# Patient Record
Sex: MALE | Race: WHITE | NOT HISPANIC OR LATINO | Employment: FULL TIME | URBAN - METROPOLITAN AREA
[De-identification: names, ages, dates, MRNs, and addresses within clinical notes are randomized per-mention and may not be internally consistent; named-entity substitution may affect disease eponyms.]

---

## 2017-01-12 ENCOUNTER — INITIAL CONSULT (OUTPATIENT)
Dept: PSYCHIATRY | Facility: CLINIC | Age: 62
End: 2017-01-12
Payer: COMMERCIAL

## 2017-01-12 DIAGNOSIS — F33.1 MODERATE EPISODE OF RECURRENT MAJOR DEPRESSIVE DISORDER: ICD-10-CM

## 2017-01-12 PROCEDURE — 90792 PSYCH DIAG EVAL W/MED SRVCS: CPT | Mod: S$GLB,,, | Performed by: PSYCHIATRY & NEUROLOGY

## 2017-01-12 RX ORDER — SERTRALINE HYDROCHLORIDE 100 MG/1
TABLET, FILM COATED ORAL
Qty: 30 TABLET | Refills: 1 | Status: SHIPPED | OUTPATIENT
Start: 2017-01-12 | End: 2017-02-14 | Stop reason: SDUPTHER

## 2017-01-12 NOTE — PROGRESS NOTES
"Outpatient Psychiatry Initial Visit (MD/NP)    2017    Stepan Springer, a 61 y.o. male, presenting for initial evaluation visit. Met with patient.    Reason for Encounter: self-referral. Patient complains of depressison.    History of Present Illness: This 62 y/o with depression recurrence, 6 months of low moods, feelings of guilt/self-reproach, hopelessness & helplessness, intermittent SI which has been mostly passive, occasional fantasies about active suicide attempts, but never seriously considers action. Most recent depression is in context of loss of love relationship to a woman he met on internet, dated for some time then broke up with him & is now in another relationship. Continued to text & attempt to contact her (she generally doesn't respond) until about a week ago, stopped because he realizes it's time to "move on", that it's taking a toll on his mental health, but still feels tempted to do it. Continues to work despite symptoms, reports performance is "ok", though "not my best work".     PsychHx: symptoms began around [employer] . first treated for depression in early '10. Did IOP at [facility] in '10. Suffered a series of losses since including wife (), mother in law (), mother (), father () & loss of job as [employer]  due to his health problems (he's since changed fields, now works in tax preparation for [employer]). Has participated in psychotherapy including since he moved to [place], stopped due to loss of insurance (though reinsured, hasn't returned since most recent depression recurrence). 1 remote episode of suicidality without action.   MedHx: DM, had NSTEMI in , FRAN, CHF, CKD, cataracts   SubstanceHx: rare wine, but mostly avoids alcohol due to his health; no illicits or prescription drug abuse.   Social Hx: normal , birth, & developmental milestones. Grew up in [place], moved to [place] in .  (wife  in '10), & 2 year relationship ended in mid , though " he's continued to contact her until very recently. 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 Works full-time. Has 3 adopted kids (one of which is child of one of other kids).      Review Of Systems:     GENERAL:  No weight gain or loss  SKIN:  No rashes or lacerations  HEAD:  No headaches  EYES:  No exophthalmos, jaundice or blindness  EARS:  No dizziness, tinnitus or hearing loss  NOSE:  No changes in smell  MOUTH & THROAT:  No dyskinetic movements or obvious goiter  CHEST:  No shortness of breath, hyperventilation or cough  CARDIOVASCULAR:  No tachycardia or chest pain  ABDOMEN:  No nausea, vomiting, pain, constipation or diarrhea  URINARY:  No frequency, dysuria or sexual dysfunction  ENDOCRINE:  No polydipsia, polyuria  MUSCULOSKELETAL:  No pain or stiffness of the joints  NEUROLOGIC:  No weakness, sensory changes, seizures, confusion, memory loss, tremor or other abnormal movements    Current Evaluation:     Nutritional Screening: Considering the patient's height and weight, medications, medical history and preferences, should a referral be made to the dietitian? no    Constitutional  Vitals:  Most recent vital signs, dated less than 90 days prior to this appointment, were not reviewed.    There were no vitals filed for this visit.     General:  unremarkable, age appropriate     Musculoskeletal  Muscle Strength/Tone:  no tremor, no tic   Gait & Station:  non-ataxic     Psychiatric  Appearance: casually dressed & groomed;   Behavior: calm,   Cooperation: cooperative with assessment  Speech: normal rate, volume, tone  Thought Process: linear, goal-directed  Thought Content: No suicidal or homicidal ideation; no delusions  Affect: normal range  Mood: euthymic  Perceptions: No auditory or visual hallucinations  Level of Consciousness: alert throughout interview  Insight: fair  Cognition: Oriented to person, place, time, &  situation  Memory: no apparent deficits to general clinical interview; not formally assessed  Attention/Concentration: no apparent deficits to general clinical interview; not formally assessed  Fund of Knowledge: average by vocabulary/education      Relevant Elements of Neurological Exam: normal gait    Functioning in Relationships:  Spouse/partner:   Peers:   Employers:     Laboratory Data  No visits with results within 1 Month(s) from this visit.  Latest known visit with results is:    Lab Visit on 11/21/2016   Component Date Value Ref Range Status    Glucose 11/21/2016 111* 70 - 110 mg/dL Final    Sodium 11/21/2016 143  136 - 145 mmol/L Final    Potassium 11/21/2016 4.0  3.5 - 5.1 mmol/L Final    Chloride 11/21/2016 112* 95 - 110 mmol/L Final    CO2 11/21/2016 18* 23 - 29 mmol/L Final    BUN, Bld 11/21/2016 40* 6 - 20 mg/dL Final    Calcium 11/21/2016 9.4  8.7 - 10.5 mg/dL Final    Creatinine 11/21/2016 2.0* 0.5 - 1.4 mg/dL Final    Albumin 11/21/2016 3.7  3.5 - 5.2 g/dL Final    Phosphorus 11/21/2016 2.9  2.7 - 4.5 mg/dL Final    eGFR if  11/21/2016 41.0* >60 mL/min/1.73 m^2 Final    eGFR if non African American 11/21/2016 35.5* >60 mL/min/1.73 m^2 Final    Anion Gap 11/21/2016 13  8 - 16 mmol/L Final         Medications  Outpatient Encounter Prescriptions as of 1/12/2017   Medication Sig Dispense Refill    aspirin 81 MG chewable tablet Take 81 mg by mouth Daily. 1 Tablet, Chewable Oral Every day      brimonidine 0.1% (ALPHAGAN P) 0.1 % Drop Place 1 drop into both eyes 3 (three) times daily. Order 90 day supply 10 mL 4    brinzolamide (AZOPT) 1 % ophthalmic suspension Place 1 drop into both eyes 3 (three) times daily. ORDER 90 DAY SUPPLY 10 mL 4    carvedilol (COREG) 25 MG tablet Take 1 tablet (25 mg total) by mouth 2 (two) times daily with meals. 80 tablet 3    furosemide (LASIX) 40 MG tablet Take 2 tablets (80 mg total) by mouth 2 (two) times daily. 180 tablet 3    insulin  NPH-insulin regular, 70/30, (NOVOLIN 70/30) 100 unit/mL (70-30) injection Inject 40 Units into the skin 2 (two) times daily. (Patient taking differently: Inject 50 Units into the skin 2 (two) times daily. ) 20 mL 11    lisinopril (PRINIVIL,ZESTRIL) 20 MG tablet Take 1 tablet (20 mg total) by mouth once daily. 90 tablet 3    lovastatin (MEVACOR) 10 MG tablet Take 1 tablet (10 mg total) by mouth every evening. 90 tablet 3    nitroGLYCERIN (NITROSTAT) 0.4 MG SL tablet Place 1 tablet (0.4 mg total) under the tongue every 5 (five) minutes as needed for Chest pain. 20 tablet 0    spironolactone (ALDACTONE) 25 MG tablet Take 1 tablet (25 mg total) by mouth 2 (two) times daily. 180 tablet 3    ticagrelor (BRILINTA) 90 mg tablet Take 1 tablet (90 mg total) by mouth 2 (two) times daily. 180 tablet 3    [DISCONTINUED] sertraline (ZOLOFT) 100 MG tablet Take 1 tablet (100 mg total) by mouth 2 (two) times daily. 60 tablet 11     No facility-administered encounter medications on file as of 1/12/2017.        Assessment - Diagnosis - Goals:     Impression: This 60 y/o WM with MDD, with recurrent major depression. Prior benefit from antidepressants & psychotherapy.     Major depressive disorder, recurrent, moderate    Treatment Goals:  Specify outcomes written in observable, behavioral terms:   Depression: increasing energy, increasing interest in usual activities, increasing motivation, reducing excessive guilt and reducing fatigue    Treatment Plan/Recommendations:   Start sertraline, taper up to 100 mg. Discussed risks, benefits, and alternatives to treatment plan documented above with patient. I answered all patient questions related to this plan and patient expressed understanding and agreement.   Psychotherapy referral    Return to Clinic: 1 month    Counseling time: 10 minutes  Total time: 50 minutes    RAMON Pitt MD

## 2017-01-14 DIAGNOSIS — I10 ESSENTIAL HYPERTENSION: ICD-10-CM

## 2017-01-14 RX ORDER — CARVEDILOL 25 MG/1
TABLET ORAL
Qty: 80 TABLET | Refills: 11 | Status: SHIPPED | OUTPATIENT
Start: 2017-01-14 | End: 2017-07-06 | Stop reason: SDUPTHER

## 2017-01-18 PROBLEM — F33.9 RECURRENT MAJOR DEPRESSIVE DISORDER: Status: ACTIVE | Noted: 2017-01-18

## 2017-01-31 ENCOUNTER — TELEPHONE (OUTPATIENT)
Dept: OPHTHALMOLOGY | Facility: CLINIC | Age: 62
End: 2017-01-31

## 2017-01-31 NOTE — TELEPHONE ENCOUNTER
Left message for patient that Dr. Hale is unable to approve drivers license form, due to his glaucoma cause loss of visual field.    We are mailed the form to him, and if he has any question to please call us @ [phone]

## 2017-02-01 ENCOUNTER — OFFICE VISIT (OUTPATIENT)
Dept: CARDIOLOGY | Facility: CLINIC | Age: 62
End: 2017-02-01
Payer: COMMERCIAL

## 2017-02-01 VITALS
OXYGEN SATURATION: 98 % | HEART RATE: 70 BPM | HEIGHT: 78 IN | WEIGHT: 315 LBS | BODY MASS INDEX: 36.45 KG/M2 | SYSTOLIC BLOOD PRESSURE: 130 MMHG | DIASTOLIC BLOOD PRESSURE: 80 MMHG

## 2017-02-01 DIAGNOSIS — I42.8 CARDIOMYOPATHY, NONISCHEMIC: Chronic | ICD-10-CM

## 2017-02-01 DIAGNOSIS — E66.01 OBESITY, CLASS III, BMI 40-49.9 (MORBID OBESITY): Chronic | ICD-10-CM

## 2017-02-01 DIAGNOSIS — I44.7 LBBB (LEFT BUNDLE BRANCH BLOCK): ICD-10-CM

## 2017-02-01 DIAGNOSIS — E78.2 MIXED HYPERLIPIDEMIA: Chronic | ICD-10-CM

## 2017-02-01 DIAGNOSIS — G47.33 OSA (OBSTRUCTIVE SLEEP APNEA): Primary | ICD-10-CM

## 2017-02-01 DIAGNOSIS — I10 ESSENTIAL HYPERTENSION: Chronic | ICD-10-CM

## 2017-02-01 DIAGNOSIS — I50.20 SYSTOLIC CONGESTIVE HEART FAILURE, UNSPECIFIED CONGESTIVE HEART FAILURE CHRONICITY: ICD-10-CM

## 2017-02-01 PROCEDURE — 99999 PR PBB SHADOW E&M-EST. PATIENT-LVL III: CPT | Mod: PBBFAC,,, | Performed by: INTERNAL MEDICINE

## 2017-02-01 PROCEDURE — 3079F DIAST BP 80-89 MM HG: CPT | Mod: S$GLB,,, | Performed by: INTERNAL MEDICINE

## 2017-02-01 PROCEDURE — 3075F SYST BP GE 130 - 139MM HG: CPT | Mod: S$GLB,,, | Performed by: INTERNAL MEDICINE

## 2017-02-01 PROCEDURE — 99214 OFFICE O/P EST MOD 30 MIN: CPT | Mod: S$GLB,,, | Performed by: INTERNAL MEDICINE

## 2017-02-01 PROCEDURE — 3066F NEPHROPATHY DOC TX: CPT | Mod: S$GLB,,, | Performed by: INTERNAL MEDICINE

## 2017-02-01 PROCEDURE — 3044F HG A1C LEVEL LT 7.0%: CPT | Mod: S$GLB,,, | Performed by: INTERNAL MEDICINE

## 2017-02-01 NOTE — PROGRESS NOTES
Subjective:   Patient ID:  Stepan Springer is a 61 y.o. male who presents for follow-up of PCI of RCA after NSTEMI 9-16.  Patient denies CP, angina or anginal equivalent.LDL 49.Pt increased lasix because of edema from zoloft    Hypertension   This is a chronic problem. The current episode started more than 1 year ago. The problem has been gradually improving since onset. The problem is controlled. Pertinent negatives include no chest pain, palpitations or shortness of breath. Past treatments include ACE inhibitors, beta blockers and diuretics. The current treatment provides moderate improvement. Compliance problems include exercise.    Hyperlipidemia   This is a chronic problem. The current episode started more than 1 year ago. The problem is controlled. Recent lipid tests were reviewed and are variable. Pertinent negatives include no chest pain or shortness of breath. Current antihyperlipidemic treatment includes statins. The current treatment provides moderate improvement of lipids. Compliance problems include adherence to exercise.    Coronary Artery Disease   Presents for follow-up visit. Pertinent negatives include no chest pain, chest pressure, chest tightness, dizziness, leg swelling, muscle weakness, palpitations, shortness of breath or weight gain. Risk factors include hyperlipidemia. The symptoms have been stable. Compliance with diet is variable. Compliance with exercise is variable. Compliance with medications is good.       Review of Systems   Constitution: Negative for weight gain.   Cardiovascular: Negative for chest pain, leg swelling and palpitations.   Respiratory: Negative for chest tightness and shortness of breath.    Musculoskeletal: Negative for muscle weakness.   Neurological: Negative for dizziness.     Family History   Problem Relation Age of Onset    Macular degeneration Father     Heart disease Father      CHF     Heart attack Father     Hypertension Mother     Macular  degeneration Paternal Uncle     Hypertension Maternal Grandmother     Hypertension Maternal Grandfather     Strabismus Neg Hx     Retinal detachment Neg Hx     Glaucoma Neg Hx     Blindness Neg Hx     Amblyopia Neg Hx      Past Medical History   Diagnosis Date    Anxiety     CHF (congestive heart failure)     CKD (chronic kidney disease) stage 3, GFR 30-59 ml/min     Coronary artery disease involving native coronary artery without angina pectoris 10/18/2016    Diabetes mellitus type 2, controlled, with complications 8/21/2013    Glaucoma     Hyperlipidemia     Hypertension     Idiopathic peripheral neuropathy 12/11/2012    Mixed hyperlipidemia 12/11/2012    Morbid obesity     Retinal detachment     Sleep apnea     Vitamin B 12 deficiency 8/23/2012     Current Outpatient Prescriptions on File Prior to Visit   Medication Sig Dispense Refill    aspirin 81 MG chewable tablet Take 81 mg by mouth Daily. 1 Tablet, Chewable Oral Every day      brimonidine 0.1% (ALPHAGAN P) 0.1 % Drop Place 1 drop into both eyes 3 (three) times daily. Order 90 day supply 10 mL 4    brinzolamide (AZOPT) 1 % ophthalmic suspension Place 1 drop into both eyes 3 (three) times daily. ORDER 90 DAY SUPPLY 10 mL 4    carvedilol (COREG) 25 MG tablet TAKE ONE TABLET BY MOUTH TWICE DAILY WITH MEALS 80 tablet 11    furosemide (LASIX) 40 MG tablet Take 2 tablets (80 mg total) by mouth 2 (two) times daily. 180 tablet 3    insulin NPH-insulin regular, 70/30, (NOVOLIN 70/30) 100 unit/mL (70-30) injection Inject 40 Units into the skin 2 (two) times daily. (Patient taking differently: Inject 50 Units into the skin 2 (two) times daily. ) 20 mL 11    lisinopril (PRINIVIL,ZESTRIL) 20 MG tablet Take 1 tablet (20 mg total) by mouth once daily. 90 tablet 3    lovastatin (MEVACOR) 10 MG tablet Take 1 tablet (10 mg total) by mouth every evening. 90 tablet 3    nitroGLYCERIN (NITROSTAT) 0.4 MG SL tablet Place 1 tablet (0.4 mg total) under  the tongue every 5 (five) minutes as needed for Chest pain. 20 tablet 0    sertraline (ZOLOFT) 100 MG tablet Take 1/2 tablet daily for the first seven days then 1 tablet daily thereafter (Patient taking differently: 1 tablet daily) 30 tablet 1    spironolactone (ALDACTONE) 25 MG tablet Take 1 tablet (25 mg total) by mouth 2 (two) times daily. 180 tablet 3    ticagrelor (BRILINTA) 90 mg tablet Take 1 tablet (90 mg total) by mouth 2 (two) times daily. 180 tablet 3     No current facility-administered medications on file prior to visit.      Review of patient's allergies indicates:   Allergen Reactions    Cefazolin      Other reaction(s): Shakes  Other reaction(s): Chills       Objective:     Physical Exam    Assessment:     1. FRAN (obstructive sleep apnea)    2. Systolic congestive heart failure, unspecified congestive heart failure chronicity    3. LBBB (left bundle branch block)    4. Cardiomyopathy, nonischemic    5. Essential hypertension    6. Mixed hyperlipidemia    7. Uncontrolled type 2 diabetes mellitus without complication, without long-term current use of insulin    8. Obesity, Class III, BMI 40-49.9 (morbid obesity)        Plan:     FRAN (obstructive sleep apnea)    Systolic congestive heart failure, unspecified congestive heart failure chronicity    LBBB (left bundle branch block)    Cardiomyopathy, nonischemic    Essential hypertension    Mixed hyperlipidemia    Uncontrolled type 2 diabetes mellitus without complication, without long-term current use of insulin    Obesity, Class III, BMI 40-49.9 (morbid obesity)      Continue asa, brilinta- cad/pci  Continue coreg, lisinopril, lasix-htn  Check K

## 2017-02-01 NOTE — MR AVS SNAPSHOT
[facility]  [address] [facility] [address]  Phone: [phone]  Fax: [phone]                  Stepan Springer   2017 8:00 AM   Office Visit    Description:  Male : 1955   Provider:  Laz Gann MD   Department:  [facility]           Diagnoses this Visit        Comments    FRAN (obstructive sleep apnea)    -  Primary     Systolic congestive heart failure, unspecified congestive heart failure chronicity         LBBB (left bundle branch block)         Cardiomyopathy, nonischemic         Essential hypertension         Mixed hyperlipidemia         Uncontrolled type 2 diabetes mellitus without complication, without long-term current use of insulin         Obesity, Class III, BMI 40-49.9 (morbid obesity)                To Do List           Future Appointments        Provider Department Dept Phone    2017 7:50 AM LABORATORY, [facility] [phone]    2017 8:00 AM SPECIMEN, [facility] [phone]    3/6/2017 2:20 PM Liza Fontenot MD [facility] - Internal Medicine [phone]    3/17/2017 8:15 AM Alvin Hale MD [facility] Ophthalmology [phone]      Goals (5 Years of Data)     None      Follow-Up and Disposition     Return in about 6 months (around 2017).      [facility]     [facility] -  Assistance  Registered nurses in the [facility] provide clinical advisement, health education, appointment booking, and other advisory services.  Call for this free service at [phone].             Medications           Message regarding Medications     Verify the changes and/or additions to your medication regime listed below are the same as discussed with your clinician today.  If any of these changes or additions are incorrect, please notify your healthcare provider.             Verify that the below list of medications is an accurate  "representation of the medications you are currently taking.  If none reported, the list may be blank. If incorrect, please contact your healthcare provider. Carry this list with you in case of emergency.           Current Medications     aspirin 81 MG chewable tablet Take 81 mg by mouth Daily. 1 Tablet, Chewable Oral Every day    brimonidine 0.1% (ALPHAGAN P) 0.1 % Drop Place 1 drop into both eyes 3 (three) times daily. Order 90 day supply    brinzolamide (AZOPT) 1 % ophthalmic suspension Place 1 drop into both eyes 3 (three) times daily. ORDER 90 DAY SUPPLY    carvedilol (COREG) 25 MG tablet TAKE ONE TABLET BY MOUTH TWICE DAILY WITH MEALS    furosemide (LASIX) 40 MG tablet Take 2 tablets (80 mg total) by mouth 2 (two) times daily.    insulin NPH-insulin regular, 70/30, (NOVOLIN 70/30) 100 unit/mL (70-30) injection Inject 40 Units into the skin 2 (two) times daily.    lisinopril (PRINIVIL,ZESTRIL) 20 MG tablet Take 1 tablet (20 mg total) by mouth once daily.    lovastatin (MEVACOR) 10 MG tablet Take 1 tablet (10 mg total) by mouth every evening.    nitroGLYCERIN (NITROSTAT) 0.4 MG SL tablet Place 1 tablet (0.4 mg total) under the tongue every 5 (five) minutes as needed for Chest pain.    sertraline (ZOLOFT) 100 MG tablet Take 1/2 tablet daily for the first seven days then 1 tablet daily thereafter    spironolactone (ALDACTONE) 25 MG tablet Take 1 tablet (25 mg total) by mouth 2 (two) times daily.    ticagrelor (BRILINTA) 90 mg tablet Take 1 tablet (90 mg total) by mouth 2 (two) times daily.           Clinical Reference Information           Vital Signs - Last Recorded  Most recent update: 2/1/2017  8:12 AM by Rachael Adamson LPN    BP Pulse Ht Wt SpO2 BMI    130/80 (BP Location: Right arm, Patient Position: Sitting, BP Method: Manual) 70 6' 7" (2.007 m) (!) 168.2 kg (370 lb 13 oz) 98% 41.77 kg/m2      Blood Pressure          Most Recent Value    Right Arm BP - Sitting  130/80    Left Arm BP - Sitting  130/80    BP  " 130/80      Allergies as of 2/1/2017     Cefazolin      Immunizations Administered on Date of Encounter - 2/1/2017     None

## 2017-02-06 ENCOUNTER — TELEPHONE (OUTPATIENT)
Dept: OPHTHALMOLOGY | Facility: CLINIC | Age: 62
End: 2017-02-06

## 2017-02-06 NOTE — TELEPHONE ENCOUNTER
----- Message from Joan Cho sent at 2/6/2017 12:40 PM CST -----  Contact: pt  [phone]  Would like to speak with staff about forms that was drop off to [place] pt states he need forms filled out and signed fax back today for [facility]mv pls fax to  [phone] today

## 2017-02-06 NOTE — TELEPHONE ENCOUNTER
Left Message for patient that we tried to reach him on 1-31-17 and left message on that date that due to his visual field loss from his glaucoma Dr. Hale could not approve his DMV form and it was mailed out on 1-31-17 back to your home address.

## 2017-02-07 ENCOUNTER — TELEPHONE (OUTPATIENT)
Dept: OPHTHALMOLOGY | Facility: CLINIC | Age: 62
End: 2017-02-07

## 2017-02-07 NOTE — TELEPHONE ENCOUNTER
Patient notified that DMV form was faxed to Dr. Hale and patient will  2-8-17 in the morning at our reception desk

## 2017-02-07 NOTE — TELEPHONE ENCOUNTER
Left message for patient to call with location of drivers license test so that I can call and try to get a new form sent over for Dr. Hale to approve DL renewal.  He reviewed his chart and did do conf. VF and will be able to renew form.  Please call [phone] and ask for Jeni with information

## 2017-02-08 ENCOUNTER — LAB VISIT (OUTPATIENT)
Dept: LAB | Facility: HOSPITAL | Age: 62
End: 2017-02-08
Attending: INTERNAL MEDICINE
Payer: COMMERCIAL

## 2017-02-08 DIAGNOSIS — E78.2 MIXED HYPERLIPIDEMIA: Chronic | ICD-10-CM

## 2017-02-08 DIAGNOSIS — Z29.9 PREVENTIVE MEASURE: ICD-10-CM

## 2017-02-08 LAB
CREAT UR-MCNC: 97 MG/DL
MICROALBUMIN UR DL<=1MG/L-MCNC: 7 UG/ML
MICROALBUMIN/CREATININE RATIO: 7.2 UG/MG

## 2017-02-08 PROCEDURE — 82570 ASSAY OF URINE CREATININE: CPT

## 2017-02-14 ENCOUNTER — OFFICE VISIT (OUTPATIENT)
Dept: PSYCHIATRY | Facility: CLINIC | Age: 62
End: 2017-02-14
Payer: COMMERCIAL

## 2017-02-14 DIAGNOSIS — F33.0 MILD EPISODE OF RECURRENT MAJOR DEPRESSIVE DISORDER: Primary | ICD-10-CM

## 2017-02-14 PROCEDURE — 99213 OFFICE O/P EST LOW 20 MIN: CPT | Mod: S$GLB,,, | Performed by: PSYCHIATRY & NEUROLOGY

## 2017-02-14 RX ORDER — SERTRALINE HYDROCHLORIDE 100 MG/1
TABLET, FILM COATED ORAL
Qty: 30 TABLET | Refills: 2 | Status: SHIPPED | OUTPATIENT
Start: 2017-02-14 | End: 2017-05-02 | Stop reason: ALTCHOICE

## 2017-02-14 NOTE — PROGRESS NOTES
"Outpatient Psychiatry Initial Visit (MD/NP)    2/14/2017    Stepan Springer, a 61 y.o. male, presenting for initial evaluation visit. Met with patient.    Reason for Encounter: self-referral. Patient complains of depressison.    Interval History: Pt. Seen & interviewed for f/u, about 1 month since initial eval. Reports work has been intensely busy amidst high tax season compounded by company being understaffed, that the business leads him to feel anxious, but not overwhelmed, but much less depressed than he had been. Also thinks switch to sertraline may have helped, but knows that distraction is a major factor because he continues to get depressed during times when he's not consumed by work, grieves loss of his relationship. Hasn't been texting ex as he had before. Says "I know it's done, but she's the best woman in the world". Adherent to meds, denies side effects. Is looking forward to restarting therapy.       Background: This 60 y/o with depression recurrence, 6 months of low moods, feelings of guilt/self-reproach, hopelessness & helplessness, intermittent SI which has been mostly passive, occasional fantasies about active suicide attempts, but never seriously considers action. Most recent depression is in context of loss of love relationship to a woman he met on internet, dated for some time then broke up with him & is now in another relationship. Continued to text & attempt to contact her (she generally doesn't respond) until about a week ago, stopped because he realizes it's time to "move on", that it's taking a toll on his mental health, but still feels tempted to do it. Continues to work despite symptoms, reports performance is "ok", though "not my best work". PsychHx: symptoms began around Jose Manuel '09. first treated for depression in early '10. [place] [facility] [place] Suffered a series of losses since including wife ('09), mother in law ('11), mother ('12), father ('13) & loss of job as [employer]  " due to his health problems (he's since changed fields, now works in tax preparation for [employer]). Has participated in psychotherapy including since he moved to [place], stopped due to loss of insurance (though reinsured, hasn't returned since most recent depression recurrence). 1 remote episode of suicidality without action.  MedHx: DM, had NSTEMI in , FRAN, CHF, CKD, cataracts   SubstanceHx: rare wine, but mostly avoids alcohol due to his health; no illicits or prescription drug abuse. Social Hx: normal , birth, & developmental milestones. Grew up in [place], moved to [place] in .  (wife  in '10), & 2 year relationship ended in mid , though he's continued to contact her until very recently. 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 Works full-time. Has 3 adopted kids (one of which is child of one of other kids).      Review Of Systems:     GENERAL:  No weight gain or loss  SKIN:  No rashes or lacerations  HEAD:  No headaches  EYES:  No exophthalmos, jaundice or blindness  EARS:  No dizziness, tinnitus or hearing loss  NOSE:  No changes in smell  MOUTH & THROAT:  No dyskinetic movements or obvious goiter  CHEST:  No shortness of breath, hyperventilation or cough  CARDIOVASCULAR:  No tachycardia or chest pain  ABDOMEN:  No nausea, vomiting, pain, constipation or diarrhea  URINARY:  No frequency, dysuria or sexual dysfunction  ENDOCRINE:  No polydipsia, polyuria  MUSCULOSKELETAL:  No pain or stiffness of the joints  NEUROLOGIC:  No weakness, sensory changes, seizures, confusion, memory loss, tremor or other abnormal movements    Current Evaluation:     Nutritional Screening: Considering the patient's height and weight, medications, medical history and preferences, should a referral be made to the dietitian? no    Constitutional  Vitals:  Most recent vital signs, dated less than 90 days prior to this  appointment, were not reviewed.    There were no vitals filed for this visit.     General:  unremarkable, age appropriate     Musculoskeletal  Muscle Strength/Tone:  no tremor, no tic   Gait & Station:  non-ataxic     Psychiatric  Appearance: casually dressed & groomed;   Behavior: calm,   Cooperation: cooperative with assessment  Speech: normal rate, volume, tone  Thought Process: linear, goal-directed  Thought Content: No suicidal or homicidal ideation; no delusions  Affect: normal range  Mood: euthymic  Perceptions: No auditory or visual hallucinations  Level of Consciousness: alert throughout interview  Insight: fair  Cognition: Oriented to person, place, time, & situation  Memory: no apparent deficits to general clinical interview; not formally assessed  Attention/Concentration: no apparent deficits to general clinical interview; not formally assessed  Fund of Knowledge: average by vocabulary/education      Relevant Elements of Neurological Exam: normal gait    Functioning in Relationships:  Spouse/partner:   Peers:   Employers:     Laboratory Data  Lab Visit on 02/08/2017   Component Date Value Ref Range Status    Sodium 02/08/2017 141  136 - 145 mmol/L Final    Potassium 02/08/2017 4.8  3.5 - 5.1 mmol/L Final    Chloride 02/08/2017 110  95 - 110 mmol/L Final    CO2 02/08/2017 25  23 - 29 mmol/L Final    Glucose 02/08/2017 89  70 - 110 mg/dL Final    BUN, Bld 02/08/2017 31* 6 - 20 mg/dL Final    Creatinine 02/08/2017 1.8* 0.5 - 1.4 mg/dL Final    Calcium 02/08/2017 9.0  8.7 - 10.5 mg/dL Final    Total Protein 02/08/2017 6.9  6.0 - 8.4 g/dL Final    Albumin 02/08/2017 3.6  3.5 - 5.2 g/dL Final    Total Bilirubin 02/08/2017 0.3  0.1 - 1.0 mg/dL Final    Alkaline Phosphatase 02/08/2017 91  55 - 135 U/L Final    AST 02/08/2017 16  10 - 40 U/L Final    ALT 02/08/2017 21  10 - 44 U/L Final    Anion Gap 02/08/2017 6* 8 - 16 mmol/L Final    eGFR if  02/08/2017 46.6* >60 mL/min/1.73 m^2  Final    eGFR if non  02/08/2017 40.3* >60 mL/min/1.73 m^2 Final    PSA, SCREEN 02/08/2017 0.18  0.00 - 4.00 ng/mL Final    WBC 02/08/2017 9.87  3.90 - 12.70 K/uL Final    RBC 02/08/2017 4.09* 4.60 - 6.20 M/uL Final    Hemoglobin 02/08/2017 13.2* 14.0 - 18.0 g/dL Final    Hematocrit 02/08/2017 39.2* 40.0 - 54.0 % Final    MCV 02/08/2017 96  82 - 98 fL Final    MCH 02/08/2017 32.3* 27.0 - 31.0 pg Final    MCHC 02/08/2017 33.7  32.0 - 36.0 % Final    RDW 02/08/2017 12.8  11.5 - 14.5 % Final    Platelets 02/08/2017 216  150 - 350 K/uL Final    MPV 02/08/2017 9.7  9.2 - 12.9 fL Final    Gran # 02/08/2017 7.5  1.8 - 7.7 K/uL Final    Lymph # 02/08/2017 1.1  1.0 - 4.8 K/uL Final    Mono # 02/08/2017 0.8  0.3 - 1.0 K/uL Final    Eos # 02/08/2017 0.5  0.0 - 0.5 K/uL Final    Baso # 02/08/2017 0.01  0.00 - 0.20 K/uL Final    Gran% 02/08/2017 76.1* 38.0 - 73.0 % Final    Lymph% 02/08/2017 11.0* 18.0 - 48.0 % Final    Mono% 02/08/2017 7.9  4.0 - 15.0 % Final    Eosinophil% 02/08/2017 4.7  0.0 - 8.0 % Final    Basophil% 02/08/2017 0.1  0.0 - 1.9 % Final    Differential Method 02/08/2017 Automated   Final    TSH 02/08/2017 1.640  0.400 - 4.000 uIU/mL Final    Vit D, 25-Hydroxy 02/08/2017 28* 30 - 96 ng/mL Final    Hemoglobin A1C 02/08/2017 6.2  4.5 - 6.2 % Final    Estimated Avg Glucose 02/08/2017 131  68 - 131 mg/dL Final   Lab Visit on 02/08/2017   Component Date Value Ref Range Status    Microalbum.,U,Random 02/08/2017 7.0  ug/mL Final    Creatinine, Random Ur 02/08/2017 97.0  23.0 - 375.0 mg/dL Final    Microalb Creat Ratio 02/08/2017 7.2  0.0 - 30.0 ug/mg Final         Medications  Outpatient Encounter Prescriptions as of 2/14/2017   Medication Sig Dispense Refill    aspirin 81 MG chewable tablet Take 81 mg by mouth Daily. 1 Tablet, Chewable Oral Every day      brimonidine 0.1% (ALPHAGAN P) 0.1 % Drop Place 1 drop into both eyes 3 (three) times daily. Order 90 day supply 10 mL  4    brinzolamide (AZOPT) 1 % ophthalmic suspension Place 1 drop into both eyes 3 (three) times daily. ORDER 90 DAY SUPPLY 10 mL 4    carvedilol (COREG) 25 MG tablet TAKE ONE TABLET BY MOUTH TWICE DAILY WITH MEALS 80 tablet 11    furosemide (LASIX) 40 MG tablet Take 2 tablets (80 mg total) by mouth 2 (two) times daily. 180 tablet 3    insulin NPH-insulin regular, 70/30, (NOVOLIN 70/30) 100 unit/mL (70-30) injection Inject 40 Units into the skin 2 (two) times daily. (Patient taking differently: Inject 50 Units into the skin 2 (two) times daily. ) 20 mL 11    lisinopril (PRINIVIL,ZESTRIL) 20 MG tablet Take 1 tablet (20 mg total) by mouth once daily. 90 tablet 3    lovastatin (MEVACOR) 10 MG tablet Take 1 tablet (10 mg total) by mouth every evening. 90 tablet 3    nitroGLYCERIN (NITROSTAT) 0.4 MG SL tablet Place 1 tablet (0.4 mg total) under the tongue every 5 (five) minutes as needed for Chest pain. 20 tablet 0    sertraline (ZOLOFT) 100 MG tablet Take 1/2 tablet daily for the first seven days then 1 tablet daily thereafter (Patient taking differently: 1 tablet daily) 30 tablet 1    spironolactone (ALDACTONE) 25 MG tablet Take 1 tablet (25 mg total) by mouth 2 (two) times daily. 180 tablet 3    ticagrelor (BRILINTA) 90 mg tablet Take 1 tablet (90 mg total) by mouth 2 (two) times daily. 180 tablet 3     No facility-administered encounter medications on file as of 2/14/2017.        Assessment - Diagnosis - Goals:     Impression: This 60 y/o WM with MDD, with recurrent major depression. Prior benefit from antidepressants & psychotherapy. Symptoms improved, partially due to behavioral activation, perhaps some antidepressant benefit.     Major depressive disorder, recurrent, mild    Treatment Goals:  Specify outcomes written in observable, behavioral terms:   Depression: increasing energy, increasing interest in usual activities, increasing motivation, reducing excessive guilt and reducing fatigue    Treatment  Plan/Recommendations:   Continue sertraline 100 mg. Discussed risks, benefits, and alternatives to treatment plan documented above with patient. I answered all patient questions related to this plan and patient expressed understanding and agreement.   Psychotherapy appt. Pending.     Return to Clinic: 2 months    Counseling time: 5 minutes  Total time: 20 minutes    RAMON Pitt MD

## 2017-02-20 ENCOUNTER — INITIAL CONSULT (OUTPATIENT)
Dept: PSYCHIATRY | Facility: CLINIC | Age: 62
End: 2017-02-20
Payer: COMMERCIAL

## 2017-02-20 DIAGNOSIS — F33.1 MAJOR DEPRESSIVE DISORDER, RECURRENT EPISODE, MODERATE: Primary | ICD-10-CM

## 2017-02-20 PROCEDURE — 90791 PSYCH DIAGNOSTIC EVALUATION: CPT | Mod: S$GLB,,, | Performed by: SOCIAL WORKER
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
## 2017-02-20 NOTE — LETTER
February 22, 2017        Wu Campbell MD  [address] [facility] [place] [address]             [facility] - Psychiatry  [address] [facility] [address] [place] [address]  Phone: [phone]  Fax: [phone]   Patient: Stepan Springer   MR Number: [id number]   YOB: 1955   Date of Visit: 2/20/2017       Dear Dr. Campbell:    Thank you for referring Stepan Springer to me for evaluation. Below are the relevant portions of my assessment and plan of care.            If you have questions, please do not hesitate to call me. I look forward to following Stepan along with you.    Sincerely,      Fransisco Lewis, ADRIANA           CC  No Recipients

## 2017-02-21 ENCOUNTER — PATIENT OUTREACH (OUTPATIENT)
Dept: ADMINISTRATIVE | Facility: HOSPITAL | Age: 62
End: 2017-02-21

## 2017-03-07 ENCOUNTER — OFFICE VISIT (OUTPATIENT)
Dept: INTERNAL MEDICINE | Facility: CLINIC | Age: 62
End: 2017-03-07
Payer: COMMERCIAL

## 2017-03-07 VITALS
HEIGHT: 78 IN | TEMPERATURE: 97 F | OXYGEN SATURATION: 97 % | HEART RATE: 84 BPM | SYSTOLIC BLOOD PRESSURE: 144 MMHG | WEIGHT: 315 LBS | BODY MASS INDEX: 36.45 KG/M2 | DIASTOLIC BLOOD PRESSURE: 86 MMHG

## 2017-03-07 DIAGNOSIS — Z00.00 ENCOUNTER FOR PREVENTIVE HEALTH EXAMINATION: ICD-10-CM

## 2017-03-07 DIAGNOSIS — N18.30 CHRONIC KIDNEY DISEASE, STAGE III (MODERATE): Chronic | ICD-10-CM

## 2017-03-07 DIAGNOSIS — F33.9 EPISODE OF RECURRENT MAJOR DEPRESSIVE DISORDER, UNSPECIFIED DEPRESSION EPISODE SEVERITY: ICD-10-CM

## 2017-03-07 DIAGNOSIS — I25.10 CORONARY ARTERY DISEASE INVOLVING NATIVE CORONARY ARTERY WITHOUT ANGINA PECTORIS, UNSPECIFIED WHETHER NATIVE OR TRANSPLANTED HEART: ICD-10-CM

## 2017-03-07 DIAGNOSIS — E78.2 MIXED HYPERLIPIDEMIA: Chronic | ICD-10-CM

## 2017-03-07 DIAGNOSIS — F41.9 ANXIETY: ICD-10-CM

## 2017-03-07 DIAGNOSIS — I10 ESSENTIAL HYPERTENSION: Chronic | ICD-10-CM

## 2017-03-07 PROCEDURE — 99396 PREV VISIT EST AGE 40-64: CPT | Mod: S$GLB,,, | Performed by: INTERNAL MEDICINE

## 2017-03-07 PROCEDURE — 3077F SYST BP >= 140 MM HG: CPT | Mod: S$GLB,,, | Performed by: INTERNAL MEDICINE

## 2017-03-07 PROCEDURE — 99999 PR PBB SHADOW E&M-EST. PATIENT-LVL III: CPT | Mod: PBBFAC,,, | Performed by: INTERNAL MEDICINE

## 2017-03-07 PROCEDURE — 3079F DIAST BP 80-89 MM HG: CPT | Mod: S$GLB,,, | Performed by: INTERNAL MEDICINE

## 2017-03-07 RX ORDER — SODIUM, POTASSIUM,MAG SULFATES 17.5-3.13G
1 SOLUTION, RECONSTITUTED, ORAL ORAL DAILY
Qty: 1 BOTTLE | Refills: 0 | Status: SHIPPED | OUTPATIENT
Start: 2017-03-07 | End: 2018-04-18

## 2017-03-07 NOTE — MR AVS SNAPSHOT
[facility] Medicine  [address] [place] [address]  Phone: [phone]  Fax: [phone]                  Stepan Springer   3/7/2017 4:20 PM   Office Visit    Description:  Male : 1955   Provider:  Liza Fontenot MD   Department:  [facility] - Internal Medicine           Reason for Visit     Annual Exam           Diagnoses this Visit        Comments    Uncontrolled type 2 diabetes mellitus with stage 3 chronic kidney disease, with long-term current use of insulin    -  Primary     Episode of recurrent major depressive disorder, unspecified depression episode severity         Essential hypertension         Mixed hyperlipidemia         Anxiety         Chronic kidney disease, stage III (moderate)         Coronary artery disease involving native coronary artery without angina pectoris, unspecified whether native or transplanted heart                To Do List           Future Appointments        Provider Department Dept Phone    3/17/2017 8:15 AM Alvin Hale MD [facility] Ophthalmology [phone]    2017 10:10 AM LAB[facility]ATORY, [facility] [phone]    2017 7:00 AM Liza Fontenot MD [facility] Internal Medicine [phone]      Goals (5 Years of Data)     None      Follow-Up and Disposition     Return in about 3 months (around 2017).      [facility]     [facility] -  Assistance  Registered nurses in the [facility] provide clinical advisement, health education, appointment booking, and other advisory services.  Call for this free service at [phone].             Medications           Message regarding Medications     Verify the changes and/or additions to your medication regime listed below are the same as discussed with your clinician today.  If any of these changes or additions are incorrect, please notify your healthcare provider.             Verify that the below list of medications is an  "accurate representation of the medications you are currently taking.  If none reported, the list may be blank. If incorrect, please contact your healthcare provider. Carry this list with you in case of emergency.           Current Medications     aspirin 81 MG chewable tablet Take 81 mg by mouth Daily. 1 Tablet, Chewable Oral Every day    brimonidine 0.1% (ALPHAGAN P) 0.1 % Drop Place 1 drop into both eyes 3 (three) times daily. Order 90 day supply    brinzolamide (AZOPT) 1 % ophthalmic suspension Place 1 drop into both eyes 3 (three) times daily. ORDER 90 DAY SUPPLY    carvedilol (COREG) 25 MG tablet TAKE ONE TABLET BY MOUTH TWICE DAILY WITH MEALS    furosemide (LASIX) 40 MG tablet Take 2 tablets (80 mg total) by mouth 2 (two) times daily.    insulin NPH-insulin regular, 70/30, (NOVOLIN 70/30) 100 unit/mL (70-30) injection Inject 40 Units into the skin 2 (two) times daily.    lisinopril (PRINIVIL,ZESTRIL) 20 MG tablet Take 1 tablet (20 mg total) by mouth once daily.    lovastatin (MEVACOR) 10 MG tablet Take 1 tablet (10 mg total) by mouth every evening.    nitroGLYCERIN (NITROSTAT) 0.4 MG SL tablet Place 1 tablet (0.4 mg total) under the tongue every 5 (five) minutes as needed for Chest pain.    sertraline (ZOLOFT) 100 MG tablet 1 tablet daily    spironolactone (ALDACTONE) 25 MG tablet Take 1 tablet (25 mg total) by mouth 2 (two) times daily.    ticagrelor (BRILINTA) 90 mg tablet Take 1 tablet (90 mg total) by mouth 2 (two) times daily.           Clinical Reference Information           Your Vitals Were     BP Pulse Temp Height Weight SpO2    144/86 (BP Location: Right arm) 84 96.6 °F (35.9 °C) (Tympanic) 6' 7" (2.007 m) 173.3 kg (382 lb 0.9 oz) 97%    BMI                43.04 kg/m2          Blood Pressure          Most Recent Value    BP  (!)  144/86      Allergies as of 3/7/2017     Cefazolin      Immunizations Administered on Date of Encounter - 3/7/2017     None      Orders Placed During Today's Visit     " Future Labs/Procedures Expected by Expires    Hemoglobin A1c  3/7/2017 5/6/2018      Language Assistance Services     ATTENTION: Language assistance services are available, free of charge. Please call [phone].      ATENCIÓN: Si guanako barber, tiene a mckeon disposición servicios gratuitos de asistencia lingüística. Llame al [phone].     CHÚ Ý: N?u b?n nói Ti?ng Vi?t, có các d?ch v? h? tr? ngôn ng? mi?n phí dành cho b?n. G?i s? [phone].         [facility] - Internal Medicine complies with applicable Federal civil rights laws and does not discriminate on the basis of race, color, national origin, age, disability, or sex.

## 2017-03-10 ENCOUNTER — OFFICE VISIT (OUTPATIENT)
Dept: PSYCHIATRY | Facility: CLINIC | Age: 62
End: 2017-03-10
Payer: COMMERCIAL

## 2017-03-10 DIAGNOSIS — F33.1 MAJOR DEPRESSIVE DISORDER, RECURRENT EPISODE, MODERATE: Primary | ICD-10-CM

## 2017-03-10 PROCEDURE — 90834 PSYTX W PT 45 MINUTES: CPT | Mod: S$GLB,,, | Performed by: SOCIAL WORKER
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
## 2017-03-20 ENCOUNTER — OFFICE VISIT (OUTPATIENT)
Dept: PSYCHIATRY | Facility: CLINIC | Age: 62
End: 2017-03-20
Payer: COMMERCIAL

## 2017-03-20 DIAGNOSIS — F33.1 MAJOR DEPRESSIVE DISORDER, RECURRENT EPISODE, MODERATE: Primary | ICD-10-CM

## 2017-03-20 PROCEDURE — 90834 PSYTX W PT 45 MINUTES: CPT | Mod: S$GLB,,, | Performed by: SOCIAL WORKER

## 2017-03-20 NOTE — PROGRESS NOTES
Individual Psychotherapy (PhD/LCSW)    3/20/2017    Site:  [facility]         Therapeutic Intervention: Met with patient.  Outpatient - Insight oriented psychotherapy 45 min - CPT code 03209    Chief complaint/reason for encounter: depression     Interval history and content of current session: Patient presents to ongoing individual therapy due to depression.  Patient continues to struggle with depression related to the end of a relationship two years ago with Solange.  He relates how connected they were for two years.  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  As the roses would begin to die, he would send her another batch.  They would talk long lunches together.  He would call her every morning when he was on the road.  He is not sure why their relationship ended.  A manager told them that the relationship was not approved of even though there was no policy preventing the relationship.  They did not work directly together.  His friend has encouraged him to reach out to her.  He does not feel comfortable because she has told him she is currently dating.  He was told by a mutual friend that she has been feeling ill.  He admits that when he is working he does not think about the previous relationship much.  He admits that he felt feelings for Solange that he had not felt before.  He has his last day off for the next month.  He hopes to take his son to dinner.  He details how his son has finished the school year early.  His son is trying to catch up with his peers academically due to being held back in the past.    Treatment plan:  Target symptoms: depression  Why chosen therapy is appropriate versus another modality: relevant to diagnosis  Outcome monitoring methods: self-report, observation  Therapeutic intervention type: insight oriented psychotherapy, supportive psychotherapy, interactive psychotherapy     Risk parameters:  Patient reports no suicidal ideation  Patient reports no homicidal ideation  Patient reports  no self-injurious behavior  Patient reports no violent behavior     Verbal deficits: None     Patient's response to intervention:  The patient's response to intervention is accepting, motivated.     Progress toward goals and other mental status changes:  The patient's progress toward goals is poor.     Diagnosis:   Major depression recurrent moderate     Plan:  individual psychotherapy and medication management by physician, Dr. Campbell     Return to clinic: as scheduled     Length of Service (minutes): 45

## 2017-04-03 ENCOUNTER — OFFICE VISIT (OUTPATIENT)
Dept: PSYCHIATRY | Facility: CLINIC | Age: 62
End: 2017-04-03
Payer: COMMERCIAL

## 2017-04-03 DIAGNOSIS — F33.1 MAJOR DEPRESSIVE DISORDER, RECURRENT EPISODE, MODERATE: Primary | ICD-10-CM

## 2017-04-03 PROCEDURE — 90834 PSYTX W PT 45 MINUTES: CPT | Mod: S$GLB,,, | Performed by: SOCIAL WORKER

## 2017-04-03 NOTE — PROGRESS NOTES
"Individual Psychotherapy (PhD/LCSW)    4/3/2017    Site:  [facility]         Therapeutic Intervention: Met with patient.  Outpatient - Insight oriented psychotherapy 45 min - CPT code 56528    Chief complaint/reason for encounter: depression     Interval history and content of current session: Patient presents to ongoing individual therapy due to anxiety.  He continues to be busy at his job.  He admits he recently "went off" on a co worker.  She works as an  and she was interjecting into his conversation with a manager.  The conversation was none of her business.  He took the day off after to calm himself.  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  The friend was asking him very specific questions about his feelings toward his ex girlfriend, Solange.  The patient wondered the purpose for these questions, but he did not ask.  He says he will take whatever relationship comes to him.  He is excited that his son, who lives in [place], is planning to propose.  His son has asked to use his grandmother's ring.  The patient was planning to use it for his girlfriend, Solange.  So, he feels it would be good for his son to use it since he is no longer in that relationship.  He admits there were some difficult times when he was raising his son, but he is proud of his son's accomplishments over the years.  He was able to enjoy some time with his friends on his day off.    Treatment plan:  Target symptoms: depression  Why chosen therapy is appropriate versus another modality: relevant to diagnosis  Outcome monitoring methods: self-report, observation  Therapeutic intervention type: insight oriented psychotherapy, supportive psychotherapy, interactive psychotherapy      Risk parameters:  Patient reports no suicidal ideation  Patient reports no homicidal ideation  Patient reports no self-injurious behavior  Patient reports no violent behavior      Verbal deficits: None      Patient's response to " intervention:  The patient's response to intervention is accepting, motivated.      Progress toward goals and other mental status changes:  The patient's progress toward goals is poor.      Diagnosis:   Major depression recurrent moderate      Plan:  individual psychotherapy and medication management by physician, Dr. Campbell      Return to clinic: as scheduled      Length of Service (minutes): 45

## 2017-04-03 NOTE — MR AVS SNAPSHOT
[facility] Psychiatry  [address] [address]  Phone: [phone]  Fax: [phone]                  Stepan Springer   4/3/2017 8:00 AM   Office Visit    Description:  Male : 1955   Provider:  Fransisco Lewis LCSW   Department:  [facility] Psychiatry                To Do List           Future Appointments        Provider Department Dept Phone    2017 8:15 AM Alvin Hale MD [facility] Ophthalmology [phone]    2017 8:00 AM Wu Campbell MD [facility] Psychiatry [phone]    5/3/2017 8:00 AM Fransisco Lewis LCSW [facility] [phone]    2017 10:10 AM [facility]AT[facility], [facility] [phone]    2017 7:00 AM Liza Fontenot MD [facility] Internal Medicine [phone]      Your Future Surgeries/Procedures     May 05, 2017   Surgery with Henry Mo III, MD   [facility]    [address] [address] [phone]              Goals (5 Years of Data)     None      [facility]     [facility] -  Assistance  Unless otherwise directed by your provider, please contact [facility], our nurse care line that is available for  assistance.     Registered nurses in the [facility] provide: appointment scheduling, clinical advisement, health education, and other advisory services.  Call: [phone] (toll free)               Medications           Message regarding Medications     Verify the changes and/or additions to your medication regime listed below are the same as discussed with your clinician today.  If any of these changes or additions are incorrect, please notify your healthcare provider.             Verify that the below list of medications is an accurate representation of the medications you are currently taking.  If none reported, the list may be blank. If incorrect, please contact your  healthcare provider. Carry this list with you in case of emergency.           Current Medications     aspirin 81 MG chewable tablet Take 81 mg by mouth Daily. 1 Tablet, Chewable Oral Every day    brimonidine 0.1% (ALPHAGAN P) 0.1 % Drop Place 1 drop into both eyes 3 (three) times daily. Order 90 day supply    brinzolamide (AZOPT) 1 % ophthalmic suspension Place 1 drop into both eyes 3 (three) times daily. ORDER 90 DAY SUPPLY    carvedilol (COREG) 25 MG tablet TAKE ONE TABLET BY MOUTH TWICE DAILY WITH MEALS    furosemide (LASIX) 40 MG tablet Take 2 tablets (80 mg total) by mouth 2 (two) times daily.    insulin NPH-insulin regular, 70/30, (NOVOLIN 70/30) 100 unit/mL (70-30) injection Inject 40 Units into the skin 2 (two) times daily.    lisinopril (PRINIVIL,ZESTRIL) 20 MG tablet Take 1 tablet (20 mg total) by mouth once daily.    lovastatin (MEVACOR) 10 MG tablet Take 1 tablet (10 mg total) by mouth every evening.    nitroGLYCERIN (NITROSTAT) 0.4 MG SL tablet Place 1 tablet (0.4 mg total) under the tongue every 5 (five) minutes as needed for Chest pain.    sertraline (ZOLOFT) 100 MG tablet 1 tablet daily    sodium,potassium,mag sulfates (SUPREP BOWEL PREP KIT) 17.5-3.13-1.6 gram SolR Take 1 kit by mouth once daily.    spironolactone (ALDACTONE) 25 MG tablet Take 1 tablet (25 mg total) by mouth 2 (two) times daily.    ticagrelor (BRILINTA) 90 mg tablet Take 1 tablet (90 mg total) by mouth 2 (two) times daily.           Clinical Reference Information           Allergies as of 4/3/2017     Cefazolin      Immunizations Administered on Date of Encounter - 4/3/2017     None      Language Assistance Services     ATTENTION: Language assistance services are available, free of charge. Please call [phone].      ATENCIÓN: Si guanako barber, tiene a mckeon disposición servicios gratuitos de asistencia lingüística. Llame al [phone].     CHÚ Ý: N?u b?n nói Ti?ng Vi?t, có các d?ch v? h? tr? ngôn ng? mi?n phí dành cho b?n.  G?i s? [phone].         [facility] - Psychiatry complies with applicable Federal civil rights laws and does not discriminate on the basis of race, color, national origin, age, disability, or sex.

## 2017-04-25 ENCOUNTER — OFFICE VISIT (OUTPATIENT)
Dept: OPHTHALMOLOGY | Facility: CLINIC | Age: 62
End: 2017-04-25
Payer: COMMERCIAL

## 2017-04-25 DIAGNOSIS — H40.1133 PRIMARY OPEN ANGLE GLAUCOMA OF BOTH EYES, SEVERE STAGE: Primary | ICD-10-CM

## 2017-04-25 DIAGNOSIS — H35.352 MACULAR EDEMA, CYSTOID, LEFT: ICD-10-CM

## 2017-04-25 DIAGNOSIS — H35.30 AMD (AGE-RELATED MACULAR DEGENERATION), BILATERAL: ICD-10-CM

## 2017-04-25 DIAGNOSIS — H26.492 PCO (POSTERIOR CAPSULAR OPACIFICATION), LEFT: ICD-10-CM

## 2017-04-25 PROCEDURE — 92014 COMPRE OPH EXAM EST PT 1/>: CPT | Mod: S$GLB,,, | Performed by: OPHTHALMOLOGY

## 2017-04-25 PROCEDURE — 99999 PR PBB SHADOW E&M-EST. PATIENT-LVL I: CPT | Mod: PBBFAC,,, | Performed by: OPHTHALMOLOGY

## 2017-04-25 PROCEDURE — 92133 CPTRZD OPH DX IMG PST SGM ON: CPT | Mod: S$GLB,,, | Performed by: OPHTHALMOLOGY

## 2017-04-25 RX ORDER — KETOROLAC TROMETHAMINE 5 MG/ML
1 SOLUTION OPHTHALMIC 4 TIMES DAILY
Qty: 5 ML | Refills: 2 | Status: SHIPPED | OUTPATIENT
Start: 2017-04-25 | End: 2017-09-20

## 2017-04-25 NOTE — PROGRESS NOTES
HPI     Glaucoma    Additional comments: azopt bid ou, alphagan bid ou           Comments   Patient is here for a 4 month goct and iop check, patient works for [employer] and misses his evening dosing of both drops due to lots of overtime   and arriving home late, patient c/o vision os dropping since 01/17 tax   season.      [facility] - Dr Burgess pt  Corneal Opacity  DM  RD Repair with Scleral Buckle right eye  PCIOL OU  CANALOPLASTY OD 8-22-13 Good flow and tension(-900)  CANAL OS 03/20/14/LOT # 1306-01/REF # [id number]  -500 with shutter effect due to much intraop respiratory movement  Moderate flow with good tension  Azopt BID OU, Alphagan BID OU                   Last edited by Alvin Hale MD on 4/25/2017  9:18 AM. (History)            Assessment /Plan     For exam results, see Encounter Report.      ICD-10-CM ICD-9-CM    1. Primary open angle glaucoma of both eyes, severe stage H40.1133 365.11 Posterior Segment OCT Optic Nerve- Both eyes Done today   IOP stable and optic nerve exam - Follow      365.73    2. Uncontrolled type 2 diabetes mellitus with stage 3 chronic kidney disease, with long-term current use of insulin E11.22 250.52 Posterior Segment OCT Retina-Both eyes    No BDR OD, yet new  CME present left eye    E11.65 585.3     N18.3 V58.67     Z79.4     3. AMD (age-related macular degeneration), bilateral H35.30 362.50 Appears stable    4. Macular edema, cystoid, left H35.352 362.53 New finding today.   Posterior Segment OCT Retina-Both eyes      Begin treatment with ketorolac 0.5% (ACULAR) 0.5 % Drop      5. PCO (posterior capsular opacification), left H26.492 366.50 Consider yag in the future if va does not improve after treating ME       Start Ketorolac QID OS  Azopt BID OU, Alphagan BID OU  RETURN TO CLINIC 2 months MOCT, if not better consider cnslt with Dr. Butterfield

## 2017-05-02 ENCOUNTER — OFFICE VISIT (OUTPATIENT)
Dept: PSYCHIATRY | Facility: CLINIC | Age: 62
End: 2017-05-02
Payer: COMMERCIAL

## 2017-05-02 DIAGNOSIS — F41.9 ANXIETY: ICD-10-CM

## 2017-05-02 DIAGNOSIS — F33.2 SEVERE EPISODE OF RECURRENT MAJOR DEPRESSIVE DISORDER, WITHOUT PSYCHOTIC FEATURES: Primary | ICD-10-CM

## 2017-05-02 PROCEDURE — 1160F RVW MEDS BY RX/DR IN RCRD: CPT | Mod: S$GLB,,, | Performed by: PSYCHIATRY & NEUROLOGY

## 2017-05-02 PROCEDURE — 99213 OFFICE O/P EST LOW 20 MIN: CPT | Mod: S$GLB,,, | Performed by: PSYCHIATRY & NEUROLOGY

## 2017-05-02 RX ORDER — BUPROPION HYDROCHLORIDE 150 MG/1
TABLET ORAL
Qty: 60 TABLET | Refills: 1 | Status: SHIPPED | OUTPATIENT
Start: 2017-05-02 | End: 2017-07-06 | Stop reason: DRUGHIGH

## 2017-05-02 NOTE — PROGRESS NOTES
"Outpatient Psychiatry Follow-up Visit (MD/NP)    5/2/2017    Stepan Springer, a 62 y.o. male, presenting for follow-up visit. Met with patient.    Reason for Encounter: self-referral. Patient complains of depressison.    Interval History: Pt. Seen & interviewed for f/u, about 2.5 months since last visit. Reports moods have been considerably worse following end of tax season. Thoughts of loss, loneliness, grief with regard to lost relationship have filled vacuum. Has been having passive suicidal thoughts & suicidal fantasies ("about a hundred ways"), denies any intent, planning or preparation. Sleep, appetite are ok. Low energy, self-reproach, rumination. Has been participating in psychotherapy, good rapport with Mr. Lewis.     Background: This 60 y/o with depression recurrence, 6 months of low moods, feelings of guilt/self-reproach, hopelessness & helplessness, intermittent SI which has been mostly passive, occasional fantasies about active suicide attempts, but never seriously considers action. Most recent depression is in context of loss of love relationship to a woman he met on internet, dated for some time then broke up with him & is now in another relationship. Continued to text & attempt to contact her (she generally doesn't respond) until about a week ago, stopped because he realizes it's time to "move on", that it's taking a toll on his mental health, but still feels tempted to do it. Continues to work despite symptoms, reports performance is "ok", though "not my best work". PsychHx: symptoms began around Christmas '09. first treated for depression in early '10. Did IOP at [facility] in '10. 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 Has participated in psychotherapy including since he moved to [place], stopped due to " loss of insurance (though reinsured, hasn't returned since most recent depression recurrence). 1 remote episode of suicidality without action.  MedHx: DM, had NSTEMI in , FRAN, CHF, CKD, cataracts   SubstanceHx: rare wine, but mostly avoids alcohol due to his health; no illicits or prescription drug abuse. Social Hx: normal , birth, & developmental milestones. Grew up in [place], moved to [place] in .  (wife  in '10), & 2 year relationship ended in mid , though he's continued to contact her until very recently. 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 Works full-time. Has 3 adopted kids (one of which is child of one of other kids).      Review Of Systems:     GENERAL:  No weight gain or loss  SKIN:  No rashes or lacerations  HEAD:  No headaches  EYES:  No exophthalmos, jaundice or blindness  EARS:  No dizziness, tinnitus or hearing loss  NOSE:  No changes in smell  MOUTH & THROAT:  No dyskinetic movements or obvious goiter  CHEST:  No shortness of breath, hyperventilation or cough  CARDIOVASCULAR:  No tachycardia or chest pain  ABDOMEN:  No nausea, vomiting, pain, constipation or diarrhea  URINARY:  No frequency, dysuria or sexual dysfunction  ENDOCRINE:  No polydipsia, polyuria  MUSCULOSKELETAL:  No pain or stiffness of the joints  NEUROLOGIC:  No weakness, sensory changes, seizures, confusion, memory loss, tremor or other abnormal movements    Current Evaluation:     Nutritional Screening: Considering the patient's height and weight, medications, medical history and preferences, should a referral be made to the dietitian? no    Constitutional  Vitals:  Most recent vital signs, dated less than 90 days prior to this appointment, were not reviewed.    There were no vitals filed for this visit.     General:  unremarkable, age appropriate     Musculoskeletal  Muscle Strength/Tone:  no tremor, no tic   Gait &  Station:  non-ataxic     Psychiatric  Appearance: casually dressed & groomed;   Behavior: calm,   Cooperation: cooperative with assessment  Speech: normal rate, volume, tone  Thought Process: linear, goal-directed  Thought Content: No suicidal or homicidal ideation; no delusions  Affect: normal range  Mood: euthymic  Perceptions: No auditory or visual hallucinations  Level of Consciousness: alert throughout interview  Insight: fair  Cognition: Oriented to person, place, time, & situation  Memory: no apparent deficits to general clinical interview; not formally assessed  Attention/Concentration: no apparent deficits to general clinical interview; not formally assessed  Fund of Knowledge: average by vocabulary/education      Relevant Elements of Neurological Exam: normal gait    Functioning in Relationships:  Spouse/partner:   Peers:   Employers:     Laboratory Data  No visits with results within 1 Month(s) from this visit.  Latest known visit with results is:    Lab Visit on 02/08/2017   Component Date Value Ref Range Status    Sodium 02/08/2017 141  136 - 145 mmol/L Final    Potassium 02/08/2017 4.8  3.5 - 5.1 mmol/L Final    Chloride 02/08/2017 110  95 - 110 mmol/L Final    CO2 02/08/2017 25  23 - 29 mmol/L Final    Glucose 02/08/2017 89  70 - 110 mg/dL Final    BUN, Bld 02/08/2017 31* 6 - 20 mg/dL Final    Creatinine 02/08/2017 1.8* 0.5 - 1.4 mg/dL Final    Calcium 02/08/2017 9.0  8.7 - 10.5 mg/dL Final    Total Protein 02/08/2017 6.9  6.0 - 8.4 g/dL Final    Albumin 02/08/2017 3.6  3.5 - 5.2 g/dL Final    Total Bilirubin 02/08/2017 0.3  0.1 - 1.0 mg/dL Final    Alkaline Phosphatase 02/08/2017 91  55 - 135 U/L Final    AST 02/08/2017 16  10 - 40 U/L Final    ALT 02/08/2017 21  10 - 44 U/L Final    Anion Gap 02/08/2017 6* 8 - 16 mmol/L Final    eGFR if  02/08/2017 46.6* >60 mL/min/1.73 m^2 Final    eGFR if non  02/08/2017 40.3* >60 mL/min/1.73 m^2 Final    PSA,  SCREEN 02/08/2017 0.18  0.00 - 4.00 ng/mL Final    WBC 02/08/2017 9.87  3.90 - 12.70 K/uL Final    RBC 02/08/2017 4.09* 4.60 - 6.20 M/uL Final    Hemoglobin 02/08/2017 13.2* 14.0 - 18.0 g/dL Final    Hematocrit 02/08/2017 39.2* 40.0 - 54.0 % Final    MCV 02/08/2017 96  82 - 98 fL Final    MCH 02/08/2017 32.3* 27.0 - 31.0 pg Final    MCHC 02/08/2017 33.7  32.0 - 36.0 % Final    RDW 02/08/2017 12.8  11.5 - 14.5 % Final    Platelets 02/08/2017 216  150 - 350 K/uL Final    MPV 02/08/2017 9.7  9.2 - 12.9 fL Final    Gran # 02/08/2017 7.5  1.8 - 7.7 K/uL Final    Lymph # 02/08/2017 1.1  1.0 - 4.8 K/uL Final    Mono # 02/08/2017 0.8  0.3 - 1.0 K/uL Final    Eos # 02/08/2017 0.5  0.0 - 0.5 K/uL Final    Baso # 02/08/2017 0.01  0.00 - 0.20 K/uL Final    Gran% 02/08/2017 76.1* 38.0 - 73.0 % Final    Lymph% 02/08/2017 11.0* 18.0 - 48.0 % Final    Mono% 02/08/2017 7.9  4.0 - 15.0 % Final    Eosinophil% 02/08/2017 4.7  0.0 - 8.0 % Final    Basophil% 02/08/2017 0.1  0.0 - 1.9 % Final    Differential Method 02/08/2017 Automated   Final    TSH 02/08/2017 1.640  0.400 - 4.000 uIU/mL Final    Vit D, 25-Hydroxy 02/08/2017 28* 30 - 96 ng/mL Final    Hemoglobin A1C 02/08/2017 6.2  4.5 - 6.2 % Final    Estimated Avg Glucose 02/08/2017 131  68 - 131 mg/dL Final         Medications  Outpatient Encounter Prescriptions as of 5/2/2017   Medication Sig Dispense Refill    aspirin 81 MG chewable tablet Take 81 mg by mouth Daily. 1 Tablet, Chewable Oral Every day      brimonidine 0.1% (ALPHAGAN P) 0.1 % Drop Place 1 drop into both eyes 3 (three) times daily. Order 90 day supply 10 mL 4    brinzolamide (AZOPT) 1 % ophthalmic suspension Place 1 drop into both eyes 3 (three) times daily. ORDER 90 DAY SUPPLY 10 mL 4    buPROPion (WELLBUTRIN XL) 150 MG TB24 tablet Take 1 tablet daily for 3 days then 2 tablets daily 60 tablet 1    carvedilol (COREG) 25 MG tablet TAKE ONE TABLET BY MOUTH TWICE DAILY WITH MEALS 80 tablet 11     furosemide (LASIX) 40 MG tablet Take 2 tablets (80 mg total) by mouth 2 (two) times daily. 180 tablet 3    insulin NPH-insulin regular, 70/30, (NOVOLIN 70/30) 100 unit/mL (70-30) injection Inject 40 Units into the skin 2 (two) times daily. (Patient taking differently: Inject 50 Units into the skin 2 (two) times daily. ) 20 mL 11    ketorolac 0.5% (ACULAR) 0.5 % Drop Place 1 drop into the left eye 4 (four) times daily. 5 mL 2    lisinopril (PRINIVIL,ZESTRIL) 20 MG tablet Take 1 tablet (20 mg total) by mouth once daily. 90 tablet 3    lovastatin (MEVACOR) 10 MG tablet Take 1 tablet (10 mg total) by mouth every evening. 90 tablet 3    nitroGLYCERIN (NITROSTAT) 0.4 MG SL tablet Place 1 tablet (0.4 mg total) under the tongue every 5 (five) minutes as needed for Chest pain. 20 tablet 0    sodium,potassium,mag sulfates (SUPREP BOWEL PREP KIT) 17.5-3.13-1.6 gram SolR Take 1 kit by mouth once daily. 1 Bottle 0    spironolactone (ALDACTONE) 25 MG tablet Take 1 tablet (25 mg total) by mouth 2 (two) times daily. 180 tablet 3    ticagrelor (BRILINTA) 90 mg tablet Take 1 tablet (90 mg total) by mouth 2 (two) times daily. 180 tablet 3    [DISCONTINUED] sertraline (ZOLOFT) 100 MG tablet 1 tablet daily 30 tablet 2     No facility-administered encounter medications on file as of 5/2/2017.        Assessment - Diagnosis - Goals:     Impression: This 60 y/o WM with MDD, with recurrent major depression, symptoms were better during tax season, worse since. Prior benefit from antidepressants but no better with current antidepressant. Started psychotherapy.. Passive SI & suicidal fantasies, but no intent or plan.      Major depressive disorder, recurrent, severe without psychosis.     Treatment Goals:  Specify outcomes written in observable, behavioral terms:   Depression: increasing energy, increasing interest in usual activities, increasing motivation, reducing excessive guilt and reducing fatigue    Treatment  Plan/Recommendations:   discontinue sertraline. Start bupropion xl, 150 mg, taper up to 300 mg daily. Discussed risks, benefits, and alternatives to treatment plan documented above with patient. I answered all patient questions related to this plan and patient expressed understanding and agreement.   Continue psychotherapy. Consider IOP. He'll discuss with Mr. Lewis.    Return to Clinic: 6 weeks    Counseling time: 5 minutes  Total time: 20 minutes    RAMON Pitt MD

## 2017-05-03 ENCOUNTER — OFFICE VISIT (OUTPATIENT)
Dept: PSYCHIATRY | Facility: CLINIC | Age: 62
End: 2017-05-03
Payer: COMMERCIAL

## 2017-05-03 DIAGNOSIS — F33.1 MAJOR DEPRESSIVE DISORDER, RECURRENT EPISODE, MODERATE: Primary | ICD-10-CM

## 2017-05-03 PROCEDURE — 90834 PSYTX W PT 45 MINUTES: CPT | Mod: S$GLB,,, | Performed by: SOCIAL WORKER

## 2017-05-03 NOTE — PROGRESS NOTES
"Individual Psychotherapy (PhD/LCSW)    5/3/2017    [facility]:  [facility]         Therapeutic Intervention: Met with patient.  Outpatient - Insight oriented psychotherapy 45 min - CPT code 61382    Chief complaint/reason for encounter: depression     Interval history and content of current session: Patient presents to ongoing individual therapy due to depression.  He has been having increased depression due to decreased work at the [employer] where he works.  He was in [facility] in [place] in 2010.  He is not sure about returning to [facility] due to financial strain.  He notes that he had to pay $5,000 at the previous program.  He admits to working to avoid his feelings.  He went to a [place] boil and he became depressed because there were only couples there.  He has not had any contact with Solange.  He notes that he started a relationship with her five months after his wife's death.  He has been told by people that he "needs" to be in a relationship.  He agrees.  He has been attending Zoroastrianism on a weekly basis.  He admits he feels comfortable in the Zoroastrianism, but he is not sure why.  He is given the number to [facility] crisis line to call if he become suicidal.  He is educated about the purpose and function of IOPs.  He is educated about Codependency.  He hopes his son will set a wedding date in June of next year and not April due to tax season.    Treatment plan:  Target symptoms: depression  Why chosen therapy is appropriate versus another modality: relevant to diagnosis  Outcome monitoring methods: self-report, observation  Therapeutic intervention type: insight oriented psychotherapy, supportive psychotherapy, interactive psychotherapy      Risk parameters:  Patient reports no suicidal ideation  Patient reports no homicidal ideation  Patient reports no self-injurious behavior  Patient reports no violent behavior      Verbal deficits: None      Patient's response to intervention:  The patient's response to intervention is " accepting, motivated.      Progress toward goals and other mental status changes:  The patient's progress toward goals is poor.      Diagnosis:   Major depression recurrent moderate      Plan:  individual psychotherapy and medication management by physician, Dr. Campbell      Return to clinic: as scheduled      Length of Service (minutes): 45

## 2017-05-12 DIAGNOSIS — I50.9 CHF (NYHA CLASS III, ACC/AHA STAGE C): ICD-10-CM

## 2017-05-12 RX ORDER — FUROSEMIDE 40 MG/1
TABLET ORAL
Qty: 180 TABLET | Refills: 3 | Status: SHIPPED | OUTPATIENT
Start: 2017-05-12 | End: 2017-07-06 | Stop reason: DRUGHIGH

## 2017-05-25 ENCOUNTER — OFFICE VISIT (OUTPATIENT)
Dept: PSYCHIATRY | Facility: CLINIC | Age: 62
End: 2017-05-25
Payer: COMMERCIAL

## 2017-05-25 DIAGNOSIS — F33.1 MAJOR DEPRESSIVE DISORDER, RECURRENT EPISODE, MODERATE: Primary | ICD-10-CM

## 2017-05-25 PROCEDURE — 90834 PSYTX W PT 45 MINUTES: CPT | Mod: S$GLB,,, | Performed by: SOCIAL WORKER

## 2017-05-25 NOTE — PROGRESS NOTES
Individual Psychotherapy (PhD/LCSW)    5/25/2017    Site:  [place]         Therapeutic Intervention: Met with patient.  Outpatient - Insight oriented psychotherapy 45 min - CPT code 83100    Chief complaint/reason for encounter: depression     Interval history and content of current session:  Patient presents to ongoing individual therapy due to depression.  He wonders if the Wellbutrin is causing him to have angry outbursts.  He has only had an outburst at work.  He had a co worker return from having a lung transplant.  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  He notes how she has had an attitude in the past.  He began to yell at her and throw things at her.  There are several co workers who do not like her as well.  He was angry with his son who did not ride with him to [place].  He went to [place] to visit his son who was in from [place] to see an ill friend.  The patient details how he has difficulty seeing during the day due to the glare.  His son is rosado and went to [place] with his boyfriend.  The patient has not seen his son for several days.  He left his phone at home and he has no way of contacting him.  His son has been dating for one year.  The patient admits he does not like the boyfriend.  The patient suspects that the boyfriend has been stealing from him.  The patient continues to ruminate about a previous relationship.  He says he is not interested in dating anymore.  He notes that a previous co worker is trying to put in a good word for him.  He admits that her lack of contact with him does tell him something.    Treatment plan:  Target symptoms: depression  Why chosen therapy is appropriate versus another modality: relevant to diagnosis  Outcome monitoring methods: self-report, observation  Therapeutic intervention type: insight oriented psychotherapy, supportive psychotherapy, interactive psychotherapy     Risk parameters:  Patient reports no suicidal  ideation  Patient reports no homicidal ideation  Patient reports no self-injurious behavior  Patient reports no violent behavior     Verbal deficits: None     Patient's response to intervention:  The patient's response to intervention is accepting, motivated.     Progress toward goals and other mental status changes:  The patient's progress toward goals is poor.     Diagnosis:   Major depression recurrent moderate     Plan:  individual psychotherapy and medication management by physician, Dr. Campbell     Return to clinic: as scheduled     Length of Service (minutes): 45

## 2017-06-06 ENCOUNTER — OFFICE VISIT (OUTPATIENT)
Dept: PSYCHIATRY | Facility: CLINIC | Age: 62
End: 2017-06-06
Payer: COMMERCIAL

## 2017-06-06 DIAGNOSIS — F33.1 MAJOR DEPRESSIVE DISORDER, RECURRENT EPISODE, MODERATE: Primary | ICD-10-CM

## 2017-06-06 PROCEDURE — 90834 PSYTX W PT 45 MINUTES: CPT | Mod: S$GLB,,, | Performed by: SOCIAL WORKER

## 2017-06-06 NOTE — PROGRESS NOTES
"Individual Psychotherapy (PhD/LCSW)    6/6/2017    Site:  [place]         Therapeutic Intervention: Met with patient.  Outpatient - Insight oriented psychotherapy 45 min - CPT code 85448    Chief complaint/reason for encounter: depression     Interval history and content of current session: Patient presents to ongoing individual therapy due to depression.  His son is no longer living with him.  His bank showed him pictures of his son stealing money from his [employer] with his son's boyfriend.  He found out that his son had not been attending school and he has flunked out.  He began to clean out his son's room and he found drug paraphernalia.  He believes his son has been smoking marijuana.  He is not sure if his son is using other drugs.  His son called the house wanting "his" computer.  The patient notes that he is the person who brought the computer for him.  His son said his "business" is on the computer.  The patient notes that his son does not have a job.  He admits that his son tried to "hit low" and criticize the patient.  The patient would come back with points about what his son is not doing.  The patient's older son wonders if his brother is dealing drugs.  The patient has contacted the [employer] police department and he has changed the deadlocks on the door.  He was able to enjoy going to a new restaurant with friends.  He feels the conflict at work has settled some.  He admits that despite all the recent activity he is still ruminating about a previous relationship.  He feels that he "needs" to be .    Treatment plan:  Target symptoms: depression  Why chosen therapy is appropriate versus another modality: relevant to diagnosis  Outcome monitoring methods: self-report, observation  Therapeutic intervention type: insight oriented psychotherapy, supportive psychotherapy, interactive psychotherapy     Risk parameters:  Patient reports no suicidal ideation  Patient reports no homicidal ideation  Patient " reports no self-injurious behavior  Patient reports no violent behavior     Verbal deficits: None     Patient's response to intervention:  The patient's response to intervention is accepting, motivated.     Progress toward goals and other mental status changes:  The patient's progress toward goals is poor.     Diagnosis:   Major depression recurrent moderate     Plan:  individual psychotherapy and medication management by physician, Dr. Campbell     Return to clinic: as scheduled     Length of Service (minutes): 45

## 2017-06-14 ENCOUNTER — TELEPHONE (OUTPATIENT)
Dept: INTERNAL MEDICINE | Facility: CLINIC | Age: 62
End: 2017-06-14

## 2017-06-14 NOTE — TELEPHONE ENCOUNTER
----- Message from Sravani Cho sent at 6/14/2017  4:41 PM CDT -----  Contact: pt  The pt states his 6/21 appt was changed without his knowledge, the pt wants to be put back on the schedule for 6/21 at 7:00, pt can be reached at [phone]//[id number]

## 2017-06-15 ENCOUNTER — LAB VISIT (OUTPATIENT)
Dept: LAB | Facility: HOSPITAL | Age: 62
End: 2017-06-15
Attending: INTERNAL MEDICINE
Payer: COMMERCIAL

## 2017-06-15 PROCEDURE — 36415 COLL VENOUS BLD VENIPUNCTURE: CPT | Mod: PO

## 2017-06-15 PROCEDURE — 83036 HEMOGLOBIN GLYCOSYLATED A1C: CPT

## 2017-06-16 LAB
ESTIMATED AVG GLUCOSE: 151 MG/DL
HBA1C MFR BLD HPLC: 6.9 %

## 2017-06-21 ENCOUNTER — OFFICE VISIT (OUTPATIENT)
Dept: PSYCHIATRY | Facility: CLINIC | Age: 62
End: 2017-06-21
Payer: COMMERCIAL

## 2017-06-21 DIAGNOSIS — F33.1 MAJOR DEPRESSIVE DISORDER, RECURRENT EPISODE, MODERATE: Primary | ICD-10-CM

## 2017-06-21 PROCEDURE — 90834 PSYTX W PT 45 MINUTES: CPT | Mod: S$GLB,,, | Performed by: SOCIAL WORKER

## 2017-06-21 NOTE — PROGRESS NOTES
"Individual Psychotherapy (PhD/LCSW)    6/21/2017    [facility]:  [facility]         Therapeutic Intervention: Met with patient.  Outpatient - Insight oriented psychotherapy 45 min - CPT code 78861    Chief complaint/reason for encounter: depression     Interval history and content of current session:  Patient presents to ongoing individual therapy due to depression.  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  He is frustrated that another tax worker did such a poor job on a previous return.  He notes that his office is full of papers.  He does admit that his job helps him to maintain some of his sanity.  His house is quite empty since his son, Murray, is no longer living there.  His son has asked his sister if he can move in with them in [place], but she declined.  As far as the patient knows, his son continues to live with his boyfriend.  He hopes that his son will be able to find a more stable life that is drug free.  The patient continues to ruminate about a previous relationship with a woman named Solange.  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  He sent her a text yesterday that she can evacuate to his house if she needs to.  He plans to send her a text on her birthday.  He plans to ask her out to dinner.  He admits that she has not responded to any of his texts in some time.  He has a friend who works with Solange.  He said she "smiled" when he told her that the patient's son had moved out.  He does not feel that he can move on because "she is the one" for him.  He has continued to socialize with a small group of friends in [place].  He enjoys spending time with them, but he feels like the "fifth wheel."    Treatment plan:  · Target symptoms: depression  · Why chosen therapy is appropriate versus another modality: relevant to diagnosis  · Outcome monitoring methods: self-report, observation  · Therapeutic intervention type: insight oriented psychotherapy, supportive psychotherapy, interactive " psychotherapy    Risk parameters:  Patient reports no suicidal ideation  Patient reports no homicidal ideation  Patient reports no self-injurious behavior  Patient reports no violent behavior    Verbal deficits: None    Patient's response to intervention:  The patient's response to intervention is accepting, motivated.    Progress toward goals and other mental status changes:  The patient's progress toward goals is fair .    Diagnosis:   Major depression recurrent moderate    Plan:  individual psychotherapy and medication management by physician    Return to clinic: as scheduled    Length of Service (minutes): 45

## 2017-06-26 ENCOUNTER — PATIENT MESSAGE (OUTPATIENT)
Dept: RESEARCH | Facility: HOSPITAL | Age: 62
End: 2017-06-26

## 2017-06-30 DIAGNOSIS — E11.29 TYPE 2 DIABETES MELLITUS WITH OTHER DIABETIC KIDNEY COMPLICATION: ICD-10-CM

## 2017-06-30 RX ORDER — HUMAN INSULIN 100 [USP'U]/ML
INJECTION, SUSPENSION SUBCUTANEOUS
Qty: 3 ML | Refills: 6 | Status: SHIPPED | OUTPATIENT
Start: 2017-06-30 | End: 2018-11-06

## 2017-07-06 ENCOUNTER — OFFICE VISIT (OUTPATIENT)
Dept: PSYCHIATRY | Facility: CLINIC | Age: 62
End: 2017-07-06
Payer: COMMERCIAL

## 2017-07-06 ENCOUNTER — OFFICE VISIT (OUTPATIENT)
Dept: INTERNAL MEDICINE | Facility: CLINIC | Age: 62
End: 2017-07-06
Payer: COMMERCIAL

## 2017-07-06 VITALS
SYSTOLIC BLOOD PRESSURE: 130 MMHG | TEMPERATURE: 96 F | HEART RATE: 70 BPM | WEIGHT: 315 LBS | OXYGEN SATURATION: 98 % | DIASTOLIC BLOOD PRESSURE: 88 MMHG | BODY MASS INDEX: 36.45 KG/M2 | HEIGHT: 78 IN

## 2017-07-06 DIAGNOSIS — I10 ESSENTIAL HYPERTENSION: Chronic | ICD-10-CM

## 2017-07-06 DIAGNOSIS — F33.41 MDD (MAJOR DEPRESSIVE DISORDER), RECURRENT, IN PARTIAL REMISSION: Primary | ICD-10-CM

## 2017-07-06 DIAGNOSIS — L03.116 CELLULITIS OF LEFT LEG: ICD-10-CM

## 2017-07-06 DIAGNOSIS — F33.1 MAJOR DEPRESSIVE DISORDER, RECURRENT EPISODE, MODERATE: Primary | ICD-10-CM

## 2017-07-06 DIAGNOSIS — F41.9 ANXIETY: ICD-10-CM

## 2017-07-06 DIAGNOSIS — N52.01 ERECTILE DYSFUNCTION DUE TO ARTERIAL INSUFFICIENCY: ICD-10-CM

## 2017-07-06 PROBLEM — N52.9 VASCULOGENIC ERECTILE DYSFUNCTION: Status: ACTIVE | Noted: 2017-07-06

## 2017-07-06 PROCEDURE — 3066F NEPHROPATHY DOC TX: CPT | Mod: S$GLB,,, | Performed by: INTERNAL MEDICINE

## 2017-07-06 PROCEDURE — 3044F HG A1C LEVEL LT 7.0%: CPT | Mod: S$GLB,,, | Performed by: INTERNAL MEDICINE

## 2017-07-06 PROCEDURE — 99213 OFFICE O/P EST LOW 20 MIN: CPT | Mod: S$GLB,,, | Performed by: PSYCHIATRY & NEUROLOGY

## 2017-07-06 PROCEDURE — 99999 PR PBB SHADOW E&M-EST. PATIENT-LVL III: CPT | Mod: PBBFAC,,, | Performed by: INTERNAL MEDICINE

## 2017-07-06 PROCEDURE — 90834 PSYTX W PT 45 MINUTES: CPT | Mod: S$GLB,,, | Performed by: SOCIAL WORKER

## 2017-07-06 PROCEDURE — 99214 OFFICE O/P EST MOD 30 MIN: CPT | Mod: S$GLB,,, | Performed by: INTERNAL MEDICINE

## 2017-07-06 RX ORDER — CARVEDILOL 25 MG/1
25 TABLET ORAL 2 TIMES DAILY WITH MEALS
Qty: 180 TABLET | Refills: 11 | Status: SHIPPED | OUTPATIENT
Start: 2017-07-06 | End: 2018-08-22 | Stop reason: SDUPTHER

## 2017-07-06 RX ORDER — BUPROPION HYDROCHLORIDE 300 MG/1
300 TABLET ORAL DAILY
Qty: 90 TABLET | Refills: 1 | Status: SHIPPED | OUTPATIENT
Start: 2017-07-06 | End: 2017-11-09 | Stop reason: SDUPTHER

## 2017-07-06 RX ORDER — SILDENAFIL 50 MG/1
50 TABLET, FILM COATED ORAL DAILY PRN
Qty: 5 TABLET | Refills: 3 | Status: SHIPPED | OUTPATIENT
Start: 2017-07-06 | End: 2021-11-02

## 2017-07-06 NOTE — PROGRESS NOTES
"Outpatient Psychiatry Follow-up Visit (MD/NP)    7/6/2017    Stepan Springer, a 62 y.o. male, presenting for follow-up visit. Met with patient.    Reason for Encounter: self-referral. Patient complains of depressison.    Interval History: Pt. Seen & interviewed for f/u, ~2 months since last visit. Reports moods are improved following about 1 week of irritability on starting wellbutrin xl. Has been sick the past 2 weeks, off work, was treated for staph infection on LE, now almost completely healed and went back to work yesterday. Endorses hope for future of relationship with his ex, though they're not in contact. Superficially able to acknowledge reconciliation is unlikely. No further SI. Sleep, appetite are ok. Has been participating in psychotherapy, good rapport with Mr. Lewis.     Background: This 62 y/o with depression recurrence, 6 months of low moods, feelings of guilt/self-reproach, hopelessness & helplessness, intermittent SI which has been mostly passive, occasional fantasies about active suicide attempts, but never seriously considers action. Most recent depression is in context of loss of love relationship to a woman he met on internet, dated for some time then broke up with him & is now in another relationship. Continued to text & attempt to contact her (she generally doesn't respond) until about a week ago, stopped because he realizes it's time to "move on", that it's taking a toll on his mental health, but still feels tempted to do it. Continues to work despite symptoms, reports performance is "ok", though "not my best work". PsychHx: symptoms began around [place] '09. first treated for depression in early '10. Did IOP at [facility] [place] in '10. Suffered a series of losses since including wife ('09), mother in law ('11), mother ('12), father ('13) & loss of job as [employer]  due to his health problems (he's since changed fields, now works in tax preparation for [employer]). Has participated in " psychotherapy including since he moved to [place], stopped due to loss of insurance (though reinsured, hasn't returned since most recent depression recurrence). 1 remote episode of suicidality without action.  MedHx: DM, had NSTEMI in , FRAN, CHF, CKD, cataracts   SubstanceHx: rare wine, but mostly avoids alcohol due to his health; no illicits or prescription drug abuse. Social Hx: normal , birth, & developmental milestones. Grew up in [place], moved to [place] in .  (wife  in '10), & 2 year relationship ended in mid , though he's continued to contact her until very recently. 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 Works full-time. Has 3 adopted kids (one of which is child of one of other kids).      Review Of Systems:     GENERAL:  No weight gain or loss  SKIN:  No rashes or lacerations  HEAD:  No headaches  EYES:  No exophthalmos, jaundice or blindness  EARS:  No dizziness, tinnitus or hearing loss  NOSE:  No changes in smell  MOUTH & THROAT:  No dyskinetic movements or obvious goiter  CHEST:  No shortness of breath, hyperventilation or cough  CARDIOVASCULAR:  No tachycardia or chest pain  ABDOMEN:  No nausea, vomiting, pain, constipation or diarrhea  URINARY:  No frequency, dysuria or sexual dysfunction  ENDOCRINE:  No polydipsia, polyuria  MUSCULOSKELETAL:  No pain or stiffness of the joints  NEUROLOGIC:  No weakness, sensory changes, seizures, confusion, memory loss, tremor or other abnormal movements    Current Evaluation:     Nutritional Screening: Considering the patient's height and weight, medications, medical history and preferences, should a referral be made to the dietitian? no    Constitutional  Vitals:  Most recent vital signs, dated less than 90 days prior to this appointment, were not reviewed.    There were no vitals filed for this visit.     General:  unremarkable, age appropriate      Musculoskeletal  Muscle Strength/Tone:  no tremor, no tic   Gait & Station:  non-ataxic     Psychiatric  Appearance: casually dressed & groomed;   Behavior: calm,   Cooperation: cooperative with assessment  Speech: normal rate, volume, tone  Thought Process: linear, goal-directed  Thought Content: No suicidal or homicidal ideation; no delusions  Affect: normal range  Mood: euthymic  Perceptions: No auditory or visual hallucinations  Level of Consciousness: alert throughout interview  Insight: fair  Cognition: Oriented to person, place, time, & situation  Memory: no apparent deficits to general clinical interview; not formally assessed  Attention/Concentration: no apparent deficits to general clinical interview; not formally assessed  Fund of Knowledge: average by vocabulary/education    Laboratory Data  Lab Visit on 06/15/2017   Component Date Value Ref Range Status    Hemoglobin A1C 06/16/2017 6.9* 4.0 - 5.6 % Final    Estimated Avg Glucose 06/16/2017 151* 68 - 131 mg/dL Final     Medications  Outpatient Encounter Prescriptions as of 7/6/2017   Medication Sig Dispense Refill    aspirin 81 MG chewable tablet Take 81 mg by mouth Daily. 1 Tablet, Chewable Oral Every day      brimonidine 0.1% (ALPHAGAN P) 0.1 % Drop Place 1 drop into both eyes 3 (three) times daily. Order 90 day supply 10 mL 4    brinzolamide (AZOPT) 1 % ophthalmic suspension Place 1 drop into both eyes 3 (three) times daily. ORDER 90 DAY SUPPLY 10 mL 4    buPROPion (WELLBUTRIN XL) 150 MG TB24 tablet Take 1 tablet daily for 3 days then 2 tablets daily 60 tablet 1    carvedilol (COREG) 25 MG tablet TAKE ONE TABLET BY MOUTH TWICE DAILY WITH MEALS 80 tablet 11    furosemide (LASIX) 40 MG tablet Take 2 tablets (80 mg total) by mouth 2 (two) times daily. 180 tablet 3    furosemide (LASIX) 40 MG tablet TAKE ONE TABLET BY MOUTH TWICE DAILY 180 tablet 3    ketorolac 0.5% (ACULAR) 0.5 % Drop Place 1 drop into the left eye 4 (four) times daily. 5  mL 2    lisinopril (PRINIVIL,ZESTRIL) 20 MG tablet Take 1 tablet (20 mg total) by mouth once daily. 90 tablet 3    lovastatin (MEVACOR) 10 MG tablet Take 1 tablet (10 mg total) by mouth every evening. 90 tablet 3    nitroGLYCERIN (NITROSTAT) 0.4 MG SL tablet Place 1 tablet (0.4 mg total) under the tongue every 5 (five) minutes as needed for Chest pain. 20 tablet 0    NOVOLIN 70/30 100 unit/mL (70-30) injection INJECT 40 UNITS SUB-Q TWO TIMES DAILY 3 mL 6    sodium,potassium,mag sulfates (SUPREP BOWEL PREP KIT) 17.5-3.13-1.6 gram SolR Take 1 kit by mouth once daily. 1 Bottle 0    spironolactone (ALDACTONE) 25 MG tablet Take 1 tablet (25 mg total) by mouth 2 (two) times daily. 180 tablet 3    ticagrelor (BRILINTA) 90 mg tablet Take 1 tablet (90 mg total) by mouth 2 (two) times daily. 180 tablet 3     No facility-administered encounter medications on file as of 7/6/2017.        Assessment - Diagnosis - Goals:     Impression: This 60 y/o WM with MDD, with recurrent major depression, symptoms were better during tax season, worse afterwards then improved in past 2 months. Participating well in psychotherapy.  SI is resolved. Fantasies of reconciliation with ex (which don't seem realistic) seem to be fueling euthymia for the moment, putting him at risk for another cycle of disappointment and depression.       Major depressive disorder, recurrent, partial remission;     Treatment Goals:  Specify outcomes written in observable, behavioral terms:   Depression: increasing energy, increasing interest in usual activities, increasing motivation, reducing excessive guilt and reducing fatigue    Treatment Plan/Recommendations:   Continue bupropion xl 300 mg daily. Discussed risks, benefits, and alternatives to treatment plan documented above with patient. I answered all patient questions related to this plan and patient expressed understanding and agreement.   Continue psychotherapy.     Return to Clinic: 4 months, prn  sooner.     Counseling time: 5 minutes  Total time: 20 minutes    RAMON Pitt MD

## 2017-07-06 NOTE — PROGRESS NOTES
"Subjective:       Patient ID: Stepan Springer is a 62 y.o. male.    Chief Complaint: Follow-up    Here for follow up of medical problems.  2 weeks ago given IV abic and then clinda course for cellulitis LLE.  Now resolved.    AC breakfast 120-130.  Didn't get colonoscopy.  May get it done in [place].  Walking more for exercise.  No f/c/sw/cough.  No cp/sob/palp.  BMs normal.  Wants viagra, has worked in past.    Updated/ annual due 2/18:  HM: ref fluvax, 6/16 [id number], 4/14 [id number], 12/13 TDaP, 2/17 PSA, No Cscope, 9/16 Eye Dr. Hale, 9/16 HCV neg.          Review of Systems   Constitutional: Negative for chills, diaphoresis, fatigue and fever.   Respiratory: Negative for cough, chest tightness and shortness of breath.    Cardiovascular: Negative for chest pain, palpitations and leg swelling.   Gastrointestinal: Negative for blood in stool, constipation, diarrhea, nausea and vomiting.   Genitourinary: Negative for difficulty urinating and frequency.   Musculoskeletal: Negative for arthralgias.       Objective:   /88 (BP Location: Right arm)   Pulse 70   Temp 96.1 °F (35.6 °C) (Tympanic)   Ht 6' 7" (2.007 m)   Wt (!) 170.6 kg (376 lb 1.7 oz)   SpO2 98%   BMI 42.37 kg/m²     Physical Exam   Constitutional: He is oriented to person, place, and time. He appears well-developed and well-nourished.   HENT:   Mouth/Throat: Oropharynx is clear and moist.   Neck: Normal range of motion. Neck supple.   Cardiovascular: Normal rate, regular rhythm and intact distal pulses.  Exam reveals no gallop and no friction rub.    No murmur heard.  Pulmonary/Chest: Effort normal and breath sounds normal. He has no wheezes. He has no rales.   Abdominal: Soft. Bowel sounds are normal. He exhibits no mass. There is no tenderness.   Musculoskeletal: He exhibits no edema.   Lymphadenopathy:     He has no cervical adenopathy.   Neurological: He is alert and oriented to person, place, and time.   Psychiatric: He has a normal " mood and affect.     Results for STEPAN FLANAGAN (MRN [id number]) as of 7/6/2017 09:44   Ref. Range 2/8/2017 07:30 6/15/2017 07:55   Hemoglobin A1C Latest Ref Range: 4.0 - 5.6 % 6.2 6.9 (H)   Estimated Avg Glucose Latest Ref Range: 68 - 131 mg/dL 131 151 (H)     Assessment:       1. Uncontrolled type 2 diabetes mellitus with stage 3 chronic kidney disease, with long-term current use of insulin    2. Essential hypertension    3. Anxiety    4. Erectile dysfunction due to arterial insufficiency    5. Cellulitis of left leg        Plan:       Stepan was seen today for follow-up.    Diagnoses and all orders for this visit:    Uncontrolled type 2 diabetes mellitus with stage 3 chronic kidney disease, with long-term current use of insulin- likely incr due to recent infx, recheck 6mo.  -     Hemoglobin A1c; Future    Essential hypertension- cont regimen, doing well.  -     carvedilol (COREG) 25 MG tablet; Take 1 tablet (25 mg total) by mouth 2 (two) times daily with meals.    Anxiety- doing well, cont Psych support.    Erectile dysfunction due to arterial insufficiency  -     sildenafil (VIAGRA) 50 MG tablet; Take 1 tablet (50 mg total) by mouth daily as needed for Erectile Dysfunction.    Cellulitis of left leg, s/p clinda, now resolved.

## 2017-07-06 NOTE — PROGRESS NOTES
"Individual Psychotherapy (PhD/LCSW)    7/6/2017    Site:  [facility]         Therapeutic Intervention: Met with patient.  Outpatient - Insight oriented psychotherapy 45 min - CPT code 75888    Chief complaint/reason for encounter: depression     Interval history and content of current session:  Patient presents to ongoing individual therapy due to depression.  Two weeks ago, he began having chills one day and he was burning up with fever the next.  His friends decided to take him to the doctor.  He was told to go to the hospital for IV antibiotics due to a staff infection.  He is not sure what caused it to flare up.  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  He was told to take two weeks off of work to rest.  He spent the majority of time watching television.  He enjoys watching Star Trek.  He does feel that the time off gave him time to thing about he ex girlfriend, Solange.  He has decided that she is his "forever love."  He plans to ask her out on a date for her birthday at the end of July.  He has planned the activities he would like to treat her to.  He admits that looking forward to the possibility has helped him to be more positive.  He admits that she may not agree to see him.  He does not feel he wants a relationship with any other woman.  He does have several women who he enjoys being friends with.  He was able to spend time with his friends over the fourth of July holiday listening to live music and eating his friend's cooking.    Treatment plan:  Target symptoms: depression  Why chosen therapy is appropriate versus another modality: relevant to diagnosis  Outcome monitoring methods: self-report, observation  Therapeutic intervention type: insight oriented psychotherapy, supportive psychotherapy, interactive psychotherapy     Risk parameters:  Patient reports no suicidal ideation  Patient reports no homicidal ideation  Patient reports no self-injurious behavior  Patient reports no violent " behavior     Verbal deficits: None     Patient's response to intervention:  The patient's response to intervention is accepting, motivated.     Progress toward goals and other mental status changes:  The patient's progress toward goals is fair .     Diagnosis:   Major depression recurrent moderate     Plan:  individual psychotherapy and medication management by physician     Return to clinic: as scheduled     Length of Service (minutes): 45

## 2017-07-18 ENCOUNTER — OFFICE VISIT (OUTPATIENT)
Dept: OPHTHALMOLOGY | Facility: CLINIC | Age: 62
End: 2017-07-18
Payer: COMMERCIAL

## 2017-07-18 DIAGNOSIS — H35.352 MACULAR EDEMA, CYSTOID, LEFT: ICD-10-CM

## 2017-07-18 DIAGNOSIS — H40.1133 PRIMARY OPEN ANGLE GLAUCOMA OF BOTH EYES, SEVERE STAGE: Primary | ICD-10-CM

## 2017-07-18 DIAGNOSIS — H35.30 AMD (AGE-RELATED MACULAR DEGENERATION), BILATERAL: ICD-10-CM

## 2017-07-18 PROCEDURE — 99999 PR PBB SHADOW E&M-EST. PATIENT-LVL II: CPT | Mod: PBBFAC,,, | Performed by: OPHTHALMOLOGY

## 2017-07-18 PROCEDURE — 92134 CPTRZ OPH DX IMG PST SGM RTA: CPT | Mod: S$GLB,,, | Performed by: OPHTHALMOLOGY

## 2017-07-18 PROCEDURE — 92014 COMPRE OPH EXAM EST PT 1/>: CPT | Mod: S$GLB,,, | Performed by: OPHTHALMOLOGY

## 2017-07-18 NOTE — PROGRESS NOTES
HPI     Glaucoma    Additional comments: azopb bid ou, alphagan            Follow-up    Additional comments: CME. MOCT today.            Comments   Pt states no changes since his last visit. Has been compliant with his   drops. Not having any pain or irritation. Vision is still a little blurry   but he sees better on cloudy days.     [facility] - Dr Burgess pt  Corneal Opacity  DM  RD Repair with Scleral Buckle right eye  PCIOL OU  CANALOPLASTY OD 8-22-13 Good flow and tension(-900)  CANAL OS 03/20/14/LOT # 1306-01/REF # [id number]  -500 with shutter effect due to much intraop respiratory movement  Moderate flow with good tension    Ketorolac qid os  Azopt BID OU, Alphagan BID OU       Last edited by Rivas Randle on 7/18/2017  8:54 AM. (History)            Assessment /Plan     For exam results, see Encounter Report.      ICD-10-CM ICD-9-CM    1. Primary open angle glaucoma of both eyes, severe stage H40.1133 365.11 Further progression of glaucoma despite controlled iop ou at 10-11. IVFA performed today to rule out additional causes of loss of vision. No causes identified. Intraretinal edema on mOCT not consistent with level of acuity.  Explained that loss of central acuity is due to progression of glaucoma and discussed the [facility] [employer] [facility] in [place] as an option for optimization of life skills. Patient declined. Also discussed that he no longer meets the criteria for a 's license.     365.73    2. Macular edema, cystoid, left H35.352 362.53 Posterior Segment OCT Retina-Both eyes - not visually significant   3. AMD (age-related macular degeneration), bilateral H35.30 362.50 Posterior Segment OCT Retina-Both eyes, dry   4. Uncontrolled type 2 diabetes mellitus with stage 3 chronic kidney disease, with long-term current use of insulin E11.22 250.52 Diabetes with no diabetic retinopathy on dilated exam.   Reviewed diabetic eye precautions including excellent blood sugar control, and  importance of regular follow up.         E11.65 585.3     N18.3 V58.67     Z79.4         Consult 2nd Opinion with Dr Burgess regarding the utility of further intervention.  DL With pt re: [facility] in [place], [place]       Stop Ketorolac   Continue Azopt BID OU, Alphagan BID OU

## 2017-07-20 ENCOUNTER — OFFICE VISIT (OUTPATIENT)
Dept: PSYCHIATRY | Facility: CLINIC | Age: 62
End: 2017-07-20
Payer: COMMERCIAL

## 2017-07-20 DIAGNOSIS — F33.1 MAJOR DEPRESSIVE DISORDER, RECURRENT EPISODE, MODERATE: Primary | ICD-10-CM

## 2017-07-20 PROCEDURE — 90834 PSYTX W PT 45 MINUTES: CPT | Mod: S$GLB,,, | Performed by: SOCIAL WORKER

## 2017-07-20 NOTE — PROGRESS NOTES
"Individual Psychotherapy (PhD/LCSW)    7/20/2017    Site:  [facility]         Therapeutic Intervention: Met with patient.  Outpatient - Insight oriented psychotherapy 45 min - CPT code 70100    Chief complaint/reason for encounter: depression     Interval history and content of current session:  Patient presents to ongoing individual therapy due to depression.  His doctor has told him that the glaucoma in his left eye continues to get worse.  He has only peripheral vision in his right eye.  The doctor told him that he may not be legal to drive currently.  The patient will be going to [place] to see a specialist.  He may need surgery on his eyes.  He notes that the surgery may cause blindness.  He becomes tearful admitting he has told his friends in [place] that they may have to be driving him around.  He feels that he could claim disability now due to his eye sight and heart condition.  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  He does not see a way that taxes could be done remotely at his home.  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  He heard from his son through his friend.  His son was requesting his Medicaid card.  The patient notes that the card was "under" him and his son will now need to get his card as an individual.  He is still planning on asking his ex girlfriend out on her birthday next week.  The patient admits his friends are preparing to console him.    Treatment plan:  Target symptoms: depression  Why chosen therapy is appropriate versus another modality: relevant to diagnosis  Outcome monitoring methods: self-report, observation  Therapeutic intervention type: insight oriented psychotherapy, supportive psychotherapy, interactive psychotherapy     Risk parameters:  Patient reports no suicidal ideation  Patient reports no homicidal ideation  Patient reports no self-injurious behavior  Patient reports no violent " behavior     Verbal deficits: None     Patient's response to intervention:  The patient's response to intervention is accepting, motivated.     Progress toward goals and other mental status changes:  The patient's progress toward goals is fair .     Diagnosis:   Major depression recurrent moderate     Plan:  individual psychotherapy and medication management by physician     Return to clinic: as scheduled     Length of Service (minutes): 45

## 2017-08-03 ENCOUNTER — OFFICE VISIT (OUTPATIENT)
Dept: PSYCHIATRY | Facility: CLINIC | Age: 62
End: 2017-08-03
Payer: COMMERCIAL

## 2017-08-03 DIAGNOSIS — E78.5 HYPERLIPIDEMIA: ICD-10-CM

## 2017-08-03 DIAGNOSIS — E11.29 TYPE 2 DIABETES MELLITUS WITH OTHER DIABETIC KIDNEY COMPLICATION: ICD-10-CM

## 2017-08-03 DIAGNOSIS — F33.1 MAJOR DEPRESSIVE DISORDER, RECURRENT EPISODE, MODERATE: Primary | ICD-10-CM

## 2017-08-03 DIAGNOSIS — I10 ESSENTIAL HYPERTENSION: ICD-10-CM

## 2017-08-03 PROCEDURE — 90834 PSYTX W PT 45 MINUTES: CPT | Mod: S$GLB,,, | Performed by: SOCIAL WORKER

## 2017-08-03 RX ORDER — LISINOPRIL 20 MG/1
TABLET ORAL
Qty: 90 TABLET | Refills: 3 | Status: SHIPPED | OUTPATIENT
Start: 2017-08-03 | End: 2018-07-27 | Stop reason: SDUPTHER

## 2017-08-03 RX ORDER — HUMAN INSULIN 100 [USP'U]/ML
INJECTION, SUSPENSION SUBCUTANEOUS
Qty: 20 ML | Refills: 6 | Status: SHIPPED | OUTPATIENT
Start: 2017-08-03 | End: 2018-01-11 | Stop reason: SDUPTHER

## 2017-08-03 RX ORDER — LOVASTATIN 10 MG/1
TABLET ORAL
Qty: 90 TABLET | Refills: 3 | Status: SHIPPED | OUTPATIENT
Start: 2017-08-03 | End: 2018-07-27

## 2017-08-03 NOTE — PROGRESS NOTES
"Individual Psychotherapy (PhD/LCSW)    8/3/2017    [facility]:  [facility]         Therapeutic Intervention: Met with patient.  Outpatient - Insight oriented psychotherapy 45 min - CPT code 76996    Chief complaint/reason for encounter: depression     Interval history and content of current session:  Patient presents to ongoing individual therapy due to depression.  His ex girlfriend's birthday has passed.  She did not respond to his advances of wanting to have dinner.  He notes that he even hand wrote a card for her.  He went to work dressed nicely that day in case he heard something from her.  He notes that he thinks she is "perfect."  He plans to continue thinking about her.  He admits if the feelings would go away he might not ruminate as much.  He was interacting with a customer and she asked if she could pray with him.  He agreed.  He had a feeling like a "lightning bolt" go into him.  He has been having slightly better eyesight since then.  He is not sure what happened.  He admits for ten years he did not go to [facility].  He felt that God had "forgotten" him.  He decided to return to [facility] a year ago because he "feels better" when he attends.  He has not heard anything from his son, Murray, since the last session.  He details how his two older children reconnected with him after several years of estrangement.  He becomes tearful stating that they thanked him.  He recalls a [place] when his house was full and all of his children were present.    Treatment plan:  Target symptoms: depression  Why chosen therapy is appropriate versus another modality: relevant to diagnosis  Outcome monitoring methods: self-report, observation  Therapeutic intervention type: insight oriented psychotherapy, supportive psychotherapy, interactive psychotherapy     Risk parameters:  Patient reports no suicidal ideation  Patient reports no homicidal ideation  Patient reports no self-injurious behavior  Patient reports no violent " behavior     Verbal deficits: None     Patient's response to intervention:  The patient's response to intervention is accepting, motivated.     Progress toward goals and other mental status changes:  The patient's progress toward goals is fair .     Diagnosis:   Major depression recurrent moderate     Plan:  individual psychotherapy and medication management by physician     Return to clinic: as scheduled     Length of Service (minutes): 45

## 2017-08-17 ENCOUNTER — OFFICE VISIT (OUTPATIENT)
Dept: PSYCHIATRY | Facility: CLINIC | Age: 62
End: 2017-08-17
Payer: COMMERCIAL

## 2017-08-17 DIAGNOSIS — F33.1 MAJOR DEPRESSIVE DISORDER, RECURRENT EPISODE, MODERATE: Primary | ICD-10-CM

## 2017-08-17 PROCEDURE — 90834 PSYTX W PT 45 MINUTES: CPT | Mod: S$GLB,,, | Performed by: SOCIAL WORKER

## 2017-08-17 NOTE — PROGRESS NOTES
"Individual Psychotherapy (PhD/LCSW)    8/17/2017    [facility]:  [facility]         Therapeutic Intervention: Met with patient.  Outpatient - Insight oriented psychotherapy 45 min - CPT code 65712    Chief complaint/reason for encounter: depression     Interval history and content of current session:  Patient presents to ongoing individual therapy due to depression.  He recently twisted his knee due to slipping on his wet steps at home.  He aggravated the arthritis in his knee.  He will have to go to physical therapy.  He has had some swelling in his foot and ankle.  He admits he has had medical problems since a young age.  He takes a "handful" of medication in the morning and in the evening.  He notes that the medication has decreased because the effectiveness has improved over the years.  He met with his friends from work.  They have been watching his ex girlfriend, Solange.  They told him that he is "breaking her down."  They noted that she had a "big smile" on her face for her birthday which was attributed to the patient's efforts.  He has composed a second letter that he plans to send to her.  He notes that she will be "over the moon" when she reads this letter.  He details in the letter how she is "perfect" for him.  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  He has worked with the  before and feels they had a good working relationship.  He admits the enjoys his job because it feels he is putting together the pieces of a puzzle.    Treatment plan:  Target symptoms: depression  Why chosen therapy is appropriate versus another modality: relevant to diagnosis  Outcome monitoring methods: self-report, observation  Therapeutic intervention type: insight oriented psychotherapy, supportive psychotherapy, interactive psychotherapy     Risk parameters:  Patient reports no suicidal ideation  Patient reports no homicidal ideation  Patient reports no self-injurious behavior  Patient reports no violent " behavior     Verbal deficits: None     Patient's response to intervention:  The patient's response to intervention is accepting, motivated.     Progress toward goals and other mental status changes:  The patient's progress toward goals is fair .     Diagnosis:   Major depression recurrent moderate     Plan:  individual psychotherapy and medication management by physician     Return to clinic: as scheduled     Length of Service (minutes): 45

## 2017-08-30 ENCOUNTER — OFFICE VISIT (OUTPATIENT)
Dept: CARDIOLOGY | Facility: CLINIC | Age: 62
End: 2017-08-30
Payer: COMMERCIAL

## 2017-08-30 VITALS
HEIGHT: 78 IN | BODY MASS INDEX: 36.45 KG/M2 | DIASTOLIC BLOOD PRESSURE: 80 MMHG | WEIGHT: 315 LBS | SYSTOLIC BLOOD PRESSURE: 128 MMHG | HEART RATE: 60 BPM

## 2017-08-30 DIAGNOSIS — N18.30 CHRONIC KIDNEY DISEASE, STAGE III (MODERATE): Chronic | ICD-10-CM

## 2017-08-30 DIAGNOSIS — E78.2 MIXED HYPERLIPIDEMIA: Chronic | ICD-10-CM

## 2017-08-30 DIAGNOSIS — I25.10 CORONARY ARTERY DISEASE INVOLVING NATIVE CORONARY ARTERY OF NATIVE HEART WITHOUT ANGINA PECTORIS: ICD-10-CM

## 2017-08-30 DIAGNOSIS — I10 ESSENTIAL HYPERTENSION: Chronic | ICD-10-CM

## 2017-08-30 DIAGNOSIS — I50.9 CHF (NYHA CLASS III, ACC/AHA STAGE C): Chronic | ICD-10-CM

## 2017-08-30 DIAGNOSIS — I44.7 LBBB (LEFT BUNDLE BRANCH BLOCK): Primary | ICD-10-CM

## 2017-08-30 PROCEDURE — 3008F BODY MASS INDEX DOCD: CPT | Mod: S$GLB,,, | Performed by: INTERNAL MEDICINE

## 2017-08-30 PROCEDURE — 99214 OFFICE O/P EST MOD 30 MIN: CPT | Mod: S$GLB,,, | Performed by: INTERNAL MEDICINE

## 2017-08-30 PROCEDURE — 3079F DIAST BP 80-89 MM HG: CPT | Mod: S$GLB,,, | Performed by: INTERNAL MEDICINE

## 2017-08-30 PROCEDURE — 99999 PR PBB SHADOW E&M-EST. PATIENT-LVL II: CPT | Mod: PBBFAC,,, | Performed by: INTERNAL MEDICINE

## 2017-08-30 PROCEDURE — 3074F SYST BP LT 130 MM HG: CPT | Mod: S$GLB,,, | Performed by: INTERNAL MEDICINE

## 2017-08-30 RX ORDER — HYDROCODONE BITARTRATE AND ACETAMINOPHEN 5; 325 MG/1; MG/1
1 TABLET ORAL EVERY 6 HOURS PRN
COMMUNITY
End: 2018-05-29

## 2017-08-30 NOTE — PROGRESS NOTES
Subjective:   Patient ID:  Stepan Springer is a 62 y.o. male who presents for follow-up of Sleep Apnea and Hypertension  and CAD.Patient denies CP, angina or anginal equivalent. Brilenta is costing pt too much money.    Hypertension   This is a chronic problem. The current episode started more than 1 year ago. The problem has been gradually improving since onset. The problem is controlled. Pertinent negatives include no chest pain, palpitations or shortness of breath. Past treatments include ACE inhibitors and beta blockers. The current treatment provides moderate improvement. Compliance problems include medication side effects.  Hypertensive end-organ damage includes CAD/MI.   Coronary Artery Disease   Presents for follow-up visit. Pertinent negatives include no chest pain, chest pressure, chest tightness, dizziness, leg swelling, muscle weakness, palpitations, shortness of breath or weight gain. Risk factors include hyperlipidemia. The symptoms have been stable. Compliance with diet is variable. Compliance with exercise is variable. Compliance with medications is good.   Hyperlipidemia   This is a chronic problem. The current episode started more than 1 year ago. The problem is controlled. Pertinent negatives include no chest pain or shortness of breath. Current antihyperlipidemic treatment includes statins. The current treatment provides moderate improvement of lipids. Compliance problems include medication side effects and adherence to exercise.        Review of Systems   Constitution: Negative. Negative for weight gain.   HENT: Negative.    Eyes: Negative.    Cardiovascular: Negative.  Negative for chest pain, leg swelling and palpitations.   Respiratory: Negative.  Negative for chest tightness and shortness of breath.    Endocrine: Negative.    Hematologic/Lymphatic: Negative.    Skin: Negative.    Musculoskeletal: Negative for muscle weakness.   Gastrointestinal: Negative.    Genitourinary: Negative.     Neurological: Negative.  Negative for dizziness.   Psychiatric/Behavioral: Negative.    Allergic/Immunologic: Negative.      Family History   Problem Relation Age of Onset    Macular degeneration Father     Heart disease Father      CHF     Heart attack Father     Hypertension Mother     Macular degeneration Paternal Uncle     Hypertension Maternal Grandmother     Hypertension Maternal Grandfather     Strabismus Neg Hx     Retinal detachment Neg Hx     Glaucoma Neg Hx     Blindness Neg Hx     Amblyopia Neg Hx      Past Medical History:   Diagnosis Date    Anxiety     CHF (congestive heart failure)     CKD (chronic kidney disease) stage 3, GFR 30-59 ml/min     Coronary artery disease involving native coronary artery without angina pectoris 10/18/2016    Diabetes mellitus type 2, controlled, with complications 8/21/2013    Glaucoma     Hyperlipidemia     Hypertension     Idiopathic peripheral neuropathy 12/11/2012    Mixed hyperlipidemia 12/11/2012    Morbid obesity     Retinal detachment     Sleep apnea     Vitamin B 12 deficiency 8/23/2012     Current Outpatient Prescriptions on File Prior to Visit   Medication Sig Dispense Refill    aspirin 81 MG chewable tablet Take 81 mg by mouth Daily. 1 Tablet, Chewable Oral Every day      brimonidine 0.1% (ALPHAGAN P) 0.1 % Drop Place 1 drop into both eyes 3 (three) times daily. Order 90 day supply 10 mL 4    brinzolamide (AZOPT) 1 % ophthalmic suspension Place 1 drop into both eyes 3 (three) times daily. ORDER 90 DAY SUPPLY 10 mL 4    buPROPion (WELLBUTRIN XL) 300 MG 24 hr tablet Take 1 tablet (300 mg total) by mouth once daily. 90 tablet 1    carvedilol (COREG) 25 MG tablet Take 1 tablet (25 mg total) by mouth 2 (two) times daily with meals. 180 tablet 11    furosemide (LASIX) 40 MG tablet Take 2 tablets (80 mg total) by mouth 2 (two) times daily. 180 tablet 3    ketorolac 0.5% (ACULAR) 0.5 % Drop Place 1 drop into the left eye 4 (four)  times daily. 5 mL 2    lisinopril (PRINIVIL,ZESTRIL) 20 MG tablet TAKE ONE TABLET BY MOUTH ONCE DAILY 90 tablet 3    lovastatin (MEVACOR) 10 MG tablet TAKE ONE TABLET BY MOUTH IN THE EVENING 90 tablet 3    NOVOLIN 70/30 100 unit/mL (70-30) injection INJECT 40 UNITS SUB-Q TWO TIMES DAILY 3 mL 6    NOVOLIN 70/30 100 unit/mL (70-30) injection INJECT 40 UNITS SUB-Q TWO TIMES DAILY 20 mL 6    sildenafil (VIAGRA) 50 MG tablet Take 1 tablet (50 mg total) by mouth daily as needed for Erectile Dysfunction. 5 tablet 3    sodium,potassium,mag sulfates (SUPREP BOWEL PREP KIT) 17.5-3.13-1.6 gram SolR Take 1 kit by mouth once daily. 1 Bottle 0    spironolactone (ALDACTONE) 25 MG tablet Take 1 tablet (25 mg total) by mouth 2 (two) times daily. 180 tablet 3    nitroGLYCERIN (NITROSTAT) 0.4 MG SL tablet Place 1 tablet (0.4 mg total) under the tongue every 5 (five) minutes as needed for Chest pain. 20 tablet 0    ticagrelor (BRILINTA) 90 mg tablet Take 1 tablet (90 mg total) by mouth 2 (two) times daily. 180 tablet 3     No current facility-administered medications on file prior to visit.      Review of patient's allergies indicates:   Allergen Reactions    Cefazolin      Other reaction(s): Shakes  Other reaction(s): Chills       Objective:     Physical Exam   Constitutional: He is oriented to person, place, and time. He appears well-developed and well-nourished.   HENT:   Head: Normocephalic and atraumatic.   Eyes: Conjunctivae are normal. Pupils are equal, round, and reactive to light.   Neck: Normal range of motion. Neck supple.   Cardiovascular: Normal rate, regular rhythm, normal heart sounds and intact distal pulses.    Pulmonary/Chest: Effort normal and breath sounds normal.   Abdominal: Soft. Bowel sounds are normal.   Neurological: He is alert and oriented to person, place, and time.   Skin: Skin is warm and dry.   Psychiatric: He has a normal mood and affect.   Nursing note and vitals reviewed.      Assessment:      1. LBBB (left bundle branch block)    2. CHF (NYHA class III, ACC/AHA stage C)    3. Coronary artery disease involving native coronary artery of native heart without angina pectoris    4. Mixed hyperlipidemia    5. Essential hypertension    6. Chronic kidney disease, stage III (moderate)        Plan:     LBBB (left bundle branch block)    CHF (NYHA class III, ACC/AHA stage C)    Coronary artery disease involving native coronary artery of native heart without angina pectoris    Mixed hyperlipidemia    Essential hypertension    Chronic kidney disease, stage III (moderate)    continue asa, stop brilenta in 10-17-cad  Continue coreg, lisinopril-htn  Continue statin-hlp  Exercise and weight loss

## 2017-08-31 ENCOUNTER — OFFICE VISIT (OUTPATIENT)
Dept: PSYCHIATRY | Facility: CLINIC | Age: 62
End: 2017-08-31
Payer: COMMERCIAL

## 2017-08-31 DIAGNOSIS — F33.1 MAJOR DEPRESSIVE DISORDER, RECURRENT EPISODE, MODERATE: Primary | ICD-10-CM

## 2017-08-31 PROCEDURE — 90834 PSYTX W PT 45 MINUTES: CPT | Mod: S$GLB,,, | Performed by: SOCIAL WORKER

## 2017-08-31 NOTE — PROGRESS NOTES
Individual Psychotherapy (PhD/LCSW)    8/31/2017    Site:  [place]         Therapeutic Intervention: Met with patient.  Outpatient - Insight oriented psychotherapy 45 min - CPT code 25117    Chief complaint/reason for encounter: depression     Interval history and content of current session:  Patient presents to ongoing individual therapy due to depression.  He continues to rehab his right knee.  He has been participating in physical therapy twice a week.  He is hoping to avoid surgery.  The injury happened from a fall.  He has been frustrated with a co worker who will soon be the .  She displayed poor judgment with a client of his.  He set his limits.  He does not work at a regular [employer] office.  They work more as individuals with wealthier clients.  Though she will be , she will not have any administrative butler over him.  He is hoping to retire in seven years if his eyesight does not worsen.  There are no businesses in [place] which could employ him.  He has not heard anything from his son.  He continues to have dreams of being a relationship with his ex girlfriend, Solange.  He sent her another letter recently with no response.  He notes that she will get her wish and she will now be a .  She will be going to [place] for training.  He details how she was good at her job when they worked together in the past.  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    Treatment plan:  Target symptoms: depression  Why chosen therapy is appropriate versus another modality: relevant to diagnosis  Outcome monitoring methods: self-report, observation  Therapeutic intervention type: insight oriented psychotherapy, supportive psychotherapy, interactive psychotherapy     Risk parameters:  Patient reports no suicidal ideation  Patient reports no homicidal ideation  Patient reports no self-injurious behavior  Patient reports no violent behavior     Verbal  deficits: None     Patient's response to intervention:  The patient's response to intervention is accepting, motivated.     Progress toward goals and other mental status changes:  The patient's progress toward goals is fair .     Diagnosis:   Major depression recurrent moderate     Plan:  individual psychotherapy and medication management by physician     Return to clinic: as scheduled     Length of Service (minutes): 45

## 2017-09-14 ENCOUNTER — OFFICE VISIT (OUTPATIENT)
Dept: PSYCHIATRY | Facility: CLINIC | Age: 62
End: 2017-09-14
Payer: COMMERCIAL

## 2017-09-14 DIAGNOSIS — F33.1 MAJOR DEPRESSIVE DISORDER, RECURRENT EPISODE, MODERATE: Primary | ICD-10-CM

## 2017-09-14 PROCEDURE — 90834 PSYTX W PT 45 MINUTES: CPT | Mod: S$GLB,,, | Performed by: SOCIAL WORKER

## 2017-09-14 NOTE — PROGRESS NOTES
"Individual Psychotherapy (PhD/LCSW)    9/14/2017    Site:  [facility]         Therapeutic Intervention: Met with patient.  Outpatient - Insight oriented psychotherapy 45 min - CPT code 08757    Chief complaint/reason for encounter: depression     Interval history and content of current session:  Patient presents to ongoing individual therapy due to depression.  He had a dip in his mood on Friday.  He became depressed and spent the weekend in bed.  He noticed the episode coming on because he was snapping at his friend in [place].  His mood improved on Monday.  He was unable to identify any trigger of his depressed mood.  His friend found that there were solar flares this weekend which may effect depression.  He was frustrated with his job last week.  He had considered quitting.  He was having to move to different offices.  He is considering running for the [employer] in a year or so.  He believes he could do a good job as the [employer].  He has been having pain in his left knee which he is going to physical therapy for.  He has an upcoming appointment with the eye doctor in [place].  He was able to find yellow glasses which helped with his vision.  He continues to think of his ex girlfriend, Solange.  He has been told by others, "Don't worry you're going to get the girl!"  He wonders if his ex girlfriend has not contacted him due to conflict with the supervisor.  He thinks she may be waiting till she gets a position where the supervisor can no longer effect her.  His son showed up at his door asking for his Medicaid card.  The patient did not let him in and he explained the circumstances of the Medicaid.  He admits that his son did look better.  His son said he is in school, but he did not specify which school.  He asked the patient for his bike, but the patient said no because he had paid for it.    Treatment plan:  Target symptoms: depression  Why chosen therapy is appropriate versus another modality: " relevant to diagnosis  Outcome monitoring methods: self-report, observation  Therapeutic intervention type: insight oriented psychotherapy, supportive psychotherapy, interactive psychotherapy     Risk parameters:  Patient reports no suicidal ideation  Patient reports no homicidal ideation  Patient reports no self-injurious behavior  Patient reports no violent behavior     Verbal deficits: None     Patient's response to intervention:  The patient's response to intervention is accepting, motivated.     Progress toward goals and other mental status changes:  The patient's progress toward goals is fair .     Diagnosis:   Major depression recurrent moderate     Plan:  individual psychotherapy and medication management by physician     Return to clinic: as scheduled     Length of Service (minutes): 45

## 2017-09-15 PROBLEM — H54.10 BLINDNESS AND LOW VISION: Status: ACTIVE | Noted: 2017-09-15

## 2017-09-15 NOTE — PROGRESS NOTES
Assessment /Plan     For exam results, see Encounter Report.    Primary open angle glaucoma of both eyes, severe stage  Comments:  SP Bilateral Canaloplasty WM Hale  Orders:  -     Color Fundus Photography - OU - Both Eyes  -     Posterior Segment OCT Optic Nerve- Both eyes    FRAN (obstructive sleep apnea)    Status post cataract extraction and insertion of intraocular lens, unspecified laterality    Blindness and low vision  -     Color Fundus Photography - OU - Both Eyes  -     Posterior Segment OCT Optic Nerve- Both eyes    Hx of retinal detachment  Comments:  Both eyes - Dr Haider    High myopia, both eyes  -     Color Fundus Photography - OU - Both Eyes  -     Posterior Segment OCT Optic Nerve- Both eyes    Posterior capsular opacification, left    Corneal opacity - Left Eye    Epiretinal membrane, left    Reports decline in vision OS from Feb 2017  Gradual loss  Glc vs ERM vs PCO      Living in [place]  SP MI stents x 2  NIDDM : good control    Depression --> gets help    [employer]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 Visually significant as discussed    RD precautions:  Discussed symptoms of RD with increased flashes, floaters, decreasing vision.  Patient/Family to call and return immediately to clinic should the symptoms of RD occur. Voiced good understanding with Q & A.      Plan  RTC 1 month after October Tax period for Retina exam and possible Yag Cap OS  rtc sooner prn with good understand

## 2017-09-20 ENCOUNTER — OFFICE VISIT (OUTPATIENT)
Dept: OPHTHALMOLOGY | Facility: CLINIC | Age: 62
End: 2017-09-20
Payer: COMMERCIAL

## 2017-09-20 DIAGNOSIS — G47.33 OSA (OBSTRUCTIVE SLEEP APNEA): ICD-10-CM

## 2017-09-20 DIAGNOSIS — H35.372 EPIRETINAL MEMBRANE, LEFT: ICD-10-CM

## 2017-09-20 DIAGNOSIS — H40.1133 PRIMARY OPEN ANGLE GLAUCOMA OF BOTH EYES, SEVERE STAGE: Primary | Chronic | ICD-10-CM

## 2017-09-20 DIAGNOSIS — Z96.1 STATUS POST CATARACT EXTRACTION AND INSERTION OF INTRAOCULAR LENS, UNSPECIFIED LATERALITY: ICD-10-CM

## 2017-09-20 DIAGNOSIS — H17.9 CORNEAL OPACITY: ICD-10-CM

## 2017-09-20 DIAGNOSIS — H26.492 POSTERIOR CAPSULAR OPACIFICATION, LEFT: ICD-10-CM

## 2017-09-20 DIAGNOSIS — Z86.69 HX OF RETINAL DETACHMENT: ICD-10-CM

## 2017-09-20 DIAGNOSIS — Z98.49 STATUS POST CATARACT EXTRACTION AND INSERTION OF INTRAOCULAR LENS, UNSPECIFIED LATERALITY: ICD-10-CM

## 2017-09-20 DIAGNOSIS — H54.10 BLINDNESS AND LOW VISION: ICD-10-CM

## 2017-09-20 DIAGNOSIS — H52.13 HIGH MYOPIA, BOTH EYES: ICD-10-CM

## 2017-09-20 PROCEDURE — 99999 PR PBB SHADOW E&M-EST. PATIENT-LVL II: CPT | Mod: PBBFAC,,, | Performed by: OPHTHALMOLOGY

## 2017-09-20 PROCEDURE — 92133 CPTRZD OPH DX IMG PST SGM ON: CPT | Mod: S$GLB,,, | Performed by: OPHTHALMOLOGY

## 2017-09-20 PROCEDURE — 92014 COMPRE OPH EXAM EST PT 1/>: CPT | Mod: S$GLB,,, | Performed by: OPHTHALMOLOGY

## 2017-09-20 PROCEDURE — 92020 GONIOSCOPY: CPT | Mod: S$GLB,,, | Performed by: OPHTHALMOLOGY

## 2017-09-28 ENCOUNTER — OFFICE VISIT (OUTPATIENT)
Dept: PSYCHIATRY | Facility: CLINIC | Age: 62
End: 2017-09-28
Payer: COMMERCIAL

## 2017-09-28 DIAGNOSIS — F33.1 MAJOR DEPRESSIVE DISORDER, RECURRENT EPISODE, MODERATE: Primary | ICD-10-CM

## 2017-09-28 PROCEDURE — 90834 PSYTX W PT 45 MINUTES: CPT | Mod: S$GLB,,, | Performed by: SOCIAL WORKER
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
## 2017-10-12 ENCOUNTER — OFFICE VISIT (OUTPATIENT)
Dept: PSYCHIATRY | Facility: CLINIC | Age: 62
End: 2017-10-12
Payer: COMMERCIAL

## 2017-10-12 DIAGNOSIS — F33.1 MAJOR DEPRESSIVE DISORDER, RECURRENT EPISODE, MODERATE: Primary | ICD-10-CM

## 2017-10-12 PROCEDURE — 90834 PSYTX W PT 45 MINUTES: CPT | Mod: S$GLB,,, | Performed by: SOCIAL WORKER
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
## 2017-10-20 DIAGNOSIS — E11.9 TYPE 2 DIABETES MELLITUS WITHOUT COMPLICATION: ICD-10-CM

## 2017-10-26 ENCOUNTER — OFFICE VISIT (OUTPATIENT)
Dept: PSYCHIATRY | Facility: CLINIC | Age: 62
End: 2017-10-26
Payer: COMMERCIAL

## 2017-10-26 DIAGNOSIS — F33.1 MAJOR DEPRESSIVE DISORDER, RECURRENT EPISODE, MODERATE: Primary | ICD-10-CM

## 2017-10-26 PROCEDURE — 90834 PSYTX W PT 45 MINUTES: CPT | Mod: S$GLB,,, | Performed by: SOCIAL WORKER

## 2017-10-26 NOTE — PROGRESS NOTES
Individual Psychotherapy (PhD/LCSW)    10/26/2017    Site:  [place]         Therapeutic Intervention: Met with patient.  Outpatient - Insight oriented psychotherapy 45 min - CPT code 78132    Chief complaint/reason for encounter: depression     Interval history and content of current session:  Patient presents to ongoing individual therapy due to depression.  His daughter and her family came from the [facility] for a visit over the weekend.  He enjoyed visiting with his grandchildren.  They are home schooled.  They both have Asperger's disorder.  The disorder is more evident in his older grandchild.  His daughter visited his son, Murray, who he had to kick out of the house several months ago.  His son is going to school for his GED.  His daughter and son in law noticed some traits of Asperger's disorder in his son.  His son will do well in some classes and he will be flunking others.  He continues to think of his ex girlfriend, Solange, who is now in [place] doing some training.  He plans to send her some pictures of roses.  He is hopeful that they can begin to have a relationship.  He is frustrated with training that is required at work.  He has had some difficulty passing the courses.  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  He is looking forward to a Halloween party with friends tomorrow night.  He plans to dress as a Ghostbuster.    Treatment plan:  Target symptoms: depression  Why chosen therapy is appropriate versus another modality: relevant to diagnosis  Outcome monitoring methods: self-report, observation  Therapeutic intervention type: insight oriented psychotherapy, supportive psychotherapy, interactive psychotherapy     Risk parameters:  Patient reports no suicidal ideation  Patient reports no homicidal ideation  Patient reports no self-injurious behavior  Patient reports no violent behavior     Verbal deficits: None     Patient's response to  intervention:  The patient's response to intervention is accepting, motivated.     Progress toward goals and other mental status changes:  The patient's progress toward goals is fair .     Diagnosis:   Major depression recurrent moderate     Plan:  individual psychotherapy and medication management by physician     Return to clinic: as scheduled     Length of Service (minutes): 45

## 2017-11-07 NOTE — PROGRESS NOTES
"Outpatient Psychiatry Follow-up Visit (MD/NP)    11/9/2017    Stepan Springer, a 62 y.o. male, presenting for follow-up visit. Met with patient.    Reason for Encounter: self-referral. Patient complains of depressison.    Interval History: Patient seen & interviewed for follow-up, last seen about 4 months prior to this visit. Endorses moods "up and down", cites "holding out hope" due to an encouraging text from his ex, also experiencing frustration with work, struggling with recovery from knee injury, mixed relationships with his kids (asked his younger son but he's now behaving in some encouraging ways); son may get full custody, daughter & grandkids visiting. upcoming eye surgery. No further SI. Sleep, appetite are ok. Has been participating in psychotherapy, good rapport with Mr. Lewis.     Background: This 60 y/o with depression recurrence, 6 months of low moods, feelings of guilt/self-reproach, hopelessness & helplessness, intermittent SI which has been mostly passive, occasional fantasies about active suicide attempts, but never seriously considers action. Most recent depression is in context of loss of love relationship to a woman he met on internet, dated for some time then broke up with him & is now in another relationship. Continued to text & attempt to contact her (she generally doesn't respond) until about a week ago, stopped because he realizes it's time to "move on", that it's taking a toll on his mental health, but still feels tempted to do it. Continues to work despite symptoms, reports performance is "ok", though "not my best work". PsychHx: symptoms began around [place] '09. first treated for depression in early '10. Did IOP at [facility] in '10. Suffered a series of losses since including wife ('09), mother in law ('11), mother ('12), father ('13) & loss of job as [employer]  due to his health problems (he's since changed fields, now works in tax preparation for [employer]). Has participated in " psychotherapy including since he moved to [place], stopped due to loss of insurance (though reinsured, hasn't returned since most recent depression recurrence). 1 remote episode of suicidality without action.  MedHx: DM, had NSTEMI in , FRAN, CHF, CKD, cataracts   SubstanceHx: rare wine, but mostly avoids alcohol due to his health; no illicits or prescription drug abuse. Social Hx: normal , birth, & developmental milestones. Grew up in [place], moved to [place] in .  (wife  in '10), & 2 year relationship ended in mid , though he's continued to contact her until very recently. 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 Works full-time. Has 3 adopted kids (one of which is child of one of other kids).      Review Of Systems:     GENERAL:  No weight gain or loss  SKIN:  No rashes or lacerations  HEAD:  No headaches  EYES:  No exophthalmos, jaundice or blindness  EARS:  No dizziness, tinnitus or hearing loss  NOSE:  No changes in smell  MOUTH & THROAT:  No dyskinetic movements or obvious goiter  CHEST:  No shortness of breath, hyperventilation or cough  CARDIOVASCULAR:  No tachycardia or chest pain  ABDOMEN:  No nausea, vomiting, pain, constipation or diarrhea  URINARY:  No frequency, dysuria or sexual dysfunction  ENDOCRINE:  No polydipsia, polyuria  MUSCULOSKELETAL:  No pain or stiffness of the joints  NEUROLOGIC:  No weakness, sensory changes, seizures, confusion, memory loss, tremor or other abnormal movements    Current Evaluation:     Nutritional Screening: Considering the patient's height and weight, medications, medical history and preferences, should a referral be made to the dietitian? no    Constitutional  Vitals:  Most recent vital signs, dated less than 90 days prior to this appointment, were not reviewed.    There were no vitals filed for this visit.     General:  unremarkable, age appropriate      Musculoskeletal  Muscle Strength/Tone:  no tremor, no tic   Gait & Station:  non-ataxic     Psychiatric  Appearance: casually dressed & groomed;   Behavior: calm,   Cooperation: cooperative with assessment  Speech: normal rate, volume, tone  Thought Process: linear, goal-directed  Thought Content: No suicidal or homicidal ideation; no delusions  Affect: normal range  Mood: euthymic  Perceptions: No auditory or visual hallucinations  Level of Consciousness: alert throughout interview  Insight: fair  Cognition: Oriented to person, place, time, & situation  Memory: no apparent deficits to general clinical interview; not formally assessed  Attention/Concentration: no apparent deficits to general clinical interview; not formally assessed  Fund of Knowledge: average by vocabulary/education    Laboratory Data  No visits with results within 1 Month(s) from this visit.   Latest known visit with results is:   Lab Visit on 06/15/2017   Component Date Value Ref Range Status    Hemoglobin A1C 06/16/2017 6.9* 4.0 - 5.6 % Final    Estimated Avg Glucose 06/16/2017 151* 68 - 131 mg/dL Final     Medications  Outpatient Encounter Prescriptions as of 11/9/2017   Medication Sig Dispense Refill    aspirin 81 MG chewable tablet Take 81 mg by mouth Daily. 1 Tablet, Chewable Oral Every day      brimonidine 0.1% (ALPHAGAN P) 0.1 % Drop Place 1 drop into both eyes 3 (three) times daily. Order 90 day supply 10 mL 4    brinzolamide (AZOPT) 1 % ophthalmic suspension Place 1 drop into both eyes 3 (three) times daily. ORDER 90 DAY SUPPLY 10 mL 4    buPROPion (WELLBUTRIN XL) 300 MG 24 hr tablet Take 1 tablet (300 mg total) by mouth once daily. 90 tablet 1    carvedilol (COREG) 25 MG tablet Take 1 tablet (25 mg total) by mouth 2 (two) times daily with meals. 180 tablet 11    furosemide (LASIX) 40 MG tablet Take 2 tablets (80 mg total) by mouth 2 (two) times daily. 180 tablet 3    hydrocodone-acetaminophen 5-325mg (NORCO) 5-325 mg per  tablet Take 1 tablet by mouth every 6 (six) hours as needed for Pain (for knee pain).      lisinopril (PRINIVIL,ZESTRIL) 20 MG tablet TAKE ONE TABLET BY MOUTH ONCE DAILY 90 tablet 3    lovastatin (MEVACOR) 10 MG tablet TAKE ONE TABLET BY MOUTH IN THE EVENING 90 tablet 3    nitroGLYCERIN (NITROSTAT) 0.4 MG SL tablet Place 1 tablet (0.4 mg total) under the tongue every 5 (five) minutes as needed for Chest pain. 20 tablet 0    NOVOLIN 70/30 100 unit/mL (70-30) injection INJECT 40 UNITS SUB-Q TWO TIMES DAILY 3 mL 6    NOVOLIN 70/30 100 unit/mL (70-30) injection INJECT 40 UNITS SUB-Q TWO TIMES DAILY 20 mL 6    sildenafil (VIAGRA) 50 MG tablet Take 1 tablet (50 mg total) by mouth daily as needed for Erectile Dysfunction. 5 tablet 3    sodium,potassium,mag sulfates (SUPREP BOWEL PREP KIT) 17.5-3.13-1.6 gram SolR Take 1 kit by mouth once daily. 1 Bottle 0    spironolactone (ALDACTONE) 25 MG tablet Take 1 tablet (25 mg total) by mouth 2 (two) times daily. 180 tablet 3    ticagrelor (BRILINTA) 90 mg tablet Take 1 tablet (90 mg total) by mouth 2 (two) times daily. 180 tablet 3     No facility-administered encounter medications on file as of 11/9/2017.        Assessment - Diagnosis - Goals:     Impression: This 62 y/o WM with MDD, with recurrent major depression, symptoms were better during tax season, worse afterwards then improved in past 2 months. Participating well in psychotherapy.  SI is resolved. Ongoing fantasies of reconciliation with ex.       Major depressive disorder, recurrent, partial remission;     Treatment Goals:  Specify outcomes written in observable, behavioral terms:   Depression: increasing energy, increasing interest in usual activities, increasing motivation, reducing excessive guilt and reducing fatigue    Treatment Plan/Recommendations:   Continue bupropion xl 300 mg daily. Discussed risks, benefits, and alternatives to treatment plan documented above with patient. I answered all patient  questions related to this plan and patient expressed understanding and agreement.   Continue psychotherapy.     Return to Clinic: 6 months, prn sooner.     Counseling time: 5 minutes  Total time: 20 minutes    RAMON Pitt MD

## 2017-11-09 ENCOUNTER — OFFICE VISIT (OUTPATIENT)
Dept: PSYCHIATRY | Facility: CLINIC | Age: 62
End: 2017-11-09
Payer: COMMERCIAL

## 2017-11-09 DIAGNOSIS — F41.9 ANXIETY: ICD-10-CM

## 2017-11-09 DIAGNOSIS — F33.1 MAJOR DEPRESSIVE DISORDER, RECURRENT EPISODE, MODERATE: Primary | ICD-10-CM

## 2017-11-09 DIAGNOSIS — F33.9 RECURRENT MAJOR DEPRESSIVE DISORDER, REMISSION STATUS UNSPECIFIED: Primary | ICD-10-CM

## 2017-11-09 PROCEDURE — 99213 OFFICE O/P EST LOW 20 MIN: CPT | Mod: S$GLB,,, | Performed by: PSYCHIATRY & NEUROLOGY

## 2017-11-09 PROCEDURE — 90834 PSYTX W PT 45 MINUTES: CPT | Mod: S$GLB,,, | Performed by: SOCIAL WORKER

## 2017-11-09 RX ORDER — BUPROPION HYDROCHLORIDE 300 MG/1
300 TABLET ORAL DAILY
Qty: 90 TABLET | Refills: 1 | Status: SHIPPED | OUTPATIENT
Start: 2017-11-09 | End: 2018-06-01 | Stop reason: SDUPTHER
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
## 2017-11-21 ENCOUNTER — OFFICE VISIT (OUTPATIENT)
Dept: PSYCHIATRY | Facility: CLINIC | Age: 62
End: 2017-11-21
Payer: COMMERCIAL

## 2017-11-21 DIAGNOSIS — F33.1 MAJOR DEPRESSIVE DISORDER, RECURRENT EPISODE, MODERATE: Primary | ICD-10-CM

## 2017-11-21 PROCEDURE — 90834 PSYTX W PT 45 MINUTES: CPT | Mod: S$GLB,,, | Performed by: SOCIAL WORKER

## 2017-11-21 NOTE — PROGRESS NOTES
Assessment /Plan     For exam results, see Encounter Report.    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 Visually significant as discussed    RD precautions:  Discussed symptoms of RD with increased flashes, floaters, decreasing vision.  Patient/Family to call and return immediately to clinic should the symptoms of RD occur. Voiced good understanding with Q & A.          Laser Capsulotomy  left eye    Laser Capsulotomy Surgery Consent:  Patient with visually significant capsular opacification negatively impacting visually based ADLs and QOL.  Discussed with patient options, risks and benefits, expectations of laser surgery, utilized an eye model with questions and answers to facilitate discussion @ Glc, ERM and PCO OS.  We specifically covered the risks of IOP spike, bleeding, lens disruption, persistent visual disturbance,macular edema, retinal detachment,  iritis & pain,  and the need for further surgery.  The patient  voiced good understanding and patient wishes to proceed with surgery.  The patient will likely benefit from laser surgery and patient signed consent for Left Eye.      The correct eye was identified by patient prior to start of the procedure.    YAG Capsulotomy:    Total  Energy:  78.2 mJ --> a lot of movement with wasted shots    Total # of Exposures: 61 Burst    Patient tolerated procedure well       Stepan understands the information Dr. Burgess provided at the time of visit.  The patient voices good understanding of these these instructions and agrees with the plan.  Retinal detachment precautions were discussed and patient is to return for increasing flashes, floaters and decreasing vision.  In addition, patient will return to clinic sooner as needed for pain, decreasing vision etc.    PF 1% 4x/day for 4-5 Days in the eye with laser treatment      RD precautions:  Discussed with patient symptoms of RD with increased flashes, floaters, decreasing vision.  Patient/Family to call and return immediately to clinic should the symptoms of RD occur.  patient voice good understanding.        Plan  RTC 1 month  Yag Cap OS --> IOP & DFE  rtc sooner prn with good understand
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
## 2017-11-28 ENCOUNTER — INITIAL CONSULT (OUTPATIENT)
Dept: OPHTHALMOLOGY | Facility: CLINIC | Age: 62
End: 2017-11-28
Payer: COMMERCIAL

## 2017-11-28 ENCOUNTER — OFFICE VISIT (OUTPATIENT)
Dept: OPHTHALMOLOGY | Facility: CLINIC | Age: 62
End: 2017-11-28
Payer: COMMERCIAL

## 2017-11-28 DIAGNOSIS — H33.021 RETINAL DETACHMENT WITH MULTIPLE BREAKS, RIGHT: ICD-10-CM

## 2017-11-28 DIAGNOSIS — H35.372 EPIRETINAL MEMBRANE (ERM) OF LEFT EYE: ICD-10-CM

## 2017-11-28 DIAGNOSIS — H35.372 LEFT EPIRETINAL MEMBRANE: Primary | ICD-10-CM

## 2017-11-28 DIAGNOSIS — Z98.49 STATUS POST CATARACT EXTRACTION AND INSERTION OF INTRAOCULAR LENS, UNSPECIFIED LATERALITY: ICD-10-CM

## 2017-11-28 DIAGNOSIS — H26.492 LEFT POSTERIOR CAPSULAR OPACIFICATION: Primary | ICD-10-CM

## 2017-11-28 DIAGNOSIS — H54.10 BLINDNESS AND LOW VISION: ICD-10-CM

## 2017-11-28 DIAGNOSIS — H35.412 LATTICE DEGENERATION OF PERIPHERAL RETINA, LEFT: ICD-10-CM

## 2017-11-28 DIAGNOSIS — Z96.1 STATUS POST CATARACT EXTRACTION AND INSERTION OF INTRAOCULAR LENS, UNSPECIFIED LATERALITY: ICD-10-CM

## 2017-11-28 DIAGNOSIS — Z86.69 HX OF RETINAL DETACHMENT: ICD-10-CM

## 2017-11-28 DIAGNOSIS — H52.13 HIGH MYOPIA, BOTH EYES: ICD-10-CM

## 2017-11-28 DIAGNOSIS — H40.1133 PRIMARY OPEN ANGLE GLAUCOMA OF BOTH EYES, SEVERE STAGE: ICD-10-CM

## 2017-11-28 PROCEDURE — 92225 PR SPECIAL EYE EXAM, INITIAL: CPT | Mod: 59,RT,S$GLB, | Performed by: OPHTHALMOLOGY

## 2017-11-28 PROCEDURE — 92134 CPTRZ OPH DX IMG PST SGM RTA: CPT | Mod: S$GLB,,, | Performed by: OPHTHALMOLOGY

## 2017-11-28 PROCEDURE — 92012 INTRM OPH EXAM EST PATIENT: CPT | Mod: 25,S$GLB,, | Performed by: OPHTHALMOLOGY

## 2017-11-28 PROCEDURE — 92014 COMPRE OPH EXAM EST PT 1/>: CPT | Mod: S$GLB,,, | Performed by: OPHTHALMOLOGY

## 2017-11-28 PROCEDURE — 99999 PR PBB SHADOW E&M-EST. PATIENT-LVL II: CPT | Mod: PBBFAC,,, | Performed by: OPHTHALMOLOGY

## 2017-11-28 PROCEDURE — 66821 AFTER CATARACT LASER SURGERY: CPT | Mod: LT,S$GLB,, | Performed by: OPHTHALMOLOGY

## 2017-11-28 NOTE — PROGRESS NOTES
HPI     DLS=9/20/2017 - Dr. Burgess Patient states to get retina check for left   eye.    POAG    Last edited by Lety Santos MA on 11/28/2017 10:50 AM. (History)        OCT OD - atrophy  OS - ERM with LMH    A/P    1. RD OD  S/p PPV/SB with LWA '97    2. Lattice OS with breaks  S/p laser/cryo with LWA    3. ERM OS with LMH  Minimal distortion  Given above and advanced Glc, not a candidate for PPV    4. Advanced POAG OU    5. PCIOL OU  Open PC OD  OS with PCO - ok for YAG      Retina with Creed or myself yearly

## 2017-11-28 NOTE — LETTER
November 28, 2017      Kev Burgess MD  [address]  [facility] [id number]           [facility] - Ophthalmology  [address]  [facility] [id number]  Phone: [phone]  Fax: [phone]          Patient: Stepan Springer   MR Number: [id number]   YOB: 1955   Date of Visit: 11/28/2017       Dear Dr. Kev Burgess:    Thank you for referring Stepan Springer to me for evaluation. Attached you will find relevant portions of my assessment and plan of care.    If you have questions, please do not hesitate to call me. I look forward to following Stepan Springer along with you.    Sincerely,    RIVAS Otoole MD    Enclosure  CC:  No Recipients    If you would like to receive this communication electronically, please contact [email or web address] or [phone] to request more information on [facility] access.    For providers and/or their staff who would like to refer a patient to [facility], please contact us through our one-stop-shop provider referral line, [facility], at [phone].    If you feel you have received this communication in error or would no longer like to receive these types of communications, please e-mail [email or web address]

## 2017-12-05 ENCOUNTER — OFFICE VISIT (OUTPATIENT)
Dept: PSYCHIATRY | Facility: CLINIC | Age: 62
End: 2017-12-05
Payer: COMMERCIAL

## 2017-12-05 DIAGNOSIS — F33.1 MAJOR DEPRESSIVE DISORDER, RECURRENT EPISODE, MODERATE: Primary | ICD-10-CM

## 2017-12-05 PROCEDURE — 90834 PSYTX W PT 45 MINUTES: CPT | Mod: S$GLB,,, | Performed by: SOCIAL WORKER

## 2017-12-05 NOTE — PROGRESS NOTES
"Individual Psychotherapy (PhD/LCSW)    12/5/2017    Site:  [facility]         Therapeutic Intervention: Met with patient.  Outpatient - Insight oriented psychotherapy 45 min - CPT code 25625    Chief complaint/reason for encounter: depression     Interval history and content of current session:  Patient presents to ongoing individual therapy due to depression.  He had the eye surgery to correct film that had built up on his lens after cataract surgery.  He was frustrated with his appointment with the retina specialist.  The specialist told him that the surgery would not work.  The patient had confidence in the surgeon due to previous visits.  After the laser surgery, the patient began to notice his vision improving as he rode home with his friend.  The patient is now able to sit several feet away from his computer at work and see the figures on the screen.  He is able to see lights at night more clearly instead of them appearing as a blur.  He is able to see further ahead of his car.  He continues to wait on his insurance to see what will happen with his car repair.  He admits he is depressed about [place] because he is "alone" in his home.  He was hoping to see his son when he came in for a custody hearing in [place].  However, the court case took longer than anticipated and his son had to fly home.  The patient is planning on cooking his "world famous" brownies.  He is hoping to give some to his ex girlfriend, Solange.  However, he still has not had any communication with her.  He has not put up any decorations, but his friend put up two wreaths at his house.    Treatment plan:  Target symptoms: depression  Why chosen therapy is appropriate versus another modality: relevant to diagnosis  Outcome monitoring methods: self-report, observation  Therapeutic intervention type: insight oriented psychotherapy, supportive psychotherapy, interactive psychotherapy     Risk parameters:  Patient reports no suicidal " ideation  Patient reports no homicidal ideation  Patient reports no self-injurious behavior  Patient reports no violent behavior     Verbal deficits: None     Patient's response to intervention:  The patient's response to intervention is accepting, motivated.     Progress toward goals and other mental status changes:  The patient's progress toward goals is fair .     Diagnosis:   Major depression recurrent moderate     Plan:  individual psychotherapy and medication management by physician     Return to clinic: as scheduled     Length of Service (minutes): 45

## 2017-12-15 NOTE — PROGRESS NOTES
Assessment /Plan     For exam results, see Encounter Report.    Primary open angle glaucoma of both eyes, severe stage    Blindness and low vision    High myopia, both eyes    Status post cataract extraction and insertion of intraocular lens, unspecified laterality    Retinal detachment with multiple breaks, right    Lattice degeneration of peripheral retina, left          Reports decline in vision OS from Feb 2017  Gradual loss  Glc vs ERM vs PCO      Living in [place]  SP MI stents x 2  NIDDM : good control    Depression --> gets help    [employer] tax [employer]      Advanced Glaucoma OD & OS  Better Adherence now since 2009  Hx high Myopia  Bilateral RDs --> Dr Haider    SP Donato Canaloplasty with Dr Hale --> nice result    CCT  534 // 544    Both eyes  Alphagan BID  Azopt BID      PC IOL OU  Open OD / OS  OS SP Yag Cap 11/28/2017 --> patient happy with improved Va    + ERM OS with small cyst  ? Visually significant as discussed    RD precautions:  Discussed symptoms of RD with increased flashes, floaters, decreasing vision.  Patient/Family to call and return immediately to clinic should the symptoms of RD occur. Voiced good understanding with Q & A.      Plan  RTC 5 month  With WM Hale / SUSAN  rtc sooner prn with good understand

## 2017-12-21 ENCOUNTER — OFFICE VISIT (OUTPATIENT)
Dept: PSYCHIATRY | Facility: CLINIC | Age: 62
End: 2017-12-21
Payer: COMMERCIAL

## 2017-12-21 DIAGNOSIS — F33.1 MAJOR DEPRESSIVE DISORDER, RECURRENT EPISODE, MODERATE: Primary | ICD-10-CM

## 2017-12-21 PROCEDURE — 90834 PSYTX W PT 45 MINUTES: CPT | Mod: S$GLB,,, | Performed by: SOCIAL WORKER

## 2017-12-21 NOTE — PROGRESS NOTES
Individual Psychotherapy (PhD/LCSW)    12/21/2017    Site:  [facility]         Therapeutic Intervention: Met with patient.  Outpatient - Insight oriented psychotherapy 45 min - CPT code 74171    Chief complaint/reason for encounter: depression     Interval history and content of current session:  Patient presents to ongoing individual therapy due to depression.  He continues to see better after his eye surgery.  He is having some problems with glare.  He has a follow up appointment next week with the eye surgeon.  He is looking forward to having eleven days off from work.  He plans to catch up on his rest.  He has not been sleeping well recently.  He admits he has been waking up tired.  He had a [employer] party at his house about a week ago.  There were nine people in attendance.  They enjoyed food and conversation.  He hopes to visit with his youngest son in the next week.  He notes he is not allowing his son to return home.  He continues to focus on a relationship with Solange though he has not heard anything from her.  He has heard that she is dealing with a lot of stress from her job and work.  He dated her for two years.  At the end of their relationship, she did not give any reason for no longer talking to him.  He acknowledges that thinking of her can be a source of happiness or depression.  He admits that he does not want to date because he feels women are only interested in a free meal.  He has been working on his stamp collection.  He is able to see the stamps better due to the eye surgery.  He also plans to walk more in his time off.  He is hoping to work up to two miles.    Treatment plan:  Target symptoms: depression  Why chosen therapy is appropriate versus another modality: relevant to diagnosis  Outcome monitoring methods: self-report, observation  Therapeutic intervention type: insight oriented psychotherapy, supportive psychotherapy, interactive psychotherapy     Risk parameters:  Patient reports no  suicidal ideation  Patient reports no homicidal ideation  Patient reports no self-injurious behavior  Patient reports no violent behavior     Verbal deficits: None     Patient's response to intervention:  The patient's response to intervention is accepting, motivated.     Progress toward goals and other mental status changes:  The patient's progress toward goals is fair .     Diagnosis:   Major depression recurrent moderate     Plan:  individual psychotherapy and medication management by physician     Return to clinic: as scheduled     Length of Service (minutes): 45

## 2017-12-29 ENCOUNTER — OFFICE VISIT (OUTPATIENT)
Dept: OPHTHALMOLOGY | Facility: CLINIC | Age: 62
End: 2017-12-29
Payer: COMMERCIAL

## 2017-12-29 DIAGNOSIS — H54.10 BLINDNESS AND LOW VISION: ICD-10-CM

## 2017-12-29 DIAGNOSIS — H33.021 RETINAL DETACHMENT WITH MULTIPLE BREAKS, RIGHT: ICD-10-CM

## 2017-12-29 DIAGNOSIS — H52.13 HIGH MYOPIA, BOTH EYES: ICD-10-CM

## 2017-12-29 DIAGNOSIS — H35.412 LATTICE DEGENERATION OF PERIPHERAL RETINA, LEFT: ICD-10-CM

## 2017-12-29 DIAGNOSIS — Z96.1 STATUS POST CATARACT EXTRACTION AND INSERTION OF INTRAOCULAR LENS, UNSPECIFIED LATERALITY: ICD-10-CM

## 2017-12-29 DIAGNOSIS — Z98.49 STATUS POST CATARACT EXTRACTION AND INSERTION OF INTRAOCULAR LENS, UNSPECIFIED LATERALITY: ICD-10-CM

## 2017-12-29 DIAGNOSIS — H40.1133 PRIMARY OPEN ANGLE GLAUCOMA OF BOTH EYES, SEVERE STAGE: Primary | ICD-10-CM

## 2017-12-29 PROCEDURE — 99024 POSTOP FOLLOW-UP VISIT: CPT | Mod: S$GLB,,, | Performed by: OPHTHALMOLOGY

## 2017-12-29 PROCEDURE — 99999 PR PBB SHADOW E&M-EST. PATIENT-LVL II: CPT | Mod: PBBFAC,,, | Performed by: OPHTHALMOLOGY

## 2018-01-04 ENCOUNTER — LAB VISIT (OUTPATIENT)
Dept: LAB | Facility: HOSPITAL | Age: 63
End: 2018-01-04
Attending: INTERNAL MEDICINE
Payer: COMMERCIAL

## 2018-01-04 ENCOUNTER — OFFICE VISIT (OUTPATIENT)
Dept: PSYCHIATRY | Facility: CLINIC | Age: 63
End: 2018-01-04
Payer: COMMERCIAL

## 2018-01-04 DIAGNOSIS — F33.1 MAJOR DEPRESSIVE DISORDER, RECURRENT EPISODE, MODERATE: Primary | ICD-10-CM

## 2018-01-04 LAB
ESTIMATED AVG GLUCOSE: 214 MG/DL
HBA1C MFR BLD HPLC: 9.1 %

## 2018-01-04 PROCEDURE — 36415 COLL VENOUS BLD VENIPUNCTURE: CPT | Mod: PO

## 2018-01-04 PROCEDURE — 90834 PSYTX W PT 45 MINUTES: CPT | Mod: S$GLB,,, | Performed by: SOCIAL WORKER

## 2018-01-04 PROCEDURE — 83036 HEMOGLOBIN GLYCOSYLATED A1C: CPT

## 2018-01-05 ENCOUNTER — DOCUMENTATION ONLY (OUTPATIENT)
Dept: GASTROENTEROLOGY | Facility: CLINIC | Age: 63
End: 2018-01-05
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
## 2018-01-11 ENCOUNTER — OFFICE VISIT (OUTPATIENT)
Dept: INTERNAL MEDICINE | Facility: CLINIC | Age: 63
End: 2018-01-11
Payer: COMMERCIAL

## 2018-01-11 VITALS
HEART RATE: 72 BPM | SYSTOLIC BLOOD PRESSURE: 138 MMHG | DIASTOLIC BLOOD PRESSURE: 94 MMHG | HEIGHT: 78 IN | OXYGEN SATURATION: 97 % | TEMPERATURE: 96 F | WEIGHT: 315 LBS | BODY MASS INDEX: 36.45 KG/M2

## 2018-01-11 DIAGNOSIS — E78.2 MIXED HYPERLIPIDEMIA: Chronic | ICD-10-CM

## 2018-01-11 DIAGNOSIS — G47.33 OSA (OBSTRUCTIVE SLEEP APNEA): ICD-10-CM

## 2018-01-11 DIAGNOSIS — F41.9 ANXIETY: ICD-10-CM

## 2018-01-11 DIAGNOSIS — Z29.9 PREVENTIVE MEASURE: ICD-10-CM

## 2018-01-11 DIAGNOSIS — I10 ESSENTIAL HYPERTENSION: Primary | Chronic | ICD-10-CM

## 2018-01-11 DIAGNOSIS — E66.01 SEVERE OBESITY (BMI >= 40): Chronic | ICD-10-CM

## 2018-01-11 PROCEDURE — 99999 PR PBB SHADOW E&M-EST. PATIENT-LVL III: CPT | Mod: PBBFAC,,, | Performed by: INTERNAL MEDICINE

## 2018-01-11 PROCEDURE — 99214 OFFICE O/P EST MOD 30 MIN: CPT | Mod: S$GLB,,, | Performed by: INTERNAL MEDICINE

## 2018-02-01 ENCOUNTER — OFFICE VISIT (OUTPATIENT)
Dept: PSYCHIATRY | Facility: CLINIC | Age: 63
End: 2018-02-01
Payer: COMMERCIAL

## 2018-02-01 DIAGNOSIS — F33.1 MAJOR DEPRESSIVE DISORDER, RECURRENT EPISODE, MODERATE: Primary | ICD-10-CM

## 2018-02-01 PROCEDURE — 90834 PSYTX W PT 45 MINUTES: CPT | Mod: S$GLB,,, | Performed by: SOCIAL WORKER
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
## 2018-02-12 ENCOUNTER — LAB VISIT (OUTPATIENT)
Dept: LAB | Facility: HOSPITAL | Age: 63
End: 2018-02-12
Attending: INTERNAL MEDICINE
Payer: COMMERCIAL

## 2018-02-12 DIAGNOSIS — Z29.9 PREVENTIVE MEASURE: ICD-10-CM

## 2018-02-12 DIAGNOSIS — I10 HTN (HYPERTENSION), BENIGN: ICD-10-CM

## 2018-02-12 LAB — HEMOCCULT STL QL IA: NEGATIVE

## 2018-02-12 PROCEDURE — 82274 ASSAY TEST FOR BLOOD FECAL: CPT

## 2018-02-12 RX ORDER — SPIRONOLACTONE 25 MG/1
TABLET ORAL
Qty: 180 TABLET | Refills: 3 | Status: SHIPPED | OUTPATIENT
Start: 2018-02-12 | End: 2019-03-14 | Stop reason: ALTCHOICE

## 2018-02-15 ENCOUNTER — OFFICE VISIT (OUTPATIENT)
Dept: PSYCHIATRY | Facility: CLINIC | Age: 63
End: 2018-02-15
Payer: COMMERCIAL

## 2018-02-15 DIAGNOSIS — F33.1 MAJOR DEPRESSIVE DISORDER, RECURRENT EPISODE, MODERATE: Primary | ICD-10-CM

## 2018-02-15 PROCEDURE — 90834 PSYTX W PT 45 MINUTES: CPT | Mod: S$GLB,,, | Performed by: SOCIAL WORKER
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
## 2018-03-01 ENCOUNTER — OFFICE VISIT (OUTPATIENT)
Dept: PSYCHIATRY | Facility: CLINIC | Age: 63
End: 2018-03-01
Payer: COMMERCIAL

## 2018-03-01 DIAGNOSIS — F33.1 MAJOR DEPRESSIVE DISORDER, RECURRENT EPISODE, MODERATE: Primary | ICD-10-CM

## 2018-03-01 PROCEDURE — 90834 PSYTX W PT 45 MINUTES: CPT | Mod: S$GLB,,, | Performed by: SOCIAL WORKER

## 2018-03-01 NOTE — PROGRESS NOTES
"Individual Psychotherapy (PhD/LCSW)    3/1/2018    Site:  [place]         Therapeutic Intervention: Met with patient.  Outpatient - Insight oriented psychotherapy 45 min - CPT code 34949    Chief complaint/reason for encounter: depression     Interval history and content of current session:  Patient presents to ongoing individual therapy due to depression.  He recently had a birthday last Saturday.  He worked eleven hours that day.  His co workers surprised him with a cake.  While they were singing him "Happy Birthday," he had a stoic face because he "felt nothing."  He notes that he is alone.  He thanked his co workers and he did enjoy the cake.  He is sad that his [place] package did not get to his ex girlfriend, Solange.  She still has not contacted him.  He wants to get the package and revamp it.  He plans to bake another heart shaped brownie and decorate it for [place] Day.  He admits that his life has not gone as he thought it would.  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  He does think he could be happy with that lifestyle.  His son is planning to fly him to [place] for a visit.  The patient is happy about the trip.  He has not been to [place] since he was in his early twenties and he worked for [employer].  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  He has started walking with his friend, Geena.  She has been to PT as well.  They are doing the exercises they learned in PT together.  He continues to be busy with audits at his work.     Treatment plan:  Target symptoms: depression  Why chosen therapy is appropriate versus another modality: relevant to diagnosis  Outcome monitoring methods: self-report, observation  Therapeutic intervention type: insight oriented psychotherapy, supportive psychotherapy, interactive psychotherapy     Risk parameters:  Patient reports no suicidal " ideation  Patient reports no homicidal ideation  Patient reports no self-injurious behavior  Patient reports no violent behavior     Verbal deficits: None     Patient's response to intervention:  The patient's response to intervention is accepting, motivated.     Progress toward goals and other mental status changes:  The patient's progress toward goals is fair .     Diagnosis:   Major depression recurrent moderate     Plan:  individual psychotherapy and medication management by physician     Return to clinic: as scheduled     Length of Service (minutes): 45

## 2018-03-07 ENCOUNTER — TELEPHONE (OUTPATIENT)
Dept: CARDIOLOGY | Facility: CLINIC | Age: 63
End: 2018-03-07

## 2018-03-07 NOTE — TELEPHONE ENCOUNTER
Called and left v/m he missed his appt with Dr Gann this morning in [place]. Please rtc to reschedule. cm

## 2018-03-15 ENCOUNTER — OFFICE VISIT (OUTPATIENT)
Dept: PSYCHIATRY | Facility: CLINIC | Age: 63
End: 2018-03-15
Payer: COMMERCIAL

## 2018-03-15 DIAGNOSIS — F33.1 MAJOR DEPRESSIVE DISORDER, RECURRENT EPISODE, MODERATE: Primary | ICD-10-CM

## 2018-03-15 PROCEDURE — 90834 PSYTX W PT 45 MINUTES: CPT | Mod: S$GLB,,, | Performed by: SOCIAL WORKER
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
## 2018-03-16 DIAGNOSIS — E11.29 TYPE 2 DIABETES MELLITUS WITH OTHER DIABETIC KIDNEY COMPLICATION: ICD-10-CM

## 2018-03-16 RX ORDER — HUMAN INSULIN 100 [USP'U]/ML
INJECTION, SUSPENSION SUBCUTANEOUS
Qty: 20 ML | Refills: 6 | Status: SHIPPED | OUTPATIENT
Start: 2018-03-16 | End: 2018-04-18 | Stop reason: SDUPTHER

## 2018-03-21 ENCOUNTER — TELEPHONE (OUTPATIENT)
Dept: CARDIOLOGY | Facility: CLINIC | Age: 63
End: 2018-03-21

## 2018-03-21 NOTE — TELEPHONE ENCOUNTER
I called pt twice to move him to early morning and left v/m x 2. I called and left v/m will reschedule his appt for an early morning appt on a wed. I will call back and leave that message on his answer machine. evelin

## 2018-03-29 ENCOUNTER — OFFICE VISIT (OUTPATIENT)
Dept: PSYCHIATRY | Facility: CLINIC | Age: 63
End: 2018-03-29
Payer: COMMERCIAL

## 2018-03-29 DIAGNOSIS — F33.1 MAJOR DEPRESSIVE DISORDER, RECURRENT EPISODE, MODERATE: Primary | ICD-10-CM

## 2018-03-29 PROCEDURE — 90834 PSYTX W PT 45 MINUTES: CPT | Mod: S$GLB,,, | Performed by: SOCIAL WORKER
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
## 2018-04-12 ENCOUNTER — LAB VISIT (OUTPATIENT)
Dept: LAB | Facility: HOSPITAL | Age: 63
End: 2018-04-12
Attending: INTERNAL MEDICINE
Payer: COMMERCIAL

## 2018-04-12 ENCOUNTER — OFFICE VISIT (OUTPATIENT)
Dept: PSYCHIATRY | Facility: CLINIC | Age: 63
End: 2018-04-12
Payer: COMMERCIAL

## 2018-04-12 DIAGNOSIS — Z29.9 PREVENTIVE MEASURE: ICD-10-CM

## 2018-04-12 DIAGNOSIS — F33.1 MAJOR DEPRESSIVE DISORDER, RECURRENT EPISODE, MODERATE: Primary | ICD-10-CM

## 2018-04-12 DIAGNOSIS — E11.9 TYPE 2 DIABETES MELLITUS WITHOUT COMPLICATION: ICD-10-CM

## 2018-04-12 DIAGNOSIS — E78.2 MIXED HYPERLIPIDEMIA: Chronic | ICD-10-CM

## 2018-04-12 LAB
25(OH)D3+25(OH)D2 SERPL-MCNC: 21 NG/ML
ALBUMIN SERPL BCP-MCNC: 3.7 G/DL
ALP SERPL-CCNC: 111 U/L
ALT SERPL W/O P-5'-P-CCNC: 16 U/L
ANION GAP SERPL CALC-SCNC: 9 MMOL/L
AST SERPL-CCNC: 11 U/L
BASOPHILS # BLD AUTO: 0.02 K/UL
BASOPHILS NFR BLD: 0.4 %
BILIRUB SERPL-MCNC: 0.3 MG/DL
BUN SERPL-MCNC: 24 MG/DL
CALCIUM SERPL-MCNC: 9.6 MG/DL
CHLORIDE SERPL-SCNC: 105 MMOL/L
CHOLEST SERPL-MCNC: 125 MG/DL
CHOLEST SERPL-MCNC: 125 MG/DL
CHOLEST/HDLC SERPL: 3 {RATIO}
CHOLEST/HDLC SERPL: 3 {RATIO}
CO2 SERPL-SCNC: 27 MMOL/L
COMPLEXED PSA SERPL-MCNC: 0.14 NG/ML
CREAT SERPL-MCNC: 2.2 MG/DL
DIFFERENTIAL METHOD: ABNORMAL
EOSINOPHIL # BLD AUTO: 0.3 K/UL
EOSINOPHIL NFR BLD: 4.9 %
ERYTHROCYTE [DISTWIDTH] IN BLOOD BY AUTOMATED COUNT: 12 %
EST. GFR  (AFRICAN AMERICAN): 36 ML/MIN/1.73 M^2
EST. GFR  (NON AFRICAN AMERICAN): 31.2 ML/MIN/1.73 M^2
ESTIMATED AVG GLUCOSE: 220 MG/DL
GLUCOSE SERPL-MCNC: 205 MG/DL
HBA1C MFR BLD HPLC: 9.3 %
HCT VFR BLD AUTO: 41.6 %
HDLC SERPL-MCNC: 41 MG/DL
HDLC SERPL-MCNC: 41 MG/DL
HDLC SERPL: 32.8 %
HDLC SERPL: 32.8 %
HGB BLD-MCNC: 14 G/DL
IMM GRANULOCYTES # BLD AUTO: 0.01 K/UL
IMM GRANULOCYTES NFR BLD AUTO: 0.2 %
LDLC SERPL CALC-MCNC: 48.2 MG/DL
LDLC SERPL CALC-MCNC: 48.2 MG/DL
LYMPHOCYTES # BLD AUTO: 1.3 K/UL
LYMPHOCYTES NFR BLD: 23.3 %
MCH RBC QN AUTO: 31.4 PG
MCHC RBC AUTO-ENTMCNC: 33.7 G/DL
MCV RBC AUTO: 93 FL
MONOCYTES # BLD AUTO: 0.5 K/UL
MONOCYTES NFR BLD: 9.7 %
NEUTROPHILS # BLD AUTO: 3.4 K/UL
NEUTROPHILS NFR BLD: 61.5 %
NONHDLC SERPL-MCNC: 84 MG/DL
NONHDLC SERPL-MCNC: 84 MG/DL
NRBC BLD-RTO: 0 /100 WBC
PLATELET # BLD AUTO: 211 K/UL
PMV BLD AUTO: 10.2 FL
POTASSIUM SERPL-SCNC: 3.9 MMOL/L
PROT SERPL-MCNC: 7.1 G/DL
RBC # BLD AUTO: 4.46 M/UL
SODIUM SERPL-SCNC: 141 MMOL/L
TRIGL SERPL-MCNC: 179 MG/DL
TRIGL SERPL-MCNC: 179 MG/DL
TSH SERPL DL<=0.005 MIU/L-ACNC: 1.49 UIU/ML
WBC # BLD AUTO: 5.54 K/UL

## 2018-04-12 PROCEDURE — 84443 ASSAY THYROID STIM HORMONE: CPT

## 2018-04-12 PROCEDURE — 80053 COMPREHEN METABOLIC PANEL: CPT

## 2018-04-12 PROCEDURE — 84153 ASSAY OF PSA TOTAL: CPT

## 2018-04-12 PROCEDURE — 80061 LIPID PANEL: CPT

## 2018-04-12 PROCEDURE — 90834 PSYTX W PT 45 MINUTES: CPT | Mod: S$GLB,,, | Performed by: SOCIAL WORKER

## 2018-04-12 PROCEDURE — 36415 COLL VENOUS BLD VENIPUNCTURE: CPT | Mod: PO

## 2018-04-12 PROCEDURE — 85025 COMPLETE CBC W/AUTO DIFF WBC: CPT

## 2018-04-12 PROCEDURE — 82306 VITAMIN D 25 HYDROXY: CPT

## 2018-04-12 PROCEDURE — 83036 HEMOGLOBIN GLYCOSYLATED A1C: CPT

## 2018-04-12 NOTE — PROGRESS NOTES
"Individual Psychotherapy (PhD/LCSW)    4/12/2018    Site:  [facility]         Therapeutic Intervention: Met with patient.  Outpatient - Insight oriented psychotherapy 45 min - CPT code 02972    Chief complaint/reason for encounter: depression     Interval history and content of current session:  Patient presents to ongoing individual therapy due to depression.  He has been working long hours at [employer] due to tax season.  The tax deadline is next Tuesday.  He says the "craziness" will stop at that point.  He has been working thirteen hour days.  He will be in the office from 8am to 9pm.  He has not been eating regular or healthy meals.  He admits he has no "real food" in his house.  He has an appointment with Dr. Fontenot next week.  He admits that she is going to "yell at me."  He has not had any time for exercise.  He continues to have problems with his left knee.  He was able to visit with his daughter from [place].  She was in town for a meeting and she asked him to do her taxes.  He was able to spend three hours with her.  She enjoys her new job at a mattress store.  He is looking forward to going to [place] to visit his son.  He is hoping to go in June.  He has not heard from his ex girlfriend, Solange.  He admits he really has heard from "no one" since he has been working such long hours.  He hopes to start walking when he returns to regular work hours.  He is also hoping to improve his nutrition plan.  He admits that he will wake up tired, but he will have improved energy in the middle of the day.    Treatment plan:  Target symptoms: depression  Why chosen therapy is appropriate versus another modality: relevant to diagnosis  Outcome monitoring methods: self-report, observation  Therapeutic intervention type: insight oriented psychotherapy, supportive psychotherapy, interactive psychotherapy     Risk parameters:  Patient reports no suicidal ideation  Patient reports no homicidal ideation  Patient " reports no self-injurious behavior  Patient reports no violent behavior     Verbal deficits: None     Patient's response to intervention:  The patient's response to intervention is accepting, motivated.     Progress toward goals and other mental status changes:  The patient's progress toward goals is fair .     Diagnosis:   Major depression recurrent moderate     Plan:  individual psychotherapy and medication management by physician     Return to clinic: as scheduled     Length of Service (minutes): 45

## 2018-04-18 ENCOUNTER — OFFICE VISIT (OUTPATIENT)
Dept: CARDIOLOGY | Facility: CLINIC | Age: 63
End: 2018-04-18
Payer: COMMERCIAL

## 2018-04-18 VITALS
WEIGHT: 315 LBS | SYSTOLIC BLOOD PRESSURE: 138 MMHG | DIASTOLIC BLOOD PRESSURE: 86 MMHG | HEIGHT: 78 IN | HEART RATE: 60 BPM | BODY MASS INDEX: 36.45 KG/M2

## 2018-04-18 DIAGNOSIS — E78.2 MIXED HYPERLIPIDEMIA: Chronic | ICD-10-CM

## 2018-04-18 DIAGNOSIS — I44.7 LBBB (LEFT BUNDLE BRANCH BLOCK): Primary | ICD-10-CM

## 2018-04-18 DIAGNOSIS — I50.9 CHF (NYHA CLASS III, ACC/AHA STAGE C): Chronic | ICD-10-CM

## 2018-04-18 DIAGNOSIS — G47.33 OSA (OBSTRUCTIVE SLEEP APNEA): ICD-10-CM

## 2018-04-18 DIAGNOSIS — I10 ESSENTIAL HYPERTENSION: Chronic | ICD-10-CM

## 2018-04-18 DIAGNOSIS — I25.10 CORONARY ARTERY DISEASE INVOLVING NATIVE CORONARY ARTERY OF NATIVE HEART WITHOUT ANGINA PECTORIS: ICD-10-CM

## 2018-04-18 PROCEDURE — 99214 OFFICE O/P EST MOD 30 MIN: CPT | Mod: S$GLB,,, | Performed by: INTERNAL MEDICINE

## 2018-04-18 PROCEDURE — 99999 PR PBB SHADOW E&M-EST. PATIENT-LVL III: CPT | Mod: PBBFAC,,, | Performed by: INTERNAL MEDICINE

## 2018-04-18 PROCEDURE — 3075F SYST BP GE 130 - 139MM HG: CPT | Mod: CPTII,S$GLB,, | Performed by: INTERNAL MEDICINE

## 2018-04-18 PROCEDURE — 3079F DIAST BP 80-89 MM HG: CPT | Mod: CPTII,S$GLB,, | Performed by: INTERNAL MEDICINE

## 2018-04-18 RX ORDER — METOLAZONE 2.5 MG/1
2.5 TABLET ORAL
Qty: 12 TABLET | Refills: 1 | Status: SHIPPED | OUTPATIENT
Start: 2018-04-18 | End: 2018-05-29 | Stop reason: ALTCHOICE

## 2018-04-18 NOTE — PROGRESS NOTES
Subjective:   Patient ID:  Stepan Springer is a 63 y.o. male who presents for follow-up of Follow-up (6 mo )  Patient denies CP, angina or anginal equivalent.  Pt working a lot because of tax season ( )  Hypertension   This is a chronic problem. The current episode started more than 1 year ago. The problem has been gradually improving since onset. The problem is controlled. Pertinent negatives include no chest pain, palpitations or shortness of breath. Past treatments include beta blockers, ACE inhibitors and diuretics. The current treatment provides moderate improvement. Compliance problems include exercise.    Hyperlipidemia   This is a chronic problem. The current episode started more than 1 year ago. The problem is controlled. Pertinent negatives include no chest pain or shortness of breath. Current antihyperlipidemic treatment includes statins. The current treatment provides moderate improvement of lipids. Compliance problems include adherence to exercise.    Coronary Artery Disease   Presents for follow-up visit. Pertinent negatives include no chest pain, chest pressure, chest tightness, dizziness, leg swelling, muscle weakness, palpitations, shortness of breath or weight gain. Risk factors include hyperlipidemia. The symptoms have been stable. Compliance with diet is variable. Compliance with exercise is variable. Compliance with medications is good.       Review of Systems   Constitution: Negative. Negative for weight gain.   HENT: Negative.    Eyes: Negative.    Cardiovascular: Negative.  Negative for chest pain, leg swelling and palpitations.   Respiratory: Negative.  Negative for chest tightness and shortness of breath.    Endocrine: Negative.    Hematologic/Lymphatic: Negative.    Skin: Negative.    Musculoskeletal: Negative for muscle weakness.   Gastrointestinal: Negative.    Genitourinary: Negative.    Neurological: Negative.  Negative for dizziness.   Psychiatric/Behavioral: Negative.     Allergic/Immunologic: Negative.      Family History   Problem Relation Age of Onset    Macular degeneration Father     Heart disease Father      CHF     Heart attack Father     Hypertension Mother     Macular degeneration Paternal Uncle     Hypertension Maternal Grandmother     Hypertension Maternal Grandfather     Strabismus Neg Hx     Retinal detachment Neg Hx     Glaucoma Neg Hx     Blindness Neg Hx     Amblyopia Neg Hx      Past Medical History:   Diagnosis Date    Anxiety     CHF (congestive heart failure)     CKD (chronic kidney disease) stage 3, GFR 30-59 ml/min     Coronary artery disease involving native coronary artery without angina pectoris 10/18/2016    Diabetes mellitus type 2, controlled, with complications 8/21/2013    Glaucoma     Hyperlipidemia     Hypertension     Idiopathic peripheral neuropathy 12/11/2012    Mixed hyperlipidemia 12/11/2012    Morbid obesity     Retinal detachment     Sleep apnea     Vitamin B 12 deficiency 8/23/2012     Current Outpatient Prescriptions on File Prior to Visit   Medication Sig Dispense Refill    aspirin 81 MG chewable tablet Take 81 mg by mouth Daily. 1 Tablet, Chewable Oral Every day      brimonidine 0.1% (ALPHAGAN P) 0.1 % Drop Place 1 drop into both eyes 3 (three) times daily. Order 90 day supply 10 mL 4    brinzolamide (AZOPT) 1 % ophthalmic suspension Place 1 drop into both eyes 3 (three) times daily. ORDER 90 DAY SUPPLY 10 mL 4    buPROPion (WELLBUTRIN XL) 300 MG 24 hr tablet Take 1 tablet (300 mg total) by mouth once daily. 90 tablet 1    carvedilol (COREG) 25 MG tablet Take 1 tablet (25 mg total) by mouth 2 (two) times daily with meals. 180 tablet 11    furosemide (LASIX) 40 MG tablet Take 2 tablets (80 mg total) by mouth 2 (two) times daily. (Patient taking differently: Take 80 mg by mouth 2 (two) times daily. When ankles retain fluid I double the lasix x 14 days) 180 tablet 3    hydrocodone-acetaminophen 5-325mg (NORCO)  5-325 mg per tablet Take 1 tablet by mouth every 6 (six) hours as needed for Pain (for knee pain).      lisinopril (PRINIVIL,ZESTRIL) 20 MG tablet TAKE ONE TABLET BY MOUTH ONCE DAILY 90 tablet 3    lovastatin (MEVACOR) 10 MG tablet TAKE ONE TABLET BY MOUTH IN THE EVENING 90 tablet 3    nitroGLYCERIN (NITROSTAT) 0.4 MG SL tablet Place 1 tablet (0.4 mg total) under the tongue every 5 (five) minutes as needed for Chest pain. 20 tablet 0    NOVOLIN 70/30 100 unit/mL (70-30) injection INJECT 40 UNITS SUB-Q TWO TIMES DAILY (Patient taking differently: INJECT 50 UNITS SUB-Q TWO TIMES DAILY) 3 mL 6    sildenafil (VIAGRA) 50 MG tablet Take 1 tablet (50 mg total) by mouth daily as needed for Erectile Dysfunction. 5 tablet 3    spironolactone (ALDACTONE) 25 MG tablet TAKE ONE TABLET BY MOUTH TWICE DAILY 180 tablet 3    [DISCONTINUED] NOVOLIN 70/30 U-100 INSULIN 100 unit/mL (70-30) injection INJECT 40 UNITS SUB-Q TWO TIMES DAILY 20 mL 6    [DISCONTINUED] sodium,potassium,mag sulfates (SUPREP BOWEL PREP KIT) 17.5-3.13-1.6 gram SolR Take 1 kit by mouth once daily. 1 Bottle 0    [DISCONTINUED] ticagrelor (BRILINTA) 90 mg tablet Take 1 tablet (90 mg total) by mouth 2 (two) times daily. 180 tablet 3     No current facility-administered medications on file prior to visit.      Review of patient's allergies indicates:   Allergen Reactions    Cefazolin      Other reaction(s): Shakes  Other reaction(s): Chills       Objective:     Physical Exam   Constitutional: He is oriented to person, place, and time. He appears well-developed and well-nourished.   HENT:   Head: Normocephalic and atraumatic.   Eyes: Conjunctivae are normal. Pupils are equal, round, and reactive to light.   Neck: Normal range of motion. Neck supple.   Cardiovascular: Normal rate, regular rhythm, normal heart sounds and intact distal pulses.    Pulmonary/Chest: Effort normal and breath sounds normal.   Abdominal: Soft. Bowel sounds are normal.   Neurological:  He is alert and oriented to person, place, and time.   Skin: Skin is warm and dry.   Psychiatric: He has a normal mood and affect.   Nursing note and vitals reviewed.      Assessment:   1. HTN  2. CADhx of PCI  3. HLP  4. obesity      Plan:     continue asa, stop brilenta in 10-17-cad  Continue coreg, lisinopril, lasix, -htn  Continue statin-hlp  Exercise and weight loss  weight loss and exercise  Add zaroxolyn M-W-F  BMP 1 week

## 2018-04-19 ENCOUNTER — PATIENT MESSAGE (OUTPATIENT)
Dept: RHEUMATOLOGY | Facility: CLINIC | Age: 63
End: 2018-04-19

## 2018-04-19 ENCOUNTER — PATIENT MESSAGE (OUTPATIENT)
Dept: INTERNAL MEDICINE | Facility: CLINIC | Age: 63
End: 2018-04-19

## 2018-04-19 ENCOUNTER — OFFICE VISIT (OUTPATIENT)
Dept: INTERNAL MEDICINE | Facility: CLINIC | Age: 63
End: 2018-04-19
Payer: COMMERCIAL

## 2018-04-19 VITALS
OXYGEN SATURATION: 98 % | HEIGHT: 78 IN | SYSTOLIC BLOOD PRESSURE: 138 MMHG | HEART RATE: 77 BPM | TEMPERATURE: 97 F | BODY MASS INDEX: 36.45 KG/M2 | DIASTOLIC BLOOD PRESSURE: 80 MMHG | WEIGHT: 315 LBS

## 2018-04-19 DIAGNOSIS — Z00.00 ROUTINE PHYSICAL EXAMINATION: Primary | ICD-10-CM

## 2018-04-19 DIAGNOSIS — N18.30 CHRONIC KIDNEY DISEASE, STAGE III (MODERATE): Chronic | ICD-10-CM

## 2018-04-19 DIAGNOSIS — I10 ESSENTIAL HYPERTENSION: Chronic | ICD-10-CM

## 2018-04-19 DIAGNOSIS — M25.562 CHRONIC PAIN OF LEFT KNEE: ICD-10-CM

## 2018-04-19 DIAGNOSIS — E78.2 MIXED HYPERLIPIDEMIA: Chronic | ICD-10-CM

## 2018-04-19 DIAGNOSIS — G89.29 CHRONIC PAIN OF LEFT KNEE: ICD-10-CM

## 2018-04-19 DIAGNOSIS — I50.9 CHF (NYHA CLASS III, ACC/AHA STAGE C): Chronic | ICD-10-CM

## 2018-04-19 DIAGNOSIS — H40.1133 PRIMARY OPEN ANGLE GLAUCOMA OF BOTH EYES, SEVERE STAGE: ICD-10-CM

## 2018-04-19 DIAGNOSIS — E66.01 SEVERE OBESITY (BMI >= 40): Chronic | ICD-10-CM

## 2018-04-19 DIAGNOSIS — E55.9 VITAMIN D INSUFFICIENCY: ICD-10-CM

## 2018-04-19 DIAGNOSIS — G47.33 OSA (OBSTRUCTIVE SLEEP APNEA): ICD-10-CM

## 2018-04-19 DIAGNOSIS — I25.10 CORONARY ARTERY DISEASE INVOLVING NATIVE CORONARY ARTERY OF NATIVE HEART WITHOUT ANGINA PECTORIS: ICD-10-CM

## 2018-04-19 DIAGNOSIS — F33.9 RECURRENT MAJOR DEPRESSIVE DISORDER, REMISSION STATUS UNSPECIFIED: ICD-10-CM

## 2018-04-19 DIAGNOSIS — Z12.11 SCREENING FOR COLON CANCER: ICD-10-CM

## 2018-04-19 PROCEDURE — 3046F HEMOGLOBIN A1C LEVEL >9.0%: CPT | Mod: CPTII,S$GLB,, | Performed by: PHYSICIAN ASSISTANT

## 2018-04-19 PROCEDURE — 99396 PREV VISIT EST AGE 40-64: CPT | Mod: S$GLB,,, | Performed by: PHYSICIAN ASSISTANT

## 2018-04-19 PROCEDURE — 3079F DIAST BP 80-89 MM HG: CPT | Mod: CPTII,S$GLB,, | Performed by: PHYSICIAN ASSISTANT

## 2018-04-19 PROCEDURE — 3075F SYST BP GE 130 - 139MM HG: CPT | Mod: CPTII,S$GLB,, | Performed by: PHYSICIAN ASSISTANT

## 2018-04-19 PROCEDURE — 99999 PR PBB SHADOW E&M-EST. PATIENT-LVL V: CPT | Mod: PBBFAC,,, | Performed by: PHYSICIAN ASSISTANT

## 2018-04-19 NOTE — PROGRESS NOTES
Subjective:       Patient ID: Stepan Springer is a 63 y.o. male.    Chief Complaint: Annual Exam    63 year old male presents to clinic for annual exam. He had prior labs and results were reviewed with him today. He reports only medical complaint today is ongoing L knee pain and swelling, which he says has been chronic and gradually worsening since falling and hyperflexing his L knee June 2017. He reports seeing [facility] MD at that time and had knee xrays and knee effusion aspiration at that time. He reports being placed in a splint and completing PT. He would like to see ortho here at [facility] for an evaluation. He saw his cardiologist Dr. Gann yesterday and zaroxolyn was added. He has f/u labs with cardiology scheduled. He reports he does not monitor his BP at home due to difficulty finding a cuff that fits him correctly. He reports glucose is typically 120s-135 AC breakfast. A1C is elevated - pt declines DM clinic appt at this time. He reports being an  and tax season was stressful - admits to eating more carbs but he is now planning to start exercising regularly and following a healthy diet. He would like to try diet/exercise before adjusting DM medications. He saw nephro in the past for CKD but is overdue for appt. He reports no fever, chills, CP, SOB, or other medical complaints.    HM: ref fluvax, 6/16 [id number], 4/14 & 11/15 [id number], 12/13 TDaP, Urol Dr. Simental, No Cscope, 12/17 Eye MD, 9/16 HCV neg.    PCP: Dr. Fontenot    Patient Active Problem List:     Status post cataract extraction and insertion of intraocular lens     Corneal opacity - Left Eye     Mixed hyperlipidemia     Idiopathic peripheral neuropathy     FRAN (obstructive sleep apnea)     Troponin I above reference range     CHF (NYHA class III, ACC/AHA stage C)     LBBB (left bundle branch block)     Uncontrolled type 2 diabetes mellitus with stage 3 chronic kidney disease, with long-term current use of insulin     Anxiety      "Essential hypertension     Chronic kidney disease, stage III (moderate)     Primary open angle glaucoma of both eyes, severe stage     Nephrolithiasis     Hydronephrosis, left     Severe obesity (BMI >= 40)     NSTEMI (non-ST elevated myocardial infarction)     Coronary artery disease involving native coronary artery without angina pectoris     Recurrent major depressive disorder     Vasculogenic erectile dysfunction     Blindness and low vision     Hx of retinal detachment     High myopia, both eyes     Left posterior capsular opacification     Epiretinal membrane (ERM) of left eye     Retinal detachment with multiple breaks, right     Lattice degeneration of peripheral retina, left      Review of Systems   Constitutional: Negative for chills and fever.   HENT: Negative for congestion, ear pain, rhinorrhea, sore throat and trouble swallowing.    Eyes: Negative for visual disturbance.   Respiratory: Negative for cough and shortness of breath.    Cardiovascular: Negative for chest pain and leg swelling.   Gastrointestinal: Negative for abdominal pain, nausea and vomiting.   Endocrine: Negative for polydipsia and polyuria.   Genitourinary: Negative for difficulty urinating.   Musculoskeletal: Positive for joint swelling (L knee).   Skin: Negative for rash.   Neurological: Negative for dizziness, weakness, numbness and headaches.   Psychiatric/Behavioral: Negative for confusion.       Objective:       Vitals:    04/19/18 0913   BP: 138/80   BP Location: Right arm   Patient Position: Sitting   BP Method: Large (Manual)   Pulse: 77   Temp: 97.3 °F (36.3 °C)   TempSrc: Tympanic   SpO2: 98%   Weight: (!) 185.6 kg (409 lb 2.8 oz)   Height: 6' 7" (2.007 m)     Physical Exam   Constitutional: He is oriented to person, place, and time. He appears well-developed and well-nourished. No distress.   HENT:   Head: Normocephalic and atraumatic.   Mouth/Throat: Oropharynx is clear and moist. No oropharyngeal exudate.   Eyes: EOM are " normal. No scleral icterus.   Neck: Neck supple. Carotid bruit is not present.   Cardiovascular: Normal rate and regular rhythm.    Pulmonary/Chest: Effort normal and breath sounds normal. No respiratory distress. He has no decreased breath sounds. He has no wheezes. He has no rhonchi. He has no rales.   Abdominal: Soft. Bowel sounds are normal. He exhibits no mass. There is no tenderness. There is no rigidity, no rebound, no guarding and no CVA tenderness.   Musculoskeletal: Normal range of motion. He exhibits no edema.   Lymphadenopathy:     He has no cervical adenopathy.   Neurological: He is alert and oriented to person, place, and time. No cranial nerve deficit.   Skin: Skin is dry. No rash noted. He is not diaphoretic.   Psychiatric: He has a normal mood and affect. His speech is normal and behavior is normal. Thought content normal.       Component      Latest Ref Rng & Units 4/12/2018 4/12/2018 4/12/2018           7:20 AM  7:20 AM  7:14 AM   WBC      3.90 - 12.70 K/uL  5.54    RBC      4.60 - 6.20 M/uL  4.46 (L)    Hemoglobin      14.0 - 18.0 g/dL  14.0    Hematocrit      40.0 - 54.0 %  41.6    MCV      82 - 98 fL  93    MCH      27.0 - 31.0 pg  31.4 (H)    MCHC      32.0 - 36.0 g/dL  33.7    RDW      11.5 - 14.5 %  12.0    Platelets      150 - 350 K/uL  211    MPV      9.2 - 12.9 fL  10.2    Immature Granulocytes      0.0 - 0.5 %  0.2    Gran # (ANC)      1.8 - 7.7 K/uL  3.4    Immature Grans (Abs)      0.00 - 0.04 K/uL  0.01    Lymph #      1.0 - 4.8 K/uL  1.3    Mono #      0.3 - 1.0 K/uL  0.5    Eos #      0.0 - 0.5 K/uL  0.3    Baso #      0.00 - 0.20 K/uL  0.02    nRBC      0 /100 WBC  0    Gran%      38.0 - 73.0 %  61.5    Lymph%      18.0 - 48.0 %  23.3    Mono%      4.0 - 15.0 %  9.7    Eosinophil%      0.0 - 8.0 %  4.9    Basophil%      0.0 - 1.9 %  0.4    Differential Method        Automated    Sodium      136 - 145 mmol/L  141    Potassium      3.5 - 5.1 mmol/L  3.9    Chloride      95 - 110  mmol/L  105    CO2      23 - 29 mmol/L  27    Glucose      70 - 110 mg/dL  205 (H)    BUN, Bld      8 - 23 mg/dL  24 (H)    Creatinine      0.5 - 1.4 mg/dL  2.2 (H)    Calcium      8.7 - 10.5 mg/dL  9.6    Total Protein      6.0 - 8.4 g/dL  7.1    Albumin      3.5 - 5.2 g/dL  3.7    Total Bilirubin      0.1 - 1.0 mg/dL  0.3    Alkaline Phosphatase      55 - 135 U/L  111    AST      10 - 40 U/L  11    ALT      10 - 44 U/L  16    Anion Gap      8 - 16 mmol/L  9    eGFR if African American      >60 mL/min/1.73 m:2  36.0 (A)    eGFR if non African American      >60 mL/min/1.73 m:2  31.2 (A)    Cholesterol      120 - 199 mg/dL 125 125    Triglycerides      30 - 150 mg/dL 179 (H) 179 (H)    HDL      40 - 75 mg/dL 41 41    LDL Cholesterol      63.0 - 159.0 mg/dL 48.2 (L) 48.2 (L)    HDL/Chol Ratio      20.0 - 50.0 % 32.8 32.8    Total Cholesterol/HDL Ratio      2.0 - 5.0 3.0 3.0    Non-HDL Cholesterol      mg/dL 84 84    Microalbum.,U,Random      ug/mL   3.0   Creatinine, Random Ur      23.0 - 375.0 mg/dL   24.0   Microalb Creat Ratio      0.0 - 30.0 ug/mg   12.5   Hemoglobin A1C      4.0 - 5.6 %  9.3 (H)    Estimated Avg Glucose      68 - 131 mg/dL  220 (H)    PSA, SCREEN      0.00 - 4.00 ng/mL  0.14    TSH      0.400 - 4.000 uIU/mL  1.493    Vit D, 25-Hydroxy      30 - 96 ng/mL  21 (L)      Assessment:       1. Routine physical examination    2. Chronic pain of left knee    3. Uncontrolled type 2 diabetes mellitus with stage 3 chronic kidney disease, with long-term current use of insulin    4. Chronic kidney disease, stage III (moderate)    5. Screening for colon cancer    6. Recurrent major depressive disorder, remission status unspecified    7. Primary open angle glaucoma of both eyes, severe stage    8. Mixed hyperlipidemia    9. CHF (NYHA class III, ACC/AHA stage C)    10. Coronary artery disease involving native coronary artery of native heart without angina pectoris    11. Essential hypertension    12. Severe  obesity (BMI >= 40)    13. FRAN (obstructive sleep apnea)    14. Vitamin D insufficiency        Plan:         Stepan was seen today for annual exam.    Diagnoses and all orders for this visit:    Routine physical examination  Lab results reviewed with pt today. Pt refuses flu vaccine. Pt to check with pharmacy concerning Zoster vaccine. Healthy diet and regular exercise as tolerated.    Chronic pain of left knee  -     Ambulatory referral to Orthopedics    Uncontrolled type 2 diabetes mellitus with stage 3 chronic kidney disease, with long-term current use of insulin  -     Hemoglobin A1c; Future  Continue current Rx. Pt declines dose adjustment and DM clinic eval. Monitor glucose. Healthy diet and regular exercise as tolerated. Recheck A1C in 3 months. Pt to inform us sooner if glucose worsens.    Chronic kidney disease, stage III (moderate), vit D insufficiency  -     Ambulatory referral to Nephrology    Screening for colon cancer  Schedule Cscope.    Recurrent major depressive disorder, remission status unspecified  As per psyc.    Primary open angle glaucoma of both eyes, severe stage  As per ophthalmology.    Mixed hyperlipidemia  Pt taking lovastatin and aspirin.    CHF (NYHA class III, ACC/AHA stage C), Coronary artery disease involving native coronary artery of native heart without angina pectoris  As per cardiology.    Essential hypertension  Controlled. Monitor BP and f/u with PCP and cardiology.    Severe obesity (BMI >= 40)  Pt to work at better diet and regular exercise.    FRAN (obstructive sleep apnea)  Pt to work on CPAP adherence.    F/u with PCP in 3 months for health management. RTC sooner if needed.

## 2018-04-24 ENCOUNTER — DOCUMENTATION ONLY (OUTPATIENT)
Dept: ENDOSCOPY | Facility: HOSPITAL | Age: 63
End: 2018-04-24

## 2018-04-26 ENCOUNTER — OFFICE VISIT (OUTPATIENT)
Dept: PSYCHIATRY | Facility: CLINIC | Age: 63
End: 2018-04-26
Payer: COMMERCIAL

## 2018-04-26 DIAGNOSIS — F33.1 MAJOR DEPRESSIVE DISORDER, RECURRENT EPISODE, MODERATE: Primary | ICD-10-CM

## 2018-04-26 PROCEDURE — 90834 PSYTX W PT 45 MINUTES: CPT | Mod: S$GLB,,, | Performed by: SOCIAL WORKER

## 2018-04-26 NOTE — PROGRESS NOTES
Individual Psychotherapy (PhD/LCSW)    4/26/2018    Site:  [facility]     Therapeutic Intervention: Met with patient.  Outpatient - Insight oriented psychotherapy 45 min - CPT code 04976    Chief complaint/reason for encounter: depression     Interval history and content of current session:  Patient presents to ongoing individual therapy due to depression.  He is now working less hours due to the take deadline having passed.  He has been surprised by the number of people who have come in after the deadline.  He notes that they have not even filed an extension.  The majority of his co workers are around or passed residential age.  He feels that they enjoy their work due to the personal connection they have with their clients.  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  He is now trying to improve his health.  He was able to exercise yesterday for over an hour.  He is also trying to eat healthy.  He was able to go to lunch with his friend, Geena.  He owed her a birthday lunch since he was working long hours when she had her birthday.  He still has hopes of a relationship with his ex girlfriend, Solange.  He has found out that she was very stressed at her job with the state.  However, they have hired staff to help her.  He is looking forward to traveling to [place] to see his son and future step granddaughters.  He has told his son he would like to ride on the [place] [employer].  He has seen pictures over the years, but he would like to experience it himself.  He has also been busy planning a [employer] party at his home for friends.    Treatment plan:  Target symptoms: depression  Why chosen therapy is appropriate versus another modality: relevant to diagnosis  Outcome monitoring methods: self-report, observation  Therapeutic intervention type: insight oriented psychotherapy, supportive psychotherapy, interactive psychotherapy     Risk parameters:  Patient  reports no suicidal ideation  Patient reports no homicidal ideation  Patient reports no self-injurious behavior  Patient reports no violent behavior     Verbal deficits: Nonee     Patient's response to intervention:  The patient's response to intervention is accepting, motivated.     Progress toward goals and other mental status changes:  The patient's progress toward goals is fair .     Diagnosis:   Major depression recurrent moderate     Plan:  individual psychotherapy and medication management by physician     Return to clinic: as scheduled     Length of Service (minutes): 45

## 2018-04-27 ENCOUNTER — TELEPHONE (OUTPATIENT)
Dept: INTERNAL MEDICINE | Facility: CLINIC | Age: 63
End: 2018-04-27

## 2018-04-27 NOTE — TELEPHONE ENCOUNTER
----- Message from Carmela Ortega sent at 4/26/2018  2:38 PM CDT -----  Contact: PATIENT  Please add the lab from 4/19 to 05/02 that patient forgot to have done. Also need to be scheduled for appointment with Dr. Brooks that need orders also. Please call patient ASAP @ [phone]. Thanks, jose

## 2018-05-01 ENCOUNTER — OFFICE VISIT (OUTPATIENT)
Dept: OPHTHALMOLOGY | Facility: CLINIC | Age: 63
End: 2018-05-01
Payer: COMMERCIAL

## 2018-05-01 DIAGNOSIS — H35.372 EPIRETINAL MEMBRANE (ERM) OF LEFT EYE: ICD-10-CM

## 2018-05-01 DIAGNOSIS — Z91.148 NON COMPLIANCE W MEDICATION REGIMEN: ICD-10-CM

## 2018-05-01 DIAGNOSIS — H40.1133 PRIMARY OPEN ANGLE GLAUCOMA OF BOTH EYES, SEVERE STAGE: Primary | ICD-10-CM

## 2018-05-01 DIAGNOSIS — H33.021 RETINAL DETACHMENT WITH MULTIPLE BREAKS, RIGHT: ICD-10-CM

## 2018-05-01 DIAGNOSIS — H54.10 BLINDNESS AND LOW VISION: ICD-10-CM

## 2018-05-01 PROCEDURE — 92014 COMPRE OPH EXAM EST PT 1/>: CPT | Mod: S$GLB,,, | Performed by: OPHTHALMOLOGY

## 2018-05-01 PROCEDURE — 99999 PR PBB SHADOW E&M-EST. PATIENT-LVL II: CPT | Mod: PBBFAC,,, | Performed by: OPHTHALMOLOGY

## 2018-05-01 NOTE — PROGRESS NOTES
"HPI     Glaucoma    Additional comments: azopt bid ou, alphagan bid ou           Comments   Patient works for [employer] and has been working 12-13 hour days so he   stated he takes his drops when he can remember which has been none at all   due to his work schedule. He states " I also forget the morning drops   often"        Last edited by Alvin Hale MD on 5/1/2018  8:54 AM. (History)            Assessment /Plan     For exam results, see Encounter Report.      ICD-10-CM ICD-9-CM    1. Primary open angle glaucoma of both eyes, severe stage H40.1133 365.11 Doing well - intraocular pressure is within acceptable range relative to target pressure with no evidence of progression.   Continue current treatment.  Reviewed importance of continued compliance with treatment and follow up.        365.73    2. Blindness and low vision H54.10 369.9 As before, follow    3. Uncontrolled type 2 diabetes mellitus with stage 3 chronic kidney disease, with long-term current use of insulin E11.22 250.52 Diabetes with no diabetic retinopathy on dilated exam.   Reviewed diabetic eye precautions including excellent blood sugar control, and importance of regular follow up.           E11.65 585.3     N18.3 V58.67     Z79.4     4. Retinal detachment with multiple breaks, right H33.021 361.02 Stable, follow   5. Epiretinal membrane (ERM) of left eye H35.372 362.56 Stable, follow    6. Non compliance w medication regimen Z91.14 V15.81 Pt off meds recently, he understands RBA with not using drops as scheduled        RETURN TO CLINIC 4 months    azopt bid ou, alphagan bid ou           "

## 2018-05-02 ENCOUNTER — LAB VISIT (OUTPATIENT)
Dept: LAB | Facility: HOSPITAL | Age: 63
End: 2018-05-02
Attending: INTERNAL MEDICINE
Payer: COMMERCIAL

## 2018-05-02 DIAGNOSIS — I25.10 CORONARY ARTERY DISEASE INVOLVING NATIVE CORONARY ARTERY OF NATIVE HEART WITHOUT ANGINA PECTORIS: ICD-10-CM

## 2018-05-02 DIAGNOSIS — E78.2 MIXED HYPERLIPIDEMIA: Chronic | ICD-10-CM

## 2018-05-02 LAB
ALBUMIN SERPL BCP-MCNC: 3.6 G/DL
ALP SERPL-CCNC: 94 U/L
ALT SERPL W/O P-5'-P-CCNC: 18 U/L
ANION GAP SERPL CALC-SCNC: 11 MMOL/L
AST SERPL-CCNC: 10 U/L
BILIRUB DIRECT SERPL-MCNC: 0.2 MG/DL
BILIRUB SERPL-MCNC: 0.5 MG/DL
BUN SERPL-MCNC: 42 MG/DL
CALCIUM SERPL-MCNC: 9.5 MG/DL
CHLORIDE SERPL-SCNC: 103 MMOL/L
CO2 SERPL-SCNC: 27 MMOL/L
CREAT SERPL-MCNC: 2.6 MG/DL
EST. GFR  (AFRICAN AMERICAN): 29.4 ML/MIN/1.73 M^2
EST. GFR  (NON AFRICAN AMERICAN): 25.5 ML/MIN/1.73 M^2
ESTIMATED AVG GLUCOSE: 217 MG/DL
GLUCOSE SERPL-MCNC: 179 MG/DL
HBA1C MFR BLD HPLC: 9.2 %
POTASSIUM SERPL-SCNC: 3.9 MMOL/L
PROT SERPL-MCNC: 6.9 G/DL
SODIUM SERPL-SCNC: 141 MMOL/L

## 2018-05-02 PROCEDURE — 80076 HEPATIC FUNCTION PANEL: CPT

## 2018-05-02 PROCEDURE — 36415 COLL VENOUS BLD VENIPUNCTURE: CPT | Mod: PO

## 2018-05-02 PROCEDURE — 83036 HEMOGLOBIN GLYCOSYLATED A1C: CPT

## 2018-05-02 PROCEDURE — 80048 BASIC METABOLIC PNL TOTAL CA: CPT

## 2018-05-03 ENCOUNTER — TELEPHONE (OUTPATIENT)
Dept: INTERNAL MEDICINE | Facility: CLINIC | Age: 63
End: 2018-05-03

## 2018-05-03 NOTE — TELEPHONE ENCOUNTER
----- Message from TARAN Calabrese sent at 5/3/2018  1:34 PM CDT -----  A1C is still elevated at 9.2 although this lab was recommended to be done at his 3 month f/u with PCP.

## 2018-05-04 ENCOUNTER — TELEPHONE (OUTPATIENT)
Dept: CARDIOLOGY | Facility: CLINIC | Age: 63
End: 2018-05-04

## 2018-05-04 LAB
ESTIMATED PA SYSTOLIC PRESSURE: 17.14
RETIRED EF AND QEF - SEE NOTES: 55 (ref 55–65)

## 2018-05-04 NOTE — TELEPHONE ENCOUNTER
----- Message from Laz Gann MD sent at 5/4/2018  5:17 AM CDT -----  Bmp reviewed, stop zaroxolyn, repeat bmp 2 weeks

## 2018-05-04 NOTE — TELEPHONE ENCOUNTER
Called and left v/m Dr Gann rev'd his labs, BUN and Creatinine is elevated, which means he is too dry, has lost too much fluid, stop Zaroxolyn , need to repeat labs in 2 weeks. Please call me back and let me know message rec'd and to schedule lab appt. cm

## 2018-05-07 ENCOUNTER — TELEPHONE (OUTPATIENT)
Dept: CARDIOLOGY | Facility: CLINIC | Age: 63
End: 2018-05-07

## 2018-05-07 NOTE — TELEPHONE ENCOUNTER
I called and left v/m, per Dr Gann his echo is wnl. Please call if he has any questions to [phone] and ask for Geraldine

## 2018-05-10 ENCOUNTER — OFFICE VISIT (OUTPATIENT)
Dept: PSYCHIATRY | Facility: CLINIC | Age: 63
End: 2018-05-10
Payer: COMMERCIAL

## 2018-05-10 DIAGNOSIS — F33.1 MAJOR DEPRESSIVE DISORDER, RECURRENT EPISODE, MODERATE: Primary | ICD-10-CM

## 2018-05-10 PROCEDURE — 90834 PSYTX W PT 45 MINUTES: CPT | Mod: S$GLB,,, | Performed by: SOCIAL WORKER

## 2018-05-10 NOTE — PROGRESS NOTES
Individual Psychotherapy (PhD/LCSW)    5/10/2018    [facility]         Therapeutic Intervention: Met with patient.  Outpatient - Insight oriented psychotherapy 45 min - CPT code 01020    Chief complaint/reason for encounter: depression     Interval history and content of current session:  Patient presents to ongoing individual therapy due to depression.  He was working on composing a letter to his ex girlfriend, Solange.  He wants to invite her to come to [place] for lunch and shopping around town.  She did not go to his [employer] party because she was taking care of her grand nephew.  He is having difficulty thinking of the words to open the letter.  He admits that he is seldom at a loss for words.  He was having such difficulty that he ruminated about it all day and he was in tears later in the day.  He denies having any problems finding words in other areas.  He admits that he has been frustrated that he has been unable to see her.  He was able to enjoy his [employer] party.  He had eleven people attend the party at their house.  They had Mexican themed food and they swung at a pinata after.  He had one beer and he felt it was strong.  He slept till 1:30 the next day.  He admits that he needed to catch up on his sleep due to working long hours.  He has only worked about twenty five hours this week.  He has Wednesday, Friday, and Sundays off.  He has been exercising on a regular basis with his friend.      Treatment plan:  Target symptoms: depression  Why chosen therapy is appropriate versus another modality: relevant to diagnosis  Outcome monitoring methods: self-report, observation  Therapeutic intervention type: insight oriented psychotherapy, supportive psychotherapy, interactive psychotherapy     Risk parameters:  Patient reports no suicidal ideation  Patient reports no homicidal ideation  Patient reports no self-injurious behavior  Patient reports no violent behavior     Verbal  deficits: Nonee     Patient's response to intervention:  The patient's response to intervention is accepting, motivated.     Progress toward goals and other mental status changes:  The patient's progress toward goals is fair .     Diagnosis:   Major depression recurrent moderate     Plan:  individual psychotherapy and medication management by physician     Return to clinic: as scheduled     Length of Service (minutes): 45

## 2018-05-15 ENCOUNTER — OFFICE VISIT (OUTPATIENT)
Dept: NEPHROLOGY | Facility: CLINIC | Age: 63
End: 2018-05-15
Payer: COMMERCIAL

## 2018-05-15 VITALS
SYSTOLIC BLOOD PRESSURE: 126 MMHG | BODY MASS INDEX: 36.45 KG/M2 | WEIGHT: 315 LBS | HEART RATE: 88 BPM | HEIGHT: 78 IN | DIASTOLIC BLOOD PRESSURE: 90 MMHG

## 2018-05-15 DIAGNOSIS — I13.0 CARDIORENAL SYNDROME WITH RENAL FAILURE, STAGE 1-4 OR UNSPECIFIED CHRONIC KIDNEY DISEASE, WITH HEART FAILURE: ICD-10-CM

## 2018-05-15 DIAGNOSIS — N18.4 CKD (CHRONIC KIDNEY DISEASE) STAGE 4, GFR 15-29 ML/MIN: Primary | ICD-10-CM

## 2018-05-15 PROCEDURE — 99999 PR PBB SHADOW E&M-EST. PATIENT-LVL III: CPT | Mod: PBBFAC,,, | Performed by: INTERNAL MEDICINE

## 2018-05-15 PROCEDURE — 99214 OFFICE O/P EST MOD 30 MIN: CPT | Mod: S$GLB,,, | Performed by: INTERNAL MEDICINE

## 2018-05-15 PROCEDURE — 3080F DIAST BP >= 90 MM HG: CPT | Mod: CPTII,S$GLB,, | Performed by: INTERNAL MEDICINE

## 2018-05-15 PROCEDURE — 3074F SYST BP LT 130 MM HG: CPT | Mod: CPTII,S$GLB,, | Performed by: INTERNAL MEDICINE

## 2018-05-15 PROCEDURE — 3008F BODY MASS INDEX DOCD: CPT | Mod: CPTII,S$GLB,, | Performed by: INTERNAL MEDICINE

## 2018-05-15 NOTE — PROGRESS NOTES
Subjective:       Patient ID: Stepan Springer is a 63 y.o. White male who presents for follow-up evaluation of  CKD stage 3 , HTN , HPT , Kidney stones     Liza Landrum MD      HPI: Stepan Springer Is a pleasant 63-year-old  gentleman with long-standing history of hypertension. He was diagnosed with type 2 diabetes about 15 years ago. Patient seen today in follow-up for above medical problems.     He was last seen by Dr. Murguia on 12/7/16.     He reports intermittent LE edema.            Past Medical History:   Diagnosis Date    Anxiety     CHF (congestive heart failure)     CKD (chronic kidney disease) stage 3, GFR 30-59 ml/min     Coronary artery disease involving native coronary artery without angina pectoris 10/18/2016    Diabetes mellitus type 2, controlled, with complications 8/21/2013    Glaucoma     Hyperlipidemia     Hypertension     Idiopathic peripheral neuropathy 12/11/2012    Mixed hyperlipidemia 12/11/2012    Morbid obesity     Retinal detachment     Sleep apnea     Vitamin B 12 deficiency 8/23/2012         Current Outpatient Prescriptions on File Prior to Visit   Medication Sig Dispense Refill    aspirin 81 MG chewable tablet Take 200 mg by mouth Daily. 1 Tablet, Chewable Oral Every day      brimonidine 0.1% (ALPHAGAN P) 0.1 % Drop Place 1 drop into both eyes 3 (three) times daily. Order 90 day supply 10 mL 4    brinzolamide (AZOPT) 1 % ophthalmic suspension Place 1 drop into both eyes 3 (three) times daily. ORDER 90 DAY SUPPLY 10 mL 4    buPROPion (WELLBUTRIN XL) 300 MG 24 hr tablet Take 1 tablet (300 mg total) by mouth once daily. 90 tablet 1    carvedilol (COREG) 25 MG tablet Take 1 tablet (25 mg total) by mouth 2 (two) times daily with meals. 180 tablet 11    furosemide (LASIX) 40 MG tablet Take 2 tablets (80 mg total) by mouth 2 (two) times daily. (Patient taking differently: Take 80 mg by mouth 2 (two) times daily. When ankles retain fluid I  "double the lasix x 14 days) 180 tablet 3    hydrocodone-acetaminophen 5-325mg (NORCO) 5-325 mg per tablet Take 1 tablet by mouth every 6 (six) hours as needed for Pain (for knee pain).      lisinopril (PRINIVIL,ZESTRIL) 20 MG tablet TAKE ONE TABLET BY MOUTH ONCE DAILY 90 tablet 3    lovastatin (MEVACOR) 10 MG tablet TAKE ONE TABLET BY MOUTH IN THE EVENING 90 tablet 3    metOLazone (ZAROXOLYN) 2.5 MG tablet Take 1 tablet (2.5 mg total) by mouth every Mon, Wed, Fri. For 2 weeks 12 tablet 1    NOVOLIN 70/30 100 unit/mL (70-30) injection INJECT 40 UNITS SUB-Q TWO TIMES DAILY (Patient taking differently: INJECT 50 UNITS SUB-Q TWO TIMES DAILY) 3 mL 6    sildenafil (VIAGRA) 50 MG tablet Take 1 tablet (50 mg total) by mouth daily as needed for Erectile Dysfunction. 5 tablet 3    spironolactone (ALDACTONE) 25 MG tablet TAKE ONE TABLET BY MOUTH TWICE DAILY 180 tablet 3    nitroGLYCERIN (NITROSTAT) 0.4 MG SL tablet Place 1 tablet (0.4 mg total) under the tongue every 5 (five) minutes as needed for Chest pain. 20 tablet 0     No current facility-administered medications on file prior to visit.          Review of Systems   Cardiovascular: Positive for leg swelling.     :  BP (!) 126/90   Pulse 88   Ht 6' 7" (2.007 m)   Wt (!) 184.8 kg (407 lb 6.6 oz)   BMI 45.90 kg/m²       Constitutional: Negative for activity change and appetite change.   HENT: Negative for congestion, facial swelling, neck pain and neck stiffness.   Eyes: Negative for pain, discharge and redness.   Respiratory: Negative for apnea, cough and chest tightness.   Cardiovascular: Negative for chest pain.    Gastrointestinal: Negative for abdominal distention.   Genitourinary: Negative for dysuria, frequency and difficulty urinating.   Skin: Negative for color change, rash and wound.   Neurological: Negative for dizziness, weakness and numbness.   Psychiatric/Behavioral: Negative for sleep disturbance.   All other systems reviewed and are " negative.      Objective:         Vitals:    05/15/18 1506   BP: (!) 126/90   Pulse: 88       Weight 367 lbs, last weight 361 lbs ,     Physical Exam  :    Nursing note and vitals reviewed.  Constitutional: He is oriented to person, place, and time. He appears well-developed and well-nourished. No distress.   HENT:  Head: Normocephalic and atraumatic. Eyes: Pupils are equal, round, and reactive to light.   Neck: Normal range of motion. Neck supple. No tracheal deviation present. No thyromegaly present.   Cardiovascular: Normal rate, regular rhythm, normal heart sounds and intact distal pulses. Exam reveals no gallop and no friction rub.    No murmur heard.  Pulmonary/Chest: Effort normal and breath sounds normal. He has no wheezes. He has no rales.   Abdominal: Soft. He exhibits no mass. There is no tenderness. There is no rebound and no guarding.   Obese, limited exam   Musculoskeletal: Normal range of motion. Mild LE edema    Lymphadenopathy: He has no cervical adenopathy.   Neurological: He is alert and oriented to person, place, and time.   Skin: Skin is warm. No rash noted. He is not diaphoretic. No erythema.      Labs:    Lab Results   Component Value Date    CREATININE 2.6 (H) 05/02/2018    BUN 42 (H) 05/02/2018     05/02/2018    K 3.9 05/02/2018     05/02/2018    CO2 27 05/02/2018       Lab Results   Component Value Date    WBC 5.54 04/12/2018    HGB 14.0 04/12/2018    HCT 41.6 04/12/2018    MCV 93 04/12/2018     04/12/2018       Lab Results   Component Value Date    .0 (H) 08/11/2016    CALCIUM 9.5 05/02/2018    PHOS 2.9 11/21/2016       Lab Results   Component Value Date    ALBUMIN 3.6 05/02/2018       Lab Results   Component Value Date    URICACID 7.9 (H) 08/11/2016       Lab Results   Component Value Date    HGBA1C 9.2 (H) 05/02/2018     Microalbumin creatinine ratio: 12.5 (4/12/18)      Impression and plan - 63-year-old  gentleman with sleep apnea, obesity, HTN, DM2,  CAD, CHF, nephrolithiasis presents for evaluation of renal insufficiency.       1. Chronic kidney disease stage 3/4 - secondary to long-standing history of hypertension and cardiorenal syndrome. DM2 is less likely given absence of proteinuria. Renal function has slightly worsened since last visit and creatinine has increased to 2.6. Likely a consequence of cardiorenal syndrome. He will return to clinic in 3-4 months for follow up. Patient was advised to avoid NSAID pain medications such as advil, aleve, motrin, ibuprofen, naprosyn, meloxicam, diclofenac, mobic.       2. Hypertension -  Blood pressure well controlled on current regimen.      3. CAD / Congestive failure -  He is also followed by cardiology. Discussed low salt diet. Continue furosemide and aldactone.       4. Secondary hyperparathyroidism - slightly elevated PTH at 112 noted, will continue to monitor.      5. Diabetes mellitus type 2 - poorly controlled, recent hemoglobin A1c is 9.2. He will follow up with PMD to improve his blood glucose control.      6. Kidney stones : s/p lithotripsy. No recurrent renal stones.     7. Normal range proteinuria. Patient is on Lasix.     8.  Morbid obesity - discussed exercise, weight loss.    9. Electrolytes: at goal.

## 2018-05-15 NOTE — LETTER
May 15, 2018      TARAN Calabrese  [address] [place] [address]           [facility] - Nephrology  [address]  [place] [address]  Phone: [phone]  Fax: [phone]          Patient: Stepan Springer   MR Number: [id number]   YOB: 1955   Date of Visit: 5/15/2018       Dear Roya Maynard:    Thank you for referring Stepan Springer to me for evaluation. Attached you will find relevant portions of my assessment and plan of care.    If you have questions, please do not hesitate to call me. I look forward to following Stepan Springer along with you.    Sincerely,    Cornell Brooks MD    Enclosure  CC:  No Recipients    If you would like to receive this communication electronically, please contact [email or web address] or [phone] to request more information on [employer] access.    For providers and/or their staff who would like to refer a patient to [facility], please contact us through our one-stop-shop provider referral line, Genna Ross, at [phone].    If you feel you have received this communication in error or would no longer like to receive these types of communications, please e-mail [email or web address]

## 2018-05-23 DIAGNOSIS — I50.9 CHF (NYHA CLASS III, ACC/AHA STAGE C): ICD-10-CM

## 2018-05-23 RX ORDER — FUROSEMIDE 40 MG/1
TABLET ORAL
Qty: 180 TABLET | Refills: 3 | Status: SHIPPED | OUTPATIENT
Start: 2018-05-23 | End: 2018-07-27 | Stop reason: SDUPTHER

## 2018-05-24 ENCOUNTER — OFFICE VISIT (OUTPATIENT)
Dept: PSYCHIATRY | Facility: CLINIC | Age: 63
End: 2018-05-24
Payer: COMMERCIAL

## 2018-05-24 DIAGNOSIS — F33.1 MAJOR DEPRESSIVE DISORDER, RECURRENT EPISODE, MODERATE: Primary | ICD-10-CM

## 2018-05-24 PROCEDURE — 90834 PSYTX W PT 45 MINUTES: CPT | Mod: S$GLB,,, | Performed by: SOCIAL WORKER
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
## 2018-05-29 ENCOUNTER — NURSE TRIAGE (OUTPATIENT)
Dept: ADMINISTRATIVE | Facility: CLINIC | Age: 63
End: 2018-05-29

## 2018-05-29 ENCOUNTER — HOSPITAL ENCOUNTER (EMERGENCY)
Facility: HOSPITAL | Age: 63
Discharge: HOME | End: 2018-05-29
Attending: EMERGENCY MEDICINE | Admitting: EMERGENCY MEDICINE
Payer: COMMERCIAL

## 2018-05-29 VITALS
TEMPERATURE: 99 F | HEART RATE: 71 BPM | HEIGHT: 78 IN | BODY MASS INDEX: 36.45 KG/M2 | DIASTOLIC BLOOD PRESSURE: 94 MMHG | RESPIRATION RATE: 18 BRPM | SYSTOLIC BLOOD PRESSURE: 186 MMHG | OXYGEN SATURATION: 99 % | WEIGHT: 315 LBS

## 2018-05-29 DIAGNOSIS — G51.0 BELL'S PALSY: Primary | ICD-10-CM

## 2018-05-29 PROCEDURE — 99284 EMERGENCY DEPT VISIT MOD MDM: CPT

## 2018-05-29 PROCEDURE — 25000003 PHARM REV CODE 250: Performed by: EMERGENCY MEDICINE

## 2018-05-29 RX ORDER — CLONIDINE HYDROCHLORIDE 0.2 MG/1
0.2 TABLET ORAL
Status: COMPLETED | OUTPATIENT
Start: 2018-05-29 | End: 2018-05-29

## 2018-05-29 RX ORDER — ASPIRIN 325 MG
325 TABLET ORAL 2 TIMES DAILY
Status: ON HOLD | COMMUNITY
End: 2020-02-09 | Stop reason: HOSPADM

## 2018-05-29 RX ORDER — HYDRALAZINE HYDROCHLORIDE 25 MG/1
50 TABLET, FILM COATED ORAL
Status: COMPLETED | OUTPATIENT
Start: 2018-05-29 | End: 2018-05-29

## 2018-05-29 RX ORDER — AMLODIPINE BESYLATE 5 MG/1
10 TABLET ORAL
Status: COMPLETED | OUTPATIENT
Start: 2018-05-29 | End: 2018-05-29

## 2018-05-29 RX ORDER — PREDNISONE 20 MG/1
40 TABLET ORAL DAILY
Qty: 10 TABLET | Refills: 0 | Status: SHIPPED | OUTPATIENT
Start: 2018-05-29 | End: 2018-06-03

## 2018-05-29 RX ADMIN — HYDRALAZINE HYDROCHLORIDE 50 MG: 25 TABLET, FILM COATED ORAL at 01:05

## 2018-05-29 RX ADMIN — AMLODIPINE BESYLATE 10 MG: 5 TABLET ORAL at 01:05

## 2018-05-29 RX ADMIN — CLONIDINE HYDROCHLORIDE 0.2 MG: 0.2 TABLET ORAL at 12:05

## 2018-05-29 NOTE — ED PROVIDER NOTES
SCRIBE #1 NOTE: I, Dolores Rivas, am scribing for, and in the presence of, Keanu Pierre Jr., MD. I have scribed the entire note.      History      Chief Complaint   Patient presents with    Facial Droop     pt states yesterday he noted his tongue was numb yesterday; today he cannot close his right eye; right facial droop noted; no slurred speech noted; denies ext weakness but does have extensive medical hx        Review of patient's allergies indicates:   Allergen Reactions    Cefazolin      Other reaction(s): Shakes  Other reaction(s): Chills        HPI   HPI    5/29/2018, 11:40 AM   History obtained from the patient      History of Present Illness: Stepan Springer is a 63 y.o. male patient who presents to the Emergency Department for R facial droop which onset suddenly yesterday at 1500. Symptoms are constant and moderate in severity. No mitigating or exacerbating factors reported. Associated sxs include R tongue numbness. Pt reports he is having difficulty closing his R eye. Patient denies any fever, chills, cough, congestions, extremity weakness/numbness, confusion, speech changes, HA, CP, SOB, visual disturbances, and all other sxs at this time. No further complaints or concerns at this time.       Arrival mode: Personal vehicle      PCP: Liza Landrum MD       Past Medical History:  Past Medical History:   Diagnosis Date    Anxiety     CHF (congestive heart failure)     CKD (chronic kidney disease) stage 3, GFR 30-59 ml/min     Coronary artery disease involving native coronary artery without angina pectoris 10/18/2016    Diabetes mellitus type 2, controlled, with complications 8/21/2013    Glaucoma     Hyperlipidemia     Hypertension     Idiopathic peripheral neuropathy 12/11/2012    Mixed hyperlipidemia 12/11/2012    Morbid obesity     Retinal detachment     Sleep apnea     Vitamin B 12 deficiency 8/23/2012       Past Surgical History:  Past Surgical History:   Procedure Laterality  "Date    CARDIAC CATHETERIZATION  7/22/13    non obstructive cad    CATARACT EXTRACTION  OU    EYE SURGERY      FRACTURE SURGERY      kidney stone       August 2016    PC IOL OU      RETINAL DETACHMENT REPAIR W/ SCLERAL BUCKLE LE      WRIST SURGERY           Family History:  Family History   Problem Relation Age of Onset    Macular degeneration Father     Heart disease Father         CHF     Heart attack Father     Hypertension Mother     Macular degeneration Paternal Uncle     Hypertension Maternal Grandmother     Hypertension Maternal Grandfather     Strabismus Neg Hx     Retinal detachment Neg Hx     Glaucoma Neg Hx     Blindness Neg Hx     Amblyopia Neg Hx        Social History:  Social History     Social History Main Topics    Smoking status: Never Smoker    Smokeless tobacco: Never Used    Alcohol use Yes      Comment: " one to two glasses of wine per year"    Drug use: No    Sexual activity: Not Currently     Birth control/ protection: None       ROS   Review of Systems   Constitutional: Negative for chills and fever.   HENT: Negative for congestion and sore throat.    Eyes: Negative for visual disturbance.   Respiratory: Negative for cough and shortness of breath.    Cardiovascular: Negative for chest pain.   Gastrointestinal: Negative for nausea and vomiting.   Genitourinary: Negative for dysuria.   Musculoskeletal: Negative for back pain.   Skin: Negative for rash.   Neurological: Positive for facial asymmetry (R) and numbness (R side of tongue). Negative for speech difficulty, weakness and headaches.   Hematological: Does not bruise/bleed easily.   All other systems reviewed and are negative.    Physical Exam      Initial Vitals   BP Pulse Resp Temp SpO2   05/29/18 1112 05/29/18 1112 05/29/18 1112 05/29/18 1114 05/29/18 1112   (!) 223/123 77 20 98.7 °F (37.1 °C) 96 %      MAP       05/29/18 1112       156.33          Physical Exam  Nursing Notes and Vital Signs " "Reviewed.  Constitutional: Patient is in no acute distress. Well-developed and well-nourished. Obese.   Head: Atraumatic. Normocephalic.  Eyes: PERRL. EOM intact. Conjunctivae are not pale. No scleral icterus.  ENT: Mucous membranes are moist. Oropharynx is clear and symmetric.    Neck: Supple. Full ROM. No lymphadenopathy.  Cardiovascular: Regular rate. Regular rhythm. No murmurs, rubs, or gallops. Distal pulses are 2+ and symmetric.  Pulmonary/Chest: No respiratory distress. Clear to auscultation bilaterally. No wheezing or rales.  Abdominal: Soft and non-distended.  There is no tenderness.  No rebound, guarding, or rigidity.   Musculoskeletal: Moves all extremities. No obvious deformities. No edema. No calf tenderness.  Skin: Warm and dry.  Neurological: Patient is alert and oriented to person, place and time. Pupils ERRL and EOM normal. R facial droop. Blunting at R nasolabial fold. Decreased sensation to R side of face. Lid lag to R eye. Asymmetry of forehead with blunting of upper R forehead. Strength is full bilaterally; it is equal and 5/5 in bilateral upper and lower extremities. There is no pronator drift of outstretched arms. Speech is clear and normal. No acute focal neurological deficits noted.  Psychiatric: Normal affect. Good eye contact. Appropriate in content.    ED Course    Procedures  ED Vital Signs:  Vitals:    05/29/18 1112 05/29/18 1114 05/29/18 1232 05/29/18 1310   BP: (!) 223/123  (!) 204/98 (!) 204/99   Pulse: 77  69 71   Resp: 20   18   Temp:  98.7 °F (37.1 °C)     TempSrc: Oral Oral     SpO2: 96%  99% 99%   Weight: (!) 184.8 kg (407 lb 6.6 oz)      Height: 6' 7" (2.007 m)       05/29/18 1333   BP: (!) 186/94   Pulse:    Resp:    Temp:    TempSrc:    SpO2:    Weight:    Height:          Imaging Results:  Imaging Results          CT Head Without Contrast (Final result)  Result time 05/29/18 12:05:43    Final result by ЕЛЕНА Molina Sr., MD (05/29/18 12:05:43)                 Impression: "      1. There is no evidence of an acute ischemic event.  2. There is no intracranial hemorrhage.  3. The above findings and impression were discussed with Dr. Pierre via the telephone at 12:05 p.m. on 05/29/2018.  All CT scans at this facility use dose modulation, iterative reconstruction, and/or weight base dosing when appropriate to reduce radiation dose when appropriate to reduce radiation dose to as low as reasonably achievable.      Electronically signed by: Vikas Molina MD  Date:    05/29/2018  Time:    12:05             Narrative:    EXAMINATION:  CT HEAD WITHOUT CONTRAST    CLINICAL HISTORY:  Cranial nerve palsy;    TECHNIQUE:  Standard brain CT protocol without IV contrast was performed.    COMPARISON:  None    FINDINGS:  There is a mild amount of generalized cerebral cortical atrophy.  There are mild bilateral periventricular ischemic white matter changes.  There is no evidence of an acute ischemic event.  There is no intracranial hemorrhage.  There is no skull fracture.  The visualized portion of the paranasal sinuses is clear.                                        The Emergency Provider reviewed the vital signs and test results, which are outlined above.    ED Discussion     12:17 PM: Reassessed pt at this time. Discussed with pt that the Tx for Bell's Palsy is steroids and that I will treat him with Prednisone. Informed pt he needs to check his sugar at least TID and cover with extra insulin if need be. Discussed with pt all pertinent ED information and results. Discussed pt dx and plan of tx. Advised pt to f/u with his PCP. Gave pt all f/u and return to the ED instructions. All questions and concerns were addressed at this time. Pt expresses understanding of information and instructions, and is comfortable with plan to discharge. Pt is stable for discharge.    I discussed with patient and/or family/caretaker that evaluation in the ED does not suggest any emergent or life threatening medical  conditions requiring immediate intervention beyond what was provided in the ED, and I believe patient is safe for discharge.  Regardless, an unremarkable evaluation in the ED does not preclude the development or presence of a serious of life threatening condition. As such, patient was instructed to return immediately for any worsening or change in current symptoms.    ED Medication(s):  Medications   cloNIDine tablet 0.2 mg (0.2 mg Oral Given 5/29/18 1240)   amLODIPine tablet 10 mg (10 mg Oral Given 5/29/18 1319)   hydrALAZINE tablet 50 mg (50 mg Oral Given 5/29/18 1319)       Discharge Medication List as of 5/29/2018 12:19 PM      START taking these medications    Details   predniSONE (DELTASONE) 20 MG tablet Take 2 tablets (40 mg total) by mouth once daily., Starting Tue 5/29/2018, Until Sun 6/3/2018, Print             Follow-up Information     Liza Landrum MD. Call in 2 days.    Specialty:  Internal Medicine  Contact information:  [address]  [place] [address] [phone]                     Medical Decision Making    Medical Decision Making:   Clinical Tests:   Radiological Study: Ordered and Reviewed           Scribe Attestation:   Scribe #1: I performed the above scribed service and the documentation accurately describes the services I performed. I attest to the accuracy of the note.    Attending:   Physician Attestation Statement for Scribe #1: I, Keanu Pierre Jr., MD, personally performed the services described in this documentation, as scribed by Dolores Rivas, in my presence, and it is both accurate and complete.          Clinical Impression       ICD-10-CM ICD-9-CM   1. Bell's palsy G51.0 351.0       Disposition:   Disposition: Discharged  Condition: Stable         Keanu Pierre Jr., MD  05/29/18 8672

## 2018-06-01 ENCOUNTER — TELEPHONE (OUTPATIENT)
Dept: OPHTHALMOLOGY | Facility: CLINIC | Age: 63
End: 2018-06-01

## 2018-06-01 ENCOUNTER — OFFICE VISIT (OUTPATIENT)
Dept: PSYCHIATRY | Facility: CLINIC | Age: 63
End: 2018-06-01
Payer: COMMERCIAL

## 2018-06-01 ENCOUNTER — OFFICE VISIT (OUTPATIENT)
Dept: INTERNAL MEDICINE | Facility: CLINIC | Age: 63
End: 2018-06-01
Payer: COMMERCIAL

## 2018-06-01 VITALS
BODY MASS INDEX: 36.45 KG/M2 | SYSTOLIC BLOOD PRESSURE: 142 MMHG | HEIGHT: 78 IN | WEIGHT: 315 LBS | TEMPERATURE: 97 F | DIASTOLIC BLOOD PRESSURE: 82 MMHG | HEART RATE: 70 BPM | OXYGEN SATURATION: 99 %

## 2018-06-01 DIAGNOSIS — F41.9 ANXIETY: ICD-10-CM

## 2018-06-01 DIAGNOSIS — G51.0 BELL'S PALSY: Primary | ICD-10-CM

## 2018-06-01 DIAGNOSIS — I25.10 CORONARY ARTERY DISEASE INVOLVING NATIVE CORONARY ARTERY OF NATIVE HEART WITHOUT ANGINA PECTORIS: ICD-10-CM

## 2018-06-01 DIAGNOSIS — N18.30 CHRONIC KIDNEY DISEASE, STAGE III (MODERATE): Chronic | ICD-10-CM

## 2018-06-01 DIAGNOSIS — F33.41 RECURRENT MAJOR DEPRESSIVE DISORDER, IN PARTIAL REMISSION: Primary | ICD-10-CM

## 2018-06-01 DIAGNOSIS — I50.9 CHF (NYHA CLASS III, ACC/AHA STAGE C): Chronic | ICD-10-CM

## 2018-06-01 DIAGNOSIS — I10 ESSENTIAL HYPERTENSION: Chronic | ICD-10-CM

## 2018-06-01 PROCEDURE — 3078F DIAST BP <80 MM HG: CPT | Mod: CPTII,S$GLB,, | Performed by: PSYCHIATRY & NEUROLOGY

## 2018-06-01 PROCEDURE — 3079F DIAST BP 80-89 MM HG: CPT | Mod: CPTII,S$GLB,, | Performed by: PHYSICIAN ASSISTANT

## 2018-06-01 PROCEDURE — 3077F SYST BP >= 140 MM HG: CPT | Mod: CPTII,S$GLB,, | Performed by: PHYSICIAN ASSISTANT

## 2018-06-01 PROCEDURE — 99213 OFFICE O/P EST LOW 20 MIN: CPT | Mod: S$GLB,,, | Performed by: PSYCHIATRY & NEUROLOGY

## 2018-06-01 PROCEDURE — 3046F HEMOGLOBIN A1C LEVEL >9.0%: CPT | Mod: CPTII,S$GLB,, | Performed by: PHYSICIAN ASSISTANT

## 2018-06-01 PROCEDURE — 3077F SYST BP >= 140 MM HG: CPT | Mod: CPTII,S$GLB,, | Performed by: PSYCHIATRY & NEUROLOGY

## 2018-06-01 PROCEDURE — 3008F BODY MASS INDEX DOCD: CPT | Mod: CPTII,S$GLB,, | Performed by: PHYSICIAN ASSISTANT

## 2018-06-01 PROCEDURE — 99999 PR PBB SHADOW E&M-EST. PATIENT-LVL III: CPT | Mod: PBBFAC,,, | Performed by: PHYSICIAN ASSISTANT

## 2018-06-01 PROCEDURE — 99214 OFFICE O/P EST MOD 30 MIN: CPT | Mod: S$GLB,,, | Performed by: PHYSICIAN ASSISTANT

## 2018-06-01 RX ORDER — LANCETS
EACH MISCELLANEOUS
Qty: 100 EACH | Refills: 3 | Status: SHIPPED | OUTPATIENT
Start: 2018-06-01 | End: 2022-08-15

## 2018-06-01 RX ORDER — NEBULIZER AND COMPRESSOR
EACH MISCELLANEOUS
Qty: 1 EACH | Refills: 0 | COMMUNITY
Start: 2018-06-01 | End: 2022-08-15

## 2018-06-01 RX ORDER — INSULIN PUMP SYRINGE, 3 ML
EACH MISCELLANEOUS
Qty: 1 EACH | Refills: 0 | Status: SHIPPED | OUTPATIENT
Start: 2018-06-01 | End: 2019-06-01

## 2018-06-01 RX ORDER — VALACYCLOVIR HYDROCHLORIDE 1 G/1
1000 TABLET, FILM COATED ORAL DAILY
Qty: 7 TABLET | Refills: 0 | Status: SHIPPED | OUTPATIENT
Start: 2018-06-01 | End: 2018-07-27

## 2018-06-01 RX ORDER — BUPROPION HYDROCHLORIDE 300 MG/1
300 TABLET ORAL DAILY
Qty: 90 TABLET | Refills: 1 | Status: SHIPPED | OUTPATIENT
Start: 2018-06-01 | End: 2018-11-06

## 2018-06-01 RX ORDER — PREDNISONE 20 MG/1
40 TABLET ORAL DAILY
Qty: 4 TABLET | Refills: 0 | Status: SHIPPED | OUTPATIENT
Start: 2018-06-01 | End: 2018-06-03
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
## 2018-06-01 NOTE — PROGRESS NOTES
Subjective:       Patient ID: Stepan Springer is a 63 y.o. male.    Chief Complaint: Follow-up    63 year old male presents to clinic for [facility] ER f/u for Bell's Palsy. He reports onset of R tongue tingling/numbness 5/28/18, which progressed to slurred speech, inability to close R eye, and drooping R mouth 5/29/18. He had neg head CT at ER and was prescribed Prednisone for Bell's Palsy. He reports sxs have improved slightly already since onset. He reports some R sided lateral neck discomfort at onset of sxs, but it has since resolved. He reports no fever, chills, headaches, dizziness, vision changes, sore throat, known injury, numbness/tingling/muscular weakness to extremities, confusion, or other medical complaints. He reports he is not monitoring his glucose or BP.    PCP: Dr. Fontenot    Patient Active Problem List:     Corneal opacity - Left Eye     Mixed hyperlipidemia     Idiopathic peripheral neuropathy     FRAN (obstructive sleep apnea)     Troponin I above reference range     CHF (NYHA class III, ACC/AHA stage C)     LBBB (left bundle branch block)     Uncontrolled type 2 diabetes mellitus with stage 3 chronic kidney disease, with long-term current use of insulin     Anxiety     Essential hypertension     Chronic kidney disease, stage III (moderate)     Primary open angle glaucoma of both eyes, severe stage     Nephrolithiasis     Hydronephrosis, left     Severe obesity (BMI >= 40)     NSTEMI (non-ST elevated myocardial infarction)     Coronary artery disease involving native coronary artery without angina pectoris     Recurrent major depressive disorder     Vasculogenic erectile dysfunction     Blindness and low vision     Hx of retinal detachment     High myopia, both eyes     Left posterior capsular opacification     Epiretinal membrane (ERM) of left eye     Retinal detachment with multiple breaks, right     Lattice degeneration of peripheral retina, left      Review of Systems   Constitutional:  "Negative for chills and fever.   HENT: Negative for congestion, ear pain, rhinorrhea, sore throat and trouble swallowing.    Eyes: Negative for pain and visual disturbance.   Respiratory: Negative for cough and shortness of breath.    Cardiovascular: Negative for chest pain and leg swelling.   Gastrointestinal: Negative for abdominal pain, nausea and vomiting.   Endocrine: Negative for polydipsia and polyuria.   Genitourinary: Negative for difficulty urinating.   Skin: Negative for rash.   Neurological: Negative for dizziness, tremors, seizures, syncope, facial asymmetry, light-headedness and headaches.   Psychiatric/Behavioral: Negative for confusion.       Objective:       Vitals:    06/01/18 0845   BP: (!) 142/82   BP Location: Right arm   Patient Position: Sitting   BP Method: Large (Manual)   Pulse: 70   Temp: 97.1 °F (36.2 °C)   TempSrc: Tympanic   SpO2: 99%   Weight: (!) 183.1 kg (403 lb 10.6 oz)   Height: 6' 7" (2.007 m)     Physical Exam   Constitutional: He is oriented to person, place, and time. He appears well-developed and well-nourished. No distress.   HENT:   Head: Normocephalic and atraumatic.   Right Ear: No mastoid tenderness.   Left Ear: No mastoid tenderness.   Eyes: EOM are normal. No scleral icterus.   Neck: Neck supple.   Cardiovascular: Normal rate and regular rhythm.    Pulmonary/Chest: Effort normal and breath sounds normal. No respiratory distress. He has no decreased breath sounds. He has no wheezes. He has no rhonchi. He has no rales.   Musculoskeletal: Normal range of motion. He exhibits no edema.   Lymphadenopathy:     He has no cervical adenopathy.   Neurological: He is alert and oriented to person, place, and time. He is not disoriented.   Pt unable to close R eye completely; R facial droop; NV intact and 5+ strength B otherwise throughout   Skin: Skin is dry. No rash noted. He is not diaphoretic.   Psychiatric: He has a normal mood and affect. His speech is normal and behavior is " normal. Thought content normal.       Assessment:       1. Bell's palsy    2. Essential hypertension    3. Coronary artery disease involving native coronary artery of native heart without angina pectoris    4. CHF (NYHA class III, ACC/AHA stage C)    5. Chronic kidney disease, stage III (moderate)    6. Uncontrolled type 2 diabetes mellitus with stage 3 chronic kidney disease, with long-term current use of insulin        Plan:         Stepan was seen today for follow-up.    Diagnoses and all orders for this visit:    Bell's palsy  -     valACYclovir (VALTREX) 1000 MG tablet; Take 1 tablet (1,000 mg total) by mouth once daily.  -     predniSONE (DELTASONE) 20 MG tablet; Take 2 tablets (40 mg total) by mouth once daily.  Will extend Prednisone to total of 7 days and start Valtrex also. Monitor sxs. RTC in 3 weeks for recheck.  Pt to see his eye doctor today for eye care due to inability to close R eye.    Essential hypertension  -     BLOOD PRESSURE CUFF Misc; 1 cuff  Continue Rx and monitor BP.    Coronary artery disease involving native coronary artery of native heart without angina pectoris, CHF (NYHA class III, ACC/AHA stage C)  As per cards.    Chronic kidney disease, stage III (moderate)    Uncontrolled type 2 diabetes mellitus with stage 3 chronic kidney disease, with long-term current use of insulin  -     blood-glucose meter kit; To check BG 3 times daily, to use with insurance preferred meter  -     lancets Misc; To check BG 3 times daily, to use with insurance preferred meter  -     blood sugar diagnostic Strp; To check BG 3 times daily, to use with insurance preferred meter  Monitor glucose, especially while taking steroid. Pt declines DM clinic at this time.    F/u with PCP in 2 months as scheduled for health management. ER if severe sxs occur.

## 2018-06-07 ENCOUNTER — OFFICE VISIT (OUTPATIENT)
Dept: PSYCHIATRY | Facility: CLINIC | Age: 63
End: 2018-06-07
Payer: COMMERCIAL

## 2018-06-07 DIAGNOSIS — F33.1 MAJOR DEPRESSIVE DISORDER, RECURRENT EPISODE, MODERATE: Primary | ICD-10-CM

## 2018-06-07 PROCEDURE — 90834 PSYTX W PT 45 MINUTES: CPT | Mod: S$GLB,,, | Performed by: SOCIAL WORKER
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
## 2018-06-21 ENCOUNTER — OFFICE VISIT (OUTPATIENT)
Dept: PSYCHIATRY | Facility: CLINIC | Age: 63
End: 2018-06-21
Payer: COMMERCIAL

## 2018-06-21 ENCOUNTER — OFFICE VISIT (OUTPATIENT)
Dept: INTERNAL MEDICINE | Facility: CLINIC | Age: 63
End: 2018-06-21
Payer: COMMERCIAL

## 2018-06-21 VITALS
WEIGHT: 315 LBS | DIASTOLIC BLOOD PRESSURE: 98 MMHG | SYSTOLIC BLOOD PRESSURE: 140 MMHG | HEIGHT: 78 IN | TEMPERATURE: 98 F | OXYGEN SATURATION: 99 % | BODY MASS INDEX: 36.45 KG/M2 | HEART RATE: 75 BPM

## 2018-06-21 DIAGNOSIS — F33.1 MAJOR DEPRESSIVE DISORDER, RECURRENT EPISODE, MODERATE: Primary | ICD-10-CM

## 2018-06-21 DIAGNOSIS — G51.0 RIGHT-SIDED BELL'S PALSY: ICD-10-CM

## 2018-06-21 PROBLEM — Z86.69 HX OF RETINAL DETACHMENT: Chronic | Status: ACTIVE | Noted: 2017-09-20

## 2018-06-21 PROCEDURE — 99999 PR PBB SHADOW E&M-EST. PATIENT-LVL V: CPT | Mod: PBBFAC,,, | Performed by: PHYSICIAN ASSISTANT

## 2018-06-21 PROCEDURE — 3008F BODY MASS INDEX DOCD: CPT | Mod: CPTII,S$GLB,, | Performed by: PHYSICIAN ASSISTANT

## 2018-06-21 PROCEDURE — 90834 PSYTX W PT 45 MINUTES: CPT | Mod: S$GLB,,, | Performed by: SOCIAL WORKER

## 2018-06-21 PROCEDURE — 99213 OFFICE O/P EST LOW 20 MIN: CPT | Mod: S$GLB,,, | Performed by: PHYSICIAN ASSISTANT

## 2018-06-21 PROCEDURE — 3080F DIAST BP >= 90 MM HG: CPT | Mod: CPTII,S$GLB,, | Performed by: PHYSICIAN ASSISTANT

## 2018-06-21 PROCEDURE — 3077F SYST BP >= 140 MM HG: CPT | Mod: CPTII,S$GLB,, | Performed by: PHYSICIAN ASSISTANT

## 2018-06-21 RX ORDER — CARBAMAZEPINE 100 MG/1
100 TABLET, CHEWABLE ORAL EVERY 4 HOURS PRN
Qty: 30 TABLET | Refills: 3 | Status: SHIPPED | OUTPATIENT
Start: 2018-06-21 | End: 2018-11-20

## 2018-06-21 NOTE — PROGRESS NOTES
Subjective:       Patient ID: Stepan Springer is a 63 y.o.W/ male.    Chief Complaint: Follow-up (3wk)    HPI         He comes in today by himself and is concerned about his symptoms of Bell's palsy.  He was initially seen on 29 May in the  [facility].  They did do a head CT scan without contrast which was normal.  He was placed on 40 mg prednisone for 2 days and sent home.  Then he was seen in follow-up here in the clinic on 1 June and this time he was put on Valtrex and also 40 mg of prednisone for 7 days.  He is back today because he just wants to have a checkup and be assured that this is strictly Bell's palsy.  He also has been having some problem with severe pain following the mandibular branch of his facial nerve that occurs sporadically.  He has to take 5 aspirin adult size in order to make the pain go away within an hour or so.  The pain is 10/10 level of discomfort.        He originally had facial sag of his lips and his eyelids, numbness to the right side of his tongue and his buccal mucosa.  He still continues to have all these problems but they are improving and the tongue numbness is just about disappeared.  He still has problems retaining fluid inside his mouth because it wants to sag at the right corner of his mouth.  He is having the use eyedrops several times a day because his lids get very dry and inflamed.  He does patches eye even at work and also at nighttime when he is trying to sleep.  He really was not given any info concerning Gonzalez's palsy to this point.    Review of Systems    Otherwise negative concerning the NEUROLOGIC, ENT, OPHTHALMIC, system review.    Objective:      Physical Exam    NEURO: He has no wrinkle to the right side of his for head.  He can't close his eyelid on the right side.  He has inflammation to the sclera and also the edges of both upper and lower eyelids.  There is a sag to his skin underneath the right eyelid.  His right lip sags down all the time and does not pick  itself up at all with a smile.  With cheeks puffed he cannot keep the air inside his mouth because it leaks out the right corner of his mouth.  He also has deviation of his right TMJ with opening and closing of his mouth.  Carotids are quiet without bruit.  Templer arteries are quiet without bruit.  EYE: The cornea is clear.  Funduscopic exam is normal without any papilledema seen.  There is no hemorrhages or exudates on the retinal background.    Assessment:       1. Right-sided Bell's palsy        Plan:     1.  Info sheet concerning Bell's palsy was given to the patient today.  2.  Reassured and advised that this will be a slow process over the next 8 weeks to try to get his normal muscular function back.  3.  Will start him on chewable 100 mg Tegretol to take only when he has the sudden pain of the mandibular branch of his facial nerve.  4.  We discussed with him and Dr. King he needs to see neurology this point not.  I don't think it's necessary today.  Recheck in one month.

## 2018-06-21 NOTE — PATIENT INSTRUCTIONS
Gonzalezs Palsy    Bell's Palsy is a problem involving the nerve that controls the muscles on one side of the face.  The cause is unknown, but may be related to inflammation of the nerve. Symptoms usually appear only on the affected side. They may include:  · Inability to close the eyelid  · Tearing of the eye  · Facial drooping  · Drooling  · Numbness or pain  · Changes in taste  · Sound sensitivity  Damage to the eye can be a serious problem. The inability to blink can cause the eye to dry out. An ulcer (sore) can then form on the cornea. Also, not blinking means that the eye has no protection from dirt and dust particles.  Treatment involves protecting and moistening the eye. Medicines may also help.  Most persons recover completely within 3 to 6 months. However, the condition sometimes returns months or years later.  Home care  · Get plenty of rest and eat a healthy diet to help yourself recover.  · Use Artificial Tears frequently during the day and at bedtime to prevent drying. These drops are available without prescription at your drug store.  · Wear protective glasses especially when outside to protect from flying debris. Use sunglasses when outdoors.  · Tape the eyelid closed at bedtime with a paper tape (available at your pharmacy). This has a very mild adhesive to avoid injury to the lid. This will protect your eye from injury while you sleep.  · Sometimes medicines are prescribed to reduce inflammation or treat specific viral infections of the nerve. If medicines are prescribed, take them exactly as directed. Usually there is a 10-day course of medication that is started as soon as possible. Taking this medicine as prescribed will help with a full recovery.  · Use low heat, for example from a heating pad, on the affected area. This can help reduce pain and swelling.  · If you are experiencing sever pain, contact your health care provider.  Follow-up care  Follow up with your healthcare provider as advised.  If you referred to a specialist, make that appointment promptly.  When to seek medical advice  Call your healthcare provider if any of the following occur:  · Severe eye redness  · Eye pain  · Thick drainage from the eye  · Change in vision (such as double vision or losing vision)  · Fever over 100.4°F (38°C) or as directed by your healthcare provider  · Headache, neck pain, weakness, trouble speaking or walking, or other unexplained symptoms  Date Last Reviewed: 8/23/2015 © 2000-2017 [employer]. [address] [place] [address]. All rights reserved. This information is not intended as a substitute for professional medical care. Always follow your healthcare professional's instructions.        Bells Palsy  Bells palsy is a nerve disorder that usually happens suddenly and without warning. This condition happens when a nerve that controls facial movement is damaged. Nerve damage can happen for many reasons. But most cases of Bells palsy are probably caused by a virus.  Symptoms of Bells palsy  Here are signs of the disorder:   · Mild weakness to total paralysis of one side of your face  · Drooping mouth, drooling on one side of mouth  · Trouble closing one eye  · Noises seeming louder than usual  · Change in your sense of taste  When to go to the emergency department (ED)  There are conditions, such as stroke, that may look like Bell's palsy and are medical emergencies. Therefore, you should seek emergent medical care if you notice facial weakness or drooping. Although Bells palsy can be alarming, its rarely serious. Many people begin to improve in about 2 weeks, even without treatment. It is important to be seen as soon as possible. Most research shows that treatment with beneficial results was received within the first few days of symptoms.   Treatment  To treat Bells palsy, you may be given steroid medicines. This helps reduce swelling of the affected nerve. In some cases,  your healthcare provider may prescribe an antiviral medicine. Your open eye may be covered with a patch to prevent it from drying out. You also may need to use eye drops and ointments for a time. Your healthcare provider will discuss follow-up care with you, including the possible need for further treatment to help your facial muscles return to normal.  Date Last Reviewed: 10/7/2015  © 7117-6869 [employer]. [address] [place] [address], [place] [address]. All rights reserved. This information is not intended as a substitute for professional medical care. Always follow your healthcare professional's instructions.
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
## 2018-07-12 ENCOUNTER — OFFICE VISIT (OUTPATIENT)
Dept: PSYCHIATRY | Facility: CLINIC | Age: 63
End: 2018-07-12
Payer: COMMERCIAL

## 2018-07-12 DIAGNOSIS — F33.1 MAJOR DEPRESSIVE DISORDER, RECURRENT EPISODE, MODERATE: Primary | ICD-10-CM

## 2018-07-12 PROCEDURE — 90834 PSYTX W PT 45 MINUTES: CPT | Mod: S$GLB,,, | Performed by: SOCIAL WORKER

## 2018-07-16 ENCOUNTER — PATIENT OUTREACH (OUTPATIENT)
Dept: ADMINISTRATIVE | Facility: HOSPITAL | Age: 63
End: 2018-07-16

## 2018-07-16 NOTE — PROGRESS NOTES
"Individual Psychotherapy (PhD/LCSW)    7/12/2018    [facility]:  [facility]         Therapeutic Intervention: Met with patient.  Outpatient - Insight oriented psychotherapy 45 min - CPT code 94401    Chief complaint/reason for encounter: depression     Interval history and content of current session:  Patient presents to ongoing individual therapy due to depression.  He has returned from his trip to [place] to visit his son.  He shares pictures of his son and his fiancee.  He stayed at his son's future mother in law's home.  He notes that she was very cordial and inviting.  He enjoyed a Fourth of July party that was a gathering of most of his future daughter in law's family.  He enjoyed the conversation.  His son has been parenting two twin girls that his fiancee has from a prior relationship.  The patient notes that one of the girls has taken a liking to him.  She is often climbing into his lap.  He was able to go to several restaurants with his son and family.  His son did take a day off for them to tour an observatory together.  The patient was not able to take a ride on the [employer], but he is planning on a return trip.  When he talked to his son about the interest he continues to have in his ex girlfriend, his son said, "You have it bad!"  The patient still has not heard from his ex girlfriend.  He continues to deal with bells palsy.  He feels it is slowly improving.  He is able to close his right eye and he can eat food without it falling out of his mouth.  He has returned to regular exercise.  He was also able to visit with his daughter in [place] due to flying in and out of the [place] airport.      Treatment plan:  Target symptoms: depression  Why chosen therapy is appropriate versus another modality: relevant to diagnosis  Outcome monitoring methods: self-report, observation  Therapeutic intervention type: insight oriented psychotherapy, supportive psychotherapy, interactive " psychotherapy     Risk parameters:  Patient reports no suicidal ideation  Patient reports no homicidal ideation  Patient reports no self-injurious behavior  Patient reports no violent behavior     Verbal deficits: Nonee     Patient's response to intervention:  The patient's response to intervention is accepting, motivated.     Progress toward goals and other mental status changes:  The patient's progress toward goals is fair .     Diagnosis:   Major depression recurrent moderate     Plan:  individual psychotherapy and medication management by physician     Return to clinic: as scheduled     Length of Service (minutes): 45

## 2018-07-19 ENCOUNTER — LAB VISIT (OUTPATIENT)
Dept: LAB | Facility: HOSPITAL | Age: 63
End: 2018-07-19
Attending: INTERNAL MEDICINE
Payer: COMMERCIAL

## 2018-07-19 DIAGNOSIS — E11.9 DIABETES MELLITUS, TYPE II: ICD-10-CM

## 2018-07-19 LAB
ESTIMATED AVG GLUCOSE: 200 MG/DL
HBA1C MFR BLD HPLC: 8.6 %

## 2018-07-19 PROCEDURE — 36415 COLL VENOUS BLD VENIPUNCTURE: CPT | Mod: PO

## 2018-07-19 PROCEDURE — 83036 HEMOGLOBIN GLYCOSYLATED A1C: CPT

## 2018-07-26 ENCOUNTER — OFFICE VISIT (OUTPATIENT)
Dept: PSYCHIATRY | Facility: CLINIC | Age: 63
End: 2018-07-26
Payer: COMMERCIAL

## 2018-07-26 DIAGNOSIS — F33.1 MAJOR DEPRESSIVE DISORDER, RECURRENT EPISODE, MODERATE: Primary | ICD-10-CM

## 2018-07-26 PROCEDURE — 90834 PSYTX W PT 45 MINUTES: CPT | Mod: S$GLB,,, | Performed by: SOCIAL WORKER

## 2018-07-26 NOTE — PROGRESS NOTES
Individual Psychotherapy (PhD/LCSW)    7/26/2018    [facility]         Therapeutic Intervention: Met with patient.  Outpatient - Insight oriented psychotherapy 45 min - CPT code 95172    Chief complaint/reason for encounter: depression     Interval history and content of current session:  Patient presents to ongoing individual therapy due to depression.  He found out that his son had gotten some bad news.  The patient's sixteen year old grandson does not want to return to live with his father in [place].  He will be staying in [place] with his mother.  The patient feels the mother of his grandson is emotionally unstable.  She is fighting a wrongful termination for performing illegal abortions.  She has told his grandson that due to this lawsuit she will be coming into a lot of money.  She is letting his grandson drive when he does not have a license.  The patient notes that it is his ex girlfriend, Solange's, birthday today.  He sent her a birthday card with a gift card to a restaurant.  He had wanted to take her out to dinner or make dinner for her.  She still has not contacted him.  His children have told him not to give up on the relationship.  He admits he will laugh about the relationship.  He becomes tearful admitting he has not been to Jew for several months.  He was raised Zoroastrian.  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  He is not going to Jew because he has not asked God for anything in the past, but he has recently asked for the relationship.  God has not given him what he desires and he does not understand why.  He does continue to spend time with his friend, Geena.  They continue to exercise together.  He admits that work will take his mind off of depression.  He is frustrated with continued symptoms of Norman palsy.    Treatment plan:  Target symptoms: depression  Why chosen therapy is appropriate versus another modality: relevant to  diagnosis  Outcome monitoring methods: self-report, observation  Therapeutic intervention type: insight oriented psychotherapy, supportive psychotherapy, interactive psychotherapy     Risk parameters:  Patient reports no suicidal ideation  Patient reports no homicidal ideation  Patient reports no self-injurious behavior  Patient reports no violent behavior     Verbal deficits: Nonee     Patient's response to intervention:  The patient's response to intervention is accepting, motivated.     Progress toward goals and other mental status changes:  The patient's progress toward goals is fair .     Diagnosis:   Major depression recurrent moderate     Plan:  individual psychotherapy and medication management by physician     Return to clinic: as scheduled     Length of Service (minutes): 45

## 2018-07-27 ENCOUNTER — OFFICE VISIT (OUTPATIENT)
Dept: INTERNAL MEDICINE | Facility: CLINIC | Age: 63
End: 2018-07-27
Payer: COMMERCIAL

## 2018-07-27 VITALS
OXYGEN SATURATION: 98 % | BODY MASS INDEX: 36.45 KG/M2 | TEMPERATURE: 97 F | SYSTOLIC BLOOD PRESSURE: 156 MMHG | HEART RATE: 70 BPM | DIASTOLIC BLOOD PRESSURE: 90 MMHG | HEIGHT: 78 IN | WEIGHT: 315 LBS

## 2018-07-27 DIAGNOSIS — G51.0 RIGHT-SIDED BELL'S PALSY: ICD-10-CM

## 2018-07-27 DIAGNOSIS — G47.33 OSA (OBSTRUCTIVE SLEEP APNEA): Chronic | ICD-10-CM

## 2018-07-27 DIAGNOSIS — I25.10 CORONARY ARTERY DISEASE INVOLVING NATIVE CORONARY ARTERY OF NATIVE HEART WITHOUT ANGINA PECTORIS: ICD-10-CM

## 2018-07-27 DIAGNOSIS — I10 ESSENTIAL HYPERTENSION: ICD-10-CM

## 2018-07-27 DIAGNOSIS — I50.9 CHF (NYHA CLASS III, ACC/AHA STAGE C): ICD-10-CM

## 2018-07-27 DIAGNOSIS — F33.41 RECURRENT MAJOR DEPRESSIVE DISORDER, IN PARTIAL REMISSION: ICD-10-CM

## 2018-07-27 DIAGNOSIS — E11.59 HYPERTENSION ASSOCIATED WITH DIABETES: Chronic | ICD-10-CM

## 2018-07-27 DIAGNOSIS — I15.2 HYPERTENSION ASSOCIATED WITH DIABETES: Chronic | ICD-10-CM

## 2018-07-27 PROCEDURE — 3080F DIAST BP >= 90 MM HG: CPT | Mod: CPTII,S$GLB,, | Performed by: INTERNAL MEDICINE

## 2018-07-27 PROCEDURE — 3008F BODY MASS INDEX DOCD: CPT | Mod: CPTII,S$GLB,, | Performed by: INTERNAL MEDICINE

## 2018-07-27 PROCEDURE — 99214 OFFICE O/P EST MOD 30 MIN: CPT | Mod: S$GLB,,, | Performed by: INTERNAL MEDICINE

## 2018-07-27 PROCEDURE — 99999 PR PBB SHADOW E&M-EST. PATIENT-LVL III: CPT | Mod: PBBFAC,,, | Performed by: INTERNAL MEDICINE

## 2018-07-27 PROCEDURE — 3077F SYST BP >= 140 MM HG: CPT | Mod: CPTII,S$GLB,, | Performed by: INTERNAL MEDICINE

## 2018-07-27 PROCEDURE — 3045F PR MOST RECENT HEMOGLOBIN A1C LEVEL 7.0-9.0%: CPT | Mod: CPTII,S$GLB,, | Performed by: INTERNAL MEDICINE

## 2018-07-27 RX ORDER — FUROSEMIDE 40 MG/1
80 TABLET ORAL 2 TIMES DAILY
Qty: 260 TABLET | Refills: 3 | Status: SHIPPED | OUTPATIENT
Start: 2018-07-27 | End: 2019-07-06 | Stop reason: SDUPTHER

## 2018-07-27 RX ORDER — LISINOPRIL 40 MG/1
40 TABLET ORAL DAILY
Qty: 90 TABLET | Refills: 3 | Status: SHIPPED | OUTPATIENT
Start: 2018-07-27 | End: 2019-03-14

## 2018-07-27 RX ORDER — ATORVASTATIN CALCIUM 10 MG/1
10 TABLET, FILM COATED ORAL DAILY
Qty: 90 TABLET | Refills: 3 | Status: SHIPPED | OUTPATIENT
Start: 2018-07-27 | End: 2019-09-03 | Stop reason: SDUPTHER

## 2018-07-27 NOTE — PROGRESS NOTES
"Subjective:      Patient ID: Stepan Springer is a 63 y.o. male.    Chief Complaint: Follow-up      HPI  Here for f/u medical problems.  Bell's palsy x 2mo.  Took steroids and valtrex.  AC breakfast sugars 110-120s lately.  HS sugars 160s, after exercise.  Doing regular exercise now, mostly calisthenics but working up to walking.    Updated/ annual due 4/19:  HM: ref fluvax, 6/16 [id number], 4/14 [id number], 12/13 TDaP, 4/18 PSA/ Urol Dr. Simental, No Cscope, 5/18 Eye Dr. Hale, 9/16 HCV neg.         Review of Systems   Constitutional: Negative for appetite change, chills, diaphoresis, fatigue and fever.   HENT: Negative for congestion, ear pain, rhinorrhea and sinus pressure.    Respiratory: Negative for cough and shortness of breath.    Cardiovascular: Negative for chest pain and palpitations.   Gastrointestinal: Negative for abdominal distention, abdominal pain, blood in stool, constipation, diarrhea, nausea and vomiting.   Genitourinary: Negative for difficulty urinating, dysuria, frequency, hematuria and urgency.   Musculoskeletal: Negative for arthralgias.   Skin: Negative for rash.   Neurological: Negative for dizziness and headaches.   Psychiatric/Behavioral: The patient is not nervous/anxious.          Objective:   BP (!) 156/90 (BP Location: Right arm, Patient Position: Sitting, BP Method: Large (Manual))   Pulse 70   Temp 97.4 °F (36.3 °C) (Tympanic)   Ht 6' 7" (2.007 m)   Wt (!) 182.6 kg (402 lb 9 oz)   SpO2 98%   BMI 45.35 kg/m²     Physical Exam   Constitutional: He is oriented to person, place, and time. He appears well-developed and well-nourished.   HENT:   Right Ear: External ear normal. Tympanic membrane is not injected.   Left Ear: External ear normal. Tympanic membrane is not injected.   Mouth/Throat: Oropharynx is clear and moist.   Eyes: Conjunctivae are normal.   Neck: Normal range of motion. Neck supple. No thyromegaly present.   Cardiovascular: Normal rate, regular rhythm and intact " distal pulses.  Exam reveals no gallop and no friction rub.    No murmur heard.  Pulses:       Dorsalis pedis pulses are 2+ on the right side, and 2+ on the left side.        Posterior tibial pulses are 2+ on the right side, and 2+ on the left side.   Pulmonary/Chest: Effort normal and breath sounds normal. He has no wheezes. He has no rales.   Abdominal: Soft. Bowel sounds are normal. He exhibits no mass. There is no tenderness.   Musculoskeletal: He exhibits no edema.   Feet:   Right Foot:   Protective Sensation: 10 sites tested. 10 sites sensed.   Skin Integrity: Negative for ulcer, blister, skin breakdown, erythema, warmth, callus or dry skin.   Left Foot:   Protective Sensation: 10 sites tested. 10 sites sensed.   Skin Integrity: Negative for ulcer, blister, skin breakdown, erythema, warmth, callus or dry skin.   Lymphadenopathy:     He has no cervical adenopathy.   Neurological: He is alert and oriented to person, place, and time.   Psychiatric: He has a normal mood and affect.         Results for ARIANNA FLANAGAN (MRN [id number]) as of 7/27/2018 13:06   Ref. Range 4/12/2018 07:14 4/12/2018 07:20   WBC Latest Ref Range: 3.90 - 12.70 K/uL  5.54   RBC Latest Ref Range: 4.60 - 6.20 M/uL  4.46 (L)   Hemoglobin Latest Ref Range: 14.0 - 18.0 g/dL  14.0   Hematocrit Latest Ref Range: 40.0 - 54.0 %  41.6   MCV Latest Ref Range: 82 - 98 fL  93   MCH Latest Ref Range: 27.0 - 31.0 pg  31.4 (H)   MCHC Latest Ref Range: 32.0 - 36.0 g/dL  33.7   RDW Latest Ref Range: 11.5 - 14.5 %  12.0   Platelets Latest Ref Range: 150 - 350 K/uL  211   MPV Latest Ref Range: 9.2 - 12.9 fL  10.2   Gran% Latest Ref Range: 38.0 - 73.0 %  61.5   Gran # (ANC) Latest Ref Range: 1.8 - 7.7 K/uL  3.4   Immature Granulocytes Latest Ref Range: 0.0 - 0.5 %  0.2   Immature Grans (Abs) Latest Ref Range: 0.00 - 0.04 K/uL  0.01   Lymph% Latest Ref Range: 18.0 - 48.0 %  23.3   Lymph # Latest Ref Range: 1.0 - 4.8 K/uL  1.3   Mono% Latest Ref Range: 4.0 -  15.0 %  9.7   Mono # Latest Ref Range: 0.3 - 1.0 K/uL  0.5   Eosinophil% Latest Ref Range: 0.0 - 8.0 %  4.9   Eos # Latest Ref Range: 0.0 - 0.5 K/uL  0.3   Basophil% Latest Ref Range: 0.0 - 1.9 %  0.4   Baso # Latest Ref Range: 0.00 - 0.20 K/uL  0.02   nRBC Latest Ref Range: 0 /100 WBC  0   Sodium Latest Ref Range: 136 - 145 mmol/L  141   Potassium Latest Ref Range: 3.5 - 5.1 mmol/L  3.9   Chloride Latest Ref Range: 95 - 110 mmol/L  105   CO2 Latest Ref Range: 23 - 29 mmol/L  27   Anion Gap Latest Ref Range: 8 - 16 mmol/L  9   BUN, Bld Latest Ref Range: 8 - 23 mg/dL  24 (H)   Creatinine Latest Ref Range: 0.5 - 1.4 mg/dL  2.2 (H)   eGFR if non African American Latest Ref Range: >60 mL/min/1.73 m^2  31.2 (A)   eGFR if African American Latest Ref Range: >60 mL/min/1.73 m^2  36.0 (A)   Glucose Latest Ref Range: 70 - 110 mg/dL  205 (H)   Calcium Latest Ref Range: 8.7 - 10.5 mg/dL  9.6   Alkaline Phosphatase Latest Ref Range: 55 - 135 U/L  111   Total Protein Latest Ref Range: 6.0 - 8.4 g/dL  7.1   Albumin Latest Ref Range: 3.5 - 5.2 g/dL  3.7   Total Bilirubin Latest Ref Range: 0.1 - 1.0 mg/dL  0.3   AST Latest Ref Range: 10 - 40 U/L  11   ALT Latest Ref Range: 10 - 44 U/L  16   Triglycerides Latest Ref Range: 30 - 150 mg/dL  179 (H)   Cholesterol Latest Ref Range: 120 - 199 mg/dL  125   HDL Latest Ref Range: 40 - 75 mg/dL  41   LDL Cholesterol Latest Ref Range: 63.0 - 159.0 mg/dL  48.2 (L)   Total Cholesterol/HDL Ratio Latest Ref Range: 2.0 - 5.0   3.0   Vit D, 25-Hydroxy Latest Ref Range: 30 - 96 ng/mL  21 (L)   Hemoglobin A1C Latest Ref Range: 4.0 - 5.6 %  9.3 (H)   Estimated Avg Glucose Latest Ref Range: 68 - 131 mg/dL  220 (H)   TSH Latest Ref Range: 0.400 - 4.000 uIU/mL  1.493   PSA, SCREEN Latest Ref Range: 0.00 - 4.00 ng/mL  0.14   Microalbum.,U,Random Latest Units: ug/mL 3.0    Microalb Creat Ratio Latest Ref Range: 0.0 - 30.0 ug/mg 12.5      Results for orders placed or performed in visit on 07/19/18    Hemoglobin A1c   Result Value Ref Range    Hemoglobin A1C 8.6 (H) 4.0 - 5.6 %    Estimated Avg Glucose 200 (H) 68 - 131 mg/dL       Assessment:       1. Encounter for preventive health examination    2. Uncontrolled type 2 diabetes mellitus with stage 3 chronic kidney disease, with long-term current use of insulin    3. Coronary artery disease involving native coronary artery of native heart without angina pectoris    4. FRAN (obstructive sleep apnea)    5. Right-sided Bell's palsy    6. Hypertension associated with diabetes    7. Recurrent major depressive disorder, in partial remission    8. Essential hypertension          Plan:       Uncontrolled type 2 diabetes mellitus with stage 3 chronic kidney disease, with long-term current use of insulin- improving with exercise despite stress and steroids, cont present treatment- recheck 3mo.  -     Hemoglobin A1c; Future; Expected date: 07/27/2018  -     Lipid panel; Future; Expected date: 07/27/2018  -     ALT (SGPT); Future; Expected date: 07/27/2018    Coronary artery disease involving native coronary artery of native heart without angina pectoris- 10yr vasc risk 18.1%, change lova to atorva, recheck 3mo.  -     atorvastatin (LIPITOR) 10 MG tablet; Take 1 tablet (10 mg total) by mouth once daily.  Dispense: 90 tablet; Refill: 3  -     lisinopril (PRINIVIL,ZESTRIL) 40 MG tablet; Take 1 tablet (40 mg total) by mouth once daily.  Dispense: 90 tablet; Refill: 3  -     Hemoglobin A1c; Future; Expected date: 07/27/2018  -     Lipid panel; Future; Expected date: 07/27/2018  -     ALT (SGPT); Future; Expected date: 07/27/2018    FARN (obstructive sleep apnea)    Right-sided Bell's palsy    Hypertension associated with diabetes, uncontrolled- increase to 40mg lisinopril.    Recurrent major depressive disorder, in partial remission- doing well with counseling and medication.

## 2018-08-09 ENCOUNTER — LAB VISIT (OUTPATIENT)
Dept: LAB | Facility: HOSPITAL | Age: 63
End: 2018-08-09
Attending: INTERNAL MEDICINE
Payer: COMMERCIAL

## 2018-08-09 ENCOUNTER — OFFICE VISIT (OUTPATIENT)
Dept: PSYCHIATRY | Facility: CLINIC | Age: 63
End: 2018-08-09
Payer: COMMERCIAL

## 2018-08-09 DIAGNOSIS — I13.0 CARDIORENAL SYNDROME WITH RENAL FAILURE, STAGE 1-4 OR UNSPECIFIED CHRONIC KIDNEY DISEASE, WITH HEART FAILURE: ICD-10-CM

## 2018-08-09 DIAGNOSIS — F33.1 MAJOR DEPRESSIVE DISORDER, RECURRENT EPISODE, MODERATE: Primary | ICD-10-CM

## 2018-08-09 DIAGNOSIS — N18.4 CKD (CHRONIC KIDNEY DISEASE) STAGE 4, GFR 15-29 ML/MIN: ICD-10-CM

## 2018-08-09 LAB
ALBUMIN SERPL BCP-MCNC: 3.6 G/DL
ANION GAP SERPL CALC-SCNC: 8 MMOL/L
BUN SERPL-MCNC: 24 MG/DL
CALCIUM SERPL-MCNC: 10 MG/DL
CHLORIDE SERPL-SCNC: 106 MMOL/L
CO2 SERPL-SCNC: 27 MMOL/L
CREAT SERPL-MCNC: 1.9 MG/DL
EST. GFR  (AFRICAN AMERICAN): 43 ML/MIN/1.73 M^2
EST. GFR  (NON AFRICAN AMERICAN): 37.2 ML/MIN/1.73 M^2
GLUCOSE SERPL-MCNC: 164 MG/DL
PHOSPHATE SERPL-MCNC: 3.1 MG/DL
POTASSIUM SERPL-SCNC: 4 MMOL/L
PTH-INTACT SERPL-MCNC: 103 PG/ML
SODIUM SERPL-SCNC: 141 MMOL/L

## 2018-08-09 PROCEDURE — 36415 COLL VENOUS BLD VENIPUNCTURE: CPT | Mod: PO

## 2018-08-09 PROCEDURE — 90834 PSYTX W PT 45 MINUTES: CPT | Mod: S$GLB,,, | Performed by: SOCIAL WORKER

## 2018-08-09 PROCEDURE — 80069 RENAL FUNCTION PANEL: CPT

## 2018-08-09 PROCEDURE — 83970 ASSAY OF PARATHORMONE: CPT

## 2018-08-09 NOTE — PROGRESS NOTES
"Individual Psychotherapy (PhD/LCSW)    8/9/2018    Site:  [place]         Therapeutic Intervention: Met with patient.  Outpatient - Insight oriented psychotherapy 45 min - CPT code 08408    Chief complaint/reason for encounter: depression     Interval history and content of current session:  Patient presents to ongoing individual therapy due to depression.  He continues to deal with drooping on the right side of his face due to bells palsy.  He is able to close his right eye when he lays horizontal.  He was told by Dr. Fontenot that it could go away tomorrow or never.  He talked to a friend who had the condition.  It took a year for the condition to go away.  He is considering getting a device that will deliver electric shock to the effected area to stimulate the nerves.  He admits it is simply an aggravating condition.  He continues to think about his ex girlfriend, Solange.  She still has not responded to him.  He is working on composing another text to her.  He thinks that she has "too much" going on in her life which makes her unable to respond.  He admits that he does have some frustration that she does not communicate with him regarding either way she is feeling.  He continues to exercise with his friend.  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  He has been enjoying watching science fiction and collecting stamps.  He is hopeful that his son will be able to spend some time with him.  His son is coming to [place] with his wife for a custody hearing in [facility].  The patient admits it would be nice to take them to dinner.  He notes that today is his daughter's 38th birthday.  He plans to call her later in the day.      Treatment plan:  Target symptoms: depression  Why chosen therapy is appropriate versus another modality: relevant to diagnosis  Outcome monitoring methods: self-report, observation  Therapeutic intervention " type: insight oriented psychotherapy, supportive psychotherapy, interactive psychotherapy     Risk parameters:  Patient reports no suicidal ideation  Patient reports no homicidal ideation  Patient reports no self-injurious behavior  Patient reports no violent behavior     Verbal deficits: Nonee     Patient's response to intervention:  The patient's response to intervention is accepting, motivated.     Progress toward goals and other mental status changes:  The patient's progress toward goals is fair .     Diagnosis:   Major depression recurrent moderate     Plan:  individual psychotherapy and medication management by physician     Return to clinic: as scheduled     Length of Service (minutes): 45

## 2018-08-16 ENCOUNTER — OFFICE VISIT (OUTPATIENT)
Dept: NEPHROLOGY | Facility: CLINIC | Age: 63
End: 2018-08-16
Payer: COMMERCIAL

## 2018-08-16 VITALS
SYSTOLIC BLOOD PRESSURE: 138 MMHG | HEART RATE: 68 BPM | WEIGHT: 315 LBS | DIASTOLIC BLOOD PRESSURE: 98 MMHG | HEIGHT: 78 IN | BODY MASS INDEX: 36.45 KG/M2

## 2018-08-16 DIAGNOSIS — N18.30 CKD (CHRONIC KIDNEY DISEASE) STAGE 3, GFR 30-59 ML/MIN: Primary | ICD-10-CM

## 2018-08-16 PROCEDURE — 3075F SYST BP GE 130 - 139MM HG: CPT | Mod: CPTII,S$GLB,, | Performed by: INTERNAL MEDICINE

## 2018-08-16 PROCEDURE — 99214 OFFICE O/P EST MOD 30 MIN: CPT | Mod: S$GLB,,, | Performed by: INTERNAL MEDICINE

## 2018-08-16 PROCEDURE — 3080F DIAST BP >= 90 MM HG: CPT | Mod: CPTII,S$GLB,, | Performed by: INTERNAL MEDICINE

## 2018-08-16 PROCEDURE — 99999 PR PBB SHADOW E&M-EST. PATIENT-LVL III: CPT | Mod: PBBFAC,,, | Performed by: INTERNAL MEDICINE

## 2018-08-16 PROCEDURE — 3008F BODY MASS INDEX DOCD: CPT | Mod: CPTII,S$GLB,, | Performed by: INTERNAL MEDICINE

## 2018-08-16 NOTE — PROGRESS NOTES
Subjective:       Patient ID: Stepan Springer is a 63 y.o. White male who presents for follow-up evaluation of  CKD stage 3 , HTN , HPT , Kidney stones     Liza Landrum MD      HPI: Stepan Springer Is a pleasant 63-year-old  gentleman with long-standing history of hypertension. He was diagnosed with type 2 diabetes about 15 years ago. Patient seen today in follow-up for above medical problems.     He reports bilateral LE edema. He adjust his lasix dose according to edema changes and is currently on Lasix 80 mg bid. Patient reports frequent weight lifting exercises.            Past Medical History:   Diagnosis Date    Anxiety     Bell's palsy     Coronary artery disease involving native coronary artery without angina pectoris 10/18/2016    Idiopathic peripheral neuropathy 12/11/2012    Mixed hyperlipidemia 12/11/2012    Vitamin B 12 deficiency 8/23/2012         Current Outpatient Medications on File Prior to Visit   Medication Sig Dispense Refill    ACCU-CHEK ALYCIA PLUS METER Misc       aspirin 325 MG tablet Take 325 mg by mouth 2 (two) times daily.      atorvastatin (LIPITOR) 10 MG tablet Take 1 tablet (10 mg total) by mouth once daily. 90 tablet 3    BLOOD PRESSURE CUFF Misc 1 cuff 1 each 0    blood sugar diagnostic Strp To check BG 3 times daily, to use with insurance preferred meter 100 strip 3    blood-glucose meter kit To check BG 3 times daily, to use with insurance preferred meter 1 each 0    brimonidine 0.1% (ALPHAGAN P) 0.1 % Drop Place 1 drop into both eyes 3 (three) times daily. Order 90 day supply 10 mL 4    brinzolamide (AZOPT) 1 % ophthalmic suspension Place 1 drop into both eyes 3 (three) times daily. ORDER 90 DAY SUPPLY 10 mL 4    buPROPion (WELLBUTRIN XL) 300 MG 24 hr tablet Take 1 tablet (300 mg total) by mouth once daily. 90 tablet 1    carvedilol (COREG) 25 MG tablet Take 1 tablet (25 mg total) by mouth 2 (two) times daily with meals. 180 tablet 11     "furosemide (LASIX) 40 MG tablet Take 2 tablets (80 mg total) by mouth 2 (two) times daily. When ankles retain fluid I double the lasix x 14 days 260 tablet 3    lancets Misc To check BG 3 times daily, to use with insurance preferred meter 100 each 3    lisinopril (PRINIVIL,ZESTRIL) 40 MG tablet Take 1 tablet (40 mg total) by mouth once daily. 90 tablet 3    NOVOLIN 70/30 100 unit/mL (70-30) injection INJECT 40 UNITS SUB-Q TWO TIMES DAILY (Patient taking differently: INJECT 50 UNITS SUB-Q TWO TIMES DAILY) 3 mL 6    spironolactone (ALDACTONE) 25 MG tablet TAKE ONE TABLET BY MOUTH TWICE DAILY 180 tablet 3    carBAMazepine (TEGRETOL) 100 mg chewable tablet Take 1 tablet (100 mg total) by mouth every 4 (four) hours as needed. 30 tablet 3    nitroGLYCERIN (NITROSTAT) 0.4 MG SL tablet Place 1 tablet (0.4 mg total) under the tongue every 5 (five) minutes as needed for Chest pain. 20 tablet 0    sildenafil (VIAGRA) 50 MG tablet Take 1 tablet (50 mg total) by mouth daily as needed for Erectile Dysfunction. 5 tablet 3     No current facility-administered medications on file prior to visit.          Review of Systems   Cardiovascular: Positive for leg swelling.     :  BP (!) 138/98   Pulse 68   Ht 6' 7" (2.007 m)   Wt (!) 181.6 kg (400 lb 5.7 oz)   BMI 45.10 kg/m²       Constitutional: Negative for activity change and appetite change.   HENT: Negative for congestion, facial swelling, neck pain and neck stiffness.   Eyes: Negative for pain, discharge and redness.   Respiratory: Negative for apnea, cough and chest tightness.   Cardiovascular: Negative for chest pain.    Gastrointestinal: Negative for abdominal distention.   Genitourinary: Negative for dysuria, frequency and difficulty urinating.   Skin: Negative for color change, rash and wound.   Neurological: Negative for dizziness, weakness and numbness.   Psychiatric/Behavioral: Negative for sleep disturbance.   All other systems reviewed and are " negative.      Objective:         Vitals:    08/16/18 0913   BP: (!) 138/98   Pulse: 68       Weight 367 lbs, last weight 361 lbs ,     Physical Exam  :    Nursing note and vitals reviewed.  Constitutional: He is oriented to person, place, and time. He appears well-developed and well-nourished. No distress.   HENT:  Head: Normocephalic and atraumatic. Eyes: Pupils are equal, round, and reactive to light.   Neck: Normal range of motion. Neck supple. No tracheal deviation present. No thyromegaly present.   Cardiovascular: Normal rate, regular rhythm, normal heart sounds and intact distal pulses. Exam reveals no gallop and no friction rub.    No murmur heard.  Pulmonary/Chest: Effort normal and breath sounds normal. He has no wheezes. He has no rales.   Abdominal: Soft. He exhibits no mass. There is no tenderness. There is no rebound and no guarding.   Obese, limited exam   Musculoskeletal: Normal range of motion. Bilateral pitting LE edema    Lymphadenopathy: He has no cervical adenopathy.   Neurological: He is alert and oriented to person, place, and time, right facial droop (patient with Bell's palsy)  Skin: Skin is warm. No rash noted. He is not diaphoretic. No erythema.      Labs:    Lab Results   Component Value Date    CREATININE 1.9 (H) 08/09/2018    BUN 24 (H) 08/09/2018     08/09/2018    K 4.0 08/09/2018     08/09/2018    CO2 27 08/09/2018       Lab Results   Component Value Date    WBC 5.54 04/12/2018    HGB 14.0 04/12/2018    HCT 41.6 04/12/2018    MCV 93 04/12/2018     04/12/2018       Lab Results   Component Value Date    .0 (H) 08/09/2018    CALCIUM 10.0 08/09/2018    PHOS 3.1 08/09/2018       Lab Results   Component Value Date    ALBUMIN 3.6 08/09/2018       Lab Results   Component Value Date    URICACID 7.9 (H) 08/11/2016       Lab Results   Component Value Date    HGBA1C 8.6 (H) 07/19/2018     Microalbumin creatinine ratio: 12.5 (4/12/18)      Impression and plan -  63-year-old  gentleman with sleep apnea, obesity, HTN, DM2, CAD, CHF, nephrolithiasis presents for evaluation of renal insufficiency.       1. Chronic kidney disease stage 3/4 - secondary to long-standing history of hypertension and cardiorenal syndrome. DM2 is less likely given absence of proteinuria. Renal function has improved since last visit and creatinine has decreased to 1.9.  He will return to clinic in 3 months for follow up. Patient was advised to avoid NSAID pain medications such as advil, aleve, motrin, ibuprofen, naprosyn, meloxicam, diclofenac, mobic.       2. Hypertension -  Blood pressure well controlled on current regimen.      3. CAD / Congestive failure -  He is also followed by cardiology. Discussed low salt diet.       4. Secondary hyperparathyroidism - slightly elevated PTH at 103 noted, will continue to monitor.      5. Diabetes mellitus type 2 - poorly controlled, recent hemoglobin A1c is 9.2. He will follow up with PMD to improve his blood glucose control.      6. Kidney stones : s/p lithotripsy. No recurrent renal stones.     7. Electrolytes: at goal.

## 2018-08-22 DIAGNOSIS — I10 ESSENTIAL HYPERTENSION: Chronic | ICD-10-CM

## 2018-08-22 RX ORDER — BUPROPION HYDROCHLORIDE 300 MG/1
TABLET ORAL
Qty: 90 TABLET | Refills: 0 | Status: SHIPPED | OUTPATIENT
Start: 2018-08-22 | End: 2018-11-29 | Stop reason: SDUPTHER

## 2018-08-22 RX ORDER — CARVEDILOL 25 MG/1
TABLET ORAL
Qty: 180 TABLET | Refills: 11 | Status: SHIPPED | OUTPATIENT
Start: 2018-08-22 | End: 2019-09-30 | Stop reason: SDUPTHER

## 2018-08-23 ENCOUNTER — OFFICE VISIT (OUTPATIENT)
Dept: PSYCHIATRY | Facility: CLINIC | Age: 63
End: 2018-08-23
Payer: COMMERCIAL

## 2018-08-23 DIAGNOSIS — F33.1 MAJOR DEPRESSIVE DISORDER, RECURRENT EPISODE, MODERATE: Primary | ICD-10-CM

## 2018-08-23 PROCEDURE — 90834 PSYTX W PT 45 MINUTES: CPT | Mod: S$GLB,,, | Performed by: SOCIAL WORKER

## 2018-09-06 ENCOUNTER — OFFICE VISIT (OUTPATIENT)
Dept: PSYCHIATRY | Facility: CLINIC | Age: 63
End: 2018-09-06
Payer: COMMERCIAL

## 2018-09-06 DIAGNOSIS — F33.1 MAJOR DEPRESSIVE DISORDER, RECURRENT EPISODE, MODERATE: Primary | ICD-10-CM

## 2018-09-06 PROCEDURE — 90834 PSYTX W PT 45 MINUTES: CPT | Mod: S$GLB,,, | Performed by: SOCIAL WORKER

## 2018-09-06 NOTE — PROGRESS NOTES
Individual Psychotherapy (PhD/LCSW)    9/6/2018    Site:  [place]         Therapeutic Intervention: Met with patient.  Outpatient - Insight oriented psychotherapy 45 min - CPT code 04179    Chief complaint/reason for encounter: depression     Interval history and content of current session:  Patient presents to ongoing individual therapy due to depression.  He continues to be fixated on his ex girlfriend, Solange.  He has been texting her on a regular basis with no response.  He is not open to relationship because he thinks his ex is perfect for him and he does not want to be hurt again.  He admits that he would be devastated if she finally responded and she told him she was not interested.  He has been told by their mutual friend that she is caring for her sister who has had another pace maker placed.  He is hopeful she would agree to meet him.  He does feel he has been having more positive thoughts recently.  He has not had any medication changes.  He has been exercising on a regular basis.  He does feel he is getting stronger.  He feels that his clothes are fitting better.  He is frustrated that he continues to have symptoms of bells palsy.  He is struggling to eat healthy.  He admits that he has several sweets that are attractive to him.  He will also eat beef jerky on a regular basis.  He is looking forward to a [employer] party he has planned with his friends.  He has been able to get his costume of N[place] [employer]ru[place] together.  He is excited because his friend bought a mystery game they can play that night.    Treatment plan:  Target symptoms: depression  Why chosen therapy is appropriate versus another modality: relevant to diagnosis  Outcome monitoring methods: self-report, observation  Therapeutic intervention type: insight oriented psychotherapy, supportive psychotherapy, interactive psychotherapy     Risk parameters:  Patient reports no suicidal ideation  Patient reports no homicidal ideation  Patient  reports no self-injurious behavior  Patient reports no violent behavior     Verbal deficits: none     Patient's response to intervention:  The patient's response to intervention is accepting, motivated.     Progress toward goals and other mental status changes:  The patient's progress toward goals is fair .     Diagnosis:   Major depression recurrent moderate     Plan:  individual psychotherapy and medication management by physician     Return to clinic: as scheduled     Length of Service (minutes): 45

## 2018-09-18 ENCOUNTER — OFFICE VISIT (OUTPATIENT)
Dept: OPHTHALMOLOGY | Facility: CLINIC | Age: 63
End: 2018-09-18
Payer: COMMERCIAL

## 2018-09-18 DIAGNOSIS — H40.1133 PRIMARY OPEN ANGLE GLAUCOMA OF BOTH EYES, SEVERE STAGE: Primary | ICD-10-CM

## 2018-09-18 DIAGNOSIS — G51.0 BELL'S PALSY: ICD-10-CM

## 2018-09-18 DIAGNOSIS — Z91.148 NON COMPLIANCE W MEDICATION REGIMEN: ICD-10-CM

## 2018-09-18 DIAGNOSIS — H54.10 BLINDNESS AND LOW VISION: ICD-10-CM

## 2018-09-18 PROCEDURE — 99999 PR PBB SHADOW E&M-EST. PATIENT-LVL III: CPT | Mod: PBBFAC,,, | Performed by: OPHTHALMOLOGY

## 2018-09-18 PROCEDURE — 92012 INTRM OPH EXAM EST PATIENT: CPT | Mod: S$GLB,,, | Performed by: OPHTHALMOLOGY

## 2018-09-18 NOTE — PROGRESS NOTES
HPI     Here for iop check,since pt was here last pt was diagnosed with bells   palsy on memorial day states not using drops od due to burning only using   refresh in OD,pt states he is unable to close OD at times due to [place]   palsy     Last edited by Suly Deras MA on 9/18/2018  7:42 AM. (History)            Assessment /Plan     For exam results, see Encounter Report.      ICD-10-CM ICD-9-CM    1. Primary open angle glaucoma of both eyes, severe stage H40.1133 365.11 Doing well - intraocular pressure is within acceptable range relative to target pressure with no evidence of progression.   Continue current treatment.  Reviewed importance of continued compliance with treatment and follow up.        365.73    2. Blindness and low vision H54.10 369.9 Stable    3. Non compliance w medication regimen Z91.14 V15.81 Right eye due to bell's palsy. Try using alphagan tid.    4. Bell's palsy G51.0 351.0 Right side. Follow. Continue refresh during the day and use gel at night      Azopt BID OS, Alphagan BID OS  Alphagan TID OD   Return to clinic 2 months with HVF, GOCT, and IOP check.

## 2018-09-20 ENCOUNTER — OFFICE VISIT (OUTPATIENT)
Dept: PSYCHIATRY | Facility: CLINIC | Age: 63
End: 2018-09-20
Payer: COMMERCIAL

## 2018-09-20 ENCOUNTER — TELEPHONE (OUTPATIENT)
Dept: FAMILY MEDICINE | Facility: CLINIC | Age: 63
End: 2018-09-20

## 2018-09-20 DIAGNOSIS — F33.1 MAJOR DEPRESSIVE DISORDER, RECURRENT EPISODE, MODERATE: Primary | ICD-10-CM

## 2018-09-20 PROCEDURE — 90834 PSYTX W PT 45 MINUTES: CPT | Mod: S$GLB,,, | Performed by: SOCIAL WORKER

## 2018-09-20 NOTE — PROGRESS NOTES
"Individual Psychotherapy (PhD/LCSW)    9/20/2018    Site:  [facility]         Therapeutic Intervention: Met with patient.  Outpatient - Insight oriented psychotherapy 45 min - CPT code 14872    Chief complaint/reason for encounter: depression     Interval history and content of current session:  Patient presents to ongoing individual therapy due to depression.  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  The extensions that were filed are now becoming due.  He has also found a large number of audits being issued.  The patient feels this is because the IRS is trying to justify it's existence.  He continues to be surprised that some people have not filed their taxes for many years.  He notes that the  informed the staff that they were the lowest performing office in the region.  However with the recent spike in business, the office is no longer at the bottom.  The patient will exceed his work goals with the recent influx of customers.  He may be promoted to  if his supervisor continues to be ill.  The patient continues to text his ex girlfriend, Solange.  She continues with no response.  He has wondered if she is getting his text messages, but he confirmed with the phone company that he would get messages letting him know it was not received.  He has been asking her out to the movies and dinner or inviting her to a local festival.  He continues to exercise on a regular basis.  He continues to have difficulty closing his right eye due to bells palsy.  He is happy his eye has not been damaged.  His son was given custody of his grandson because of the instability of his grandson's mother.  He has not had any contact with his youngest son.    Treatment plan:  Target symptoms: depression  Why chosen therapy is appropriate versus another modality: relevant to diagnosis  Outcome monitoring methods: self-report, observation  Therapeutic intervention type: insight oriented " psychotherapy, supportive psychotherapy, interactive psychotherapy     Risk parameters:  Patient reports no suicidal ideation  Patient reports no homicidal ideation  Patient reports no self-injurious behavior  Patient reports no violent behavior     Verbal deficits: none     Patient's response to intervention:  The patient's response to intervention is accepting, motivated.     Progress toward goals and other mental status changes:  The patient's progress toward goals is fair .     Diagnosis:   Major depression recurrent moderate     Plan:  individual psychotherapy and medication management by physician     Return to clinic: as scheduled     Length of Service (minutes): 45

## 2018-10-04 ENCOUNTER — OFFICE VISIT (OUTPATIENT)
Dept: PSYCHIATRY | Facility: CLINIC | Age: 63
End: 2018-10-04
Payer: COMMERCIAL

## 2018-10-04 DIAGNOSIS — F33.1 MAJOR DEPRESSIVE DISORDER, RECURRENT EPISODE, MODERATE: Primary | ICD-10-CM

## 2018-10-04 PROCEDURE — 90834 PSYTX W PT 45 MINUTES: CPT | Mod: S$GLB,,, | Performed by: SOCIAL WORKER

## 2018-10-04 NOTE — PROGRESS NOTES
Individual Psychotherapy (PhD/LCSW)    10/4/2018    Site:  [place]         Therapeutic Intervention: Met with patient.  Outpatient - Insight oriented psychotherapy 45 min - CPT code 37867    Chief complaint/reason for encounter: depression     Interval history and content of current session:  Patient present to ongoing individual therapy for depression.  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  He is frustrated that people procrastinate and bring in complicated issues at the last minute.  He continues to prepare for his Halloween party.  He has decorated his home.  They will have a mystery for guests to solve.  He notes that he has a dead body for the pool.  He has gotten more pieces to his [employer] bowers.  He sent another text to his ex girlfriend, Solange, asking if she would come to attend Fall Fest in [place].  He told her that he misses being with her.  He still has not gotten any response from her.  He still is not attending Shinto.  He admits that he could not be any other Orthodoxy than Rastafari.  He recalls having to attend another Shinto when his daughter was young and she attended a Judaism school.  He did not like the way some of the Shinto members were acting in the service.  He shares that there is a new  where he lives.  This  used to be  with kids, but his wife .  The patient has not yet met him, but he is considering returning to Shinto.  He is encouraged to attend spiritual direction with the  regarding his frustrations with God.  The patient observes how technology has become more advanced.  He feels the technology is a lot like the science fiction shows he has been watching for years.    Treatment plan:  Target symptoms: depression  Why chosen therapy is appropriate versus another modality: relevant to diagnosis  Outcome monitoring methods: self-report, observation  Therapeutic intervention type: insight oriented psychotherapy,  supportive psychotherapy, interactive psychotherapy     Risk parameters:  Patient reports no suicidal ideation  Patient reports no homicidal ideation  Patient reports no self-injurious behavior  Patient reports no violent behavior     Verbal deficits: none     Patient's response to intervention:  The patient's response to intervention is accepting, motivated.     Progress toward goals and other mental status changes:  The patient's progress toward goals is fair .     Diagnosis:   Major depression recurrent moderate     Plan:  individual psychotherapy and medication management by physician     Return to clinic: as scheduled     Length of Service (minutes): 45

## 2018-10-18 ENCOUNTER — OFFICE VISIT (OUTPATIENT)
Dept: PSYCHIATRY | Facility: CLINIC | Age: 63
End: 2018-10-18
Payer: COMMERCIAL

## 2018-10-18 DIAGNOSIS — F33.1 MAJOR DEPRESSIVE DISORDER, RECURRENT EPISODE, MODERATE: Primary | ICD-10-CM

## 2018-10-18 PROCEDURE — 90834 PSYTX W PT 45 MINUTES: CPT | Mod: S$GLB,,, | Performed by: SOCIAL WORKER
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
## 2018-10-23 DIAGNOSIS — E11.59 HYPERTENSION ASSOCIATED WITH DIABETES: Primary | Chronic | ICD-10-CM

## 2018-10-23 DIAGNOSIS — I15.2 HYPERTENSION ASSOCIATED WITH DIABETES: Primary | Chronic | ICD-10-CM

## 2018-10-24 ENCOUNTER — OFFICE VISIT (OUTPATIENT)
Dept: CARDIOLOGY | Facility: CLINIC | Age: 63
End: 2018-10-24
Payer: COMMERCIAL

## 2018-10-24 ENCOUNTER — CLINICAL SUPPORT (OUTPATIENT)
Dept: CARDIOLOGY | Facility: CLINIC | Age: 63
End: 2018-10-24
Payer: COMMERCIAL

## 2018-10-24 VITALS
WEIGHT: 315 LBS | HEIGHT: 78 IN | DIASTOLIC BLOOD PRESSURE: 80 MMHG | HEART RATE: 64 BPM | BODY MASS INDEX: 36.45 KG/M2 | SYSTOLIC BLOOD PRESSURE: 132 MMHG

## 2018-10-24 DIAGNOSIS — E78.2 MIXED HYPERLIPIDEMIA: Chronic | ICD-10-CM

## 2018-10-24 DIAGNOSIS — E11.59 HYPERTENSION ASSOCIATED WITH DIABETES: Chronic | ICD-10-CM

## 2018-10-24 DIAGNOSIS — I50.9 CHF (NYHA CLASS III, ACC/AHA STAGE C): Chronic | ICD-10-CM

## 2018-10-24 DIAGNOSIS — I25.10 CORONARY ARTERY DISEASE INVOLVING NATIVE CORONARY ARTERY OF NATIVE HEART WITHOUT ANGINA PECTORIS: ICD-10-CM

## 2018-10-24 DIAGNOSIS — I15.2 HYPERTENSION ASSOCIATED WITH DIABETES: Chronic | ICD-10-CM

## 2018-10-24 DIAGNOSIS — I44.7 LBBB (LEFT BUNDLE BRANCH BLOCK): Primary | ICD-10-CM

## 2018-10-24 PROCEDURE — 3008F BODY MASS INDEX DOCD: CPT | Mod: CPTII,S$GLB,, | Performed by: INTERNAL MEDICINE

## 2018-10-24 PROCEDURE — 93000 ELECTROCARDIOGRAM COMPLETE: CPT | Mod: S$GLB,,, | Performed by: INTERNAL MEDICINE

## 2018-10-24 PROCEDURE — 99999 PR PBB SHADOW E&M-EST. PATIENT-LVL III: CPT | Mod: PBBFAC,,, | Performed by: INTERNAL MEDICINE

## 2018-10-24 PROCEDURE — 3045F PR MOST RECENT HEMOGLOBIN A1C LEVEL 7.0-9.0%: CPT | Mod: CPTII,S$GLB,, | Performed by: INTERNAL MEDICINE

## 2018-10-24 PROCEDURE — 99214 OFFICE O/P EST MOD 30 MIN: CPT | Mod: S$GLB,,, | Performed by: INTERNAL MEDICINE

## 2018-10-24 PROCEDURE — 3075F SYST BP GE 130 - 139MM HG: CPT | Mod: CPTII,S$GLB,, | Performed by: INTERNAL MEDICINE

## 2018-10-24 PROCEDURE — 3079F DIAST BP 80-89 MM HG: CPT | Mod: CPTII,S$GLB,, | Performed by: INTERNAL MEDICINE

## 2018-10-24 NOTE — PROGRESS NOTES
"Subjective:      Patient ID: Stepan Springer is a 63 y.o. male.    Chief Complaint: Follow-up (3 month)      HPI  Here for follow up of medical problems.  AC breakfast 120s.  Has had some dietary indiscretion lately.  No recent steroids.  Since Bell palsy has started with episodes of hiccups, 3+ episodes per day.  No f/c/sw/cough.  No cp/sob/palp.  Drinking prune juice for moving BMs, working well.  Colace doesn't work.    Can't find BP cuff large enough to monitor at home.    Updated/ annual due 4/19:  HM: ref fluvax, 6/16 [id number], 4/14 [id number], 12/13 TDaP, 4/18 PSA/ Urol Dr. Simental, No Cscope, 5/18 Eye Dr. Hale, 9/16 HCV neg.     Review of Systems   Constitutional: Negative for chills, diaphoresis and fever.   Respiratory: Negative for cough and shortness of breath.    Cardiovascular: Negative for chest pain, palpitations and leg swelling.   Gastrointestinal: Negative for blood in stool, constipation, diarrhea, nausea and vomiting.   Genitourinary: Negative for dysuria, frequency and hematuria.   Psychiatric/Behavioral: The patient is not nervous/anxious.          Objective:   BP (!) 142/92 (BP Location: Right arm, Patient Position: Sitting)   Pulse 72   Temp 96.3 °F (35.7 °C) (Tympanic)   Ht 6' 7" (2.007 m)   Wt (!) 181.1 kg (399 lb 4.1 oz)   SpO2 97%   BMI 44.98 kg/m²     Physical Exam   Constitutional: He is oriented to person, place, and time. He appears well-developed and well-nourished.   HENT:   Mouth/Throat: Oropharynx is clear and moist.   Neck: Normal range of motion. Neck supple.   Cardiovascular: Normal rate, regular rhythm and intact distal pulses. Exam reveals no gallop and no friction rub.   No murmur heard.  Pulmonary/Chest: Effort normal and breath sounds normal. He has no wheezes. He has no rales.   Abdominal: Soft. Bowel sounds are normal. He exhibits no mass. There is no tenderness.   Musculoskeletal: He exhibits no edema.   Lymphadenopathy:     He has no cervical adenopathy. "   Neurological: He is alert and oriented to person, place, and time.   Psychiatric: He has a normal mood and affect.       Results for orders placed or performed in visit on 11/01/18   Hemoglobin A1c   Result Value Ref Range    Hemoglobin A1C 9.7 (H) 4.0 - 5.6 %    Estimated Avg Glucose 232 (H) 68 - 131 mg/dL   Lipid panel   Result Value Ref Range    Cholesterol 128 120 - 199 mg/dL    Triglycerides 134 30 - 150 mg/dL    HDL 41 40 - 75 mg/dL    LDL Cholesterol 60.2 (L) 63.0 - 159.0 mg/dL    HDL/Chol Ratio 32.0 20.0 - 50.0 %    Total Cholesterol/HDL Ratio 3.1 2.0 - 5.0    Non-HDL Cholesterol 87 mg/dL   ALT (SGPT)   Result Value Ref Range    ALT 21 10 - 44 U/L         Assessment:       1. Uncontrolled type 2 diabetes mellitus with stage 3 chronic kidney disease, with long-term current use of insulin    2. Hypertension associated with diabetes    3. Hyperlipidemia associated with type 2 diabetes mellitus    4. Recurrent major depressive disorder, in partial remission    5. Preventive measure    6. Hiccups    7. Constipation, unspecified constipation type          Plan:     Uncontrolled type 2 diabetes mellitus with stage 3 chronic kidney disease, with long-term current use of insulin- try to add aerobic exercise, stop prune juice.  DM specialist.  -     Ambulatory referral to Endocrinology    Hypertension associated with diabetes- try to monitor.  Try to monitor with WM cuff on forearm.  RTC 6wk for recheck.    Hyperlipidemia associated with type 2 diabetes mellitus    Recurrent major depressive disorder, in partial remission- doing well with Psych and counseling.    Preventive measure- Discussed pt needs to get Shingrix vaccination at pharmacy.    Hiccups  -     X-Ray Chest PA And Lateral; Future; Expected date: 11/06/2018    Constipation, unspecified constipation type- stop prune juice, start miralax.  -     polyethylene glycol (GLYCOLAX) 17 gram/dose powder; Take 17 g by mouth once daily.  Dispense: 238 g; Refill:  6

## 2018-10-24 NOTE — PROGRESS NOTES
Subjective:   Patient ID:  Stepan Springer is a 63 y.o. male who presents for follow-up of No chief complaint on file.  Patient denies CP, angina or anginal equivalent.    Hypertension   This is a chronic problem. The current episode started more than 1 year ago. The problem has been gradually improving since onset. The problem is controlled. Pertinent negatives include no chest pain, palpitations or shortness of breath. Past treatments include beta blockers, ACE inhibitors and diuretics. The current treatment provides moderate improvement. There are no compliance problems.    Hyperlipidemia   This is a chronic problem. The current episode started more than 1 year ago. The problem is controlled. Recent lipid tests were reviewed and are variable. Pertinent negatives include no chest pain or shortness of breath. Current antihyperlipidemic treatment includes statins. The current treatment provides moderate improvement of lipids. There are no compliance problems.        Review of Systems   Constitution: Negative. Negative for weight gain.   HENT: Negative.    Eyes: Negative.    Cardiovascular: Negative.  Negative for chest pain, leg swelling and palpitations.   Respiratory: Negative.  Negative for shortness of breath.    Endocrine: Negative.    Hematologic/Lymphatic: Negative.    Skin: Negative.    Musculoskeletal: Negative for muscle weakness.   Gastrointestinal: Negative.    Genitourinary: Negative.    Neurological: Negative.  Negative for dizziness.   Psychiatric/Behavioral: Negative.    Allergic/Immunologic: Negative.      Family History   Problem Relation Age of Onset    Macular degeneration Father     Heart disease Father         CHF     Heart attack Father     Hypertension Mother     Macular degeneration Paternal Uncle     Hypertension Maternal Grandmother     Hypertension Maternal Grandfather     Strabismus Neg Hx     Retinal detachment Neg Hx     Glaucoma Neg Hx     Blindness Neg Hx      Amblyopia Neg Hx      Past Medical History:   Diagnosis Date    Anxiety     Bell's palsy     Coronary artery disease involving native coronary artery without angina pectoris 10/18/2016    Idiopathic peripheral neuropathy 12/11/2012    Mixed hyperlipidemia 12/11/2012    Vitamin B 12 deficiency 8/23/2012     Current Outpatient Medications on File Prior to Visit   Medication Sig Dispense Refill    ACCU-CHEK ALYCIA PLUS METER Misc       aspirin 325 MG tablet Take 325 mg by mouth 2 (two) times daily.      atorvastatin (LIPITOR) 10 MG tablet Take 1 tablet (10 mg total) by mouth once daily. 90 tablet 3    BLOOD PRESSURE CUFF Misc 1 cuff 1 each 0    blood sugar diagnostic Strp To check BG 3 times daily, to use with insurance preferred meter 100 strip 3    blood-glucose meter kit To check BG 3 times daily, to use with insurance preferred meter 1 each 0    brimonidine 0.1% (ALPHAGAN P) 0.1 % Drop Place 1 drop into both eyes 3 (three) times daily. Order 90 day supply 10 mL 4    brinzolamide (AZOPT) 1 % ophthalmic suspension Place 1 drop into both eyes 3 (three) times daily. ORDER 90 DAY SUPPLY 10 mL 4    buPROPion (WELLBUTRIN XL) 300 MG 24 hr tablet Take 1 tablet (300 mg total) by mouth once daily. 90 tablet 1    buPROPion (WELLBUTRIN XL) 300 MG 24 hr tablet TAKE ONE TABLET BY MOUTH ONCE DAILY 90 tablet 0    carBAMazepine (TEGRETOL) 100 mg chewable tablet Take 1 tablet (100 mg total) by mouth every 4 (four) hours as needed. 30 tablet 3    carvedilol (COREG) 25 MG tablet TAKE ONE TABLET BY MOUTH TWICE DAILY WITH  MEALS 180 tablet 11    furosemide (LASIX) 40 MG tablet Take 2 tablets (80 mg total) by mouth 2 (two) times daily. When ankles retain fluid I double the lasix x 14 days 260 tablet 3    lancets Misc To check BG 3 times daily, to use with insurance preferred meter 100 each 3    lisinopril (PRINIVIL,ZESTRIL) 40 MG tablet Take 1 tablet (40 mg total) by mouth once daily. 90 tablet 3    NOVOLIN 70/30 100  unit/mL (70-30) injection INJECT 40 UNITS SUB-Q TWO TIMES DAILY (Patient taking differently: INJECT 50 UNITS SUB-Q TWO TIMES DAILY) 3 mL 6    sildenafil (VIAGRA) 50 MG tablet Take 1 tablet (50 mg total) by mouth daily as needed for Erectile Dysfunction. 5 tablet 3    spironolactone (ALDACTONE) 25 MG tablet TAKE ONE TABLET BY MOUTH TWICE DAILY 180 tablet 3    nitroGLYCERIN (NITROSTAT) 0.4 MG SL tablet Place 1 tablet (0.4 mg total) under the tongue every 5 (five) minutes as needed for Chest pain. 20 tablet 0     No current facility-administered medications on file prior to visit.      Review of patient's allergies indicates:   Allergen Reactions    Cefazolin      Other reaction(s): Shakes  Other reaction(s): Chills       Objective:     Physical Exam   Constitutional: He is oriented to person, place, and time. He appears well-developed and well-nourished.   HENT:   Head: Normocephalic and atraumatic.   Eyes: Conjunctivae are normal. Pupils are equal, round, and reactive to light.   Neck: Normal range of motion. Neck supple.   Cardiovascular: Normal rate, regular rhythm, normal heart sounds and intact distal pulses.   Pulmonary/Chest: Effort normal and breath sounds normal.   Abdominal: Soft. Bowel sounds are normal.   Neurological: He is alert and oriented to person, place, and time.   Skin: Skin is warm and dry.   Psychiatric: He has a normal mood and affect.   Nursing note and vitals reviewed.      Assessment:     1. LBBB (left bundle branch block)    2. Coronary artery disease involving native coronary artery of native heart without angina pectoris    3. Mixed hyperlipidemia    4. Hypertension associated with diabetes    5. CHF (NYHA class III, ACC/AHA stage C)        Plan:     LBBB (left bundle branch block)    Coronary artery disease involving native coronary artery of native heart without angina pectoris    Mixed hyperlipidemia    Hypertension associated with diabetes    CHF (NYHA class III, ACC/AHA stage  C)    continue asa -cad  Continue coreg, lisinopril, lasix, -htn  Continue statin-hlp  Exercise and weight loss  weight loss and exercise  BMP 1 week

## 2018-10-26 DIAGNOSIS — E11.29 TYPE 2 DIABETES MELLITUS WITH OTHER DIABETIC KIDNEY COMPLICATION: ICD-10-CM

## 2018-10-26 RX ORDER — HUMAN INSULIN 100 [USP'U]/ML
INJECTION, SUSPENSION SUBCUTANEOUS
Qty: 20 ML | Refills: 6 | Status: SHIPPED | OUTPATIENT
Start: 2018-10-26 | End: 2019-03-25 | Stop reason: SDUPTHER

## 2018-11-01 ENCOUNTER — LAB VISIT (OUTPATIENT)
Dept: LAB | Facility: HOSPITAL | Age: 63
End: 2018-11-01
Attending: INTERNAL MEDICINE
Payer: COMMERCIAL

## 2018-11-01 ENCOUNTER — OFFICE VISIT (OUTPATIENT)
Dept: PSYCHIATRY | Facility: CLINIC | Age: 63
End: 2018-11-01
Payer: COMMERCIAL

## 2018-11-01 DIAGNOSIS — F33.1 MAJOR DEPRESSIVE DISORDER, RECURRENT EPISODE, MODERATE: Primary | ICD-10-CM

## 2018-11-01 DIAGNOSIS — I25.10 CORONARY ARTERY DISEASE INVOLVING NATIVE CORONARY ARTERY OF NATIVE HEART WITHOUT ANGINA PECTORIS: ICD-10-CM

## 2018-11-01 LAB
ALT SERPL W/O P-5'-P-CCNC: 21 U/L
CHOLEST SERPL-MCNC: 128 MG/DL
CHOLEST/HDLC SERPL: 3.1 {RATIO}
ESTIMATED AVG GLUCOSE: 232 MG/DL
HBA1C MFR BLD HPLC: 9.7 %
HDLC SERPL-MCNC: 41 MG/DL
HDLC SERPL: 32 %
LDLC SERPL CALC-MCNC: 60.2 MG/DL
NONHDLC SERPL-MCNC: 87 MG/DL
TRIGL SERPL-MCNC: 134 MG/DL

## 2018-11-01 PROCEDURE — 36415 COLL VENOUS BLD VENIPUNCTURE: CPT | Mod: PO

## 2018-11-01 PROCEDURE — 80061 LIPID PANEL: CPT

## 2018-11-01 PROCEDURE — 84460 ALANINE AMINO (ALT) (SGPT): CPT

## 2018-11-01 PROCEDURE — 83036 HEMOGLOBIN GLYCOSYLATED A1C: CPT

## 2018-11-01 PROCEDURE — 90834 PSYTX W PT 45 MINUTES: CPT | Mod: S$GLB,,, | Performed by: SOCIAL WORKER
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
## 2018-11-02 ENCOUNTER — TELEPHONE (OUTPATIENT)
Dept: PSYCHIATRY | Facility: CLINIC | Age: 63
End: 2018-11-02

## 2018-11-06 ENCOUNTER — HOSPITAL ENCOUNTER (OUTPATIENT)
Dept: RADIOLOGY | Facility: HOSPITAL | Age: 63
Discharge: HOME | End: 2018-11-06
Attending: INTERNAL MEDICINE
Payer: COMMERCIAL

## 2018-11-06 ENCOUNTER — OFFICE VISIT (OUTPATIENT)
Dept: INTERNAL MEDICINE | Facility: CLINIC | Age: 63
End: 2018-11-06
Payer: COMMERCIAL

## 2018-11-06 ENCOUNTER — PATIENT MESSAGE (OUTPATIENT)
Dept: INTERNAL MEDICINE | Facility: CLINIC | Age: 63
End: 2018-11-06

## 2018-11-06 VITALS
SYSTOLIC BLOOD PRESSURE: 142 MMHG | DIASTOLIC BLOOD PRESSURE: 92 MMHG | HEIGHT: 78 IN | OXYGEN SATURATION: 97 % | TEMPERATURE: 96 F | WEIGHT: 315 LBS | BODY MASS INDEX: 36.45 KG/M2 | HEART RATE: 72 BPM

## 2018-11-06 DIAGNOSIS — F33.41 RECURRENT MAJOR DEPRESSIVE DISORDER, IN PARTIAL REMISSION: ICD-10-CM

## 2018-11-06 DIAGNOSIS — R06.6 HICCUPS: ICD-10-CM

## 2018-11-06 DIAGNOSIS — R93.89 ABNORMAL CXR: ICD-10-CM

## 2018-11-06 DIAGNOSIS — I15.2 HYPERTENSION ASSOCIATED WITH DIABETES: Chronic | ICD-10-CM

## 2018-11-06 DIAGNOSIS — K59.00 CONSTIPATION, UNSPECIFIED CONSTIPATION TYPE: ICD-10-CM

## 2018-11-06 DIAGNOSIS — E78.5 HYPERLIPIDEMIA ASSOCIATED WITH TYPE 2 DIABETES MELLITUS: ICD-10-CM

## 2018-11-06 DIAGNOSIS — E11.59 HYPERTENSION ASSOCIATED WITH DIABETES: Chronic | ICD-10-CM

## 2018-11-06 DIAGNOSIS — E11.69 HYPERLIPIDEMIA ASSOCIATED WITH TYPE 2 DIABETES MELLITUS: ICD-10-CM

## 2018-11-06 DIAGNOSIS — Z29.9 PREVENTIVE MEASURE: ICD-10-CM

## 2018-11-06 PROCEDURE — 71046 X-RAY EXAM CHEST 2 VIEWS: CPT | Mod: TC,FY,PO

## 2018-11-06 PROCEDURE — 99999 PR PBB SHADOW E&M-EST. PATIENT-LVL IV: CPT | Mod: PBBFAC,,, | Performed by: INTERNAL MEDICINE

## 2018-11-06 PROCEDURE — 3080F DIAST BP >= 90 MM HG: CPT | Mod: CPTII,S$GLB,, | Performed by: INTERNAL MEDICINE

## 2018-11-06 PROCEDURE — 99214 OFFICE O/P EST MOD 30 MIN: CPT | Mod: S$GLB,,, | Performed by: INTERNAL MEDICINE

## 2018-11-06 PROCEDURE — 3077F SYST BP >= 140 MM HG: CPT | Mod: CPTII,S$GLB,, | Performed by: INTERNAL MEDICINE

## 2018-11-06 PROCEDURE — 3046F HEMOGLOBIN A1C LEVEL >9.0%: CPT | Mod: CPTII,S$GLB,, | Performed by: INTERNAL MEDICINE

## 2018-11-06 PROCEDURE — 71046 X-RAY EXAM CHEST 2 VIEWS: CPT | Mod: 26,,, | Performed by: RADIOLOGY

## 2018-11-06 PROCEDURE — 3008F BODY MASS INDEX DOCD: CPT | Mod: CPTII,S$GLB,, | Performed by: INTERNAL MEDICINE

## 2018-11-06 RX ORDER — POLYETHYLENE GLYCOL 3350 17 G/17G
17 POWDER, FOR SOLUTION ORAL DAILY
Qty: 238 G | Refills: 6 | Status: SHIPPED | OUTPATIENT
Start: 2018-11-06 | End: 2022-07-21

## 2018-11-20 ENCOUNTER — OFFICE VISIT (OUTPATIENT)
Dept: OPHTHALMOLOGY | Facility: CLINIC | Age: 63
End: 2018-11-20
Payer: COMMERCIAL

## 2018-11-20 DIAGNOSIS — H40.1133 PRIMARY OPEN ANGLE GLAUCOMA OF BOTH EYES, SEVERE STAGE: Primary | ICD-10-CM

## 2018-11-20 DIAGNOSIS — H54.10 BLINDNESS AND LOW VISION: ICD-10-CM

## 2018-11-20 PROCEDURE — 92083 EXTENDED VISUAL FIELD XM: CPT | Mod: S$GLB,,, | Performed by: OPHTHALMOLOGY

## 2018-11-20 PROCEDURE — 92133 CPTRZD OPH DX IMG PST SGM ON: CPT | Mod: S$GLB,,, | Performed by: OPHTHALMOLOGY

## 2018-11-20 PROCEDURE — 99999 PR PBB SHADOW E&M-EST. PATIENT-LVL III: CPT | Mod: PBBFAC,,, | Performed by: OPHTHALMOLOGY

## 2018-11-20 PROCEDURE — 92012 INTRM OPH EXAM EST PATIENT: CPT | Mod: S$GLB,,, | Performed by: OPHTHALMOLOGY

## 2018-11-20 RX ORDER — TRAVOPROST OPHTHALMIC SOLUTION 0.04 MG/ML
1 SOLUTION OPHTHALMIC NIGHTLY
Qty: 2.5 ML | Refills: 6 | Status: SHIPPED | OUTPATIENT
Start: 2018-11-20 | End: 2019-04-23 | Stop reason: SDUPTHER

## 2018-11-20 NOTE — PROGRESS NOTES
HPI     Glaucoma      Additional comments: IOP check, GOCT               Comments     POAG - Dr Burgess pt  Corneal Opacity  [place] palsy  DM  RD Repair with Scleral Buckle right eye  PCIOL OU  CANALOPLASTY OD 8-22-13 Good flow and tension(-900)  CANAL OS 03/20/14/LOT # 1306-01/REF # [id number]  -500 with shutter effect due to much intraop respiratory movement  Moderate flow with good tension        Azopt BID OS, Alphagan BID OS (states using tid)  Alphagan TID OD  Refresh prn OD          Last edited by Mini Rodgers MA on 11/20/2018  8:46 AM. (History)            Assessment /Plan     For exam results, see Encounter Report.      ICD-10-CM ICD-9-CM   1. Primary open angle glaucoma of both eyes, severe stage Needs IOP as low as possible - add travatan   HVF and GOCT Done today      H40.1133 365.11     365.73   2. Blindness and low vision H54.10 369.9           Add Travatan Z QHS OU (free voucher and coupon given to pt today)   Azopt BID OS, Alphagan BID OS (states using tid)  Alphagan TID OD  Refresh prn OD     RETURN TO CLINIC 3 week IOP

## 2018-11-29 ENCOUNTER — OFFICE VISIT (OUTPATIENT)
Dept: PSYCHIATRY | Facility: CLINIC | Age: 63
End: 2018-11-29
Payer: COMMERCIAL

## 2018-11-29 DIAGNOSIS — F33.1 MAJOR DEPRESSIVE DISORDER, RECURRENT EPISODE, MODERATE: Primary | ICD-10-CM

## 2018-11-29 DIAGNOSIS — F33.9 RECURRENT MAJOR DEPRESSIVE DISORDER, REMISSION STATUS UNSPECIFIED: Primary | ICD-10-CM

## 2018-11-29 DIAGNOSIS — F41.9 ANXIETY: ICD-10-CM

## 2018-11-29 PROCEDURE — 99999 PR PBB SHADOW E&M-EST. PATIENT-LVL I: CPT | Mod: PBBFAC,,, | Performed by: PSYCHIATRY & NEUROLOGY

## 2018-11-29 PROCEDURE — 99213 OFFICE O/P EST LOW 20 MIN: CPT | Mod: S$GLB,,, | Performed by: PSYCHIATRY & NEUROLOGY

## 2018-11-29 PROCEDURE — 90834 PSYTX W PT 45 MINUTES: CPT | Mod: S$GLB,,, | Performed by: SOCIAL WORKER

## 2018-11-29 RX ORDER — BUPROPION HYDROCHLORIDE 300 MG/1
300 TABLET ORAL DAILY
Qty: 90 TABLET | Refills: 1 | Status: SHIPPED | OUTPATIENT
Start: 2018-11-29 | End: 2019-03-28 | Stop reason: SDUPTHER

## 2018-11-29 NOTE — PROGRESS NOTES
"Outpatient Psychiatry Follow-up Visit (MD/NP)    11/29/2018    Stepan Springer, a 63 y.o. male, presenting for follow-up visit. Met with patient.    Reason for Encounter: self-referral. Patient complains of depressison.    Interval History: Patient seen & interviewed for follow-up, last seen about 4 months prior to this visit. Ongoing symptoms of Bell's palsy, with modest improvement from previously. 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 No side effects from mental health meds. Continues psychotherapy. Working on self-care in light of longstanding unrequited love for Solange.      Background: This 62 y/o with depression recurrence, 6 months of low moods, feelings of guilt/self-reproach, hopelessness & helplessness, intermittent SI which has been mostly passive, occasional fantasies about active suicide attempts, but never seriously considers action. Most recent depression is in context of loss of love relationship to a woman he met on internet, dated for some time then broke up with him & is now in another relationship. Continued to text & attempt to contact her (she generally doesn't respond) until about a week ago, stopped because he realizes it's time to "move on", that it's taking a toll on his mental health, but still feels tempted to do it. Continues to work despite symptoms, reports performance is "ok", though "not my best work". PsychHx: symptoms began around [place] '09. first treated for depression in early '10. Did IOP at [facility] [place] [place] in '10. Suffered a series of losses since including wife ('09), mother in law ('11), mother ('12), father ('13) & loss of job as [employer]  due to his health problems (he's since changed fields, now works in tax preparation for [employer]). Has participated in psychotherapy including since he moved to [place], stopped due to loss of insurance (though reinsured, hasn't returned since most recent " depression recurrence). 1 remote episode of suicidality without action.  MedHx: DM, had NSTEMI in , FRAN, CHF, CKD, cataracts. SubstanceHx: rare wine, but mostly avoids alcohol due to his health; no illicits or prescription drug abuse. Social Hx: normal , birth, & developmental milestones. Grew up in [place], moved to [place] in .  (wife  in '10), & 2 year relationship ended in mid , though he's continued to contact her until very recently. 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 Works full-time. Has 3 adopted kids (one of which is child of one of other kids).      Review Of Systems:     GENERAL:  No weight gain or loss  SKIN:  No rashes or lacerations  HEAD:  No headaches, +hemifacial plegia.   CHEST:  No shortness of breath, hyperventilation or cough  CARDIOVASCULAR:  No tachycardia or chest pain  ABDOMEN:  No nausea, vomiting, pain, constipation or diarrhea  URINARY:  No frequency, dysuria or sexual dysfunction  ENDOCRINE:  No polydipsia, polyuria  MUSCULOSKELETAL:  No pain or stiffness of the joints  NEUROLOGIC:  No weakness, sensory changes, seizures, confusion, memory loss, tremor or other abnormal movements    Current Evaluation:     Nutritional Screening: Considering the patient's height and weight, medications, medical history and preferences, should a referral be made to the dietitian? no    Constitutional  Vitals:  Most recent vital signs, dated less than 90 days prior to this appointment, were not reviewed.    There were no vitals filed for this visit.     General:  unremarkable, age appropriate     Musculoskeletal  Muscle Strength/Tone:  no tremor, no tic   Gait & Station:  non-ataxic     Psychiatric  Appearance: casually dressed & groomed;   Behavior: calm,   Cooperation: cooperative with assessment  Speech: normal rate, volume, tone  Thought Process: linear, goal-directed  Thought Content: No  suicidal or homicidal ideation; no delusions  Affect: mildly anxious  Mood: mildly anxious  Perceptions: No auditory or visual hallucinations  Level of Consciousness: alert throughout interview  Insight: fair  Cognition: Oriented to person, place, time, & situation  Memory: no apparent deficits to general clinical interview; not formally assessed  Attention/Concentration: no apparent deficits to general clinical interview; not formally assessed  Fund of Knowledge: average by vocabulary/education    Laboratory Data  Lab Visit on 11/01/2018   Component Date Value Ref Range Status    Hemoglobin A1C 11/01/2018 9.7* 4.0 - 5.6 % Final    Estimated Avg Glucose 11/01/2018 232* 68 - 131 mg/dL Final    Cholesterol 11/01/2018 128  120 - 199 mg/dL Final    Triglycerides 11/01/2018 134  30 - 150 mg/dL Final    HDL 11/01/2018 41  40 - 75 mg/dL Final    LDL Cholesterol 11/01/2018 60.2* 63.0 - 159.0 mg/dL Final    HDL/Chol Ratio 11/01/2018 32.0  20.0 - 50.0 % Final    Total Cholesterol/HDL Ratio 11/01/2018 3.1  2.0 - 5.0 Final    Non-HDL Cholesterol 11/01/2018 87  mg/dL Final    ALT 11/01/2018 21  10 - 44 U/L Final     Medications  Outpatient Encounter Medications as of 11/29/2018   Medication Sig Dispense Refill    ACCU-CHEK ALYCIA PLUS METER Misc       aspirin 325 MG tablet Take 325 mg by mouth 2 (two) times daily.      atorvastatin (LIPITOR) 10 MG tablet Take 1 tablet (10 mg total) by mouth once daily. 90 tablet 3    BLOOD PRESSURE CUFF Misc 1 cuff 1 each 0    blood sugar diagnostic Strp To check BG 3 times daily, to use with insurance preferred meter 100 strip 3    blood-glucose meter kit To check BG 3 times daily, to use with insurance preferred meter 1 each 0    brimonidine 0.1% (ALPHAGAN P) 0.1 % Drop Place 1 drop into both eyes 3 (three) times daily. Order 90 day supply 10 mL 4    brinzolamide (AZOPT) 1 % ophthalmic suspension Place 1 drop into both eyes 3 (three) times daily. ORDER 90 DAY SUPPLY 10 mL 4     buPROPion (WELLBUTRIN XL) 300 MG 24 hr tablet TAKE ONE TABLET BY MOUTH ONCE DAILY 90 tablet 0    carvedilol (COREG) 25 MG tablet TAKE ONE TABLET BY MOUTH TWICE DAILY WITH  MEALS 180 tablet 11    furosemide (LASIX) 40 MG tablet Take 2 tablets (80 mg total) by mouth 2 (two) times daily. When ankles retain fluid I double the lasix x 14 days 260 tablet 3    lancets Misc To check BG 3 times daily, to use with insurance preferred meter 100 each 3    lisinopril (PRINIVIL,ZESTRIL) 40 MG tablet Take 1 tablet (40 mg total) by mouth once daily. 90 tablet 3    nitroGLYCERIN (NITROSTAT) 0.4 MG SL tablet Place 1 tablet (0.4 mg total) under the tongue every 5 (five) minutes as needed for Chest pain. 20 tablet 0    NOVOLIN 70/30 U-100 INSULIN 100 unit/mL (70-30) injection INJECT 40 UNITS SUBCUTANEOUSLY TWICE DAILY 20 mL 6    polyethylene glycol (GLYCOLAX) 17 gram/dose powder Take 17 g by mouth once daily. 238 g 6    sildenafil (VIAGRA) 50 MG tablet Take 1 tablet (50 mg total) by mouth daily as needed for Erectile Dysfunction. 5 tablet 3    spironolactone (ALDACTONE) 25 MG tablet TAKE ONE TABLET BY MOUTH TWICE DAILY 180 tablet 3    travoprost (TRAVATAN Z) 0.004 % ophthalmic solution Place 1 drop into both eyes every evening. 2.5 mL 6     No facility-administered encounter medications on file as of 11/29/2018.      Assessment - Diagnosis - Goals:     Impression: This 62 y/o WM with MDD, with recurrent major depression, symptoms were better during tax season, worse afterwards then improved. Participating well in psychotherapy.  SI is resolved, hasn't returned. Ongoing fantasies of reconciliation with ex, disappointment. Has bell's palsy, ongoing symptoms that are slow to resolve.       Major depressive disorder, recurrent, partial remission; anxiety    Treatment Goals:  Specify outcomes written in observable, behavioral terms:   Depression: increasing energy, increasing interest in usual activities, increasing  motivation, reducing excessive guilt and reducing fatigue    Treatment Plan/Recommendations:   Continue bupropion xl 300 mg daily. Discussed risks, benefits, and alternatives to treatment plan documented above with patient. I answered all patient questions related to this plan and patient expressed understanding and agreement.   Continue psychotherapy.     Return to Clinic: 6 months, prn sooner.     Counseling time: 5 minutes  Total time: 20 minutes    RAMON Pitt MD
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
## 2018-12-10 ENCOUNTER — OFFICE VISIT (OUTPATIENT)
Dept: DIABETES | Facility: CLINIC | Age: 63
End: 2018-12-10
Payer: COMMERCIAL

## 2018-12-10 ENCOUNTER — CLINICAL SUPPORT (OUTPATIENT)
Dept: DIABETES | Facility: CLINIC | Age: 63
End: 2018-12-10
Payer: COMMERCIAL

## 2018-12-10 VITALS
BODY MASS INDEX: 36.45 KG/M2 | WEIGHT: 315 LBS | DIASTOLIC BLOOD PRESSURE: 84 MMHG | HEIGHT: 78 IN | SYSTOLIC BLOOD PRESSURE: 126 MMHG

## 2018-12-10 DIAGNOSIS — E11.65 UNCONTROLLED TYPE 2 DIABETES MELLITUS WITH HYPERGLYCEMIA: Primary | ICD-10-CM

## 2018-12-10 LAB — GLUCOSE SERPL-MCNC: 329 MG/DL (ref 70–110)

## 2018-12-10 PROCEDURE — 99999 PR PBB SHADOW E&M-EST. PATIENT-LVL III: CPT | Mod: PBBFAC,,, | Performed by: PHYSICIAN ASSISTANT

## 2018-12-10 PROCEDURE — 82962 GLUCOSE BLOOD TEST: CPT | Mod: S$GLB,,, | Performed by: PHYSICIAN ASSISTANT

## 2018-12-10 PROCEDURE — 3079F DIAST BP 80-89 MM HG: CPT | Mod: CPTII,S$GLB,, | Performed by: PHYSICIAN ASSISTANT

## 2018-12-10 PROCEDURE — 3046F HEMOGLOBIN A1C LEVEL >9.0%: CPT | Mod: CPTII,S$GLB,, | Performed by: PHYSICIAN ASSISTANT

## 2018-12-10 PROCEDURE — 3074F SYST BP LT 130 MM HG: CPT | Mod: CPTII,S$GLB,, | Performed by: PHYSICIAN ASSISTANT

## 2018-12-10 PROCEDURE — 3008F BODY MASS INDEX DOCD: CPT | Mod: CPTII,S$GLB,, | Performed by: PHYSICIAN ASSISTANT

## 2018-12-10 PROCEDURE — 99215 OFFICE O/P EST HI 40 MIN: CPT | Mod: S$GLB,,, | Performed by: PHYSICIAN ASSISTANT

## 2018-12-10 NOTE — PROGRESS NOTES
Subjective:      Patient ID: Stepan Springer is a 63 y.o. male.    PCP: Liza Landrum MD      Stepan Springer is a pleasant 63 y.o. male presenting to follow up on diabetes mellitus. Also, pertinent to this visit in decision making is hypertension, hyperlipidemia, and adherence. He has had diabetes for 20 or more years. His last visit in Diabetes Management was 10/23/2013. Since that time he has had varying degrees of success with his glycemic control however currently his hemoglobin A1c is 9.7% and he is maintained on Novolin 70 30 twice daily.  He struggles with lifestyle modifications.  He is not checking his blood glucose. His current concerns are glycemic control.    He denies any hospital admissions, emergency room visits, hypoglycemia, syncope, diaphoresis, chest pain, or dyspnea.    He has gained 4 pounds since last visit. His BMI is 45.25 kg/m².    His blood sugar in the clinic today was:   Lab Results   Component Value Date    POCGLU 329 (A) 12/10/2018       We discussed the American diabetes Association recommendations:  hemoglobin A1c below 7.0%; all diabetics should be on statins unless contraindicated; one aspirin daily unless contraindicated; fasting blood sugar between 80 and 130 mg/dL; postprandial blood sugar below 180 mg/dl; prevention of hypoglycemia, may adjust goals to higher levels if persistent; ACE or ARB therapy if not contraindicated; and maintain in an ideal body weight with BMI below 25.    Stepan is noncompliant much of the time with DM medications.     Stepan is noncompliant much of the time with lifestyle modifications to include activity and meal planning.       Current Outpatient Medications:     ACCU-CHEK ALYCIA PLUS METER Misc, , Disp: , Rfl:     aspirin 325 MG tablet, Take 325 mg by mouth 2 (two) times daily., Disp: , Rfl:     atorvastatin (LIPITOR) 10 MG tablet, Take 1 tablet (10 mg total) by mouth once daily., Disp: 90 tablet, Rfl: 3    BLOOD PRESSURE CUFF Misc,  1 cuff, Disp: 1 each, Rfl: 0    blood sugar diagnostic Strp, To check BG 3 times daily, to use with insurance preferred meter, Disp: 100 strip, Rfl: 3    blood-glucose meter kit, To check BG 3 times daily, to use with insurance preferred meter, Disp: 1 each, Rfl: 0    brimonidine 0.1% (ALPHAGAN P) 0.1 % Drop, Place 1 drop into both eyes 3 (three) times daily. Order 90 day supply, Disp: 10 mL, Rfl: 4    brinzolamide (AZOPT) 1 % ophthalmic suspension, Place 1 drop into both eyes 3 (three) times daily. ORDER 90 DAY SUPPLY, Disp: 10 mL, Rfl: 4    buPROPion (WELLBUTRIN XL) 300 MG 24 hr tablet, Take 1 tablet (300 mg total) by mouth once daily., Disp: 90 tablet, Rfl: 1    carvedilol (COREG) 25 MG tablet, TAKE ONE TABLET BY MOUTH TWICE DAILY WITH  MEALS, Disp: 180 tablet, Rfl: 11    furosemide (LASIX) 40 MG tablet, Take 2 tablets (80 mg total) by mouth 2 (two) times daily. When ankles retain fluid I double the lasix x 14 days, Disp: 260 tablet, Rfl: 3    lancets Misc, To check BG 3 times daily, to use with insurance preferred meter, Disp: 100 each, Rfl: 3    lisinopril (PRINIVIL,ZESTRIL) 40 MG tablet, Take 1 tablet (40 mg total) by mouth once daily., Disp: 90 tablet, Rfl: 3    NOVOLIN 70/30 U-100 INSULIN 100 unit/mL (70-30) injection, INJECT 40 UNITS SUBCUTANEOUSLY TWICE DAILY (Patient taking differently: INJECT 60 UNITS SUBCUTANEOUSLY TWICE DAILY), Disp: 20 mL, Rfl: 6    polyethylene glycol (GLYCOLAX) 17 gram/dose powder, Take 17 g by mouth once daily., Disp: 238 g, Rfl: 6    sildenafil (VIAGRA) 50 MG tablet, Take 1 tablet (50 mg total) by mouth daily as needed for Erectile Dysfunction., Disp: 5 tablet, Rfl: 3    spironolactone (ALDACTONE) 25 MG tablet, TAKE ONE TABLET BY MOUTH TWICE DAILY, Disp: 180 tablet, Rfl: 3    travoprost (TRAVATAN Z) 0.004 % ophthalmic solution, Place 1 drop into both eyes every evening., Disp: 2.5 mL, Rfl: 6    nitroGLYCERIN (NITROSTAT) 0.4 MG SL tablet, Place 1 tablet (0.4 mg  total) under the tongue every 5 (five) minutes as needed for Chest pain., Disp: 20 tablet, Rfl: 0    semaglutide (OZEMPIC) 1 mg/0.75 mL (2 mg/1.5 mL) PnIj, Inject 0.5 mg into the skin every 7 days for 90 days, THEN 1 mg every 7 days., Disp: 2 Syringe, Rfl: 5    STANDARDS OF CARE:  Eye doctor: Dr. Hale, last exam 11/20/2018.  Dental exam: Recommend regular exams; denies gums bleeding.  Podiatry doctor:  ACE/ARB: Yes  Statin: Yes    ACTIVITY LEVEL: He exercises intermittently.  BLOOD GLUCOSE TESTING: Self-monitoring with   SOCIAL HISTORY: . Lives alone. retired no tobacco use    Health Maintenance   Topic Date Due    Zoster Vaccine  02/24/2017    Influenza Vaccine  08/01/2018    Fecal Occult Blood Test (FOBT)/[employer]  02/12/2019    Hemoglobin A1c  05/01/2019    Lipid Panel  11/01/2019    Eye Exam  11/20/2019    Foot Exam  12/10/2019    TETANUS VACCINE  12/10/2023    Hepatitis C Screening  Completed    Pneumococcal Vaccine (Medium Risk)  Completed       Diabetes Status:   Diabetes Management Status    Statin: Taking  ACE/ARB: Taking    Screening or Prevention Patient's value Goal Complete/Controlled?   HgA1C Testing and Control   Lab Results   Component Value Date    HGBA1C 9.7 (H) 11/01/2018      Annually/Less than 8% No   Lipid profile : 11/01/2018 Annually Yes   LDL control Lab Results   Component Value Date    LDLCALC 60.2 (L) 11/01/2018    Annually/Less than 100 mg/dl  Yes   Nephropathy screening Lab Results   Component Value Date    LABMICR 3.0 04/12/2018     Lab Results   Component Value Date    PROTEINUA Negative 08/09/2018    Annually Yes   Blood pressure BP Readings from Last 1 Encounters:   12/18/18 126/86    Less than 140/90 Yes   Dilated retinal exam : 11/20/2018 Annually Yes   Foot exam   : 12/10/2018 Annually Yes     The following results were reviewed with patient.    Lab Results   Component Value Date    WBC 5.54 04/12/2018    HGB 14.0 04/12/2018    HCT 41.6 04/12/2018      04/12/2018    CHOL 128 11/01/2018    TRIG 134 11/01/2018    HDL 41 11/01/2018    LDLCALC 60.2 (L) 11/01/2018    ALT 21 11/01/2018    AST 10 05/02/2018     08/09/2018    K 4.0 08/09/2018     08/09/2018    ANIONGAP 8 08/09/2018    CREATININE 1.9 (H) 08/09/2018    ESTGFRAFRICA 43.0 (A) 08/09/2018    EGFRNONAA 37.2 (A) 08/09/2018    BUN 24 (H) 08/09/2018    CO2 27 08/09/2018    TSH 1.493 04/12/2018    PSA 0.14 04/12/2018    INR 1.2 09/20/2016     (H) 08/09/2018    UTPCR Unable to calculate 08/09/2018       Lab Results   Component Value Date    HGBA1C 9.7 (H) 11/01/2018    HGBA1C 8.6 (H) 07/19/2018    HGBA1C 9.2 (H) 05/02/2018       Lab Results   Component Value Date    GLUTAMICACID 0.00 12/13/2018    CPEPTIDE 2.70 12/13/2018    FREET4 0.96 11/11/2011    TSH 1.493 04/12/2018    IRON 107 11/11/2011    TIBC 432.0 11/11/2011    EFBIWPCM65 342 12/11/2015    .0 (H) 08/09/2018    CALCIUM 10.0 08/09/2018    PHOS 3.1 08/09/2018       Review of patient's allergies indicates:   Allergen Reactions    Cefazolin      Other reaction(s): Shakes  Other reaction(s): Chills       Past Medical History:   Diagnosis Date    Anxiety     Bell's palsy     Coronary artery disease involving native coronary artery without angina pectoris 10/18/2016    Idiopathic peripheral neuropathy 12/11/2012    Mixed hyperlipidemia 12/11/2012    Vitamin B 12 deficiency 8/23/2012       Review of Systems   Constitutional: Negative.  Negative for activity change, appetite change, chills, diaphoresis, fatigue, fever and unexpected weight change.   HENT: Negative.  Negative for dental problem, facial swelling, hearing loss, nosebleeds, trouble swallowing and voice change.    Eyes: Negative.  Negative for photophobia, pain, discharge, redness, itching and visual disturbance.   Respiratory: Negative.  Negative for cough, choking, chest tightness, shortness of breath and wheezing.    Cardiovascular: Negative.  Negative for chest pain,  "palpitations and leg swelling.   Gastrointestinal: Negative.  Negative for abdominal distention, abdominal pain, blood in stool, constipation, diarrhea, nausea and vomiting.   Endocrine: Negative.  Negative for cold intolerance, heat intolerance, polydipsia, polyphagia and polyuria.   Genitourinary: Negative.  Negative for decreased urine volume, difficulty urinating, dysuria, frequency, scrotal swelling, testicular pain and urgency.   Musculoskeletal: Negative.  Negative for arthralgias, back pain, gait problem, joint swelling, myalgias, neck pain and neck stiffness.   Skin: Negative.  Negative for color change, pallor, rash and wound.   Allergic/Immunologic: Negative.  Negative for environmental allergies, food allergies and immunocompromised state.   Neurological: Negative.  Negative for dizziness, tremors, seizures, syncope, facial asymmetry, speech difficulty, weakness, light-headedness, numbness and headaches.   Hematological: Negative.  Negative for adenopathy. Does not bruise/bleed easily.   Psychiatric/Behavioral: Negative.  Negative for agitation, behavioral problems, confusion, decreased concentration, dysphoric mood, hallucinations, self-injury, sleep disturbance and suicidal ideas. The patient is not nervous/anxious and is not hyperactive.       Objective:     Vitals - 1 value per visit 11/6/2018 12/10/2018 12/18/2018   SYSTOLIC 142 126 126   DIASTOLIC 92 84 86   PULSE 72 - -   TEMPERATURE 96.3 - 96.4   RESPIRATIONS - - -   SPO2 97 - -   Weight (lb) 399.25 401.68 405.87   Weight (kg) 181.1 182.2 184.1   HEIGHT 6' 7" 6' 7" 6' 7"   BODY MASS INDEX 44.98 45.25 45.72   VISIT REPORT - - -   Pain Score  0 0 0   Some recent data might be hidden       Physical Exam   Constitutional: He is oriented to person, place, and time. He appears well-developed and well-nourished. He is cooperative.  Non-toxic appearance. He does not have a sickly appearance. He does not appear ill. No distress. He is not intubated. "   HENT:   Head: Normocephalic and atraumatic. Not macrocephalic and not microcephalic. Head is without raccoon's eyes, without Gomez's sign, without abrasion, without contusion, without laceration, without right periorbital erythema and without left periorbital erythema. Hair is normal.   Right Ear: External ear normal. No lacerations. No drainage, swelling or tenderness. No foreign bodies. No mastoid tenderness. Tympanic membrane is not injected, not scarred, not perforated, not erythematous, not retracted and not bulging. Tympanic membrane mobility is normal. No middle ear effusion. No hemotympanum. No decreased hearing is noted.   Left Ear: External ear normal. No lacerations. No drainage, swelling or tenderness. No foreign bodies. No mastoid tenderness. Tympanic membrane is not injected, not scarred, not perforated, not erythematous, not retracted and not bulging. Tympanic membrane mobility is normal.  No middle ear effusion. No hemotympanum. No decreased hearing is noted.   Nose: Nose normal.   Mouth/Throat: Oropharynx is clear and moist.   Eyes: EOM and lids are normal. Pupils are equal, round, and reactive to light. Right eye exhibits no chemosis, no discharge, no exudate and no hordeolum. No foreign body present in the right eye. Left eye exhibits no chemosis, no discharge, no exudate and no hordeolum. No foreign body present in the left eye. Right conjunctiva is not injected. Right conjunctiva has no hemorrhage. Left conjunctiva is not injected. Left conjunctiva has no hemorrhage. No scleral icterus. Right eye exhibits normal extraocular motion and no nystagmus. Left eye exhibits normal extraocular motion and no nystagmus. Right pupil is round and reactive. Left pupil is round and reactive. Pupils are equal.   Neck: Normal range of motion, full passive range of motion without pain and phonation normal. Neck supple. Normal carotid pulses, no hepatojugular reflux and no JVD present. No tracheal tenderness,  no spinous process tenderness and no muscular tenderness present. Carotid bruit is not present. No neck rigidity. No tracheal deviation, no edema, no erythema and normal range of motion present. No thyroid mass and no thyromegaly present.   Cardiovascular: Normal rate, regular rhythm, normal heart sounds and intact distal pulses.  No extrasystoles are present. PMI is not displaced. Exam reveals no gallop, no friction rub and no decreased pulses.   No murmur heard.  Pulses:       Carotid pulses are 2+ on the right side, and 2+ on the left side.       Radial pulses are 2+ on the right side, and 2+ on the left side.        Dorsalis pedis pulses are 2+ on the right side, and 2+ on the left side.        Posterior tibial pulses are 2+ on the right side, and 2+ on the left side.   Pulmonary/Chest: Effort normal and breath sounds normal. No accessory muscle usage or stridor. No apnea, no tachypnea and no bradypnea. He is not intubated. No respiratory distress. He has no decreased breath sounds. He has no wheezes. He has no rhonchi. He has no rales. He exhibits no tenderness.   Abdominal: Soft. Bowel sounds are normal. He exhibits no shifting dullness, no distension, no pulsatile liver, no fluid wave, no abdominal bruit, no ascites, no pulsatile midline mass and no mass. There is no hepatosplenomegaly, splenomegaly or hepatomegaly. There is no tenderness. There is no rigidity, no rebound, no guarding, no CVA tenderness and no tenderness at McBurney's point. No hernia. Hernia confirmed negative in the ventral area.   Musculoskeletal: Normal range of motion. He exhibits no edema or tenderness.        Right foot: There is normal range of motion and no deformity.        Left foot: There is normal range of motion and no deformity.   Feet:   Right Foot:   Protective Sensation: 6 sites tested. 6 sites sensed.   Skin Integrity: Negative for ulcer, blister, skin breakdown, erythema, warmth, callus or dry skin.   Left Foot:    Protective Sensation: 6 sites tested. 6 sites sensed.   Skin Integrity: Negative for ulcer, blister, skin breakdown, erythema, warmth, callus or dry skin.   Lymphadenopathy:        Head (right side): No submental, no submandibular, no tonsillar, no preauricular, no posterior auricular and no occipital adenopathy present.        Head (left side): No submental, no submandibular, no tonsillar, no preauricular, no posterior auricular and no occipital adenopathy present.     He has no cervical adenopathy.        Right cervical: No superficial cervical, no deep cervical and no posterior cervical adenopathy present.       Left cervical: No superficial cervical, no deep cervical and no posterior cervical adenopathy present.   Neurological: He is alert and oriented to person, place, and time. He has normal reflexes. He displays no atrophy and no tremor. No cranial nerve deficit or sensory deficit. He exhibits normal muscle tone. He displays no seizure activity. Coordination and gait normal.   Reflex Scores:       Bicep reflexes are 2+ on the right side and 2+ on the left side.       Brachioradialis reflexes are 2+ on the right side and 2+ on the left side.       Patellar reflexes are 2+ on the right side and 2+ on the left side.  Skin: Skin is warm and dry. No rash noted. No erythema. No pallor.   Psychiatric: He has a normal mood and affect. His mood appears not anxious. His affect is not angry, not blunt, not labile and not inappropriate. His speech is not rapid and/or pressured, not delayed, not tangential and not slurred. He is not agitated, not aggressive, not hyperactive, not slowed, not withdrawn, not actively hallucinating and not combative. Thought content is not paranoid and not delusional. Cognition and memory are not impaired. He does not express impulsivity or inappropriate judgment. He does not exhibit a depressed mood. He expresses no homicidal and no suicidal ideation. He expresses no suicidal plans and no  homicidal plans. He is communicative. He exhibits normal recent memory and normal remote memory. He is attentive.     Assessment:     1. Uncontrolled type 2 diabetes mellitus with hyperglycemia       Plan:   Stepan Springer is seen today for   1. Uncontrolled type 2 diabetes mellitus with hyperglycemia      We have discussed the etiology and treatment options associated with the diagnosis as well as alternatives.       He has elected the following treatments.      - Continue with D&E, reduce portion size, and restrict carbohydrates (no more that 45 grams ) per meal.   - F/U 6 weeks      Uncontrolled type 2 diabetes mellitus with hyperglycemia  -     POCT Glucose, Hand-Held Device  -     C-peptide; Future; Expected date: 12/10/2018  -     Glutamic acid decarboxylase; Future; Expected date: 12/10/2018  -     Anti-islet cell antibody; Future; Expected date: 12/10/2018    Other orders  -     semaglutide (OZEMPIC) 1 mg/0.75 mL (2 mg/1.5 mL) PnIj; Inject 0.5 mg into the skin every 7 days for 90 days, THEN 1 mg every 7 days.  Dispense: 2 Syringe; Refill: 5        1.) Patient was instructed to continue to monitor blood glucose 4 times daily. Reminded to bring BG meter or record to each visit for review.  2.) Reviewed pathophysiology of diabetes, complications related to the disease, importance of annual dilated eye exam and self daily foot examination.  3.) Continue medications as prescribed Novolin 70/30. [facility] MyChart or Phone review in 1 week with BG records for adjustment of medication.  4.) Advised patient to continue to follow up with Dietician/CDE as directed.  5.) Discussed activity, benefits, methods, and precautions. Recommended patient start/continue some form of exercise and increase as tolerated to 60 minutes per day to facilitate weight loss and aid in control of BGs. Also reminded patient of WHO recommendation of 10,000 steps daily as a goal.   6.) A1C, TSH, Lipid Panel, CMP/renal panel with eGFR and  Micro/Creatinine prior to next visit, if not already done.  7.) Return to clinic in 6 weeks for follow up. Advised patient to call clinic with any questions or concerns.    A total of 60 minutes was spent in face to face time, of which 50 % was spent in counseling patient on disease process, complications, treatment, and side effects of medications.    The patient was explained the above plan and given opportunity to ask questions.  He understands, chooses and consents to this plan and accepts all the risks, which include but are not limited to the risks mentioned above.   He understands the alternative of having no testing, interventions or treatments at this time. He left content and without further questions.     Disclaimer:  This note is prepared using voice recognition software and as such is likely to have errors and has not been proof read. Please contact me for questions.

## 2018-12-10 NOTE — LETTER
December 30, 2018      Liza Fontenot MD  [address]  [place] [address]           [facility] - Diabetes Management  [address] [place] [address]  Phone: [phone]  Fax: [phone]          Patient: Stepan Springer   MR Number: [id number]   YOB: 1955   Date of Visit: 12/10/2018       Dear Dr. Liza Fontenot:    Thank you for referring Stepan Springer to me for evaluation. Attached you will find relevant portions of my assessment and plan of care.    If you have questions, please do not hesitate to call me. I look forward to following Stepan Springer along with you.    Sincerely,    Daphne Barrera Jr., LASHONDA    Enclosure  CC:  No Recipients    If you would like to receive this communication electronically, please contact [email or web address] or [phone] to request more information on [facility] access.    For providers and/or their staff who would like to refer a patient to [facility], please contact us through our one-stop-shop provider referral line, [facility], at [phone].    If you feel you have received this communication in error or would no longer like to receive these types of communications, please e-mail [email or web address]

## 2018-12-10 NOTE — PROGRESS NOTES
"Patient is here in clinic today for placement of continuous glucose monitoring system. Patient was provided a Freestyle Sriram sensor and a copy of the Continuous Glucose Monitoring Patient Log to to fill out during the study.      A detailed explanation of CGMS was provided. Patient informed that this is a blind procedure and they will not actually see the blood sugar tracing in real time. Reviewed with the patient the Freestyle Sriram Pro Sensor education handout, "What you need to know"  to review self-care during the study to avoid sensor loosening or removal ie...bathing, swimming, dressing, and exercising.      Instructed patient to check blood sugar using home glucometer and to record the following on provided patient log sheets: blood sugar taken at home, meals and snacks, activity, and diabetes medications taken and dosage         Sensor was inserted on back of right upper arm.   Follow up appointment was given to patient  "

## 2018-12-13 ENCOUNTER — LAB VISIT (OUTPATIENT)
Dept: LAB | Facility: HOSPITAL | Age: 63
End: 2018-12-13
Attending: PHYSICIAN ASSISTANT
Payer: COMMERCIAL

## 2018-12-13 ENCOUNTER — OFFICE VISIT (OUTPATIENT)
Dept: PSYCHIATRY | Facility: CLINIC | Age: 63
End: 2018-12-13
Payer: COMMERCIAL

## 2018-12-13 DIAGNOSIS — F33.1 MAJOR DEPRESSIVE DISORDER, RECURRENT EPISODE, MODERATE: Primary | ICD-10-CM

## 2018-12-13 DIAGNOSIS — E11.65 UNCONTROLLED TYPE 2 DIABETES MELLITUS WITH HYPERGLYCEMIA: ICD-10-CM

## 2018-12-13 LAB — C PEPTIDE SERPL-MCNC: 2.7 NG/ML

## 2018-12-13 PROCEDURE — 86341 ISLET CELL ANTIBODY: CPT

## 2018-12-13 PROCEDURE — 90834 PSYTX W PT 45 MINUTES: CPT | Mod: S$GLB,,, | Performed by: SOCIAL WORKER

## 2018-12-13 PROCEDURE — 84681 ASSAY OF C-PEPTIDE: CPT

## 2018-12-13 PROCEDURE — 86341 ISLET CELL ANTIBODY: CPT | Mod: 91
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
## 2018-12-16 LAB — PANC ISLET CELL IGG SER-ACNC: NORMAL

## 2018-12-18 ENCOUNTER — OFFICE VISIT (OUTPATIENT)
Dept: INTERNAL MEDICINE | Facility: CLINIC | Age: 63
End: 2018-12-18
Payer: COMMERCIAL

## 2018-12-18 VITALS
BODY MASS INDEX: 36.45 KG/M2 | TEMPERATURE: 96 F | WEIGHT: 315 LBS | HEIGHT: 78 IN | DIASTOLIC BLOOD PRESSURE: 86 MMHG | SYSTOLIC BLOOD PRESSURE: 126 MMHG

## 2018-12-18 DIAGNOSIS — N18.30 CHRONIC KIDNEY DISEASE, STAGE III (MODERATE): Chronic | ICD-10-CM

## 2018-12-18 DIAGNOSIS — I15.2 HYPERTENSION ASSOCIATED WITH DIABETES: Primary | Chronic | ICD-10-CM

## 2018-12-18 DIAGNOSIS — E78.5 HYPERLIPIDEMIA ASSOCIATED WITH TYPE 2 DIABETES MELLITUS: ICD-10-CM

## 2018-12-18 DIAGNOSIS — E11.69 HYPERLIPIDEMIA ASSOCIATED WITH TYPE 2 DIABETES MELLITUS: ICD-10-CM

## 2018-12-18 DIAGNOSIS — F33.9 RECURRENT MAJOR DEPRESSIVE DISORDER, REMISSION STATUS UNSPECIFIED: ICD-10-CM

## 2018-12-18 DIAGNOSIS — I25.10 CORONARY ARTERY DISEASE INVOLVING NATIVE CORONARY ARTERY OF NATIVE HEART WITHOUT ANGINA PECTORIS: ICD-10-CM

## 2018-12-18 DIAGNOSIS — R06.6 HICCUPS: ICD-10-CM

## 2018-12-18 DIAGNOSIS — I50.9 CHF (NYHA CLASS III, ACC/AHA STAGE C): Chronic | ICD-10-CM

## 2018-12-18 DIAGNOSIS — R93.89 ABNORMAL CXR: ICD-10-CM

## 2018-12-18 DIAGNOSIS — G47.33 OSA (OBSTRUCTIVE SLEEP APNEA): Chronic | ICD-10-CM

## 2018-12-18 DIAGNOSIS — E11.59 HYPERTENSION ASSOCIATED WITH DIABETES: Primary | Chronic | ICD-10-CM

## 2018-12-18 LAB — GAD65 AB SER-SCNC: 0 NMOL/L

## 2018-12-18 PROCEDURE — 3008F BODY MASS INDEX DOCD: CPT | Mod: CPTII,S$GLB,, | Performed by: PHYSICIAN ASSISTANT

## 2018-12-18 PROCEDURE — 3046F HEMOGLOBIN A1C LEVEL >9.0%: CPT | Mod: CPTII,S$GLB,, | Performed by: PHYSICIAN ASSISTANT

## 2018-12-18 PROCEDURE — 3079F DIAST BP 80-89 MM HG: CPT | Mod: CPTII,S$GLB,, | Performed by: PHYSICIAN ASSISTANT

## 2018-12-18 PROCEDURE — 99999 PR PBB SHADOW E&M-EST. PATIENT-LVL IV: CPT | Mod: PBBFAC,,, | Performed by: PHYSICIAN ASSISTANT

## 2018-12-18 PROCEDURE — 99214 OFFICE O/P EST MOD 30 MIN: CPT | Mod: S$GLB,,, | Performed by: PHYSICIAN ASSISTANT

## 2018-12-18 PROCEDURE — 3074F SYST BP LT 130 MM HG: CPT | Mod: CPTII,S$GLB,, | Performed by: PHYSICIAN ASSISTANT

## 2018-12-18 NOTE — PROGRESS NOTES
Subjective:       Patient ID: Stepan Springer is a 63 y.o. male.    Chief Complaint: Follow-up    63 year old male presents to clinic for 6 week f/u from last PCP visit. He has seen DM clinic. He has not yet done chest CT. He has not checked BP since last PCP visit - does not have a cuff that works at home. Pt reports no CP, SOB, fever, chills, or other medical complaints. Pt declines flu vaccine.    PCP: Dr. Fontenot    Patient Active Problem List:     Status post cataract extraction and insertion of intraocular lens     Corneal opacity - Left Eye     Idiopathic peripheral neuropathy     FRAN (obstructive sleep apnea)     CHF (NYHA class III, ACC/AHA stage C)     LBBB (left bundle branch block)     Uncontrolled type 2 diabetes mellitus with stage 3 chronic kidney disease, with long-term current use of insulin     Anxiety     Hypertension associated with diabetes     Chronic kidney disease, stage III (moderate)     Primary open angle glaucoma of both eyes, severe stage     Nephrolithiasis     Hydronephrosis, left     Obesity, Class III, BMI 40-49.9 (morbid obesity)     NSTEMI (non-ST elevated myocardial infarction)     Coronary artery disease involving native coronary artery without angina pectoris     Recurrent major depressive disorder     Vasculogenic erectile dysfunction     Blindness and low vision     Hx of retinal detachment     High myopia, both eyes     Epiretinal membrane (ERM) of left eye     Lattice degeneration of peripheral retina, left     Right-sided Bell's palsy     Hyperlipidemia associated with type 2 diabetes mellitus      Review of Systems   Constitutional: Negative for chills and fever.   HENT: Negative for congestion, ear pain, rhinorrhea, sore throat and trouble swallowing.    Eyes: Negative for visual disturbance.   Respiratory: Negative for cough and shortness of breath.    Cardiovascular: Negative for chest pain and leg swelling.   Gastrointestinal: Negative for abdominal pain, nausea  "and vomiting.   Endocrine: Negative for polydipsia and polyuria.   Skin: Negative for rash.   Neurological: Negative for dizziness, weakness, numbness and headaches.   Psychiatric/Behavioral: Negative for confusion.       Objective:       Vitals:    12/18/18 0816   BP: 126/86   BP Location: Right arm   Patient Position: Sitting   BP Method: Large (Manual)   Temp: 96.4 °F (35.8 °C)   TempSrc: Tympanic   Weight: (!) 184.1 kg (405 lb 13.9 oz)   Height: 6' 7" (2.007 m)     Physical Exam   Constitutional: He is oriented to person, place, and time. He appears well-developed and well-nourished. No distress.   HENT:   Head: Normocephalic and atraumatic.   Eyes: EOM are normal. No scleral icterus.   Neck: Neck supple.   Cardiovascular: Normal rate and regular rhythm.   Pulmonary/Chest: Effort normal and breath sounds normal. No stridor. No respiratory distress. He has no decreased breath sounds. He has no wheezes. He has no rhonchi. He has no rales.   Musculoskeletal: Normal range of motion.   Neurological: He is alert and oriented to person, place, and time.   Skin: Skin is dry. No rash noted. He is not diaphoretic.   Psychiatric: He has a normal mood and affect. His speech is normal and behavior is normal. Thought content normal.       CXR 11/6/18: The cardiac and mediastinal silhouettes appear within normal limits.   Ill-defined parenchymal opacities noted in the left lung base were not definitely present on prior and are concerning for possible infectious/inflammatory infiltrate, correlate clinically.  No definite pleural effusion visualized.  Multilevel degenerative findings noted throughout the visualized spine.  Assessment:       1. Hypertension associated with diabetes    2. Uncontrolled type 2 diabetes mellitus with stage 3 chronic kidney disease, with long-term current use of insulin    3. Hiccups    4. Abnormal CXR    5. Chronic kidney disease, stage III (moderate)    6. Hyperlipidemia associated with type 2 " diabetes mellitus    7. Coronary artery disease involving native coronary artery of native heart without angina pectoris    8. CHF (NYHA class III, ACC/AHA stage C)    9. FRAN (obstructive sleep apnea)    10. Recurrent major depressive disorder, remission status unspecified        Plan:       Stepan was seen today for follow-up.    Diagnoses and all orders for this visit:    Hypertension associated with diabetes  Controlled. Continue to monitor BP. Continue Rx and f/u with PCP and specialists as recommended.    Uncontrolled type 2 diabetes mellitus with stage 3 chronic kidney disease, with long-term current use of insulin  Monitor glucose, check feet daily, and stay UTD with eye doctor. F/u with DM clinic as recommended.    Hiccups, Abnormal CXR  Will get chest CT ordered by PCP scheduled for pt.    Chronic kidney disease, stage III (moderate)  Avoid NSAIDs. F/u with nephrology as recommended.    Hyperlipidemia associated with type 2 diabetes mellitus  Pt aspirin and statin.    Coronary artery disease involving native coronary artery of native heart without angina pectoris, CHF (NYHA class III, ACC/AHA stage C)  F/u with cardiology as recommended.    FRAN (obstructive sleep apnea)  Stable.    Recurrent major depressive disorder, remission status unspecified  F/u with psyc as recommended.      Follow-up with your PCP as scheduled in 2 months for health management.

## 2018-12-24 ENCOUNTER — CLINICAL SUPPORT (OUTPATIENT)
Dept: DIABETES | Facility: CLINIC | Age: 63
End: 2018-12-24
Payer: COMMERCIAL

## 2018-12-24 PROCEDURE — 99999 PR PBB SHADOW E&M-EST. PATIENT-LVL I: CPT | Mod: PBBFAC,,,

## 2018-12-24 NOTE — PROGRESS NOTES
Patient returned to clinic today to return Glucose Sensor.      The CGMS Sensor was scanned and downloaded. All reports were imported into the patient's electronic medical record. Physician Assistant will complete data interpretation and make recommendations at visit today.

## 2018-12-27 ENCOUNTER — OFFICE VISIT (OUTPATIENT)
Dept: PSYCHIATRY | Facility: CLINIC | Age: 63
End: 2018-12-27
Payer: COMMERCIAL

## 2018-12-27 DIAGNOSIS — F33.1 MAJOR DEPRESSIVE DISORDER, RECURRENT EPISODE, MODERATE: Primary | ICD-10-CM

## 2018-12-27 PROCEDURE — 90834 PSYTX W PT 45 MINUTES: CPT | Mod: S$GLB,,, | Performed by: SOCIAL WORKER

## 2018-12-27 NOTE — PROGRESS NOTES
"Individual Psychotherapy (PhD/LCSW)    12/27/2018    Site:  [facility]         Therapeutic Intervention: Met with patient.  Outpatient - Insight oriented psychotherapy 45 min - CPT code 47378    Chief complaint/reason for encounter: depression     Interval history and content of current session:  Patient presents to ongoing individual therapy due to depression.  He says that [place] really sucked for him because he was alone.  He did make a decision on Christmas Eve to go to [employer].  He was able to meet the new  at the [employer].  He admits that he has not gone to [employer] in some time because he will cry during the whole mass.  He cries because he feels that God has "forgotten" him.  He was able to talk to his son and daughter on [place] Day.  His son could tell that he was down.  His son told him, "We need you Dad!"  He did go to his friend's home on [place] Day for some gumbo.  After three hours, he decided to return home.  He admits that he spent the day yesterday under the covers watching television.  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  He admits he is happy to return to work.  His manager is still medically ill.  She has lost a good deal of weight.  He has not heard from his ex girlfriend, Solange, since he last saw her at a lunch two weeks ago.  She told him at that time, "don't spoil it."  He wonders what would happen if he stopped communicating with her.  He is worried that it would end the possibility of a relationship.  He is encouraged to consider affirmations to work toward disputing some of the negative thoughts he has about himself.  He is considering going to watch the [employer] in the [place].  He notes that his friends don't live far from the [employer].  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    Target symptoms: depression  Why chosen therapy is appropriate versus another modality: relevant to diagnosis  Outcome monitoring methods: self-report, observation  Therapeutic intervention type: insight oriented psychotherapy, supportive psychotherapy, interactive psychotherapy     Risk parameters:  Patient reports no suicidal ideation  Patient reports no homicidal ideation  Patient reports no self-injurious behavior  Patient reports no violent behavior     Verbal deficits: none     Patient's response to intervention:  The patient's response to intervention is accepting, motivated.     Progress toward goals and other mental status changes:  The patient's progress toward goals is fair .     Diagnosis:   Major depression recurrent moderate     Plan:  individual psychotherapy and medication management by physician     Return to clinic: as scheduled     Length of Service (minutes): 45

## 2019-01-02 ENCOUNTER — LAB VISIT (OUTPATIENT)
Dept: LAB | Facility: HOSPITAL | Age: 64
End: 2019-01-02
Attending: INTERNAL MEDICINE
Payer: COMMERCIAL

## 2019-01-02 ENCOUNTER — OFFICE VISIT (OUTPATIENT)
Dept: OPHTHALMOLOGY | Facility: CLINIC | Age: 64
End: 2019-01-02
Payer: COMMERCIAL

## 2019-01-02 ENCOUNTER — TELEPHONE (OUTPATIENT)
Dept: RADIOLOGY | Facility: HOSPITAL | Age: 64
End: 2019-01-02

## 2019-01-02 DIAGNOSIS — H54.10 BLINDNESS AND LOW VISION: ICD-10-CM

## 2019-01-02 DIAGNOSIS — R93.89 ABNORMAL CXR: ICD-10-CM

## 2019-01-02 DIAGNOSIS — H40.1133 PRIMARY OPEN ANGLE GLAUCOMA OF BOTH EYES, SEVERE STAGE: Primary | ICD-10-CM

## 2019-01-02 LAB
CREAT SERPL-MCNC: 2.3 MG/DL
EST. GFR  (AFRICAN AMERICAN): 34.2 ML/MIN/1.73 M^2
EST. GFR  (NON AFRICAN AMERICAN): 29.5 ML/MIN/1.73 M^2

## 2019-01-02 PROCEDURE — 82565 ASSAY OF CREATININE: CPT

## 2019-01-02 PROCEDURE — 99999 PR PBB SHADOW E&M-EST. PATIENT-LVL II: CPT | Mod: PBBFAC,,, | Performed by: OPHTHALMOLOGY

## 2019-01-02 PROCEDURE — 36415 COLL VENOUS BLD VENIPUNCTURE: CPT | Mod: PO

## 2019-01-02 PROCEDURE — 99999 PR PBB SHADOW E&M-EST. PATIENT-LVL II: ICD-10-PCS | Mod: PBBFAC,,, | Performed by: OPHTHALMOLOGY

## 2019-01-02 PROCEDURE — 92012 PR EYE EXAM, EST PATIENT,INTERMED: ICD-10-PCS | Mod: S$GLB,,, | Performed by: OPHTHALMOLOGY

## 2019-01-02 PROCEDURE — 92012 INTRM OPH EXAM EST PATIENT: CPT | Mod: S$GLB,,, | Performed by: OPHTHALMOLOGY

## 2019-01-02 NOTE — PROGRESS NOTES
HPI     Glaucoma      Additional comments: 3 week IOP check              Comments     The patient states his eyes are unchanged and he denies any ocular pain   at this time.  100% drop compliance    Report  [facility] - Dr Burgess pt  Corneal Opacity  Porum palsy  DM  RD Repair with Scleral Buckle right eye  PCIOL OU  CANALOPLASTY OD 8-22-13 Good flow and tension(-900)  CANAL OS 03/20/14/LOT # 1306-01/REF # [id number]  -500 with shutter effect due to much intraop respiratory movement  Moderate flow with good tension      Azopt BID OS, Alphagan BID OS   Alphagan TID OD  Refresh prn OD  OU: Travatan Z QHS (ran out Monday 12/31/18)              Last edited by Beulah Pink on 1/2/2019  8:00 AM. (History)            Assessment /Plan     For exam results, see Encounter Report.      ICD-10-CM ICD-9-CM    1. Primary open angle glaucoma of both eyes, severe stage H40.1133 365.11 Doing well - intraocular pressure is within acceptable range relative to target pressure with no evidence of progression.   Continue current treatment.  Reviewed importance of continued compliance with treatment and follow up.        365.73    2. Blindness and low vision H54.10 369.9      Pt did not use meds this am     Azopt BID OS, Alphagan BID OS   Alphagan TID OD  Refresh prn OD  OU: Travatan Z QHS (ran out Monday 12/31/18)      Return to clinic 3 months with dilation and sdp's

## 2019-01-03 ENCOUNTER — HOSPITAL ENCOUNTER (OUTPATIENT)
Dept: RADIOLOGY | Facility: HOSPITAL | Age: 64
Discharge: HOME | End: 2019-01-03
Attending: INTERNAL MEDICINE
Payer: COMMERCIAL

## 2019-01-03 DIAGNOSIS — R93.89 ABNORMAL CXR: ICD-10-CM

## 2019-01-03 PROCEDURE — 71250 CT THORAX DX C-: CPT | Mod: TC,PO

## 2019-01-03 PROCEDURE — 71250 CT THORAX DX C-: CPT | Mod: 26,,, | Performed by: RADIOLOGY

## 2019-01-03 PROCEDURE — 71250 CT CHEST WITHOUT CONTRAST: ICD-10-PCS | Mod: 26,,, | Performed by: RADIOLOGY

## 2019-01-04 ENCOUNTER — PATIENT MESSAGE (OUTPATIENT)
Dept: INTERNAL MEDICINE | Facility: CLINIC | Age: 64
End: 2019-01-04

## 2019-01-04 DIAGNOSIS — K22.9 ESOPHAGEAL ABNORMALITY: Primary | ICD-10-CM

## 2019-01-15 ENCOUNTER — TELEPHONE (OUTPATIENT)
Dept: GASTROENTEROLOGY | Facility: CLINIC | Age: 64
End: 2019-01-15

## 2019-01-15 NOTE — TELEPHONE ENCOUNTER
----- Message from Swathi Dennis sent at 1/15/2019  2:40 PM CST -----  Contact: [phone]  Would like to consult with the nurse concerning his appt, please call back at [phone] thanks sj

## 2019-01-17 ENCOUNTER — OFFICE VISIT (OUTPATIENT)
Dept: PSYCHIATRY | Facility: CLINIC | Age: 64
End: 2019-01-17
Payer: COMMERCIAL

## 2019-01-17 DIAGNOSIS — F33.1 MAJOR DEPRESSIVE DISORDER, RECURRENT EPISODE, MODERATE: Primary | ICD-10-CM

## 2019-01-17 PROCEDURE — 90834 PSYTX W PT 45 MINUTES: CPT | Mod: S$GLB,,, | Performed by: SOCIAL WORKER

## 2019-01-17 PROCEDURE — 90834 PR PSYCHOTHERAPY W/PATIENT, 45 MIN: ICD-10-PCS | Mod: S$GLB,,, | Performed by: SOCIAL WORKER
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
## 2019-01-29 ENCOUNTER — OFFICE VISIT (OUTPATIENT)
Dept: GASTROENTEROLOGY | Facility: CLINIC | Age: 64
End: 2019-01-29
Payer: COMMERCIAL

## 2019-01-29 VITALS
HEART RATE: 82 BPM | WEIGHT: 315 LBS | SYSTOLIC BLOOD PRESSURE: 110 MMHG | BODY MASS INDEX: 36.45 KG/M2 | DIASTOLIC BLOOD PRESSURE: 76 MMHG | HEIGHT: 78 IN

## 2019-01-29 DIAGNOSIS — K59.04 CHRONIC IDIOPATHIC CONSTIPATION: ICD-10-CM

## 2019-01-29 DIAGNOSIS — R93.3 ABNORMAL CT SCAN, ESOPHAGUS: Primary | ICD-10-CM

## 2019-01-29 DIAGNOSIS — Z12.11 COLON CANCER SCREENING: ICD-10-CM

## 2019-01-29 DIAGNOSIS — K21.9 GASTROESOPHAGEAL REFLUX DISEASE, ESOPHAGITIS PRESENCE NOT SPECIFIED: ICD-10-CM

## 2019-01-29 PROCEDURE — 99999 PR PBB SHADOW E&M-EST. PATIENT-LVL III: CPT | Mod: PBBFAC,,, | Performed by: NURSE PRACTITIONER

## 2019-01-29 PROCEDURE — 3078F PR MOST RECENT DIASTOLIC BLOOD PRESSURE < 80 MM HG: ICD-10-PCS | Mod: CPTII,S$GLB,, | Performed by: NURSE PRACTITIONER

## 2019-01-29 PROCEDURE — 3074F SYST BP LT 130 MM HG: CPT | Mod: CPTII,S$GLB,, | Performed by: NURSE PRACTITIONER

## 2019-01-29 PROCEDURE — 99204 OFFICE O/P NEW MOD 45 MIN: CPT | Mod: S$GLB,,, | Performed by: NURSE PRACTITIONER

## 2019-01-29 PROCEDURE — 3078F DIAST BP <80 MM HG: CPT | Mod: CPTII,S$GLB,, | Performed by: NURSE PRACTITIONER

## 2019-01-29 PROCEDURE — 3074F PR MOST RECENT SYSTOLIC BLOOD PRESSURE < 130 MM HG: ICD-10-PCS | Mod: CPTII,S$GLB,, | Performed by: NURSE PRACTITIONER

## 2019-01-29 PROCEDURE — 3008F BODY MASS INDEX DOCD: CPT | Mod: CPTII,S$GLB,, | Performed by: NURSE PRACTITIONER

## 2019-01-29 PROCEDURE — 99204 PR OFFICE/OUTPT VISIT, NEW, LEVL IV, 45-59 MIN: ICD-10-PCS | Mod: S$GLB,,, | Performed by: NURSE PRACTITIONER

## 2019-01-29 PROCEDURE — 99999 PR PBB SHADOW E&M-EST. PATIENT-LVL III: ICD-10-PCS | Mod: PBBFAC,,, | Performed by: NURSE PRACTITIONER

## 2019-01-29 PROCEDURE — 3008F PR BODY MASS INDEX (BMI) DOCUMENTED: ICD-10-PCS | Mod: CPTII,S$GLB,, | Performed by: NURSE PRACTITIONER

## 2019-01-29 RX ORDER — POLYETHYLENE GLYCOL 3350, SODIUM SULFATE ANHYDROUS, SODIUM BICARBONATE, SODIUM CHLORIDE, POTASSIUM CHLORIDE 236; 22.74; 6.74; 5.86; 2.97 G/4L; G/4L; G/4L; G/4L; G/4L
4 POWDER, FOR SOLUTION ORAL ONCE
Qty: 4000 ML | Refills: 0 | Status: SHIPPED | OUTPATIENT
Start: 2019-01-29 | End: 2019-01-29

## 2019-01-29 RX ORDER — OMEPRAZOLE 40 MG/1
40 CAPSULE, DELAYED RELEASE ORAL DAILY
Qty: 30 CAPSULE | Refills: 5 | Status: SHIPPED | OUTPATIENT
Start: 2019-01-29 | End: 2019-07-08 | Stop reason: SDUPTHER

## 2019-01-29 NOTE — PROGRESS NOTES
"Subjective:      Patient ID: Stepan Springer is a 63 y.o. male.    Chief Complaint: Follow-up      HPI  Here for follow up of medical problems.  Seeing [facility] for uncontrolled DM.  Insulin dose is being adjusted.  Seeing Gastro, to have EGD and Cscope.  Hiccups have resolved.  No f/c/sw/cough.  No cp/sob/palp.  BMs good with miralax.  Walking more now for exercise.  Doing well in therapy, still with depression symptoms.    Updated/ annual due 4/19:  HM: ref fluvax, 6/16 [id number], 4/14 [id number], 12/13 TDaP, 4/18 PSA/ Urol Dr. Simental, No Cscope, 5/18 Eye Dr. Hale, 9/16 HCV neg.     Review of Systems   Constitutional: Negative for chills, diaphoresis and fever.   Respiratory: Negative for cough and shortness of breath.    Cardiovascular: Negative for chest pain, palpitations and leg swelling.   Gastrointestinal: Negative for blood in stool, constipation, diarrhea, nausea and vomiting.   Genitourinary: Negative for dysuria, frequency and hematuria.   Psychiatric/Behavioral: The patient is not nervous/anxious.          Objective:   /70 (BP Location: Right arm, Patient Position: Sitting, BP Method: Large (Manual))   Pulse 66   Temp 97.7 °F (36.5 °C) (Tympanic)   Ht 6' 7" (2.007 m)   Wt (!) 180.8 kg (398 lb 9.5 oz)   SpO2 98%   BMI 44.90 kg/m²     Physical Exam   Constitutional: He is oriented to person, place, and time. He appears well-developed and well-nourished.   HENT:   Mouth/Throat: Oropharynx is clear and moist.   Neck: Normal range of motion. Neck supple.   Cardiovascular: Normal rate, regular rhythm and intact distal pulses. Exam reveals no gallop and no friction rub.   No murmur heard.  Pulmonary/Chest: Effort normal and breath sounds normal. He has no wheezes. He has no rales.   Abdominal: Soft. Bowel sounds are normal. He exhibits no mass. There is no tenderness.   Musculoskeletal: He exhibits no edema.   Lymphadenopathy:     He has no cervical adenopathy.   Neurological: He is alert and " oriented to person, place, and time.   Psychiatric: He has a normal mood and affect.     Results for TERRY FLANAGAN (MRN [id number]) as of 2/12/2019 07:54   Ref. Range 1/2/2019 07:03   Creatinine Latest Ref Range: 0.5 - 1.4 mg/dL 2.3 (H)   eGFR if non African American Latest Ref Range: >60 mL/min/1.73 m^2 29.5 (A)   eGFR if African American Latest Ref Range: >60 mL/min/1.73 m^2 34.2 (A)         Assessment:       1. Uncontrolled type 2 diabetes mellitus with kidney complication, with long-term current use of insulin    2. Hypertension associated with diabetes    3. Hyperlipidemia associated with type 2 diabetes mellitus    4. Morbid obesity with BMI of 40.0-44.9, adult    5. Anxiety    6. Coronary artery disease involving native coronary artery of native heart without angina pectoris    7. Moderate episode of recurrent major depressive disorder    8. CKD (chronic kidney disease) stage 4, GFR 15-29 ml/min          Plan:     Uncontrolled type 2 diabetes mellitus with stage 4 chronic kidney disease, with long-term current use of insulin    Hypertension associated with diabetes    Hyperlipidemia associated with type 2 diabetes mellitus/ Morbid obesity with BMI of 40.0-44.9, adult- work on diet/increasing exercise.    Anxiety    Coronary artery disease involving native coronary artery of native heart without angina pectoris- clinically stable, cont statin/ASA/exercise.    Moderate episode of recurrent major depressive disorder- c/w therapy    CKD (chronic kidney disease) stage 4, GFR 15-29 ml/min- recheck now.  -     Basic metabolic panel; Future; Expected date: 02/12/2019

## 2019-01-29 NOTE — PROGRESS NOTES
"Clinic Consult:  [facility] Consultation Note    Reason for Consult:  The primary encounter diagnosis was Abnormal CT scan, esophagus. Diagnoses of Gastroesophageal reflux disease, esophagitis presence not specified, Colon cancer screening, and Chronic idiopathic constipation were also pertinent to this visit.    PCP: Liza Fontenot   [address] / [place] [id number]    HPI:  This is a 63 y.o. male here for evaluation of the above. He was referred by Dr. Fontenot. Had a recent CT chest that revealed a "circumferential wall thickening of the distal esophagus. Correlate clinically with follow-up and/or further evaluation as warranted. Cannot exclude concomitant small hiatal hernia." He has also recently started with indigestion/ heartburn. Symptoms started around Thanksgiving. He has been drinking milk which helps some. Also uses Tums as needed. He is a non-smoker and has never been a smoker. No family history of GI cancers. Has chronic constipation. He takes Miralax and is well controlled with medication. He is also due for screening colonoscopy. No prior colonoscopy. No abdominal pain, hematochezia, melena, nausea, vomiting, or weight loss.      Review of Systems   Constitutional: Negative for fever, malaise/fatigue and weight loss.   HENT: Negative for sore throat.    Respiratory: Negative for cough and wheezing.    Cardiovascular: Negative for chest pain and palpitations.   Gastrointestinal: Positive for constipation and heartburn. Negative for abdominal pain, blood in stool, diarrhea, melena, nausea and vomiting.   Genitourinary: Negative for dysuria and frequency.   Musculoskeletal: Negative for back pain, joint pain, myalgias and neck pain.   Skin: Negative for itching and rash.   Neurological: Negative for dizziness, speech change, seizures, loss of consciousness and headaches.   Psychiatric/Behavioral: Negative for depression and substance abuse. The patient is not nervous/anxious.  "       Medical History:  has a past medical history of Anxiety, Bell's palsy, Coronary artery disease involving native coronary artery without angina pectoris (10/18/2016), Idiopathic peripheral neuropathy (12/11/2012), Mixed hyperlipidemia (12/11/2012), and Vitamin B 12 deficiency (8/23/2012).    Surgical History:  has a past surgical history that includes Retinal detachment repair w/ scleral buckle; PC IOL OU; Cataract extraction (OU); Wrist surgery; Eye surgery; Fracture surgery; Cardiac catheterization (7/22/13); and kidney stone .    Family History: family history includes Heart attack in his father; Heart disease in his father; Hypertension in his maternal grandfather, maternal grandmother, and mother; Macular degeneration in his father and paternal uncle..     Social History:  reports that  has never smoked. he has never used smokeless tobacco. He reports that he drinks alcohol. He reports that he does not use drugs.    Allergies: Reviewed    Home Medications:   Current Outpatient Medications on File Prior to Visit   Medication Sig Dispense Refill    ACCU-CHEK ALYCIA PLUS METER Misc       aspirin 325 MG tablet Take 325 mg by mouth 2 (two) times daily.      atorvastatin (LIPITOR) 10 MG tablet Take 1 tablet (10 mg total) by mouth once daily. 90 tablet 3    BLOOD PRESSURE CUFF Misc 1 cuff 1 each 0    blood sugar diagnostic Strp To check BG 3 times daily, to use with insurance preferred meter 100 strip 3    blood-glucose meter kit To check BG 3 times daily, to use with insurance preferred meter 1 each 0    brimonidine 0.1% (ALPHAGAN P) 0.1 % Drop Place 1 drop into both eyes 3 (three) times daily. Order 90 day supply 10 mL 4    brinzolamide (AZOPT) 1 % ophthalmic suspension Place 1 drop into both eyes 3 (three) times daily. ORDER 90 DAY SUPPLY 10 mL 4    buPROPion (WELLBUTRIN XL) 300 MG 24 hr tablet Take 1 tablet (300 mg total) by mouth once daily. 90 tablet 1    carvedilol (COREG) 25 MG tablet TAKE ONE  "TABLET BY MOUTH TWICE DAILY WITH  MEALS 180 tablet 11    furosemide (LASIX) 40 MG tablet Take 2 tablets (80 mg total) by mouth 2 (two) times daily. When ankles retain fluid I double the lasix x 14 days 260 tablet 3    lancets Misc To check BG 3 times daily, to use with insurance preferred meter 100 each 3    lisinopril (PRINIVIL,ZESTRIL) 40 MG tablet Take 1 tablet (40 mg total) by mouth once daily. 90 tablet 3    NOVOLIN 70/30 U-100 INSULIN 100 unit/mL (70-30) injection INJECT 40 UNITS SUBCUTANEOUSLY TWICE DAILY (Patient taking differently: INJECT 60 UNITS SUBCUTANEOUSLY TWICE DAILY) 20 mL 6    polyethylene glycol (GLYCOLAX) 17 gram/dose powder Take 17 g by mouth once daily. 238 g 6    semaglutide (OZEMPIC) 1 mg/0.75 mL (2 mg/1.5 mL) PnIj Inject 0.5 mg into the skin every 7 days for 90 days, THEN 1 mg every 7 days. 2 Syringe 5    sildenafil (VIAGRA) 50 MG tablet Take 1 tablet (50 mg total) by mouth daily as needed for Erectile Dysfunction. 5 tablet 3    spironolactone (ALDACTONE) 25 MG tablet TAKE ONE TABLET BY MOUTH TWICE DAILY 180 tablet 3    travoprost (TRAVATAN Z) 0.004 % ophthalmic solution Place 1 drop into both eyes every evening. 2.5 mL 6    nitroGLYCERIN (NITROSTAT) 0.4 MG SL tablet Place 1 tablet (0.4 mg total) under the tongue every 5 (five) minutes as needed for Chest pain. 20 tablet 0     No current facility-administered medications on file prior to visit.        Physical Exam:  /76   Pulse 82   Ht 6' 7" (2.007 m)   Wt (!) 181 kg (399 lb 0.5 oz)   BMI 44.95 kg/m²   Body mass index is 44.95 kg/m².  Physical Exam   Constitutional: He is oriented to person, place, and time and well-developed, well-nourished, and in no distress. No distress.   Morbidly obese    HENT:   Head: Normocephalic.   Eyes: Conjunctivae are normal. Pupils are equal, round, and reactive to light.   Cardiovascular: Normal rate, regular rhythm and normal heart sounds.   Pulmonary/Chest: Effort normal and breath " sounds normal. No respiratory distress.   Abdominal: Soft. Bowel sounds are normal. He exhibits no distension. There is no tenderness.   Neurological: He is alert and oriented to person, place, and time. No cranial nerve deficit.   Skin: Skin is warm and dry. No rash noted.   Psychiatric: Mood and affect normal.       Labs: Pertinent labs reviewed.    Assessment:  1. Abnormal CT scan, esophagus    2. Gastroesophageal reflux disease, esophagitis presence not specified    3. Colon cancer screening    4. Chronic idiopathic constipation         Recommendations:  Abnormal CT scan, esophagus  Gastroesophageal reflux disease, esophagitis presence not specified  - abnormal finding on chest CT scan.   - will correlate with EGD  - also having new onset GERD symptoms  - will start on PPI  -     Case request GI: ESOPHAGOGASTRODUODENOSCOPY (EGD), COLONOSCOPY    Colon cancer screening  - due for screening colonoscopy.   - will schedule at time of EGD  -     Case request GI: ESOPHAGOGASTRODUODENOSCOPY (EGD), COLONOSCOPY    Chronic idiopathic constipation  - continue Miralax    Other orders  -     omeprazole (PRILOSEC) 40 MG capsule; Take 1 capsule (40 mg total) by mouth once daily.  Dispense: 30 capsule; Refill: 5  -     polyethylene glycol (GOLYTELY,NULYTELY) 236-22.74-6.74 -5.86 gram suspension; Take 4,000 mLs (4 L total) by mouth once. for 1 dose  Dispense: 4000 mL; Refill: 0    Follow up to be determined after procedure.    Thank you so much for allowing me to participate in the care of BARBARA Marcus

## 2019-01-29 NOTE — LETTER
January 29, 2019      Liza Fontenot MD  [address]  [place] [address]           [facility]  [address]  [place] [address]  Phone: [phone]  Fax: [phone]          Patient: Stepan Springer   MR Number: [id number]   YOB: 1955   Date of Visit: 1/29/2019       Dear Dr. Liza Fontenot:    Thank you for referring Stepan Springer to me for evaluation. Attached you will find relevant portions of my assessment and plan of care.    If you have questions, please do not hesitate to call me. I look forward to following Stepan Springer along with you.    Sincerely,    Megan Miller, CAILIN    Enclosure  CC:  No Recipients    If you would like to receive this communication electronically, please contact [email or web address] or [phone] to request more information on [employer] access.    For providers and/or their staff who would like to refer a patient to [facility], please contact us through our one-stop-shop provider referral line, [facility] , at [phone].    If you feel you have received this communication in error or would no longer like to receive these types of communications, please e-mail [email or web address]

## 2019-01-31 ENCOUNTER — OFFICE VISIT (OUTPATIENT)
Dept: PSYCHIATRY | Facility: CLINIC | Age: 64
End: 2019-01-31
Payer: COMMERCIAL

## 2019-01-31 DIAGNOSIS — F33.1 MAJOR DEPRESSIVE DISORDER, RECURRENT EPISODE, MODERATE: Primary | ICD-10-CM

## 2019-01-31 PROCEDURE — 90834 PR PSYCHOTHERAPY W/PATIENT, 45 MIN: ICD-10-PCS | Mod: S$GLB,,, | Performed by: SOCIAL WORKER

## 2019-01-31 PROCEDURE — 90834 PSYTX W PT 45 MINUTES: CPT | Mod: S$GLB,,, | Performed by: SOCIAL WORKER
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
## 2019-02-04 ENCOUNTER — OFFICE VISIT (OUTPATIENT)
Dept: DIABETES | Facility: CLINIC | Age: 64
End: 2019-02-04
Payer: COMMERCIAL

## 2019-02-04 VITALS
BODY MASS INDEX: 36.45 KG/M2 | HEIGHT: 78 IN | SYSTOLIC BLOOD PRESSURE: 124 MMHG | DIASTOLIC BLOOD PRESSURE: 68 MMHG | WEIGHT: 315 LBS

## 2019-02-04 LAB — GLUCOSE SERPL-MCNC: 388 MG/DL (ref 70–110)

## 2019-02-04 PROCEDURE — 99214 PR OFFICE/OUTPT VISIT, EST, LEVL IV, 30-39 MIN: ICD-10-PCS | Mod: S$GLB,,, | Performed by: PHYSICIAN ASSISTANT

## 2019-02-04 PROCEDURE — 99214 OFFICE O/P EST MOD 30 MIN: CPT | Mod: S$GLB,,, | Performed by: PHYSICIAN ASSISTANT

## 2019-02-04 PROCEDURE — 82962 POCT GLUCOSE, HAND-HELD DEVICE: ICD-10-PCS | Mod: S$GLB,,, | Performed by: PHYSICIAN ASSISTANT

## 2019-02-04 PROCEDURE — 95251 PR GLUCOSE MONITOR, 72 HOUR, PHYS INTERP: ICD-10-PCS | Mod: S$GLB,,, | Performed by: PHYSICIAN ASSISTANT

## 2019-02-04 PROCEDURE — 3046F PR MOST RECENT HEMOGLOBIN A1C LEVEL > 9.0%: ICD-10-PCS | Mod: CPTII,S$GLB,, | Performed by: PHYSICIAN ASSISTANT

## 2019-02-04 PROCEDURE — 3008F BODY MASS INDEX DOCD: CPT | Mod: CPTII,S$GLB,, | Performed by: PHYSICIAN ASSISTANT

## 2019-02-04 PROCEDURE — 99999 PR PBB SHADOW E&M-EST. PATIENT-LVL IV: CPT | Mod: PBBFAC,,, | Performed by: PHYSICIAN ASSISTANT

## 2019-02-04 PROCEDURE — 3046F HEMOGLOBIN A1C LEVEL >9.0%: CPT | Mod: CPTII,S$GLB,, | Performed by: PHYSICIAN ASSISTANT

## 2019-02-04 PROCEDURE — 3078F DIAST BP <80 MM HG: CPT | Mod: CPTII,S$GLB,, | Performed by: PHYSICIAN ASSISTANT

## 2019-02-04 PROCEDURE — 3078F PR MOST RECENT DIASTOLIC BLOOD PRESSURE < 80 MM HG: ICD-10-PCS | Mod: CPTII,S$GLB,, | Performed by: PHYSICIAN ASSISTANT

## 2019-02-04 PROCEDURE — 99999 PR PBB SHADOW E&M-EST. PATIENT-LVL IV: ICD-10-PCS | Mod: PBBFAC,,, | Performed by: PHYSICIAN ASSISTANT

## 2019-02-04 PROCEDURE — 3074F PR MOST RECENT SYSTOLIC BLOOD PRESSURE < 130 MM HG: ICD-10-PCS | Mod: CPTII,S$GLB,, | Performed by: PHYSICIAN ASSISTANT

## 2019-02-04 PROCEDURE — 3074F SYST BP LT 130 MM HG: CPT | Mod: CPTII,S$GLB,, | Performed by: PHYSICIAN ASSISTANT

## 2019-02-04 PROCEDURE — 95251 CONT GLUC MNTR ANALYSIS I&R: CPT | Mod: S$GLB,,, | Performed by: PHYSICIAN ASSISTANT

## 2019-02-04 PROCEDURE — 3008F PR BODY MASS INDEX (BMI) DOCUMENTED: ICD-10-PCS | Mod: CPTII,S$GLB,, | Performed by: PHYSICIAN ASSISTANT

## 2019-02-04 PROCEDURE — 82962 GLUCOSE BLOOD TEST: CPT | Mod: S$GLB,,, | Performed by: PHYSICIAN ASSISTANT

## 2019-02-04 NOTE — PROGRESS NOTES
Subjective:      Patient ID: Stepan Springer is a 63 y.o. male.    PCP: Liza Landrum MD      Stepan Springer is a pleasant 63 y.o. male presenting to follow up on diabetes mellitus. Also, pertinent to this visit in decision making is hypertension, hyperlipidemia, and adherence. He has had diabetes for 20 or more years. His last visit in Diabetes Management was 10/23/2013.  At which time he had Ozimpic added to his treatment regimen to help with glycemic control and insulin resistance.  However, since that time he has not had any additional medication stating that he wanted to talk with me before starting the Ozimpic and has not advanced his 70 30.  Additionally, he has had a professional C GM applied which was discussed at this visit.  He has significant hyperglycemia throughout the day and rarely had low blood sugars below 80. However currently his hemoglobin A1c is 9.7% and he is maintained on Novolin 70 30 twice daily.  He struggles with lifestyle modifications.  He is not checking his blood glucose. His current concerns are glycemic control.  He went shin to me that he was under good control approximately 2 years ago and is unsure and does not understand what happened during that period of time.  I reviewed his chart during that time and it was after he had had a heart attack and stents placed.  Also he was approximately 35 lb lighter at 360 lb and currently he is 395 lb.    Also, I had a prolonged discussion with the patient about his current insulin treatment.  I informed him that insulin is a weight based treatment and that I usually start at 0.3 units/kilogram of body weight, current we that would equal 53 units twice daily as a starting dose.  Years currently on 60 units twice daily and his blood sugars are very high.  I also advised him that he would need to titrate his dose up by 3-5 units every 3 days until he is at goal, which is defined as a blood sugar below 130 fasting and below 180 other  times of the day.  I have set his maximum dose at 134 units twice daily which he is 0.75 units per kg of body weight at which time we will change his treatment to something more aggressive if he is not at goal.  I have also cautioned him that 30% of his insulin dose is rapid acting and that he should be aware of hypoglycemia symptoms as well as have available methods to corrected such as glucose tablets, glucose gel, 4 oz of Coca-Cola, 4 oz of orange juice or 3-5 hard candies.    He denies any hospital admissions, emergency room visits, hypoglycemia, syncope, diaphoresis, chest pain, or dyspnea.    He has gained 4 pounds since last visit. His BMI is 44.61 kg/m².    His blood sugar in the clinic today was:   Lab Results   Component Value Date    POCGLU 388 (A) 02/04/2019       We discussed the American diabetes Association recommendations:  hemoglobin A1c below 7.0%; all diabetics should be on statins unless contraindicated; one aspirin daily unless contraindicated; fasting blood sugar between 80 and 130 mg/dL; postprandial blood sugar below 180 mg/dl; prevention of hypoglycemia, may adjust goals to higher levels if persistent; ACE or ARB therapy if not contraindicated; and maintain in an ideal body weight with BMI below 25.    Stepan is noncompliant much of the time with DM medications.     Stepan is noncompliant much of the time with lifestyle modifications to include activity and meal planning.       Current Outpatient Medications:     ACCU-CHEK ALYCIA PLUS METER Misc, , Disp: , Rfl:     aspirin 325 MG tablet, Take 325 mg by mouth 2 (two) times daily., Disp: , Rfl:     atorvastatin (LIPITOR) 10 MG tablet, Take 1 tablet (10 mg total) by mouth once daily., Disp: 90 tablet, Rfl: 3    BLOOD PRESSURE CUFF Misc, 1 cuff, Disp: 1 each, Rfl: 0    blood sugar diagnostic Strp, To check BG 3 times daily, to use with insurance preferred meter, Disp: 100 strip, Rfl: 3    blood-glucose meter kit, To check BG 3 times  daily, to use with insurance preferred meter, Disp: 1 each, Rfl: 0    brimonidine 0.1% (ALPHAGAN P) 0.1 % Drop, Place 1 drop into both eyes 3 (three) times daily. Order 90 day supply, Disp: 10 mL, Rfl: 4    brinzolamide (AZOPT) 1 % ophthalmic suspension, Place 1 drop into both eyes 3 (three) times daily. ORDER 90 DAY SUPPLY, Disp: 10 mL, Rfl: 4    buPROPion (WELLBUTRIN XL) 300 MG 24 hr tablet, Take 1 tablet (300 mg total) by mouth once daily., Disp: 90 tablet, Rfl: 1    carvedilol (COREG) 25 MG tablet, TAKE ONE TABLET BY MOUTH TWICE DAILY WITH  MEALS, Disp: 180 tablet, Rfl: 11    furosemide (LASIX) 40 MG tablet, Take 2 tablets (80 mg total) by mouth 2 (two) times daily. When ankles retain fluid I double the lasix x 14 days, Disp: 260 tablet, Rfl: 3    lancets Misc, To check BG 3 times daily, to use with insurance preferred meter, Disp: 100 each, Rfl: 3    lisinopril (PRINIVIL,ZESTRIL) 40 MG tablet, Take 1 tablet (40 mg total) by mouth once daily., Disp: 90 tablet, Rfl: 3    nitroGLYCERIN (NITROSTAT) 0.4 MG SL tablet, Place 1 tablet (0.4 mg total) under the tongue every 5 (five) minutes as needed for Chest pain., Disp: 20 tablet, Rfl: 0    NOVOLIN 70/30 U-100 INSULIN 100 unit/mL (70-30) injection, INJECT 40 UNITS SUBCUTANEOUSLY TWICE DAILY (Patient taking differently: INJECT 60 UNITS SUBCUTANEOUSLY TWICE DAILY), Disp: 20 mL, Rfl: 6    omeprazole (PRILOSEC) 40 MG capsule, Take 1 capsule (40 mg total) by mouth once daily., Disp: 30 capsule, Rfl: 5    polyethylene glycol (GLYCOLAX) 17 gram/dose powder, Take 17 g by mouth once daily., Disp: 238 g, Rfl: 6    semaglutide (OZEMPIC) 1 mg/0.75 mL (2 mg/1.5 mL) PnIj, Inject 0.5 mg into the skin every 7 days for 90 days, THEN 1 mg every 7 days., Disp: 2 Syringe, Rfl: 5    sildenafil (VIAGRA) 50 MG tablet, Take 1 tablet (50 mg total) by mouth daily as needed for Erectile Dysfunction., Disp: 5 tablet, Rfl: 3    spironolactone (ALDACTONE) 25 MG tablet, TAKE ONE  TABLET BY MOUTH TWICE DAILY, Disp: 180 tablet, Rfl: 3    travoprost (TRAVATAN Z) 0.004 % ophthalmic solution, Place 1 drop into both eyes every evening., Disp: 2.5 mL, Rfl: 6    STANDARDS OF CARE:  Eye doctor: Dr. Hale, last exam 11/20/2018.  Dental exam: Recommend regular exams; denies gums bleeding.  Podiatry doctor:  ACE/ARB: Yes  Statin: Yes    ACTIVITY LEVEL: He exercises intermittently.  BLOOD GLUCOSE TESTING: Self-monitoring with   SOCIAL HISTORY: . Lives alone. retired no tobacco use    Health Maintenance   Topic Date Due    Zoster Vaccine  02/24/2017    Influenza Vaccine  08/01/2018    Fecal Occult Blood Test (FOBT)/[employer]  02/12/2019    Hemoglobin A1c  05/01/2019    Lipid Panel  11/01/2019    Foot Exam  12/10/2019    Eye Exam  01/02/2020    TETANUS VACCINE  12/10/2023    Hepatitis C Screening  Completed    Pneumococcal Vaccine (Medium Risk)  Completed       Diabetes Status:   Diabetes Management Status    Statin: Taking  ACE/ARB: Taking    Screening or Prevention Patient's value Goal Complete/Controlled?   HgA1C Testing and Control   Lab Results   Component Value Date    HGBA1C 9.7 (H) 11/01/2018      Annually/Less than 8% No   Lipid profile : 11/01/2018 Annually Yes   LDL control Lab Results   Component Value Date    LDLCALC 60.2 (L) 11/01/2018    Annually/Less than 100 mg/dl  Yes   Nephropathy screening Lab Results   Component Value Date    LABMICR 3.0 04/12/2018     Lab Results   Component Value Date    PROTEINUA Negative 08/09/2018    Annually Yes   Blood pressure BP Readings from Last 1 Encounters:   02/04/19 124/68    Less than 140/90 Yes   Dilated retinal exam : 01/02/2019 Annually Yes   Foot exam   : 12/10/2018 Annually Yes     The following results were reviewed with patient.    Lab Results   Component Value Date    WBC 5.54 04/12/2018    HGB 14.0 04/12/2018    HCT 41.6 04/12/2018     04/12/2018    CHOL 128 11/01/2018    TRIG 134 11/01/2018    HDL 41 11/01/2018     LDLCALC 60.2 (L) 11/01/2018    ALT 21 11/01/2018    AST 10 05/02/2018     08/09/2018    K 4.0 08/09/2018     08/09/2018    ANIONGAP 8 08/09/2018    CREATININE 2.3 (H) 01/02/2019    ESTGFRAFRICA 34.2 (A) 01/02/2019    EGFRNONAA 29.5 (A) 01/02/2019    BUN 24 (H) 08/09/2018    CO2 27 08/09/2018    TSH 1.493 04/12/2018    PSA 0.14 04/12/2018    INR 1.2 09/20/2016     (H) 08/09/2018    UTPCR Unable to calculate 08/09/2018       Lab Results   Component Value Date    HGBA1C 9.7 (H) 11/01/2018    HGBA1C 8.6 (H) 07/19/2018    HGBA1C 9.2 (H) 05/02/2018       Lab Results   Component Value Date    GLUTAMICACID 0.00 12/13/2018    CPEPTIDE 2.70 12/13/2018    FREET4 0.96 11/11/2011    TSH 1.493 04/12/2018    IRON 107 11/11/2011    TIBC 432.0 11/11/2011    [id number] 342 12/11/2015    .0 (H) 08/09/2018    CALCIUM 10.0 08/09/2018    PHOS 3.1 08/09/2018       Review of patient's allergies indicates:   Allergen Reactions    Cefazolin      Other reaction(s): Shakes  Other reaction(s): Chills       Past Medical History:   Diagnosis Date    Anxiety     Bell's palsy     Coronary artery disease involving native coronary artery without angina pectoris 10/18/2016    Idiopathic peripheral neuropathy 12/11/2012    Mixed hyperlipidemia 12/11/2012    Vitamin B 12 deficiency 8/23/2012       Review of Systems   Constitutional: Negative.  Negative for activity change, appetite change, chills, diaphoresis, fatigue, fever and unexpected weight change.   HENT: Negative.  Negative for dental problem, facial swelling, hearing loss, nosebleeds, trouble swallowing and voice change.    Eyes: Negative.  Negative for photophobia, pain, discharge, redness, itching and visual disturbance.   Respiratory: Negative.  Negative for cough, choking, chest tightness, shortness of breath and wheezing.    Cardiovascular: Negative.  Negative for chest pain, palpitations and leg swelling.   Gastrointestinal: Negative.  Negative for  "abdominal distention, abdominal pain, blood in stool, constipation, diarrhea, nausea and vomiting.   Endocrine: Negative.  Negative for cold intolerance, heat intolerance, polydipsia, polyphagia and polyuria.   Genitourinary: Negative.  Negative for decreased urine volume, difficulty urinating, dysuria, frequency, scrotal swelling, testicular pain and urgency.   Musculoskeletal: Negative.  Negative for arthralgias, back pain, gait problem, joint swelling, myalgias, neck pain and neck stiffness.   Skin: Negative.  Negative for color change, pallor, rash and wound.   Allergic/Immunologic: Negative.  Negative for environmental allergies, food allergies and immunocompromised state.   Neurological: Negative.  Negative for dizziness, tremors, seizures, syncope, facial asymmetry, speech difficulty, weakness, light-headedness, numbness and headaches.   Hematological: Negative.  Negative for adenopathy. Does not bruise/bleed easily.   Psychiatric/Behavioral: Negative.  Negative for agitation, behavioral problems, confusion, decreased concentration, dysphoric mood, hallucinations, self-injury, sleep disturbance and suicidal ideas. The patient is not nervous/anxious and is not hyperactive.       Objective:     Vitals - 1 value per visit 12/18/2018 1/29/2019 2/4/2019   SYSTOLIC 126 110 124   DIASTOLIC 86 76 68   PULSE - 82 -   TEMPERATURE 96.4 - -   RESPIRATIONS - - -   SPO2 - - -   Weight (lb) 405.87 399.03 395.95   Weight (kg) 184.1 181 179.6   HEIGHT 6' 7" 6' 7" 6' 7"   BODY MASS INDEX 45.72 44.95 44.61   VISIT REPORT - - -   Pain Score  0 0 0   Some recent data might be hidden       Physical Exam   Constitutional: He is oriented to person, place, and time. He appears well-developed and well-nourished. He is cooperative.  Non-toxic appearance. He does not have a sickly appearance. He does not appear ill. No distress. He is not intubated.   HENT:   Head: Normocephalic and atraumatic. Not macrocephalic and not microcephalic. " Head is without raccoon's eyes, without Gomez's sign, without abrasion, without contusion, without laceration, without right periorbital erythema and without left periorbital erythema. Hair is normal.   Right Ear: External ear normal. No lacerations. No drainage, swelling or tenderness. No foreign bodies. No mastoid tenderness. Tympanic membrane is not injected, not scarred, not perforated, not erythematous, not retracted and not bulging. Tympanic membrane mobility is normal. No middle ear effusion. No hemotympanum. No decreased hearing is noted.   Left Ear: External ear normal. No lacerations. No drainage, swelling or tenderness. No foreign bodies. No mastoid tenderness. Tympanic membrane is not injected, not scarred, not perforated, not erythematous, not retracted and not bulging. Tympanic membrane mobility is normal.  No middle ear effusion. No hemotympanum. No decreased hearing is noted.   Nose: Nose normal.   Mouth/Throat: Oropharynx is clear and moist.   Eyes: EOM and lids are normal. Pupils are equal, round, and reactive to light. Right eye exhibits no chemosis, no discharge, no exudate and no hordeolum. No foreign body present in the right eye. Left eye exhibits no chemosis, no discharge, no exudate and no hordeolum. No foreign body present in the left eye. Right conjunctiva is not injected. Right conjunctiva has no hemorrhage. Left conjunctiva is not injected. Left conjunctiva has no hemorrhage. No scleral icterus. Right eye exhibits normal extraocular motion and no nystagmus. Left eye exhibits normal extraocular motion and no nystagmus. Right pupil is round and reactive. Left pupil is round and reactive. Pupils are equal.   Neck: Normal range of motion, full passive range of motion without pain and phonation normal. Neck supple. Normal carotid pulses, no hepatojugular reflux and no JVD present. No tracheal tenderness, no spinous process tenderness and no muscular tenderness present. Carotid bruit is not  present. No neck rigidity. No tracheal deviation, no edema, no erythema and normal range of motion present. No thyroid mass and no thyromegaly present.   Cardiovascular: Normal rate, regular rhythm, normal heart sounds and intact distal pulses.  No extrasystoles are present. PMI is not displaced. Exam reveals no gallop, no friction rub and no decreased pulses.   No murmur heard.  Pulses:       Carotid pulses are 2+ on the right side, and 2+ on the left side.       Radial pulses are 2+ on the right side, and 2+ on the left side.        Dorsalis pedis pulses are 2+ on the right side, and 2+ on the left side.        Posterior tibial pulses are 2+ on the right side, and 2+ on the left side.   Pulmonary/Chest: Effort normal and breath sounds normal. No accessory muscle usage or stridor. No apnea, no tachypnea and no bradypnea. He is not intubated. No respiratory distress. He has no decreased breath sounds. He has no wheezes. He has no rhonchi. He has no rales. He exhibits no tenderness.   Abdominal: Soft. Bowel sounds are normal. He exhibits no shifting dullness, no distension, no pulsatile liver, no fluid wave, no abdominal bruit, no ascites, no pulsatile midline mass and no mass. There is no hepatosplenomegaly, splenomegaly or hepatomegaly. There is no tenderness. There is no rigidity, no rebound, no guarding, no CVA tenderness and no tenderness at McBurney's point. No hernia. Hernia confirmed negative in the ventral area.   Musculoskeletal: Normal range of motion. He exhibits no edema or tenderness.        Right foot: There is normal range of motion and no deformity.        Left foot: There is normal range of motion and no deformity.   Feet:   Right Foot:   Protective Sensation: 6 sites tested. 6 sites sensed.   Skin Integrity: Negative for ulcer, blister, skin breakdown, erythema, warmth, callus or dry skin.   Left Foot:   Protective Sensation: 6 sites tested. 6 sites sensed.   Skin Integrity: Negative for ulcer,  blister, skin breakdown, erythema, warmth, callus or dry skin.   Lymphadenopathy:        Head (right side): No submental, no submandibular, no tonsillar, no preauricular, no posterior auricular and no occipital adenopathy present.        Head (left side): No submental, no submandibular, no tonsillar, no preauricular, no posterior auricular and no occipital adenopathy present.     He has no cervical adenopathy.        Right cervical: No superficial cervical, no deep cervical and no posterior cervical adenopathy present.       Left cervical: No superficial cervical, no deep cervical and no posterior cervical adenopathy present.   Neurological: He is alert and oriented to person, place, and time. He has normal reflexes. He displays no atrophy and no tremor. No cranial nerve deficit or sensory deficit. He exhibits normal muscle tone. He displays no seizure activity. Coordination and gait normal.   Reflex Scores:       Bicep reflexes are 2+ on the right side and 2+ on the left side.       Brachioradialis reflexes are 2+ on the right side and 2+ on the left side.       Patellar reflexes are 2+ on the right side and 2+ on the left side.  Skin: Skin is warm and dry. No rash noted. No erythema. No pallor.   Psychiatric: He has a normal mood and affect. His mood appears not anxious. His affect is not angry, not blunt, not labile and not inappropriate. His speech is not rapid and/or pressured, not delayed, not tangential and not slurred. He is not agitated, not aggressive, not hyperactive, not slowed, not withdrawn, not actively hallucinating and not combative. Thought content is not paranoid and not delusional. Cognition and memory are not impaired. He does not express impulsivity or inappropriate judgment. He does not exhibit a depressed mood. He expresses no homicidal and no suicidal ideation. He expresses no suicidal plans and no homicidal plans. He is communicative. He exhibits normal recent memory and normal remote  memory. He is attentive.       Continuous glucose monitoring report:     The patient's CGM was downloaded and was reviewed.  The report was technically satisfactory and there were 15 days of data reviewed. Target range was  mg/dl Hypoglycemia was below 70 and hyperglycemic was above 180 mg/dl. Patient's daily glucose summary showed a range of hypoglycemic from 0% to 0% with 0 of 15 days above 10%. His daily above target range was from 64% to 100% 15 of 15 days above 25%.  There was not a food intake diary or exercise diary submitted with this report.     Statistics:  Patient's average glucose was 286 mg/dL, Sensor usage was 15 of 15 days.  He was in time above range (TAR) 93% of the time with 76% above 250 mg/dl, time in range; (TIR) 7% of the time, and time below range (TBR) 0% of the time.  The target range for this patient was  mg/dL, and for the purpose of overnight this would be interpreted as 10 PM to 6 AM.     Pattern insight summary:  Nighttime lows, 0 were found      Daytime lows, 0 were found.     Nighttime highs 15 was found and the most significant pattern found them between 10 PM and 6 AM.     Daytime highs, 15 were found and most significant pattern showed them to be between 6 AM- 10 PM.        Assessment:     1. Uncontrolled type 2 diabetes mellitus with stage 3 chronic kidney disease, with long-term current use of insulin       Plan:   Stepan Springer is seen today for   1. Uncontrolled type 2 diabetes mellitus with stage 3 chronic kidney disease, with long-term current use of insulin      We have discussed the etiology and treatment options associated with the diagnosis as well as alternatives.       He has elected the following treatments.        - Continue with D&E, reduce portion size, and restrict carbohydrates (no more that 45 grams ) per meal.   - F/U 6 weeks      Uncontrolled type 2 diabetes mellitus with stage 3 chronic kidney disease, with long-term current use of insulin  -      POCT Glucose, Hand-Held Device        CGM Interpretation:  #1 His hypoglycemia pattern was suspected to secondary to N/A.  #2 His daytime highs pattern was suspected to secondary to erratic diet and under treatment.  #3 Recommendations were to increase his insulin start taking his ozempic and reduce his portion size, carbohydrates, and calories. Refer to dietician.  #4 Will have his follow up in 3 months.      1.) Patient was instructed to continue to monitor blood glucose 4 times daily. Reminded to bring BG meter or record to each visit for review.  2.) Reviewed pathophysiology of diabetes, complications related to the disease, importance of annual dilated eye exam and self daily foot examination.  3.) Continue medications as prescribed Novolin 70/30. [facility] MyChart or Phone review in 1 week with BG records for adjustment of medication.  4.) Advised patient to continue to follow up with Dietician/CDE as directed.  5.) Discussed activity, benefits, methods, and precautions. Recommended patient start/continue some form of exercise and increase as tolerated to 60 minutes per day to facilitate weight loss and aid in control of BGs. Also reminded patient of WHO recommendation of 10,000 steps daily as a goal.   6.) A1C, TSH, Lipid Panel, CMP/renal panel with eGFR and Micro/Creatinine prior to next visit, if not already done.  7.) Return to clinic in 12 weeks for follow up. Advised patient to call clinic with any questions or concerns.    A total of 60 minutes was spent in face to face time, of which 50 % was spent in counseling patient on disease process, complications, treatment, and side effects of medications.    The patient was explained the above plan and given opportunity to ask questions.  He understands, chooses and consents to this plan and accepts all the risks, which include but are not limited to the risks mentioned above.   He understands the alternative of having no testing, interventions or treatments  at this time. He left content and without further questions.     Disclaimer:  This note is prepared using voice recognition software and as such is likely to have errors and has not been proof read. Please contact me for questions.

## 2019-02-06 ENCOUNTER — TELEPHONE (OUTPATIENT)
Dept: PHARMACY | Facility: CLINIC | Age: 64
End: 2019-02-06

## 2019-02-06 NOTE — TELEPHONE ENCOUNTER
Left messsage for patient to return my call.  There are no programs available for medication (Ozempic).  Also, the patient has a federal insurance and the coupon will not work for him.

## 2019-02-07 ENCOUNTER — PATIENT OUTREACH (OUTPATIENT)
Dept: ADMINISTRATIVE | Facility: HOSPITAL | Age: 64
End: 2019-02-07

## 2019-02-12 ENCOUNTER — OFFICE VISIT (OUTPATIENT)
Dept: INTERNAL MEDICINE | Facility: CLINIC | Age: 64
End: 2019-02-12
Payer: COMMERCIAL

## 2019-02-12 ENCOUNTER — LAB VISIT (OUTPATIENT)
Dept: LAB | Facility: HOSPITAL | Age: 64
End: 2019-02-12
Attending: INTERNAL MEDICINE
Payer: COMMERCIAL

## 2019-02-12 VITALS
DIASTOLIC BLOOD PRESSURE: 70 MMHG | WEIGHT: 315 LBS | TEMPERATURE: 98 F | OXYGEN SATURATION: 98 % | HEIGHT: 78 IN | HEART RATE: 66 BPM | BODY MASS INDEX: 36.45 KG/M2 | SYSTOLIC BLOOD PRESSURE: 122 MMHG

## 2019-02-12 DIAGNOSIS — I25.10 CORONARY ARTERY DISEASE INVOLVING NATIVE CORONARY ARTERY OF NATIVE HEART WITHOUT ANGINA PECTORIS: ICD-10-CM

## 2019-02-12 DIAGNOSIS — E66.01 MORBID OBESITY WITH BMI OF 40.0-44.9, ADULT: ICD-10-CM

## 2019-02-12 DIAGNOSIS — E78.5 HYPERLIPIDEMIA ASSOCIATED WITH TYPE 2 DIABETES MELLITUS: ICD-10-CM

## 2019-02-12 DIAGNOSIS — F33.1 MODERATE EPISODE OF RECURRENT MAJOR DEPRESSIVE DISORDER: ICD-10-CM

## 2019-02-12 DIAGNOSIS — E11.69 HYPERLIPIDEMIA ASSOCIATED WITH TYPE 2 DIABETES MELLITUS: ICD-10-CM

## 2019-02-12 DIAGNOSIS — N18.4 CKD (CHRONIC KIDNEY DISEASE) STAGE 4, GFR 15-29 ML/MIN: ICD-10-CM

## 2019-02-12 DIAGNOSIS — E11.59 HYPERTENSION ASSOCIATED WITH DIABETES: Chronic | ICD-10-CM

## 2019-02-12 DIAGNOSIS — I15.2 HYPERTENSION ASSOCIATED WITH DIABETES: Chronic | ICD-10-CM

## 2019-02-12 DIAGNOSIS — F41.9 ANXIETY: ICD-10-CM

## 2019-02-12 LAB
ANION GAP SERPL CALC-SCNC: 11 MMOL/L
BUN SERPL-MCNC: 56 MG/DL
CALCIUM SERPL-MCNC: 9.5 MG/DL
CHLORIDE SERPL-SCNC: 106 MMOL/L
CO2 SERPL-SCNC: 21 MMOL/L
CREAT SERPL-MCNC: 2.6 MG/DL
EST. GFR  (AFRICAN AMERICAN): 29.4 ML/MIN/1.73 M^2
EST. GFR  (NON AFRICAN AMERICAN): 25.5 ML/MIN/1.73 M^2
GLUCOSE SERPL-MCNC: 354 MG/DL
POTASSIUM SERPL-SCNC: 5 MMOL/L
SODIUM SERPL-SCNC: 138 MMOL/L

## 2019-02-12 PROCEDURE — 3046F HEMOGLOBIN A1C LEVEL >9.0%: CPT | Mod: CPTII,S$GLB,, | Performed by: INTERNAL MEDICINE

## 2019-02-12 PROCEDURE — 99999 PR PBB SHADOW E&M-EST. PATIENT-LVL IV: CPT | Mod: PBBFAC,,, | Performed by: INTERNAL MEDICINE

## 2019-02-12 PROCEDURE — 99214 OFFICE O/P EST MOD 30 MIN: CPT | Mod: S$GLB,,, | Performed by: INTERNAL MEDICINE

## 2019-02-12 PROCEDURE — 3008F PR BODY MASS INDEX (BMI) DOCUMENTED: ICD-10-PCS | Mod: CPTII,S$GLB,, | Performed by: INTERNAL MEDICINE

## 2019-02-12 PROCEDURE — 3046F PR MOST RECENT HEMOGLOBIN A1C LEVEL > 9.0%: ICD-10-PCS | Mod: CPTII,S$GLB,, | Performed by: INTERNAL MEDICINE

## 2019-02-12 PROCEDURE — 3074F SYST BP LT 130 MM HG: CPT | Mod: CPTII,S$GLB,, | Performed by: INTERNAL MEDICINE

## 2019-02-12 PROCEDURE — 99214 PR OFFICE/OUTPT VISIT, EST, LEVL IV, 30-39 MIN: ICD-10-PCS | Mod: S$GLB,,, | Performed by: INTERNAL MEDICINE

## 2019-02-12 PROCEDURE — 3074F PR MOST RECENT SYSTOLIC BLOOD PRESSURE < 130 MM HG: ICD-10-PCS | Mod: CPTII,S$GLB,, | Performed by: INTERNAL MEDICINE

## 2019-02-12 PROCEDURE — 3008F BODY MASS INDEX DOCD: CPT | Mod: CPTII,S$GLB,, | Performed by: INTERNAL MEDICINE

## 2019-02-12 PROCEDURE — 3078F PR MOST RECENT DIASTOLIC BLOOD PRESSURE < 80 MM HG: ICD-10-PCS | Mod: CPTII,S$GLB,, | Performed by: INTERNAL MEDICINE

## 2019-02-12 PROCEDURE — 80048 BASIC METABOLIC PNL TOTAL CA: CPT

## 2019-02-12 PROCEDURE — 36415 COLL VENOUS BLD VENIPUNCTURE: CPT

## 2019-02-12 PROCEDURE — 99999 PR PBB SHADOW E&M-EST. PATIENT-LVL IV: ICD-10-PCS | Mod: PBBFAC,,, | Performed by: INTERNAL MEDICINE

## 2019-02-12 PROCEDURE — 3078F DIAST BP <80 MM HG: CPT | Mod: CPTII,S$GLB,, | Performed by: INTERNAL MEDICINE

## 2019-02-12 NOTE — MEDICAL/APP STUDENT
Subjective:       Patient ID: Stepan Springer is a 63 y.o. male.    Chief Complaint: Follow-up    62 yo male here for f/u of medical problems. Pt. Is noncompliant with diabetes control. Continues to drink coke and eat a lot. Blood glucose level remains in the 300's. Pt. C/o depression and sometimes wanting it to end, Denies SI. Frustrated with chronic conditions. Pt. Also complained of indigestion that is improved w/copious amounts of milk ingestion. Scheduled to see GI in May for scope.        Review of Systems   Constitutional: Negative for diaphoresis, fatigue and fever.   Eyes: Positive for visual disturbance (baseline). Negative for photophobia, pain and redness.   Respiratory: Negative for cough, chest tightness, shortness of breath, wheezing and stridor.    Cardiovascular: Negative for chest pain and palpitations.   Gastrointestinal: Negative for abdominal distention, abdominal pain, constipation, diarrhea, nausea and vomiting.   Genitourinary: Negative for difficulty urinating, dysuria and hematuria.   Musculoskeletal: Negative for arthralgias, gait problem and myalgias.   Neurological: Negative for dizziness, syncope, weakness, light-headedness and headaches.   Hematological: Negative for adenopathy. Does not bruise/bleed easily.   Psychiatric/Behavioral: Positive for behavioral problems (Depression). Negative for agitation, confusion, self-injury, sleep disturbance and suicidal ideas. The patient is not nervous/anxious.    All other systems reviewed and are negative.      Objective:      Physical Exam   Constitutional: He appears well-developed. No distress.   HENT:   Head: Normocephalic and atraumatic.   Mouth/Throat: Mucous membranes are dry. Abnormal dentition. No oropharyngeal exudate.   Neck: No thyromegaly present.   Cardiovascular: Normal rate, regular rhythm, normal heart sounds and intact distal pulses. Exam reveals no friction rub.   No murmur heard.  Pulmonary/Chest: Effort normal and breath  sounds normal. No stridor. No respiratory distress. He has no wheezes.   Abdominal: Soft. Bowel sounds are normal. He exhibits no distension.   Lymphadenopathy:     He has no cervical adenopathy.   Skin: Capillary refill takes less than 2 seconds. He is not diaphoretic.   Psychiatric: His speech is normal and behavior is normal. Judgment and thought content normal. His mood appears not anxious. His affect is not angry. Cognition and memory are normal. He exhibits a depressed mood. He expresses no suicidal ideation. He expresses no suicidal plans.   Pt. States that he wants it to end sometimes b/c he is frustrated w/his diabetes and depression. Continues to see someone for his depression.    Vitals reviewed.      Assessment:       1. Uncontrolled type 2 diabetes mellitus with kidney complication, with long-term current use of insulin    2. Hypertension associated with diabetes    3. Hyperlipidemia associated with type 2 diabetes mellitus    4. Morbid obesity with BMI of 40.0-44.9, adult    5. Anxiety    6. Coronary artery disease involving native coronary artery of native heart without angina pectoris    7. Moderate episode of recurrent major depressive disorder    8. CKD (chronic kidney disease) stage 4, GFR 15-29 ml/min        Plan:       1. Uncontrolled IDT2DM w/kidney complications   - Again spoke w/pt. About diet and exercise and making small steps.   - Pt. Voiced a plan he is working on w/endocrinologist that has lowered his BG from the high 300's to the low 300's and is hopeful it will help him lose weight. (plan is to increase 70/30 insulin injection by 5units every 3-4 days until BG level is controled.) Explained to pt. That he still needs to watch what he eats and drinks and to increase his exercise b/c insulin will not be enough alone.    2. HTN: controlled   - Maintain home meds    3. Obestiy:   - Continues to gain weight.    - Voiced concern about diet and exercise and how it will also help his  T2DM.    4. Depression/anxiety: stable   - No anxiety today but base line depression was noted. No SI or real desire for death but just frustration with chronic health conditions. Focused on exciting things in his life and how losing some weight will help with T2DM and his depression.    - Continue seeing psych for medication and therapy

## 2019-02-13 ENCOUNTER — PATIENT MESSAGE (OUTPATIENT)
Dept: INTERNAL MEDICINE | Facility: CLINIC | Age: 64
End: 2019-02-13

## 2019-02-13 DIAGNOSIS — N18.4 CKD (CHRONIC KIDNEY DISEASE) STAGE 4, GFR 15-29 ML/MIN: Primary | ICD-10-CM

## 2019-02-14 ENCOUNTER — OFFICE VISIT (OUTPATIENT)
Dept: PSYCHIATRY | Facility: CLINIC | Age: 64
End: 2019-02-14
Payer: COMMERCIAL

## 2019-02-14 DIAGNOSIS — F33.1 MAJOR DEPRESSIVE DISORDER, RECURRENT EPISODE, MODERATE: Primary | ICD-10-CM

## 2019-02-14 PROCEDURE — 90834 PR PSYCHOTHERAPY W/PATIENT, 45 MIN: ICD-10-PCS | Mod: S$GLB,,, | Performed by: SOCIAL WORKER

## 2019-02-14 PROCEDURE — 90834 PSYTX W PT 45 MINUTES: CPT | Mod: S$GLB,,, | Performed by: SOCIAL WORKER

## 2019-02-14 NOTE — PROGRESS NOTES
"Individual Psychotherapy (PhD/LCSW)    2/14/2019    Site:  [place]         Therapeutic Intervention: Met with patient.  Outpatient - Insight oriented psychotherapy 45 min - CPT code 50311    Chief complaint/reason for encounter: depression     Interval history and content of current session:  Patient presents to ongoing individual therapy due to depression.  He has sent a Langford's message to his ex girlfriend, Solange.  He is hoping, as usual, to get a response.  He is considering writing a "good bye" letter if she does not respond.  He notes that he is "tired of crying."  He admits that he would not move on to another relationship.  He admits seeing her around [place] was more than he had seen her in four years, but he has not heard from her since.  He is frustrated with work.  He typically works overtime during tax season and his manager has not been allowing him to do so.  He was going to take a shift for a co worker, but his manager gave it to a co worker, who spends her time talking to others and does not have work.  He plans to talk to the  to see if there is a prohibition on over time.  He plans to attend the [employer] parades in [place] town.  He notes that his cat seems to watch television with him like a human.  The patient is hopeful that his son from [place] will be able to visit him for his birthday next weekend.  His son will be coming to [place] for a custody hearing.  The patient admits it would be nice to move to [place] and help with his step granddaughters.  He admits that he would spoil them.  He has asked his son to build a father in law suit when he builds a new home.  The patient has not heard from his youngest son.  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    Target symptoms: depression  Why chosen therapy is appropriate versus another modality: relevant to diagnosis  Outcome monitoring methods: self-report, " observation  Therapeutic intervention type: insight oriented psychotherapy, supportive psychotherapy, interactive psychotherapy     Risk parameters:  Patient reports no suicidal ideation  Patient reports no homicidal ideation  Patient reports no self-injurious behavior  Patient reports no violent behavior     Verbal deficits: none     Patient's response to intervention:  The patient's response to intervention is accepting, motivated.     Progress toward goals and other mental status changes:  The patient's progress toward goals is fair .     Diagnosis:   Major depression recurrent moderate     Plan:  individual psychotherapy and medication management by physician     Return to clinic: as scheduled     Length of Service (minutes): 45

## 2019-02-28 ENCOUNTER — OFFICE VISIT (OUTPATIENT)
Dept: PSYCHIATRY | Facility: CLINIC | Age: 64
End: 2019-02-28
Payer: COMMERCIAL

## 2019-02-28 DIAGNOSIS — F33.1 MAJOR DEPRESSIVE DISORDER, RECURRENT EPISODE, MODERATE: Primary | ICD-10-CM

## 2019-02-28 PROCEDURE — 90834 PSYTX W PT 45 MINUTES: CPT | Mod: S$GLB,,, | Performed by: SOCIAL WORKER

## 2019-02-28 PROCEDURE — 90834 PR PSYCHOTHERAPY W/PATIENT, 45 MIN: ICD-10-PCS | Mod: S$GLB,,, | Performed by: SOCIAL WORKER
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
## 2019-03-04 ENCOUNTER — TELEPHONE (OUTPATIENT)
Dept: INTERNAL MEDICINE | Facility: CLINIC | Age: 64
End: 2019-03-04

## 2019-03-04 NOTE — TELEPHONE ENCOUNTER
----- Message from Charis Carlton sent at 3/4/2019  8:43 AM CST -----  Type:  Needs Medical Advice    Who Called: pt  Symptoms (please be specific): cellulitis  How long has patient had these symptoms:  3 days  Pharmacy name and phone #: .  [facility] [address]  [address] [facility] [address]  Phone: [phone] Fax: [phone]    Would the patient rather a call back or a response via [facility]? call  Best Call Back Number: .[phone] (home)   Additional Information:

## 2019-03-14 ENCOUNTER — LAB VISIT (OUTPATIENT)
Dept: LAB | Facility: HOSPITAL | Age: 64
End: 2019-03-14
Attending: INTERNAL MEDICINE
Payer: COMMERCIAL

## 2019-03-14 ENCOUNTER — OFFICE VISIT (OUTPATIENT)
Dept: PSYCHIATRY | Facility: CLINIC | Age: 64
End: 2019-03-14
Payer: COMMERCIAL

## 2019-03-14 ENCOUNTER — OFFICE VISIT (OUTPATIENT)
Dept: NEPHROLOGY | Facility: CLINIC | Age: 64
End: 2019-03-14
Payer: COMMERCIAL

## 2019-03-14 VITALS
DIASTOLIC BLOOD PRESSURE: 70 MMHG | BODY MASS INDEX: 36.45 KG/M2 | HEIGHT: 78 IN | SYSTOLIC BLOOD PRESSURE: 110 MMHG | HEART RATE: 68 BPM | WEIGHT: 315 LBS

## 2019-03-14 DIAGNOSIS — Z71.89 ENCOUNTER FOR MEDICATION REVIEW AND COUNSELING: ICD-10-CM

## 2019-03-14 DIAGNOSIS — I25.10 CORONARY ARTERY DISEASE INVOLVING NATIVE CORONARY ARTERY OF NATIVE HEART WITHOUT ANGINA PECTORIS: ICD-10-CM

## 2019-03-14 DIAGNOSIS — F33.1 MAJOR DEPRESSIVE DISORDER, RECURRENT EPISODE, MODERATE: Primary | ICD-10-CM

## 2019-03-14 DIAGNOSIS — I10 ESSENTIAL HYPERTENSION: ICD-10-CM

## 2019-03-14 DIAGNOSIS — L03.116 CELLULITIS OF LEFT LOWER EXTREMITY: Primary | ICD-10-CM

## 2019-03-14 DIAGNOSIS — L03.116 CELLULITIS OF LEFT LOWER EXTREMITY: ICD-10-CM

## 2019-03-14 DIAGNOSIS — N18.30 CHRONIC KIDNEY DISEASE, STAGE III (MODERATE): ICD-10-CM

## 2019-03-14 DIAGNOSIS — E87.5 HYPERKALEMIA: ICD-10-CM

## 2019-03-14 PROCEDURE — 87040 BLOOD CULTURE FOR BACTERIA: CPT | Mod: 59

## 2019-03-14 PROCEDURE — 99245 OFF/OP CONSLTJ NEW/EST HI 55: CPT | Mod: S$GLB,,, | Performed by: INTERNAL MEDICINE

## 2019-03-14 PROCEDURE — 99999 PR PBB SHADOW E&M-EST. PATIENT-LVL IV: ICD-10-PCS | Mod: PBBFAC,,, | Performed by: INTERNAL MEDICINE

## 2019-03-14 PROCEDURE — 99245 PR OFFICE CONSULTATION,LEVEL V: ICD-10-PCS | Mod: S$GLB,,, | Performed by: INTERNAL MEDICINE

## 2019-03-14 PROCEDURE — 90834 PSYTX W PT 45 MINUTES: CPT | Mod: S$GLB,,, | Performed by: SOCIAL WORKER

## 2019-03-14 PROCEDURE — 90834 PR PSYCHOTHERAPY W/PATIENT, 45 MIN: ICD-10-PCS | Mod: S$GLB,,, | Performed by: SOCIAL WORKER

## 2019-03-14 PROCEDURE — 36415 COLL VENOUS BLD VENIPUNCTURE: CPT

## 2019-03-14 PROCEDURE — 99999 PR PBB SHADOW E&M-EST. PATIENT-LVL IV: CPT | Mod: PBBFAC,,, | Performed by: INTERNAL MEDICINE

## 2019-03-14 RX ORDER — LISINOPRIL 10 MG/1
10 TABLET ORAL DAILY
Qty: 90 TABLET | Refills: 3 | Status: SHIPPED | OUTPATIENT
Start: 2019-03-14 | End: 2019-03-20

## 2019-03-14 RX ORDER — SULFAMETHOXAZOLE AND TRIMETHOPRIM 800; 160 MG/1; MG/1
1 TABLET ORAL DAILY
Qty: 14 TABLET | Refills: 0 | Status: ON HOLD | OUTPATIENT
Start: 2019-03-14 | End: 2019-05-01 | Stop reason: HOSPADM

## 2019-03-14 NOTE — PROGRESS NOTES
NEPHROLOGY CONSULTATION NOTE    REASON FOR CONSULTATION:  Chronic kidney disease.    REFERRING PHYSICIAN:  Dr. Liza Fontenot.     HISTORY OF PRESENT ILLNESS:  Thank you very much for referring Mr. Ji Gordon to us.  As you know, he is a 64-year-old male with a pertinent history of   diabetes mellitus, hypertension, chronic kidney disease with serum creatinine   ranging between 1.9-2.6 in the past several years and obesity, who was referred   to us for evidence of increase in serum creatinine.  The patient is under the   impression today that he was sent to me for the treatment of left leg infection.    He has had previous cellulitis.  This is his third episode according to the   patient.  I was able to explain to the patient the main reason for his referral   to us, but I am happy to address the leg issue as well.    The patient says that in the past two weeks, his left leg has been swollen and   red.  The right leg is okay.  He says that he may have scraped the leg against a   table.  He says that he may have had fever at home.  He has no other complaints   today.  No chest pain, no shortness of breath.  He says that he has had   cellulitis in the past.    PAST MEDICAL HISTORY:  1.  CKD, stage III, based on review of the records, baseline creatinine appears   to be close to 2.0 with estimated GFR of about 40 mL/min.  2.  Diabetes mellitus for 15 years.  3.  Hypertension.  4.  Coronary artery disease with past history of MI in 2016, had 2 stents   placed.  5.  Diastolic CHF.  6.  Obesity.  7.  Hyperlipidemia.  8.  Left bundle-branch block.  9.  Obstructive sleep apnea.  10.  History of right-sided Bell's palsy.  11.  Idiopathic peripheral neuropathy.   12.  Primary open-angle glaucoma of both eyes.  13.  Chronic left hydronephrosis.    PAST SURGICAL HISTORY:  Reviewed.  Distant eye surgery for cataracts.    FAMILY HISTORY:  Reviewed.  Both parents passed away in their 80s.  No history   of diabetes  mellitus.    ALLERGIES:  Reviewed, to Anc.    SOCIAL HISTORY:  Negative for smoking.  No alcohol use.    MEDICATIONS:  Fully reviewed per [facility] medication record.  The patient takes   aspirin, Lipitor, Coreg 25 mg p.o. b.i.d., Lasix 40 mg 2 tablets b.i.d.,   lisinopril 40 mg daily, spironolactone 25 mg daily.  Other meds reviewed and   noted.    REVIEW OF SYSTEMS:  No recent hospitalizations.  GENERAL:  Negative.  HEAD, EYES, EARS, NOSE, AND THROAT:  Negative.  CARDIAC:  Negative.  PULMONARY:  Negative.  GASTROINTESTINAL:  Negative.  GENITOURINARY:  Negative.  PSYCHOLOGICAL:  Negative.  NEUROLOGICAL:  Negative.  ENDOCRINE:  Negative.  HEMATOLOGIC AND ONCOLOGIC:  Negative.  INFECTIOUS DISEASE:  Negative.  The rest of the review of systems negative.    PHYSICAL EXAMINATION:  VITAL SIGNS:  Blood pressure is 110/70, pulse 68, temperature was 98.4, weight   is 404 pounds.  He is cooperative, pleasant, in no acute distress.  Atraumatic, normocephalic.    well developed, well nourished.  HEENT:  Mucous membranes moist.  NECK:  No JVD.  HEART:  Regular rate and rhythm.  CHEST:  Clear to auscultation.  No rales.  No wheezes.  Breathing symmetric,   unlabored.  ABDOMEN:  Soft, nontender.  EXTREMITIES:  Showed on the right side, trace edema.  On the left, he had local   edema at the site of a large erythematous area oval shaped on the anterior   aspect of the leg.  The size was 10 x 5 cm.  There was local heat and tenderness   associated with this lesion.    LABS:  Reviewed.  Current creatinine is 2.6.  Sodium 138, potassium 5.0,   chloride 106, bicarbonate 21, BUN is 56, calcium 9.5.  Protein to creatinine   ratio is low, lab unable to measure.  Intact PTH is 103.  Urinalysis previously   had shown no proteinuria, no blood, no casts reported.  No rbc's.    ASSESSMENT AND PLAN:  This is a 64-year-old male who was referred to us for   chronic kidney disease and incidentally has left leg cellulitis.  The impression   is as  follows:  1.  Renal:  The patient has fluctuating levels of serum creatinine.  Currently   creatinine is above his baseline, may be related to the current cellulitis and   possible bacteremia.  The patient was informed.    The patient does not have any proteinuria.  Therefore, this suggests that he may   not have diabetic nephropathy.  The cause of the chronic kidney disease may be   related to hypertension.    2.  Hypertension.  Blood pressure is controlled to slightly low, noted and   reviewed.  Medications will be adjusted.    3.  The left leg lesion is quite alarming.  I advised the patient that he needs   IV antibiotics.  He refused to go to the hospital.  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  I agreed to treat him   with p.o. antibiotics with his promise that he would followup either with me or   with Dr. Fontenot next week.    4.  Allergies were reviewed.  He is allergic to cefazolin.  He said he had a bad   allergy when cefazolin was given IV.  I have considered giving him Keflex for   cellulitis, but instead Bactrim will be used.    5.  Mild hyperkalemia/borderline serum potassium noted.  This would complicate   the use of Bactrim.  Therefore, what I will do is that I will stop   spironolactone and lower the dose of lisinopril from 40 to 10 mg p.o. daily in   order to improve serum potassium that would open room for the use of Bactrim.    The patient was advised in layman's language and was given written instructions   as well.    PLANS AND RECOMMENDATIONS:  For this complicated patient include the followin.  Discussed with the patient.  Opportunity for question and discussion   provided.  2.  Blood culture x2 today.  3.  The patient was advised to go to ER, but he refused.  4.  Stop spironolactone.  5.  Decrease lisinopril from 40 to 10 mg p.o. daily.  6.  Give Bactrim  double strength one p.o. daily x2 weeks, renally adjusted dose.  7.  Return to see me next week for close followup.  The patient was advised to   go to ER if fever occurred, if he was not feeling well, if leg looks worse or if   he had any chest pain or shortness of breath.    Total time spent 60 minutes, time was needed to review the records, the patient   evaluation, documentation, and face-to-face discussion with the patient.  More   than 50% of the time was spent in counseling and coordination of care.  Level V   visit.      AK/PAZ  dd: 03/14/2019 16:58:28 (CDT)  td: 03/15/2019 01:47:54 (CDT)  Doc ID   #[id number]  Job ID #[id number]    CC: Liza Fontenot M.D.    [id number]

## 2019-03-14 NOTE — PROGRESS NOTES
"Individual Psychotherapy (PhD/LCSW)    3/14/2019    Site:  [place]         Therapeutic Intervention: Met with patient.  Outpatient - Insight oriented psychotherapy 45 min - CPT code 99945    Chief complaint/reason for encounter: depression     Interval history and content of current session:  Patient presents to ongoing individual therapy due to depression.  He has been struggling with cellulitis for about two weeks.  Two weeks ago, he woke up with his leg swollen and red.  He spend Danial Gras day with his foot elevated.  He was previously admitted to the hospital due to this condition.  He notes that he knows what to do since he has had the condition prior.  He will be seeing nephrology later today.  He is not sure why.  He has been placed on antibiotics in the past to help with the cellulitis.  He notes that his foot felt better this morning when he woke up.  He is using a cane to help with stability.  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  He has about one more month before the deadline for filing.  He has found out that his ex girlfriend is out of work due to hurting her back at work.  He has told her that he hopes she feels better and he would like to send her flowers.  As usual, she has not responded.  He vacillates about whether to send a "goodbye for now" letter.  He continues to stay in contact with his children.  He shares that he has family photos lining the tillman of his home.  He continues to enjoy watching science fiction.  He is encourage to increase his self care.  Negative statements about himself are confronted.  He is encouraged to work on internal boundaries.    Target symptoms: depression  Why chosen therapy is appropriate versus another modality: relevant to diagnosis  Outcome monitoring methods: self-report, observation  Therapeutic intervention type: insight oriented " psychotherapy, supportive psychotherapy, interactive psychotherapy     Risk parameters:  Patient reports no suicidal ideation  Patient reports no homicidal ideation  Patient reports no self-injurious behavior  Patient reports no violent behavior     Verbal deficits: none     Patient's response to intervention:  The patient's response to intervention is accepting, motivated.     Progress toward goals and other mental status changes:  The patient's progress toward goals is fair .     Diagnosis:   Major depression recurrent moderate     Plan:  individual psychotherapy and medication management by physician     Return to clinic: as scheduled     Length of Service (minutes): 45

## 2019-03-14 NOTE — LETTER
March 14, 2019      Liza Fontenot MD  [address] [facility] [address] [facility] [address]  [place] [address]           [facility]  [address] [place] [address]  Phone: [phone]  Fax: [phone]          Patient: Stepan Springer   MR Number: [id number]   YOB: 1955   Date of Visit: 3/14/2019       Dear Dr. Liza Fontenot:    Thank you for referring Stepan Springer to me for evaluation. Attached you will find relevant portions of my assessment and plan of care.    If you have questions, please do not hesitate to call me. I look forward to following Stepan Springer along with you.    Sincerely,    Kamila Abdi MD    Enclosure  CC:  No Recipients    If you would like to receive this communication electronically, please contact [email or web address] or [phone] to request more information on [employer] Link access.    For providers and/or their staff who would like to refer a patient to [facility], please contact us through our one-stop-shop provider referral line, [facility], at [phone].    If you feel you have received this communication in error or would no longer like to receive these types of communications, please e-mail [email or web address]

## 2019-03-18 ENCOUNTER — LAB VISIT (OUTPATIENT)
Dept: LAB | Facility: HOSPITAL | Age: 64
End: 2019-03-18
Attending: INTERNAL MEDICINE
Payer: COMMERCIAL

## 2019-03-18 LAB
ALBUMIN SERPL BCP-MCNC: 2.8 G/DL
ANION GAP SERPL CALC-SCNC: 10 MMOL/L
BASOPHILS # BLD AUTO: 0.03 K/UL
BASOPHILS NFR BLD: 0.6 %
BUN SERPL-MCNC: 21 MG/DL
CALCIUM SERPL-MCNC: 9.1 MG/DL
CHLORIDE SERPL-SCNC: 109 MMOL/L
CO2 SERPL-SCNC: 23 MMOL/L
CREAT SERPL-MCNC: 2.1 MG/DL
DIFFERENTIAL METHOD: ABNORMAL
EOSINOPHIL # BLD AUTO: 0.2 K/UL
EOSINOPHIL NFR BLD: 4.4 %
ERYTHROCYTE [DISTWIDTH] IN BLOOD BY AUTOMATED COUNT: 12.1 %
EST. GFR  (AFRICAN AMERICAN): 37.9 ML/MIN/1.73 M^2
EST. GFR  (NON AFRICAN AMERICAN): 32.7 ML/MIN/1.73 M^2
GLUCOSE SERPL-MCNC: 127 MG/DL
HCT VFR BLD AUTO: 36.3 %
HGB BLD-MCNC: 11.5 G/DL
IMM GRANULOCYTES # BLD AUTO: 0.01 K/UL
IMM GRANULOCYTES NFR BLD AUTO: 0.2 %
LYMPHOCYTES # BLD AUTO: 1 K/UL
LYMPHOCYTES NFR BLD: 18 %
MCH RBC QN AUTO: 30.8 PG
MCHC RBC AUTO-ENTMCNC: 31.7 G/DL
MCV RBC AUTO: 97 FL
MONOCYTES # BLD AUTO: 0.6 K/UL
MONOCYTES NFR BLD: 10.3 %
NEUTROPHILS # BLD AUTO: 3.6 K/UL
NEUTROPHILS NFR BLD: 66.5 %
NRBC BLD-RTO: 0 /100 WBC
PHOSPHATE SERPL-MCNC: 2 MG/DL
PLATELET # BLD AUTO: 411 K/UL
PMV BLD AUTO: 9 FL
POTASSIUM SERPL-SCNC: 3.8 MMOL/L
RBC # BLD AUTO: 3.73 M/UL
SODIUM SERPL-SCNC: 142 MMOL/L
WBC # BLD AUTO: 5.43 K/UL

## 2019-03-18 PROCEDURE — 80069 RENAL FUNCTION PANEL: CPT

## 2019-03-18 PROCEDURE — 36415 COLL VENOUS BLD VENIPUNCTURE: CPT

## 2019-03-18 PROCEDURE — 85025 COMPLETE CBC W/AUTO DIFF WBC: CPT

## 2019-03-19 LAB
BACTERIA BLD CULT: NORMAL
BACTERIA BLD CULT: NORMAL

## 2019-03-20 ENCOUNTER — OFFICE VISIT (OUTPATIENT)
Dept: NEPHROLOGY | Facility: CLINIC | Age: 64
End: 2019-03-20
Payer: COMMERCIAL

## 2019-03-20 VITALS
BODY MASS INDEX: 36.45 KG/M2 | SYSTOLIC BLOOD PRESSURE: 150 MMHG | WEIGHT: 315 LBS | HEIGHT: 78 IN | HEART RATE: 70 BPM | DIASTOLIC BLOOD PRESSURE: 88 MMHG

## 2019-03-20 DIAGNOSIS — I10 UNCONTROLLED HYPERTENSION: ICD-10-CM

## 2019-03-20 DIAGNOSIS — I10 ESSENTIAL HYPERTENSION: ICD-10-CM

## 2019-03-20 DIAGNOSIS — N18.30 CHRONIC KIDNEY DISEASE, STAGE III (MODERATE): ICD-10-CM

## 2019-03-20 DIAGNOSIS — E87.5 HYPERKALEMIA: ICD-10-CM

## 2019-03-20 DIAGNOSIS — Z71.89 ENCOUNTER FOR MEDICATION REVIEW AND COUNSELING: ICD-10-CM

## 2019-03-20 DIAGNOSIS — I25.10 CORONARY ARTERY DISEASE INVOLVING NATIVE CORONARY ARTERY OF NATIVE HEART WITHOUT ANGINA PECTORIS: ICD-10-CM

## 2019-03-20 DIAGNOSIS — N17.9 ACUTE KIDNEY INJURY: ICD-10-CM

## 2019-03-20 DIAGNOSIS — L03.116 CELLULITIS OF LEG, LEFT: Primary | ICD-10-CM

## 2019-03-20 PROCEDURE — 3077F SYST BP >= 140 MM HG: CPT | Mod: CPTII,S$GLB,, | Performed by: INTERNAL MEDICINE

## 2019-03-20 PROCEDURE — 99999 PR PBB SHADOW E&M-EST. PATIENT-LVL IV: CPT | Mod: PBBFAC,,, | Performed by: INTERNAL MEDICINE

## 2019-03-20 PROCEDURE — 3008F PR BODY MASS INDEX (BMI) DOCUMENTED: ICD-10-PCS | Mod: CPTII,S$GLB,, | Performed by: INTERNAL MEDICINE

## 2019-03-20 PROCEDURE — 3079F PR MOST RECENT DIASTOLIC BLOOD PRESSURE 80-89 MM HG: ICD-10-PCS | Mod: CPTII,S$GLB,, | Performed by: INTERNAL MEDICINE

## 2019-03-20 PROCEDURE — 99999 PR PBB SHADOW E&M-EST. PATIENT-LVL IV: ICD-10-PCS | Mod: PBBFAC,,, | Performed by: INTERNAL MEDICINE

## 2019-03-20 PROCEDURE — 3008F BODY MASS INDEX DOCD: CPT | Mod: CPTII,S$GLB,, | Performed by: INTERNAL MEDICINE

## 2019-03-20 PROCEDURE — 99215 OFFICE O/P EST HI 40 MIN: CPT | Mod: S$GLB,,, | Performed by: INTERNAL MEDICINE

## 2019-03-20 PROCEDURE — 3077F PR MOST RECENT SYSTOLIC BLOOD PRESSURE >= 140 MM HG: ICD-10-PCS | Mod: CPTII,S$GLB,, | Performed by: INTERNAL MEDICINE

## 2019-03-20 PROCEDURE — 3046F HEMOGLOBIN A1C LEVEL >9.0%: CPT | Mod: CPTII,S$GLB,, | Performed by: INTERNAL MEDICINE

## 2019-03-20 PROCEDURE — 3079F DIAST BP 80-89 MM HG: CPT | Mod: CPTII,S$GLB,, | Performed by: INTERNAL MEDICINE

## 2019-03-20 PROCEDURE — 3046F PR MOST RECENT HEMOGLOBIN A1C LEVEL > 9.0%: ICD-10-PCS | Mod: CPTII,S$GLB,, | Performed by: INTERNAL MEDICINE

## 2019-03-20 PROCEDURE — 99215 PR OFFICE/OUTPT VISIT, EST, LEVL V, 40-54 MIN: ICD-10-PCS | Mod: S$GLB,,, | Performed by: INTERNAL MEDICINE

## 2019-03-20 RX ORDER — LISINOPRIL 20 MG/1
20 TABLET ORAL DAILY
Qty: 30 TABLET | Refills: 11 | Status: SHIPPED | OUTPATIENT
Start: 2019-03-20 | End: 2021-03-22 | Stop reason: SDUPTHER

## 2019-03-20 NOTE — PROGRESS NOTES
NEPHROLOGY CONSULTATION F/U NOTE  Date of clinic visit: 3/20/19     REASON FOR CONSULTATION:  NINFA on Chronic kidney disease, hyperkalemia, and f/u for L leg cellulitis     REFERRING PHYSICIAN:  Dr. Liza Fontenot.      HISTORY OF PRESENT ILLNESS: Pt is a 64-year-old male with a h/o of DM, HTN, CKD stage 3 with s Cr ranging between 1.9-2.6 in the past several years and obesity, who was initially referred to us for increase in s Cr. Pt additionally complained of left leg redness, swelling, and discomfort. Pt was treated by us for left leg cellulitis by giving bactrim. He is allergic to cefazolin and keflex was not given. Pt had also borderline hyperkalemia, and for bactrim to be given, aldactone had to be stopped and ACE-I dose was reduced. Pt has started bactrim (renally adjusted dose) and has 8 more days of treatment left.    On return visit, he has no new c/o's, says that leg looks and feels better. No fever, no new issues or c/o's. Noted BP is slightly elevated. Labs and meds reviewed with pt.       PAST MEDICAL HISTORY:  1.  CKD, stage III, based on review of the records, baseline creatinine appears to be close to 2.0 with estimated GFR of about 40 mL/min.  2.  Diabetes mellitus for 15 years.  3.  Hypertension.  4.  Coronary artery disease with past history of MI in 2016, had 2 stents placed.  5.  Diastolic CHF.  6.  Obesity.  7.  Hyperlipidemia.  8.  Left bundle-branch block.  9.  Obstructive sleep apnea.  10.  History of right-sided Bell's palsy.  11.  Idiopathic peripheral neuropathy.   12.  Primary open-angle glaucoma of both eyes.  13.  Chronic left hydronephrosis.     PAST SURGICAL HISTORY:  Reviewed.  Distant eye surgery for cataracts.     FAMILY HISTORY:  Reviewed.  Both parents passed away in their 80s.  No history   of diabetes mellitus.     ALLERGIES:  Reviewed, to Ancef.     SOCIAL HISTORY:  Negative for smoking.  No alcohol use.     MEDICATIONS:  Fully reviewed per [facility] medication record.  The  patient takes aspirin, Lipitor, Coreg 25 mg p.o. b.i.d., Lasix 40 mg 2 tablets b.i.d., lisinopril 10 mg daily.  Other meds reviewed and noted.     REVIEW OF SYSTEMS:  No recent hospitalizations.  GENERAL:  Negative.  HEAD, EYES, EARS, NOSE, AND THROAT:  Negative.  CARDIAC:  Negative.  PULMONARY:  Negative.  GASTROINTESTINAL:  Negative.  GENITOURINARY:  Negative.  PSYCHOLOGICAL:  Negative.  NEUROLOGICAL:  Negative.  ENDOCRINE:  Negative.  HEMATOLOGIC AND ONCOLOGIC:  Negative.  INFECTIOUS DISEASE:  Negative.  The rest of the review of systems negative.     PHYSICAL EXAMINATION:  VITAL SIGNS:  Blood pressure is 150/88, pulse 70, weight 407 lbs, last visit 404 pounds.  He is cooperative, pleasant, in no acute distress.  Atraumatic, normocephalic.    Well developed, well nourished.  HEENT:  Mucous membranes moist.  NECK:  No JVD.  HEART:  Regular rate and rhythm.  CHEST:  Clear to auscultation.  No rales.  No wheezes.  Breathing symmetric,   unlabored.  ABDOMEN:  Soft, nontender.  EXTREMITIES:  Showed on the right side, trace edema.  On the left, improved edema and erythema    LABS:  Reviewed.   BMP  Lab Results   Component Value Date     03/18/2019    K 3.8 03/18/2019     03/18/2019    CO2 23 03/18/2019    BUN 21 03/18/2019    CREATININE 2.1 (H) 03/18/2019    CALCIUM 9.1 03/18/2019    ANIONGAP 10 03/18/2019    ESTGFRAFRICA 37.9 (A) 03/18/2019    EGFRNONAA 32.7 (A) 03/18/2019     Lab Results   Component Value Date    WBC 5.43 03/18/2019    HGB 11.5 (L) 03/18/2019    HCT 36.3 (L) 03/18/2019    MCV 97 03/18/2019     (H) 03/18/2019     Lab Results   Component Value Date    .0 (H) 08/09/2018    CALCIUM 9.1 03/18/2019    PHOS 2.0 (L) 03/18/2019     Blood cx x 2 neg  Urinalysis no proteinuria, no blood, no casts reported.  No rbc's.     ASSESSMENT AND PLAN:  This is a 64-year-old male with metabolic syndrome, DM, HTN, CKD-3, who presents for f/u of NINFA, hyperkalemia, and leg cellulitis:  The  impression is:    1.  Renal:  NINFA on CKD-3.  NINFA has resolved, s Cr lower, improved, at baseline  Pt does have fluctuating levels of serum creatinine, per pervious records, however.  NINFA may have been due to cellulitis, perhaps subclinical sepsis, bacteremia, as pt also had low BP/hypotension last time       Proteinuria. Related to CKD  Likely has diabetic nephropathy.    Cause of CKD likely due to DM and HTN      2.  Hypertension. Hypotension has improved  BP slightly elevated  Previously aldactone was stopped and lisinopril was reduced from 40 to 10 mg qd  Will increase again lisinopril from 10 to 20 mg po qd    3. Hyperkalemia: resolved after stopping aldactone  K normal  OK to increase the ACE-I.     4. Left leg cellulitis. On bactrim  Clinically appears improved  WBC normal  Blood cx's negative  Continue bactrim until all gone, 8 more days.       PLANS AND RECOMMENDATIONS:  For this complicated patient include the following:  As discussed above  Discussed with the patient.  Opportunity for question and discussion provided.  Increase lisinopril to 20 mg po qd  RTC 1 month  F/u with PCP  Multiple issues addressed   Total time spent 40 minutes, time was needed to review the records, the patient   evaluation, documentation, and face-to-face discussion with the patient.  More   than 50% of the time was spent in counseling and coordination of care.  Level V visit.    Kamila Abdi MD

## 2019-03-25 ENCOUNTER — TELEPHONE (OUTPATIENT)
Dept: OPHTHALMOLOGY | Facility: CLINIC | Age: 64
End: 2019-03-25

## 2019-03-25 DIAGNOSIS — E11.29 TYPE 2 DIABETES MELLITUS WITH OTHER DIABETIC KIDNEY COMPLICATION: ICD-10-CM

## 2019-03-25 NOTE — TELEPHONE ENCOUNTER
Spoke with patient and he has a form from the [employer]. [employer] that needs to be filled out and faxed I gave him the fax # at dot.

## 2019-03-25 NOTE — TELEPHONE ENCOUNTER
----- Message from Tawny Su sent at 3/25/2019 10:54 AM CDT -----  Contact: pt  He's calling in regards to refill/ insulin 120 units twice a day       pls call pt back at [phone] (home)       [facility] [address] [place] [id number]  Phone: [phone] Fax: [phone]

## 2019-03-25 NOTE — TELEPHONE ENCOUNTER
----- Message from Joan Cho sent at 3/25/2019 10:58 AM CDT -----  Contact: pt  He's calling in regards to forms being filled out for motor vehicle     pls call pt back at [phone] (home)

## 2019-03-27 ENCOUNTER — TELEPHONE (OUTPATIENT)
Dept: OPHTHALMOLOGY | Facility: CLINIC | Age: 64
End: 2019-03-27

## 2019-03-27 NOTE — TELEPHONE ENCOUNTER
Spoke with pt and advised that Dr. Hale wants him to have a road test at the [facility] to get 's license.

## 2019-03-28 ENCOUNTER — OFFICE VISIT (OUTPATIENT)
Dept: PSYCHIATRY | Facility: CLINIC | Age: 64
End: 2019-03-28
Payer: COMMERCIAL

## 2019-03-28 VITALS
WEIGHT: 315 LBS | BODY MASS INDEX: 47.46 KG/M2 | SYSTOLIC BLOOD PRESSURE: 148 MMHG | HEART RATE: 64 BPM | DIASTOLIC BLOOD PRESSURE: 92 MMHG

## 2019-03-28 DIAGNOSIS — F41.9 ANXIETY: ICD-10-CM

## 2019-03-28 DIAGNOSIS — F33.1 MAJOR DEPRESSIVE DISORDER, RECURRENT EPISODE, MODERATE: Primary | ICD-10-CM

## 2019-03-28 DIAGNOSIS — F33.9 RECURRENT MAJOR DEPRESSIVE DISORDER, REMISSION STATUS UNSPECIFIED: Primary | ICD-10-CM

## 2019-03-28 PROCEDURE — 3008F PR BODY MASS INDEX (BMI) DOCUMENTED: ICD-10-PCS | Mod: CPTII,S$GLB,, | Performed by: PSYCHIATRY & NEUROLOGY

## 2019-03-28 PROCEDURE — 99214 OFFICE O/P EST MOD 30 MIN: CPT | Mod: S$GLB,,, | Performed by: PSYCHIATRY & NEUROLOGY

## 2019-03-28 PROCEDURE — 3077F PR MOST RECENT SYSTOLIC BLOOD PRESSURE >= 140 MM HG: ICD-10-PCS | Mod: CPTII,S$GLB,, | Performed by: PSYCHIATRY & NEUROLOGY

## 2019-03-28 PROCEDURE — 90834 PSYTX W PT 45 MINUTES: CPT | Mod: S$GLB,,, | Performed by: SOCIAL WORKER

## 2019-03-28 PROCEDURE — 99999 PR PBB SHADOW E&M-EST. PATIENT-LVL II: ICD-10-PCS | Mod: PBBFAC,,, | Performed by: PSYCHIATRY & NEUROLOGY

## 2019-03-28 PROCEDURE — 90834 PR PSYCHOTHERAPY W/PATIENT, 45 MIN: ICD-10-PCS | Mod: S$GLB,,, | Performed by: SOCIAL WORKER

## 2019-03-28 PROCEDURE — 3080F PR MOST RECENT DIASTOLIC BLOOD PRESSURE >= 90 MM HG: ICD-10-PCS | Mod: CPTII,S$GLB,, | Performed by: PSYCHIATRY & NEUROLOGY

## 2019-03-28 PROCEDURE — 3080F DIAST BP >= 90 MM HG: CPT | Mod: CPTII,S$GLB,, | Performed by: PSYCHIATRY & NEUROLOGY

## 2019-03-28 PROCEDURE — 99214 PR OFFICE/OUTPT VISIT, EST, LEVL IV, 30-39 MIN: ICD-10-PCS | Mod: S$GLB,,, | Performed by: PSYCHIATRY & NEUROLOGY

## 2019-03-28 PROCEDURE — 3077F SYST BP >= 140 MM HG: CPT | Mod: CPTII,S$GLB,, | Performed by: PSYCHIATRY & NEUROLOGY

## 2019-03-28 PROCEDURE — 3008F BODY MASS INDEX DOCD: CPT | Mod: CPTII,S$GLB,, | Performed by: PSYCHIATRY & NEUROLOGY

## 2019-03-28 PROCEDURE — 99999 PR PBB SHADOW E&M-EST. PATIENT-LVL II: CPT | Mod: PBBFAC,,, | Performed by: PSYCHIATRY & NEUROLOGY

## 2019-03-28 RX ORDER — BUPROPION HYDROCHLORIDE 300 MG/1
300 TABLET ORAL DAILY
Qty: 90 TABLET | Refills: 0 | Status: SHIPPED | OUTPATIENT
Start: 2019-03-28 | End: 2019-07-25 | Stop reason: SDUPTHER

## 2019-03-28 NOTE — PROGRESS NOTES
"Outpatient Psychiatry Follow-up Visit (MD/NP)    3/28/2019    Stepan Springer, a 64 y.o. male, presenting for follow-up visit. Met with patient.    Reason for Encounter: self-referral. Patient complains of depressison.    Interval History: Patient seen & interviewed for follow-up, last seen about 4 months prior to this visit. Symptoms of Bell's palsy considerably improved from previously with ongoing residual symptoms. More acute problem has been recurrent cellulitis for which he just finished course of antibiotics. Off spironolactone. Very busy with work in midst of tax season. Mental health ok.   No side effects. No new problems. Went out to lunch with Solange (as part of a group lunch). Grandchildren are his chief source of jaya.     Background: This 62 y/o with depression recurrence, 6 months of low moods, feelings of guilt/self-reproach, hopelessness & helplessness, intermittent SI which has been mostly passive, occasional fantasies about active suicide attempts, but never seriously considers action. Most recent depression is in context of loss of love relationship to a woman he met on internet, dated for some time then broke up with him & is now in another relationship. Continued to text & attempt to contact her (she generally doesn't respond) until about a week ago, stopped because he realizes it's time to "move on", that it's taking a toll on his mental health, but still feels tempted to do it. Continues to work despite symptoms, reports performance is "ok", though "not my best work". PsychHx: symptoms began around [place] '09. first treated for depression in early '10. [place] [facility] [place] 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 Has participated in psychotherapy including since he moved to [place], stopped due " to loss of insurance (though reinsured, hasn't returned since most recent depression recurrence). 1 remote episode of suicidality without action.  MedHx: DM, had NSTEMI in , FRAN, CHF, CKD, cataracts. SubstanceHx: rare wine, but mostly avoids alcohol due to his health; no illicits or prescription drug abuse. Social Hx: normal , birth, & developmental milestones. Grew up in [place], moved to [place] in .  (wife  in '10), & 2 year relationship ended in mid , though he's continued to contact her until very recently. 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 Works full-time. Has 3 adopted kids (one of which is child of one of other kids).      Review Of Systems:     GENERAL:  No weight gain or loss  SKIN:  No rashes or lacerations  HEAD:  No headaches, +hemifacial plegia.   CHEST:  No shortness of breath, hyperventilation or cough  CARDIOVASCULAR:  No tachycardia or chest pain  ABDOMEN:  No nausea, vomiting, pain, constipation or diarrhea  URINARY:  No frequency, dysuria or sexual dysfunction  ENDOCRINE:  No polydipsia, polyuria  MUSCULOSKELETAL:  No pain or stiffness of the joints  NEUROLOGIC:  No weakness, sensory changes, seizures, confusion, memory loss, tremor or other abnormal movements    Current Evaluation:     Nutritional Screening: Considering the patient's height and weight, medications, medical history and preferences, should a referral be made to the dietitian? no    Constitutional  Vitals:  Most recent vital signs, dated less than 90 days prior to this appointment, were not reviewed.    There were no vitals filed for this visit.     General:  unremarkable, age appropriate     Musculoskeletal  Muscle Strength/Tone:  no tremor, no tic   Gait & Station:  non-ataxic     Psychiatric  Appearance: casually dressed & groomed;   Behavior: calm,   Cooperation: cooperative with assessment  Speech: normal rate,  volume, tone  Thought Process: linear, goal-directed  Thought Content: No suicidal or homicidal ideation; no delusions  Affect: mildly anxious  Mood: mildly anxious  Perceptions: No auditory or visual hallucinations  Level of Consciousness: alert throughout interview  Insight: fair  Cognition: Oriented to person, place, time, & situation  Memory: no apparent deficits to general clinical interview; not formally assessed  Attention/Concentration: no apparent deficits to general clinical interview; not formally assessed  Fund of Knowledge: average by vocabulary/education    Laboratory Data  Lab Visit on 03/18/2019   Component Date Value Ref Range Status    Glucose 03/18/2019 127* 70 - 110 mg/dL Final    Sodium 03/18/2019 142  136 - 145 mmol/L Final    Potassium 03/18/2019 3.8  3.5 - 5.1 mmol/L Final    Chloride 03/18/2019 109  95 - 110 mmol/L Final    CO2 03/18/2019 23  23 - 29 mmol/L Final    BUN, Bld 03/18/2019 21  8 - 23 mg/dL Final    Calcium 03/18/2019 9.1  8.7 - 10.5 mg/dL Final    Creatinine 03/18/2019 2.1* 0.5 - 1.4 mg/dL Final    Albumin 03/18/2019 2.8* 3.5 - 5.2 g/dL Final    Phosphorus 03/18/2019 2.0* 2.7 - 4.5 mg/dL Final    eGFR if  03/18/2019 37.9* >60 mL/min/1.73 m^2 Final    eGFR if non  03/18/2019 32.7* >60 mL/min/1.73 m^2 Final    Anion Gap 03/18/2019 10  8 - 16 mmol/L Final    WBC 03/18/2019 5.43  3.90 - 12.70 K/uL Final    RBC 03/18/2019 3.73* 4.60 - 6.20 M/uL Final    Hemoglobin 03/18/2019 11.5* 14.0 - 18.0 g/dL Final    Hematocrit 03/18/2019 36.3* 40.0 - 54.0 % Final    MCV 03/18/2019 97  82 - 98 fL Final    MCH 03/18/2019 30.8  27.0 - 31.0 pg Final    MCHC 03/18/2019 31.7* 32.0 - 36.0 g/dL Final    RDW 03/18/2019 12.1  11.5 - 14.5 % Final    Platelets 03/18/2019 411* 150 - 350 K/uL Final    MPV 03/18/2019 9.0* 9.2 - 12.9 fL Final    Immature Granulocytes 03/18/2019 0.2  0.0 - 0.5 % Final    Gran # (ANC) 03/18/2019 3.6  1.8 - 7.7 K/uL Final     Immature Grans (Abs) 03/18/2019 0.01  0.00 - 0.04 K/uL Final    Lymph # 03/18/2019 1.0  1.0 - 4.8 K/uL Final    Mono # 03/18/2019 0.6  0.3 - 1.0 K/uL Final    Eos # 03/18/2019 0.2  0.0 - 0.5 K/uL Final    Baso # 03/18/2019 0.03  0.00 - 0.20 K/uL Final    nRBC 03/18/2019 0  0 /100 WBC Final    Gran% 03/18/2019 66.5  38.0 - 73.0 % Final    Lymph% 03/18/2019 18.0  18.0 - 48.0 % Final    Mono% 03/18/2019 10.3  4.0 - 15.0 % Final    Eosinophil% 03/18/2019 4.4  0.0 - 8.0 % Final    Basophil% 03/18/2019 0.6  0.0 - 1.9 % Final    Differential Method 03/18/2019 Automated   Final   Lab Visit on 03/14/2019   Component Date Value Ref Range Status    Blood Culture, Routine 03/14/2019 No growth after 5 days.   Final    Blood Culture, Routine 03/14/2019 No growth after 5 days.   Final     Medications  Outpatient Encounter Medications as of 3/28/2019   Medication Sig Dispense Refill    ACCU-CHEK ALYCIA PLUS METER Misc       aspirin 325 MG tablet Take 325 mg by mouth 2 (two) times daily.      atorvastatin (LIPITOR) 10 MG tablet Take 1 tablet (10 mg total) by mouth once daily. 90 tablet 3    BLOOD PRESSURE CUFF Misc 1 cuff 1 each 0    blood sugar diagnostic Strp To check BG 3 times daily, to use with insurance preferred meter 100 strip 3    blood-glucose meter kit To check BG 3 times daily, to use with insurance preferred meter 1 each 0    brimonidine 0.1% (ALPHAGAN P) 0.1 % Drop Place 1 drop into both eyes 3 (three) times daily. Order 90 day supply 10 mL 4    brinzolamide (AZOPT) 1 % ophthalmic suspension Place 1 drop into both eyes 3 (three) times daily. ORDER 90 DAY SUPPLY 10 mL 4    buPROPion (WELLBUTRIN XL) 300 MG 24 hr tablet Take 1 tablet (300 mg total) by mouth once daily. 90 tablet 1    carvedilol (COREG) 25 MG tablet TAKE ONE TABLET BY MOUTH TWICE DAILY WITH  MEALS 180 tablet 11    furosemide (LASIX) 40 MG tablet Take 2 tablets (80 mg total) by mouth 2 (two) times daily. When ankles retain fluid I  double the lasix x 14 days 260 tablet 3    insulin NPH-insulin regular, 70/30, (NOVOLIN 70/30 U-100 INSULIN) 100 unit/mL (70-30) injection Inject 120 Units into the skin 2 (two) times daily. 20 mL 6    lancets Misc To check BG 3 times daily, to use with insurance preferred meter 100 each 3    lisinopril (PRINIVIL,ZESTRIL) 20 MG tablet Take 1 tablet (20 mg total) by mouth once daily. 30 tablet 11    nitroGLYCERIN (NITROSTAT) 0.4 MG SL tablet Place 1 tablet (0.4 mg total) under the tongue every 5 (five) minutes as needed for Chest pain. 20 tablet 0    omeprazole (PRILOSEC) 40 MG capsule Take 1 capsule (40 mg total) by mouth once daily. 30 capsule 5    polyethylene glycol (GLYCOLAX) 17 gram/dose powder Take 17 g by mouth once daily. 238 g 6    semaglutide (OZEMPIC) 1 mg/0.75 mL (2 mg/1.5 mL) PnIj Inject 0.5 mg into the skin every 7 days for 90 days, THEN 1 mg every 7 days. 2 Syringe 5    sildenafil (VIAGRA) 50 MG tablet Take 1 tablet (50 mg total) by mouth daily as needed for Erectile Dysfunction. 5 tablet 3    sulfamethoxazole-trimethoprim 800-160mg (BACTRIM DS) 800-160 mg Tab Take 1 tablet by mouth once daily. Once/day for renal dose adjustment 14 tablet 0    travoprost (TRAVATAN Z) 0.004 % ophthalmic solution Place 1 drop into both eyes every evening. 2.5 mL 6     No facility-administered encounter medications on file as of 3/28/2019.      Assessment - Diagnosis - Goals:     Impression: This 65 y/o WM with MDD, with recurrent major depression, symptoms.. Participating well in psychotherapy.  SI is resolved, hasn't returned. Ongoing fantasies of reconciliation with ex, disappointment. Has bell's palsy, slowly resolving.       Major depressive disorder, recurrent, mild; anxiety    Treatment Goals:  Specify outcomes written in observable, behavioral terms:   Depression: increasing energy, increasing interest in usual activities, increasing motivation, reducing excessive guilt and reducing fatigue    Treatment  Plan/Recommendations:   Continue bupropion xl 300 mg daily. Discussed risks, benefits, and alternatives to treatment plan documented above with patient. I answered all patient questions related to this plan and patient expressed understanding and agreement.   Continue psychotherapy.     Return to Clinic: 6 months, prn sooner.     Counseling time: 5 minutes  Total time: 20 minutes    RAMON Pitt MD

## 2019-04-03 ENCOUNTER — TELEPHONE (OUTPATIENT)
Dept: GASTROENTEROLOGY | Facility: CLINIC | Age: 64
End: 2019-04-03

## 2019-04-03 DIAGNOSIS — E11.29 TYPE 2 DIABETES MELLITUS WITH OTHER DIABETIC KIDNEY COMPLICATION: ICD-10-CM

## 2019-04-03 RX ORDER — POLYETHYLENE GLYCOL 3350, SODIUM SULFATE ANHYDROUS, SODIUM BICARBONATE, SODIUM CHLORIDE, POTASSIUM CHLORIDE 236; 22.74; 6.74; 5.86; 2.97 G/4L; G/4L; G/4L; G/4L; G/4L
4 POWDER, FOR SOLUTION ORAL ONCE
Qty: 4000 ML | Refills: 0 | Status: SHIPPED | OUTPATIENT
Start: 2019-04-03 | End: 2019-04-03

## 2019-04-03 NOTE — TELEPHONE ENCOUNTER
----- Message from Darlin Duque sent at 4/3/2019  2:30 PM CDT -----  Contact: pt   Type:  RX Refill Request    Who Called: pt   Refill or New Rx: refill   RX Name and Strength:insulin 120 units  How is the patient currently taking it? (ex. 1XDay): morning & evening  Is this a 30 day or 90 day RX:90 days   [facility]   Local or Mail Order:local   Ordering Provider:raffi  Would the patient rather a call back or a response via [facility]? Phone   Best Call Back Number: [phone]  Additional Information: was told to increase 5 levels until under control

## 2019-04-03 NOTE — TELEPHONE ENCOUNTER
----- Message from Darlin Duque sent at 4/3/2019  2:28 PM CDT -----  Contact: pt   Type:  RX Refill Request    Who Called: Pt   Refill or New Rx:new   RX Name and Strength:unknown to pt/ pharm lost his rx   How is the patient currently taking it? (ex. 1XDay):  Is this a 30 day or 90 day RX:  [facility] [place]   Local or Mail Order:local   Ordering Provider: juany   Would the patient rather a call back or a response via [facility]? Phone   Best Call Back Number [phone]  Additional Information:     [facility] [address]  [address] [facility] [address]  Phone: [phone] Fax: [phone]

## 2019-04-03 NOTE — TELEPHONE ENCOUNTER
Returned call to pt who stated [facility] needs a new script for -     polyethylene glycol (GOLYTELY,NULYTELY) 236-22.74-6.74 -5.86 gram suspension

## 2019-04-04 ENCOUNTER — TELEPHONE (OUTPATIENT)
Dept: CARDIOLOGY | Facility: CLINIC | Age: 64
End: 2019-04-04

## 2019-04-04 NOTE — TELEPHONE ENCOUNTER
Called and left v/m that Dr Gann was not going to be in clinic on the 30th. To please rtc to reschedule. cm

## 2019-04-11 ENCOUNTER — OFFICE VISIT (OUTPATIENT)
Dept: PSYCHIATRY | Facility: CLINIC | Age: 64
End: 2019-04-11
Payer: COMMERCIAL

## 2019-04-11 DIAGNOSIS — F33.1 MAJOR DEPRESSIVE DISORDER, RECURRENT EPISODE, MODERATE: Primary | ICD-10-CM

## 2019-04-11 PROCEDURE — 90834 PR PSYCHOTHERAPY W/PATIENT, 45 MIN: ICD-10-PCS | Mod: S$GLB,,, | Performed by: SOCIAL WORKER

## 2019-04-11 PROCEDURE — 90834 PSYTX W PT 45 MINUTES: CPT | Mod: S$GLB,,, | Performed by: SOCIAL WORKER

## 2019-04-11 NOTE — PROGRESS NOTES
"Individual Psychotherapy (PhD/LCSW)    4/11/2019    Site:  [place]         Therapeutic Intervention: Met with patient.  Outpatient - Insight oriented psychotherapy 45 min - CPT code 12050    Chief complaint/reason for encounter: depression     Interval history and content of current session:  Patient presents to ongoing individual therapy due to depression.  He has low blood sugar when entering session.   He has not eaten breakfast today.  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  His schedule will return to "normal" after next Monday.  He has two job possibilities.  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  He has been happy to find out about the possibilities because he thought he would be at [facility] "forever."  He is frustrated with needing to take a driving test because his eye doctor would not complete a form.  He plans to take the test at the [facility] in [place].  He recently discovered that his license is suspended until he does so.  He is worried about his ex girlfriend, Solange.  She went to a physical therapist for the problems with her back.  The physical therapy made the pain worse.  He remembers having problems with his back.  He was given muscle relaxers and told to spend several weeks resting.  He talked to his friend, ANGEL, about the strange behavior he was noticing from his friend's wife, Geena.  He found out that the behavior was due to a lupus flare up.  She later apologized to the patient.  He is not looking forward to a colonoscopy and an EGD.  He has seen what he will have to drink for the procedure.  He has been having reflux which prompted the tests.    Target symptoms: depression  Why chosen therapy is appropriate versus another modality: relevant to diagnosis  Outcome monitoring methods: self-report, observation  Therapeutic intervention type: insight oriented psychotherapy, supportive " psychotherapy, interactive psychotherapy     Risk parameters:  Patient reports no suicidal ideation  Patient reports no homicidal ideation  Patient reports no self-injurious behavior  Patient reports no violent behavior     Verbal deficits: none     Patient's response to intervention:  The patient's response to intervention is accepting, motivated.     Progress toward goals and other mental status changes:  The patient's progress toward goals is fair .     Diagnosis:   Major depression recurrent moderate     Plan:  individual psychotherapy and medication management by physician     Return to clinic: as scheduled     Length of Service (minutes): 45

## 2019-04-23 ENCOUNTER — OFFICE VISIT (OUTPATIENT)
Dept: OPHTHALMOLOGY | Facility: CLINIC | Age: 64
End: 2019-04-23
Payer: COMMERCIAL

## 2019-04-23 DIAGNOSIS — Z91.148 NON COMPLIANCE W MEDICATION REGIMEN: ICD-10-CM

## 2019-04-23 DIAGNOSIS — H40.1133 PRIMARY OPEN ANGLE GLAUCOMA OF BOTH EYES, SEVERE STAGE: Primary | ICD-10-CM

## 2019-04-23 DIAGNOSIS — H35.372 EPIRETINAL MEMBRANE (ERM) OF LEFT EYE: ICD-10-CM

## 2019-04-23 PROCEDURE — 99999 PR PBB SHADOW E&M-EST. PATIENT-LVL II: ICD-10-PCS | Mod: PBBFAC,,, | Performed by: OPHTHALMOLOGY

## 2019-04-23 PROCEDURE — 92014 PR EYE EXAM, EST PATIENT,COMPREHESV: ICD-10-PCS | Mod: S$GLB,,, | Performed by: OPHTHALMOLOGY

## 2019-04-23 PROCEDURE — 92250 COLOR FUNDUS PHOTOGRAPHY - OU - BOTH EYES: ICD-10-PCS | Mod: S$GLB,,, | Performed by: OPHTHALMOLOGY

## 2019-04-23 PROCEDURE — 92250 FUNDUS PHOTOGRAPHY W/I&R: CPT | Mod: S$GLB,,, | Performed by: OPHTHALMOLOGY

## 2019-04-23 PROCEDURE — 92014 COMPRE OPH EXAM EST PT 1/>: CPT | Mod: S$GLB,,, | Performed by: OPHTHALMOLOGY

## 2019-04-23 PROCEDURE — 99999 PR PBB SHADOW E&M-EST. PATIENT-LVL II: CPT | Mod: PBBFAC,,, | Performed by: OPHTHALMOLOGY

## 2019-04-23 RX ORDER — TRAVOPROST OPHTHALMIC SOLUTION 0.04 MG/ML
1 SOLUTION OPHTHALMIC NIGHTLY
Qty: 2.5 ML | Refills: 6 | Status: SHIPPED | OUTPATIENT
Start: 2019-04-23 | End: 2019-04-23 | Stop reason: SDUPTHER

## 2019-04-23 RX ORDER — TRAVOPROST OPHTHALMIC SOLUTION 0.04 MG/ML
1 SOLUTION OPHTHALMIC NIGHTLY
Qty: 2.5 ML | Refills: 6 | Status: SHIPPED | OUTPATIENT
Start: 2019-04-23 | End: 2019-09-17 | Stop reason: SDUPTHER

## 2019-04-23 NOTE — PROGRESS NOTES
HPI     Glaucoma      Additional comments: 3M SDP and DOA              Comments     The patient states his eyes are about the same and he denies any ocular   pain at this time.  The pt brought in his DMV form again which we already signed and   recommended road test   ptran out of travatan and says he wsant sure he had to continue it       [facility] - Dr Burgess pt  Corneal Opacity  [place] palsy  DM  RD Repair with Scleral Buckle right eye  PCIOL OU  CANALOPLASTY OD 8-22-13 Good flow and tension(-900)  CANAL OS 03/20/14/LOT # 1306-01/REF # [id number]  -500 with shutter effect due to much intraop respiratory movement  Moderate flow with good tension      Azopt BID OU, Alphagan BID OU  Refresh prn OD  OU: Travatan Z QHS (NOT USING)          Last edited by Alvin Hale MD on 4/23/2019  9:00 AM. (History)            Assessment /Plan     For exam results, see Encounter Report.      ICD-10-CM ICD-9-CM    1. Primary open angle glaucoma of both eyes, severe stage H40.1133 365.11 iop above target but patient not using Travatan     365.73    2. Epiretinal membrane (ERM) of left eye H35.372 362.56 follow   3. Uncontrolled type 2 diabetes mellitus with stage 3 chronic kidney disease, with long-term current use of insulin E11.22 250.52 Diabetes with no diabetic retinopathy on dilated exam.   Reviewed diabetic eye precautions including excellent blood sugar control, and importance of regular follow up.           E11.65 585.3     N18.3 V58.67     Z79.4     4. Non compliance w medication regimen Z91.14 V15.81 As above   5.      Bell's Palsy - much improved on exam, not using ointment , only uses refresh prn    Azopt BID OU, Alphagan BID OU  Refresh prn OD  OU: Travatan Z QHS  Return to clinic 3 months with hvf

## 2019-04-30 NOTE — H&P
Short Stay Endoscopy History and Physical    PCP - Liza Landrum MD    Procedure - EGD and Colonoscopy  ASA - 3  Mallampati - per anesthesia  History of Anesthesia problems - no  Family history Anesthesia problems -  no     HPI:  This is a 64 y.o.male here for evaluation of : GERD; Colon Cancer Screen    Reflux - yes  Dysphagia - no  Abdominal pain - no  Diarrhea - no  Anemia - no  GI bleeding - no  Nausea and vomiting-no  Early satiety-no  aversion to sight or smell of food-no    ROS:  Constitutional: No fevers, chills, No weight loss  ENT: No allergies  CV: No chest pain  Pulm: No cough, No shortness of breath  Ophtho: No vision changes  GI: see HPI  Derm: No rash  Heme: No lymphadenopathy, No bruising  MSK: No arthritis  : No dysuria, No hematuria  Endo: No hot or cold intolerance  Neuro: No syncope, No seizure  Psych: No anxiety, No depression    Medical History:  Past Medical History:   Diagnosis Date    Anxiety     Bell's palsy     Coronary artery disease involving native coronary artery without angina pectoris 10/18/2016    Idiopathic peripheral neuropathy 12/11/2012    Mixed hyperlipidemia 12/11/2012    Vitamin B 12 deficiency 8/23/2012       Surgical History:  Past Surgical History:   Procedure Laterality Date    CARDIAC CATHETERIZATION  7/22/13    non obstructive cad    CATARACT EXTRACTION  OU    CATHETERIZATION, HEART, LEFT Left 7/22/2013    Performed by Lila Parekh MD at [facility] CATH LAB    EYE SURGERY      FRACTURE SURGERY      kidney stone       August 2016    PC IOL OU      RETINAL DETACHMENT REPAIR W/ SCLERAL BUCKLE LE      WRIST SURGERY         Family History:  Family History   Problem Relation Age of Onset    Macular degeneration Father     Heart disease Father         CHF     Heart attack Father     Hypertension Mother     Macular degeneration Paternal Uncle     Hypertension Maternal Grandmother     Hypertension Maternal Grandfather     Strabismus Neg Hx      "Retinal detachment Neg Hx     Glaucoma Neg Hx     Blindness Neg Hx     Amblyopia Neg Hx        Social History:  Social History     Socioeconomic History    Marital status:      Spouse name: Not on file    Number of children: Not on file    Years of education: Not on file    Highest education level: Not on file   Occupational History     Employer:  [employer]   Social Needs    Financial resource strain: Not on file    Food insecurity:     Worry: Not on file     Inability: Not on file    Transportation needs:     Medical: Not on file     Non-medical: Not on file   Tobacco Use    Smoking status: Never Smoker    Smokeless tobacco: Never Used   Substance and Sexual Activity    Alcohol use: Yes     Comment: " one to two glasses of wine per year"    Drug use: No    Sexual activity: Not Currently     Birth control/protection: None   Lifestyle    Physical activity:     Days per week: Not on file     Minutes per session: Not on file    Stress: Not on file   Relationships    Social connections:     Talks on phone: Not on file     Gets together: Not on file     Attends Gnosticism service: Not on file     Active member of club or organization: Not on file     Attends meetings of clubs or organizations: Not on file     Relationship status: Not on file   Other Topics Concern    Not on file   Social History Narrative    , 1 son, [employer].       Allergies:   Review of patient's allergies indicates:   Allergen Reactions    Cefazolin      Other reaction(s): Shakes  Other reaction(s): Chills       Medications:   No current facility-administered medications on file prior to encounter.      Current Outpatient Medications on File Prior to Encounter   Medication Sig Dispense Refill    ACCU-CHEK ALYCIA PLUS METER Misc       aspirin 325 MG tablet Take 325 mg by mouth 2 (two) times daily.      atorvastatin (LIPITOR) 10 MG tablet Take 1 tablet (10 mg total) by mouth once daily. 90 tablet 3    BLOOD PRESSURE " CUFF Misc 1 cuff 1 each 0    blood sugar diagnostic Strp To check BG 3 times daily, to use with insurance preferred meter 100 strip 3    blood-glucose meter kit To check BG 3 times daily, to use with insurance preferred meter 1 each 0    brimonidine 0.1% (ALPHAGAN P) 0.1 % Drop Place 1 drop into both eyes 3 (three) times daily. Order 90 day supply 10 mL 4    brinzolamide (AZOPT) 1 % ophthalmic suspension Place 1 drop into both eyes 3 (three) times daily. ORDER 90 DAY SUPPLY 10 mL 4    carvedilol (COREG) 25 MG tablet TAKE ONE TABLET BY MOUTH TWICE DAILY WITH  MEALS 180 tablet 11    furosemide (LASIX) 40 MG tablet Take 2 tablets (80 mg total) by mouth 2 (two) times daily. When ankles retain fluid I double the lasix x 14 days 260 tablet 3    lancets Misc To check BG 3 times daily, to use with insurance preferred meter 100 each 3    nitroGLYCERIN (NITROSTAT) 0.4 MG SL tablet Place 1 tablet (0.4 mg total) under the tongue every 5 (five) minutes as needed for Chest pain. 20 tablet 0    omeprazole (PRILOSEC) 40 MG capsule Take 1 capsule (40 mg total) by mouth once daily. 30 capsule 5    polyethylene glycol (GLYCOLAX) 17 gram/dose powder Take 17 g by mouth once daily. 238 g 6    semaglutide (OZEMPIC) 1 mg/0.75 mL (2 mg/1.5 mL) PnIj Inject 0.5 mg into the skin every 7 days for 90 days, THEN 1 mg every 7 days. 2 Syringe 5    sildenafil (VIAGRA) 50 MG tablet Take 1 tablet (50 mg total) by mouth daily as needed for Erectile Dysfunction. 5 tablet 3       Objective Findings:    Vital Signs:There were no vitals filed for this visit.        Physical Exam:  General Appearance: Well appearing in no acute distress  Eyes:    No scleral icterus  ENT: Neck supple, Lips, mucosa, and tongue normal; teeth and gums normal  Lungs: CTA bilaterally in anterior and posterior fields, no wheezes, no crackles.  Heart:  Regular rate, S1, S2 normal, no murmurs heard.  Abdomen: Soft, non tender, non distended with normal bowel sounds. No  hepatosplenomegaly, ascites, or mass.  Extremities: No clubbing, cyanosis or edema  Skin: No rash    Labs:  Reviewed    Plan:EGD and Colonoscopy  I have explained the risks and benefits of endoscopy procedures to the patient including but not limited to bleeding, perforation, infection, and death. The patient wishes to proceed.

## 2019-05-01 ENCOUNTER — ANESTHESIA (OUTPATIENT)
Dept: ENDOSCOPY | Facility: HOSPITAL | Age: 64
End: 2019-05-01
Payer: COMMERCIAL

## 2019-05-01 ENCOUNTER — ANESTHESIA EVENT (OUTPATIENT)
Dept: ENDOSCOPY | Facility: HOSPITAL | Age: 64
End: 2019-05-01
Payer: COMMERCIAL

## 2019-05-01 ENCOUNTER — HOSPITAL ENCOUNTER (OUTPATIENT)
Facility: HOSPITAL | Age: 64
Discharge: HOME | End: 2019-05-01
Attending: INTERNAL MEDICINE | Admitting: INTERNAL MEDICINE
Payer: COMMERCIAL

## 2019-05-01 VITALS
HEART RATE: 56 BPM | RESPIRATION RATE: 18 BRPM | DIASTOLIC BLOOD PRESSURE: 99 MMHG | BODY MASS INDEX: 36.45 KG/M2 | SYSTOLIC BLOOD PRESSURE: 163 MMHG | WEIGHT: 315 LBS | OXYGEN SATURATION: 96 % | HEIGHT: 78 IN | TEMPERATURE: 98 F

## 2019-05-01 DIAGNOSIS — K29.60 EROSIVE GASTRITIS: ICD-10-CM

## 2019-05-01 DIAGNOSIS — K21.00 REFLUX ESOPHAGITIS: ICD-10-CM

## 2019-05-01 DIAGNOSIS — K21.9 GASTROESOPHAGEAL REFLUX DISEASE WITHOUT ESOPHAGITIS: Primary | ICD-10-CM

## 2019-05-01 DIAGNOSIS — Z12.11 COLON CANCER SCREENING: ICD-10-CM

## 2019-05-01 DIAGNOSIS — K21.9 GERD (GASTROESOPHAGEAL REFLUX DISEASE): ICD-10-CM

## 2019-05-01 LAB — POCT GLUCOSE: 117 MG/DL (ref 70–110)

## 2019-05-01 PROCEDURE — 43239 EGD BIOPSY SINGLE/MULTIPLE: CPT | Performed by: INTERNAL MEDICINE

## 2019-05-01 PROCEDURE — 25000003 PHARM REV CODE 250: Performed by: INTERNAL MEDICINE

## 2019-05-01 PROCEDURE — G0121 COLON CA SCRN NOT HI RSK IND: HCPCS | Performed by: INTERNAL MEDICINE

## 2019-05-01 PROCEDURE — 88305 TISSUE EXAM BY PATHOLOGIST: CPT | Performed by: PATHOLOGY

## 2019-05-01 PROCEDURE — 88305 TISSUE EXAM BY PATHOLOGIST: CPT | Mod: 26,,, | Performed by: PATHOLOGY

## 2019-05-01 PROCEDURE — 43239 EGD BIOPSY SINGLE/MULTIPLE: CPT | Mod: 51,,, | Performed by: INTERNAL MEDICINE

## 2019-05-01 PROCEDURE — 88305 TISSUE SPECIMEN TO PATHOLOGY - SURGERY: ICD-10-PCS | Mod: 26,,, | Performed by: PATHOLOGY

## 2019-05-01 PROCEDURE — 43239 PR EGD, FLEX, W/BIOPSY, SGL/MULTI: ICD-10-PCS | Mod: 51,,, | Performed by: INTERNAL MEDICINE

## 2019-05-01 PROCEDURE — G0121 COLON CA SCRN NOT HI RSK IND: HCPCS | Mod: ,,, | Performed by: INTERNAL MEDICINE

## 2019-05-01 PROCEDURE — 37000008 HC ANESTHESIA 1ST 15 MINUTES: Performed by: INTERNAL MEDICINE

## 2019-05-01 PROCEDURE — 37000009 HC ANESTHESIA EA ADD 15 MINS: Performed by: INTERNAL MEDICINE

## 2019-05-01 PROCEDURE — 27201012 HC FORCEPS, HOT/COLD, DISP: Performed by: INTERNAL MEDICINE

## 2019-05-01 PROCEDURE — 25000003 PHARM REV CODE 250: Performed by: NURSE ANESTHETIST, CERTIFIED REGISTERED

## 2019-05-01 PROCEDURE — G0121 COLON CA SCRN NOT HI RSK IND: ICD-10-PCS | Mod: ,,, | Performed by: INTERNAL MEDICINE

## 2019-05-01 PROCEDURE — 82962 GLUCOSE BLOOD TEST: CPT | Performed by: INTERNAL MEDICINE

## 2019-05-01 PROCEDURE — 63600175 PHARM REV CODE 636 W HCPCS: Performed by: NURSE ANESTHETIST, CERTIFIED REGISTERED

## 2019-05-01 RX ORDER — LIDOCAINE HCL/PF 100 MG/5ML
SYRINGE (ML) INTRAVENOUS
Status: DISCONTINUED | OUTPATIENT
Start: 2019-05-01 | End: 2019-05-01

## 2019-05-01 RX ORDER — SODIUM CHLORIDE 0.9 % (FLUSH) 0.9 %
10 SYRINGE (ML) INJECTION
Status: DISCONTINUED | OUTPATIENT
Start: 2019-05-01 | End: 2019-05-01 | Stop reason: HOSPADM

## 2019-05-01 RX ORDER — ETOMIDATE 2 MG/ML
INJECTION INTRAVENOUS
Status: DISCONTINUED | OUTPATIENT
Start: 2019-05-01 | End: 2019-05-01

## 2019-05-01 RX ORDER — PROPOFOL 10 MG/ML
INJECTION, EMULSION INTRAVENOUS
Status: DISCONTINUED | OUTPATIENT
Start: 2019-05-01 | End: 2019-05-01

## 2019-05-01 RX ORDER — SODIUM CHLORIDE, SODIUM LACTATE, POTASSIUM CHLORIDE, CALCIUM CHLORIDE 600; 310; 30; 20 MG/100ML; MG/100ML; MG/100ML; MG/100ML
INJECTION, SOLUTION INTRAVENOUS CONTINUOUS
Status: DISCONTINUED | OUTPATIENT
Start: 2019-05-01 | End: 2019-05-01 | Stop reason: HOSPADM

## 2019-05-01 RX ADMIN — PROPOFOL 100 MG: 10 INJECTION, EMULSION INTRAVENOUS at 08:05

## 2019-05-01 RX ADMIN — ETOMIDATE 20 MG: 2 INJECTION, SOLUTION INTRAVENOUS at 08:05

## 2019-05-01 RX ADMIN — PROPOFOL 70 MG: 10 INJECTION, EMULSION INTRAVENOUS at 08:05

## 2019-05-01 RX ADMIN — SODIUM CHLORIDE, SODIUM LACTATE, POTASSIUM CHLORIDE, AND CALCIUM CHLORIDE: 600; 310; 30; 20 INJECTION, SOLUTION INTRAVENOUS at 08:05

## 2019-05-01 RX ADMIN — LIDOCAINE HYDROCHLORIDE 100 MG: 20 INJECTION, SOLUTION INTRAVENOUS at 08:05

## 2019-05-01 RX ADMIN — SODIUM CHLORIDE, SODIUM LACTATE, POTASSIUM CHLORIDE, AND CALCIUM CHLORIDE: 600; 310; 30; 20 INJECTION, SOLUTION INTRAVENOUS at 07:05

## 2019-05-01 NOTE — DISCHARGE INSTRUCTIONS
Colonoscopy     A camera attached to a flexible tube with a viewing lens is used to take video pictures.     Colonoscopy is a test to view the inside of your lower digestive tract (colon and rectum). Sometimes it can show the last part of the small intestine (ileum). During the test, small pieces of tissue may be removed for testing. This is called a biopsy. Small growths, such as polyps, may also be removed.   Why is colonoscopy done?  The test is done to help look for colon cancer. And it can help find the source of abdominal pain, bleeding, and changes in bowel habits. It may be needed once a year, depending on factors such as your:  · Age  · Health history  · Family health history  · Symptoms  · Results from any prior colonoscopy  Risks and possible complications  These include:  · Bleeding               · A puncture or tear in the colon   · Risks of anesthesia  · A cancer lesion not being seen  Getting ready   To prepare for the test:  · Talk with your healthcare provider about the risks of the test (see below). Also ask your healthcare provider about alternatives to the test.  · Tell your healthcare provider about any medicines you take. Also tell him or her about any health conditions you may have.  · Make sure your rectum and colon are empty for the test. Follow the diet and bowel prep instructions exactly. If you dont, the test may need to be rescheduled.  · Plan for a friend or family member to drive you home after the test.     Colonoscopy provides an inside view of the entire colon.     You may discuss the results with your doctor right away or at a future visit.  During the test   The test is usually done in the hospital on an outpatient basis. This means you go home the same day. The procedure takes about 30 minutes. During that time:  · You are given relaxing (sedating) medicine through an IV line. You may be drowsy, or fully asleep.  · The healthcare provider will first give you a physical exam to  check for anal and rectal problems.  · Then the anus is lubricated and the scope inserted.  · If you are awake, you may have a feeling similar to needing to have a bowel movement. You may also feel pressure as air is pumped into the colon. Its OK to pass gas during the procedure.  · Biopsy, polyp removal, or other treatments may be done during the test.  After the test   You may have gas right after the test. It can help to try to pass it to help prevent later bloating. Your healthcare provider may discuss the results with you right away. Or you may need to schedule a follow-up visit to talk about the results. After the test, you can go back to your normal eating and other activities. You may be tired from the sedation and need to rest for a few hours.  Date Last Reviewed: 11/1/2016 © 2000-2017 [employer]. [address] [place] [address]. All rights reserved. This information is not intended as a substitute for professional medical care. Always follow your healthcare professional's instructions.        Upper GI Endoscopy     During endoscopy, a long, flexible tube is used to view the inside of your upper GI tract.      Upper GI endoscopy allows your healthcare provider to look directly into the beginning of your gastrointestinal (GI) tract. The esophagus, stomach, and duodenum (the first part of the small intestine) make up the upper GI tract.   Before the exam  Follow these and any other instructions you are given before your endoscopy. If you dont follow the healthcare providers instructions carefully, the test may need to be canceled or done over:  · Don't eat or drink anything after midnight the night before your exam. If your exam is in the afternoon, drink only clear liquids in the morning. Don't eat or drink anything for 8 hours before the exam. In some cases, you may be able to take medicines with sips of water until 2 hours before the procedure. Speak with your healthcare  provider about this.   · Bring your X-rays and any other test results you have.  · Because you will be sedated, arrange for an adult to drive you home after the exam.  · Tell your healthcare provider before the exam if you are taking any medicines or have any medical problems.  The procedure  Here is what to expect:  · You will lie on the endoscopy table. Usually patients lie on the left side.  · You will be monitored and given oxygen.  · Your throat may be numbed with a spray or gargle. You are given medicine through an intravenous (IV) line that will help you relax and remain comfortable. You may be awake or asleep during the procedure.  · The healthcare provider will put the endoscope in your mouth and down your esophagus. It is thinner than most pieces of food that you swallow. It will not affect your breathing. The medicine helps keep you from gagging.  · Air is put into your GI tract to expand it. It can make you burp.  · During the procedure, the healthcare provider can take biopsies (tissue samples), remove abnormalities, such as polyps, or treat abnormalities through a variety of devices placed through the endoscope. You will not feel this.   · The endoscope carries images of your upper GI tract to a video screen. If you are awake, you may be able to look at the images.  · After the procedure is done, you will rest for a time. An adult must drive you home.  When to call your healthcare provider  Contact your healthcare provider if you have:  · Black or tarry stools, or blood in your stool  · Fever  · Pain in your belly that does not go away  · Nausea and vomiting, or vomiting blood   Date Last Reviewed: 7/1/2016 © 2000-2017 [employer]. [address] [place] [address]. All rights reserved. This information is not intended as a substitute for professional medical care. Always follow your healthcare professional's instructions.        Esophagitis     With esophagitis, the lining of  the esophagus is inflamed.   Do you often have burning pain in your chest? You may have esophagitis. This is when the lining of the esophagus becomes red and swollen (inflamed). The esophagus is the tube that connects your throat to your stomach. This sheet tells you more about esophagitis. It also explains your treatment options.  Main types of esophagitis  Reflux esophagitis. This is the more common type. It is caused by GERD (gastroesophageal reflux disease). Stomach contents with stomach acid flow back up into the esophagus. This happens over and over. It leads to inflammation. Risk factors can include:  · Being overweight  · Asthma  · Smoking  · Pregnancy  · Frequent vomiting  · Certain medicines (such as aspirin and other anti-inflammatories)  · Hiatal hernia  Infectious esophagitis. This is caused by an infection. You are more at risk for this if you have a weakened immune system and poor nutrition. Antibiotic use can also be a factor. The infection is often due to the following:  · A type of fungus (typically candida)  · A virus, such as herpes simplex virus 1 (HSV-1) or cytomegalovirus (CMV)  Eosinophilic esophagitis. Foods or other things around you can give you an allergic reaction. This triggers an immune response and leads to esophagitis.  Pill-induced esophagitis. Certain types of medicines can cause inflammation and ulcers in the esophagus. These include doxycycline, aspirin, NSAIDs, alendronate, potassium, quinidine, iron.  Symptoms of esophagitis  The following symptoms can occur with esophagitis:  · Pain when swallowing, or trouble swallowing  · Pain behind your breastbone (heartburn)  · Acid regurgitation  · Chronic sore throat  · Gum Inflammation  · Cavities  · Bad breath  · Nausea  · Pain in your upper belly (abdomen)  · Bleeding (indicated by bright red vomit or black, tarry stool)  These symptoms occur more often with reflux esophagitis:  · Coughing, wheezing, or  asthma  · Hoarseness  Diagnosis of esophagitis  Your healthcare provider will ask about your health history and symptoms. Youll also be examined. Sometimes certain tests are needed. These may include:  · Upper endoscopy. A thin, flexible tube with a tiny light and camera is used. It is inserted through the mouth down into the esophagus. This lets the provider look for damage. A small sample of tissue (biopsy) may also be removed. The sample is sent to a lab for testing.  · Upper GI X-ray with barium. An X-ray is done after you drink a substance called barium. Barium may make problems in the esophagus easier to see on an x-ray.  · Esophageal pH. A soft, thin tube is passed into the esophagus through the nose or mouth for 24 hours. It measures the acid level in the esophagus.  · Esophageal manometry. A soft, thin tube is passed into the esophagus through the nose or mouth. It measures muscle contractions in the esophagus.  Treatment of esophagitis  Medicines. Different medicines can help treat esophagitis. The medicine used will depend on the type of esophagitis you have. Talk with your healthcare provider.  Lifestyle changes. Making the following changes can help reduce irritation and ease your symptoms:  · Avoid spicy foods (pepper, chili powder, taveras). Also avoid hard foods (nuts, crackers, raw vegetables) and acidic foods and drinks (tomatoes, citrus fruits and juices). Other problem foods include chocolate, peppermint, nutmeg, and foods high in fat.  · Until you can swallow without pain, follow a combined liquid and soft diet. Try foods such as cooked cereals, mashed potatoes, and soups.  · Take small bites and chew your food thoroughly.  · Avoid large meals and heavy evening meals. Don't lie down within 2 to 3 hours of eating.  · Get to or stay at a healthy weight.  · Avoid alcohol, caffeine, and smoking or tobacco products.  · Brush and floss your teeth  · Raise your upper body by 4 to 6 inches when lying in  bed. This can be done using a foam wedge. Or put blocks under the legs at the head of your bed.  Surgery. This may be needed for severe reflux esophagitis. Other noninvasive procedures to treat GERD and esophagitis are being studied. Your provider can tell you more.  Why treatment Is important  Without treatment, esophagitis can get worse. This is especially true with severe reflux esophagitis. For instance, continued symptoms can cause scarring of the esophagus. Over time, this can cause a narrowing the esophagus (stricture). This can make it hard to pass food down to the stomach. As symptoms go on they can also cause changes in the lining of the esophagus. These changes can put you at a slightly higher risk of cancer of the esophagus.   Date Last Reviewed: 7/1/2016 © 2000-2017 [employer]. [address] [place] [address], [place] [address]. All rights reserved. This information is not intended as a substitute for professional medical care. Always follow your healthcare professional's instructions.        Gastritis (Adult)    Gastritis is inflammation and irritation of the stomach lining. It can be present for a short time (acute) or be long lasting (chronic). Gastritis is often caused by infection with bacteria called H pylori. More than a third of people in the [place] have this bacteria in their bodies. In many cases, H pylori causes no problems or symptoms. In some people, though, the infection irritates the stomach lining and causes gastritis. Other causes of stomach irritation include drinking alcohol or taking pain-relieving medicines called NSAIDs (such as aspirin or ibuprofen).   Symptoms of gastritis can include:  · Abdominal pain or bloating  · Loss of appetite  · Nausea or vomiting  · Vomiting blood or having black stools  · Feeling more tired than usual  An inflamed and irritated stomach lining is more likely to develop a sore called an ulcer. To help prevent this, gastritis should be treated.  Home  care  If needed, medicines may be prescribed. If you have H pylori infection, treating it will likely relieve your symptoms. Other changes can help reduce stomach irritation and help it heal.  · If you have been prescribed medicines for H pylori infection, take them as directed. Take all of the medicine until it is finished or your healthcare provider tells you to stop, even if you feel better.  · Your healthcare provider may recommend avoiding NSAIDs. If you take daily aspirin for your heart or other medical reasons, do not stop without talking to your healthcare provider first.  · Avoid drinking alcohol.  · Stop smoking. Smoking can irritate the stomach and delay healing. As much as possible, stay away from second hand smoke.  Follow-up care  Follow up with your healthcare provider, or as advised by our staff. Testing may be needed to check for inflammation or an ulcer.  When to seek medical advice  Call your healthcare provider for any of the following:  · Stomach pain that gets worse or moves to the lower right abdomen (appendix area)  · Chest pain that appears or gets worse, or spreads to the back, neck, shoulder, or arm  · Frequent vomiting (cant keep down liquids)  · Blood in the stool or vomit (red or black in color)  · Feeling weak or dizzy  · Fever of 100.4ºF (38ºC) or higher, or as directed by your healthcare provider  Date Last Reviewed: 6/22/2015  © 8174-9567 [employer]. [address] [place] [address], [place] [address]. All rights reserved. This information is not intended as a substitute for professional medical care. Always follow your healthcare professional's instructions.        What Is a Hiatal Hernia?    Hiatal hernia is when the area where the stomach and esophagus meet bulges up through the diaphragm into the chest cavity. In some cases, part of the stomach may bulge above the diaphragm. Stomach acid may move up into the esophagus and cause symptoms. The symptoms are often blamed on  gastroesophageal reflux disease (GERD). You may only know about the hernia when it shows up on an X-ray taken for other reasons.   What you may feel  The hiatus is a normal hole in the diaphragm. The esophagus passes through this hole and leads to the stomach. In some cases, part of the stomach may bulge above the diaphragm. This bulge is called a hernia. Stomach acid may move up into the esophagus and cause symptoms.  When you eat, the muscle at the hiatus relaxes to allow food to pass into the stomach. It tightens again to keep food and digestive acids in the stomach.  Many people with hiatal hernias have mild symptoms. You may notice the following GERD symptoms:  · Heartburn or other chest discomfort  · A feeling of chest fullness after a meal  · Frequent burping  · Acid taste in the mouth  · Trouble swallowing  Treating symptoms  If you have been diagnosed with hiatal hernia, these suggestions may help improve symptoms:  · Lose excess weight. Extra weight puts pressure on the stomach and esophagus.  · Dont lie down after eating. Sit up for at least an hour after eating. Lying down after eating can increase symptoms.  · Avoid certain foods and drinks. These include fatty foods, chocolate, coffee, mint, and other foods that cause symptoms for you.  · Dont smoke or drink alcohol. These can worsen symptoms.  · Look at your medicines. Discuss your medicines with your healthcare provider. Many medicines can cause symptoms.  · Consider an antacid medicine. Ask your healthcare provider about over-the-counter and prescription medicines that may help.  · Ask about surgery, if needed. Surgery is a treatment choice for some people. Your healthcare provider can determine if surgery is an option for you.    Date Last Reviewed: 10/1/2016  © 6548-0598 [employer]. [address] [place] [address], [place] [address]. All rights reserved. This information is not intended as a substitute for professional medical care. Always  follow your healthcare professional's instructions.

## 2019-05-01 NOTE — INTERVAL H&P NOTE
The patient has been examined and the H&P has been reviewed:I have reviewed this note and I agree with this assessment. The patient remains stable for endoscopy at the time of this present evaluation.         Anesthesia/Surgery risks, benefits and alternative options discussed and understood by patient/family.          Active Hospital Problems    Diagnosis  POA    GERD (gastroesophageal reflux disease) [K21.9]  Yes      Resolved Hospital Problems   No resolved problems to display.

## 2019-05-01 NOTE — ANESTHESIA POSTPROCEDURE EVALUATION
Anesthesia Post Evaluation    Patient: Stepan Nixon    Procedure(s) Performed: Procedure(s) (LRB):  ESOPHAGOGASTRODUODENOSCOPY (EGD) (N/A)  COLONOSCOPY (N/A)    Final Anesthesia Type: MAC  Patient location during evaluation: PACU  Patient participation: Yes- Able to Participate  Level of consciousness: awake and alert and oriented  Post-procedure vital signs: reviewed and stable  Pain management: adequate  Airway patency: patent  PONV status at discharge: No PONV  Anesthetic complications: no      Cardiovascular status: blood pressure returned to baseline  Respiratory status: unassisted, spontaneous ventilation and room air  Hydration status: euvolemic  Follow-up not needed.          Vitals Value Taken Time   /74 5/1/2019  9:05 AM   Temp 36.5 °C (97.7 °F) 5/1/2019  7:16 AM   Pulse 61 5/1/2019  9:05 AM   Resp 16 5/1/2019  9:05 AM   SpO2 95 % 5/1/2019  9:05 AM         No case tracking events are documented in the log.      Pain/Sherif Score: Sherif Score: 8 (5/1/2019  9:05 AM)

## 2019-05-01 NOTE — ANESTHESIA PREPROCEDURE EVALUATION
05/01/2019  Stepan Nixon is a 64 y.o., male.    Anesthesia Evaluation    I have reviewed the Patient Summary Reports.    I have reviewed the Nursing Notes.   I have reviewed the Medications.     Review of Systems  Anesthesia Hx:  No problems with previous Anesthesia    Social:  Non-Smoker, Social Alcohol Use    Hematology/Oncology:  Hematology Normal   Oncology Normal     EENT/Dental:EENT/Dental Normal   Cardiovascular:   Exercise tolerance: poor Hypertension Past MI CAD  CABG/stent Dysrhythmias  CHF hyperlipidemia ECG has been reviewed. CONCLUSIONS   Normal sinus rhythm  Left bundle branch block  Abnormal ECG  When compared with ECG of 22-SEP-2016 04:37,  No significant change was found  Confirmed by MD EVELIO, SUZAN ([id number]) on 10/25/2018 6:27:07 PM    1 - Severe left atrial enlargement.     2 - Concentric hypertrophy.     3 - No wall motion abnormalities.     4 - Normal left ventricular systolic function (EF 55-60%).     5 - Indeterminate LV diastolic function.     6 - Normal right ventricular systolic function .     7 - The estimated PA systolic pressure is greater than 17 mmHg.   2016 MI x2 stents  CHF  LBBB (left bundle branch block)    Coronary artery disease involving native coronary artery without angina pectoris   Pulmonary:   Sleep Apnea, CPAP    Renal/:   Chronic Renal Disease renal calculi    Hepatic/GI:   Bowel Prep. GERD, well controlled 0230 last drink of fluid.  Monday last solid meal.   Musculoskeletal:   Arthritis  Bell's palsy  Idiopathic peripheral neuropathy   Neurological:   Neuromuscular Disease,    Endocrine:   Diabetes, well controlled, type 2    Dermatological:  Skin Normal    Psych:   Psychiatric History          Physical Exam  General:  Well nourished, Morbid Obesity    Airway/Jaw/Neck:  Airway Findings: Mallampati: IV     Dental:  Dental Findings:              Anesthesia  Plan  Type of Anesthesia, risks & benefits discussed:  Anesthesia Type:  MAC  Patient's Preference:   Intra-op Monitoring Plan:   Intra-op Monitoring Plan Comments:   Post Op Pain Control Plan:   Post Op Pain Control Plan Comments:   Induction:   IV  Beta Blocker:  Patient is on a Beta-Blocker and has received one dose within the past 24 hours (No further documentation required).       Informed Consent: Patient understands risks and agrees with Anesthesia plan.  Questions answered. Anesthesia consent signed with patient.  ASA Score: 3     Day of Surgery Review of History & Physical: I have interviewed and examined the patient. I have reviewed the patient's H&P dated: 05/01/19. There are no significant changes.  H&P update referred to the surgeon.         Ready For Surgery From Anesthesia Perspective.

## 2019-05-01 NOTE — [employer] PATIENT INSTRUCTIONS
Discharge Summary/Instructions after an Endoscopic Procedure  Patient Name: Stepan Pizarro  Patient MRN: [id number]  Patient YOB: 1955  Wednesday, May 01, 2019 Henry Mo III, MD  RESTRICTIONS:  During your procedure today, you received medications for sedation.  These   medications may affect your judgment, balance and coordination.  Therefore,   for 24 hours, you have the following restrictions:   - DO NOT drive a car, operate machinery, make legal/financial decisions,   sign important papers or drink alcohol.    ACTIVITY:  Today: no heavy lifting, straining or running due to procedural   sedation/anesthesia.  The following day: return to full activity including work.  DIET:  Eat and drink normally unless instructed otherwise.     TREATMENT FOR COMMON SIDE EFFECTS:  - Mild abdominal pain, nausea, belching, bloating or excessive gas:  rest,   eat lightly and use a heating pad.  - Sore Throat: treat with throat lozenges and/or gargle with warm salt   water.  - Because air was used during the procedure, expelling large amounts of air   from your rectum or belching is normal.  - If a bowel prep was taken, you may not have a bowel movement for 1-3 days.    This is normal.  SYMPTOMS TO WATCH FOR AND REPORT TO YOUR PHYSICIAN:  1. Abdominal pain or bloating, other than gas cramps.  2. Chest pain.  3. Back pain.  4. Signs of infection such as: chills or fever occurring within 24 hours   after the procedure.  5. Rectal bleeding, which would show as bright red, maroon, or black stools.   (A tablespoon of blood from the rectum is not serious, especially if   hemorrhoids are present.)  6. Vomiting.  7. Weakness or dizziness.  GO DIRECTLY TO THE NEAREST EMERGENCY ROOM IF YOU HAVE ANY OF THE FOLLOWING:      Difficulty breathing              Chills and/or fever over 101 F   Persistent vomiting and/or vomiting blood   Severe abdominal pain   Severe chest pain   Black, tarry stools   Bleeding- more than one  tablespoon   Any other symptom or condition that you feel may need urgent attention  Your doctor recommends these additional instructions:  If any biopsies were taken, your doctors clinic will contact you in 1 to 2   weeks with any results.  - Discharge patient to home (via wheelchair).   - High fiber diet.   - Continue present medications.   - Repeat colonoscopy in 10 years for screening purposes.   - Return to referring physician as previously scheduled.   - Discharge patient to home (via wheelchair).   - High fiber diet.   - Continue present medications.   - Repeat colonoscopy in 10 years for screening purposes.   - Return to referring physician as previously scheduled.  For questions, problems or results please call your physician Henry Mo III, MD at Work:  [phone]  If you have any questions about the above instructions, call the GI   department at [phone] or call the endoscopy unit at [phone]   from 7am until 3 pm.  [facility] [place], EMERGENCY ROOM PHONE NUMBER:   [phone]  IF A COMPLICATION OR EMERGENCY SITUATION ARISES AND YOU ARE UNABLE TO REACH   YOUR PHYSICIAN - GO DIRECTLY TO THE EMERGENCY ROOM.  I have read or have had read to me these discharge instructions for my   procedure and have received a written copy.  I understand these   instructions and will follow-up with my physician if I have any questions.     __________________________________       _____________________________________  Nurse Signature                                          Patient/Designated   Responsible Party Signature  Henry Mo III, MD  5/1/2019 9:10:02 AM  This report has been verified and signed electronically.  [employer]

## 2019-05-01 NOTE — PROVATION PATIENT INSTRUCTIONS
Discharge Summary/Instructions after an Endoscopic Procedure  Patient Name: Stepan Pizarro  Patient MRN: [id number]  Patient YOB: 1955  Wednesday, May 01, 2019 Henry Mo III, MD  RESTRICTIONS:  During your procedure today, you received medications for sedation.  These   medications may affect your judgment, balance and coordination.  Therefore,   for 24 hours, you have the following restrictions:   - DO NOT drive a car, operate machinery, make legal/financial decisions,   sign important papers or drink alcohol.    ACTIVITY:  Today: no heavy lifting, straining or running due to procedural   sedation/anesthesia.  The following day: return to full activity including work.  DIET:  Eat and drink normally unless instructed otherwise.     TREATMENT FOR COMMON SIDE EFFECTS:  - Mild abdominal pain, nausea, belching, bloating or excessive gas:  rest,   eat lightly and use a heating pad.  - Sore Throat: treat with throat lozenges and/or gargle with warm salt   water.  - Because air was used during the procedure, expelling large amounts of air   from your rectum or belching is normal.  - If a bowel prep was taken, you may not have a bowel movement for 1-3 days.    This is normal.  SYMPTOMS TO WATCH FOR AND REPORT TO YOUR PHYSICIAN:  1. Abdominal pain or bloating, other than gas cramps.  2. Chest pain.  3. Back pain.  4. Signs of infection such as: chills or fever occurring within 24 hours   after the procedure.  5. Rectal bleeding, which would show as bright red, maroon, or black stools.   (A tablespoon of blood from the rectum is not serious, especially if   hemorrhoids are present.)  6. Vomiting.  7. Weakness or dizziness.  GO DIRECTLY TO THE NEAREST EMERGENCY ROOM IF YOU HAVE ANY OF THE FOLLOWING:      Difficulty breathing              Chills and/or fever over 101 F   Persistent vomiting and/or vomiting blood   Severe abdominal pain   Severe chest pain   Black, tarry stools   Bleeding- more than one  tablespoon   Any other symptom or condition that you feel may need urgent attention  Your doctor recommends these additional instructions:  If any biopsies were taken, your doctors clinic will contact you in 1 to 2   weeks with any results.  - Discharge patient to home (via wheelchair).   - Resume previous diet.   - Continue present medications.   - Await pathology results.   - Follow an antireflux regimen indefinitely.   - Use Prilosec (omeprazole) 40 mg PO daily.  For questions, problems or results please call your physician Henry Mo III, MD at Work:  [phone]  If you have any questions about the above instructions, call the GI   department at [phone] or call the endoscopy unit at [phone]   from 7am until 3 pm.  [facility] [place], EMERGENCY ROOM PHONE NUMBER:   [phone]  IF A COMPLICATION OR EMERGENCY SITUATION ARISES AND YOU ARE UNABLE TO REACH   YOUR PHYSICIAN - GO DIRECTLY TO THE EMERGENCY ROOM.  I have read or have had read to me these discharge instructions for my   procedure and have received a written copy.  I understand these   instructions and will follow-up with my physician if I have any questions.     __________________________________       _____________________________________  Nurse Signature                                          Patient/Designated   Responsible Party Signature  Henry Mo III, MD  5/1/2019 9:17:18 AM  This report has been verified and signed electronically.  [employer]

## 2019-05-01 NOTE — DISCHARGE SUMMARY
[facility]  Brief Operative Note     SUMMARY     Surgery Date: 5/1/2019     Surgeon(s) and Role:     * Henry Mo III, MD - Primary    Assisting Surgeon: None    Pre-op Diagnosis:  Abnormal CT scan, esophagus [R93.3]  Gastroesophageal reflux disease, esophagitis presence not specified [K21.9]  Colon cancer screening [Z12.11]    Post-op Diagnosis:  Post-Op Diagnosis Codes:     * Abnormal CT scan, esophagus [R93.3]     * Gastroesophageal reflux disease, esophagitis presence not specified [K21.9]     * Colon cancer screening [Z12.11]      - erosive gastritis      - Reflux esophagitis  Procedure(s) (LRB):  ESOPHAGOGASTRODUODENOSCOPY (EGD) (N/A)  COLONOSCOPY (N/A)    Anesthesia: Monitor Anesthesia Care    Description of the findings of the procedure: Procedures completed. See Procedure note for full details.    Findings/Key Components: Procedures completed. See Procedure note for full details.    Prosthetics/Devices: None    Estimated Blood Loss: * No values recorded between 5/1/2019 12:00 AM and 5/1/2019  9:20 AM *         Specimens:   Specimen (12h ago, onward)    Start     Ordered    05/01/19 0827  Specimen to Pathology - Surgery  Once     Comments:  1. Antrum biopsies- gastritis, r/o H pylori2. Esophagus biopsies- esophagitis     Start Status     05/01/19 0827 Collected (05/01/19 0902) Order ID: [id number]       05/01/19 0901          Discharge Note    SUMMARY     Admit Date: 5/1/2019    Discharge Date and Time: 5/1/2019    Hospital Course (synopsis of major diagnoses, care, treatment, and services provided during the course of the hospital stay):  Procedures completed. See Procedure note for full details. Discharge patient when discharge criteria met.    Final Diagnosis: Post-Op Diagnosis Codes:     * Abnormal CT scan, esophagus [R93.3]     * Gastroesophageal reflux disease, esophagitis presence not specified [K21.9]     * Colon cancer screening [Z12.11]     - Reflux Esophagitis     - Erosive  gastritis  Disposition: Discharge patient when discharge criteria met.    Follow Up/Patient Instructions:       Medications:  Reconciled Home Medications:   Current Discharge Medication List      CONTINUE these medications which have NOT CHANGED    Details   ACCU-CHEK ALYCIA PLUS METER Misc       aspirin 325 MG tablet Take 325 mg by mouth 2 (two) times daily.      atorvastatin (LIPITOR) 10 MG tablet Take 1 tablet (10 mg total) by mouth once daily.  Qty: 90 tablet, Refills: 3    Associated Diagnoses: Uncontrolled type 2 diabetes mellitus with stage 3 chronic kidney disease, with long-term current use of insulin; Coronary artery disease involving native coronary artery of native heart without angina pectoris      blood sugar diagnostic Strp To check BG 3 times daily, to use with insurance preferred meter  Qty: 100 strip, Refills: 3    Associated Diagnoses: Uncontrolled type 2 diabetes mellitus with stage 3 chronic kidney disease, with long-term current use of insulin      blood-glucose meter kit To check BG 3 times daily, to use with insurance preferred meter  Qty: 1 each, Refills: 0    Associated Diagnoses: Uncontrolled type 2 diabetes mellitus with stage 3 chronic kidney disease, with long-term current use of insulin      brimonidine 0.1% (ALPHAGAN P) 0.1 % Drop Place 1 drop into both eyes 3 (three) times daily. Order 90 day supply  Qty: 10 mL, Refills: 4      brinzolamide (AZOPT) 1 % ophthalmic suspension Place 1 drop into both eyes 3 (three) times daily. ORDER 90 DAY SUPPLY  Qty: 10 mL, Refills: 4      buPROPion (WELLBUTRIN XL) 300 MG 24 hr tablet Take 1 tablet (300 mg total) by mouth once daily.  Qty: 90 tablet, Refills: 0      carvedilol (COREG) 25 MG tablet TAKE ONE TABLET BY MOUTH TWICE DAILY WITH  MEALS  Qty: 180 tablet, Refills: 11    Associated Diagnoses: Essential hypertension      furosemide (LASIX) 40 MG tablet Take 2 tablets (80 mg total) by mouth 2 (two) times daily. When ankles retain fluid I double  the lasix x 14 days  Qty: 260 tablet, Refills: 3    Associated Diagnoses: CHF (NYHA class III, ACC/AHA stage C)      insulin NPH-insulin regular, 70/30, (NOVOLIN 70/30 U-100 INSULIN) 100 unit/mL (70-30) injection Inject 130 Units into the skin 2 (two) times daily.  Qty: 7.8 mL, Refills: 6    Comments: Please consider 90 day supplies to promote better adherence  Associated Diagnoses: Type 2 diabetes mellitus with other diabetic kidney complication      lancets Misc To check BG 3 times daily, to use with insurance preferred meter  Qty: 100 each, Refills: 3    Associated Diagnoses: Uncontrolled type 2 diabetes mellitus with stage 3 chronic kidney disease, with long-term current use of insulin      lisinopril (PRINIVIL,ZESTRIL) 20 MG tablet Take 1 tablet (20 mg total) by mouth once daily.  Qty: 30 tablet, Refills: 11    Associated Diagnoses: Essential hypertension; Uncontrolled type 2 diabetes mellitus with stage 3 chronic kidney disease, with long-term current use of insulin; Coronary artery disease involving native coronary artery of native heart without angina pectoris      omeprazole (PRILOSEC) 40 MG capsule Take 1 capsule (40 mg total) by mouth once daily.  Qty: 30 capsule, Refills: 5      polyethylene glycol (GLYCOLAX) 17 gram/dose powder Take 17 g by mouth once daily.  Qty: 238 g, Refills: 6    Associated Diagnoses: Constipation, unspecified constipation type      sildenafil (VIAGRA) 50 MG tablet Take 1 tablet (50 mg total) by mouth daily as needed for Erectile Dysfunction.  Qty: 5 tablet, Refills: 3    Associated Diagnoses: Erectile dysfunction due to arterial insufficiency      travoprost (TRAVATAN Z) 0.004 % ophthalmic solution Place 1 drop into both eyes every evening.  Qty: 2.5 mL, Refills: 6    Associated Diagnoses: Primary open angle glaucoma of both eyes, severe stage      BLOOD PRESSURE CUFF Misc 1 cuff  Qty: 1 each, Refills: 0    Associated Diagnoses: Essential hypertension      nitroGLYCERIN  (NITROSTAT) 0.4 MG SL tablet Place 1 tablet (0.4 mg total) under the tongue every 5 (five) minutes as needed for Chest pain.  Qty: 20 tablet, Refills: 0         STOP taking these medications       semaglutide (OZEMPIC) 1 mg/0.75 mL (2 mg/1.5 mL) PnIj Comments:   Reason for Stopping:         sulfamethoxazole-trimethoprim 800-160mg (BACTRIM DS) 800-160 mg Tab Comments:   Reason for Stopping:              Discharge Procedure Orders   Diet general     Activity as tolerated

## 2019-05-01 NOTE — TRANSFER OF CARE
"Anesthesia Transfer of Care Note    Patient: Stepan Nixon    Procedure(s) Performed: Procedure(s) (LRB):  ESOPHAGOGASTRODUODENOSCOPY (EGD) (N/A)  COLONOSCOPY (N/A)    Patient location: PACU    Anesthesia Type: MAC    Transport from OR: Transported from OR on room air with adequate spontaneous ventilation    Post pain: adequate analgesia    Post assessment: no apparent anesthetic complications    Post vital signs: stable    Level of consciousness: sedated    Nausea/Vomiting: no nausea/vomiting    Complications: none    Transfer of care protocol was followed      Last vitals:   Visit Vitals  /74   Pulse 61   Temp 36.5 °C (97.7 °F) (Oral)   Resp 16   Ht 6' 7" (2.007 m)   Wt (!) 191.8 kg (422 lb 13.5 oz)   SpO2 95%   BMI 47.64 kg/m²     "

## 2019-05-06 ENCOUNTER — LAB VISIT (OUTPATIENT)
Dept: LAB | Facility: HOSPITAL | Age: 64
End: 2019-05-06
Attending: INTERNAL MEDICINE
Payer: COMMERCIAL

## 2019-05-06 DIAGNOSIS — N18.30 CKD (CHRONIC KIDNEY DISEASE) STAGE 3, GFR 30-59 ML/MIN: ICD-10-CM

## 2019-05-06 LAB
ALBUMIN SERPL BCP-MCNC: 3.4 G/DL (ref 3.5–5.2)
ANION GAP SERPL CALC-SCNC: 13 MMOL/L (ref 8–16)
BUN SERPL-MCNC: 17 MG/DL (ref 8–23)
CALCIUM SERPL-MCNC: 9.1 MG/DL (ref 8.7–10.5)
CHLORIDE SERPL-SCNC: 109 MMOL/L (ref 95–110)
CO2 SERPL-SCNC: 21 MMOL/L (ref 23–29)
CREAT SERPL-MCNC: 1.7 MG/DL (ref 0.5–1.4)
EST. GFR  (AFRICAN AMERICAN): 48.9 ML/MIN/1.73 M^2
EST. GFR  (NON AFRICAN AMERICAN): 42.3 ML/MIN/1.73 M^2
GLUCOSE SERPL-MCNC: 36 MG/DL (ref 70–110)
PHOSPHATE SERPL-MCNC: 2.3 MG/DL (ref 2.7–4.5)
POTASSIUM SERPL-SCNC: 3.7 MMOL/L (ref 3.5–5.1)
SODIUM SERPL-SCNC: 143 MMOL/L (ref 136–145)

## 2019-05-06 PROCEDURE — 36415 COLL VENOUS BLD VENIPUNCTURE: CPT

## 2019-05-06 PROCEDURE — 80069 RENAL FUNCTION PANEL: CPT

## 2019-05-08 ENCOUNTER — CLINICAL SUPPORT (OUTPATIENT)
Dept: DIABETES | Facility: CLINIC | Age: 64
End: 2019-05-08
Payer: COMMERCIAL

## 2019-05-08 ENCOUNTER — OFFICE VISIT (OUTPATIENT)
Dept: NEPHROLOGY | Facility: CLINIC | Age: 64
End: 2019-05-08
Payer: COMMERCIAL

## 2019-05-08 ENCOUNTER — OFFICE VISIT (OUTPATIENT)
Dept: CARDIOLOGY | Facility: CLINIC | Age: 64
End: 2019-05-08
Payer: COMMERCIAL

## 2019-05-08 ENCOUNTER — OFFICE VISIT (OUTPATIENT)
Dept: DIABETES | Facility: CLINIC | Age: 64
End: 2019-05-08
Payer: COMMERCIAL

## 2019-05-08 ENCOUNTER — LAB VISIT (OUTPATIENT)
Dept: LAB | Facility: HOSPITAL | Age: 64
End: 2019-05-08
Attending: PHYSICIAN ASSISTANT
Payer: COMMERCIAL

## 2019-05-08 VITALS
HEIGHT: 78 IN | WEIGHT: 315 LBS | SYSTOLIC BLOOD PRESSURE: 132 MMHG | DIASTOLIC BLOOD PRESSURE: 84 MMHG | BODY MASS INDEX: 36.45 KG/M2

## 2019-05-08 VITALS
SYSTOLIC BLOOD PRESSURE: 126 MMHG | HEIGHT: 78 IN | DIASTOLIC BLOOD PRESSURE: 80 MMHG | BODY MASS INDEX: 36.45 KG/M2 | HEART RATE: 64 BPM | WEIGHT: 315 LBS

## 2019-05-08 VITALS
SYSTOLIC BLOOD PRESSURE: 156 MMHG | WEIGHT: 315 LBS | BODY MASS INDEX: 36.45 KG/M2 | HEART RATE: 92 BPM | DIASTOLIC BLOOD PRESSURE: 90 MMHG | HEIGHT: 78 IN

## 2019-05-08 DIAGNOSIS — G47.33 OSA (OBSTRUCTIVE SLEEP APNEA): Chronic | ICD-10-CM

## 2019-05-08 DIAGNOSIS — E11.649 HYPOGLYCEMIA ASSOCIATED WITH DIABETES: ICD-10-CM

## 2019-05-08 DIAGNOSIS — Z86.69 HX OF RETINAL DETACHMENT: Chronic | ICD-10-CM

## 2019-05-08 DIAGNOSIS — E78.5 HYPERLIPIDEMIA ASSOCIATED WITH TYPE 2 DIABETES MELLITUS: ICD-10-CM

## 2019-05-08 DIAGNOSIS — E11.649 HYPOGLYCEMIA UNAWARENESS ASSOCIATED WITH TYPE 2 DIABETES MELLITUS: ICD-10-CM

## 2019-05-08 DIAGNOSIS — I50.9 CHF (NYHA CLASS III, ACC/AHA STAGE C): Chronic | ICD-10-CM

## 2019-05-08 DIAGNOSIS — E11.59 HYPERTENSION ASSOCIATED WITH DIABETES: Chronic | ICD-10-CM

## 2019-05-08 DIAGNOSIS — I15.2 HYPERTENSION ASSOCIATED WITH DIABETES: Chronic | ICD-10-CM

## 2019-05-08 DIAGNOSIS — N17.9 ACUTE KIDNEY INJURY: ICD-10-CM

## 2019-05-08 DIAGNOSIS — E87.5 HYPERKALEMIA: ICD-10-CM

## 2019-05-08 DIAGNOSIS — G60.9 IDIOPATHIC PERIPHERAL NEUROPATHY: ICD-10-CM

## 2019-05-08 DIAGNOSIS — L03.90 CELLULITIS, UNSPECIFIED CELLULITIS SITE: ICD-10-CM

## 2019-05-08 DIAGNOSIS — I10 ESSENTIAL HYPERTENSION: ICD-10-CM

## 2019-05-08 DIAGNOSIS — E66.01 MORBID OBESITY WITH BMI OF 40.0-44.9, ADULT: ICD-10-CM

## 2019-05-08 DIAGNOSIS — N18.30 CHRONIC KIDNEY DISEASE, STAGE III (MODERATE): Primary | ICD-10-CM

## 2019-05-08 DIAGNOSIS — E11.69 HYPERLIPIDEMIA ASSOCIATED WITH TYPE 2 DIABETES MELLITUS: ICD-10-CM

## 2019-05-08 DIAGNOSIS — I44.7 LBBB (LEFT BUNDLE BRANCH BLOCK): ICD-10-CM

## 2019-05-08 DIAGNOSIS — Z71.89 ENCOUNTER FOR MEDICATION REVIEW AND COUNSELING: ICD-10-CM

## 2019-05-08 DIAGNOSIS — Z96.1 STATUS POST CATARACT EXTRACTION AND INSERTION OF INTRAOCULAR LENS, UNSPECIFIED LATERALITY: Primary | Chronic | ICD-10-CM

## 2019-05-08 DIAGNOSIS — N18.30 CHRONIC KIDNEY DISEASE, STAGE III (MODERATE): Chronic | ICD-10-CM

## 2019-05-08 DIAGNOSIS — I25.10 CORONARY ARTERY DISEASE INVOLVING NATIVE CORONARY ARTERY OF NATIVE HEART WITHOUT ANGINA PECTORIS: ICD-10-CM

## 2019-05-08 DIAGNOSIS — Z98.49 STATUS POST CATARACT EXTRACTION AND INSERTION OF INTRAOCULAR LENS, UNSPECIFIED LATERALITY: Primary | Chronic | ICD-10-CM

## 2019-05-08 LAB
ESTIMATED AVG GLUCOSE: 166 MG/DL (ref 68–131)
GLUCOSE SERPL-MCNC: 43 MG/DL (ref 70–110)
HBA1C MFR BLD HPLC: 7.4 % (ref 4–5.6)

## 2019-05-08 PROCEDURE — 3075F PR MOST RECENT SYSTOLIC BLOOD PRESS GE 130-139MM HG: ICD-10-PCS | Mod: CPTII,S$GLB,, | Performed by: PHYSICIAN ASSISTANT

## 2019-05-08 PROCEDURE — 3074F PR MOST RECENT SYSTOLIC BLOOD PRESSURE < 130 MM HG: ICD-10-PCS | Mod: CPTII,S$GLB,, | Performed by: INTERNAL MEDICINE

## 2019-05-08 PROCEDURE — 3074F SYST BP LT 130 MM HG: CPT | Mod: CPTII,S$GLB,, | Performed by: INTERNAL MEDICINE

## 2019-05-08 PROCEDURE — 3008F BODY MASS INDEX DOCD: CPT | Mod: CPTII,S$GLB,, | Performed by: INTERNAL MEDICINE

## 2019-05-08 PROCEDURE — 99999 PR PBB SHADOW E&M-EST. PATIENT-LVL III: CPT | Mod: PBBFAC,,, | Performed by: PHYSICIAN ASSISTANT

## 2019-05-08 PROCEDURE — 99999 PR PBB SHADOW E&M-EST. PATIENT-LVL III: ICD-10-PCS | Mod: PBBFAC,,, | Performed by: PHYSICIAN ASSISTANT

## 2019-05-08 PROCEDURE — 99214 OFFICE O/P EST MOD 30 MIN: CPT | Mod: S$GLB,,, | Performed by: INTERNAL MEDICINE

## 2019-05-08 PROCEDURE — 83036 HEMOGLOBIN GLYCOSYLATED A1C: CPT

## 2019-05-08 PROCEDURE — 3079F PR MOST RECENT DIASTOLIC BLOOD PRESSURE 80-89 MM HG: ICD-10-PCS | Mod: CPTII,S$GLB,, | Performed by: PHYSICIAN ASSISTANT

## 2019-05-08 PROCEDURE — 3046F HEMOGLOBIN A1C LEVEL >9.0%: CPT | Mod: CPTII,S$GLB,, | Performed by: PHYSICIAN ASSISTANT

## 2019-05-08 PROCEDURE — 3075F SYST BP GE 130 - 139MM HG: CPT | Mod: CPTII,S$GLB,, | Performed by: PHYSICIAN ASSISTANT

## 2019-05-08 PROCEDURE — 99215 PR OFFICE/OUTPT VISIT, EST, LEVL V, 40-54 MIN: ICD-10-PCS | Mod: S$GLB,,, | Performed by: INTERNAL MEDICINE

## 2019-05-08 PROCEDURE — 3008F BODY MASS INDEX DOCD: CPT | Mod: CPTII,S$GLB,, | Performed by: PHYSICIAN ASSISTANT

## 2019-05-08 PROCEDURE — 99214 PR OFFICE/OUTPT VISIT, EST, LEVL IV, 30-39 MIN: ICD-10-PCS | Mod: S$GLB,,, | Performed by: INTERNAL MEDICINE

## 2019-05-08 PROCEDURE — 3078F PR MOST RECENT DIASTOLIC BLOOD PRESSURE < 80 MM HG: ICD-10-PCS | Mod: CPTII,S$GLB,, | Performed by: INTERNAL MEDICINE

## 2019-05-08 PROCEDURE — 3008F PR BODY MASS INDEX (BMI) DOCUMENTED: ICD-10-PCS | Mod: CPTII,S$GLB,, | Performed by: PHYSICIAN ASSISTANT

## 2019-05-08 PROCEDURE — 3046F HEMOGLOBIN A1C LEVEL >9.0%: CPT | Mod: CPTII,S$GLB,, | Performed by: INTERNAL MEDICINE

## 2019-05-08 PROCEDURE — 3046F PR MOST RECENT HEMOGLOBIN A1C LEVEL > 9.0%: ICD-10-PCS | Mod: CPTII,S$GLB,, | Performed by: INTERNAL MEDICINE

## 2019-05-08 PROCEDURE — 82962 GLUCOSE BLOOD TEST: CPT | Mod: S$GLB,,, | Performed by: PHYSICIAN ASSISTANT

## 2019-05-08 PROCEDURE — 99999 PR PBB SHADOW E&M-EST. PATIENT-LVL IV: ICD-10-PCS | Mod: PBBFAC,,, | Performed by: INTERNAL MEDICINE

## 2019-05-08 PROCEDURE — 99999 PR PBB SHADOW E&M-EST. PATIENT-LVL III: ICD-10-PCS | Mod: PBBFAC,,, | Performed by: INTERNAL MEDICINE

## 2019-05-08 PROCEDURE — 99215 OFFICE O/P EST HI 40 MIN: CPT | Mod: S$GLB,,, | Performed by: INTERNAL MEDICINE

## 2019-05-08 PROCEDURE — 3008F PR BODY MASS INDEX (BMI) DOCUMENTED: ICD-10-PCS | Mod: CPTII,S$GLB,, | Performed by: INTERNAL MEDICINE

## 2019-05-08 PROCEDURE — 99999 PR PBB SHADOW E&M-EST. PATIENT-LVL III: CPT | Mod: PBBFAC,,, | Performed by: INTERNAL MEDICINE

## 2019-05-08 PROCEDURE — 3079F DIAST BP 80-89 MM HG: CPT | Mod: CPTII,S$GLB,, | Performed by: INTERNAL MEDICINE

## 2019-05-08 PROCEDURE — 3079F DIAST BP 80-89 MM HG: CPT | Mod: CPTII,S$GLB,, | Performed by: PHYSICIAN ASSISTANT

## 2019-05-08 PROCEDURE — 82962 POCT GLUCOSE, HAND-HELD DEVICE: ICD-10-PCS | Mod: S$GLB,,, | Performed by: PHYSICIAN ASSISTANT

## 2019-05-08 PROCEDURE — 36415 COLL VENOUS BLD VENIPUNCTURE: CPT

## 2019-05-08 PROCEDURE — 99999 PR PBB SHADOW E&M-EST. PATIENT-LVL IV: CPT | Mod: PBBFAC,,, | Performed by: INTERNAL MEDICINE

## 2019-05-08 PROCEDURE — 3046F PR MOST RECENT HEMOGLOBIN A1C LEVEL > 9.0%: ICD-10-PCS | Mod: CPTII,S$GLB,, | Performed by: PHYSICIAN ASSISTANT

## 2019-05-08 PROCEDURE — 99214 OFFICE O/P EST MOD 30 MIN: CPT | Mod: S$GLB,,, | Performed by: PHYSICIAN ASSISTANT

## 2019-05-08 PROCEDURE — 3079F PR MOST RECENT DIASTOLIC BLOOD PRESSURE 80-89 MM HG: ICD-10-PCS | Mod: CPTII,S$GLB,, | Performed by: INTERNAL MEDICINE

## 2019-05-08 PROCEDURE — 3078F DIAST BP <80 MM HG: CPT | Mod: CPTII,S$GLB,, | Performed by: INTERNAL MEDICINE

## 2019-05-08 PROCEDURE — 99214 PR OFFICE/OUTPT VISIT, EST, LEVL IV, 30-39 MIN: ICD-10-PCS | Mod: S$GLB,,, | Performed by: PHYSICIAN ASSISTANT

## 2019-05-08 NOTE — PROGRESS NOTES
Subjective:      Patient ID: Stepan Nixon is a 64 y.o. male.    PCP: Liza Landrum MD      Stepan Nixon is a pleasant 64 y.o. male presenting to follow up on diabetes mellitus. Also, pertinent to this visit in decision making is hypertension, ASCVD, CHF NYHA III, NSTEMI, hyperlipidemia, and adherence. He has had diabetes for 20 or more years. His last visit in Diabetes Management was 02/04/2019.  Since that time he has been in his usual state of health without significant hyperglycemia or hypoglycemia. He has not been checking his blood glucose regularly.  Today his fingerstick glucose in the clinic is 43 mg/dL and the patient is unaware.  Two days ago he had blood work done for the [facility] and that his blood sugar at that time was 36 mg/dL and he was unaware.  It is concerning to me but after further questioning patient had a blood sugar done the day he was having his colonoscopy and he was 117 mg/dL which is normal, however he did not take his morning insulin that day. Also, I learned his routine for morning was that he takes 130 units of 70/30 insulin as he leaves the house.  He drives to [employer] and will get something to eat at a [employer] then proceeds to work.  He informs me that his drive is approximately 1 hr.  He and I had a prolonged discussion about low blood sugars and that by taking his insulin without eating he is precipitating hypoglycemia and that he is unaware is a more dangerous situation.  I strongly advised him to have something to eat before he leaves home, get in the car, and drive 1 hr to work.  I also advised him that he would benefit from a continuous glucose monitor with alarms to let him know when he is predicted to be low. His current concerns are glycemic control.      He denies any hospital admissions, emergency room visits, hypoglycemia, syncope, diaphoresis, chest pain, or dyspnea.    He has gained 5 pounds since last visit. His BMI is 48.11 kg/m².    His  "blood sugar in the clinic today was:   Lab Results   Component Value Date    POCGLU 43 (A) 05/08/2019    He was treated and a repeat POCGLU was 89 mg/dL    Stepan is noncompliant much of the time with DM medications.     Stepan is noncompliant much of the time with lifestyle modifications to include activity and meal planning.     Diabetes Management Status    Statin: Taking  ACE/ARB: Taking    Screening or Prevention Patient's value Goal Complete/Controlled?   HgA1C Testing and Control   Lab Results   Component Value Date    HGBA1C 9.7 (H) 11/01/2018      Annually/Less than 8% No   Lipid profile : 11/01/2018 Annually Yes   LDL control Lab Results   Component Value Date    LDLCALC 60.2 (L) 11/01/2018    Annually/Less than 100 mg/dl  Yes   Nephropathy screening Lab Results   Component Value Date    LABMICR 3.0 04/12/2018     Lab Results   Component Value Date    PROTEINUA Negative 08/09/2018    Annually Yes   Blood pressure BP Readings from Last 1 Encounters:   05/08/19 126/80    Less than 140/90 Yes   Dilated retinal exam : 04/23/2019 Annually Yes   Foot exam   : 02/04/2019 Annually Yes       Lab Results   Component Value Date    HGBA1C 9.7 (H) 11/01/2018    HGBA1C 8.6 (H) 07/19/2018    HGBA1C 9.2 (H) 05/02/2018       Review of Systems   Constitutional: Negative for activity change and unexpected weight change.   Eyes: Negative for visual disturbance.   Respiratory: Negative for chest tightness and shortness of breath.    Cardiovascular: Negative for chest pain and leg swelling.   Gastrointestinal: Negative for constipation and diarrhea.   Endocrine: Negative for cold intolerance, heat intolerance, polydipsia, polyphagia and polyuria.   Genitourinary: Negative for frequency.   Skin: Negative for wound.   Neurological: Negative for numbness.   Psychiatric/Behavioral: Negative for confusion.      Objective:   /84   Ht 6' 7" (2.007 m)   Wt (!) 193.7 kg (427 lb 0.5 oz)   BMI 48.11 kg/m²     Physical Exam "   Constitutional: He is oriented to person, place, and time. He appears well-developed and well-nourished. No distress.   Neck: Normal range of motion. Neck supple. No tracheal deviation present. No thyromegaly present.   Cardiovascular: Normal rate, regular rhythm and normal heart sounds.   Pulmonary/Chest: Effort normal and breath sounds normal.   Musculoskeletal: He exhibits no edema.   Lymphadenopathy:     He has no cervical adenopathy.   Neurological: He is alert and oriented to person, place, and time.   Skin: Skin is warm and dry. He is not diaphoretic.   Psychiatric: He has a normal mood and affect.       Assessment:     1. Uncontrolled type 2 diabetes mellitus with kidney complication, with long-term current use of insulin       Plan:   Stepan Nixon is seen today for   1. Uncontrolled type 2 diabetes mellitus with kidney complication, with long-term current use of insulin         - Continue with D&E, reduce portion size, and restrict carbohydrates (no more that 45 grams ) per meal.   - DEXCOM CGM personal   - A detailed explanation of CGMS was provided.    Instructed patient to check blood sugar using home glucometer and to record the following on provided patient log sheets: blood sugar taken at home, meals and snacks, activity, and diabetes medications taken and dosage. Sensor was inserted on back of left upper arm   - A1c today   - F/U 2 weeks      Uncontrolled type 2 diabetes mellitus with kidney complication, with long-term current use of insulin  -     POCT Glucose, Hand-Held Device  -     GLUCOSE MONITORING CONTINUOUS MIN 72 HOURS; Future  -     Hemoglobin A1c; Future; Expected date: 05/08/2019        A total of 60 minutes was spent in face to face time, of which 50 % was spent in counseling patient on disease process, complications, treatment, and side effects of medications.    The patient was explained the above plan and given opportunity to ask questions.  He understands, chooses and consents  to this plan and accepts all the risks, which include but are not limited to the risks mentioned above.   He understands the alternative of having no testing, interventions or treatments at this time. He left content and without further questions.     Disclaimer:  This note is prepared using voice recognition software and as such is likely to have errors and has not been proof read. Please contact me for questions.

## 2019-05-08 NOTE — PROGRESS NOTES
Patient is here in clinic today for placement of continuous glucose monitoring system (CGMS) [employer]. Site on back of patient's right upper arm was cleaned and sensor was placed and started. Explained to patient that this is a blind procedure and that she will not actually see her BG in real time. Instructed pt. she could shower and informed her of need to check blood sugars as prescribed. Patient was also informed to avoid all imaging procedures and acetaminophen during her procedure. Voiced understanding of all instructions given. No further questions.   She will come back to have sensor downloaded

## 2019-05-08 NOTE — PROGRESS NOTES
"Subjective:   Patient ID:  Stepan Nixon is a 64 y.o. male who presents for follow up of Carotid Artery Disease (6 mth f/u)      HPI  A 65 yo male with obesity diabetes  ckd htn karyn unteated chf cad old mi s/p rca stentx2 is here for f/u wants his care at the [facility]. HEIS BP IS ELEVATED NOW HE HAD LUNCH AT [facility] HE SI NOT VERY COMPLIANT WITH DIET HE EXERCISES WHENEVER HE CAN NOT DURING TAX SEASON. HAS NO ANGINA CHF  NO SYNCOPE NEAR SYNCOPE. HAD AN EPISODE OF LEFT LEG CELLULITIS. HSIO WEIGHT IS STABLE.HE SLEEPS WELL AT NITE NO ISSUES CLINICALLY.HE STILL HAS LEG EDEMA. HE AHS 2 STENTS PLACED BY ME IN September 2016.HE JUST HAD LUNCH AT [facility] BEFORE COMING TO CLINIC. THIS MORNING BP WAS WELL CONTROLLED.  Past Medical History:   Diagnosis Date    Anxiety     Bell's palsy     CHF (congestive heart failure)     Coronary artery disease involving native coronary artery without angina pectoris 10/18/2016    Diabetes mellitus     Hypertension     Idiopathic peripheral neuropathy 12/11/2012    Mixed hyperlipidemia 12/11/2012    Vitamin B 12 deficiency 8/23/2012       Past Surgical History:   Procedure Laterality Date    CARDIAC CATHETERIZATION  7/22/13    non obstructive cad    CATARACT EXTRACTION  OU    CATHETERIZATION, HEART, LEFT Left 7/22/2013    Performed by Lila Parekh MD at [facility] CATH LAB    COLONOSCOPY N/A 5/1/2019    Performed by Henry Mo III, MD at [facility] ENDO    CORONARY ANGIOPLASTY      ESOPHAGOGASTRODUODENOSCOPY (EGD) N/A 5/1/2019    Performed by Henry Mo III, MD at [facility] ENDO    EYE SURGERY      FRACTURE SURGERY      kidney stone       August 2016    PC IOL OU      RETINAL DETACHMENT REPAIR W/ SCLERAL BUCKLE LE      WRIST SURGERY         Social History     Tobacco Use    Smoking status: Never Smoker    Smokeless tobacco: Never Used   Substance Use Topics    Alcohol use: Yes     Comment: " one to two glasses of wine per year"    Drug use: No       Family " History   Problem Relation Age of Onset    Macular degeneration Father     Heart disease Father         CHF     Heart attack Father     Hypertension Mother     Macular degeneration Paternal Uncle     Hypertension Maternal Grandmother     Hypertension Maternal Grandfather     Strabismus Neg Hx     Retinal detachment Neg Hx     Glaucoma Neg Hx     Blindness Neg Hx     Amblyopia Neg Hx        Current Outpatient Medications   Medication Sig    aspirin 325 MG tablet Take 325 mg by mouth 2 (two) times daily.    atorvastatin (LIPITOR) 10 MG tablet Take 1 tablet (10 mg total) by mouth once daily. (Patient taking differently: Take 20 mg by mouth once daily. )    buPROPion (WELLBUTRIN XL) 300 MG 24 hr tablet Take 1 tablet (300 mg total) by mouth once daily.    carvedilol (COREG) 25 MG tablet TAKE ONE TABLET BY MOUTH TWICE DAILY WITH  MEALS    furosemide (LASIX) 40 MG tablet Take 2 tablets (80 mg total) by mouth 2 (two) times daily. When ankles retain fluid I double the lasix x 14 days    lisinopril (PRINIVIL,ZESTRIL) 20 MG tablet Take 1 tablet (20 mg total) by mouth once daily.    nitroGLYCERIN (NITROSTAT) 0.4 MG SL tablet Place 1 tablet (0.4 mg total) under the tongue every 5 (five) minutes as needed for Chest pain.    omeprazole (PRILOSEC) 40 MG capsule Take 1 capsule (40 mg total) by mouth once daily.    ACCU-CHEK ALYCIA PLUS METER Misc     BLOOD PRESSURE CUFF Misc 1 cuff    blood sugar diagnostic Strp To check BG 3 times daily, to use with insurance preferred meter    blood-glucose meter kit To check BG 3 times daily, to use with insurance preferred meter    brimonidine 0.1% (ALPHAGAN P) 0.1 % Drop Place 1 drop into both eyes 3 (three) times daily. Order 90 day supply    brinzolamide (AZOPT) 1 % ophthalmic suspension Place 1 drop into both eyes 3 (three) times daily. ORDER 90 DAY SUPPLY    insulin NPH-insulin regular, 70/30, (NOVOLIN 70/30 U-100 INSULIN) 100 unit/mL (70-30) injection Inject 130  Units into the skin 2 (two) times daily.    lancets Misc To check BG 3 times daily, to use with insurance preferred meter    polyethylene glycol (GLYCOLAX) 17 gram/dose powder Take 17 g by mouth once daily.    sildenafil (VIAGRA) 50 MG tablet Take 1 tablet (50 mg total) by mouth daily as needed for Erectile Dysfunction.    travoprost (TRAVATAN Z) 0.004 % ophthalmic solution Place 1 drop into both eyes every evening.     No current facility-administered medications for this visit.      Current Outpatient Medications on File Prior to Visit   Medication Sig    aspirin 325 MG tablet Take 325 mg by mouth 2 (two) times daily.    atorvastatin (LIPITOR) 10 MG tablet Take 1 tablet (10 mg total) by mouth once daily. (Patient taking differently: Take 20 mg by mouth once daily. )    buPROPion (WELLBUTRIN XL) 300 MG 24 hr tablet Take 1 tablet (300 mg total) by mouth once daily.    carvedilol (COREG) 25 MG tablet TAKE ONE TABLET BY MOUTH TWICE DAILY WITH  MEALS    furosemide (LASIX) 40 MG tablet Take 2 tablets (80 mg total) by mouth 2 (two) times daily. When ankles retain fluid I double the lasix x 14 days    lisinopril (PRINIVIL,ZESTRIL) 20 MG tablet Take 1 tablet (20 mg total) by mouth once daily.    nitroGLYCERIN (NITROSTAT) 0.4 MG SL tablet Place 1 tablet (0.4 mg total) under the tongue every 5 (five) minutes as needed for Chest pain.    omeprazole (PRILOSEC) 40 MG capsule Take 1 capsule (40 mg total) by mouth once daily.    ACCU-CHEK ALYCIA PLUS METER Misc     BLOOD PRESSURE CUFF Misc 1 cuff    blood sugar diagnostic Strp To check BG 3 times daily, to use with insurance preferred meter    blood-glucose meter kit To check BG 3 times daily, to use with insurance preferred meter    brimonidine 0.1% (ALPHAGAN P) 0.1 % Drop Place 1 drop into both eyes 3 (three) times daily. Order 90 day supply    brinzolamide (AZOPT) 1 % ophthalmic suspension Place 1 drop into both eyes 3 (three) times daily. ORDER 90 DAY SUPPLY     insulin NPH-insulin regular, 70/30, (NOVOLIN 70/30 U-100 INSULIN) 100 unit/mL (70-30) injection Inject 130 Units into the skin 2 (two) times daily.    lancets Misc To check BG 3 times daily, to use with insurance preferred meter    polyethylene glycol (GLYCOLAX) 17 gram/dose powder Take 17 g by mouth once daily.    sildenafil (VIAGRA) 50 MG tablet Take 1 tablet (50 mg total) by mouth daily as needed for Erectile Dysfunction.    travoprost (TRAVATAN Z) 0.004 % ophthalmic solution Place 1 drop into both eyes every evening.     No current facility-administered medications on file prior to visit.      Review of patient's allergies indicates:   Allergen Reactions    Cefazolin      Other reaction(s): Shakes  Other reaction(s): Chills     Review of Systems   Constitution: Negative for malaise/fatigue.   Eyes: Negative for blurred vision.   Cardiovascular: Positive for leg swelling. Negative for chest pain, claudication, cyanosis, dyspnea on exertion, irregular heartbeat, near-syncope, orthopnea, palpitations and paroxysmal nocturnal dyspnea.   Respiratory: Positive for snoring. Negative for cough, hemoptysis and shortness of breath.    Hematologic/Lymphatic: Negative for bleeding problem. Does not bruise/bleed easily.   Skin: Positive for color change and rash. Negative for dry skin and itching.   Musculoskeletal: Negative for falls, muscle weakness and myalgias.   Gastrointestinal: Negative for abdominal pain, diarrhea, heartburn, hematemesis, hematochezia and melena.   Genitourinary: Negative for flank pain and hematuria.   Neurological: Negative for dizziness, focal weakness, headaches, light-headedness, numbness, paresthesias, seizures and weakness.   Psychiatric/Behavioral: Negative for altered mental status and memory loss. The patient is not nervous/anxious.    Allergic/Immunologic: Negative for hives.       Objective:   Physical Exam   Constitutional: He is oriented to person, place, and time. He appears  "well-developed and well-nourished. No distress.   HENT:   Head: Normocephalic and atraumatic.   Eyes: Pupils are equal, round, and reactive to light. EOM are normal. Right eye exhibits no discharge. Left eye exhibits no discharge.   Neck: Neck supple. No JVD present. No thyromegaly present.   Cardiovascular: Normal rate, regular rhythm, normal heart sounds and intact distal pulses. Exam reveals no gallop and no friction rub.   No murmur heard.  Pulmonary/Chest: Effort normal and breath sounds normal. No respiratory distress. He has no wheezes. He has no rales. He exhibits no tenderness.   Abdominal: Soft. Bowel sounds are normal. He exhibits no distension. There is no tenderness.   OBESE ABDOMEN.   Musculoskeletal: Normal range of motion. He exhibits edema (1+ BILATERAL).   Neurological: He is alert and oriented to person, place, and time. No cranial nerve deficit.   Skin: Skin is warm and dry. No rash noted. He is not diaphoretic. No erythema.   Psychiatric: He has a normal mood and affect. His behavior is normal.   Nursing note and vitals reviewed.    Vitals:    05/08/19 1455   BP: (!) 156/90   Patient Position: Sitting   BP Method: Large (Manual)   Pulse: 92   Weight: (!) 195.8 kg (431 lb 10.6 oz)   Height: 6' 7" (2.007 m)     Lab Results   Component Value Date    CHOL 128 11/01/2018    CHOL 125 04/12/2018    CHOL 125 04/12/2018     Lab Results   Component Value Date    HDL 41 11/01/2018    HDL 41 04/12/2018    HDL 41 04/12/2018     Lab Results   Component Value Date    LDLCALC 60.2 (L) 11/01/2018    LDLCALC 48.2 (L) 04/12/2018    LDLCALC 48.2 (L) 04/12/2018     Lab Results   Component Value Date    TRIG 134 11/01/2018    TRIG 179 (H) 04/12/2018    TRIG 179 (H) 04/12/2018     Lab Results   Component Value Date    CHOLHDL 32.0 11/01/2018    CHOLHDL 32.8 04/12/2018    CHOLHDL 32.8 04/12/2018       Chemistry        Component Value Date/Time     05/06/2019 0734    K 3.7 05/06/2019 0734     05/06/2019 0734 "    CO2 21 (L) 05/06/2019 0734    BUN 17 05/06/2019 0734    BUN 34 (A) 10/28/2013 0835    CREATININE 1.7 (H) 05/06/2019 0734    CREATININE 1.7 10/28/2013 0835    GLU 36 (LL) 05/06/2019 0734        Component Value Date/Time    CALCIUM 9.1 05/06/2019 0734    ALKPHOS 94 05/02/2018 0930    AST 10 05/02/2018 0930    AST 14 10/28/2013 0835    ALT 21 11/01/2018 0716    BILITOT 0.5 05/02/2018 0930    ESTGFRAFRICA 48.9 (A) 05/06/2019 0734    EGFRNONAA 42.3 (A) 05/06/2019 0734        Lab Results   Component Value Date    HGBA1C 9.7 (H) 11/01/2018       Lab Results   Component Value Date    TSH 1.493 04/12/2018     Lab Results   Component Value Date    INR 1.2 09/20/2016    INR 1.1 09/20/2016    INR 1.0 08/15/2016     Lab Results   Component Value Date    WBC 5.43 03/18/2019    HGB 11.5 (L) 03/18/2019    HCT 36.3 (L) 03/18/2019    MCV 97 03/18/2019     (H) 03/18/2019     BMP  Sodium   Date Value Ref Range Status   05/06/2019 143 136 - 145 mmol/L Final     Potassium   Date Value Ref Range Status   05/06/2019 3.7 3.5 - 5.1 mmol/L Final     Chloride   Date Value Ref Range Status   05/06/2019 109 95 - 110 mmol/L Final     CO2   Date Value Ref Range Status   05/06/2019 21 (L) 23 - 29 mmol/L Final     BUN, Bld   Date Value Ref Range Status   05/06/2019 17 8 - 23 mg/dL Final     BUN   Date Value Ref Range Status   10/28/2013 34 (A) 4 - 21 mg/dL Final     Creatinine   Date Value Ref Range Status   05/06/2019 1.7 (H) 0.5 - 1.4 mg/dL Final   10/28/2013 1.7 mg/dL Final     Calcium   Date Value Ref Range Status   05/06/2019 9.1 8.7 - 10.5 mg/dL Final     Anion Gap   Date Value Ref Range Status   05/06/2019 13 8 - 16 mmol/L Final     eGFR if    Date Value Ref Range Status   05/06/2019 48.9 (A) >60 mL/min/1.73 m^2 Final     eGFR if non    Date Value Ref Range Status   05/06/2019 42.3 (A) >60 mL/min/1.73 m^2 Final     Comment:     Calculation used to obtain the estimated glomerular filtration  rate  (eGFR) is the CKD-EPI equation.        Estimated Creatinine Clearance: 85.7 mL/min (A) (based on SCr of 1.7 mg/dL (H)).    Assessment:     1. Status post cataract extraction and insertion of intraocular lens, unspecified laterality    2. FRAN (obstructive sleep apnea)    3. CHF (NYHA class III, ACC/AHA stage C)    4. Hypertension associated with diabetes    5. Chronic kidney disease, stage III (moderate)    6. Hx of retinal detachment    7. LBBB (left bundle branch block)    8. Idiopathic peripheral neuropathy    9. Uncontrolled type 2 diabetes mellitus with kidney complication, with long-term current use of insulin    10. Coronary artery disease involving native coronary artery of native heart without angina pectoris    11. Morbid obesity with BMI of 40.0-44.9, adult    12. Hyperlipidemia associated with type 2 diabetes mellitus    13. Hypoglycemia unawareness associated with type 2 diabetes mellitus      STABLE CARDIAC WISE   A1C PENDING WAS UNCONTROLLED.  LIPID STATUS UNKNOWN NEEDS REPEAT   MORBID OBESITY COUNSELED ABOUT DIET EXERCISE WEIGHT LOSS  Plan:   NEEDS LIPIDS   Continue current therapy  Cardiac low salt diet.  Risk factor modification and excercise program.  F/u in 6 months with lipid cmp A1C   HAS SIGNIFICANT VENOUS INSUFFCIENCY AND RECURRENT LEFT LEG CELLULITIS HE WILL BENFIT FROM EVALUATION TO SEE DR GARCIA FOR VENOUS REFLUX AND SCLEROTHERAPY.

## 2019-05-08 NOTE — PROGRESS NOTES
"NEPHROLOGY CONSULTATION F/U NOTE  Date of clinic visit: 5/8/19     REASON FOR CONSULTATION:  NINFA on Chronic kidney disease, hyperkalemia, and f/u for L leg cellulitis     REFERRING PHYSICIAN:  Dr. Liza Fontenot.      HISTORY OF PRESENT ILLNESS: Pt is a 64-year-old male with a h/o of DM, HTN, CKD stage 3 with s Cr ranging between 1.9-2.6 in the past several years and obesity, who was initially referred to us for NINFA. Pt was found to have L leg cellulitis and received treatment with bactrim. Pt had also borderline hyperkalemia, and for bactrim to be given, aldactone had to be stopped and ACE-I dose was reduced. Pt completed treatment with abx. On his last visit with us 2 months ago, BP was high, and the ACE-I was increased back to the prior dose of 20 mg po qd.      On return visit today, he has no new c/o's, says that leg looks and feels better. No current or new c/o's. No fever, no new issues or c/o's. Labs and meds reviewed with pt. In addition, noted that BG was 43 this am. Pt says he was feeling a little lightheaded. He was given a bottle of OJ. Currently, not lightheaded, not dizzy, feels "fine." Repeat BG was 63 in our clinic.     PAST MEDICAL HISTORY:  1.  CKD, stage III, based on review of the records, baseline creatinine appears to be close to 2.0 with estimated GFR of about 40 mL/min.  2.  Diabetes mellitus for 15 years.  3.  Hypertension.  4.  Coronary artery disease with past history of MI in 2016, had 2 stents placed.  5.  Diastolic CHF.  6.  Obesity.  7.  Hyperlipidemia.  8.  Left bundle-branch block.  9.  Obstructive sleep apnea.  10.  History of right-sided Bell's palsy.  11.  Idiopathic peripheral neuropathy.   12.  Primary open-angle glaucoma of both eyes.  13.  Chronic left hydronephrosis.     PAST SURGICAL HISTORY:  Reviewed.  Distant eye surgery for cataracts.     FAMILY HISTORY:  Reviewed.  Both parents passed away in their 80s.  No history   of diabetes mellitus.     ALLERGIES:  Reviewed, to " [facility].     SOCIAL HISTORY:  Negative for smoking.  No alcohol use.     MEDICATIONS:  Fully reviewed     Current Outpatient Medications:     ACCU-CHEK ALYCIA PLUS METER Misc, , Disp: , Rfl:     aspirin 325 MG tablet, Take 325 mg by mouth 2 (two) times daily., Disp: , Rfl:     atorvastatin (LIPITOR) 10 MG tablet, Take 1 tablet (10 mg total) by mouth once daily., Disp: 90 tablet, Rfl: 3    BLOOD PRESSURE CUFF Misc, 1 cuff, Disp: 1 each, Rfl: 0    blood sugar diagnostic Strp, To check BG 3 times daily, to use with insurance preferred meter, Disp: 100 strip, Rfl: 3    blood-glucose meter kit, To check BG 3 times daily, to use with insurance preferred meter, Disp: 1 each, Rfl: 0    brimonidine 0.1% (ALPHAGAN P) 0.1 % Drop, Place 1 drop into both eyes 3 (three) times daily. Order 90 day supply, Disp: 10 mL, Rfl: 4    brinzolamide (AZOPT) 1 % ophthalmic suspension, Place 1 drop into both eyes 3 (three) times daily. ORDER 90 DAY SUPPLY, Disp: 10 mL, Rfl: 4    buPROPion (WELLBUTRIN XL) 300 MG 24 hr tablet, Take 1 tablet (300 mg total) by mouth once daily., Disp: 90 tablet, Rfl: 0    carvedilol (COREG) 25 MG tablet, TAKE ONE TABLET BY MOUTH TWICE DAILY WITH  MEALS, Disp: 180 tablet, Rfl: 11    furosemide (LASIX) 40 MG tablet, Take 2 tablets (80 mg total) by mouth 2 (two) times daily. When ankles retain fluid I double the lasix x 14 days, Disp: 260 tablet, Rfl: 3    insulin NPH-insulin regular, 70/30, (NOVOLIN 70/30 U-100 INSULIN) 100 unit/mL (70-30) injection, Inject 130 Units into the skin 2 (two) times daily., Disp: 7.8 mL, Rfl: 6    lancets Misc, To check BG 3 times daily, to use with insurance preferred meter, Disp: 100 each, Rfl: 3    lisinopril (PRINIVIL,ZESTRIL) 20 MG tablet, Take 1 tablet (20 mg total) by mouth once daily., Disp: 30 tablet, Rfl: 11    nitroGLYCERIN (NITROSTAT) 0.4 MG SL tablet, Place 1 tablet (0.4 mg total) under the tongue every 5 (five) minutes as needed for Chest pain., Disp: 20  tablet, Rfl: 0    omeprazole (PRILOSEC) 40 MG capsule, Take 1 capsule (40 mg total) by mouth once daily., Disp: 30 capsule, Rfl: 5    polyethylene glycol (GLYCOLAX) 17 gram/dose powder, Take 17 g by mouth once daily., Disp: 238 g, Rfl: 6    sildenafil (VIAGRA) 50 MG tablet, Take 1 tablet (50 mg total) by mouth daily as needed for Erectile Dysfunction., Disp: 5 tablet, Rfl: 3    travoprost (TRAVATAN Z) 0.004 % ophthalmic solution, Place 1 drop into both eyes every evening., Disp: 2.5 mL, Rfl: 6       REVIEW OF SYSTEMS:  No recent hospitalizations.  GENERAL:  Negative.  HEAD, EYES, EARS, NOSE, AND THROAT:  Negative.  CARDIAC:  Negative.  PULMONARY:  Negative.  GASTROINTESTINAL:  Negative.  GENITOURINARY:  Negative.  PSYCHOLOGICAL:  Negative.  NEUROLOGICAL:  Negative.  ENDOCRINE:  Negative.  HEMATOLOGIC AND ONCOLOGIC:  Negative.  INFECTIOUS DISEASE:  Negative.  The rest of the review of systems negative.     PHYSICAL EXAMINATION:  VITAL SIGNS:  Blood pressure is 126/80, pulse 64, weight 428 lbs, last visit 407 lbs  He is cooperative, pleasant, in no acute distress.  Atraumatic, normocephalic.    Well developed, well nourished.  HEENT:  Mucous membranes moist.  NECK:  No JVD.  HEART:  Regular rate and rhythm.  CHEST:  Clear to auscultation.  No rales.  No wheezes.  Breathing symmetric,   unlabored.  ABDOMEN:  Soft, nontender.  EXTREMITIES:  Showed on the right side, trace edema.  On the left, improved edema and erythema     LABS:  Reviewed.      BMP  Lab Results   Component Value Date     05/06/2019    K 3.7 05/06/2019     05/06/2019    CO2 21 (L) 05/06/2019    BUN 17 05/06/2019    CREATININE 1.7 (H) 05/06/2019    CALCIUM 9.1 05/06/2019    ANIONGAP 13 05/06/2019    ESTGFRAFRICA 48.9 (A) 05/06/2019    EGFRNONAA 42.3 (A) 05/06/2019     Lab Results   Component Value Date    WBC 5.43 03/18/2019    HGB 11.5 (L) 03/18/2019    HCT 36.3 (L) 03/18/2019    MCV 97 03/18/2019     (H) 03/18/2019     Lab  Results   Component Value Date    .0 (H) 08/09/2018    CALCIUM 9.1 05/06/2019    PHOS 2.3 (L) 05/06/2019        Blood cx x 2 neg  Urinalysis no proteinuria, no blood, no casts reported.  No rbc's.       ASSESSMENT AND PLAN:  This is a 64-year-old male with metabolic syndrome, DM, HTN, CKD-3, who presents for f/u of NINFA, hyperkalemia, and leg cellulitis:  The impression is:     1.  Renal:  NINFA on CKD-3.  NINFA remains resolved, s Cr lower and is at baseline now.  Stable renal function  Fluctuating levels of serum creatinine, mostly hemodynamic  NINFA may have been due to cellulitis, perhaps subclinical sepsis, bacteremia, as pt also had low BP/hypotension last time       Proteinuria. Related to CKD  Likely has diabetic nephropathy.    Cause of CKD likely due to DM and HTN      2.  Hypertension. BP improved, normal, well controlled today  Meds reviewed.  ACE-I dose was previously increased     3. Hyperkalemia: resolved after stopping aldactone  K has remained normal despite increasing dose of lisinopril  OK to continue     4. Left leg cellulitis. s/p bactrim  Clinically improved, reolved  WBC normal  Blood cx's negative  The redness of the leg appears to be related to venous stasis, rather than cellulitis at this point    5. Hypoglycemia: resolved.  Was given juice  Repeat BG is improved  Advised pt to eat breakfast to avoid hypoglycemia       PLANS AND RECOMMENDATIONS:  For this complicated patient include the following:  As discussed above  Discussed with the patient.  Opportunity for question and discussion provided.\  RTC 4 months  Multiple issues addressed  F/u with PCP  Total time spent 40 minutes, time was needed to review the records, the patient   evaluation, documentation, and face-to-face discussion with the patient.  More   than 50% of the time was spent in counseling and coordination of care.  Level V visit.     Kamila Abdi MD

## 2019-05-13 ENCOUNTER — LAB VISIT (OUTPATIENT)
Dept: LAB | Facility: HOSPITAL | Age: 64
End: 2019-05-13
Attending: INTERNAL MEDICINE
Payer: COMMERCIAL

## 2019-05-13 DIAGNOSIS — I25.10 CORONARY ARTERY DISEASE INVOLVING NATIVE CORONARY ARTERY OF NATIVE HEART WITHOUT ANGINA PECTORIS: ICD-10-CM

## 2019-05-13 DIAGNOSIS — E11.69 HYPERLIPIDEMIA ASSOCIATED WITH TYPE 2 DIABETES MELLITUS: ICD-10-CM

## 2019-05-13 DIAGNOSIS — E78.5 HYPERLIPIDEMIA ASSOCIATED WITH TYPE 2 DIABETES MELLITUS: ICD-10-CM

## 2019-05-13 LAB
CHOLEST SERPL-MCNC: 138 MG/DL (ref 120–199)
CHOLEST/HDLC SERPL: 2.6 {RATIO} (ref 2–5)
HDLC SERPL-MCNC: 53 MG/DL (ref 40–75)
HDLC SERPL: 38.4 % (ref 20–50)
LDLC SERPL CALC-MCNC: 63.6 MG/DL (ref 63–159)
NONHDLC SERPL-MCNC: 85 MG/DL
TRIGL SERPL-MCNC: 107 MG/DL (ref 30–150)

## 2019-05-13 PROCEDURE — 80061 LIPID PANEL: CPT

## 2019-05-13 PROCEDURE — 36415 COLL VENOUS BLD VENIPUNCTURE: CPT

## 2019-05-16 ENCOUNTER — OFFICE VISIT (OUTPATIENT)
Dept: INTERNAL MEDICINE | Facility: CLINIC | Age: 64
End: 2019-05-16
Payer: COMMERCIAL

## 2019-05-16 ENCOUNTER — OFFICE VISIT (OUTPATIENT)
Dept: PSYCHIATRY | Facility: CLINIC | Age: 64
End: 2019-05-16
Payer: COMMERCIAL

## 2019-05-16 VITALS
HEART RATE: 62 BPM | WEIGHT: 315 LBS | OXYGEN SATURATION: 99 % | SYSTOLIC BLOOD PRESSURE: 164 MMHG | HEIGHT: 78 IN | DIASTOLIC BLOOD PRESSURE: 96 MMHG | TEMPERATURE: 97 F | BODY MASS INDEX: 36.45 KG/M2

## 2019-05-16 DIAGNOSIS — F33.1 MAJOR DEPRESSIVE DISORDER, RECURRENT EPISODE, MODERATE: Primary | ICD-10-CM

## 2019-05-16 DIAGNOSIS — I25.10 CORONARY ARTERY DISEASE INVOLVING NATIVE CORONARY ARTERY OF NATIVE HEART WITHOUT ANGINA PECTORIS: ICD-10-CM

## 2019-05-16 PROCEDURE — 3045F PR MOST RECENT HEMOGLOBIN A1C LEVEL 7.0-9.0%: ICD-10-PCS | Mod: CPTII,S$GLB,, | Performed by: FAMILY MEDICINE

## 2019-05-16 PROCEDURE — 99999 PR PBB SHADOW E&M-EST. PATIENT-LVL III: CPT | Mod: PBBFAC,,, | Performed by: FAMILY MEDICINE

## 2019-05-16 PROCEDURE — 3080F DIAST BP >= 90 MM HG: CPT | Mod: CPTII,S$GLB,, | Performed by: FAMILY MEDICINE

## 2019-05-16 PROCEDURE — 99999 PR PBB SHADOW E&M-EST. PATIENT-LVL III: ICD-10-PCS | Mod: PBBFAC,,, | Performed by: FAMILY MEDICINE

## 2019-05-16 PROCEDURE — 3008F PR BODY MASS INDEX (BMI) DOCUMENTED: ICD-10-PCS | Mod: CPTII,S$GLB,, | Performed by: FAMILY MEDICINE

## 2019-05-16 PROCEDURE — 3008F BODY MASS INDEX DOCD: CPT | Mod: CPTII,S$GLB,, | Performed by: FAMILY MEDICINE

## 2019-05-16 PROCEDURE — 90834 PSYTX W PT 45 MINUTES: CPT | Mod: S$GLB,,, | Performed by: SOCIAL WORKER

## 2019-05-16 PROCEDURE — 3045F PR MOST RECENT HEMOGLOBIN A1C LEVEL 7.0-9.0%: CPT | Mod: CPTII,S$GLB,, | Performed by: FAMILY MEDICINE

## 2019-05-16 PROCEDURE — 3077F SYST BP >= 140 MM HG: CPT | Mod: CPTII,S$GLB,, | Performed by: FAMILY MEDICINE

## 2019-05-16 PROCEDURE — 3077F PR MOST RECENT SYSTOLIC BLOOD PRESSURE >= 140 MM HG: ICD-10-PCS | Mod: CPTII,S$GLB,, | Performed by: FAMILY MEDICINE

## 2019-05-16 PROCEDURE — 3080F PR MOST RECENT DIASTOLIC BLOOD PRESSURE >= 90 MM HG: ICD-10-PCS | Mod: CPTII,S$GLB,, | Performed by: FAMILY MEDICINE

## 2019-05-16 PROCEDURE — 99214 PR OFFICE/OUTPT VISIT, EST, LEVL IV, 30-39 MIN: ICD-10-PCS | Mod: S$GLB,,, | Performed by: FAMILY MEDICINE

## 2019-05-16 PROCEDURE — 90834 PR PSYCHOTHERAPY W/PATIENT, 45 MIN: ICD-10-PCS | Mod: S$GLB,,, | Performed by: SOCIAL WORKER

## 2019-05-16 PROCEDURE — 99214 OFFICE O/P EST MOD 30 MIN: CPT | Mod: S$GLB,,, | Performed by: FAMILY MEDICINE

## 2019-05-16 NOTE — PROGRESS NOTES
Subjective:       Patient ID: Stepan Nixon is a 64 y.o. male.    Chief Complaint: Follow-up (3 month// low blood sugar)    63 yo male here for f/u; He has been seeing Dr. Fontenot; he has DM2 followed by [facility] with some recent low readings. He has CAD and follows with Dr. Parekh. He has chronic venous stasis dermatitis of lower extremities and has resolved left lower extremity cellulitis. Reports his BP is typically in the 120s/80s; reviewed vitals flow sheet with 2 normals readings documented this month.   Has FRAN but couldn't tolerate CPAP.     Cscope nl 5/2019, repeat in 10 years (Dr. Mo)    does not have any pertinent problems on file.  Past Medical History:   Diagnosis Date    Anxiety     Bell's palsy     CHF (congestive heart failure)     Coronary artery disease involving native coronary artery without angina pectoris 10/18/2016    Diabetes mellitus     Hypertension     Idiopathic peripheral neuropathy 12/11/2012    Mixed hyperlipidemia 12/11/2012    Vitamin B 12 deficiency 8/23/2012     Past Surgical History:   Procedure Laterality Date    CARDIAC CATHETERIZATION  7/22/13    non obstructive cad    CATARACT EXTRACTION  OU    CATHETERIZATION, HEART, LEFT Left 7/22/2013    Performed by Lila Parekh MD at [facility]    COLONOSCOPY N/A 5/1/2019    Performed by Henry Mo III, MD at [facility] ENDO    CORONARY ANGIOPLASTY      ESOPHAGOGASTRODUODENOSCOPY (EGD) N/A 5/1/2019    Performed by Henry Mo III, MD at [facility] END[facility]    EYE SURGERY      FRACTURE SURGERY      kidney stone       August 2016    PC IOL OU      RETINAL DETACHMENT REPAIR W/ SCLERAL BUCKLE LE      WRIST SURGERY       Family History   Problem Relation Age of Onset    Macular degeneration Father     Heart disease Father         CHF     Heart attack Father     Hypertension Mother     Macular degeneration Paternal Uncle     Hypertension Maternal Grandmother     Hypertension Maternal Grandfather     Strabismus  "Neg Hx     Retinal detachment Neg Hx     Glaucoma Neg Hx     Blindness Neg Hx     Amblyopia Neg Hx      Social History     Socioeconomic History    Marital status:      Spouse name: Not on file    Number of children: Not on file    Years of education: Not on file    Highest education level: Not on file   Occupational History     Employer:  [employer]   Social Needs    Financial resource strain: Not on file    Food insecurity:     Worry: Not on file     Inability: Not on file    Transportation needs:     Medical: Not on file     Non-medical: Not on file   Tobacco Use    Smoking status: Never Smoker    Smokeless tobacco: Never Used   Substance and Sexual Activity    Alcohol use: Yes     Comment: " one to two glasses of wine per year"    Drug use: No    Sexual activity: Not Currently     Birth control/protection: None   Lifestyle    Physical activity:     Days per week: Not on file     Minutes per session: Not on file    Stress: Not on file   Relationships    Social connections:     Talks on phone: Not on file     Gets together: Not on file     Attends Confucianism service: Not on file     Active member of club or organization: Not on file     Attends meetings of clubs or organizations: Not on file     Relationship status: Not on file   Other Topics Concern    Not on file   Social History Narrative    , 1 son, [employer].     Review of Systems   Constitutional: Negative for activity change, appetite change, fatigue and unexpected weight change.   HENT: Negative for dental problem and hearing loss.    Eyes: Negative for pain and visual disturbance.   Respiratory: Positive for shortness of breath. Negative for wheezing.    Cardiovascular: Positive for leg swelling. Negative for chest pain and palpitations.   Gastrointestinal: Negative for abdominal pain, constipation and diarrhea.   Genitourinary: Negative for difficulty urinating and dysuria.   Musculoskeletal: Negative for joint swelling and " myalgias.   Skin: Positive for color change. Negative for rash and wound.   Neurological: Negative for light-headedness and headaches.   Psychiatric/Behavioral: Negative for dysphoric mood. The patient is not nervous/anxious.        Objective:     Vitals:    05/16/19 0920   BP: (!) 164/96   Pulse: 62   Temp: 97.4 °F (36.3 °C)        Physical Exam   Constitutional: He is oriented to person, place, and time. He appears well-developed and well-nourished.   HENT:   Head: Normocephalic and atraumatic.   Eyes: Pupils are equal, round, and reactive to light. EOM are normal.   Neck: Normal range of motion. Neck supple.   Cardiovascular: Normal rate, regular rhythm, normal heart sounds and intact distal pulses.   Pulmonary/Chest: Effort normal and breath sounds normal. He has no rales.   Abdominal: Soft. Bowel sounds are normal.   Musculoskeletal: Normal range of motion. He exhibits no deformity.   Neurological: He is alert and oriented to person, place, and time.   Skin: Skin is warm and dry.   Psychiatric: He has a normal mood and affect. His behavior is normal.   Nursing note and vitals reviewed.      Assessment:       1. Uncontrolled type 2 diabetes mellitus with kidney complication, with long-term current use of insulin    2. Coronary artery disease involving native coronary artery of native heart without angina pectoris        Plan:           Problem List Items Addressed This Visit        Cardiac/Vascular    Coronary artery disease involving native coronary artery without angina pectoris    Current Assessment & Plan     Recently seen by Dr. Parekh; no new med changes            Endocrine    Uncontrolled type 2 diabetes mellitus with kidney complication, with long-term current use of insulin    Current Assessment & Plan     Lab Results   Component Value Date    HGBA1C 7.4 (H) 05/08/2019                   Up to date with specialist appointments; has been referred to Dr. Nuñez by [facility]        Uncontrolled type 2 diabetes  mellitus with stage 4 chronic kidney disease, with long-term current use of insulin     Hypertension associated with diabetes     Hyperlipidemia associated with type 2 diabetes mellitus/ Morbid obesity with BMI of 40.0-44.9, adult- work on diet/increasing exercise.        Coronary artery disease involving native coronary artery of native heart without angina pectoris- clinically stable, cont statin/ASA/exercise.     Moderate episode of recurrent major depressive disorder- c/w therapy     CKD (chronic kidney disease) stage 4, GFR 15-29 ml/min- recent visit with nephrology; stable disease

## 2019-05-17 ENCOUNTER — TELEPHONE (OUTPATIENT)
Dept: CARDIOLOGY | Facility: CLINIC | Age: 64
End: 2019-05-17

## 2019-05-20 NOTE — PROGRESS NOTES
"Individual Psychotherapy (PhD/LCSW)    5/16/2019    [facility]         Therapeutic Intervention: Met with patient.  Outpatient - Behavior modifying psychotherapy 45 min - CPT code 03017    Chief complaint/reason for encounter: depression     Interval history and content of current session:  Patient presents to ongoing individual therapy due to depression.  He is considering changing careers.  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   The man asked the patient what he thought of all the policies that had been implemented.  The patient told him that the policies "stink."  The supervisor asked what gave him the right to say that and the patient pointed to his degrees.  He finds the policies are cumbersome and weigh down his time.  He is considering going into education.  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  He has been checking in to the process of getting certified.  He has been going to supper club with a group of men.  He has enjoyed the time communicating.  He admits that he has more severe periods of depression on Sundays when his mind is more free to roam.  He has not returned to going to Christianity.  He admits he does not want to go to another Christianity besides the [employer] Christianity.  He has gotten another informant concerning his ex girlfriend.  She has confirmed that his ex girlfriend has a good deal going on with her family and medical problems.  He has been told not to lose hope.  He knows that she has been out of work due to hurting her back.  He is hopeful that she will get another job soon.  He admits he is still hoping that they will be able to have another job in the future.  He had a colonoscopy which came back all clear.  He now has to take some medication for reflux due to the results of the EGD.    Target symptoms: depression  Why chosen therapy is appropriate versus another modality: relevant to " diagnosis  Outcome monitoring methods: self-report, observation  Therapeutic intervention type: insight oriented psychotherapy, supportive psychotherapy, interactive psychotherapy     Risk parameters:  Patient reports no suicidal ideation  Patient reports no homicidal ideation  Patient reports no self-injurious behavior  Patient reports no violent behavior     Verbal deficits: none     Patient's response to intervention:  The patient's response to intervention is accepting, motivated.     Progress toward goals and other mental status changes:  The patient's progress toward goals is fair .     Diagnosis:   Major depression recurrent moderate     Plan:  individual psychotherapy and medication management by physician     Return to clinic: as scheduled     Length of Service (minutes): 45

## 2019-05-22 NOTE — PROGRESS NOTES
Continuous glucose monitoring report:    Indication: Hypoglycemia unawareness in patient with type 2 diabetes mellitus.     The patient's CGM was downloaded and was reviewed.  The report was technically satisfactory and there were 15 days of data reviewed. Target range was  mg/dl Hypoglycemia was below 70 and hyperglycemic was above 180 mg/dl. Patient's daily glucose summary showed a range of hypoglycemic from 0% to 26% with 4 of 15 days above 10%. His daily above target range was from 23% to 100% 14 of 15 days above 25%.  There was not a food intake diary or exercise diary submitted with this report.     Statistics:  Patient's average glucose was 192 mg/dL, Sensor usage was 15 of 15 days.  He was in time above range (TAR) 60% of the time, time in range(TIR) 37% of the time, and time below range (TBR) 3% of the time.  The target range for this patient was  mg/dL, and for the purpose of overnight this would be interpreted as 10 PM to 6 AM.     Pattern insight summary:  Nighttime lows, 3 were found and the most significant pattern found them between 10 PM and 4 AM     Daytime lows, 4 were found and most significant pattern showed them to be between 10 AM- 2 PM.     Nighttime highs 8 was found and the most significant pattern found them between 10 PM and 6 AM.     Daytime highs, 15 were found and most significant pattern showed them to be between 6 AM- 10 PM.      Interpretation:  #1 His hypoglycemia pattern was suspected to secondary to erratic diet and over treatment of hyperglycemia.  #2 His daytime highs pattern was suspected to secondary to erratic diet, too many carbohydrates and insulin timing.  #3 Recommendations were to Discussed portion size, carbohydrate restriction, and the addition of Glyset tablets to slow carbohydrate absorption .  #4 Will have his follow up in 3 months.  #5 Glyset 25 mg- 1/2 tablet before largest meal initially and gradually titrate up to 25 mg ac meals.     A total of 30  minutes was spent in face to face time, of which 50 % was spent in counseling patient on disease process, complications, treatment, and side effects of medications.    The patient was explained the above plan and given opportunity to ask questions. He understands, chooses and consents to this plan and accepts all the risks, which include but are not limited to the risks mentioned above. He understands the alternative of having no testing, interventions or treatments at this time. He left content and without further questions.       Daphne Barrera PA-C

## 2019-05-23 ENCOUNTER — OFFICE VISIT (OUTPATIENT)
Dept: DIABETES | Facility: CLINIC | Age: 64
End: 2019-05-23
Payer: COMMERCIAL

## 2019-05-23 ENCOUNTER — CLINICAL SUPPORT (OUTPATIENT)
Dept: DIABETES | Facility: CLINIC | Age: 64
End: 2019-05-23
Payer: COMMERCIAL

## 2019-05-23 VITALS
DIASTOLIC BLOOD PRESSURE: 98 MMHG | WEIGHT: 315 LBS | HEIGHT: 78 IN | BODY MASS INDEX: 36.45 KG/M2 | SYSTOLIC BLOOD PRESSURE: 144 MMHG

## 2019-05-23 DIAGNOSIS — E11.29 TYPE 2 DIABETES MELLITUS WITH OTHER DIABETIC KIDNEY COMPLICATION: ICD-10-CM

## 2019-05-23 DIAGNOSIS — E11.649 HYPOGLYCEMIA UNAWARENESS ASSOCIATED WITH TYPE 2 DIABETES MELLITUS: Primary | ICD-10-CM

## 2019-05-23 LAB — GLUCOSE SERPL-MCNC: 173 MG/DL (ref 70–110)

## 2019-05-23 PROCEDURE — 3045F PR MOST RECENT HEMOGLOBIN A1C LEVEL 7.0-9.0%: CPT | Mod: CPTII,S$GLB,, | Performed by: PHYSICIAN ASSISTANT

## 2019-05-23 PROCEDURE — 99213 PR OFFICE/OUTPT VISIT, EST, LEVL III, 20-29 MIN: ICD-10-PCS | Mod: S$GLB,,, | Performed by: PHYSICIAN ASSISTANT

## 2019-05-23 PROCEDURE — 3008F PR BODY MASS INDEX (BMI) DOCUMENTED: ICD-10-PCS | Mod: CPTII,S$GLB,, | Performed by: PHYSICIAN ASSISTANT

## 2019-05-23 PROCEDURE — 3077F PR MOST RECENT SYSTOLIC BLOOD PRESSURE >= 140 MM HG: ICD-10-PCS | Mod: CPTII,S$GLB,, | Performed by: PHYSICIAN ASSISTANT

## 2019-05-23 PROCEDURE — 99999 PR PBB SHADOW E&M-EST. PATIENT-LVL III: CPT | Mod: PBBFAC,,, | Performed by: PHYSICIAN ASSISTANT

## 2019-05-23 PROCEDURE — 82962 POCT GLUCOSE, HAND-HELD DEVICE: ICD-10-PCS | Mod: S$GLB,,, | Performed by: PHYSICIAN ASSISTANT

## 2019-05-23 PROCEDURE — 3008F BODY MASS INDEX DOCD: CPT | Mod: CPTII,S$GLB,, | Performed by: PHYSICIAN ASSISTANT

## 2019-05-23 PROCEDURE — 3080F DIAST BP >= 90 MM HG: CPT | Mod: CPTII,S$GLB,, | Performed by: PHYSICIAN ASSISTANT

## 2019-05-23 PROCEDURE — 3077F SYST BP >= 140 MM HG: CPT | Mod: CPTII,S$GLB,, | Performed by: PHYSICIAN ASSISTANT

## 2019-05-23 PROCEDURE — 3080F PR MOST RECENT DIASTOLIC BLOOD PRESSURE >= 90 MM HG: ICD-10-PCS | Mod: CPTII,S$GLB,, | Performed by: PHYSICIAN ASSISTANT

## 2019-05-23 PROCEDURE — 82962 GLUCOSE BLOOD TEST: CPT | Mod: S$GLB,,, | Performed by: PHYSICIAN ASSISTANT

## 2019-05-23 PROCEDURE — 99213 OFFICE O/P EST LOW 20 MIN: CPT | Mod: S$GLB,,, | Performed by: PHYSICIAN ASSISTANT

## 2019-05-23 PROCEDURE — 99999 PR PBB SHADOW E&M-EST. PATIENT-LVL III: ICD-10-PCS | Mod: PBBFAC,,, | Performed by: PHYSICIAN ASSISTANT

## 2019-05-23 PROCEDURE — 3045F PR MOST RECENT HEMOGLOBIN A1C LEVEL 7.0-9.0%: ICD-10-PCS | Mod: CPTII,S$GLB,, | Performed by: PHYSICIAN ASSISTANT

## 2019-05-23 RX ORDER — MIGLITOL 25 MG/1
25 TABLET, COATED ORAL
Qty: 270 TABLET | Refills: 3 | Status: SHIPPED | OUTPATIENT
Start: 2019-05-23 | End: 2020-08-18 | Stop reason: SDUPTHER

## 2019-05-23 NOTE — PROGRESS NOTES
Patient returned to clinic today to have  Glucose Sensor removed.     The CGMS Sensor was scanned and downloaded.   All reports were imported into the patient's electronic medical record.   Physician Assistant will complete data interpretation and make recommendations

## 2019-05-30 ENCOUNTER — OFFICE VISIT (OUTPATIENT)
Dept: PSYCHIATRY | Facility: CLINIC | Age: 64
End: 2019-05-30
Payer: COMMERCIAL

## 2019-05-30 DIAGNOSIS — F33.1 MAJOR DEPRESSIVE DISORDER, RECURRENT EPISODE, MODERATE: Primary | ICD-10-CM

## 2019-05-30 PROCEDURE — 90834 PSYTX W PT 45 MINUTES: CPT | Mod: S$GLB,,, | Performed by: SOCIAL WORKER

## 2019-05-30 PROCEDURE — 90834 PR PSYCHOTHERAPY W/PATIENT, 45 MIN: ICD-10-PCS | Mod: S$GLB,,, | Performed by: SOCIAL WORKER

## 2019-05-30 NOTE — PROGRESS NOTES
"Individual Psychotherapy (PhD/LCSW)    5/30/2019    Site:  [facility]         Therapeutic Intervention: Met with patient.  Outpatient - Insight oriented psychotherapy 45 min - CPT code 39841    Chief complaint/reason for encounter: depression      Interval history and content of current session:  Patient presents to ongoing individual therapy due to depression.  He enters session with bruises on his lip and his right temple.  He states that these were caused by his male cat.  He had a period of depressed mood over the weekend.  He had a long weekend due to the Memorial Day weekend.  He admits he was watching a movie and he started sobbing about his ex girlfriend, Solange.  He still has not heard any thing directly from her.  He has found out that she may be able to get the promotion she has always wanted.  He will drive by her office and say "hello" to himself.  She has not gotten any results about her back injury.  He was frustrated that he was the "fifth wheel" when he went out with friends to eat.  He is confronted that his decision to carry "the sun" for his ex girlfriend will cause him to be single in most settings.  He is not happy at his job due to the demanding behavior of some of his customers.  He does enjoy when he is able to figure out a problem that others are unable to.  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  He has been preparing his resume for possible employment elsewhere.  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  He is looking forward to spending Father's Day with his daughter and her family.  They are considering going to the aquarium in [place].    Target symptoms: depression  Why chosen therapy is appropriate versus another modality: relevant to diagnosis  Outcome monitoring methods: self-report, observation  Therapeutic intervention type: insight oriented psychotherapy, supportive " psychotherapy, interactive psychotherapy     Risk parameters:  Patient reports no suicidal ideation  Patient reports no homicidal ideation  Patient reports no self-injurious behavior  Patient reports no violent behavior     Verbal deficits: none     Patient's response to intervention:  The patient's response to intervention is accepting, motivated.     Progress toward goals and other mental status changes:  The patient's progress toward goals is fair .     Diagnosis:   Major depression recurrent moderate     Plan:  individual psychotherapy and medication management by physician     Return to clinic: as scheduled     Length of Service (minutes): 45

## 2019-06-13 ENCOUNTER — OFFICE VISIT (OUTPATIENT)
Dept: PSYCHIATRY | Facility: CLINIC | Age: 64
End: 2019-06-13
Payer: COMMERCIAL

## 2019-06-13 ENCOUNTER — PATIENT MESSAGE (OUTPATIENT)
Dept: GASTROENTEROLOGY | Facility: CLINIC | Age: 64
End: 2019-06-13

## 2019-06-13 DIAGNOSIS — F33.1 MAJOR DEPRESSIVE DISORDER, RECURRENT EPISODE, MODERATE: Primary | ICD-10-CM

## 2019-06-13 PROCEDURE — 90834 PR PSYCHOTHERAPY W/PATIENT, 45 MIN: ICD-10-PCS | Mod: S$GLB,,, | Performed by: SOCIAL WORKER

## 2019-06-13 PROCEDURE — 90834 PSYTX W PT 45 MINUTES: CPT | Mod: S$GLB,,, | Performed by: SOCIAL WORKER

## 2019-06-13 NOTE — PROGRESS NOTES
"Individual Psychotherapy (PhD/LCSW)    6/13/2019    [facility]         Therapeutic Intervention: Met with patient.  Outpatient - Insight oriented psychotherapy 45 min - CPT code 25382    Chief complaint/reason for encounter: depression     Interval history and content of current session:  Patient presents to ongoing individual therapy due to depression.  He went to the [facility] for a driving evaluation.  He passed with a restriction for him not to drive at night.  He notes that he drives better at night with his eyesight.  He will have to drive at night in the winter.  He went to get his car inspected and he was told that he will have to get a new windshield.  He was trying to tell the  that his field of vision was not where the crack is.  He now has a red sticker until the windshield is changed.  He wrote a "good bye" letter to his ex girlfriend, Solange.  He did not send the letter.  He was crying at the end of the letter.  He was hoping to meet her at lunch today to help her with some job difficulties.  They are unable to meet due to conflict in their schedule.  He has found out that she is back at work after having back problems.  In the good bye letter, he details how she is the only woman for him.  He is looking forward to seeing his two older children on Saturday.  His son will be in town due to court proceedings for his son.  The patient did get a text from his youngest son, Murray.  He would like to meet the patient and talk.  The patient says he is not ready to do so yet.  The patient knows that his son is still with his boyfriend, Bigg.  The patient feels that the boyfriend is a "scumbag" because he feels that people owe him and he is not working.  He would be open to meeting his son without the boyfriend.  His son continues to work at a local grocery store.  The patient continues to exercise on a regular basis.    Target symptoms: depression  Why chosen therapy is appropriate versus another " modality: relevant to diagnosis  Outcome monitoring methods: self-report, observation  Therapeutic intervention type: insight oriented psychotherapy, supportive psychotherapy, interactive psychotherapy     Risk parameters:  Patient reports no suicidal ideation  Patient reports no homicidal ideation  Patient reports no self-injurious behavior  Patient reports no violent behavior     Verbal deficits: none     Patient's response to intervention:  The patient's response to intervention is accepting, motivated.     Progress toward goals and other mental status changes:  The patient's progress toward goals is fair .     Diagnosis:   Major depression recurrent moderate     Plan:  individual psychotherapy and medication management by physician     Return to clinic: as scheduled     Length of Service (minutes): 45

## 2019-06-27 ENCOUNTER — OFFICE VISIT (OUTPATIENT)
Dept: PSYCHIATRY | Facility: CLINIC | Age: 64
End: 2019-06-27
Payer: COMMERCIAL

## 2019-06-27 DIAGNOSIS — F33.1 MAJOR DEPRESSIVE DISORDER, RECURRENT EPISODE, MODERATE: Primary | ICD-10-CM

## 2019-06-27 PROCEDURE — 90834 PR PSYCHOTHERAPY W/PATIENT, 45 MIN: ICD-10-PCS | Mod: S$GLB,,, | Performed by: SOCIAL WORKER

## 2019-06-27 PROCEDURE — 90834 PSYTX W PT 45 MINUTES: CPT | Mod: S$GLB,,, | Performed by: SOCIAL WORKER

## 2019-06-27 NOTE — PROGRESS NOTES
"Individual Psychotherapy (PhD/LCSW)    6/27/2019    [facility]         Therapeutic Intervention: Met with patient.  Outpatient - Insight oriented psychotherapy 45 min - CPT code 00644    Chief complaint/reason for encounter: depression     Interval history and content of current session:  Patient presents to ongoing individual therapy due to depression.  He was happy to share Father's Day with two of his three children.  His son was in from [place].  They enjoyed eating sushi together.  He later received a text from his older son thanking him for his guidance.  His son told him that he was responsible for the man he is today.  The patient admits that he cried when he received the text.  He also heard from his youngest son, Murray.  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  The patient suggested [employer] where he could get free room and board.  He also suggested the [facility] [place].  Last week, the patient was surprised to get texts from his ex girlfriend, Solange.  She asked the patient about filing for disability with the state.  He told her what workers would be supportive of her claim and keep it private.  He ended each text with "I love you."  She stopped responding after she gathered the information.  He admits that she has been in a lot of pain.  She knew that he would help her regardless of their past history.  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  He enjoyed going to dinner with friends last night.  He continues to have problems controlling his blood sugar.    Target symptoms: depression  Why chosen therapy is appropriate versus another modality: relevant to diagnosis  Outcome monitoring methods: self-report, observation  Therapeutic intervention type: insight oriented psychotherapy, supportive psychotherapy, interactive psychotherapy     Risk " parameters:  Patient reports no suicidal ideation  Patient reports no homicidal ideation  Patient reports no self-injurious behavior  Patient reports no violent behavior     Verbal deficits: none     Patient's response to intervention:  The patient's response to intervention is accepting, motivated.     Progress toward goals and other mental status changes:  The patient's progress toward goals is fair .     Diagnosis:   Major depression recurrent moderate     Plan:  individual psychotherapy and medication management by physician     Return to clinic: as scheduled     Length of Service (minutes): 45

## 2019-07-06 DIAGNOSIS — I50.9 CHF (NYHA CLASS III, ACC/AHA STAGE C): ICD-10-CM

## 2019-07-06 RX ORDER — FUROSEMIDE 40 MG/1
TABLET ORAL
Qty: 260 TABLET | Refills: 3 | Status: ON HOLD | OUTPATIENT
Start: 2019-07-06 | End: 2020-02-09 | Stop reason: HOSPADM

## 2019-07-08 RX ORDER — OMEPRAZOLE 40 MG/1
40 CAPSULE, DELAYED RELEASE ORAL DAILY
Qty: 90 CAPSULE | Refills: 3 | Status: SHIPPED | OUTPATIENT
Start: 2019-07-08 | End: 2022-07-21

## 2019-07-11 ENCOUNTER — OFFICE VISIT (OUTPATIENT)
Dept: PSYCHIATRY | Facility: CLINIC | Age: 64
End: 2019-07-11
Payer: COMMERCIAL

## 2019-07-11 DIAGNOSIS — F33.1 MAJOR DEPRESSIVE DISORDER, RECURRENT EPISODE, MODERATE: Primary | ICD-10-CM

## 2019-07-11 PROCEDURE — 90834 PSYTX W PT 45 MINUTES: CPT | Mod: S$GLB,,, | Performed by: SOCIAL WORKER

## 2019-07-11 PROCEDURE — 90834 PR PSYCHOTHERAPY W/PATIENT, 45 MIN: ICD-10-PCS | Mod: S$GLB,,, | Performed by: SOCIAL WORKER
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
## 2019-07-18 ENCOUNTER — PATIENT MESSAGE (OUTPATIENT)
Dept: PSYCHIATRY | Facility: CLINIC | Age: 64
End: 2019-07-18

## 2019-07-25 ENCOUNTER — OFFICE VISIT (OUTPATIENT)
Dept: PSYCHIATRY | Facility: CLINIC | Age: 64
End: 2019-07-25
Payer: COMMERCIAL

## 2019-07-25 VITALS
WEIGHT: 315 LBS | DIASTOLIC BLOOD PRESSURE: 103 MMHG | SYSTOLIC BLOOD PRESSURE: 158 MMHG | HEART RATE: 67 BPM | BODY MASS INDEX: 48.21 KG/M2

## 2019-07-25 DIAGNOSIS — F33.1 MODERATE EPISODE OF RECURRENT MAJOR DEPRESSIVE DISORDER: Primary | ICD-10-CM

## 2019-07-25 DIAGNOSIS — F41.9 ANXIETY: ICD-10-CM

## 2019-07-25 PROCEDURE — 3080F PR MOST RECENT DIASTOLIC BLOOD PRESSURE >= 90 MM HG: ICD-10-PCS | Mod: CPTII,S$GLB,, | Performed by: PSYCHIATRY & NEUROLOGY

## 2019-07-25 PROCEDURE — 3008F PR BODY MASS INDEX (BMI) DOCUMENTED: ICD-10-PCS | Mod: CPTII,S$GLB,, | Performed by: PSYCHIATRY & NEUROLOGY

## 2019-07-25 PROCEDURE — 3077F SYST BP >= 140 MM HG: CPT | Mod: CPTII,S$GLB,, | Performed by: PSYCHIATRY & NEUROLOGY

## 2019-07-25 PROCEDURE — 3080F DIAST BP >= 90 MM HG: CPT | Mod: CPTII,S$GLB,, | Performed by: PSYCHIATRY & NEUROLOGY

## 2019-07-25 PROCEDURE — 3077F PR MOST RECENT SYSTOLIC BLOOD PRESSURE >= 140 MM HG: ICD-10-PCS | Mod: CPTII,S$GLB,, | Performed by: PSYCHIATRY & NEUROLOGY

## 2019-07-25 PROCEDURE — 99213 PR OFFICE/OUTPT VISIT, EST, LEVL III, 20-29 MIN: ICD-10-PCS | Mod: S$GLB,,, | Performed by: PSYCHIATRY & NEUROLOGY

## 2019-07-25 PROCEDURE — 3008F BODY MASS INDEX DOCD: CPT | Mod: CPTII,S$GLB,, | Performed by: PSYCHIATRY & NEUROLOGY

## 2019-07-25 PROCEDURE — 99213 OFFICE O/P EST LOW 20 MIN: CPT | Mod: S$GLB,,, | Performed by: PSYCHIATRY & NEUROLOGY

## 2019-07-25 PROCEDURE — 99999 PR PBB SHADOW E&M-EST. PATIENT-LVL II: CPT | Mod: PBBFAC,,, | Performed by: PSYCHIATRY & NEUROLOGY

## 2019-07-25 PROCEDURE — 99999 PR PBB SHADOW E&M-EST. PATIENT-LVL II: ICD-10-PCS | Mod: PBBFAC,,, | Performed by: PSYCHIATRY & NEUROLOGY

## 2019-07-25 RX ORDER — BUPROPION HYDROCHLORIDE 300 MG/1
300 TABLET ORAL DAILY
Qty: 90 TABLET | Refills: 0 | Status: SHIPPED | OUTPATIENT
Start: 2019-07-25 | End: 2019-10-31 | Stop reason: ALTCHOICE

## 2019-07-25 NOTE — PROGRESS NOTES
"Outpatient Psychiatry Follow-up Visit (MD/NP)    7/25/2019    Stepan Nixon, a 64 y.o. male, presenting for follow-up visit. Met with patient.    Reason for Encounter: self-referral. Patient complains of depressison.    Interval History: Patient seen & interviewed for follow-up, last seen about 4 months prior to this visit. Got a text from his Solange that they could be friends & he perceived this to be ruling out a romantic relationship. He says she's never given him as categorical a rejection before. He was devastated, typed a letter to her to say goodbye and sent it about 4 days ago. Tearful with various reminders of her. Has been self-reproachful. Intends to not communicate with her anymore. Grieving greatly, has had intermittent SI, no intent or plan. Getting support from a couple of friends who are generally supportive.     Ongoing high work demands. Stressful, but generally helpful to him as it keeps his mind off Solange. Weight is up. Bell's palsy symptoms are improving.      Background: This 60 y/o with depression recurrence, 6 months of low moods, feelings of guilt/self-reproach, hopelessness & helplessness, intermittent SI which has been mostly passive, occasional fantasies about active suicide attempts, but never seriously considers action. Most recent depression is in context of loss of love relationship to a woman he met on internet, dated for some time then broke up with him & is now in another relationship. Continued to text & attempt to contact her (she generally doesn't respond) until about a week ago, stopped because he realizes it's time to "move on", that it's taking a toll on his mental health, but still feels tempted to do it. Continues to work despite symptoms, reports performance is "ok", though "not my best work". PsychHx: symptoms began around [place] Manuel '09. first treated for depression in early '10. [place] [facility] [place]. Suffered a series of losses since including wife ('09), mother in " law ('11), mother ('12), father ('13) & loss of job as [employer]  due to his health problems (he's since changed fields, now works in tax preparation for [employer]). Has participated in psychotherapy including since he moved to [place], stopped due to loss of insurance (though reinsured, hasn't returned since most recent depression recurrence). 1 remote episode of suicidality without action.  MedHx: DM, had NSTEMI in , FRAN, CHF, CKD, cataracts. SubstanceHx: rare wine, but mostly avoids alcohol due to his health; no illicits or prescription drug abuse. Social Hx: normal , birth, & developmental milestones. Grew up in [place], moved to [place] in .  (wife  in '10), & 2 year relationship ended in mid , though he's continued to contact her until very recently. 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 Works full-time. Has 3 adopted kids (one of which is child of one of other kids).      Review Of Systems:     GENERAL:  No weight gain or loss  SKIN:  No rashes or lacerations  HEAD:  No headaches, +hemifacial plegia.   CHEST:  No shortness of breath, hyperventilation or cough  CARDIOVASCULAR:  No tachycardia or chest pain  ABDOMEN:  No nausea, vomiting, pain, constipation or diarrhea  URINARY:  No frequency, dysuria or sexual dysfunction  ENDOCRINE:  No polydipsia, polyuria  MUSCULOSKELETAL:  No pain or stiffness of the joints  NEUROLOGIC:  No weakness, sensory changes, seizures, confusion, memory loss, tremor or other abnormal movements    Current Evaluation:     Nutritional Screening: Considering the patient's height and weight, medications, medical history and preferences, should a referral be made to the dietitian? no    Constitutional  Vitals:  Most recent vital signs, dated less than 90 days prior to this appointment, were not reviewed.    There were no vitals filed for this visit.     General:   unremarkable, age appropriate     Musculoskeletal  Muscle Strength/Tone:  no tremor, no tic   Gait & Station:  non-ataxic     Psychiatric  Appearance: casually dressed & groomed;   Behavior: calm,   Cooperation: cooperative with assessment  Speech: normal rate, volume, tone  Thought Process: linear, goal-directed  Thought Content: No suicidal or homicidal ideation; no delusions  Affect: mildly anxious  Mood: mildly anxious  Perceptions: No auditory or visual hallucinations  Level of Consciousness: alert throughout interview  Insight: fair  Cognition: Oriented to person, place, time, & situation  Memory: no apparent deficits to general clinical interview; not formally assessed  Attention/Concentration: no apparent deficits to general clinical interview; not formally assessed  Fund of Knowledge: average by vocabulary/education    Laboratory Data  No visits with results within 1 Month(s) from this visit.   Latest known visit with results is:   Office Visit on 05/23/2019   Component Date Value Ref Range Status    POC Glucose 05/23/2019 173* 70 - 110 MG/DL Final     Medications  Outpatient Encounter Medications as of 7/25/2019   Medication Sig Dispense Refill    ACCU-CHEK ALYCIA PLUS METER Misc       aspirin 325 MG tablet Take 325 mg by mouth 2 (two) times daily.      atorvastatin (LIPITOR) 10 MG tablet Take 1 tablet (10 mg total) by mouth once daily. (Patient taking differently: Take 20 mg by mouth once daily. ) 90 tablet 3    BLOOD PRESSURE CUFF Misc 1 cuff 1 each 0    blood sugar diagnostic Strp To check BG 3 times daily, to use with insurance preferred meter 100 strip 3    brimonidine 0.1% (ALPHAGAN P) 0.1 % Drop Place 1 drop into both eyes 3 (three) times daily. Order 90 day supply 10 mL 4    brinzolamide (AZOPT) 1 % ophthalmic suspension Place 1 drop into both eyes 3 (three) times daily. ORDER 90 DAY SUPPLY 10 mL 4    buPROPion (WELLBUTRIN XL) 300 MG 24 hr tablet Take 1 tablet (300 mg total) by mouth once  daily. 90 tablet 0    carvedilol (COREG) 25 MG tablet TAKE ONE TABLET BY MOUTH TWICE DAILY WITH  MEALS 180 tablet 11    furosemide (LASIX) 40 MG tablet TAKE 2 TABLETS BY MOUTH TWICE DAILY. WHEN  ANKLES  RETAIN  FLUID DOUBLE  THE  LASIX  FOR  14  DAYS 260 tablet 3    insulin NPH-insulin regular, 70/30, (NOVOLIN 70/30 U-100 INSULIN) 100 unit/mL (70-30) injection Inject 130 Units into the skin 2 (two) times daily before meals. 240 mL 3    lancets Misc To check BG 3 times daily, to use with insurance preferred meter 100 each 3    lisinopril (PRINIVIL,ZESTRIL) 20 MG tablet Take 1 tablet (20 mg total) by mouth once daily. 30 tablet 11    miglitol (GLYSET) 25 MG Tab Take 1 tablet (25 mg total) by mouth 3 (three) times daily with meals. 270 tablet 3    nitroGLYCERIN (NITROSTAT) 0.4 MG SL tablet Place 1 tablet (0.4 mg total) under the tongue every 5 (five) minutes as needed for Chest pain. 20 tablet 0    omeprazole (PRILOSEC) 40 MG capsule Take 1 capsule (40 mg total) by mouth once daily. 90 capsule 3    polyethylene glycol (GLYCOLAX) 17 gram/dose powder Take 17 g by mouth once daily. 238 g 6    sildenafil (VIAGRA) 50 MG tablet Take 1 tablet (50 mg total) by mouth daily as needed for Erectile Dysfunction. 5 tablet 3    travoprost (TRAVATAN Z) 0.004 % ophthalmic solution Place 1 drop into both eyes every evening. 2.5 mL 6     No facility-administered encounter medications on file as of 7/25/2019.      Assessment - Diagnosis - Goals:     Impression: This 63 y/o WM with MDD, with recurrent major depression, symptoms. Participating well in psychotherapy. SI is resolved, hasn't returned. Grieving loss of relationship. Sent a goodbye text.       Major depressive disorder, recurrent, mild; anxiety    Treatment Goals:  Specify outcomes written in observable, behavioral terms:   Depression: increasing energy, increasing interest in usual activities, increasing motivation, reducing excessive guilt and reducing  fatigue    Treatment Plan/Recommendations:   Continue bupropion xl 300 mg daily. Discussed risks, benefits, and alternatives to treatment plan documented above with patient. I answered all patient questions related to this plan and patient expressed understanding and agreement.   Continue psychotherapy.     Return to Clinic: 4 months, prn sooner.     Counseling time: 5 minutes  Total time: 20 minutes    RAMON Pitt MD

## 2019-08-01 ENCOUNTER — OFFICE VISIT (OUTPATIENT)
Dept: PSYCHIATRY | Facility: CLINIC | Age: 64
End: 2019-08-01
Payer: COMMERCIAL

## 2019-08-01 DIAGNOSIS — F33.1 MODERATE EPISODE OF RECURRENT MAJOR DEPRESSIVE DISORDER: Primary | ICD-10-CM

## 2019-08-01 PROCEDURE — 90834 PR PSYCHOTHERAPY W/PATIENT, 45 MIN: ICD-10-PCS | Mod: S$GLB,,, | Performed by: SOCIAL WORKER

## 2019-08-01 PROCEDURE — 90834 PSYTX W PT 45 MINUTES: CPT | Mod: S$GLB,,, | Performed by: SOCIAL WORKER

## 2019-08-01 NOTE — PROGRESS NOTES
"Individual Psychotherapy (PhD/LCSW)    8/1/2019    Site:  [facility]         Therapeutic Intervention: Met with patient.  Outpatient - Insight oriented psychotherapy 45 min - CPT code 36364    Chief complaint/reason for encounter: depression     Interval history and content of current session:  Patient presents to ongoing individual therapy due to depression.  He received a text Sunday before last from his ex girlfriend.  She told him that she wanted him to stop sending hearts and gifts.  She said she only sees him as a friend and she can understand if he does not like that.  He had composed a "good bye letter" to her previously.  He copied and pasted the text and he sent it to her that afternoon.  He has not heard from her in the last week and a half.  He has not sent her any texts.  He admits that he has been down.  He is tearful in session.  He does not want another relationship due to the pain he has experienced.  Encouraged patient to begin to care for himself like he cares for others.  Emphasize that he has the opportunity to move forward since he is no longer in [facility].  Confront patient about the need for internal and external boundaries with his ex girlfriend.  Patient is tearful stating all he has known is work due to trying to support his family.  He will pass his ex girlfriend's office and tell her "Good Morning sweetheart!"  He is reminded that he has been talking about the same thing related to his ex girlfriend for the past three years.  He admits that Sundays are difficult for him because he will isolate in his home.  He admits he needs to find more hobbies.  He is still working to get a license that will allow him to travel to see his son and grandchildren in [place].    Target symptoms: depression  Why chosen therapy is appropriate versus another modality: relevant to diagnosis  Outcome monitoring methods: self-report, observation  Therapeutic intervention type: insight oriented psychotherapy, " supportive psychotherapy, interactive psychotherapy     Risk parameters:  Patient reports no suicidal ideation  Patient reports no homicidal ideation  Patient reports no self-injurious behavior  Patient reports no violent behavior     Verbal deficits: none     Patient's response to intervention:  The patient's response to intervention is accepting, motivated.     Progress toward goals and other mental status changes:  The patient's progress toward goals is fair .     Diagnosis:   Major depression recurrent moderate     Plan:  individual psychotherapy and medication management by physician     Return to clinic: as scheduled     Length of Service (minutes): 45

## 2019-08-20 ENCOUNTER — OFFICE VISIT (OUTPATIENT)
Dept: OPHTHALMOLOGY | Facility: CLINIC | Age: 64
End: 2019-08-20
Payer: COMMERCIAL

## 2019-08-20 ENCOUNTER — LAB VISIT (OUTPATIENT)
Dept: LAB | Facility: HOSPITAL | Age: 64
End: 2019-08-20
Attending: PHYSICIAN ASSISTANT
Payer: COMMERCIAL

## 2019-08-20 DIAGNOSIS — E11.649 HYPOGLYCEMIA UNAWARENESS ASSOCIATED WITH TYPE 2 DIABETES MELLITUS: ICD-10-CM

## 2019-08-20 DIAGNOSIS — H40.1133 PRIMARY OPEN ANGLE GLAUCOMA OF BOTH EYES, SEVERE STAGE: Primary | ICD-10-CM

## 2019-08-20 DIAGNOSIS — H35.372 EPIRETINAL MEMBRANE (ERM) OF LEFT EYE: ICD-10-CM

## 2019-08-20 LAB
ESTIMATED AVG GLUCOSE: 189 MG/DL (ref 68–131)
HBA1C MFR BLD HPLC: 8.2 % (ref 4–5.6)

## 2019-08-20 PROCEDURE — 99999 PR PBB SHADOW E&M-EST. PATIENT-LVL II: ICD-10-PCS | Mod: PBBFAC,,, | Performed by: OPHTHALMOLOGY

## 2019-08-20 PROCEDURE — 92012 PR EYE EXAM, EST PATIENT,INTERMED: ICD-10-PCS | Mod: S$GLB,,, | Performed by: OPHTHALMOLOGY

## 2019-08-20 PROCEDURE — 92083 EXTENDED VISUAL FIELD XM: CPT | Mod: S$GLB,,, | Performed by: OPHTHALMOLOGY

## 2019-08-20 PROCEDURE — 83036 HEMOGLOBIN GLYCOSYLATED A1C: CPT

## 2019-08-20 PROCEDURE — 36415 COLL VENOUS BLD VENIPUNCTURE: CPT

## 2019-08-20 PROCEDURE — 99999 PR PBB SHADOW E&M-EST. PATIENT-LVL II: CPT | Mod: PBBFAC,,, | Performed by: OPHTHALMOLOGY

## 2019-08-20 PROCEDURE — 92012 INTRM OPH EXAM EST PATIENT: CPT | Mod: S$GLB,,, | Performed by: OPHTHALMOLOGY

## 2019-08-20 PROCEDURE — 92083 HUMPHREY VISUAL FIELD - OU - BOTH EYES: ICD-10-PCS | Mod: S$GLB,,, | Performed by: OPHTHALMOLOGY

## 2019-08-20 NOTE — PROGRESS NOTES
HPI     Patient returns for a 4 month iop check, hvf review. Patient does not   know the name pf the third drop he takes but states he takes all 3 bottles    bid ou.        POAG - Dr Burgess pt  Corneal Opacity  [place] palsy  DM  RD Repair with Scleral Buckle right eye  PCIOL OU  CANALOPLASTY OD 8-22-13 Good flow and tension(-900)  CANAL OS 03/20/14/LOT # 1306-01/REF # [id number]  -500 with shutter effect due to much intraop respiratory movement  Moderate flow with good tension      Azopt BID OU, Alphagan BID OU  OU: Travatan Z QHS (Patient does not know the name just states he uses all   3 bottles bid ou )    Last edited by CARMEN Hickey on 8/20/2019  3:48 PM. (History)            Assessment /Plan     For exam results, see Encounter Report.      ICD-10-CM ICD-9-CM    1. Primary open angle glaucoma of both eyes, severe stage H40.1133 365.11 [place] Visual Field - OU - Extended - Both Eyes    IOP not within acceptable range relative to target IOP with risk of irreversible visual loss. Additional treatment required.  Discussed options, risks, and benefits of additional medication, SLT laser, or incisional glaucoma surgery.     recommend add rhopressa qd OS    Patient chooses above     Reviewed importance of continued compliance with treatment and follow up.         365.73    2. Epiretinal membrane (ERM) of left eye H35.372 362.56 Follow    3. Uncontrolled type 2 diabetes mellitus with stage 3 chronic kidney disease, with long-term current use of insulin E11.22 250.52 Dilated 4/23/19    E11.65 585.3     N18.3 V58.67     Z79.4       Still has some residual effects of Bell's palsy OD (feels heavy) but patient does have full blink and coverage of the surface.   Add rhopressa qd OS  Azopt BID OU, Alphagan BID OU  OU: Travatan Z QHS ( making eye dry but will continue)  Return to clinic 4 weeks with IOP check   Had road test and was cleared to drive during the day by DMV but with daylight driving only - he  is upset about that and wants to appeal it   We told him to have that conversation with them

## 2019-08-27 ENCOUNTER — OFFICE VISIT (OUTPATIENT)
Dept: DIABETES | Facility: CLINIC | Age: 64
End: 2019-08-27
Payer: COMMERCIAL

## 2019-08-27 VITALS
WEIGHT: 315 LBS | SYSTOLIC BLOOD PRESSURE: 148 MMHG | DIASTOLIC BLOOD PRESSURE: 90 MMHG | BODY MASS INDEX: 36.45 KG/M2 | HEIGHT: 78 IN

## 2019-08-27 LAB — GLUCOSE SERPL-MCNC: 181 MG/DL (ref 70–110)

## 2019-08-27 PROCEDURE — 3077F SYST BP >= 140 MM HG: CPT | Mod: CPTII,S$GLB,, | Performed by: PHYSICIAN ASSISTANT

## 2019-08-27 PROCEDURE — 3008F PR BODY MASS INDEX (BMI) DOCUMENTED: ICD-10-PCS | Mod: CPTII,S$GLB,, | Performed by: PHYSICIAN ASSISTANT

## 2019-08-27 PROCEDURE — 82962 GLUCOSE BLOOD TEST: CPT | Mod: S$GLB,,, | Performed by: PHYSICIAN ASSISTANT

## 2019-08-27 PROCEDURE — 82962 POCT GLUCOSE, HAND-HELD DEVICE: ICD-10-PCS | Mod: S$GLB,,, | Performed by: PHYSICIAN ASSISTANT

## 2019-08-27 PROCEDURE — 3045F PR MOST RECENT HEMOGLOBIN A1C LEVEL 7.0-9.0%: CPT | Mod: CPTII,S$GLB,, | Performed by: PHYSICIAN ASSISTANT

## 2019-08-27 PROCEDURE — 99999 PR PBB SHADOW E&M-EST. PATIENT-LVL III: ICD-10-PCS | Mod: PBBFAC,,, | Performed by: PHYSICIAN ASSISTANT

## 2019-08-27 PROCEDURE — 3045F PR MOST RECENT HEMOGLOBIN A1C LEVEL 7.0-9.0%: ICD-10-PCS | Mod: CPTII,S$GLB,, | Performed by: PHYSICIAN ASSISTANT

## 2019-08-27 PROCEDURE — 99999 PR PBB SHADOW E&M-EST. PATIENT-LVL III: CPT | Mod: PBBFAC,,, | Performed by: PHYSICIAN ASSISTANT

## 2019-08-27 PROCEDURE — 99214 OFFICE O/P EST MOD 30 MIN: CPT | Mod: S$GLB,,, | Performed by: PHYSICIAN ASSISTANT

## 2019-08-27 PROCEDURE — 3008F BODY MASS INDEX DOCD: CPT | Mod: CPTII,S$GLB,, | Performed by: PHYSICIAN ASSISTANT

## 2019-08-27 PROCEDURE — 3080F DIAST BP >= 90 MM HG: CPT | Mod: CPTII,S$GLB,, | Performed by: PHYSICIAN ASSISTANT

## 2019-08-27 PROCEDURE — 3077F PR MOST RECENT SYSTOLIC BLOOD PRESSURE >= 140 MM HG: ICD-10-PCS | Mod: CPTII,S$GLB,, | Performed by: PHYSICIAN ASSISTANT

## 2019-08-27 PROCEDURE — 3080F PR MOST RECENT DIASTOLIC BLOOD PRESSURE >= 90 MM HG: ICD-10-PCS | Mod: CPTII,S$GLB,, | Performed by: PHYSICIAN ASSISTANT

## 2019-08-27 PROCEDURE — 99214 PR OFFICE/OUTPT VISIT, EST, LEVL IV, 30-39 MIN: ICD-10-PCS | Mod: S$GLB,,, | Performed by: PHYSICIAN ASSISTANT

## 2019-08-27 NOTE — PROGRESS NOTES
"Subjective:      Patient ID: Stepan Nixon is a 64 y.o. male.    PCP: Liza Landrum MD        Stepan Nixon is a pleasant 64 y.o. male presenting to follow up on Type 2 diabetes mellitus.  Also, pertinent to this visit in decision making is depression, hypertension, ASCVD, CHF NYHA III, NSTEMI, hyperlipidemia, and adherence.  He has had diabetes for 20 or more years.  His last visit in Diabetes Management was 5/23/2019 .   Since that time he has been in his usual state of health without significant hyperglycemia or hypoglycemia. He has been checking his blood glucose regularly four times daily, before meals and HS.  Since that time he has had moderate improvement in his glycemia with A1c of 8.2%.   He is currently on Novolin 70/30 130 units BID and Glyset.  His current concerns are glycemic control. His girlfriend for many years has sent him a "just friends" letter and this has caused depression which is treated in [facility]. However he does admit to eating poorly, and frequently.     He denies any hospital admissions, emergency room visits,  Only 3 episodes of hypoglycemia in the last 3 months, syncope, diaphoresis, chest pain, or dyspnea.    He has gained 5 pounds since last visit. His BMI is 48.88 kg/m².    His blood sugar in the clinic today was:   Lab Results   Component Value Date    POCGLU 181 (A) 08/27/2019     Stepan is noncompliant much of the time with DM medications.     Stepan is noncompliant much of the time with lifestyle modifications to include activity and meal planning.     Diabetes Management Status    Statin: Taking  ACE/ARB: Taking    Screening or Prevention Patient's value Goal Complete/Controlled?   HgA1C Testing and Control   Lab Results   Component Value Date    HGBA1C 8.2 (H) 08/20/2019      Annually/Less than 8% No   Lipid profile : 05/13/2019 Annually Yes   LDL control Lab Results   Component Value Date    LDLCALC 63.6 05/13/2019    Annually/Less than 100 mg/dl  Yes " "  Nephropathy screening Lab Results   Component Value Date    LABMICR 3.0 04/12/2018     Lab Results   Component Value Date    PROTEINUA Negative 08/09/2018    Annually Yes   Blood pressure BP Readings from Last 1 Encounters:   08/27/19 (!) 148/90    Less than 140/90 Yes   Dilated retinal exam : 08/20/2019 Annually Yes   Foot exam   : 02/04/2019 Annually Yes       Lab Results   Component Value Date    HGBA1C 8.2 (H) 08/20/2019    HGBA1C 7.4 (H) 05/08/2019    HGBA1C 9.7 (H) 11/01/2018       Review of Systems   Constitutional: Negative for activity change and unexpected weight change.   Eyes: Negative for visual disturbance.   Respiratory: Negative for chest tightness and shortness of breath.    Cardiovascular: Negative for chest pain and leg swelling.   Gastrointestinal: Negative for constipation and diarrhea.   Endocrine: Negative for cold intolerance, heat intolerance, polydipsia, polyphagia and polyuria.   Genitourinary: Negative for frequency.   Skin: Negative for wound.   Neurological: Negative for numbness.   Psychiatric/Behavioral: Negative for confusion.      Objective:   BP (!) 148/90   Ht 6' 7" (2.007 m)   Wt (!) 196.8 kg (433 lb 13.8 oz)   BMI 48.88 kg/m²     Physical Exam   Constitutional: He is oriented to person, place, and time. He appears well-developed and well-nourished. No distress.   Neck: Normal range of motion. Neck supple. No tracheal deviation present. No thyromegaly present.   Cardiovascular: Normal rate, regular rhythm and normal heart sounds.   Pulmonary/Chest: Effort normal and breath sounds normal.   Musculoskeletal: He exhibits no edema.   Lymphadenopathy:     He has no cervical adenopathy.   Neurological: He is alert and oriented to person, place, and time.   Skin: Skin is warm and dry. He is not diaphoretic.   Psychiatric: He has a normal mood and affect.       Assessment:     1. Uncontrolled type 2 diabetes mellitus with kidney complication, with long-term current use of insulin  "      Plan:   Stepan Nixon is seen today for   1. Uncontrolled type 2 diabetes mellitus with kidney complication, with long-term current use of insulin         - Continue with D&E, reduce portion size, and restrict carbohydrates (no more that 45 grams ) per meal.   - DEXCOM CGM personal. (Patient can not afford)   - His A1c is higher and this may be secondary to correction of his hypoglycemia. Or dietary indiscretion.    - A1c prior to next visit.   - F/U 2 weeks      Uncontrolled type 2 diabetes mellitus with kidney complication, with long-term current use of insulin  -     POCT Glucose, Hand-Held Device      A total of 30 minutes was spent in face to face time, of which 50 % was spent in counseling patient on disease process, complications, treatment, and side effects of medications.    The patient was explained the above plan and given opportunity to ask questions.  He understands, chooses and consents to this plan and accepts all the risks, which include but are not limited to the risks mentioned above.   He understands the alternative of having no testing, interventions or treatments at this time. He left content and without further questions.     Disclaimer:  This note is prepared using voice recognition software and as such is likely to have errors and has not been proof read. Please contact me for questions.

## 2019-08-28 ENCOUNTER — HOSPITAL ENCOUNTER (EMERGENCY)
Facility: HOSPITAL | Age: 64
Discharge: HOME | End: 2019-08-28
Attending: EMERGENCY MEDICINE
Payer: COMMERCIAL

## 2019-08-28 VITALS
TEMPERATURE: 98 F | BODY MASS INDEX: 36.45 KG/M2 | DIASTOLIC BLOOD PRESSURE: 74 MMHG | SYSTOLIC BLOOD PRESSURE: 166 MMHG | OXYGEN SATURATION: 96 % | HEIGHT: 78 IN | HEART RATE: 68 BPM | WEIGHT: 315 LBS | RESPIRATION RATE: 24 BRPM

## 2019-08-28 DIAGNOSIS — M25.532 WRIST PAIN, ACUTE, LEFT: ICD-10-CM

## 2019-08-28 DIAGNOSIS — S60.512A ABRASION OF LEFT HAND, INITIAL ENCOUNTER: Primary | ICD-10-CM

## 2019-08-28 DIAGNOSIS — R42 DIZZY: ICD-10-CM
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
## 2019-08-28 LAB
ALBUMIN SERPL BCP-MCNC: 3.4 G/DL (ref 3.5–5.2)
ALP SERPL-CCNC: 120 U/L (ref 55–135)
ALT SERPL W/O P-5'-P-CCNC: 23 U/L (ref 10–44)
ANION GAP SERPL CALC-SCNC: 12 MMOL/L (ref 8–16)
AST SERPL-CCNC: 15 U/L (ref 10–40)
BASOPHILS # BLD AUTO: 0.02 K/UL (ref 0–0.2)
BASOPHILS NFR BLD: 0.3 % (ref 0–1.9)
BILIRUB SERPL-MCNC: 0.8 MG/DL (ref 0.1–1)
BUN SERPL-MCNC: 18 MG/DL (ref 8–23)
CALCIUM SERPL-MCNC: 9.1 MG/DL (ref 8.7–10.5)
CHLORIDE SERPL-SCNC: 110 MMOL/L (ref 95–110)
CO2 SERPL-SCNC: 23 MMOL/L (ref 23–29)
CREAT SERPL-MCNC: 2.2 MG/DL (ref 0.5–1.4)
DIFFERENTIAL METHOD: ABNORMAL
EOSINOPHIL # BLD AUTO: 0.3 K/UL (ref 0–0.5)
EOSINOPHIL NFR BLD: 3.9 % (ref 0–8)
ERYTHROCYTE [DISTWIDTH] IN BLOOD BY AUTOMATED COUNT: 14 % (ref 11.5–14.5)
EST. GFR  (AFRICAN AMERICAN): 36 ML/MIN/1.73 M^2
EST. GFR  (NON AFRICAN AMERICAN): 31 ML/MIN/1.73 M^2
GLUCOSE SERPL-MCNC: 106 MG/DL (ref 70–110)
HCT VFR BLD AUTO: 40.2 % (ref 40–54)
HGB BLD-MCNC: 14.2 G/DL (ref 14–18)
LYMPHOCYTES # BLD AUTO: 1.1 K/UL (ref 1–4.8)
LYMPHOCYTES NFR BLD: 14.1 % (ref 18–48)
MCH RBC QN AUTO: 31.2 PG (ref 27–31)
MCHC RBC AUTO-ENTMCNC: 35.3 G/DL (ref 32–36)
MCV RBC AUTO: 88 FL (ref 82–98)
MONOCYTES # BLD AUTO: 0.9 K/UL (ref 0.3–1)
MONOCYTES NFR BLD: 12 % (ref 4–15)
NEUTROPHILS # BLD AUTO: 5.5 K/UL (ref 1.8–7.7)
NEUTROPHILS NFR BLD: 70 % (ref 38–73)
PLATELET # BLD AUTO: 200 K/UL (ref 150–350)
PMV BLD AUTO: 9.3 FL (ref 9.2–12.9)
POTASSIUM SERPL-SCNC: 3 MMOL/L (ref 3.5–5.1)
PROT SERPL-MCNC: 6.8 G/DL (ref 6–8.4)
RBC # BLD AUTO: 4.55 M/UL (ref 4.6–6.2)
SODIUM SERPL-SCNC: 145 MMOL/L (ref 136–145)
TROPONIN I SERPL DL<=0.01 NG/ML-MCNC: 0.03 NG/ML (ref 0–0.03)
WBC # BLD AUTO: 7.86 K/UL (ref 3.9–12.7)

## 2019-08-28 PROCEDURE — 84484 ASSAY OF TROPONIN QUANT: CPT

## 2019-08-28 PROCEDURE — 63600175 PHARM REV CODE 636 W HCPCS: Performed by: EMERGENCY MEDICINE

## 2019-08-28 PROCEDURE — 96360 HYDRATION IV INFUSION INIT: CPT

## 2019-08-28 PROCEDURE — 99285 EMERGENCY DEPT VISIT HI MDM: CPT | Mod: 25

## 2019-08-28 PROCEDURE — 80053 COMPREHEN METABOLIC PANEL: CPT

## 2019-08-28 PROCEDURE — 25000003 PHARM REV CODE 250: Performed by: EMERGENCY MEDICINE

## 2019-08-28 PROCEDURE — 85025 COMPLETE CBC W/AUTO DIFF WBC: CPT

## 2019-08-28 PROCEDURE — 93010 EKG 12-LEAD: ICD-10-PCS | Mod: ,,, | Performed by: INTERNAL MEDICINE

## 2019-08-28 PROCEDURE — 93010 ELECTROCARDIOGRAM REPORT: CPT | Mod: ,,, | Performed by: INTERNAL MEDICINE

## 2019-08-28 PROCEDURE — 93005 ELECTROCARDIOGRAM TRACING: CPT

## 2019-08-28 RX ORDER — SULFAMETHOXAZOLE AND TRIMETHOPRIM 800; 160 MG/1; MG/1
1 TABLET ORAL 2 TIMES DAILY
Qty: 10 TABLET | Refills: 0 | Status: SHIPPED | OUTPATIENT
Start: 2019-08-28 | End: 2019-09-02

## 2019-08-28 RX ORDER — HYDROCODONE BITARTRATE AND ACETAMINOPHEN 7.5; 325 MG/1; MG/1
1 TABLET ORAL EVERY 6 HOURS PRN
Qty: 15 TABLET | Refills: 0 | Status: SHIPPED | OUTPATIENT
Start: 2019-08-28 | End: 2019-09-17

## 2019-08-28 RX ORDER — HYDROCODONE BITARTRATE AND ACETAMINOPHEN 10; 325 MG/1; MG/1
1 TABLET ORAL
Status: COMPLETED | OUTPATIENT
Start: 2019-08-28 | End: 2019-08-28

## 2019-08-28 RX ORDER — MUPIROCIN 20 MG/G
OINTMENT TOPICAL 2 TIMES DAILY
Qty: 22 G | Refills: 0 | Status: SHIPPED | OUTPATIENT
Start: 2019-08-28 | End: 2020-12-03

## 2019-08-28 RX ADMIN — HYDROCODONE BITARTRATE AND ACETAMINOPHEN 1 TABLET: 10; 325 TABLET ORAL at 05:08

## 2019-08-28 RX ADMIN — SODIUM CHLORIDE 1000 ML: 0.9 INJECTION, SOLUTION INTRAVENOUS at 05:08
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
## 2019-09-01 DIAGNOSIS — I25.10 CORONARY ARTERY DISEASE INVOLVING NATIVE CORONARY ARTERY OF NATIVE HEART WITHOUT ANGINA PECTORIS: ICD-10-CM

## 2019-09-03 RX ORDER — ATORVASTATIN CALCIUM 10 MG/1
TABLET, FILM COATED ORAL
Qty: 90 TABLET | Refills: 3 | Status: SHIPPED | OUTPATIENT
Start: 2019-09-03 | End: 2020-08-18 | Stop reason: SDUPTHER

## 2019-09-09 ENCOUNTER — OFFICE VISIT (OUTPATIENT)
Dept: PODIATRY | Facility: CLINIC | Age: 64
End: 2019-09-09
Payer: COMMERCIAL

## 2019-09-09 VITALS
HEIGHT: 78 IN | SYSTOLIC BLOOD PRESSURE: 166 MMHG | HEART RATE: 54 BPM | WEIGHT: 315 LBS | BODY MASS INDEX: 36.45 KG/M2 | DIASTOLIC BLOOD PRESSURE: 88 MMHG

## 2019-09-09 DIAGNOSIS — G60.9 IDIOPATHIC PERIPHERAL NEUROPATHY: ICD-10-CM

## 2019-09-09 DIAGNOSIS — L60.0 ONYCHOCRYPTOSIS: ICD-10-CM

## 2019-09-09 DIAGNOSIS — B35.1 DERMATOPHYTOSIS OF NAIL: ICD-10-CM

## 2019-09-09 PROCEDURE — 3077F SYST BP >= 140 MM HG: CPT | Mod: CPTII,S$GLB,, | Performed by: PODIATRIST

## 2019-09-09 PROCEDURE — 11721 PR DEBRIDEMENT OF NAILS, 6 OR MORE: ICD-10-PCS | Mod: S$GLB,,, | Performed by: PODIATRIST

## 2019-09-09 PROCEDURE — 99204 OFFICE O/P NEW MOD 45 MIN: CPT | Mod: 25,S$GLB,, | Performed by: PODIATRIST

## 2019-09-09 PROCEDURE — 3045F PR MOST RECENT HEMOGLOBIN A1C LEVEL 7.0-9.0%: CPT | Mod: CPTII,S$GLB,, | Performed by: PODIATRIST

## 2019-09-09 PROCEDURE — 3008F PR BODY MASS INDEX (BMI) DOCUMENTED: ICD-10-PCS | Mod: CPTII,S$GLB,, | Performed by: PODIATRIST

## 2019-09-09 PROCEDURE — 3045F PR MOST RECENT HEMOGLOBIN A1C LEVEL 7.0-9.0%: ICD-10-PCS | Mod: CPTII,S$GLB,, | Performed by: PODIATRIST

## 2019-09-09 PROCEDURE — 11721 DEBRIDE NAIL 6 OR MORE: CPT | Mod: S$GLB,,, | Performed by: PODIATRIST

## 2019-09-09 PROCEDURE — 3079F PR MOST RECENT DIASTOLIC BLOOD PRESSURE 80-89 MM HG: ICD-10-PCS | Mod: CPTII,S$GLB,, | Performed by: PODIATRIST

## 2019-09-09 PROCEDURE — 99999 PR PBB SHADOW E&M-EST. PATIENT-LVL III: CPT | Mod: PBBFAC,,, | Performed by: PODIATRIST

## 2019-09-09 PROCEDURE — 3077F PR MOST RECENT SYSTOLIC BLOOD PRESSURE >= 140 MM HG: ICD-10-PCS | Mod: CPTII,S$GLB,, | Performed by: PODIATRIST

## 2019-09-09 PROCEDURE — 3079F DIAST BP 80-89 MM HG: CPT | Mod: CPTII,S$GLB,, | Performed by: PODIATRIST

## 2019-09-09 PROCEDURE — 3008F BODY MASS INDEX DOCD: CPT | Mod: CPTII,S$GLB,, | Performed by: PODIATRIST

## 2019-09-09 PROCEDURE — 99204 PR OFFICE/OUTPT VISIT, NEW, LEVL IV, 45-59 MIN: ICD-10-PCS | Mod: 25,S$GLB,, | Performed by: PODIATRIST

## 2019-09-09 PROCEDURE — 99999 PR PBB SHADOW E&M-EST. PATIENT-LVL III: ICD-10-PCS | Mod: PBBFAC,,, | Performed by: PODIATRIST

## 2019-09-09 NOTE — PROGRESS NOTES
Subjective:     Patient ID: Stepan Nixon is a 64 y.o. male.    Chief Complaint: Nail Problem (c/o possible bilateral hallux infection, and nail thick and discoloration. no c/o pain. wears boots with socks. diabetic Pt. last seen on 05/16/19 with PCP Dr. Persaud)    Stepan is a 64 y.o. male who presents to the clinic for evaluation and treatment of high risk feet. Stepan has a past medical history of Anxiety, Bell's palsy, CHF (congestive heart failure), Coronary artery disease involving native coronary artery without angina pectoris (10/18/2016), Depression, Diabetes mellitus, Hypertension, Idiopathic peripheral neuropathy (12/11/2012), Mixed hyperlipidemia (12/11/2012), and Vitamin B 12 deficiency (8/23/2012). The patient's chief complaint is possible big toenail infections. Patient states the 2 big nails became painful, red, and swollen. Patient states he did cut nails back and the toes look better. Patient admits some tenderness.  This patient has documented high risk feet requiring routine maintenance secondary to peripheral neuropathy.    PCP: Elham Persaud MD    Date Last Seen by PCP: 05/16/19    Current shoe gear:  Affected Foot: Tennis shoes     Unaffected Foot: Tennis shoes    Hemoglobin A1C   Date Value Ref Range Status   08/20/2019 8.2 (H) 4.0 - 5.6 % Final     Comment:     ADA Screening Guidelines:  5.7-6.4%  Consistent with prediabetes  >or=6.5%  Consistent with diabetes  High levels of fetal hemoglobin interfere with the HbA1C  assay. Heterozygous hemoglobin variants (HbS, HgC, etc)do  not significantly interfere with this assay.   However, presence of multiple variants may affect accuracy.     05/08/2019 7.4 (H) 4.0 - 5.6 % Final     Comment:     ADA Screening Guidelines:  5.7-6.4%  Consistent with prediabetes  >or=6.5%  Consistent with diabetes  High levels of fetal hemoglobin interfere with the HbA1C  assay. Heterozygous hemoglobin variants (HbS, HgC, etc)do  not significantly  interfere with this assay.   However, presence of multiple variants may affect accuracy.     11/01/2018 9.7 (H) 4.0 - 5.6 % Final     Comment:     ADA Screening Guidelines:  5.7-6.4%  Consistent with prediabetes  >or=6.5%  Consistent with diabetes  High levels of fetal hemoglobin interfere with the HbA1C  assay. Heterozygous hemoglobin variants (HbS, HgC, etc)do  not significantly interfere with this assay.   However, presence of multiple variants may affect accuracy.             Patient Active Problem List   Diagnosis    Status post cataract extraction and insertion of intraocular lens    Corneal opacity - Left Eye    Idiopathic peripheral neuropathy    FRAN (obstructive sleep apnea)    CHF (NYHA class III, ACC/AHA stage C)    LBBB (left bundle branch block)    Uncontrolled type 2 diabetes mellitus with kidney complication, with long-term current use of insulin    Anxiety    Hypertension associated with diabetes    Chronic kidney disease, stage III (moderate)    Primary open angle glaucoma of both eyes, severe stage    Nephrolithiasis    Hydronephrosis, left    NSTEMI (non-ST elevated myocardial infarction)    Coronary artery disease involving native coronary artery without angina pectoris    Recurrent major depressive disorder    Vasculogenic erectile dysfunction    Blindness and low vision    Hx of retinal detachment    High myopia, both eyes    Epiretinal membrane (ERM) of left eye    Lattice degeneration of peripheral retina, left    Right-sided Bell's palsy    Hyperlipidemia associated with type 2 diabetes mellitus    Morbid obesity with BMI of 40.0-44.9, adult    GERD (gastroesophageal reflux disease)    Hypoglycemia unawareness associated with type 2 diabetes mellitus       Medication List with Changes/Refills   Current Medications    ACCU-CHEK ALYCIA PLUS METER MISC        ASPIRIN 325 MG TABLET    Take 325 mg by mouth 2 (two) times daily.    ATORVASTATIN (LIPITOR) 10 MG TABLET     TAKE 1 TABLET BY MOUTH ONCE DAILY    BLOOD PRESSURE CUFF MISC    1 cuff    BLOOD SUGAR DIAGNOSTIC STRP    To check BG 3 times daily, to use with insurance preferred meter    BRIMONIDINE 0.1% (ALPHAGAN P) 0.1 % DROP    Place 1 drop into both eyes 3 (three) times daily. Order 90 day supply    BRINZOLAMIDE (AZOPT) 1 % OPHTHALMIC SUSPENSION    Place 1 drop into both eyes 3 (three) times daily. ORDER 90 DAY SUPPLY    BUPROPION (WELLBUTRIN XL) 300 MG 24 HR TABLET    Take 1 tablet (300 mg total) by mouth once daily.    CARVEDILOL (COREG) 25 MG TABLET    TAKE ONE TABLET BY MOUTH TWICE DAILY WITH  MEALS    FUROSEMIDE (LASIX) 40 MG TABLET    TAKE 2 TABLETS BY MOUTH TWICE DAILY. WHEN  ANKLES  RETAIN  FLUID DOUBLE  THE  LASIX  FOR  14  DAYS    HYDROCODONE-ACETAMINOPHEN (NORCO) 7.5-325 MG PER TABLET    Take 1 tablet by mouth every 6 (six) hours as needed for Pain.    INSULIN NPH-INSULIN REGULAR, 70/30, (NOVOLIN 70/30 U-100 INSULIN) 100 UNIT/ML (70-30) INJECTION    Inject 130 Units into the skin 2 (two) times daily before meals.    LANCETS MISC    To check BG 3 times daily, to use with insurance preferred meter    LISINOPRIL (PRINIVIL,ZESTRIL) 20 MG TABLET    Take 1 tablet (20 mg total) by mouth once daily.    MIGLITOL (GLYSET) 25 MG TAB    Take 1 tablet (25 mg total) by mouth 3 (three) times daily with meals.    MUPIROCIN (BACTROBAN) 2 % OINTMENT    Apply topically 2 (two) times daily.    NETARSUDIL (RHOPRESSA) 0.02 % DROP    Place 1 drop into both eyes once daily.    NITROGLYCERIN (NITROSTAT) 0.4 MG SL TABLET    Place 1 tablet (0.4 mg total) under the tongue every 5 (five) minutes as needed for Chest pain.    OMEPRAZOLE (PRILOSEC) 40 MG CAPSULE    Take 1 capsule (40 mg total) by mouth once daily.    POLYETHYLENE GLYCOL (GLYCOLAX) 17 GRAM/DOSE POWDER    Take 17 g by mouth once daily.    SILDENAFIL (VIAGRA) 50 MG TABLET    Take 1 tablet (50 mg total) by mouth daily as needed for Erectile Dysfunction.    TRAVOPROST (TRAVATAN Z)  "0.004 % OPHTHALMIC SOLUTION    Place 1 drop into both eyes every evening.       Review of patient's allergies indicates:   Allergen Reactions    Cefazolin      Other reaction(s): Shakes  Other reaction(s): Chills       Past Surgical History:   Procedure Laterality Date    CARDIAC CATHETERIZATION  7/22/13    non obstructive cad    CATARACT EXTRACTION  OU    CATHETERIZATION, HEART, LEFT Left 7/22/2013    [facility] CATH LAB    COLONOSCOPY N/A 5/1/2019    Performed by Henry Mo III, MD at [facility] ENDO    CORONARY ANGIOPLASTY      ESOPHAGOGASTRODUODENOSCOPY (EGD) N/A 5/1/2019    Performed by [facility] at [facility] ENDO    EYE SURGERY      FRACTURE SURGERY      kidney stone       August 2016    PC IOL OU      RETINAL DETACHMENT REPAIR W/ SCLERAL BUCKLE LE      WRIST SURGERY         Family History   Problem Relation Age of Onset    Macular degeneration Father     Heart disease Father         CHF     Heart attack Father     Hypertension Mother     Macular degeneration Paternal Uncle     Hypertension Maternal Grandmother     Hypertension Maternal Grandfather     Strabismus Neg Hx     Retinal detachment Neg Hx     Glaucoma Neg Hx     Blindness Neg Hx     Amblyopia Neg Hx        Social History     Socioeconomic History    Marital status:      Spouse name: Not on file    Number of children: Not on file    Years of education: Not on file    Highest education level: Not on file   Occupational History     Employer:  [employer]   Social Needs    Financial resource strain: Not on file    Food insecurity:     Worry: Not on file     Inability: Not on file    Transportation needs:     Medical: Not on file     Non-medical: Not on file   Tobacco Use    Smoking status: Never Smoker    Smokeless tobacco: Never Used   Substance and Sexual Activity    Alcohol use: Yes     Comment: " one to two glasses of wine per year"    Drug use: No    Sexual " "activity: Not Currently     Birth control/protection: None   Lifestyle    Physical activity:     Days per week: Not on file     Minutes per session: Not on file    Stress: Not on file   Relationships    Social connections:     Talks on phone: Not on file     Gets together: Not on file     Attends Yazdanism service: Not on file     Active member of club or organization: Not on file     Attends meetings of clubs or organizations: Not on file     Relationship status: Not on file   Other Topics Concern    Not on file   Social History Narrative    , 1 son, JANIE.       Vitals:    09/09/19 0810   BP: (!) 166/88   Pulse: (!) 54   Weight: (!) 198.8 kg (438 lb 4.4 oz)   Height: 6' 7" (2.007 m)   PainSc: 0-No pain   PainLoc: Foot       Hemoglobin A1C   Date Value Ref Range Status   08/20/2019 8.2 (H) 4.0 - 5.6 % Final     Comment:     ADA Screening Guidelines:  5.7-6.4%  Consistent with prediabetes  >or=6.5%  Consistent with diabetes  High levels of fetal hemoglobin interfere with the HbA1C  assay. Heterozygous hemoglobin variants (HbS, HgC, etc)do  not significantly interfere with this assay.   However, presence of multiple variants may affect accuracy.     05/08/2019 7.4 (H) 4.0 - 5.6 % Final     Comment:     ADA Screening Guidelines:  5.7-6.4%  Consistent with prediabetes  >or=6.5%  Consistent with diabetes  High levels of fetal hemoglobin interfere with the HbA1C  assay. Heterozygous hemoglobin variants (HbS, HgC, etc)do  not significantly interfere with this assay.   However, presence of multiple variants may affect accuracy.     11/01/2018 9.7 (H) 4.0 - 5.6 % Final     Comment:     ADA Screening Guidelines:  5.7-6.4%  Consistent with prediabetes  >or=6.5%  Consistent with diabetes  High levels of fetal hemoglobin interfere with the HbA1C  assay. Heterozygous hemoglobin variants (HbS, HgC, etc)do  not significantly interfere with this assay.   However, presence of multiple variants may affect accuracy.   "       Review of Systems   Constitutional: Negative for chills and fever.   Respiratory: Negative for shortness of breath.    Cardiovascular: Negative for chest pain, palpitations, orthopnea, claudication and leg swelling.   Gastrointestinal: Negative for diarrhea, nausea and vomiting.   Musculoskeletal: Negative for joint pain.   Skin: Negative for rash.   Neurological: Positive for tingling and sensory change.   Psychiatric/Behavioral: Negative.          Objective:   PHYSICAL EXAM: Apperance: Alert and orient in no distress,well developed, and with good attention to grooming and body habits  Patient presents ambulating in tennis shoes.   LOWER EXTREMITY EXAM:  VASCULAR: Dorsalis pedis pulses 1/4 bilateral and Posterior Tibial pulses 1/4 bilateral. Capillary fill time <4 seconds bilateral. Moderate edema observed bilateral. Varicosities absent bilateral. Skin temperature of the lower extremities is warm to cool, proximal to distal. Hair growth dim bilateral.  DERMATOLOGICAL: No skin rashes, subcutaneous nodules, lesions, or ulcers observed bilateral. Nails 1,2,3,4,5 bilateral elongated, incurvated, and discolored with subungual debris. Webspaces 1,2,3,4 bilateral clean, dry and without evidence of break in skin integrity.   NEUROLOGICAL: Light touch, sharp-dull, proprioception all present and equal bilaterally.  Vibratory sensation diminished at bilateral hallux and navicular. Protective sensation absent at 2/10 sites as tested with a Spring-Pranav 5.07 monofilament.   MUSCULOSKELETAL: Muscle strength is 5/5 for foot inverters, everters, plantarflexors, and dorsiflexors. Muscle tone is normal. Semi-rigid hammertoes bilateral.     Assessment:   Uncontrolled type 2 diabetes mellitus with kidney complication, with long-term current use of insulin    Idiopathic peripheral neuropathy    Dermatophytosis of nail    Onychocryptosis          Plan:   Uncontrolled type 2 diabetes mellitus with kidney complication, with  long-term current use of insulin    Idiopathic peripheral neuropathy    Dermatophytosis of nail    Onychocryptosis      I counseled the patient on his conditions, regarding findings of my examination, my impressions, and usual treatment plan.   Greater than 50% of this visit spent on counseling and coordination of care.  Greater than 15 minutes of a 20 minute appointment spent on education about the diabetic foot, neuropathy, and prevention of limb loss.  Shoe inspection. Diabetic Foot Education. Patient reminded of the importance of good nutrition and blood sugar control to help prevent podiatric complications of diabetes. Patient instructed on proper foot hygeine. We discussed wearing proper shoe gear, daily foot inspections, never walking without protective shoe gear, never putting sharp instruments to feet.    With patient's permission, nails 1-5 bilateral were debrided/trimmed in length and thickness aggressively to their soft tissue attachment mechanically and with electric , removing all offending nail and debris. Patient relates relief following the procedure.  Patient  will continue to monitor the areas daily, inspect feet, wear protective shoe gear when ambulatory, moisturizer to maintain skin integrity. Patient reminded of the importance of good nutrition and blood sugar control to help prevent podiatric complications of diabetes.  Patient to return 3 months or sooner if needed.                 Vangie Cuevas DPM  [facility]

## 2019-09-12 ENCOUNTER — OFFICE VISIT (OUTPATIENT)
Dept: PSYCHIATRY | Facility: CLINIC | Age: 64
End: 2019-09-12
Payer: COMMERCIAL

## 2019-09-12 DIAGNOSIS — F33.1 MODERATE EPISODE OF RECURRENT MAJOR DEPRESSIVE DISORDER: Primary | ICD-10-CM

## 2019-09-12 PROCEDURE — 90834 PSYTX W PT 45 MINUTES: CPT | Mod: S$GLB,,, | Performed by: SOCIAL WORKER

## 2019-09-12 PROCEDURE — 90834 PR PSYCHOTHERAPY W/PATIENT, 45 MIN: ICD-10-PCS | Mod: S$GLB,,, | Performed by: SOCIAL WORKER
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
## 2019-09-17 ENCOUNTER — LAB VISIT (OUTPATIENT)
Dept: LAB | Facility: HOSPITAL | Age: 64
End: 2019-09-17
Attending: INTERNAL MEDICINE
Payer: COMMERCIAL

## 2019-09-17 ENCOUNTER — OFFICE VISIT (OUTPATIENT)
Dept: OPHTHALMOLOGY | Facility: CLINIC | Age: 64
End: 2019-09-17
Payer: COMMERCIAL

## 2019-09-17 DIAGNOSIS — H54.10 BLINDNESS AND LOW VISION: ICD-10-CM

## 2019-09-17 DIAGNOSIS — H33.021 RETINAL DETACHMENT WITH MULTIPLE BREAKS, RIGHT: ICD-10-CM

## 2019-09-17 DIAGNOSIS — H35.372 EPIRETINAL MEMBRANE (ERM) OF LEFT EYE: ICD-10-CM

## 2019-09-17 DIAGNOSIS — N18.30 CHRONIC KIDNEY DISEASE, STAGE III (MODERATE): ICD-10-CM

## 2019-09-17 DIAGNOSIS — H40.1133 PRIMARY OPEN ANGLE GLAUCOMA OF BOTH EYES, SEVERE STAGE: Primary | ICD-10-CM

## 2019-09-17 LAB
ALBUMIN SERPL BCP-MCNC: 3.3 G/DL (ref 3.5–5.2)
ANION GAP SERPL CALC-SCNC: 10 MMOL/L (ref 8–16)
BUN SERPL-MCNC: 15 MG/DL (ref 8–23)
CALCIUM SERPL-MCNC: 9.2 MG/DL (ref 8.7–10.5)
CHLORIDE SERPL-SCNC: 111 MMOL/L (ref 95–110)
CO2 SERPL-SCNC: 25 MMOL/L (ref 23–29)
CREAT SERPL-MCNC: 2 MG/DL (ref 0.5–1.4)
EST. GFR  (AFRICAN AMERICAN): 40.2 ML/MIN/1.73 M^2
EST. GFR  (NON AFRICAN AMERICAN): 34.7 ML/MIN/1.73 M^2
GLUCOSE SERPL-MCNC: 114 MG/DL (ref 70–110)
PHOSPHATE SERPL-MCNC: 2.3 MG/DL (ref 2.7–4.5)
POTASSIUM SERPL-SCNC: 3.5 MMOL/L (ref 3.5–5.1)
SODIUM SERPL-SCNC: 146 MMOL/L (ref 136–145)

## 2019-09-17 PROCEDURE — 99999 PR PBB SHADOW E&M-EST. PATIENT-LVL II: ICD-10-PCS | Mod: PBBFAC,,, | Performed by: OPHTHALMOLOGY

## 2019-09-17 PROCEDURE — 92012 PR EYE EXAM, EST PATIENT,INTERMED: ICD-10-PCS | Mod: S$GLB,,, | Performed by: OPHTHALMOLOGY

## 2019-09-17 PROCEDURE — 99999 PR PBB SHADOW E&M-EST. PATIENT-LVL II: CPT | Mod: PBBFAC,,, | Performed by: OPHTHALMOLOGY

## 2019-09-17 PROCEDURE — 80069 RENAL FUNCTION PANEL: CPT

## 2019-09-17 PROCEDURE — 36415 COLL VENOUS BLD VENIPUNCTURE: CPT

## 2019-09-17 PROCEDURE — 92012 INTRM OPH EXAM EST PATIENT: CPT | Mod: S$GLB,,, | Performed by: OPHTHALMOLOGY

## 2019-09-17 RX ORDER — TRAVOPROST OPHTHALMIC SOLUTION 0.04 MG/ML
1 SOLUTION OPHTHALMIC NIGHTLY
Qty: 2.5 ML | Refills: 6 | Status: SHIPPED | OUTPATIENT
Start: 2019-09-17 | End: 2020-03-10

## 2019-09-17 NOTE — PROGRESS NOTES
HPI     Glaucoma      Additional comments: Rhopressa QD OS , Azopt Bid ou, Alphagan bid ou,   Travatan zqhs ou              Comments     Patient returns for a one month iop check, patient states Rhopressa   itches OS.    [facility] - Dr Burgess pt  Corneal Opacity  [place] palsy  DM  RD Repair with Scleral Buckle right eye  PCIOL OU  CANALOPLASTY OD 8-22-13 Good flow and tension(-900)  CANAL OS 03/20/14/LOT # 1306-01/REF # [id number]  -500 with shutter effect due to much intraop respiratory movement  Moderate flow with good tension      Azopt BID OU, Alphagan BID OU  OU: Travatan Z QHS (Patient does not know the name just states he uses all   3 bottles bid ou )  OS Rhopressa QD          Last edited by Alvin Hale MD on 9/17/2019  8:32 AM. (History)            Assessment /Plan     For exam results, see Encounter Report.      ICD-10-CM ICD-9-CM    1. Primary open angle glaucoma of both eyes, severe stage H40.1133 365.11 Good IOP OU today. Will continue current drops      365.73    2. Epiretinal membrane (ERM) of left eye H35.372 362.56 Follow    3. Uncontrolled type 2 diabetes mellitus with stage 3 chronic kidney disease, with long-term current use of insulin E11.22 250.52 Dilated 4/23/2019    E11.65 585.3     N18.3 V58.67     Z79.4     4. Blindness and low vision H54.10 369.9 OD. Stable    5. Retinal detachment with multiple breaks, right H33.021 361.02      Azopt BID OU, Alphagan BID OU  OU: Travatan Z QHS  OS Rhopressa QD    Return to clinic 3 months with IOP check

## 2019-09-23 ENCOUNTER — OFFICE VISIT (OUTPATIENT)
Dept: NEPHROLOGY | Facility: CLINIC | Age: 64
End: 2019-09-23
Payer: COMMERCIAL

## 2019-09-23 VITALS
SYSTOLIC BLOOD PRESSURE: 130 MMHG | HEIGHT: 78 IN | RESPIRATION RATE: 20 BRPM | DIASTOLIC BLOOD PRESSURE: 84 MMHG | WEIGHT: 315 LBS | HEART RATE: 60 BPM | BODY MASS INDEX: 36.45 KG/M2

## 2019-09-23 DIAGNOSIS — I10 ESSENTIAL HYPERTENSION: Primary | ICD-10-CM

## 2019-09-23 DIAGNOSIS — N18.30 STAGE 3 CHRONIC KIDNEY DISEASE DUE TO TYPE 2 DIABETES MELLITUS: ICD-10-CM

## 2019-09-23 DIAGNOSIS — E11.22 STAGE 3 CHRONIC KIDNEY DISEASE DUE TO TYPE 2 DIABETES MELLITUS: ICD-10-CM

## 2019-09-23 DIAGNOSIS — Z71.89 ENCOUNTER FOR MEDICATION REVIEW AND COUNSELING: ICD-10-CM

## 2019-09-23 PROCEDURE — 3045F PR MOST RECENT HEMOGLOBIN A1C LEVEL 7.0-9.0%: ICD-10-PCS | Mod: CPTII,S$GLB,, | Performed by: INTERNAL MEDICINE

## 2019-09-23 PROCEDURE — 3008F BODY MASS INDEX DOCD: CPT | Mod: CPTII,S$GLB,, | Performed by: INTERNAL MEDICINE

## 2019-09-23 PROCEDURE — 3008F PR BODY MASS INDEX (BMI) DOCUMENTED: ICD-10-PCS | Mod: CPTII,S$GLB,, | Performed by: INTERNAL MEDICINE

## 2019-09-23 PROCEDURE — 3075F PR MOST RECENT SYSTOLIC BLOOD PRESS GE 130-139MM HG: ICD-10-PCS | Mod: CPTII,S$GLB,, | Performed by: INTERNAL MEDICINE

## 2019-09-23 PROCEDURE — 99999 PR PBB SHADOW E&M-EST. PATIENT-LVL IV: CPT | Mod: PBBFAC,,, | Performed by: INTERNAL MEDICINE

## 2019-09-23 PROCEDURE — 99214 OFFICE O/P EST MOD 30 MIN: CPT | Mod: S$GLB,,, | Performed by: INTERNAL MEDICINE

## 2019-09-23 PROCEDURE — 3079F PR MOST RECENT DIASTOLIC BLOOD PRESSURE 80-89 MM HG: ICD-10-PCS | Mod: CPTII,S$GLB,, | Performed by: INTERNAL MEDICINE

## 2019-09-23 PROCEDURE — 3075F SYST BP GE 130 - 139MM HG: CPT | Mod: CPTII,S$GLB,, | Performed by: INTERNAL MEDICINE

## 2019-09-23 PROCEDURE — 99214 PR OFFICE/OUTPT VISIT, EST, LEVL IV, 30-39 MIN: ICD-10-PCS | Mod: S$GLB,,, | Performed by: INTERNAL MEDICINE

## 2019-09-23 PROCEDURE — 99999 PR PBB SHADOW E&M-EST. PATIENT-LVL IV: ICD-10-PCS | Mod: PBBFAC,,, | Performed by: INTERNAL MEDICINE

## 2019-09-23 PROCEDURE — 3045F PR MOST RECENT HEMOGLOBIN A1C LEVEL 7.0-9.0%: CPT | Mod: CPTII,S$GLB,, | Performed by: INTERNAL MEDICINE

## 2019-09-23 PROCEDURE — 3079F DIAST BP 80-89 MM HG: CPT | Mod: CPTII,S$GLB,, | Performed by: INTERNAL MEDICINE

## 2019-09-23 NOTE — PROGRESS NOTES
NEPHROLOGY CONSULTATION F/U NOTE  Date of clinic visit: 9/23/19     REASON FOR CONSULTATION:  Chronic kidney disease, hyperkalemia, and f/u for L leg cellulitis     REFERRING PHYSICIAN:  Dr. Liza Fontenot.      HISTORY OF PRESENT ILLNESS: Pt is a 64-year-old male with a h/o of DM, HTN, CKD stage 3 with s Cr ranging between 1.9-2.6 in the past several years and obesity, who was initially referred to us for NINFA and hyperkalemia after bactrim use for leg cellulitis. Both problems resolved. Also, aldactone was stopped and ACE-I dose was reduced. On 2 visits ago with us, BP was high, and the ACE-I was increased back to the prior dose of 20 mg po qd.      On return visit today, he has no new c/o's. No current or new c/o's. No fever, no new issues or c/o's. Labs and meds reviewed with pt.     PAST MEDICAL HISTORY:  1.  CKD, stage III, based on review of the records, baseline creatinine appears to be close to 2.0 with estimated GFR of about 40 mL/min.  2.  Diabetes mellitus for 15 years.  3.  Hypertension.  4.  Coronary artery disease with past history of MI in 2016, had 2 stents placed.  5.  Diastolic CHF.  6.  Obesity.  7.  Hyperlipidemia.  8.  Left bundle-branch block.  9.  Obstructive sleep apnea.  10.  History of right-sided Bell's palsy.  11.  Idiopathic peripheral neuropathy.   12.  Primary open-angle glaucoma of both eyes.  13.  Chronic left hydronephrosis.     PAST SURGICAL HISTORY:  Reviewed.  Distant eye surgery for cataracts.     FAMILY HISTORY:  Reviewed.  Both parents passed away in their 80s.  No history   of diabetes mellitus.     ALLERGIES:  Reviewed, to Ancef.     SOCIAL HISTORY:  Negative for smoking.  No alcohol use.     MEDICATIONS:  Fully reviewed      Current Outpatient Medications:     Current Outpatient Medications:     ACCU-CHEK ALYCIA PLUS METER Misc, , Disp: , Rfl:     aspirin 325 MG tablet, Take 325 mg by mouth 2 (two) times daily., Disp: , Rfl:     atorvastatin (LIPITOR) 10 MG tablet, TAKE 1  TABLET BY MOUTH ONCE DAILY, Disp: 90 tablet, Rfl: 3    BLOOD PRESSURE CUFF Misc, 1 cuff, Disp: 1 each, Rfl: 0    blood sugar diagnostic Strp, To check BG 3 times daily, to use with insurance preferred meter, Disp: 100 strip, Rfl: 3    brimonidine 0.1% (ALPHAGAN P) 0.1 % Drop, Place 1 drop into both eyes 3 (three) times daily. Order 90 day supply, Disp: 10 mL, Rfl: 4    brinzolamide (AZOPT) 1 % ophthalmic suspension, Place 1 drop into both eyes 3 (three) times daily. ORDER 90 DAY SUPPLY, Disp: 10 mL, Rfl: 4    buPROPion (WELLBUTRIN XL) 300 MG 24 hr tablet, Take 1 tablet (300 mg total) by mouth once daily., Disp: 90 tablet, Rfl: 0    carvedilol (COREG) 25 MG tablet, TAKE ONE TABLET BY MOUTH TWICE DAILY WITH  MEALS, Disp: 180 tablet, Rfl: 11    furosemide (LASIX) 40 MG tablet, TAKE 2 TABLETS BY MOUTH TWICE DAILY. WHEN  ANKLES  RETAIN  FLUID DOUBLE  THE  LASIX  FOR  14  DAYS, Disp: 260 tablet, Rfl: 3    insulin NPH-insulin regular, 70/30, (NOVOLIN 70/30 U-100 INSULIN) 100 unit/mL (70-30) injection, Inject 130 Units into the skin 2 (two) times daily before meals., Disp: 240 mL, Rfl: 3    lancets Misc, To check BG 3 times daily, to use with insurance preferred meter, Disp: 100 each, Rfl: 3    lisinopril (PRINIVIL,ZESTRIL) 20 MG tablet, Take 1 tablet (20 mg total) by mouth once daily., Disp: 30 tablet, Rfl: 11    miglitol (GLYSET) 25 MG Tab, Take 1 tablet (25 mg total) by mouth 3 (three) times daily with meals., Disp: 270 tablet, Rfl: 3    mupirocin (BACTROBAN) 2 % ointment, Apply topically 2 (two) times daily., Disp: 22 g, Rfl: 0    netarsudil (RHOPRESSA) 0.02 % Drop, Place 1 drop into both eyes once daily., Disp: 2.5 mL, Rfl: 6    nitroGLYCERIN (NITROSTAT) 0.4 MG SL tablet, Place 1 tablet (0.4 mg total) under the tongue every 5 (five) minutes as needed for Chest pain., Disp: 20 tablet, Rfl: 0    omeprazole (PRILOSEC) 40 MG capsule, Take 1 capsule (40 mg total) by mouth once daily., Disp: 90 capsule, Rfl:  3    polyethylene glycol (GLYCOLAX) 17 gram/dose powder, Take 17 g by mouth once daily., Disp: 238 g, Rfl: 6    sildenafil (VIAGRA) 50 MG tablet, Take 1 tablet (50 mg total) by mouth daily as needed for Erectile Dysfunction., Disp: 5 tablet, Rfl: 3    travoprost (TRAVATAN Z) 0.004 % ophthalmic solution, Place 1 drop into both eyes every evening., Disp: 2.5 mL, Rfl: 6     REVIEW OF SYSTEMS:  No recent hospitalizations.  GENERAL:  Negative.  HEAD, EYES, EARS, NOSE, AND THROAT:  Negative.  CARDIAC:  Negative.  PULMONARY:  Negative.  GASTROINTESTINAL:  Negative.  GENITOURINARY:  Negative.  PSYCHOLOGICAL:  Negative.  NEUROLOGICAL:  Negative.  ENDOCRINE:  Negative.  HEMATOLOGIC AND ONCOLOGIC:  Negative.  INFECTIOUS DISEASE:  Negative.  The rest of the review of systems negative.     PHYSICAL EXAMINATION:  VITAL SIGNS:  Blood pressure is 130/84, pulse 60, weight 435 lbs, last visit 428 lbs, before that 407 lbs  He is cooperative, pleasant, in no acute distress.  Atraumatic, normocephalic.    Well developed, well nourished.  HEENT:  Mucous membranes moist.  NECK:  No JVD.  HEART:  Regular rate and rhythm.  CHEST:  Clear to auscultation.  No rales.  No wheezes.  Breathing symmetric,   unlabored.  ABDOMEN:  Soft, nontender.  EXTREMITIES:  Showed on the right side, trace edema.  On the left, improved edema and erythema     LABS:  Reviewed.   BMP  Lab Results   Component Value Date     (H) 09/17/2019    K 3.5 09/17/2019     (H) 09/17/2019    CO2 25 09/17/2019    BUN 15 09/17/2019    CREATININE 2.0 (H) 09/17/2019    CALCIUM 9.2 09/17/2019    ANIONGAP 10 09/17/2019    ESTGFRAFRICA 40.2 (A) 09/17/2019    EGFRNONAA 34.7 (A) 09/17/2019     Lab Results   Component Value Date    WBC 7.86 08/28/2019    HGB 14.2 08/28/2019    HCT 40.2 08/28/2019    MCV 88 08/28/2019     08/28/2019     Lab Results   Component Value Date    .0 (H) 08/09/2018    CALCIUM 9.2 09/17/2019    PHOS 2.3 (L) 09/17/2019      Urinalysis  no proteinuria, no blood, no casts reported.  No rbc's.        ASSESSMENT AND PLAN:  This is a 64-year-old male with metabolic syndrome, DM, HTN, CKD-3, who presents for f/u of NINFA, hyperkalemia, and leg cellulitis:  The impression is:     1.  Renal:  NINFA on CKD-3. s Cr back at baseline. NINFA remains resolved  CKD stage 3. Stable renal function  Fluctuating levels of serum creatinine, mostly hemodynamic  Cause of CKD likely due to DM and HTN      2.  Hypertension. BP improved, normal, well controlled today  Meds reviewed.  ACE-I dose was previously increased     3. Hyperkalemia: resolved after stopping aldactone  K has remained normal after increasing dose of lisinopril  OK to continue     4. Left leg cellulitis. s/p bactrim  Resolved        PLANS AND RECOMMENDATIONS:  For this complicated patient include the following:  As discussed above  Discussed with the patient.  Opportunity for question and discussion provided.\  RTC 6 months  Multiple issues addressed  F/u with PCP  Total time spent 25 minutes, time was needed to review the records, the patient   evaluation, documentation, and face-to-face discussion with the patient.  More   than 50% of the time was spent in counseling and coordination of care.  Level IV visit.     Kamila Abdi MD

## 2019-09-26 ENCOUNTER — OFFICE VISIT (OUTPATIENT)
Dept: PSYCHIATRY | Facility: CLINIC | Age: 64
End: 2019-09-26
Payer: COMMERCIAL

## 2019-09-26 DIAGNOSIS — F33.1 MODERATE EPISODE OF RECURRENT MAJOR DEPRESSIVE DISORDER: Primary | ICD-10-CM

## 2019-09-26 PROCEDURE — 90834 PR PSYCHOTHERAPY W/PATIENT, 45 MIN: ICD-10-PCS | Mod: S$GLB,,, | Performed by: SOCIAL WORKER

## 2019-09-26 PROCEDURE — 90834 PSYTX W PT 45 MINUTES: CPT | Mod: S$GLB,,, | Performed by: SOCIAL WORKER

## 2019-09-26 NOTE — PROGRESS NOTES
"Individual Psychotherapy (PhD/LCSW)    9/26/2019    [facility]:  [facility]         Therapeutic Intervention: Met with patient.  Outpatient - Insight oriented psychotherapy 45 min - CPT code 15857    Chief complaint/reason for encounter: depression     Interval history and content of current session:  Patient presents to ongoing individual therapy due to depression.  He had a down day last Wednesday when he was off from work.  He was thinking about his ex girlfriend.  He recalls when he was in his late teens and early twenties that he was often rejected in relationships.  He notes that if it would not be for his close friends at the time he "might not be here."  It has been seven years since he dated his ex girlfriend.  She sent him a text several month ago detailing that she could only offer him friendship.  He admits that he did have depression prior to his recent relationship.  He had entered the partial program at [facility] main [facility] because his wife found him sitting in a chair crying for no reason.  Educate the patient that he can focus on healing from past hurts instead of wallowing in them.  Reflect to the patient that relationships are very complex.  He feels if he knew what he did wrong in the previous relationship he could avoid future hurt.  He is not sure how to move on from past hurts.  One of his friends have encouraged him to hire an escort service, but he notes that he is not "that type of johanna."  He and his friend have been planning for his [employer] party which has a toga theme.  She has started to decorate his home.  She has his den decorated like the fraternity house in the movie Animal House.    Target symptoms: depression  Why chosen therapy is appropriate versus another modality: relevant to diagnosis  Outcome monitoring methods: self-report, observation  Therapeutic intervention type: insight oriented psychotherapy, supportive psychotherapy, interactive psychotherapy     Risk " parameters:  Patient reports no suicidal ideation  Patient reports no homicidal ideation  Patient reports no self-injurious behavior  Patient reports no violent behavior     Verbal deficits: none     Patient's response to intervention:  The patient's response to intervention is accepting, motivated.     Progress toward goals and other mental status changes:  The patient's progress toward goals is fair .     Diagnosis:   Major depression recurrent moderate     Plan:  individual psychotherapy and medication management by physician     Return to clinic: as scheduled     Length of Service (minutes): 45

## 2019-09-30 DIAGNOSIS — I10 ESSENTIAL HYPERTENSION: Chronic | ICD-10-CM

## 2019-09-30 RX ORDER — CARVEDILOL 25 MG/1
TABLET ORAL
Qty: 180 TABLET | Refills: 11 | Status: SHIPPED | OUTPATIENT
Start: 2019-09-30 | End: 2020-11-10 | Stop reason: SDUPTHER

## 2019-10-10 ENCOUNTER — OFFICE VISIT (OUTPATIENT)
Dept: PSYCHIATRY | Facility: CLINIC | Age: 64
End: 2019-10-10
Payer: COMMERCIAL

## 2019-10-10 DIAGNOSIS — F33.1 MODERATE EPISODE OF RECURRENT MAJOR DEPRESSIVE DISORDER: Primary | ICD-10-CM

## 2019-10-10 PROCEDURE — 90834 PSYTX W PT 45 MINUTES: CPT | Mod: S$GLB,,, | Performed by: SOCIAL WORKER

## 2019-10-10 PROCEDURE — 90834 PR PSYCHOTHERAPY W/PATIENT, 45 MIN: ICD-10-PCS | Mod: S$GLB,,, | Performed by: SOCIAL WORKER

## 2019-10-10 NOTE — PROGRESS NOTES
"Individual Psychotherapy (PhD/LCSW)    10/10/2019    [facility]         Therapeutic Intervention: Met with patient.  Outpatient - Insight oriented psychotherapy 45 min - CPT code 92584    Chief complaint/reason for encounter: depression     Interval history and content of current session:  Patient presents to ongoing individual therapy due to depression.  He has not been in a relationship with his ex girlfriend for over three years.  He would have liked her to tell him "fuck you!" because he could simply move on at that point.  He still has had no contact with her.  He admits that his parents seemed to have a good relationship.  He notes that his grandparents did live with his parents.  The patient sought out psychiatric treatment in  when his wife .  He had tried to get the gastric bypass in  or , but he was denied for psychiatric reasons.  He was not given any opportunity to work toward having the gastric procedure.  He notes that his wife  from complications related to diabetes and obesity.  Explore why the patient continues to hold on to this previous relationship.  Encourage the patient to consider letting go.  The patient admits that he is worried about being hurt again in another relationship.  He admits that he did not expect any relationship with his previous girlfriend.  His ex wife was the one who pursued him, but in his recent relationship, he did actions in pursuit that he had not done before.  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  He plans to work a majority of the weekend.  His friends continue to plan for the Halloween [employer] party at his home.    Target symptoms: depression  Why chosen therapy is appropriate versus another modality: relevant to diagnosis  Outcome monitoring methods: self-report, observation  Therapeutic intervention type: insight oriented psychotherapy, supportive psychotherapy, interactive psychotherapy     Risk parameters:  Patient reports no " suicidal ideation  Patient reports no homicidal ideation  Patient reports no self-injurious behavior  Patient reports no violent behavior     Verbal deficits: none     Patient's response to intervention:  The patient's response to intervention is accepting, motivated.     Progress toward goals and other mental status changes:  The patient's progress toward goals is fair .     Diagnosis:   Major depression recurrent moderate     Plan:  individual psychotherapy and medication management by physician     Return to clinic: as scheduled     Length of Service (minutes): 45

## 2019-10-24 ENCOUNTER — OFFICE VISIT (OUTPATIENT)
Dept: PSYCHIATRY | Facility: CLINIC | Age: 64
End: 2019-10-24
Payer: COMMERCIAL

## 2019-10-24 DIAGNOSIS — F33.1 MODERATE EPISODE OF RECURRENT MAJOR DEPRESSIVE DISORDER: Primary | ICD-10-CM

## 2019-10-24 PROCEDURE — 90834 PSYTX W PT 45 MINUTES: CPT | Mod: S$GLB,,, | Performed by: SOCIAL WORKER

## 2019-10-24 PROCEDURE — 90834 PR PSYCHOTHERAPY W/PATIENT, 45 MIN: ICD-10-PCS | Mod: S$GLB,,, | Performed by: SOCIAL WORKER
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
## 2019-10-31 ENCOUNTER — OFFICE VISIT (OUTPATIENT)
Dept: PSYCHIATRY | Facility: CLINIC | Age: 64
End: 2019-10-31
Payer: COMMERCIAL

## 2019-10-31 DIAGNOSIS — F33.1 MDD (MAJOR DEPRESSIVE DISORDER), RECURRENT EPISODE, MODERATE: Primary | ICD-10-CM

## 2019-10-31 DIAGNOSIS — F33.1 MAJOR DEPRESSIVE DISORDER, RECURRENT EPISODE, MODERATE: Primary | ICD-10-CM

## 2019-10-31 PROCEDURE — 99999 PR PBB SHADOW E&M-EST. PATIENT-LVL I: CPT | Mod: PBBFAC,,, | Performed by: PSYCHIATRY & NEUROLOGY

## 2019-10-31 PROCEDURE — 99999 PR PBB SHADOW E&M-EST. PATIENT-LVL I: ICD-10-PCS | Mod: PBBFAC,,, | Performed by: PSYCHIATRY & NEUROLOGY

## 2019-10-31 PROCEDURE — 90834 PR PSYCHOTHERAPY W/PATIENT, 45 MIN: ICD-10-PCS | Mod: S$GLB,,, | Performed by: SOCIAL WORKER

## 2019-10-31 PROCEDURE — 99214 OFFICE O/P EST MOD 30 MIN: CPT | Mod: S$GLB,,, | Performed by: PSYCHIATRY & NEUROLOGY

## 2019-10-31 PROCEDURE — 99214 PR OFFICE/OUTPT VISIT, EST, LEVL IV, 30-39 MIN: ICD-10-PCS | Mod: S$GLB,,, | Performed by: PSYCHIATRY & NEUROLOGY

## 2019-10-31 PROCEDURE — 90834 PSYTX W PT 45 MINUTES: CPT | Mod: S$GLB,,, | Performed by: SOCIAL WORKER

## 2019-10-31 RX ORDER — DULOXETIN HYDROCHLORIDE 30 MG/1
CAPSULE, DELAYED RELEASE ORAL
Qty: 180 CAPSULE | Refills: 0 | Status: SHIPPED | OUTPATIENT
Start: 2019-10-31 | End: 2020-10-15
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
## 2019-10-31 NOTE — PROGRESS NOTES
"Outpatient Psychiatry Follow-up Visit (MD/NP)    10/31/2019    Stepan Nixon, a 64 y.o. male, presenting for follow-up visit. Met with patient.    Reason for Encounter: self-referral. Patient complains of depressison.    Interval History: Patient seen & interviewed for follow-up, last seen about 3 months prior to this visit. Work is most positive part of his life. Still grieving. Feeling hopeless about the future, though acknowledges many positive aspects of his life (work, kids/grandkids, etc.) No new health problems. Adherent to medications. Denies side effects.      Background: This 60 y/o with depression recurrence, 6 months of low moods, feelings of guilt/self-reproach, hopelessness & helplessness, intermittent SI which has been mostly passive, occasional fantasies about active suicide attempts, but never seriously considers action. Most recent depression is in context of loss of love relationship to a woman he met on internet, dated for some time then broke up with him & is now in another relationship. Continued to text & attempt to contact her (she generally doesn't respond) until about a week ago, stopped because he realizes it's time to "move on", that it's taking a toll on his mental health, but still feels tempted to do it. Continues to work despite symptoms, reports performance is "ok", though "not my best work". PsychHx: symptoms began around Jose Manuel '09. first treated for depression in early '10. Did IOP at [facility] in '10. Suffered a series of losses since including wife ('09), mother in law ('11), mother ('12), father ('13) & loss of job as [employer]  due to his health problems (he's since changed fields, now works in tax preparation for [employer]). Has participated in psychotherapy including since he moved to [place], stopped due to loss of insurance (though reinsured, hasn't returned since most recent depression recurrence). 1 remote episode of suicidality without action.  MedHx: DM, had " NSTEMI in , FRAN, CHF, CKD, cataracts. SubstanceHx: rare wine, but mostly avoids alcohol due to his health; no illicits or prescription drug abuse. Social Hx: normal , birth, & developmental milestones. Grew up in [place], moved to [place] in .  (wife  in '10), & 2 year relationship ended in mid , though he's continued to contact her until very recently. 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 Works full-time. Has 3 adopted kids (one of which is child of one of other kids).      Review Of Systems:     GENERAL:  No weight gain or loss  SKIN:  No rashes or lacerations  HEAD:  No headaches, +hemifacial plegia.   CHEST:  No shortness of breath, hyperventilation or cough  CARDIOVASCULAR:  No tachycardia or chest pain  ABDOMEN:  No nausea, vomiting, pain, constipation or diarrhea  URINARY:  No frequency, dysuria or sexual dysfunction  ENDOCRINE:  No polydipsia, polyuria  MUSCULOSKELETAL:  No pain or stiffness of the joints  NEUROLOGIC:  No weakness, sensory changes, seizures, confusion, memory loss, tremor or other abnormal movements    Current Evaluation:     Nutritional Screening: Considering the patient's height and weight, medications, medical history and preferences, should a referral be made to the dietitian? no    Constitutional  Vitals:  Most recent vital signs, dated less than 90 days prior to this appointment, were not reviewed.    There were no vitals filed for this visit.     General:  unremarkable, age appropriate     Musculoskeletal  Muscle Strength/Tone:  no tremor, no tic   Gait & Station:  non-ataxic     Psychiatric  Appearance: casually dressed & groomed;   Behavior: calm,   Cooperation: cooperative with assessment  Speech: normal rate, volume, tone  Thought Process: linear, goal-directed  Thought Content: No suicidal or homicidal ideation; no delusions  Affect: mildly anxious  Mood: mildly  anxious  Perceptions: No auditory or visual hallucinations  Level of Consciousness: alert throughout interview  Insight: fair  Cognition: Oriented to person, place, time, & situation  Memory: no apparent deficits to general clinical interview; not formally assessed  Attention/Concentration: no apparent deficits to general clinical interview; not formally assessed  Fund of Knowledge: average by vocabulary/education    Laboratory Data  No visits with results within 1 Month(s) from this visit.   Latest known visit with results is:   Lab Visit on 09/17/2019   Component Date Value Ref Range Status    Glucose 09/17/2019 114* 70 - 110 mg/dL Final    Sodium 09/17/2019 146* 136 - 145 mmol/L Final    Potassium 09/17/2019 3.5  3.5 - 5.1 mmol/L Final    Chloride 09/17/2019 111* 95 - 110 mmol/L Final    CO2 09/17/2019 25  23 - 29 mmol/L Final    BUN, Bld 09/17/2019 15  8 - 23 mg/dL Final    Calcium 09/17/2019 9.2  8.7 - 10.5 mg/dL Final    Creatinine 09/17/2019 2.0* 0.5 - 1.4 mg/dL Final    Albumin 09/17/2019 3.3* 3.5 - 5.2 g/dL Final    Phosphorus 09/17/2019 2.3* 2.7 - 4.5 mg/dL Final    eGFR if  09/17/2019 40.2* >60 mL/min/1.73 m^2 Final    eGFR if non  09/17/2019 34.7* >60 mL/min/1.73 m^2 Final    Anion Gap 09/17/2019 10  8 - 16 mmol/L Final     Medications  Outpatient Encounter Medications as of 10/31/2019   Medication Sig Dispense Refill    ACCU-CHEK ALYCIA PLUS METER Misc       aspirin 325 MG tablet Take 325 mg by mouth 2 (two) times daily.      atorvastatin (LIPITOR) 10 MG tablet TAKE 1 TABLET BY MOUTH ONCE DAILY 90 tablet 3    BLOOD PRESSURE CUFF Misc 1 cuff 1 each 0    blood sugar diagnostic Strp To check BG 3 times daily, to use with insurance preferred meter 100 strip 3    brimonidine 0.1% (ALPHAGAN P) 0.1 % Drop Place 1 drop into both eyes 3 (three) times daily. Order 90 day supply 10 mL 4    brinzolamide (AZOPT) 1 % ophthalmic suspension Place 1 drop into both eyes  3 (three) times daily. ORDER 90 DAY SUPPLY 10 mL 4    buPROPion (WELLBUTRIN XL) 300 MG 24 hr tablet Take 1 tablet (300 mg total) by mouth once daily. 90 tablet 0    carvedilol (COREG) 25 MG tablet TAKE 1 TABLET BY MOUTH TWICE DAILY WITH MEALS 180 tablet 11    furosemide (LASIX) 40 MG tablet TAKE 2 TABLETS BY MOUTH TWICE DAILY. WHEN  ANKLES  RETAIN  FLUID DOUBLE  THE  LASIX  FOR  14  DAYS 260 tablet 3    insulin NPH-insulin regular, 70/30, (NOVOLIN 70/30 U-100 INSULIN) 100 unit/mL (70-30) injection Inject 130 Units into the skin 2 (two) times daily before meals. 240 mL 3    lancets Misc To check BG 3 times daily, to use with insurance preferred meter 100 each 3    lisinopril (PRINIVIL,ZESTRIL) 20 MG tablet Take 1 tablet (20 mg total) by mouth once daily. 30 tablet 11    miglitol (GLYSET) 25 MG Tab Take 1 tablet (25 mg total) by mouth 3 (three) times daily with meals. 270 tablet 3    mupirocin (BACTROBAN) 2 % ointment Apply topically 2 (two) times daily. 22 g 0    netarsudil (RHOPRESSA) 0.02 % Drop Place 1 drop into both eyes once daily. 2.5 mL 6    nitroGLYCERIN (NITROSTAT) 0.4 MG SL tablet Place 1 tablet (0.4 mg total) under the tongue every 5 (five) minutes as needed for Chest pain. 20 tablet 0    omeprazole (PRILOSEC) 40 MG capsule Take 1 capsule (40 mg total) by mouth once daily. 90 capsule 3    polyethylene glycol (GLYCOLAX) 17 gram/dose powder Take 17 g by mouth once daily. 238 g 6    sildenafil (VIAGRA) 50 MG tablet Take 1 tablet (50 mg total) by mouth daily as needed for Erectile Dysfunction. 5 tablet 3    travoprost (TRAVATAN Z) 0.004 % ophthalmic solution Place 1 drop into both eyes every evening. 2.5 mL 6     No facility-administered encounter medications on file as of 10/31/2019.      Assessment - Diagnosis - Goals:     Impression: This 63 y/o WM with MDD, with recurrent major depression, symptoms. Participating in psychotherapy. Chronic problems with relationship co-dependency, seeking of  relationship from unattainable partner.     Major depressive disorder, recurrent, mild; anxiety    Treatment Goals:  Specify outcomes written in observable, behavioral terms:   Depression: increasing energy, increasing interest in usual activities, increasing motivation, reducing excessive guilt and reducing fatigue    Treatment Plan/Recommendations:   Try duloxetine daily in place of wellbutrin. Discussed risks, benefits, and alternatives to treatment plan documented above with patient. I answered all patient questions related to this plan and patient expressed understanding and agreement.   Continue psychotherapy.     Return to Clinic: 4 months, prn sooner.     Counseling time: 10 minutes  Total time: 25 minutes    RAMON Pitt MD

## 2019-11-13 ENCOUNTER — PATIENT OUTREACH (OUTPATIENT)
Dept: ADMINISTRATIVE | Facility: HOSPITAL | Age: 64
End: 2019-11-13

## 2019-11-14 ENCOUNTER — OFFICE VISIT (OUTPATIENT)
Dept: PSYCHIATRY | Facility: CLINIC | Age: 64
End: 2019-11-14
Payer: COMMERCIAL

## 2019-11-14 DIAGNOSIS — F33.1 MAJOR DEPRESSIVE DISORDER, RECURRENT EPISODE, MODERATE: Primary | ICD-10-CM

## 2019-11-14 PROCEDURE — 90834 PR PSYCHOTHERAPY W/PATIENT, 45 MIN: ICD-10-PCS | Mod: S$GLB,,, | Performed by: SOCIAL WORKER

## 2019-11-14 PROCEDURE — 90834 PSYTX W PT 45 MINUTES: CPT | Mod: S$GLB,,, | Performed by: SOCIAL WORKER

## 2019-11-18 NOTE — PROGRESS NOTES
"Individual Psychotherapy (PhD/LCSW)    11/14/2019    Site:  [place]         Therapeutic Intervention: Met with patient.  Outpatient - Insight oriented psychotherapy 45 min - CPT code 04727    Chief complaint/reason for encounter: depression     Interval history and content of current session:  Patient presents to ongoing individual therapy due to depression.  He has been feeling better with the change in antidepressant.  He is no longer waking at 2 AM and "crying my eyes out."  He has not made any efforts to be more active on his days off.  He did have a Halloween [employer]ASS[employer] party at his home.  There were about fifteen people at his home.  He notes that the other attendees were all couples.  He felt cut out of the conversation at times.  He is looking forward to Thanksgiving because his daughter and her family will be coming to his home for several days.  He is worried that he will be alone for Jose Manuel.  He has not yet returned to attending [employer].  Explore conversational skills and working himself into a conversation.  Encourage the patient to plan for a good Jose Manuel instead of predicting a bad one.  Encourage working toward goals now that he is feeling better with the medication.  He notes that he usually will talk to his granddaughters by [place] on [place].  He continues to struggle with ruminative thoughts about his ex girlfriend.  He continues to go back to the past when he had no success in dating.  He is not sure why he has such difficulty letting go of the relationship when he has not been with her for several year.  He continues to be involved in several audits at his work.  He admits that he needs to return to exercising.    Target symptoms: depression  Why chosen therapy is appropriate versus another modality: relevant to diagnosis  Outcome monitoring methods: self-report, observation  Therapeutic intervention type: insight oriented psychotherapy, supportive psychotherapy, interactive " psychotherapy     Risk parameters:  Patient reports no suicidal ideation  Patient reports no homicidal ideation  Patient reports no self-injurious behavior  Patient reports no violent behavior     Verbal deficits: none     Patient's response to intervention:  The patient's response to intervention is accepting, motivated.     Progress toward goals and other mental status changes:  The patient's progress toward goals is fair .     Diagnosis:   Major depression recurrent moderate     Plan:  individual psychotherapy and medication management by physician     Return to clinic: as scheduled     Length of Service (minutes): 45

## 2019-12-10 ENCOUNTER — OFFICE VISIT (OUTPATIENT)
Dept: OPHTHALMOLOGY | Facility: CLINIC | Age: 64
End: 2019-12-10
Payer: COMMERCIAL

## 2019-12-10 ENCOUNTER — OFFICE VISIT (OUTPATIENT)
Dept: PODIATRY | Facility: CLINIC | Age: 64
End: 2019-12-10
Payer: COMMERCIAL

## 2019-12-10 VITALS
HEART RATE: 70 BPM | BODY MASS INDEX: 36.45 KG/M2 | SYSTOLIC BLOOD PRESSURE: 164 MMHG | HEIGHT: 78 IN | WEIGHT: 315 LBS | DIASTOLIC BLOOD PRESSURE: 90 MMHG

## 2019-12-10 DIAGNOSIS — H33.021 RETINAL DETACHMENT WITH MULTIPLE BREAKS, RIGHT: ICD-10-CM

## 2019-12-10 DIAGNOSIS — L60.0 ONYCHOCRYPTOSIS: ICD-10-CM

## 2019-12-10 DIAGNOSIS — H52.13 HIGH MYOPIA, BOTH EYES: ICD-10-CM

## 2019-12-10 DIAGNOSIS — B35.1 DERMATOPHYTOSIS OF NAIL: ICD-10-CM

## 2019-12-10 DIAGNOSIS — G60.9 IDIOPATHIC PERIPHERAL NEUROPATHY: ICD-10-CM

## 2019-12-10 DIAGNOSIS — H35.372 EPIRETINAL MEMBRANE (ERM) OF LEFT EYE: ICD-10-CM

## 2019-12-10 DIAGNOSIS — H54.10 BLINDNESS AND LOW VISION: ICD-10-CM

## 2019-12-10 DIAGNOSIS — H40.1133 PRIMARY OPEN ANGLE GLAUCOMA OF BOTH EYES, SEVERE STAGE: Primary | ICD-10-CM

## 2019-12-10 PROCEDURE — 99499 UNLISTED E&M SERVICE: CPT | Mod: S$GLB,,, | Performed by: PODIATRIST

## 2019-12-10 PROCEDURE — 99999 PR PBB SHADOW E&M-EST. PATIENT-LVL II: ICD-10-PCS | Mod: PBBFAC,,, | Performed by: OPHTHALMOLOGY

## 2019-12-10 PROCEDURE — 92012 PR EYE EXAM, EST PATIENT,INTERMED: ICD-10-PCS | Mod: S$GLB,,, | Performed by: OPHTHALMOLOGY

## 2019-12-10 PROCEDURE — 99999 PR PBB SHADOW E&M-EST. PATIENT-LVL II: CPT | Mod: PBBFAC,,, | Performed by: OPHTHALMOLOGY

## 2019-12-10 PROCEDURE — 92012 INTRM OPH EXAM EST PATIENT: CPT | Mod: S$GLB,,, | Performed by: OPHTHALMOLOGY

## 2019-12-10 PROCEDURE — 11721 DEBRIDE NAIL 6 OR MORE: CPT | Mod: S$GLB,,, | Performed by: PODIATRIST

## 2019-12-10 PROCEDURE — 99999 PR PBB SHADOW E&M-EST. PATIENT-LVL III: CPT | Mod: PBBFAC,,, | Performed by: PODIATRIST

## 2019-12-10 PROCEDURE — 99499 NO LOS: ICD-10-PCS | Mod: S$GLB,,, | Performed by: PODIATRIST

## 2019-12-10 PROCEDURE — 99999 PR PBB SHADOW E&M-EST. PATIENT-LVL III: ICD-10-PCS | Mod: PBBFAC,,, | Performed by: PODIATRIST

## 2019-12-10 PROCEDURE — 11721 PR DEBRIDEMENT OF NAILS, 6 OR MORE: ICD-10-PCS | Mod: S$GLB,,, | Performed by: PODIATRIST

## 2019-12-10 NOTE — PROGRESS NOTES
HPI     Glaucoma      Additional comments: 3 month IOP check, OU: Azopt BID, Alphagan BID,   Travatan Z QHS, OS: Rhopressa              Comments     Pt states no problems since the last visit. States the rhopressa and   travatan helps his vision.     POAG - Dr Burgess pt  Corneal Opacity  [place] palsy  DM  RD Repair with Scleral Buckle right eye  PCIOL OU  CANALOPLASTY OD 8-22-13 Good flow and tension(-900)  CANAL OS 03/20/14/LOT # 1306-01/REF # [id number]  -500 with shutter effect due to much intraop respiratory movement  Moderate flow with good tension      Azopt BID OU, Alphagan BID OU  OU: Travatan Z QHS (Patient does not know the name just states he uses all   3 bottles bid ou )  OS Rhopressa QD          Last edited by Rivas Randle on 12/10/2019  9:21 AM. (History)            Assessment /Plan     For exam results, see Encounter Report.      ICD-10-CM ICD-9-CM    1. Primary open angle glaucoma of both eyes, severe stage H40.1133 365.11 IOP better today - follow      365.73    2. Epiretinal membrane (ERM) of left eye H35.372 362.56 Follow    3. Uncontrolled type 2 diabetes mellitus with stage 3 chronic kidney disease, with long-term current use of insulin E11.22 250.52     E11.65 585.3     N18.3 V58.67     Z79.4     4. Blindness and low vision H54.10 369.9    5. Retinal detachment with multiple breaks, right H33.021 361.02    6. High myopia, both eyes H52.13 367.1        RETURN TO CLINIC 3 months DOA GOCT and MOCT     Azopt BID OU, Alphagan BID OU  OU: Travatan Z QHS   OS Rhopressa Q

## 2019-12-10 NOTE — PROGRESS NOTES
Subjective:     Patient ID: Stepan Nixon is a 64 y.o. male.    Chief Complaint: Nail Care (RNC. c/o no pain. Diabetic Pt/ Wears casual shoes. PCP DR Persaud, last visit 5/16/19)    Stepan is a 64 y.o. male who presents to the clinic for evaluation and treatment of high risk feet. Stepan has a past medical history of Anxiety, Bell's palsy, CHF (congestive heart failure), Coronary artery disease involving native coronary artery without angina pectoris (10/18/2016), Depression, Diabetes mellitus, Glaucoma, Hypertension, Idiopathic peripheral neuropathy (12/11/2012), Mixed hyperlipidemia (12/11/2012), and Vitamin B 12 deficiency (8/23/2012). The patient's chief complaint is long painful toenils, especially the left 2nd toenail.  This patient has documented high risk feet requiring routine maintenance secondary to peripheral neuropathy.    PCP: Elham Persaud MD    Date Last Seen by PCP: 05/16/19    Current shoe gear:  Affected Foot: Casual shoes     Unaffected Foot: Casual shoes    Hemoglobin A1C   Date Value Ref Range Status   08/20/2019 8.2 (H) 4.0 - 5.6 % Final     Comment:     ADA Screening Guidelines:  5.7-6.4%  Consistent with prediabetes  >or=6.5%  Consistent with diabetes  High levels of fetal hemoglobin interfere with the HbA1C  assay. Heterozygous hemoglobin variants (HbS, HgC, etc)do  not significantly interfere with this assay.   However, presence of multiple variants may affect accuracy.     05/08/2019 7.4 (H) 4.0 - 5.6 % Final     Comment:     ADA Screening Guidelines:  5.7-6.4%  Consistent with prediabetes  >or=6.5%  Consistent with diabetes  High levels of fetal hemoglobin interfere with the HbA1C  assay. Heterozygous hemoglobin variants (HbS, HgC, etc)do  not significantly interfere with this assay.   However, presence of multiple variants may affect accuracy.     11/01/2018 9.7 (H) 4.0 - 5.6 % Final     Comment:     ADA Screening Guidelines:  5.7-6.4%  Consistent with prediabetes  >or=6.5%   Consistent with diabetes  High levels of fetal hemoglobin interfere with the HbA1C  assay. Heterozygous hemoglobin variants (HbS, HgC, etc)do  not significantly interfere with this assay.   However, presence of multiple variants may affect accuracy.             Patient Active Problem List   Diagnosis    Status post cataract extraction and insertion of intraocular lens    Corneal opacity - Left Eye    Idiopathic peripheral neuropathy    FRAN (obstructive sleep apnea)    CHF (NYHA class III, ACC/AHA stage C)    LBBB (left bundle branch block)    Uncontrolled type 2 diabetes mellitus with kidney complication, with long-term current use of insulin    Anxiety    Hypertension associated with diabetes    Chronic kidney disease, stage III (moderate)    Primary open angle glaucoma of both eyes, severe stage    Nephrolithiasis    Hydronephrosis, left    NSTEMI (non-ST elevated myocardial infarction)    Coronary artery disease involving native coronary artery without angina pectoris    Recurrent major depressive disorder    Vasculogenic erectile dysfunction    Blindness and low vision    Hx of retinal detachment    High myopia, both eyes    Epiretinal membrane (ERM) of left eye    Lattice degeneration of peripheral retina, left    Right-sided Bell's palsy    Hyperlipidemia associated with type 2 diabetes mellitus    Morbid obesity with BMI of 40.0-44.9, adult    GERD (gastroesophageal reflux disease)    Hypoglycemia unawareness associated with type 2 diabetes mellitus       Medication List with Changes/Refills   Current Medications    ACCU-CHEK ALYCIA PLUS METER MISC        ASPIRIN 325 MG TABLET    Take 325 mg by mouth 2 (two) times daily.    ATORVASTATIN (LIPITOR) 10 MG TABLET    TAKE 1 TABLET BY MOUTH ONCE DAILY    BLOOD PRESSURE CUFF MISC    1 cuff    BLOOD SUGAR DIAGNOSTIC STRP    To check BG 3 times daily, to use with insurance preferred meter    BRIMONIDINE 0.1% (ALPHAGAN P) 0.1 % DROP    Place  1 drop into both eyes 3 (three) times daily. Order 90 day supply    BRINZOLAMIDE (AZOPT) 1 % OPHTHALMIC SUSPENSION    Place 1 drop into both eyes 3 (three) times daily. ORDER 90 DAY SUPPLY    CARVEDILOL (COREG) 25 MG TABLET    TAKE 1 TABLET BY MOUTH TWICE DAILY WITH MEALS    DULOXETINE (CYMBALTA) 30 MG CAPSULE    Take 1 capsule daily for 7 days then 2 capsules daily.    FUROSEMIDE (LASIX) 40 MG TABLET    TAKE 2 TABLETS BY MOUTH TWICE DAILY. WHEN  ANKLES  RETAIN  FLUID DOUBLE  THE  LASIX  FOR  14  DAYS    INSULIN NPH-INSULIN REGULAR, 70/30, (NOVOLIN 70/30 U-100 INSULIN) 100 UNIT/ML (70-30) INJECTION    Inject 130 Units into the skin 2 (two) times daily before meals.    LANCETS MISC    To check BG 3 times daily, to use with insurance preferred meter    LISINOPRIL (PRINIVIL,ZESTRIL) 20 MG TABLET    Take 1 tablet (20 mg total) by mouth once daily.    MIGLITOL (GLYSET) 25 MG TAB    Take 1 tablet (25 mg total) by mouth 3 (three) times daily with meals.    MUPIROCIN (BACTROBAN) 2 % OINTMENT    Apply topically 2 (two) times daily.    NETARSUDIL (RHOPRESSA) 0.02 % DROP    Place 1 drop into both eyes once daily.    NITROGLYCERIN (NITROSTAT) 0.4 MG SL TABLET    Place 1 tablet (0.4 mg total) under the tongue every 5 (five) minutes as needed for Chest pain.    OMEPRAZOLE (PRILOSEC) 40 MG CAPSULE    Take 1 capsule (40 mg total) by mouth once daily.    POLYETHYLENE GLYCOL (GLYCOLAX) 17 GRAM/DOSE POWDER    Take 17 g by mouth once daily.    SILDENAFIL (VIAGRA) 50 MG TABLET    Take 1 tablet (50 mg total) by mouth daily as needed for Erectile Dysfunction.    TRAVOPROST (TRAVATAN Z) 0.004 % OPHTHALMIC SOLUTION    Place 1 drop into both eyes every evening.       Review of patient's allergies indicates:   Allergen Reactions    Cefazolin      Other reaction(s): Shakes  Other reaction(s): Chills       Past Surgical History:   Procedure Laterality Date    CARDIAC CATHETERIZATION  7/22/13    non obstructive cad    CATARACT EXTRACTION  OU  "   COLONOSCOPY N/A 5/1/2019    Procedure: COLONOSCOPY;  Surgeon: Henry Mo III, MD;  Location: [facility];  Service: Endoscopy;  Laterality: N/A;    CORONARY ANGIOPLASTY      ESOPHAGOGASTRODUODENOSCOPY N/A 5/1/2019    Procedure: ESOPHAGOGASTRODUODENOSCOPY (EGD);  Surgeon: Henry Mo III, MD;  Location: [facility];  Service: Endoscopy;  Laterality: N/A;    EYE SURGERY      FRACTURE SURGERY      kidney stone       August 2016    PC IOL OU      RETINAL DETACHMENT REPAIR W/ SCLERAL BUCKLE LE      WRIST SURGERY         Family History   Problem Relation Age of Onset    Macular degeneration Father     Heart disease Father         CHF     Heart attack Father     Hypertension Mother     Macular degeneration Paternal Uncle     Hypertension Maternal Grandmother     Hypertension Maternal Grandfather     Strabismus Neg Hx     Retinal detachment Neg Hx     Glaucoma Neg Hx     Blindness Neg Hx     Amblyopia Neg Hx        Social History     Socioeconomic History    Marital status:      Spouse name: Not on file    Number of children: Not on file    Years of education: Not on file    Highest education level: Not on file   Occupational History     Employer: [employer]   Social Needs    Financial resource strain: Not on file    Food insecurity:     Worry: Not on file     Inability: Not on file    Transportation needs:     Medical: Not on file     Non-medical: Not on file   Tobacco Use    Smoking status: Never Smoker    Smokeless tobacco: Never Used   Substance and Sexual Activity    Alcohol use: Yes     Comment: " one to two glasses of wine per year"    Drug use: No    Sexual activity: Not Currently     Birth control/protection: None   Lifestyle    Physical activity:     Days per week: Not on file     Minutes per session: Not on file    Stress: Not on file   Relationships    Social connections:     Talks on phone: Not on file     Gets together: Not on file     Attends " "Hoahaoism service: Not on file     Active member of club or organization: Not on file     Attends meetings of clubs or organizations: Not on file     Relationship status: Not on file   Other Topics Concern    Not on file   Social History Narrative    , 1 son, JANIE.       Vitals:    12/10/19 0811   BP: (!) 164/90   Pulse: 70   Weight: (!) 204.1 kg (449 lb 15.3 oz)   Height: 6' 7" (2.007 m)   PainSc: 0-No pain       Hemoglobin A1C   Date Value Ref Range Status   08/20/2019 8.2 (H) 4.0 - 5.6 % Final     Comment:     ADA Screening Guidelines:  5.7-6.4%  Consistent with prediabetes  >or=6.5%  Consistent with diabetes  High levels of fetal hemoglobin interfere with the HbA1C  assay. Heterozygous hemoglobin variants (HbS, HgC, etc)do  not significantly interfere with this assay.   However, presence of multiple variants may affect accuracy.     05/08/2019 7.4 (H) 4.0 - 5.6 % Final     Comment:     ADA Screening Guidelines:  5.7-6.4%  Consistent with prediabetes  >or=6.5%  Consistent with diabetes  High levels of fetal hemoglobin interfere with the HbA1C  assay. Heterozygous hemoglobin variants (HbS, HgC, etc)do  not significantly interfere with this assay.   However, presence of multiple variants may affect accuracy.     11/01/2018 9.7 (H) 4.0 - 5.6 % Final     Comment:     ADA Screening Guidelines:  5.7-6.4%  Consistent with prediabetes  >or=6.5%  Consistent with diabetes  High levels of fetal hemoglobin interfere with the HbA1C  assay. Heterozygous hemoglobin variants (HbS, HgC, etc)do  not significantly interfere with this assay.   However, presence of multiple variants may affect accuracy.         Review of Systems   Constitutional: Negative for chills and fever.   Respiratory: Negative for shortness of breath.    Cardiovascular: Negative for chest pain, palpitations, orthopnea, claudication and leg swelling.   Gastrointestinal: Negative for diarrhea, nausea and vomiting.   Musculoskeletal: Negative for joint " pain.   Skin: Negative for rash.   Neurological: Positive for tingling and sensory change.   Psychiatric/Behavioral: Negative.          Objective:   PHYSICAL EXAM: Apperance: Alert and orient in no distress,well developed, and with good attention to grooming and body habits  Patient presents ambulating in tennis shoes.   LOWER EXTREMITY EXAM:  VASCULAR: Dorsalis pedis pulses 1/4 bilateral and Posterior Tibial pulses 1/4 bilateral. Capillary fill time <4 seconds bilateral. Moderate edema observed bilateral. Varicosities absent bilateral. Skin temperature of the lower extremities is warm to cool, proximal to distal. Hair growth dim bilateral.  DERMATOLOGICAL: No skin rashes, subcutaneous nodules, lesions, or ulcers observed bilateral. Nails 1,2,3,4,5 bilateral elongated, incurvated, and discolored with subungual debris. Webspaces 1,2,3,4 bilateral clean, dry and without evidence of break in skin integrity.   NEUROLOGICAL: Light touch, sharp-dull, proprioception all present and equal bilaterally.  Vibratory sensation diminished at bilateral hallux and navicular. Protective sensation absent at 2/10 sites as tested with a Laurens-Pranav 5.07 monofilament.   MUSCULOSKELETAL: Muscle strength is 5/5 for foot inverters, everters, plantarflexors, and dorsiflexors. Muscle tone is normal. Semi-rigid hammertoes bilateral.     Assessment:   Uncontrolled type 2 diabetes mellitus with kidney complication, with long-term current use of insulin    Idiopathic peripheral neuropathy    Dermatophytosis of nail    Onychocryptosis          Plan:   Uncontrolled type 2 diabetes mellitus with kidney complication, with long-term current use of insulin    Idiopathic peripheral neuropathy    Dermatophytosis of nail    Onychocryptosis      I counseled the patient on his conditions, regarding findings of my examination, my impressions, and usual treatment plan.   Greater than 15 minutes of a 20 minute appointment spent on education about the  diabetic foot, neuropathy, and prevention of limb loss.  Shoe inspection. Diabetic Foot Education. Patient reminded of the importance of good nutrition and blood sugar control to help prevent podiatric complications of diabetes. Patient instructed on proper foot hygeine. We discussed wearing proper shoe gear, daily foot inspections, never walking without protective shoe gear, never putting sharp instruments to feet.    With patient's permission, nails 1-5 bilateral were debrided/trimmed in length and thickness aggressively to their soft tissue attachment mechanically and with electric , removing all offending nail and debris. Patient relates relief following the procedure.  Patient  will continue to monitor the areas daily, inspect feet, wear protective shoe gear when ambulatory, moisturizer to maintain skin integrity. Patient reminded of the importance of good nutrition and blood sugar control to help prevent podiatric complications of diabetes.  Patient to return 3 months or sooner if needed.                 Vangie Cuevas DPM  [facility]

## 2019-12-12 ENCOUNTER — OFFICE VISIT (OUTPATIENT)
Dept: PSYCHIATRY | Facility: CLINIC | Age: 64
End: 2019-12-12
Payer: COMMERCIAL

## 2019-12-12 DIAGNOSIS — F33.1 MAJOR DEPRESSIVE DISORDER, RECURRENT EPISODE, MODERATE: Primary | ICD-10-CM

## 2019-12-12 PROCEDURE — 90834 PSYTX W PT 45 MINUTES: CPT | Mod: S$GLB,,, | Performed by: SOCIAL WORKER

## 2019-12-12 PROCEDURE — 90834 PR PSYCHOTHERAPY W/PATIENT, 45 MIN: ICD-10-PCS | Mod: S$GLB,,, | Performed by: SOCIAL WORKER

## 2019-12-12 NOTE — PROGRESS NOTES
"Individual Psychotherapy (PhD/LCSW)    12/12/2019    Site:  [facility]         Therapeutic Intervention: Met with patient.  Outpatient - Insight oriented psychotherapy 45 min - CPT code 29810    Chief complaint/reason for encounter: depression     Interval history and content of current session:  Patient presents to ongoing individual therapy due to depression.  He has gained about seventy pounds.  He is currently weighing 449 lbs.  He was working with Daphne Barrera Jr., PA-C regarding management of diabetes.  He has not transitioned to a new provider.  He has been emotionally eating.  He will eat "junk" when he is working.  He has a goal of beginning to bring his meals at the beginning of the year.  He is using a cane when entering session to help with balance.  He has not been exercising.  He is sleeping more than usual.  He continues to focus on a previous relationship he had with Solange.  He continues to wonder "why" she ended their relationship.  He has not had any contact with her.  Confront the patient about the destructive nature of the previous relationship and ruminating about what happened.  Emphasize that it has led to poor coping and a destructive nature.  Praise steps to socialize with his friends and family.  He enjoyed having his daughter's family visiting for Thanksgiving.  He helped them to get a hotel room for one night so they could have privacy.  He enjoyed the turkey his son in law prepared.  He took his friend out to dinner for his birthday.  He "hates" the holiday of Holabird because he is alone.  His friend forced him to put up decorations.  He is encouraged to try to focus on something positive about Christmas.  He is brought to diabetes management after the appointment to schedule time with a new provider in that department.    Target symptoms: depression  Why chosen therapy is appropriate versus another modality: relevant to diagnosis  Outcome monitoring methods: self-report, " observation  Therapeutic intervention type: insight oriented psychotherapy, supportive psychotherapy, interactive psychotherapy     Risk parameters:  Patient reports no suicidal ideation  Patient reports no homicidal ideation  Patient reports no self-injurious behavior  Patient reports no violent behavior     Verbal deficits: none     Patient's response to intervention:  The patient's response to intervention is accepting, motivated.     Progress toward goals and other mental status changes:  The patient's progress toward goals is fair .     Diagnosis:   Major depression recurrent moderate     Plan:  individual psychotherapy and medication management by physician     Return to clinic: as scheduled     Length of Service (minutes): 45

## 2019-12-26 ENCOUNTER — OFFICE VISIT (OUTPATIENT)
Dept: PSYCHIATRY | Facility: CLINIC | Age: 64
End: 2019-12-26
Payer: COMMERCIAL

## 2019-12-26 DIAGNOSIS — F33.1 MAJOR DEPRESSIVE DISORDER, RECURRENT EPISODE, MODERATE: Primary | ICD-10-CM

## 2019-12-26 PROCEDURE — 90834 PSYTX W PT 45 MINUTES: CPT | Mod: S$GLB,,, | Performed by: SOCIAL WORKER

## 2019-12-26 PROCEDURE — 90834 PR PSYCHOTHERAPY W/PATIENT, 45 MIN: ICD-10-PCS | Mod: S$GLB,,, | Performed by: SOCIAL WORKER

## 2019-12-26 NOTE — PROGRESS NOTES
"Individual Psychotherapy (PhD/LCSW)    12/26/2019    Site:  [facility]         Therapeutic Intervention: Met with patient.  Outpatient - Insight oriented psychotherapy 45 min - CPT code 45565    Chief complaint/reason for encounter: depression     Interval history and content of current session:  Patient presents to ongoing individual therapy due to depression.  He struggled with depression due to listening to Jose Manuel music.  He admits that he would become very depressed when he heard "All I Want for Jose Manuel is You."  He did go to [employer] on Christmas Eve.  The faustino landeros visits with the [employer] before the service starts.  He asked the patient if he was new and the patient admitted that he had been away from the Synagogue due to struggling.  The  asked the patient if he wanted to talk.  The patient agree to call him and set up an appointment in the next week.  The patient was cheered up on [place] Day when he received a video of his granddaughters, who were enjoying the gifts he had sent.  He admits that he has continued to eat poorly.  He is considering ways he can eat healthier at work.  He has made an appointment with the new diabetes management provider.  Confront the destructive thoughts the patient continues to ruminate about with his ex girlfriend.  Encourage the patient to be flexible about the upcoming tax season and his nutrition.  Praise the patient for attending Syn[employer] and considering spiritual direction.  He does not have any major plans for the New Year's holiday.  He is planning to move more in the New Year.  He is looking forward to traveling to [place] in the summer for a wedding reception for his son.  His son is getting  in [place].  The patient enjoys the weather in [place].    Target symptoms: depression  Why chosen therapy is appropriate versus another modality: relevant to diagnosis  Outcome monitoring methods: self-report, observation  Therapeutic intervention " type: insight oriented psychotherapy, supportive psychotherapy, interactive psychotherapy     Risk parameters:  Patient reports no suicidal ideation  Patient reports no homicidal ideation  Patient reports no self-injurious behavior  Patient reports no violent behavior     Verbal deficits: none     Patient's response to intervention:  The patient's response to intervention is accepting, motivated.     Progress toward goals and other mental status changes:  The patient's progress toward goals is fair .     Diagnosis:   Major depression recurrent moderate     Plan:  individual psychotherapy and medication management by physician     Return to clinic: as scheduled     Length of Service (minutes): 45

## 2020-01-09 ENCOUNTER — OFFICE VISIT (OUTPATIENT)
Dept: PSYCHIATRY | Facility: CLINIC | Age: 65
End: 2020-01-09
Payer: COMMERCIAL

## 2020-01-09 VITALS — WEIGHT: 315 LBS | BODY MASS INDEX: 52.33 KG/M2

## 2020-01-09 DIAGNOSIS — F41.9 ANXIETY: ICD-10-CM

## 2020-01-09 DIAGNOSIS — F33.1 MDD (MAJOR DEPRESSIVE DISORDER), RECURRENT EPISODE, MODERATE: Primary | ICD-10-CM

## 2020-01-09 DIAGNOSIS — F33.9 EPISODE OF RECURRENT MAJOR DEPRESSIVE DISORDER, UNSPECIFIED DEPRESSION EPISODE SEVERITY: Primary | ICD-10-CM

## 2020-01-09 PROCEDURE — 99999 PR PBB SHADOW E&M-EST. PATIENT-LVL I: CPT | Mod: PBBFAC,,, | Performed by: PSYCHIATRY & NEUROLOGY

## 2020-01-09 PROCEDURE — 90834 PSYTX W PT 45 MINUTES: CPT | Mod: S$GLB,,, | Performed by: SOCIAL WORKER

## 2020-01-09 PROCEDURE — 3008F BODY MASS INDEX DOCD: CPT | Mod: CPTII,S$GLB,, | Performed by: PSYCHIATRY & NEUROLOGY

## 2020-01-09 PROCEDURE — 3008F PR BODY MASS INDEX (BMI) DOCUMENTED: ICD-10-PCS | Mod: CPTII,S$GLB,, | Performed by: PSYCHIATRY & NEUROLOGY

## 2020-01-09 PROCEDURE — 99213 PR OFFICE/OUTPT VISIT, EST, LEVL III, 20-29 MIN: ICD-10-PCS | Mod: S$GLB,,, | Performed by: PSYCHIATRY & NEUROLOGY

## 2020-01-09 PROCEDURE — 99213 OFFICE O/P EST LOW 20 MIN: CPT | Mod: S$GLB,,, | Performed by: PSYCHIATRY & NEUROLOGY

## 2020-01-09 PROCEDURE — 90834 PR PSYCHOTHERAPY W/PATIENT, 45 MIN: ICD-10-PCS | Mod: S$GLB,,, | Performed by: SOCIAL WORKER

## 2020-01-09 PROCEDURE — 99999 PR PBB SHADOW E&M-EST. PATIENT-LVL I: ICD-10-PCS | Mod: PBBFAC,,, | Performed by: PSYCHIATRY & NEUROLOGY

## 2020-01-09 NOTE — PROGRESS NOTES
"Outpatient Psychiatry Follow-up Visit (MD/NP)    1/9/2020    Stepan Nixon, a 64 y.o. male, presenting for follow-up visit. Met with patient.    Reason for Encounter: self-referral. Patient complains of depressison.    Interval History: Patient seen & interviewed for follow-up, last seen about 3 months prior to this visit. Work getting more intense. Considering quitting. Recognizes that work is generally positive for him.   Continuing to gain weight. Now having to use a cane. Didn't celebrate [place]. Felt a bit more in control with his meds.     Background: This 62 y/o with depression recurrence, 6 months of low moods, feelings of guilt/self-reproach, hopelessness & helplessness, intermittent SI which has been mostly passive, occasional fantasies about active suicide attempts, but never seriously considers action. Most recent depression is in context of loss of love relationship to a woman he met on internet, dated for some time then broke up with him & is now in another relationship. Continued to text & attempt to contact her (she generally doesn't respond) until about a week ago, stopped because he realizes it's time to "move on", that it's taking a toll on his mental health, but still feels tempted to do it. Continues to work despite symptoms, reports performance is "ok", though "not my best work". PsychHx: symptoms began around Christmas '09. first treated for depression in early '10. Did IOP at [facility] in '10. Suffered a series of losses since including wife ('09), mother in law ('11), mother ('12), father ('13) & loss of job as [employer]  due to his health problems (he's since changed fields, now works in tax preparation for [employer]). Has participated in psychotherapy including since he moved to [place], stopped due to loss of insurance (though reinsured, hasn't returned since most recent depression recurrence). 1 remote episode of suicidality without action.  MedHx: DM, had NSTEMI in '16, FRAN, CHF, " CKD, cataracts. SubstanceHx: rare wine, but mostly avoids alcohol due to his health; no illicits or prescription drug abuse. Social Hx: normal , birth, & developmental milestones. Grew up in [place], moved to [place] in .  (wife  in '10), & 2 year relationship ended in mid , though he's continued to contact her until very recently. 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 Works full-time. Has 3 adopted kids (one of which is child of one of other kids).      Review Of Systems:     GENERAL:  No weight gain or loss  SKIN:  No rashes or lacerations  HEAD:  No headaches, +hemifacial plegia.   CHEST:  No shortness of breath, hyperventilation or cough  CARDIOVASCULAR:  No tachycardia or chest pain  ABDOMEN:  No nausea, vomiting, pain, constipation or diarrhea  URINARY:  No frequency, dysuria or sexual dysfunction  ENDOCRINE:  No polydipsia, polyuria  MUSCULOSKELETAL:  No pain or stiffness of the joints  NEUROLOGIC:  No weakness, sensory changes, seizures, confusion, memory loss, tremor or other abnormal movements    Current Evaluation:     Nutritional Screening: Considering the patient's height and weight, medications, medical history and preferences, should a referral be made to the dietitian? no    Constitutional  Vitals:  Most recent vital signs, dated less than 90 days prior to this appointment, were not reviewed.    Vitals:    20 0840   Weight: (!) 210.7 kg (464 lb 8.2 oz)        General:  unremarkable, age appropriate     Musculoskeletal  Muscle Strength/Tone:  no tremor, no tic   Gait & Station:  non-ataxic     Psychiatric  Appearance: casually dressed & groomed;   Behavior: calm,   Cooperation: cooperative with assessment  Speech: normal rate, volume, tone  Thought Process: linear, goal-directed  Thought Content: No suicidal or homicidal ideation; no delusions  Affect: mildly anxious  Mood: mildly  anxious  Perceptions: No auditory or visual hallucinations  Level of Consciousness: alert throughout interview  Insight: fair  Cognition: Oriented to person, place, time, & situation  Memory: no apparent deficits to general clinical interview; not formally assessed  Attention/Concentration: no apparent deficits to general clinical interview; not formally assessed  Fund of Knowledge: average by vocabulary/education    Laboratory Data  No visits with results within 1 Month(s) from this visit.   Latest known visit with results is:   Lab Visit on 09/17/2019   Component Date Value Ref Range Status    Glucose 09/17/2019 114* 70 - 110 mg/dL Final    Sodium 09/17/2019 146* 136 - 145 mmol/L Final    Potassium 09/17/2019 3.5  3.5 - 5.1 mmol/L Final    Chloride 09/17/2019 111* 95 - 110 mmol/L Final    CO2 09/17/2019 25  23 - 29 mmol/L Final    BUN, Bld 09/17/2019 15  8 - 23 mg/dL Final    Calcium 09/17/2019 9.2  8.7 - 10.5 mg/dL Final    Creatinine 09/17/2019 2.0* 0.5 - 1.4 mg/dL Final    Albumin 09/17/2019 3.3* 3.5 - 5.2 g/dL Final    Phosphorus 09/17/2019 2.3* 2.7 - 4.5 mg/dL Final    eGFR if  09/17/2019 40.2* >60 mL/min/1.73 m^2 Final    eGFR if non  09/17/2019 34.7* >60 mL/min/1.73 m^2 Final    Anion Gap 09/17/2019 10  8 - 16 mmol/L Final     Medications  Outpatient Encounter Medications as of 1/9/2020   Medication Sig Dispense Refill    ACCU-CHEK ALYCIA PLUS METER Misc       aspirin 325 MG tablet Take 325 mg by mouth 2 (two) times daily.      atorvastatin (LIPITOR) 10 MG tablet TAKE 1 TABLET BY MOUTH ONCE DAILY 90 tablet 3    BLOOD PRESSURE CUFF Misc 1 cuff 1 each 0    blood sugar diagnostic Strp To check BG 3 times daily, to use with insurance preferred meter 100 strip 3    brimonidine 0.1% (ALPHAGAN P) 0.1 % Drop Place 1 drop into both eyes 3 (three) times daily. Order 90 day supply 10 mL 4    brinzolamide (AZOPT) 1 % ophthalmic suspension Place 1 drop into both eyes 3  (three) times daily. ORDER 90 DAY SUPPLY 10 mL 4    carvedilol (COREG) 25 MG tablet TAKE 1 TABLET BY MOUTH TWICE DAILY WITH MEALS 180 tablet 11    DULoxetine (CYMBALTA) 30 MG capsule Take 1 capsule daily for 7 days then 2 capsules daily. 180 capsule 0    furosemide (LASIX) 40 MG tablet TAKE 2 TABLETS BY MOUTH TWICE DAILY. WHEN  ANKLES  RETAIN  FLUID DOUBLE  THE  LASIX  FOR  14  DAYS 260 tablet 3    insulin NPH-insulin regular, 70/30, (NOVOLIN 70/30 U-100 INSULIN) 100 unit/mL (70-30) injection Inject 130 Units into the skin 2 (two) times daily before meals. 240 mL 3    lancets Misc To check BG 3 times daily, to use with insurance preferred meter 100 each 3    lisinopril (PRINIVIL,ZESTRIL) 20 MG tablet Take 1 tablet (20 mg total) by mouth once daily. 30 tablet 11    miglitol (GLYSET) 25 MG Tab Take 1 tablet (25 mg total) by mouth 3 (three) times daily with meals. 270 tablet 3    mupirocin (BACTROBAN) 2 % ointment Apply topically 2 (two) times daily. 22 g 0    netarsudil (RHOPRESSA) 0.02 % Drop Place 1 drop into both eyes once daily. 2.5 mL 6    nitroGLYCERIN (NITROSTAT) 0.4 MG SL tablet Place 1 tablet (0.4 mg total) under the tongue every 5 (five) minutes as needed for Chest pain. 20 tablet 0    omeprazole (PRILOSEC) 40 MG capsule Take 1 capsule (40 mg total) by mouth once daily. 90 capsule 3    polyethylene glycol (GLYCOLAX) 17 gram/dose powder Take 17 g by mouth once daily. 238 g 6    sildenafil (VIAGRA) 50 MG tablet Take 1 tablet (50 mg total) by mouth daily as needed for Erectile Dysfunction. 5 tablet 3    travoprost (TRAVATAN Z) 0.004 % ophthalmic solution Place 1 drop into both eyes every evening. 2.5 mL 6     No facility-administered encounter medications on file as of 1/9/2020.      Assessment - Diagnosis - Goals:     Impression: This 65 y/o WM with MDD, with recurrent major depression, symptoms. Participating in psychotherapy. Chronic problems with relationship co-dependency, seeking of  relationship from unattainable partner. Recent occupational stressor with busy season, conflict with supervisor, ongoing carrie problems.     Major depressive disorder, recurrent, mild; anxiety    Treatment Goals:  Specify outcomes written in observable, behavioral terms:   Depression: increasing energy, increasing interest in usual activities, increasing motivation, reducing excessive guilt and reducing fatigue    Treatment Plan/Recommendations:   Try duloxetine daily in place of wellbutrin. Discussed risks, benefits, and alternatives to treatment plan documented above with patient. I answered all patient questions related to this plan and patient expressed understanding and agreement.   Continue psychotherapy.     Return to Clinic: 4 months, prn sooner.     Counseling time: 10 minutes  Total time: 25 minutes    RAMON Pitt MD
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
## 2020-01-13 NOTE — PROGRESS NOTES
PCP: Elham Persaud MD    Subjective:     Chief Complaint: Diabetes - Established Patient    HISTORY OF PRESENT ILLNESS: 64 y.o.   male presenting for diabetes management visit.   Patient has previously been established with the Diabetes Management Department and was last seen by Daphne Barrera PA-C on 08/27/19.   Patient is new to me and is here for establishment of future care .   Patient has had Type II diabetes since 2005.  Pertinent to decision making is the following comorbidities: HTN, HLD, CAD, S/p MI, CHF, CKD III and Obesity by BMI  Patient has the following Diabetes complications: with diabetic chronic kidney disease  He  is to be enrolled in diabetes education classes.     Patient's most recent A1c of 8.2% was completed 4 months ago.   Patient states since His last A1c His blood glucose levels have been unknown since patient is not currently checking blood sugars at home..   Patient monitors blood glucose 0 times per day with unknown meter : Irregularly.   Patient blood glucose monitoring device will not be uploaded into [facility] today  secondary to not having enough data to upload.   Patient endorses the following diabetes related symptoms: Leg cramping or claudication and Leg swelling or edema. This is a chronic issue secondary to chronic cellulitis of lower limb.   Patient is due today for the following diabetes-related health maintenance standards: None - Up to Date.   He denies any recent hospital admissions or emergency room visits.   He voices having hypoglycemia only approximately 3 times per year. Per chart, patient has history of hypoglycemia unawareness.   Patient's concerns today include glycemic control.   Patient medication regimen is as below.     CURRENT DM MEDICATIONS:    70/30 140 units in the morning and 140 units at night   Miglitol 25 mg TID wm     Patient has failed the following Diabetes medications:    Metformin - GI    Glyburide   Ozempic - cost   Basal  "- Lantus        Labs Reviewed.       Lab Results   Component Value Date    CPEPTIDE 2.70 12/13/2018     Lab Results   Component Value Date    GLUTAMICACID 0.00 12/13/2018       Height: 6' 7" (200.7 cm)  //  Weight: (!) 205.1 kg (452 lb 2.6 oz), Body mass index is 50.94 kg/m².  His blood sugar in clinic today is:    Lab Results   Component Value Date    POCGLU 118 (A) 01/14/2020       Review of Systems   Constitutional: Negative for activity change, appetite change, chills and fever.   HENT: Negative for dental problem, mouth sores, nosebleeds, sore throat and trouble swallowing.    Eyes: Negative for pain and discharge.   Respiratory: Negative for shortness of breath, wheezing and stridor.    Cardiovascular: Positive for leg swelling (Chronic; chronic Leg cellulitis). Negative for chest pain and palpitations.   Gastrointestinal: Negative for abdominal pain, diarrhea, nausea and vomiting.   Endocrine: Negative for polydipsia, polyphagia and polyuria.   Genitourinary: Negative for dysuria, frequency and urgency.   Musculoskeletal: Negative for joint swelling and myalgias.   Skin: Negative for rash and wound.   Neurological: Negative for dizziness, syncope, weakness and headaches.   Psychiatric/Behavioral: Negative for behavioral problems and dysphoric mood.         Diabetes Management Status  Statin: Taking  ACE/ARB: Taking    Screening or Prevention Patient's value Goal Complete/Controlled?   HgA1C Testing and Control   Lab Results   Component Value Date    HGBA1C 8.2 (H) 08/20/2019      Annually/Less than 8% No   Lipid profile : 05/13/2019 Annually Yes   LDL control Lab Results   Component Value Date    LDLCALC 63.6 05/13/2019    Annually/Less than 100 mg/dl  Yes   Nephropathy screening Lab Results   Component Value Date    MICALBCREAT 12.5 04/12/2018     Lab Results   Component Value Date    PROTEINUA Negative 08/09/2018    Annually No   Blood pressure BP Readings from Last 1 Encounters:   01/14/20 (!) 153/92    " "Less than 140/90 No   Dilated retinal exam : 12/10/2019 Annually Yes    Foot exam   : 09/09/2019 Annually Yes     ACTIVITY LEVEL: Rarely Active. Discussed activities, benefits, methods, and precautions.  MEAL PLANNING: Patient reports number of meals per day to be 3 and number of snacks per day to be 2.   Patient is encouraged to carb count and consume no more than 30 - 45 grams of carbohydrates in each meal and 15 grams of carbohydrates in each snack.     Social History     Socioeconomic History    Marital status:      Spouse name: Not on file    Number of children: Not on file    Years of education: Not on file    Highest education level: Not on file   Occupational History     Employer: [employer]   Social Needs    Financial resource strain: Not on file    Food insecurity:     Worry: Not on file     Inability: Not on file    Transportation needs:     Medical: Not on file     Non-medical: Not on file   Tobacco Use    Smoking status: Never Smoker    Smokeless tobacco: Never Used   Substance and Sexual Activity    Alcohol use: Yes     Comment: " one to two glasses of wine per year"    Drug use: No    Sexual activity: Not Currently     Birth control/protection: None   Lifestyle    Physical activity:     Days per week: Not on file     Minutes per session: Not on file    Stress: Not on file   Relationships    Social connections:     Talks on phone: Not on file     Gets together: Not on file     Attends Advent service: Not on file     Active member of club or organization: Not on file     Attends meetings of clubs or organizations: Not on file     Relationship status: Not on file   Other Topics Concern    Not on file   Social History Narrative    , 1 son, JANIE.     Past Medical History:   Diagnosis Date    Anxiety     Bell's palsy     CHF (congestive heart failure)     Coronary artery disease involving native coronary artery without angina pectoris 10/18/2016    Depression     " Diabetes mellitus     Glaucoma     Hypertension     Idiopathic peripheral neuropathy 12/11/2012    Mixed hyperlipidemia 12/11/2012    Vitamin B 12 deficiency 8/23/2012       Objective:        Physical Exam   Constitutional: He is oriented to person, place, and time. He appears well-developed and well-nourished. No distress.   HENT:   Head: Normocephalic and atraumatic.   Right Ear: External ear normal.   Left Ear: External ear normal.   Nose: Nose normal.   Eyes: Pupils are equal, round, and reactive to light. EOM are normal. Right eye exhibits no discharge. Left eye exhibits no discharge.   Neck: Normal range of motion. Neck supple.   Cardiovascular: Normal rate, regular rhythm, normal heart sounds and intact distal pulses.   Pulmonary/Chest: Effort normal and breath sounds normal.   Abdominal: Soft.   Musculoskeletal: Normal range of motion. He exhibits edema.   Neurological: He is alert and oriented to person, place, and time. He exhibits normal muscle tone. Coordination normal.   Skin: Skin is warm and dry. Capillary refill takes less than 2 seconds. He is not diaphoretic.   Psychiatric: He has a normal mood and affect. His behavior is normal. Judgment and thought content normal.         Assessment / Plan:     Uncontrolled type 2 diabetes mellitus with kidney complication, with long-term current use of insulin  -     POCT Glucose, Hand-Held Device  -     Hemoglobin A1c; Standing  -     flash glucose sensor (FREESTYLE CLEMENCIA 14 DAY SENSOR) Kit; 1 kit by Misc.(Non-Drug; Combo Route) route every 14 (fourteen) days.  Dispense: 2 kit; Refill: 11  -     flash glucose scanning reader (FREESTYLE CLEMENCIA 14 DAY READER) Misc; 1 each by Misc.(Non-Drug; Combo Route) route once. for 1 dose  Dispense: 1 each; Refill: 0  -     CBC auto differential; Future; Expected date: 01/14/2020  -     Lipid panel; Future; Expected date: 01/14/2020  -     Comprehensive metabolic panel; Future; Expected date: 01/14/2020  -     TSH;  Future; Expected date: 01/14/2020  -     Microalbumin/creatinine urine ratio; Future; Expected date: 01/14/2020  -     Ambulatory Referral to Diabetes Education; Future; Expected date: 01/14/2020    Hypoglycemia unawareness associated with type 2 diabetes mellitus    Blindness and low vision    CHF (NYHA class III, ACC/AHA stage C)    Coronary artery disease involving native coronary artery of native heart without angina pectoris    Hypertension associated with diabetes    Hyperlipidemia associated with type 2 diabetes mellitus    Morbid obesity with BMI of 40.0-44.9, adult      Additional Plan Details:    - POCT Glucose  - Encouraged continuation of lifestyle changes including regular exercise and limiting carbohydrates to 30-45 grams per meal threes times daily and 15 grams per snack with a limit of two daily.   - Patient is new to me today and will establish care with me for future visits.  - Encouraged continued monitoring of blood glucose with an increase to 2 times daily at Fasting and Before Bed. Will send in Rx for Sriram CGM.   - Current DM Medication Regimen:  Continue 70/30 140 units in the morning and 140 units at night and Miglitol 25 mg TID wm .   - Health Maintenance standards addressed today: none - up to date.  - Labs scheduled today. Will review results at [facility].   - Referral to Diabetic Education to Establish Care.   - Nursing Visit: Patient is age 79 or younger with an A1c of 7.5 or greater and will need nursing visit in 1 week for BG log review and insulin adjustment.   - Follow up in 3 months with A1c prior.       Blakeney McKnight, PA-C [facility]    A total of 60 minutes was spent in face to face time, of which over 50 % was spent in counseling patient on disease process, complications, treatment, and side effects of medications.

## 2020-01-14 ENCOUNTER — OFFICE VISIT (OUTPATIENT)
Dept: DIABETES | Facility: CLINIC | Age: 65
End: 2020-01-14
Payer: COMMERCIAL

## 2020-01-14 ENCOUNTER — LAB VISIT (OUTPATIENT)
Dept: LAB | Facility: HOSPITAL | Age: 65
End: 2020-01-14
Attending: PHYSICIAN ASSISTANT
Payer: COMMERCIAL

## 2020-01-14 VITALS
SYSTOLIC BLOOD PRESSURE: 153 MMHG | WEIGHT: 315 LBS | BODY MASS INDEX: 36.45 KG/M2 | DIASTOLIC BLOOD PRESSURE: 92 MMHG | HEIGHT: 78 IN

## 2020-01-14 DIAGNOSIS — E66.01 MORBID OBESITY WITH BMI OF 40.0-44.9, ADULT: ICD-10-CM

## 2020-01-14 DIAGNOSIS — E11.59 HYPERTENSION ASSOCIATED WITH DIABETES: Chronic | ICD-10-CM

## 2020-01-14 DIAGNOSIS — E78.5 HYPERLIPIDEMIA ASSOCIATED WITH TYPE 2 DIABETES MELLITUS: ICD-10-CM

## 2020-01-14 DIAGNOSIS — I50.9 CHF (NYHA CLASS III, ACC/AHA STAGE C): Chronic | ICD-10-CM

## 2020-01-14 DIAGNOSIS — E11.69 HYPERLIPIDEMIA ASSOCIATED WITH TYPE 2 DIABETES MELLITUS: ICD-10-CM

## 2020-01-14 DIAGNOSIS — H54.10 BLINDNESS AND LOW VISION: ICD-10-CM

## 2020-01-14 DIAGNOSIS — I15.2 HYPERTENSION ASSOCIATED WITH DIABETES: Chronic | ICD-10-CM

## 2020-01-14 DIAGNOSIS — I25.10 CORONARY ARTERY DISEASE INVOLVING NATIVE CORONARY ARTERY OF NATIVE HEART WITHOUT ANGINA PECTORIS: ICD-10-CM

## 2020-01-14 DIAGNOSIS — E11.649 HYPOGLYCEMIA UNAWARENESS ASSOCIATED WITH TYPE 2 DIABETES MELLITUS: ICD-10-CM

## 2020-01-14 LAB
ALBUMIN SERPL BCP-MCNC: 3.5 G/DL (ref 3.5–5.2)
ALP SERPL-CCNC: 146 U/L (ref 55–135)
ALT SERPL W/O P-5'-P-CCNC: 21 U/L (ref 10–44)
ANION GAP SERPL CALC-SCNC: 10 MMOL/L (ref 8–16)
AST SERPL-CCNC: 14 U/L (ref 10–40)
BASOPHILS # BLD AUTO: 0.04 K/UL (ref 0–0.2)
BASOPHILS NFR BLD: 0.7 % (ref 0–1.9)
BILIRUB SERPL-MCNC: 0.4 MG/DL (ref 0.1–1)
BUN SERPL-MCNC: 16 MG/DL (ref 8–23)
CALCIUM SERPL-MCNC: 9.5 MG/DL (ref 8.7–10.5)
CHLORIDE SERPL-SCNC: 109 MMOL/L (ref 95–110)
CHOLEST SERPL-MCNC: 163 MG/DL (ref 120–199)
CHOLEST/HDLC SERPL: 3.6 {RATIO} (ref 2–5)
CO2 SERPL-SCNC: 26 MMOL/L (ref 23–29)
CREAT SERPL-MCNC: 1.7 MG/DL (ref 0.5–1.4)
DIFFERENTIAL METHOD: ABNORMAL
EOSINOPHIL # BLD AUTO: 0.5 K/UL (ref 0–0.5)
EOSINOPHIL NFR BLD: 8.3 % (ref 0–8)
ERYTHROCYTE [DISTWIDTH] IN BLOOD BY AUTOMATED COUNT: 13.2 % (ref 11.5–14.5)
EST. GFR  (AFRICAN AMERICAN): 48.9 ML/MIN/1.73 M^2
EST. GFR  (NON AFRICAN AMERICAN): 42.3 ML/MIN/1.73 M^2
ESTIMATED AVG GLUCOSE: 197 MG/DL (ref 68–131)
GLUCOSE SERPL-MCNC: 118 MG/DL (ref 70–110)
GLUCOSE SERPL-MCNC: 85 MG/DL (ref 70–110)
HBA1C MFR BLD HPLC: 8.5 % (ref 4–5.6)
HCT VFR BLD AUTO: 43.6 % (ref 40–54)
HDLC SERPL-MCNC: 45 MG/DL (ref 40–75)
HDLC SERPL: 27.6 % (ref 20–50)
HGB BLD-MCNC: 13.9 G/DL (ref 14–18)
IMM GRANULOCYTES # BLD AUTO: 0.01 K/UL (ref 0–0.04)
IMM GRANULOCYTES NFR BLD AUTO: 0.2 % (ref 0–0.5)
LDLC SERPL CALC-MCNC: 96.6 MG/DL (ref 63–159)
LYMPHOCYTES # BLD AUTO: 1.3 K/UL (ref 1–4.8)
LYMPHOCYTES NFR BLD: 22.5 % (ref 18–48)
MCH RBC QN AUTO: 30.8 PG (ref 27–31)
MCHC RBC AUTO-ENTMCNC: 31.9 G/DL (ref 32–36)
MCV RBC AUTO: 97 FL (ref 82–98)
MONOCYTES # BLD AUTO: 0.6 K/UL (ref 0.3–1)
MONOCYTES NFR BLD: 10.7 % (ref 4–15)
NEUTROPHILS # BLD AUTO: 3.3 K/UL (ref 1.8–7.7)
NEUTROPHILS NFR BLD: 57.6 % (ref 38–73)
NONHDLC SERPL-MCNC: 118 MG/DL
NRBC BLD-RTO: 0 /100 WBC
PLATELET # BLD AUTO: 228 K/UL (ref 150–350)
PMV BLD AUTO: 9.9 FL (ref 9.2–12.9)
POTASSIUM SERPL-SCNC: 4.1 MMOL/L (ref 3.5–5.1)
PROT SERPL-MCNC: 7 G/DL (ref 6–8.4)
RBC # BLD AUTO: 4.51 M/UL (ref 4.6–6.2)
SODIUM SERPL-SCNC: 145 MMOL/L (ref 136–145)
TRIGL SERPL-MCNC: 107 MG/DL (ref 30–150)
TSH SERPL DL<=0.005 MIU/L-ACNC: 1.35 UIU/ML (ref 0.4–4)
WBC # BLD AUTO: 5.69 K/UL (ref 3.9–12.7)

## 2020-01-14 PROCEDURE — 84443 ASSAY THYROID STIM HORMONE: CPT

## 2020-01-14 PROCEDURE — 99214 PR OFFICE/OUTPT VISIT, EST, LEVL IV, 30-39 MIN: ICD-10-PCS | Mod: S$GLB,,, | Performed by: PHYSICIAN ASSISTANT

## 2020-01-14 PROCEDURE — 3077F SYST BP >= 140 MM HG: CPT | Mod: CPTII,S$GLB,, | Performed by: PHYSICIAN ASSISTANT

## 2020-01-14 PROCEDURE — 99214 OFFICE O/P EST MOD 30 MIN: CPT | Mod: S$GLB,,, | Performed by: PHYSICIAN ASSISTANT

## 2020-01-14 PROCEDURE — 3008F PR BODY MASS INDEX (BMI) DOCUMENTED: ICD-10-PCS | Mod: CPTII,S$GLB,, | Performed by: PHYSICIAN ASSISTANT

## 2020-01-14 PROCEDURE — 36415 COLL VENOUS BLD VENIPUNCTURE: CPT

## 2020-01-14 PROCEDURE — 99999 PR PBB SHADOW E&M-EST. PATIENT-LVL IV: ICD-10-PCS | Mod: PBBFAC,,, | Performed by: PHYSICIAN ASSISTANT

## 2020-01-14 PROCEDURE — 3077F PR MOST RECENT SYSTOLIC BLOOD PRESSURE >= 140 MM HG: ICD-10-PCS | Mod: CPTII,S$GLB,, | Performed by: PHYSICIAN ASSISTANT

## 2020-01-14 PROCEDURE — 3080F DIAST BP >= 90 MM HG: CPT | Mod: CPTII,S$GLB,, | Performed by: PHYSICIAN ASSISTANT

## 2020-01-14 PROCEDURE — 82962 POCT GLUCOSE, HAND-HELD DEVICE: ICD-10-PCS | Mod: S$GLB,,, | Performed by: PHYSICIAN ASSISTANT

## 2020-01-14 PROCEDURE — 80061 LIPID PANEL: CPT

## 2020-01-14 PROCEDURE — 85025 COMPLETE CBC W/AUTO DIFF WBC: CPT

## 2020-01-14 PROCEDURE — 3008F BODY MASS INDEX DOCD: CPT | Mod: CPTII,S$GLB,, | Performed by: PHYSICIAN ASSISTANT

## 2020-01-14 PROCEDURE — 83036 HEMOGLOBIN GLYCOSYLATED A1C: CPT

## 2020-01-14 PROCEDURE — 99999 PR PBB SHADOW E&M-EST. PATIENT-LVL IV: CPT | Mod: PBBFAC,,, | Performed by: PHYSICIAN ASSISTANT

## 2020-01-14 PROCEDURE — 82962 GLUCOSE BLOOD TEST: CPT | Mod: S$GLB,,, | Performed by: PHYSICIAN ASSISTANT

## 2020-01-14 PROCEDURE — 3080F PR MOST RECENT DIASTOLIC BLOOD PRESSURE >= 90 MM HG: ICD-10-PCS | Mod: CPTII,S$GLB,, | Performed by: PHYSICIAN ASSISTANT

## 2020-01-14 PROCEDURE — 80053 COMPREHEN METABOLIC PANEL: CPT

## 2020-01-14 NOTE — LETTER
January 23, 2020      Elham Persaud MD  [id number]  [place] [address]           [facility]  [address] [address]  Phone: [phone]  Fax: [phone]          Patient: Stepan Nixon   MR Number: [id number]   YOB: 1955   Date of Visit: 1/14/2020       Dear Dr. Elham Persaud:    Thank you for referring Stepan Nixon to me for evaluation. Attached you will find relevant portions of my assessment and plan of care.    If you have questions, please do not hesitate to call me. I look forward to following Stepan Nixon along with you.    Sincerely,    Luís Millard PA-C    Enclosure  CC:  No Recipients    If you would like to receive this communication electronically, please contact [email or web address] or [phone] to request more information on [facility] access.    For providers and/or their staff who would like to refer a patient to [facility], please contact us through our one-stop-shop provider referral line, [facility], at [phone].    If you feel you have received this communication in error or would no longer like to receive these types of communications, please e-mail [email or web address]

## 2020-01-23 ENCOUNTER — OFFICE VISIT (OUTPATIENT)
Dept: PSYCHIATRY | Facility: CLINIC | Age: 65
End: 2020-01-23
Payer: COMMERCIAL

## 2020-01-23 ENCOUNTER — TELEPHONE (OUTPATIENT)
Dept: DIABETES | Facility: CLINIC | Age: 65
End: 2020-01-23

## 2020-01-23 ENCOUNTER — CLINICAL SUPPORT (OUTPATIENT)
Dept: DIABETES | Facility: CLINIC | Age: 65
End: 2020-01-23
Payer: COMMERCIAL

## 2020-01-23 DIAGNOSIS — F33.1 MDD (MAJOR DEPRESSIVE DISORDER), RECURRENT EPISODE, MODERATE: Primary | ICD-10-CM

## 2020-01-23 DIAGNOSIS — E11.649 HYPOGLYCEMIA UNAWARENESS ASSOCIATED WITH TYPE 2 DIABETES MELLITUS: Primary | ICD-10-CM

## 2020-01-23 PROCEDURE — 90834 PSYTX W PT 45 MINUTES: CPT | Mod: S$GLB,,, | Performed by: SOCIAL WORKER

## 2020-01-23 PROCEDURE — 90834 PR PSYCHOTHERAPY W/PATIENT, 45 MIN: ICD-10-PCS | Mod: S$GLB,,, | Performed by: SOCIAL WORKER

## 2020-01-23 NOTE — PROGRESS NOTES
Patient did not have any Blood Glucose logs.  Patient did received the Sriram system and will start today.  Patient will be back on the 2/6 with new readings.

## 2020-01-23 NOTE — TELEPHONE ENCOUNTER
"Review of:     Nursing Visit - 01/23/20    "Patient did not have any Blood Glucose logs.  Patient did received the Sriram system and will start today.  Patient will be back on the 2/6 with new readings."     Comments:   Abnormal results reviewed with patient. Patient understands importance of monitoring and will bring Sriram to next NV.     BM     "

## 2020-01-27 NOTE — PROGRESS NOTES
Individual Psychotherapy (PhD/LCSW)    1/23/2020    Site:  [facility]         Therapeutic Intervention: Met with patient.  Outpatient - Insight oriented psychotherapy 45 min - CPT code 59429    Chief complaint/reason for encounter: depression     Interval history and content of current session:  Patient presents to ongoing individual therapy due to depression.  He has not had any further conflict with his boss.  Since he set boundaries with her, she has not tried to criticize his work any further.  He is starting to work more due to tax season approaching.  He will be working six days a week.  He is determined to avoid working seven days a week.  He has not yet been able to meet with his Howell  because they both have busy schedules.  He was able to meet with his new provider in diabetes management.  He is working on getting his blood sugar and weight under control.  He has been drinking Boost for two meals and a regular meal for the third meal in the day.  He thinks he has lost some weight.  He continues to struggle with rumination about a past relationship.  He is not looking forward to Anastasiya's Day approaching.  Emphasize how the relationship has been destructive to him and a trigger to depression.  Praise steps to begin to take better care of his health.  Educate the patient about the need for boundaries and self care at work.  He details the stress he has driving due to worry about the other drivers on the road.  He is looking forward to the [place] in the [place].  He catches beads for his granddaughters who live in [place].  He plans to call his daughter to check in soon.  He still has minimal contact with his youngest son, Murray.  He has heard from others who have seen his son working at a local grocery store.  The patient does not like the person his son is in a relationship with.  The patient notes it has nothing to do with his son being rosado.  He just wants his son  treated well.    Target symptoms: depression  Why chosen therapy is appropriate versus another modality: relevant to diagnosis  Outcome monitoring methods: self-report, observation  Therapeutic intervention type: insight oriented psychotherapy, supportive psychotherapy, interactive psychotherapy     Risk parameters:  Patient reports no suicidal ideation  Patient reports no homicidal ideation  Patient reports no self-injurious behavior  Patient reports no violent behavior     Verbal deficits: none     Patient's response to intervention:  The patient's response to intervention is accepting, motivated.     Progress toward goals and other mental status changes:  The patient's progress toward goals is fair .     Diagnosis:   Major depression recurrent moderate     Plan:  individual psychotherapy and medication management by physician     Return to clinic: as scheduled     Length of Service (minutes): 45

## 2020-02-05 ENCOUNTER — TELEPHONE (OUTPATIENT)
Dept: DIABETES | Facility: CLINIC | Age: 65
End: 2020-02-05

## 2020-02-05 NOTE — TELEPHONE ENCOUNTER
Returned patient phone call regarding rescheduling diabetes appointment. There was no answer. I left a voice message requesting a call back.  ----- Message from Bhakti Aguilar sent at 2/5/2020  1:15 PM CST -----  Contact: self  Patient requesting a call back to reschedule his nurse visit. He states that he has the flu. Please call pt back at [phone]

## 2020-02-06 ENCOUNTER — HOSPITAL ENCOUNTER (INPATIENT)
Facility: HOSPITAL | Age: 65
LOS: 2 days | Discharge: HOME | DRG: 291 | End: 2020-02-09
Attending: EMERGENCY MEDICINE | Admitting: INTERNAL MEDICINE
Payer: COMMERCIAL

## 2020-02-06 DIAGNOSIS — R79.89 ELEVATED TROPONIN I LEVEL: Primary | ICD-10-CM

## 2020-02-06 DIAGNOSIS — R79.89 ELEVATED TROPONIN: ICD-10-CM

## 2020-02-06 DIAGNOSIS — I50.33 ACUTE ON CHRONIC DIASTOLIC HEART FAILURE: ICD-10-CM

## 2020-02-06 DIAGNOSIS — R53.81 MALAISE: ICD-10-CM

## 2020-02-06 DIAGNOSIS — D69.6 THROMBOCYTOPENIA: ICD-10-CM

## 2020-02-06 DIAGNOSIS — I49.9 ARRHYTHMIA: ICD-10-CM

## 2020-02-06 DIAGNOSIS — R05.9 COUGH: ICD-10-CM

## 2020-02-06 DIAGNOSIS — I10 HTN (HYPERTENSION): ICD-10-CM

## 2020-02-06 LAB
ALBUMIN SERPL BCP-MCNC: 2.9 G/DL (ref 3.5–5.2)
ALP SERPL-CCNC: 86 U/L (ref 55–135)
ALT SERPL W/O P-5'-P-CCNC: 22 U/L (ref 10–44)
ANION GAP SERPL CALC-SCNC: 10 MMOL/L (ref 8–16)
AST SERPL-CCNC: 32 U/L (ref 10–40)
BASOPHILS # BLD AUTO: 0 K/UL (ref 0–0.2)
BASOPHILS NFR BLD: 0 % (ref 0–1.9)
BILIRUB SERPL-MCNC: 0.5 MG/DL (ref 0.1–1)
BNP SERPL-MCNC: 76 PG/ML (ref 0–99)
BUN SERPL-MCNC: 19 MG/DL (ref 8–23)
CALCIUM SERPL-MCNC: 8.7 MG/DL (ref 8.7–10.5)
CHLORIDE SERPL-SCNC: 104 MMOL/L (ref 95–110)
CO2 SERPL-SCNC: 20 MMOL/L (ref 23–29)
CREAT SERPL-MCNC: 1.8 MG/DL (ref 0.5–1.4)
DIFFERENTIAL METHOD: ABNORMAL
EOSINOPHIL # BLD AUTO: 0 K/UL (ref 0–0.5)
EOSINOPHIL NFR BLD: 0 % (ref 0–8)
ERYTHROCYTE [DISTWIDTH] IN BLOOD BY AUTOMATED COUNT: 12.5 % (ref 11.5–14.5)
EST. GFR  (AFRICAN AMERICAN): 46 ML/MIN/1.73 M^2
EST. GFR  (NON AFRICAN AMERICAN): 39 ML/MIN/1.73 M^2
GLUCOSE SERPL-MCNC: 265 MG/DL (ref 70–110)
HCT VFR BLD AUTO: 42.1 % (ref 40–54)
HCV AB SERPL QL IA: NEGATIVE
HGB BLD-MCNC: 14 G/DL (ref 14–18)
HIV 1+2 AB+HIV1 P24 AG SERPL QL IA: NEGATIVE
IMM GRANULOCYTES # BLD AUTO: 0.01 K/UL (ref 0–0.04)
IMM GRANULOCYTES NFR BLD AUTO: 0.4 % (ref 0–0.5)
INFLUENZA A, MOLECULAR: POSITIVE
INFLUENZA B, MOLECULAR: NEGATIVE
LYMPHOCYTES # BLD AUTO: 0.5 K/UL (ref 1–4.8)
LYMPHOCYTES NFR BLD: 18.9 % (ref 18–48)
MAGNESIUM SERPL-MCNC: 1.9 MG/DL (ref 1.6–2.6)
MCH RBC QN AUTO: 30.5 PG (ref 27–31)
MCHC RBC AUTO-ENTMCNC: 33.3 G/DL (ref 32–36)
MCV RBC AUTO: 92 FL (ref 82–98)
MONOCYTES # BLD AUTO: 0.3 K/UL (ref 0.3–1)
MONOCYTES NFR BLD: 9.8 % (ref 4–15)
NEUTROPHILS # BLD AUTO: 1.8 K/UL (ref 1.8–7.7)
NEUTROPHILS NFR BLD: 70.9 % (ref 38–73)
NRBC BLD-RTO: 0 /100 WBC
PLATELET # BLD AUTO: 112 K/UL (ref 150–350)
PMV BLD AUTO: 9.8 FL (ref 9.2–12.9)
POCT GLUCOSE: 256 MG/DL (ref 70–110)
POTASSIUM SERPL-SCNC: 3.5 MMOL/L (ref 3.5–5.1)
PROT SERPL-MCNC: 6.7 G/DL (ref 6–8.4)
RBC # BLD AUTO: 4.59 M/UL (ref 4.6–6.2)
SODIUM SERPL-SCNC: 134 MMOL/L (ref 136–145)
SPECIMEN SOURCE: ABNORMAL
TROPONIN I SERPL DL<=0.01 NG/ML-MCNC: 0.11 NG/ML (ref 0–0.03)
WBC # BLD AUTO: 2.54 K/UL (ref 3.9–12.7)

## 2020-02-06 PROCEDURE — 84484 ASSAY OF TROPONIN QUANT: CPT

## 2020-02-06 PROCEDURE — 80053 COMPREHEN METABOLIC PANEL: CPT

## 2020-02-06 PROCEDURE — 85025 COMPLETE CBC W/AUTO DIFF WBC: CPT

## 2020-02-06 PROCEDURE — 87502 INFLUENZA DNA AMP PROBE: CPT

## 2020-02-06 PROCEDURE — 25000003 PHARM REV CODE 250: Performed by: EMERGENCY MEDICINE

## 2020-02-06 PROCEDURE — 86703 HIV-1/HIV-2 1 RESULT ANTBDY: CPT

## 2020-02-06 PROCEDURE — 96375 TX/PRO/DX INJ NEW DRUG ADDON: CPT

## 2020-02-06 PROCEDURE — 87040 BLOOD CULTURE FOR BACTERIA: CPT

## 2020-02-06 PROCEDURE — 82553 CREATINE MB FRACTION: CPT

## 2020-02-06 PROCEDURE — 96361 HYDRATE IV INFUSION ADD-ON: CPT

## 2020-02-06 PROCEDURE — 63600175 PHARM REV CODE 636 W HCPCS: Performed by: EMERGENCY MEDICINE

## 2020-02-06 PROCEDURE — 93010 ELECTROCARDIOGRAM REPORT: CPT | Mod: ,,, | Performed by: INTERNAL MEDICINE

## 2020-02-06 PROCEDURE — 82550 ASSAY OF CK (CPK): CPT

## 2020-02-06 PROCEDURE — 93005 ELECTROCARDIOGRAM TRACING: CPT

## 2020-02-06 PROCEDURE — 96374 THER/PROPH/DIAG INJ IV PUSH: CPT

## 2020-02-06 PROCEDURE — 99285 EMERGENCY DEPT VISIT HI MDM: CPT | Mod: 25

## 2020-02-06 PROCEDURE — 36415 COLL VENOUS BLD VENIPUNCTURE: CPT

## 2020-02-06 PROCEDURE — 83735 ASSAY OF MAGNESIUM: CPT

## 2020-02-06 PROCEDURE — 93010 EKG 12-LEAD: ICD-10-PCS | Mod: ,,, | Performed by: INTERNAL MEDICINE

## 2020-02-06 PROCEDURE — 84145 PROCALCITONIN (PCT): CPT

## 2020-02-06 PROCEDURE — 83880 ASSAY OF NATRIURETIC PEPTIDE: CPT

## 2020-02-06 PROCEDURE — 86803 HEPATITIS C AB TEST: CPT

## 2020-02-06 RX ORDER — CARVEDILOL 12.5 MG/1
25 TABLET ORAL 2 TIMES DAILY
Status: DISCONTINUED | OUTPATIENT
Start: 2020-02-06 | End: 2020-02-06

## 2020-02-06 RX ORDER — HYDRALAZINE HYDROCHLORIDE 20 MG/ML
10 INJECTION INTRAMUSCULAR; INTRAVENOUS
Status: DISCONTINUED | OUTPATIENT
Start: 2020-02-06 | End: 2020-02-07

## 2020-02-06 RX ORDER — CARVEDILOL 12.5 MG/1
25 TABLET ORAL ONCE
Status: COMPLETED | OUTPATIENT
Start: 2020-02-06 | End: 2020-02-06

## 2020-02-06 RX ORDER — LEVOFLOXACIN 5 MG/ML
750 INJECTION, SOLUTION INTRAVENOUS
Status: COMPLETED | OUTPATIENT
Start: 2020-02-07 | End: 2020-02-07

## 2020-02-06 RX ORDER — CARVEDILOL 12.5 MG/1
25 TABLET ORAL 2 TIMES DAILY
Status: DISCONTINUED | OUTPATIENT
Start: 2020-02-06 | End: 2020-02-07

## 2020-02-06 RX ORDER — ATORVASTATIN CALCIUM 40 MG/1
40 TABLET, FILM COATED ORAL DAILY
Status: DISCONTINUED | OUTPATIENT
Start: 2020-02-06 | End: 2020-02-07

## 2020-02-06 RX ORDER — ONDANSETRON 2 MG/ML
4 INJECTION INTRAMUSCULAR; INTRAVENOUS
Status: COMPLETED | OUTPATIENT
Start: 2020-02-06 | End: 2020-02-06

## 2020-02-06 RX ORDER — ASPIRIN 325 MG
325 TABLET, DELAYED RELEASE (ENTERIC COATED) ORAL
Status: COMPLETED | OUTPATIENT
Start: 2020-02-06 | End: 2020-02-06

## 2020-02-06 RX ADMIN — LEVOFLOXACIN 750 MG: 750 INJECTION, SOLUTION INTRAVENOUS at 11:02

## 2020-02-06 RX ADMIN — SODIUM CHLORIDE 500 ML: 0.9 INJECTION, SOLUTION INTRAVENOUS at 10:02

## 2020-02-06 RX ADMIN — ATORVASTATIN CALCIUM 40 MG: 40 TABLET, FILM COATED ORAL at 11:02

## 2020-02-06 RX ADMIN — ASPIRIN 325 MG: 325 TABLET, DELAYED RELEASE ORAL at 11:02

## 2020-02-06 RX ADMIN — CARVEDILOL 25 MG: 12.5 TABLET, FILM COATED ORAL at 10:02

## 2020-02-06 RX ADMIN — ONDANSETRON 4 MG: 2 INJECTION INTRAMUSCULAR; INTRAVENOUS at 10:02

## 2020-02-07 PROBLEM — D69.6 THROMBOCYTOPENIA: Status: ACTIVE | Noted: 2020-02-07

## 2020-02-07 PROBLEM — R79.89 ELEVATED TROPONIN I LEVEL: Status: ACTIVE | Noted: 2020-02-07

## 2020-02-07 PROBLEM — I10 ACCELERATED HYPERTENSION: Status: ACTIVE | Noted: 2020-02-07

## 2020-02-07 PROBLEM — J10.1 INFLUENZA A: Status: ACTIVE | Noted: 2020-02-07

## 2020-02-07 LAB
ALBUMIN SERPL BCP-MCNC: 2.7 G/DL (ref 3.5–5.2)
ALP SERPL-CCNC: 77 U/L (ref 55–135)
ALT SERPL W/O P-5'-P-CCNC: 20 U/L (ref 10–44)
ANION GAP SERPL CALC-SCNC: 9 MMOL/L (ref 8–16)
AST SERPL-CCNC: 29 U/L (ref 10–40)
BASOPHILS # BLD AUTO: 0 K/UL (ref 0–0.2)
BASOPHILS NFR BLD: 0 % (ref 0–1.9)
BILIRUB SERPL-MCNC: 0.5 MG/DL (ref 0.1–1)
BUN SERPL-MCNC: 21 MG/DL (ref 8–23)
CALCIUM SERPL-MCNC: 8.3 MG/DL (ref 8.7–10.5)
CHLORIDE SERPL-SCNC: 106 MMOL/L (ref 95–110)
CHOLEST SERPL-MCNC: 76 MG/DL (ref 120–199)
CHOLEST/HDLC SERPL: 3.8 {RATIO} (ref 2–5)
CK MB SERPL-MCNC: 1.8 NG/ML (ref 0.1–6.5)
CK MB SERPL-RTO: 0.2 % (ref 0–5)
CK SERPL-CCNC: 748 U/L (ref 20–200)
CO2 SERPL-SCNC: 18 MMOL/L (ref 23–29)
CREAT SERPL-MCNC: 1.8 MG/DL (ref 0.5–1.4)
DIASTOLIC DYSFUNCTION: NO
DIFFERENTIAL METHOD: ABNORMAL
EOSINOPHIL # BLD AUTO: 0 K/UL (ref 0–0.5)
EOSINOPHIL NFR BLD: 0 % (ref 0–8)
ERYTHROCYTE [DISTWIDTH] IN BLOOD BY AUTOMATED COUNT: 12.4 % (ref 11.5–14.5)
EST. GFR  (AFRICAN AMERICAN): 46 ML/MIN/1.73 M^2
EST. GFR  (NON AFRICAN AMERICAN): 39 ML/MIN/1.73 M^2
ESTIMATED AVG GLUCOSE: 189 MG/DL (ref 68–131)
GLUCOSE SERPL-MCNC: 261 MG/DL (ref 70–110)
HBA1C MFR BLD HPLC: 8.2 % (ref 4–5.6)
HCT VFR BLD AUTO: 39.6 % (ref 40–54)
HDLC SERPL-MCNC: 20 MG/DL (ref 40–75)
HDLC SERPL: 26.3 % (ref 20–50)
HGB BLD-MCNC: 13.2 G/DL (ref 14–18)
IMM GRANULOCYTES # BLD AUTO: 0.01 K/UL (ref 0–0.04)
IMM GRANULOCYTES NFR BLD AUTO: 0.4 % (ref 0–0.5)
LDLC SERPL CALC-MCNC: 38.8 MG/DL (ref 63–159)
LYMPHOCYTES # BLD AUTO: 0.6 K/UL (ref 1–4.8)
LYMPHOCYTES NFR BLD: 23.4 % (ref 18–48)
MAGNESIUM SERPL-MCNC: 1.9 MG/DL (ref 1.6–2.6)
MCH RBC QN AUTO: 30.3 PG (ref 27–31)
MCHC RBC AUTO-ENTMCNC: 33.3 G/DL (ref 32–36)
MCV RBC AUTO: 91 FL (ref 82–98)
MONOCYTES # BLD AUTO: 0.2 K/UL (ref 0.3–1)
MONOCYTES NFR BLD: 10.2 % (ref 4–15)
NEUTROPHILS # BLD AUTO: 1.6 K/UL (ref 1.8–7.7)
NEUTROPHILS NFR BLD: 66 % (ref 38–73)
NONHDLC SERPL-MCNC: 56 MG/DL
NRBC BLD-RTO: 0 /100 WBC
PHOSPHATE SERPL-MCNC: 3.1 MG/DL (ref 2.7–4.5)
PLATELET # BLD AUTO: 94 K/UL (ref 150–350)
PMV BLD AUTO: 10.4 FL (ref 9.2–12.9)
POCT GLUCOSE: 108 MG/DL (ref 70–110)
POCT GLUCOSE: 120 MG/DL (ref 70–110)
POCT GLUCOSE: 270 MG/DL (ref 70–110)
POCT GLUCOSE: 271 MG/DL (ref 70–110)
POCT GLUCOSE: 82 MG/DL (ref 70–110)
POTASSIUM SERPL-SCNC: 3.6 MMOL/L (ref 3.5–5.1)
PROCALCITONIN SERPL IA-MCNC: 0.11 NG/ML
PROT SERPL-MCNC: 6.2 G/DL (ref 6–8.4)
RBC # BLD AUTO: 4.35 M/UL (ref 4.6–6.2)
RETIRED EF AND QEF - SEE NOTES: 55 (ref 55–65)
SODIUM SERPL-SCNC: 133 MMOL/L (ref 136–145)
TRIGL SERPL-MCNC: 86 MG/DL (ref 30–150)
TROPONIN I SERPL DL<=0.01 NG/ML-MCNC: 0.06 NG/ML (ref 0–0.03)
TROPONIN I SERPL DL<=0.01 NG/ML-MCNC: 0.08 NG/ML (ref 0–0.03)
WBC # BLD AUTO: 2.35 K/UL (ref 3.9–12.7)

## 2020-02-07 PROCEDURE — 83036 HEMOGLOBIN GLYCOSYLATED A1C: CPT

## 2020-02-07 PROCEDURE — 96375 TX/PRO/DX INJ NEW DRUG ADDON: CPT

## 2020-02-07 PROCEDURE — 96361 HYDRATE IV INFUSION ADD-ON: CPT

## 2020-02-07 PROCEDURE — 83735 ASSAY OF MAGNESIUM: CPT

## 2020-02-07 PROCEDURE — 93306 TTE W/DOPPLER COMPLETE: CPT

## 2020-02-07 PROCEDURE — 93306 2D ECHO WITH COLOR FLOW DOPPLER: ICD-10-PCS | Mod: 26,,, | Performed by: INTERNAL MEDICINE

## 2020-02-07 PROCEDURE — 36415 COLL VENOUS BLD VENIPUNCTURE: CPT

## 2020-02-07 PROCEDURE — 96372 THER/PROPH/DIAG INJ SC/IM: CPT

## 2020-02-07 PROCEDURE — 84100 ASSAY OF PHOSPHORUS: CPT

## 2020-02-07 PROCEDURE — 80053 COMPREHEN METABOLIC PANEL: CPT

## 2020-02-07 PROCEDURE — 84484 ASSAY OF TROPONIN QUANT: CPT | Mod: 91

## 2020-02-07 PROCEDURE — 21400001 HC TELEMETRY ROOM

## 2020-02-07 PROCEDURE — 63600175 PHARM REV CODE 636 W HCPCS: Performed by: EMERGENCY MEDICINE

## 2020-02-07 PROCEDURE — 63600175 PHARM REV CODE 636 W HCPCS: Performed by: NURSE PRACTITIONER

## 2020-02-07 PROCEDURE — 25000003 PHARM REV CODE 250: Performed by: NURSE PRACTITIONER

## 2020-02-07 PROCEDURE — 85025 COMPLETE CBC W/AUTO DIFF WBC: CPT

## 2020-02-07 PROCEDURE — 93306 TTE W/DOPPLER COMPLETE: CPT | Mod: 26,,, | Performed by: INTERNAL MEDICINE

## 2020-02-07 PROCEDURE — 80061 LIPID PANEL: CPT

## 2020-02-07 RX ORDER — PANTOPRAZOLE SODIUM 40 MG/1
40 TABLET, DELAYED RELEASE ORAL DAILY
Status: DISCONTINUED | OUTPATIENT
Start: 2020-02-07 | End: 2020-02-09 | Stop reason: HOSPADM

## 2020-02-07 RX ORDER — ATORVASTATIN CALCIUM 10 MG/1
10 TABLET, FILM COATED ORAL DAILY
Status: DISCONTINUED | OUTPATIENT
Start: 2020-02-07 | End: 2020-02-09 | Stop reason: HOSPADM

## 2020-02-07 RX ORDER — ONDANSETRON 2 MG/ML
4 INJECTION INTRAMUSCULAR; INTRAVENOUS EVERY 6 HOURS PRN
Status: DISCONTINUED | OUTPATIENT
Start: 2020-02-07 | End: 2020-02-09 | Stop reason: HOSPADM

## 2020-02-07 RX ORDER — DULOXETIN HYDROCHLORIDE 30 MG/1
30 CAPSULE, DELAYED RELEASE ORAL DAILY
Status: DISCONTINUED | OUTPATIENT
Start: 2020-02-07 | End: 2020-02-09 | Stop reason: HOSPADM

## 2020-02-07 RX ORDER — IBUPROFEN 200 MG
16 TABLET ORAL
Status: DISCONTINUED | OUTPATIENT
Start: 2020-02-07 | End: 2020-02-09 | Stop reason: HOSPADM

## 2020-02-07 RX ORDER — TALC
6 POWDER (GRAM) TOPICAL NIGHTLY PRN
Status: DISCONTINUED | OUTPATIENT
Start: 2020-02-07 | End: 2020-02-09 | Stop reason: HOSPADM

## 2020-02-07 RX ORDER — NITROGLYCERIN 0.4 MG/1
0.4 TABLET SUBLINGUAL EVERY 5 MIN PRN
Status: DISCONTINUED | OUTPATIENT
Start: 2020-02-07 | End: 2020-02-09 | Stop reason: HOSPADM

## 2020-02-07 RX ORDER — IBUPROFEN 200 MG
24 TABLET ORAL
Status: DISCONTINUED | OUTPATIENT
Start: 2020-02-07 | End: 2020-02-09 | Stop reason: HOSPADM

## 2020-02-07 RX ORDER — IPRATROPIUM BROMIDE AND ALBUTEROL SULFATE 2.5; .5 MG/3ML; MG/3ML
3 SOLUTION RESPIRATORY (INHALATION) EVERY 4 HOURS PRN
Status: DISCONTINUED | OUTPATIENT
Start: 2020-02-07 | End: 2020-02-09 | Stop reason: HOSPADM

## 2020-02-07 RX ORDER — GLUCAGON 1 MG
1 KIT INJECTION
Status: DISCONTINUED | OUTPATIENT
Start: 2020-02-07 | End: 2020-02-09 | Stop reason: HOSPADM

## 2020-02-07 RX ORDER — INSULIN ASPART 100 [IU]/ML
70 INJECTION, SUSPENSION SUBCUTANEOUS
Status: DISCONTINUED | OUTPATIENT
Start: 2020-02-07 | End: 2020-02-08

## 2020-02-07 RX ORDER — ACETAMINOPHEN 325 MG/1
650 TABLET ORAL EVERY 6 HOURS PRN
Status: DISCONTINUED | OUTPATIENT
Start: 2020-02-07 | End: 2020-02-09 | Stop reason: HOSPADM

## 2020-02-07 RX ORDER — LISINOPRIL 20 MG/1
20 TABLET ORAL DAILY
Status: DISCONTINUED | OUTPATIENT
Start: 2020-02-07 | End: 2020-02-09 | Stop reason: HOSPADM

## 2020-02-07 RX ORDER — CARVEDILOL 12.5 MG/1
25 TABLET ORAL 2 TIMES DAILY WITH MEALS
Status: DISCONTINUED | OUTPATIENT
Start: 2020-02-07 | End: 2020-02-09 | Stop reason: HOSPADM

## 2020-02-07 RX ORDER — FUROSEMIDE 10 MG/ML
60 INJECTION INTRAMUSCULAR; INTRAVENOUS ONCE
Status: COMPLETED | OUTPATIENT
Start: 2020-02-07 | End: 2020-02-07

## 2020-02-07 RX ORDER — GUAIFENESIN 100 MG/5ML
200 SOLUTION ORAL EVERY 4 HOURS PRN
Status: DISCONTINUED | OUTPATIENT
Start: 2020-02-07 | End: 2020-02-09

## 2020-02-07 RX ORDER — ASPIRIN 81 MG/1
81 TABLET ORAL DAILY
Status: DISCONTINUED | OUTPATIENT
Start: 2020-02-07 | End: 2020-02-09 | Stop reason: HOSPADM

## 2020-02-07 RX ORDER — FUROSEMIDE 10 MG/ML
40 INJECTION INTRAMUSCULAR; INTRAVENOUS 2 TIMES DAILY
Status: DISCONTINUED | OUTPATIENT
Start: 2020-02-07 | End: 2020-02-08

## 2020-02-07 RX ADMIN — INSULIN ASPART 70 UNITS: 100 INJECTION, SUSPENSION SUBCUTANEOUS at 05:02

## 2020-02-07 RX ADMIN — GUAIFENESIN 200 MG: 200 SOLUTION ORAL at 09:02

## 2020-02-07 RX ADMIN — DULOXETINE HYDROCHLORIDE 30 MG: 30 CAPSULE, DELAYED RELEASE ORAL at 08:02

## 2020-02-07 RX ADMIN — LISINOPRIL 20 MG: 20 TABLET ORAL at 09:02

## 2020-02-07 RX ADMIN — ATORVASTATIN CALCIUM 10 MG: 10 TABLET, FILM COATED ORAL at 09:02

## 2020-02-07 RX ADMIN — PANTOPRAZOLE SODIUM 40 MG: 40 TABLET, DELAYED RELEASE ORAL at 08:02

## 2020-02-07 RX ADMIN — FUROSEMIDE 60 MG: 10 INJECTION, SOLUTION INTRAMUSCULAR; INTRAVENOUS at 01:02

## 2020-02-07 RX ADMIN — CARVEDILOL 25 MG: 12.5 TABLET, FILM COATED ORAL at 08:02

## 2020-02-07 RX ADMIN — ASPIRIN 81 MG: 81 TABLET ORAL at 09:02

## 2020-02-07 RX ADMIN — CARVEDILOL 25 MG: 12.5 TABLET, FILM COATED ORAL at 05:02

## 2020-02-07 RX ADMIN — INSULIN ASPART 70 UNITS: 100 INJECTION, SUSPENSION SUBCUTANEOUS at 06:02

## 2020-02-07 RX ADMIN — FUROSEMIDE 40 MG: 10 INJECTION, SOLUTION INTRAMUSCULAR; INTRAVENOUS at 04:02

## 2020-02-07 RX ADMIN — GUAIFENESIN 200 MG: 200 SOLUTION ORAL at 08:02

## 2020-02-07 NOTE — ED NOTES
Assisted pt with bed bath, pt with dried stool on both legs. Pt states that he has had dried stool on his legs since Tuesday because he has been unable to wash himself.

## 2020-02-07 NOTE — ED PROVIDER NOTES
SCRIBE #1 NOTE: I, Delia Michel, am scribing for, and in the presence of, Connie Okeefe DO. I have scribed the entire note.       History     Chief Complaint   Patient presents with    Emesis    flu like symptoms     x10 days     Review of patient's allergies indicates:   Allergen Reactions    Cefazolin      Other reaction(s): Shakes  Other reaction(s): Chills         History of Present Illness     HPI    2/6/2020, 9:57 PM  History obtained from the patient      History of Present Illness: Stepan Nixon is a 64 y.o. male patient with a PMHx of CAD, mixed HLD, Bell's palsy, CHF, DM and HTN who presents to the Emergency Department for evaluation of n/v/d which onset gradually x2 weeks ago. Pt reports he was unable to get out of his chair today secondary to generalized weakness. Symptoms are constant and moderate in severity. Pt reports sharp abd pain when he coughs. No mitigating factors reported. Associated sxs include dry cough, frequency sore throat, SOB, and congestion. Patient denies any hematuria, dysuria, blood in stool, fever, melena, difficulty urinating, chills, numbness, CP, palpitations, leg swelling, and all other sxs at this time. Pt notes he has been unable to take all meds (including BP med) secondary to emesis. No further complaints or concerns at this time.     Arrival mode: Ambulance services     PCP: Elham Persaud MD        Past Medical History:  Past Medical History:   Diagnosis Date    Anxiety     Bell's palsy     CHF (congestive heart failure)     Coronary artery disease involving native coronary artery without angina pectoris 10/18/2016    Depression     Diabetes mellitus     Glaucoma     Hypertension     Idiopathic peripheral neuropathy 12/11/2012    Mixed hyperlipidemia 12/11/2012    Vitamin B 12 deficiency 8/23/2012       Past Surgical History:  Past Surgical History:   Procedure Laterality Date    CARDIAC CATHETERIZATION  7/22/13    non obstructive cad     "CATARACT EXTRACTION  OU    COLONOSCOPY N/A 5/1/2019    Procedure: COLONOSCOPY;  Surgeon: Henry Mo III, MD;  Location: [facility] ENDO;  Service: Endoscopy;  Laterality: N/A;    CORONARY ANGIOPLASTY      ESOPHAGOGASTRODUODENOSCOPY N/A 5/1/2019    Procedure: ESOPHAGOGASTRODUODENOSCOPY (EGD);  Surgeon: Henry Mo III, MD;  Location: [facility] ENDO;  Service: Endoscopy;  Laterality: N/A;    EYE SURGERY      FRACTURE SURGERY      kidney stone       August 2016    PC IOL OU      RETINAL DETACHMENT REPAIR W/ SCLERAL BUCKLE LE      WRIST SURGERY           Family History:  Family History   Problem Relation Age of Onset    Macular degeneration Father     Heart disease Father         CHF     Heart attack Father     Hypertension Mother     Macular degeneration Paternal Uncle     Hypertension Maternal Grandmother     Hypertension Maternal Grandfather     Strabismus Neg Hx     Retinal detachment Neg Hx     Glaucoma Neg Hx     Blindness Neg Hx     Amblyopia Neg Hx        Social History:  Social History     Tobacco Use    Smoking status: Never Smoker    Smokeless tobacco: Never Used   Substance and Sexual Activity    Alcohol use: Yes     Comment: " one to two glasses of wine per year"    Drug use: No    Sexual activity: Not Currently     Birth control/protection: None        Review of Systems     Review of Systems   Constitutional: Negative for chills and fever.   HENT: Positive for congestion and sore throat.    Respiratory: Positive for cough (dry) and shortness of breath.    Cardiovascular: Negative for chest pain, palpitations and leg swelling.   Gastrointestinal: Positive for abdominal pain (when coughing), diarrhea, nausea and vomiting. Negative for blood in stool.        (-) melena   Genitourinary: Positive for frequency. Negative for difficulty urinating, dysuria and hematuria.   Musculoskeletal: Negative for back pain.   Skin: Negative for rash.   Neurological: Positive for weakness " (generalized). Negative for numbness.   Hematological: Does not bruise/bleed easily.   All other systems reviewed and are negative.     Physical Exam     Initial Vitals [02/06/20 2043]   BP Pulse Resp Temp SpO2   (!) 140/94 88 16 98.7 °F (37.1 °C) 96 %      MAP       --          Physical Exam  Nursing Notes and Vital Signs Reviewed.  Constitutional: Patient is in mild distress.   Appears sickly.Morbidly obese.   Head: Atraumatic. Normocephalic.  Eyes: PERRL. EOM intact. Conjunctivae are not pale. No scleral icterus.  ENT: Mucous membranes are dry. Oropharynx is clear and symmetric.    Neck: Supple. Full ROM. No lymphadenopathy.  Cardiovascular: Regular rate. Regular rhythm. No murmurs, rubs, or gallops. Distal pulses are 2+ and symmetric.  Pulmonary/Chest: No respiratory distress. Clear to auscultation bilaterally. No wheezing or rales.  Abdominal: Soft and non-distended.  There is no tenderness.  No rebound, guarding, or rigidity. Good bowel sounds.  Genitourinary: No CVA tenderness  Musculoskeletal: Moves all extremities. No obvious deformities. No edema. No calf tenderness. There is Woody edema to the lower extremities with chronic changes noted.  Skin: Warm and dry.  Neurological: Patient is alert and oriented to person, place and time. Pupils ERRL and EOM normal. Cranial nerves II-XII are intact. Strength is full bilaterally; it is equal and 5/5 in bilateral upper and lower extremities. There is no pronator drift of outstretched arms. Light touch sense is intact. Speech is clear and normal. No acute focal neurological deficits noted.  Psychiatric: Normal affect. Good eye contact. Appropriate in content.     ED Course   Procedures  ED Vital Signs:  Vitals:    02/06/20 2043 02/06/20 2102 02/06/20 2103 02/06/20 2113   BP: (!) 140/94 (!) 202/96     Pulse: 88 87 86    Resp: 16  19    Temp: 98.7 °F (37.1 °C)      TempSrc: Oral      SpO2: 96%  (!) 94%    Weight:    (!) 200.3 kg (441 lb 9.6 oz)    02/06/20 2121  02/06/20 2122 02/06/20 2123 02/06/20 2127   BP:   (!) 210/95    Pulse: 84   84   Resp:  12     Temp:       TempSrc:       SpO2: 95%      Weight:        02/06/20 2141 02/06/20 2143 02/06/20 2215 02/06/20 2254   BP: (!) 209/93  (!) 226/91 122/66   Pulse: 83  79    Resp:  19 17    Temp:       TempSrc:       SpO2: 95%  96%    Weight:        02/06/20 2345 02/07/20 0030 02/07/20 0129   BP: (!) 166/82 (!) 146/78 134/75   Pulse: 72 71 72   Resp: (!) 22 (!) 22 16   Temp:   99.2 °F (37.3 °C)   TempSrc:   Oral   SpO2: 95% 95% 96%   Weight:          Abnormal Lab Results:  Labs Reviewed   INFLUENZA A & B BY MOLECULAR - Abnormal; Notable for the following components:       Result Value    Influenza A, Molecular Positive (*)     All other components within normal limits   COMPREHENSIVE METABOLIC PANEL - Abnormal; Notable for the following components:    Sodium 134 (*)     CO2 20 (*)     Glucose 265 (*)     Creatinine 1.8 (*)     Albumin 2.9 (*)     eGFR if  46 (*)     eGFR if non  39 (*)     All other components within normal limits   CBC W/ AUTO DIFFERENTIAL - Abnormal; Notable for the following components:    WBC 2.54 (*)     RBC 4.59 (*)     Platelets 112 (*)     Lymph # 0.5 (*)     All other components within normal limits   TROPONIN I - Abnormal; Notable for the following components:    Troponin I 0.110 (*)     All other components within normal limits   POCT GLUCOSE - Abnormal; Notable for the following components:    POCT Glucose 256 (*)     All other components within normal limits   POCT GLUCOSE - Abnormal; Notable for the following components:    POCT Glucose 271 (*)     All other components within normal limits   CULTURE, BLOOD   CULTURE, BLOOD   HIV 1 / 2 ANTIBODY   HEPATITIS C ANTIBODY   B-TYPE NATRIURETIC PEPTIDE   B-TYPE NATRIURETIC PEPTIDE   MAGNESIUM   MAGNESIUM   TROPONIN I   PROCALCITONIN   CK   CK-MB   TROPONIN I   MAGNESIUM   LIPID PANEL   CBC W/ AUTO DIFFERENTIAL    COMPREHENSIVE METABOLIC PANEL   PHOSPHORUS   HEMOGLOBIN A1C   URINALYSIS, REFLEX TO URINE CULTURE   CK-MB   POCT GLUCOSE MONITORING CONTINUOUS        All Lab Results:  Results for orders placed or performed during the hospital encounter of 02/06/20   Influenza A & B by Molecular   Result Value Ref Range    Influenza A, Molecular Positive (A) Negative    Influenza B, Molecular Negative Negative    Flu A & B Source NP    HIV 1/2 Ag/Ab (4th Gen)   Result Value Ref Range    HIV 1/2 Ag/Ab Negative Negative   Hepatitis C antibody   Result Value Ref Range    Hepatitis C Ab Negative Negative   Comprehensive metabolic panel   Result Value Ref Range    Sodium 134 (L) 136 - 145 mmol/L    Potassium 3.5 3.5 - 5.1 mmol/L    Chloride 104 95 - 110 mmol/L    CO2 20 (L) 23 - 29 mmol/L    Glucose 265 (H) 70 - 110 mg/dL    BUN, Bld 19 8 - 23 mg/dL    Creatinine 1.8 (H) 0.5 - 1.4 mg/dL    Calcium 8.7 8.7 - 10.5 mg/dL    Total Protein 6.7 6.0 - 8.4 g/dL    Albumin 2.9 (L) 3.5 - 5.2 g/dL    Total Bilirubin 0.5 0.1 - 1.0 mg/dL    Alkaline Phosphatase 86 55 - 135 U/L    AST 32 10 - 40 U/L    ALT 22 10 - 44 U/L    Anion Gap 10 8 - 16 mmol/L    eGFR if African American 46 (A) >60 mL/min/1.73 m^2    eGFR if non African American 39 (A) >60 mL/min/1.73 m^2   Brain natriuretic peptide   Result Value Ref Range    BNP 76 0 - 99 pg/mL   CBC auto differential   Result Value Ref Range    WBC 2.54 (L) 3.90 - 12.70 K/uL    RBC 4.59 (L) 4.60 - 6.20 M/uL    Hemoglobin 14.0 14.0 - 18.0 g/dL    Hematocrit 42.1 40.0 - 54.0 %    Mean Corpuscular Volume 92 82 - 98 fL    Mean Corpuscular Hemoglobin 30.5 27.0 - 31.0 pg    Mean Corpuscular Hemoglobin Conc 33.3 32.0 - 36.0 g/dL    RDW 12.5 11.5 - 14.5 %    Platelets 112 (L) 150 - 350 K/uL    MPV 9.8 9.2 - 12.9 fL    Immature Granulocytes 0.4 0.0 - 0.5 %    Gran # (ANC) 1.8 1.8 - 7.7 K/uL    Immature Grans (Abs) 0.01 0.00 - 0.04 K/uL    Lymph # 0.5 (L) 1.0 - 4.8 K/uL    Mono # 0.3 0.3 - 1.0 K/uL    Eos # 0.0 0.0  - 0.5 K/uL    Baso # 0.00 0.00 - 0.20 K/uL    nRBC 0 0 /100 WBC    Gran% 70.9 38.0 - 73.0 %    Lymph% 18.9 18.0 - 48.0 %    Mono% 9.8 4.0 - 15.0 %    Eosinophil% 0.0 0.0 - 8.0 %    Basophil% 0.0 0.0 - 1.9 %    Differential Method Automated    Magnesium   Result Value Ref Range    Magnesium 1.9 1.6 - 2.6 mg/dL   Troponin I   Result Value Ref Range    Troponin I 0.110 (H) 0.000 - 0.026 ng/mL   Procalcitonin   Result Value Ref Range    Procalcitonin 0.11 <0.25 ng/mL   POCT glucose   Result Value Ref Range    POCT Glucose 256 (H) 70 - 110 mg/dL   POCT glucose   Result Value Ref Range    POCT Glucose 271 (H) 70 - 110 mg/dL     Results for TERRY THAPA (MRN [id number]) as of 2/6/2020 22:51   Ref. Range 8/28/2019 16:41 9/17/2019 07:36 1/14/2020 09:31 2/6/2020 21:27   BUN, Bld Latest Ref Range: 8 - 23 mg/dL 18 15 16 19   Creatinine Latest Ref Range: 0.5 - 1.4 mg/dL 2.2 (H) 2.0 (H) 1.7 (H) 1.8 (H)       Imaging Results:  Imaging Results          X-Ray Chest AP Portable (Final result)  Result time 02/06/20 22:27:59    Final result by Jonathan Graham MD (02/06/20 22:27:59)                 Impression:      Vascular congestion without focal infiltrate or edema left lung base.      Electronically signed by: Jonathan Graham  Date:    02/06/2020  Time:    22:27             Narrative:    EXAMINATION:  XR CHEST AP PORTABLE    CLINICAL HISTORY:  Other malaise    COMPARISON:  11/16/2018    FINDINGS:  Borderline cardiomegaly.  Bilateral vascular congestion with infiltrate or edema left lung base.                                 The EKG was ordered, reviewed, and independently interpreted by the ED provider.  Interpretation time: 2130  Rate: 82 BPM  Rhythm: normal sinus rhythm  Interpretation: LAD. LBBB No STEMI.           The Emergency Provider reviewed the vital signs and test results, which are outlined above.     ED Discussion     12:37 AM: Discussed case with Rachael Childs NP (Hospital Medicine). Dr. Agee agrees with current care  and management of pt and accepts admission.   Admitting Service: Hospital Medicine   Admitting Physician: Dr. Agee  Admit to: obs tele    12:38 AM: Re-evaluated pt. I have discussed test results, shared treatment plan, and the need for admission with patient at bedside. Pt expresses understanding at this time and agree with all information. All questions answered. Pt has no further questions or concerns at this time. Pt is ready for admit.    ED Course as of Feb 07 0139   Thu Feb 06, 2020   0657 Results for TERRY THAPA (MRN [id number]) as of 2/6/2020 22:56    8/28/2019 16:41  Troponin I: 0.033 (H)    9/17/2019 07:36    1/14/2020 09:31    2/6/2020 21:27  Troponin I: 0.110 (H)      [LB]      ED Course User Index  [LB] Connie Okeefe,      Medical Decision Making:   Clinical Tests:   Lab Tests: Reviewed and Ordered  Radiological Study: Reviewed and Ordered  Medical Tests: Reviewed and Ordered           ED Medication(s):  Medications   acetaminophen tablet 650 mg (has no administration in time range)   ondansetron injection 4 mg (has no administration in time range)   melatonin tablet 6 mg (has no administration in time range)   guaifenesin 100 mg/5 ml syrup 200 mg (has no administration in time range)   albuterol-ipratropium 2.5 mg-0.5 mg/3 mL nebulizer solution 3 mL (has no administration in time range)   glucose chewable tablet 16 g (has no administration in time range)   glucose chewable tablet 24 g (has no administration in time range)   glucagon (human recombinant) injection 1 mg (has no administration in time range)   atorvastatin tablet 10 mg (has no administration in time range)   aspirin EC tablet 81 mg (has no administration in time range)   carvediloL tablet 25 mg (has no administration in time range)   DULoxetine DR capsule 30 mg (has no administration in time range)   insulin aspart protamine-insulin aspart (NovoLOG 70/30) injection (has no administration in time range)   nitroGLYCERIN SL tablet  0.4 mg (has no administration in time range)   pantoprazole EC tablet 40 mg (has no administration in time range)   lisinopril tablet 20 mg (has no administration in time range)   dextrose 10% (D10W) Bolus (has no administration in time range)   dextrose 10% (D10W) Bolus (has no administration in time range)   furosemide injection 60 mg (has no administration in time range)   ondansetron injection 4 mg (4 mg Intravenous Given 2/6/20 2218)   carvediloL tablet 25 mg (0 mg Oral Return to Cabinet 2/6/20 2200)   sodium chloride 0.9% bolus 500 mL (0 mLs Intravenous Stopped 2/7/20 0033)   aspirin EC tablet 325 mg (325 mg Oral Given 2/6/20 2336)   levoFLOXacin 750 mg/150 mL IVPB 750 mg (750 mg Intravenous New Bag 2/6/20 2337)       Current Discharge Medication List                  Scribe Attestation:   Scribe #1: I performed the above scribed service and the documentation accurately describes the services I performed. I attest to the accuracy of the note.     Attending:   Physician Attestation Statement for Scribe #1: I, Connie Okeefe DO, personally performed the services described in this documentation, as scribed by Delia Michel, in my presence, and it is both accurate and complete.           Clinical Impression       ICD-10-CM ICD-9-CM   1. Elevated troponin I level R79.89 790.6   2. HTN (hypertension) I10 401.9   3. Malaise R53.81 780.79   4. Cough R05 786.2   5. Elevated troponin R79.89 790.6   6. Thrombocytopenia D69.6 287.5       Disposition:   Disposition: Placed in Observation  Condition: Stable       Connie Okeefe DO  02/07/20 0139

## 2020-02-07 NOTE — H&P
[facility] Hospital Medicine  History & Physical    Patient Name: Stepan Nixon  MRN: [id number]  Admission Date: 2/6/2020  Attending Physician: Albin Romo MD   Primary Care Provider: Elham Persaud MD         Patient information was obtained from patient, past medical records and ER records.     Subjective:     Principal Problem:Acute on chronic diastolic heart failure    Chief Complaint:   Chief Complaint   Patient presents with    Emesis    flu like symptoms     x10 days        HPI: Mr. Springer is a 64 y.o. male with a PMHx of CAD with remote PCI (BMS) of RCA in 9/2016, chronic diastolic HFpEF of 55-60%, HTN, HLD, CKD, DM II, and morbid obesity.  He presented to the ED with c/o generalized weakness x 2 weeks.  Associated fatigue, malaise, nausea, vomiting, diarrhea, sore throat, and nonproductive cough.  He states symptoms worsened over the past 1 day with SOB, congestion, and more frequent cough.  No aggravating or alleviating factors.  Patient has been unable to take any of his medications, including antihypertensives and diuretics, over the past several days due to above symptoms.  He denies any CP, palpitations, wheezing, orthopnea, PND, edema, ABD pain, hematochezia, melena, hematemesis, constipation, dysuria, hematuria, back pain, neck pain or stiffness, HA, lightheadedness, dizziness, syncope, rhinorrhea, fever or chills.  Initial work-up in the ED resulted WBC of 2.54, plt 112, creatinine 1.8 at baseline, BUN 19, glucose 265, troponin 0.11, BNP 76 (unreliable due to patient's obesity), influenza A (+), EKG unrevealing.  CXR showed pulmonary vascular congestion and edema with left pleural effusion.  In the ED, patient was hypertensive with /96.  He remained afebrile with stable O2 sat on RA.  Patient received 25 mg Coreg, which improved BP to 146/78.  500 mL IV fluids and IV Levaquin also given in the ED.  60 mg IV Lasix ordered.  Hospital Medicine was called for  admission.      Past Medical History:   Diagnosis Date    Anxiety     Bell's palsy     CHF (congestive heart failure)     Coronary artery disease involving native coronary artery without angina pectoris 10/18/2016    Depression     Diabetes mellitus     Glaucoma     Hypertension     Idiopathic peripheral neuropathy 12/11/2012    Mixed hyperlipidemia 12/11/2012    Vitamin B 12 deficiency 8/23/2012       Past Surgical History:   Procedure Laterality Date    CARDIAC CATHETERIZATION  7/22/13    non obstructive cad    CATARACT EXTRACTION  OU    COLONOSCOPY N/A 5/1/2019    Procedure: COLONOSCOPY;  Surgeon: Henry Mo III, MD;  Location[facility] ENDO;  Service: Endoscopy;  Laterality: N/A;    CORONARY ANGIOPLASTY      ESOPHAGOGASTRODUODENOSCOPY N/A 5/1/2019    Procedure: ESOPHAGOGASTRODUODENOSCOPY (EGD);  Surgeon: Henry Mo III, MD;  Location: [facility];  Service: Endoscopy;  Laterality: N/A;    EYE SURGERY      FRACTURE SURGERY      kidney stone       August 2016    PC IOL OU      RETINAL DETACHMENT REPAIR W/ SCLERAL BUCKLE LE      WRIST SURGERY         Review of patient's allergies indicates:   Allergen Reactions    Cefazolin      Other reaction(s): Shakes  Other reaction(s): Chills       No current facility-administered medications on file prior to encounter.      Current Outpatient Medications on File Prior to Encounter   Medication Sig    aspirin 325 MG tablet Take 325 mg by mouth 2 (two) times daily.    atorvastatin (LIPITOR) 10 MG tablet TAKE 1 TABLET BY MOUTH ONCE DAILY    carvedilol (COREG) 25 MG tablet TAKE 1 TABLET BY MOUTH TWICE DAILY WITH MEALS    DULoxetine (CYMBALTA) 30 MG capsule Take 1 capsule daily for 7 days then 2 capsules daily.    furosemide (LASIX) 40 MG tablet TAKE 2 TABLETS BY MOUTH TWICE DAILY. WHEN  ANKLES  RETAIN  FLUID DOUBLE  THE  LASIX  FOR  14  DAYS    insulin NPH-insulin regular, 70/30, (NOVOLIN 70/30 U-100 INSULIN) 100 unit/mL (70-30) injection Inject  130 Units into the skin 2 (two) times daily before meals.    lisinopril (PRINIVIL,ZESTRIL) 20 MG tablet Take 1 tablet (20 mg total) by mouth once daily.    miglitol (GLYSET) 25 MG Tab Take 1 tablet (25 mg total) by mouth 3 (three) times daily with meals.    omeprazole (PRILOSEC) 40 MG capsule Take 1 capsule (40 mg total) by mouth once daily.    ACCU-CHEK ALYCIA PLUS METER [facility]     BLOOD PRESSURE CUFF Misc 1 cuff    blood sugar diagnostic Strp To check BG 3 times daily, to use with insurance preferred meter    brimonidine 0.1% (ALPHAGAN P) 0.1 % Drop Place 1 drop into both eyes 3 (three) times daily. Order 90 day supply    brinzolamide (AZOPT) 1 % ophthalmic suspension Place 1 drop into both eyes 3 (three) times daily. ORDER 90 DAY SUPPLY    flash glucose sensor (FREESTYLE CLEMENCIA 14 DAY SENSOR) Kit 1 kit by Misc.(Non-Drug; Combo Route) route every 14 (fourteen) days.    lancets [facility] To check BG 3 times daily, to use with insurance preferred meter    mupirocin (BACTROBAN) 2 % ointment Apply topically 2 (two) times daily. (Patient not taking: Reported on 1/14/2020)    netarsudil (RHOPRESSA) 0.02 % Drop Place 1 drop into both eyes once daily.    nitroGLYCERIN (NITROSTAT) 0.4 MG SL tablet Place 1 tablet (0.4 mg total) under the tongue every 5 (five) minutes as needed for Chest pain.    polyethylene glycol (GLYCOLAX) 17 gram/dose powder Take 17 g by mouth once daily.    sildenafil (VIAGRA) 50 MG tablet Take 1 tablet (50 mg total) by mouth daily as needed for Erectile Dysfunction.    travoprost (TRAVATAN Z) 0.004 % ophthalmic solution Place 1 drop into both eyes every evening.     Family History     Problem Relation (Age of Onset)    Heart attack Father    Heart disease Father    Hypertension Mother, Maternal Grandmother, Maternal Grandfather    Macular degeneration Father, Paternal Uncle        Tobacco Use    Smoking status: Never Smoker    Smokeless tobacco: Never Used   Substance and Sexual Activity     "Alcohol use: Yes     Comment: " one to two glasses of wine per year"    Drug use: No    Sexual activity: Not Currently     Birth control/protection: None     Review of Systems   Constitutional: Positive for fatigue. Negative for chills, diaphoresis and fever.   HENT: Positive for congestion and sore throat. Negative for postnasal drip, rhinorrhea and sinus pressure.    Respiratory: Positive for cough and shortness of breath. Negative for wheezing.    Cardiovascular: Negative for chest pain, palpitations and leg swelling.   Gastrointestinal: Positive for diarrhea, nausea and vomiting. Negative for abdominal distention, abdominal pain, blood in stool and constipation.   Genitourinary: Negative for decreased urine volume, difficulty urinating, dysuria, frequency, hematuria and urgency.   Musculoskeletal: Negative for arthralgias, back pain, myalgias, neck pain and neck stiffness.   Skin: Negative for pallor and rash.   Neurological: Positive for weakness (generalized). Negative for dizziness, syncope, light-headedness, numbness and headaches.   All other systems reviewed and are negative.    Objective:     Vital Signs (Most Recent):  Temp: 99.2 °F (37.3 °C) (02/07/20 0129)  Pulse: 72 (02/07/20 0129)  Resp: 16 (02/07/20 0129)  BP: 134/75 (02/07/20 0129)  SpO2: 96 % (02/07/20 0129) Vital Signs (24h Range):  Temp:  [98.7 °F (37.1 °C)-99.2 °F (37.3 °C)] 99.2 °F (37.3 °C)  Pulse:  [71-88] 72  Resp:  [12-22] 16  SpO2:  [94 %-96 %] 96 %  BP: (122-226)/(66-96) 134/75     Weight: (!) 200.3 kg (441 lb 9.6 oz)  Body mass index is 49.75 kg/m².    Physical Exam   Constitutional: He is oriented to person, place, and time. He appears well-developed and well-nourished. He appears ill. No distress.   Morbidly obese.   HENT:   Head: Normocephalic and atraumatic.   Eyes: Conjunctivae are normal.   PERRL; EOM intact.   Neck: Normal range of motion. Neck supple.   Cardiovascular: Normal rate, regular rhythm, S1 normal and S2 normal.  No " extrasystoles are present. Exam reveals no gallop.   No murmur heard.  Pulmonary/Chest: Effort normal. No accessory muscle usage. No tachypnea. No respiratory distress. He has no wheezes. He has no rhonchi. He has rales in the right lower field and the left lower field.   Abdominal: Soft. Bowel sounds are normal. He exhibits no distension. There is no tenderness. There is no rebound, no guarding and no CVA tenderness.   Musculoskeletal: Normal range of motion. He exhibits no edema, tenderness or deformity.   Neurological: He is alert and oriented to person, place, and time. No cranial nerve deficit or sensory deficit.   Skin: Skin is warm, dry and intact. Capillary refill takes less than 2 seconds. No rash noted. He is not diaphoretic. No cyanosis or erythema.   Psychiatric: He has a normal mood and affect. His behavior is normal.   Nursing note and vitals reviewed.          Significant Labs:  Results for orders placed or performed during the hospital encounter of 02/06/20   Influenza A & B by Molecular   Result Value Ref Range    Influenza A, Molecular Positive (A) Negative    Influenza B, Molecular Negative Negative    Flu A & B Source NP    HIV 1/2 Ag/Ab (4th Gen)   Result Value Ref Range    HIV 1/2 Ag/Ab Negative Negative   Hepatitis C antibody   Result Value Ref Range    Hepatitis C Ab Negative Negative   Comprehensive metabolic panel   Result Value Ref Range    Sodium 134 (L) 136 - 145 mmol/L    Potassium 3.5 3.5 - 5.1 mmol/L    Chloride 104 95 - 110 mmol/L    CO2 20 (L) 23 - 29 mmol/L    Glucose 265 (H) 70 - 110 mg/dL    BUN, Bld 19 8 - 23 mg/dL    Creatinine 1.8 (H) 0.5 - 1.4 mg/dL    Calcium 8.7 8.7 - 10.5 mg/dL    Total Protein 6.7 6.0 - 8.4 g/dL    Albumin 2.9 (L) 3.5 - 5.2 g/dL    Total Bilirubin 0.5 0.1 - 1.0 mg/dL    Alkaline Phosphatase 86 55 - 135 U/L    AST 32 10 - 40 U/L    ALT 22 10 - 44 U/L    Anion Gap 10 8 - 16 mmol/L    eGFR if African American 46 (A) >60 mL/min/1.73 m^2    eGFR if non African  American 39 (A) >60 mL/min/1.73 m^2   Brain natriuretic peptide   Result Value Ref Range    BNP 76 0 - 99 pg/mL   CBC auto differential   Result Value Ref Range    WBC 2.54 (L) 3.90 - 12.70 K/uL    RBC 4.59 (L) 4.60 - 6.20 M/uL    Hemoglobin 14.0 14.0 - 18.0 g/dL    Hematocrit 42.1 40.0 - 54.0 %    Mean Corpuscular Volume 92 82 - 98 fL    Mean Corpuscular Hemoglobin 30.5 27.0 - 31.0 pg    Mean Corpuscular Hemoglobin Conc 33.3 32.0 - 36.0 g/dL    RDW 12.5 11.5 - 14.5 %    Platelets 112 (L) 150 - 350 K/uL    MPV 9.8 9.2 - 12.9 fL    Immature Granulocytes 0.4 0.0 - 0.5 %    Gran # (ANC) 1.8 1.8 - 7.7 K/uL    Immature Grans (Abs) 0.01 0.00 - 0.04 K/uL    Lymph # 0.5 (L) 1.0 - 4.8 K/uL    Mono # 0.3 0.3 - 1.0 K/uL    Eos # 0.0 0.0 - 0.5 K/uL    Baso # 0.00 0.00 - 0.20 K/uL    nRBC 0 0 /100 WBC    Gran% 70.9 38.0 - 73.0 %    Lymph% 18.9 18.0 - 48.0 %    Mono% 9.8 4.0 - 15.0 %    Eosinophil% 0.0 0.0 - 8.0 %    Basophil% 0.0 0.0 - 1.9 %    Differential Method Automated    Magnesium   Result Value Ref Range    Magnesium 1.9 1.6 - 2.6 mg/dL   Troponin I   Result Value Ref Range    Troponin I 0.110 (H) 0.000 - 0.026 ng/mL   Procalcitonin   Result Value Ref Range    Procalcitonin 0.11 <0.25 ng/mL   CK-MB   Result Value Ref Range     (H) 20 - 200 U/L   POCT glucose   Result Value Ref Range    POCT Glucose 256 (H) 70 - 110 mg/dL   POCT glucose   Result Value Ref Range    POCT Glucose 271 (H) 70 - 110 mg/dL      All pertinent labs within the past 24 hours have been reviewed.    Significant Imaging:  Imaging Results          X-Ray Chest AP Portable (Final result)  Result time 02/06/20 22:27:59    Final result by Jonathan Graham MD (02/06/20 22:27:59)                 Impression:      Vascular congestion without focal infiltrate or edema left lung base.      Electronically signed by: Jonathan Graham  Date:    02/06/2020  Time:    22:27             Narrative:    EXAMINATION:  XR CHEST AP PORTABLE    CLINICAL HISTORY:  Other  malaise    COMPARISON:  11/16/2018    FINDINGS:  Borderline cardiomegaly.  Bilateral vascular congestion with infiltrate or edema left lung base.                               I have reviewed all pertinent imaging results/findings within the past 24 hours.     EKG: (personally reviewed)  NSR, LAD, LBBB.  No significant change from previous tracings.             Assessment/Plan:     * Acute on chronic diastolic HFpEF of 55-60%  - Likely secondary to uncontrolled HTN.  - Diurese with IV Lasix 60 mg BID.  - BP control.  - Strict I&O's, daily weights, Na/fluid restriction.  - Will obtain repeat 2D echo.    Accelerated hypertension  - BP improved after receiving home dose Coreg in the ED.  - Continue home Coreg and lisinopril.  Follow BP trends and optimize as needed.  - Diuretics as above.    Elevated troponin I level  - Initial troponin 0.11, likely demand secondary to above.  EKG unrevealing.  Patient denies any CP.  - Continue ASA, BB, and statin.  - Trend serial cardiac enzymes.  - Check FLP.  - 2D echo.    Thrombocytopenia  - Initial plt 112, possibly due to infectious process.  No evidence of active bleeding.    - Continue ASA for now.  - Follow daily CBC.    Influenza A  - Symptoms started >1 week ago.  Outside of Tamiflu treatment window.  - Supportive care with antipyretics, antiemetics, and decongestant.     CAD with remote PCI (BMS) of RCA in 9/2016  - Continue medical management as above.    Chronic kidney disease, stage III (moderate)  - Creatinine stable at baseline of 1.7-2.  - Avoid nephrotoxic agents.  - Follow labs.    Uncontrolled type 2 diabetes mellitus with kidney complication, with long-term current use of insulin  - Hold Glyset during hospital stay.  - Continue 70/30 insulin at 70 units BID with meals.  Follow AccuChecks.  - Diabetic diet.  - Recent HbA1c of 8.5.      VTE Risk Mitigation (From admission, onward)         Ordered     Place sequential compression device  Until discontinued       02/07/20 0051     IP VTE HIGH RISK PATIENT  Once      02/07/20 0051     Reason for No Pharmacological VTE Prophylaxis  Once     Question:  Reasons:  Answer:  Thrombocytopenia    02/07/20 0051                   Rachael Childs NP  Department of Hospital Medicine   [facility]

## 2020-02-07 NOTE — ASSESSMENT & PLAN NOTE
- Symptoms started >1 week ago.  Outside of Tamiflu treatment window.  - Supportive care with antipyretics, antiemetics, and decongestant.

## 2020-02-07 NOTE — ASSESSMENT & PLAN NOTE
- Initial plt 112, possibly due to infectious process.  No evidence of active bleeding.    - Continue ASA for now.  - Follow daily CBC.

## 2020-02-07 NOTE — HPI
Mr. Springer is a 64 y.o. male with a PMHx of CAD with remote PCI (BMS) of RCA in 9/2016, chronic diastolic HFpEF of 55-60%, HTN, HLD, CKD, DM II, and morbid obesity.  He presented to the ED with c/o generalized weakness x 2 weeks.  Associated fatigue, malaise, nausea, vomiting, diarrhea, sore throat, and nonproductive cough.  He states symptoms worsened over the past 1 day with SOB, congestion, and more frequent cough.  No aggravating or alleviating factors.  Patient has been unable to take any of his medications, including antihypertensives and diuretics, over the past several days due to above symptoms.  He denies any CP, palpitations, wheezing, orthopnea, PND, edema, ABD pain, hematochezia, melena, hematemesis, constipation, dysuria, hematuria, back pain, neck pain or stiffness, HA, lightheadedness, dizziness, syncope, rhinorrhea, fever or chills.  Initial work-up in the ED resulted WBC of 2.54, plt 112, creatinine 1.8 at baseline, BUN 19, glucose 265, troponin 0.11, BNP 76 (unreliable due to patient's obesity), influenza A (+), EKG unrevealing.  CXR showed pulmonary vascular congestion and edema with left pleural effusion.  In the ED, patient was hypertensive with /96.  He remained afebrile with stable O2 sat on RA.  Patient received 25 mg Coreg, which improved BP to 146/78.  500 mL IV fluids and IV Levaquin also given in the ED.  60 mg IV Lasix ordered.  Hospital Medicine was called for admission.

## 2020-02-07 NOTE — PLAN OF CARE
CM spoke to patient who is awake, alert, and able to make needs known. Patient reports that before he came to the hospital he lives at home alone. Patient reports that he uses a cane at home when ambulating. Patient reports that he is established with his PCP and has reliable transportation to and from scheduled    appointments. Patient states that when its time for discharge he does not need any services set up at home and does not need any equipment.  CM provided a transitional care folder, information on advanced directives, information on pharmacy bedside delivery, and discharge planning begins on admission with contact information for any needs/questions.    D/C Plan: Home   PCP: Dr. Persaud   Preferred Pharmacy:   Discharge transportation: Friend   [facility]:  Pharmacy Bedside Delivery: [facility] [address]  [address] [facility] [address]  Phone: [phone] Fax: [phone]       02/07/20 1139   Discharge Assessment   Assessment Type Discharge Planning Assessment   Assessment information obtained from? Patient   Expected Length of Stay (days)   (TBD )   Communicated expected length of stay with patient/caregiver yes   Prior to hospitilization cognitive status: Alert/Oriented   Prior to hospitalization functional status: Assistive Equipment   Current cognitive status: Alert/Oriented   Current Functional Status: Assistive Equipment   Facility Arrived From: Home    Lives With alone   Able to Return to Prior Arrangements yes   Is patient able to care for self after discharge? Yes   Who are your caregiver(s) and their phone number(s)? (TRISTAN) friend [phone]   Patient's perception of discharge disposition home or selfcare   Patient currently being followed by outpatient case management? No   Patient currently receives any other outside agency services? No   Equipment Currently Used at Home cane, straight   Do you have any problems affording any of your  prescribed medications? No   Is the patient taking medications as prescribed? yes   Does the patient have transportation home? Yes   Transportation Anticipated family or friend will provide   Does the patient receive services at the [facility]? No   Discharge Plan A Home   DME Needed Upon Discharge  none   Patient/Family in Agreement with Plan yes

## 2020-02-07 NOTE — ASSESSMENT & PLAN NOTE
- Initial troponin 0.11, likely demand secondary to above.  EKG unrevealing.  Patient denies any CP.  - Continue ASA, BB, and statin.  - Trend serial cardiac enzymes.  - Check FLP.  - 2D echo.

## 2020-02-07 NOTE — ASSESSMENT & PLAN NOTE
- BP improved after receiving home dose Coreg in the ED.  - Continue home Coreg and lisinopril.  Follow BP trends and optimize as needed.  - Diuretics as above.

## 2020-02-07 NOTE — NURSING
Received report from CASE [place]. Assumed care. VSS. PIV intact. Saline locked. Cardiac monitoring initiated and maintained Oriented x4. Will continue to monitor

## 2020-02-07 NOTE — ASSESSMENT & PLAN NOTE
- Likely secondary to uncontrolled HTN.  - Diurese with IV Lasix 60 mg BID.  - BP control.  - Strict I&O's, daily weights, Na/fluid restriction.  - Will obtain repeat 2D echo.

## 2020-02-07 NOTE — PROGRESS NOTES
Pt seen ans examined chart, CXR reviewed. Mr. Springer is a 64 y.o. male, big and tall, morbidly obese, with a PMHx of CAD with remote PCI (BMS) of RCA in 9/2016, chronic diastolic HFpEF of 55-60%, HTN, HLD, CKD, DM II, admitted with acute on chronic diastolic CHF/ Cor Pulmonale and Hypertensive Urgency sec to non compliance with meds. He also has Acute Influenza A.    CXR showed pulmonary vascular congestion and edema with left pleural effusion.  Initial /96. Patient received 25 mg Coreg, which improved BP to 146/78. 500 mL IV fluids and IV Levaquin also given in the ED. 60 mg IV Lasix ordered.    Pt feels a little better after diuresis but still has severe 4 + edema BLE. No s/s of any FRAN. Will aggressivly diurese the pt and control the BP. Pt states the redness on his R calf is chronic and unchanged. No s/s any cellulitis.

## 2020-02-07 NOTE — ASSESSMENT & PLAN NOTE
- Hold Glyset during hospital stay.  - Continue 70/30 insulin at 70 units BID with meals.  Follow AccuChecks.  - Diabetic diet.  - Recent HbA1c of 8.5.

## 2020-02-07 NOTE — SUBJECTIVE & OBJECTIVE
Past Medical History:   Diagnosis Date    Anxiety     Bell's palsy     CHF (congestive heart failure)     Coronary artery disease involving native coronary artery without angina pectoris 10/18/2016    Depression     Diabetes mellitus     Glaucoma     Hypertension     Idiopathic peripheral neuropathy 12/11/2012    Mixed hyperlipidemia 12/11/2012    Vitamin B 12 deficiency 8/23/2012       Past Surgical History:   Procedure Laterality Date    CARDIAC CATHETERIZATION  7/22/13    non obstructive cad    CATARACT EXTRACTION  OU    COLONOSCOPY N/A 5/1/2019    Procedure: COLONOSCOPY;  Surgeon: Henry Mo III, MD;  Location: [facility];  Service: Endoscopy;  Laterality: N/A;    CORONARY ANGIOPLASTY      ESOPHAGOGASTRODUODENOSCOPY N/A 5/1/2019    Procedure: ESOPHAGOGASTRODUODENOSCOPY (EGD);  Surgeon: Henry Mo III, MD;  Location: [facility];  Service: Endoscopy;  Laterality: N/A;    EYE SURGERY      FRACTURE SURGERY      kidney stone       August 2016    PC IOL OU      RETINAL DETACHMENT REPAIR W/ SCLERAL BUCKLE LE      WRIST SURGERY         Review of patient's allergies indicates:   Allergen Reactions    Cefazolin      Other reaction(s): Shakes  Other reaction(s): Chills       No current facility-administered medications on file prior to encounter.      Current Outpatient Medications on File Prior to Encounter   Medication Sig    aspirin 325 MG tablet Take 325 mg by mouth 2 (two) times daily.    atorvastatin (LIPITOR) 10 MG tablet TAKE 1 TABLET BY MOUTH ONCE DAILY    carvedilol (COREG) 25 MG tablet TAKE 1 TABLET BY MOUTH TWICE DAILY WITH MEALS    DULoxetine (CYMBALTA) 30 MG capsule Take 1 capsule daily for 7 days then 2 capsules daily.    furosemide (LASIX) 40 MG tablet TAKE 2 TABLETS BY MOUTH TWICE DAILY. WHEN  ANKLES  RETAIN  FLUID DOUBLE  THE  LASIX  FOR  14  DAYS    insulin NPH-insulin regular, 70/30, (NOVOLIN 70/30 U-100 INSULIN) 100 unit/mL (70-30) injection Inject 130 Units into  the skin 2 (two) times daily before meals.    lisinopril (PRINIVIL,ZESTRIL) 20 MG tablet Take 1 tablet (20 mg total) by mouth once daily.    miglitol (GLYSET) 25 MG Tab Take 1 tablet (25 mg total) by mouth 3 (three) times daily with meals.    omeprazole (PRILOSEC) 40 MG capsule Take 1 capsule (40 mg total) by mouth once daily.    ACCU-CHEK ALYCIA PLUS METER [place]     BLOOD PRESSURE CUFF Misc 1 cuff    blood sugar diagnostic Strp To check BG 3 times daily, to use with insurance preferred meter    brimonidine 0.1% (ALPHAGAN P) 0.1 % Drop Place 1 drop into both eyes 3 (three) times daily. Order 90 day supply    brinzolamide (AZOPT) 1 % ophthalmic suspension Place 1 drop into both eyes 3 (three) times daily. ORDER 90 DAY SUPPLY    flash glucose sensor (FREESTYLE CLEMENCIA 14 DAY SENSOR) Kit 1 kit by Misc.(Non-Drug; Combo Route) route every 14 (fourteen) days.    lancets [place] To check BG 3 times daily, to use with insurance preferred meter    mupirocin (BACTROBAN) 2 % ointment Apply topically 2 (two) times daily. (Patient not taking: Reported on 1/14/2020)    netarsudil (RHOPRESSA) 0.02 % Drop Place 1 drop into both eyes once daily.    nitroGLYCERIN (NITROSTAT) 0.4 MG SL tablet Place 1 tablet (0.4 mg total) under the tongue every 5 (five) minutes as needed for Chest pain.    polyethylene glycol (GLYCOLAX) 17 gram/dose powder Take 17 g by mouth once daily.    sildenafil (VIAGRA) 50 MG tablet Take 1 tablet (50 mg total) by mouth daily as needed for Erectile Dysfunction.    travoprost (TRAVATAN Z) 0.004 % ophthalmic solution Place 1 drop into both eyes every evening.     Family History     Problem Relation (Age of Onset)    Heart attack Father    Heart disease Father    Hypertension Mother, Maternal Grandmother, Maternal Grandfather    Macular degeneration Father, Paternal Uncle        Tobacco Use    Smoking status: Never Smoker    Smokeless tobacco: Never Used   Substance and Sexual Activity    Alcohol use:  "Yes     Comment: " one to two glasses of wine per year"    Drug use: No    Sexual activity: Not Currently     Birth control/protection: None     Review of Systems   Constitutional: Positive for fatigue. Negative for chills, diaphoresis and fever.   HENT: Positive for congestion and sore throat. Negative for postnasal drip, rhinorrhea and sinus pressure.    Respiratory: Positive for cough and shortness of breath. Negative for wheezing.    Cardiovascular: Negative for chest pain, palpitations and leg swelling.   Gastrointestinal: Positive for diarrhea, nausea and vomiting. Negative for abdominal distention, abdominal pain, blood in stool and constipation.   Genitourinary: Negative for decreased urine volume, difficulty urinating, dysuria, frequency, hematuria and urgency.   Musculoskeletal: Negative for arthralgias, back pain, myalgias, neck pain and neck stiffness.   Skin: Negative for pallor and rash.   Neurological: Positive for weakness (generalized). Negative for dizziness, syncope, light-headedness, numbness and headaches.   All other systems reviewed and are negative.    Objective:     Vital Signs (Most Recent):  Temp: 99.2 °F (37.3 °C) (02/07/20 0129)  Pulse: 72 (02/07/20 0129)  Resp: 16 (02/07/20 0129)  BP: 134/75 (02/07/20 0129)  SpO2: 96 % (02/07/20 0129) Vital Signs (24h Range):  Temp:  [98.7 °F (37.1 °C)-99.2 °F (37.3 °C)] 99.2 °F (37.3 °C)  Pulse:  [71-88] 72  Resp:  [12-22] 16  SpO2:  [94 %-96 %] 96 %  BP: (122-226)/(66-96) 134/75     Weight: (!) 200.3 kg (441 lb 9.6 oz)  Body mass index is 49.75 kg/m².    Physical Exam   Constitutional: He is oriented to person, place, and time. He appears well-developed and well-nourished. He appears ill. No distress.   Morbidly obese.   HENT:   Head: Normocephalic and atraumatic.   Eyes: Conjunctivae are normal.   PERRL; EOM intact.   Neck: Normal range of motion. Neck supple.   Cardiovascular: Normal rate, regular rhythm, S1 normal and S2 normal.  No extrasystoles " are present. Exam reveals no gallop.   No murmur heard.  Pulmonary/Chest: Effort normal. No accessory muscle usage. No tachypnea. No respiratory distress. He has no wheezes. He has no rhonchi. He has rales in the right lower field and the left lower field.   Abdominal: Soft. Bowel sounds are normal. He exhibits no distension. There is no tenderness. There is no rebound, no guarding and no CVA tenderness.   Musculoskeletal: Normal range of motion. He exhibits no edema, tenderness or deformity.   Neurological: He is alert and oriented to person, place, and time. No cranial nerve deficit or sensory deficit.   Skin: Skin is warm, dry and intact. Capillary refill takes less than 2 seconds. No rash noted. He is not diaphoretic. No cyanosis or erythema.   Psychiatric: He has a normal mood and affect. His behavior is normal.   Nursing note and vitals reviewed.          Significant Labs:  Results for orders placed or performed during the hospital encounter of 02/06/20   Influenza A & B by Molecular   Result Value Ref Range    Influenza A, Molecular Positive (A) Negative    Influenza B, Molecular Negative Negative    Flu A & B Source NP    HIV 1/2 Ag/Ab (4th Gen)   Result Value Ref Range    HIV 1/2 Ag/Ab Negative Negative   Hepatitis C antibody   Result Value Ref Range    Hepatitis C Ab Negative Negative   Comprehensive metabolic panel   Result Value Ref Range    Sodium 134 (L) 136 - 145 mmol/L    Potassium 3.5 3.5 - 5.1 mmol/L    Chloride 104 95 - 110 mmol/L    CO2 20 (L) 23 - 29 mmol/L    Glucose 265 (H) 70 - 110 mg/dL    BUN, Bld 19 8 - 23 mg/dL    Creatinine 1.8 (H) 0.5 - 1.4 mg/dL    Calcium 8.7 8.7 - 10.5 mg/dL    Total Protein 6.7 6.0 - 8.4 g/dL    Albumin 2.9 (L) 3.5 - 5.2 g/dL    Total Bilirubin 0.5 0.1 - 1.0 mg/dL    Alkaline Phosphatase 86 55 - 135 U/L    AST 32 10 - 40 U/L    ALT 22 10 - 44 U/L    Anion Gap 10 8 - 16 mmol/L    eGFR if African American 46 (A) >60 mL/min/1.73 m^2    eGFR if non African American 39  (A) >60 mL/min/1.73 m^2   Brain natriuretic peptide   Result Value Ref Range    BNP 76 0 - 99 pg/mL   CBC auto differential   Result Value Ref Range    WBC 2.54 (L) 3.90 - 12.70 K/uL    RBC 4.59 (L) 4.60 - 6.20 M/uL    Hemoglobin 14.0 14.0 - 18.0 g/dL    Hematocrit 42.1 40.0 - 54.0 %    Mean Corpuscular Volume 92 82 - 98 fL    Mean Corpuscular Hemoglobin 30.5 27.0 - 31.0 pg    Mean Corpuscular Hemoglobin Conc 33.3 32.0 - 36.0 g/dL    RDW 12.5 11.5 - 14.5 %    Platelets 112 (L) 150 - 350 K/uL    MPV 9.8 9.2 - 12.9 fL    Immature Granulocytes 0.4 0.0 - 0.5 %    Gran # (ANC) 1.8 1.8 - 7.7 K/uL    Immature Grans (Abs) 0.01 0.00 - 0.04 K/uL    Lymph # 0.5 (L) 1.0 - 4.8 K/uL    Mono # 0.3 0.3 - 1.0 K/uL    Eos # 0.0 0.0 - 0.5 K/uL    Baso # 0.00 0.00 - 0.20 K/uL    nRBC 0 0 /100 WBC    Gran% 70.9 38.0 - 73.0 %    Lymph% 18.9 18.0 - 48.0 %    Mono% 9.8 4.0 - 15.0 %    Eosinophil% 0.0 0.0 - 8.0 %    Basophil% 0.0 0.0 - 1.9 %    Differential Method Automated    Magnesium   Result Value Ref Range    Magnesium 1.9 1.6 - 2.6 mg/dL   Troponin I   Result Value Ref Range    Troponin I 0.110 (H) 0.000 - 0.026 ng/mL   Procalcitonin   Result Value Ref Range    Procalcitonin 0.11 <0.25 ng/mL   CK-MB   Result Value Ref Range     (H) 20 - 200 U/L   POCT glucose   Result Value Ref Range    POCT Glucose 256 (H) 70 - 110 mg/dL   POCT glucose   Result Value Ref Range    POCT Glucose 271 (H) 70 - 110 mg/dL      All pertinent labs within the past 24 hours have been reviewed.    Significant Imaging:  Imaging Results          X-Ray Chest AP Portable (Final result)  Result time 02/06/20 22:27:59    Final result by Jonathan Graham MD (02/06/20 22:27:59)                 Impression:      Vascular congestion without focal infiltrate or edema left lung base.      Electronically signed by: Jonathan Graham  Date:    02/06/2020  Time:    22:27             Narrative:    EXAMINATION:  XR CHEST AP PORTABLE    CLINICAL HISTORY:  Other  malaise    COMPARISON:  11/16/2018    FINDINGS:  Borderline cardiomegaly.  Bilateral vascular congestion with infiltrate or edema left lung base.                               I have reviewed all pertinent imaging results/findings within the past 24 hours.     EKG: (personally reviewed)  NSR, LAD, LBBB.  No significant change from previous tracings.

## 2020-02-07 NOTE — PLAN OF CARE
POC reviwed,verbalized understanding.Pt remained free from falls. Fall precautions in place. Pt is SR with bundle branch  on monitor. VSS. PIV intact. Call bell and personal belongings within reach. Hourly rounding complete. No c/o at this time. Reminded to call for assistance. Will continue to monitor.

## 2020-02-07 NOTE — CONSULTS
This 69 year old male patient seen today due to weight trigger. PMHx of CAD with remote PCI (BMS) of RCA in 9/2016, chronic diastolic HFpEF of 55-60%, HTN, HLD, CKD, DM II, and morbid obesity. Bariatric bed ordered with extender due to patients height and weight. Also noted on consult is LLE cellulitis. Left calf with edematous, red area that is intact. No local wound care necessary, will needed pharmacologic treatment if not already ordered. Please re consult if needed.

## 2020-02-08 LAB
ALBUMIN SERPL BCP-MCNC: 2.7 G/DL (ref 3.5–5.2)
ALP SERPL-CCNC: 80 U/L (ref 55–135)
ALT SERPL W/O P-5'-P-CCNC: 27 U/L (ref 10–44)
ANION GAP SERPL CALC-SCNC: 11 MMOL/L (ref 8–16)
AST SERPL-CCNC: 37 U/L (ref 10–40)
BASOPHILS # BLD AUTO: 0.01 K/UL (ref 0–0.2)
BASOPHILS NFR BLD: 0.4 % (ref 0–1.9)
BILIRUB SERPL-MCNC: 0.5 MG/DL (ref 0.1–1)
BILIRUB UR QL STRIP: NEGATIVE
BUN SERPL-MCNC: 30 MG/DL (ref 8–23)
CALCIUM SERPL-MCNC: 8.3 MG/DL (ref 8.7–10.5)
CHLORIDE SERPL-SCNC: 106 MMOL/L (ref 95–110)
CLARITY UR: CLEAR
CO2 SERPL-SCNC: 18 MMOL/L (ref 23–29)
COLOR UR: YELLOW
CREAT SERPL-MCNC: 2 MG/DL (ref 0.5–1.4)
DACRYOCYTES BLD QL SMEAR: ABNORMAL
DIFFERENTIAL METHOD: ABNORMAL
EOSINOPHIL # BLD AUTO: 0 K/UL (ref 0–0.5)
EOSINOPHIL NFR BLD: 0 % (ref 0–8)
ERYTHROCYTE [DISTWIDTH] IN BLOOD BY AUTOMATED COUNT: 12.3 % (ref 11.5–14.5)
EST. GFR  (AFRICAN AMERICAN): 40 ML/MIN/1.73 M^2
EST. GFR  (NON AFRICAN AMERICAN): 35 ML/MIN/1.73 M^2
GLUCOSE SERPL-MCNC: 68 MG/DL (ref 70–110)
GLUCOSE UR QL STRIP: NEGATIVE
HCT VFR BLD AUTO: 38.7 % (ref 40–54)
HGB BLD-MCNC: 13 G/DL (ref 14–18)
HGB UR QL STRIP: ABNORMAL
IMM GRANULOCYTES # BLD AUTO: 0 K/UL (ref 0–0.04)
IMM GRANULOCYTES NFR BLD AUTO: 0 % (ref 0–0.5)
KETONES UR QL STRIP: NEGATIVE
LEUKOCYTE ESTERASE UR QL STRIP: NEGATIVE
LYMPHOCYTES # BLD AUTO: 0.9 K/UL (ref 1–4.8)
LYMPHOCYTES NFR BLD: 33.2 % (ref 18–48)
MCH RBC QN AUTO: 30.6 PG (ref 27–31)
MCHC RBC AUTO-ENTMCNC: 33.6 G/DL (ref 32–36)
MCV RBC AUTO: 91 FL (ref 82–98)
MONOCYTES # BLD AUTO: 0.2 K/UL (ref 0.3–1)
MONOCYTES NFR BLD: 8.6 % (ref 4–15)
NEUTROPHILS # BLD AUTO: 1.6 K/UL (ref 1.8–7.7)
NEUTROPHILS NFR BLD: 57.8 % (ref 38–73)
NITRITE UR QL STRIP: NEGATIVE
NRBC BLD-RTO: 0 /100 WBC
OVALOCYTES BLD QL SMEAR: ABNORMAL
PH UR STRIP: 6 [PH] (ref 5–8)
PLATELET # BLD AUTO: 112 K/UL (ref 150–350)
PLATELET BLD QL SMEAR: ABNORMAL
PMV BLD AUTO: 10.1 FL (ref 9.2–12.9)
POCT GLUCOSE: 124 MG/DL (ref 70–110)
POCT GLUCOSE: 126 MG/DL (ref 70–110)
POCT GLUCOSE: 137 MG/DL (ref 70–110)
POCT GLUCOSE: 48 MG/DL (ref 70–110)
POCT GLUCOSE: 65 MG/DL (ref 70–110)
POCT GLUCOSE: 68 MG/DL (ref 70–110)
POCT GLUCOSE: 90 MG/DL (ref 70–110)
POTASSIUM SERPL-SCNC: 3.3 MMOL/L (ref 3.5–5.1)
PROT SERPL-MCNC: 6.2 G/DL (ref 6–8.4)
PROT UR QL STRIP: NEGATIVE
RBC # BLD AUTO: 4.25 M/UL (ref 4.6–6.2)
SODIUM SERPL-SCNC: 135 MMOL/L (ref 136–145)
SP GR UR STRIP: 1.02 (ref 1–1.03)
URN SPEC COLLECT METH UR: ABNORMAL
UROBILINOGEN UR STRIP-ACNC: NEGATIVE EU/DL
WBC # BLD AUTO: 2.8 K/UL (ref 3.9–12.7)

## 2020-02-08 PROCEDURE — 25000003 PHARM REV CODE 250: Performed by: NURSE PRACTITIONER

## 2020-02-08 PROCEDURE — 85025 COMPLETE CBC W/AUTO DIFF WBC: CPT

## 2020-02-08 PROCEDURE — 93010 EKG 12-LEAD: ICD-10-PCS | Mod: ,,, | Performed by: INTERNAL MEDICINE

## 2020-02-08 PROCEDURE — 81003 URINALYSIS AUTO W/O SCOPE: CPT

## 2020-02-08 PROCEDURE — 80053 COMPREHEN METABOLIC PANEL: CPT

## 2020-02-08 PROCEDURE — 93010 ELECTROCARDIOGRAM REPORT: CPT | Mod: ,,, | Performed by: INTERNAL MEDICINE

## 2020-02-08 PROCEDURE — 93005 ELECTROCARDIOGRAM TRACING: CPT

## 2020-02-08 PROCEDURE — 99254 PR INITIAL INPATIENT CONSULT,LEVL IV: ICD-10-PCS | Mod: ,,, | Performed by: INTERNAL MEDICINE

## 2020-02-08 PROCEDURE — 63600175 PHARM REV CODE 636 W HCPCS: Performed by: EMERGENCY MEDICINE

## 2020-02-08 PROCEDURE — 99254 IP/OBS CNSLTJ NEW/EST MOD 60: CPT | Mod: ,,, | Performed by: INTERNAL MEDICINE

## 2020-02-08 PROCEDURE — 21400001 HC TELEMETRY ROOM

## 2020-02-08 PROCEDURE — 94761 N-INVAS EAR/PLS OXIMETRY MLT: CPT

## 2020-02-08 PROCEDURE — 63600175 PHARM REV CODE 636 W HCPCS: Performed by: FAMILY MEDICINE

## 2020-02-08 PROCEDURE — 36415 COLL VENOUS BLD VENIPUNCTURE: CPT

## 2020-02-08 RX ORDER — INSULIN ASPART 100 [IU]/ML
60 INJECTION, SUSPENSION SUBCUTANEOUS
Status: DISCONTINUED | OUTPATIENT
Start: 2020-02-08 | End: 2020-02-09 | Stop reason: HOSPADM

## 2020-02-08 RX ORDER — FUROSEMIDE 10 MG/ML
80 INJECTION INTRAMUSCULAR; INTRAVENOUS 2 TIMES DAILY
Status: DISCONTINUED | OUTPATIENT
Start: 2020-02-08 | End: 2020-02-09 | Stop reason: HOSPADM

## 2020-02-08 RX ORDER — LIDOCAINE 50 MG/G
1 PATCH TOPICAL
Status: DISCONTINUED | OUTPATIENT
Start: 2020-02-08 | End: 2020-02-09 | Stop reason: HOSPADM

## 2020-02-08 RX ADMIN — FUROSEMIDE 40 MG: 10 INJECTION, SOLUTION INTRAMUSCULAR; INTRAVENOUS at 09:02

## 2020-02-08 RX ADMIN — INSULIN ASPART 60 UNITS: 100 INJECTION, SUSPENSION SUBCUTANEOUS at 05:02

## 2020-02-08 RX ADMIN — LISINOPRIL 20 MG: 20 TABLET ORAL at 09:02

## 2020-02-08 RX ADMIN — DULOXETINE HYDROCHLORIDE 30 MG: 30 CAPSULE, DELAYED RELEASE ORAL at 09:02

## 2020-02-08 RX ADMIN — LIDOCAINE 1 PATCH: 50 PATCH TOPICAL at 09:02

## 2020-02-08 RX ADMIN — PANTOPRAZOLE SODIUM 40 MG: 40 TABLET, DELAYED RELEASE ORAL at 09:02

## 2020-02-08 RX ADMIN — Medication 24 G: at 10:02

## 2020-02-08 RX ADMIN — GUAIFENESIN 200 MG: 200 SOLUTION ORAL at 09:02

## 2020-02-08 RX ADMIN — ATORVASTATIN CALCIUM 10 MG: 10 TABLET, FILM COATED ORAL at 09:02

## 2020-02-08 RX ADMIN — FUROSEMIDE 80 MG: 10 INJECTION, SOLUTION INTRAMUSCULAR; INTRAVENOUS at 05:02

## 2020-02-08 RX ADMIN — ASPIRIN 81 MG: 81 TABLET ORAL at 09:02

## 2020-02-08 NOTE — SUBJECTIVE & OBJECTIVE
Past Medical History:   Diagnosis Date    Anxiety     Bell's palsy     CHF (congestive heart failure)     Coronary artery disease involving native coronary artery without angina pectoris 10/18/2016    Depression     Diabetes mellitus     Glaucoma     Hypertension     Idiopathic peripheral neuropathy 12/11/2012    Mixed hyperlipidemia 12/11/2012    Vitamin B 12 deficiency 8/23/2012       Past Surgical History:   Procedure Laterality Date    CARDIAC CATHETERIZATION  7/22/13    non obstructive cad    CATARACT EXTRACTION  OU    COLONOSCOPY N/A 5/1/2019    Procedure: COLONOSCOPY;  Surgeon: Henry Mo III, MD;  Location: [facility];  Service: Endoscopy;  Laterality: N/A;    CORONARY ANGIOPLASTY      ESOPHAGOGASTRODUODENOSCOPY N/A 5/1/2019    Procedure: ESOPHAGOGASTRODUODENOSCOPY (EGD);  Surgeon: Henry Mo III, MD;  Location: [facility];  Service: Endoscopy;  Laterality: N/A;    EYE SURGERY      FRACTURE SURGERY      kidney stone       August 2016    PC IOL OU      RETINAL DETACHMENT REPAIR W/ SCLERAL BUCKLE LE      WRIST SURGERY         Review of patient's allergies indicates:   Allergen Reactions    Cefazolin      Other reaction(s): Shakes  Other reaction(s): Chills       No current facility-administered medications on file prior to encounter.      Current Outpatient Medications on File Prior to Encounter   Medication Sig    aspirin 325 MG tablet Take 325 mg by mouth 2 (two) times daily.    atorvastatin (LIPITOR) 10 MG tablet TAKE 1 TABLET BY MOUTH ONCE DAILY    carvedilol (COREG) 25 MG tablet TAKE 1 TABLET BY MOUTH TWICE DAILY WITH MEALS    DULoxetine (CYMBALTA) 30 MG capsule Take 1 capsule daily for 7 days then 2 capsules daily.    furosemide (LASIX) 40 MG tablet TAKE 2 TABLETS BY MOUTH TWICE DAILY. WHEN  ANKLES  RETAIN  FLUID DOUBLE  THE  LASIX  FOR  14  DAYS    insulin NPH-insulin regular, 70/30, (NOVOLIN 70/30 U-100 INSULIN) 100 unit/mL (70-30) injection Inject 130 Units into  the skin 2 (two) times daily before meals.    lisinopril (PRINIVIL,ZESTRIL) 20 MG tablet Take 1 tablet (20 mg total) by mouth once daily.    miglitol (GLYSET) 25 MG Tab Take 1 tablet (25 mg total) by mouth 3 (three) times daily with meals.    omeprazole (PRILOSEC) 40 MG capsule Take 1 capsule (40 mg total) by mouth once daily.    ACCU-CHEK ALYCIA PLUS METER [facility]     BLOOD PRESSURE CUFF Misc 1 cuff    blood sugar diagnostic Strp To check BG 3 times daily, to use with insurance preferred meter    brimonidine 0.1% (ALPHAGAN P) 0.1 % Drop Place 1 drop into both eyes 3 (three) times daily. Order 90 day supply    brinzolamide (AZOPT) 1 % ophthalmic suspension Place 1 drop into both eyes 3 (three) times daily. ORDER 90 DAY SUPPLY    flash glucose sensor (FREESTYLE CLEMENCIA 14 DAY SENSOR) Kit 1 kit by Misc.(Non-Drug; Combo Route) route every 14 (fourteen) days.    lancets [facility] To check BG 3 times daily, to use with insurance preferred meter    mupirocin (BACTROBAN) 2 % ointment Apply topically 2 (two) times daily. (Patient not taking: Reported on 1/14/2020)    netarsudil (RHOPRESSA) 0.02 % Drop Place 1 drop into both eyes once daily.    nitroGLYCERIN (NITROSTAT) 0.4 MG SL tablet Place 1 tablet (0.4 mg total) under the tongue every 5 (five) minutes as needed for Chest pain.    polyethylene glycol (GLYCOLAX) 17 gram/dose powder Take 17 g by mouth once daily.    sildenafil (VIAGRA) 50 MG tablet Take 1 tablet (50 mg total) by mouth daily as needed for Erectile Dysfunction.    travoprost (TRAVATAN Z) 0.004 % ophthalmic solution Place 1 drop into both eyes every evening.     Family History     Problem Relation (Age of Onset)    Heart attack Father    Heart disease Father    Hypertension Mother, Maternal Grandmother, Maternal Grandfather    Macular degeneration Father, Paternal Uncle        Tobacco Use    Smoking status: Never Smoker    Smokeless tobacco: Never Used   Substance and Sexual Activity    Alcohol use:  "Yes     Comment: " one to two glasses of wine per year"    Drug use: No    Sexual activity: Not Currently     Birth control/protection: None     Review of Systems   Constitution: Positive for malaise/fatigue.   HENT: Negative for hearing loss and hoarse voice.    Eyes: Negative for blurred vision and visual disturbance.   Cardiovascular: Positive for chest pain, dyspnea on exertion and leg swelling. Negative for claudication, irregular heartbeat, near-syncope, orthopnea, palpitations, paroxysmal nocturnal dyspnea and syncope.   Respiratory: Positive for cough, shortness of breath, sleep disturbances due to breathing and snoring. Negative for hemoptysis and wheezing.    Endocrine: Negative for cold intolerance and heat intolerance.   Hematologic/Lymphatic: Bruises/bleeds easily.   Skin: Negative for color change, dry skin and nail changes.   Musculoskeletal: Positive for arthritis, back pain and myalgias. Negative for joint pain.   Gastrointestinal: Negative for bloating, abdominal pain, constipation, nausea and vomiting.   Genitourinary: Negative for dysuria, flank pain, hematuria and hesitancy.   Neurological: Positive for weakness. Negative for headaches, light-headedness, loss of balance, numbness and paresthesias.   Psychiatric/Behavioral: Negative for altered mental status.   Allergic/Immunologic: Negative for environmental allergies.     Objective:     Vital Signs (Most Recent):  Temp: 98.1 °F (36.7 °C) (02/08/20 0738)  Pulse: 62 (02/08/20 0738)  Resp: 20 (02/08/20 0738)  BP: (!) 143/73 (02/08/20 0738)  SpO2: (!) 94 % (02/08/20 0800) Vital Signs (24h Range):  Temp:  [96.3 °F (35.7 °C)-98.2 °F (36.8 °C)] 98.1 °F (36.7 °C)  Pulse:  [60-66] 62  Resp:  [18-20] 20  SpO2:  [92 %-95 %] 94 %  BP: (127-143)/(72-80) 143/73     Weight: (!) 200.3 kg (441 lb 9.6 oz)  Body mass index is 49.75 kg/m².    SpO2: (!) 94 %  O2 Device (Oxygen Therapy): room air      Intake/Output Summary (Last 24 hours) at 2/8/2020 0818  Last " data filed at 2/8/2020 0600  Gross per 24 hour   Intake 1517 ml   Output 2260 ml   Net -743 ml       Lines/Drains/Airways     Peripheral Intravenous Line                 Peripheral IV - Single Lumen 02/07/20 0049 20 G Right Antecubital 1 day                Physical Exam   Constitutional: He is oriented to person, place, and time. He appears well-developed and well-nourished. No distress.   HENT:   Head: Normocephalic and atraumatic.   Eyes: Pupils are equal, round, and reactive to light.   Neck: Normal range of motion and full passive range of motion without pain. Neck supple. No JVD present.   Cardiovascular: Normal rate, regular rhythm, S1 normal, S2 normal and intact distal pulses. PMI is not displaced. Exam reveals no distant heart sounds.   No murmur heard.  Pulses:       Radial pulses are 2+ on the right side, and 2+ on the left side.        Dorsalis pedis pulses are 2+ on the right side, and 2+ on the left side.   CHEST PAIN FREE   Pulmonary/Chest: Effort normal. No accessory muscle usage. No respiratory distress. He has decreased breath sounds in the right lower field and the left lower field. He has no wheezes. He has no rales.   +LAWSON   Abdominal: Soft. Bowel sounds are normal. He exhibits no distension. There is no tenderness.   Musculoskeletal: Normal range of motion. He exhibits no edema.        Right ankle: He exhibits swelling.        Left ankle: He exhibits swelling.   Neurological: He is alert and oriented to person, place, and time.   Skin: Skin is warm and dry. He is not diaphoretic. No cyanosis. Nails show no clubbing.   Psychiatric: He has a normal mood and affect. His speech is normal and behavior is normal. Judgment and thought content normal. Cognition and memory are normal.   Nursing note and vitals reviewed.      Significant Labs:   BMP:   Recent Labs   Lab 02/06/20  2127 02/07/20  0350 02/08/20  0519   * 261* 68*   * 133* 135*   K 3.5 3.6 3.3*    106 106   CO2 20* 18* 18*    BUN 19 21 30*   CREATININE 1.8* 1.8* 2.0*   CALCIUM 8.7 8.3* 8.3*   MG 1.9 1.9  --    , CBC   Recent Labs   Lab 02/06/20 2127 02/07/20 0350 02/08/20  0519   WBC 2.54* 2.35* 2.80*   HGB 14.0 13.2* 13.0*   HCT 42.1 39.6* 38.7*   * 94* 112*   , Troponin   Recent Labs   Lab 02/06/20 2127 02/07/20 0350 02/07/20  1027   TROPONINI 0.110* 0.084* 0.057*    and All pertinent lab results from the last 24 hours have been reviewed.    Significant Imaging: Echocardiogram:   2D echo with color flow doppler:   Results for orders placed or performed during the hospital encounter of 02/06/20   2D echo with color flow doppler   Result Value Ref Range    QEF 55 55 - 65    Diastolic Dysfunction No     Narrative    Date of Procedure: 02/07/2020        TEST DESCRIPTION   Technical Quality: This is a technically challenging study.     Aorta: The aortic root is normal in size, measuring 3.9 cm at sinotubular junction and 4.2 cm at Sinuses of Valsalva. The proximal ascending aorta is normal in size, measuring 3.6 cm across.     Left Atrium: The left atrial volume index is normal, measuring 17.37 cc/m2.     Left Ventricle: The left ventricle is normal in size, with an end-diastolic diameter of 4.3 cm, and an end-systolic diameter of 2.8 cm. LV wall thickness is normal, with the septum measuring 2.2 cm and the posterior wall measuring 2.0 cm across. Relative   wall thickness was increased at 0.93, and the LV mass index was increased at 170.0 g/m2 consistent with concentric left ventricular hypertrophy. There are no regional wall motion abnormalities. Left ventricular systolic function appears normal. Visually   estimated ejection fraction is 55-60%. The LV Doppler derived stroke volume equals 84.0 ccs.     Diastolic indices: E wave velocity 0.4 m/s, E/A ratio 1.6,  msec., E/e' ratio(avg) 4. Diastolic function is normal.     Right Atrium: The right atrium is normal in size, measuring 4.9 cm in length and 3.2 cm in width in  the apical view.     Right Ventricle: The right ventricle is normal in size. Global right ventricular systolic function appears normal. Tricuspid annular plane systolic excursion (TAPSE) is 2.4 cm.     Aortic Valve:  The aortic valve is normal in structure. The aortic valve is tri-leaflet in structure. The mean gradient obtained across the aortic valve is 5 mmHg.     Mitral Valve:  The mitral valve is normal in structure. The pressure half time is 74 msec. The calculated mitral valve area is 2.97 cm2.     Tricuspid Valve:  The tricuspid valve is normal in structure.     Pulmonary Valve:  The pulmonic valve is not well seen.     IVC: IVC is normal in size and collapses > 50% with a sniff, suggesting normal right atrial pressure of 3 mmHg.     Intracavitary: There is no evidence of pericardial effusion, intracavity mass, thrombi, or vegetation.         CONCLUSIONS     1 - Concentric hypertrophy.     2 - No wall motion abnormalities.     3 - Normal left ventricular systolic function (EF 55-60%).     4 - Normal left ventricular diastolic function.     5 - Normal right ventricular systolic function .             This document has been electronically    SIGNED BY: Ramiro Summers MD On: 02/07/2020 16:08

## 2020-02-08 NOTE — ASSESSMENT & PLAN NOTE
- Symptoms started >1 week ago.  Outside of Tamiflu treatment window.  - Supportive care with antipyretics, antiemetics, and decongestant.     2/8:  Cont current management

## 2020-02-08 NOTE — CONSULTS
[facility] -   Cardiology  Consult Note    Patient Name: Stepan Nixon  MRN: [id number]  Admission Date: 2/6/2020  Hospital Length of Stay: 1 days  Code Status: Full Code   Attending Provider: Marino Myers MD   Consulting Provider: BARBARA Forde  Primary Care Physician: Elham Persaud MD  Principal Problem:Acute on chronic diastolic heart failure    Patient information was obtained from patient, past medical records and ER records.     Inpatient consult to Cardiology  Consult performed by: BARBARA Hale  Consult ordered by: Albin Romo MD        Subjective:     Chief Complaint:  fatigue     HPI:   Stepan Nixon is a 64 year old male who presented to [facility] due to generalized weakness x 2 weeks with associated fatigue, cough, nausea, vomiting, sore throat and non-productive cough. He does endorse worsening symptoms over the past day with shortness of breath, congestion and worsening cough. He reports being unable to take any PO cardiac meds for the past day or so due to these symptoms. His current medical conditions include CAD s/p PCI of RCA in 9/2016, Chronic diastolic CHF, HFpEF of 55-60%, HTN, HLP, CKD, DM Type II, Morbid obesity. ED workup revealed Cr 1.8, BUN 19, Gluc 265, Trop 0.011, BNP 76, FLU A +, CXR revealed pulmonary vascular congestion and edema with left pleural effusion, /96. He was admitted to telemetry under the care of [facility] medicine. Cardiology consulted to assist with medical management. Chart reviewed, patient seen and examined. Denies chest pain or anginal equivalents. He does report that he feels better today than upon arrival to ED. Denies shortness of breath, LAWSON or palpitations. He does have some degree of LE edema today on exam. He reports weight gain and +50 lb weight gain from October to Christmas.     Past Medical History:   Diagnosis Date    Anxiety     Bell's palsy     CHF (congestive heart failure)     Coronary artery disease  involving native coronary artery without angina pectoris 10/18/2016    Depression     Diabetes mellitus     Glaucoma     Hypertension     Idiopathic peripheral neuropathy 12/11/2012    Mixed hyperlipidemia 12/11/2012    Vitamin B 12 deficiency 8/23/2012       Past Surgical History:   Procedure Laterality Date    CARDIAC CATHETERIZATION  7/22/13    non obstructive cad    CATARACT EXTRACTION  OU    COLONOSCOPY N/A 5/1/2019    Procedure: COLONOSCOPY;  Surgeon: Henry Mo III, MD;  Location: [facility];  Service: Endoscopy;  Laterality: N/A;    CORONARY ANGIOPLASTY      ESOPHAGOGASTRODUODENOSCOPY N/A 5/1/2019    Procedure: ESOPHAGOGASTRODUODENOSCOPY (EGD);  Surgeon: Henry Mo III, MD;  Location: [facility];  Service: Endoscopy;  Laterality: N/A;    EYE SURGERY      FRACTURE SURGERY      kidney stone       August 2016    PC IOL OU      RETINAL DETACHMENT REPAIR W/ SCLERAL BUCKLE LE      WRIST SURGERY         Review of patient's allergies indicates:   Allergen Reactions    Cefazolin      Other reaction(s): Shakes  Other reaction(s): Chills       No current facility-administered medications on file prior to encounter.      Current Outpatient Medications on File Prior to Encounter   Medication Sig    aspirin 325 MG tablet Take 325 mg by mouth 2 (two) times daily.    atorvastatin (LIPITOR) 10 MG tablet TAKE 1 TABLET BY MOUTH ONCE DAILY    carvedilol (COREG) 25 MG tablet TAKE 1 TABLET BY MOUTH TWICE DAILY WITH MEALS    DULoxetine (CYMBALTA) 30 MG capsule Take 1 capsule daily for 7 days then 2 capsules daily.    furosemide (LASIX) 40 MG tablet TAKE 2 TABLETS BY MOUTH TWICE DAILY. WHEN  ANKLES  RETAIN  FLUID DOUBLE  THE  LASIX  FOR  14  DAYS    insulin NPH-insulin regular, 70/30, (NOVOLIN 70/30 U-100 INSULIN) 100 unit/mL (70-30) injection Inject 130 Units into the skin 2 (two) times daily before meals.    lisinopril (PRINIVIL,ZESTRIL) 20 MG tablet Take 1 tablet (20 mg total) by mouth once  "daily.    miglitol (GLYSET) 25 MG Tab Take 1 tablet (25 mg total) by mouth 3 (three) times daily with meals.    omeprazole (PRILOSEC) 40 MG capsule Take 1 capsule (40 mg total) by mouth once daily.    ACCU-CHEK [place]CIA PLUS METER [facility]     BLOOD PRESSURE CUFF Misc 1 cuff    blood sugar diagnostic Strp To check BG 3 times daily, to use with insurance preferred meter    brimonidine 0.1% (ALPHAGAN P) 0.1 % Drop Place 1 drop into both eyes 3 (three) times daily. Order 90 day supply    brinzolamide (AZOPT) 1 % ophthalmic suspension Place 1 drop into both eyes 3 (three) times daily. ORDER 90 DAY SUPPLY    flash glucose sensor (FREESTYLE CLEMENCIA 14 DAY SENSOR) Kit 1 kit by Misc.(Non-Drug; Combo Route) route every 14 (fourteen) days.    lancets [facility] To check BG 3 times daily, to use with insurance preferred meter    mupirocin (BACTROBAN) 2 % ointment Apply topically 2 (two) times daily. (Patient not taking: Reported on 1/14/2020)    netarsudil (RHOPRESSA) 0.02 % Drop Place 1 drop into both eyes once daily.    nitroGLYCERIN (NITROSTAT) 0.4 MG SL tablet Place 1 tablet (0.4 mg total) under the tongue every 5 (five) minutes as needed for Chest pain.    polyethylene glycol (GLYCOLAX) 17 gram/dose powder Take 17 g by mouth once daily.    sildenafil (VIAGRA) 50 MG tablet Take 1 tablet (50 mg total) by mouth daily as needed for Erectile Dysfunction.    travoprost (TRAVATAN Z) 0.004 % ophthalmic solution Place 1 drop into both eyes every evening.     Family History     Problem Relation (Age of Onset)    Heart attack Father    Heart disease Father    Hypertension Mother, Maternal Grandmother, Maternal Grandfather    Macular degeneration Father, Paternal Uncle        Tobacco Use    Smoking status: Never Smoker    Smokeless tobacco: Never Used   Substance and Sexual Activity    Alcohol use: Yes     Comment: " one to two glasses of wine per year"    Drug use: No    Sexual activity: Not Currently     Birth " control/protection: None     Review of Systems   Constitution: Positive for malaise/fatigue.   HENT: Negative for hearing loss and hoarse voice.    Eyes: Negative for blurred vision and visual disturbance.   Cardiovascular: Positive for chest pain, dyspnea on exertion and leg swelling. Negative for claudication, irregular heartbeat, near-syncope, orthopnea, palpitations, paroxysmal nocturnal dyspnea and syncope.   Respiratory: Positive for cough, shortness of breath, sleep disturbances due to breathing and snoring. Negative for hemoptysis and wheezing.    Endocrine: Negative for cold intolerance and heat intolerance.   Hematologic/Lymphatic: Bruises/bleeds easily.   Skin: Negative for color change, dry skin and nail changes.   Musculoskeletal: Positive for arthritis, back pain and myalgias. Negative for joint pain.   Gastrointestinal: Negative for bloating, abdominal pain, constipation, nausea and vomiting.   Genitourinary: Negative for dysuria, flank pain, hematuria and hesitancy.   Neurological: Positive for weakness. Negative for headaches, light-headedness, loss of balance, numbness and paresthesias.   Psychiatric/Behavioral: Negative for altered mental status.   Allergic/Immunologic: Negative for environmental allergies.     Objective:     Vital Signs (Most Recent):  Temp: 98.1 °F (36.7 °C) (02/08/20 0738)  Pulse: 62 (02/08/20 0738)  Resp: 20 (02/08/20 0738)  BP: (!) 143/73 (02/08/20 0738)  SpO2: (!) 94 % (02/08/20 0800) Vital Signs (24h Range):  Temp:  [96.3 °F (35.7 °C)-98.2 °F (36.8 °C)] 98.1 °F (36.7 °C)  Pulse:  [60-66] 62  Resp:  [18-20] 20  SpO2:  [92 %-95 %] 94 %  BP: (127-143)/(72-80) 143/73     Weight: (!) 200.3 kg (441 lb 9.6 oz)  Body mass index is 49.75 kg/m².    SpO2: (!) 94 %  O2 Device (Oxygen Therapy): room air      Intake/Output Summary (Last 24 hours) at 2/8/2020 0818  Last data filed at 2/8/2020 0600  Gross per 24 hour   Intake 1517 ml   Output 2260 ml   Net -743 ml        Lines/Drains/Airways     Peripheral Intravenous Line                 Peripheral IV - Single Lumen 02/07/20 0049 20 G Right Antecubital 1 day                Physical Exam   Constitutional: He is oriented to person, place, and time. He appears well-developed and well-nourished. No distress.   HENT:   Head: Normocephalic and atraumatic.   Eyes: Pupils are equal, round, and reactive to light.   Neck: Normal range of motion and full passive range of motion without pain. Neck supple. No JVD present.   Cardiovascular: Normal rate, regular rhythm, S1 normal, S2 normal and intact distal pulses. PMI is not displaced. Exam reveals no distant heart sounds.   No murmur heard.  Pulses:       Radial pulses are 2+ on the right side, and 2+ on the left side.        Dorsalis pedis pulses are 2+ on the right side, and 2+ on the left side.   CHEST PAIN FREE   Pulmonary/Chest: Effort normal. No accessory muscle usage. No respiratory distress. He has decreased breath sounds in the right lower field and the left lower field. He has no wheezes. He has no rales.   +LWASON   Abdominal: Soft. Bowel sounds are normal. He exhibits no distension. There is no tenderness.   Musculoskeletal: Normal range of motion. He exhibits no edema.        Right ankle: He exhibits swelling.        Left ankle: He exhibits swelling.   Neurological: He is alert and oriented to person, place, and time.   Skin: Skin is warm and dry. He is not diaphoretic. No cyanosis. Nails show no clubbing.   Psychiatric: He has a normal mood and affect. His speech is normal and behavior is normal. Judgment and thought content normal. Cognition and memory are normal.   Nursing note and vitals reviewed.      Significant Labs:   BMP:   Recent Labs   Lab 02/06/20  2127 02/07/20  0350 02/08/20  0519   * 261* 68*   * 133* 135*   K 3.5 3.6 3.3*    106 106   CO2 20* 18* 18*   BUN 19 21 30*   CREATININE 1.8* 1.8* 2.0*   CALCIUM 8.7 8.3* 8.3*   MG 1.9 1.9  --    , CBC    Recent Labs   Lab 02/06/20 2127 02/07/20  0350 02/08/20  0519   WBC 2.54* 2.35* 2.80*   HGB 14.0 13.2* 13.0*   HCT 42.1 39.6* 38.7*   * 94* 112*   , Troponin   Recent Labs   Lab 02/06/20 2127 02/07/20  0350 02/07/20  1027   TROPONINI 0.110* 0.084* 0.057*    and All pertinent lab results from the last 24 hours have been reviewed.    Significant Imaging: Echocardiogram:   2D echo with color flow doppler:   Results for orders placed or performed during the hospital encounter of 02/06/20   2D echo with color flow doppler   Result Value Ref Range    QEF 55 55 - 65    Diastolic Dysfunction No     Narrative    Date of Procedure: 02/07/2020        TEST DESCRIPTION   Technical Quality: This is a technically challenging study.     Aorta: The aortic root is normal in size, measuring 3.9 cm at sinotubular junction and 4.2 cm at Sinuses of Valsalva. The proximal ascending aorta is normal in size, measuring 3.6 cm across.     Left Atrium: The left atrial volume index is normal, measuring 17.37 cc/m2.     Left Ventricle: The left ventricle is normal in size, with an end-diastolic diameter of 4.3 cm, and an end-systolic diameter of 2.8 cm. LV wall thickness is normal, with the septum measuring 2.2 cm and the posterior wall measuring 2.0 cm across. Relative   wall thickness was increased at 0.93, and the LV mass index was increased at 170.0 g/m2 consistent with concentric left ventricular hypertrophy. There are no regional wall motion abnormalities. Left ventricular systolic function appears normal. Visually   estimated ejection fraction is 55-60%. The LV Doppler derived stroke volume equals 84.0 ccs.     Diastolic indices: E wave velocity 0.4 m/s, E/A ratio 1.6,  msec., E/e' ratio(avg) 4. Diastolic function is normal.     Right Atrium: The right atrium is normal in size, measuring 4.9 cm in length and 3.2 cm in width in the apical view.     Right Ventricle: The right ventricle is normal in size. Global right  ventricular systolic function appears normal. Tricuspid annular plane systolic excursion (TAPSE) is 2.4 cm.     Aortic Valve:  The aortic valve is normal in structure. The aortic valve is tri-leaflet in structure. The mean gradient obtained across the aortic valve is 5 mmHg.     Mitral Valve:  The mitral valve is normal in structure. The pressure half time is 74 msec. The calculated mitral valve area is 2.97 cm2.     Tricuspid Valve:  The tricuspid valve is normal in structure.     Pulmonary Valve:  The pulmonic valve is not well seen.     IVC: IVC is normal in size and collapses > 50% with a sniff, suggesting normal right atrial pressure of 3 mmHg.     Intracavitary: There is no evidence of pericardial effusion, intracavity mass, thrombi, or vegetation.         CONCLUSIONS     1 - Concentric hypertrophy.     2 - No wall motion abnormalities.     3 - Normal left ventricular systolic function (EF 55-60%).     4 - Normal left ventricular diastolic function.     5 - Normal right ventricular systolic function .             This document has been electronically    SIGNED BY: Ramiro Summers MD On: 02/07/2020 16:08     Assessment and Plan:     * Acute on chronic diastolic HFpEF of 55-60%  -mainly compensated on exam today  -likely secondary to recent FLU illness  -continue same medical management  -ECHO revealed EF 55-60%  -Dash diet, 2 gm sodium restriction  -1500 ml fluid restriction  -Daily weights  -Strict I/O's    Influenza A  -mgmt per primary team    Accelerated hypertension  -continue same medical mgmt    Elevated troponin I level  -trending downward  -likely secondary to FLU illness  -continue current medical mgmt    CAD with remote PCI (BMS) of RCA in 9/2016  -Continue ASA, Statin, BB, ACEi  -CHEST PAIN  Free on exam today.     Chronic kidney disease, stage III (moderate)  -monitor closely, recommend daily labs.     Uncontrolled type 2 diabetes mellitus with kidney complication, with long-term current use of  insulin  -MGMT PER PRIMARY TEAM        VTE Risk Mitigation (From admission, onward)         Ordered     Place sequential compression device  Until discontinued      02/07/20 0051     IP VTE HIGH RISK PATIENT  Once      02/07/20 0051     Reason for No Pharmacological VTE Prophylaxis  Once     Question:  Reasons:  Answer:  Thrombocytopenia    02/07/20 0051                Thank you for your consult. I will sign off. Please contact us if you have any additional questions.    Elizabeth Mueller, PREMP-C  Cardiology   [facility] [place]

## 2020-02-08 NOTE — ASSESSMENT & PLAN NOTE
-mainly compensated on exam today  -likely secondary to recent FLU illness  -continue same medical management  -ECHO revealed EF 55-60%  -Dash diet, 2 gm sodium restriction  -1500 ml fluid restriction  -Daily weights  -Strict I/O's

## 2020-02-08 NOTE — ASSESSMENT & PLAN NOTE
- Creatinine stable at baseline of 1.7-2.  - Avoid nephrotoxic agents.  - Follow labs.    2/8:  Cr stable  Increased dose of lasix for adequate diuresis

## 2020-02-08 NOTE — ASSESSMENT & PLAN NOTE
- Likely secondary to uncontrolled HTN.  - Diurese with IV Lasix 60 mg BID.  - BP control.  - Strict I&O's, daily weights, Na/fluid restriction.  - Will obtain repeat 2D echo.    2/8:  Counseled on sodium consumption  Increased lasix to 80 mg bid  May increase to TID depending on response  Will need further diuresis  Possible dc tomorrow.

## 2020-02-08 NOTE — HOSPITAL COURSE
Patient admitted for diastolic HF exacerbation. He was started on lasix 40mg bid however it was increased to 80mg bid. No oxygen was needed on day of discharge. Edema improved. He was counseled on sodium consumption and need to use cpap at night for his FRAN. Patient was pancytopenic during stay. Heme/onc consulted on case recommended outpatient follow up.

## 2020-02-08 NOTE — PROGRESS NOTES
[facility] Hospital Medicine  Progress Note    Patient Name: Stepan Nixon  MRN: [id number]  Patient Class: IP- Inpatient   Admission Date: 2/6/2020  Length of Stay: 1 days  Attending Physician: Marino Myers MD  Primary Care Provider: Elham Persaud MD        Subjective:     Principal Problem:Acute on chronic diastolic heart failure        HPI:  Mr. Springer is a 64 y.o. male with a PMHx of CAD with remote PCI (BMS) of RCA in 9/2016, chronic diastolic HFpEF of 55-60%, HTN, HLD, CKD, DM II, and morbid obesity.  He presented to the ED with c/o generalized weakness x 2 weeks.  Associated fatigue, malaise, nausea, vomiting, diarrhea, sore throat, and nonproductive cough.  He states symptoms worsened over the past 1 day with SOB, congestion, and more frequent cough.  No aggravating or alleviating factors.  Patient has been unable to take any of his medications, including antihypertensives and diuretics, over the past several days due to above symptoms.  He denies any CP, palpitations, wheezing, orthopnea, PND, edema, ABD pain, hematochezia, melena, hematemesis, constipation, dysuria, hematuria, back pain, neck pain or stiffness, HA, lightheadedness, dizziness, syncope, rhinorrhea, fever or chills.  Initial work-up in the ED resulted WBC of 2.54, plt 112, creatinine 1.8 at baseline, BUN 19, glucose 265, troponin 0.11, BNP 76 (unreliable due to patient's obesity), influenza A (+), EKG unrevealing.  CXR showed pulmonary vascular congestion and edema with left pleural effusion.  In the ED, patient was hypertensive with /96.  He remained afebrile with stable O2 sat on RA.  Patient received 25 mg Coreg, which improved BP to 146/78.  500 mL IV fluids and IV Levaquin also given in the ED.  60 mg IV Lasix ordered.  Hospital Medicine was called for admission.      Overview/Hospital Course:  2/8:  Lower extremity edema still present, lasix increased to 80mg bid. Cont to monitor and diurese. Counseled on  need for cpap use due to diagosed FRAN. Limit sodium intake.     Interval History: Denies any issues overnight. States he still feels sob whenever he lays flat. Reports sleeping in recliner at home.     Review of Systems   Constitutional: Positive for chills. Negative for fatigue and fever.   Respiratory: Negative for shortness of breath.    Cardiovascular: Positive for leg swelling. Negative for chest pain and palpitations.   Gastrointestinal: Negative for nausea and vomiting.   Skin: Negative for rash.   Neurological: Positive for weakness.     Objective:     Vital Signs (Most Recent):  Temp: 98.1 °F (36.7 °C) (02/08/20 0738)  Pulse: 62 (02/08/20 0738)  Resp: 20 (02/08/20 0738)  BP: (!) 143/73 (02/08/20 0738)  SpO2: (!) 94 % (02/08/20 0800) Vital Signs (24h Range):  Temp:  [96.3 °F (35.7 °C)-98.2 °F (36.8 °C)] 98.1 °F (36.7 °C)  Pulse:  [60-66] 62  Resp:  [18-20] 20  SpO2:  [92 %-95 %] 94 %  BP: (127-143)/(72-80) 143/73     Weight: (!) 200.3 kg (441 lb 9.6 oz)  Body mass index is 49.75 kg/m².    Intake/Output Summary (Last 24 hours) at 2/8/2020 1146  Last data filed at 2/8/2020 0600  Gross per 24 hour   Intake 1517 ml   Output 2260 ml   Net -743 ml      Physical Exam   Constitutional: He is oriented to person, place, and time. He appears well-developed and well-nourished. No distress.   HENT:   Head: Normocephalic and atraumatic.   Cardiovascular: Normal rate, regular rhythm and normal heart sounds. Exam reveals no gallop and no friction rub.   No murmur heard.  Pulmonary/Chest: Effort normal and breath sounds normal. No stridor. No respiratory distress. He has no wheezes. He has no rales.   Abdominal: Soft. Bowel sounds are normal. He exhibits no distension and no mass. There is no tenderness. There is no guarding.   Musculoskeletal: Normal range of motion. He exhibits edema (3+ edema BL).   Neurological: He is alert and oriented to person, place, and time.   Skin: Skin is warm. He is not diaphoretic. There is  erythema (noted on lower ext.).       Significant Labs:   Recent Lab Results       02/08/20  1134   02/08/20  0717   02/08/20  0617   02/08/20  0519   02/07/20  2057        Albumin       2.7       Alkaline Phosphatase       80       ALT       27       Anion Gap       11       AST       37       Baso #       0.01       Basophil%       0.4       BILIRUBIN TOTAL       0.5  Comment:  For infants and newborns, interpretation of results should be based  on gestational age, weight and in agreement with clinical  observations.  Premature Infant recommended reference ranges:  Up to 24 hours.............<8.0 mg/dL  Up to 48 hours............<12.0 mg/dL  3-5 days..................<15.0 mg/dL  6-29 days.................<15.0 mg/dL         BUN, Bld       30       Calcium       8.3       Chloride       106       CO2       18       Creatinine       2.0       Differential Method       Automated       eGFR if        40       eGFR if non        35  Comment:  Calculation used to obtain the estimated glomerular filtration  rate (eGFR) is the CKD-EPI equation.          Eos #       0.0       Eosinophil%       0.0       Glucose       68       Gran # (ANC)       1.6       Gran%       57.8       Hematocrit       38.7       Hemoglobin       13.0       Immature Grans (Abs)       0.00  Comment:  Mild elevation in immature granulocytes is non specific and   can be seen in a variety of conditions including stress response,   acute inflammation, trauma and pregnancy. Correlation with other   laboratory and clinical findings is essential.         Immature Granulocytes       0.0       Lymph #       0.9       Lymph%       33.2       MCH       30.6       MCHC       33.6       MCV       91       Mono #       0.2       Mono%       8.6       MPV       10.1       nRBC       0       Ovalocytes       Occasional       Platelet Estimate       Decreased       Platelets       112       POCT Glucose 124 126 68   82     Potassium        3.3       PROTEIN TOTAL       6.2       RBC       4.25       RDW       12.3       Sodium       135       Tear Drop Cells       Occasional       WBC       2.80                        02/07/20  1721        Albumin       Alkaline Phosphatase       ALT       Anion Gap       AST       Baso #       Basophil%       BILIRUBIN TOTAL       BUN, Bld       Calcium       Chloride       CO2       Creatinine       Differential Method       eGFR if        eGFR if non        Eos #       Eosinophil%       Glucose       Gran # (ANC)       Gran%       Hematocrit       Hemoglobin       Immature Grans (Abs)       Immature Granulocytes       Lymph #       Lymph%       MCH       MCHC       MCV       Mono #       Mono%       MPV       nRBC       Ovalocytes       Platelet Estimate       Platelets       POCT Glucose 120     Potassium       PROTEIN TOTAL       RBC       RDW       Sodium       Tear Drop Cells       WBC           All pertinent labs within the past 24 hours have been reviewed.    Significant Imaging: I have reviewed all pertinent imaging results/findings within the past 24 hours.      Assessment/Plan:      * Acute on chronic diastolic HFpEF of 55-60%  - Likely secondary to uncontrolled HTN.  - Diurese with IV Lasix 60 mg BID.  - BP control.  - Strict I&O's, daily weights, Na/fluid restriction.  - Will obtain repeat 2D echo.    2/8:  Counseled on sodium consumption  Increased lasix to 80 mg bid  May increase to TID depending on response  Will need further diuresis  Possible dc tomorrow.     Thrombocytopenia  - Initial plt 112, possibly due to infectious process.  No evidence of active bleeding.    - Continue ASA for now.  - Follow daily CBC.    Influenza A  - Symptoms started >1 week ago.  Outside of Tamiflu treatment window.  - Supportive care with antipyretics, antiemetics, and decongestant.     2/8:  Cont current management     Accelerated hypertension  - BP improved after receiving home  dose Coreg in the ED.  - Continue home Coreg and lisinopril.  Follow BP trends and optimize as needed.  - Diuretics as above.    2/8:  BP controlled     Elevated troponin I level  - Initial troponin 0.11, likely demand secondary to above.  EKG unrevealing.  Patient denies any CP.  - Continue ASA, BB, and statin.  - Trend serial cardiac enzymes.  - Check FLP.  - 2D echo.    CAD with remote PCI (BMS) of RCA in 9/2016  - Continue medical management as above.    Chronic kidney disease, stage III (moderate)  - Creatinine stable at baseline of 1.7-2.  - Avoid nephrotoxic agents.  - Follow labs.    2/8:  Cr stable  Increased dose of lasix for adequate diuresis    Uncontrolled type 2 diabetes mellitus with kidney complication, with long-term current use of insulin  - Hold Glyset during hospital stay.  - Continue 70/30 insulin at 70 units BID with meals.  Follow AccuChecks.  - Diabetic diet.  - Recent HbA1c of 8.5.    2/8:  hba1c elevated  Glucose controlled today  Plan to keep glucose between 140-180  Can decrease insulin if necessary to prevent hypoglycemia       VTE Risk Mitigation (From admission, onward)         Ordered     Place sequential compression device  Until discontinued      02/07/20 0051     IP VTE HIGH RISK PATIENT  Once      02/07/20 0051     Reason for No Pharmacological VTE Prophylaxis  Once     Question:  Reasons:  Answer:  Thrombocytopenia    02/07/20 0051                      Marino Myers MD  Department of Hospital Medicine   [facility]

## 2020-02-08 NOTE — HPI
Stepan Nixon is a 64 year old male who presented to [facility] due to generalized weakness x 2 weeks with associated fatigue, cough, nausea, vomiting, sore throat and non-productive cough. He does endorse worsening symptoms over the past day with shortness of breath, congestion and worsening cough. He reports being unable to take any PO cardiac meds for the past day or so due to these symptoms. His current medical conditions include CAD s/p PCI of RCA in 9/2016, Chronic diastolic CHF, HFpEF of 55-60%, HTN, HLP, CKD, DM Type II, Morbid obesity. ED workup revealed Cr 1.8, BUN 19, Gluc 265, Trop 0.011, BNP 76, FLU A +, CXR revealed pulmonary vascular congestion and edema with left pleural effusion, /96. He was admitted to telemetry under the care of [facility] medicine. Cardiology consulted to assist with medical management. Chart reviewed, patient seen and examined. Denies chest pain or anginal equivalents. He does report that he feels better today than upon arrival to ED. Denies shortness of breath, LAWSON or palpitations. He does have some degree of LE edema today on exam. He reports weight gain and +50 lb weight gain from October to Christmas.

## 2020-02-08 NOTE — NURSING
Notified Dr. Myers of pt's heart rate dropping to 30s-40s occasionally. Pt currently asymptomatic sitting on couch in room. EKG completed this AM already showing NSR 60s with L BBB. Orders to hold beta blockers today, will be seen by cards this AM. Primary nurse CASE Reis notified.

## 2020-02-08 NOTE — PLAN OF CARE
No acute changes during shift  Pt AAOx4  Plan of care reviewed with patient. Verbalized understanding  Pt remained free from falls. All fall and safety precautions in place  NSR on tele monitor  PIV, CDI   VSS  Call bell and personal items within reach.  Hourly rounding complete. Instructed to call for assistance  Pt denies needs and has no c/o at this time  Continue current plan of care   Low blood sugar this morning, snack given. Will re-check   Problem: Wound  Goal: Optimal Wound Healing  Outcome: Ongoing, Progressing     Problem: Fall Injury Risk  Goal: Absence of Fall and Fall-Related Injury  Outcome: Ongoing, Progressing     Problem: Adult Inpatient Plan of Care  Goal: Plan of Care Review  Outcome: Ongoing, Progressing  Flowsheets (Taken 2/8/2020 0712)  Plan of Care Reviewed With: patient  Goal: Patient-Specific Goal (Individualization)  Outcome: Ongoing, Progressing  Goal: Absence of Hospital-Acquired Illness or Injury  Outcome: Ongoing, Progressing  Goal: Optimal Comfort and Wellbeing  Outcome: Ongoing, Progressing  Goal: Readiness for Transition of Care  Outcome: Ongoing, Progressing  Goal: Rounds/Family Conference  Outcome: Ongoing, Progressing     Problem: Diabetes Comorbidity  Goal: Blood Glucose Level Within Desired Range  Outcome: Ongoing, Progressing     Problem: Infection  Goal: Infection Symptom Resolution  Outcome: Ongoing, Progressing     Problem: Skin Injury Risk Increased  Goal: Skin Health and Integrity  Outcome: Ongoing, Progressing     Problem: Pain Acute  Goal: Optimal Pain Control  Outcome: Ongoing, Progressing     Problem: Balance Impairment (Functional Deficit)  Goal: Improved Balance and Postural Control  Outcome: Ongoing, Progressing     Problem: Coordination Impairment (Functional Deficit)  Goal: Optimal Coordination  Outcome: Ongoing, Progressing     Problem: Muscle Strength Impairment  Goal: Improved Muscle Strength  Outcome: Ongoing, Progressing

## 2020-02-08 NOTE — SUBJECTIVE & OBJECTIVE
Interval History: Denies any issues overnight. States he still feels sob whenever he lays flat. Reports sleeping in recliner at home.     Review of Systems   Constitutional: Positive for chills. Negative for fatigue and fever.   Respiratory: Negative for shortness of breath.    Cardiovascular: Positive for leg swelling. Negative for chest pain and palpitations.   Gastrointestinal: Negative for nausea and vomiting.   Skin: Negative for rash.   Neurological: Positive for weakness.     Objective:     Vital Signs (Most Recent):  Temp: 98.1 °F (36.7 °C) (02/08/20 0738)  Pulse: 62 (02/08/20 0738)  Resp: 20 (02/08/20 0738)  BP: (!) 143/73 (02/08/20 0738)  SpO2: (!) 94 % (02/08/20 0800) Vital Signs (24h Range):  Temp:  [96.3 °F (35.7 °C)-98.2 °F (36.8 °C)] 98.1 °F (36.7 °C)  Pulse:  [60-66] 62  Resp:  [18-20] 20  SpO2:  [92 %-95 %] 94 %  BP: (127-143)/(72-80) 143/73     Weight: (!) 200.3 kg (441 lb 9.6 oz)  Body mass index is 49.75 kg/m².    Intake/Output Summary (Last 24 hours) at 2/8/2020 1146  Last data filed at 2/8/2020 0600  Gross per 24 hour   Intake 1517 ml   Output 2260 ml   Net -743 ml      Physical Exam   Constitutional: He is oriented to person, place, and time. He appears well-developed and well-nourished. No distress.   HENT:   Head: Normocephalic and atraumatic.   Cardiovascular: Normal rate, regular rhythm and normal heart sounds. Exam reveals no gallop and no friction rub.   No murmur heard.  Pulmonary/Chest: Effort normal and breath sounds normal. No stridor. No respiratory distress. He has no wheezes. He has no rales.   Abdominal: Soft. Bowel sounds are normal. He exhibits no distension and no mass. There is no tenderness. There is no guarding.   Musculoskeletal: Normal range of motion. He exhibits edema (3+ edema BL).   Neurological: He is alert and oriented to person, place, and time.   Skin: Skin is warm. He is not diaphoretic. There is erythema (noted on lower ext.).       Significant Labs:   Recent  Lab Results       02/08/20  1134   02/08/20  0717   02/08/20  0617   02/08/20  0519   02/07/20  2057        Albumin       2.7       Alkaline Phosphatase       80       ALT       27       Anion Gap       11       AST       37       Baso #       0.01       Basophil%       0.4       BILIRUBIN TOTAL       0.5  Comment:  For infants and newborns, interpretation of results should be based  on gestational age, weight and in agreement with clinical  observations.  Premature Infant recommended reference ranges:  Up to 24 hours.............<8.0 mg/dL  Up to 48 hours............<12.0 mg/dL  3-5 days..................<15.0 mg/dL  6-29 days.................<15.0 mg/dL         BUN, Bld       30       Calcium       8.3       Chloride       106       CO2       18       Creatinine       2.0       Differential Method       Automated       eGFR if        40       eGFR if non        35  Comment:  Calculation used to obtain the estimated glomerular filtration  rate (eGFR) is the CKD-EPI equation.          Eos #       0.0       Eosinophil%       0.0       Glucose       68       Gran # (ANC)       1.6       Gran%       57.8       Hematocrit       38.7       Hemoglobin       13.0       Immature Grans (Abs)       0.00  Comment:  Mild elevation in immature granulocytes is non specific and   can be seen in a variety of conditions including stress response,   acute inflammation, trauma and pregnancy. Correlation with other   laboratory and clinical findings is essential.         Immature Granulocytes       0.0       Lymph #       0.9       Lymph%       33.2       MCH       30.6       MCHC       33.6       MCV       91       Mono #       0.2       Mono%       8.6       MPV       10.1       nRBC       0       Ovalocytes       Occasional       Platelet Estimate       Decreased       Platelets       112       POCT Glucose 124 126 68   82     Potassium       3.3       PROTEIN TOTAL       6.2       RBC       4.25        RDW       12.3       Sodium       135       Tear Drop Cells       Occasional       WBC       2.80                        02/07/20  1721        Albumin       Alkaline Phosphatase       ALT       Anion Gap       AST       Baso #       Basophil%       BILIRUBIN TOTAL       BUN, Bld       Calcium       Chloride       CO2       Creatinine       Differential Method       eGFR if        eGFR if non        Eos #       Eosinophil%       Glucose       Gran # (ANC)       Gran%       Hematocrit       Hemoglobin       Immature Grans (Abs)       Immature Granulocytes       Lymph #       Lymph%       MCH       MCHC       MCV       Mono #       Mono%       MPV       nRBC       Ovalocytes       Platelet Estimate       Platelets       POCT Glucose 120     Potassium       PROTEIN TOTAL       RBC       RDW       Sodium       Tear Drop Cells       WBC           All pertinent labs within the past 24 hours have been reviewed.    Significant Imaging: I have reviewed all pertinent imaging results/findings within the past 24 hours.

## 2020-02-08 NOTE — ASSESSMENT & PLAN NOTE
- Hold Glyset during hospital stay.  - Continue 70/30 insulin at 70 units BID with meals.  Follow AccuChecks.  - Diabetic diet.  - Recent HbA1c of 8.5.    2/8:  hba1c elevated  Glucose controlled today  Plan to keep glucose between 140-180  Can decrease insulin if necessary to prevent hypoglycemia

## 2020-02-08 NOTE — ASSESSMENT & PLAN NOTE
- BP improved after receiving home dose Coreg in the ED.  - Continue home Coreg and lisinopril.  Follow BP trends and optimize as needed.  - Diuretics as above.    2/8:  BP controlled

## 2020-02-09 VITALS
OXYGEN SATURATION: 97 % | HEIGHT: 78 IN | HEART RATE: 63 BPM | TEMPERATURE: 98 F | SYSTOLIC BLOOD PRESSURE: 143 MMHG | RESPIRATION RATE: 20 BRPM | BODY MASS INDEX: 36.45 KG/M2 | DIASTOLIC BLOOD PRESSURE: 74 MMHG | WEIGHT: 315 LBS

## 2020-02-09 PROBLEM — I10 ACCELERATED HYPERTENSION: Status: RESOLVED | Noted: 2020-02-07 | Resolved: 2020-02-09

## 2020-02-09 PROBLEM — D61.818 PANCYTOPENIA: Status: ACTIVE | Noted: 2020-02-09

## 2020-02-09 LAB
ANION GAP SERPL CALC-SCNC: 12 MMOL/L (ref 8–16)
BASOPHILS # BLD AUTO: 0 K/UL (ref 0–0.2)
BASOPHILS NFR BLD: 0 % (ref 0–1.9)
BUN SERPL-MCNC: 29 MG/DL (ref 8–23)
CALCIUM SERPL-MCNC: 8.5 MG/DL (ref 8.7–10.5)
CHLORIDE SERPL-SCNC: 104 MMOL/L (ref 95–110)
CO2 SERPL-SCNC: 22 MMOL/L (ref 23–29)
CREAT SERPL-MCNC: 1.9 MG/DL (ref 0.5–1.4)
DACRYOCYTES BLD QL SMEAR: ABNORMAL
DIFFERENTIAL METHOD: ABNORMAL
EOSINOPHIL # BLD AUTO: 0 K/UL (ref 0–0.5)
EOSINOPHIL NFR BLD: 0.4 % (ref 0–8)
ERYTHROCYTE [DISTWIDTH] IN BLOOD BY AUTOMATED COUNT: 12.4 % (ref 11.5–14.5)
EST. GFR  (AFRICAN AMERICAN): 43 ML/MIN/1.73 M^2
EST. GFR  (NON AFRICAN AMERICAN): 37 ML/MIN/1.73 M^2
GLUCOSE SERPL-MCNC: 97 MG/DL (ref 70–110)
HCT VFR BLD AUTO: 38.2 % (ref 40–54)
HGB BLD-MCNC: 13.1 G/DL (ref 14–18)
IMM GRANULOCYTES # BLD AUTO: 0.01 K/UL (ref 0–0.04)
IMM GRANULOCYTES NFR BLD AUTO: 0.4 % (ref 0–0.5)
LYMPHOCYTES # BLD AUTO: 0.8 K/UL (ref 1–4.8)
LYMPHOCYTES NFR BLD: 33.6 % (ref 18–48)
MCH RBC QN AUTO: 30.8 PG (ref 27–31)
MCHC RBC AUTO-ENTMCNC: 34.3 G/DL (ref 32–36)
MCV RBC AUTO: 90 FL (ref 82–98)
MONOCYTES # BLD AUTO: 0.3 K/UL (ref 0.3–1)
MONOCYTES NFR BLD: 12.6 % (ref 4–15)
NEUTROPHILS # BLD AUTO: 1.3 K/UL (ref 1.8–7.7)
NEUTROPHILS NFR BLD: 53 % (ref 38–73)
NRBC BLD-RTO: 0 /100 WBC
OVALOCYTES BLD QL SMEAR: ABNORMAL
PLATELET # BLD AUTO: 150 K/UL (ref 150–350)
PLATELET BLD QL SMEAR: ABNORMAL
PMV BLD AUTO: 10 FL (ref 9.2–12.9)
POCT GLUCOSE: 119 MG/DL (ref 70–110)
POCT GLUCOSE: 96 MG/DL (ref 70–110)
POTASSIUM SERPL-SCNC: 3.2 MMOL/L (ref 3.5–5.1)
RBC # BLD AUTO: 4.25 M/UL (ref 4.6–6.2)
SODIUM SERPL-SCNC: 138 MMOL/L (ref 136–145)
STOMATOCYTES BLD QL SMEAR: PRESENT
WBC # BLD AUTO: 2.47 K/UL (ref 3.9–12.7)

## 2020-02-09 PROCEDURE — 99255 PR INITIAL INPATIENT CONSULT,LEVL V: ICD-10-PCS | Mod: ,,, | Performed by: INTERNAL MEDICINE

## 2020-02-09 PROCEDURE — 80048 BASIC METABOLIC PNL TOTAL CA: CPT

## 2020-02-09 PROCEDURE — 25000003 PHARM REV CODE 250: Performed by: NURSE PRACTITIONER

## 2020-02-09 PROCEDURE — 63600175 PHARM REV CODE 636 W HCPCS: Performed by: FAMILY MEDICINE

## 2020-02-09 PROCEDURE — 99255 IP/OBS CONSLTJ NEW/EST HI 80: CPT | Mod: ,,, | Performed by: INTERNAL MEDICINE

## 2020-02-09 PROCEDURE — 85025 COMPLETE CBC W/AUTO DIFF WBC: CPT

## 2020-02-09 PROCEDURE — 36415 COLL VENOUS BLD VENIPUNCTURE: CPT

## 2020-02-09 PROCEDURE — 94761 N-INVAS EAR/PLS OXIMETRY MLT: CPT

## 2020-02-09 PROCEDURE — 25000003 PHARM REV CODE 250: Performed by: FAMILY MEDICINE

## 2020-02-09 RX ORDER — FUROSEMIDE 40 MG/1
40 TABLET ORAL 2 TIMES DAILY
Qty: 60 TABLET | Refills: 11 | Status: SHIPPED | OUTPATIENT
Start: 2020-02-09 | End: 2020-07-28

## 2020-02-09 RX ORDER — POTASSIUM CHLORIDE 20 MEQ/1
40 TABLET, EXTENDED RELEASE ORAL ONCE
Status: COMPLETED | OUTPATIENT
Start: 2020-02-09 | End: 2020-02-09

## 2020-02-09 RX ORDER — ASPIRIN 81 MG/1
81 TABLET ORAL DAILY
Refills: 0 | COMMUNITY
Start: 2020-02-10 | End: 2020-09-01

## 2020-02-09 RX ADMIN — ATORVASTATIN CALCIUM 10 MG: 10 TABLET, FILM COATED ORAL at 08:02

## 2020-02-09 RX ADMIN — POTASSIUM CHLORIDE 40 MEQ: 1500 TABLET, EXTENDED RELEASE ORAL at 08:02

## 2020-02-09 RX ADMIN — GUAIFENESIN 200 MG: 200 SOLUTION ORAL at 06:02

## 2020-02-09 RX ADMIN — PANTOPRAZOLE SODIUM 40 MG: 40 TABLET, DELAYED RELEASE ORAL at 08:02

## 2020-02-09 RX ADMIN — DULOXETINE HYDROCHLORIDE 30 MG: 30 CAPSULE, DELAYED RELEASE ORAL at 08:02

## 2020-02-09 RX ADMIN — LISINOPRIL 20 MG: 20 TABLET ORAL at 08:02

## 2020-02-09 RX ADMIN — GUAIFENESIN AND DEXTROMETHORPHAN HYDROBROMIDE 1 TABLET: 600; 30 TABLET, EXTENDED RELEASE ORAL at 08:02

## 2020-02-09 RX ADMIN — ASPIRIN 81 MG: 81 TABLET ORAL at 08:02

## 2020-02-09 RX ADMIN — FUROSEMIDE 80 MG: 10 INJECTION, SOLUTION INTRAMUSCULAR; INTRAVENOUS at 08:02

## 2020-02-09 RX ADMIN — GUAIFENESIN 200 MG: 200 SOLUTION ORAL at 02:02

## 2020-02-09 NOTE — ASSESSMENT & PLAN NOTE
----- Message from Jewels Luong MD sent at 1/25/2019  2:25 PM CST -----  Normal urine, UTI resolved.   Patient's records demonstrate persistent pancytopenia back a number of years.  At this time high likelihood combination of factors patient does have some degree of renal insufficiency would account for his hemoglobin being low in terms of his white count platelet intermittently well high likelihood patient has non alcoholic steatohepatitis doses because of morbid obesity with shunting to spleen. Nothing needs to be done at the present time and would recommend follow-up for evaluation in the outpatient setting discussed implications with him copies of his CBCs for the last several years have been given demonstrating this to him

## 2020-02-09 NOTE — DISCHARGE INSTRUCTIONS

## 2020-02-09 NOTE — SUBJECTIVE & OBJECTIVE
Oncology Treatment Plan:   [No treatment plan]    Medications:  Continuous Infusions:  Scheduled Meds:   aspirin  81 mg Oral Daily    atorvastatin  10 mg Oral Daily    carvediloL  25 mg Oral BID WM    DULoxetine  30 mg Oral Daily    furosemide  80 mg Intravenous BID    insulin aspart protamine-insulin aspart  60 Units Subcutaneous BID AC    lidocaine  1 patch Transdermal Q24H    lisinopril  20 mg Oral Daily    pantoprazole  40 mg Oral Daily     PRN Meds:acetaminophen, albuterol-ipratropium, dextromethorphan-guaifenesin  mg, Dextrose 10% Bolus, Dextrose 10% Bolus, glucagon (human recombinant), glucose, glucose, melatonin, nitroGLYCERIN, ondansetron     Review of patient's allergies indicates:   Allergen Reactions    Cefazolin      Other reaction(s): Shakes  Other reaction(s): Chills        Past Medical History:   Diagnosis Date    Anxiety     Bell's palsy     CHF (congestive heart failure)     Coronary artery disease involving native coronary artery without angina pectoris 10/18/2016    Depression     Diabetes mellitus     Glaucoma     Hypertension     Idiopathic peripheral neuropathy 12/11/2012    Mixed hyperlipidemia 12/11/2012    Vitamin B 12 deficiency 8/23/2012     Past Surgical History:   Procedure Laterality Date    CARDIAC CATHETERIZATION  7/22/13    non obstructive cad    CATARACT EXTRACTION  OU    COLONOSCOPY N/A 5/1/2019    Procedure: COLONOSCOPY;  Surgeon: Henry Mo III, MD;  Location: [facility];  Service: Endoscopy;  Laterality: N/A;    CORONARY ANGIOPLASTY      ESOPHAGOGASTRODUODENOSCOPY N/A 5/1/2019    Procedure: ESOPHAGOGASTRODUODENOSCOPY (EGD);  Surgeon: Henry Mo III, MD;  Location: [facility];  Service: Endoscopy;  Laterality: N/A;    EYE SURGERY      FRACTURE SURGERY      kidney stone       August 2016    PC IOL OU      RETINAL DETACHMENT REPAIR W/ SCLERAL BUCKLE LE      WRIST SURGERY       Family History     Problem Relation (Age of Onset)     "Heart attack Father    Heart disease Father    Hypertension Mother, Maternal Grandmother, Maternal Grandfather    Macular degeneration Father, Paternal Uncle        Tobacco Use    Smoking status: Never Smoker    Smokeless tobacco: Never Used   Substance and Sexual Activity    Alcohol use: Yes     Comment: " one to two glasses of wine per year"    Drug use: No    Sexual activity: Not Currently     Birth control/protection: None       Review of Systems   Constitutional: Positive for fatigue. Negative for activity change, appetite change, chills, diaphoresis, fever and unexpected weight change.   HENT: Negative for congestion, dental problem, drooling, ear discharge, ear pain, facial swelling, hearing loss, mouth sores, nosebleeds, postnasal drip, rhinorrhea, sinus pressure, sneezing, sore throat, tinnitus, trouble swallowing and voice change.    Eyes: Negative for photophobia, pain, discharge, redness, itching and visual disturbance.   Respiratory: Positive for cough. Negative for apnea, choking, chest tightness, shortness of breath, wheezing and stridor.    Cardiovascular: Negative for chest pain, palpitations and leg swelling.   Gastrointestinal: Negative for abdominal distention, abdominal pain, anal bleeding, blood in stool, constipation, diarrhea, nausea, rectal pain and vomiting.   Endocrine: Negative for cold intolerance, heat intolerance, polydipsia, polyphagia and polyuria.   Genitourinary: Negative for decreased urine volume, difficulty urinating, discharge, dysuria, enuresis, flank pain, frequency, genital sores, hematuria, penile pain, penile swelling, scrotal swelling, testicular pain and urgency.   Musculoskeletal: Negative for arthralgias, back pain, gait problem, joint swelling, myalgias, neck pain and neck stiffness.   Skin: Negative for color change, pallor, rash and wound.   Allergic/Immunologic: Negative for environmental allergies, food allergies and immunocompromised state.   Neurological: " Positive for weakness. Negative for dizziness, tremors, seizures, syncope, facial asymmetry, speech difficulty, light-headedness, numbness and headaches.   Hematological: Negative for adenopathy. Does not bruise/bleed easily.   Psychiatric/Behavioral: Positive for dysphoric mood. Negative for agitation, behavioral problems, confusion, decreased concentration, hallucinations, self-injury, sleep disturbance and suicidal ideas. The patient is nervous/anxious. The patient is not hyperactive.      Objective:     Vital Signs (Most Recent):  Temp: 98.3 °F (36.8 °C) (02/09/20 0745)  Pulse: 63 (02/09/20 0745)  Resp: 20 (02/09/20 0745)  BP: (!) 143/74 (02/09/20 0745)  SpO2: 97 % (02/09/20 0745) Vital Signs (24h Range):  Temp:  [97.8 °F (36.6 °C)-100 °F (37.8 °C)] 98.3 °F (36.8 °C)  Pulse:  [52-69] 63  Resp:  [18-20] 20  SpO2:  [92 %-97 %] 97 %  BP: (116-166)/(59-82) 143/74     Weight: (!) 200.3 kg (441 lb 9.6 oz)  Body mass index is 49.75 kg/m².  Body surface area is 3.34 meters squared.      Intake/Output Summary (Last 24 hours) at 2/9/2020 0837  Last data filed at 2/9/2020 0700  Gross per 24 hour   Intake 840 ml   Output 3500 ml   Net -2660 ml       Physical Exam   Constitutional: He is oriented to person, place, and time. He appears well-developed and well-nourished. He appears distressed.   HENT:   Head: Normocephalic.   Right Ear: External ear normal.   Left Ear: External ear normal.   Nose: Nose normal. Right sinus exhibits no maxillary sinus tenderness and no frontal sinus tenderness. Left sinus exhibits no maxillary sinus tenderness and no frontal sinus tenderness.   Mouth/Throat: Oropharynx is clear and moist. No oropharyngeal exudate.   Eyes: Pupils are equal, round, and reactive to light. EOM and lids are normal. Right eye exhibits no discharge. Left eye exhibits no discharge. Right conjunctiva is not injected. Right conjunctiva has no hemorrhage. Left conjunctiva is not injected. Left conjunctiva has no  hemorrhage. No scleral icterus. Right eye exhibits normal extraocular motion. Left eye exhibits normal extraocular motion.   Neck: Normal range of motion. Neck supple. No JVD present. No tracheal deviation present. No thyromegaly present.   Cardiovascular: Normal rate, regular rhythm and normal heart sounds.   Pulmonary/Chest: Effort normal and breath sounds normal. No stridor. No respiratory distress.   Abdominal: Soft. Bowel sounds are normal. He exhibits no mass. There is no hepatosplenomegaly, splenomegaly or hepatomegaly. There is no tenderness.   Musculoskeletal: Normal range of motion. He exhibits no edema or tenderness.   Lymphadenopathy:        Head (right side): No posterior auricular and no occipital adenopathy present.        Head (left side): No posterior auricular and no occipital adenopathy present.     He has no cervical adenopathy.        Right cervical: No superficial cervical, no deep cervical and no posterior cervical adenopathy present.       Left cervical: No superficial cervical, no deep cervical and no posterior cervical adenopathy present.     He has no axillary adenopathy.        Right: No supraclavicular adenopathy present.        Left: No supraclavicular adenopathy present.   Neurological: He is alert and oriented to person, place, and time. He has normal strength. No cranial nerve deficit. Coordination normal.   Skin: Skin is dry. No rash noted. He is not diaphoretic. No cyanosis or erythema. Nails show no clubbing.   Psychiatric: He has a normal mood and affect. His behavior is normal. Judgment and thought content normal. Cognition and memory are normal.   Vitals reviewed.      Significant Labs:   BMP:   Recent Labs   Lab 02/08/20  0519 02/09/20  0710   GLU 68* 97   * 138   K 3.3* 3.2*    104   CO2 18* 22*   BUN 30* 29*   CREATININE 2.0* 1.9*   CALCIUM 8.3* 8.5*   , CBC:   Recent Labs   Lab 02/08/20  0519 02/09/20  0710   WBC 2.80* 2.47*   HGB 13.0* 13.1*   HCT 38.7* 38.2*    * 150   , CMP:   Recent Labs   Lab 02/08/20  0519 02/09/20  0710   * 138   K 3.3* 3.2*    104   CO2 18* 22*   GLU 68* 97   BUN 30* 29*   CREATININE 2.0* 1.9*   CALCIUM 8.3* 8.5*   PROT 6.2  --    ALBUMIN 2.7*  --    BILITOT 0.5  --    ALKPHOS 80  --    AST 37  --    ALT 27  --    ANIONGAP 11 12   EGFRNONAA 35* 37*   , Coagulation: No results for input(s): PT, INR, APTT in the last 48 hours., Haptoglobin: No results for input(s): HAPTOGLOBIN in the last 48 hours., Immunology: No results for input(s): SPEP, JESUS, TANIA, FREELAMBDALI in the last 48 hours., LDH: No results for input(s): LDHCSF, BFSOURCE in the last 48 hours. and LFTs:   Recent Labs   Lab 02/08/20  0519   ALT 27   AST 37   ALKPHOS 80   BILITOT 0.5   PROT 6.2   ALBUMIN 2.7*       Diagnostic Results:  I have reviewed all pertinent imaging results/findings within the past 24 hours.

## 2020-02-09 NOTE — PLAN OF CARE
No acute changes during shift  Pt AAOx4  Plan of care reviewed with patient. Verbalized understanding  Pt remained free from falls. All fall and safety precautions in place  Sinus meli to NSR on tele monitor  PIV, CDI   VSS  Call bell and personal items within reach.  Hourly rounding complete. Instructed to call for assistance  Pt denies needs and has no c/o at this time  Continue current plan of care   Possible discharge today    Problem: Wound  Goal: Optimal Wound Healing  Outcome: Ongoing, Progressing     Problem: Fall Injury Risk  Goal: Absence of Fall and Fall-Related Injury  Outcome: Ongoing, Progressing     Problem: Adult Inpatient Plan of Care  Goal: Plan of Care Review  Outcome: Ongoing, Progressing  Flowsheets (Taken 2/9/2020 0525)  Plan of Care Reviewed With: patient  Goal: Patient-Specific Goal (Individualization)  Outcome: Ongoing, Progressing  Goal: Absence of Hospital-Acquired Illness or Injury  Outcome: Ongoing, Progressing  Goal: Optimal Comfort and Wellbeing  Outcome: Ongoing, Progressing  Goal: Readiness for Transition of Care  Outcome: Ongoing, Progressing  Goal: Rounds/Family Conference  Outcome: Ongoing, Progressing     Problem: Diabetes Comorbidity  Goal: Blood Glucose Level Within Desired Range  Outcome: Ongoing, Progressing     Problem: Infection  Goal: Infection Symptom Resolution  Outcome: Ongoing, Progressing     Problem: Skin Injury Risk Increased  Goal: Skin Health and Integrity  Outcome: Ongoing, Progressing     Problem: Pain Acute  Goal: Optimal Pain Control  Outcome: Ongoing, Progressing     Problem: Balance Impairment (Functional Deficit)  Goal: Improved Balance and Postural Control  Outcome: Ongoing, Progressing     Problem: Coordination Impairment (Functional Deficit)  Goal: Optimal Coordination  Outcome: Ongoing, Progressing     Problem: Muscle Strength Impairment  Goal: Improved Muscle Strength  Outcome: Ongoing, Progressing

## 2020-02-09 NOTE — HPI
64-year-old male with morbid obesity congestive heart failure admitted to the hospital with flu.  Patient is found to have pancytopenia I was asked to see the patient for further evaluation

## 2020-02-09 NOTE — PLAN OF CARE
Patient discharged to home. PIV removed. Tele-monitor removed.  Discharge instructions reviewed with patient. Patient wheeled down to the car.

## 2020-02-09 NOTE — DISCHARGE SUMMARY
[facility] Hospital Medicine  Discharge Summary      Patient Name: Stepan Nixon  MRN: [id number]  Admission Date: 2/6/2020  Hospital Length of Stay: 2 days  Discharge Date and Time: 2/9/2020  3:00 PM  Attending Physician: No att. providers found   Discharging Provider: Marino Myers MD  Primary Care Provider: Elham Persaud MD      HPI:   Mr. Springer is a 64 y.o. male with a PMHx of CAD with remote PCI (BMS) of RCA in 9/2016, chronic diastolic HFpEF of 55-60%, HTN, HLD, CKD, DM II, and morbid obesity.  He presented to the ED with c/o generalized weakness x 2 weeks.  Associated fatigue, malaise, nausea, vomiting, diarrhea, sore throat, and nonproductive cough.  He states symptoms worsened over the past 1 day with SOB, congestion, and more frequent cough.  No aggravating or alleviating factors.  Patient has been unable to take any of his medications, including antihypertensives and diuretics, over the past several days due to above symptoms.  He denies any CP, palpitations, wheezing, orthopnea, PND, edema, ABD pain, hematochezia, melena, hematemesis, constipation, dysuria, hematuria, back pain, neck pain or stiffness, HA, lightheadedness, dizziness, syncope, rhinorrhea, fever or chills.  Initial work-up in the ED resulted WBC of 2.54, plt 112, creatinine 1.8 at baseline, BUN 19, glucose 265, troponin 0.11, BNP 76 (unreliable due to patient's obesity), influenza A (+), EKG unrevealing.  CXR showed pulmonary vascular congestion and edema with left pleural effusion.  In the ED, patient was hypertensive with /96.  He remained afebrile with stable O2 sat on RA.  Patient received 25 mg Coreg, which improved BP to 146/78.  500 mL IV fluids and IV Levaquin also given in the ED.  60 mg IV Lasix ordered.  Hospital Medicine was called for admission.      * No surgery found *      Hospital Course:   Patient admitted for diastolic HF exacerbation. He was started on lasix 40mg bid however it was  increased to 80mg bid. No oxygen was needed on day of discharge. Edema improved. He was counseled on sodium consumption and need to use cpap at night for his FRAN. Patient was pancytopenic during stay. Heme/onc consulted on case recommended outpatient follow up.        Consults:   Consults (From admission, onward)        Status Ordering Provider     Inpatient consult to Cardiology  Once     Provider:  Ramiro Summers MD    Completed PATO XIE     Inpatient consult to Hematology/Oncology  Once     Provider:  Owen Siegel MD    Acknowledged SUJEY NUNEZ     IP consult to case management  Once     Provider:  (Not yet assigned)    Completed HAL BEGUM new Assessment & Plan notes have been filed under this hospital service since the last note was generated.  Service: Hospital Medicine    Final Active Diagnoses:    Diagnosis Date Noted POA    Pancytopenia [D61.818] 02/09/2020 Yes    Elevated troponin I level [R79.89] 02/07/2020 Yes    Influenza A [J10.1] 02/07/2020 Yes    Thrombocytopenia [D69.6] 02/07/2020 Yes    CAD with remote PCI (BMS) of RCA in 9/2016 [I25.10] 10/18/2016 Yes     Chronic    Chronic kidney disease, stage III (moderate) [N18.3]  Yes     Chronic    Uncontrolled type 2 diabetes mellitus with kidney complication, with long-term current use of insulin [E11.29, Z79.4, E11.65] 08/21/2013 Not Applicable     Chronic      Problems Resolved During this Admission:    Diagnosis Date Noted Date Resolved POA    PRINCIPAL PROBLEM:  Acute on chronic diastolic HFpEF of 55-60% [I50.33] 07/20/2013 02/09/2020 Yes    Accelerated hypertension [I10] 02/07/2020 02/09/2020 Yes       Discharged Condition: good    Disposition: Home or Self Care    Follow Up:  Follow-up Information     Owen Siegel MD. Schedule an appointment as soon as possible for a visit in 2 weeks.    Specialty:  Hematology and Oncology  Contact information:  [address]  [place] [address] [phone]              Elham Persaud MD In 1 week.    Specialty:  Family Medicine  Contact information:  [id number] [facility] [id number] [phone]                 Patient Instructions:      Diet Cardiac       Significant Diagnostic Studies:   Results for orders placed or performed during the hospital encounter of 02/06/20   Influenza A & B by Molecular   Result Value Ref Range    Influenza A, Molecular Positive (A) Negative    Influenza B, Molecular Negative Negative    Flu A & B Source NP    Blood Culture #1 **CANNOT BE ORDERED STAT**   Result Value Ref Range    Blood Culture, Routine No Growth to date     Blood Culture, Routine No Growth to date     Blood Culture, Routine No Growth to date    Blood Culture #2 **CANNOT BE ORDERED STAT**   Result Value Ref Range    Blood Culture, Routine No Growth to date     Blood Culture, Routine No Growth to date     Blood Culture, Routine No Growth to date    HIV 1/2 Ag/Ab (4th Gen)   Result Value Ref Range    HIV 1/2 Ag/Ab Negative Negative   Hepatitis C antibody   Result Value Ref Range    Hepatitis C Ab Negative Negative   Comprehensive metabolic panel   Result Value Ref Range    Sodium 134 (L) 136 - 145 mmol/L    Potassium 3.5 3.5 - 5.1 mmol/L    Chloride 104 95 - 110 mmol/L    CO2 20 (L) 23 - 29 mmol/L    Glucose 265 (H) 70 - 110 mg/dL    BUN, Bld 19 8 - 23 mg/dL    Creatinine 1.8 (H) 0.5 - 1.4 mg/dL    Calcium 8.7 8.7 - 10.5 mg/dL    Total Protein 6.7 6.0 - 8.4 g/dL    Albumin 2.9 (L) 3.5 - 5.2 g/dL    Total Bilirubin 0.5 0.1 - 1.0 mg/dL    Alkaline Phosphatase 86 55 - 135 U/L    AST 32 10 - 40 U/L    ALT 22 10 - 44 U/L    Anion Gap 10 8 - 16 mmol/L    eGFR if African American 46 (A) >60 mL/min/1.73 m^2    eGFR if non African American 39 (A) >60 mL/min/1.73 m^2   Brain natriuretic peptide   Result Value Ref Range    BNP 76 0 - 99 pg/mL   CBC auto differential   Result Value Ref Range    WBC 2.54 (L) 3.90 - 12.70 K/uL    RBC 4.59 (L) 4.60 - 6.20 M/uL    Hemoglobin 14.0 14.0 -  18.0 g/dL    Hematocrit 42.1 40.0 - 54.0 %    Mean Corpuscular Volume 92 82 - 98 fL    Mean Corpuscular Hemoglobin 30.5 27.0 - 31.0 pg    Mean Corpuscular Hemoglobin Conc 33.3 32.0 - 36.0 g/dL    RDW 12.5 11.5 - 14.5 %    Platelets 112 (L) 150 - 350 K/uL    MPV 9.8 9.2 - 12.9 fL    Immature Granulocytes 0.4 0.0 - 0.5 %    Gran # (ANC) 1.8 1.8 - 7.7 K/uL    Immature Grans (Abs) 0.01 0.00 - 0.04 K/uL    Lymph # 0.5 (L) 1.0 - 4.8 K/uL    Mono # 0.3 0.3 - 1.0 K/uL    Eos # 0.0 0.0 - 0.5 K/uL    Baso # 0.00 0.00 - 0.20 K/uL    nRBC 0 0 /100 WBC    Gran% 70.9 38.0 - 73.0 %    Lymph% 18.9 18.0 - 48.0 %    Mono% 9.8 4.0 - 15.0 %    Eosinophil% 0.0 0.0 - 8.0 %    Basophil% 0.0 0.0 - 1.9 %    Differential Method Automated    Magnesium   Result Value Ref Range    Magnesium 1.9 1.6 - 2.6 mg/dL   Troponin I   Result Value Ref Range    Troponin I 0.110 (H) 0.000 - 0.026 ng/mL   Procalcitonin   Result Value Ref Range    Procalcitonin 0.11 <0.25 ng/mL   Troponin I   Result Value Ref Range    Troponin I 0.084 (H) 0.000 - 0.026 ng/mL   Magnesium   Result Value Ref Range    Magnesium 1.9 1.6 - 2.6 mg/dL   Lipid panel   Result Value Ref Range    Cholesterol 76 (L) 120 - 199 mg/dL    Triglycerides 86 30 - 150 mg/dL    HDL 20 (L) 40 - 75 mg/dL    LDL Cholesterol 38.8 (L) 63.0 - 159.0 mg/dL    Hdl/Cholesterol Ratio 26.3 20.0 - 50.0 %    Total Cholesterol/HDL Ratio 3.8 2.0 - 5.0    Non-HDL Cholesterol 56 mg/dL   CBC auto differential   Result Value Ref Range    WBC 2.35 (L) 3.90 - 12.70 K/uL    RBC 4.35 (L) 4.60 - 6.20 M/uL    Hemoglobin 13.2 (L) 14.0 - 18.0 g/dL    Hematocrit 39.6 (L) 40.0 - 54.0 %    Mean Corpuscular Volume 91 82 - 98 fL    Mean Corpuscular Hemoglobin 30.3 27.0 - 31.0 pg    Mean Corpuscular Hemoglobin Conc 33.3 32.0 - 36.0 g/dL    RDW 12.4 11.5 - 14.5 %    Platelets 94 (L) 150 - 350 K/uL    MPV 10.4 9.2 - 12.9 fL    Immature Granulocytes 0.4 0.0 - 0.5 %    Gran # (ANC) 1.6 (L) 1.8 - 7.7 K/uL    Immature Grans (Abs) 0.01  0.00 - 0.04 K/uL    Lymph # 0.6 (L) 1.0 - 4.8 K/uL    Mono # 0.2 (L) 0.3 - 1.0 K/uL    Eos # 0.0 0.0 - 0.5 K/uL    Baso # 0.00 0.00 - 0.20 K/uL    nRBC 0 0 /100 WBC    Gran% 66.0 38.0 - 73.0 %    Lymph% 23.4 18.0 - 48.0 %    Mono% 10.2 4.0 - 15.0 %    Eosinophil% 0.0 0.0 - 8.0 %    Basophil% 0.0 0.0 - 1.9 %    Differential Method Automated    Comprehensive metabolic panel   Result Value Ref Range    Sodium 133 (L) 136 - 145 mmol/L    Potassium 3.6 3.5 - 5.1 mmol/L    Chloride 106 95 - 110 mmol/L    CO2 18 (L) 23 - 29 mmol/L    Glucose 261 (H) 70 - 110 mg/dL    BUN, Bld 21 8 - 23 mg/dL    Creatinine 1.8 (H) 0.5 - 1.4 mg/dL    Calcium 8.3 (L) 8.7 - 10.5 mg/dL    Total Protein 6.2 6.0 - 8.4 g/dL    Albumin 2.7 (L) 3.5 - 5.2 g/dL    Total Bilirubin 0.5 0.1 - 1.0 mg/dL    Alkaline Phosphatase 77 55 - 135 U/L    AST 29 10 - 40 U/L    ALT 20 10 - 44 U/L    Anion Gap 9 8 - 16 mmol/L    eGFR if African American 46 (A) >60 mL/min/1.73 m^2    eGFR if non African American 39 (A) >60 mL/min/1.73 m^2   Phosphorus   Result Value Ref Range    Phosphorus 3.1 2.7 - 4.5 mg/dL   Hemoglobin A1c   Result Value Ref Range    Hemoglobin A1C 8.2 (H) 4.0 - 5.6 %    Estimated Avg Glucose 189 (H) 68 - 131 mg/dL   Urinalysis, Reflex to Urine Culture Urine, Clean Catch   Result Value Ref Range    Specimen UA Urine, Clean Catch     Color, UA Yellow Yellow, Straw, Ella    Appearance, UA Clear Clear    pH, UA 6.0 5.0 - 8.0    Specific Gravity, UA 1.020 1.005 - 1.030    Protein, UA Negative Negative    Glucose, UA Negative Negative    Ketones, UA Negative Negative    Bilirubin (UA) Negative Negative    Occult Blood UA Trace (A) Negative    Nitrite, UA Negative Negative    Urobilinogen, UA Negative <2.0 EU/dL    Leukocytes, UA Negative Negative   CK-MB   Result Value Ref Range     (H) 20 - 200 U/L    CPK MB 1.8 0.1 - 6.5 ng/mL    MB% 0.2 0.0 - 5.0 %   Troponin I   Result Value Ref Range    Troponin I 0.057 (H) 0.000 - 0.026 ng/mL   CBC auto  differential   Result Value Ref Range    WBC 2.80 (L) 3.90 - 12.70 K/uL    RBC 4.25 (L) 4.60 - 6.20 M/uL    Hemoglobin 13.0 (L) 14.0 - 18.0 g/dL    Hematocrit 38.7 (L) 40.0 - 54.0 %    Mean Corpuscular Volume 91 82 - 98 fL    Mean Corpuscular Hemoglobin 30.6 27.0 - 31.0 pg    Mean Corpuscular Hemoglobin Conc 33.6 32.0 - 36.0 g/dL    RDW 12.3 11.5 - 14.5 %    Platelets 112 (L) 150 - 350 K/uL    MPV 10.1 9.2 - 12.9 fL    Immature Granulocytes 0.0 0.0 - 0.5 %    Gran # (ANC) 1.6 (L) 1.8 - 7.7 K/uL    Immature Grans (Abs) 0.00 0.00 - 0.04 K/uL    Lymph # 0.9 (L) 1.0 - 4.8 K/uL    Mono # 0.2 (L) 0.3 - 1.0 K/uL    Eos # 0.0 0.0 - 0.5 K/uL    Baso # 0.01 0.00 - 0.20 K/uL    nRBC 0 0 /100 WBC    Gran% 57.8 38.0 - 73.0 %    Lymph% 33.2 18.0 - 48.0 %    Mono% 8.6 4.0 - 15.0 %    Eosinophil% 0.0 0.0 - 8.0 %    Basophil% 0.4 0.0 - 1.9 %    Platelet Estimate Decreased (A)     Ovalocytes Occasional     Tear Drop Cells Occasional     Differential Method Automated    Comprehensive metabolic panel   Result Value Ref Range    Sodium 135 (L) 136 - 145 mmol/L    Potassium 3.3 (L) 3.5 - 5.1 mmol/L    Chloride 106 95 - 110 mmol/L    CO2 18 (L) 23 - 29 mmol/L    Glucose 68 (L) 70 - 110 mg/dL    BUN, Bld 30 (H) 8 - 23 mg/dL    Creatinine 2.0 (H) 0.5 - 1.4 mg/dL    Calcium 8.3 (L) 8.7 - 10.5 mg/dL    Total Protein 6.2 6.0 - 8.4 g/dL    Albumin 2.7 (L) 3.5 - 5.2 g/dL    Total Bilirubin 0.5 0.1 - 1.0 mg/dL    Alkaline Phosphatase 80 55 - 135 U/L    AST 37 10 - 40 U/L    ALT 27 10 - 44 U/L    Anion Gap 11 8 - 16 mmol/L    eGFR if African American 40 (A) >60 mL/min/1.73 m^2    eGFR if non African American 35 (A) >60 mL/min/1.73 m^2   CBC auto differential   Result Value Ref Range    WBC 2.47 (L) 3.90 - 12.70 K/uL    RBC 4.25 (L) 4.60 - 6.20 M/uL    Hemoglobin 13.1 (L) 14.0 - 18.0 g/dL    Hematocrit 38.2 (L) 40.0 - 54.0 %    Mean Corpuscular Volume 90 82 - 98 fL    Mean Corpuscular Hemoglobin 30.8 27.0 - 31.0 pg    Mean Corpuscular Hemoglobin  Conc 34.3 32.0 - 36.0 g/dL    RDW 12.4 11.5 - 14.5 %    Platelets 150 150 - 350 K/uL    MPV 10.0 9.2 - 12.9 fL    Immature Granulocytes 0.4 0.0 - 0.5 %    Gran # (ANC) 1.3 (L) 1.8 - 7.7 K/uL    Immature Grans (Abs) 0.01 0.00 - 0.04 K/uL    Lymph # 0.8 (L) 1.0 - 4.8 K/uL    Mono # 0.3 0.3 - 1.0 K/uL    Eos # 0.0 0.0 - 0.5 K/uL    Baso # 0.00 0.00 - 0.20 K/uL    nRBC 0 0 /100 WBC    Gran% 53.0 38.0 - 73.0 %    Lymph% 33.6 18.0 - 48.0 %    Mono% 12.6 4.0 - 15.0 %    Eosinophil% 0.4 0.0 - 8.0 %    Basophil% 0.0 0.0 - 1.9 %    Platelet Estimate Appears normal     Ovalocytes Occasional     Tear Drop Cells Occasional     Stomatocytes Present     Differential Method Automated    Basic metabolic panel   Result Value Ref Range    Sodium 138 136 - 145 mmol/L    Potassium 3.2 (L) 3.5 - 5.1 mmol/L    Chloride 104 95 - 110 mmol/L    CO2 22 (L) 23 - 29 mmol/L    Glucose 97 70 - 110 mg/dL    BUN, Bld 29 (H) 8 - 23 mg/dL    Creatinine 1.9 (H) 0.5 - 1.4 mg/dL    Calcium 8.5 (L) 8.7 - 10.5 mg/dL    Anion Gap 12 8 - 16 mmol/L    eGFR if African American 43 (A) >60 mL/min/1.73 m^2    eGFR if non African American 37 (A) >60 mL/min/1.73 m^2   2D echo with color flow doppler   Result Value Ref Range    QEF 55 55 - 65    Diastolic Dysfunction No    POCT glucose   Result Value Ref Range    POCT Glucose 256 (H) 70 - 110 mg/dL   POCT glucose   Result Value Ref Range    POCT Glucose 271 (H) 70 - 110 mg/dL   POCT glucose   Result Value Ref Range    POCT Glucose 270 (H) 70 - 110 mg/dL   POCT glucose   Result Value Ref Range    POCT Glucose 108 70 - 110 mg/dL   POCT glucose   Result Value Ref Range    POCT Glucose 120 (H) 70 - 110 mg/dL   POCT glucose   Result Value Ref Range    POCT Glucose 82 70 - 110 mg/dL   POCT glucose   Result Value Ref Range    POCT Glucose 68 (L) 70 - 110 mg/dL   POCT glucose   Result Value Ref Range    POCT Glucose 126 (H) 70 - 110 mg/dL   POCT glucose   Result Value Ref Range    POCT Glucose 124 (H) 70 - 110 mg/dL   POCT  glucose   Result Value Ref Range    POCT Glucose 137 (H) 70 - 110 mg/dL   POCT glucose   Result Value Ref Range    POCT Glucose 65 (L) 70 - 110 mg/dL   POCT glucose   Result Value Ref Range    POCT Glucose 48 (LL) 70 - 110 mg/dL   POCT glucose   Result Value Ref Range    POCT Glucose 90 70 - 110 mg/dL   POCT glucose   Result Value Ref Range    POCT Glucose 119 (H) 70 - 110 mg/dL   POCT glucose   Result Value Ref Range    POCT Glucose 96 70 - 110 mg/dL        Pending Diagnostic Studies:     Procedure Component Value Units Date/Time    CT Chest Without Contrast [[id number]]     Order Status:  Sent Lab Status:  No result          Medications:  Reconciled Home Medications:      Medication List      START taking these medications    aspirin 81 MG EC tablet  Commonly known as:  ECOTRIN  Take 1 tablet (81 mg total) by mouth once daily.  Start taking on:  February 10, 2020  Replaces:  aspirin 325 MG tablet        CHANGE how you take these medications    furosemide 40 MG tablet  Commonly known as:  Lasix  Take 1 tablet (40 mg total) by mouth 2 (two) times daily.  What changed:  See the new instructions.        CONTINUE taking these medications    Accu-Chek Sol Plus Meter [facility]  Generic drug:  blood-glucose meter     atorvastatin 10 MG tablet  Commonly known as:  LIPITOR  TAKE 1 TABLET BY MOUTH ONCE DAILY     Blood Pressure Cuff Misc  Generic drug:  miscellaneous medical supply  1 cuff     blood sugar diagnostic Strp  To check BG 3 times daily, to use with insurance preferred meter     brimonidine 0.1% 0.1 % Drop  Commonly known as:  ALPHAGAN P  Place 1 drop into both eyes 3 (three) times daily. Order 90 day supply     brinzolamide 1 % ophthalmic suspension  Commonly known as:  AZOPT  Place 1 drop into both eyes 3 (three) times daily. ORDER 90 DAY SUPPLY     carvediloL 25 MG tablet  Commonly known as:  COREG  TAKE 1 TABLET BY MOUTH TWICE DAILY WITH MEALS     DULoxetine 30 MG capsule  Commonly known as:  CYMBALTA  Take 1  capsule daily for 7 days then 2 capsules daily.     flash glucose sensor Kit  Commonly known as:  FreeStyle Sriram 14 Day Sensor  1 kit by Misc.(Non-Drug; Combo Route) route every 14 (fourteen) days.     insulin NPH-insulin regular (70/30) 100 unit/mL (70-30) injection  Commonly known as:  NovoLIN 70/30 U-100 Insulin  Inject 130 Units into the skin 2 (two) times daily before meals.     lancets [facility]  To check BG 3 times daily, to use with insurance preferred meter     lisinopril 20 MG tablet  Commonly known as:  PRINIVIL,ZESTRIL  Take 1 tablet (20 mg total) by mouth once daily.     miglitol 25 MG Tab  Commonly known as:  GLYSET  Take 1 tablet (25 mg total) by mouth 3 (three) times daily with meals.     mupirocin 2 % ointment  Commonly known as:  BACTROBAN  Apply topically 2 (two) times daily.     netarsudil 0.02 % Drop  Commonly known as:  Rhopressa  Place 1 drop into both eyes once daily.     nitroGLYCERIN 0.4 MG SL tablet  Commonly known as:  NITROSTAT  Place 1 tablet (0.4 mg total) under the tongue every 5 (five) minutes as needed for Chest pain.     omeprazole 40 MG capsule  Commonly known as:  PRILOSEC  Take 1 capsule (40 mg total) by mouth once daily.     polyethylene glycol 17 gram/dose powder  Commonly known as:  GLYCOLAX  Take 17 g by mouth once daily.     sildenafil 50 MG tablet  Commonly known as:  VIAGRA  Take 1 tablet (50 mg total) by mouth daily as needed for Erectile Dysfunction.     travoprost 0.004 % ophthalmic solution  Commonly known as:  Travatan Z  Place 1 drop into both eyes every evening.        STOP taking these medications    aspirin 325 MG tablet  Replaced by:  aspirin 81 MG EC tablet            Indwelling Lines/Drains at time of discharge:   Lines/Drains/Airways     None                 Time spent on the discharge of patient: 40 minutes  Patient was seen and examined on the date of discharge and determined to be suitable for discharge.         Marino Myers MD  Department of Hospital  Medicine  [facility]

## 2020-02-09 NOTE — NURSING
Last night around 2130 pt's BG 64. PO snack given  BG re-checked at 2212 and was 48  Pt  Asymptomatic  24 grams of glucose tablets given  BG re-checked at 2238 and was 90    Spot checked at 0200 and it was 119

## 2020-02-12 LAB
BACTERIA BLD CULT: NORMAL
BACTERIA BLD CULT: NORMAL

## 2020-02-20 ENCOUNTER — OFFICE VISIT (OUTPATIENT)
Dept: PSYCHIATRY | Facility: CLINIC | Age: 65
End: 2020-02-20
Payer: COMMERCIAL

## 2020-02-20 DIAGNOSIS — F33.1 MDD (MAJOR DEPRESSIVE DISORDER), RECURRENT EPISODE, MODERATE: Primary | ICD-10-CM

## 2020-02-20 PROCEDURE — 90834 PR PSYCHOTHERAPY W/PATIENT, 45 MIN: ICD-10-PCS | Mod: S$GLB,,, | Performed by: SOCIAL WORKER

## 2020-02-20 PROCEDURE — 90834 PSYTX W PT 45 MINUTES: CPT | Mod: S$GLB,,, | Performed by: SOCIAL WORKER

## 2020-02-20 NOTE — PROGRESS NOTES
Individual Psychotherapy (PhD/LCSW)    2/20/2020    Site:  [place]         Therapeutic Intervention: Met with patient.  Outpatient - Insight oriented psychotherapy 45 min - CPT code 53983    Chief complaint/reason for encounter: depression     Interval history and content of current session:  Patient presents to ongoing individual therapy due to depression.  He was admitted to the hospital for four days which is why he missed the last session.  He was retaining fluid, had pneumonia, and the flu.  He was not able to drive himself to the hospital.  He was brought there by ambulance.  He lost thirty pounds due to the fluid that was taken off of him.  He thinks that the medical condition had been coming on for some time.  He admits he could double his Lasix occasionally to get the fluid off.  He is feeling much better with the weight loss.  He was able to walk a distance into the building this morning without being winded.  He has been busy preparing tax returns.  He will have to work seven days a week now because of missing three weeks of work.  He continues to have ruminative thoughts about his ex girlfriend.  Emphasize the need to retain the weight loss that has happened.  Encourage the patient to work to lessen the ruminations.  Praise the patient for caring for himself medically.  He still has plans to meet with his  after tax season.  His children have agreed that the patient needs a new chair to sit in at home.  They are getting something more sturdy.  The patient is looking forward to the parades in his town of [place].  He will be going for a birthday dinner with his friend, Will.    Target symptoms: depression  Why chosen therapy is appropriate versus another modality: relevant to diagnosis  Outcome monitoring methods: self-report, observation  Therapeutic intervention type: insight oriented psychotherapy, supportive psychotherapy, interactive psychotherapy     Risk parameters:  Patient reports no  suicidal ideation  Patient reports no homicidal ideation  Patient reports no self-injurious behavior  Patient reports no violent behavior     Verbal deficits: none     Patient's response to intervention:  The patient's response to intervention is accepting, motivated.     Progress toward goals and other mental status changes:  The patient's progress toward goals is fair .     Diagnosis:   Major depression recurrent moderate     Plan:  individual psychotherapy and medication management by physician     Return to clinic: as scheduled     Length of Service (minutes): 45

## 2020-02-25 ENCOUNTER — PATIENT OUTREACH (OUTPATIENT)
Dept: ADMINISTRATIVE | Facility: HOSPITAL | Age: 65
End: 2020-02-25

## 2020-02-28 ENCOUNTER — TELEPHONE (OUTPATIENT)
Dept: CARDIOLOGY | Facility: CLINIC | Age: 65
End: 2020-02-28

## 2020-03-05 ENCOUNTER — OFFICE VISIT (OUTPATIENT)
Dept: PSYCHIATRY | Facility: CLINIC | Age: 65
End: 2020-03-05
Payer: COMMERCIAL

## 2020-03-05 DIAGNOSIS — F33.1 MDD (MAJOR DEPRESSIVE DISORDER), RECURRENT EPISODE, MODERATE: Primary | ICD-10-CM

## 2020-03-05 PROCEDURE — 90834 PR PSYCHOTHERAPY W/PATIENT, 45 MIN: ICD-10-PCS | Mod: S$GLB,,, | Performed by: SOCIAL WORKER

## 2020-03-05 PROCEDURE — 90834 PSYTX W PT 45 MINUTES: CPT | Mod: S$GLB,,, | Performed by: SOCIAL WORKER

## 2020-03-06 ENCOUNTER — PATIENT OUTREACH (OUTPATIENT)
Dept: ADMINISTRATIVE | Facility: OTHER | Age: 65
End: 2020-03-06

## 2020-03-10 ENCOUNTER — OFFICE VISIT (OUTPATIENT)
Dept: OPHTHALMOLOGY | Facility: CLINIC | Age: 65
End: 2020-03-10
Payer: COMMERCIAL

## 2020-03-10 DIAGNOSIS — H35.372 EPIRETINAL MEMBRANE (ERM) OF LEFT EYE: ICD-10-CM

## 2020-03-10 DIAGNOSIS — H54.10 BLINDNESS AND LOW VISION: ICD-10-CM

## 2020-03-10 DIAGNOSIS — H40.1133 PRIMARY OPEN ANGLE GLAUCOMA OF BOTH EYES, SEVERE STAGE: Primary | ICD-10-CM

## 2020-03-10 PROCEDURE — 99999 PR PBB SHADOW E&M-EST. PATIENT-LVL II: CPT | Mod: PBBFAC,,, | Performed by: OPHTHALMOLOGY

## 2020-03-10 PROCEDURE — 99999 PR PBB SHADOW E&M-EST. PATIENT-LVL II: ICD-10-PCS | Mod: PBBFAC,,, | Performed by: OPHTHALMOLOGY

## 2020-03-10 PROCEDURE — 92133 CPTRZD OPH DX IMG PST SGM ON: CPT | Mod: S$GLB,,, | Performed by: OPHTHALMOLOGY

## 2020-03-10 PROCEDURE — 92133 POSTERIOR SEGMENT OCT OPTIC NERVE(OCULAR COHERENCE TOMOGRAPHY) - OU - BOTH EYES: ICD-10-PCS | Mod: S$GLB,,, | Performed by: OPHTHALMOLOGY

## 2020-03-10 PROCEDURE — 92012 PR EYE EXAM, EST PATIENT,INTERMED: ICD-10-PCS | Mod: S$GLB,,, | Performed by: OPHTHALMOLOGY

## 2020-03-10 PROCEDURE — 92012 INTRM OPH EXAM EST PATIENT: CPT | Mod: S$GLB,,, | Performed by: OPHTHALMOLOGY

## 2020-03-10 RX ORDER — LATANOPROST 50 UG/ML
1 SOLUTION/ DROPS OPHTHALMIC DAILY
Qty: 3 BOTTLE | Refills: 4 | Status: SHIPPED | OUTPATIENT
Start: 2020-03-10 | End: 2020-07-14 | Stop reason: SDUPTHER

## 2020-03-10 RX ORDER — BRINZOLAMIDE 10 MG/ML
1 SUSPENSION/ DROPS OPHTHALMIC 3 TIMES DAILY
Qty: 10 ML | Refills: 4 | Status: SHIPPED | OUTPATIENT
Start: 2020-03-10 | End: 2020-11-10 | Stop reason: SDUPTHER

## 2020-03-10 RX ORDER — BRIMONIDINE TARTRATE 1 MG/ML
1 SOLUTION/ DROPS OPHTHALMIC 3 TIMES DAILY
Qty: 10 ML | Refills: 4 | Status: SHIPPED | OUTPATIENT
Start: 2020-03-10 | End: 2020-11-10 | Stop reason: SDUPTHER

## 2020-03-10 NOTE — PROGRESS NOTES
HPI     Glaucoma     Comments: 3 month IOP check with [facility]              Diabetes     Comments: 3 month follow up with [facility]              Comments     Patient fells OU is doing well, no changes in VA and states lost his   drops several weeks ago and has not had his Rhopressa and will need   refills.      POAG - Dr Burgess pt  Corneal Opacity  [place] palsy  DM  RD Repair with Scleral Buckle right eye  PCIOL OU  CANALOPLASTY OD 8-22-13 Good flow and tension(-900)  CANAL OS 03/20/14/LOT # 1306-01/REF # [id number]  -500 with shutter effect due to much intraop respiratory movement  Moderate flow with good tension      Azopt BID OU, Alphagan BID OU  OU: Travatan Z QHS (Patient does not know the name just states he uses all   3 bottles bid ou )  OS Rhopressa QD          Last edited by Lyndsay Paul, Patient Care Assistant on 3/10/2020  8:44   AM. (History)            Assessment /Plan     For exam results, see Encounter Report.      ICD-10-CM ICD-9-CM    1. Primary open angle glaucoma of both eyes, severe stage H40.1133 365.11 Posterior Segment OCT Optic Nerve- Both eyes     365.73 latanoprost (XALATAN) 0.005 % ophthalmic solution      brinzolamide (AZOPT) 1 % ophthalmic suspension      brimonidine 0.1% (ALPHAGAN P) 0.1 % Drop      netarsudiL (RHOPRESSA) 0.02 % Drop  Doing well - intraocular pressure is within acceptable range relative to target pressure with no evidence of progression.   Continue current treatment.  Reviewed importance of continued compliance with treatment and follow up.      2. Blindness and low vision H54.10 369.9    3. Uncontrolled type 2 diabetes mellitus with stage 3 chronic kidney disease, with long-term current use of insulin E11.22 250.52     E11.65 585.3     N18.3 V58.67     Z79.4     4. Epiretinal membrane (ERM) of left eye H35.372 362.56 OCT, Retina - OU - Both Eyes       RETURN TO CLINIC 4 month DOA     Azopt BID OU, Alphagan BID OU  OU: Travatan Z QHS   OS Rhopressa QD

## 2020-03-19 ENCOUNTER — OFFICE VISIT (OUTPATIENT)
Dept: PSYCHIATRY | Facility: CLINIC | Age: 65
End: 2020-03-19
Payer: COMMERCIAL

## 2020-03-19 DIAGNOSIS — F33.1 MDD (MAJOR DEPRESSIVE DISORDER), RECURRENT EPISODE, MODERATE: Primary | ICD-10-CM

## 2020-03-19 PROCEDURE — 90834 PR PSYCHOTHERAPY W/PATIENT, 45 MIN: ICD-10-PCS | Mod: S$GLB,,, | Performed by: SOCIAL WORKER

## 2020-03-19 PROCEDURE — 90834 PSYTX W PT 45 MINUTES: CPT | Mod: S$GLB,,, | Performed by: SOCIAL WORKER

## 2020-03-20 NOTE — PROGRESS NOTES
Individual Psychotherapy (PhD/LCSW)    3/19/2020    [facility]:  [facility]         Therapeutic Intervention: Met with patient.  Outpatient - Interactive psychotherapy 30 min - CPT code 88868    Chief complaint/reason for encounter: depression     Interval history and content of current session:  Patient presents to ongoing individual therapy due to depression.  He has been stressed at work.  They are wanting him to keep his visits with clients to an hour.  He has worked with many clients for many years.  He notes that he is friends with his clients which helps him to serve them better.  He was trying to take off Sundays, but a client wanted to see him on Sunday.  He will come in for the appointment, but he plans to take a half day off.  He is frustrated that the restaurant that is near his office which helps him to eat healthy has been closed with the recent health crisis.  He has plateaued with his recent weight.  He does not want to gain any weight back.  He has not gotten his chair for his house from his children.  Encourage the patient to take care of his health.  Emphasize that he has been doing his job for some time which leads to him being particular.  He still has ruminations about an ex girlfriend.  He admits that he does not think about her as often.  He has limited his texting to her to every month and a half.  He admits he would still help her if he could.  He notes that he has been distracted by his job for a good bit.    Target symptoms: depression  Why chosen therapy is appropriate versus another modality: relevant to diagnosis  Outcome monitoring methods: self-report, observation  Therapeutic intervention type: insight oriented psychotherapy, supportive psychotherapy, interactive psychotherapy     Risk parameters:  Patient reports no suicidal ideation  Patient reports no homicidal ideation  Patient reports no self-injurious behavior  Patient reports no violent behavior     Verbal  deficits: none     Patient's response to intervention:  The patient's response to intervention is accepting, motivated.     Progress toward goals and other mental status changes:  The patient's progress toward goals is fair .     Diagnosis:   Major depression recurrent moderate     Plan:  individual psychotherapy and medication management by physician     Return to clinic: as scheduled     Length of Service (minutes): 45

## 2020-03-26 ENCOUNTER — OFFICE VISIT (OUTPATIENT)
Dept: HEMATOLOGY/ONCOLOGY | Facility: CLINIC | Age: 65
End: 2020-03-26
Payer: COMMERCIAL

## 2020-03-26 ENCOUNTER — LAB VISIT (OUTPATIENT)
Dept: LAB | Facility: HOSPITAL | Age: 65
End: 2020-03-26
Attending: INTERNAL MEDICINE
Payer: COMMERCIAL

## 2020-03-26 VITALS
HEIGHT: 78 IN | SYSTOLIC BLOOD PRESSURE: 226 MMHG | HEART RATE: 96 BPM | DIASTOLIC BLOOD PRESSURE: 113 MMHG | TEMPERATURE: 97 F | WEIGHT: 315 LBS | BODY MASS INDEX: 36.45 KG/M2

## 2020-03-26 DIAGNOSIS — I15.2 HYPERTENSION ASSOCIATED WITH DIABETES: Chronic | ICD-10-CM

## 2020-03-26 DIAGNOSIS — N18.30 CHRONIC KIDNEY DISEASE, STAGE III (MODERATE): Chronic | ICD-10-CM

## 2020-03-26 DIAGNOSIS — E11.59 HYPERTENSION ASSOCIATED WITH DIABETES: Chronic | ICD-10-CM

## 2020-03-26 DIAGNOSIS — D61.818 PANCYTOPENIA: Primary | ICD-10-CM

## 2020-03-26 DIAGNOSIS — D69.6 THROMBOCYTOPENIA: ICD-10-CM

## 2020-03-26 LAB
ALBUMIN SERPL BCP-MCNC: 3.4 G/DL (ref 3.5–5.2)
ALP SERPL-CCNC: 117 U/L (ref 55–135)
ALT SERPL W/O P-5'-P-CCNC: 24 U/L (ref 10–44)
ANION GAP SERPL CALC-SCNC: 9 MMOL/L (ref 8–16)
AST SERPL-CCNC: 16 U/L (ref 10–40)
BASOPHILS # BLD AUTO: 0.01 K/UL (ref 0–0.2)
BASOPHILS NFR BLD: 0.4 % (ref 0–1.9)
BILIRUB SERPL-MCNC: 0.6 MG/DL (ref 0.1–1)
BNP SERPL-MCNC: 136 PG/ML (ref 0–99)
BUN SERPL-MCNC: 11 MG/DL (ref 8–23)
CALCIUM SERPL-MCNC: 8.9 MG/DL (ref 8.7–10.5)
CHLORIDE SERPL-SCNC: 109 MMOL/L (ref 95–110)
CO2 SERPL-SCNC: 21 MMOL/L (ref 23–29)
CREAT SERPL-MCNC: 1.5 MG/DL (ref 0.5–1.4)
DIFFERENTIAL METHOD: ABNORMAL
EOSINOPHIL # BLD AUTO: 0.2 K/UL (ref 0–0.5)
EOSINOPHIL NFR BLD: 6 % (ref 0–8)
ERYTHROCYTE [DISTWIDTH] IN BLOOD BY AUTOMATED COUNT: 13.6 % (ref 11.5–14.5)
EST. GFR  (AFRICAN AMERICAN): 56 ML/MIN/1.73 M^2
EST. GFR  (NON AFRICAN AMERICAN): 49 ML/MIN/1.73 M^2
GLUCOSE SERPL-MCNC: 273 MG/DL (ref 70–110)
HCT VFR BLD AUTO: 41.2 % (ref 40–54)
HGB BLD-MCNC: 14.1 G/DL (ref 14–18)
IMM GRANULOCYTES # BLD AUTO: 0 K/UL (ref 0–0.04)
IMM GRANULOCYTES NFR BLD AUTO: 0 % (ref 0–0.5)
LYMPHOCYTES # BLD AUTO: 0.7 K/UL (ref 1–4.8)
LYMPHOCYTES NFR BLD: 25.5 % (ref 18–48)
MCH RBC QN AUTO: 31.2 PG (ref 27–31)
MCHC RBC AUTO-ENTMCNC: 34.2 G/DL (ref 32–36)
MCV RBC AUTO: 91 FL (ref 82–98)
MONOCYTES # BLD AUTO: 0.6 K/UL (ref 0.3–1)
MONOCYTES NFR BLD: 20.6 % (ref 4–15)
NEUTROPHILS # BLD AUTO: 1.3 K/UL (ref 1.8–7.7)
NEUTROPHILS NFR BLD: 47.5 % (ref 38–73)
NRBC BLD-RTO: 0 /100 WBC
PLATELET # BLD AUTO: 189 K/UL (ref 150–350)
PMV BLD AUTO: 9.4 FL (ref 9.2–12.9)
POTASSIUM SERPL-SCNC: 3.6 MMOL/L (ref 3.5–5.1)
PROT SERPL-MCNC: 6.6 G/DL (ref 6–8.4)
RBC # BLD AUTO: 4.52 M/UL (ref 4.6–6.2)
SODIUM SERPL-SCNC: 139 MMOL/L (ref 136–145)
WBC # BLD AUTO: 2.82 K/UL (ref 3.9–12.7)

## 2020-03-26 PROCEDURE — 3080F PR MOST RECENT DIASTOLIC BLOOD PRESSURE >= 90 MM HG: ICD-10-PCS | Mod: CPTII,S$GLB,, | Performed by: INTERNAL MEDICINE

## 2020-03-26 PROCEDURE — 3052F HG A1C>EQUAL 8.0%<EQUAL 9.0%: CPT | Mod: CPTII,S$GLB,, | Performed by: INTERNAL MEDICINE

## 2020-03-26 PROCEDURE — 36415 COLL VENOUS BLD VENIPUNCTURE: CPT

## 2020-03-26 PROCEDURE — 99214 OFFICE O/P EST MOD 30 MIN: CPT | Mod: S$GLB,,, | Performed by: INTERNAL MEDICINE

## 2020-03-26 PROCEDURE — 3008F PR BODY MASS INDEX (BMI) DOCUMENTED: ICD-10-PCS | Mod: CPTII,S$GLB,, | Performed by: INTERNAL MEDICINE

## 2020-03-26 PROCEDURE — 83880 ASSAY OF NATRIURETIC PEPTIDE: CPT

## 2020-03-26 PROCEDURE — 3080F DIAST BP >= 90 MM HG: CPT | Mod: CPTII,S$GLB,, | Performed by: INTERNAL MEDICINE

## 2020-03-26 PROCEDURE — 3008F BODY MASS INDEX DOCD: CPT | Mod: CPTII,S$GLB,, | Performed by: INTERNAL MEDICINE

## 2020-03-26 PROCEDURE — 99999 PR PBB SHADOW E&M-EST. PATIENT-LVL III: CPT | Mod: PBBFAC,,, | Performed by: INTERNAL MEDICINE

## 2020-03-26 PROCEDURE — 99999 PR PBB SHADOW E&M-EST. PATIENT-LVL III: ICD-10-PCS | Mod: PBBFAC,,, | Performed by: INTERNAL MEDICINE

## 2020-03-26 PROCEDURE — 3077F SYST BP >= 140 MM HG: CPT | Mod: CPTII,S$GLB,, | Performed by: INTERNAL MEDICINE

## 2020-03-26 PROCEDURE — 80053 COMPREHEN METABOLIC PANEL: CPT

## 2020-03-26 PROCEDURE — 3052F PR MOST RECENT HEMOGLOBIN A1C LEVEL 8.0 - < 9.0%: ICD-10-PCS | Mod: CPTII,S$GLB,, | Performed by: INTERNAL MEDICINE

## 2020-03-26 PROCEDURE — 3077F PR MOST RECENT SYSTOLIC BLOOD PRESSURE >= 140 MM HG: ICD-10-PCS | Mod: CPTII,S$GLB,, | Performed by: INTERNAL MEDICINE

## 2020-03-26 PROCEDURE — 85025 COMPLETE CBC W/AUTO DIFF WBC: CPT

## 2020-03-26 PROCEDURE — 99214 PR OFFICE/OUTPT VISIT, EST, LEVL IV, 30-39 MIN: ICD-10-PCS | Mod: S$GLB,,, | Performed by: INTERNAL MEDICINE

## 2020-03-26 NOTE — PROGRESS NOTES
"Subjective:       Patient ID: Stepan Nixon is a 65 y.o. male.    Chief Complaint: Results    HPI 65-year-old male returns with progressive pancytopenia.  Felt to be secondary to non alcoholic steatohepatosis.  Patient returns for review patient is seen during the Coronavirus event    Past Medical History:   Diagnosis Date    Anxiety     Bell's palsy     CHF (congestive heart failure)     Coronary artery disease involving native coronary artery without angina pectoris 10/18/2016    Depression     Diabetes mellitus     Glaucoma     Hypertension     Idiopathic peripheral neuropathy 12/11/2012    Mixed hyperlipidemia 12/11/2012    Vitamin B 12 deficiency 8/23/2012     Family History   Problem Relation Age of Onset    Macular degeneration Father     Heart disease Father         CHF     Heart attack Father     Hypertension Mother     Macular degeneration Paternal Uncle     Hypertension Maternal Grandmother     Hypertension Maternal Grandfather     Strabismus Neg Hx     Retinal detachment Neg Hx     Glaucoma Neg Hx     Blindness Neg Hx     Amblyopia Neg Hx      Social History     Socioeconomic History    Marital status:      Spouse name: Not on file    Number of children: Not on file    Years of education: Not on file    Highest education level: Not on file   Occupational History     Employer:  [employer]   Social Needs    Financial resource strain: Not on file    Food insecurity:     Worry: Not on file     Inability: Not on file    Transportation needs:     Medical: Not on file     Non-medical: Not on file   Tobacco Use    Smoking status: Never Smoker    Smokeless tobacco: Never Used   Substance and Sexual Activity    Alcohol use: Yes     Comment: " one to two glasses of wine per year"    Drug use: No    Sexual activity: Not Currently     Birth control/protection: None   Lifestyle    Physical activity:     Days per week: Not on file     Minutes per session: Not on file "    Stress: Not on file   Relationships    Social connections:     Talks on phone: Not on file     Gets together: Not on file     Attends Denominational service: Not on file     Active member of club or organization: Not on file     Attends meetings of clubs or organizations: Not on file     Relationship status: Not on file   Other Topics Concern    Not on file   Social History Narrative    , 1 son, JANIE.     Past Surgical History:   Procedure Laterality Date    CARDIAC CATHETERIZATION  7/22/13    non obstructive cad    CATARACT EXTRACTION  OU    COLONOSCOPY N/A 5/1/2019    Procedure: COLONOSCOPY;  Surgeon: Henry Mo III, MD;  Location[facility] ENDO;  Service: Endoscopy;  Laterality: N/A;    CORONARY ANGIOPLASTY      ESOPHAGOGASTRODUODENOSCOPY N/A 5/1/2019    Procedure: ESOPHAGOGASTRODUODENOSCOPY (EGD);  Surgeon: Henry Mo III, MD;  Location: [facility];  Service: Endoscopy;  Laterality: N/A;    EYE SURGERY      FRACTURE SURGERY      kidney stone       August 2016    PC IOL OU      RETINAL DETACHMENT REPAIR W/ SCLERAL BUCKLE LE      WRIST SURGERY         Labs:  Lab Results   Component Value Date    WBC 2.47 (L) 02/09/2020    HGB 13.1 (L) 02/09/2020    HCT 38.2 (L) 02/09/2020    MCV 90 02/09/2020     02/09/2020     BMP  Lab Results   Component Value Date     02/09/2020    K 3.2 (L) 02/09/2020     02/09/2020    CO2 22 (L) 02/09/2020    BUN 29 (H) 02/09/2020    CREATININE 1.9 (H) 02/09/2020    CALCIUM 8.5 (L) 02/09/2020    ANIONGAP 12 02/09/2020    ESTGFRAFRICA 43 (A) 02/09/2020    EGFRNONAA 37 (A) 02/09/2020     Lab Results   Component Value Date    ALT 27 02/08/2020    AST 37 02/08/2020    ALKPHOS 80 02/08/2020    BILITOT 0.5 02/08/2020       Lab Results   Component Value Date    IRON 107 11/11/2011    TIBC 432.0 11/11/2011     Lab Results   Component Value Date    ENWPKGQH21 342 12/11/2015     Lab Results   Component Value Date    FOLATE 7.2 11/11/2011     Lab Results    Component Value Date    TSH 1.349 01/14/2020         Review of Systems   Constitutional: Positive for activity change, appetite change and fatigue. Negative for chills, diaphoresis, fever and unexpected weight change.   HENT: Negative for congestion, dental problem, drooling, ear discharge, ear pain, facial swelling, hearing loss, mouth sores, nosebleeds, postnasal drip, rhinorrhea, sinus pressure, sneezing, sore throat, tinnitus, trouble swallowing and voice change.    Eyes: Negative for photophobia, pain, discharge, redness, itching and visual disturbance.   Respiratory: Negative for apnea, cough, choking, chest tightness, shortness of breath, wheezing and stridor.    Cardiovascular: Positive for leg swelling. Negative for chest pain and palpitations.   Gastrointestinal: Negative for abdominal distention, abdominal pain, anal bleeding, blood in stool, constipation, diarrhea, nausea, rectal pain and vomiting.   Endocrine: Negative for cold intolerance, heat intolerance, polydipsia, polyphagia and polyuria.   Genitourinary: Negative for decreased urine volume, difficulty urinating, discharge, dysuria, enuresis, flank pain, frequency, genital sores, hematuria, penile pain, penile swelling, scrotal swelling, testicular pain and urgency.   Musculoskeletal: Positive for gait problem. Negative for arthralgias, back pain, joint swelling, myalgias, neck pain and neck stiffness.   Skin: Negative for color change, pallor, rash and wound.   Allergic/Immunologic: Negative for environmental allergies, food allergies and immunocompromised state.   Neurological: Positive for weakness. Negative for dizziness, tremors, seizures, syncope, facial asymmetry, speech difficulty, light-headedness, numbness and headaches.   Hematological: Negative for adenopathy. Does not bruise/bleed easily.   Psychiatric/Behavioral: Positive for dysphoric mood. Negative for agitation, behavioral problems, confusion, decreased concentration,  hallucinations, self-injury, sleep disturbance and suicidal ideas. The patient is nervous/anxious. The patient is not hyperactive.        Objective:      Physical Exam   Constitutional: He is oriented to person, place, and time. He appears well-developed and well-nourished. He appears distressed.   HENT:   Head: Normocephalic.   Right Ear: External ear normal.   Left Ear: External ear normal.   Nose: Nose normal. Right sinus exhibits no maxillary sinus tenderness and no frontal sinus tenderness. Left sinus exhibits no maxillary sinus tenderness and no frontal sinus tenderness.   Mouth/Throat: Oropharynx is clear and moist. No oropharyngeal exudate.   Eyes: Pupils are equal, round, and reactive to light. EOM and lids are normal. Right eye exhibits no discharge. Left eye exhibits no discharge. Right conjunctiva is not injected. Right conjunctiva has no hemorrhage. Left conjunctiva is not injected. Left conjunctiva has no hemorrhage. No scleral icterus. Right eye exhibits normal extraocular motion. Left eye exhibits normal extraocular motion.   Neck: Normal range of motion. Neck supple. No JVD present. No tracheal deviation present. No thyromegaly present.   Cardiovascular: Normal rate and regular rhythm.   Pulmonary/Chest: Effort normal. No stridor. No respiratory distress.   Abdominal: Soft. He exhibits no mass. There is no hepatosplenomegaly, splenomegaly or hepatomegaly. There is no tenderness.   Musculoskeletal: Normal range of motion. He exhibits no edema or tenderness.   Lymphadenopathy:        Head (right side): No posterior auricular and no occipital adenopathy present.        Head (left side): No posterior auricular and no occipital adenopathy present.     He has no cervical adenopathy.        Right cervical: No superficial cervical, no deep cervical and no posterior cervical adenopathy present.       Left cervical: No superficial cervical, no deep cervical and no posterior cervical adenopathy present.     He  has no axillary adenopathy.        Right: No supraclavicular adenopathy present.        Left: No supraclavicular adenopathy present.   Neurological: He is alert and oriented to person, place, and time. He has normal strength. No cranial nerve deficit. Coordination normal.   Skin: Skin is dry. No rash noted. He is not diaphoretic. No cyanosis or erythema. Nails show no clubbing.   Psychiatric: He has a normal mood and affect. His behavior is normal. Judgment and thought content normal. Cognition and memory are normal.   Vitals reviewed.          Assessment:      1. Pancytopenia    2. Hypertension associated with diabetes    3. Chronic kidney disease, stage III (moderate)    4. Thrombocytopenia           Plan:     Review of laboratory studies with him still demonstrates the persistent pancytopenia repeat CBC CMP and BMP today hospitalization reviewed from 02/06/2020 with diastolic heart failure.  Currently on 80 mg of Lasix twice a day.  Told him to reinitiate reassurance he is unwilling to use the electronic patient portal.  Patient was seen during the corona virus event from hematological standpoint I do not have much else to offer I do not think because of his habitus of bone marrow aspirate biopsy would be warranted at this particular point will communicate results to him by phone once available is afternoon 25 min face-to-face time coordination of care greater than 50% time face-to-face patient      Owen Siegel Jr, MD FACP

## 2020-03-31 ENCOUNTER — TELEPHONE (OUTPATIENT)
Dept: PSYCHIATRY | Facility: CLINIC | Age: 65
End: 2020-03-31

## 2020-04-09 ENCOUNTER — TELEPHONE (OUTPATIENT)
Dept: INTERNAL MEDICINE | Facility: CLINIC | Age: 65
End: 2020-04-09

## 2020-04-09 NOTE — TELEPHONE ENCOUNTER
Spoke with Myriam co worker and informed her that the last visit pt had was 3/26/20 with Dr. Siegel . Dr. Persaud last seen patient 05/2019

## 2020-04-09 NOTE — TELEPHONE ENCOUNTER
----- Message from Eugenio Nagel sent at 4/8/2020  4:25 PM CDT -----  Contact: Myriam from [facility]  Myriam from [facility] called and would like a Nurse call her about pt.     Leeanne is needing to know about an appt for pt     Myriam can be reached at [phone] or [phone]

## 2020-04-13 ENCOUNTER — PATIENT MESSAGE (OUTPATIENT)
Dept: INTERNAL MEDICINE | Facility: CLINIC | Age: 65
End: 2020-04-13

## 2020-04-13 ENCOUNTER — TELEPHONE (OUTPATIENT)
Dept: INTERNAL MEDICINE | Facility: CLINIC | Age: 65
End: 2020-04-13

## 2020-04-13 ENCOUNTER — OFFICE VISIT (OUTPATIENT)
Dept: INTERNAL MEDICINE | Facility: CLINIC | Age: 65
End: 2020-04-13
Payer: COMMERCIAL

## 2020-04-13 DIAGNOSIS — R53.1 GENERALIZED WEAKNESS: Primary | ICD-10-CM

## 2020-04-13 DIAGNOSIS — Z91.81 H/O FALL: ICD-10-CM

## 2020-04-13 DIAGNOSIS — R53.81 PHYSICAL DEBILITY: ICD-10-CM

## 2020-04-13 PROCEDURE — 99213 OFFICE O/P EST LOW 20 MIN: CPT | Mod: 95,,, | Performed by: FAMILY MEDICINE

## 2020-04-13 PROCEDURE — 99213 PR OFFICE/OUTPT VISIT, EST, LEVL III, 20-29 MIN: ICD-10-PCS | Mod: 95,,, | Performed by: FAMILY MEDICINE

## 2020-04-13 NOTE — TELEPHONE ENCOUNTER
----- Message from Jazz Lopez sent at 4/13/2020  3:55 PM CDT -----  Contact: Maicol Rider [facility]  They are requesting call back in regards to questions about orders and virtual visit from today          [facility] call [phone]/ [phone]

## 2020-04-13 NOTE — PROGRESS NOTES
Subjective:       Patient ID: Stepan Nixon is a 65 y.o. male.    Chief Complaint: hospital f/u    The patient location is: [place]  The chief complaint leading to consultation is: hospital f/u  Visit type: Virtual visit with synchronous audio and video  Total time spent with patient: 15 minutes  Each patient to whom he or she provides medical services by telemedicine is:  (1) informed of the relationship between the physician and patient and the respective role of any other health care provider with respect to management of the patient; and (2) notified that he or she may decline to receive medical services by telemedicine and may withdraw from such care at any time.    Notes: 64 yo male seen for hospital follow-up. He was admitted to [place] for episode of generalized weakness and pancytopenia. Has followed up with oncology; has home health arranged for PT and skilled nursing. Ambulating with walker. Reports feeling great today; normal appetite, no fevers or chills. Had sick contact but was COVID negative in the hospital per patient.   Requests return to work note Fax [phone], ATTN: SIMON Pretty     does not have any pertinent problems on file.  Past Medical History:   Diagnosis Date    Anxiety     Bell's palsy     CHF (congestive heart failure)     Coronary artery disease involving native coronary artery without angina pectoris 10/18/2016    Depression     Diabetes mellitus     Glaucoma     Hypertension     Idiopathic peripheral neuropathy 12/11/2012    Mixed hyperlipidemia 12/11/2012    Vitamin B 12 deficiency 8/23/2012     Past Surgical History:   Procedure Laterality Date    CARDIAC CATHETERIZATION  7/22/13    non obstructive cad    CATARACT EXTRACTION  OU    COLONOSCOPY N/A 5/1/2019    Procedure: COLONOSCOPY;  Surgeon: Henry Mo III, MD;  [facility];  Service: Endoscopy;  Laterality: N/A;    CORONARY ANGIOPLASTY      ESOPHAGOGASTRODUODENOSCOPY N/A 5/1/2019    Procedure:  "ESOPHAGOGASTRODUODENOSCOPY (EGD);  Surgeon: Henry Mo III, MD;  Location: [facility];  Service: Endoscopy;  Laterality: N/A;    EYE SURGERY      FRACTURE SURGERY      kidney stone       August 2016    PC IOL OU      RETINAL DETACHMENT REPAIR W/ SCLERAL BUCKLE LE      WRIST SURGERY       Family History   Problem Relation Age of Onset    Macular degeneration Father     Heart disease Father         CHF     Heart attack Father     Hypertension Mother     Macular degeneration Paternal Uncle     Hypertension Maternal Grandmother     Hypertension Maternal Grandfather     Strabismus Neg Hx     Retinal detachment Neg Hx     Glaucoma Neg Hx     Blindness Neg Hx     Amblyopia Neg Hx      Social History     Socioeconomic History    Marital status:      Spouse name: Not on file    Number of children: Not on file    Years of education: Not on file    Highest education level: Not on file   Occupational History     Employer: [employer]   Social Needs    Financial resource strain: Not on file    Food insecurity:     Worry: Not on file     Inability: Not on file    Transportation needs:     Medical: Not on file     Non-medical: Not on file   Tobacco Use    Smoking status: Never Smoker    Smokeless tobacco: Never Used   Substance and Sexual Activity    Alcohol use: Yes     Comment: " one to two glasses of wine per year"    Drug use: No    Sexual activity: Not Currently     Birth control/protection: None   Lifestyle    Physical activity:     Days per week: Not on file     Minutes per session: Not on file    Stress: Not on file   Relationships    Social connections:     Talks on phone: Not on file     Gets together: Not on file     Attends Sikhism service: Not on file     Active member of club or organization: Not on file     Attends meetings of clubs or organizations: Not on file     Relationship status: Not on file   Other Topics Concern    Not on file   Social History Narrative "    , 1 son, [employer].     Review of Systems   A comprehensive 14-point review of systems was reviewed with patient and was negative other than as specified above.     Objective:   There were no vitals filed for this visit.     Physical Exam   Constitutional: He is oriented to person, place, and time.   Neurological: He is alert and oriented to person, place, and time.   Psychiatric: Judgment and thought content normal.       Assessment:       1. Generalized weakness        Plan:           Problem List Items Addressed This Visit     None      Visit Diagnoses     Generalized weakness    -  Primary    Return to work in 1 week; has home health PT and would like safety assessment prior to return. Ambulating with walker      Will request discharge summary from [facility].

## 2020-04-13 NOTE — TELEPHONE ENCOUNTER
home health wants to know if he can do the out patient therapy at the therapy center in [place] . He is not home bound so he cant have home heal th . Advise

## 2020-04-13 NOTE — TELEPHONE ENCOUNTER
----- Message from Celina Guaman sent at 4/13/2020  7:28 AM CDT -----  Contact: self/[phone]  Would like to consult with nurse regarding his Virtual visit this morning. Patient states he would like for Dr Persaud to call him and not face time him, because he is not able to do it. Please call back at  [phone]. Thanks//ar

## 2020-04-14 ENCOUNTER — TELEPHONE (OUTPATIENT)
Dept: INTERNAL MEDICINE | Facility: CLINIC | Age: 65
End: 2020-04-14

## 2020-04-14 NOTE — TELEPHONE ENCOUNTER
----- Message from Elham Persaud MD sent at 4/14/2020 10:03 AM CDT -----  Will you request discharge summary from recent hospitalization at [facility]?

## 2020-04-14 NOTE — TELEPHONE ENCOUNTER
Contacted damaso . [facility] department was unsure how long the wait would be for record request .   Record request has been faxed over to [phone]

## 2020-04-15 ENCOUNTER — TELEPHONE (OUTPATIENT)
Dept: PSYCHIATRY | Facility: CLINIC | Age: 65
End: 2020-04-15

## 2020-04-16 ENCOUNTER — OFFICE VISIT (OUTPATIENT)
Dept: PSYCHIATRY | Facility: CLINIC | Age: 65
End: 2020-04-16
Payer: COMMERCIAL

## 2020-04-16 DIAGNOSIS — F33.1 MDD (MAJOR DEPRESSIVE DISORDER), RECURRENT EPISODE, MODERATE: Primary | ICD-10-CM

## 2020-04-16 PROCEDURE — 90832 PR PSYCHOTHERAPY W/PATIENT, 30 MIN: ICD-10-PCS | Mod: 95,,, | Performed by: SOCIAL WORKER

## 2020-04-16 PROCEDURE — 90832 PSYTX W PT 30 MINUTES: CPT | Mod: 95,,, | Performed by: SOCIAL WORKER

## 2020-04-17 NOTE — PROGRESS NOTES
Individual Psychotherapy (PhD/LCSW)    4/16/2020    [facility]         Therapeutic Intervention: Met with patient.  Outpatient -  Non physician telephone assessment 30 min - CPT code 67126    Location:  In his home    Chief complaint/reason for encounter: depression     Interval history and content of current session:  Patient presents via phone to ongoing individual therapy due to depression.  He does not yet know how to use the indy on his phone for a video visit.  He has an older indy which seems to not allow video visits.  He will try to get his phone video visit capable by the next session.  Approximately three weeks ago, he was trying to get out of his chair.  His leg gave out and he ended up on the floor.  He was on the floor for two days.  He used a bucket for the bathroom.  He was finally able to get his phone and charge it.  He called 911.  He was brought to [facility] in [place].  He was there for ten days.  He spent some time on rehab.  He plans to go back to work on Monday.  He has been frustrated with his boss, who has not been understanding of his time off.  He is considering moving back to the [facility] where he has a friend that runs a [employer].  He would be closer to his daughter.  He is still looking for his car keys.  He hopes to find them today.  He continues to ruminate about his ex girlfriend, Solange.  He is worried that she has been effected by COVID.  He sent her a text, but she has not responded.  He is not sure when he will be able to travel to [place] to see his son.  His son has been in a lab working on a vaccine for COVID.    Treatment plan:  · Target symptoms: depression  · Why chosen therapy is appropriate versus another modality: relevant to diagnosis  · Outcome monitoring methods: self-report, observation  · Therapeutic intervention type: insight oriented psychotherapy, supportive psychotherapy, interactive psychotherapy    Risk parameters:  Patient reports no  suicidal ideation  Patient reports no homicidal ideation  Patient reports no self-injurious behavior  Patient reports no violent behavior    Verbal deficits: None    Patient's response to intervention:  The patient's response to intervention is accepting, motivated.    Progress toward goals and other mental status changes:  The patient's progress toward goals is poor.    Diagnosis:   Major depression recurrent moderate    Plan:  individual psychotherapy and medication management by physician, Dr. Campbell    Return to clinic: as scheduled    Length of Service (minutes): 30

## 2020-04-20 ENCOUNTER — TELEPHONE (OUTPATIENT)
Dept: INTERNAL MEDICINE | Facility: CLINIC | Age: 65
End: 2020-04-20

## 2020-04-20 ENCOUNTER — HOSPITAL ENCOUNTER (EMERGENCY)
Facility: HOSPITAL | Age: 65
Discharge: HOME | End: 2020-04-20
Attending: EMERGENCY MEDICINE
Payer: COMMERCIAL

## 2020-04-20 ENCOUNTER — NURSE TRIAGE (OUTPATIENT)
Dept: ADMINISTRATIVE | Facility: CLINIC | Age: 65
End: 2020-04-20

## 2020-04-20 VITALS
HEIGHT: 78 IN | WEIGHT: 315 LBS | TEMPERATURE: 99 F | SYSTOLIC BLOOD PRESSURE: 193 MMHG | RESPIRATION RATE: 18 BRPM | DIASTOLIC BLOOD PRESSURE: 102 MMHG | BODY MASS INDEX: 36.45 KG/M2 | OXYGEN SATURATION: 96 % | HEART RATE: 90 BPM

## 2020-04-20 DIAGNOSIS — N20.1 LEFT URETERAL STONE: Primary | ICD-10-CM

## 2020-04-20 DIAGNOSIS — U07.1 COVID-19 VIRUS DETECTED: ICD-10-CM

## 2020-04-20 DIAGNOSIS — Z79.4 TYPE 2 DIABETES MELLITUS WITH HYPERGLYCEMIA, WITH LONG-TERM CURRENT USE OF INSULIN: ICD-10-CM

## 2020-04-20 DIAGNOSIS — N18.30 STAGE 3 CHRONIC KIDNEY DISEASE: ICD-10-CM

## 2020-04-20 DIAGNOSIS — I10 CHRONIC HYPERTENSION: ICD-10-CM

## 2020-04-20 DIAGNOSIS — E66.01 MORBID OBESITY: ICD-10-CM

## 2020-04-20 DIAGNOSIS — E11.65 TYPE 2 DIABETES MELLITUS WITH HYPERGLYCEMIA, WITH LONG-TERM CURRENT USE OF INSULIN: ICD-10-CM

## 2020-04-20 LAB
ALBUMIN SERPL BCP-MCNC: 3.6 G/DL (ref 3.5–5.2)
ALP SERPL-CCNC: 110 U/L (ref 55–135)
ALT SERPL W/O P-5'-P-CCNC: 56 U/L (ref 10–44)
ANION GAP SERPL CALC-SCNC: 12 MMOL/L (ref 8–16)
AST SERPL-CCNC: 27 U/L (ref 10–40)
BACTERIA #/AREA URNS HPF: ABNORMAL /HPF
BASOPHILS # BLD AUTO: 0.02 K/UL (ref 0–0.2)
BASOPHILS NFR BLD: 0.3 % (ref 0–1.9)
BILIRUB SERPL-MCNC: 0.7 MG/DL (ref 0.1–1)
BILIRUB UR QL STRIP: NEGATIVE
BNP SERPL-MCNC: 89 PG/ML (ref 0–99)
BUN SERPL-MCNC: 14 MG/DL (ref 8–23)
CALCIUM SERPL-MCNC: 9.6 MG/DL (ref 8.7–10.5)
CHLORIDE SERPL-SCNC: 110 MMOL/L (ref 95–110)
CLARITY UR: CLEAR
CO2 SERPL-SCNC: 17 MMOL/L (ref 23–29)
COLOR UR: YELLOW
CREAT SERPL-MCNC: 2 MG/DL (ref 0.5–1.4)
CRP SERPL-MCNC: 5 MG/L (ref 0–8.2)
DIFFERENTIAL METHOD: ABNORMAL
EOSINOPHIL # BLD AUTO: 0 K/UL (ref 0–0.5)
EOSINOPHIL NFR BLD: 0.5 % (ref 0–8)
ERYTHROCYTE [DISTWIDTH] IN BLOOD BY AUTOMATED COUNT: 13.3 % (ref 11.5–14.5)
EST. GFR  (AFRICAN AMERICAN): 40 ML/MIN/1.73 M^2
EST. GFR  (NON AFRICAN AMERICAN): 34 ML/MIN/1.73 M^2
FERRITIN SERPL-MCNC: 157 NG/ML (ref 20–300)
GLUCOSE SERPL-MCNC: 336 MG/DL (ref 70–110)
GLUCOSE UR QL STRIP: ABNORMAL
HCT VFR BLD AUTO: 44.3 % (ref 40–54)
HGB BLD-MCNC: 15.2 G/DL (ref 14–18)
HGB UR QL STRIP: ABNORMAL
HYALINE CASTS #/AREA URNS LPF: 0 /LPF
IMM GRANULOCYTES # BLD AUTO: 0.02 K/UL (ref 0–0.04)
IMM GRANULOCYTES NFR BLD AUTO: 0.3 % (ref 0–0.5)
KETONES UR QL STRIP: ABNORMAL
LDH SERPL L TO P-CCNC: 382 U/L (ref 110–260)
LEUKOCYTE ESTERASE UR QL STRIP: NEGATIVE
LYMPHOCYTES # BLD AUTO: 0.6 K/UL (ref 1–4.8)
LYMPHOCYTES NFR BLD: 9.9 % (ref 18–48)
MCH RBC QN AUTO: 30.5 PG (ref 27–31)
MCHC RBC AUTO-ENTMCNC: 34.3 G/DL (ref 32–36)
MCV RBC AUTO: 89 FL (ref 82–98)
MICROSCOPIC COMMENT: ABNORMAL
MONOCYTES # BLD AUTO: 0.4 K/UL (ref 0.3–1)
MONOCYTES NFR BLD: 5.7 % (ref 4–15)
NEUTROPHILS # BLD AUTO: 5.3 K/UL (ref 1.8–7.7)
NEUTROPHILS NFR BLD: 83.3 % (ref 38–73)
NITRITE UR QL STRIP: NEGATIVE
NRBC BLD-RTO: 0 /100 WBC
PH UR STRIP: 7 [PH] (ref 5–8)
PLATELET # BLD AUTO: 215 K/UL (ref 150–350)
PMV BLD AUTO: 9.8 FL (ref 9.2–12.9)
POCT GLUCOSE: 279 MG/DL (ref 70–110)
POTASSIUM SERPL-SCNC: 4.2 MMOL/L (ref 3.5–5.1)
PROT SERPL-MCNC: 7.3 G/DL (ref 6–8.4)
PROT UR QL STRIP: ABNORMAL
RBC # BLD AUTO: 4.98 M/UL (ref 4.6–6.2)
RBC #/AREA URNS HPF: 6 /HPF (ref 0–4)
SARS-COV-2 RDRP RESP QL NAA+PROBE: POSITIVE
SODIUM SERPL-SCNC: 139 MMOL/L (ref 136–145)
SP GR UR STRIP: 1.01 (ref 1–1.03)
TROPONIN I SERPL DL<=0.01 NG/ML-MCNC: 0.04 NG/ML (ref 0–0.03)
URN SPEC COLLECT METH UR: ABNORMAL
UROBILINOGEN UR STRIP-ACNC: NEGATIVE EU/DL
WBC # BLD AUTO: 6.37 K/UL (ref 3.9–12.7)
WBC #/AREA URNS HPF: 1 /HPF (ref 0–5)
YEAST URNS QL MICRO: ABNORMAL

## 2020-04-20 PROCEDURE — 85025 COMPLETE CBC W/AUTO DIFF WBC: CPT

## 2020-04-20 PROCEDURE — U0002 COVID-19 LAB TEST NON-CDC: HCPCS

## 2020-04-20 PROCEDURE — 80053 COMPREHEN METABOLIC PANEL: CPT

## 2020-04-20 PROCEDURE — 99291 CRITICAL CARE FIRST HOUR: CPT | Mod: 25

## 2020-04-20 PROCEDURE — 84484 ASSAY OF TROPONIN QUANT: CPT

## 2020-04-20 PROCEDURE — 93010 ELECTROCARDIOGRAM REPORT: CPT | Mod: ,,, | Performed by: INTERNAL MEDICINE

## 2020-04-20 PROCEDURE — 86140 C-REACTIVE PROTEIN: CPT

## 2020-04-20 PROCEDURE — 25000003 PHARM REV CODE 250: Performed by: EMERGENCY MEDICINE

## 2020-04-20 PROCEDURE — 81000 URINALYSIS NONAUTO W/SCOPE: CPT

## 2020-04-20 PROCEDURE — 83880 ASSAY OF NATRIURETIC PEPTIDE: CPT

## 2020-04-20 PROCEDURE — 63600175 PHARM REV CODE 636 W HCPCS: Performed by: EMERGENCY MEDICINE

## 2020-04-20 PROCEDURE — 96374 THER/PROPH/DIAG INJ IV PUSH: CPT

## 2020-04-20 PROCEDURE — 82728 ASSAY OF FERRITIN: CPT

## 2020-04-20 PROCEDURE — 83615 LACTATE (LD) (LDH) ENZYME: CPT

## 2020-04-20 PROCEDURE — 36415 COLL VENOUS BLD VENIPUNCTURE: CPT

## 2020-04-20 PROCEDURE — 82962 GLUCOSE BLOOD TEST: CPT

## 2020-04-20 PROCEDURE — 96375 TX/PRO/DX INJ NEW DRUG ADDON: CPT

## 2020-04-20 PROCEDURE — 93005 ELECTROCARDIOGRAM TRACING: CPT

## 2020-04-20 PROCEDURE — 93010 EKG 12-LEAD: ICD-10-PCS | Mod: ,,, | Performed by: INTERNAL MEDICINE

## 2020-04-20 RX ORDER — TAMSULOSIN HYDROCHLORIDE 0.4 MG/1
0.4 CAPSULE ORAL
Status: COMPLETED | OUTPATIENT
Start: 2020-04-20 | End: 2020-04-20

## 2020-04-20 RX ORDER — TAMSULOSIN HYDROCHLORIDE 0.4 MG/1
0.4 CAPSULE ORAL DAILY
Qty: 30 CAPSULE | Refills: 0 | Status: SHIPPED | OUTPATIENT
Start: 2020-04-20 | End: 2021-01-26

## 2020-04-20 RX ORDER — HYDRALAZINE HYDROCHLORIDE 20 MG/ML
10 INJECTION INTRAMUSCULAR; INTRAVENOUS
Status: COMPLETED | OUTPATIENT
Start: 2020-04-20 | End: 2020-04-20

## 2020-04-20 RX ORDER — HYDROCODONE BITARTRATE AND ACETAMINOPHEN 10; 325 MG/1; MG/1
1 TABLET ORAL
Status: COMPLETED | OUTPATIENT
Start: 2020-04-20 | End: 2020-04-20

## 2020-04-20 RX ORDER — HYDROCODONE BITARTRATE AND ACETAMINOPHEN 10; 325 MG/1; MG/1
1 TABLET ORAL EVERY 6 HOURS PRN
Qty: 18 TABLET | Refills: 0 | Status: SHIPPED | OUTPATIENT
Start: 2020-04-20 | End: 2020-09-01

## 2020-04-20 RX ORDER — LABETALOL HYDROCHLORIDE 5 MG/ML
20 INJECTION, SOLUTION INTRAVENOUS
Status: COMPLETED | OUTPATIENT
Start: 2020-04-20 | End: 2020-04-20

## 2020-04-20 RX ADMIN — INSULIN HUMAN 10 UNITS: 100 INJECTION, SOLUTION PARENTERAL at 05:04

## 2020-04-20 RX ADMIN — TAMSULOSIN HYDROCHLORIDE 0.4 MG: 0.4 CAPSULE ORAL at 03:04

## 2020-04-20 RX ADMIN — HYDRALAZINE HYDROCHLORIDE 10 MG: 20 INJECTION INTRAMUSCULAR; INTRAVENOUS at 03:04

## 2020-04-20 RX ADMIN — LABETALOL HYDROCHLORIDE 20 MG: 5 INJECTION, SOLUTION INTRAVENOUS at 04:04

## 2020-04-20 RX ADMIN — HYDROCODONE BITARTRATE AND ACETAMINOPHEN 1 TABLET: 10; 325 TABLET ORAL at 03:04

## 2020-04-20 NOTE — ED PROVIDER NOTES
SCRIBE #1 NOTE: I, Fidencio Sun, am scribing for, and in the presence of, Carli Irving MD. I have scribed the entire note.       History     Chief Complaint   Patient presents with    Flank Pain     left sided flank pain, denies trauma     Review of patient's allergies indicates:   Allergen Reactions    Cefazolin      Other reaction(s): Shakes  Other reaction(s): Chills         History of Present Illness     HPI    4/20/2020, 2:24 PM  History obtained from the patient      History of Present Illness: Stepan Nixon is a 65 y.o. male patient with a history of mrobid obestiy, HTN, diastolic HF, chronic pancytopenia, DM who presents to the Emergency Department for evaluation of sharp LLQ abdominal pain which onset suddenly earlier today. Patient states pain does not radiate. Symptoms are constant and moderate in severity. No mitigating or exacerbating factors reported. Associated sxs include none. Patient denies any fever, chills, n/v/d, constipation, dysuria, and all other sxs at this time. Prior Tx includes none. No further complaints or concerns at this time.       Arrival mode:   EMS/AASI    PCP: Elham Persaud MD      Past Medical History:  Past Medical History:   Diagnosis Date    Anxiety     Bell's palsy     CHF (congestive heart failure)     Coronary artery disease involving native coronary artery without angina pectoris 10/18/2016    Depression     Diabetes mellitus     Glaucoma     Hypertension     Idiopathic peripheral neuropathy 12/11/2012    Mixed hyperlipidemia 12/11/2012    Vitamin B 12 deficiency 8/23/2012       Past Surgical History:  Past Surgical History:   Procedure Laterality Date    CARDIAC CATHETERIZATION  7/22/13    non obstructive cad    CATARACT EXTRACTION  OU    COLONOSCOPY N/A 5/1/2019    Procedure: COLONOSCOPY;  Surgeon: Henry Mo III, MD;  Location: [facility];  Service: Endoscopy;  Laterality: N/A;    CORONARY ANGIOPLASTY       "ESOPHAGOGASTRODUODENOSCOPY N/A 5/1/2019    Procedure: ESOPHAGOGASTRODUODENOSCOPY (EGD);  Surgeon: Henry Mo III, MD;  Location[facility];  Service: Endoscopy;  Laterality: N/A;    EYE SURGERY      FRACTURE SURGERY      kidney stone       August 2016    PC IOL OU      RETINAL DETACHMENT REPAIR W/ SCLERAL BUCKLE LE      WRIST SURGERY           Family History:  Family History   Problem Relation Age of Onset    Macular degeneration Father     Heart disease Father         CHF     Heart attack Father     Hypertension Mother     Macular degeneration Paternal Uncle     Hypertension Maternal Grandmother     Hypertension Maternal Grandfather     Strabismus Neg Hx     Retinal detachment Neg Hx     Glaucoma Neg Hx     Blindness Neg Hx     Amblyopia Neg Hx        Social History:   Social History     Tobacco Use    Smoking status: Never Smoker    Smokeless tobacco: Never Used   Substance and Sexual Activity    Alcohol use: Yes     Comment: " one to two glasses of wine per year"    Drug use: No    Sexual activity: Not Currently     Birth control/protection: None        Review of Systems     Review of Systems   Constitutional: Negative for chills and fever.   HENT: Negative for sore throat.    Respiratory: Negative for shortness of breath.    Cardiovascular: Negative for chest pain.   Gastrointestinal: Positive for abdominal pain. Negative for constipation, diarrhea, nausea and vomiting.   Genitourinary: Negative for dysuria and enuresis.   Musculoskeletal: Negative for back pain.   Skin: Negative for rash.   Neurological: Negative for weakness.   Hematological: Does not bruise/bleed easily.   All other systems reviewed and are negative.       Physical Exam     Initial Vitals [04/20/20 1412]   BP Pulse Resp Temp SpO2   (!) 167/90 85 20 98.9 °F (37.2 °C) 99 %      MAP       --          Physical Exam  Nursing Notes and Vital Signs Reviewed.  Constitutional: Obese. Patient is in NAD.  Head: Atraumatic. " Normocephalic.  Eyes: PERRL. EOM intact. Conjunctivae are not pale. No scleral icterus.  ENT: Mucous membranes are moist. Oropharynx is clear and symmetric.    Neck: Supple. Full ROM. No lymphadenopathy.  Cardiovascular: Regular rate. Regular rhythm. No murmurs, rubs, or gallops. Distal pulses are 2+ and symmetric.  Pulmonary/Chest: No respiratory distress. Clear to auscultation bilaterally. No wheezing or rales.  Abdominal: Obese abdomen. Small area of skin breakdown under large panus consistent with yeast. Soft and non-distended.  There is no LLQ tenderness.  No rebound, guarding, or rigidity. Good bowel sounds.  Genitourinary: No CVA tenderness  Musculoskeletal: Moves all extremities. No obvious deformities. No calf tenderness.  Skin: Warm and dry. Chronic venous stasis changes to bileral lower extremities worse on the left.  Neurological:  Alert, awake, and appropriate.  Normal speech.  No acute focal neurological deficits are appreciated.  Psychiatric: Normal affect. Good eye contact. Appropriate in content.     ED Course   Critical Care  Date/Time: 4/20/2020 5:50 PM  Performed by: Carli Irving MD  Authorized by: Carli Irving MD   Direct patient critical care time: 40 minutes  Additional history critical care time: 5 minutes  Ordering / reviewing critical care time: 10 minutes  Documentation critical care time: 5 minutes  Total critical care time (exclusive of procedural time) : 60 minutes  Critical care time was exclusive of separately billable procedures and treating other patients and teaching time.  Critical care was necessary to treat or prevent imminent or life-threatening deterioration of the following conditions: hypertensive urgency.  Critical care was time spent personally by me on the following activities: blood draw for specimens, development of treatment plan with patient or surrogate, interpretation of cardiac output measurements, evaluation of patient's response to treatment,  "examination of patient, obtaining history from patient or surrogate, ordering and performing treatments and interventions, ordering and review of laboratory studies, ordering and review of radiographic studies, pulse oximetry, re-evaluation of patient's condition and review of old charts.        ED Vital Signs:  Vitals:    04/20/20 1412 04/20/20 1428 04/20/20 1445 04/20/20 1507   BP: (!) 167/90 (!) 231/99  (!) 240/120   Pulse: 85 84  81   Resp: 20 (!) 23  11   Temp: 98.9 °F (37.2 °C)      TempSrc: Oral      SpO2: 99% 98%  99%   Weight:   (!) 175.9 kg (387 lb 12.8 oz)    Height: 6' 7" (2.007 m)       04/20/20 1520 04/20/20 1528 04/20/20 1532 04/20/20 1621   BP: (!) 233/102 (!) 234/118 (!) 219/114 (!) 220/106   Pulse: 91 86 84 96   Resp: (!) 21 18 (!) 22 18   Temp:       TempSrc:       SpO2:   99% 98%   Weight:       Height:        04/20/20 1631 04/20/20 1647 04/20/20 1703 04/20/20 1730   BP: (!) 203/83 (!) 205/98 (!) 213/106 (!) 169/86   Pulse: 83 84 85 90   Resp: (!) 21 20  18   Temp:       TempSrc:       SpO2: 98%   97%   Weight:       Height:        04/20/20 1802 04/20/20 1831 04/20/20 1845   BP: (!) 183/91 (!) 188/100 (!) 193/102   Pulse: 89 90 90   Resp: (!) 22 18    Temp:      TempSrc:      SpO2: 97% 96% 96%   Weight:      Height:          Abnormal Lab Results:  Labs Reviewed   CBC W/ AUTO DIFFERENTIAL - Abnormal; Notable for the following components:       Result Value    Lymph # 0.6 (*)     Gran% 83.3 (*)     Lymph% 9.9 (*)     All other components within normal limits   SARS-COV-2 RNA AMPLIFICATION, QUAL - Abnormal; Notable for the following components:    SARS-CoV-2 RNA, Amplification, Qual Positive (*)     All other components within normal limits   URINALYSIS, REFLEX TO URINE CULTURE - Abnormal; Notable for the following components:    Protein, UA 1+ (*)     Glucose, UA 3+ (*)     Ketones, UA 1+ (*)     Occult Blood UA 2+ (*)     All other components within normal limits    Narrative:     Preferred " Collection Type->Urine, Catheterized   COMPREHENSIVE METABOLIC PANEL - Abnormal; Notable for the following components:    CO2 17 (*)     Glucose 336 (*)     Creatinine 2.0 (*)     ALT 56 (*)     eGFR if  40 (*)     eGFR if non  34 (*)     All other components within normal limits   TROPONIN I - Abnormal; Notable for the following components:    Troponin I 0.037 (*)     All other components within normal limits   LACTATE DEHYDROGENASE - Abnormal; Notable for the following components:     (*)     All other components within normal limits   URINALYSIS MICROSCOPIC - Abnormal; Notable for the following components:    RBC, UA 6 (*)     All other components within normal limits    Narrative:     Preferred Collection Type->Urine, Catheterized   POCT GLUCOSE - Abnormal; Notable for the following components:    POCT Glucose 279 (*)     All other components within normal limits   B-TYPE NATRIURETIC PEPTIDE   TROPONIN I   LACTATE DEHYDROGENASE   C-REACTIVE PROTEIN   FERRITIN   FERRITIN   C-REACTIVE PROTEIN   B-TYPE NATRIURETIC PEPTIDE        All Lab Results:  Results for orders placed or performed during the hospital encounter of 04/20/20   CBC auto differential   Result Value Ref Range    WBC 6.37 3.90 - 12.70 K/uL    RBC 4.98 4.60 - 6.20 M/uL    Hemoglobin 15.2 14.0 - 18.0 g/dL    Hematocrit 44.3 40.0 - 54.0 %    Mean Corpuscular Volume 89 82 - 98 fL    Mean Corpuscular Hemoglobin 30.5 27.0 - 31.0 pg    Mean Corpuscular Hemoglobin Conc 34.3 32.0 - 36.0 g/dL    RDW 13.3 11.5 - 14.5 %    Platelets 215 150 - 350 K/uL    MPV 9.8 9.2 - 12.9 fL    Immature Granulocytes 0.3 0.0 - 0.5 %    Gran # (ANC) 5.3 1.8 - 7.7 K/uL    Immature Grans (Abs) 0.02 0.00 - 0.04 K/uL    Lymph # 0.6 (L) 1.0 - 4.8 K/uL    Mono # 0.4 0.3 - 1.0 K/uL    Eos # 0.0 0.0 - 0.5 K/uL    Baso # 0.02 0.00 - 0.20 K/uL    nRBC 0 0 /100 WBC    Gran% 83.3 (H) 38.0 - 73.0 %    Lymph% 9.9 (L) 18.0 - 48.0 %    Mono% 5.7 4.0 - 15.0 %     Eosinophil% 0.5 0.0 - 8.0 %    Basophil% 0.3 0.0 - 1.9 %    Differential Method Automated    COVID-19 Routine Screening   Result Value Ref Range    SARS-CoV-2 RNA, Amplification, Qual Positive (A) Negative   Urinalysis, Reflex to Urine Culture Urine, Catheterized   Result Value Ref Range    Specimen UA Urine, Clean Catch     Color, UA Yellow Yellow, Straw, Ella    Appearance, UA Clear Clear    pH, UA 7.0 5.0 - 8.0    Specific Gravity, UA 1.015 1.005 - 1.030    Protein, UA 1+ (A) Negative    Glucose, UA 3+ (A) Negative    Ketones, UA 1+ (A) Negative    Bilirubin (UA) Negative Negative    Occult Blood UA 2+ (A) Negative    Nitrite, UA Negative Negative    Urobilinogen, UA Negative <2.0 EU/dL    Leukocytes, UA Negative Negative   Comprehensive metabolic panel   Result Value Ref Range    Sodium 139 136 - 145 mmol/L    Potassium 4.2 3.5 - 5.1 mmol/L    Chloride 110 95 - 110 mmol/L    CO2 17 (L) 23 - 29 mmol/L    Glucose 336 (H) 70 - 110 mg/dL    BUN, Bld 14 8 - 23 mg/dL    Creatinine 2.0 (H) 0.5 - 1.4 mg/dL    Calcium 9.6 8.7 - 10.5 mg/dL    Total Protein 7.3 6.0 - 8.4 g/dL    Albumin 3.6 3.5 - 5.2 g/dL    Total Bilirubin 0.7 0.1 - 1.0 mg/dL    Alkaline Phosphatase 110 55 - 135 U/L    AST 27 10 - 40 U/L    ALT 56 (H) 10 - 44 U/L    Anion Gap 12 8 - 16 mmol/L    eGFR if African American 40 (A) >60 mL/min/1.73 m^2    eGFR if non African American 34 (A) >60 mL/min/1.73 m^2   Ferritin   Result Value Ref Range    Ferritin 157 20.0 - 300.0 ng/mL   Troponin I   Result Value Ref Range    Troponin I 0.037 (H) 0.000 - 0.026 ng/mL   C-Reactive Protein   Result Value Ref Range    CRP 5.0 0.0 - 8.2 mg/L   Brain Natriuretic Peptide   Result Value Ref Range    BNP 89 0 - 99 pg/mL   Lactate dehydrogenase   Result Value Ref Range     (H) 110 - 260 U/L   Urinalysis Microscopic   Result Value Ref Range    RBC, UA 6 (H) 0 - 4 /hpf    WBC, UA 1 0 - 5 /hpf    Bacteria Rare None-Occ /hpf    Yeast, UA None None    Hyaline Casts, UA 0  0-1/lpf /lpf    Microscopic Comment SEE COMMENT    POCT glucose   Result Value Ref Range    POCT Glucose 279 (H) 70 - 110 mg/dL         Imaging Results          X-Ray Chest AP Portable (Final result)  Result time 04/20/20 17:40:19    Final result by Jonathan Deras MD (04/20/20 17:40:19)                 Impression:      No acute findings.      Electronically signed by: Jonathan Deras MD  Date:    04/20/2020  Time:    17:40             Narrative:    EXAMINATION:  XR CHEST AP PORTABLE    CLINICAL HISTORY:  COVID-19    TECHNIQUE:  Single frontal view of the chest was performed.    COMPARISON:  02/06/2020    FINDINGS:  Mild cardiomegaly.    The lungs are clear.  No pleural effusions.    Bones are unremarkable.                               CT Renal Stone Study ABD Pelvis WO (Final result)  Result time 04/20/20 15:15:17    Final result by Karan Helms MD (04/20/20 15:15:17)                 Impression:      Extensive left-sided perinephric stranding. Moderate hydronephrosis and hydroureter extending to a 4 mm stone within the proximal 3rd of the left ureter. Upper tract infection not excluded.    Patchy interstitial infiltrates are seen within the lung bases bilaterally.  Multifocal airspace disease not excluded including viral pneumonitis.    Mild thickening and fluid at the GE junction could reflect changes from reflux disease or esophagitis. Recommend follow-up esophagram or endoscopy.    All CT scans at this facility use dose modulation, iterative reconstruction, and/or weight based dosing when appropriate to reduce radiation dose to as low as reasonable achievable.      Electronically signed by: Karan Helms MD  Date:    04/20/2020  Time:    15:15             Narrative:    EXAMINATION:  CT RENAL STONE STUDY ABD PELVIS WO    CLINICAL HISTORY:  Flank pain, stone disease suspected;    TECHNIQUE:  Low dose axial images, sagittal and coronal reformations were obtained from the lung bases to the pubic symphysis.  Oral  contrast was not administered.    COMPARISON:  01/03/2019    FINDINGS:  Heart: Normal size. No effusion.    Lung Bases: Patchy interstitial infiltrates are seen within the lung bases bilaterally.  Multifocal airspace disease not excluded.    Liver: Normal size and attenuation. No focal lesions.    Gallbladder: No calcified gallstones.    Bile Ducts: No dilatation.    Pancreas: No obvious mass. No peripancreatic fat stranding.    Spleen: Normal.    Adrenals: Normal.    Kidneys/Ureters: Mild renal cortical thinning bilaterally.  Indeterminate hypodensity within the upper pole of the right kidney measures 12 mm in likely reflects a small cyst..  Extensive left-sided perinephric stranding.  Moderate hydronephrosis and hydroureter extending to a 4 mm stone within the proximal 3rd of the left ureter.  Upper tract infection not excluded.  Punctate nonobstructing stones are also noted within the left upper pole calices.    Bladder: Minimal wall thickening is seen at the base of the bladder.  Small right-sided diverticulum noted.    GI Tract/Mesentery: Small hiatal hernia.  Mild thickening and fluid at the GE junction could reflect changes from reflux disease or esophagitis.  Recommend follow-up esophagram or endoscopy.  No evidence of bowel obstruction or inflammation.  Normal appendix    Peritoneal Space: No ascites or free air.    Retroperitoneum: No significant adenopathy.    Abdominal wall: Normal.    Vasculature: No aneurysm.Aorta demonstrates atherosclerotic disease.    Bones: No acute fracture.  Mild DJD.  No suspicious lytic or sclerotic lesions.                                 The EKG was ordered, reviewed, and independently interpreted by the ED provider.  Interpretation time: 1536  Rate: 89 BPM  Rhythm: NSR  Interpretation: Chronic LBBB. LAD. No STEMI.  No change compared to EKG on 2/8/20    The Emergency Provider reviewed the vital signs and test results, which are outlined above.     ED Discussion   Labs at  baseline, BP chronically elevated and unchanged from baseline.     5:51 PM: Reassessed pt at this time.  Pt states his condition has improved at this time. Discussed with pt all pertinent ED information and results. Discussed pt dx and plan of tx. Gave pt all f/u and return to the ED instructions. All questions and concerns were addressed at this time. Pt expresses understanding of information and instructions, and is comfortable with plan to discharge. Pt is stable for discharge.    I discussed with patient and/or family/caretaker that evaluation in the ED does not suggest any emergent or life threatening medical conditions requiring immediate intervention beyond what was provided in the ED, and I believe patient is safe for discharge.  Regardless, an unremarkable evaluation in the ED does not preclude the development or presence of a serious of life threatening condition. As such, patient was instructed to return immediately for any worsening or change in current symptoms.    Positive Covid-19 discharge precautions given as outlined below:      Your test was POSITIVE for COVID-19 (coronavirus).       Prevention steps for patients with confirmed COVID-19       Stay home and stay away from family members and friends. The CDC says, you can leave home after these three things have happened: 1) You have had no fever for at least 72 hours (that is three full days of no fever without the use of medicine that reduces fevers) 2) AND other symptoms have improved (for example, when your cough or shortness of breath have improved) 3) AND at least 7 days have passed since your symptoms first appeared.   Separate yourself from other people and animals in your home.   Call ahead before visiting your doctor.   Wear a facemask.   Cover your coughs and sneezes.   Wash your hands often with soap and water; hand  can be used, too.   Avoid sharing personal household items.   Wipe down surfaces used daily.   Monitor  your symptoms. Seek prompt medical attention if your illness is worsening (e.g., difficulty breathing).    Before seeking care, call your healthcare provider.   If you have a medical emergency and need to call 911, notify the dispatch personnel that you have, or are being evaluated for COVID-19. If possible, put on a facemask before emergency medical services arrive.        Recommended precautions for household members, intimate partners, and caregivers in a home setting of a patient with symptomatic laboratory-confirmed COVID-19 or a patient under investigation.  Household members, intimate partners, and caregivers in the home setting awaiting tests results have close contact with a person with symptomatic, laboratory-confirmed COVID-19 or a person under investigation. Close contacts should monitor their health; they should call their provider right away if they develop symptoms suggestive of COVID-19 (e.g., fever, cough, shortness of breath).    Close contacts should also follow these recommendations:   Make sure that you understand and can help the patient follow their provider's instructions for medication(s) and care. You should help the patient with basic needs in the home and provide support for getting groceries, prescriptions, and other personal needs.   Monitor the patient's symptoms. If the patient is getting sicker, call his or her healthcare provider and tell them that the patient has laboratory-confirmed COVID-19. If the patient has a medical emergency and you need to call 911, notify the dispatch personnel that the patient has, or is being evaluated for COVID-19.   Household members should stay in another room or be  from the patient. Household members should use a separate bedroom and bathroom, if available.   Prohibit visitors.   Household members should care for any pets in the home.   Make sure that shared spaces in the home have good air flow, such as by an air conditioner or an  opened window, weather permitting.   Perform hand hygiene frequently. Wash your hands often with soap and water for at least 20 seconds or use an alcohol-based hand  (that contains > 60% alcohol) covering all surfaces of your hands and rubbing them together until they feel dry. Soap and water should be used preferentially.   Avoid touching your eyes, nose, and mouth.   The patient should wear a facemask. If the patient is not able to wear a facemask (for example, because it causes trouble breathing), caregivers should wear a mask when they are in the same room as the patient.   Wear a disposable facemask and gloves when you touch or have contact with the patient's blood, stool, or body fluids, such as saliva, sputum, nasal mucus, vomit, urine.  o Throw out disposable facemasks and gloves after using them. Do not reuse.  o When removing personal protective equipment, first remove and dispose of gloves. Then, immediately clean your hands with soap and water or alcohol-based hand . Next, remove and dispose of facemask, and immediately clean your hands again with soap and water or alcohol-based hand .   You should not share dishes, drinking glasses, cups, eating utensils, towels, bedding, or other items with the patient. After the patient uses these items, you should wash them thoroughly (see below Wash laundry thoroughly).   Clean all high-touch surfaces, such as counters, tabletops, doorknobs, bathroom fixtures, toilets, phones, keyboards, tablets, and bedside tables, every day. Also, clean any surfaces that may have blood, stool, or body fluids on them.   Use a household cleaning spray or wipe, according to the label instructions. Labels contain instructions for safe and effective use of the cleaning product including precautions you should take when applying the product, such as wearing gloves and making sure you have good ventilation during use of the product.   Wash laundry  thoroughly.  o Immediately remove and wash clothes or bedding that have blood, stool, or body fluids on them.  o Wear disposable gloves while handling soiled items and keep soiled items away from your body. Clean your hands (with soap and water or an alcohol-based hand ) immediately after removing your gloves.  o Read and follow directions on labels of laundry or clothing items and detergent. In general, using a normal laundry detergent according to washing machine instructions and dry thoroughly using the warmest temperatures recommended on the clothing label.   Place all used disposable gloves, facemasks, and other contaminated items in a lined container before disposing of them with other household waste. Clean your hands (with soap and water or an alcohol-based hand ) immediately after handling these items. Soap and water should be used preferentially if hands are visibly dirty.   Discuss any additional questions with your state or local health department or healthcare provider. Check available hours when contacting your local health department.    For more information see CDC link below.      https://www.cdc.gov/coronavirus/2019-ncov/hcp/guidance-prevent-spread.html#precautions        Sources:  [facility], [facility] and [facility]        Medical Decision Making:   Clinical Tests:   Lab Tests: Reviewed and Ordered  Radiological Study: Ordered and Reviewed  Medical Tests: Reviewed and Ordered           ED Medication(s):  Medications   hydrALAZINE injection 10 mg (10 mg Intravenous Given 4/20/20 1531)   tamsulosin 24 hr capsule 0.4 mg (0.4 mg Oral Given 4/20/20 1538)   HYDROcodone-acetaminophen  mg per tablet 1 tablet (1 tablet Oral Given 4/20/20 1537)   labetaloL injection 20 mg (20 mg Intravenous Given 4/20/20 1617)   insulin regular injection 10 Units (10 Units Intravenous Given 4/20/20 1700)       Discharge Medication List as of 4/20/2020  7:24 PM      START  taking these medications    Details   HYDROcodone-acetaminophen (NORCO)  mg per tablet Take 1 tablet by mouth every 6 (six) hours as needed for Pain., Starting Mon 4/20/2020, Normal      tamsulosin (FLOMAX) 0.4 mg Cap Take 1 capsule (0.4 mg total) by mouth once daily., Starting Mon 4/20/2020, Until Tue 4/20/2021, Normal             Follow-up Information     Elham Persaud MD. Schedule an appointment as soon as possible for a visit in 2 days.    Specialty:  Family Medicine  Why:  Return to the Emergency Room, If symptoms worsen  Contact information:  [id number]  [place] [address]  [phone]             Bigg Addison IV, MD. Schedule an appointment as soon as possible for a visit in 2 days.    Specialty:  Urology  Contact information:  [id number] [place] [address]  [phone]                       Scribe Attestation:   Scribe #1: I performed the above scribed service and the documentation accurately describes the services I performed. I attest to the accuracy of the note.     Attending:   Physician Attestation Statement for Scribe #1: I, Carli Irving MD, personally performed the services described in this documentation, as scribed by Fidencio Sun, in my presence, and it is both accurate and complete.           Clinical Impression       ICD-10-CM ICD-9-CM   1. Left ureteral stone N20.1 592.1   2. Chronic hypertension I10 401.9   3. COVID-19 virus detected U07.1    4. Stage 3 chronic kidney disease N18.3 585.3   5. Morbid obesity E66.01 278.01   6. Type 2 diabetes mellitus with hyperglycemia, with long-term current use of insulin E11.65 250.00    Z79.4 790.29     V58.67       Disposition:   Disposition: Discharged  Condition: Stable         Carli Irving MD  04/21/20 2051

## 2020-04-20 NOTE — ED NOTES
Patient resting in bed, able to make needs known. No acute distress noted. Cart in low position, side rails up x 2 and call bell in reach.

## 2020-04-20 NOTE — TELEPHONE ENCOUNTER
----- Message from Celina Guaman sent at 4/20/2020  8:10 AM CDT -----  Contact: self/[phone]  Would like to consult with nurse regarding a letter fax over to Patient employer. The fax number is [phone] for excuse for work. Please call back at [phone]. Please was to be back to work today. Thanks/ar

## 2020-04-20 NOTE — TELEPHONE ENCOUNTER
"Stepan called with severe ("11/10" pain) to his lower left abdomen.  Woke up with the pain early this AM, it is constant, and extends from the crease of abd just below navel to lower abdomen above the groin. Recommend ED now He lives in [place], [place], and states he will not go to the hospital in [facility], so he will call 911 and request an ambulance to bring him to [facility]. Of note, he does have DM with complications, and says he has not checked his glucose for "a couple days".  Encouraged him to report this to EMTs.  Said he will. Message to Elham Persaud MD pcp, and to  Dr Siegel, hem onc, Dr Abdi, nephrology, MARIO Deutsch.  Please contact caller directly with any additional care advice.       Reason for Disposition   SEVERE abdominal pain (e.g., excruciating)    Additional Information   Negative: Passed out (i.e., fainted, collapsed and was not responding)   Negative: Shock suspected (e.g., cold/pale/clammy skin, too weak to stand, low BP, rapid pulse)   Negative: Sounds like a life-threatening emergency to the triager   Negative: Chest pain   Negative: Pain is mainly in upper abdomen (if needed ask: 'is it mainly above the belly button?')    Protocols used: ABDOMINAL PAIN - MALE-A-OH      "

## 2020-04-21 ENCOUNTER — TELEPHONE (OUTPATIENT)
Dept: DIABETES | Facility: CLINIC | Age: 65
End: 2020-04-21

## 2020-04-24 ENCOUNTER — TELEPHONE (OUTPATIENT)
Dept: INTERNAL MEDICINE | Facility: CLINIC | Age: 65
End: 2020-04-24

## 2020-04-24 NOTE — TELEPHONE ENCOUNTER
----- Message from Britt Skinner sent at 4/24/2020  8:58 AM CDT -----  Contact: pt  Pt needs to set up an audio phone appt for an ER follow up. Please call pt at [phone]

## 2020-04-30 ENCOUNTER — OFFICE VISIT (OUTPATIENT)
Dept: PSYCHIATRY | Facility: CLINIC | Age: 65
End: 2020-04-30
Payer: COMMERCIAL

## 2020-04-30 DIAGNOSIS — F33.1 MDD (MAJOR DEPRESSIVE DISORDER), RECURRENT EPISODE, MODERATE: Primary | ICD-10-CM

## 2020-04-30 PROCEDURE — 90832 PR PSYCHOTHERAPY W/PATIENT, 30 MIN: ICD-10-PCS | Mod: 95,,, | Performed by: SOCIAL WORKER

## 2020-04-30 PROCEDURE — 90832 PSYTX W PT 30 MINUTES: CPT | Mod: 95,,, | Performed by: SOCIAL WORKER

## 2020-05-12 ENCOUNTER — PATIENT OUTREACH (OUTPATIENT)
Dept: ADMINISTRATIVE | Facility: OTHER | Age: 65
End: 2020-05-12

## 2020-05-14 ENCOUNTER — TELEPHONE (OUTPATIENT)
Dept: INTERNAL MEDICINE | Facility: CLINIC | Age: 65
End: 2020-05-14

## 2020-05-14 ENCOUNTER — OFFICE VISIT (OUTPATIENT)
Dept: PSYCHIATRY | Facility: CLINIC | Age: 65
End: 2020-05-14
Payer: COMMERCIAL

## 2020-05-14 DIAGNOSIS — F33.1 MDD (MAJOR DEPRESSIVE DISORDER), RECURRENT EPISODE, MODERATE: Primary | ICD-10-CM

## 2020-05-14 PROCEDURE — 90832 PSYTX W PT 30 MINUTES: CPT | Mod: 95,,, | Performed by: SOCIAL WORKER

## 2020-05-14 PROCEDURE — 90832 PR PSYCHOTHERAPY W/PATIENT, 30 MIN: ICD-10-PCS | Mod: 95,,, | Performed by: SOCIAL WORKER

## 2020-05-14 NOTE — PROGRESS NOTES
Individual Psychotherapy (PhD/LCSW)    5/14/2020    Site:  [facility]         Therapeutic Intervention: Met with patient.  Outpatient -  Non physician telephone assessment 30 min - CPT code 94981    Location:  In his home    Chief complaint/reason for encounter: depression     Interval history and content of current session:  Patient presents via phone to ongoing individual therapy due to depression.  He has been quarantined at home due to testing positive for COVID.  He has been at home for four weeks.  He is hoping he has another week to stay at home.  He has been working with Dr. Persaud regarding when he can return to work.  He has been in the hospital five times since January.  Each time he has been in the hospital they have messed with his insulin.  He had an appointment with diabetes management this week, but it was postponed till June due to COVID.  When he was discharged from [facility], his blood sugar was messed up.  He got his blood sugar under control at this time until he sees diabetes management.  Emphasized the depressive symptoms of lack of motivation and interest.  Encourage the patient to advocate for his own physical health.  Emphasize the effects the current virus is having on everyone.  Praise his efforts to walk in his neighborhood.  He has been sleeping a lot.  Two days last week, he did not get up till 11am.  He would then not go to sleep till 1am.  He does have some depression at times.  He finds he is thinking less about his ex girlfriend.  When he does think of her, he wonders what they would do in the future.  He has been struggling with feeling sad for himself.  He has been walking to the end of the street.  He admits he is not even joking anymore about the virus.  Both of his children are currently working.  He is not even interested in going back to work.      Treatment plan:  ? Target symptoms: depression  ? Why chosen therapy is appropriate versus another modality: relevant to  diagnosis  ? Outcome monitoring methods: self-report, observation  ? Therapeutic intervention type: insight oriented psychotherapy, supportive psychotherapy, interactive psychotherapy     Risk parameters:  Patient reports no suicidal ideation  Patient reports no homicidal ideation  Patient reports no self-injurious behavior  Patient reports no violent behavior     Verbal deficits: None     Patient's response to intervention:  The patient's response to intervention is accepting, motivated.     Progress toward goals and other mental status changes:  The patient's progress toward goals is fair.     Diagnosis:   Major depression recurrent moderate     Plan:  individual psychotherapy and medication management by physician, Dr. Campbell     Return to clinic: as scheduled     Length of Service (minutes): 30

## 2020-05-20 ENCOUNTER — TELEPHONE (OUTPATIENT)
Dept: INTERNAL MEDICINE | Facility: CLINIC | Age: 65
End: 2020-05-20

## 2020-05-20 NOTE — TELEPHONE ENCOUNTER
----- Message from Mariann Ca sent at 5/20/2020  2:44 PM CDT -----  Contact: Pt self Mobile/Home [phone]   Patient would like a call back in regards to him wanting to know if he need to take a COVID-19 test again before his audio visit on 06/01/2020?

## 2020-05-20 NOTE — TELEPHONE ENCOUNTER
S/W pt, advised him he does need another Covid 19 test prior to his virtual visit on 06/01/20. Pt verbalized understanding/jj

## 2020-05-28 ENCOUNTER — OFFICE VISIT (OUTPATIENT)
Dept: PSYCHIATRY | Facility: CLINIC | Age: 65
End: 2020-05-28
Payer: COMMERCIAL

## 2020-05-28 DIAGNOSIS — F33.1 MDD (MAJOR DEPRESSIVE DISORDER), RECURRENT EPISODE, MODERATE: Primary | ICD-10-CM

## 2020-05-28 PROCEDURE — 90832 PR PSYCHOTHERAPY W/PATIENT, 30 MIN: ICD-10-PCS | Mod: 95,,, | Performed by: SOCIAL WORKER

## 2020-05-28 PROCEDURE — 90832 PSYTX W PT 30 MINUTES: CPT | Mod: 95,,, | Performed by: SOCIAL WORKER

## 2020-05-28 NOTE — PROGRESS NOTES
"Individual Psychotherapy (PhD/LCSW)    5/28/2020    Site:  [facility]         Therapeutic Intervention: Met with patient.  Outpatient -  Non physician telephone assessment 30 min - CPT code 67141    Chief complaint/reason for encounter: depression     Interval history and content of current session:  Patient presents via phone to ongoing individual therapy due to depression.  He has gotten a new iPhone, but he is having difficulty connecting to video visits.  He plans to go to the [employer] store to get assistance.  He is hoping that he will be medically cleared for the next appointment and be able to come in person.  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  He has not heard a word from his boss since he has been out sick.  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  He has been walking on a regular basis.  He has lost some weight.  He is still "wobbly" when he walks.  When he stands up, he needs a couple of seconds to gain his balance.  He is using a cane to walk.  He still does not know why he had the spell two months ago when he ended up on the floor for a couple of days.  The hospital still does not know why he had the spell.  He continues to work toward getting an alert button for his person.  Encourage him to continue exercise for his mood.  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  Praise steps to socialize with his neighbors.  Confront the toxic negative thoughts he has regarding his ex girlfriend.  He has been doing little things in his house and going to the grocery.  He is not seeing his friends at all due to their fear of getting the virus.  They are taking it "really seriously."  His mood swings are not as severe because he has been walking on a regular basis.  He will sit on his swing after the walk and visit with his neighbors.  He has been watching court room, medical dramas, and Star Trek.  He continues to have thoughts " of his ex girlfriend.  He admits that thoughts of her can be destructive.  He has been collecting unemployment from the state.  His daughter talks to him every Sunday and his son has been working overtime.           Treatment plan:  ? Target symptoms: depression  ? Why chosen therapy is appropriate versus another modality: relevant to diagnosis  ? Outcome monitoring methods: self-report, observation  ? Therapeutic intervention type: insight oriented psychotherapy, supportive psychotherapy, interactive psychotherapy     Risk parameters:  Patient reports no suicidal ideation  Patient reports no homicidal ideation  Patient reports no self-injurious behavior  Patient reports no violent behavior     Verbal deficits: None     Patient's response to intervention:  The patient's response to intervention is accepting, motivated.     Progress toward goals and other mental status changes:  The patient's progress toward goals is fair.     Diagnosis:   Major depression recurrent moderate     Plan:  individual psychotherapy and medication management by physician, Dr. Campbell     Return to clinic: as scheduled     Length of Service (minutes): 30

## 2020-06-01 ENCOUNTER — TELEPHONE (OUTPATIENT)
Dept: INTERNAL MEDICINE | Facility: CLINIC | Age: 65
End: 2020-06-01

## 2020-06-01 NOTE — TELEPHONE ENCOUNTER
Letter faxed to [employer]   Attn: SIMON Pretty   [phone] and emailed to [email or web address]     Patient is scheduled to see Dr. Fleming on 09/01/2020.//KM

## 2020-06-11 ENCOUNTER — OFFICE VISIT (OUTPATIENT)
Dept: PSYCHIATRY | Facility: CLINIC | Age: 65
End: 2020-06-11
Payer: COMMERCIAL

## 2020-06-11 DIAGNOSIS — F33.1 MDD (MAJOR DEPRESSIVE DISORDER), RECURRENT EPISODE, MODERATE: Primary | ICD-10-CM

## 2020-06-11 DIAGNOSIS — F32.9 MAJOR DEPRESSION, CHRONIC: Primary | ICD-10-CM

## 2020-06-11 DIAGNOSIS — F41.9 ANXIETY: ICD-10-CM

## 2020-06-11 PROCEDURE — 99213 OFFICE O/P EST LOW 20 MIN: CPT | Mod: S$GLB,,, | Performed by: PSYCHIATRY & NEUROLOGY

## 2020-06-11 PROCEDURE — 99999 PR PBB SHADOW E&M-EST. PATIENT-LVL I: CPT | Mod: PBBFAC,,, | Performed by: PSYCHIATRY & NEUROLOGY

## 2020-06-11 PROCEDURE — 99213 PR OFFICE/OUTPT VISIT, EST, LEVL III, 20-29 MIN: ICD-10-PCS | Mod: S$GLB,,, | Performed by: PSYCHIATRY & NEUROLOGY

## 2020-06-11 PROCEDURE — 90834 PR PSYCHOTHERAPY W/PATIENT, 45 MIN: ICD-10-PCS | Mod: S$GLB,,, | Performed by: SOCIAL WORKER

## 2020-06-11 PROCEDURE — 90834 PSYTX W PT 45 MINUTES: CPT | Mod: S$GLB,,, | Performed by: SOCIAL WORKER

## 2020-06-11 PROCEDURE — 99999 PR PBB SHADOW E&M-EST. PATIENT-LVL I: ICD-10-PCS | Mod: PBBFAC,,, | Performed by: PSYCHIATRY & NEUROLOGY

## 2020-06-11 RX ORDER — DULOXETIN HYDROCHLORIDE 30 MG/1
30 CAPSULE, DELAYED RELEASE ORAL DAILY
Qty: 90 CAPSULE | Refills: 1 | Status: SHIPPED | OUTPATIENT
Start: 2020-06-11 | End: 2020-10-15

## 2020-06-11 RX ORDER — DULOXETIN HYDROCHLORIDE 30 MG/1
30 CAPSULE, DELAYED RELEASE ORAL DAILY
Qty: 30 CAPSULE | Refills: 3 | Status: SHIPPED | OUTPATIENT
Start: 2020-06-11 | End: 2020-10-15

## 2020-06-11 NOTE — PROGRESS NOTES
"  Outpatient Psychiatry Follow-up Visit (MD/NP)    6/11/2020    Stepan Nixon, a 65 y.o. male, presenting for follow-up visit. Met with patient.    Reason for Encounter: self-referral. Patient complains of depressison.    Interval History: Patient seen & interviewed for follow-up, last seen about 5 months prior to this visit. Multiple medical admissions since last visit - first for respiratory illness, first thought to be flu, but later diagnosed as COVID. Has also had admission for nephrolithiasis. Now feeling well, & has just returned to work. Working in the office. Has been off depression medication for some months. Still with some obsessive thoughts about Solange. "I've only contacted her a couple of times." Sleeping a lot. [facility] or to go to work for himself.     Background: This 60 y/o with depression recurrence, 6 months of low moods, feelings of guilt/self-reproach, hopelessness & helplessness, intermittent SI which has been mostly passive, occasional fantasies about active suicide attempts, but never seriously considers action. Most recent depression is in context of loss of love relationship to a woman he met on internet, dated for some time then broke up with him & is now in another relationship. Continued to text & attempt to contact her (she generally doesn't respond) until about a week ago, stopped because he realizes it's time to "move on", that it's taking a toll on his mental health, but still feels tempted to do it. Continues to work despite symptoms, reports performance is "ok", though "not my best work". PsychHx: symptoms began around Christmas '09. first treated for depression in early '10. [facility] '[facility]. Suffered a series of losses since including wife ('09), mother in law ('11), mother ('12), father ('13) & loss of job as [employer]  due to his health problems (he's since changed fields, now works in tax preparation for [employer]). Has participated in " psychotherapy including since he moved to [place], stopped due to loss of insurance (though reinsured, hasn't returned since most recent depression recurrence). 1 remote episode of suicidality without action.  MedHx: DM, had NSTEMI in , FRAN, CHF, CKD, cataracts. SubstanceHx: rare wine, but mostly avoids alcohol due to his health; no illicits or prescription drug abuse. Social Hx: normal , birth, & developmental milestones. Grew up in [place], moved to [place] in .  (wife  in '10), & 2 year relationship ended in mid , though he's continued to contact her until very recently. 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 Works full-time. Has 3 adopted kids (one of which is child of one of other kids).      Review Of Systems:     GENERAL:  No weight gain or loss  SKIN:  No rashes or lacerations  HEAD:  No headaches, +hemifacial plegia.   CHEST:  No shortness of breath, hyperventilation or cough  CARDIOVASCULAR:  No tachycardia or chest pain  ABDOMEN:  No nausea, vomiting, pain, constipation or diarrhea  URINARY:  No frequency, dysuria or sexual dysfunction  ENDOCRINE:  No polydipsia, polyuria  MUSCULOSKELETAL:  No pain or stiffness of the joints  NEUROLOGIC:  No weakness, sensory changes, seizures, confusion, memory loss, tremor or other abnormal movements    Current Evaluation:     Nutritional Screening: Considering the patient's height and weight, medications, medical history and preferences, should a referral be made to the dietitian? no    Constitutional  Vitals:  Most recent vital signs, dated less than 90 days prior to this appointment, were not reviewed.    There were no vitals filed for this visit.     General:  unremarkable, age appropriate     Musculoskeletal  Muscle Strength/Tone:  no tremor, no tic   Gait & Station:  non-ataxic     Psychiatric  Appearance: casually dressed & groomed;   Behavior: calm,    Cooperation: cooperative with assessment  Speech: normal rate, volume, tone  Thought Process: linear, goal-directed  Thought Content: No suicidal or homicidal ideation; no delusions  Affect: mildly anxious  Mood: mildly anxious  Perceptions: No auditory or visual hallucinations  Level of Consciousness: alert throughout interview  Insight: fair  Cognition: Oriented to person, place, time, & situation  Memory: no apparent deficits to general clinical interview; not formally assessed  Attention/Concentration: no apparent deficits to general clinical interview; not formally assessed  Fund of Knowledge: average by vocabulary/education    Laboratory Data  No visits with results within 1 Month(s) from this visit.   Latest known visit with results is:   Admission on 04/20/2020, Discharged on 04/20/2020   Component Date Value Ref Range Status    WBC 04/20/2020 6.37  3.90 - 12.70 K/uL Final    RBC 04/20/2020 4.98  4.60 - 6.20 M/uL Final    Hemoglobin 04/20/2020 15.2  14.0 - 18.0 g/dL Final    Hematocrit 04/20/2020 44.3  40.0 - 54.0 % Final    Mean Corpuscular Volume 04/20/2020 89  82 - 98 fL Final    Mean Corpuscular Hemoglobin 04/20/2020 30.5  27.0 - 31.0 pg Final    Mean Corpuscular Hemoglobin Conc 04/20/2020 34.3  32.0 - 36.0 g/dL Final    RDW 04/20/2020 13.3  11.5 - 14.5 % Final    Platelets 04/20/2020 215  150 - 350 K/uL Final    MPV 04/20/2020 9.8  9.2 - 12.9 fL Final    Immature Granulocytes 04/20/2020 0.3  0.0 - 0.5 % Final    Gran # (ANC) 04/20/2020 5.3  1.8 - 7.7 K/uL Final    Immature Grans (Abs) 04/20/2020 0.02  0.00 - 0.04 K/uL Final    Lymph # 04/20/2020 0.6* 1.0 - 4.8 K/uL Final    Mono # 04/20/2020 0.4  0.3 - 1.0 K/uL Final    Eos # 04/20/2020 0.0  0.0 - 0.5 K/uL Final    Baso # 04/20/2020 0.02  0.00 - 0.20 K/uL Final    nRBC 04/20/2020 0  0 /100 WBC Final    Gran% 04/20/2020 83.3* 38.0 - 73.0 % Final    Lymph% 04/20/2020 9.9* 18.0 - 48.0 % Final    Mono% 04/20/2020 5.7  4.0 - 15.0 %  Final    Eosinophil% 04/20/2020 0.5  0.0 - 8.0 % Final    Basophil% 04/20/2020 0.3  0.0 - 1.9 % Final    Differential Method 04/20/2020 Automated   Final    SARS-CoV-2 RNA, Amplification, Qual 04/20/2020 Positive* Negative Final    Specimen UA 04/20/2020 Urine, Clean Catch   Final    Color, UA 04/20/2020 Yellow  Yellow, Straw, Ella Final    Appearance, UA 04/20/2020 Clear  Clear Final    pH, UA 04/20/2020 7.0  5.0 - 8.0 Final    Specific Gravity, UA 04/20/2020 1.015  1.005 - 1.030 Final    Protein, UA 04/20/2020 1+* Negative Final    Glucose, UA 04/20/2020 3+* Negative Final    Ketones, UA 04/20/2020 1+* Negative Final    Bilirubin (UA) 04/20/2020 Negative  Negative Final    Occult Blood UA 04/20/2020 2+* Negative Final    Nitrite, UA 04/20/2020 Negative  Negative Final    Urobilinogen, UA 04/20/2020 Negative  <2.0 EU/dL Final    Leukocytes, UA 04/20/2020 Negative  Negative Final    Sodium 04/20/2020 139  136 - 145 mmol/L Final    Potassium 04/20/2020 4.2  3.5 - 5.1 mmol/L Final    Chloride 04/20/2020 110  95 - 110 mmol/L Final    CO2 04/20/2020 17* 23 - 29 mmol/L Final    Glucose 04/20/2020 336* 70 - 110 mg/dL Final    BUN, Bld 04/20/2020 14  8 - 23 mg/dL Final    Creatinine 04/20/2020 2.0* 0.5 - 1.4 mg/dL Final    Calcium 04/20/2020 9.6  8.7 - 10.5 mg/dL Final    Total Protein 04/20/2020 7.3  6.0 - 8.4 g/dL Final    Albumin 04/20/2020 3.6  3.5 - 5.2 g/dL Final    Total Bilirubin 04/20/2020 0.7  0.1 - 1.0 mg/dL Final    Alkaline Phosphatase 04/20/2020 110  55 - 135 U/L Final    AST 04/20/2020 27  10 - 40 U/L Final    ALT 04/20/2020 56* 10 - 44 U/L Final    Anion Gap 04/20/2020 12  8 - 16 mmol/L Final    eGFR if  04/20/2020 40* >60 mL/min/1.73 m^2 Final    eGFR if non African American 04/20/2020 34* >60 mL/min/1.73 m^2 Final    Ferritin 04/20/2020 157  20.0 - 300.0 ng/mL Final    Troponin I 04/20/2020 0.037* 0.000 - 0.026 ng/mL Final    CRP 04/20/2020 5.0  0.0 -  8.2 mg/L Final    BNP 04/20/2020 89  0 - 99 pg/mL Final    LD 04/20/2020 382* 110 - 260 U/L Final    RBC, UA 04/20/2020 6* 0 - 4 /hpf Final    WBC, UA 04/20/2020 1  0 - 5 /hpf Final    Bacteria 04/20/2020 Rare  None-Occ /hpf Final    Yeast, UA 04/20/2020 None  None Final    Hyaline Casts, UA 04/20/2020 0  0-1/lpf /lpf Final    Microscopic Comment 04/20/2020 SEE COMMENT   Final    POCT Glucose 04/20/2020 279* 70 - 110 mg/dL Final     Medications  Outpatient Encounter Medications as of 6/11/2020   Medication Sig Dispense Refill    ACCU-CHEK ALYCIA PLUS METER [facility]       aspirin (ECOTRIN) 81 MG EC tablet Take 1 tablet (81 mg total) by mouth once daily.  0    atorvastatin (LIPITOR) 10 MG tablet TAKE 1 TABLET BY MOUTH ONCE DAILY 90 tablet 3    BLOOD PRESSURE CUFF Misc 1 cuff 1 each 0    blood sugar diagnostic Strp To check BG 3 times daily, to use with insurance preferred meter 100 strip 3    brimonidine 0.1% (ALPHAGAN P) 0.1 % Drop Place 1 drop into both eyes 3 (three) times daily. Order 90 day supply 10 mL 4    brinzolamide (AZOPT) 1 % ophthalmic suspension Place 1 drop into both eyes 3 (three) times daily. ORDER 90 DAY SUPPLY 10 mL 4    carvedilol (COREG) 25 MG tablet TAKE 1 TABLET BY MOUTH TWICE DAILY WITH MEALS 180 tablet 11    DULoxetine (CYMBALTA) 30 MG capsule Take 1 capsule daily for 7 days then 2 capsules daily. 180 capsule 0    flash glucose sensor (FREESTYLE CLEMENCIA 14 DAY SENSOR) Kit 1 kit by Misc.(Non-Drug; Combo Route) route every 14 (fourteen) days. 2 kit 11    furosemide (LASIX) 40 MG tablet Take 1 tablet (40 mg total) by mouth 2 (two) times daily. 60 tablet 11    HYDROcodone-acetaminophen (NORCO)  mg per tablet Take 1 tablet by mouth every 6 (six) hours as needed for Pain. 18 tablet 0    insulin NPH-insulin regular, 70/30, (NOVOLIN 70/30 U-100 INSULIN) 100 unit/mL (70-30) injection Inject 130 Units into the skin 2 (two) times daily before meals. 240 mL 3    lancets Misc To check  BG 3 times daily, to use with insurance preferred meter 100 each 3    latanoprost (XALATAN) 0.005 % ophthalmic solution Place 1 drop into both eyes once daily. 3 Bottle 4    lisinopril (PRINIVIL,ZESTRIL) 20 MG tablet Take 1 tablet (20 mg total) by mouth once daily. 30 tablet 11    miglitol (GLYSET) 25 MG Tab Take 1 tablet (25 mg total) by mouth 3 (three) times daily with meals. 270 tablet 3    mupirocin (BACTROBAN) 2 % ointment Apply topically 2 (two) times daily. 22 g 0    netarsudiL (RHOPRESSA) 0.02 % Drop Place 1 drop into both eyes once daily. 2.5 mL 6    nitroGLYCERIN (NITROSTAT) 0.4 MG SL tablet Place 1 tablet (0.4 mg total) under the tongue every 5 (five) minutes as needed for Chest pain. 20 tablet 0    omeprazole (PRILOSEC) 40 MG capsule Take 1 capsule (40 mg total) by mouth once daily. 90 capsule 3    polyethylene glycol (GLYCOLAX) 17 gram/dose powder Take 17 g by mouth once daily. 238 g 6    sildenafil (VIAGRA) 50 MG tablet Take 1 tablet (50 mg total) by mouth daily as needed for Erectile Dysfunction. 5 tablet 3    tamsulosin (FLOMAX) 0.4 mg Cap Take 1 capsule (0.4 mg total) by mouth once daily. 30 capsule 0     No facility-administered encounter medications on file as of 6/11/2020.      Assessment - Diagnosis - Goals:     Impression: This 65 y/o WM with MDD, with recurrent major depression, symptoms. Participating in psychotherapy. Chronic problems with relationship co-dependency, seeking of relationship from unattainable partner. Recent occupational stressor with busy season, conflict with supervisor, ongoing carrie problems, now s/p COVID & other new illnesses, recovering.     Major depressive disorder, recurrent, mild; anxiety    Treatment Goals:  Specify outcomes written in observable, behavioral terms:   Depression: increasing energy, increasing interest in usual activities, increasing motivation, reducing excessive guilt and reducing fatigue    Treatment Plan/Recommendations:   Continue  duloxetine.. Discussed risks, benefits, and alternatives to treatment plan documented above with patient. I answered all patient questions related to this plan and patient expressed understanding and agreement.   Continue psychotherapy.     Return to Clinic: 4 months, prn sooner.     RAMON Pitt MD

## 2020-06-15 NOTE — PROGRESS NOTES
Individual Psychotherapy (PhD/LCSW)    6/11/2020    [facility]:  [facility]         Therapeutic Intervention: Met with patient.  Outpatient - Insight oriented psychotherapy 45 min - CPT code 61358    Chief complaint/reason for encounter: depression     Interval history and content of current session:  Patient presents to ongoing individual therapy due to depression.  He has returned to work part time.  He was cleared by Dr. Persaud to return to work after testing positive for COVID 19 previously.  He is working about three days a week.  He had many customers that requested to only work with him.  The other customers, who did not care who they worked with, was divided among the other staff.  The manager has been nice to him since his return.  He continues to explore getting an alert necklace for when he is at home.  He is hoping to get one through the local senior center that will alert EMS in his [place].  He can get the service for free.   However due to COVID, he has had difficulty contacting the senior center because the staff is not always there.  He continues to walk as much as he can.  Emphasize the patient needs to be proactive about getting an alert necklace so he does not spend a long period of time on the floor as he did in the past.  Encourage the patient to give himself time to adjust to returning to work.  Praise steps to care for his physical health.  His friend continues to tell him not to give up hope on his ex girlfriend.  The patient responded angrily to the friend.  He admits he does think about her less.  He continues to have regular contact with his daughter because she has been helping him with unemployment benefits.  He is happy to report that his daughter and son are doing well.  His annual trip to [place] is postponed due to the recent pandemic.    Treatment plan:  ? Target symptoms: depression  ? Why chosen therapy is appropriate versus another modality: relevant to diagnosis  ? Outcome  monitoring methods: self-report, observation  ? Therapeutic intervention type: insight oriented psychotherapy, supportive psychotherapy, interactive psychotherapy     Risk parameters:  Patient reports no suicidal ideation  Patient reports no homicidal ideation  Patient reports no self-injurious behavior  Patient reports no violent behavior     Verbal deficits: None     Patient's response to intervention:  The patient's response to intervention is accepting, motivated.     Progress toward goals and other mental status changes:  The patient's progress toward goals is fair.     Diagnosis:   Major depression recurrent moderate     Plan:  individual psychotherapy and medication management by physician, Dr. Campbell     Return to clinic: as scheduled     Length of Service (minutes): 30

## 2020-06-19 ENCOUNTER — TELEPHONE (OUTPATIENT)
Dept: DIABETES | Facility: CLINIC | Age: 65
End: 2020-06-19

## 2020-06-25 ENCOUNTER — OFFICE VISIT (OUTPATIENT)
Dept: PSYCHIATRY | Facility: CLINIC | Age: 65
End: 2020-06-25
Payer: COMMERCIAL

## 2020-06-25 DIAGNOSIS — F33.1 MAJOR DEPRESSIVE DISORDER, RECURRENT EPISODE, MODERATE: Primary | ICD-10-CM

## 2020-06-25 PROCEDURE — 90834 PR PSYCHOTHERAPY W/PATIENT, 45 MIN: ICD-10-PCS | Mod: S$GLB,,, | Performed by: SOCIAL WORKER

## 2020-06-25 PROCEDURE — 90834 PSYTX W PT 45 MINUTES: CPT | Mod: S$GLB,,, | Performed by: SOCIAL WORKER

## 2020-06-29 NOTE — PROGRESS NOTES
Individual Psychotherapy (PhD/LCSW)    6/25/2020    Site:  [place]         Therapeutic Intervention: Met with patient.  Outpatient - Insight oriented psychotherapy 45 min - CPT code 11591    Chief complaint/reason for encounter: depression     Interval history and content of current session:  Patient presents to ongoing individual therapy due to depression.  He has been working part time.  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  He does not like the changes that were made.  He is not sure what his occupational future holds.  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  His daughter has encouraged him to move to the [facility].  His son in law has said that he would build an apartment for the patient.  The patient does not believe he could afford housing in [place].  He has had supplemental income from unemployment that is ending.  He has been taking walks on a regular basis.  He enjoys getting out in the morning.  Emphasize that the patient will need to take some time to consider his options.  Praise the patient for continued physical activity.  Reflect the strength of having a supportive family.  He has heard from friends that work at his previous workplace.  They hare having struggles due to the changes made with COVID.  The patient has enjoyed having more time off recently.  He acknowledges that he has struggled in the past with a lot of idle time.    Treatment plan:  ? Target symptoms: depression  ? Why chosen therapy is appropriate versus another modality: relevant to diagnosis  ? Outcome monitoring methods: self-report, observation  ? Therapeutic intervention type: insight oriented psychotherapy, supportive psychotherapy, interactive psychotherapy     Risk parameters:  Patient reports no suicidal ideation  Patient reports no homicidal  ideation  Patient reports no self-injurious behavior  Patient reports no violent behavior     Verbal deficits: None     Patient's response to intervention:  The patient's response to intervention is accepting, motivated.     Progress toward goals and other mental status changes:  The patient's progress toward goals is fair.     Diagnosis:   Major depression recurrent moderate     Plan:  individual psychotherapy and medication management by physician, Dr. Campbell     Return to clinic: as scheduled     Length of Service (minutes): 45

## 2020-07-14 ENCOUNTER — OFFICE VISIT (OUTPATIENT)
Dept: OPHTHALMOLOGY | Facility: CLINIC | Age: 65
End: 2020-07-14
Payer: COMMERCIAL

## 2020-07-14 DIAGNOSIS — H40.1133 PRIMARY OPEN ANGLE GLAUCOMA OF BOTH EYES, SEVERE STAGE: Primary | ICD-10-CM

## 2020-07-14 DIAGNOSIS — H52.13 HIGH MYOPIA, BOTH EYES: ICD-10-CM

## 2020-07-14 DIAGNOSIS — H35.372 EPIRETINAL MEMBRANE (ERM) OF LEFT EYE: ICD-10-CM

## 2020-07-14 DIAGNOSIS — H54.10 BLINDNESS AND LOW VISION: ICD-10-CM

## 2020-07-14 PROCEDURE — 92012 PR EYE EXAM, EST PATIENT,INTERMED: ICD-10-PCS | Mod: S$GLB,,, | Performed by: OPHTHALMOLOGY

## 2020-07-14 PROCEDURE — 92012 INTRM OPH EXAM EST PATIENT: CPT | Mod: S$GLB,,, | Performed by: OPHTHALMOLOGY

## 2020-07-14 PROCEDURE — 99999 PR PBB SHADOW E&M-EST. PATIENT-LVL III: CPT | Mod: PBBFAC,,, | Performed by: OPHTHALMOLOGY

## 2020-07-14 PROCEDURE — 99999 PR PBB SHADOW E&M-EST. PATIENT-LVL III: ICD-10-PCS | Mod: PBBFAC,,, | Performed by: OPHTHALMOLOGY

## 2020-07-14 RX ORDER — NETARSUDIL 0.2 MG/ML
1 SOLUTION/ DROPS OPHTHALMIC; TOPICAL DAILY
Qty: 2.5 ML | Refills: 6 | Status: SHIPPED | OUTPATIENT
Start: 2020-07-14 | End: 2022-07-21 | Stop reason: SDUPTHER

## 2020-07-14 RX ORDER — LATANOPROST 50 UG/ML
1 SOLUTION/ DROPS OPHTHALMIC DAILY
Qty: 3 BOTTLE | Refills: 4 | Status: SHIPPED | OUTPATIENT
Start: 2020-07-14 | End: 2021-09-09 | Stop reason: SDUPTHER

## 2020-07-14 RX ORDER — ASPIRIN 325 MG
325 TABLET ORAL DAILY
Status: ON HOLD | COMMUNITY
End: 2022-10-07 | Stop reason: HOSPADM

## 2020-07-14 NOTE — PROGRESS NOTES
HPI     Patient returns for a 4 month iop check only .  Patient is using   Latanoprost bid ou, patient does not have Travatan or Rhopressa.  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 (History)            Assessment /Plan     For exam results, see Encounter Report.      ICD-10-CM ICD-9-CM    1. Primary open angle glaucoma of both eyes, severe stage  H40.1133 365.11 latanoprost (XALATAN) 0.005 % ophthalmic solution    Iop better today yet wants IOP OS closer to goal   Add rhopressa OS QD     365.73    2. Blindness and low vision  H54.10 369.9    3. Uncontrolled type 2 diabetes mellitus with stage 3 chronic kidney disease, with long-term current use of insulin  E11.22 250.52     E11.65 585.3     N18.3 V58.67     Z79.4     4. Epiretinal membrane (ERM) of left eye  H35.372 362.56    5. High myopia, both eyes  H52.13 367.1        RETURN TO CLINIC 2 month IOP  Sent rhopressa to [facility] to start pa     Azopt BID OU, Alphagan BID OU , Latanoprost qhs ou  Rhopressa QD OS

## 2020-07-16 ENCOUNTER — OFFICE VISIT (OUTPATIENT)
Dept: PSYCHIATRY | Facility: CLINIC | Age: 65
End: 2020-07-16
Payer: COMMERCIAL

## 2020-07-16 DIAGNOSIS — F33.1 MAJOR DEPRESSIVE DISORDER, RECURRENT EPISODE, MODERATE: Primary | ICD-10-CM

## 2020-07-16 PROCEDURE — 90834 PSYTX W PT 45 MINUTES: CPT | Mod: S$GLB,,, | Performed by: SOCIAL WORKER

## 2020-07-16 PROCEDURE — 90834 PR PSYCHOTHERAPY W/PATIENT, 45 MIN: ICD-10-PCS | Mod: S$GLB,,, | Performed by: SOCIAL WORKER

## 2020-07-20 NOTE — PROGRESS NOTES
Individual Psychotherapy (PhD/LCSW)    7/16/2020    Site:  [place]         Therapeutic Intervention: Met with patient.  Outpatient - Insight oriented psychotherapy 45 min - CPT code 11311    Chief complaint/reason for encounter: depression     Interval history and content of current session:  Patient presents to ongoing individual therapy due to depression.  He is anxious at the beginning of session due to confusion about checking in on time.  He notes that he is always on time for his appointments.  He has been stressed at work.  He feels that his manager is trying to get rid of him.  She has been critical of the amount of work he is doing.  He feels that he is getting a good number of tax returns completed.  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  His daughter found a lead for a job.  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  The job would also be closer to home because it is out of [place].  Encourage the patient to use internal boundaries in dealing with his boss.  Emphasize that he has been unhappy with his job for some time.  Confront negative self talk statements.  He is considering writing a good bye letter to his ex girlfriend.  It has been about ten years since they were together.  He is not sure he will send the letter to her.  He still has not had any contact with her.  His youngest son wrote him a note requesting all of his pictures from the home.  The patient has refused to do so.  The patient is proud of his two older children and the success they have had in their business.     Treatment plan:  ? Target symptoms: depression  ? Why chosen therapy is appropriate versus another modality: relevant to diagnosis  ? Outcome monitoring methods: self-report, observation  ? Therapeutic intervention type: insight oriented psychotherapy,  supportive psychotherapy, interactive psychotherapy     Risk parameters:  Patient reports no suicidal ideation  Patient reports no homicidal ideation  Patient reports no self-injurious behavior  Patient reports no violent behavior     Verbal deficits: None     Patient's response to intervention:  The patient's response to intervention is accepting, motivated.     Progress toward goals and other mental status changes:  The patient's progress toward goals is fair.     Diagnosis:   Major depression recurrent moderate     Plan:  individual psychotherapy and medication management by physician, Dr. Campbell     Return to clinic: as scheduled     Length of Service (minutes): 45

## 2020-07-30 ENCOUNTER — OFFICE VISIT (OUTPATIENT)
Dept: PSYCHIATRY | Facility: CLINIC | Age: 65
End: 2020-07-30
Payer: COMMERCIAL

## 2020-07-30 DIAGNOSIS — F33.1 MAJOR DEPRESSIVE DISORDER, RECURRENT EPISODE, MODERATE: Primary | ICD-10-CM

## 2020-07-30 PROCEDURE — 90834 PR PSYCHOTHERAPY W/PATIENT, 45 MIN: ICD-10-PCS | Mod: S$GLB,,, | Performed by: SOCIAL WORKER

## 2020-07-30 PROCEDURE — 90834 PSYTX W PT 45 MINUTES: CPT | Mod: S$GLB,,, | Performed by: SOCIAL WORKER
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
## 2020-08-03 ENCOUNTER — TELEPHONE (OUTPATIENT)
Dept: INTERNAL MEDICINE | Facility: CLINIC | Age: 65
End: 2020-08-03

## 2020-08-03 ENCOUNTER — PATIENT OUTREACH (OUTPATIENT)
Dept: ADMINISTRATIVE | Facility: HOSPITAL | Age: 65
End: 2020-08-03

## 2020-08-03 NOTE — PROGRESS NOTES
Called pt for [employer] CKD report. BP not updated-no answer. Pt scheduled for 3M f/u with  09/01/2020.

## 2020-08-03 NOTE — TELEPHONE ENCOUNTER
[facility] report for CKD being worked. Patient is needing a documented bp reading. I called pt, unable to obtain updated reading- no answer. I was unable to leave message. Pt scheduled for 3M f/u with Dr. Fleming 09/01/2020.

## 2020-08-13 ENCOUNTER — OFFICE VISIT (OUTPATIENT)
Dept: PSYCHIATRY | Facility: CLINIC | Age: 65
End: 2020-08-13
Payer: COMMERCIAL

## 2020-08-13 DIAGNOSIS — F33.1 MAJOR DEPRESSIVE DISORDER, RECURRENT EPISODE, MODERATE: Primary | ICD-10-CM

## 2020-08-13 PROCEDURE — 90834 PR PSYCHOTHERAPY W/PATIENT, 45 MIN: ICD-10-PCS | Mod: S$GLB,,, | Performed by: SOCIAL WORKER

## 2020-08-13 PROCEDURE — 90834 PSYTX W PT 45 MINUTES: CPT | Mod: S$GLB,,, | Performed by: SOCIAL WORKER

## 2020-08-13 NOTE — PROGRESS NOTES
"Individual Psychotherapy (PhD/LCSW)    8/13/2020    [facility]:  [facility]         Therapeutic Intervention: Met with patient.  Outpatient - Insight oriented psychotherapy 45 min - CPT code 92770    Chief complaint/reason for encounter: depression     Interval history and content of current session:  Patient presents to ongoing individual therapy due to depression.  He has been sleeping at least ten hours a night.  He is not sure of the reason for his increased sleep.  He has continued to work on a "goodbye letter" to his ex girlfriend, Solange.  He wonders if the letter is the source of increased sleep.  He has been snacking a good deal.  He relates that he needs to get back on his "diet."  He will be seeing Luís Millard for better management of his diabetes.  He is working two days a week.  Once he is above a certain dollar amount of weekly income, he will no longer qualify for unemployment.  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  A friend has told him that he may not hear something for several months.  He has not been walking due to sleeping so much.  Confront the patient that he has been holding on to this previous relationship like a dead weight.  Emphasize that the more work he does to let go the more other opportunities will be present.  Praise steps to get healthier and work with diabetes management.  Encourage continued work on his good bye letter.  He is proud of his son in [place], who has been helping to develop medication for COVID 19.  He has told his daughter that he plans to move with her on the [facility] in three years.  He has been [facility]  He though he had an in person audit, but he found out at the audit that it was a phone review.  He plans to file a complaint letter.  He has started to enjoy a new television show, [employer] [place].    Treatment plan:  ? Target " symptoms: depression  ? Why chosen therapy is appropriate versus another modality: relevant to diagnosis  ? Outcome monitoring methods: self-report, observation  ? Therapeutic intervention type: insight oriented psychotherapy, supportive psychotherapy, interactive psychotherapy     Risk parameters:  Patient reports no suicidal ideation  Patient reports no homicidal ideation  Patient reports no self-injurious behavior  Patient reports no violent behavior     Verbal deficits: None     Patient's response to intervention:  The patient's response to intervention is accepting, motivated.     Progress toward goals and other mental status changes:  The patient's progress toward goals is fair.     Diagnosis:   Major depression recurrent moderate     Plan:  individual psychotherapy and medication management by physician, Dr. Campbell     Return to clinic: as scheduled     Length of Service (minutes): 45

## 2020-08-18 ENCOUNTER — LAB VISIT (OUTPATIENT)
Dept: LAB | Facility: HOSPITAL | Age: 65
End: 2020-08-18
Attending: PHYSICIAN ASSISTANT
Payer: COMMERCIAL

## 2020-08-18 ENCOUNTER — PATIENT OUTREACH (OUTPATIENT)
Dept: ADMINISTRATIVE | Facility: OTHER | Age: 65
End: 2020-08-18

## 2020-08-18 ENCOUNTER — OFFICE VISIT (OUTPATIENT)
Dept: DIABETES | Facility: CLINIC | Age: 65
End: 2020-08-18
Payer: COMMERCIAL

## 2020-08-18 DIAGNOSIS — G47.33 OSA (OBSTRUCTIVE SLEEP APNEA): Chronic | ICD-10-CM

## 2020-08-18 DIAGNOSIS — E78.5 HYPERLIPIDEMIA ASSOCIATED WITH TYPE 2 DIABETES MELLITUS: ICD-10-CM

## 2020-08-18 DIAGNOSIS — E11.59 HYPERTENSION ASSOCIATED WITH DIABETES: ICD-10-CM

## 2020-08-18 DIAGNOSIS — N18.30 CHRONIC KIDNEY DISEASE, STAGE III (MODERATE): Chronic | ICD-10-CM

## 2020-08-18 DIAGNOSIS — G60.9 IDIOPATHIC PERIPHERAL NEUROPATHY: ICD-10-CM

## 2020-08-18 DIAGNOSIS — S91.209A WOUND OF TOENAIL, INITIAL ENCOUNTER: ICD-10-CM

## 2020-08-18 DIAGNOSIS — I25.10 CORONARY ARTERY DISEASE INVOLVING NATIVE CORONARY ARTERY OF NATIVE HEART WITHOUT ANGINA PECTORIS: ICD-10-CM

## 2020-08-18 DIAGNOSIS — E11.69 HYPERLIPIDEMIA ASSOCIATED WITH TYPE 2 DIABETES MELLITUS: ICD-10-CM

## 2020-08-18 DIAGNOSIS — I15.2 HYPERTENSION ASSOCIATED WITH DIABETES: ICD-10-CM

## 2020-08-18 DIAGNOSIS — I50.9 CHF (NYHA CLASS III, ACC/AHA STAGE C): ICD-10-CM

## 2020-08-18 DIAGNOSIS — H54.10 BLINDNESS AND LOW VISION: ICD-10-CM

## 2020-08-18 DIAGNOSIS — E11.649 HYPOGLYCEMIA UNAWARENESS ASSOCIATED WITH TYPE 2 DIABETES MELLITUS: ICD-10-CM

## 2020-08-18 DIAGNOSIS — E66.01 MORBID OBESITY WITH BMI OF 40.0-44.9, ADULT: ICD-10-CM

## 2020-08-18 LAB — GLUCOSE SERPL-MCNC: 354 MG/DL (ref 70–110)

## 2020-08-18 PROCEDURE — 99214 OFFICE O/P EST MOD 30 MIN: CPT | Mod: S$GLB,,, | Performed by: PHYSICIAN ASSISTANT

## 2020-08-18 PROCEDURE — 3008F PR BODY MASS INDEX (BMI) DOCUMENTED: ICD-10-PCS | Mod: CPTII,S$GLB,, | Performed by: PHYSICIAN ASSISTANT

## 2020-08-18 PROCEDURE — 3052F HG A1C>EQUAL 8.0%<EQUAL 9.0%: CPT | Mod: CPTII,S$GLB,, | Performed by: PHYSICIAN ASSISTANT

## 2020-08-18 PROCEDURE — 99999 PR PBB SHADOW E&M-EST. PATIENT-LVL IV: ICD-10-PCS | Mod: PBBFAC,,, | Performed by: PHYSICIAN ASSISTANT

## 2020-08-18 PROCEDURE — 99999 PR PBB SHADOW E&M-EST. PATIENT-LVL IV: CPT | Mod: PBBFAC,,, | Performed by: PHYSICIAN ASSISTANT

## 2020-08-18 PROCEDURE — 36415 COLL VENOUS BLD VENIPUNCTURE: CPT

## 2020-08-18 PROCEDURE — 99214 PR OFFICE/OUTPT VISIT, EST, LEVL IV, 30-39 MIN: ICD-10-PCS | Mod: S$GLB,,, | Performed by: PHYSICIAN ASSISTANT

## 2020-08-18 PROCEDURE — 3052F PR MOST RECENT HEMOGLOBIN A1C LEVEL 8.0 - < 9.0%: ICD-10-PCS | Mod: CPTII,S$GLB,, | Performed by: PHYSICIAN ASSISTANT

## 2020-08-18 PROCEDURE — 82962 GLUCOSE BLOOD TEST: CPT | Mod: S$GLB,,, | Performed by: PHYSICIAN ASSISTANT

## 2020-08-18 PROCEDURE — 82962 POCT GLUCOSE, HAND-HELD DEVICE: ICD-10-PCS | Mod: S$GLB,,, | Performed by: PHYSICIAN ASSISTANT

## 2020-08-18 PROCEDURE — 83036 HEMOGLOBIN GLYCOSYLATED A1C: CPT

## 2020-08-18 PROCEDURE — 3008F BODY MASS INDEX DOCD: CPT | Mod: CPTII,S$GLB,, | Performed by: PHYSICIAN ASSISTANT

## 2020-08-18 RX ORDER — ATORVASTATIN CALCIUM 10 MG/1
10 TABLET, FILM COATED ORAL DAILY
Qty: 90 TABLET | Refills: 3 | Status: SHIPPED | OUTPATIENT
Start: 2020-08-18 | End: 2021-03-22 | Stop reason: SDUPTHER

## 2020-08-18 RX ORDER — MUPIROCIN CALCIUM 20 MG/G
CREAM TOPICAL 3 TIMES DAILY
Qty: 15 G | Refills: 1 | Status: SHIPPED | OUTPATIENT
Start: 2020-08-18 | End: 2020-08-18 | Stop reason: CLARIF

## 2020-08-18 RX ORDER — MIGLITOL 25 MG/1
25 TABLET, COATED ORAL
Qty: 270 TABLET | Refills: 3 | Status: SHIPPED | OUTPATIENT
Start: 2020-08-18 | End: 2021-05-03 | Stop reason: SDUPTHER

## 2020-08-18 RX ORDER — MUPIROCIN CALCIUM 20 MG/G
CREAM TOPICAL 3 TIMES DAILY
Qty: 15 G | Refills: 1 | Status: SHIPPED | OUTPATIENT
Start: 2020-08-18 | End: 2020-08-28

## 2020-08-18 NOTE — PROGRESS NOTES
PCP: KANDICE Fleming MD    Subjective:     Chief Complaint: Diabetes - Established Patient    HISTORY OF PRESENT ILLNESS: 65 y.o.   male presenting for diabetes management visit.   Patient has previously been established with the Diabetes Management Department and was last seen by myself on 01/14/20.  Patient has had Type II diabetes since 2005.  Pertinent to decision making is the following comorbidities: HTN, HLD, CAD, S/p MI, CHF, CKD III and Obesity by BMI  Patient has the following Diabetes complications: with diabetic chronic kidney disease  He has attended diabetes education in the past.     Patient's most recent A1c of 8.2% was completed 6 months ago.   Patient states since His last A1c His blood glucose levels have been unknown since patient is not currently checking blood sugars at home..   Patient monitors blood glucose 2 times per day with unknown meter : Fasting and Before Bed. Patient was using Sriram CGM, but stopped secondary to [place] issues.   Patient blood glucose monitoring device will not be uploaded into [employer] today secondary to patient forgetting device.   Patient endorses the following diabetes related symptoms: Leg cramping or claudication and Leg swelling or edema. This is a chronic issue secondary to chronic cellulitis of lower limb.   Per patient recall, fasting blood sugars are ranging from 130 - 150 and before bed usually less than 180. Patient admits higher readings when eats heavier. Patient had reading of 354 today after eating 4 cookies last night.   Patient is due today for the following diabetes-related health maintenance standards: Foot exam and A1c.    He denies any recent hospital admissions or emergency room visits.   He denies having hypoglycemia recently. Per chart, patient has history of hypoglycemia unawareness.   Patient's concerns today include glycemic control. Of note, patient had COVID - 19 in the last few months and was hospitalized. During this  "time, he lost 50-70 lbs and also missed routine follow ups such as Podiatry. Patient attempted to cut nails himself and has a small wound near toenail.   Patient medication regimen is as below.     CURRENT DM MEDICATIONS:    70/30 100 units in the morning and 100 units at night   Miglitol 25 mg TID wm     Patient has failed the following Diabetes medications:    Metformin - GI    Glyburide   Ozempic - cost   Basal - Lantus        Labs Reviewed.       Lab Results   Component Value Date    CPEPTIDE 2.70 12/13/2018     Lab Results   Component Value Date    GLUTAMICACID 0.00 12/13/2018       Height: 6' 7" (200.7 cm)  //  Weight: (!) 185.5 kg (408 lb 15.3 oz), Body mass index is 46.07 kg/m².  His blood sugar in clinic today is:    Lab Results   Component Value Date    POCGLU 354 (A) 08/18/2020       Review of Systems   Constitutional: Negative for activity change, appetite change, chills and fever.   HENT: Negative for dental problem, mouth sores, nosebleeds, sore throat and trouble swallowing.    Eyes: Negative for pain and discharge.   Respiratory: Negative for shortness of breath, wheezing and stridor.    Cardiovascular: Positive for leg swelling (Chronic; chronic Leg cellulitis). Negative for chest pain and palpitations.   Gastrointestinal: Negative for abdominal pain, diarrhea, nausea and vomiting.   Endocrine: Negative for polydipsia, polyphagia and polyuria.   Genitourinary: Negative for dysuria, frequency and urgency.   Musculoskeletal: Negative for joint swelling and myalgias.   Skin: Negative for rash and wound.   Neurological: Negative for dizziness, syncope, weakness and headaches.   Psychiatric/Behavioral: Negative for behavioral problems and dysphoric mood.         Diabetes Management Status  Statin: Taking  ACE/ARB: Taking    Screening or Prevention Patient's value Goal Complete/Controlled?   HgA1C Testing and Control   Lab Results   Component Value Date    HGBA1C 8.2 (H) 02/07/2020      " "Annually/Less than 8% No   Lipid profile : 02/07/2020 Annually Yes   LDL control Lab Results   Component Value Date    LDLCALC 38.8 (L) 02/07/2020    Annually/Less than 100 mg/dl  Yes   Nephropathy screening Lab Results   Component Value Date    MICALBCREAT 12.5 04/12/2018     Lab Results   Component Value Date    PROTEINUA 1+ (A) 04/20/2020    Annually No   Blood pressure BP Readings from Last 1 Encounters:   04/20/20 (!) 193/102    Less than 140/90 No   Dilated retinal exam : 07/14/2020 Annually Yes    Foot exam   : 02/04/2019 Annually Yes     ACTIVITY LEVEL: Rarely Active. Discussed activities, benefits, methods, and precautions.  MEAL PLANNING: Patient reports number of meals per day to be 3 and number of snacks per day to be 2.   Patient is encouraged to carb count and consume no more than 30 - 45 grams of carbohydrates in each meal and 15 grams of carbohydrates in each snack.     Social History     Socioeconomic History    Marital status:      Spouse name: Not on file    Number of children: Not on file    Years of education: Not on file    Highest education level: Not on file   Occupational History     Employer: [employer]   Social Needs    Financial resource strain: Not on file    Food insecurity     Worry: Not on file     Inability: Not on file    Transportation needs     Medical: Not on file     Non-medical: Not on file   Tobacco Use    Smoking status: Never Smoker    Smokeless tobacco: Never Used   Substance and Sexual Activity    Alcohol use: Yes     Comment: " one to two glasses of wine per year"    Drug use: No    Sexual activity: Not Currently     Birth control/protection: None   Lifestyle    Physical activity     Days per week: Not on file     Minutes per session: Not on file    Stress: Not on file   Relationships    Social connections     Talks on phone: Not on file     Gets together: Not on file     Attends [employer] service: Not on file     Active member of club or " organization: Not on file     Attends meetings of clubs or organizations: Not on file     Relationship status: Not on file   Other Topics Concern    Not on file   Social History Narrative    , 1 son, JANIE.     Past Medical History:   Diagnosis Date    Anxiety     Bell's palsy     CHF (congestive heart failure)     Coronary artery disease involving native coronary artery without angina pectoris 10/18/2016    Depression     Diabetes mellitus     Glaucoma     Hypertension     Idiopathic peripheral neuropathy 12/11/2012    Mixed hyperlipidemia 12/11/2012    Vitamin B 12 deficiency 8/23/2012       Objective:        Physical Exam  Constitutional:       General: He is not in acute distress.     Appearance: He is well-developed. He is not diaphoretic.   HENT:      Head: Normocephalic and atraumatic.      Right Ear: External ear normal.      Left Ear: External ear normal.      Nose: Nose normal.   Eyes:      General:         Right eye: No discharge.         Left eye: No discharge.      Pupils: Pupils are equal, round, and reactive to light.   Neck:      Musculoskeletal: Normal range of motion and neck supple.   Cardiovascular:      Rate and Rhythm: Normal rate and regular rhythm.      Pulses:           Dorsalis pedis pulses are 2+ on the right side and 2+ on the left side.        Posterior tibial pulses are 2+ on the right side and 2+ on the left side.      Heart sounds: Normal heart sounds.   Pulmonary:      Effort: Pulmonary effort is normal.      Breath sounds: Normal breath sounds.   Abdominal:      Palpations: Abdomen is soft.   Musculoskeletal: Normal range of motion.      Right foot: Normal range of motion. No deformity.      Left foot: Normal range of motion. No deformity.   Feet:      Right foot:      Skin integrity: Callus and dry skin present. No ulcer, blister, skin breakdown, erythema or warmth.      Toenail Condition: Right toenails are long and ingrown.      Left foot:      Skin integrity:  Callus and dry skin present. No ulcer, blister, skin breakdown, erythema or warmth.      Toenail Condition: Left toenails are long and ingrown.      Comments: Dried blood at corner of Big toenail  Skin:     General: Skin is warm and dry.      Capillary Refill: Capillary refill takes less than 2 seconds.   Neurological:      Mental Status: He is oriented to person, place, and time.   Psychiatric:         Behavior: Behavior normal.         Thought Content: Thought content normal.         Judgment: Judgment normal.           Assessment / Plan:     Uncontrolled type 2 diabetes mellitus with kidney complication, with long-term current use of insulin  -     POCT Glucose, Hand-Held Device  -     miglitoL (GLYSET) 25 MG Tab; Take 1 tablet (25 mg total) by mouth 3 (three) times daily with meals.  Dispense: 270 tablet; Refill: 3  -     insulin NPH-insulin regular, 70/30, (NOVOLIN 70/30 U-100 INSULIN) 100 unit/mL (70-30) injection; Inject 130 Units into the skin 2 (two) times daily before meals.  Dispense: 240 mL; Refill: 3  -     atorvastatin (LIPITOR) 10 MG tablet; Take 1 tablet (10 mg total) by mouth once daily.  Dispense: 90 tablet; Refill: 3    Wound of toenail, initial encounter  -     Discontinue: mupirocin calcium 2% (BACTROBAN) 2 % cream; Apply topically 3 (three) times daily. for 10 days  Dispense: 15 g; Refill: 1  -     mupirocin calcium 2% (BACTROBAN) 2 % cream; Apply topically 3 (three) times daily. for 10 days  Dispense: 15 g; Refill: 1    Chronic kidney disease, stage III (moderate)    Hypoglycemia unawareness associated with type 2 diabetes mellitus    Blindness and low vision    CHF (NYHA class III, ACC/AHA stage C)    Idiopathic peripheral neuropathy    Coronary artery disease involving native coronary artery of native heart without angina pectoris  -     atorvastatin (LIPITOR) 10 MG tablet; Take 1 tablet (10 mg total) by mouth once daily.  Dispense: 90 tablet; Refill: 3    Hypertension associated with  diabetes    Hyperlipidemia associated with type 2 diabetes mellitus    FRAN (obstructive sleep apnea)    Morbid obesity with BMI of 40.0-44.9, adult      Additional Plan Details:    - POCT Glucose  - Encouraged continuation of lifestyle changes including regular exercise and limiting carbohydrates to 30-45 grams per meal threes times daily and 15 grams per snack with a limit of two daily.   - Encouraged continued monitoring of blood glucose with an increase to 2 times daily at Fasting, Before Bed and Continuously with personal CGM Sriram. Will set up sharing for Sriram CGM on phone today.   - Current DM Medication Regimen:  Continue 70/30 100 units in the morning and 100 units at night and Miglitol 25 mg TID wm - will update following A1c.   - Follow up with Podiatry for High Risk Diabetic Foot Exam / Toenail wound sustained during cutting nails - no same day availability per Podiatry staff. Patient unavailable to come tomorrow and asks appt be made in 2 days to follow up. No signs of erythema or indurations. Instructed to keep covered with Bactroban until follow up with Podiatry and monitor for s/s of infection.   - Health Maintenance standards addressed today: Foot Exam - completed today and documented in physical exam with feedback to patient about proper diabetic foot care and findings and A1c to be scheduled today  - Nursing Visit: Patient is age 79 or younger with an A1c of 7.5 or greater and qualifies for nursing visit, but will defer for now.   - Follow up in 8 weeks with A1c prior.       Blakeney McKnight, PA-C [facility]    A total of 30 minutes was spent in face to face time, of which over 50 % was spent in counseling patient on disease process, complications, treatment, and side effects of medications.

## 2020-08-19 LAB
ESTIMATED AVG GLUCOSE: 318 MG/DL (ref 68–131)
HBA1C MFR BLD HPLC: 12.7 % (ref 4–5.6)

## 2020-08-20 ENCOUNTER — OFFICE VISIT (OUTPATIENT)
Dept: PODIATRY | Facility: CLINIC | Age: 65
End: 2020-08-20
Payer: COMMERCIAL

## 2020-08-20 VITALS
WEIGHT: 315 LBS | BODY MASS INDEX: 36.45 KG/M2 | SYSTOLIC BLOOD PRESSURE: 183 MMHG | DIASTOLIC BLOOD PRESSURE: 105 MMHG | HEART RATE: 72 BPM | HEIGHT: 78 IN

## 2020-08-20 DIAGNOSIS — L60.0 ONYCHOCRYPTOSIS: Primary | ICD-10-CM

## 2020-08-20 DIAGNOSIS — I89.0 LYMPHEDEMA OF LEFT LEG: ICD-10-CM

## 2020-08-20 DIAGNOSIS — G60.9 IDIOPATHIC PERIPHERAL NEUROPATHY: ICD-10-CM

## 2020-08-20 DIAGNOSIS — B35.1 DERMATOPHYTOSIS OF NAIL: ICD-10-CM

## 2020-08-20 PROCEDURE — 99999 PR PBB SHADOW E&M-EST. PATIENT-LVL III: CPT | Mod: PBBFAC,,, | Performed by: PODIATRIST

## 2020-08-20 PROCEDURE — 3008F PR BODY MASS INDEX (BMI) DOCUMENTED: ICD-10-PCS | Mod: CPTII,S$GLB,, | Performed by: PODIATRIST

## 2020-08-20 PROCEDURE — 99214 PR OFFICE/OUTPT VISIT, EST, LEVL IV, 30-39 MIN: ICD-10-PCS | Mod: 25,S$GLB,, | Performed by: PODIATRIST

## 2020-08-20 PROCEDURE — 99214 OFFICE O/P EST MOD 30 MIN: CPT | Mod: 25,S$GLB,, | Performed by: PODIATRIST

## 2020-08-20 PROCEDURE — 3046F HEMOGLOBIN A1C LEVEL >9.0%: CPT | Mod: CPTII,S$GLB,, | Performed by: PODIATRIST

## 2020-08-20 PROCEDURE — 3046F PR MOST RECENT HEMOGLOBIN A1C LEVEL > 9.0%: ICD-10-PCS | Mod: CPTII,S$GLB,, | Performed by: PODIATRIST

## 2020-08-20 PROCEDURE — 3077F PR MOST RECENT SYSTOLIC BLOOD PRESSURE >= 140 MM HG: ICD-10-PCS | Mod: CPTII,S$GLB,, | Performed by: PODIATRIST

## 2020-08-20 PROCEDURE — 3077F SYST BP >= 140 MM HG: CPT | Mod: CPTII,S$GLB,, | Performed by: PODIATRIST

## 2020-08-20 PROCEDURE — 3080F DIAST BP >= 90 MM HG: CPT | Mod: CPTII,S$GLB,, | Performed by: PODIATRIST

## 2020-08-20 PROCEDURE — 11721 PR DEBRIDEMENT OF NAILS, 6 OR MORE: ICD-10-PCS | Mod: Q9,S$GLB,, | Performed by: PODIATRIST

## 2020-08-20 PROCEDURE — 99999 PR PBB SHADOW E&M-EST. PATIENT-LVL III: ICD-10-PCS | Mod: PBBFAC,,, | Performed by: PODIATRIST

## 2020-08-20 PROCEDURE — 11721 DEBRIDE NAIL 6 OR MORE: CPT | Mod: Q9,S$GLB,, | Performed by: PODIATRIST

## 2020-08-20 PROCEDURE — 3080F PR MOST RECENT DIASTOLIC BLOOD PRESSURE >= 90 MM HG: ICD-10-PCS | Mod: CPTII,S$GLB,, | Performed by: PODIATRIST

## 2020-08-20 PROCEDURE — 3008F BODY MASS INDEX DOCD: CPT | Mod: CPTII,S$GLB,, | Performed by: PODIATRIST

## 2020-08-20 NOTE — PROGRESS NOTES
[facility]  PODIATRIC MEDICINE AND SURGERY      CHIEF COMPLAINT  Chief Complaint   Patient presents with    Diabetic Foot Exam     PCP  4/13/20  DFE         HPI    SUBJECTIVE: Stepan Nixon is a 65 y.o. male who  has a past medical history of Anxiety, Bell's palsy, CHF (congestive heart failure), Coronary artery disease involving native coronary artery without angina pectoris (10/18/2016), Depression, Diabetes mellitus, Glaucoma, Hypertension, Idiopathic peripheral neuropathy (12/11/2012), Mixed hyperlipidemia (12/11/2012), and Vitamin B 12 deficiency (8/23/2012). Stepanpresents to clinic for high risk diabetic foot exam and care.  Stepan admits numbness, burning, and/or tingling sensations in their feet. Patient complains about thickened, elongated toenails. He is routinely seen by my colleague, Dr. Cuevas. He has been lost to follow up due to COVID 19. He complains about ingrown toenail on left great toe. He denies any pain as he is neuropathic. He tried to trim nail himself. Pt has established care with primary care physician and would like to establish care with a podiatric physician. Patient has no other pedal complaints at this time      HgA1c:   Hemoglobin A1C   Date Value Ref Range Status   08/18/2020 12.7 (H) 4.0 - 5.6 % Final     Comment:     ADA Screening Guidelines:  5.7-6.4%  Consistent with prediabetes  >or=6.5%  Consistent with diabetes  High levels of fetal hemoglobin interfere with the HbA1C  assay. Heterozygous hemoglobin variants (HbS, HgC, etc)do  not significantly interfere with this assay.   However, presence of multiple variants may affect accuracy.     02/07/2020 8.2 (H) 4.0 - 5.6 % Final     Comment:     ADA Screening Guidelines:  5.7-6.4%  Consistent with prediabetes  >or=6.5%  Consistent with diabetes  High levels of fetal hemoglobin interfere with the HbA1C  assay. Heterozygous hemoglobin variants (HbS, HgC, etc)do  not significantly interfere with this  assay.   However, presence of multiple variants may affect accuracy.     01/14/2020 8.5 (H) 4.0 - 5.6 % Final     Comment:     ADA Screening Guidelines:  5.7-6.4%  Consistent with prediabetes  >or=6.5%  Consistent with diabetes  High levels of fetal hemoglobin interfere with the HbA1C  assay. Heterozygous hemoglobin variants (HbS, HgC, etc)do  not significantly interfere with this assay.   However, presence of multiple variants may affect accuracy.           PM  Past Medical History:   Diagnosis Date    Anxiety     Bell's palsy     CHF (congestive heart failure)     Coronary artery disease involving native coronary artery without angina pectoris 10/18/2016    Depression     Diabetes mellitus     Glaucoma     Hypertension     Idiopathic peripheral neuropathy 12/11/2012    Mixed hyperlipidemia 12/11/2012    Vitamin B 12 deficiency 8/23/2012       MEDS  Current Outpatient Medications on File Prior to Visit   Medication Sig Dispense Refill    ACCU-CHEK ALYCIA PLUS METER Misc       aspirin 325 MG tablet Take 325 mg by mouth once daily.      atorvastatin (LIPITOR) 10 MG tablet Take 1 tablet (10 mg total) by mouth once daily. 90 tablet 3    BLOOD PRESSURE CUFF Misc 1 cuff 1 each 0    blood sugar diagnostic Strp To check BG 3 times daily, to use with insurance preferred meter 100 strip 3    brimonidine 0.1% (ALPHAGAN P) 0.1 % Drop Place 1 drop into both eyes 3 (three) times daily. Order 90 day supply 10 mL 4    brinzolamide (AZOPT) 1 % ophthalmic suspension Place 1 drop into both eyes 3 (three) times daily. ORDER 90 DAY SUPPLY 10 mL 4    carvedilol (COREG) 25 MG tablet TAKE 1 TABLET BY MOUTH TWICE DAILY WITH MEALS 180 tablet 11    DULoxetine (CYMBALTA) 30 MG capsule Take 1 capsule daily for 7 days then 2 capsules daily. 180 capsule 0    DULoxetine (CYMBALTA) 30 MG capsule Take 1 capsule (30 mg total) by mouth once daily. 30 capsule 3    DULoxetine (CYMBALTA) 30 MG capsule Take 1 capsule (30 mg total)  by mouth once daily. 90 capsule 1    flash glucose sensor (FREESTYLE CLEMENICA 14 DAY SENSOR) Kit 1 kit by Misc.(Non-Drug; Combo Route) route every 14 (fourteen) days. 2 kit 11    furosemide (LASIX) 40 MG tablet TAKE 2 TABLETS BY MOUTH TWICE DAILY (WHEN  ANKLES  RETAIN  FLUID  DOUBLE  THE FUROSEMIDE)  FOR  14  DAYS 260 tablet 0    insulin NPH-insulin regular, 70/30, (NOVOLIN 70/30 U-100 INSULIN) 100 unit/mL (70-30) injection Inject 130 Units into the skin 2 (two) times daily before meals. 240 mL 3    lancets Misc To check BG 3 times daily, to use with insurance preferred meter 100 each 3    latanoprost (XALATAN) 0.005 % ophthalmic solution Place 1 drop into both eyes once daily. 3 Bottle 4    lisinopril (PRINIVIL,ZESTRIL) 20 MG tablet Take 1 tablet (20 mg total) by mouth once daily. 30 tablet 11    miglitoL (GLYSET) 25 MG Tab Take 1 tablet (25 mg total) by mouth 3 (three) times daily with meals. 270 tablet 3    mupirocin (BACTROBAN) 2 % ointment Apply topically 2 (two) times daily. 22 g 0    mupirocin calcium 2% (BACTROBAN) 2 % cream Apply topically 3 (three) times daily. for 10 days 15 g 1    netarsudiL (RHOPRESSA) 0.02 % Drop Place 1 drop into both eyes once daily. 2.5 mL 6    netarsudiL (RHOPRESSA) 0.02 % ophthalmic solution Place 1 drop into the left eye once daily. 2.5 mL 6    polyethylene glycol (GLYCOLAX) 17 gram/dose powder Take 17 g by mouth once daily. 238 g 6    sildenafil (VIAGRA) 50 MG tablet Take 1 tablet (50 mg total) by mouth daily as needed for Erectile Dysfunction. 5 tablet 3    aspirin (ECOTRIN) 81 MG EC tablet Take 1 tablet (81 mg total) by mouth once daily. (Patient not taking: Reported on 8/20/2020)  0    HYDROcodone-acetaminophen (NORCO)  mg per tablet Take 1 tablet by mouth every 6 (six) hours as needed for Pain. (Patient not taking: Reported on 8/20/2020) 18 tablet 0    nitroGLYCERIN (NITROSTAT) 0.4 MG SL tablet Place 1 tablet (0.4 mg total) under the tongue every 5 (five)  "minutes as needed for Chest pain. 20 tablet 0    omeprazole (PRILOSEC) 40 MG capsule Take 1 capsule (40 mg total) by mouth once daily. 90 capsule 3    tamsulosin (FLOMAX) 0.4 mg Cap Take 1 capsule (0.4 mg total) by mouth once daily. (Patient not taking: Reported on 8/20/2020) 30 capsule 0     No current facility-administered medications on file prior to visit.        PSH     Past Surgical History:   Procedure Laterality Date    CARDIAC CATHETERIZATION  7/22/13    non obstructive cad    CATARACT EXTRACTION  OU    COLONOSCOPY N/A 5/1/2019    Procedure: COLONOSCOPY;  Surgeon: Henry Mo III, MD;  Location: [facility] ENDO;  Service: Endoscopy;  Laterality: N/A;    CORONARY ANGIOPLASTY      ESOPHAGOGASTRODUODENOSCOPY N/A 5/1/2019    Procedure: ESOPHAGOGASTRODUODENOSCOPY (EGD);  Surgeon: Henry Mo III, MD;  Location: [facility] ENDO;  Service: Endoscopy;  Laterality: N/A;    EYE SURGERY      FRACTURE SURGERY      kidney stone       August 2016    PC IOL OU      RETINAL DETACHMENT REPAIR W/ SCLERAL BUCKLE LE      WRIST SURGERY          ALL  Review of patient's allergies indicates:   Allergen Reactions    Cefazolin      Other reaction(s): Shakes  Other reaction(s): Chills       SOC     Social History     Tobacco Use    Smoking status: Never Smoker    Smokeless tobacco: Never Used   Substance Use Topics    Alcohol use: Yes     Comment: " one to two glasses of wine per year"    Drug use: No         Family HX    Family History   Problem Relation Age of Onset    Macular degeneration Father     Heart disease Father         CHF     Heart attack Father     Hypertension Mother     Macular degeneration Paternal Uncle     Hypertension Maternal Grandmother     Hypertension Maternal Grandfather     Strabismus Neg Hx     Retinal detachment Neg Hx     Glaucoma Neg Hx     Blindness Neg Hx     Amblyopia Neg Hx           REVIEW OF SYSTEMS  General: This patient is well-developed, well-nourished and appears " "stated age, well-oriented to person, place and time, and cooperative and pleasant on today's visit  Constitutional: Negative for chills and fever.   Respiratory: Negative for shortness of breath.    Cardiovascular: Negative for chest pain, palpitations, orthopnea  Gastrointestinal: Negative for diarrhea, nausea and vomiting.   Musculoskeletal: Positive for above noted in HPI  Skin: Positive for skin, or nail changes   Neurological: Positive for tingling and sensory changes  Peripheral Vascular: no claudication or cyanosis  Psychiatric/Behavioral: Negative for altered mental status     PHYSICAL EXAM  Vitals:    08/20/20 1059   BP: (!) 183/105   Pulse: 72   Weight: (!) 186 kg (410 lb 0.9 oz)   Height: 6' 7" (2.007 m)       GEN:  This patient is well-developed, well-nourished and appears stated age, well-oriented to person, place and time, and cooperative and pleasant on today's visit.      LOWER EXTREMITY PHYSICAL EXAMINATION   VASCULAR  DP pedal pulse 1/4 RIGHT, LEFT1/4   PT pedal pulse 1/4 RIGHT, LEFT1/4  Capillary refill time immediate to the toes.   Feet are warm to the touch. Skin temperature warm to warm from proximally to distally   There are no varicosities, telangiectasias noted to bilateral foot and ankle regions.   There are no ecchymoses noted to bilateral foot and ankle regions.   There is moderate gross lower extremity edema with left > right     DERMATOLOGIC  Skin moist with healthy texture and turgor.  Thickened, dystrophic, elongated toenails with subungal debris 1-5 b/l   Incurvated medial border right hallux with subungual hematoma   There are no open ulcerations, lacerations, or fissures to bilateral foot and ankle regions. There are no signs of infection as there is no erythema, no proximal-extending lymphangiitis, no fluctuance, or crepitus noted on palpation to bilateral foot and ankle regions.   There is no interdigital maceration.   There are no hyperkeratotic lesions noted to feet. "     NEUROLOGIC  Protective sensation intact at 10/10 sites upon examination with [place] Weinsten 5.07 g monofilament.  Propioception intact at 1st MTPJ b/l.  Achilles and patellar deep tendon reflexes intact  Babinski reflex absent    ORTHOPEDIC/BIOMECHANICAL  No symptomatic structural abnormalities noted. Muscle strength is 5/5 for foot inverters, everters, plantarflexors, and dorsiflexors. Muscle tone is normal.  Inspection/palpation of bone, joints and muscles unremarkable.      ASSESSMENT  Encounter Diagnoses   Name Primary?    Uncontrolled type 2 diabetes mellitus with kidney complication, with long-term current use of insulin     Idiopathic peripheral neuropathy     Onychocryptosis Yes    Lymphedema of left leg     Dermatophytosis of nail          Plan:  -Initial patient evaluation with H&P was performed    Uncontrolled type 2 diabetes mellitus with kidney complication, with long-term current use of insulin  Patient has above noted diagnosis and/or medical co-morbidities.They are being treated and managed by their  Primary Care Physician for management of comorbid states which are deemed to be currently stable.        Idiopathic peripheral neuropathy    Patient has above noted diagnosis and/or medical co-morbidities.They are being treated and managed by their  Primary Care Physician for management of comorbid states which are deemed to be currently stable.        Onychocryptosis    Utilizing sterile toenail clippers I aggressively trimmed the offending nail border approximately 3 mm from its edge and carried the nail plate incision down at an angle in order to wedge out the offending cryptotic portion of the nail plate. The offending border was then removed in toto. The remaining nail was grinded down with an electric  down to nail bed. Minimal blood was drawn. Applied betadine and covered with band-aid. Patient tolerated the procedure well and related significant relief.    Lymphedema of left  leg  -     COMPRESSION STOCKINGS. Pt declined lymphedema treatment. States he has had long term swelling with improvement with increased dose of lasix. Rx provided and locations for compression stockings. Continue with PCP management.    Dermatophytosis of nail    -With patient's permission, the elongated onychomycotic toenails, as outlined in the physical examination, were sharply debrided with a double action nail nipper to their soft tissue attachment. If indicated, the nails were then smoothed down in thickness with a mechanical rotary abdi and/or emma board to facilitate in further debridement removing all offending nail and subungual debris.      -Discuss presenting problems, etiology, pathologic processes and management options with patient today.   -I counseled the patient on their conditions, their implications and medical management. An in depth discussion on diabetic management, risk prevention, amputation prevention verbally and provided educational literature in written format.  - Shoe inspection. Diabetic Foot Education. Patient reminded of the importance of good nutrition and blood sugar control to help prevent podiatric complications of diabetes. Patient instructed on proper foot hygeine. We discussed wearing proper shoe gear, daily foot inspections, never walking without protective shoe gear, never putting sharp instruments to feet, routine podiatric visits annually/semi annually or sooner if symptoms arise    Disclaimer: This note was partially prepared using a voice recognition system and is likely to have sound alike errors within the text.        Future Appointments   Date Time Provider Department Center   8/27/2020  8:00 AM Fransisco Lewis LCSW [facility]   9/1/2020  9:40 AM CRISTIAN Fleming MD [facility]   9/10/2020  8:00 AM Fransisco Lewis LCSW [facility]   9/22/2020  7:30 AM FIELDS, VISUAL-ONE [facility] [place]   9/22/2020  8:15 AM Alvin HOWELL  MD [facility] OPHTHAL [place]   9/24/2020  8:00 AM Fransisco Lewis LCSW [facility] PSYCH [place]   10/8/2020  8:00 AM Fransisco Lewis LCSW [facility] [place]   10/14/2020  9:30 AM Luís Millard PA-C [facility] DIABETE [place]   10/22/2020  8:00 AM Fransisco Lewis LCSW [facility]   10/22/2020  9:00 AM Wu Campbell MD [facility] PS[facility]CH [facility]   11/25/2020  8:20 AM Vangie Cuevas DPM [facility] POD [place]       Report Electronically Signed By:     Laurel Walls DPM   Podiatry  [facility]  8/20/2020

## 2020-08-20 NOTE — LETTER
August 20, 2020      Luís Millard PA-C  [address]  [place] [address]           [facility]  [address] [facility] [address]  Phone: [phone]  Fax: [phone]          Patient: Stepan Nixon   MR Number: [id number]   YOB: 1955   Date of Visit: 8/20/2020       Dear Luís Millard:    Thank you for referring Stepan Nixon to me for evaluation. Attached you will find relevant portions of my assessment and plan of care.    If you have questions, please do not hesitate to call me. I look forward to following Stepan Nixon along with you.    Sincerely,    Laurel Walls, DPWM    Enclosure  CC:  No Recipients    If you would like to receive this communication electronically, please contact [email or web address] or [phone] to request more information on [employer] Link access.    For providers and/or their staff who would like to refer a patient to [facility], please contact us through our one-stop-shop provider referral line, [facility], at [phone].    If you feel you have received this communication in error or would no longer like to receive these types of communications, please e-mail [email or web address]

## 2020-08-31 VITALS
WEIGHT: 315 LBS | HEART RATE: 90 BPM | SYSTOLIC BLOOD PRESSURE: 131 MMHG | BODY MASS INDEX: 36.45 KG/M2 | HEIGHT: 78 IN | DIASTOLIC BLOOD PRESSURE: 91 MMHG

## 2020-09-01 ENCOUNTER — OFFICE VISIT (OUTPATIENT)
Dept: INTERNAL MEDICINE | Facility: CLINIC | Age: 65
End: 2020-09-01
Payer: COMMERCIAL

## 2020-09-01 VITALS
TEMPERATURE: 98 F | DIASTOLIC BLOOD PRESSURE: 88 MMHG | WEIGHT: 315 LBS | SYSTOLIC BLOOD PRESSURE: 140 MMHG | HEIGHT: 78 IN | HEART RATE: 76 BPM | BODY MASS INDEX: 36.45 KG/M2 | OXYGEN SATURATION: 95 %

## 2020-09-01 DIAGNOSIS — E78.5 HYPERLIPIDEMIA ASSOCIATED WITH TYPE 2 DIABETES MELLITUS: ICD-10-CM

## 2020-09-01 DIAGNOSIS — E11.42 TYPE 2 DIABETES MELLITUS WITH DIABETIC POLYNEUROPATHY, WITH LONG-TERM CURRENT USE OF INSULIN: Chronic | ICD-10-CM

## 2020-09-01 DIAGNOSIS — D61.818 PANCYTOPENIA: ICD-10-CM

## 2020-09-01 DIAGNOSIS — I21.4 NSTEMI (NON-ST ELEVATED MYOCARDIAL INFARCTION): ICD-10-CM

## 2020-09-01 DIAGNOSIS — E11.59 HYPERTENSION ASSOCIATED WITH DIABETES: Primary | Chronic | ICD-10-CM

## 2020-09-01 DIAGNOSIS — G47.33 OBSTRUCTIVE SLEEP APNEA: Chronic | ICD-10-CM

## 2020-09-01 DIAGNOSIS — I15.2 HYPERTENSION ASSOCIATED WITH DIABETES: Primary | Chronic | ICD-10-CM

## 2020-09-01 DIAGNOSIS — Z86.16 HISTORY OF 2019 NOVEL CORONAVIRUS DISEASE (COVID-19): ICD-10-CM

## 2020-09-01 DIAGNOSIS — E11.69 HYPERLIPIDEMIA ASSOCIATED WITH TYPE 2 DIABETES MELLITUS: ICD-10-CM

## 2020-09-01 DIAGNOSIS — Z79.4 TYPE 2 DIABETES MELLITUS WITH DIABETIC POLYNEUROPATHY, WITH LONG-TERM CURRENT USE OF INSULIN: Chronic | ICD-10-CM

## 2020-09-01 DIAGNOSIS — N18.30 CHRONIC KIDNEY DISEASE, STAGE III (MODERATE): Chronic | ICD-10-CM

## 2020-09-01 DIAGNOSIS — I25.10 CORONARY ARTERY DISEASE INVOLVING NATIVE CORONARY ARTERY OF NATIVE HEART WITHOUT ANGINA PECTORIS: Chronic | ICD-10-CM

## 2020-09-01 DIAGNOSIS — E66.01 MORBID OBESITY WITH BMI OF 45.0-49.9, ADULT: Chronic | ICD-10-CM

## 2020-09-01 PROCEDURE — 3077F SYST BP >= 140 MM HG: CPT | Mod: CPTII,S$GLB,, | Performed by: FAMILY MEDICINE

## 2020-09-01 PROCEDURE — 99396 PREV VISIT EST AGE 40-64: CPT | Mod: S$GLB,,, | Performed by: FAMILY MEDICINE

## 2020-09-01 PROCEDURE — 3046F PR MOST RECENT HEMOGLOBIN A1C LEVEL > 9.0%: ICD-10-PCS | Mod: CPTII,S$GLB,, | Performed by: FAMILY MEDICINE

## 2020-09-01 PROCEDURE — 99999 PR PBB SHADOW E&M-EST. PATIENT-LVL V: ICD-10-PCS | Mod: PBBFAC,,, | Performed by: FAMILY MEDICINE

## 2020-09-01 PROCEDURE — 99396 PR PREVENTIVE VISIT,EST,40-64: ICD-10-PCS | Mod: S$GLB,,, | Performed by: FAMILY MEDICINE

## 2020-09-01 PROCEDURE — 3008F PR BODY MASS INDEX (BMI) DOCUMENTED: ICD-10-PCS | Mod: CPTII,S$GLB,, | Performed by: FAMILY MEDICINE

## 2020-09-01 PROCEDURE — 3046F HEMOGLOBIN A1C LEVEL >9.0%: CPT | Mod: CPTII,S$GLB,, | Performed by: FAMILY MEDICINE

## 2020-09-01 PROCEDURE — 3079F PR MOST RECENT DIASTOLIC BLOOD PRESSURE 80-89 MM HG: ICD-10-PCS | Mod: CPTII,S$GLB,, | Performed by: FAMILY MEDICINE

## 2020-09-01 PROCEDURE — 3008F BODY MASS INDEX DOCD: CPT | Mod: CPTII,S$GLB,, | Performed by: FAMILY MEDICINE

## 2020-09-01 PROCEDURE — 99999 PR PBB SHADOW E&M-EST. PATIENT-LVL V: CPT | Mod: PBBFAC,,, | Performed by: FAMILY MEDICINE

## 2020-09-01 PROCEDURE — 3077F PR MOST RECENT SYSTOLIC BLOOD PRESSURE >= 140 MM HG: ICD-10-PCS | Mod: CPTII,S$GLB,, | Performed by: FAMILY MEDICINE

## 2020-09-01 PROCEDURE — 3079F DIAST BP 80-89 MM HG: CPT | Mod: CPTII,S$GLB,, | Performed by: FAMILY MEDICINE

## 2020-09-01 NOTE — ASSESSMENT & PLAN NOTE
DIABETIC FOOT EXAM: Inspection of feet reveals no open wounds, ulcerations, significant calluses, or evidence of arterial insufficiency. 10g monofilament sensation is ABSENT in all 5 locations tested.

## 2020-09-01 NOTE — ASSESSMENT & PLAN NOTE
BP Readings from Last 6 Encounters:   09/01/20 (!) 140/88   08/20/20 (!) 183/105   08/18/20 (!) 131/91   04/20/20 (!) 193/102   03/26/20 (!) 226/113   02/09/20 (!) 143/74   Uncontrolled.

## 2020-09-01 NOTE — ASSESSMENT & PLAN NOTE
Lab Results   Component Value Date    ESTGFRAFRICA 40 (A) 04/20/2020    ESTGFRAFRICA 56 (A) 03/26/2020    ESTGFRAFRICA 43 (A) 02/09/2020    EGFRNONAA 34 (A) 04/20/2020    EGFRNONAA 49 (A) 03/26/2020    EGFRNONAA 37 (A) 02/09/2020    CREATININE 2.0 (H) 04/20/2020    CREATININE 1.5 (H) 03/26/2020    CREATININE 1.9 (H) 02/09/2020    BUN 14 04/20/2020    BUN 11 03/26/2020    BUN 29 (H) 02/09/2020     Lab Results   Component Value Date    ALBUMIN 3.6 04/20/2020    PROT 7.3 04/20/2020    MICALBCREAT 12.5 04/12/2018    LABMICR 3.0 04/12/2018    CREATRANDUR 22.0 (L) 08/09/2018     04/20/2020    K 4.2 04/20/2020     04/20/2020    CO2 17 (L) 04/20/2020     (H) 04/20/2020    .0 (H) 08/09/2018    CALCIUM 9.6 04/20/2020    PHOS 3.1 02/07/2020    MG 1.9 02/07/2020    HGB 15.2 04/20/2020    HCT 44.3 04/20/2020   Mild interval worsening.

## 2020-09-01 NOTE — ASSESSMENT & PLAN NOTE
Lab Results   Component Value Date    WBC 6.37 04/20/2020    HGB 15.2 04/20/2020    HCT 44.3 04/20/2020    MCV 89 04/20/2020     04/20/2020   Improved.

## 2020-09-01 NOTE — ASSESSMENT & PLAN NOTE
Lab Results   Component Value Date    CHOL 76 (L) 02/07/2020    CHOL 163 01/14/2020    TRIG 86 02/07/2020    TRIG 107 01/14/2020    HDL 20 (L) 02/07/2020    HDL 45 01/14/2020    LDLCALC 38.8 (L) 02/07/2020    LDLCALC 96.6 01/14/2020    NONHDLCHOL 56 02/07/2020    NONHDLCHOL 118 01/14/2020    AST 27 04/20/2020    ALT 56 (H) 04/20/2020   LDL at goal. He appears to be tolerating statin for dyslipidemia without apparent symptoms of myositis.

## 2020-09-01 NOTE — PROGRESS NOTES
WELLNESS VISIT (PREVENTIVE SERVICES)    CHIEF COMPLAINT  Annual Wellness Exam    This is my first time treating him here. All problems addressed today are NEW TO ME.  Stepan is requesting that I take over as his primary care provider.     HEALTH MAINTENANCE INTERVENTIONS - UP TO DATE  Health Maintenance Topics with due status: Not Due       Topic Last Completion Date    TETANUS VACCINE 12/10/2013    Colorectal Cancer Screening 05/01/2019    Lipid Panel 02/07/2020    Eye Exam 07/14/2020    Foot Exam 08/18/2020    Hemoglobin A1c 08/18/2020    High Dose Statin 09/10/2020    Aspirin/Antiplatelet Therapy 09/10/2020       HEALTH MAINTENANCE INTERVENTIONS - DUE OR DUE SOON  Health Maintenance Due   Topic Date Due    Shingles Vaccine (1 of 2) 02/24/2007    PROSTATE-SPECIFIC ANTIGEN  04/12/2019    Influenza Vaccine (1) 08/01/2020       Except as noted herein, any chronic conditions are compensated/controlled and stable, and no other significant new complaints or concerns reported.     DIAGNOSES, HISTORY, ASSESSMENT, AND PLAN  Assessment   1. Hypertension associated with diabetes    2. History of COVID-19 (April, 2020)    3. Hyperlipidemia associated with type 2 diabetes mellitus    4. Chronic kidney disease, stage III (moderate)    5. NSTEMI (non-ST elevated myocardial infarction)    6. Uncontrolled type 2 diabetes mellitus with kidney complication, with long-term current use of insulin    7. Pancytopenia    8. Obstructive sleep apnea    9. Type 2 diabetes mellitus with diabetic polyneuropathy, with long-term current use of insulin    10. Coronary artery disease involving native coronary artery of native heart without angina pectoris    11. Morbid obesity with BMI of 45.0-49.9, adult         Orders Placed This Encounter    Ambulatory referral/consult to Cardiology    Ambulatory referral/consult to Weight Management Program    Hypertension Digital Medicine (HDMP) Enrollment Order    Hypertension Digital Medicine  (HDMP): Assign Onboarding Questionnaires       Plan   Problem List Items Addressed This Visit     Type 2 diabetes mellitus with diabetic polyneuropathy, with long-term current use of insulin (Chronic)    Current Assessment & Plan     DIABETIC FOOT EXAM: Inspection of feet reveals no open wounds, ulcerations, significant calluses, or evidence of arterial insufficiency. 10g monofilament sensation is ABSENT in all 5 locations tested.         Obstructive sleep apnea (untreated, refuses treatment) (Chronic)    Overview     Untreated. He tried CPAP 7 years ago and did not tolerate it. He declined any further evaluation and management of this condition. I instructed him to let me know if he changes his mind.          Uncontrolled type 2 diabetes mellitus with kidney complication, with long-term current use of insulin (Chronic)    Current Assessment & Plan     Diabetes Management Status    Statin: Taking  ACE/ARB: Taking    Screening or Prevention Patient's value Goal Complete/Controlled?   HgA1C Testing and Control   Lab Results   Component Value Date    HGBA1C 12.7 (H) 08/18/2020      Annually/Less than 8% No   Lipid profile : 02/07/2020 Annually Yes   LDL control Lab Results   Component Value Date    LDLCALC 38.8 (L) 02/07/2020    Annually/Less than 100 mg/dl  Yes   Nephropathy screening Lab Results   Component Value Date    LABMICR 3.0 04/12/2018     Lab Results   Component Value Date    PROTEINUA 1+ (A) 04/20/2020    Annually Yes   Blood pressure BP Readings from Last 1 Encounters:   09/01/20 (!) 140/88    Less than 140/90 Yes   Dilated retinal exam : 07/14/2020 Annually Yes   Foot exam   : 08/18/2020 Annually Yes     Lab Results   Component Value Date    HGBA1C 12.7 (H) 08/18/2020    HGBA1C 8.2 (H) 02/07/2020    HGBA1C 8.5 (H) 01/14/2020    ESTGFRAFRICA 40 (A) 04/20/2020    EGFRNONAA 34 (A) 04/20/2020    MICALBCREAT 12.5 04/12/2018    LDLCALC 38.8 (L) 02/07/2020      HEALTH MAINTENANCE: Diabetic health maintenance  interventions reviewed and are up to date except for:  There are no preventive care reminders to display for this patient.  Uncontrolled, but asymptomatic.          Hypertension associated with diabetes - Primary (Chronic)    Current Assessment & Plan     BP Readings from Last 6 Encounters:   09/01/20 (!) 140/88   08/20/20 (!) 183/105   08/18/20 (!) 131/91   04/20/20 (!) 193/102   03/26/20 (!) 226/113   02/09/20 (!) 143/74   Uncontrolled.           Relevant Orders    Ambulatory referral/consult to Cardiology    Hypertension Digital Medicine (HDMP) Enrollment Order (Completed)    Hypertension Digital Medicine ([facility]): Assign Onboarding Questionnaires (Completed)    Chronic kidney disease, stage III (moderate) (Chronic)    Current Assessment & Plan     Lab Results   Component Value Date    ESTGFRAFRICA 40 (A) 04/20/2020    ESTGFRAFRICA 56 (A) 03/26/2020    ESTGFRAFRICA 43 (A) 02/09/2020    EGFRNONAA 34 (A) 04/20/2020    EGFRNONAA 49 (A) 03/26/2020    EGFRNONAA 37 (A) 02/09/2020    CREATININE 2.0 (H) 04/20/2020    CREATININE 1.5 (H) 03/26/2020    CREATININE 1.9 (H) 02/09/2020    BUN 14 04/20/2020    BUN 11 03/26/2020    BUN 29 (H) 02/09/2020     Lab Results   Component Value Date    ALBUMIN 3.6 04/20/2020    PROT 7.3 04/20/2020    MICALBCREAT 12.5 04/12/2018    LABMICR 3.0 04/12/2018    CREATRANDUR 22.0 (L) 08/09/2018     04/20/2020    K 4.2 04/20/2020     04/20/2020    CO2 17 (L) 04/20/2020     (H) 04/20/2020    .0 (H) 08/09/2018    CALCIUM 9.6 04/20/2020    PHOS 3.1 02/07/2020    MG 1.9 02/07/2020    HGB 15.2 04/20/2020    HCT 44.3 04/20/2020   Mild interval worsening.          CAD with remote PCI (BMS) of RCA in 9/2016 (Chronic)    Morbid obesity with BMI of 45.0-49.9, adult (Chronic)    Relevant Orders    Ambulatory referral/consult to Weight Management Program    NSTEMI (non-ST elevated myocardial infarction)    Relevant Orders    Ambulatory referral/consult to Cardiology    Hyperlipidemia  associated with type 2 diabetes mellitus    Current Assessment & Plan     Lab Results   Component Value Date    CHOL 76 (L) 02/07/2020    CHOL 163 01/14/2020    TRIG 86 02/07/2020    TRIG 107 01/14/2020    HDL 20 (L) 02/07/2020    HDL 45 01/14/2020    LDLCALC 38.8 (L) 02/07/2020    LDLCALC 96.6 01/14/2020    NONHDLCHOL 56 02/07/2020    NONHDLCHOL 118 01/14/2020    AST 27 04/20/2020    ALT 56 (H) 04/20/2020   LDL at goal. He appears to be tolerating statin for dyslipidemia without apparent symptoms of myositis.         Pancytopenia    Current Assessment & Plan     Lab Results   Component Value Date    WBC 6.37 04/20/2020    HGB 15.2 04/20/2020    HCT 44.3 04/20/2020    MCV 89 04/20/2020     04/20/2020   Improved.         History of COVID-19 (April, 2020)          Outpatient Medications Prior to Visit   Medication Sig Dispense Refill    ACCU-CHEK ALYCIA PLUS METER Misc       aspirin 325 MG tablet Take 325 mg by mouth once daily.      atorvastatin (LIPITOR) 10 MG tablet Take 1 tablet (10 mg total) by mouth once daily. 90 tablet 3    BLOOD PRESSURE CUFF Misc 1 cuff 1 each 0    blood sugar diagnostic Strp To check BG 3 times daily, to use with insurance preferred meter 100 strip 3    brimonidine 0.1% (ALPHAGAN P) 0.1 % Drop Place 1 drop into both eyes 3 (three) times daily. Order 90 day supply 10 mL 4    brinzolamide (AZOPT) 1 % ophthalmic suspension Place 1 drop into both eyes 3 (three) times daily. ORDER 90 DAY SUPPLY 10 mL 4    carvedilol (COREG) 25 MG tablet TAKE 1 TABLET BY MOUTH TWICE DAILY WITH MEALS 180 tablet 11    DULoxetine (CYMBALTA) 30 MG capsule Take 1 capsule daily for 7 days then 2 capsules daily. 180 capsule 0    DULoxetine (CYMBALTA) 30 MG capsule Take 1 capsule (30 mg total) by mouth once daily. 30 capsule 3    DULoxetine (CYMBALTA) 30 MG capsule Take 1 capsule (30 mg total) by mouth once daily. 90 capsule 1    flash glucose sensor (FREESTYLE CLEMENCIA 14 DAY SENSOR) Kit 1 kit by  Misc.(Non-Drug; Combo Route) route every 14 (fourteen) days. 2 kit 11    insulin NPH-insulin regular, 70/30, (NOVOLIN 70/30 U-100 INSULIN) 100 unit/mL (70-30) injection Inject 130 Units into the skin 2 (two) times daily before meals. 240 mL 3    lancets Misc To check BG 3 times daily, to use with insurance preferred meter 100 each 3    latanoprost (XALATAN) 0.005 % ophthalmic solution Place 1 drop into both eyes once daily. 3 Bottle 4    lisinopril (PRINIVIL,ZESTRIL) 20 MG tablet Take 1 tablet (20 mg total) by mouth once daily. 30 tablet 11    miglitoL (GLYSET) 25 MG Tab Take 1 tablet (25 mg total) by mouth 3 (three) times daily with meals. 270 tablet 3    mupirocin (BACTROBAN) 2 % ointment Apply topically 2 (two) times daily. 22 g 0    netarsudiL (RHOPRESSA) 0.02 % Drop Place 1 drop into both eyes once daily. 2.5 mL 6    netarsudiL (RHOPRESSA) 0.02 % ophthalmic solution Place 1 drop into the left eye once daily. 2.5 mL 6    polyethylene glycol (GLYCOLAX) 17 gram/dose powder Take 17 g by mouth once daily. 238 g 6    sildenafil (VIAGRA) 50 MG tablet Take 1 tablet (50 mg total) by mouth daily as needed for Erectile Dysfunction. 5 tablet 3    tamsulosin (FLOMAX) 0.4 mg Cap Take 1 capsule (0.4 mg total) by mouth once daily. (Patient not taking: Reported on 9/10/2020) 30 capsule 0    furosemide (LASIX) 40 MG tablet TAKE 2 TABLETS BY MOUTH TWICE DAILY (WHEN  ANKLES  RETAIN  FLUID  DOUBLE  THE FUROSEMIDE)  FOR  14  DAYS 260 tablet 0    nitroGLYCERIN (NITROSTAT) 0.4 MG SL tablet Place 1 tablet (0.4 mg total) under the tongue every 5 (five) minutes as needed for Chest pain. 20 tablet 0    omeprazole (PRILOSEC) 40 MG capsule Take 1 capsule (40 mg total) by mouth once daily. 90 capsule 3    aspirin (ECOTRIN) 81 MG EC tablet Take 1 tablet (81 mg total) by mouth once daily.  0    HYDROcodone-acetaminophen (NORCO)  mg per tablet Take 1 tablet by mouth every 6 (six) hours as needed for Pain. (Patient not  "taking: Reported on 9/1/2020) 18 tablet 0     No facility-administered medications prior to visit.         Medications Discontinued During This Encounter   Medication Reason    HYDROcodone-acetaminophen (NORCO)  mg per tablet Patient no longer taking    aspirin (ECOTRIN) 81 MG EC tablet Patient no longer taking             Follow up in about 1 month (around 10/1/2020) for re-evaluate problem(s) discussed today.     Subjective   Review of Systems   Constitutional: Negative for chills and fever.   Respiratory: Negative for chest tightness and shortness of breath.    Endocrine: Negative for polydipsia and polyuria.        Objective   Vitals:    09/01/20 0929   BP: (!) 140/88   BP Location: Left arm   Patient Position: Sitting   BP Method: Large (Manual)   Pulse: 76   Temp: 98.3 °F (36.8 °C)   TempSrc: Tympanic   SpO2: 95%   Weight: (!) 184.4 kg (406 lb 8.5 oz)   Height: 6' 7" (2.007 m)     Physical Exam  Vitals signs reviewed.   Constitutional:       General: He is not in acute distress.     Appearance: Normal appearance. He is not ill-appearing or diaphoretic.   Eyes:      General: No scleral icterus.  Cardiovascular:      Rate and Rhythm: Normal rate and regular rhythm.   Pulmonary:      Effort: Pulmonary effort is normal.      Breath sounds: Normal breath sounds.   Skin:     General: Skin is warm and dry.   Neurological:      General: No focal deficit present.      Mental Status: He is alert and oriented to person, place, and time.   Psychiatric:         Mood and Affect: Mood normal.         Behavior: Behavior normal.         Judgment: Judgment normal.           PAST MEDICAL HISTORY  He has a past medical history of Anxiety, Bell's palsy, CHF (congestive heart failure), Coronary artery disease involving native coronary artery without angina pectoris, Depression, Diabetes mellitus, Glaucoma, Hypertension, Idiopathic peripheral neuropathy, Mixed hyperlipidemia, Morbid obesity with BMI of 45.0-49.9, adult, " "Sleep apnea, Type 2 diabetes mellitus with diabetic polyneuropathy, with long-term current use of insulin, and Vitamin B 12 deficiency.    SURGICAL HISTORY  He has a past surgical history that includes Retinal detachment repair w/ scleral buckle; PC IOL OU; Cataract extraction (OU); Wrist surgery; Eye surgery; Fracture surgery; Cardiac catheterization (7/22/13); kidney stone ; Esophagogastroduodenoscopy (N/A, 5/1/2019); Colonoscopy (N/A, 5/1/2019); and Coronary angioplasty.    FAMILY HISTORY  His family history includes Heart attack in his father; Heart disease in his father; Hypertension in his maternal grandfather, maternal grandmother, and mother; Macular degeneration in his father and paternal uncle.     ALLERGIES  Review of patient's allergies indicates:   Allergen Reactions    Cefazolin      Other reaction(s): Shakes  Other reaction(s): Chills       SOCIAL HISTORY   TOBACCO USE HISTORY: He reports that he has never smoked. He has never used smokeless tobacco.   ALCOHOL USE HISTORY:  He reports current alcohol use.   RECREATIONAL DRUG USE HISTORY:  He reports no history of drug use.    Documentation entered by me for this encounter may have been done in part using speech-recognition technology. Although I have made an effort to ensure accuracy, "sound like" errors may exist and should be interpreted in context. -CRISTIAN Fleming MD.     "

## 2020-09-01 NOTE — ASSESSMENT & PLAN NOTE
Diabetes Management Status    Statin: Taking  ACE/ARB: Taking    Screening or Prevention Patient's value Goal Complete/Controlled?   HgA1C Testing and Control   Lab Results   Component Value Date    HGBA1C 12.7 (H) 08/18/2020      Annually/Less than 8% No   Lipid profile : 02/07/2020 Annually Yes   LDL control Lab Results   Component Value Date    LDLCALC 38.8 (L) 02/07/2020    Annually/Less than 100 mg/dl  Yes   Nephropathy screening Lab Results   Component Value Date    LABMICR 3.0 04/12/2018     Lab Results   Component Value Date    PROTEINUA 1+ (A) 04/20/2020    Annually Yes   Blood pressure BP Readings from Last 1 Encounters:   09/01/20 (!) 140/88    Less than 140/90 Yes   Dilated retinal exam : 07/14/2020 Annually Yes   Foot exam   : 08/18/2020 Annually Yes     Lab Results   Component Value Date    HGBA1C 12.7 (H) 08/18/2020    HGBA1C 8.2 (H) 02/07/2020    HGBA1C 8.5 (H) 01/14/2020    ESTGFRAFRICA 40 (A) 04/20/2020    EGFRNONAA 34 (A) 04/20/2020    MICALBCREAT 12.5 04/12/2018    LDLCALC 38.8 (L) 02/07/2020      HEALTH MAINTENANCE: Diabetic health maintenance interventions reviewed and are up to date except for:  There are no preventive care reminders to display for this patient.  Uncontrolled, but asymptomatic.

## 2020-09-10 ENCOUNTER — OFFICE VISIT (OUTPATIENT)
Dept: PSYCHIATRY | Facility: CLINIC | Age: 65
End: 2020-09-10
Payer: COMMERCIAL

## 2020-09-10 ENCOUNTER — OFFICE VISIT (OUTPATIENT)
Dept: CARDIOLOGY | Facility: CLINIC | Age: 65
End: 2020-09-10
Payer: COMMERCIAL

## 2020-09-10 VITALS
HEART RATE: 71 BPM | DIASTOLIC BLOOD PRESSURE: 98 MMHG | SYSTOLIC BLOOD PRESSURE: 152 MMHG | BODY MASS INDEX: 46.12 KG/M2 | WEIGHT: 315 LBS | OXYGEN SATURATION: 96 %

## 2020-09-10 DIAGNOSIS — E11.69 HYPERLIPIDEMIA ASSOCIATED WITH TYPE 2 DIABETES MELLITUS: ICD-10-CM

## 2020-09-10 DIAGNOSIS — E78.5 HYPERLIPIDEMIA ASSOCIATED WITH TYPE 2 DIABETES MELLITUS: ICD-10-CM

## 2020-09-10 DIAGNOSIS — Z79.4 TYPE 2 DIABETES MELLITUS WITH DIABETIC POLYNEUROPATHY, WITH LONG-TERM CURRENT USE OF INSULIN: Chronic | ICD-10-CM

## 2020-09-10 DIAGNOSIS — I25.10 CORONARY ARTERY DISEASE INVOLVING NATIVE CORONARY ARTERY OF NATIVE HEART WITHOUT ANGINA PECTORIS: Chronic | ICD-10-CM

## 2020-09-10 DIAGNOSIS — N18.30 CHRONIC KIDNEY DISEASE, STAGE III (MODERATE): Chronic | ICD-10-CM

## 2020-09-10 DIAGNOSIS — I21.4 NSTEMI (NON-ST ELEVATED MYOCARDIAL INFARCTION): ICD-10-CM

## 2020-09-10 DIAGNOSIS — I44.7 LBBB (LEFT BUNDLE BRANCH BLOCK): ICD-10-CM

## 2020-09-10 DIAGNOSIS — E11.42 TYPE 2 DIABETES MELLITUS WITH DIABETIC POLYNEUROPATHY, WITH LONG-TERM CURRENT USE OF INSULIN: Chronic | ICD-10-CM

## 2020-09-10 DIAGNOSIS — E66.01 MORBID OBESITY WITH BMI OF 45.0-49.9, ADULT: Chronic | ICD-10-CM

## 2020-09-10 DIAGNOSIS — I15.2 HYPERTENSION ASSOCIATED WITH DIABETES: Chronic | ICD-10-CM

## 2020-09-10 DIAGNOSIS — E11.59 HYPERTENSION ASSOCIATED WITH DIABETES: Chronic | ICD-10-CM

## 2020-09-10 DIAGNOSIS — Z86.16 HISTORY OF 2019 NOVEL CORONAVIRUS DISEASE (COVID-19): ICD-10-CM

## 2020-09-10 DIAGNOSIS — F33.1 MAJOR DEPRESSIVE DISORDER, RECURRENT EPISODE, MODERATE: Primary | ICD-10-CM

## 2020-09-10 DIAGNOSIS — I73.9 PVD (PERIPHERAL VASCULAR DISEASE): Primary | ICD-10-CM

## 2020-09-10 PROCEDURE — 99215 OFFICE O/P EST HI 40 MIN: CPT | Mod: S$GLB,,, | Performed by: INTERNAL MEDICINE

## 2020-09-10 PROCEDURE — 3080F DIAST BP >= 90 MM HG: CPT | Mod: CPTII,S$GLB,, | Performed by: INTERNAL MEDICINE

## 2020-09-10 PROCEDURE — 3046F PR MOST RECENT HEMOGLOBIN A1C LEVEL > 9.0%: ICD-10-PCS | Mod: CPTII,S$GLB,, | Performed by: INTERNAL MEDICINE

## 2020-09-10 PROCEDURE — 3008F BODY MASS INDEX DOCD: CPT | Mod: CPTII,S$GLB,, | Performed by: INTERNAL MEDICINE

## 2020-09-10 PROCEDURE — 99999 PR PBB SHADOW E&M-EST. PATIENT-LVL V: CPT | Mod: PBBFAC,,, | Performed by: INTERNAL MEDICINE

## 2020-09-10 PROCEDURE — 90834 PR PSYCHOTHERAPY W/PATIENT, 45 MIN: ICD-10-PCS | Mod: S$GLB,,, | Performed by: SOCIAL WORKER

## 2020-09-10 PROCEDURE — 90834 PSYTX W PT 45 MINUTES: CPT | Mod: S$GLB,,, | Performed by: SOCIAL WORKER

## 2020-09-10 PROCEDURE — 3077F SYST BP >= 140 MM HG: CPT | Mod: CPTII,S$GLB,, | Performed by: INTERNAL MEDICINE

## 2020-09-10 PROCEDURE — 99999 PR PBB SHADOW E&M-EST. PATIENT-LVL V: ICD-10-PCS | Mod: PBBFAC,,, | Performed by: INTERNAL MEDICINE

## 2020-09-10 PROCEDURE — 3080F PR MOST RECENT DIASTOLIC BLOOD PRESSURE >= 90 MM HG: ICD-10-PCS | Mod: CPTII,S$GLB,, | Performed by: INTERNAL MEDICINE

## 2020-09-10 PROCEDURE — 99215 PR OFFICE/OUTPT VISIT, EST, LEVL V, 40-54 MIN: ICD-10-PCS | Mod: S$GLB,,, | Performed by: INTERNAL MEDICINE

## 2020-09-10 PROCEDURE — 3008F PR BODY MASS INDEX (BMI) DOCUMENTED: ICD-10-PCS | Mod: CPTII,S$GLB,, | Performed by: INTERNAL MEDICINE

## 2020-09-10 PROCEDURE — 3077F PR MOST RECENT SYSTOLIC BLOOD PRESSURE >= 140 MM HG: ICD-10-PCS | Mod: CPTII,S$GLB,, | Performed by: INTERNAL MEDICINE

## 2020-09-10 PROCEDURE — 3046F HEMOGLOBIN A1C LEVEL >9.0%: CPT | Mod: CPTII,S$GLB,, | Performed by: INTERNAL MEDICINE

## 2020-09-10 RX ORDER — METOLAZONE 2.5 MG/1
2.5 TABLET ORAL
Qty: 36 TABLET | Refills: 3 | Status: SHIPPED | OUTPATIENT
Start: 2020-09-11 | End: 2021-01-19 | Stop reason: SDUPTHER

## 2020-09-10 RX ORDER — FUROSEMIDE 40 MG/1
40 TABLET ORAL 2 TIMES DAILY
Qty: 245 TABLET | Refills: 3 | Status: SHIPPED | OUTPATIENT
Start: 2020-09-10 | End: 2021-03-22 | Stop reason: SDUPTHER

## 2020-09-10 RX ORDER — METOLAZONE 2.5 MG/1
2.5 TABLET ORAL
Qty: 12 TABLET | Refills: 11 | Status: SHIPPED | OUTPATIENT
Start: 2020-09-11 | End: 2020-09-10 | Stop reason: SDUPTHER

## 2020-09-10 RX ORDER — HYDRALAZINE HYDROCHLORIDE 50 MG/1
50 TABLET, FILM COATED ORAL EVERY 12 HOURS
Qty: 60 TABLET | Refills: 11 | Status: SHIPPED | OUTPATIENT
Start: 2020-09-10 | End: 2021-01-19 | Stop reason: SDUPTHER

## 2020-09-10 NOTE — LETTER
September 10, 2020      CRISTIAN Fleming MD  [address]  [place] [address]           [facility]  [address]  [place] [address]  Phone: [phone]  Fax: [phone]          Patient: Stepan Nixon   MR Number: [id number]   YOB: 1955   Date of Visit: 9/10/2020       Dear Dr. CRISTIAN Fleming:    Thank you for referring Stepan Nixon to me for evaluation. Attached you will find relevant portions of my assessment and plan of care.    If you have questions, please do not hesitate to call me. I look forward to following Stepan Nixon along with you.    Sincerely,    Ramiro Summers MD    Enclosure  CC:  No Recipients    If you would like to receive this communication electronically, please contact [email or web address] or [phone] to request more information on [facility] access.    For providers and/or their staff who would like to refer a patient to [facility], please contact us through our one-stop-shop provider referral line, [facility], at [phone].    If you feel you have received this communication in error or would no longer like to receive these types of communications, please e-mail [email or web address]

## 2020-09-10 NOTE — PROGRESS NOTES
Subjective:   Patient ID:  Stepan Nixon is a 65 y.o. male who presents for evaluation of Dizziness and Shortness of Breath (when walking)         64 yo male, came in for routine f/u. Last seen by Dr Parekh one yr ago. Office work  PMH CAD h/o NSTEMI, s/p PCI midRCA SABINO x2 in 2016 by Dr. Parekh, midLAD 40%. LBBB, DM x 15 yrs, CKD stage IIIHTN,HLD obesity, FRAN not tolerate CPAP. Left leg weakness cane dependent No smoking. Occasional drinking  H/o COVID 19 twice infection in  and positive in   ECHO normal EF and mild LVH  LAWSON chronic walking from the parking to the front. Worse at summer. In cold weather could walk a mile   No chest pain  Both shoulder pain while walking  Occasional dizziness and imbalance. A1c 12.   Sleeps on 1 pillow., good appetite. Lost 30 pounds in 1 yr  03/2020 fell and admitted to [facility]. Had left leg injury  EKG  NSR and LBBB  BP high and A1c high  LDL controlled            Past Medical History:   Diagnosis Date    Anxiety     Bell's palsy     CHF (congestive heart failure)     Coronary artery disease involving native coronary artery without angina pectoris 10/18/2016    Depression     Diabetes mellitus     Glaucoma     Hypertension     Idiopathic peripheral neuropathy 12/11/2012    Mixed hyperlipidemia 12/11/2012    Morbid obesity with BMI of 45.0-49.9, adult 2/12/2019    Sleep apnea     Type 2 diabetes mellitus with diabetic polyneuropathy, with long-term current use of insulin 12/11/2012    Vitamin B 12 deficiency 8/23/2012       Past Surgical History:   Procedure Laterality Date    CARDIAC CATHETERIZATION  7/22/13    non obstructive cad    CATARACT EXTRACTION  OU    COLONOSCOPY N/A 5/1/2019    Procedure: COLONOSCOPY;  Surgeon: Henry Mo III, MD;  Location: [facility];  Service: Endoscopy;  Laterality: N/A;    CORONARY ANGIOPLASTY      ESOPHAGOGASTRODUODENOSCOPY N/A 5/1/2019    Procedure: ESOPHAGOGASTRODUODENOSCOPY (EGD);  Surgeon:  "Henry Mo III, MD;  Location: [facility];  Service: Endoscopy;  Laterality: N/A;    EYE SURGERY      FRACTURE SURGERY      kidney stone       August 2016    PC IOL OU      RETINAL DETACHMENT REPAIR W/ SCLERAL BUCKLE LE      WRIST SURGERY         Social History     Tobacco Use    Smoking status: Never Smoker    Smokeless tobacco: Never Used   Substance Use Topics    Alcohol use: Yes     Comment: " one to two glasses of wine per year"    Drug use: No       Family History   Problem Relation Age of Onset    Macular degeneration Father     Heart disease Father         CHF     Heart attack Father     Hypertension Mother     Macular degeneration Paternal Uncle     Hypertension Maternal Grandmother     Hypertension Maternal Grandfather     Strabismus Neg Hx     Retinal detachment Neg Hx     Glaucoma Neg Hx     Blindness Neg Hx     Amblyopia Neg Hx        Review of Systems   Constitution: Negative for decreased appetite, diaphoresis, fever, malaise/fatigue and night sweats.   HENT: Negative for nosebleeds.    Eyes: Negative for blurred vision and double vision.   Cardiovascular: Positive for dyspnea on exertion. Negative for chest pain, claudication, irregular heartbeat, leg swelling, near-syncope, orthopnea, palpitations, paroxysmal nocturnal dyspnea and syncope.   Respiratory: Negative for cough, shortness of breath, sleep disturbances due to breathing, snoring, sputum production and wheezing.    Endocrine: Negative for cold intolerance and polyuria.   Hematologic/Lymphatic: Does not bruise/bleed easily.   Skin: Negative for rash.   Musculoskeletal: Positive for joint pain and muscle weakness. Negative for back pain, falls, joint swelling and neck pain.   Gastrointestinal: Negative for abdominal pain, heartburn, nausea and vomiting.   Genitourinary: Negative for dysuria, frequency and hematuria.   Neurological: Positive for dizziness. Negative for difficulty with concentration, focal weakness, " headaches, light-headedness, numbness, seizures and weakness.   Psychiatric/Behavioral: Negative for depression, memory loss and substance abuse. The patient does not have insomnia.    Allergic/Immunologic: Negative for HIV exposure and hives.       Objective:   Physical Exam   Constitutional: He is oriented to person, place, and time. He appears well-nourished.   HENT:   Head: Normocephalic.   Eyes: Pupils are equal, round, and reactive to light.   Neck: Normal carotid pulses and no JVD present. Carotid bruit is not present. No thyromegaly present.   Cardiovascular: Normal rate, regular rhythm, normal heart sounds and normal pulses.  No extrasystoles are present. PMI is not displaced. Exam reveals no gallop and no S3.   No murmur heard.  Pulmonary/Chest: Breath sounds normal. No stridor. No respiratory distress.   Abdominal: Soft. Bowel sounds are normal. There is no abdominal tenderness. There is no rebound.   Musculoskeletal:         General: Edema (>2+ edema) present.   Neurological: He is alert and oriented to person, place, and time.   Skin: Skin is intact. No rash noted.   Psychiatric: His behavior is normal.       Lab Results   Component Value Date    CHOL 76 (L) 02/07/2020    CHOL 163 01/14/2020    CHOL 138 05/13/2019     Lab Results   Component Value Date    HDL 20 (L) 02/07/2020    HDL 45 01/14/2020    HDL 53 05/13/2019     Lab Results   Component Value Date    LDLCALC 38.8 (L) 02/07/2020    LDLCALC 96.6 01/14/2020    LDLCALC 63.6 05/13/2019     Lab Results   Component Value Date    TRIG 86 02/07/2020    TRIG 107 01/14/2020    TRIG 107 05/13/2019     Lab Results   Component Value Date    CHOLHDL 26.3 02/07/2020    CHOLHDL 27.6 01/14/2020    CHOLHDL 38.4 05/13/2019       Chemistry        Component Value Date/Time     04/20/2020 1529    K 4.2 04/20/2020 1529     04/20/2020 1529    CO2 17 (L) 04/20/2020 1529    BUN 14 04/20/2020 1529    BUN 34 (A) 10/28/2013 0835    CREATININE 2.0 (H) 04/20/2020  1529    CREATININE 1.7 10/28/2013 0835     (H) 04/20/2020 1529        Component Value Date/Time    CALCIUM 9.6 04/20/2020 1529    ALKPHOS 110 04/20/2020 1529    AST 27 04/20/2020 1529    AST 14 10/28/2013 0835    ALT 56 (H) 04/20/2020 1529    BILITOT 0.7 04/20/2020 1529    ESTGFRAFRICA 40 (A) 04/20/2020 1529    EGFRNONAA 34 (A) 04/20/2020 1529          Lab Results   Component Value Date    HGBA1C 12.7 (H) 08/18/2020     Lab Results   Component Value Date    TSH 1.349 01/14/2020     Lab Results   Component Value Date    INR 1.2 09/20/2016    INR 1.1 09/20/2016    INR 1.0 08/15/2016     Lab Results   Component Value Date    WBC 6.37 04/20/2020    HGB 15.2 04/20/2020    HCT 44.3 04/20/2020    MCV 89 04/20/2020     04/20/2020     BNP  @LABRCNTIP(BNP,BNPTRIAGEBLO)@  CrCl cannot be calculated (Patient's most recent lab result is older than the maximum 7 days allowed.).  No results found in the last 24 hours.  No results found in the last 24 hours.  No results found in the last 24 hours.    Assessment:      1. PVD (peripheral vascular disease)    2. Hypertension associated with diabetes    3. NSTEMI (non-ST elevated myocardial infarction)    4. Coronary artery disease involving native coronary artery of native heart without angina pectoris    5. LBBB (left bundle branch block)    6. Hyperlipidemia associated with type 2 diabetes mellitus    7. Morbid obesity with BMI of 45.0-49.9, adult    8. Type 2 diabetes mellitus with diabetic polyneuropathy, with long-term current use of insulin    9. History of COVID-19 (April, 2020)    10. Chronic kidney disease, stage III (moderate)      PVD   High BP  Plan:   Continue Lasix 40 mg bid  Add Metolazone 2.5 mg 3 times a week  Compression stocking  NUKE stress test   Add hydralzine 50mg bid for HTN  Continue Lisnopirl and Coreg  BMP in 2 weeks  DM Rx per PCP  Counseled DASH  Check Lipid profile in 6 months  Recommend heart-healthy diet, weight control and regular  exercise.  Iam. Risk modification.   RTC in 4 weeks    I have reviewed all pertinent labs and cardiac studies independently. Plans and recommendations have been formulated under my direct supervision. All questions answered and patient voiced understanding.   If symptoms persist go to the ED
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
## 2020-09-22 ENCOUNTER — PATIENT OUTREACH (OUTPATIENT)
Dept: ADMINISTRATIVE | Facility: OTHER | Age: 65
End: 2020-09-22

## 2020-09-22 NOTE — PROGRESS NOTES
Health Maintenance Due   Topic Date Due    Shingles Vaccine (1 of 2) 02/24/2007    PROSTATE-SPECIFIC ANTIGEN  04/12/2019    Influenza Vaccine (1) 08/01/2020     Updates were requested from care everywhere.  Chart was reviewed for overdue [facility]active [facility] Encounters ([facility]) topics (CRS, Breast Cancer Screening, Eye exam)  Health Maintenance has been updated.  LINKS immunization registry triggered.  Immunizations were reconciled.

## 2020-09-24 ENCOUNTER — OFFICE VISIT (OUTPATIENT)
Dept: PSYCHIATRY | Facility: CLINIC | Age: 65
End: 2020-09-24
Payer: COMMERCIAL

## 2020-09-24 ENCOUNTER — LAB VISIT (OUTPATIENT)
Dept: LAB | Facility: HOSPITAL | Age: 65
End: 2020-09-24
Attending: INTERNAL MEDICINE
Payer: COMMERCIAL

## 2020-09-24 DIAGNOSIS — F33.1 MAJOR DEPRESSIVE DISORDER, RECURRENT EPISODE, MODERATE: Primary | ICD-10-CM

## 2020-09-24 DIAGNOSIS — I73.9 PVD (PERIPHERAL VASCULAR DISEASE): ICD-10-CM

## 2020-09-24 LAB
ANION GAP SERPL CALC-SCNC: 10 MMOL/L (ref 8–16)
BUN SERPL-MCNC: 31 MG/DL (ref 8–23)
CALCIUM SERPL-MCNC: 9.4 MG/DL (ref 8.7–10.5)
CHLORIDE SERPL-SCNC: 101 MMOL/L (ref 95–110)
CO2 SERPL-SCNC: 29 MMOL/L (ref 23–29)
CREAT SERPL-MCNC: 2.1 MG/DL (ref 0.5–1.4)
EST. GFR  (AFRICAN AMERICAN): 37.6 ML/MIN/1.73 M^2
EST. GFR  (NON AFRICAN AMERICAN): 32.5 ML/MIN/1.73 M^2
GLUCOSE SERPL-MCNC: 361 MG/DL (ref 70–110)
POTASSIUM SERPL-SCNC: 4.4 MMOL/L (ref 3.5–5.1)
SODIUM SERPL-SCNC: 140 MMOL/L (ref 136–145)

## 2020-09-24 PROCEDURE — 90834 PSYTX W PT 45 MINUTES: CPT | Mod: S$GLB,,, | Performed by: SOCIAL WORKER

## 2020-09-24 PROCEDURE — 36415 COLL VENOUS BLD VENIPUNCTURE: CPT

## 2020-09-24 PROCEDURE — 80048 BASIC METABOLIC PNL TOTAL CA: CPT

## 2020-09-24 PROCEDURE — 90834 PR PSYCHOTHERAPY W/PATIENT, 45 MIN: ICD-10-PCS | Mod: S$GLB,,, | Performed by: SOCIAL WORKER

## 2020-09-25 ENCOUNTER — TELEPHONE (OUTPATIENT)
Dept: CARDIOLOGY | Facility: HOSPITAL | Age: 65
End: 2020-09-25

## 2020-09-25 ENCOUNTER — TELEPHONE (OUTPATIENT)
Dept: CARDIOLOGY | Facility: CLINIC | Age: 65
End: 2020-09-25

## 2020-09-25 DIAGNOSIS — I73.9 PVD (PERIPHERAL VASCULAR DISEASE): Primary | ICD-10-CM

## 2020-09-25 NOTE — TELEPHONE ENCOUNTER
Pt contacted about results, pt verbalized understanding and also scheduled labs.          ----- Message from Ramiro Summers MD sent at 9/25/2020  6:01 AM CDT -----  Lab showed kidney function stable  Repeat BMP in 1 week
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
## 2020-10-01 ENCOUNTER — LAB VISIT (OUTPATIENT)
Dept: LAB | Facility: HOSPITAL | Age: 65
End: 2020-10-01
Attending: INTERNAL MEDICINE
Payer: COMMERCIAL

## 2020-10-01 DIAGNOSIS — I73.9 PVD (PERIPHERAL VASCULAR DISEASE): ICD-10-CM

## 2020-10-01 LAB
ANION GAP SERPL CALC-SCNC: 10 MMOL/L (ref 8–16)
BUN SERPL-MCNC: 29 MG/DL (ref 8–23)
CALCIUM SERPL-MCNC: 9.1 MG/DL (ref 8.7–10.5)
CHLORIDE SERPL-SCNC: 102 MMOL/L (ref 95–110)
CO2 SERPL-SCNC: 28 MMOL/L (ref 23–29)
CREAT SERPL-MCNC: 2.1 MG/DL (ref 0.5–1.4)
EST. GFR  (AFRICAN AMERICAN): 37.6 ML/MIN/1.73 M^2
EST. GFR  (NON AFRICAN AMERICAN): 32.5 ML/MIN/1.73 M^2
GLUCOSE SERPL-MCNC: 128 MG/DL (ref 70–110)
POTASSIUM SERPL-SCNC: 3.8 MMOL/L (ref 3.5–5.1)
SODIUM SERPL-SCNC: 140 MMOL/L (ref 136–145)

## 2020-10-01 PROCEDURE — 36415 COLL VENOUS BLD VENIPUNCTURE: CPT

## 2020-10-01 PROCEDURE — 80048 BASIC METABOLIC PNL TOTAL CA: CPT

## 2020-10-02 ENCOUNTER — TELEPHONE (OUTPATIENT)
Dept: CARDIOLOGY | Facility: CLINIC | Age: 65
End: 2020-10-02

## 2020-10-02 NOTE — TELEPHONE ENCOUNTER
Pt contacted about results, lvm for pt to call back about results.          ----- Message from Ramiro Summers MD sent at 10/2/2020  2:38 PM CDT -----  Lab showed kidney function stable

## 2020-10-02 NOTE — TELEPHONE ENCOUNTER
Pt contacted about results, pt verbalized understanding.              ----- Message from Randy Marinelli sent at 10/2/2020  3:18 PM CDT -----  Regarding: Return call  Type:  Patient Returning Call    Who Called:Stepan  Who Left Message for Patient:Britt  Does the patient know what this is regarding?:results  Would the patient rather a call back or a response via [facility]? Call back  Best Call Back Number:[phone] ([place])   Additional Information: na

## 2020-10-08 ENCOUNTER — OFFICE VISIT (OUTPATIENT)
Dept: INTERNAL MEDICINE | Facility: CLINIC | Age: 65
End: 2020-10-08
Payer: COMMERCIAL

## 2020-10-08 ENCOUNTER — LAB VISIT (OUTPATIENT)
Dept: LAB | Facility: HOSPITAL | Age: 65
End: 2020-10-08
Attending: INTERNAL MEDICINE
Payer: COMMERCIAL

## 2020-10-08 ENCOUNTER — OFFICE VISIT (OUTPATIENT)
Dept: PSYCHIATRY | Facility: CLINIC | Age: 65
End: 2020-10-08
Payer: COMMERCIAL

## 2020-10-08 ENCOUNTER — OFFICE VISIT (OUTPATIENT)
Dept: CARDIOLOGY | Facility: CLINIC | Age: 65
End: 2020-10-08
Payer: COMMERCIAL

## 2020-10-08 VITALS
DIASTOLIC BLOOD PRESSURE: 84 MMHG | DIASTOLIC BLOOD PRESSURE: 60 MMHG | BODY MASS INDEX: 46 KG/M2 | OXYGEN SATURATION: 96 % | BODY MASS INDEX: 36.45 KG/M2 | OXYGEN SATURATION: 96 % | SYSTOLIC BLOOD PRESSURE: 138 MMHG | HEIGHT: 78 IN | SYSTOLIC BLOOD PRESSURE: 140 MMHG | TEMPERATURE: 98 F | WEIGHT: 315 LBS | WEIGHT: 315 LBS | HEART RATE: 69 BPM | HEART RATE: 77 BPM

## 2020-10-08 DIAGNOSIS — E11.42 TYPE 2 DIABETES MELLITUS WITH DIABETIC POLYNEUROPATHY, WITH LONG-TERM CURRENT USE OF INSULIN: Chronic | ICD-10-CM

## 2020-10-08 DIAGNOSIS — Z12.5 SCREENING PSA (PROSTATE SPECIFIC ANTIGEN): ICD-10-CM

## 2020-10-08 DIAGNOSIS — E11.69 HYPERLIPIDEMIA ASSOCIATED WITH TYPE 2 DIABETES MELLITUS: ICD-10-CM

## 2020-10-08 DIAGNOSIS — N18.32 STAGE 3B CHRONIC KIDNEY DISEASE: Chronic | ICD-10-CM

## 2020-10-08 DIAGNOSIS — I25.10 CORONARY ARTERY DISEASE INVOLVING NATIVE CORONARY ARTERY OF NATIVE HEART WITHOUT ANGINA PECTORIS: Chronic | ICD-10-CM

## 2020-10-08 DIAGNOSIS — Z28.21 IMMUNIZATION NOT CARRIED OUT BECAUSE OF PATIENT REFUSAL: ICD-10-CM

## 2020-10-08 DIAGNOSIS — Z79.4 TYPE 2 DIABETES MELLITUS WITH DIABETIC POLYNEUROPATHY, WITH LONG-TERM CURRENT USE OF INSULIN: Chronic | ICD-10-CM

## 2020-10-08 DIAGNOSIS — I73.9 PVD (PERIPHERAL VASCULAR DISEASE): Primary | ICD-10-CM

## 2020-10-08 DIAGNOSIS — Z23 NEED FOR SHINGLES VACCINE: ICD-10-CM

## 2020-10-08 DIAGNOSIS — I50.32 CHRONIC DIASTOLIC CONGESTIVE HEART FAILURE: ICD-10-CM

## 2020-10-08 DIAGNOSIS — E66.01 MORBID OBESITY WITH BMI OF 45.0-49.9, ADULT: Chronic | ICD-10-CM

## 2020-10-08 DIAGNOSIS — E11.59 HYPERTENSION ASSOCIATED WITH DIABETES: Chronic | ICD-10-CM

## 2020-10-08 DIAGNOSIS — I73.9 PVD (PERIPHERAL VASCULAR DISEASE): ICD-10-CM

## 2020-10-08 DIAGNOSIS — I15.2 HYPERTENSION ASSOCIATED WITH DIABETES: Chronic | ICD-10-CM

## 2020-10-08 DIAGNOSIS — N18.32 STAGE 3B CHRONIC KIDNEY DISEASE: ICD-10-CM

## 2020-10-08 DIAGNOSIS — Z23 NEED FOR INFLUENZA VACCINATION: ICD-10-CM

## 2020-10-08 DIAGNOSIS — Z00.01 ENCOUNTER FOR WELL ADULT EXAM WITH ABNORMAL FINDINGS: Primary | ICD-10-CM

## 2020-10-08 DIAGNOSIS — E78.5 HYPERLIPIDEMIA ASSOCIATED WITH TYPE 2 DIABETES MELLITUS: ICD-10-CM

## 2020-10-08 DIAGNOSIS — F33.1 MAJOR DEPRESSIVE DISORDER, RECURRENT EPISODE, MODERATE: Primary | ICD-10-CM

## 2020-10-08 LAB
ANION GAP SERPL CALC-SCNC: 9 MMOL/L (ref 8–16)
BUN SERPL-MCNC: 28 MG/DL (ref 8–23)
CALCIUM SERPL-MCNC: 9.5 MG/DL (ref 8.7–10.5)
CHLORIDE SERPL-SCNC: 103 MMOL/L (ref 95–110)
CO2 SERPL-SCNC: 30 MMOL/L (ref 23–29)
COMPLEXED PSA SERPL-MCNC: 0.22 NG/ML (ref 0–4)
CREAT SERPL-MCNC: 2.2 MG/DL (ref 0.5–1.4)
EST. GFR  (AFRICAN AMERICAN): 35.5 ML/MIN/1.73 M^2
EST. GFR  (NON AFRICAN AMERICAN): 30.7 ML/MIN/1.73 M^2
ESTIMATED AVG GLUCOSE: 338 MG/DL (ref 68–131)
GLUCOSE SERPL-MCNC: 360 MG/DL (ref 70–110)
HBA1C MFR BLD HPLC: 13.4 % (ref 4–5.6)
POTASSIUM SERPL-SCNC: 4.5 MMOL/L (ref 3.5–5.1)
SODIUM SERPL-SCNC: 142 MMOL/L (ref 136–145)

## 2020-10-08 PROCEDURE — 3074F SYST BP LT 130 MM HG: CPT | Mod: CPTII,S$GLB,, | Performed by: FAMILY MEDICINE

## 2020-10-08 PROCEDURE — 3078F DIAST BP <80 MM HG: CPT | Mod: CPTII,S$GLB,, | Performed by: FAMILY MEDICINE

## 2020-10-08 PROCEDURE — 3008F BODY MASS INDEX DOCD: CPT | Mod: CPTII,S$GLB,, | Performed by: INTERNAL MEDICINE

## 2020-10-08 PROCEDURE — 99396 PREV VISIT EST AGE 40-64: CPT | Mod: S$GLB,,, | Performed by: FAMILY MEDICINE

## 2020-10-08 PROCEDURE — 3046F HEMOGLOBIN A1C LEVEL >9.0%: CPT | Mod: CPTII,S$GLB,, | Performed by: INTERNAL MEDICINE

## 2020-10-08 PROCEDURE — 80048 BASIC METABOLIC PNL TOTAL CA: CPT

## 2020-10-08 PROCEDURE — 90834 PSYTX W PT 45 MINUTES: CPT | Mod: S$GLB,,, | Performed by: SOCIAL WORKER

## 2020-10-08 PROCEDURE — 3074F PR MOST RECENT SYSTOLIC BLOOD PRESSURE < 130 MM HG: ICD-10-PCS | Mod: CPTII,S$GLB,, | Performed by: FAMILY MEDICINE

## 2020-10-08 PROCEDURE — 3079F PR MOST RECENT DIASTOLIC BLOOD PRESSURE 80-89 MM HG: ICD-10-PCS | Mod: CPTII,S$GLB,, | Performed by: INTERNAL MEDICINE

## 2020-10-08 PROCEDURE — 99396 PR PREVENTIVE VISIT,EST,40-64: ICD-10-PCS | Mod: S$GLB,,, | Performed by: FAMILY MEDICINE

## 2020-10-08 PROCEDURE — 99999 PR PBB SHADOW E&M-EST. PATIENT-LVL IV: CPT | Mod: PBBFAC,,, | Performed by: FAMILY MEDICINE

## 2020-10-08 PROCEDURE — 3046F PR MOST RECENT HEMOGLOBIN A1C LEVEL > 9.0%: ICD-10-PCS | Mod: CPTII,S$GLB,, | Performed by: FAMILY MEDICINE

## 2020-10-08 PROCEDURE — 99214 OFFICE O/P EST MOD 30 MIN: CPT | Mod: S$GLB,,, | Performed by: INTERNAL MEDICINE

## 2020-10-08 PROCEDURE — 3008F PR BODY MASS INDEX (BMI) DOCUMENTED: ICD-10-PCS | Mod: CPTII,S$GLB,, | Performed by: FAMILY MEDICINE

## 2020-10-08 PROCEDURE — 3008F BODY MASS INDEX DOCD: CPT | Mod: CPTII,S$GLB,, | Performed by: FAMILY MEDICINE

## 2020-10-08 PROCEDURE — 3079F DIAST BP 80-89 MM HG: CPT | Mod: CPTII,S$GLB,, | Performed by: INTERNAL MEDICINE

## 2020-10-08 PROCEDURE — 83036 HEMOGLOBIN GLYCOSYLATED A1C: CPT

## 2020-10-08 PROCEDURE — 99214 PR OFFICE/OUTPT VISIT, EST, LEVL IV, 30-39 MIN: ICD-10-PCS | Mod: S$GLB,,, | Performed by: INTERNAL MEDICINE

## 2020-10-08 PROCEDURE — 84153 ASSAY OF PSA TOTAL: CPT

## 2020-10-08 PROCEDURE — 3075F SYST BP GE 130 - 139MM HG: CPT | Mod: CPTII,S$GLB,, | Performed by: INTERNAL MEDICINE

## 2020-10-08 PROCEDURE — 3046F HEMOGLOBIN A1C LEVEL >9.0%: CPT | Mod: CPTII,S$GLB,, | Performed by: FAMILY MEDICINE

## 2020-10-08 PROCEDURE — 3075F PR MOST RECENT SYSTOLIC BLOOD PRESS GE 130-139MM HG: ICD-10-PCS | Mod: CPTII,S$GLB,, | Performed by: INTERNAL MEDICINE

## 2020-10-08 PROCEDURE — 3078F PR MOST RECENT DIASTOLIC BLOOD PRESSURE < 80 MM HG: ICD-10-PCS | Mod: CPTII,S$GLB,, | Performed by: FAMILY MEDICINE

## 2020-10-08 PROCEDURE — 99999 PR PBB SHADOW E&M-EST. PATIENT-LVL III: CPT | Mod: PBBFAC,,, | Performed by: INTERNAL MEDICINE

## 2020-10-08 PROCEDURE — 99999 PR PBB SHADOW E&M-EST. PATIENT-LVL III: ICD-10-PCS | Mod: PBBFAC,,, | Performed by: INTERNAL MEDICINE

## 2020-10-08 PROCEDURE — 90834 PR PSYCHOTHERAPY W/PATIENT, 45 MIN: ICD-10-PCS | Mod: S$GLB,,, | Performed by: SOCIAL WORKER

## 2020-10-08 PROCEDURE — 3008F PR BODY MASS INDEX (BMI) DOCUMENTED: ICD-10-PCS | Mod: CPTII,S$GLB,, | Performed by: INTERNAL MEDICINE

## 2020-10-08 PROCEDURE — 3046F PR MOST RECENT HEMOGLOBIN A1C LEVEL > 9.0%: ICD-10-PCS | Mod: CPTII,S$GLB,, | Performed by: INTERNAL MEDICINE

## 2020-10-08 PROCEDURE — 36415 COLL VENOUS BLD VENIPUNCTURE: CPT

## 2020-10-08 PROCEDURE — 99999 PR PBB SHADOW E&M-EST. PATIENT-LVL IV: ICD-10-PCS | Mod: PBBFAC,,, | Performed by: FAMILY MEDICINE

## 2020-10-08 NOTE — PATIENT INSTRUCTIONS
I've entered a referral order for you to be able to participate in [facility] excellent Comprehensive [facility] Loss [facility]. Call [phone] to schedule your appointment. You can learn more about the program at [email or web address]

## 2020-10-08 NOTE — PROGRESS NOTES
Subjective:   Patient ID:  Stepan Springer is a 65 y.o. male who presents for follow up of No chief complaint on file.         66 yo male, came in for routine f/u. Last seen by Dr Parekh one yr ago. Office work  PMH CAD h/o NSTEMI, s/p PCI midRCA SABINO x2 in 2016 by Dr. Parekh, midLAD 40%. LBBB, DM x 15 yrs, CKD stage IIIHTN,HLD obesity, FRAN not tolerate CPAP. Left leg weakness cane dependent No smoking. Occasional drinking  H/o COVID 19 twice infection in  and positive in   ECHO normal EF and mild LVH  LAWSON chronic walking from the parking to the front. Worse at summer. In cold weather could walk a mile   No chest pain  Both shoulder pain while walking  Occasional dizziness and imbalance. A1c 12.   Sleeps on 1 pillow., good appetite. Lost 30 pounds in 1 yr  03/2020 fell and admitted to [facility]. Had left leg injury  EKG  NSR and LBBB  BP high and A1c high  LDL controlled    today states that he [place] the [place] of metolazone [place]. And need start now.  NUKE stress test pending  LAWSON and leg swelling stable          Past Medical History:   Diagnosis Date    Anxiety     Bell's palsy     CHF (congestive heart failure)     Coronary artery disease involving native coronary artery without angina pectoris 10/18/2016    Depression     Diabetes mellitus     Glaucoma     Hypertension     Idiopathic peripheral neuropathy 12/11/2012    Mixed hyperlipidemia 12/11/2012    Morbid obesity with BMI of 45.0-49.9, adult 2/12/2019    Sleep apnea     Type 2 diabetes mellitus with diabetic polyneuropathy, with long-term current use of insulin 12/11/2012    Vitamin B 12 deficiency 8/23/2012       Past Surgical History:   Procedure Laterality Date    CARDIAC CATHETERIZATION  7/22/13    non obstructive cad    CATARACT EXTRACTION  OU    COLONOSCOPY N/A 5/1/2019    Procedure: COLONOSCOPY;  Surgeon: Henry Mo III, MD;  Location: [facility];  Service: Endoscopy;  Laterality: N/A;     "CORONARY ANGIOPLASTY      ESOPHAGOGASTRODUODENOSCOPY N/A 5/1/2019    Procedure: ESOPHAGOGASTRODUODENOSCOPY (EGD);  Surgeon: Henry Mo III, MD;  Location: [facility];  Service: Endoscopy;  Laterality: N/A;    EYE SURGERY      FRACTURE SURGERY      kidney stone       August 2016    PC IOL OU      RETINAL DETACHMENT REPAIR W/ SCLERAL BUCKLE LE      WRIST SURGERY         Social History     Tobacco Use    Smoking status: Never Smoker    Smokeless tobacco: Never Used   Substance Use Topics    Alcohol use: Yes     Comment: " one to two glasses of wine per year"    Drug use: No       Family History   Problem Relation Age of Onset    Macular degeneration Father     Heart disease Father         CHF     Heart attack Father     Hypertension Mother     Macular degeneration Paternal Uncle     Hypertension Maternal Grandmother     Hypertension Maternal Grandfather     Strabismus Neg Hx     Retinal detachment Neg Hx     Glaucoma Neg Hx     Blindness Neg Hx     Amblyopia Neg Hx          Review of Systems   Constitution: Negative for decreased appetite, diaphoresis, fever, malaise/fatigue and night sweats.   HENT: Negative for nosebleeds.    Eyes: Negative for blurred vision and double vision.   Cardiovascular: Positive for dyspnea on exertion. Negative for chest pain, claudication, irregular heartbeat, leg swelling, near-syncope, orthopnea, palpitations, paroxysmal nocturnal dyspnea and syncope.   Respiratory: Negative for cough, shortness of breath, sleep disturbances due to breathing, snoring, sputum production and wheezing.    Endocrine: Negative for cold intolerance and polyuria.   Hematologic/Lymphatic: Does not bruise/bleed easily.   Skin: Negative for rash.   Musculoskeletal: Positive for joint pain and muscle weakness. Negative for back pain, falls, joint swelling and neck pain.   Gastrointestinal: Negative for abdominal pain, heartburn, nausea and vomiting.   Genitourinary: Negative for dysuria, " frequency and hematuria.   Neurological: Positive for dizziness. Negative for difficulty with concentration, focal weakness, headaches, light-headedness, numbness, seizures and weakness.   Psychiatric/Behavioral: Negative for depression, memory loss and substance abuse. The patient does not have insomnia.    Allergic/Immunologic: Negative for HIV exposure and hives.       Objective:   Physical Exam   Constitutional: He is oriented to person, place, and time. He appears well-nourished.   HENT:   Head: Normocephalic.   Eyes: Pupils are equal, round, and reactive to light.   Neck: Normal carotid pulses and no JVD present. Carotid bruit is not present. No thyromegaly present.   Cardiovascular: Normal rate, regular rhythm, normal heart sounds and normal pulses.  No extrasystoles are present. PMI is not displaced. Exam reveals no gallop and no S3.   No murmur heard.  Pulmonary/Chest: Breath sounds normal. No stridor. No respiratory distress.   Abdominal: Soft. Bowel sounds are normal. There is no abdominal tenderness. There is no rebound.   Musculoskeletal:         General: Edema (>2+ edema) present.   Neurological: He is alert and oriented to person, place, and time.   Skin: Skin is intact. No rash noted.   Psychiatric: His behavior is normal.       Lab Results   Component Value Date    CHOL 76 (L) 02/07/2020    CHOL 163 01/14/2020    CHOL 138 05/13/2019     Lab Results   Component Value Date    HDL 20 (L) 02/07/2020    HDL 45 01/14/2020    HDL 53 05/13/2019     Lab Results   Component Value Date    LDLCALC 38.8 (L) 02/07/2020    LDLCALC 96.6 01/14/2020    LDLCALC 63.6 05/13/2019     Lab Results   Component Value Date    TRIG 86 02/07/2020    TRIG 107 01/14/2020    TRIG 107 05/13/2019     Lab Results   Component Value Date    CHOLHDL 26.3 02/07/2020    CHOLHDL 27.6 01/14/2020    CHOLHDL 38.4 05/13/2019       Chemistry        Component Value Date/Time     10/01/2020 0919    K 3.8 10/01/2020 0919     10/01/2020  0919    CO2 28 10/01/2020 0919    BUN 29 (H) 10/01/2020 0919    BUN 34 (A) 10/28/2013 0835    CREATININE 2.1 (H) 10/01/2020 0919    CREATININE 1.7 10/28/2013 0835     (H) 10/01/2020 0919        Component Value Date/Time    CALCIUM 9.1 10/01/2020 0919    ALKPHOS 110 04/20/2020 1529    AST 27 04/20/2020 1529    AST 14 10/28/2013 0835    ALT 56 (H) 04/20/2020 1529    BILITOT 0.7 04/20/2020 1529    ESTGFRAFRICA 37.6 (A) 10/01/2020 0919    EGFRNONAA 32.5 (A) 10/01/2020 0919          Lab Results   Component Value Date    HGBA1C 12.7 (H) 08/18/2020     Lab Results   Component Value Date    TSH 1.349 01/14/2020     Lab Results   Component Value Date    INR 1.2 09/20/2016    INR 1.1 09/20/2016    INR 1.0 08/15/2016     Lab Results   Component Value Date    WBC 6.37 04/20/2020    HGB 15.2 04/20/2020    HCT 44.3 04/20/2020    MCV 89 04/20/2020     04/20/2020     BMP  Sodium   Date Value Ref Range Status   10/01/2020 140 136 - 145 mmol/L Final     Potassium   Date Value Ref Range Status   10/01/2020 3.8 3.5 - 5.1 mmol/L Final     Chloride   Date Value Ref Range Status   10/01/2020 102 95 - 110 mmol/L Final     CO2   Date Value Ref Range Status   10/01/2020 28 23 - 29 mmol/L Final     BUN, Bld   Date Value Ref Range Status   10/01/2020 29 (H) 8 - 23 mg/dL Final     BUN   Date Value Ref Range Status   10/28/2013 34 (A) 4 - 21 mg/dL Final     Creatinine   Date Value Ref Range Status   10/01/2020 2.1 (H) 0.5 - 1.4 mg/dL Final   10/28/2013 1.7 mg/dL Final     Calcium   Date Value Ref Range Status   10/01/2020 9.1 8.7 - 10.5 mg/dL Final     Anion Gap   Date Value Ref Range Status   10/01/2020 10 8 - 16 mmol/L Final     eGFR if    Date Value Ref Range Status   10/01/2020 37.6 (A) >60 mL/min/1.73 m^2 Final     eGFR if non    Date Value Ref Range Status   10/01/2020 32.5 (A) >60 mL/min/1.73 m^2 Final     Comment:     Calculation used to obtain the estimated glomerular filtration  rate (eGFR)  is the CKD-EPI equation.        BNP  @LABRCNTIP(BNP,BNPTRIAGEBLO)@  @LABRCNTIP(troponini)@  CrCl cannot be calculated (Patient's most recent lab result is older than the maximum 7 days allowed.).  No results found in the last 24 hours.  No results found in the last 24 hours.  No results found in the last 24 hours.    Assessment:      1. PVD (peripheral vascular disease)    2. Coronary artery disease involving native coronary artery of native heart without angina pectoris    3. Hyperlipidemia associated with type 2 diabetes mellitus    4. Hypertension associated with diabetes    5. Chronic diastolic congestive heart failure    6. Morbid obesity with BMI of 45.0-49.9, adult    7. Type 2 diabetes mellitus with diabetic polyneuropathy, with long-term current use of insulin    8. Stage 3b chronic kidney disease        Plan:   Continue ASA Statin, coreg, lisinopirl hydralazine lasix 80 mg bid and Metolazone 3 times a week prn  Daily weight. Please call the office if gain 3 pounds in 1 day or 5 pounds in 1 week.  Fluid restriction 1.5 liters a day  Na< 2 gm  BMP in 2 weeks  RTC in 3 months

## 2020-10-08 NOTE — PROGRESS NOTES
"Individual Psychotherapy (PhD/LCSW)    10/8/2020    Site:  [facility]         Therapeutic Intervention: Met with patient.  Outpatient - Insight oriented psychotherapy 45 min - CPT code 90330    Chief complaint/reason for encounter: depression     Interval history and content of current session:  The patient presents to ongoing individual therapy due to depression.  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  He will be working over the weekend.  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  He has not had any questioning about his intentions from his boss.  He overheard a conversation of his co workers.  He realized they do not know how to do basic portions of their job.  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  He was at [place] [employer] and a  said she wants him to do her taxes.  He was surprised to get a phone call from his son, Murray.  His son apologized for his previous behavior toward the patient.  He realizes he let the wrong people influence him in past years.  His relationship recently ended.  The patient suggested he enter the service which will give him a sense of direction.  His son plans to talk to his siblings and people who have been in the  before making the decision.  Emphasized that the patient can't make the decision for his son.  Encourage the patient to return to physical activity.  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  Encourage the patient to continue to let go of a previous relationship.  His friend, Rex, brought up his previous girlfriend.  The patient told his friend, "She does not care about me!"  The patient has told his son that he can't move back in with him.  The patient admits that he has not been walking because he has been "lazy."    Treatment plan:  ? Target symptoms: depression  ? Why chosen therapy is appropriate versus another " modality: relevant to diagnosis  ? Outcome monitoring methods: self-report, observation  ? Therapeutic intervention type: insight oriented psychotherapy, supportive psychotherapy, interactive psychotherapy     Risk parameters:  Patient reports no suicidal ideation  Patient reports no homicidal ideation  Patient reports no self-injurious behavior  Patient reports no violent behavior     Verbal deficits: None     Patient's response to intervention:  The patient's response to intervention is accepting, motivated.     Progress toward goals and other mental status changes:  The patient's progress toward goals is fair.     Diagnosis:   Major depression recurrent moderate     Plan:  individual psychotherapy and medication management by physician, Dr. Campbell     Return to clinic: as scheduled     Length of Service (minutes): 45

## 2020-10-08 NOTE — PROGRESS NOTES
WELLNESS VISIT (PREVENTIVE SERVICES)    CHIEF COMPLAINT  Annual Wellness Exam    HEALTH MAINTENANCE INTERVENTIONS - UP TO DATE  Health Maintenance Topics with due status: Not Due       Topic Last Completion Date    TETANUS VACCINE 12/10/2013    Colorectal Cancer Screening 05/01/2019    Lipid Panel 02/07/2020    Eye Exam 07/14/2020    Foot Exam 08/18/2020    PROSTATE-SPECIFIC ANTIGEN 10/08/2020    High Dose Statin 10/08/2020    Aspirin/Antiplatelet Therapy 10/08/2020    Hemoglobin A1c 10/08/2020       HEALTH MAINTENANCE INTERVENTIONS - DUE OR DUE SOON  Health Maintenance Due   Topic Date Due    Shingles Vaccine (1 of 2) 02/24/2007       Except as noted herein, any chronic conditions are compensated/controlled and stable, and no other significant new complaints or concerns reported.     DIAGNOSES, HISTORY, ASSESSMENT, AND PLAN  Assessment   1. Encounter for well adult exam with abnormal findings    2. Stage 3b chronic kidney disease    3. Morbid obesity with BMI of 45.0-49.9, adult    4. Hypertension associated with diabetes    5. Uncontrolled type 2 diabetes mellitus with kidney complication, with long-term current use of insulin    6. Screening PSA (prostate specific antigen)    7. Need for shingles vaccine    8. Need for influenza vaccination    9. Influenza Immunization not carried out because of patient refusal         Orders Placed This Encounter    PSA, Screening    Ambulatory referral/consult to Nephrology    varicella-zoster gE-AS01B, PF, (SHINGRIX) 50 mcg/0.5 mL injection       Plan   Problem List Items Addressed This Visit     Uncontrolled type 2 diabetes mellitus with kidney complication, with long-term current use of insulin (Chronic)    Current Assessment & Plan     Asymptomatic, uncontrolled, but improved. Following with diabetes clinic.  Future Appointments   Date Time Provider Department Center   10/14/2020  9:30 AM Luís Millard PA-C [facility]             Hypertension  associated with diabetes (Chronic)    Current Assessment & Plan     BP Readings from Last 6 Encounters:   10/08/20 (!) 140/60   10/08/20 138/84   09/10/20 (!) 152/98   09/01/20 (!) 140/88   08/20/20 (!) 183/105   08/18/20 (!) 131/91   Hypertension Digital Medicine program BP readings determined inaccurate. He has new device and will start using this.  Asymptomatic, not controlled.  Saw cardiology this morning.         Stage 3b chronic kidney disease (Chronic)    Current Assessment & Plan     Lab Results   Component Value Date    ESTGFRAFRICA 37.6 (A) 10/01/2020    ESTGFRAFRICA 37.6 (A) 09/24/2020    ESTGFRAFRICA 40 (A) 04/20/2020    EGFRNONAA 32.5 (A) 10/01/2020    EGFRNONAA 32.5 (A) 09/24/2020    EGFRNONAA 34 (A) 04/20/2020    CREATININE 2.1 (H) 10/01/2020    CREATININE 2.1 (H) 09/24/2020    CREATININE 2.0 (H) 04/20/2020    BUN 29 (H) 10/01/2020    BUN 31 (H) 09/24/2020    BUN 14 04/20/2020     Lab Results   Component Value Date    ALBUMIN 3.6 04/20/2020    PROT 7.3 04/20/2020    MICALBCREAT 12.5 04/12/2018    LABMICR 3.0 04/12/2018    CREATRANDUR 22.0 (L) 08/09/2018     10/01/2020    K 3.8 10/01/2020     10/01/2020    CO2 28 10/01/2020     (H) 10/01/2020    .0 (H) 08/09/2018    CALCIUM 9.1 10/01/2020    PHOS 3.1 02/07/2020    MG 1.9 02/07/2020    HGB 15.2 04/20/2020    HCT 44.3 04/20/2020            Relevant Orders    Ambulatory referral/consult to Nephrology    Morbid obesity with BMI of 45.0-49.9, adult (Chronic)    Current Assessment & Plan     Wt Readings from Last 6 Encounters:   10/08/20 (!) 185.2 kg (408 lb 4.7 oz)   10/08/20 (!) 185.2 kg (408 lb 4.7 oz)   09/10/20 (!) 185.7 kg (409 lb 6.3 oz)   09/01/20 (!) 184.4 kg (406 lb 8.5 oz)   08/20/20 (!) 186 kg (410 lb 0.9 oz)   08/18/20 (!) 185.5 kg (408 lb 15.3 oz)     BMI Readings from Last 2 Encounters:   10/08/20 46.00 kg/m²   10/08/20 46.00 kg/m²   Therapeutic lifestyle changes encouraged. Additional instructions were reviewed with  "him. Refer to "Patient Instructions" section of after visit summary.          Influenza Immunization not carried out because of patient refusal      Other Visit Diagnoses     Encounter for well adult exam with abnormal findings    -  Primary    Screening PSA (prostate specific antigen)        Relevant Orders    PSA, Screening (Completed)    Need for shingles vaccine        Relevant Medications    varicella-zoster gE-AS01B, PF, (SHINGRIX) 50 mcg/0.5 mL injection    Need for influenza vaccination              Outpatient Medications Prior to Visit   Medication Sig Dispense Refill    ACCU-CHEK ALYCIA PLUS METER Misc       aspirin 325 MG tablet Take 325 mg by mouth once daily.      atorvastatin (LIPITOR) 10 MG tablet Take 1 tablet (10 mg total) by mouth once daily. 90 tablet 3    BLOOD PRESSURE CUFF Misc 1 cuff 1 each 0    blood sugar diagnostic Strp To check BG 3 times daily, to use with insurance preferred meter 100 strip 3    brimonidine 0.1% (ALPHAGAN P) 0.1 % Drop Place 1 drop into both eyes 3 (three) times daily. Order 90 day supply 10 mL 4    brinzolamide (AZOPT) 1 % ophthalmic suspension Place 1 drop into both eyes 3 (three) times daily. ORDER 90 DAY SUPPLY 10 mL 4    carvedilol (COREG) 25 MG tablet TAKE 1 TABLET BY MOUTH TWICE DAILY WITH MEALS 180 tablet 11    furosemide (LASIX) 40 MG tablet Take 1 tablet (40 mg total) by mouth 2 (two) times a day. 245 tablet 3    hydrALAZINE (APRESOLINE) 50 MG tablet Take 1 tablet (50 mg total) by mouth every 12 (twelve) hours. 60 tablet 11    insulin NPH-insulin regular, 70/30, (NOVOLIN 70/30 U-100 INSULIN) 100 unit/mL (70-30) injection Inject 130 Units into the skin 2 (two) times daily before meals. 240 mL 3    lancets Misc To check BG 3 times daily, to use with insurance preferred meter 100 each 3    latanoprost (XALATAN) 0.005 % ophthalmic solution Place 1 drop into both eyes once daily. 3 Bottle 4    lisinopril (PRINIVIL,ZESTRIL) 20 MG tablet Take 1 tablet (20 " mg total) by mouth once daily. 30 tablet 11    metOLazone (ZAROXOLYN) 2.5 MG tablet Take 1 tablet (2.5 mg total) by mouth every Mon, Wed, Fri. 36 tablet 3    miglitoL (GLYSET) 25 MG Tab Take 1 tablet (25 mg total) by mouth 3 (three) times daily with meals. 270 tablet 3    mupirocin (BACTROBAN) 2 % ointment Apply topically 2 (two) times daily. 22 g 0    netarsudiL (RHOPRESSA) 0.02 % Drop Place 1 drop into both eyes once daily. 2.5 mL 6    netarsudiL (RHOPRESSA) 0.02 % ophthalmic solution Place 1 drop into the left eye once daily. 2.5 mL 6    nitroGLYCERIN (NITROSTAT) 0.4 MG SL tablet Place 1 tablet (0.4 mg total) under the tongue every 5 (five) minutes as needed for Chest pain. 20 tablet 0    omeprazole (PRILOSEC) 40 MG capsule Take 1 capsule (40 mg total) by mouth once daily. 90 capsule 3    polyethylene glycol (GLYCOLAX) 17 gram/dose powder Take 17 g by mouth once daily. 238 g 6    sildenafil (VIAGRA) 50 MG tablet Take 1 tablet (50 mg total) by mouth daily as needed for Erectile Dysfunction. 5 tablet 3    tamsulosin (FLOMAX) 0.4 mg Cap Take 1 capsule (0.4 mg total) by mouth once daily. 30 capsule 0    DULoxetine (CYMBALTA) 30 MG capsule Take 1 capsule daily for 7 days then 2 capsules daily. 180 capsule 0    DULoxetine (CYMBALTA) 30 MG capsule Take 1 capsule (30 mg total) by mouth once daily. 30 capsule 3    DULoxetine (CYMBALTA) 30 MG capsule Take 1 capsule (30 mg total) by mouth once daily. 90 capsule 1    flash glucose sensor (FREESTYLE CLEMENCIA 14 DAY SENSOR) Kit 1 kit by Misc.(Non-Drug; Combo Route) route every 14 (fourteen) days. 2 kit 11     No facility-administered medications prior to visit.         There are no discontinued medications.     Medications Ordered This Encounter   Medications    varicella-zoster gE-AS01B, PF, (SHINGRIX) 50 mcg/0.5 mL injection     Sig: Inject 0.5 mL (one dose) into muscle now; give second dose at least 2 months later     Dispense:  1 each     Refill:  1  "      Follow up in about 6 months (around 4/8/2021) for periodic management of chronic medical conditions.     Subjective   Review of Systems   Constitutional: Negative for chills and fever.   Respiratory: Negative for chest tightness and shortness of breath.    Endocrine: Negative for polydipsia and polyuria.        Objective   Vitals:    10/08/20 0947   BP: (!) 140/60   BP Location: Left arm   Patient Position: Sitting   BP Method: Large (Manual)   Pulse: 77   Temp: 97.8 °F (36.6 °C)   TempSrc: Tympanic   SpO2: 96%   Weight: (!) 185.2 kg (408 lb 4.7 oz)   Height: 6' 7" (2.007 m)     Physical Exam  Vitals signs reviewed.   Constitutional:       General: He is not in acute distress.     Appearance: Normal appearance. He is not ill-appearing or diaphoretic.   Eyes:      General: No scleral icterus.  Cardiovascular:      Rate and Rhythm: Normal rate and regular rhythm.   Pulmonary:      Effort: Pulmonary effort is normal.      Breath sounds: Normal breath sounds.   Skin:     General: Skin is warm and dry.   Neurological:      General: No focal deficit present.      Mental Status: He is alert and oriented to person, place, and time.   Psychiatric:         Mood and Affect: Mood normal.         Behavior: Behavior normal.         Judgment: Judgment normal.           PAST MEDICAL HISTORY  He has a past medical history of Anxiety, Bell's palsy, CHF (congestive heart failure), Coronary artery disease involving native coronary artery without angina pectoris, Depression, Diabetes mellitus, Glaucoma, Hypertension, Idiopathic peripheral neuropathy, Mixed hyperlipidemia, Morbid obesity with BMI of 45.0-49.9, adult, Sleep apnea, Stage 3b chronic kidney disease, Type 2 diabetes mellitus with diabetic polyneuropathy, with long-term current use of insulin, and Vitamin B 12 deficiency.    SURGICAL HISTORY  He has a past surgical history that includes Retinal detachment repair w/ scleral buckle; PC IOL OU; Cataract extraction (OU); " "Wrist surgery; Eye surgery; Fracture surgery; Cardiac catheterization (7/22/13); kidney stone ; Esophagogastroduodenoscopy (N/A, 5/1/2019); Colonoscopy (N/A, 5/1/2019); and Coronary angioplasty.    FAMILY HISTORY  His family history includes Heart attack in his father; Heart disease in his father; Hypertension in his maternal grandfather, maternal grandmother, and mother; Macular degeneration in his father and paternal uncle.     ALLERGIES  Review of patient's allergies indicates:   Allergen Reactions    Cefazolin      Other reaction(s): Shakes  Other reaction(s): Chills       SOCIAL HISTORY   TOBACCO USE HISTORY: He reports that he has never smoked. He has never used smokeless tobacco.   ALCOHOL USE HISTORY:  He reports current alcohol use.   RECREATIONAL DRUG USE HISTORY:  He reports no history of drug use.    Documentation entered by me for this encounter may have been done in part using speech-recognition technology. Although I have made an effort to ensure accuracy, "sound like" errors may exist and should be interpreted in context. -CRISTIAN Fleming MD.     "

## 2020-10-12 ENCOUNTER — TELEPHONE (OUTPATIENT)
Dept: CARDIOLOGY | Facility: CLINIC | Age: 65
End: 2020-10-12

## 2020-10-12 ENCOUNTER — PATIENT MESSAGE (OUTPATIENT)
Dept: INTERNAL MEDICINE | Facility: CLINIC | Age: 65
End: 2020-10-12

## 2020-10-12 ENCOUNTER — TELEPHONE (OUTPATIENT)
Dept: INTERNAL MEDICINE | Facility: CLINIC | Age: 65
End: 2020-10-12

## 2020-10-12 NOTE — PROGRESS NOTES
"I'm happy to report that your prostate specific antigen ("PSA" - a test used to screen for and monitor prostate cancer) is NORMAL.    Your hemoglobin A1c ("A1c" - a test used to evaluate for diabetes) is ABNORMAL - VERY HIGH at 13.4%. This means that your diabetes is uncontrolled, putting you at increased risk for problems such as heart disease, stroke, kidney failure, and blindness. It is VERY IMPORTANT that you keep your diabetes clinic appointment Wednesday, October 14, 2020 at 9:30 AM with Luís Millard PA-C to discuss treatment options to lower your risk for these problems.    The basic metabolic panel (BMP) checks kidney function, sodium, potassium, glucose (sugar) and other aspects of your metabolism. Your BMP is abnormal because of your chronic kidney disease and diabetes, but otherwise it is OK."

## 2020-10-12 NOTE — TELEPHONE ENCOUNTER
Patient contacted and was advised that his     Lab showed kidney function stable  Continue current Rx   the patient stated understanding with no question or concerns

## 2020-10-12 NOTE — TELEPHONE ENCOUNTER
"ACTION NEEDED: Please contact him and relay my message (below). Thanks.   --------------------------------------------------------------------------------   I'm happy to report that your prostate specific antigen ("PSA" - a test used to screen for and monitor prostate cancer) is NORMAL.    Your hemoglobin A1c ("A1c" - a test used to evaluate for diabetes) is ABNORMAL - VERY HIGH at 13.4%. This means that your diabetes is uncontrolled, putting you at increased risk for problems such as heart disease, stroke, kidney failure, and blindness. It is VERY IMPORTANT that you keep your diabetes clinic appointment Wednesday, October 14, 2020 at 9:30 AM with Luís Millard PA-C to discuss treatment options to lower your risk for these problems.    The basic metabolic panel (BMP) checks kidney function, sodium, potassium, glucose (sugar) and other aspects of your metabolism. Your BMP is abnormal because of your chronic kidney disease and diabetes, but otherwise it is OK.  "

## 2020-10-13 RX ORDER — FLASH GLUCOSE SENSOR
1 KIT MISCELLANEOUS
Qty: 2 KIT | Refills: 11 | Status: SHIPPED | OUTPATIENT
Start: 2020-10-13 | End: 2021-01-04

## 2020-10-15 RX ORDER — DULOXETIN HYDROCHLORIDE 30 MG/1
30 CAPSULE, DELAYED RELEASE ORAL DAILY
Qty: 90 CAPSULE | Refills: 0 | Status: SHIPPED | OUTPATIENT
Start: 2020-10-15 | End: 2021-02-04 | Stop reason: SDUPTHER

## 2020-10-20 ENCOUNTER — PATIENT OUTREACH (OUTPATIENT)
Dept: ADMINISTRATIVE | Facility: HOSPITAL | Age: 65
End: 2020-10-20

## 2020-10-20 NOTE — PROGRESS NOTES
Working BP report.  Advised need an updated bp reading. He said he does tend to run a little high but he does not believe the home machine is working properly as it is always very high on the machine.   Said he would try to come in for a bp check but has to drive 50 miles in traffic so will probably be high due to stress of drive.

## 2020-10-21 ENCOUNTER — HOSPITAL ENCOUNTER (OUTPATIENT)
Dept: CARDIOLOGY | Facility: HOSPITAL | Age: 65
Discharge: HOME | End: 2020-10-21
Attending: INTERNAL MEDICINE
Payer: COMMERCIAL

## 2020-10-21 ENCOUNTER — HOSPITAL ENCOUNTER (OUTPATIENT)
Dept: RADIOLOGY | Facility: HOSPITAL | Age: 65
Discharge: HOME | End: 2020-10-21
Attending: INTERNAL MEDICINE
Payer: COMMERCIAL

## 2020-10-21 PROCEDURE — A9502 TC99M TETROFOSMIN: HCPCS

## 2020-10-21 PROCEDURE — 93016 CV STRESS TEST SUPVJ ONLY: CPT | Mod: ,,, | Performed by: INTERNAL MEDICINE

## 2020-10-21 PROCEDURE — 78452 HT MUSCLE IMAGE SPECT MULT: CPT | Mod: 26,,, | Performed by: INTERNAL MEDICINE

## 2020-10-21 PROCEDURE — 93017 CV STRESS TEST TRACING ONLY: CPT

## 2020-10-21 PROCEDURE — 78452 STRESS TEST WITH MYOCARDIAL PERFUSION (CUPID ONLY): ICD-10-PCS | Mod: 26,,, | Performed by: INTERNAL MEDICINE

## 2020-10-21 PROCEDURE — 93018 CV STRESS TEST I&R ONLY: CPT | Mod: ,,, | Performed by: INTERNAL MEDICINE

## 2020-10-21 PROCEDURE — 93016 STRESS TEST WITH MYOCARDIAL PERFUSION (CUPID ONLY): ICD-10-PCS | Mod: ,,, | Performed by: INTERNAL MEDICINE

## 2020-10-21 PROCEDURE — 93018 STRESS TEST WITH MYOCARDIAL PERFUSION (CUPID ONLY): ICD-10-PCS | Mod: ,,, | Performed by: INTERNAL MEDICINE

## 2020-10-21 RX ORDER — REGADENOSON 0.08 MG/ML
0.4 INJECTION, SOLUTION INTRAVENOUS ONCE
Status: COMPLETED | OUTPATIENT
Start: 2020-10-21 | End: 2020-10-21

## 2020-10-21 RX ADMIN — REGADENOSON 0.4 MG: 0.08 INJECTION, SOLUTION INTRAVENOUS at 12:10

## 2020-10-22 ENCOUNTER — TELEPHONE (OUTPATIENT)
Dept: CARDIOLOGY | Facility: CLINIC | Age: 65
End: 2020-10-22

## 2020-10-22 ENCOUNTER — OFFICE VISIT (OUTPATIENT)
Dept: PSYCHIATRY | Facility: CLINIC | Age: 65
End: 2020-10-22
Payer: COMMERCIAL

## 2020-10-22 DIAGNOSIS — F33.1 MAJOR DEPRESSIVE DISORDER, RECURRENT EPISODE, MODERATE: Primary | ICD-10-CM

## 2020-10-22 PROCEDURE — 90834 PSYTX W PT 45 MINUTES: CPT | Mod: S$GLB,,, | Performed by: SOCIAL WORKER

## 2020-10-22 PROCEDURE — 90834 PR PSYCHOTHERAPY W/PATIENT, 45 MIN: ICD-10-PCS | Mod: S$GLB,,, | Performed by: SOCIAL WORKER

## 2020-10-22 NOTE — TELEPHONE ENCOUNTER
Pt contacted about results, lvm for pt to call back about results.        ----- Message from Ramiro Summers MD sent at 10/21/2020  8:33 PM CDT -----  [facility] stress test normal

## 2020-10-26 NOTE — PROGRESS NOTES
Individual Psychotherapy (PhD/LCSW)    10/22/2020    Site:  [place]         Therapeutic Intervention: Met with patient.  Outpatient - Insight oriented psychotherapy 45 min - CPT code 37074    Chief complaint/reason for encounter: depression     Interval history and content of current session:  The patient presents to ongoing individual therapy due to depression.  He is happy to report that he is no longer working for [employer].  The company was cutting hours which made his time very minimal.  He decided to take the opportunity to end his contract.  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  He lives outside of the mileage radius for a non compete clause.  He has seen several peers take their customers with them.  His son in law is building a computer for his business.  He will be installing a ramp onto his home.  He hopes to return to walking on a regular basis.  He is happy that he only has to drive into [place] for medical appointments.  He does still think of his ex girlfriend, but he has realized that she does not think of him.  He is reluctant to return to [employer].  He will go for [employer].  Emphasize that the patient will now have time to invest in himself.  Encourage cultivating his spirituality and moving more.  Praise the patient for making steps to improve his occupational life.  He does feel that a weight has been lifted since he is no longer working for [employer].  He has not heard from his younger son again.  He is still working at [employer] and considering the .  He has not returned to his ex boyfriend, who was toxic.  The patient's older son continues to work in a lab producing a medication for COVID.  The patient was invited by his daughter to go for a short trip to the beach.  The patient does not like the beach and he declined.    Treatment plan:  ? Target symptoms: depression  ? Why  chosen therapy is appropriate versus another modality: relevant to diagnosis  ? Outcome monitoring methods: self-report, observation  ? Therapeutic intervention type: insight oriented psychotherapy, supportive psychotherapy, interactive psychotherapy     Risk parameters:  Patient reports no suicidal ideation  Patient reports no homicidal ideation  Patient reports no self-injurious behavior  Patient reports no violent behavior     Verbal deficits: None     Patient's response to intervention:  The patient's response to intervention is accepting, motivated.     Progress toward goals and other mental status changes:  The patient's progress toward goals is fair.     Diagnosis:   Major depression recurrent moderate     Plan:  individual psychotherapy and medication management by physician, Dr. Campbell     Return to clinic: as scheduled     Length of Service (minutes): 45

## 2020-11-10 ENCOUNTER — OFFICE VISIT (OUTPATIENT)
Dept: DIABETES | Facility: CLINIC | Age: 65
End: 2020-11-10
Payer: COMMERCIAL

## 2020-11-10 VITALS
HEART RATE: 81 BPM | SYSTOLIC BLOOD PRESSURE: 153 MMHG | WEIGHT: 315 LBS | HEIGHT: 78 IN | DIASTOLIC BLOOD PRESSURE: 92 MMHG | BODY MASS INDEX: 36.45 KG/M2

## 2020-11-10 DIAGNOSIS — G60.9 IDIOPATHIC PERIPHERAL NEUROPATHY: ICD-10-CM

## 2020-11-10 DIAGNOSIS — E66.01 MORBID OBESITY WITH BMI OF 40.0-44.9, ADULT: ICD-10-CM

## 2020-11-10 DIAGNOSIS — I15.2 HYPERTENSION ASSOCIATED WITH DIABETES: ICD-10-CM

## 2020-11-10 DIAGNOSIS — N18.32 STAGE 3B CHRONIC KIDNEY DISEASE: ICD-10-CM

## 2020-11-10 DIAGNOSIS — E11.69 HYPERLIPIDEMIA ASSOCIATED WITH TYPE 2 DIABETES MELLITUS: ICD-10-CM

## 2020-11-10 DIAGNOSIS — H54.10 BLINDNESS AND LOW VISION: ICD-10-CM

## 2020-11-10 DIAGNOSIS — H40.1133 PRIMARY OPEN ANGLE GLAUCOMA OF BOTH EYES, SEVERE STAGE: ICD-10-CM

## 2020-11-10 DIAGNOSIS — G47.33 OSA (OBSTRUCTIVE SLEEP APNEA): ICD-10-CM

## 2020-11-10 DIAGNOSIS — E11.649 HYPOGLYCEMIA UNAWARENESS ASSOCIATED WITH TYPE 2 DIABETES MELLITUS: ICD-10-CM

## 2020-11-10 DIAGNOSIS — I50.9 CHF (NYHA CLASS III, ACC/AHA STAGE C): ICD-10-CM

## 2020-11-10 DIAGNOSIS — I10 ESSENTIAL HYPERTENSION: Chronic | ICD-10-CM

## 2020-11-10 DIAGNOSIS — I25.10 CORONARY ARTERY DISEASE INVOLVING NATIVE CORONARY ARTERY OF NATIVE HEART WITHOUT ANGINA PECTORIS: ICD-10-CM

## 2020-11-10 DIAGNOSIS — E11.59 HYPERTENSION ASSOCIATED WITH DIABETES: ICD-10-CM

## 2020-11-10 DIAGNOSIS — E78.5 HYPERLIPIDEMIA ASSOCIATED WITH TYPE 2 DIABETES MELLITUS: ICD-10-CM

## 2020-11-10 LAB — GLUCOSE SERPL-MCNC: 316 MG/DL (ref 70–110)

## 2020-11-10 PROCEDURE — 3077F SYST BP >= 140 MM HG: CPT | Mod: CPTII,S$GLB,, | Performed by: PHYSICIAN ASSISTANT

## 2020-11-10 PROCEDURE — 82962 POCT GLUCOSE, HAND-HELD DEVICE: ICD-10-PCS | Mod: S$GLB,,, | Performed by: PHYSICIAN ASSISTANT

## 2020-11-10 PROCEDURE — 3008F PR BODY MASS INDEX (BMI) DOCUMENTED: ICD-10-PCS | Mod: CPTII,S$GLB,, | Performed by: PHYSICIAN ASSISTANT

## 2020-11-10 PROCEDURE — 3080F PR MOST RECENT DIASTOLIC BLOOD PRESSURE >= 90 MM HG: ICD-10-PCS | Mod: CPTII,S$GLB,, | Performed by: PHYSICIAN ASSISTANT

## 2020-11-10 PROCEDURE — 82962 GLUCOSE BLOOD TEST: CPT | Mod: S$GLB,,, | Performed by: PHYSICIAN ASSISTANT

## 2020-11-10 PROCEDURE — 3080F DIAST BP >= 90 MM HG: CPT | Mod: CPTII,S$GLB,, | Performed by: PHYSICIAN ASSISTANT

## 2020-11-10 PROCEDURE — 3046F PR MOST RECENT HEMOGLOBIN A1C LEVEL > 9.0%: ICD-10-PCS | Mod: CPTII,S$GLB,, | Performed by: PHYSICIAN ASSISTANT

## 2020-11-10 PROCEDURE — 3046F HEMOGLOBIN A1C LEVEL >9.0%: CPT | Mod: CPTII,S$GLB,, | Performed by: PHYSICIAN ASSISTANT

## 2020-11-10 PROCEDURE — 99214 OFFICE O/P EST MOD 30 MIN: CPT | Mod: S$GLB,,, | Performed by: PHYSICIAN ASSISTANT

## 2020-11-10 PROCEDURE — 3077F PR MOST RECENT SYSTOLIC BLOOD PRESSURE >= 140 MM HG: ICD-10-PCS | Mod: CPTII,S$GLB,, | Performed by: PHYSICIAN ASSISTANT

## 2020-11-10 PROCEDURE — 99214 PR OFFICE/OUTPT VISIT, EST, LEVL IV, 30-39 MIN: ICD-10-PCS | Mod: S$GLB,,, | Performed by: PHYSICIAN ASSISTANT

## 2020-11-10 PROCEDURE — 99999 PR PBB SHADOW E&M-EST. PATIENT-LVL V: CPT | Mod: PBBFAC,,, | Performed by: PHYSICIAN ASSISTANT

## 2020-11-10 PROCEDURE — 99999 PR PBB SHADOW E&M-EST. PATIENT-LVL V: ICD-10-PCS | Mod: PBBFAC,,, | Performed by: PHYSICIAN ASSISTANT

## 2020-11-10 PROCEDURE — 3008F BODY MASS INDEX DOCD: CPT | Mod: CPTII,S$GLB,, | Performed by: PHYSICIAN ASSISTANT

## 2020-11-10 RX ORDER — BRINZOLAMIDE 10 MG/ML
1 SUSPENSION/ DROPS OPHTHALMIC 3 TIMES DAILY
Qty: 10 ML | Refills: 4 | Status: SHIPPED | OUTPATIENT
Start: 2020-11-10 | End: 2021-09-09 | Stop reason: SDUPTHER

## 2020-11-10 RX ORDER — EMPAGLIFLOZIN 10 MG/1
10 TABLET, FILM COATED ORAL DAILY
Qty: 30 TABLET | Refills: 11 | Status: SHIPPED | OUTPATIENT
Start: 2020-11-10 | End: 2021-01-25

## 2020-11-10 RX ORDER — CARVEDILOL 25 MG/1
25 TABLET ORAL 2 TIMES DAILY WITH MEALS
Qty: 180 TABLET | Refills: 0 | Status: SHIPPED | OUTPATIENT
Start: 2020-11-10 | End: 2021-03-22 | Stop reason: SDUPTHER

## 2020-11-10 RX ORDER — BRIMONIDINE TARTRATE 1 MG/ML
1 SOLUTION/ DROPS OPHTHALMIC 3 TIMES DAILY
Qty: 10 ML | Refills: 4 | Status: SHIPPED | OUTPATIENT
Start: 2020-11-10 | End: 2022-02-08 | Stop reason: SDUPTHER

## 2020-11-10 NOTE — PATIENT INSTRUCTIONS
CURRENT DM MEDICATIONS:    70/30 140 units in the morning and 140 units at night   Miglitol 25 mg TID wm    Jardiance 10 mg

## 2020-11-10 NOTE — Clinical Note
China BAKER - following up with you in 2 wks    Hannah, please pend Alphagan and Azopt drops to Dr. Hale in optometry

## 2020-11-10 NOTE — PROGRESS NOTES
PCP: KANDICE Fleming MD    Subjective:     Chief Complaint: Diabetes - Established Patient    HISTORY OF PRESENT ILLNESS: 65 y.o.   male presenting for diabetes management visit.   Patient has previously been established with the Diabetes Management Department and was last seen by myself on 08/18/20.   Patient has had Type II diabetes since 2005.  Pertinent to decision making is the following comorbidities: HTN, HLD, CAD, S/p MI, CHF, CKD III and Obesity by BMI  Patient has the following Diabetes complications: with diabetic chronic kidney disease  He has attended diabetes education in the past.     Patient's most recent A1c of 13.4% was completed 1 months ago. This is large regression. Patient admits this is due to diet in part.   Patient states since His last A1c His blood glucose levels have been unknown since patient is not currently checking blood sugars at home.    Patient monitors blood glucose 2 times per day with unknown meter : Fasting and Before Bed. Patient was using Sriram CGM, but just received sensor refills last night.   Patient blood glucose monitoring device will not be uploaded into [employer] [facility] today secondary to patient forgetting device.   Patient endorses the following diabetes related symptoms: Leg cramping or claudication and Leg swelling or edema. This is a chronic issue secondary to chronic cellulitis of lower limb.   Patient blood sugar this am was 316 this am without eating breakfast. Patient admits had salami and pretzels for dinner last night.   Patient is due today for the following diabetes-related health maintenance standards: None - up to date.   He denies any recent hospital admissions or emergency room visits.   He denies having hypoglycemia recently. Per chart, patient has history of hypoglycemia unawareness.   Patient's concerns today include glycemic control.   Patient medication regimen is as below.     CURRENT DM MEDICATIONS:    Novolin 70/30 140 units in the  "morning and 140 units at night   Miglitol 25 mg TID wm     Patient has failed the following Diabetes medications:    Metformin - GI    Glyburide   Ozempic - cost   Basal - Lantus         Labs Reviewed.       Lab Results   Component Value Date    CPEPTIDE 2.70 12/13/2018     Lab Results   Component Value Date    GLUTAMICACID 0.00 12/13/2018       Height: 6' 7" (200.7 cm)  //  Weight: (!) 187 kg (412 lb 4.2 oz), Body mass index is 46.44 kg/m².  His blood sugar in clinic today is:    Lab Results   Component Value Date    POCGLU 316 (A) 11/10/2020       Review of Systems   Constitutional: Negative for activity change, appetite change, chills and fever.   HENT: Negative for dental problem, mouth sores, nosebleeds, sore throat and trouble swallowing.    Eyes: Negative for pain and discharge.   Respiratory: Negative for shortness of breath, wheezing and stridor.    Cardiovascular: Positive for leg swelling (Chronic; chronic Leg cellulitis). Negative for chest pain and palpitations.   Gastrointestinal: Negative for abdominal pain, diarrhea, nausea and vomiting.   Endocrine: Negative for polydipsia, polyphagia and polyuria.   Genitourinary: Negative for dysuria, frequency and urgency.   Musculoskeletal: Negative for joint swelling and myalgias.   Skin: Negative for rash and wound.   Neurological: Negative for dizziness, syncope, weakness and headaches.   Psychiatric/Behavioral: Negative for behavioral problems and dysphoric mood.         Diabetes Management Status  Statin: Taking  ACE/ARB: Taking    Screening or Prevention Patient's value Goal Complete/Controlled?   HgA1C Testing and Control   Lab Results   Component Value Date    HGBA1C 13.4 (H) 10/08/2020      Annually/Less than 8% No   Lipid profile : 02/07/2020 Annually Yes   LDL control Lab Results   Component Value Date    LDLCALC 38.8 (L) 02/07/2020    Annually/Less than 100 mg/dl  Yes   Nephropathy screening Lab Results   Component Value Date    MICALBCREAT 12.5 " "04/12/2018     Lab Results   Component Value Date    PROTEINUA 1+ (A) 04/20/2020    Annually No   Blood pressure BP Readings from Last 1 Encounters:   11/10/20 (!) 153/92    Less than 140/90 No   Dilated retinal exam : 07/14/2020 Annually Yes    Foot exam   : 08/18/2020 Annually Yes     ACTIVITY LEVEL: Rarely Active. Discussed activities, benefits, methods, and precautions.  MEAL PLANNING: Patient reports number of meals per day to be 3 and number of snacks per day to be 2.   Patient is encouraged to carb count and consume no more than 30 - 45 grams of carbohydrates in each meal and 15 grams of carbohydrates in each snack.     Social History     Socioeconomic History    Marital status:      Spouse name: Not on file    Number of children: Not on file    Years of education: Not on file    Highest education level: Not on file   Occupational History     Employer: [email or web address]   Social Needs    Financial resource strain: Not on file    Food insecurity     Worry: Not on file     Inability: Not on file    Transportation needs     Medical: Not on file     Non-medical: Not on file   Tobacco Use    Smoking status: Never Smoker    Smokeless tobacco: Never Used   Substance and Sexual Activity    Alcohol use: Yes     Comment: " one to two glasses of wine per year"    Drug use: No    Sexual activity: Not Currently     Birth control/protection: None   Lifestyle    Physical activity     Days per week: Not on file     Minutes per session: Not on file    Stress: Not on file   Relationships    Social connections     Talks on phone: Not on file     Gets together: Not on file     Attends Holiness service: Not on file     Active member of club or organization: Not on file     Attends meetings of clubs or organizations: Not on file     Relationship status: Not on file   Other Topics Concern    Not on file   Social History Narrative    , 1 son, [employer].     Past Medical History:   Diagnosis Date    Anxiety  "    Bell's palsy     CHF (congestive heart failure)     Coronary artery disease involving native coronary artery without angina pectoris 10/18/2016    Depression     Diabetes mellitus     Glaucoma     Hypertension     Idiopathic peripheral neuropathy 12/11/2012    Mixed hyperlipidemia 12/11/2012    Morbid obesity with BMI of 45.0-49.9, adult 2/12/2019    Sleep apnea     Stage 3b chronic kidney disease     Type 2 diabetes mellitus with diabetic polyneuropathy, with long-term current use of insulin 12/11/2012    Vitamin B 12 deficiency 8/23/2012       Objective:        Physical Exam  Constitutional:       General: He is not in acute distress.     Appearance: He is well-developed. He is not diaphoretic.   HENT:      Head: Normocephalic and atraumatic.      Right Ear: External ear normal.      Left Ear: External ear normal.      Nose: Nose normal.   Eyes:      General:         Right eye: No discharge.         Left eye: No discharge.      Pupils: Pupils are equal, round, and reactive to light.   Neck:      Musculoskeletal: Normal range of motion and neck supple.   Cardiovascular:      Rate and Rhythm: Normal rate and regular rhythm.      Heart sounds: Normal heart sounds.   Pulmonary:      Effort: Pulmonary effort is normal.      Breath sounds: Normal breath sounds.   Abdominal:      Palpations: Abdomen is soft.   Musculoskeletal: Normal range of motion.   Skin:     General: Skin is warm and dry.      Capillary Refill: Capillary refill takes less than 2 seconds.   Neurological:      Mental Status: He is alert and oriented to person, place, and time.      Motor: No abnormal muscle tone.      Coordination: Coordination normal.   Psychiatric:         Behavior: Behavior normal.         Thought Content: Thought content normal.         Judgment: Judgment normal.           Assessment / Plan:     Uncontrolled type 2 diabetes mellitus with kidney complication, with long-term current use of insulin  -     POCT  Glucose, Hand-Held Device  -     empagliflozin (JARDIANCE) 10 mg tablet; Take 1 tablet (10 mg total) by mouth once daily.  Dispense: 30 tablet; Refill: 11  -     Comprehensive Metabolic Panel; Future; Expected date: 11/10/2020  -     Ambulatory referral/consult to Diabetes Education; Future; Expected date: 11/17/2020    Stage 3b chronic kidney disease  -     Comprehensive Metabolic Panel; Future; Expected date: 11/10/2020    Hypoglycemia unawareness associated with type 2 diabetes mellitus    Blindness and low vision    CHF (NYHA class III, ACC/AHA stage C)    Idiopathic peripheral neuropathy    Coronary artery disease involving native coronary artery of native heart without angina pectoris    Hypertension associated with diabetes    Hyperlipidemia associated with type 2 diabetes mellitus    FRAN (obstructive sleep apnea)    Morbid obesity with BMI of 40.0-44.9, adult    Essential hypertension  -     carvediloL (COREG) 25 MG tablet; Take 1 tablet (25 mg total) by mouth 2 (two) times daily with meals.  Dispense: 180 tablet; Refill: 0      Additional Plan Details:    - POCT Glucose  - Encouraged continuation of lifestyle changes including regular exercise and limiting carbohydrates to 30-45 grams per meal threes times daily and 15 grams per snack with a limit of two daily.   - Encouraged continued monitoring of blood glucose with an increase to 4 times daily and Continuously with personal CGM Sriram.   - Current DM Medication Regimen:  Start Jardiance 10 mg for microalbuminuria benefit - copay card. Continue 70/30 140 units in the morning and 140 units at night and Miglitol 25 mg TID wm.  Will update after NV in 1 week.   - Patient will have A1c and CMP repeated in December  - Health Maintenance standards addressed today: None - up to date  - Nursing Visit: Patient is age 79 or younger with an A1c of 7.5 or greater and will need CGM review in 1 week.   - Follow up in 4 weeks or soonest available. Follow up with [place]  DAVID Woodson in 2 weeks.       Blakeney McKnight, PA-C [facility]     A total of 30 minutes was spent in face to face time, of which over 50 % was spent in counseling patient on disease process, complications, treatment, and side effects of medications.

## 2020-11-12 ENCOUNTER — PATIENT OUTREACH (OUTPATIENT)
Dept: ADMINISTRATIVE | Facility: HOSPITAL | Age: 65
End: 2020-11-12

## 2020-11-12 ENCOUNTER — OFFICE VISIT (OUTPATIENT)
Dept: PSYCHIATRY | Facility: CLINIC | Age: 65
End: 2020-11-12
Payer: COMMERCIAL

## 2020-11-12 DIAGNOSIS — F33.1 MAJOR DEPRESSIVE DISORDER, RECURRENT EPISODE, MODERATE: Primary | ICD-10-CM

## 2020-11-12 PROCEDURE — 90834 PSYTX W PT 45 MINUTES: CPT | Mod: S$GLB,,, | Performed by: SOCIAL WORKER

## 2020-11-12 PROCEDURE — 90834 PR PSYCHOTHERAPY W/PATIENT, 45 MIN: ICD-10-PCS | Mod: S$GLB,,, | Performed by: SOCIAL WORKER

## 2020-11-12 NOTE — PROGRESS NOTES
Individual Psychotherapy (PhD/LCSW)    11/12/2020    Site:  [facility]         Therapeutic Intervention: Met with patient.  Outpatient - Insight oriented psychotherapy 45 min - CPT code 38314    Chief complaint/reason for encounter: depression     Interval history and content of current session:  The patient presents to ongoing individual therapy due to depression.  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  He found out that his credit card did not go through when he tried to register his business with the state.  He has reapplied.  He has not been able to find a person, who can build a ramp onto his home.  He has two friends, who are wanting to invest in his business.  His future customers have started sending him their tax information.  He has found out that the tax program he will be using will be $8200 per year after a first year introductory fee.  He feels the investment is worth it.  He was sold on the work that the program does after two examples of it's capabilities.  He admits that he was sleeping a good deal after he stopped working with [employer].  His sleep schedule is regulating more currently.  He has not been walking, but he hopes to do so more when the weather gets cooler.  He has had some thoughts about his ex girlfriend, but he does not want to talk about it.  Encourage the patient to continue to work on internal boundaries with the previous relationship.  Emphasize the need to move more and make healthy choices with food intake.  Praise steps to work to set up his business.  Confront spending his time sitting in his chair.  He will be working with a dietician here at [facility].  He continues to have problems with his eyesight, but he hopes to get new glasses soon.  He was able to talk to his son in [place] yesterday.  He enjoyed the conversation.  He will be having his daughter's family to his home on Thanksgiving.  It will be a small gathering.  He has not heard from his youngest  son recently.  He is not sure if that is a good or a bad thing.    Treatment plan:  ? Target symptoms: depression  ? Why chosen therapy is appropriate versus another modality: relevant to diagnosis  ? Outcome monitoring methods: self-report, observation  ? Therapeutic intervention type: insight oriented psychotherapy, supportive psychotherapy, interactive psychotherapy     Risk parameters:  Patient reports no suicidal ideation  Patient reports no homicidal ideation  Patient reports no self-injurious behavior  Patient reports no violent behavior     Verbal deficits: None     Patient's response to intervention:  The patient's response to intervention is accepting, motivated.     Progress toward goals and other mental status changes:  The patient's progress toward goals is fair.     Diagnosis:   Major depression recurrent moderate     Plan:  individual psychotherapy and medication management by physician, Dr. Campbell     Return to clinic: as scheduled     Length of Service (minutes): 45

## 2020-11-12 NOTE — PROGRESS NOTES
Working A1c Report:     Pt had A1c in 10/2020 and was > 8. Pt already scheduled for repeat A1c in 12/2020. Seeing Diabetes Mgmt.

## 2020-11-16 ENCOUNTER — PATIENT OUTREACH (OUTPATIENT)
Dept: ADMINISTRATIVE | Facility: HOSPITAL | Age: 65
End: 2020-11-16

## 2020-11-16 NOTE — PROGRESS NOTES
Working HTN Report       Called pt for home BP Reading, No answer, L/M,     Celena FOSTER, VIOLA Care Coordinator  Care Coordination Department  [facility]  [phone]

## 2020-11-24 ENCOUNTER — OFFICE VISIT (OUTPATIENT)
Dept: PSYCHIATRY | Facility: CLINIC | Age: 65
End: 2020-11-24
Payer: COMMERCIAL

## 2020-11-24 DIAGNOSIS — F33.1 MAJOR DEPRESSIVE DISORDER, RECURRENT EPISODE, MODERATE: Primary | ICD-10-CM

## 2020-11-24 PROCEDURE — 90834 PR PSYCHOTHERAPY W/PATIENT, 45 MIN: ICD-10-PCS | Mod: S$GLB,,, | Performed by: SOCIAL WORKER

## 2020-11-24 PROCEDURE — 90834 PSYTX W PT 45 MINUTES: CPT | Mod: S$GLB,,, | Performed by: SOCIAL WORKER

## 2020-12-03 ENCOUNTER — OFFICE VISIT (OUTPATIENT)
Dept: PODIATRY | Facility: CLINIC | Age: 65
End: 2020-12-03
Payer: COMMERCIAL

## 2020-12-03 VITALS
HEIGHT: 78 IN | BODY MASS INDEX: 36.45 KG/M2 | HEART RATE: 72 BPM | DIASTOLIC BLOOD PRESSURE: 90 MMHG | WEIGHT: 315 LBS | SYSTOLIC BLOOD PRESSURE: 147 MMHG

## 2020-12-03 DIAGNOSIS — E11.42 TYPE 2 DIABETES MELLITUS WITH DIABETIC POLYNEUROPATHY, WITH LONG-TERM CURRENT USE OF INSULIN: Chronic | ICD-10-CM

## 2020-12-03 DIAGNOSIS — Z79.4 TYPE 2 DIABETES MELLITUS WITH DIABETIC POLYNEUROPATHY, WITH LONG-TERM CURRENT USE OF INSULIN: Chronic | ICD-10-CM

## 2020-12-03 DIAGNOSIS — L60.0 ONYCHOCRYPTOSIS: ICD-10-CM

## 2020-12-03 DIAGNOSIS — L03.032 ACUTE PARONYCHIA OF TOE OF LEFT FOOT: Primary | ICD-10-CM

## 2020-12-03 DIAGNOSIS — B35.1 DERMATOPHYTOSIS OF NAIL: ICD-10-CM

## 2020-12-03 DIAGNOSIS — G60.9 IDIOPATHIC PERIPHERAL NEUROPATHY: ICD-10-CM

## 2020-12-03 PROCEDURE — 99213 PR OFFICE/OUTPT VISIT, EST, LEVL III, 20-29 MIN: ICD-10-PCS | Mod: 25,S$GLB,, | Performed by: PODIATRIST

## 2020-12-03 PROCEDURE — 3046F HEMOGLOBIN A1C LEVEL >9.0%: CPT | Mod: CPTII,S$GLB,, | Performed by: PODIATRIST

## 2020-12-03 PROCEDURE — 3077F PR MOST RECENT SYSTOLIC BLOOD PRESSURE >= 140 MM HG: ICD-10-PCS | Mod: CPTII,S$GLB,, | Performed by: PODIATRIST

## 2020-12-03 PROCEDURE — 3077F SYST BP >= 140 MM HG: CPT | Mod: CPTII,S$GLB,, | Performed by: PODIATRIST

## 2020-12-03 PROCEDURE — 99999 PR PBB SHADOW E&M-EST. PATIENT-LVL V: ICD-10-PCS | Mod: PBBFAC,,, | Performed by: PODIATRIST

## 2020-12-03 PROCEDURE — 3080F DIAST BP >= 90 MM HG: CPT | Mod: CPTII,S$GLB,, | Performed by: PODIATRIST

## 2020-12-03 PROCEDURE — 11721 DEBRIDE NAIL 6 OR MORE: CPT | Mod: Q9,S$GLB,, | Performed by: PODIATRIST

## 2020-12-03 PROCEDURE — 99999 PR PBB SHADOW E&M-EST. PATIENT-LVL V: CPT | Mod: PBBFAC,,, | Performed by: PODIATRIST

## 2020-12-03 PROCEDURE — 11721 PR DEBRIDEMENT OF NAILS, 6 OR MORE: ICD-10-PCS | Mod: Q9,S$GLB,, | Performed by: PODIATRIST

## 2020-12-03 PROCEDURE — 3080F PR MOST RECENT DIASTOLIC BLOOD PRESSURE >= 90 MM HG: ICD-10-PCS | Mod: CPTII,S$GLB,, | Performed by: PODIATRIST

## 2020-12-03 PROCEDURE — 3008F BODY MASS INDEX DOCD: CPT | Mod: CPTII,S$GLB,, | Performed by: PODIATRIST

## 2020-12-03 PROCEDURE — 3008F PR BODY MASS INDEX (BMI) DOCUMENTED: ICD-10-PCS | Mod: CPTII,S$GLB,, | Performed by: PODIATRIST

## 2020-12-03 PROCEDURE — 99213 OFFICE O/P EST LOW 20 MIN: CPT | Mod: 25,S$GLB,, | Performed by: PODIATRIST

## 2020-12-03 PROCEDURE — 3046F PR MOST RECENT HEMOGLOBIN A1C LEVEL > 9.0%: ICD-10-PCS | Mod: CPTII,S$GLB,, | Performed by: PODIATRIST

## 2020-12-03 RX ORDER — SULFAMETHOXAZOLE AND TRIMETHOPRIM 800; 160 MG/1; MG/1
1 TABLET ORAL 2 TIMES DAILY
Qty: 20 TABLET | Refills: 0 | Status: SHIPPED | OUTPATIENT
Start: 2020-12-03 | End: 2020-12-13

## 2020-12-03 RX ORDER — MUPIROCIN 20 MG/G
OINTMENT TOPICAL DAILY
Qty: 30 G | Refills: 0 | Status: SHIPPED | OUTPATIENT
Start: 2020-12-03 | End: 2022-03-08 | Stop reason: SDUPTHER

## 2020-12-03 NOTE — PROGRESS NOTES
Subjective:     Patient ID: Stepan Springer is a 65 y.o. male.    Chief Complaint: Nail Care (3mo RFC, diabetic pt wears tennis shoes with socks, PCP Dr. Fleming last seen 10/8/2020)    Stepan is a 65 y.o. male who presents to the clinic for evaluation and treatment of high risk feet. Stepan has a past medical history of Anxiety, Bell's palsy, CHF (congestive heart failure), Coronary artery disease involving native coronary artery without angina pectoris (10/18/2016), Depression, Diabetes mellitus, Glaucoma, Hypertension, Idiopathic peripheral neuropathy (12/11/2012), Mixed hyperlipidemia (12/11/2012), Morbid obesity with BMI of 45.0-49.9, adult (2/12/2019), Sleep apnea, Stage 3b chronic kidney disease, Type 2 diabetes mellitus with diabetic polyneuropathy, with long-term current use of insulin (12/11/2012), and Vitamin B 12 deficiency (8/23/2012). The patient's chief complaint is long painful toenils, especially the left 2nd toenail.  This patient has documented high risk feet requiring routine maintenance secondary to peripheral neuropathy.    PCP: KANDICE Fleming MD    Date Last Seen by PCP: 10/08/2020    Current shoe gear:  Affected Foot: Casual shoes     Unaffected Foot: Casual shoes    Hemoglobin A1C   Date Value Ref Range Status   10/08/2020 13.4 (H) 4.0 - 5.6 % Final     Comment:     ADA Screening Guidelines:  5.7-6.4%  Consistent with prediabetes  >or=6.5%  Consistent with diabetes  High levels of fetal hemoglobin interfere with the HbA1C  assay. Heterozygous hemoglobin variants (HbS, HgC, etc)do  not significantly interfere with this assay.   However, presence of multiple variants may affect accuracy.     08/18/2020 12.7 (H) 4.0 - 5.6 % Final     Comment:     ADA Screening Guidelines:  5.7-6.4%  Consistent with prediabetes  >or=6.5%  Consistent with diabetes  High levels of fetal hemoglobin interfere with the HbA1C  assay. Heterozygous hemoglobin variants (HbS, HgC, etc)do  not significantly  interfere with this assay.   However, presence of multiple variants may affect accuracy.     02/07/2020 8.2 (H) 4.0 - 5.6 % Final     Comment:     ADA Screening Guidelines:  5.7-6.4%  Consistent with prediabetes  >or=6.5%  Consistent with diabetes  High levels of fetal hemoglobin interfere with the HbA1C  assay. Heterozygous hemoglobin variants (HbS, HgC, etc)do  not significantly interfere with this assay.   However, presence of multiple variants may affect accuracy.             Patient Active Problem List   Diagnosis    Status post cataract extraction and insertion of intraocular lens    Corneal opacity - Left Eye    Type 2 diabetes mellitus with diabetic polyneuropathy, with long-term current use of insulin    Obstructive sleep apnea (untreated, refuses treatment)    Chronic diastolic congestive heart failure    LBBB (left bundle branch block)    Uncontrolled type 2 diabetes mellitus with kidney complication, with long-term current use of insulin    Anxiety    Hypertension associated with diabetes    Stage 3b chronic kidney disease    Primary open angle glaucoma of both eyes, severe stage    Nephrolithiasis    Hydronephrosis, left    NSTEMI (non-ST elevated myocardial infarction)    CAD with remote PCI (BMS) of RCA in 9/2016    Recurrent major depressive disorder    Vasculogenic erectile dysfunction    Blindness and low vision    Hx of retinal detachment    High myopia, both eyes    Epiretinal membrane (ERM) of left eye    Lattice degeneration of peripheral retina, left    Right-sided Bell's palsy    Hyperlipidemia associated with type 2 diabetes mellitus    Morbid obesity with BMI of 45.0-49.9, adult    GERD (gastroesophageal reflux disease)    Hypoglycemia unawareness associated with type 2 diabetes mellitus    Elevated troponin I level    Thrombocytopenia    Pancytopenia    History of COVID-19 (April, 2020)    PVD (peripheral vascular disease)    Influenza Immunization not  carried out because of patient refusal       Medication List with Changes/Refills   New Medications    MUPIROCIN (BACTROBAN) 2 % OINTMENT    Apply topically once daily.    SULFAMETHOXAZOLE-TRIMETHOPRIM 800-160MG (BACTRIM DS) 800-160 MG TAB    Take 1 tablet by mouth 2 (two) times daily. for 10 days   Current Medications    ACCU-CHEK ALYCIA PLUS METER [facility]        ASPIRIN 325 MG TABLET    Take 325 mg by mouth once daily.    ATORVASTATIN (LIPITOR) 10 MG TABLET    Take 1 tablet (10 mg total) by mouth once daily.    BLOOD PRESSURE CUFF MISC    1 cuff    BLOOD SUGAR DIAGNOSTIC STRP    To check BG 3 times daily, to use with insurance preferred meter    BRIMONIDINE 0.1% (ALPHAGAN P) 0.1 % DROP    Place 1 drop into both eyes 3 (three) times daily. Order 90 day supply    BRINZOLAMIDE (AZOPT) 1 % OPHTHALMIC SUSPENSION    Place 1 drop into both eyes 3 (three) times daily. ORDER 90 DAY SUPPLY    CARVEDILOL (COREG) 25 MG TABLET    Take 1 tablet (25 mg total) by mouth 2 (two) times daily with meals.    DULOXETINE (CYMBALTA) 30 MG CAPSULE    Take 1 capsule (30 mg total) by mouth once daily.    EMPAGLIFLOZIN (JARDIANCE) 10 MG TABLET    Take 1 tablet (10 mg total) by mouth once daily.    FLASH GLUCOSE SENSOR (FREESTYLE CLEMENCIA 14 DAY SENSOR) KIT    1 kit by Misc.(Non-Drug; Combo Route) route every 14 (fourteen) days.    FUROSEMIDE (LASIX) 40 MG TABLET    Take 1 tablet (40 mg total) by mouth 2 (two) times a day.    HYDRALAZINE (APRESOLINE) 50 MG TABLET    Take 1 tablet (50 mg total) by mouth every 12 (twelve) hours.    INSULIN NPH-INSULIN REGULAR, 70/30, (NOVOLIN 70/30 U-100 INSULIN) 100 UNIT/ML (70-30) INJECTION    Inject 130 Units into the skin 2 (two) times daily before meals.    LANCETS [facility]    To check BG 3 times daily, to use with insurance preferred meter    LATANOPROST (XALATAN) 0.005 % OPHTHALMIC SOLUTION    Place 1 drop into both eyes once daily.    LISINOPRIL (PRINIVIL,ZESTRIL) 20 MG TABLET    Take 1 tablet (20 mg total) by  mouth once daily.    METOLAZONE (ZAROXOLYN) 2.5 MG TABLET    Take 1 tablet (2.5 mg total) by mouth every Mon, Wed, Fri.    MIGLITOL (GLYSET) 25 MG TAB    Take 1 tablet (25 mg total) by mouth 3 (three) times daily with meals.    NETARSUDIL (RHOPRESSA) 0.02 % DROP    Place 1 drop into both eyes once daily.    NETARSUDIL (RHOPRESSA) 0.02 % OPHTHALMIC SOLUTION    Place 1 drop into the left eye once daily.    NITROGLYCERIN (NITROSTAT) 0.4 MG SL TABLET    Place 1 tablet (0.4 mg total) under the tongue every 5 (five) minutes as needed for Chest pain.    OMEPRAZOLE (PRILOSEC) 40 MG CAPSULE    Take 1 capsule (40 mg total) by mouth once daily.    POLYETHYLENE GLYCOL (GLYCOLAX) 17 GRAM/DOSE POWDER    Take 17 g by mouth once daily.    SILDENAFIL (VIAGRA) 50 MG TABLET    Take 1 tablet (50 mg total) by mouth daily as needed for Erectile Dysfunction.    TAMSULOSIN (FLOMAX) 0.4 MG CAP    Take 1 capsule (0.4 mg total) by mouth once daily.    VARICELLA-ZOSTER GE-AS01B, PF, (SHINGRIX) 50 MCG/0.5 ML INJECTION    Inject 0.5 mL (one dose) into muscle now; give second dose at least 2 months later   Discontinued Medications    MUPIROCIN (BACTROBAN) 2 % OINTMENT    Apply topically 2 (two) times daily.       Review of patient's allergies indicates:   Allergen Reactions    Cefazolin      Other reaction(s): Shakes  Other reaction(s): Chills       Past Surgical History:   Procedure Laterality Date    CARDIAC CATHETERIZATION  7/22/13    non obstructive cad    CATARACT EXTRACTION  OU    COLONOSCOPY N/A 5/1/2019    Procedure: COLONOSCOPY;  Surgeon: Henry Mo III, MD;  Location: [facility] ENDO;  Service: Endoscopy;  Laterality: N/A;    CORONARY ANGIOPLASTY      ESOPHAGOGASTRODUODENOSCOPY N/A 5/1/2019    Procedure: ESOPHAGOGASTRODUODENOSCOPY (EGD);  Surgeon: Henry Mo III, MD;  Location: [facility] ENDO;  Service: Endoscopy;  Laterality: N/A;    EYE SURGERY      FRACTURE SURGERY      kidney stone       August 2016    PC IOL OU       "RETINAL DETACHMENT REPAIR W/ SCLERAL BUCKLE LE      WRIST SURGERY         Family History   Problem Relation Age of Onset    Macular degeneration Father     Heart disease Father         CHF     Heart attack Father     Hypertension Mother     Macular degeneration Paternal Uncle     Hypertension Maternal Grandmother     Hypertension Maternal Grandfather     Strabismus Neg Hx     Retinal detachment Neg Hx     Glaucoma Neg Hx     Blindness Neg Hx     Amblyopia Neg Hx        Social History     Socioeconomic History    Marital status:      Spouse name: Not on file    Number of children: Not on file    Years of education: Not on file    Highest education level: Not on file   Occupational History     Employer: [employer]   Social Needs    Financial resource strain: Not on file    Food insecurity     Worry: Not on file     Inability: Not on file    Transportation needs     Medical: Not on file     Non-medical: Not on file   Tobacco Use    Smoking status: Never Smoker    Smokeless tobacco: Never Used   Substance and Sexual Activity    Alcohol use: Yes     Comment: " one to two glasses of wine per year"    Drug use: No    Sexual activity: Not Currently     Birth control/protection: None   Lifestyle    Physical activity     Days per week: Not on file     Minutes per session: Not on file    Stress: Not on file   Relationships    Social connections     Talks on phone: Not on file     Gets together: Not on file     Attends Zoroastrian service: Not on file     Active member of club or organization: Not on file     Attends meetings of clubs or organizations: Not on file     Relationship status: Not on file   Other Topics Concern    Not on file   Social History Narrative    , 1 son, JANIE.       Vitals:    12/03/20 0958   BP: (!) 147/90   Pulse: 72   Weight: (!) 188.2 kg (414 lb 14.5 oz)   Height: 6' 7" (2.007 m)       Hemoglobin A1C   Date Value Ref Range Status   10/08/2020 13.4 (H) 4.0 - " 5.6 % Final     Comment:     ADA Screening Guidelines:  5.7-6.4%  Consistent with prediabetes  >or=6.5%  Consistent with diabetes  High levels of fetal hemoglobin interfere with the HbA1C  assay. Heterozygous hemoglobin variants (HbS, HgC, etc)do  not significantly interfere with this assay.   However, presence of multiple variants may affect accuracy.     08/18/2020 12.7 (H) 4.0 - 5.6 % Final     Comment:     ADA Screening Guidelines:  5.7-6.4%  Consistent with prediabetes  >or=6.5%  Consistent with diabetes  High levels of fetal hemoglobin interfere with the HbA1C  assay. Heterozygous hemoglobin variants (HbS, HgC, etc)do  not significantly interfere with this assay.   However, presence of multiple variants may affect accuracy.     02/07/2020 8.2 (H) 4.0 - 5.6 % Final     Comment:     ADA Screening Guidelines:  5.7-6.4%  Consistent with prediabetes  >or=6.5%  Consistent with diabetes  High levels of fetal hemoglobin interfere with the HbA1C  assay. Heterozygous hemoglobin variants (HbS, HgC, etc)do  not significantly interfere with this assay.   However, presence of multiple variants may affect accuracy.         Review of Systems   Constitutional: Negative for chills and fever.   Respiratory: Negative for shortness of breath.    Cardiovascular: Negative for chest pain, palpitations, orthopnea, claudication and leg swelling.   Gastrointestinal: Negative for diarrhea, nausea and vomiting.   Musculoskeletal: Negative for joint pain.   Skin: Negative for rash.   Neurological: Positive for tingling and sensory change.   Psychiatric/Behavioral: Negative.          Objective:   PHYSICAL EXAM: Apperance: Alert and orient in no distress,well developed, and with good attention to grooming and body habits  Patient presents ambulating in tennis shoes.   LOWER EXTREMITY EXAM:  VASCULAR: Dorsalis pedis pulses 1/4 bilateral and Posterior Tibial pulses 1/4 bilateral. Capillary fill time <4 seconds bilateral. Moderate edema  observed bilateral. Varicosities absent bilateral. Skin temperature of the lower extremities is warm to cool, proximal to distal. Hair growth dim bilateral.  DERMATOLOGICAL: No skin rashes, subcutaneous nodules, lesions, or ulcers observed bilateral. Nails 1,2,3,4,5 bilateral elongated, incurvated, and discolored with subungual debris. Left hallux nail observed to be mildly incurvated at distal medial borders and slight obstructed in the nail grooves with soft tissue. The left hallux distal medial nail fold is grossly edematous and firm from chronic inflammation and scarring. (+) purulent drainage, no odor, and no increased temperature observed to left hallux. Webspaces 1,2,3,4 bilateral clean, dry and without evidence of break in skin integrity.   NEUROLOGICAL: Light touch, sharp-dull, proprioception all present and equal bilaterally.  Vibratory sensation diminished at bilateral hallux and navicular. Protective sensation absent at 2/10 sites as tested with a [place]-P[place]av 5.07 monofilament.   MUSCULOSKELETAL: Muscle strength is 5/5 for foot inverters, everters, plantarflexors, and dorsiflexors. Muscle tone is normal. Semi-rigid hammertoes bilateral. No pain on palpation of left hallux nail.     Assessment:   Acute paronychia of toe of left foot - Left Foot  -     sulfamethoxazole-trimethoprim 800-160mg (BACTRIM DS) 800-160 mg Tab; Take 1 tablet by mouth 2 (two) times daily. for 10 days  Dispense: 20 tablet; Refill: 0  -     mupirocin (BACTROBAN) 2 % ointment; Apply topically once daily.  Dispense: 30 g; Refill: 0    Onychocryptosis - Left Foot    Uncontrolled type 2 diabetes mellitus with kidney complication, with long-term current use of insulin    Idiopathic peripheral neuropathy    Dermatophytosis of nail          Plan:   Acute paronychia of toe of left foot - Left Foot  -     sulfamethoxazole-trimethoprim 800-160mg (BACTRIM DS) 800-160 mg Tab; Take 1 tablet by mouth 2 (two) times daily. for 10 days   Dispense: 20 tablet; Refill: 0  -     mupirocin (BACTROBAN) 2 % ointment; Apply topically once daily.  Dispense: 30 g; Refill: 0    Onychocryptosis - Left Foot    Uncontrolled type 2 diabetes mellitus with kidney complication, with long-term current use of insulin    Idiopathic peripheral neuropathy    Dermatophytosis of nail      I counseled the patient on his conditions, regarding findings of my examination, my impressions, and usual treatment plan.   Greater than 15 minutes of a 20 minute appointment spent on education about the diabetic foot, neuropathy, and prevention of limb loss.  Shoe inspection. Diabetic Foot Education. Patient reminded of the importance of good nutrition and blood sugar control to help prevent podiatric complications of diabetes. Patient instructed on proper foot hygeine. We discussed wearing proper shoe gear, daily foot inspections, never walking without protective shoe gear, never putting sharp instruments to feet.    With patient's permission, nails 1-5 bilateral were debrided/trimmed in length and thickness aggressively to their soft tissue attachment mechanically and with electric , removing all offending nail and debris. Patient relates relief following the procedure.  The area of left medial hallux nailfold was flushed with copious amounts of sterile normal saline. Betadine was applied to the area and dry sterile dressing of gauze and Coflex. Patient tolerated procedure well.   The patient was given oral and written instructions for post-op care including daily soaks of water and Epson salt followed by application of antibiotic ointment  and light dressing.  The patient was instructed to take Tylenol over-the-counter q4h prn pain and to call clinic immediately if there is increased pain, increased redness, pus, fever, chills, nausea, or vomiting present.  Prescription written for Bactrim DS to be taken twice daily.   Prescription written for topical Bactroban ointment to be  applied once daily.   Patient  will continue to monitor the areas daily, inspect feet, wear protective shoe gear when ambulatory, moisturizer to maintain skin integrity. Patient reminded of the importance of good nutrition and blood sugar control to help prevent podiatric complications of diabetes.  Patient to return 3 months or sooner if needed.                 Vangie Cuevas DPM  [facility]

## 2020-12-10 ENCOUNTER — OFFICE VISIT (OUTPATIENT)
Dept: PSYCHIATRY | Facility: CLINIC | Age: 65
End: 2020-12-10
Payer: COMMERCIAL

## 2020-12-10 DIAGNOSIS — F33.1 MAJOR DEPRESSIVE DISORDER, RECURRENT EPISODE, MODERATE: Primary | ICD-10-CM

## 2020-12-10 PROCEDURE — 90834 PR PSYCHOTHERAPY W/PATIENT, 45 MIN: ICD-10-PCS | Mod: S$GLB,,, | Performed by: SOCIAL WORKER

## 2020-12-10 PROCEDURE — 90834 PSYTX W PT 45 MINUTES: CPT | Mod: S$GLB,,, | Performed by: SOCIAL WORKER

## 2020-12-14 NOTE — PROGRESS NOTES
Individual Psychotherapy (PhD/LCSW)    12/10/2020    Site:  [place]         Therapeutic Intervention: Met with patient.  Outpatient - Insight oriented psychotherapy 45 min - CPT code 48760    Chief complaint/reason for encounter: depression     Interval history and content of current session:  The patient presents to ongoing individual therapy due to depression.  He was able to celebrate Thanksgiving with his daughter's family.  He now has his work computer working.  He was touched when his daughter and son in law rearranged his dining room into an office.  They moved out the dining table and moved in his desk from another room.  He is still trying to find someone who can build a ramp to his home.  However, he has noticed other small businesses that do not have a ramp in his town.  He does not plan to start working till mid January.  He could postpone the start several weeks if he needs to.  He is happy to no longer be working for [employer].  He is still struggling to walk on a regular basis due to lack of motivation.  He has been sleeping for long periods.  His blood sugar levels have been stable.  Encourage the patient to increase physical activity.  Educate the patient about lack of motivation as a symptom of depression.  Confront the urge to isolate.  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  The patient admits to occasional thoughts about his ex girlfriend.  He does report a decrease in ruminations.  He is willing to begin decreasing the frequency of his appointments to monthly in January.  He has not heard from his youngest son in several months.  The patient's daughter told him that his younger son is still considering the .  The patient believes his son is still single and removed from his previous toxic relationship.    Treatment plan:  ? Target symptoms: depression  ? Why chosen therapy is appropriate versus another modality: relevant to diagnosis  ? Outcome  monitoring methods: self-report, observation  ? Therapeutic intervention type: insight oriented psychotherapy, supportive psychotherapy, interactive psychotherapy     Risk parameters:  Patient reports no suicidal ideation  Patient reports no homicidal ideation  Patient reports no self-injurious behavior  Patient reports no violent behavior     Verbal deficits: None     Patient's response to intervention:  The patient's response to intervention is accepting, motivated.     Progress toward goals and other mental status changes:  The patient's progress toward goals is fair.     Diagnosis:   Major depression recurrent moderate     Plan:  individual psychotherapy and medication management by physician, Dr. Campbell     Return to clinic: as scheduled     Length of Service (minutes): 45

## 2020-12-15 ENCOUNTER — PATIENT OUTREACH (OUTPATIENT)
Dept: ADMINISTRATIVE | Facility: HOSPITAL | Age: 65
End: 2020-12-15

## 2020-12-15 NOTE — PROGRESS NOTES
Pt on [employer] uncontrolled a1c report.  Noted pt completed a1c lab 10/8, resulted 13.4, updated HM modifier. Already scheduled for a1c repeat 12/29. Completed annual with pcp 10/8/2020.

## 2020-12-18 ENCOUNTER — PATIENT OUTREACH (OUTPATIENT)
Dept: ADMINISTRATIVE | Facility: HOSPITAL | Age: 65
End: 2020-12-18

## 2020-12-22 ENCOUNTER — OFFICE VISIT (OUTPATIENT)
Dept: PSYCHIATRY | Facility: CLINIC | Age: 65
End: 2020-12-22
Payer: COMMERCIAL

## 2020-12-22 DIAGNOSIS — F33.1 MAJOR DEPRESSIVE DISORDER, RECURRENT EPISODE, MODERATE: Primary | ICD-10-CM

## 2020-12-22 PROCEDURE — 90834 PSYTX W PT 45 MINUTES: CPT | Mod: S$GLB,,, | Performed by: SOCIAL WORKER

## 2020-12-22 PROCEDURE — 90834 PR PSYCHOTHERAPY W/PATIENT, 45 MIN: ICD-10-PCS | Mod: S$GLB,,, | Performed by: SOCIAL WORKER

## 2020-12-28 NOTE — PROGRESS NOTES
Individual Psychotherapy (PhD/LCSW)    12/22/2020    Site:  [place]         Therapeutic Intervention: Met with patient.  Outpatient - Insight oriented psychotherapy 45 min - CPT code 07529    Chief complaint/reason for encounter: depression     Interval history and content of current session:  Patient presents to ongoing individual therapy due to depression.  He continues to have problems with fatigue.  He is not sure why he is so tired.  He has still not been exercising much.  He continues to work on setting up his home office.  He will be getting separate wi fi and phone lines set up.  He notes that he has to have better security due to filing private information of other's taxes.  He does feel more relaxes since he no longer has [employer] to deal with.  He is looking forward to his daughter's family coming in for [employer].  His son in law has to return to fix some issues on with his work computer.  The patient is hoping to take his daughter's family for a meal at a nice restaurant in [place].  He plans to call today to see if the restaurant will be open with the current pandemic.  The patient has no plans to attend Christianity services.  Encouraged the patient to increase his physical activity.  Emphasized the need to medical follow up if his fatigue continues.  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  He is happy that he does not have to drive into [place] on a regular basis.  He was driving into [place] yesterday for a doctor's appointment.  He had difficulty seeing due to the fog.  He was able to ship special [employer] gifts to his granddaughters in [place].  He is excited to hear their reactions to his gifts.  He still has random thoughts of his ex girlfriend, but they are not as intense as in the past.    Treatment plan:  ? Target symptoms: depression  ? Why chosen therapy is appropriate versus another modality: relevant to diagnosis  ? Outcome  monitoring methods: self-report, observation  ? Therapeutic intervention type: insight oriented psychotherapy, supportive psychotherapy, interactive psychotherapy     Risk parameters:  Patient reports no suicidal ideation  Patient reports no homicidal ideation  Patient reports no self-injurious behavior  Patient reports no violent behavior     Verbal deficits: None     Patient's response to intervention:  The patient's response to intervention is accepting, motivated.     Progress toward goals and other mental status changes:  The patient's progress toward goals is fair.     Diagnosis:   Major depression recurrent moderate     Plan:  individual psychotherapy and medication management by physician, Dr. Campbell     Return to clinic: as scheduled     Length of Service (minutes): 45

## 2020-12-29 ENCOUNTER — PATIENT OUTREACH (OUTPATIENT)
Dept: ADMINISTRATIVE | Facility: HOSPITAL | Age: 65
End: 2020-12-29

## 2021-01-04 ENCOUNTER — CLINICAL SUPPORT (OUTPATIENT)
Dept: DIABETES | Facility: CLINIC | Age: 66
End: 2021-01-04
Payer: COMMERCIAL

## 2021-01-04 ENCOUNTER — TELEPHONE (OUTPATIENT)
Dept: DIABETES | Facility: CLINIC | Age: 66
End: 2021-01-04

## 2021-01-04 VITALS — WEIGHT: 315 LBS | BODY MASS INDEX: 36.45 KG/M2 | HEIGHT: 78 IN

## 2021-01-04 PROCEDURE — 99999 PR PBB SHADOW E&M-EST. PATIENT-LVL II: CPT | Mod: PBBFAC,,, | Performed by: DIETITIAN, REGISTERED

## 2021-01-04 PROCEDURE — 99999 PR PBB SHADOW E&M-EST. PATIENT-LVL II: ICD-10-PCS | Mod: PBBFAC,,, | Performed by: DIETITIAN, REGISTERED

## 2021-01-04 PROCEDURE — G0108 PR DIAB MANAGE TRN  PER INDIV: ICD-10-PCS | Mod: S$GLB,,, | Performed by: DIETITIAN, REGISTERED

## 2021-01-04 PROCEDURE — G0108 DIAB MANAGE TRN  PER INDIV: HCPCS | Mod: S$GLB,,, | Performed by: DIETITIAN, REGISTERED

## 2021-01-04 RX ORDER — FLASH GLUCOSE SENSOR
1 KIT MISCELLANEOUS
Qty: 6 KIT | Refills: 3 | Status: SHIPPED | OUTPATIENT
Start: 2021-01-04 | End: 2021-07-08 | Stop reason: SDUPTHER

## 2021-01-19 ENCOUNTER — OFFICE VISIT (OUTPATIENT)
Dept: CARDIOLOGY | Facility: CLINIC | Age: 66
End: 2021-01-19
Payer: COMMERCIAL

## 2021-01-19 VITALS
DIASTOLIC BLOOD PRESSURE: 90 MMHG | WEIGHT: 315 LBS | SYSTOLIC BLOOD PRESSURE: 148 MMHG | HEART RATE: 70 BPM | OXYGEN SATURATION: 98 % | BODY MASS INDEX: 47.81 KG/M2

## 2021-01-19 DIAGNOSIS — I25.10 CORONARY ARTERY DISEASE INVOLVING NATIVE CORONARY ARTERY OF NATIVE HEART WITHOUT ANGINA PECTORIS: Primary | Chronic | ICD-10-CM

## 2021-01-19 DIAGNOSIS — E78.5 HYPERLIPIDEMIA ASSOCIATED WITH TYPE 2 DIABETES MELLITUS: ICD-10-CM

## 2021-01-19 DIAGNOSIS — I21.4 NSTEMI (NON-ST ELEVATED MYOCARDIAL INFARCTION): ICD-10-CM

## 2021-01-19 DIAGNOSIS — E11.59 HYPERTENSION ASSOCIATED WITH DIABETES: Chronic | ICD-10-CM

## 2021-01-19 DIAGNOSIS — E11.69 HYPERLIPIDEMIA ASSOCIATED WITH TYPE 2 DIABETES MELLITUS: ICD-10-CM

## 2021-01-19 DIAGNOSIS — I44.7 LBBB (LEFT BUNDLE BRANCH BLOCK): ICD-10-CM

## 2021-01-19 DIAGNOSIS — N18.32 STAGE 3B CHRONIC KIDNEY DISEASE: Chronic | ICD-10-CM

## 2021-01-19 DIAGNOSIS — I73.9 PVD (PERIPHERAL VASCULAR DISEASE): ICD-10-CM

## 2021-01-19 DIAGNOSIS — I50.32 CHRONIC DIASTOLIC CONGESTIVE HEART FAILURE: ICD-10-CM

## 2021-01-19 DIAGNOSIS — I15.2 HYPERTENSION ASSOCIATED WITH DIABETES: Chronic | ICD-10-CM

## 2021-01-19 PROCEDURE — 99999 PR PBB SHADOW E&M-EST. PATIENT-LVL IV: ICD-10-PCS | Mod: PBBFAC,,, | Performed by: INTERNAL MEDICINE

## 2021-01-19 PROCEDURE — 3046F HEMOGLOBIN A1C LEVEL >9.0%: CPT | Mod: CPTII,S$GLB,, | Performed by: INTERNAL MEDICINE

## 2021-01-19 PROCEDURE — 3077F SYST BP >= 140 MM HG: CPT | Mod: CPTII,S$GLB,, | Performed by: INTERNAL MEDICINE

## 2021-01-19 PROCEDURE — 3008F PR BODY MASS INDEX (BMI) DOCUMENTED: ICD-10-PCS | Mod: CPTII,S$GLB,, | Performed by: INTERNAL MEDICINE

## 2021-01-19 PROCEDURE — 3077F PR MOST RECENT SYSTOLIC BLOOD PRESSURE >= 140 MM HG: ICD-10-PCS | Mod: CPTII,S$GLB,, | Performed by: INTERNAL MEDICINE

## 2021-01-19 PROCEDURE — 3080F DIAST BP >= 90 MM HG: CPT | Mod: CPTII,S$GLB,, | Performed by: INTERNAL MEDICINE

## 2021-01-19 PROCEDURE — 3080F PR MOST RECENT DIASTOLIC BLOOD PRESSURE >= 90 MM HG: ICD-10-PCS | Mod: CPTII,S$GLB,, | Performed by: INTERNAL MEDICINE

## 2021-01-19 PROCEDURE — 99214 PR OFFICE/OUTPT VISIT, EST, LEVL IV, 30-39 MIN: ICD-10-PCS | Mod: S$GLB,,, | Performed by: INTERNAL MEDICINE

## 2021-01-19 PROCEDURE — 3046F PR MOST RECENT HEMOGLOBIN A1C LEVEL > 9.0%: ICD-10-PCS | Mod: CPTII,S$GLB,, | Performed by: INTERNAL MEDICINE

## 2021-01-19 PROCEDURE — 99214 OFFICE O/P EST MOD 30 MIN: CPT | Mod: S$GLB,,, | Performed by: INTERNAL MEDICINE

## 2021-01-19 PROCEDURE — 3008F BODY MASS INDEX DOCD: CPT | Mod: CPTII,S$GLB,, | Performed by: INTERNAL MEDICINE

## 2021-01-19 PROCEDURE — 99999 PR PBB SHADOW E&M-EST. PATIENT-LVL IV: CPT | Mod: PBBFAC,,, | Performed by: INTERNAL MEDICINE

## 2021-01-19 RX ORDER — HYDRALAZINE HYDROCHLORIDE 100 MG/1
100 TABLET, FILM COATED ORAL EVERY 12 HOURS
Qty: 180 TABLET | Refills: 3 | Status: SHIPPED | OUTPATIENT
Start: 2021-01-19 | End: 2021-07-20 | Stop reason: SDUPTHER

## 2021-01-19 RX ORDER — HYDRALAZINE HYDROCHLORIDE 100 MG/1
100 TABLET, FILM COATED ORAL EVERY 12 HOURS
Qty: 60 TABLET | Refills: 11 | Status: SHIPPED | OUTPATIENT
Start: 2021-01-19 | End: 2021-01-19 | Stop reason: SDUPTHER

## 2021-01-19 RX ORDER — METOLAZONE 2.5 MG/1
2.5 TABLET ORAL
Qty: 36 TABLET | Refills: 3 | Status: SHIPPED | OUTPATIENT
Start: 2021-01-20 | End: 2021-03-22 | Stop reason: SDUPTHER

## 2021-01-24 ENCOUNTER — PATIENT OUTREACH (OUTPATIENT)
Dept: ADMINISTRATIVE | Facility: OTHER | Age: 66
End: 2021-01-24

## 2021-01-25 ENCOUNTER — LAB VISIT (OUTPATIENT)
Dept: LAB | Facility: HOSPITAL | Age: 66
End: 2021-01-25
Attending: PHYSICIAN ASSISTANT
Payer: COMMERCIAL

## 2021-01-25 ENCOUNTER — OFFICE VISIT (OUTPATIENT)
Dept: DIABETES | Facility: CLINIC | Age: 66
End: 2021-01-25
Payer: COMMERCIAL

## 2021-01-25 ENCOUNTER — CLINICAL SUPPORT (OUTPATIENT)
Dept: DIABETES | Facility: CLINIC | Age: 66
End: 2021-01-25
Payer: COMMERCIAL

## 2021-01-25 VITALS
SYSTOLIC BLOOD PRESSURE: 181 MMHG | WEIGHT: 315 LBS | BODY MASS INDEX: 36.45 KG/M2 | HEIGHT: 78 IN | HEART RATE: 76 BPM | RESPIRATION RATE: 20 BRPM | DIASTOLIC BLOOD PRESSURE: 100 MMHG

## 2021-01-25 DIAGNOSIS — E11.649 HYPOGLYCEMIA UNAWARENESS ASSOCIATED WITH TYPE 2 DIABETES MELLITUS: ICD-10-CM

## 2021-01-25 DIAGNOSIS — E11.59 HYPERTENSION ASSOCIATED WITH DIABETES: ICD-10-CM

## 2021-01-25 DIAGNOSIS — G47.33 OSA (OBSTRUCTIVE SLEEP APNEA): ICD-10-CM

## 2021-01-25 DIAGNOSIS — E66.01 MORBID OBESITY: ICD-10-CM

## 2021-01-25 DIAGNOSIS — H54.10 BLINDNESS AND LOW VISION: ICD-10-CM

## 2021-01-25 DIAGNOSIS — G60.9 IDIOPATHIC PERIPHERAL NEUROPATHY: ICD-10-CM

## 2021-01-25 DIAGNOSIS — I25.10 CORONARY ARTERY DISEASE INVOLVING NATIVE CORONARY ARTERY OF NATIVE HEART WITHOUT ANGINA PECTORIS: ICD-10-CM

## 2021-01-25 DIAGNOSIS — E11.69 HYPERLIPIDEMIA ASSOCIATED WITH TYPE 2 DIABETES MELLITUS: ICD-10-CM

## 2021-01-25 DIAGNOSIS — I50.9 CHF (NYHA CLASS III, ACC/AHA STAGE C): ICD-10-CM

## 2021-01-25 DIAGNOSIS — I15.2 HYPERTENSION ASSOCIATED WITH DIABETES: ICD-10-CM

## 2021-01-25 DIAGNOSIS — E78.5 HYPERLIPIDEMIA ASSOCIATED WITH TYPE 2 DIABETES MELLITUS: ICD-10-CM

## 2021-01-25 DIAGNOSIS — N18.32 STAGE 3B CHRONIC KIDNEY DISEASE: ICD-10-CM

## 2021-01-25 LAB
ESTIMATED AVG GLUCOSE: 278 MG/DL (ref 68–131)
GLUCOSE SERPL-MCNC: 106 MG/DL (ref 70–110)
HBA1C MFR BLD HPLC: 11.3 % (ref 4–5.6)

## 2021-01-25 PROCEDURE — 83036 HEMOGLOBIN GLYCOSYLATED A1C: CPT

## 2021-01-25 PROCEDURE — 3077F PR MOST RECENT SYSTOLIC BLOOD PRESSURE >= 140 MM HG: ICD-10-PCS | Mod: CPTII,S$GLB,, | Performed by: PHYSICIAN ASSISTANT

## 2021-01-25 PROCEDURE — 99999 PR PBB SHADOW E&M-EST. PATIENT-LVL IV: CPT | Mod: PBBFAC,,, | Performed by: PHYSICIAN ASSISTANT

## 2021-01-25 PROCEDURE — 3008F PR BODY MASS INDEX (BMI) DOCUMENTED: ICD-10-PCS | Mod: CPTII,S$GLB,, | Performed by: PHYSICIAN ASSISTANT

## 2021-01-25 PROCEDURE — 3008F BODY MASS INDEX DOCD: CPT | Mod: CPTII,S$GLB,, | Performed by: PHYSICIAN ASSISTANT

## 2021-01-25 PROCEDURE — 36415 COLL VENOUS BLD VENIPUNCTURE: CPT

## 2021-01-25 PROCEDURE — 82962 GLUCOSE BLOOD TEST: CPT | Mod: S$GLB,,, | Performed by: PHYSICIAN ASSISTANT

## 2021-01-25 PROCEDURE — 3046F HEMOGLOBIN A1C LEVEL >9.0%: CPT | Mod: CPTII,S$GLB,, | Performed by: PHYSICIAN ASSISTANT

## 2021-01-25 PROCEDURE — 99214 OFFICE O/P EST MOD 30 MIN: CPT | Mod: S$GLB,,, | Performed by: PHYSICIAN ASSISTANT

## 2021-01-25 PROCEDURE — 3080F PR MOST RECENT DIASTOLIC BLOOD PRESSURE >= 90 MM HG: ICD-10-PCS | Mod: CPTII,S$GLB,, | Performed by: PHYSICIAN ASSISTANT

## 2021-01-25 PROCEDURE — 3080F DIAST BP >= 90 MM HG: CPT | Mod: CPTII,S$GLB,, | Performed by: PHYSICIAN ASSISTANT

## 2021-01-25 PROCEDURE — 82962 POCT GLUCOSE, HAND-HELD DEVICE: ICD-10-PCS | Mod: S$GLB,,, | Performed by: PHYSICIAN ASSISTANT

## 2021-01-25 PROCEDURE — 99214 PR OFFICE/OUTPT VISIT, EST, LEVL IV, 30-39 MIN: ICD-10-PCS | Mod: S$GLB,,, | Performed by: PHYSICIAN ASSISTANT

## 2021-01-25 PROCEDURE — 3077F SYST BP >= 140 MM HG: CPT | Mod: CPTII,S$GLB,, | Performed by: PHYSICIAN ASSISTANT

## 2021-01-25 PROCEDURE — 1125F AMNT PAIN NOTED PAIN PRSNT: CPT | Mod: S$GLB,,, | Performed by: PHYSICIAN ASSISTANT

## 2021-01-25 PROCEDURE — 3046F PR MOST RECENT HEMOGLOBIN A1C LEVEL > 9.0%: ICD-10-PCS | Mod: CPTII,S$GLB,, | Performed by: PHYSICIAN ASSISTANT

## 2021-01-25 PROCEDURE — 99999 PR PBB SHADOW E&M-EST. PATIENT-LVL IV: ICD-10-PCS | Mod: PBBFAC,,, | Performed by: PHYSICIAN ASSISTANT

## 2021-01-25 PROCEDURE — 1125F PR PAIN SEVERITY QUANTIFIED, PAIN PRESENT: ICD-10-PCS | Mod: S$GLB,,, | Performed by: PHYSICIAN ASSISTANT

## 2021-01-25 RX ORDER — EMPAGLIFLOZIN 25 MG/1
25 TABLET, FILM COATED ORAL DAILY
Qty: 90 TABLET | Refills: 3 | Status: SHIPPED | OUTPATIENT
Start: 2021-01-25 | End: 2021-07-08 | Stop reason: SDUPTHER

## 2021-02-04 ENCOUNTER — CLINICAL SUPPORT (OUTPATIENT)
Dept: DIABETES | Facility: CLINIC | Age: 66
End: 2021-02-04
Payer: COMMERCIAL

## 2021-02-04 ENCOUNTER — OFFICE VISIT (OUTPATIENT)
Dept: PSYCHIATRY | Facility: CLINIC | Age: 66
End: 2021-02-04
Payer: COMMERCIAL

## 2021-02-04 VITALS
HEART RATE: 72 BPM | BODY MASS INDEX: 48.6 KG/M2 | WEIGHT: 315 LBS | DIASTOLIC BLOOD PRESSURE: 110 MMHG | SYSTOLIC BLOOD PRESSURE: 185 MMHG

## 2021-02-04 VITALS — HEIGHT: 78 IN | WEIGHT: 315 LBS | BODY MASS INDEX: 36.45 KG/M2

## 2021-02-04 DIAGNOSIS — F33.1 MAJOR DEPRESSIVE DISORDER, RECURRENT EPISODE, MODERATE: Primary | ICD-10-CM

## 2021-02-04 DIAGNOSIS — F32.A CHRONIC DEPRESSION: Primary | ICD-10-CM

## 2021-02-04 PROCEDURE — G0108 PR DIAB MANAGE TRN  PER INDIV: ICD-10-PCS | Mod: S$GLB,,, | Performed by: DIETITIAN, REGISTERED

## 2021-02-04 PROCEDURE — G0108 DIAB MANAGE TRN  PER INDIV: HCPCS | Mod: S$GLB,,, | Performed by: DIETITIAN, REGISTERED

## 2021-02-04 PROCEDURE — 90834 PR PSYCHOTHERAPY W/PATIENT, 45 MIN: ICD-10-PCS | Mod: S$GLB,,, | Performed by: SOCIAL WORKER

## 2021-02-04 PROCEDURE — 3008F PR BODY MASS INDEX (BMI) DOCUMENTED: ICD-10-PCS | Mod: CPTII,S$GLB,, | Performed by: PSYCHIATRY & NEUROLOGY

## 2021-02-04 PROCEDURE — 3080F DIAST BP >= 90 MM HG: CPT | Mod: CPTII,S$GLB,, | Performed by: PSYCHIATRY & NEUROLOGY

## 2021-02-04 PROCEDURE — 99999 PR PBB SHADOW E&M-EST. PATIENT-LVL III: CPT | Mod: PBBFAC,,, | Performed by: DIETITIAN, REGISTERED

## 2021-02-04 PROCEDURE — 3080F PR MOST RECENT DIASTOLIC BLOOD PRESSURE >= 90 MM HG: ICD-10-PCS | Mod: CPTII,S$GLB,, | Performed by: PSYCHIATRY & NEUROLOGY

## 2021-02-04 PROCEDURE — 99999 PR PBB SHADOW E&M-EST. PATIENT-LVL II: ICD-10-PCS | Mod: PBBFAC,,, | Performed by: PSYCHIATRY & NEUROLOGY

## 2021-02-04 PROCEDURE — 99999 PR PBB SHADOW E&M-EST. PATIENT-LVL II: CPT | Mod: PBBFAC,,, | Performed by: PSYCHIATRY & NEUROLOGY

## 2021-02-04 PROCEDURE — 99999 PR PBB SHADOW E&M-EST. PATIENT-LVL III: ICD-10-PCS | Mod: PBBFAC,,, | Performed by: DIETITIAN, REGISTERED

## 2021-02-04 PROCEDURE — 3077F SYST BP >= 140 MM HG: CPT | Mod: CPTII,S$GLB,, | Performed by: PSYCHIATRY & NEUROLOGY

## 2021-02-04 PROCEDURE — 3077F PR MOST RECENT SYSTOLIC BLOOD PRESSURE >= 140 MM HG: ICD-10-PCS | Mod: CPTII,S$GLB,, | Performed by: PSYCHIATRY & NEUROLOGY

## 2021-02-04 PROCEDURE — 3008F BODY MASS INDEX DOCD: CPT | Mod: CPTII,S$GLB,, | Performed by: PSYCHIATRY & NEUROLOGY

## 2021-02-04 PROCEDURE — 99214 PR OFFICE/OUTPT VISIT, EST, LEVL IV, 30-39 MIN: ICD-10-PCS | Mod: S$GLB,,, | Performed by: PSYCHIATRY & NEUROLOGY

## 2021-02-04 PROCEDURE — 90834 PSYTX W PT 45 MINUTES: CPT | Mod: S$GLB,,, | Performed by: SOCIAL WORKER

## 2021-02-04 PROCEDURE — 99214 OFFICE O/P EST MOD 30 MIN: CPT | Mod: S$GLB,,, | Performed by: PSYCHIATRY & NEUROLOGY

## 2021-02-04 RX ORDER — DULOXETIN HYDROCHLORIDE 30 MG/1
30 CAPSULE, DELAYED RELEASE ORAL DAILY
Qty: 90 CAPSULE | Refills: 1 | Status: SHIPPED | OUTPATIENT
Start: 2021-02-04 | End: 2022-08-15

## 2021-02-09 ENCOUNTER — PATIENT OUTREACH (OUTPATIENT)
Dept: ADMINISTRATIVE | Facility: HOSPITAL | Age: 66
End: 2021-02-09

## 2021-03-04 ENCOUNTER — DOCUMENTATION ONLY (OUTPATIENT)
Dept: CARDIOLOGY | Facility: HOSPITAL | Age: 66
End: 2021-03-04

## 2021-03-04 ENCOUNTER — OFFICE VISIT (OUTPATIENT)
Dept: PSYCHIATRY | Facility: CLINIC | Age: 66
End: 2021-03-04
Payer: COMMERCIAL

## 2021-03-04 ENCOUNTER — HOSPITAL ENCOUNTER (OUTPATIENT)
Dept: CARDIOLOGY | Facility: HOSPITAL | Age: 66
Discharge: HOME | End: 2021-03-04
Attending: INTERNAL MEDICINE
Payer: COMMERCIAL

## 2021-03-04 ENCOUNTER — OFFICE VISIT (OUTPATIENT)
Dept: PODIATRY | Facility: CLINIC | Age: 66
End: 2021-03-04
Payer: COMMERCIAL

## 2021-03-04 VITALS
SYSTOLIC BLOOD PRESSURE: 148 MMHG | WEIGHT: 315 LBS | DIASTOLIC BLOOD PRESSURE: 87 MMHG | HEART RATE: 96 BPM | HEIGHT: 78 IN | BODY MASS INDEX: 36.45 KG/M2

## 2021-03-04 VITALS
WEIGHT: 315 LBS | DIASTOLIC BLOOD PRESSURE: 110 MMHG | SYSTOLIC BLOOD PRESSURE: 185 MMHG | HEART RATE: 80 BPM | BODY MASS INDEX: 36.45 KG/M2 | HEIGHT: 78 IN

## 2021-03-04 DIAGNOSIS — F33.0 MAJOR DEPRESSIVE DISORDER, RECURRENT EPISODE, MILD: Primary | ICD-10-CM

## 2021-03-04 DIAGNOSIS — G60.9 IDIOPATHIC PERIPHERAL NEUROPATHY: ICD-10-CM

## 2021-03-04 DIAGNOSIS — B35.1 DERMATOPHYTOSIS OF NAIL: ICD-10-CM

## 2021-03-04 DIAGNOSIS — I25.10 CORONARY ARTERY DISEASE INVOLVING NATIVE CORONARY ARTERY OF NATIVE HEART WITHOUT ANGINA PECTORIS: ICD-10-CM

## 2021-03-04 DIAGNOSIS — L60.0 ONYCHOCRYPTOSIS: ICD-10-CM

## 2021-03-04 LAB
AORTIC ROOT ANNULUS: 3.17 CM
ASCENDING AORTA: 3.48 CM
BSA FOR ECHO PROCEDURE: 3.3 M2
CV ECHO LV RWT: 0.56 CM
DOP CALC LVOT AREA: 6.6 CM2
DOP CALC LVOT DIAMETER: 2.91 CM
DOP CALC LVOT PEAK VEL: 0.73 M/S
DOP CALC LVOT STROKE VOLUME: 78.11 CM3
DOP CALC RVOT PEAK VEL: 0.79 M/S
DOP CALC RVOT VTI: 14.39 CM
DOP CALCLVOT PEAK VEL VTI: 11.75 CM
E WAVE DECELERATION TIME: 118.55 MSEC
E/A RATIO: 2.21
E/E' RATIO: 11.64 M/S
ECHO LV POSTERIOR WALL: 1.68 CM (ref 0.6–1.1)
FRACTIONAL SHORTENING: 26 % (ref 28–44)
INTERVENTRICULAR SEPTUM: 1.36 CM (ref 0.6–1.1)
IVRT: 66.6 MSEC
LA MAJOR: 5.14 CM
LA MINOR: 4.54 CM
LA WIDTH: 3.79 CM
LEFT ATRIUM SIZE: 3.52 CM
LEFT ATRIUM VOLUME INDEX: 17.3 ML/M2
LEFT ATRIUM VOLUME: 54.67 CM3
LEFT INTERNAL DIMENSION IN SYSTOLE: 4.44 CM (ref 2.1–4)
LEFT VENTRICLE DIASTOLIC VOLUME INDEX: 57.6 ML/M2
LEFT VENTRICLE DIASTOLIC VOLUME: 182.01 ML
LEFT VENTRICLE MASS INDEX: 139 G/M2
LEFT VENTRICLE SYSTOLIC VOLUME INDEX: 28.3 ML/M2
LEFT VENTRICLE SYSTOLIC VOLUME: 89.54 ML
LEFT VENTRICULAR INTERNAL DIMENSION IN DIASTOLE: 6.03 CM (ref 3.5–6)
LEFT VENTRICULAR MASS: 438.97 G
LV LATERAL E/E' RATIO: 12.8 M/S
LV SEPTAL E/E' RATIO: 10.67 M/S
MV PEAK A VEL: 0.29 M/S
MV PEAK E VEL: 0.64 M/S
MV STENOSIS PRESSURE HALF TIME: 34.38 MS
MV VALVE AREA P 1/2 METHOD: 6.4 CM2
PISA TR MAX VEL: 2.25 M/S
PULM VEIN S/D RATIO: 1.63
PV MEAN GRADIENT: 1 MMHG
PV PEAK D VEL: 0.19 M/S
PV PEAK S VEL: 0.31 M/S
PV PEAK VELOCITY: 1.14 CM/S
RA MAJOR: 4.99 CM
RA PRESSURE: 3 MMHG
RA WIDTH: 2.78 CM
SINUS: 3.48 CM
STJ: 3.39 CM
TDI LATERAL: 0.05 M/S
TDI SEPTAL: 0.06 M/S
TDI: 0.06 M/S
TR MAX PG: 20 MMHG
TV REST PULMONARY ARTERY PRESSURE: 23 MMHG

## 2021-03-04 PROCEDURE — 99499 UNLISTED E&M SERVICE: CPT | Mod: S$GLB,,, | Performed by: PODIATRIST

## 2021-03-04 PROCEDURE — 90834 PR PSYCHOTHERAPY W/PATIENT, 45 MIN: ICD-10-PCS | Mod: S$GLB,,, | Performed by: SOCIAL WORKER

## 2021-03-04 PROCEDURE — 1126F PR PAIN SEVERITY QUANTIFIED, NO PAIN PRESENT: ICD-10-PCS | Mod: S$GLB,,, | Performed by: PODIATRIST

## 2021-03-04 PROCEDURE — 1126F AMNT PAIN NOTED NONE PRSNT: CPT | Mod: S$GLB,,, | Performed by: PODIATRIST

## 2021-03-04 PROCEDURE — 99499 NO LOS: ICD-10-PCS | Mod: S$GLB,,, | Performed by: PODIATRIST

## 2021-03-04 PROCEDURE — 3008F BODY MASS INDEX DOCD: CPT | Mod: CPTII,S$GLB,, | Performed by: PODIATRIST

## 2021-03-04 PROCEDURE — 11721 DEBRIDE NAIL 6 OR MORE: CPT | Mod: S$GLB,,, | Performed by: PODIATRIST

## 2021-03-04 PROCEDURE — 99999 PR PBB SHADOW E&M-EST. PATIENT-LVL V: ICD-10-PCS | Mod: PBBFAC,,, | Performed by: PODIATRIST

## 2021-03-04 PROCEDURE — C8929 TTE W OR WO FOL WCON,DOPPLER: HCPCS

## 2021-03-04 PROCEDURE — 93306 TTE W/DOPPLER COMPLETE: CPT | Mod: 26,,, | Performed by: INTERNAL MEDICINE

## 2021-03-04 PROCEDURE — 11721 PR DEBRIDEMENT OF NAILS, 6 OR MORE: ICD-10-PCS | Mod: S$GLB,,, | Performed by: PODIATRIST

## 2021-03-04 PROCEDURE — 99999 PR PBB SHADOW E&M-EST. PATIENT-LVL V: CPT | Mod: PBBFAC,,, | Performed by: PODIATRIST

## 2021-03-04 PROCEDURE — 90834 PSYTX W PT 45 MINUTES: CPT | Mod: S$GLB,,, | Performed by: SOCIAL WORKER

## 2021-03-04 PROCEDURE — 3008F PR BODY MASS INDEX (BMI) DOCUMENTED: ICD-10-PCS | Mod: CPTII,S$GLB,, | Performed by: PODIATRIST

## 2021-03-04 PROCEDURE — 93306 ECHO (CUPID ONLY): ICD-10-PCS | Mod: 26,,, | Performed by: INTERNAL MEDICINE

## 2021-03-05 ENCOUNTER — TELEPHONE (OUTPATIENT)
Dept: CARDIOLOGY | Facility: CLINIC | Age: 66
End: 2021-03-05

## 2021-03-15 ENCOUNTER — PATIENT OUTREACH (OUTPATIENT)
Dept: ADMINISTRATIVE | Facility: HOSPITAL | Age: 66
End: 2021-03-15

## 2021-03-17 DIAGNOSIS — N18.32 STAGE 3B CHRONIC KIDNEY DISEASE: Primary | ICD-10-CM

## 2021-03-22 ENCOUNTER — LAB VISIT (OUTPATIENT)
Dept: LAB | Facility: HOSPITAL | Age: 66
End: 2021-03-22
Attending: INTERNAL MEDICINE
Payer: COMMERCIAL

## 2021-03-22 ENCOUNTER — OFFICE VISIT (OUTPATIENT)
Dept: NEPHROLOGY | Facility: CLINIC | Age: 66
End: 2021-03-22
Payer: COMMERCIAL

## 2021-03-22 ENCOUNTER — TELEPHONE (OUTPATIENT)
Dept: DIABETES | Facility: CLINIC | Age: 66
End: 2021-03-22

## 2021-03-22 ENCOUNTER — CLINICAL SUPPORT (OUTPATIENT)
Dept: DIABETES | Facility: CLINIC | Age: 66
End: 2021-03-22
Payer: COMMERCIAL

## 2021-03-22 VITALS
WEIGHT: 315 LBS | BODY MASS INDEX: 36.45 KG/M2 | SYSTOLIC BLOOD PRESSURE: 146 MMHG | HEIGHT: 78 IN | RESPIRATION RATE: 20 BRPM | DIASTOLIC BLOOD PRESSURE: 88 MMHG | HEART RATE: 88 BPM

## 2021-03-22 VITALS — HEIGHT: 78 IN | BODY MASS INDEX: 36.45 KG/M2 | WEIGHT: 315 LBS

## 2021-03-22 DIAGNOSIS — N18.32 STAGE 3B CHRONIC KIDNEY DISEASE: ICD-10-CM

## 2021-03-22 DIAGNOSIS — I25.10 CORONARY ARTERY DISEASE INVOLVING NATIVE CORONARY ARTERY OF NATIVE HEART WITHOUT ANGINA PECTORIS: ICD-10-CM

## 2021-03-22 DIAGNOSIS — N18.30 STAGE 3 CHRONIC KIDNEY DISEASE, UNSPECIFIED WHETHER STAGE 3A OR 3B CKD: Primary | ICD-10-CM

## 2021-03-22 DIAGNOSIS — I10 ESSENTIAL HYPERTENSION: ICD-10-CM

## 2021-03-22 LAB
ALBUMIN SERPL BCP-MCNC: 3.8 G/DL (ref 3.5–5.2)
ANION GAP SERPL CALC-SCNC: 9 MMOL/L (ref 8–16)
BACTERIA #/AREA URNS HPF: NORMAL /HPF
BASOPHILS # BLD AUTO: 0.02 K/UL (ref 0–0.2)
BASOPHILS NFR BLD: 0.5 % (ref 0–1.9)
BILIRUB UR QL STRIP: NEGATIVE
BUN SERPL-MCNC: 17 MG/DL (ref 8–23)
CALCIUM SERPL-MCNC: 9.4 MG/DL (ref 8.7–10.5)
CHLORIDE SERPL-SCNC: 112 MMOL/L (ref 95–110)
CLARITY UR: CLEAR
CO2 SERPL-SCNC: 24 MMOL/L (ref 23–29)
COLOR UR: YELLOW
CREAT SERPL-MCNC: 1.7 MG/DL (ref 0.5–1.4)
DIFFERENTIAL METHOD: ABNORMAL
EOSINOPHIL # BLD AUTO: 0.3 K/UL (ref 0–0.5)
EOSINOPHIL NFR BLD: 6.1 % (ref 0–8)
ERYTHROCYTE [DISTWIDTH] IN BLOOD BY AUTOMATED COUNT: 12.7 % (ref 11.5–14.5)
EST. GFR  (AFRICAN AMERICAN): 48 ML/MIN/1.73 M^2
EST. GFR  (NON AFRICAN AMERICAN): 42 ML/MIN/1.73 M^2
GLUCOSE SERPL-MCNC: 120 MG/DL (ref 70–110)
GLUCOSE UR QL STRIP: ABNORMAL
HCT VFR BLD AUTO: 47.7 % (ref 40–54)
HGB BLD-MCNC: 16.4 G/DL (ref 14–18)
HGB UR QL STRIP: NEGATIVE
IMM GRANULOCYTES # BLD AUTO: 0.01 K/UL (ref 0–0.04)
IMM GRANULOCYTES NFR BLD AUTO: 0.2 % (ref 0–0.5)
KETONES UR QL STRIP: NEGATIVE
LEUKOCYTE ESTERASE UR QL STRIP: NEGATIVE
LYMPHOCYTES # BLD AUTO: 1.4 K/UL (ref 1–4.8)
LYMPHOCYTES NFR BLD: 35 % (ref 18–48)
MCH RBC QN AUTO: 32.6 PG (ref 27–31)
MCHC RBC AUTO-ENTMCNC: 34.4 G/DL (ref 32–36)
MCV RBC AUTO: 95 FL (ref 82–98)
MICROSCOPIC COMMENT: NORMAL
MONOCYTES # BLD AUTO: 0.5 K/UL (ref 0.3–1)
MONOCYTES NFR BLD: 12.4 % (ref 4–15)
NEUTROPHILS # BLD AUTO: 1.9 K/UL (ref 1.8–7.7)
NEUTROPHILS NFR BLD: 45.8 % (ref 38–73)
NITRITE UR QL STRIP: NEGATIVE
NRBC BLD-RTO: 0 /100 WBC
PH UR STRIP: 5 [PH] (ref 5–8)
PHOSPHATE SERPL-MCNC: 2.7 MG/DL (ref 2.7–4.5)
PLATELET # BLD AUTO: 193 K/UL (ref 150–350)
PMV BLD AUTO: 9 FL (ref 9.2–12.9)
POTASSIUM SERPL-SCNC: 3.7 MMOL/L (ref 3.5–5.1)
PROT UR QL STRIP: NEGATIVE
PTH-INTACT SERPL-MCNC: 131.5 PG/ML (ref 9–77)
RBC # BLD AUTO: 5.03 M/UL (ref 4.6–6.2)
RBC #/AREA URNS HPF: 2 /HPF (ref 0–4)
SODIUM SERPL-SCNC: 145 MMOL/L (ref 136–145)
SP GR UR STRIP: 1.02 (ref 1–1.03)
SQUAMOUS #/AREA URNS HPF: 2 /HPF
URN SPEC COLLECT METH UR: ABNORMAL
WBC # BLD AUTO: 4.11 K/UL (ref 3.9–12.7)
WBC #/AREA URNS HPF: 2 /HPF (ref 0–5)
YEAST URNS QL MICRO: NORMAL

## 2021-03-22 PROCEDURE — 99999 PR PBB SHADOW E&M-EST. PATIENT-LVL IV: CPT | Mod: PBBFAC,,, | Performed by: INTERNAL MEDICINE

## 2021-03-22 PROCEDURE — 3008F PR BODY MASS INDEX (BMI) DOCUMENTED: ICD-10-PCS | Mod: CPTII,S$GLB,, | Performed by: INTERNAL MEDICINE

## 2021-03-22 PROCEDURE — 84156 ASSAY OF PROTEIN URINE: CPT | Performed by: INTERNAL MEDICINE

## 2021-03-22 PROCEDURE — 99214 PR OFFICE/OUTPT VISIT, EST, LEVL IV, 30-39 MIN: ICD-10-PCS | Mod: S$GLB,,, | Performed by: INTERNAL MEDICINE

## 2021-03-22 PROCEDURE — 83970 ASSAY OF PARATHORMONE: CPT | Performed by: INTERNAL MEDICINE

## 2021-03-22 PROCEDURE — 81000 URINALYSIS NONAUTO W/SCOPE: CPT | Performed by: INTERNAL MEDICINE

## 2021-03-22 PROCEDURE — 3046F HEMOGLOBIN A1C LEVEL >9.0%: CPT | Mod: CPTII,S$GLB,, | Performed by: INTERNAL MEDICINE

## 2021-03-22 PROCEDURE — 99214 OFFICE O/P EST MOD 30 MIN: CPT | Mod: S$GLB,,, | Performed by: INTERNAL MEDICINE

## 2021-03-22 PROCEDURE — 80069 RENAL FUNCTION PANEL: CPT | Performed by: INTERNAL MEDICINE

## 2021-03-22 PROCEDURE — 85025 COMPLETE CBC W/AUTO DIFF WBC: CPT | Performed by: INTERNAL MEDICINE

## 2021-03-22 PROCEDURE — 3079F PR MOST RECENT DIASTOLIC BLOOD PRESSURE 80-89 MM HG: ICD-10-PCS | Mod: CPTII,S$GLB,, | Performed by: INTERNAL MEDICINE

## 2021-03-22 PROCEDURE — 99999 PR PBB SHADOW E&M-EST. PATIENT-LVL IV: ICD-10-PCS | Mod: PBBFAC,,, | Performed by: INTERNAL MEDICINE

## 2021-03-22 PROCEDURE — 99999 PR PBB SHADOW E&M-EST. PATIENT-LVL II: ICD-10-PCS | Mod: PBBFAC,,, | Performed by: DIETITIAN, REGISTERED

## 2021-03-22 PROCEDURE — 1126F AMNT PAIN NOTED NONE PRSNT: CPT | Mod: S$GLB,,, | Performed by: INTERNAL MEDICINE

## 2021-03-22 PROCEDURE — 3077F PR MOST RECENT SYSTOLIC BLOOD PRESSURE >= 140 MM HG: ICD-10-PCS | Mod: CPTII,S$GLB,, | Performed by: INTERNAL MEDICINE

## 2021-03-22 PROCEDURE — 3046F PR MOST RECENT HEMOGLOBIN A1C LEVEL > 9.0%: ICD-10-PCS | Mod: CPTII,S$GLB,, | Performed by: INTERNAL MEDICINE

## 2021-03-22 PROCEDURE — 3079F DIAST BP 80-89 MM HG: CPT | Mod: CPTII,S$GLB,, | Performed by: INTERNAL MEDICINE

## 2021-03-22 PROCEDURE — 3077F SYST BP >= 140 MM HG: CPT | Mod: CPTII,S$GLB,, | Performed by: INTERNAL MEDICINE

## 2021-03-22 PROCEDURE — 3008F BODY MASS INDEX DOCD: CPT | Mod: CPTII,S$GLB,, | Performed by: INTERNAL MEDICINE

## 2021-03-22 PROCEDURE — 36415 COLL VENOUS BLD VENIPUNCTURE: CPT | Performed by: INTERNAL MEDICINE

## 2021-03-22 PROCEDURE — 1126F PR PAIN SEVERITY QUANTIFIED, NO PAIN PRESENT: ICD-10-PCS | Mod: S$GLB,,, | Performed by: INTERNAL MEDICINE

## 2021-03-22 PROCEDURE — 99999 PR PBB SHADOW E&M-EST. PATIENT-LVL II: CPT | Mod: PBBFAC,,, | Performed by: DIETITIAN, REGISTERED

## 2021-03-22 RX ORDER — ATORVASTATIN CALCIUM 10 MG/1
10 TABLET, FILM COATED ORAL DAILY
Qty: 90 TABLET | Refills: 3 | Status: SHIPPED | OUTPATIENT
Start: 2021-03-22 | End: 2021-11-02 | Stop reason: SDUPTHER

## 2021-03-22 RX ORDER — FUROSEMIDE 40 MG/1
40 TABLET ORAL 2 TIMES DAILY
Qty: 245 TABLET | Refills: 3 | Status: SHIPPED | OUTPATIENT
Start: 2021-03-22 | End: 2021-03-22 | Stop reason: SDUPTHER

## 2021-03-22 RX ORDER — ATORVASTATIN CALCIUM 10 MG/1
10 TABLET, FILM COATED ORAL DAILY
Qty: 90 TABLET | Refills: 3 | Status: SHIPPED | OUTPATIENT
Start: 2021-03-22 | End: 2021-03-22 | Stop reason: SDUPTHER

## 2021-03-22 RX ORDER — FUROSEMIDE 40 MG/1
40 TABLET ORAL 2 TIMES DAILY
Qty: 180 TABLET | Refills: 3 | Status: SHIPPED | OUTPATIENT
Start: 2021-03-22 | End: 2021-08-24 | Stop reason: SDUPTHER

## 2021-03-22 RX ORDER — CARVEDILOL 25 MG/1
25 TABLET ORAL 2 TIMES DAILY WITH MEALS
Qty: 180 TABLET | Refills: 0 | Status: SHIPPED | OUTPATIENT
Start: 2021-03-22 | End: 2021-03-22 | Stop reason: SDUPTHER

## 2021-03-22 RX ORDER — METOLAZONE 2.5 MG/1
2.5 TABLET ORAL
Qty: 36 TABLET | Refills: 3 | Status: SHIPPED | OUTPATIENT
Start: 2021-03-22 | End: 2021-03-22 | Stop reason: SDUPTHER

## 2021-03-22 RX ORDER — LISINOPRIL 20 MG/1
20 TABLET ORAL DAILY
Qty: 30 TABLET | Refills: 11 | Status: SHIPPED | OUTPATIENT
Start: 2021-03-22 | End: 2021-03-22 | Stop reason: SDUPTHER

## 2021-03-22 RX ORDER — CARVEDILOL 25 MG/1
25 TABLET ORAL 2 TIMES DAILY WITH MEALS
Qty: 180 TABLET | Refills: 3 | Status: SHIPPED | OUTPATIENT
Start: 2021-03-22 | End: 2021-11-02 | Stop reason: SDUPTHER

## 2021-03-22 RX ORDER — LISINOPRIL 20 MG/1
20 TABLET ORAL DAILY
Qty: 90 TABLET | Refills: 3 | Status: SHIPPED | OUTPATIENT
Start: 2021-03-22 | End: 2021-07-20 | Stop reason: SDUPTHER

## 2021-03-22 RX ORDER — METOLAZONE 2.5 MG/1
2.5 TABLET ORAL
Qty: 45 TABLET | Refills: 3 | Status: SHIPPED | OUTPATIENT
Start: 2021-03-22 | End: 2021-08-24

## 2021-03-23 LAB
CREAT UR-MCNC: 135 MG/DL (ref 23–375)
PROT UR-MCNC: 9 MG/DL (ref 0–15)
PROT/CREAT UR: 0.07 MG/G{CREAT} (ref 0–0.2)

## 2021-03-29 ENCOUNTER — PATIENT OUTREACH (OUTPATIENT)
Dept: ADMINISTRATIVE | Facility: HOSPITAL | Age: 66
End: 2021-03-29

## 2021-03-31 ENCOUNTER — PATIENT OUTREACH (OUTPATIENT)
Dept: ADMINISTRATIVE | Facility: HOSPITAL | Age: 66
End: 2021-03-31

## 2021-04-01 ENCOUNTER — OFFICE VISIT (OUTPATIENT)
Dept: PSYCHIATRY | Facility: CLINIC | Age: 66
End: 2021-04-01
Payer: COMMERCIAL

## 2021-04-01 ENCOUNTER — TELEPHONE (OUTPATIENT)
Dept: INTERNAL MEDICINE | Facility: CLINIC | Age: 66
End: 2021-04-01

## 2021-04-01 ENCOUNTER — CLINICAL SUPPORT (OUTPATIENT)
Dept: INTERNAL MEDICINE | Facility: CLINIC | Age: 66
End: 2021-04-01
Payer: COMMERCIAL

## 2021-04-01 VITALS — SYSTOLIC BLOOD PRESSURE: 138 MMHG | DIASTOLIC BLOOD PRESSURE: 94 MMHG

## 2021-04-01 DIAGNOSIS — I15.2 HYPERTENSION ASSOCIATED WITH DIABETES: Primary | ICD-10-CM

## 2021-04-01 DIAGNOSIS — F33.0 MAJOR DEPRESSIVE DISORDER, RECURRENT EPISODE, MILD: Primary | ICD-10-CM

## 2021-04-01 DIAGNOSIS — E11.59 HYPERTENSION ASSOCIATED WITH DIABETES: Primary | ICD-10-CM

## 2021-04-01 PROCEDURE — 90834 PSYTX W PT 45 MINUTES: CPT | Mod: S$GLB,,, | Performed by: SOCIAL WORKER

## 2021-04-01 PROCEDURE — 99999 PR PBB SHADOW E&M-EST. PATIENT-LVL III: CPT | Mod: PBBFAC,,,

## 2021-04-01 PROCEDURE — 99999 PR PBB SHADOW E&M-EST. PATIENT-LVL III: ICD-10-PCS | Mod: PBBFAC,,,

## 2021-04-01 PROCEDURE — 90834 PR PSYCHOTHERAPY W/PATIENT, 45 MIN: ICD-10-PCS | Mod: S$GLB,,, | Performed by: SOCIAL WORKER

## 2021-04-01 RX ORDER — AMLODIPINE BESYLATE 5 MG/1
5 TABLET ORAL DAILY
Qty: 30 TABLET | Refills: 0 | Status: SHIPPED | OUTPATIENT
Start: 2021-04-01 | End: 2021-05-06 | Stop reason: DRUGHIGH

## 2021-04-19 ENCOUNTER — PATIENT OUTREACH (OUTPATIENT)
Dept: ADMINISTRATIVE | Facility: HOSPITAL | Age: 66
End: 2021-04-19

## 2021-05-03 ENCOUNTER — TELEPHONE (OUTPATIENT)
Dept: DIABETES | Facility: CLINIC | Age: 66
End: 2021-05-03

## 2021-05-03 ENCOUNTER — OFFICE VISIT (OUTPATIENT)
Dept: DIABETES | Facility: CLINIC | Age: 66
End: 2021-05-03
Payer: COMMERCIAL

## 2021-05-03 DIAGNOSIS — I50.9 CHF (NYHA CLASS III, ACC/AHA STAGE C): ICD-10-CM

## 2021-05-03 DIAGNOSIS — E11.69 HYPERLIPIDEMIA ASSOCIATED WITH TYPE 2 DIABETES MELLITUS: ICD-10-CM

## 2021-05-03 DIAGNOSIS — E11.59 HYPERTENSION ASSOCIATED WITH DIABETES: ICD-10-CM

## 2021-05-03 DIAGNOSIS — E66.01 MORBID OBESITY: ICD-10-CM

## 2021-05-03 DIAGNOSIS — G47.33 OSA (OBSTRUCTIVE SLEEP APNEA): ICD-10-CM

## 2021-05-03 DIAGNOSIS — N18.32 STAGE 3B CHRONIC KIDNEY DISEASE: ICD-10-CM

## 2021-05-03 DIAGNOSIS — E11.649 HYPOGLYCEMIA UNAWARENESS ASSOCIATED WITH TYPE 2 DIABETES MELLITUS: ICD-10-CM

## 2021-05-03 DIAGNOSIS — H54.10 BLINDNESS AND LOW VISION: ICD-10-CM

## 2021-05-03 DIAGNOSIS — I25.10 CORONARY ARTERY DISEASE INVOLVING NATIVE CORONARY ARTERY OF NATIVE HEART WITHOUT ANGINA PECTORIS: ICD-10-CM

## 2021-05-03 DIAGNOSIS — E78.5 HYPERLIPIDEMIA ASSOCIATED WITH TYPE 2 DIABETES MELLITUS: ICD-10-CM

## 2021-05-03 DIAGNOSIS — I15.2 HYPERTENSION ASSOCIATED WITH DIABETES: ICD-10-CM

## 2021-05-03 DIAGNOSIS — G60.9 IDIOPATHIC PERIPHERAL NEUROPATHY: ICD-10-CM

## 2021-05-03 PROCEDURE — 99212 PR OFFICE/OUTPT VISIT, EST, LEVL II, 10-19 MIN: ICD-10-PCS | Mod: 95,,, | Performed by: PHYSICIAN ASSISTANT

## 2021-05-03 PROCEDURE — 99212 OFFICE O/P EST SF 10 MIN: CPT | Mod: 95,,, | Performed by: PHYSICIAN ASSISTANT

## 2021-05-03 PROCEDURE — 3046F HEMOGLOBIN A1C LEVEL >9.0%: CPT | Mod: CPTII,,, | Performed by: PHYSICIAN ASSISTANT

## 2021-05-03 PROCEDURE — 3046F PR MOST RECENT HEMOGLOBIN A1C LEVEL > 9.0%: ICD-10-PCS | Mod: CPTII,,, | Performed by: PHYSICIAN ASSISTANT

## 2021-05-03 RX ORDER — MIGLITOL 25 MG/1
25 TABLET, COATED ORAL
Qty: 270 TABLET | Refills: 3 | Status: SHIPPED | OUTPATIENT
Start: 2021-05-03 | End: 2021-07-08 | Stop reason: SDUPTHER

## 2021-05-04 ENCOUNTER — TELEPHONE (OUTPATIENT)
Dept: INTERNAL MEDICINE | Facility: CLINIC | Age: 66
End: 2021-05-04

## 2021-05-04 ENCOUNTER — PATIENT OUTREACH (OUTPATIENT)
Dept: ADMINISTRATIVE | Facility: HOSPITAL | Age: 66
End: 2021-05-04

## 2021-05-06 ENCOUNTER — OFFICE VISIT (OUTPATIENT)
Dept: PSYCHIATRY | Facility: CLINIC | Age: 66
End: 2021-05-06
Payer: COMMERCIAL

## 2021-05-06 ENCOUNTER — CLINICAL SUPPORT (OUTPATIENT)
Dept: INTERNAL MEDICINE | Facility: CLINIC | Age: 66
End: 2021-05-06
Payer: COMMERCIAL

## 2021-05-06 ENCOUNTER — TELEPHONE (OUTPATIENT)
Dept: INTERNAL MEDICINE | Facility: CLINIC | Age: 66
End: 2021-05-06

## 2021-05-06 ENCOUNTER — LAB VISIT (OUTPATIENT)
Dept: LAB | Facility: HOSPITAL | Age: 66
End: 2021-05-06
Attending: PHYSICIAN ASSISTANT
Payer: COMMERCIAL

## 2021-05-06 VITALS — DIASTOLIC BLOOD PRESSURE: 88 MMHG | SYSTOLIC BLOOD PRESSURE: 122 MMHG

## 2021-05-06 DIAGNOSIS — I15.2 HYPERTENSION ASSOCIATED WITH DIABETES: Primary | ICD-10-CM

## 2021-05-06 DIAGNOSIS — E11.59 HYPERTENSION ASSOCIATED WITH DIABETES: Primary | ICD-10-CM

## 2021-05-06 DIAGNOSIS — F33.0 MAJOR DEPRESSIVE DISORDER, RECURRENT EPISODE, MILD: Primary | ICD-10-CM

## 2021-05-06 LAB
CHOLEST SERPL-MCNC: 116 MG/DL (ref 120–199)
CHOLEST/HDLC SERPL: 3.3 {RATIO} (ref 2–5)
ESTIMATED AVG GLUCOSE: 174 MG/DL (ref 68–131)
HBA1C MFR BLD: 7.7 % (ref 4–5.6)
HDLC SERPL-MCNC: 35 MG/DL (ref 40–75)
HDLC SERPL: 30.2 % (ref 20–50)
LDLC SERPL CALC-MCNC: 48.8 MG/DL (ref 63–159)
NONHDLC SERPL-MCNC: 81 MG/DL
TRIGL SERPL-MCNC: 161 MG/DL (ref 30–150)

## 2021-05-06 PROCEDURE — 36415 COLL VENOUS BLD VENIPUNCTURE: CPT | Performed by: PHYSICIAN ASSISTANT

## 2021-05-06 PROCEDURE — 90834 PSYTX W PT 45 MINUTES: CPT | Mod: S$GLB,,, | Performed by: SOCIAL WORKER

## 2021-05-06 PROCEDURE — 80061 LIPID PANEL: CPT | Performed by: PHYSICIAN ASSISTANT

## 2021-05-06 PROCEDURE — 83036 HEMOGLOBIN GLYCOSYLATED A1C: CPT | Performed by: PHYSICIAN ASSISTANT

## 2021-05-06 PROCEDURE — 90834 PR PSYCHOTHERAPY W/PATIENT, 45 MIN: ICD-10-PCS | Mod: S$GLB,,, | Performed by: SOCIAL WORKER

## 2021-05-06 RX ORDER — AMLODIPINE BESYLATE 10 MG/1
10 TABLET ORAL NIGHTLY
Qty: 90 TABLET | Refills: 3 | Status: SHIPPED | OUTPATIENT
Start: 2021-05-06 | End: 2021-10-19

## 2021-05-06 RX ORDER — AMLODIPINE BESYLATE 10 MG/1
10 TABLET ORAL NIGHTLY
Qty: 30 TABLET | Refills: 0 | Status: SHIPPED | OUTPATIENT
Start: 2021-05-06 | End: 2021-07-08

## 2021-05-10 ENCOUNTER — PATIENT OUTREACH (OUTPATIENT)
Dept: ADMINISTRATIVE | Facility: HOSPITAL | Age: 66
End: 2021-05-10

## 2021-05-11 ENCOUNTER — PATIENT OUTREACH (OUTPATIENT)
Dept: ADMINISTRATIVE | Facility: HOSPITAL | Age: 66
End: 2021-05-11

## 2021-05-12 ENCOUNTER — TELEPHONE (OUTPATIENT)
Dept: DIABETES | Facility: CLINIC | Age: 66
End: 2021-05-12

## 2021-05-17 ENCOUNTER — CLINICAL SUPPORT (OUTPATIENT)
Dept: DIABETES | Facility: CLINIC | Age: 66
End: 2021-05-17
Payer: COMMERCIAL

## 2021-06-03 ENCOUNTER — OFFICE VISIT (OUTPATIENT)
Dept: PODIATRY | Facility: CLINIC | Age: 66
End: 2021-06-03
Payer: COMMERCIAL

## 2021-06-03 ENCOUNTER — CLINICAL SUPPORT (OUTPATIENT)
Dept: DIABETES | Facility: CLINIC | Age: 66
End: 2021-06-03
Payer: COMMERCIAL

## 2021-06-03 ENCOUNTER — OFFICE VISIT (OUTPATIENT)
Dept: PSYCHIATRY | Facility: CLINIC | Age: 66
End: 2021-06-03
Payer: COMMERCIAL

## 2021-06-03 VITALS — HEIGHT: 78 IN | WEIGHT: 315 LBS | BODY MASS INDEX: 36.45 KG/M2

## 2021-06-03 VITALS
SYSTOLIC BLOOD PRESSURE: 137 MMHG | BODY MASS INDEX: 36.45 KG/M2 | WEIGHT: 315 LBS | HEIGHT: 78 IN | HEART RATE: 63 BPM | DIASTOLIC BLOOD PRESSURE: 82 MMHG

## 2021-06-03 DIAGNOSIS — G60.9 IDIOPATHIC PERIPHERAL NEUROPATHY: ICD-10-CM

## 2021-06-03 DIAGNOSIS — B35.1 DERMATOPHYTOSIS OF NAIL: ICD-10-CM

## 2021-06-03 DIAGNOSIS — F33.0 MAJOR DEPRESSIVE DISORDER, RECURRENT EPISODE, MILD: Primary | ICD-10-CM

## 2021-06-03 DIAGNOSIS — L60.0 ONYCHOCRYPTOSIS: ICD-10-CM

## 2021-06-03 PROCEDURE — 99999 PR PBB SHADOW E&M-EST. PATIENT-LVL V: ICD-10-PCS | Mod: PBBFAC,,, | Performed by: PODIATRIST

## 2021-06-03 PROCEDURE — 99999 PR PBB SHADOW E&M-EST. PATIENT-LVL V: CPT | Mod: PBBFAC,,, | Performed by: PODIATRIST

## 2021-06-03 PROCEDURE — 3008F BODY MASS INDEX DOCD: CPT | Mod: CPTII,S$GLB,, | Performed by: PODIATRIST

## 2021-06-03 PROCEDURE — 1126F AMNT PAIN NOTED NONE PRSNT: CPT | Mod: S$GLB,,, | Performed by: PODIATRIST

## 2021-06-03 PROCEDURE — 99999 PR PBB SHADOW E&M-EST. PATIENT-LVL II: CPT | Mod: PBBFAC,,, | Performed by: DIETITIAN, REGISTERED

## 2021-06-03 PROCEDURE — 99999 PR PBB SHADOW E&M-EST. PATIENT-LVL II: ICD-10-PCS | Mod: PBBFAC,,, | Performed by: DIETITIAN, REGISTERED

## 2021-06-03 PROCEDURE — 11721 DEBRIDE NAIL 6 OR MORE: CPT | Mod: S$GLB,,, | Performed by: PODIATRIST

## 2021-06-03 PROCEDURE — 11721 PR DEBRIDEMENT OF NAILS, 6 OR MORE: ICD-10-PCS | Mod: S$GLB,,, | Performed by: PODIATRIST

## 2021-06-03 PROCEDURE — 1126F PR PAIN SEVERITY QUANTIFIED, NO PAIN PRESENT: ICD-10-PCS | Mod: S$GLB,,, | Performed by: PODIATRIST

## 2021-06-03 PROCEDURE — 99499 NO LOS: ICD-10-PCS | Mod: S$GLB,,, | Performed by: PODIATRIST

## 2021-06-03 PROCEDURE — 3051F HG A1C>EQUAL 7.0%<8.0%: CPT | Mod: CPTII,S$GLB,, | Performed by: PODIATRIST

## 2021-06-03 PROCEDURE — 90834 PSYTX W PT 45 MINUTES: CPT | Mod: S$GLB,,, | Performed by: SOCIAL WORKER

## 2021-06-03 PROCEDURE — G0108 DIAB MANAGE TRN  PER INDIV: HCPCS | Mod: S$GLB,,, | Performed by: DIETITIAN, REGISTERED

## 2021-06-03 PROCEDURE — 3051F PR MOST RECENT HEMOGLOBIN A1C LEVEL 7.0 - < 8.0%: ICD-10-PCS | Mod: CPTII,S$GLB,, | Performed by: PODIATRIST

## 2021-06-03 PROCEDURE — G0108 PR DIAB MANAGE TRN  PER INDIV: ICD-10-PCS | Mod: S$GLB,,, | Performed by: DIETITIAN, REGISTERED

## 2021-06-03 PROCEDURE — 99499 UNLISTED E&M SERVICE: CPT | Mod: S$GLB,,, | Performed by: PODIATRIST

## 2021-06-03 PROCEDURE — 90834 PR PSYCHOTHERAPY W/PATIENT, 45 MIN: ICD-10-PCS | Mod: S$GLB,,, | Performed by: SOCIAL WORKER

## 2021-06-03 PROCEDURE — 3008F PR BODY MASS INDEX (BMI) DOCUMENTED: ICD-10-PCS | Mod: CPTII,S$GLB,, | Performed by: PODIATRIST

## 2021-07-08 DIAGNOSIS — I10 ESSENTIAL HYPERTENSION: ICD-10-CM

## 2021-07-08 DIAGNOSIS — I15.2 HYPERTENSION ASSOCIATED WITH DIABETES: ICD-10-CM

## 2021-07-08 DIAGNOSIS — N52.01 ERECTILE DYSFUNCTION DUE TO ARTERIAL INSUFFICIENCY: ICD-10-CM

## 2021-07-08 DIAGNOSIS — I25.10 CORONARY ARTERY DISEASE INVOLVING NATIVE CORONARY ARTERY OF NATIVE HEART WITHOUT ANGINA PECTORIS: ICD-10-CM

## 2021-07-08 DIAGNOSIS — E11.59 HYPERTENSION ASSOCIATED WITH DIABETES: ICD-10-CM

## 2021-07-08 RX ORDER — FUROSEMIDE 40 MG/1
40 TABLET ORAL 2 TIMES DAILY
Qty: 180 TABLET | Refills: 3 | Status: CANCELLED | OUTPATIENT
Start: 2021-07-08

## 2021-07-08 RX ORDER — ATORVASTATIN CALCIUM 10 MG/1
10 TABLET, FILM COATED ORAL DAILY
Qty: 90 TABLET | Refills: 3 | Status: CANCELLED | OUTPATIENT
Start: 2021-07-08

## 2021-07-08 RX ORDER — AMLODIPINE BESYLATE 10 MG/1
10 TABLET ORAL NIGHTLY
Qty: 90 TABLET | Refills: 3 | Status: CANCELLED | OUTPATIENT
Start: 2021-07-08 | End: 2022-07-08

## 2021-07-08 RX ORDER — LISINOPRIL 20 MG/1
20 TABLET ORAL DAILY
Qty: 90 TABLET | Refills: 3 | Status: CANCELLED | OUTPATIENT
Start: 2021-07-08

## 2021-07-08 RX ORDER — SILDENAFIL 50 MG/1
50 TABLET, FILM COATED ORAL DAILY PRN
Qty: 5 TABLET | Refills: 3 | Status: CANCELLED | OUTPATIENT
Start: 2021-07-08

## 2021-07-08 RX ORDER — DULOXETIN HYDROCHLORIDE 30 MG/1
30 CAPSULE, DELAYED RELEASE ORAL DAILY
Qty: 90 CAPSULE | Refills: 1 | Status: CANCELLED | OUTPATIENT
Start: 2021-07-08 | End: 2022-07-08

## 2021-07-08 RX ORDER — MIGLITOL 25 MG/1
25 TABLET, COATED ORAL
Qty: 270 TABLET | Refills: 3 | Status: ON HOLD | OUTPATIENT
Start: 2021-07-08 | End: 2022-08-16 | Stop reason: HOSPADM

## 2021-07-08 RX ORDER — EMPAGLIFLOZIN 25 MG/1
25 TABLET, FILM COATED ORAL DAILY
Qty: 90 TABLET | Refills: 3 | Status: SHIPPED | OUTPATIENT
Start: 2021-07-08 | End: 2021-10-19 | Stop reason: SDUPTHER

## 2021-07-08 RX ORDER — HYDRALAZINE HYDROCHLORIDE 100 MG/1
100 TABLET, FILM COATED ORAL EVERY 12 HOURS
Qty: 180 TABLET | Refills: 3 | Status: CANCELLED | OUTPATIENT
Start: 2021-07-08 | End: 2022-07-08

## 2021-07-08 RX ORDER — METOLAZONE 2.5 MG/1
2.5 TABLET ORAL
Qty: 45 TABLET | Refills: 3 | Status: CANCELLED | OUTPATIENT
Start: 2021-07-09 | End: 2022-07-09

## 2021-07-08 RX ORDER — FLASH GLUCOSE SENSOR
1 KIT MISCELLANEOUS
Qty: 6 KIT | Refills: 3 | Status: SHIPPED | OUTPATIENT
Start: 2021-07-08 | End: 2022-06-21 | Stop reason: SDUPTHER

## 2021-07-13 DIAGNOSIS — I15.2 HYPERTENSION ASSOCIATED WITH DIABETES: ICD-10-CM

## 2021-07-13 DIAGNOSIS — I10 HYPERTENSION, UNSPECIFIED TYPE: Primary | ICD-10-CM

## 2021-07-13 DIAGNOSIS — E11.59 HYPERTENSION ASSOCIATED WITH DIABETES: ICD-10-CM

## 2021-07-19 ENCOUNTER — PATIENT OUTREACH (OUTPATIENT)
Dept: ADMINISTRATIVE | Facility: OTHER | Age: 66
End: 2021-07-19

## 2021-07-20 ENCOUNTER — HOSPITAL ENCOUNTER (OUTPATIENT)
Dept: CARDIOLOGY | Facility: HOSPITAL | Age: 66
Discharge: HOME | End: 2021-07-20
Attending: INTERNAL MEDICINE
Payer: COMMERCIAL

## 2021-07-20 ENCOUNTER — OFFICE VISIT (OUTPATIENT)
Dept: CARDIOLOGY | Facility: CLINIC | Age: 66
End: 2021-07-20
Payer: COMMERCIAL

## 2021-07-20 VITALS
SYSTOLIC BLOOD PRESSURE: 190 MMHG | WEIGHT: 315 LBS | DIASTOLIC BLOOD PRESSURE: 120 MMHG | HEART RATE: 85 BPM | HEIGHT: 78 IN | OXYGEN SATURATION: 98 % | BODY MASS INDEX: 36.45 KG/M2

## 2021-07-20 DIAGNOSIS — I15.2 HYPERTENSION ASSOCIATED WITH DIABETES: ICD-10-CM

## 2021-07-20 DIAGNOSIS — E11.59 HYPERTENSION ASSOCIATED WITH DIABETES: ICD-10-CM

## 2021-07-20 DIAGNOSIS — I25.10 CORONARY ARTERY DISEASE INVOLVING NATIVE CORONARY ARTERY OF NATIVE HEART WITHOUT ANGINA PECTORIS: Primary | Chronic | ICD-10-CM

## 2021-07-20 DIAGNOSIS — E11.42 TYPE 2 DIABETES MELLITUS WITH DIABETIC POLYNEUROPATHY, WITH LONG-TERM CURRENT USE OF INSULIN: Chronic | ICD-10-CM

## 2021-07-20 DIAGNOSIS — I50.32 CHRONIC DIASTOLIC CONGESTIVE HEART FAILURE: ICD-10-CM

## 2021-07-20 DIAGNOSIS — I21.4 NSTEMI (NON-ST ELEVATED MYOCARDIAL INFARCTION): ICD-10-CM

## 2021-07-20 DIAGNOSIS — Z79.4 TYPE 2 DIABETES MELLITUS WITH DIABETIC POLYNEUROPATHY, WITH LONG-TERM CURRENT USE OF INSULIN: Chronic | ICD-10-CM

## 2021-07-20 DIAGNOSIS — I73.9 PVD (PERIPHERAL VASCULAR DISEASE): ICD-10-CM

## 2021-07-20 DIAGNOSIS — E11.59 HYPERTENSION ASSOCIATED WITH DIABETES: Chronic | ICD-10-CM

## 2021-07-20 DIAGNOSIS — I10 ESSENTIAL HYPERTENSION: ICD-10-CM

## 2021-07-20 DIAGNOSIS — I15.2 HYPERTENSION ASSOCIATED WITH DIABETES: Chronic | ICD-10-CM

## 2021-07-20 DIAGNOSIS — I44.7 LBBB (LEFT BUNDLE BRANCH BLOCK): ICD-10-CM

## 2021-07-20 PROCEDURE — 3080F PR MOST RECENT DIASTOLIC BLOOD PRESSURE >= 90 MM HG: ICD-10-PCS | Mod: CPTII,S$GLB,, | Performed by: INTERNAL MEDICINE

## 2021-07-20 PROCEDURE — 99999 PR PBB SHADOW E&M-EST. PATIENT-LVL V: ICD-10-PCS | Mod: PBBFAC,,, | Performed by: INTERNAL MEDICINE

## 2021-07-20 PROCEDURE — 99214 PR OFFICE/OUTPT VISIT, EST, LEVL IV, 30-39 MIN: ICD-10-PCS | Mod: S$GLB,,, | Performed by: INTERNAL MEDICINE

## 2021-07-20 PROCEDURE — 99214 OFFICE O/P EST MOD 30 MIN: CPT | Mod: S$GLB,,, | Performed by: INTERNAL MEDICINE

## 2021-07-20 PROCEDURE — 99999 PR PBB SHADOW E&M-EST. PATIENT-LVL V: CPT | Mod: PBBFAC,,, | Performed by: INTERNAL MEDICINE

## 2021-07-20 PROCEDURE — 3008F BODY MASS INDEX DOCD: CPT | Mod: CPTII,S$GLB,, | Performed by: INTERNAL MEDICINE

## 2021-07-20 PROCEDURE — 3051F PR MOST RECENT HEMOGLOBIN A1C LEVEL 7.0 - < 8.0%: ICD-10-PCS | Mod: CPTII,S$GLB,, | Performed by: INTERNAL MEDICINE

## 2021-07-20 PROCEDURE — 93010 ELECTROCARDIOGRAM REPORT: CPT | Mod: ,,, | Performed by: INTERNAL MEDICINE

## 2021-07-20 PROCEDURE — 1126F AMNT PAIN NOTED NONE PRSNT: CPT | Mod: CPTII,S$GLB,, | Performed by: INTERNAL MEDICINE

## 2021-07-20 PROCEDURE — 3080F DIAST BP >= 90 MM HG: CPT | Mod: CPTII,S$GLB,, | Performed by: INTERNAL MEDICINE

## 2021-07-20 PROCEDURE — 3077F PR MOST RECENT SYSTOLIC BLOOD PRESSURE >= 140 MM HG: ICD-10-PCS | Mod: CPTII,S$GLB,, | Performed by: INTERNAL MEDICINE

## 2021-07-20 PROCEDURE — 93005 ELECTROCARDIOGRAM TRACING: CPT

## 2021-07-20 PROCEDURE — 3008F PR BODY MASS INDEX (BMI) DOCUMENTED: ICD-10-PCS | Mod: CPTII,S$GLB,, | Performed by: INTERNAL MEDICINE

## 2021-07-20 PROCEDURE — 1126F PR PAIN SEVERITY QUANTIFIED, NO PAIN PRESENT: ICD-10-PCS | Mod: CPTII,S$GLB,, | Performed by: INTERNAL MEDICINE

## 2021-07-20 PROCEDURE — 3051F HG A1C>EQUAL 7.0%<8.0%: CPT | Mod: CPTII,S$GLB,, | Performed by: INTERNAL MEDICINE

## 2021-07-20 PROCEDURE — 93010 EKG 12-LEAD: ICD-10-PCS | Mod: ,,, | Performed by: INTERNAL MEDICINE

## 2021-07-20 PROCEDURE — 3077F SYST BP >= 140 MM HG: CPT | Mod: CPTII,S$GLB,, | Performed by: INTERNAL MEDICINE

## 2021-07-20 RX ORDER — HYDRALAZINE HYDROCHLORIDE 100 MG/1
100 TABLET, FILM COATED ORAL EVERY 8 HOURS
Qty: 270 TABLET | Refills: 3 | Status: SHIPPED | OUTPATIENT
Start: 2021-07-20 | End: 2021-11-02 | Stop reason: SDUPTHER

## 2021-07-20 RX ORDER — LISINOPRIL 40 MG/1
40 TABLET ORAL DAILY
Qty: 90 TABLET | Refills: 2 | Status: SHIPPED | OUTPATIENT
Start: 2021-07-20 | End: 2021-11-02 | Stop reason: SDUPTHER

## 2021-08-05 ENCOUNTER — TELEPHONE (OUTPATIENT)
Dept: INTERNAL MEDICINE | Facility: CLINIC | Age: 66
End: 2021-08-05

## 2021-08-05 ENCOUNTER — TELEPHONE (OUTPATIENT)
Dept: CARDIOLOGY | Facility: CLINIC | Age: 66
End: 2021-08-05

## 2021-08-05 DIAGNOSIS — I25.10 CORONARY ARTERY DISEASE INVOLVING NATIVE CORONARY ARTERY OF NATIVE HEART WITHOUT ANGINA PECTORIS: Primary | ICD-10-CM

## 2021-08-05 DIAGNOSIS — I50.32 CHRONIC DIASTOLIC CONGESTIVE HEART FAILURE: ICD-10-CM

## 2021-08-06 ENCOUNTER — TELEPHONE (OUTPATIENT)
Dept: INTERNAL MEDICINE | Facility: CLINIC | Age: 66
End: 2021-08-06

## 2021-08-10 RX ORDER — BRIMONIDINE TARTRATE 1.5 MG/ML
1 SOLUTION/ DROPS OPHTHALMIC 2 TIMES DAILY
Qty: 15 ML | Refills: 0 | Status: SHIPPED | OUTPATIENT
Start: 2021-08-10 | End: 2022-07-21 | Stop reason: SDUPTHER

## 2021-08-10 RX ORDER — TRAVOPROST OPHTHALMIC SOLUTION 0.04 MG/ML
1 SOLUTION OPHTHALMIC NIGHTLY
Qty: 3 BOTTLE | Refills: 0 | Status: SHIPPED | OUTPATIENT
Start: 2021-08-10 | End: 2021-09-09 | Stop reason: ALTCHOICE

## 2021-08-10 RX ORDER — BRINZOLAMIDE 10 MG/ML
1 SUSPENSION/ DROPS OPHTHALMIC 2 TIMES DAILY
Qty: 180 DROP | Refills: 3 | Status: SHIPPED | OUTPATIENT
Start: 2021-08-10 | End: 2021-09-09

## 2021-08-24 ENCOUNTER — OFFICE VISIT (OUTPATIENT)
Dept: INTERNAL MEDICINE | Facility: CLINIC | Age: 66
End: 2021-08-24
Payer: COMMERCIAL

## 2021-08-24 ENCOUNTER — LAB VISIT (OUTPATIENT)
Dept: LAB | Facility: HOSPITAL | Age: 66
End: 2021-08-24
Attending: INTERNAL MEDICINE
Payer: COMMERCIAL

## 2021-08-24 ENCOUNTER — OFFICE VISIT (OUTPATIENT)
Dept: DIABETES | Facility: CLINIC | Age: 66
End: 2021-08-24
Payer: COMMERCIAL

## 2021-08-24 ENCOUNTER — OFFICE VISIT (OUTPATIENT)
Dept: CARDIOLOGY | Facility: CLINIC | Age: 66
End: 2021-08-24
Payer: COMMERCIAL

## 2021-08-24 VITALS
SYSTOLIC BLOOD PRESSURE: 174 MMHG | OXYGEN SATURATION: 97 % | WEIGHT: 315 LBS | DIASTOLIC BLOOD PRESSURE: 112 MMHG | HEIGHT: 78 IN | BODY MASS INDEX: 36.45 KG/M2 | HEART RATE: 67 BPM

## 2021-08-24 VITALS
BODY MASS INDEX: 36.45 KG/M2 | OXYGEN SATURATION: 97 % | HEART RATE: 68 BPM | TEMPERATURE: 98 F | HEIGHT: 78 IN | DIASTOLIC BLOOD PRESSURE: 110 MMHG | SYSTOLIC BLOOD PRESSURE: 180 MMHG | WEIGHT: 315 LBS

## 2021-08-24 VITALS
DIASTOLIC BLOOD PRESSURE: 108 MMHG | HEART RATE: 68 BPM | SYSTOLIC BLOOD PRESSURE: 189 MMHG | WEIGHT: 315 LBS | BODY MASS INDEX: 45.9 KG/M2

## 2021-08-24 DIAGNOSIS — H40.1133 PRIMARY OPEN ANGLE GLAUCOMA OF BOTH EYES, SEVERE STAGE: Chronic | ICD-10-CM

## 2021-08-24 DIAGNOSIS — N18.32 STAGE 3B CHRONIC KIDNEY DISEASE: ICD-10-CM

## 2021-08-24 DIAGNOSIS — I25.10 CORONARY ARTERY DISEASE INVOLVING NATIVE CORONARY ARTERY OF NATIVE HEART WITHOUT ANGINA PECTORIS: ICD-10-CM

## 2021-08-24 DIAGNOSIS — E78.5 HYPERLIPIDEMIA ASSOCIATED WITH TYPE 2 DIABETES MELLITUS: ICD-10-CM

## 2021-08-24 DIAGNOSIS — I50.32 CHRONIC DIASTOLIC CONGESTIVE HEART FAILURE: ICD-10-CM

## 2021-08-24 DIAGNOSIS — H54.10 BLINDNESS AND LOW VISION: ICD-10-CM

## 2021-08-24 DIAGNOSIS — I73.9 PVD (PERIPHERAL VASCULAR DISEASE): ICD-10-CM

## 2021-08-24 DIAGNOSIS — Z79.4 TYPE 2 DIABETES MELLITUS WITH DIABETIC POLYNEUROPATHY, WITH LONG-TERM CURRENT USE OF INSULIN: Chronic | ICD-10-CM

## 2021-08-24 DIAGNOSIS — E66.01 MORBID OBESITY: ICD-10-CM

## 2021-08-24 DIAGNOSIS — E11.42 TYPE 2 DIABETES MELLITUS WITH DIABETIC POLYNEUROPATHY, WITH LONG-TERM CURRENT USE OF INSULIN: Chronic | ICD-10-CM

## 2021-08-24 DIAGNOSIS — E11.649 HYPOGLYCEMIA UNAWARENESS ASSOCIATED WITH TYPE 2 DIABETES MELLITUS: ICD-10-CM

## 2021-08-24 DIAGNOSIS — I10 ELEVATED BLOOD PRESSURE READING IN OFFICE WITH DIAGNOSIS OF HYPERTENSION: ICD-10-CM

## 2021-08-24 DIAGNOSIS — G60.9 IDIOPATHIC PERIPHERAL NEUROPATHY: ICD-10-CM

## 2021-08-24 DIAGNOSIS — E11.69 HYPERLIPIDEMIA ASSOCIATED WITH TYPE 2 DIABETES MELLITUS: ICD-10-CM

## 2021-08-24 DIAGNOSIS — I50.32 CHRONIC DIASTOLIC CONGESTIVE HEART FAILURE: Primary | ICD-10-CM

## 2021-08-24 DIAGNOSIS — E11.59 HYPERTENSION ASSOCIATED WITH DIABETES: Chronic | ICD-10-CM

## 2021-08-24 DIAGNOSIS — I50.9 CHF (NYHA CLASS III, ACC/AHA STAGE C): ICD-10-CM

## 2021-08-24 DIAGNOSIS — I15.2 HYPERTENSION ASSOCIATED WITH DIABETES: Chronic | ICD-10-CM

## 2021-08-24 DIAGNOSIS — R29.6 FREQUENT FALLS: Primary | ICD-10-CM

## 2021-08-24 DIAGNOSIS — I15.2 HYPERTENSION ASSOCIATED WITH DIABETES: ICD-10-CM

## 2021-08-24 DIAGNOSIS — G47.33 OSA (OBSTRUCTIVE SLEEP APNEA): ICD-10-CM

## 2021-08-24 DIAGNOSIS — E11.59 HYPERTENSION ASSOCIATED WITH DIABETES: ICD-10-CM

## 2021-08-24 DIAGNOSIS — G47.33 OBSTRUCTIVE SLEEP APNEA: Chronic | ICD-10-CM

## 2021-08-24 DIAGNOSIS — I21.4 NSTEMI (NON-ST ELEVATED MYOCARDIAL INFARCTION): ICD-10-CM

## 2021-08-24 DIAGNOSIS — I25.10 CORONARY ARTERY DISEASE INVOLVING NATIVE CORONARY ARTERY OF NATIVE HEART WITHOUT ANGINA PECTORIS: Chronic | ICD-10-CM

## 2021-08-24 LAB
ANION GAP SERPL CALC-SCNC: 8 MMOL/L (ref 8–16)
BUN SERPL-MCNC: 18 MG/DL (ref 8–23)
CALCIUM SERPL-MCNC: 9.2 MG/DL (ref 8.7–10.5)
CHLORIDE SERPL-SCNC: 112 MMOL/L (ref 95–110)
CO2 SERPL-SCNC: 24 MMOL/L (ref 23–29)
CREAT SERPL-MCNC: 1.5 MG/DL (ref 0.5–1.4)
EST. GFR  (AFRICAN AMERICAN): 56.1 ML/MIN/1.73 M^2
EST. GFR  (NON AFRICAN AMERICAN): 48.5 ML/MIN/1.73 M^2
GLUCOSE SERPL-MCNC: 138 MG/DL (ref 70–110)
GLUCOSE SERPL-MCNC: 65 MG/DL (ref 70–110)
POTASSIUM SERPL-SCNC: 3.6 MMOL/L (ref 3.5–5.1)
SODIUM SERPL-SCNC: 144 MMOL/L (ref 136–145)

## 2021-08-24 PROCEDURE — 99999 PR PBB SHADOW E&M-EST. PATIENT-LVL III: ICD-10-PCS | Mod: PBBFAC,,, | Performed by: PHYSICIAN ASSISTANT

## 2021-08-24 PROCEDURE — 3077F PR MOST RECENT SYSTOLIC BLOOD PRESSURE >= 140 MM HG: ICD-10-PCS | Mod: CPTII,S$GLB,, | Performed by: PHYSICIAN ASSISTANT

## 2021-08-24 PROCEDURE — 3080F PR MOST RECENT DIASTOLIC BLOOD PRESSURE >= 90 MM HG: ICD-10-PCS | Mod: CPTII,S$GLB,, | Performed by: PHYSICIAN ASSISTANT

## 2021-08-24 PROCEDURE — 3051F PR MOST RECENT HEMOGLOBIN A1C LEVEL 7.0 - < 8.0%: ICD-10-PCS | Mod: CPTII,S$GLB,, | Performed by: PHYSICIAN ASSISTANT

## 2021-08-24 PROCEDURE — 3077F SYST BP >= 140 MM HG: CPT | Mod: CPTII,S$GLB,, | Performed by: PHYSICIAN ASSISTANT

## 2021-08-24 PROCEDURE — 3051F HG A1C>EQUAL 7.0%<8.0%: CPT | Mod: CPTII,S$GLB,, | Performed by: PHYSICIAN ASSISTANT

## 2021-08-24 PROCEDURE — 99214 PR OFFICE/OUTPT VISIT, EST, LEVL IV, 30-39 MIN: ICD-10-PCS | Mod: S$GLB,,, | Performed by: INTERNAL MEDICINE

## 2021-08-24 PROCEDURE — 3051F PR MOST RECENT HEMOGLOBIN A1C LEVEL 7.0 - < 8.0%: ICD-10-PCS | Mod: CPTII,S$GLB,, | Performed by: INTERNAL MEDICINE

## 2021-08-24 PROCEDURE — 1126F PR PAIN SEVERITY QUANTIFIED, NO PAIN PRESENT: ICD-10-PCS | Mod: CPTII,S$GLB,, | Performed by: INTERNAL MEDICINE

## 2021-08-24 PROCEDURE — 1159F MED LIST DOCD IN RCRD: CPT | Mod: CPTII,S$GLB,, | Performed by: PHYSICIAN ASSISTANT

## 2021-08-24 PROCEDURE — 1159F MED LIST DOCD IN RCRD: CPT | Mod: CPTII,S$GLB,, | Performed by: INTERNAL MEDICINE

## 2021-08-24 PROCEDURE — 99214 OFFICE O/P EST MOD 30 MIN: CPT | Mod: S$GLB,,, | Performed by: INTERNAL MEDICINE

## 2021-08-24 PROCEDURE — 99999 PR PBB SHADOW E&M-EST. PATIENT-LVL V: CPT | Mod: PBBFAC,,, | Performed by: INTERNAL MEDICINE

## 2021-08-24 PROCEDURE — 1125F PR PAIN SEVERITY QUANTIFIED, PAIN PRESENT: ICD-10-PCS | Mod: CPTII,S$GLB,, | Performed by: PHYSICIAN ASSISTANT

## 2021-08-24 PROCEDURE — 36415 COLL VENOUS BLD VENIPUNCTURE: CPT | Performed by: INTERNAL MEDICINE

## 2021-08-24 PROCEDURE — 1160F RVW MEDS BY RX/DR IN RCRD: CPT | Mod: CPTII,S$GLB,, | Performed by: INTERNAL MEDICINE

## 2021-08-24 PROCEDURE — 3008F PR BODY MASS INDEX (BMI) DOCUMENTED: ICD-10-PCS | Mod: CPTII,S$GLB,, | Performed by: PHYSICIAN ASSISTANT

## 2021-08-24 PROCEDURE — 3080F DIAST BP >= 90 MM HG: CPT | Mod: CPTII,S$GLB,, | Performed by: PHYSICIAN ASSISTANT

## 2021-08-24 PROCEDURE — 99214 OFFICE O/P EST MOD 30 MIN: CPT | Mod: S$GLB,,, | Performed by: PHYSICIAN ASSISTANT

## 2021-08-24 PROCEDURE — 3080F PR MOST RECENT DIASTOLIC BLOOD PRESSURE >= 90 MM HG: ICD-10-PCS | Mod: CPTII,S$GLB,, | Performed by: INTERNAL MEDICINE

## 2021-08-24 PROCEDURE — 99999 PR PBB SHADOW E&M-EST. PATIENT-LVL II: CPT | Mod: PBBFAC,,, | Performed by: PHYSICIAN ASSISTANT

## 2021-08-24 PROCEDURE — 1160F PR REVIEW ALL MEDS BY PRESCRIBER/CLIN PHARMACIST DOCUMENTED: ICD-10-PCS | Mod: CPTII,S$GLB,, | Performed by: PHYSICIAN ASSISTANT

## 2021-08-24 PROCEDURE — 3080F DIAST BP >= 90 MM HG: CPT | Mod: CPTII,S$GLB,, | Performed by: INTERNAL MEDICINE

## 2021-08-24 PROCEDURE — 80048 BASIC METABOLIC PNL TOTAL CA: CPT | Performed by: INTERNAL MEDICINE

## 2021-08-24 PROCEDURE — 99213 OFFICE O/P EST LOW 20 MIN: CPT | Mod: S$GLB,,, | Performed by: PHYSICIAN ASSISTANT

## 2021-08-24 PROCEDURE — 99999 PR PBB SHADOW E&M-EST. PATIENT-LVL V: ICD-10-PCS | Mod: PBBFAC,,, | Performed by: INTERNAL MEDICINE

## 2021-08-24 PROCEDURE — 3077F PR MOST RECENT SYSTOLIC BLOOD PRESSURE >= 140 MM HG: ICD-10-PCS | Mod: CPTII,S$GLB,, | Performed by: INTERNAL MEDICINE

## 2021-08-24 PROCEDURE — 1126F AMNT PAIN NOTED NONE PRSNT: CPT | Mod: CPTII,S$GLB,, | Performed by: INTERNAL MEDICINE

## 2021-08-24 PROCEDURE — 95251 CONT GLUC MNTR ANALYSIS I&R: CPT | Mod: S$GLB,,, | Performed by: PHYSICIAN ASSISTANT

## 2021-08-24 PROCEDURE — 1159F PR MEDICATION LIST DOCUMENTED IN MEDICAL RECORD: ICD-10-PCS | Mod: CPTII,S$GLB,, | Performed by: INTERNAL MEDICINE

## 2021-08-24 PROCEDURE — 3008F BODY MASS INDEX DOCD: CPT | Mod: CPTII,S$GLB,, | Performed by: INTERNAL MEDICINE

## 2021-08-24 PROCEDURE — 1125F AMNT PAIN NOTED PAIN PRSNT: CPT | Mod: CPTII,S$GLB,, | Performed by: PHYSICIAN ASSISTANT

## 2021-08-24 PROCEDURE — 3008F BODY MASS INDEX DOCD: CPT | Mod: CPTII,S$GLB,, | Performed by: PHYSICIAN ASSISTANT

## 2021-08-24 PROCEDURE — 1160F RVW MEDS BY RX/DR IN RCRD: CPT | Mod: CPTII,S$GLB,, | Performed by: PHYSICIAN ASSISTANT

## 2021-08-24 PROCEDURE — 99214 PR OFFICE/OUTPT VISIT, EST, LEVL IV, 30-39 MIN: ICD-10-PCS | Mod: S$GLB,,, | Performed by: PHYSICIAN ASSISTANT

## 2021-08-24 PROCEDURE — 95251 PR GLUCOSE MONITOR, 72 HOUR, PHYS INTERP: ICD-10-PCS | Mod: S$GLB,,, | Performed by: PHYSICIAN ASSISTANT

## 2021-08-24 PROCEDURE — 3008F PR BODY MASS INDEX (BMI) DOCUMENTED: ICD-10-PCS | Mod: CPTII,S$GLB,, | Performed by: INTERNAL MEDICINE

## 2021-08-24 PROCEDURE — 1126F PR PAIN SEVERITY QUANTIFIED, NO PAIN PRESENT: ICD-10-PCS | Mod: CPTII,S$GLB,, | Performed by: PHYSICIAN ASSISTANT

## 2021-08-24 PROCEDURE — 3077F SYST BP >= 140 MM HG: CPT | Mod: CPTII,S$GLB,, | Performed by: INTERNAL MEDICINE

## 2021-08-24 PROCEDURE — 99999 PR PBB SHADOW E&M-EST. PATIENT-LVL III: CPT | Mod: PBBFAC,,, | Performed by: PHYSICIAN ASSISTANT

## 2021-08-24 PROCEDURE — 3051F HG A1C>EQUAL 7.0%<8.0%: CPT | Mod: CPTII,S$GLB,, | Performed by: INTERNAL MEDICINE

## 2021-08-24 PROCEDURE — 1160F PR REVIEW ALL MEDS BY PRESCRIBER/CLIN PHARMACIST DOCUMENTED: ICD-10-PCS | Mod: CPTII,S$GLB,, | Performed by: INTERNAL MEDICINE

## 2021-08-24 PROCEDURE — 1159F PR MEDICATION LIST DOCUMENTED IN MEDICAL RECORD: ICD-10-PCS | Mod: CPTII,S$GLB,, | Performed by: PHYSICIAN ASSISTANT

## 2021-08-24 PROCEDURE — 99213 PR OFFICE/OUTPT VISIT, EST, LEVL III, 20-29 MIN: ICD-10-PCS | Mod: S$GLB,,, | Performed by: PHYSICIAN ASSISTANT

## 2021-08-24 PROCEDURE — 1126F AMNT PAIN NOTED NONE PRSNT: CPT | Mod: CPTII,S$GLB,, | Performed by: PHYSICIAN ASSISTANT

## 2021-08-24 PROCEDURE — 99999 PR PBB SHADOW E&M-EST. PATIENT-LVL II: ICD-10-PCS | Mod: PBBFAC,,, | Performed by: PHYSICIAN ASSISTANT

## 2021-08-24 RX ORDER — CLONIDINE HYDROCHLORIDE 0.1 MG/1
0.1 TABLET ORAL 3 TIMES DAILY
Qty: 90 TABLET | Refills: 11 | Status: SHIPPED | OUTPATIENT
Start: 2021-08-24 | End: 2021-11-02 | Stop reason: SDUPTHER

## 2021-08-24 RX ORDER — HYDROCHLOROTHIAZIDE 25 MG/1
25 TABLET ORAL DAILY
Qty: 30 TABLET | Refills: 11 | Status: SHIPPED | OUTPATIENT
Start: 2021-08-24 | End: 2021-11-02 | Stop reason: SDUPTHER

## 2021-08-24 RX ORDER — FUROSEMIDE 40 MG/1
60 TABLET ORAL 2 TIMES DAILY
Qty: 180 TABLET | Refills: 3 | Status: SHIPPED | OUTPATIENT
Start: 2021-08-24 | End: 2021-11-02 | Stop reason: SDUPTHER

## 2021-08-25 ENCOUNTER — TELEPHONE (OUTPATIENT)
Dept: CARDIOLOGY | Facility: CLINIC | Age: 66
End: 2021-08-25

## 2021-09-08 ENCOUNTER — PATIENT OUTREACH (OUTPATIENT)
Dept: ADMINISTRATIVE | Facility: OTHER | Age: 66
End: 2021-09-08

## 2021-09-09 ENCOUNTER — OFFICE VISIT (OUTPATIENT)
Dept: PODIATRY | Facility: CLINIC | Age: 66
End: 2021-09-09
Payer: COMMERCIAL

## 2021-09-09 ENCOUNTER — LAB VISIT (OUTPATIENT)
Dept: LAB | Facility: HOSPITAL | Age: 66
End: 2021-09-09
Attending: FAMILY MEDICINE
Payer: COMMERCIAL

## 2021-09-09 ENCOUNTER — OFFICE VISIT (OUTPATIENT)
Dept: OPHTHALMOLOGY | Facility: CLINIC | Age: 66
End: 2021-09-09
Payer: COMMERCIAL

## 2021-09-09 ENCOUNTER — CLINICAL SUPPORT (OUTPATIENT)
Dept: DIABETES | Facility: CLINIC | Age: 66
End: 2021-09-09
Payer: COMMERCIAL

## 2021-09-09 VITALS
BODY MASS INDEX: 36.45 KG/M2 | DIASTOLIC BLOOD PRESSURE: 67 MMHG | SYSTOLIC BLOOD PRESSURE: 121 MMHG | HEART RATE: 68 BPM | WEIGHT: 315 LBS | HEIGHT: 78 IN

## 2021-09-09 VITALS — BODY MASS INDEX: 36.45 KG/M2 | HEIGHT: 78 IN | WEIGHT: 315 LBS

## 2021-09-09 DIAGNOSIS — R29.6 FREQUENT FALLS: ICD-10-CM

## 2021-09-09 DIAGNOSIS — B35.1 DERMATOPHYTOSIS OF NAIL: ICD-10-CM

## 2021-09-09 DIAGNOSIS — H54.10 BLINDNESS AND LOW VISION: ICD-10-CM

## 2021-09-09 DIAGNOSIS — L60.0 ONYCHOCRYPTOSIS: ICD-10-CM

## 2021-09-09 DIAGNOSIS — G60.9 IDIOPATHIC PERIPHERAL NEUROPATHY: ICD-10-CM

## 2021-09-09 DIAGNOSIS — H40.1133 PRIMARY OPEN ANGLE GLAUCOMA OF BOTH EYES, SEVERE STAGE: Primary | ICD-10-CM

## 2021-09-09 LAB
ESTIMATED AVG GLUCOSE: 120 MG/DL (ref 68–131)
HBA1C MFR BLD: 5.8 % (ref 4–5.6)

## 2021-09-09 PROCEDURE — 3044F HG A1C LEVEL LT 7.0%: CPT | Mod: CPTII,S$GLB,, | Performed by: OPTOMETRIST

## 2021-09-09 PROCEDURE — 11721 DEBRIDE NAIL 6 OR MORE: CPT | Mod: S$GLB,,, | Performed by: PODIATRIST

## 2021-09-09 PROCEDURE — 3008F PR BODY MASS INDEX (BMI) DOCUMENTED: ICD-10-PCS | Mod: CPTII,S$GLB,, | Performed by: PODIATRIST

## 2021-09-09 PROCEDURE — 3066F PR DOCUMENTATION OF TREATMENT FOR NEPHROPATHY: ICD-10-PCS | Mod: CPTII,S$GLB,, | Performed by: OPTOMETRIST

## 2021-09-09 PROCEDURE — 1159F PR MEDICATION LIST DOCUMENTED IN MEDICAL RECORD: ICD-10-PCS | Mod: CPTII,S$GLB,, | Performed by: OPTOMETRIST

## 2021-09-09 PROCEDURE — 1159F MED LIST DOCD IN RCRD: CPT | Mod: CPTII,S$GLB,, | Performed by: PODIATRIST

## 2021-09-09 PROCEDURE — 99203 PR OFFICE/OUTPT VISIT, NEW, LEVL III, 30-44 MIN: ICD-10-PCS | Mod: S$GLB,,, | Performed by: OPTOMETRIST

## 2021-09-09 PROCEDURE — 3051F HG A1C>EQUAL 7.0%<8.0%: CPT | Mod: CPTII,S$GLB,, | Performed by: PODIATRIST

## 2021-09-09 PROCEDURE — 3066F NEPHROPATHY DOC TX: CPT | Mod: CPTII,S$GLB,, | Performed by: PODIATRIST

## 2021-09-09 PROCEDURE — 99999 PR PBB SHADOW E&M-EST. PATIENT-LVL II: CPT | Mod: PBBFAC,,, | Performed by: DIETITIAN, REGISTERED

## 2021-09-09 PROCEDURE — 99999 PR PBB SHADOW E&M-EST. PATIENT-LVL III: CPT | Mod: PBBFAC,,, | Performed by: PODIATRIST

## 2021-09-09 PROCEDURE — G0108 DIAB MANAGE TRN  PER INDIV: HCPCS | Mod: S$GLB,,, | Performed by: DIETITIAN, REGISTERED

## 2021-09-09 PROCEDURE — 1160F RVW MEDS BY RX/DR IN RCRD: CPT | Mod: CPTII,S$GLB,, | Performed by: PODIATRIST

## 2021-09-09 PROCEDURE — 36415 COLL VENOUS BLD VENIPUNCTURE: CPT | Performed by: PHYSICIAN ASSISTANT

## 2021-09-09 PROCEDURE — 83036 HEMOGLOBIN GLYCOSYLATED A1C: CPT | Performed by: PHYSICIAN ASSISTANT

## 2021-09-09 PROCEDURE — 99999 PR PBB SHADOW E&M-EST. PATIENT-LVL IV: CPT | Mod: PBBFAC,,, | Performed by: OPTOMETRIST

## 2021-09-09 PROCEDURE — 4010F ACE/ARB THERAPY RXD/TAKEN: CPT | Mod: CPTII,S$GLB,, | Performed by: PODIATRIST

## 2021-09-09 PROCEDURE — 3066F NEPHROPATHY DOC TX: CPT | Mod: CPTII,S$GLB,, | Performed by: OPTOMETRIST

## 2021-09-09 PROCEDURE — 99999 PR PBB SHADOW E&M-EST. PATIENT-LVL II: ICD-10-PCS | Mod: PBBFAC,,, | Performed by: DIETITIAN, REGISTERED

## 2021-09-09 PROCEDURE — 11721 PR DEBRIDEMENT OF NAILS, 6 OR MORE: ICD-10-PCS | Mod: S$GLB,,, | Performed by: PODIATRIST

## 2021-09-09 PROCEDURE — 1160F RVW MEDS BY RX/DR IN RCRD: CPT | Mod: CPTII,S$GLB,, | Performed by: OPTOMETRIST

## 2021-09-09 PROCEDURE — 1160F PR REVIEW ALL MEDS BY PRESCRIBER/CLIN PHARMACIST DOCUMENTED: ICD-10-PCS | Mod: CPTII,S$GLB,, | Performed by: OPTOMETRIST

## 2021-09-09 PROCEDURE — 3074F SYST BP LT 130 MM HG: CPT | Mod: CPTII,S$GLB,, | Performed by: PODIATRIST

## 2021-09-09 PROCEDURE — 4010F ACE/ARB THERAPY RXD/TAKEN: CPT | Mod: CPTII,S$GLB,, | Performed by: OPTOMETRIST

## 2021-09-09 PROCEDURE — 4010F PR ACE/ARB THEARPY RXD/TAKEN: ICD-10-PCS | Mod: CPTII,S$GLB,, | Performed by: OPTOMETRIST

## 2021-09-09 PROCEDURE — 3051F PR MOST RECENT HEMOGLOBIN A1C LEVEL 7.0 - < 8.0%: ICD-10-PCS | Mod: CPTII,S$GLB,, | Performed by: PODIATRIST

## 2021-09-09 PROCEDURE — 1160F PR REVIEW ALL MEDS BY PRESCRIBER/CLIN PHARMACIST DOCUMENTED: ICD-10-PCS | Mod: CPTII,S$GLB,, | Performed by: PODIATRIST

## 2021-09-09 PROCEDURE — 3008F BODY MASS INDEX DOCD: CPT | Mod: CPTII,S$GLB,, | Performed by: PODIATRIST

## 2021-09-09 PROCEDURE — 4010F PR ACE/ARB THEARPY RXD/TAKEN: ICD-10-PCS | Mod: CPTII,S$GLB,, | Performed by: PODIATRIST

## 2021-09-09 PROCEDURE — 3074F PR MOST RECENT SYSTOLIC BLOOD PRESSURE < 130 MM HG: ICD-10-PCS | Mod: CPTII,S$GLB,, | Performed by: PODIATRIST

## 2021-09-09 PROCEDURE — 99499 UNLISTED E&M SERVICE: CPT | Mod: S$GLB,,, | Performed by: PODIATRIST

## 2021-09-09 PROCEDURE — 3066F PR DOCUMENTATION OF TREATMENT FOR NEPHROPATHY: ICD-10-PCS | Mod: CPTII,S$GLB,, | Performed by: PODIATRIST

## 2021-09-09 PROCEDURE — 3078F DIAST BP <80 MM HG: CPT | Mod: CPTII,S$GLB,, | Performed by: PODIATRIST

## 2021-09-09 PROCEDURE — 1159F MED LIST DOCD IN RCRD: CPT | Mod: CPTII,S$GLB,, | Performed by: OPTOMETRIST

## 2021-09-09 PROCEDURE — 99999 PR PBB SHADOW E&M-EST. PATIENT-LVL IV: ICD-10-PCS | Mod: PBBFAC,,, | Performed by: OPTOMETRIST

## 2021-09-09 PROCEDURE — 99999 PR PBB SHADOW E&M-EST. PATIENT-LVL III: ICD-10-PCS | Mod: PBBFAC,,, | Performed by: PODIATRIST

## 2021-09-09 PROCEDURE — 3044F PR MOST RECENT HEMOGLOBIN A1C LEVEL <7.0%: ICD-10-PCS | Mod: CPTII,S$GLB,, | Performed by: OPTOMETRIST

## 2021-09-09 PROCEDURE — G0108 PR DIAB MANAGE TRN  PER INDIV: ICD-10-PCS | Mod: S$GLB,,, | Performed by: DIETITIAN, REGISTERED

## 2021-09-09 PROCEDURE — 3078F PR MOST RECENT DIASTOLIC BLOOD PRESSURE < 80 MM HG: ICD-10-PCS | Mod: CPTII,S$GLB,, | Performed by: PODIATRIST

## 2021-09-09 PROCEDURE — 99499 NO LOS: ICD-10-PCS | Mod: S$GLB,,, | Performed by: PODIATRIST

## 2021-09-09 PROCEDURE — 1159F PR MEDICATION LIST DOCUMENTED IN MEDICAL RECORD: ICD-10-PCS | Mod: CPTII,S$GLB,, | Performed by: PODIATRIST

## 2021-09-09 PROCEDURE — 99203 OFFICE O/P NEW LOW 30 MIN: CPT | Mod: S$GLB,,, | Performed by: OPTOMETRIST

## 2021-09-09 RX ORDER — BRINZOLAMIDE 10 MG/ML
1 SUSPENSION/ DROPS OPHTHALMIC 3 TIMES DAILY
Qty: 270 DROP | Refills: 3 | Status: SHIPPED | OUTPATIENT
Start: 2021-09-09 | End: 2022-07-21 | Stop reason: SDUPTHER

## 2021-09-09 RX ORDER — LATANOPROST 50 UG/ML
1 SOLUTION/ DROPS OPHTHALMIC DAILY
Qty: 6 ML | Refills: 3 | Status: SHIPPED | OUTPATIENT
Start: 2021-09-09 | End: 2022-08-15

## 2021-09-09 RX ORDER — BRINZOLAMIDE 10 MG/ML
1 SUSPENSION/ DROPS OPHTHALMIC 3 TIMES DAILY
Qty: 270 DROP | Refills: 3 | Status: SHIPPED | OUTPATIENT
Start: 2021-09-09 | End: 2021-09-09

## 2021-09-10 ENCOUNTER — PATIENT OUTREACH (OUTPATIENT)
Dept: ADMINISTRATIVE | Facility: HOSPITAL | Age: 66
End: 2021-09-10

## 2021-09-27 ENCOUNTER — PATIENT OUTREACH (OUTPATIENT)
Dept: ADMINISTRATIVE | Facility: HOSPITAL | Age: 66
End: 2021-09-27

## 2021-09-27 NOTE — PROGRESS NOTES
Gastroenterology Procedure History & Physical    Date of Procedure:  9/27/2021   Referring Provider: Lionel Muro MD      PROCEDURE(S):  EGD      REASON FOR PROCEDURE(S):  Nausea  Vomiting    HPI:    This is a 23 year old male who presents for   EGD:    for the above noted indication(s).     MEDICATIONS:  No current outpatient medications on file.     Current Facility-Administered Medications   Medication   • LACTATED RINGERS IV SOLN Pyxis Override   • lactated ringers infusion     ALLERGIES:   Allergen Reactions   • Penicillins Other (See Comments)        PAST MEDICAL HISTORY:    Tendonitis of wrist, left                       8/12/2016     Attention deficit hyperactivity disorder (ADHD* 2/17/2015     Viral URI                                       8/19/2014     Gastro-esophageal reflux disease without esoph* 6/28/2019     Left wrist injury                               1/20/2015     Tendonitis of wrist, left                       8/12/2016     Dysthymic disorder                              6/28/2019     Need for Menactra vaccination                   6/2/2017      Need for prophylactic vaccination against drake* 12/10/2013    Attention deficit hyperactivity disorder (ADHD* 2/17/2015       HERNIA REPAIR                                                   Comment: age 10 - inguinal hernia repair    WISDOM TOOTH EXTRACTION                                       ANTERIOR CRUCIATE LIGAMENT REPAIR               10/23/2017    ARTHROTOMY,OPEN REPAIR MENISCUS                 10/23/2017    FAMILY HISTORY:  Family History   Problem Relation Age of Onset   • Other Mother         cholecystectomy   • Cancer, Endometrial Maternal Grandmother    • Cancer, Breast Maternal Grandmother    • Stomach Cancer Neg Hx    • Cancer, Rectal Neg Hx    • Cancer, Liver Neg Hx    • Cancer, Esophageal Neg Hx    • Cancer, Colon Neg Hx        SOCIAL HISTORY:  Social History     Socioeconomic History   • Marital status: Single     Spouse name: Not  Individual Psychotherapy (PhD/LCSW)    11/24/2020    Site:  [place]         Therapeutic Intervention: Met with patient.  Outpatient - Insight oriented psychotherapy 45 min - CPT code 79578    Chief complaint/reason for encounter: depression     Interval history and content of current session:  The patient presents to ongoing individual therapy due to depression.  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  He will be getting another phone for the business.  He will be operating out of the front of his home.  He is still looking for someone to build a ramp.  His son in law has shipped the computer he built for the patient's business.  He will work on setting up the computer when he visits the patient at the end of the week.  The patient has set limits with clients regarding what times they can call him.  One of his clients called [employer] where he used to work.  The assistant said they wanted to go work with the patient.  He is excited about his family visiting him.  He has been spending a good bit of time at [employer] getting what he needs to prepare the meal for Thanksgiving.  His daughter's family is leaving Thursday afternoon.  She wants to work on Good Friday because she will have the opportunity to make a lot of sales.  The patient is thinking less about his ex girlfriend.  He is becoming more comfortable living with cats.  Encouraged the patient to increase his physical activity.  Praise steps to gain independence with his own business.  Emphasize that his previous relationship was toxic due to lack of contact and respect.  Explore decreasing the frequency of visits since he is doing well.  He is working on spacing out the visits.  His blood sugar has been more in control recently because he is using a pod for continuous medication distribution.  He will sleep for long periods of time and binge watch programs.  He agrees to begin walking more.    Treatment plan:  ? [employer]  on file   • Number of children: Not on file   • Years of education: Not on file   • Highest education level: Not on file   Occupational History   • Occupation:  at [employer]   Tobacco Use   • Smoking status: Never Smoker   • Smokeless tobacco: Current User   • Tobacco comment: does vaping socially   Vaping Use   • Vaping Use: user current status unknown   • Substances: Nicotine, THC   Substance and Sexual Activity   • Alcohol use: Yes     Comment: socially    • Drug use: Yes     Frequency: 7.0 times per week     Types: Marijuana   • Sexual activity: Yes     Birth control/protection: Condom   Other Topics Concern   • Not on file   Social History Narrative   • Not on file     Social Determinants of Health     Financial Resource Strain:    • Social Determinants: Financial Resource Strain:    Food Insecurity:    • Social Determinants: Food Insecurity:    Transportation Needs:    • Lack of Transportation (Medical):    • Lack of Transportation (Non-Medical):    Physical Activity:    • Days of Exercise per Week:    • Minutes of Exercise per Session:    Stress:    • Social Determinants: Stress:    Social Connections:    • Social Determinants: Social Connections:    Intimate Partner Violence: Not At Risk   • Social Determinants: Intimate Partner Violence Past Fear: No   • Social Determinants: Intimate Partner Violence Current Fear: No          REVIEW OF SYSTEMS:  All systems reviewed and negative except as mentioned in HPI      PHYSICAL EXAM:  Vitals:    Visit Vitals  /74   Pulse 65   Temp 97.2 °F (36.2 °C) (Temporal)   Resp 18   Ht 5' 8\" (1.727 m)   Wt 70.3 kg (155 lb)   SpO2 98%   BMI 23.57 kg/m²      Constitutional:  General appearance without acute distress  Skin:  No jaundice on inspection.  Skin is warm and dry on palpation  Eyes:  Normal conjunctiva and lids   appropriate      LABS AND IMAGING:  Reviewed in Epic      A/P  EGD       The procedure was discussed with the patient in detail including but  symptoms: depression  ? Why chosen therapy is appropriate versus another modality: relevant to diagnosis  ? Outcome monitoring methods: self-report, observation  ? Therapeutic intervention type: insight oriented psychotherapy, supportive psychotherapy, interactive psychotherapy     Risk parameters:  Patient reports no suicidal ideation  Patient reports no homicidal ideation  Patient reports no self-injurious behavior  Patient reports no violent behavior     Verbal deficits: None     Patient's response to intervention:  The patient's response to intervention is accepting, motivated.     Progress toward goals and other mental status changes:  The patient's progress toward goals is fair.     Diagnosis:   Major depression recurrent moderate     Plan:  individual psychotherapy and medication management by physician, Dr. Campbell     Return to clinic: as scheduled     Length of Service (minutes): 45     not limited to risks, including but not limited to bleeding, perforation, anesthesia related complications.  The patient verbalized understanding and agreed to proceed.    Lionel Muro MD  Gastroenterology

## 2021-09-28 ENCOUNTER — OFFICE VISIT (OUTPATIENT)
Dept: CARDIOLOGY | Facility: CLINIC | Age: 66
End: 2021-09-28
Payer: COMMERCIAL

## 2021-09-28 VITALS
BODY MASS INDEX: 46.67 KG/M2 | SYSTOLIC BLOOD PRESSURE: 142 MMHG | OXYGEN SATURATION: 97 % | WEIGHT: 315 LBS | HEART RATE: 67 BPM | DIASTOLIC BLOOD PRESSURE: 86 MMHG

## 2021-09-28 DIAGNOSIS — I21.4 NSTEMI (NON-ST ELEVATED MYOCARDIAL INFARCTION): ICD-10-CM

## 2021-09-28 DIAGNOSIS — I44.7 LBBB (LEFT BUNDLE BRANCH BLOCK): ICD-10-CM

## 2021-09-28 DIAGNOSIS — Z79.4 TYPE 2 DIABETES MELLITUS WITH DIABETIC POLYNEUROPATHY, WITH LONG-TERM CURRENT USE OF INSULIN: Chronic | ICD-10-CM

## 2021-09-28 DIAGNOSIS — I50.32 CHRONIC DIASTOLIC CONGESTIVE HEART FAILURE: ICD-10-CM

## 2021-09-28 DIAGNOSIS — E11.42 TYPE 2 DIABETES MELLITUS WITH DIABETIC POLYNEUROPATHY, WITH LONG-TERM CURRENT USE OF INSULIN: Chronic | ICD-10-CM

## 2021-09-28 DIAGNOSIS — I73.9 PVD (PERIPHERAL VASCULAR DISEASE): Primary | ICD-10-CM

## 2021-09-28 DIAGNOSIS — E11.59 HYPERTENSION ASSOCIATED WITH DIABETES: Chronic | ICD-10-CM

## 2021-09-28 DIAGNOSIS — E11.69 HYPERLIPIDEMIA ASSOCIATED WITH TYPE 2 DIABETES MELLITUS: ICD-10-CM

## 2021-09-28 DIAGNOSIS — I15.2 HYPERTENSION ASSOCIATED WITH DIABETES: Chronic | ICD-10-CM

## 2021-09-28 DIAGNOSIS — E78.5 HYPERLIPIDEMIA ASSOCIATED WITH TYPE 2 DIABETES MELLITUS: ICD-10-CM

## 2021-09-28 DIAGNOSIS — I25.10 CORONARY ARTERY DISEASE INVOLVING NATIVE CORONARY ARTERY OF NATIVE HEART WITHOUT ANGINA PECTORIS: Chronic | ICD-10-CM

## 2021-09-28 PROCEDURE — 3077F SYST BP >= 140 MM HG: CPT | Mod: CPTII,S$GLB,, | Performed by: INTERNAL MEDICINE

## 2021-09-28 PROCEDURE — 1160F PR REVIEW ALL MEDS BY PRESCRIBER/CLIN PHARMACIST DOCUMENTED: ICD-10-PCS | Mod: CPTII,S$GLB,, | Performed by: INTERNAL MEDICINE

## 2021-09-28 PROCEDURE — 4010F PR ACE/ARB THEARPY RXD/TAKEN: ICD-10-PCS | Mod: CPTII,S$GLB,, | Performed by: INTERNAL MEDICINE

## 2021-09-28 PROCEDURE — 3077F PR MOST RECENT SYSTOLIC BLOOD PRESSURE >= 140 MM HG: ICD-10-PCS | Mod: CPTII,S$GLB,, | Performed by: INTERNAL MEDICINE

## 2021-09-28 PROCEDURE — 1159F PR MEDICATION LIST DOCUMENTED IN MEDICAL RECORD: ICD-10-PCS | Mod: CPTII,S$GLB,, | Performed by: INTERNAL MEDICINE

## 2021-09-28 PROCEDURE — 99214 OFFICE O/P EST MOD 30 MIN: CPT | Mod: S$GLB,,, | Performed by: INTERNAL MEDICINE

## 2021-09-28 PROCEDURE — 3066F NEPHROPATHY DOC TX: CPT | Mod: CPTII,S$GLB,, | Performed by: INTERNAL MEDICINE

## 2021-09-28 PROCEDURE — 1159F MED LIST DOCD IN RCRD: CPT | Mod: CPTII,S$GLB,, | Performed by: INTERNAL MEDICINE

## 2021-09-28 PROCEDURE — 3044F PR MOST RECENT HEMOGLOBIN A1C LEVEL <7.0%: ICD-10-PCS | Mod: CPTII,S$GLB,, | Performed by: INTERNAL MEDICINE

## 2021-09-28 PROCEDURE — 99999 PR PBB SHADOW E&M-EST. PATIENT-LVL V: ICD-10-PCS | Mod: PBBFAC,,, | Performed by: INTERNAL MEDICINE

## 2021-09-28 PROCEDURE — 1160F RVW MEDS BY RX/DR IN RCRD: CPT | Mod: CPTII,S$GLB,, | Performed by: INTERNAL MEDICINE

## 2021-09-28 PROCEDURE — 3079F DIAST BP 80-89 MM HG: CPT | Mod: CPTII,S$GLB,, | Performed by: INTERNAL MEDICINE

## 2021-09-28 PROCEDURE — 3079F PR MOST RECENT DIASTOLIC BLOOD PRESSURE 80-89 MM HG: ICD-10-PCS | Mod: CPTII,S$GLB,, | Performed by: INTERNAL MEDICINE

## 2021-09-28 PROCEDURE — 3008F BODY MASS INDEX DOCD: CPT | Mod: CPTII,S$GLB,, | Performed by: INTERNAL MEDICINE

## 2021-09-28 PROCEDURE — 99999 PR PBB SHADOW E&M-EST. PATIENT-LVL V: CPT | Mod: PBBFAC,,, | Performed by: INTERNAL MEDICINE

## 2021-09-28 PROCEDURE — 4010F ACE/ARB THERAPY RXD/TAKEN: CPT | Mod: CPTII,S$GLB,, | Performed by: INTERNAL MEDICINE

## 2021-09-28 PROCEDURE — 99214 PR OFFICE/OUTPT VISIT, EST, LEVL IV, 30-39 MIN: ICD-10-PCS | Mod: S$GLB,,, | Performed by: INTERNAL MEDICINE

## 2021-09-28 PROCEDURE — 3044F HG A1C LEVEL LT 7.0%: CPT | Mod: CPTII,S$GLB,, | Performed by: INTERNAL MEDICINE

## 2021-09-28 PROCEDURE — 3008F PR BODY MASS INDEX (BMI) DOCUMENTED: ICD-10-PCS | Mod: CPTII,S$GLB,, | Performed by: INTERNAL MEDICINE

## 2021-09-28 PROCEDURE — 3066F PR DOCUMENTATION OF TREATMENT FOR NEPHROPATHY: ICD-10-PCS | Mod: CPTII,S$GLB,, | Performed by: INTERNAL MEDICINE

## 2021-09-29 DIAGNOSIS — E11.9 TYPE 2 DIABETES MELLITUS WITHOUT COMPLICATION: ICD-10-CM

## 2021-10-12 ENCOUNTER — TELEPHONE (OUTPATIENT)
Dept: INTERNAL MEDICINE | Facility: CLINIC | Age: 66
End: 2021-10-12

## 2021-10-12 ENCOUNTER — PATIENT OUTREACH (OUTPATIENT)
Dept: ADMINISTRATIVE | Facility: HOSPITAL | Age: 66
End: 2021-10-12

## 2021-10-18 ENCOUNTER — PATIENT OUTREACH (OUTPATIENT)
Dept: ADMINISTRATIVE | Facility: HOSPITAL | Age: 66
End: 2021-10-18

## 2021-10-18 ENCOUNTER — PATIENT OUTREACH (OUTPATIENT)
Dept: ADMINISTRATIVE | Facility: OTHER | Age: 66
End: 2021-10-18

## 2021-10-19 ENCOUNTER — HOSPITAL ENCOUNTER (OUTPATIENT)
Dept: CARDIOLOGY | Facility: HOSPITAL | Age: 66
Discharge: HOME | End: 2021-10-19
Attending: INTERNAL MEDICINE
Payer: COMMERCIAL

## 2021-10-19 ENCOUNTER — LAB VISIT (OUTPATIENT)
Dept: LAB | Facility: HOSPITAL | Age: 66
End: 2021-10-19
Attending: INTERNAL MEDICINE
Payer: COMMERCIAL

## 2021-10-19 ENCOUNTER — TELEPHONE (OUTPATIENT)
Dept: INTERNAL MEDICINE | Facility: CLINIC | Age: 66
End: 2021-10-19

## 2021-10-19 ENCOUNTER — TELEPHONE (OUTPATIENT)
Dept: CARDIOLOGY | Facility: CLINIC | Age: 66
End: 2021-10-19

## 2021-10-19 ENCOUNTER — OFFICE VISIT (OUTPATIENT)
Dept: DIABETES | Facility: CLINIC | Age: 66
End: 2021-10-19
Payer: COMMERCIAL

## 2021-10-19 ENCOUNTER — CLINICAL SUPPORT (OUTPATIENT)
Dept: INTERNAL MEDICINE | Facility: CLINIC | Age: 66
End: 2021-10-19
Payer: COMMERCIAL

## 2021-10-19 VITALS
HEART RATE: 63 BPM | SYSTOLIC BLOOD PRESSURE: 127 MMHG | WEIGHT: 315 LBS | DIASTOLIC BLOOD PRESSURE: 65 MMHG | BODY MASS INDEX: 47.93 KG/M2

## 2021-10-19 VITALS — SYSTOLIC BLOOD PRESSURE: 142 MMHG | DIASTOLIC BLOOD PRESSURE: 88 MMHG

## 2021-10-19 VITALS — BODY MASS INDEX: 47.88 KG/M2 | WEIGHT: 315 LBS

## 2021-10-19 DIAGNOSIS — G60.9 IDIOPATHIC PERIPHERAL NEUROPATHY: ICD-10-CM

## 2021-10-19 DIAGNOSIS — I25.10 CORONARY ARTERY DISEASE INVOLVING NATIVE CORONARY ARTERY OF NATIVE HEART WITHOUT ANGINA PECTORIS: ICD-10-CM

## 2021-10-19 DIAGNOSIS — I73.9 PVD (PERIPHERAL VASCULAR DISEASE): ICD-10-CM

## 2021-10-19 DIAGNOSIS — E66.01 MORBID OBESITY: ICD-10-CM

## 2021-10-19 DIAGNOSIS — E11.9 TYPE 2 DIABETES MELLITUS WITHOUT COMPLICATION: ICD-10-CM

## 2021-10-19 DIAGNOSIS — I15.2 HYPERTENSION ASSOCIATED WITH DIABETES: ICD-10-CM

## 2021-10-19 DIAGNOSIS — E11.59 HYPERTENSION ASSOCIATED WITH DIABETES: ICD-10-CM

## 2021-10-19 DIAGNOSIS — G47.33 OSA (OBSTRUCTIVE SLEEP APNEA): ICD-10-CM

## 2021-10-19 DIAGNOSIS — I50.9 CHF (NYHA CLASS III, ACC/AHA STAGE C): ICD-10-CM

## 2021-10-19 DIAGNOSIS — E78.5 HYPERLIPIDEMIA ASSOCIATED WITH TYPE 2 DIABETES MELLITUS: ICD-10-CM

## 2021-10-19 DIAGNOSIS — N18.32 STAGE 3B CHRONIC KIDNEY DISEASE: ICD-10-CM

## 2021-10-19 DIAGNOSIS — E11.69 HYPERLIPIDEMIA ASSOCIATED WITH TYPE 2 DIABETES MELLITUS: ICD-10-CM

## 2021-10-19 DIAGNOSIS — H54.10 BLINDNESS AND LOW VISION: ICD-10-CM

## 2021-10-19 DIAGNOSIS — E11.649 HYPOGLYCEMIA UNAWARENESS ASSOCIATED WITH TYPE 2 DIABETES MELLITUS: ICD-10-CM

## 2021-10-19 LAB
ALBUMIN/CREAT UR: 35 UG/MG (ref 0–30)
ANION GAP SERPL CALC-SCNC: 6 MMOL/L (ref 8–16)
BUN SERPL-MCNC: 19 MG/DL (ref 8–23)
CALCIUM SERPL-MCNC: 8.8 MG/DL (ref 8.7–10.5)
CHLORIDE SERPL-SCNC: 115 MMOL/L (ref 95–110)
CO2 SERPL-SCNC: 24 MMOL/L (ref 23–29)
CREAT SERPL-MCNC: 1.8 MG/DL (ref 0.5–1.4)
CREAT UR-MCNC: 60 MG/DL (ref 23–375)
EST. GFR  (AFRICAN AMERICAN): 45 ML/MIN/1.73 M^2
EST. GFR  (NON AFRICAN AMERICAN): 38.9 ML/MIN/1.73 M^2
GLUCOSE SERPL-MCNC: 146 MG/DL (ref 70–110)
GLUCOSE SERPL-MCNC: 95 MG/DL (ref 70–110)
MICROALBUMIN UR DL<=1MG/L-MCNC: 21 UG/ML
POTASSIUM SERPL-SCNC: 3.6 MMOL/L (ref 3.5–5.1)
SODIUM SERPL-SCNC: 145 MMOL/L (ref 136–145)

## 2021-10-19 PROCEDURE — 80048 BASIC METABOLIC PNL TOTAL CA: CPT | Performed by: INTERNAL MEDICINE

## 2021-10-19 PROCEDURE — 3066F NEPHROPATHY DOC TX: CPT | Mod: CPTII,S$GLB,, | Performed by: PHYSICIAN ASSISTANT

## 2021-10-19 PROCEDURE — 99214 OFFICE O/P EST MOD 30 MIN: CPT | Mod: S$GLB,,, | Performed by: PHYSICIAN ASSISTANT

## 2021-10-19 PROCEDURE — 4010F ACE/ARB THERAPY RXD/TAKEN: CPT | Mod: CPTII,S$GLB,, | Performed by: PHYSICIAN ASSISTANT

## 2021-10-19 PROCEDURE — 93970 CV US DOPPLER VENOUS LEGS BILATERAL (CUPID ONLY): ICD-10-PCS | Mod: 26,,, | Performed by: INTERNAL MEDICINE

## 2021-10-19 PROCEDURE — 1159F MED LIST DOCD IN RCRD: CPT | Mod: CPTII,S$GLB,, | Performed by: PHYSICIAN ASSISTANT

## 2021-10-19 PROCEDURE — 93970 EXTREMITY STUDY: CPT | Mod: 26,,, | Performed by: INTERNAL MEDICINE

## 2021-10-19 PROCEDURE — 99999 PR PBB SHADOW E&M-EST. PATIENT-LVL IV: CPT | Mod: PBBFAC,,, | Performed by: PHYSICIAN ASSISTANT

## 2021-10-19 PROCEDURE — 3008F BODY MASS INDEX DOCD: CPT | Mod: CPTII,S$GLB,, | Performed by: PHYSICIAN ASSISTANT

## 2021-10-19 PROCEDURE — 82570 ASSAY OF URINE CREATININE: CPT | Performed by: FAMILY MEDICINE

## 2021-10-19 PROCEDURE — 95251 PR GLUCOSE MONITOR, 72 HOUR, PHYS INTERP: ICD-10-PCS | Mod: S$GLB,,, | Performed by: PHYSICIAN ASSISTANT

## 2021-10-19 PROCEDURE — 93970 EXTREMITY STUDY: CPT | Mod: 50

## 2021-10-19 PROCEDURE — 4010F PR ACE/ARB THEARPY RXD/TAKEN: ICD-10-PCS | Mod: CPTII,S$GLB,, | Performed by: PHYSICIAN ASSISTANT

## 2021-10-19 PROCEDURE — 3066F PR DOCUMENTATION OF TREATMENT FOR NEPHROPATHY: ICD-10-PCS | Mod: CPTII,S$GLB,, | Performed by: PHYSICIAN ASSISTANT

## 2021-10-19 PROCEDURE — 3078F DIAST BP <80 MM HG: CPT | Mod: CPTII,S$GLB,, | Performed by: PHYSICIAN ASSISTANT

## 2021-10-19 PROCEDURE — 3044F HG A1C LEVEL LT 7.0%: CPT | Mod: CPTII,S$GLB,, | Performed by: PHYSICIAN ASSISTANT

## 2021-10-19 PROCEDURE — 99999 PR PBB SHADOW E&M-EST. PATIENT-LVL I: ICD-10-PCS | Mod: PBBFAC,,,

## 2021-10-19 PROCEDURE — 1159F PR MEDICATION LIST DOCUMENTED IN MEDICAL RECORD: ICD-10-PCS | Mod: CPTII,S$GLB,, | Performed by: PHYSICIAN ASSISTANT

## 2021-10-19 PROCEDURE — 95251 CONT GLUC MNTR ANALYSIS I&R: CPT | Mod: S$GLB,,, | Performed by: PHYSICIAN ASSISTANT

## 2021-10-19 PROCEDURE — 36415 COLL VENOUS BLD VENIPUNCTURE: CPT | Performed by: INTERNAL MEDICINE

## 2021-10-19 PROCEDURE — 3074F SYST BP LT 130 MM HG: CPT | Mod: CPTII,S$GLB,, | Performed by: PHYSICIAN ASSISTANT

## 2021-10-19 PROCEDURE — 3074F PR MOST RECENT SYSTOLIC BLOOD PRESSURE < 130 MM HG: ICD-10-PCS | Mod: CPTII,S$GLB,, | Performed by: PHYSICIAN ASSISTANT

## 2021-10-19 PROCEDURE — 99999 PR PBB SHADOW E&M-EST. PATIENT-LVL IV: ICD-10-PCS | Mod: PBBFAC,,, | Performed by: PHYSICIAN ASSISTANT

## 2021-10-19 PROCEDURE — 99214 PR OFFICE/OUTPT VISIT, EST, LEVL IV, 30-39 MIN: ICD-10-PCS | Mod: S$GLB,,, | Performed by: PHYSICIAN ASSISTANT

## 2021-10-19 PROCEDURE — 3008F PR BODY MASS INDEX (BMI) DOCUMENTED: ICD-10-PCS | Mod: CPTII,S$GLB,, | Performed by: PHYSICIAN ASSISTANT

## 2021-10-19 PROCEDURE — 99999 PR PBB SHADOW E&M-EST. PATIENT-LVL I: CPT | Mod: PBBFAC,,,

## 2021-10-19 PROCEDURE — 3044F PR MOST RECENT HEMOGLOBIN A1C LEVEL <7.0%: ICD-10-PCS | Mod: CPTII,S$GLB,, | Performed by: PHYSICIAN ASSISTANT

## 2021-10-19 PROCEDURE — 3078F PR MOST RECENT DIASTOLIC BLOOD PRESSURE < 80 MM HG: ICD-10-PCS | Mod: CPTII,S$GLB,, | Performed by: PHYSICIAN ASSISTANT

## 2021-10-19 RX ORDER — EMPAGLIFLOZIN 25 MG/1
25 TABLET, FILM COATED ORAL DAILY
Qty: 90 TABLET | Refills: 3 | Status: SHIPPED | OUTPATIENT
Start: 2021-10-19 | End: 2022-07-21 | Stop reason: SDUPTHER

## 2021-10-19 RX ORDER — AMLODIPINE BESYLATE 5 MG/1
5 TABLET ORAL NIGHTLY
Qty: 90 TABLET | Refills: 0 | Status: SHIPPED | OUTPATIENT
Start: 2021-10-19 | End: 2021-11-02 | Stop reason: SDUPTHER

## 2021-10-19 RX ORDER — AMLODIPINE BESYLATE 5 MG/1
5 TABLET ORAL NIGHTLY
Qty: 30 TABLET | Refills: 0 | Status: SHIPPED | OUTPATIENT
Start: 2021-10-19 | End: 2021-10-19 | Stop reason: SDUPTHER

## 2021-10-20 ENCOUNTER — TELEPHONE (OUTPATIENT)
Dept: CARDIOLOGY | Facility: CLINIC | Age: 66
End: 2021-10-20

## 2021-10-20 DIAGNOSIS — I50.32 CHRONIC DIASTOLIC CONGESTIVE HEART FAILURE: Primary | ICD-10-CM

## 2021-10-23 ENCOUNTER — PATIENT MESSAGE (OUTPATIENT)
Dept: INTERNAL MEDICINE | Facility: CLINIC | Age: 66
End: 2021-10-23
Payer: COMMERCIAL

## 2021-10-23 DIAGNOSIS — E11.22 TYPE 2 DIABETES MELLITUS WITH STAGE 3B CHRONIC KIDNEY DISEASE, WITH LONG-TERM CURRENT USE OF INSULIN: ICD-10-CM

## 2021-10-23 DIAGNOSIS — R80.9 TYPE 2 DIABETES MELLITUS WITH MICROALBUMINURIA, WITH LONG-TERM CURRENT USE OF INSULIN: ICD-10-CM

## 2021-10-23 DIAGNOSIS — N18.32 TYPE 2 DIABETES MELLITUS WITH STAGE 3B CHRONIC KIDNEY DISEASE, WITH LONG-TERM CURRENT USE OF INSULIN: ICD-10-CM

## 2021-10-23 DIAGNOSIS — Z79.4 TYPE 2 DIABETES MELLITUS WITH MICROALBUMINURIA, WITH LONG-TERM CURRENT USE OF INSULIN: ICD-10-CM

## 2021-10-23 DIAGNOSIS — Z12.5 SCREENING PSA (PROSTATE SPECIFIC ANTIGEN): ICD-10-CM

## 2021-10-23 DIAGNOSIS — N18.32 STAGE 3B CHRONIC KIDNEY DISEASE: Primary | Chronic | ICD-10-CM

## 2021-10-23 DIAGNOSIS — E11.29 TYPE 2 DIABETES MELLITUS WITH MICROALBUMINURIA, WITH LONG-TERM CURRENT USE OF INSULIN: ICD-10-CM

## 2021-10-23 DIAGNOSIS — Z79.4 TYPE 2 DIABETES MELLITUS WITH STAGE 3B CHRONIC KIDNEY DISEASE, WITH LONG-TERM CURRENT USE OF INSULIN: ICD-10-CM

## 2021-11-01 ENCOUNTER — TELEPHONE (OUTPATIENT)
Dept: INTERNAL MEDICINE | Facility: CLINIC | Age: 66
End: 2021-11-01
Payer: COMMERCIAL

## 2021-11-02 ENCOUNTER — OFFICE VISIT (OUTPATIENT)
Dept: OPHTHALMOLOGY | Facility: CLINIC | Age: 66
End: 2021-11-02
Payer: COMMERCIAL

## 2021-11-02 ENCOUNTER — CLINICAL SUPPORT (OUTPATIENT)
Dept: INTERNAL MEDICINE | Facility: CLINIC | Age: 66
End: 2021-11-02
Payer: COMMERCIAL

## 2021-11-02 ENCOUNTER — TELEPHONE (OUTPATIENT)
Dept: INTERNAL MEDICINE | Facility: CLINIC | Age: 66
End: 2021-11-02

## 2021-11-02 ENCOUNTER — HOSPITAL ENCOUNTER (OUTPATIENT)
Dept: RADIOLOGY | Facility: HOSPITAL | Age: 66
Discharge: HOME | End: 2021-11-02
Attending: FAMILY MEDICINE
Payer: COMMERCIAL

## 2021-11-02 VITALS — DIASTOLIC BLOOD PRESSURE: 76 MMHG | SYSTOLIC BLOOD PRESSURE: 126 MMHG

## 2021-11-02 DIAGNOSIS — E55.9 VITAMIN D DEFICIENCY: Chronic | ICD-10-CM

## 2021-11-02 DIAGNOSIS — E11.29 TYPE 2 DIABETES MELLITUS WITH MICROALBUMINURIA, WITH LONG-TERM CURRENT USE OF INSULIN: ICD-10-CM

## 2021-11-02 DIAGNOSIS — E11.22 TYPE 2 DIABETES MELLITUS WITH STAGE 3A CHRONIC KIDNEY DISEASE, WITH LONG-TERM CURRENT USE OF INSULIN: ICD-10-CM

## 2021-11-02 DIAGNOSIS — E11.59 HYPERTENSION ASSOCIATED WITH DIABETES: ICD-10-CM

## 2021-11-02 DIAGNOSIS — H35.372 EPIRETINAL MEMBRANE (ERM) OF LEFT EYE: ICD-10-CM

## 2021-11-02 DIAGNOSIS — N18.32 TYPE 2 DIABETES MELLITUS WITH STAGE 3B CHRONIC KIDNEY DISEASE, WITH LONG-TERM CURRENT USE OF INSULIN: ICD-10-CM

## 2021-11-02 DIAGNOSIS — H33.021 RETINAL DETACHMENT WITH MULTIPLE BREAKS, RIGHT: ICD-10-CM

## 2021-11-02 DIAGNOSIS — E78.5 HYPERLIPIDEMIA ASSOCIATED WITH TYPE 2 DIABETES MELLITUS: Primary | ICD-10-CM

## 2021-11-02 DIAGNOSIS — Z91.148 NON COMPLIANCE W MEDICATION REGIMEN: ICD-10-CM

## 2021-11-02 DIAGNOSIS — N18.32 STAGE 3B CHRONIC KIDNEY DISEASE: ICD-10-CM

## 2021-11-02 DIAGNOSIS — H52.13 HIGH MYOPIA, BOTH EYES: ICD-10-CM

## 2021-11-02 DIAGNOSIS — Z79.4 TYPE 2 DIABETES MELLITUS WITH STAGE 3B CHRONIC KIDNEY DISEASE, WITH LONG-TERM CURRENT USE OF INSULIN: ICD-10-CM

## 2021-11-02 DIAGNOSIS — N18.31 CHRONIC KIDNEY DISEASE, STAGE 3A: Chronic | ICD-10-CM

## 2021-11-02 DIAGNOSIS — N25.81 HYPERPARATHYROIDISM, SECONDARY RENAL: ICD-10-CM

## 2021-11-02 DIAGNOSIS — R74.01 ELEVATED TRANSAMINASE LEVEL: ICD-10-CM

## 2021-11-02 DIAGNOSIS — I15.2 HYPERTENSION ASSOCIATED WITH DIABETES: ICD-10-CM

## 2021-11-02 DIAGNOSIS — R29.6 FREQUENT FALLS: ICD-10-CM

## 2021-11-02 DIAGNOSIS — N18.31 TYPE 2 DIABETES MELLITUS WITH STAGE 3A CHRONIC KIDNEY DISEASE, WITH LONG-TERM CURRENT USE OF INSULIN: ICD-10-CM

## 2021-11-02 DIAGNOSIS — I25.10 CORONARY ARTERY DISEASE INVOLVING NATIVE CORONARY ARTERY OF NATIVE HEART WITHOUT ANGINA PECTORIS: Chronic | ICD-10-CM

## 2021-11-02 DIAGNOSIS — Z79.4 TYPE 2 DIABETES MELLITUS WITH STAGE 3A CHRONIC KIDNEY DISEASE, WITH LONG-TERM CURRENT USE OF INSULIN: ICD-10-CM

## 2021-11-02 DIAGNOSIS — R80.9 TYPE 2 DIABETES MELLITUS WITH MICROALBUMINURIA, WITH LONG-TERM CURRENT USE OF INSULIN: ICD-10-CM

## 2021-11-02 DIAGNOSIS — E11.22 TYPE 2 DIABETES MELLITUS WITH STAGE 3B CHRONIC KIDNEY DISEASE, WITH LONG-TERM CURRENT USE OF INSULIN: ICD-10-CM

## 2021-11-02 DIAGNOSIS — Z79.4 TYPE 2 DIABETES MELLITUS WITH MICROALBUMINURIA, WITH LONG-TERM CURRENT USE OF INSULIN: ICD-10-CM

## 2021-11-02 DIAGNOSIS — H40.1133 PRIMARY OPEN ANGLE GLAUCOMA OF BOTH EYES, SEVERE STAGE: Primary | ICD-10-CM

## 2021-11-02 DIAGNOSIS — I10 ESSENTIAL HYPERTENSION: ICD-10-CM

## 2021-11-02 DIAGNOSIS — H54.10 BLINDNESS AND LOW VISION: ICD-10-CM

## 2021-11-02 DIAGNOSIS — E11.69 HYPERLIPIDEMIA ASSOCIATED WITH TYPE 2 DIABETES MELLITUS: Primary | ICD-10-CM

## 2021-11-02 PROCEDURE — 99999 PR PBB SHADOW E&M-EST. PATIENT-LVL IV: CPT | Mod: PBBFAC,,, | Performed by: OPHTHALMOLOGY

## 2021-11-02 PROCEDURE — 1160F PR REVIEW ALL MEDS BY PRESCRIBER/CLIN PHARMACIST DOCUMENTED: ICD-10-PCS | Mod: CPTII,S$GLB,, | Performed by: OPHTHALMOLOGY

## 2021-11-02 PROCEDURE — 92083 EXTENDED VISUAL FIELD XM: CPT | Mod: S$GLB,,, | Performed by: OPHTHALMOLOGY

## 2021-11-02 PROCEDURE — 4010F PR ACE/ARB THEARPY RXD/TAKEN: ICD-10-PCS | Mod: CPTII,S$GLB,, | Performed by: OPHTHALMOLOGY

## 2021-11-02 PROCEDURE — 4010F ACE/ARB THERAPY RXD/TAKEN: CPT | Mod: CPTII,S$GLB,, | Performed by: OPHTHALMOLOGY

## 2021-11-02 PROCEDURE — 3066F PR DOCUMENTATION OF TREATMENT FOR NEPHROPATHY: ICD-10-PCS | Mod: CPTII,S$GLB,, | Performed by: OPHTHALMOLOGY

## 2021-11-02 PROCEDURE — 1159F PR MEDICATION LIST DOCUMENTED IN MEDICAL RECORD: ICD-10-PCS | Mod: CPTII,S$GLB,, | Performed by: OPHTHALMOLOGY

## 2021-11-02 PROCEDURE — 92133 OCT, OPTIC NERVE - OU - BOTH EYES: ICD-10-PCS | Mod: S$GLB,,, | Performed by: OPHTHALMOLOGY

## 2021-11-02 PROCEDURE — 3044F HG A1C LEVEL LT 7.0%: CPT | Mod: CPTII,S$GLB,, | Performed by: OPHTHALMOLOGY

## 2021-11-02 PROCEDURE — 99214 PR OFFICE/OUTPT VISIT, EST, LEVL IV, 30-39 MIN: ICD-10-PCS | Mod: S$GLB,,, | Performed by: OPHTHALMOLOGY

## 2021-11-02 PROCEDURE — 99999 PR PBB SHADOW E&M-EST. PATIENT-LVL III: ICD-10-PCS | Mod: PBBFAC,,,

## 2021-11-02 PROCEDURE — 92133 CPTRZD OPH DX IMG PST SGM ON: CPT | Mod: S$GLB,,, | Performed by: OPHTHALMOLOGY

## 2021-11-02 PROCEDURE — 76770 US RETROPERITONEAL COMPLETE: ICD-10-PCS | Mod: 26,,, | Performed by: RADIOLOGY

## 2021-11-02 PROCEDURE — 3060F POS MICROALBUMINURIA REV: CPT | Mod: CPTII,S$GLB,, | Performed by: OPHTHALMOLOGY

## 2021-11-02 PROCEDURE — 3066F NEPHROPATHY DOC TX: CPT | Mod: CPTII,S$GLB,, | Performed by: OPHTHALMOLOGY

## 2021-11-02 PROCEDURE — 92083 HUMPHREY VISUAL FIELD - OU - BOTH EYES: ICD-10-PCS | Mod: S$GLB,,, | Performed by: OPHTHALMOLOGY

## 2021-11-02 PROCEDURE — 1160F RVW MEDS BY RX/DR IN RCRD: CPT | Mod: CPTII,S$GLB,, | Performed by: OPHTHALMOLOGY

## 2021-11-02 PROCEDURE — 99999 PR PBB SHADOW E&M-EST. PATIENT-LVL III: CPT | Mod: PBBFAC,,,

## 2021-11-02 PROCEDURE — 99999 PR PBB SHADOW E&M-EST. PATIENT-LVL IV: ICD-10-PCS | Mod: PBBFAC,,, | Performed by: OPHTHALMOLOGY

## 2021-11-02 PROCEDURE — 76770 US EXAM ABDO BACK WALL COMP: CPT | Mod: 26,,, | Performed by: RADIOLOGY

## 2021-11-02 PROCEDURE — 1159F MED LIST DOCD IN RCRD: CPT | Mod: CPTII,S$GLB,, | Performed by: OPHTHALMOLOGY

## 2021-11-02 PROCEDURE — 76770 US EXAM ABDO BACK WALL COMP: CPT | Mod: TC

## 2021-11-02 PROCEDURE — 99214 OFFICE O/P EST MOD 30 MIN: CPT | Mod: S$GLB,,, | Performed by: OPHTHALMOLOGY

## 2021-11-02 PROCEDURE — 3044F PR MOST RECENT HEMOGLOBIN A1C LEVEL <7.0%: ICD-10-PCS | Mod: CPTII,S$GLB,, | Performed by: OPHTHALMOLOGY

## 2021-11-02 PROCEDURE — 3060F PR POS MICROALBUMINURIA RESULT DOCUMENTED/REVIEW: ICD-10-PCS | Mod: CPTII,S$GLB,, | Performed by: OPHTHALMOLOGY

## 2021-11-02 RX ORDER — CLONIDINE HYDROCHLORIDE 0.1 MG/1
0.1 TABLET ORAL 3 TIMES DAILY
Qty: 270 TABLET | Refills: 1 | Status: SHIPPED | OUTPATIENT
Start: 2021-11-02 | End: 2022-07-21 | Stop reason: SDUPTHER

## 2021-11-02 RX ORDER — HYDROCHLOROTHIAZIDE 25 MG/1
25 TABLET ORAL DAILY
Qty: 90 TABLET | Refills: 1 | Status: ON HOLD | OUTPATIENT
Start: 2021-11-02 | End: 2022-08-16 | Stop reason: HOSPADM

## 2021-11-02 RX ORDER — ATORVASTATIN CALCIUM 10 MG/1
10 TABLET, FILM COATED ORAL NIGHTLY
Qty: 90 TABLET | Refills: 1 | Status: SHIPPED | OUTPATIENT
Start: 2021-11-02 | End: 2022-07-21

## 2021-11-02 RX ORDER — AMLODIPINE BESYLATE 5 MG/1
5 TABLET ORAL NIGHTLY
Qty: 90 TABLET | Refills: 1 | Status: SHIPPED | OUTPATIENT
Start: 2021-11-02 | End: 2022-07-21 | Stop reason: SDUPTHER

## 2021-11-02 RX ORDER — LISINOPRIL 40 MG/1
40 TABLET ORAL DAILY
Qty: 90 TABLET | Refills: 1 | Status: ON HOLD | OUTPATIENT
Start: 2021-11-02 | End: 2022-09-07 | Stop reason: HOSPADM

## 2021-11-02 RX ORDER — CARVEDILOL 25 MG/1
25 TABLET ORAL 2 TIMES DAILY WITH MEALS
Qty: 180 TABLET | Refills: 1 | Status: ON HOLD | OUTPATIENT
Start: 2021-11-02 | End: 2022-09-07 | Stop reason: HOSPADM

## 2021-11-02 RX ORDER — HYDRALAZINE HYDROCHLORIDE 100 MG/1
100 TABLET, FILM COATED ORAL EVERY 8 HOURS
Qty: 270 TABLET | Refills: 1 | Status: SHIPPED | OUTPATIENT
Start: 2021-11-02 | End: 2022-07-21 | Stop reason: SDUPTHER

## 2021-11-02 RX ORDER — FUROSEMIDE 40 MG/1
60 TABLET ORAL 2 TIMES DAILY
Qty: 270 TABLET | Refills: 1 | Status: ON HOLD | OUTPATIENT
Start: 2021-11-02 | End: 2022-09-07 | Stop reason: SDUPTHER

## 2021-11-02 RX ORDER — BRINZOLAMIDE 10 MG/ML
1 SUSPENSION/ DROPS OPHTHALMIC 3 TIMES DAILY
Qty: 15 ML | Refills: 6 | Status: SHIPPED | OUTPATIENT
Start: 2021-11-02 | End: 2022-03-08 | Stop reason: SDUPTHER

## 2021-11-04 ENCOUNTER — PATIENT OUTREACH (OUTPATIENT)
Dept: ADMINISTRATIVE | Facility: HOSPITAL | Age: 66
End: 2021-11-04
Payer: COMMERCIAL

## 2021-11-04 ENCOUNTER — TELEPHONE (OUTPATIENT)
Dept: INTERNAL MEDICINE | Facility: CLINIC | Age: 66
End: 2021-11-04
Payer: COMMERCIAL

## 2021-11-05 ENCOUNTER — PATIENT OUTREACH (OUTPATIENT)
Dept: ADMINISTRATIVE | Facility: HOSPITAL | Age: 66
End: 2021-11-05
Payer: COMMERCIAL

## 2021-11-08 ENCOUNTER — TELEPHONE (OUTPATIENT)
Dept: INTERNAL MEDICINE | Facility: CLINIC | Age: 66
End: 2021-11-08
Payer: COMMERCIAL

## 2021-11-08 DIAGNOSIS — N20.0 LEFT NEPHROLITHIASIS: Chronic | ICD-10-CM

## 2021-11-10 ENCOUNTER — TELEPHONE (OUTPATIENT)
Dept: UROLOGY | Facility: CLINIC | Age: 66
End: 2021-11-10
Payer: COMMERCIAL

## 2021-11-15 ENCOUNTER — PATIENT MESSAGE (OUTPATIENT)
Dept: UROLOGY | Facility: CLINIC | Age: 66
End: 2021-11-15
Payer: COMMERCIAL

## 2021-12-02 ENCOUNTER — PATIENT OUTREACH (OUTPATIENT)
Dept: ADMINISTRATIVE | Facility: OTHER | Age: 66
End: 2021-12-02
Payer: COMMERCIAL

## 2021-12-07 ENCOUNTER — OFFICE VISIT (OUTPATIENT)
Dept: OPHTHALMOLOGY | Facility: CLINIC | Age: 66
End: 2021-12-07
Payer: COMMERCIAL

## 2021-12-07 DIAGNOSIS — Z91.148 NON COMPLIANCE W MEDICATION REGIMEN: ICD-10-CM

## 2021-12-07 DIAGNOSIS — H33.021 RETINAL DETACHMENT WITH MULTIPLE BREAKS, RIGHT: ICD-10-CM

## 2021-12-07 DIAGNOSIS — H40.1133 PRIMARY OPEN ANGLE GLAUCOMA OF BOTH EYES, SEVERE STAGE: Primary | ICD-10-CM

## 2021-12-07 DIAGNOSIS — H54.10 BLINDNESS AND LOW VISION: ICD-10-CM

## 2021-12-07 DIAGNOSIS — H52.13 HIGH MYOPIA, BOTH EYES: ICD-10-CM

## 2021-12-07 DIAGNOSIS — H35.372 EPIRETINAL MEMBRANE (ERM) OF LEFT EYE: ICD-10-CM

## 2021-12-07 LAB
LEFT EYE DM RETINOPATHY: NEGATIVE
RIGHT EYE DM RETINOPATHY: NEGATIVE

## 2021-12-07 PROCEDURE — 4010F PR ACE/ARB THEARPY RXD/TAKEN: ICD-10-PCS | Mod: CPTII,S$GLB,, | Performed by: OPHTHALMOLOGY

## 2021-12-07 PROCEDURE — 3066F PR DOCUMENTATION OF TREATMENT FOR NEPHROPATHY: ICD-10-PCS | Mod: CPTII,S$GLB,, | Performed by: OPHTHALMOLOGY

## 2021-12-07 PROCEDURE — 99999 PR PBB SHADOW E&M-EST. PATIENT-LVL III: CPT | Mod: PBBFAC,,, | Performed by: OPHTHALMOLOGY

## 2021-12-07 PROCEDURE — 99999 PR PBB SHADOW E&M-EST. PATIENT-LVL III: ICD-10-PCS | Mod: PBBFAC,,, | Performed by: OPHTHALMOLOGY

## 2021-12-07 PROCEDURE — 3060F POS MICROALBUMINURIA REV: CPT | Mod: CPTII,S$GLB,, | Performed by: OPHTHALMOLOGY

## 2021-12-07 PROCEDURE — 3060F PR POS MICROALBUMINURIA RESULT DOCUMENTED/REVIEW: ICD-10-PCS | Mod: CPTII,S$GLB,, | Performed by: OPHTHALMOLOGY

## 2021-12-07 PROCEDURE — 3066F NEPHROPATHY DOC TX: CPT | Mod: CPTII,S$GLB,, | Performed by: OPHTHALMOLOGY

## 2021-12-07 PROCEDURE — 92250 FUNDUS PHOTOGRAPHY W/I&R: CPT | Mod: S$GLB,,, | Performed by: OPHTHALMOLOGY

## 2021-12-07 PROCEDURE — 99214 OFFICE O/P EST MOD 30 MIN: CPT | Mod: S$GLB,,, | Performed by: OPHTHALMOLOGY

## 2021-12-07 PROCEDURE — 99214 PR OFFICE/OUTPT VISIT, EST, LEVL IV, 30-39 MIN: ICD-10-PCS | Mod: S$GLB,,, | Performed by: OPHTHALMOLOGY

## 2021-12-07 PROCEDURE — 4010F ACE/ARB THERAPY RXD/TAKEN: CPT | Mod: CPTII,S$GLB,, | Performed by: OPHTHALMOLOGY

## 2021-12-07 PROCEDURE — 92250 COLOR FUNDUS PHOTOGRAPHY - OU - BOTH EYES: ICD-10-PCS | Mod: S$GLB,,, | Performed by: OPHTHALMOLOGY

## 2021-12-09 ENCOUNTER — OFFICE VISIT (OUTPATIENT)
Dept: PODIATRY | Facility: CLINIC | Age: 66
End: 2021-12-09
Payer: COMMERCIAL

## 2021-12-09 ENCOUNTER — CLINICAL SUPPORT (OUTPATIENT)
Dept: DIABETES | Facility: CLINIC | Age: 66
End: 2021-12-09
Payer: COMMERCIAL

## 2021-12-09 VITALS
SYSTOLIC BLOOD PRESSURE: 115 MMHG | HEIGHT: 78 IN | DIASTOLIC BLOOD PRESSURE: 69 MMHG | HEART RATE: 51 BPM | BODY MASS INDEX: 36.45 KG/M2 | WEIGHT: 315 LBS

## 2021-12-09 VITALS — HEIGHT: 78 IN | BODY MASS INDEX: 36.45 KG/M2 | WEIGHT: 315 LBS

## 2021-12-09 DIAGNOSIS — G60.9 IDIOPATHIC PERIPHERAL NEUROPATHY: ICD-10-CM

## 2021-12-09 DIAGNOSIS — L60.0 ONYCHOCRYPTOSIS: ICD-10-CM

## 2021-12-09 DIAGNOSIS — B35.1 DERMATOPHYTOSIS OF NAIL: ICD-10-CM

## 2021-12-09 DIAGNOSIS — L97.511 SKIN ULCER OF TOE OF RIGHT FOOT, LIMITED TO BREAKDOWN OF SKIN: Primary | ICD-10-CM

## 2021-12-09 PROCEDURE — 3060F POS MICROALBUMINURIA REV: CPT | Mod: CPTII,S$GLB,, | Performed by: PODIATRIST

## 2021-12-09 PROCEDURE — 11721 PR DEBRIDEMENT OF NAILS, 6 OR MORE: ICD-10-PCS | Mod: S$GLB,,, | Performed by: PODIATRIST

## 2021-12-09 PROCEDURE — 99999 PR PBB SHADOW E&M-EST. PATIENT-LVL II: CPT | Mod: PBBFAC,,, | Performed by: DIETITIAN, REGISTERED

## 2021-12-09 PROCEDURE — 99999 PR PBB SHADOW E&M-EST. PATIENT-LVL V: ICD-10-PCS | Mod: PBBFAC,,, | Performed by: PODIATRIST

## 2021-12-09 PROCEDURE — 3060F PR POS MICROALBUMINURIA RESULT DOCUMENTED/REVIEW: ICD-10-PCS | Mod: CPTII,S$GLB,, | Performed by: PODIATRIST

## 2021-12-09 PROCEDURE — 4010F ACE/ARB THERAPY RXD/TAKEN: CPT | Mod: CPTII,S$GLB,, | Performed by: PODIATRIST

## 2021-12-09 PROCEDURE — 99999 PR PBB SHADOW E&M-EST. PATIENT-LVL II: ICD-10-PCS | Mod: PBBFAC,,, | Performed by: DIETITIAN, REGISTERED

## 2021-12-09 PROCEDURE — G0108 PR DIAB MANAGE TRN  PER INDIV: ICD-10-PCS | Mod: S$GLB,,, | Performed by: DIETITIAN, REGISTERED

## 2021-12-09 PROCEDURE — 99214 PR OFFICE/OUTPT VISIT, EST, LEVL IV, 30-39 MIN: ICD-10-PCS | Mod: 25,S$GLB,, | Performed by: PODIATRIST

## 2021-12-09 PROCEDURE — 3066F NEPHROPATHY DOC TX: CPT | Mod: CPTII,S$GLB,, | Performed by: PODIATRIST

## 2021-12-09 PROCEDURE — 99214 OFFICE O/P EST MOD 30 MIN: CPT | Mod: 25,S$GLB,, | Performed by: PODIATRIST

## 2021-12-09 PROCEDURE — 99999 PR PBB SHADOW E&M-EST. PATIENT-LVL V: CPT | Mod: PBBFAC,,, | Performed by: PODIATRIST

## 2021-12-09 PROCEDURE — 3066F PR DOCUMENTATION OF TREATMENT FOR NEPHROPATHY: ICD-10-PCS | Mod: CPTII,S$GLB,, | Performed by: PODIATRIST

## 2021-12-09 PROCEDURE — G0108 DIAB MANAGE TRN  PER INDIV: HCPCS | Mod: S$GLB,,, | Performed by: DIETITIAN, REGISTERED

## 2021-12-09 PROCEDURE — 11721 DEBRIDE NAIL 6 OR MORE: CPT | Mod: S$GLB,,, | Performed by: PODIATRIST

## 2021-12-09 PROCEDURE — 4010F PR ACE/ARB THEARPY RXD/TAKEN: ICD-10-PCS | Mod: CPTII,S$GLB,, | Performed by: PODIATRIST

## 2021-12-09 RX ORDER — SILVER SULFADIAZINE 10 G/1000G
CREAM TOPICAL 2 TIMES DAILY
Qty: 20 G | Refills: 0 | Status: SHIPPED | OUTPATIENT
Start: 2021-12-09 | End: 2022-07-21

## 2021-12-09 RX ORDER — SULFAMETHOXAZOLE AND TRIMETHOPRIM 800; 160 MG/1; MG/1
1 TABLET ORAL 2 TIMES DAILY
Qty: 20 TABLET | Refills: 0 | Status: SHIPPED | OUTPATIENT
Start: 2021-12-09 | End: 2021-12-19

## 2021-12-29 DIAGNOSIS — I25.10 CORONARY ARTERY DISEASE INVOLVING NATIVE CORONARY ARTERY OF NATIVE HEART WITHOUT ANGINA PECTORIS: Primary | ICD-10-CM

## 2022-01-06 ENCOUNTER — HOSPITAL ENCOUNTER (OUTPATIENT)
Dept: CARDIOLOGY | Facility: HOSPITAL | Age: 67
Discharge: HOME | End: 2022-01-06
Attending: INTERNAL MEDICINE
Payer: COMMERCIAL

## 2022-01-06 ENCOUNTER — PATIENT OUTREACH (OUTPATIENT)
Dept: ADMINISTRATIVE | Facility: OTHER | Age: 67
End: 2022-01-06
Payer: COMMERCIAL

## 2022-01-06 ENCOUNTER — OFFICE VISIT (OUTPATIENT)
Dept: CARDIOLOGY | Facility: CLINIC | Age: 67
End: 2022-01-06
Payer: COMMERCIAL

## 2022-01-06 VITALS
BODY MASS INDEX: 48.44 KG/M2 | WEIGHT: 315 LBS | HEART RATE: 61 BPM | DIASTOLIC BLOOD PRESSURE: 72 MMHG | SYSTOLIC BLOOD PRESSURE: 118 MMHG | OXYGEN SATURATION: 96 %

## 2022-01-06 DIAGNOSIS — Z86.16 HISTORY OF 2019 NOVEL CORONAVIRUS DISEASE (COVID-19): ICD-10-CM

## 2022-01-06 DIAGNOSIS — E11.59 HYPERTENSION ASSOCIATED WITH DIABETES: Chronic | ICD-10-CM

## 2022-01-06 DIAGNOSIS — E11.42 TYPE 2 DIABETES MELLITUS WITH DIABETIC POLYNEUROPATHY, WITH LONG-TERM CURRENT USE OF INSULIN: Chronic | ICD-10-CM

## 2022-01-06 DIAGNOSIS — E78.5 HYPERLIPIDEMIA ASSOCIATED WITH TYPE 2 DIABETES MELLITUS: ICD-10-CM

## 2022-01-06 DIAGNOSIS — I25.10 CORONARY ARTERY DISEASE INVOLVING NATIVE CORONARY ARTERY OF NATIVE HEART WITHOUT ANGINA PECTORIS: ICD-10-CM

## 2022-01-06 DIAGNOSIS — I25.10 CORONARY ARTERY DISEASE INVOLVING NATIVE CORONARY ARTERY OF NATIVE HEART WITHOUT ANGINA PECTORIS: Primary | Chronic | ICD-10-CM

## 2022-01-06 DIAGNOSIS — E66.01 MORBID OBESITY WITH BMI OF 45.0-49.9, ADULT: Chronic | ICD-10-CM

## 2022-01-06 DIAGNOSIS — Z79.4 TYPE 2 DIABETES MELLITUS WITH DIABETIC POLYNEUROPATHY, WITH LONG-TERM CURRENT USE OF INSULIN: Chronic | ICD-10-CM

## 2022-01-06 DIAGNOSIS — I89.0 LYMPHEDEMA: ICD-10-CM

## 2022-01-06 DIAGNOSIS — I73.9 PVD (PERIPHERAL VASCULAR DISEASE): ICD-10-CM

## 2022-01-06 DIAGNOSIS — I50.32 CHRONIC DIASTOLIC CONGESTIVE HEART FAILURE: ICD-10-CM

## 2022-01-06 DIAGNOSIS — E11.69 HYPERLIPIDEMIA ASSOCIATED WITH TYPE 2 DIABETES MELLITUS: ICD-10-CM

## 2022-01-06 DIAGNOSIS — I44.7 LBBB (LEFT BUNDLE BRANCH BLOCK): ICD-10-CM

## 2022-01-06 DIAGNOSIS — I15.2 HYPERTENSION ASSOCIATED WITH DIABETES: Chronic | ICD-10-CM

## 2022-01-06 PROCEDURE — 99214 OFFICE O/P EST MOD 30 MIN: CPT | Mod: 25,S$GLB,, | Performed by: INTERNAL MEDICINE

## 2022-01-06 PROCEDURE — 3078F DIAST BP <80 MM HG: CPT | Mod: CPTII,S$GLB,, | Performed by: INTERNAL MEDICINE

## 2022-01-06 PROCEDURE — 1160F RVW MEDS BY RX/DR IN RCRD: CPT | Mod: CPTII,S$GLB,, | Performed by: INTERNAL MEDICINE

## 2022-01-06 PROCEDURE — 3078F PR MOST RECENT DIASTOLIC BLOOD PRESSURE < 80 MM HG: ICD-10-PCS | Mod: CPTII,S$GLB,, | Performed by: INTERNAL MEDICINE

## 2022-01-06 PROCEDURE — 3008F PR BODY MASS INDEX (BMI) DOCUMENTED: ICD-10-PCS | Mod: CPTII,S$GLB,, | Performed by: INTERNAL MEDICINE

## 2022-01-06 PROCEDURE — 99999 PR PBB SHADOW E&M-EST. PATIENT-LVL III: CPT | Mod: PBBFAC,,, | Performed by: INTERNAL MEDICINE

## 2022-01-06 PROCEDURE — 3074F SYST BP LT 130 MM HG: CPT | Mod: CPTII,S$GLB,, | Performed by: INTERNAL MEDICINE

## 2022-01-06 PROCEDURE — 1160F PR REVIEW ALL MEDS BY PRESCRIBER/CLIN PHARMACIST DOCUMENTED: ICD-10-PCS | Mod: CPTII,S$GLB,, | Performed by: INTERNAL MEDICINE

## 2022-01-06 PROCEDURE — 93010 EKG 12-LEAD: ICD-10-PCS | Mod: ,,, | Performed by: INTERNAL MEDICINE

## 2022-01-06 PROCEDURE — 93005 ELECTROCARDIOGRAM TRACING: CPT

## 2022-01-06 PROCEDURE — 1159F PR MEDICATION LIST DOCUMENTED IN MEDICAL RECORD: ICD-10-PCS | Mod: CPTII,S$GLB,, | Performed by: INTERNAL MEDICINE

## 2022-01-06 PROCEDURE — 3074F PR MOST RECENT SYSTOLIC BLOOD PRESSURE < 130 MM HG: ICD-10-PCS | Mod: CPTII,S$GLB,, | Performed by: INTERNAL MEDICINE

## 2022-01-06 PROCEDURE — 99999 PR PBB SHADOW E&M-EST. PATIENT-LVL III: ICD-10-PCS | Mod: PBBFAC,,, | Performed by: INTERNAL MEDICINE

## 2022-01-06 PROCEDURE — 93010 ELECTROCARDIOGRAM REPORT: CPT | Mod: ,,, | Performed by: INTERNAL MEDICINE

## 2022-01-06 PROCEDURE — 1159F MED LIST DOCD IN RCRD: CPT | Mod: CPTII,S$GLB,, | Performed by: INTERNAL MEDICINE

## 2022-01-06 PROCEDURE — 99214 PR OFFICE/OUTPT VISIT, EST, LEVL IV, 30-39 MIN: ICD-10-PCS | Mod: 25,S$GLB,, | Performed by: INTERNAL MEDICINE

## 2022-01-06 PROCEDURE — 3008F BODY MASS INDEX DOCD: CPT | Mod: CPTII,S$GLB,, | Performed by: INTERNAL MEDICINE

## 2022-01-06 NOTE — PROGRESS NOTES
Subjective:   Patient ID:  Stepan Springer is a 66 y.o. male who presents for follow up of No chief complaint on file.      65 yo male, came in for 3 months f/u. Open a tax office by himself.  PMH CAD h/o NSTEMI, s/p PCI midRCA SABINO x2 in 2016 by Dr. Parekh, midLAD 40%. LBBB, DM x 15 yrs, CKD stage III, HTN, HLD obesity, PVD/lymphoedema FRAN not tolerate CPAP. Left leg weakness cane dependent No smoking. Occasional drinking  H/o COVID 19 twice infection in  and positive in   ECHO normal EF and mild LVH   NUKE stress test no ischemia, reviewed in the office  EKG  NSR and LBBB  LAWSON chronic walking from the parking to the front. Worse at summer. In cold weather could walk a mile   No chest pain/chest pressure  Both shoulder pain while walking  Occasional dizziness and imbalance.  Sleeps on 1 pillow., good appetite. Lost 30 pounds in 1 yr  03/2020 fell and admitted to [facility]. Had left leg injury  BP high and A1c 12. LDL controlled. Started vegetable and mild.     07/2021 visit  Fell 2 weeks ago and the pain improved,  /120 today, no headache vision change dizziness.  No chest pain dyspnea and faint  C/o leg swelling worse and now on lasix 40 mg bid    08/2021 visit   Leg swelling improved after increased lasix to 80 mg bid for a week and kept on 60 mg bid. BMP pending now  BP high 174/112 mmHG    09/2021 visit  BP better controlled. The leg swelling and could not find appropriated size compression sock.  Left leg swelling below the knee.    Interval history  No dizziness and fall since . Leg swelling stable.   Chronic dizziness when standign up quickly. No faint and syncope. No PND and orthopnea  Could not tolerate CPAP      Past Medical History:   Diagnosis Date    Anxiety     Bell's palsy     CHF (congestive heart failure)     Chronic kidney disease, stage 3a 11/2/2021    Coronary artery disease involving native coronary artery without angina pectoris 10/18/2016  "   Depression     Diabetes mellitus     Glaucoma     Hypertension     Idiopathic peripheral neuropathy 12/11/2012    Mixed hyperlipidemia 12/11/2012    Morbid obesity with BMI of 45.0-49.9, adult 2/12/2019    Pancytopenia     Sleep apnea     Stage 3b chronic kidney disease     Type 2 diabetes mellitus with diabetic polyneuropathy, with long-term current use of insulin 12/11/2012    Vitamin B 12 deficiency 8/23/2012       Past Surgical History:   Procedure Laterality Date    CARDIAC CATHETERIZATION  7/22/13    non obstructive cad    CATARACT EXTRACTION  OU    COLONOSCOPY N/A 5/1/2019    Procedure: COLONOSCOPY;  Surgeon: Henry Mo III, MD;  Location: [facility] ENDO;  Service: Endoscopy;  Laterality: N/A;    CORONARY ANGIOPLASTY      ESOPHAGOGASTRODUODENOSCOPY N/A 5/1/2019    Procedure: ESOPHAGOGASTRODUODENOSCOPY (EGD);  Surgeon: Henry Mo III, MD;  Location: [facility] ENDO;  Service: Endoscopy;  Laterality: N/A;    EYE SURGERY      FRACTURE SURGERY      kidney stone       August 2016    PC IOL OU      RETINAL DETACHMENT REPAIR W/ SCLERAL BUCKLE LE      WRIST SURGERY         Social History     Tobacco Use    Smoking status: Never Smoker    Smokeless tobacco: Never Used   Substance Use Topics    Alcohol use: Yes     Comment: " one to two glasses of wine per year"    Drug use: No       Family History   Problem Relation Age of Onset    Macular degeneration Father     Heart disease Father         CHF     Heart attack Father     Hypertension Mother     Macular degeneration Paternal Uncle     Hypertension Maternal Grandmother     Hypertension Maternal Grandfather     Strabismus Neg Hx     Retinal detachment Neg Hx     Glaucoma Neg Hx     Blindness Neg Hx     Amblyopia Neg Hx          Review of Systems   Constitutional: Negative for decreased appetite, diaphoresis, fever, malaise/fatigue and night sweats.   HENT: Negative for nosebleeds.    Eyes: Negative for blurred vision and " double vision.   Cardiovascular: Positive for dyspnea on exertion and leg swelling. Negative for chest pain, claudication, irregular heartbeat, near-syncope, orthopnea, palpitations, paroxysmal nocturnal dyspnea and syncope.   Respiratory: Negative for cough, shortness of breath, sleep disturbances due to breathing, snoring, sputum production and wheezing.    Endocrine: Negative for cold intolerance and polyuria.   Hematologic/Lymphatic: Does not bruise/bleed easily.   Skin: Negative for rash.   Musculoskeletal: Positive for joint pain and muscle weakness. Negative for back pain, falls, joint swelling and neck pain.   Gastrointestinal: Negative for abdominal pain, heartburn, nausea and vomiting.   Genitourinary: Negative for dysuria, frequency and hematuria.   Neurological: Positive for dizziness. Negative for difficulty with concentration, focal weakness, headaches, light-headedness, numbness, seizures and weakness.   Psychiatric/Behavioral: Negative for depression, memory loss and substance abuse. The patient does not have insomnia.    Allergic/Immunologic: Negative for HIV exposure and hives.       Objective:   Physical Exam  HENT:      Head: Normocephalic.   Eyes:      Pupils: Pupils are equal, round, and reactive to light.   Neck:      Thyroid: No thyromegaly.      Vascular: Normal carotid pulses. No carotid bruit or JVD.   Cardiovascular:      Rate and Rhythm: Normal rate and regular rhythm.  No extrasystoles are present.     Chest Wall: PMI is not displaced.      Pulses: Normal pulses.      Heart sounds: Normal heart sounds. No murmur heard.  No gallop. No S3 sounds.    Pulmonary:      Effort: No respiratory distress.      Breath sounds: Normal breath sounds. No stridor.   Abdominal:      General: Bowel sounds are normal.      Palpations: Abdomen is soft.      Tenderness: There is no abdominal tenderness. There is no rebound.   Musculoskeletal:         General: Swelling (L >>R) and deformity present.    Skin:     Findings: No rash.   Neurological:      Mental Status: He is alert and oriented to person, place, and time.   Psychiatric:         Behavior: Behavior normal.         Lab Results   Component Value Date    CHOL 116 (L) 05/06/2021    CHOL 76 (L) 02/07/2020    CHOL 163 01/14/2020     Lab Results   Component Value Date    HDL 35 (L) 05/06/2021    HDL 20 (L) 02/07/2020    HDL 45 01/14/2020     Lab Results   Component Value Date    LDLCALC 48.8 (L) 05/06/2021    LDLCALC 38.8 (L) 02/07/2020    LDLCALC 96.6 01/14/2020     Lab Results   Component Value Date    TRIG 161 (H) 05/06/2021    TRIG 86 02/07/2020    TRIG 107 01/14/2020     Lab Results   Component Value Date    CHOLHDL 30.2 05/06/2021    CHOLHDL 26.3 02/07/2020    CHOLHDL 27.6 01/14/2020       Chemistry        Component Value Date/Time     10/19/2021 0711    K 3.6 10/19/2021 0711     (H) 10/19/2021 0711    CO2 24 10/19/2021 0711    BUN 19 10/19/2021 0711    BUN 34 (A) 10/28/2013 0835    CREATININE 1.8 (H) 10/19/2021 0711    CREATININE 1.7 10/28/2013 0835    GLU 95 10/19/2021 0711        Component Value Date/Time    CALCIUM 8.8 10/19/2021 0711    ALKPHOS 110 04/20/2020 1529    AST 27 04/20/2020 1529    AST 14 10/28/2013 0835    ALT 56 (H) 04/20/2020 1529    BILITOT 0.7 04/20/2020 1529    ESTGFRAFRICA 45.0 (A) 10/19/2021 0711    EGFRNONAA 38.9 (A) 10/19/2021 0711          Lab Results   Component Value Date    HGBA1C 5.8 (H) 09/09/2021     Lab Results   Component Value Date    TSH 1.349 01/14/2020     Lab Results   Component Value Date    INR 1.2 09/20/2016    INR 1.1 09/20/2016    INR 1.0 08/15/2016     Lab Results   Component Value Date    WBC 4.11 03/22/2021    HGB 16.4 03/22/2021    HCT 47.7 03/22/2021    MCV 95 03/22/2021     03/22/2021     BMP  Sodium   Date Value Ref Range Status   10/19/2021 145 136 - 145 mmol/L Final     Potassium   Date Value Ref Range Status   10/19/2021 3.6 3.5 - 5.1 mmol/L Final     Chloride   Date Value Ref  Range Status   10/19/2021 115 (H) 95 - 110 mmol/L Final     CO2   Date Value Ref Range Status   10/19/2021 24 23 - 29 mmol/L Final     BUN   Date Value Ref Range Status   10/19/2021 19 8 - 23 mg/dL Final   10/28/2013 34 (A) 4 - 21 mg/dL Final     Creatinine   Date Value Ref Range Status   10/19/2021 1.8 (H) 0.5 - 1.4 mg/dL Final   10/28/2013 1.7 mg/dL Final     Calcium   Date Value Ref Range Status   10/19/2021 8.8 8.7 - 10.5 mg/dL Final     Anion Gap   Date Value Ref Range Status   10/19/2021 6 (L) 8 - 16 mmol/L Final     eGFR if    Date Value Ref Range Status   10/19/2021 45.0 (A) >60 mL/min/1.73 m^2 Final     eGFR if non    Date Value Ref Range Status   10/19/2021 38.9 (A) >60 mL/min/1.73 m^2 Final     Comment:     Calculation used to obtain the estimated glomerular filtration  rate (eGFR) is the CKD-EPI equation.        BNP  @LABRCNTIP(BNP,BNPTRIAGEBLO)@  @LABRCNTIP(troponini)@  CrCl cannot be calculated (Patient's most recent lab result is older than the maximum 7 days allowed.).  No results found in the last 24 hours.  No results found in the last 24 hours.  No results found in the last 24 hours.    Assessment:      1. Coronary artery disease involving native coronary artery of native heart without angina pectoris    2. Hypertension associated with diabetes    3. LBBB (left bundle branch block)    4. Chronic diastolic congestive heart failure    5. Hyperlipidemia associated with type 2 diabetes mellitus    6. PVD (peripheral vascular disease)    7. Type 2 diabetes mellitus with diabetic polyneuropathy, with long-term current use of insulin    8. Morbid obesity with BMI of 45.0-49.9, adult    9. History of COVID-19 (April, 2020)    10. Lymphedema      BP and LDL controled  Lymphedema    Plan:     Continue ASA and Lipitor for HLD and DM  Continue Coreg amlodipine hydralazine clonidine hctz and Lisinopirl for HTN  Continue Lasix and HCTZ for PVD  Refer to PT for lymphedema    DM Rx  per PCP  Counseled DASH  Check Lipid profile in 6 months  Recommend heart-healthy diet, weight control and regular exercise.  Iam. Risk modification.   I have reviewed all pertinent labs and cardiac studies independently. Plans and recommendations have been formulated under my direct supervision. All questions answered and patient voiced understanding.   If symptoms persist go to the ED  RTC in 6 months      RTC in 6 months

## 2022-01-11 ENCOUNTER — PATIENT OUTREACH (OUTPATIENT)
Dept: ADMINISTRATIVE | Facility: HOSPITAL | Age: 67
End: 2022-01-11
Payer: COMMERCIAL

## 2022-02-07 ENCOUNTER — PATIENT OUTREACH (OUTPATIENT)
Dept: ADMINISTRATIVE | Facility: OTHER | Age: 67
End: 2022-02-07
Payer: COMMERCIAL

## 2022-02-08 ENCOUNTER — OFFICE VISIT (OUTPATIENT)
Dept: UROLOGY | Facility: CLINIC | Age: 67
End: 2022-02-08
Payer: COMMERCIAL

## 2022-02-08 ENCOUNTER — OFFICE VISIT (OUTPATIENT)
Dept: OPHTHALMOLOGY | Facility: CLINIC | Age: 67
End: 2022-02-08
Payer: COMMERCIAL

## 2022-02-08 ENCOUNTER — LAB VISIT (OUTPATIENT)
Dept: LAB | Facility: HOSPITAL | Age: 67
End: 2022-02-08
Attending: PHYSICIAN ASSISTANT
Payer: COMMERCIAL

## 2022-02-08 ENCOUNTER — OFFICE VISIT (OUTPATIENT)
Dept: PODIATRY | Facility: CLINIC | Age: 67
End: 2022-02-08
Payer: COMMERCIAL

## 2022-02-08 VITALS
HEART RATE: 86 BPM | WEIGHT: 315 LBS | BODY MASS INDEX: 36.45 KG/M2 | DIASTOLIC BLOOD PRESSURE: 88 MMHG | SYSTOLIC BLOOD PRESSURE: 166 MMHG | HEIGHT: 78 IN

## 2022-02-08 VITALS
HEIGHT: 78 IN | BODY MASS INDEX: 36.45 KG/M2 | WEIGHT: 315 LBS | TEMPERATURE: 97 F | HEART RATE: 84 BPM | DIASTOLIC BLOOD PRESSURE: 85 MMHG | SYSTOLIC BLOOD PRESSURE: 155 MMHG

## 2022-02-08 DIAGNOSIS — Z91.199 NON COMPLIANCE WITH MEDICAL TREATMENT: ICD-10-CM

## 2022-02-08 DIAGNOSIS — E55.9 VITAMIN D DEFICIENCY: Chronic | ICD-10-CM

## 2022-02-08 DIAGNOSIS — B35.1 DERMATOPHYTOSIS OF NAIL: ICD-10-CM

## 2022-02-08 DIAGNOSIS — H35.372 EPIRETINAL MEMBRANE (ERM) OF LEFT EYE: ICD-10-CM

## 2022-02-08 DIAGNOSIS — N20.0 KIDNEY STONE: Primary | ICD-10-CM

## 2022-02-08 DIAGNOSIS — G60.9 IDIOPATHIC PERIPHERAL NEUROPATHY: ICD-10-CM

## 2022-02-08 DIAGNOSIS — N18.31 CHRONIC KIDNEY DISEASE, STAGE 3A: Chronic | ICD-10-CM

## 2022-02-08 DIAGNOSIS — H54.10 BLINDNESS AND LOW VISION: ICD-10-CM

## 2022-02-08 DIAGNOSIS — R74.01 ELEVATED TRANSAMINASE LEVEL: ICD-10-CM

## 2022-02-08 DIAGNOSIS — N25.81 HYPERPARATHYROIDISM, SECONDARY RENAL: ICD-10-CM

## 2022-02-08 DIAGNOSIS — L97.511 SKIN ULCER OF TOE OF RIGHT FOOT, LIMITED TO BREAKDOWN OF SKIN: ICD-10-CM

## 2022-02-08 DIAGNOSIS — H40.1133 PRIMARY OPEN ANGLE GLAUCOMA OF BOTH EYES, SEVERE STAGE: Primary | ICD-10-CM

## 2022-02-08 LAB
ALBUMIN SERPL BCP-MCNC: 3 G/DL (ref 3.5–5.2)
ALT SERPL W/O P-5'-P-CCNC: 24 U/L (ref 10–44)
ANION GAP SERPL CALC-SCNC: 11 MMOL/L (ref 8–16)
AST SERPL-CCNC: 15 U/L (ref 10–40)
BUN SERPL-MCNC: 18 MG/DL (ref 8–23)
CALCIUM SERPL-MCNC: 8.9 MG/DL (ref 8.7–10.5)
CHLORIDE SERPL-SCNC: 106 MMOL/L (ref 95–110)
CO2 SERPL-SCNC: 19 MMOL/L (ref 23–29)
CREAT SERPL-MCNC: 1.6 MG/DL (ref 0.5–1.4)
EST. GFR  (AFRICAN AMERICAN): 51.9 ML/MIN/1.73 M^2
EST. GFR  (NON AFRICAN AMERICAN): 44.9 ML/MIN/1.73 M^2
ESTIMATED AVG GLUCOSE: 151 MG/DL (ref 68–131)
GLUCOSE SERPL-MCNC: 242 MG/DL (ref 70–110)
HBA1C MFR BLD: 6.9 % (ref 4–5.6)
PHOSPHATE SERPL-MCNC: 2.1 MG/DL (ref 2.7–4.5)
POTASSIUM SERPL-SCNC: 3.5 MMOL/L (ref 3.5–5.1)
PTH-INTACT SERPL-MCNC: 95.2 PG/ML (ref 9–77)
SODIUM SERPL-SCNC: 136 MMOL/L (ref 136–145)

## 2022-02-08 PROCEDURE — 99999 PR PBB SHADOW E&M-EST. PATIENT-LVL III: ICD-10-PCS | Mod: PBBFAC,,, | Performed by: PODIATRIST

## 2022-02-08 PROCEDURE — 80069 RENAL FUNCTION PANEL: CPT | Performed by: FAMILY MEDICINE

## 2022-02-08 PROCEDURE — 1159F MED LIST DOCD IN RCRD: CPT | Mod: CPTII,S$GLB,, | Performed by: OPHTHALMOLOGY

## 2022-02-08 PROCEDURE — 99214 OFFICE O/P EST MOD 30 MIN: CPT | Mod: S$GLB,,, | Performed by: OPHTHALMOLOGY

## 2022-02-08 PROCEDURE — 99213 OFFICE O/P EST LOW 20 MIN: CPT | Mod: 25,S$GLB,, | Performed by: PODIATRIST

## 2022-02-08 PROCEDURE — 3008F PR BODY MASS INDEX (BMI) DOCUMENTED: ICD-10-PCS | Mod: CPTII,S$GLB,, | Performed by: UROLOGY

## 2022-02-08 PROCEDURE — 1159F MED LIST DOCD IN RCRD: CPT | Mod: CPTII,S$GLB,, | Performed by: PODIATRIST

## 2022-02-08 PROCEDURE — 36415 COLL VENOUS BLD VENIPUNCTURE: CPT | Performed by: FAMILY MEDICINE

## 2022-02-08 PROCEDURE — 1159F MED LIST DOCD IN RCRD: CPT | Mod: CPTII,S$GLB,, | Performed by: UROLOGY

## 2022-02-08 PROCEDURE — 3077F SYST BP >= 140 MM HG: CPT | Mod: CPTII,S$GLB,, | Performed by: PODIATRIST

## 2022-02-08 PROCEDURE — 3077F SYST BP >= 140 MM HG: CPT | Mod: CPTII,S$GLB,, | Performed by: UROLOGY

## 2022-02-08 PROCEDURE — 1160F PR REVIEW ALL MEDS BY PRESCRIBER/CLIN PHARMACIST DOCUMENTED: ICD-10-PCS | Mod: CPTII,S$GLB,, | Performed by: OPHTHALMOLOGY

## 2022-02-08 PROCEDURE — 1159F PR MEDICATION LIST DOCUMENTED IN MEDICAL RECORD: ICD-10-PCS | Mod: CPTII,S$GLB,, | Performed by: OPHTHALMOLOGY

## 2022-02-08 PROCEDURE — 3079F DIAST BP 80-89 MM HG: CPT | Mod: CPTII,S$GLB,, | Performed by: PODIATRIST

## 2022-02-08 PROCEDURE — 1159F PR MEDICATION LIST DOCUMENTED IN MEDICAL RECORD: ICD-10-PCS | Mod: CPTII,S$GLB,, | Performed by: UROLOGY

## 2022-02-08 PROCEDURE — 99999 PR PBB SHADOW E&M-EST. PATIENT-LVL III: ICD-10-PCS | Mod: PBBFAC,,, | Performed by: UROLOGY

## 2022-02-08 PROCEDURE — 83970 ASSAY OF PARATHORMONE: CPT | Performed by: FAMILY MEDICINE

## 2022-02-08 PROCEDURE — 3077F PR MOST RECENT SYSTOLIC BLOOD PRESSURE >= 140 MM HG: ICD-10-PCS | Mod: CPTII,S$GLB,, | Performed by: PODIATRIST

## 2022-02-08 PROCEDURE — 1160F RVW MEDS BY RX/DR IN RCRD: CPT | Mod: CPTII,S$GLB,, | Performed by: UROLOGY

## 2022-02-08 PROCEDURE — 99203 OFFICE O/P NEW LOW 30 MIN: CPT | Mod: S$GLB,,, | Performed by: UROLOGY

## 2022-02-08 PROCEDURE — 99999 PR PBB SHADOW E&M-EST. PATIENT-LVL III: CPT | Mod: PBBFAC,,, | Performed by: UROLOGY

## 2022-02-08 PROCEDURE — 99214 PR OFFICE/OUTPT VISIT, EST, LEVL IV, 30-39 MIN: ICD-10-PCS | Mod: S$GLB,,, | Performed by: OPHTHALMOLOGY

## 2022-02-08 PROCEDURE — 3008F BODY MASS INDEX DOCD: CPT | Mod: CPTII,S$GLB,, | Performed by: PODIATRIST

## 2022-02-08 PROCEDURE — 1160F PR REVIEW ALL MEDS BY PRESCRIBER/CLIN PHARMACIST DOCUMENTED: ICD-10-PCS | Mod: CPTII,S$GLB,, | Performed by: PODIATRIST

## 2022-02-08 PROCEDURE — 99203 PR OFFICE/OUTPT VISIT, NEW, LEVL III, 30-44 MIN: ICD-10-PCS | Mod: S$GLB,,, | Performed by: UROLOGY

## 2022-02-08 PROCEDURE — 84460 ALANINE AMINO (ALT) (SGPT): CPT | Performed by: FAMILY MEDICINE

## 2022-02-08 PROCEDURE — 99213 PR OFFICE/OUTPT VISIT, EST, LEVL III, 20-29 MIN: ICD-10-PCS | Mod: 25,S$GLB,, | Performed by: PODIATRIST

## 2022-02-08 PROCEDURE — 11721 DEBRIDE NAIL 6 OR MORE: CPT | Mod: S$GLB,,, | Performed by: PODIATRIST

## 2022-02-08 PROCEDURE — 3079F DIAST BP 80-89 MM HG: CPT | Mod: CPTII,S$GLB,, | Performed by: UROLOGY

## 2022-02-08 PROCEDURE — 1159F PR MEDICATION LIST DOCUMENTED IN MEDICAL RECORD: ICD-10-PCS | Mod: CPTII,S$GLB,, | Performed by: PODIATRIST

## 2022-02-08 PROCEDURE — 3079F PR MOST RECENT DIASTOLIC BLOOD PRESSURE 80-89 MM HG: ICD-10-PCS | Mod: CPTII,S$GLB,, | Performed by: UROLOGY

## 2022-02-08 PROCEDURE — 3008F PR BODY MASS INDEX (BMI) DOCUMENTED: ICD-10-PCS | Mod: CPTII,S$GLB,, | Performed by: PODIATRIST

## 2022-02-08 PROCEDURE — 3079F PR MOST RECENT DIASTOLIC BLOOD PRESSURE 80-89 MM HG: ICD-10-PCS | Mod: CPTII,S$GLB,, | Performed by: PODIATRIST

## 2022-02-08 PROCEDURE — 99999 PR PBB SHADOW E&M-EST. PATIENT-LVL IV: CPT | Mod: PBBFAC,,, | Performed by: OPHTHALMOLOGY

## 2022-02-08 PROCEDURE — 84450 TRANSFERASE (AST) (SGOT): CPT | Performed by: FAMILY MEDICINE

## 2022-02-08 PROCEDURE — 3008F BODY MASS INDEX DOCD: CPT | Mod: CPTII,S$GLB,, | Performed by: UROLOGY

## 2022-02-08 PROCEDURE — 1160F PR REVIEW ALL MEDS BY PRESCRIBER/CLIN PHARMACIST DOCUMENTED: ICD-10-PCS | Mod: CPTII,S$GLB,, | Performed by: UROLOGY

## 2022-02-08 PROCEDURE — 3077F PR MOST RECENT SYSTOLIC BLOOD PRESSURE >= 140 MM HG: ICD-10-PCS | Mod: CPTII,S$GLB,, | Performed by: UROLOGY

## 2022-02-08 PROCEDURE — 83036 HEMOGLOBIN GLYCOSYLATED A1C: CPT | Performed by: PHYSICIAN ASSISTANT

## 2022-02-08 PROCEDURE — 1160F RVW MEDS BY RX/DR IN RCRD: CPT | Mod: CPTII,S$GLB,, | Performed by: OPHTHALMOLOGY

## 2022-02-08 PROCEDURE — 1160F RVW MEDS BY RX/DR IN RCRD: CPT | Mod: CPTII,S$GLB,, | Performed by: PODIATRIST

## 2022-02-08 PROCEDURE — 99999 PR PBB SHADOW E&M-EST. PATIENT-LVL III: CPT | Mod: PBBFAC,,, | Performed by: PODIATRIST

## 2022-02-08 PROCEDURE — 11721 PR DEBRIDEMENT OF NAILS, 6 OR MORE: ICD-10-PCS | Mod: S$GLB,,, | Performed by: PODIATRIST

## 2022-02-08 PROCEDURE — 99999 PR PBB SHADOW E&M-EST. PATIENT-LVL IV: ICD-10-PCS | Mod: PBBFAC,,, | Performed by: OPHTHALMOLOGY

## 2022-02-08 RX ORDER — BRIMONIDINE TARTRATE 1 MG/ML
1 SOLUTION/ DROPS OPHTHALMIC 3 TIMES DAILY
Qty: 15 ML | Refills: 4 | Status: SHIPPED | OUTPATIENT
Start: 2022-02-08 | End: 2023-04-28 | Stop reason: SDUPTHER

## 2022-02-08 NOTE — PROGRESS NOTES
HPI     Glaucoma     Comments: Patient reports for 2 month IOP check. Denies pain or   irritation. Va stable. States he fell 2 times this weekend, is doing well   now. Had to call EMS. Has not used drops the last  5-6 days, states he is   unable to  drops due to dizzy ness and balance issues.              Comments     Pt out of all coag meds- when he had the fall and was for many days   prior- he feels it happened while not taking drops     POAG - Dr Burgess pt   Corneal Opacity   Valley palsy   DM   RD Repair with Scleral Buckle right eye   PCIOL OU   CANALOPLASTY OD 8-22-13 Good flow and tension(-900)   CANAL OS 03/20/14/LOT # 1306-01/REF # [id number]   -500 with shutter effect due to much intraop respiratory movement   Moderate flow with good tension       OU: Azopt BID & Alphagan BID  Pt stopped using Latanoprost/ Travatan            Last edited by Alvin Hale MD on 2/8/2022  9:38 AM. (History)        ROS     Positive for: Eyes    Negative for: Constitutional, Gastrointestinal, Neurological, Skin,   Genitourinary, Musculoskeletal, HENT, Endocrine, Cardiovascular,   Respiratory, Psychiatric, Allergic/Imm, Heme/Lymph    Last edited by Alvin Hale MD on 2/8/2022  9:17 AM. (History)        Assessment /Plan     For exam results, see Encounter Report.      ICD-10-CM ICD-9-CM    1. Primary open angle glaucoma of both eyes, severe stage  H40.1133 365.11 Intraocular pressure is not within an acceptable range relative to target pressure. Increases risk of irreversible visual loss due to inadequate compliance discussed. Lengthy discussion regarding importance of absolute compliance with treatment regimen.    Discussed options, risks, and benefits of additional medication, SLT laser, or incisional glaucoma surgery without improved compliance.     Patient chooses improve compliance        365.73    2. Uncontrolled type 2 diabetes mellitus with stage 3 chronic kidney disease, with long-term  current use of insulin  E11.22 250.52 Not due for dilation at this time     E11.65 585.3     N18.30 V58.67     Z79.4     3. Epiretinal membrane (ERM) of left eye  H35.372 362.56 As before- follow    4. Blindness and low vision  H54.10 369.9 Follow           5. Non compliance with medical treatment  Z91.19 V15.81 Encourage compliance with meds and dosing        Resume meds   Use punctual  occlusion after taking Alphagan drops     OU: Azopt BID & Alphagan BID

## 2022-02-08 NOTE — PROGRESS NOTES
Subjective:     Patient ID: Stepan Springer is a 66 y.o. male.    Chief Complaint: Wound Care (2wk right skin ulcer f/u, diabetic pt wears tennis shoes w/o socks, PCP Dr. Fleming last seen 8-24-21)    Stepan is a 66 y.o. male who presents to the clinic for evaluation and treatment of high risk feet. Stepan has a past medical history of Anxiety, Bell's palsy, CHF (congestive heart failure), Chronic kidney disease, stage 3a (11/2/2021), Coronary artery disease involving native coronary artery without angina pectoris (10/18/2016), Depression, Diabetes mellitus, Glaucoma, Hypertension, Idiopathic peripheral neuropathy (12/11/2012), Mixed hyperlipidemia (12/11/2012), Morbid obesity with BMI of 45.0-49.9, adult (2/12/2019), Pancytopenia, Sleep apnea, Stage 3b chronic kidney disease, Type 2 diabetes mellitus with diabetic polyneuropathy, with long-term current use of insulin (12/11/2012), and Vitamin B 12 deficiency (8/23/2012). The patient's chief complaint is skin ulcer right foot. Patient states he was not able to follow up since last appointment 12/09/2021. Patient states he has been dressing toes as instructed.  This patient has documented high risk feet requiring routine maintenance secondary to peripheral neuropathy.       PCP: KANDICE Fleming MD    Date Last Seen by PCP: 08/24/2021    Current shoe gear:  Affected Foot: Tennis shoes     Unaffected Foot: Tennis shoes    Hemoglobin A1C   Date Value Ref Range Status   09/09/2021 5.8 (H) 4.0 - 5.6 % Final     Comment:     ADA Screening Guidelines:  5.7-6.4%  Consistent with prediabetes  >or=6.5%  Consistent with diabetes    High levels of fetal hemoglobin interfere with the HbA1C  assay. Heterozygous hemoglobin variants (HbS, HgC, etc)do  not significantly interfere with this assay.   However, presence of multiple variants may affect accuracy.     05/06/2021 7.7 (H) 4.0 - 5.6 % Final     Comment:     ADA Screening Guidelines:  5.7-6.4%  Consistent with  prediabetes  >or=6.5%  Consistent with diabetes    High levels of fetal hemoglobin interfere with the HbA1C  assay. Heterozygous hemoglobin variants (HbS, HgC, etc)do  not significantly interfere with this assay.   However, presence of multiple variants may affect accuracy.     01/25/2021 11.3 (H) 4.0 - 5.6 % Final     Comment:     ADA Screening Guidelines:  5.7-6.4%  Consistent with prediabetes  >or=6.5%  Consistent with diabetes    High levels of fetal hemoglobin interfere with the HbA1C  assay. Heterozygous hemoglobin variants (HbS, HgC, etc)do  not significantly interfere with this assay.   However, presence of multiple variants may affect accuracy.             Patient Active Problem List   Diagnosis    Status post cataract extraction and insertion of intraocular lens    Corneal opacity - Left Eye    Type 2 diabetes mellitus with diabetic polyneuropathy, with long-term current use of insulin    Obstructive sleep apnea (untreated, refuses treatment)    Chronic diastolic congestive heart failure    LBBB (left bundle branch block)    Type 2 diabetes mellitus with stage 3a chronic kidney disease, with long-term current use of insulin    Anxiety    Hypertension associated with diabetes    Stage 3b chronic kidney disease    Primary open angle glaucoma of both eyes, severe stage    Left nephrolithiasis    Hydronephrosis, left    NSTEMI (non-ST elevated myocardial infarction)    CAD with remote PCI (BMS) of RCA in 9/2016    Recurrent major depressive disorder    Vasculogenic erectile dysfunction    Blindness and low vision    Hx of retinal detachment    High myopia, both eyes    Epiretinal membrane (ERM) of left eye    Lattice degeneration of peripheral retina, left    Right-sided Bell's palsy    Hyperlipidemia associated with type 2 diabetes mellitus    Morbid obesity with BMI of 45.0-49.9, adult    GERD (gastroesophageal reflux disease)    Hypoglycemia unawareness associated with type 2  diabetes mellitus    Elevated troponin I level    Thrombocytopenia    Pancytopenia    History of COVID-19 (April, 2020)    PVD (peripheral vascular disease)    Influenza Immunization not carried out because of patient refusal    Type 2 diabetes mellitus with microalbuminuria, with long-term current use of insulin    Chronic kidney disease, stage 3a    Hyperparathyroidism, secondary renal    Vitamin D deficiency    Lymphedema       Medication List with Changes/Refills   Current Medications    AMLODIPINE (NORVASC) 5 MG TABLET    Take 1 tablet (5 mg total) by mouth every evening.    ASPIRIN 325 MG TABLET    Take 325 mg by mouth once daily.    ATORVASTATIN (LIPITOR) 10 MG TABLET    Take 1 tablet (10 mg total) by mouth every evening.    BLOOD PRESSURE CUFF MISC    1 cuff    BLOOD SUGAR DIAGNOSTIC STRP    To check BG 3 times daily, to use with insurance preferred meter    BRIMONIDINE 0.1% (ALPHAGAN P) 0.1 % DROP    Place 1 drop into both eyes 3 (three) times daily. Order 90 day supply    BRIMONIDINE 0.15 % OPTH DROP (ALPHAGAN) 0.15 % OPHTHALMIC SOLUTION    Place 1 drop into both eyes 2 (two) times daily.    BRINZOLAMIDE (AZOPT) 1 % OPHTHALMIC SUSPENSION    Place 1 drop into both eyes 3 (three) times daily.    BRINZOLAMIDE (AZOPT) 1 % OPHTHALMIC SUSPENSION    Place 1 drop into both eyes 3 (three) times daily. Brand name only    CARVEDILOL (COREG) 25 MG TABLET    Take 1 tablet (25 mg total) by mouth 2 (two) times daily with meals.    CLONIDINE (CATAPRES) 0.1 MG TABLET    Take 1 tablet (0.1 mg total) by mouth 3 (three) times daily.    DULOXETINE (CYMBALTA) 30 MG CAPSULE    Take 1 capsule (30 mg total) by mouth once daily.    EMPAGLIFLOZIN (JARDIANCE) 25 MG TABLET    Take 1 tablet (25 mg total) by mouth once daily.    FLASH GLUCOSE SENSOR (FREESTYLE CLEMENCIA 14 DAY SENSOR) KIT    1 each by Misc.(Non-Drug; Combo Route) route every 14 (fourteen) days.    FUROSEMIDE (LASIX) 40 MG TABLET    Take 1.5 tablets (60 mg total)  by mouth 2 (two) times a day.    HYDRALAZINE (APRESOLINE) 100 MG TABLET    Take 1 tablet (100 mg total) by mouth every 8 (eight) hours.    HYDROCHLOROTHIAZIDE (HYDRODIURIL) 25 MG TABLET    Take 1 tablet (25 mg total) by mouth once daily.    INSULIN NPH-INSULIN REGULAR, 70/30, (NOVOLIN 70/30 U-100 INSULIN) 100 UNIT/ML (70-30) INJECTION    Inject 85 units before breakfast and 75 units before dinner.    LANCETS MISC    To check BG 3 times daily, to use with insurance preferred meter    LATANOPROST (XALATAN) 0.005 % OPHTHALMIC SOLUTION    Place 1 drop into both eyes once daily.    LISINOPRIL (PRINIVIL,ZESTRIL) 40 MG TABLET    Take 1 tablet (40 mg total) by mouth once daily.    MIGLITOL (GLYSET) 25 MG TAB    Take 1 tablet (25 mg total) by mouth 3 (three) times daily with meals.    MUPIROCIN (BACTROBAN) 2 % OINTMENT    Apply topically once daily.    NETARSUDIL (RHOPRESSA) 0.02 % OPHTHALMIC SOLUTION    Place 1 drop into the left eye once daily.    NITROGLYCERIN (NITROSTAT) 0.4 MG SL TABLET    Place 1 tablet (0.4 mg total) under the tongue every 5 (five) minutes as needed for Chest pain.    OMEPRAZOLE (PRILOSEC) 40 MG CAPSULE    Take 1 capsule (40 mg total) by mouth once daily.    POLYETHYLENE GLYCOL (GLYCOLAX) 17 GRAM/DOSE POWDER    Take 17 g by mouth once daily.    SILVER SULFADIAZINE 1% (SILVADENE) 1 % CREAM    Apply topically 2 (two) times daily.       Review of patient's allergies indicates:   Allergen Reactions    Ancef in dextrose (iso-osm)      dizziness    Cefazolin      Other reaction(s): Shakes  Other reaction(s): Chills       Past Surgical History:   Procedure Laterality Date    CARDIAC CATHETERIZATION  7/22/13    non obstructive cad    CATARACT EXTRACTION  OU    COLONOSCOPY N/A 5/1/2019    Procedure: COLONOSCOPY;  Surgeon: Henry Mo III, MD;  Location: [facility];  Service: Endoscopy;  Laterality: N/A;    CORONARY ANGIOPLASTY      ESOPHAGOGASTRODUODENOSCOPY N/A 5/1/2019    Procedure:  "ESOPHAGOGASTRODUODENOSCOPY (EGD);  Surgeon: Henry Mo III, MD;  Location[facility];  Service: Endoscopy;  Laterality: N/A;    EYE SURGERY      FRACTURE SURGERY      kidney stone       August 2016    PC IOL OU      RETINAL DETACHMENT REPAIR W/ SCLERAL BUCKLE LE      WRIST SURGERY         Family History   Problem Relation Age of Onset    Macular degeneration Father     Heart disease Father         CHF     Heart attack Father     Hypertension Mother     Macular degeneration Paternal Uncle     Hypertension Maternal Grandmother     Hypertension Maternal Grandfather     Strabismus Neg Hx     Retinal detachment Neg Hx     Glaucoma Neg Hx     Blindness Neg Hx     Amblyopia Neg Hx        Social History     Socioeconomic History    Marital status:    Occupational History     Comment: Quit job at [employer]; wants to open his own [employer] business    Tobacco Use    Smoking status: Never Smoker    Smokeless tobacco: Never Used   Substance and Sexual Activity    Alcohol use: Yes     Comment: " one to two glasses of wine per year"    Drug use: No    Sexual activity: Not Currently     Birth control/protection: None   Social History Narrative    , 1 son, JANIE.       Vitals:    02/08/22 0814   BP: (!) 166/88   Pulse: 86   Weight: (!) 195 kg (429 lb 14.4 oz)   Height: 6' 7" (2.007 m)       Hemoglobin A1C   Date Value Ref Range Status   09/09/2021 5.8 (H) 4.0 - 5.6 % Final     Comment:     ADA Screening Guidelines:  5.7-6.4%  Consistent with prediabetes  >or=6.5%  Consistent with diabetes    High levels of fetal hemoglobin interfere with the HbA1C  assay. Heterozygous hemoglobin variants (HbS, HgC, etc)do  not significantly interfere with this assay.   However, presence of multiple variants may affect accuracy.     05/06/2021 7.7 (H) 4.0 - 5.6 % Final     Comment:     ADA Screening Guidelines:  5.7-6.4%  Consistent with prediabetes  >or=6.5%  Consistent with diabetes    High levels of fetal " hemoglobin interfere with the HbA1C  assay. Heterozygous hemoglobin variants (HbS, HgC, etc)do  not significantly interfere with this assay.   However, presence of multiple variants may affect accuracy.     01/25/2021 11.3 (H) 4.0 - 5.6 % Final     Comment:     ADA Screening Guidelines:  5.7-6.4%  Consistent with prediabetes  >or=6.5%  Consistent with diabetes    High levels of fetal hemoglobin interfere with the HbA1C  assay. Heterozygous hemoglobin variants (HbS, HgC, etc)do  not significantly interfere with this assay.   However, presence of multiple variants may affect accuracy.         Review of Systems   Constitutional: Negative for chills and fever.   Respiratory: Negative for shortness of breath.    Cardiovascular: Negative for chest pain, palpitations, orthopnea, claudication and leg swelling.   Gastrointestinal: Negative for diarrhea, nausea and vomiting.   Musculoskeletal: Negative for joint pain.   Skin: Negative for rash.   Neurological: Positive for tingling and sensory change.   Psychiatric/Behavioral: Negative.          Objective:   PHYSICAL EXAM: Apperance: Alert and orient in no distress,well developed, and with good attention to grooming and body habits  Patient presents ambulating in tennis shoes.   LOWER EXTREMITY EXAM:  VASCULAR: Dorsalis pedis pulses 1/4 bilateral and Posterior Tibial pulses 1/4 bilateral. Capillary fill time <4 seconds bilateral. Moderate edema observed bilateral. Varicosities absent bilateral. Skin temperature of the lower extremities is warm to cool, proximal to distal. Hair growth dim bilateral. (--) lymphangitis or (--) cellulitis noted right.  DERMATOLOGICAL: (--) edema, (--) erythema, (--) malodor, (--) drainage, (--) warmth to right foot.  Ulcer noted to distal right hallux with granular base and with a 1 mm yellow/white border and hyperkeratosis surrounding ulcer. Ulcer measurement  1.5cm x 1.5cm.  The ulcer does not extend into deeper tissue and (--) sinus tracts  exist. Previous ulcer noted to dorsal right hallux and 2nd toe closed with epithealuzed tissue.  The dorsum surface of the feet are soft and supple.  The plantar aspects of feet are dry and scaly. Nails 1,2,3,4,5 bilateral elongated, incurvated, and discolored with subungual debris. Webspaces 1,2,3,4 bilateral clean, dry and without evidence of break in skin integrity.   NEUROLOGICAL: Light touch, sharp-dull, proprioception all present and equal bilaterally.  Vibratory sensation diminished at bilateral hallux and navicular. Protective sensation absent at 2/10 sites as tested with a [place] 5.07 monofilament.   MUSCULOSKELETAL: Muscle strength is 5/5 for foot inverters, everters, plantarflexors, and dorsiflexors. Muscle tone is normal. Semi-rigid hammertoes bilateral. No pain on palpation of left hallux nail.       02/08/2022 12/09/2021                Assessment:   Uncontrolled type 2 diabetes mellitus with kidney complication, with long-term current use of insulin    Idiopathic peripheral neuropathy    Dermatophytosis of nail    Skin ulcer of toe of right foot, limited to breakdown of skin          Plan:   Uncontrolled type 2 diabetes mellitus with kidney complication, with long-term current use of insulin    Idiopathic peripheral neuropathy    Dermatophytosis of nail    Skin ulcer of toe of right foot, limited to breakdown of skin      I counseled the patient on his conditions, regarding findings of my examination, my impressions, and usual treatment plan.   Greater than 15 minutes of a 20 minute appointment spent on education about the diabetic foot, neuropathy, and prevention of limb loss.  Shoe inspection. Diabetic Foot Education. Patient reminded of the importance of good nutrition and blood sugar control to help prevent podiatric complications of diabetes. Patient instructed on proper foot hygeine. We discussed wearing proper shoe gear, daily foot inspections, never walking without  protective shoe gear, never putting sharp instruments to feet.    With patient's permission, nails 1-5 bilateral were debrided/trimmed in length and thickness aggressively to their soft tissue attachment mechanically and with electric , removing all offending nail and debris. Patient relates relief following the procedure.  With patient's permission, the right hallux wound was cleansed with wound care wash and bleeding was controlled with direct pressure. Minimal blood loss.  The patient tolerated this well. Wound was then dressed with Silvadene and band-aid. Patient was given instructions on daily dressing changes. Patient was also instructed on the importance of keeping the wound and dressings clean dry and intact and keeping pressure off the wound area until complete healing of the wound.The patient is alerted to watch for any signs of infection (redness, pus, pain, increased swelling or fever) and call if such occurs. Home wound care instructions are provided.  Patient to continue topical Silvadene to be applied to right toes twice daily.  Patient  will continue to monitor the areas daily, inspect feet, wear protective shoe gear when ambulatory, moisturizer to maintain skin integrity. Patient reminded of the importance of good nutrition and blood sugar control to help prevent podiatric complications of diabetes.  Patient to return 1 month or sooner if needed.                 Vangie Cuevas DPM  [facility]

## 2022-02-08 NOTE — PROGRESS NOTES
Health Maintenance Due   Topic Date Due    Hemoglobin A1c  12/09/2021    COVID-19 Vaccine (2 - Moderna 3-dose series) 01/13/2022     Updates were requested from care everywhere.  Chart was reviewed for overdue Proactive [facility] ([facility]) topics (CRS, Breast Cancer Screening, Eye exam)  Health Maintenance has been updated.  [place] immunization registry triggered.  Immunizations were reconciled.

## 2022-02-08 NOTE — PROGRESS NOTES
Chief Complaint:  Kidney stones    HPI:   Stepan Springer is a 66 y.o. male that presents today as a referral from Dr. Fleming for kidney stones.  Patient has a history of a prior stones and underwent a procedure by Dr. Simental 6-8 years ago.  He also had a CT with a left mid ureteral stone in 2020. Patient had a renal ultrasound obtained most recently for CKD stage 3 which showed a persistent left upper pole renal calculus.  Patient denies any pain.  He denies any dysuria or gross hematuria.  He denies family history of  cancers or stones.  States that he was told by Dr. Simental that he should drink orange juice as it helps prevent kidney stones.  Denies any weak stream and is not currently taking any medications for his prostate.  PSA less than 1.       PMH:  Past Medical History:   Diagnosis Date    Anxiety     Bell's palsy     CHF (congestive heart failure)     Chronic kidney disease, stage 3a 11/2/2021    Coronary artery disease involving native coronary artery without angina pectoris 10/18/2016    Depression     Diabetes mellitus     Glaucoma     Hypertension     Idiopathic peripheral neuropathy 12/11/2012    Mixed hyperlipidemia 12/11/2012    Morbid obesity with BMI of 45.0-49.9, adult 2/12/2019    Pancytopenia     Sleep apnea     Stage 3b chronic kidney disease     Type 2 diabetes mellitus with diabetic polyneuropathy, with long-term current use of insulin 12/11/2012    Vitamin B 12 deficiency 8/23/2012       PSH:  Past Surgical History:   Procedure Laterality Date    CARDIAC CATHETERIZATION  7/22/13    non obstructive cad    CATARACT EXTRACTION  OU    COLONOSCOPY N/A 5/1/2019    Procedure: COLONOSCOPY;  Surgeon: Henry Mo III, MD;  Location: [facility];  Service: Endoscopy;  Laterality: N/A;    CORONARY ANGIOPLASTY      ESOPHAGOGASTRODUODENOSCOPY N/A 5/1/2019    Procedure: ESOPHAGOGASTRODUODENOSCOPY (EGD);  Surgeon: Henry Mo III, MD;  Location: [facility];   "Service: Endoscopy;  Laterality: N/A;    EYE SURGERY      FRACTURE SURGERY      kidney stone       August 2016    PC IOL OU      RETINAL DETACHMENT REPAIR W/ SCLERAL BUCKLE LE      WRIST SURGERY         Family History:  Family History   Problem Relation Age of Onset    Macular degeneration Father     Heart disease Father         CHF     Heart attack Father     Hypertension Mother     Macular degeneration Paternal Uncle     Hypertension Maternal Grandmother     Hypertension Maternal Grandfather     Strabismus Neg Hx     Retinal detachment Neg Hx     Glaucoma Neg Hx     Blindness Neg Hx     Amblyopia Neg Hx        Social History:  Social History     Tobacco Use    Smoking status: Never Smoker    Smokeless tobacco: Never Used   Substance Use Topics    Alcohol use: Yes     Comment: " one to two glasses of wine per year"    Drug use: No        Review of Systems:  General: No fever, chills  Skin: No rashes  Chest:  Denies cough and sputum production  Heart: Denies chest pain  Resp: Denies dyspnea  Abdomen: Denies diarrhea, abdominal pain, hematemesis, or blood in stool.  Musculoskeletal: No joint stiffness or swelling. Denies back pain.  : see HPI  Neuro: no dizziness or weakness    Allergies:  Ancef in dextrose (iso-osm) and Cefazolin    Medications:    Current Outpatient Medications:     amLODIPine (NORVASC) 5 MG tablet, Take 1 tablet (5 mg total) by mouth every evening., Disp: 90 tablet, Rfl: 1    aspirin 325 MG tablet, Take 325 mg by mouth once daily., Disp: , Rfl:     atorvastatin (LIPITOR) 10 MG tablet, Take 1 tablet (10 mg total) by mouth every evening., Disp: 90 tablet, Rfl: 1    BLOOD PRESSURE CUFF Misc, 1 cuff, Disp: 1 each, Rfl: 0    blood sugar diagnostic Strp, To check BG 3 times daily, to use with insurance preferred meter, Disp: 100 strip, Rfl: 3    brimonidine 0.1% (ALPHAGAN P) 0.1 % Drop, Place 1 drop into both eyes 3 (three) times daily. Order 90 day supply, Disp: 15 mL, " Rfl: 4    brimonidine 0.15 % OPTH DROP (ALPHAGAN) 0.15 % ophthalmic solution, Place 1 drop into both eyes 2 (two) times daily., Disp: 15 mL, Rfl: 0    brinzolamide (AZOPT) 1 % ophthalmic suspension, Place 1 drop into both eyes 3 (three) times daily., Disp: 270 drop, Rfl: 3    brinzolamide (AZOPT) 1 % ophthalmic suspension, Place 1 drop into both eyes 3 (three) times daily. Brand name only, Disp: 15 mL, Rfl: 6    carvediloL (COREG) 25 MG tablet, Take 1 tablet (25 mg total) by mouth 2 (two) times daily with meals., Disp: 180 tablet, Rfl: 1    cloNIDine (CATAPRES) 0.1 MG tablet, Take 1 tablet (0.1 mg total) by mouth 3 (three) times daily., Disp: 270 tablet, Rfl: 1    empagliflozin (JARDIANCE) 25 mg tablet, Take 1 tablet (25 mg total) by mouth once daily., Disp: 90 tablet, Rfl: 3    flash glucose sensor (FREESTYLE CLEMENCIA 14 DAY SENSOR) Kit, 1 each by Misc.(Non-Drug; Combo Route) route every 14 (fourteen) days., Disp: 6 kit, Rfl: 3    furosemide (LASIX) 40 MG tablet, Take 1.5 tablets (60 mg total) by mouth 2 (two) times a day., Disp: 270 tablet, Rfl: 1    hydrALAZINE (APRESOLINE) 100 MG tablet, Take 1 tablet (100 mg total) by mouth every 8 (eight) hours., Disp: 270 tablet, Rfl: 1    hydroCHLOROthiazide (HYDRODIURIL) 25 MG tablet, Take 1 tablet (25 mg total) by mouth once daily., Disp: 90 tablet, Rfl: 1    insulin NPH-insulin regular, 70/30, (NOVOLIN 70/30 U-100 INSULIN) 100 unit/mL (70-30) injection, Inject 85 units before breakfast and 75 units before dinner., Disp: 240 mL, Rfl: 3    lancets Misc, To check BG 3 times daily, to use with insurance preferred meter, Disp: 100 each, Rfl: 3    latanoprost (XALATAN) 0.005 % ophthalmic solution, Place 1 drop into both eyes once daily., Disp: 6 mL, Rfl: 3    lisinopriL (PRINIVIL,ZESTRIL) 40 MG tablet, Take 1 tablet (40 mg total) by mouth once daily., Disp: 90 tablet, Rfl: 1    miglitoL (GLYSET) 25 MG Tab, Take 1 tablet (25 mg total) by mouth 3 (three) times daily  with meals., Disp: 270 tablet, Rfl: 3    mupirocin (BACTROBAN) 2 % ointment, Apply topically once daily., Disp: 30 g, Rfl: 0    netarsudiL (RHOPRESSA) 0.02 % ophthalmic solution, Place 1 drop into the left eye once daily., Disp: 2.5 mL, Rfl: 6    polyethylene glycol (GLYCOLAX) 17 gram/dose powder, Take 17 g by mouth once daily., Disp: 238 g, Rfl: 6    silver sulfADIAZINE 1% (SILVADENE) 1 % cream, Apply topically 2 (two) times daily., Disp: 20 g, Rfl: 0    DULoxetine (CYMBALTA) 30 MG capsule, Take 1 capsule (30 mg total) by mouth once daily., Disp: 90 capsule, Rfl: 1    nitroGLYCERIN (NITROSTAT) 0.4 MG SL tablet, Place 1 tablet (0.4 mg total) under the tongue every 5 (five) minutes as needed for Chest pain., Disp: 20 tablet, Rfl: 0    omeprazole (PRILOSEC) 40 MG capsule, Take 1 capsule (40 mg total) by mouth once daily., Disp: 90 capsule, Rfl: 3    Physical Exam:  Vitals:    02/08/22 0956   BP: (!) 155/85   Pulse: 84   Temp: 97.3 °F (36.3 °C)     Body mass index is 48.33 kg/m².  General: awake, alert, cooperative  Head: NC/AT  Ears: external ears normal  Eyes: sclera normal  Lungs: normal inspiration, NAD  Heart: well-perfused  Skin: The skin is warm and dry  Ext: No c/c/e.  Neuro: grossly intact, AOx3    RADIOLOGY:  US RETROPERITONEAL COMPLETE  11/02/2021     CLINICAL HISTORY:  Chronic kidney disease, stage 3b     TECHNIQUE:  Ultrasound of the kidneys and urinary bladder was performed including color flow and Doppler evaluation of the kidneys.     COMPARISON:  Ultrasound dated 01/02/2014, CT dated 04/20/2020     FINDINGS:  Right kidney: The right kidney measures 10.8 cm. No cortical thinning. No loss of corticomedullary distinction. There is a 14 mm simple cyst present at the upper pole.  No solid renal masses visualized.  No stone visualized.  No hydronephrosis.     Left kidney: The left kidney measures 10.9 cm. No cortical thinning. No loss of corticomedullary distinction. There is a 16 mm echogenic lesion  at the upper pole, likely correlating with a stone seen on prior CT.  Sonographic appearance is similar to prior.  No definite solid renal mass visualized.  No hydronephrosis.     The bladder is partially distended at the time of scanning and has an unremarkable appearance.     Impression:     Possible left-sided nephrolithiasis as above.  No hydronephrosis.    CT renal stone from April 2020 personally reviewed, patient's left upper pole renal calculus appears to be in a calyceal diverticulum and thus is not going to be accessible ureteroscopically and will also thus never cause the patient any difficulty  CT RENAL STONE STUDY ABD PELVIS WO     CLINICAL HISTORY:  Flank pain, stone disease suspected;     TECHNIQUE:  Low dose axial images, sagittal and coronal reformations were obtained from the lung bases to the pubic symphysis.  Oral contrast was not administered.     COMPARISON:  01/03/2019     FINDINGS:  Heart: Normal size. No effusion.     Lung Bases: Patchy interstitial infiltrates are seen within the lung bases bilaterally.  Multifocal airspace disease not excluded.     Liver: Normal size and attenuation. No focal lesions.     Gallbladder: No calcified gallstones.     Bile Ducts: No dilatation.     Pancreas: No obvious mass. No peripancreatic fat stranding.     Spleen: Normal.     Adrenals: Normal.     Kidneys/Ureters: Mild renal cortical thinning bilaterally.  Indeterminate hypodensity within the upper pole of the right kidney measures 12 mm in likely reflects a small cyst..  Extensive left-sided perinephric stranding.  Moderate hydronephrosis and hydroureter extending to a 4 mm stone within the proximal 3rd of the left ureter.  Upper tract infection not excluded.  Punctate nonobstructing stones are also noted within the left upper pole calices.     Bladder: Minimal wall thickening is seen at the base of the bladder.  Small right-sided diverticulum noted.     GI Tract/Mesentery: Small hiatal hernia.  Mild  thickening and fluid at the GE junction could reflect changes from reflux disease or esophagitis.  Recommend follow-up esophagram or endoscopy.  No evidence of bowel obstruction or inflammation.  Normal appendix     Peritoneal Space: No ascites or free air.     Retroperitoneum: No significant adenopathy.     Abdominal wall: Normal.     Vasculature: No aneurysm.Aorta demonstrates atherosclerotic disease.     Bones: No acute fracture.  Mild DJD.  No suspicious lytic or sclerotic lesions.     Impression:     Extensive left-sided perinephric stranding. Moderate hydronephrosis and hydroureter extending to a 4 mm stone within the proximal 3rd of the left ureter. Upper tract infection not excluded.     Patchy interstitial infiltrates are seen within the lung bases bilaterally.  Multifocal airspace disease not excluded including viral pneumonitis.     Mild thickening and fluid at the GE junction could reflect changes from reflux disease or esophagitis. Recommend follow-up esophagram or endoscopy.    LABS:  I personally reviewed the following lab values:  Lab Results   Component Value Date    WBC 4.11 03/22/2021    HGB 16.4 03/22/2021    HCT 47.7 03/22/2021     03/22/2021     10/19/2021    K 3.6 10/19/2021     (H) 10/19/2021    CREATININE 1.8 (H) 10/19/2021    BUN 19 10/19/2021    CO2 24 10/19/2021    TSH 1.349 01/14/2020    PSA 0.27 11/02/2021    INR 1.2 09/20/2016    HGBA1C 5.8 (H) 09/09/2021    CHOL 116 (L) 05/06/2021    TRIG 161 (H) 05/06/2021    HDL 35 (L) 05/06/2021    ALT 56 (H) 04/20/2020    AST 27 04/20/2020       Assessment/Plan:   Stepan Springer is a 66 y.o. male with:    Left renal stone - likely located within a calyceal diverticulum.  Calyceal diverticular stones are unlikely to lead to ureteral calculus and thus are very unlikely to be symptomatic, patient likely to remain asymptomatic    Prostate Cancer Screening - PSA normal, f/u with PCP for annual screening    Jonathan Encarnacion,  MD  Urology

## 2022-02-10 ENCOUNTER — OFFICE VISIT (OUTPATIENT)
Dept: DIABETES | Facility: CLINIC | Age: 67
End: 2022-02-10
Payer: COMMERCIAL

## 2022-02-10 ENCOUNTER — TELEPHONE (OUTPATIENT)
Dept: INTERNAL MEDICINE | Facility: CLINIC | Age: 67
End: 2022-02-10
Payer: COMMERCIAL

## 2022-02-10 VITALS
WEIGHT: 315 LBS | SYSTOLIC BLOOD PRESSURE: 157 MMHG | DIASTOLIC BLOOD PRESSURE: 93 MMHG | BODY MASS INDEX: 48.58 KG/M2 | HEART RATE: 68 BPM

## 2022-02-10 DIAGNOSIS — E11.649 HYPOGLYCEMIA UNAWARENESS ASSOCIATED WITH TYPE 2 DIABETES MELLITUS: ICD-10-CM

## 2022-02-10 DIAGNOSIS — H54.10 BLINDNESS AND LOW VISION: ICD-10-CM

## 2022-02-10 DIAGNOSIS — E11.69 HYPERLIPIDEMIA ASSOCIATED WITH TYPE 2 DIABETES MELLITUS: ICD-10-CM

## 2022-02-10 DIAGNOSIS — I25.10 CORONARY ARTERY DISEASE INVOLVING NATIVE CORONARY ARTERY OF NATIVE HEART WITHOUT ANGINA PECTORIS: ICD-10-CM

## 2022-02-10 DIAGNOSIS — G60.9 IDIOPATHIC PERIPHERAL NEUROPATHY: ICD-10-CM

## 2022-02-10 DIAGNOSIS — N18.32 STAGE 3B CHRONIC KIDNEY DISEASE: ICD-10-CM

## 2022-02-10 DIAGNOSIS — G47.33 OSA (OBSTRUCTIVE SLEEP APNEA): ICD-10-CM

## 2022-02-10 DIAGNOSIS — I15.2 HYPERTENSION ASSOCIATED WITH DIABETES: ICD-10-CM

## 2022-02-10 DIAGNOSIS — E66.01 MORBID OBESITY: ICD-10-CM

## 2022-02-10 DIAGNOSIS — I50.9 CHF (NYHA CLASS III, ACC/AHA STAGE C): ICD-10-CM

## 2022-02-10 DIAGNOSIS — E78.5 HYPERLIPIDEMIA ASSOCIATED WITH TYPE 2 DIABETES MELLITUS: ICD-10-CM

## 2022-02-10 DIAGNOSIS — E11.59 HYPERTENSION ASSOCIATED WITH DIABETES: ICD-10-CM

## 2022-02-10 LAB — GLUCOSE SERPL-MCNC: 108 MG/DL (ref 70–110)

## 2022-02-10 PROCEDURE — 95251 PR GLUCOSE MONITOR, 72 HOUR, PHYS INTERP: ICD-10-PCS | Mod: S$GLB,,, | Performed by: PHYSICIAN ASSISTANT

## 2022-02-10 PROCEDURE — 1159F MED LIST DOCD IN RCRD: CPT | Mod: CPTII,S$GLB,, | Performed by: PHYSICIAN ASSISTANT

## 2022-02-10 PROCEDURE — 95251 CONT GLUC MNTR ANALYSIS I&R: CPT | Mod: S$GLB,,, | Performed by: PHYSICIAN ASSISTANT

## 2022-02-10 PROCEDURE — 3080F PR MOST RECENT DIASTOLIC BLOOD PRESSURE >= 90 MM HG: ICD-10-PCS | Mod: CPTII,S$GLB,, | Performed by: PHYSICIAN ASSISTANT

## 2022-02-10 PROCEDURE — 3044F PR MOST RECENT HEMOGLOBIN A1C LEVEL <7.0%: ICD-10-PCS | Mod: CPTII,S$GLB,, | Performed by: PHYSICIAN ASSISTANT

## 2022-02-10 PROCEDURE — 3008F PR BODY MASS INDEX (BMI) DOCUMENTED: ICD-10-PCS | Mod: CPTII,S$GLB,, | Performed by: PHYSICIAN ASSISTANT

## 2022-02-10 PROCEDURE — 3008F BODY MASS INDEX DOCD: CPT | Mod: CPTII,S$GLB,, | Performed by: PHYSICIAN ASSISTANT

## 2022-02-10 PROCEDURE — 3077F PR MOST RECENT SYSTOLIC BLOOD PRESSURE >= 140 MM HG: ICD-10-PCS | Mod: CPTII,S$GLB,, | Performed by: PHYSICIAN ASSISTANT

## 2022-02-10 PROCEDURE — 82962 POCT GLUCOSE, HAND-HELD DEVICE: ICD-10-PCS | Mod: S$GLB,,, | Performed by: PHYSICIAN ASSISTANT

## 2022-02-10 PROCEDURE — 3080F DIAST BP >= 90 MM HG: CPT | Mod: CPTII,S$GLB,, | Performed by: PHYSICIAN ASSISTANT

## 2022-02-10 PROCEDURE — 99999 PR PBB SHADOW E&M-EST. PATIENT-LVL V: CPT | Mod: PBBFAC,,, | Performed by: PHYSICIAN ASSISTANT

## 2022-02-10 PROCEDURE — 1159F PR MEDICATION LIST DOCUMENTED IN MEDICAL RECORD: ICD-10-PCS | Mod: CPTII,S$GLB,, | Performed by: PHYSICIAN ASSISTANT

## 2022-02-10 PROCEDURE — 3044F HG A1C LEVEL LT 7.0%: CPT | Mod: CPTII,S$GLB,, | Performed by: PHYSICIAN ASSISTANT

## 2022-02-10 PROCEDURE — 99999 PR PBB SHADOW E&M-EST. PATIENT-LVL V: ICD-10-PCS | Mod: PBBFAC,,, | Performed by: PHYSICIAN ASSISTANT

## 2022-02-10 PROCEDURE — 99214 PR OFFICE/OUTPT VISIT, EST, LEVL IV, 30-39 MIN: ICD-10-PCS | Mod: S$GLB,,, | Performed by: PHYSICIAN ASSISTANT

## 2022-02-10 PROCEDURE — 3077F SYST BP >= 140 MM HG: CPT | Mod: CPTII,S$GLB,, | Performed by: PHYSICIAN ASSISTANT

## 2022-02-10 PROCEDURE — 82962 GLUCOSE BLOOD TEST: CPT | Mod: S$GLB,,, | Performed by: PHYSICIAN ASSISTANT

## 2022-02-10 PROCEDURE — 99214 OFFICE O/P EST MOD 30 MIN: CPT | Mod: S$GLB,,, | Performed by: PHYSICIAN ASSISTANT

## 2022-02-10 RX ORDER — INSULIN ASPART 100 [IU]/ML
25 INJECTION, SOLUTION INTRAVENOUS; SUBCUTANEOUS
Qty: 69 ML | Refills: 3 | Status: SHIPPED | OUTPATIENT
Start: 2022-02-10 | End: 2023-01-30 | Stop reason: SDUPTHER

## 2022-02-10 RX ORDER — INSULIN GLARGINE 300 U/ML
80 INJECTION, SOLUTION SUBCUTANEOUS DAILY
Qty: 8 PEN | Refills: 3 | Status: SHIPPED | OUTPATIENT
Start: 2022-02-10 | End: 2023-01-30 | Stop reason: SDUPTHER

## 2022-02-10 NOTE — PATIENT INSTRUCTIONS
CURRENT DM MEDICATIONS:   · Novolin 70/30 80 units with breakfast and 80 units before dinner    Miglitol 25 mg TID wm    Jardiance 25 mg daily        When you switch:   Toujeo 80 units daily - regardless of eating at same time   Novolog 25 units three times daily before meals - 15 mins before eating; if not eating, do not take.  Miglitol 25 mg three times daily with meals  Jardiance 25 mg daily

## 2022-02-10 NOTE — TELEPHONE ENCOUNTER
----- Message from Fabio Lafleur MA sent at 2/10/2022  9:07 AM CST -----  Regarding: Appointment  Can someone please call patient to set appointment. Patient has had frequent falls and needs to discuss. Thanks

## 2022-02-10 NOTE — TELEPHONE ENCOUNTER
Called and left a voicemail for patient to call and schedule an appointment with any available provider since Dr. Fleming's schedule is booked up through mid-April.

## 2022-02-10 NOTE — PROGRESS NOTES
PCP: KANDICE Fleming MD    Subjective:     Chief Complaint: Diabetes - Established Patient    HISTORY OF PRESENT ILLNESS: 66 y.o.   male presenting for diabetes management visit.   The patient's last visit with me was on 10/19/2021.  Patient has had Type II diabetes since 2005.  Pertinent to decision making is the following comorbidities: HTN, HLD, CAD, S/p MI, CHF, CKD III and Obesity by BMI  Patient has the following Diabetes complications: with diabetic chronic kidney disease  He has attended diabetes education in the past.     Patient's most recent A1c of 6.9% was completed 2 days ago.   Patient states since His last A1c His blood glucose levels have been both high and low throughout the day     Patient monitors blood glucose 4 times per day and Continuously with personal CGM Sriram.   Patient blood glucose monitoring device will be uploaded into [employer] today.   Patient endorses the following diabetes related symptoms: None.   Interpretation of CGM includes an average blood glucose of 153 mg/dL with a glucose variability of 45.2%. GMI of 7.0%. Patient's time in range of 59%, time above range of 31%, and time below range of 10%.   Patients records show hypoglycemia after breakfast and occasionally after dinner with occasional postprandial hyperglycemia at times.     Patient is due today for the following diabetes-related health maintenance standards: COVID Vaccine.   He voices any recent hospital admissions or emergency room visits for fall at home.    He voices having hypoglycemia as above. Per chart, patient has history of hypoglycemia unawareness.   Patient's concerns today include glycemic control.   Of note, patient has an elevated BP of 162/93 today upon presentation to clinic. Patient is resting comfortably in the room. Patient denies missing BP medications this am or last night. Patient denies CP, SOB, Palpitations, Abdominal Pain, Nausea/Vomiting, Headache, and other concerning  symptoms. Patient states her BP management is primarily managed by PCP. Discussed with patient we would need to repeat BP at end of visit since resting comfortably with formulation of BP management plan.  Patient medication regimen is as below.     CURRENT DM MEDICATIONS:   · Novolin 70/30 80 units with breakfast and 90 units before dinner - 4 vials left   Miglitol 25 mg TID wm    Jardiance 25 mg daily    Patient has failed the following Diabetes medications:    Metformin - GI    Glyburide   Ozempic - cost   Basal - Lantus         Labs Reviewed.       Lab Results   Component Value Date    CPEPTIDE 2.70 12/13/2018     Lab Results   Component Value Date    GLUTAMICACID 0.00 12/13/2018          //  Weight: (!) 195.6 kg (431 lb 3.5 oz), Body mass index is 48.58 kg/m².  His blood sugar in clinic today is:    Lab Results   Component Value Date    POCGLU 108 02/10/2022       Review of Systems   Constitutional: Negative for activity change, appetite change, chills and fever.   HENT: Negative for dental problem, mouth sores, nosebleeds, sore throat and trouble swallowing.    Eyes: Negative for pain and discharge.   Respiratory: Negative for shortness of breath, wheezing and stridor.    Cardiovascular: Negative for chest pain, palpitations and leg swelling.   Gastrointestinal: Negative for abdominal pain, diarrhea, nausea and vomiting.   Endocrine: Negative for polydipsia, polyphagia and polyuria.   Genitourinary: Negative for dysuria, frequency and urgency.   Musculoskeletal: Negative for joint swelling and myalgias.   Skin: Negative for rash and wound.   Neurological: Negative for dizziness, syncope, weakness and headaches.   Psychiatric/Behavioral: Negative for behavioral problems and dysphoric mood.         Diabetes Management Status  Statin: Taking  ACE/ARB: Taking    Screening or Prevention Patient's value Goal Complete/Controlled?   HgA1C Testing and Control   Lab Results   Component Value Date    HGBA1C 6.9 (H)  "02/08/2022      Annually/Less than 8% No   Lipid profile : 05/06/2021 Annually Yes   LDL control Lab Results   Component Value Date    LDLCALC 48.8 (L) 05/06/2021    Annually/Less than 100 mg/dl  Yes   Nephropathy screening Lab Results   Component Value Date    MICALBCREAT 35.0 (H) 10/19/2021     Lab Results   Component Value Date    PROTEINUA Negative 03/22/2021    Annually No   Blood pressure BP Readings from Last 1 Encounters:   02/10/22 (!) 157/93    Less than 140/90 No   Dilated retinal exam : 12/07/2021 Annually Yes    Foot exam   : 08/24/2021 Annually Yes     ACTIVITY LEVEL: Rarely Active. Discussed activities, benefits, methods, and precautions.  MEAL PLANNING: Patient reports number of meals per day to be 3 and number of snacks per day to be 2.   Patient is encouraged to carb count and consume no more than 30 - 45 grams of carbohydrates in each meal and 15 grams of carbohydrates in each snack.     Social History     Socioeconomic History    Marital status:    Occupational History     Comment: Quit job at [employer]; wants to open his own [id number] business    Tobacco Use    Smoking status: Never Smoker    Smokeless tobacco: Never Used   Substance and Sexual Activity    Alcohol use: Yes     Comment: " one to two glasses of wine per year"    Drug use: No    Sexual activity: Not Currently     Birth control/protection: None   Social History Narrative    , 1 son, JANIE.     Past Medical History:   Diagnosis Date    Anxiety     Bell's palsy     CHF (congestive heart failure)     Chronic kidney disease, stage 3a 11/2/2021    Coronary artery disease involving native coronary artery without angina pectoris 10/18/2016    Depression     Diabetes mellitus     Glaucoma     Hypertension     Idiopathic peripheral neuropathy 12/11/2012    Mixed hyperlipidemia 12/11/2012    Morbid obesity with BMI of 45.0-49.9, adult 2/12/2019    Pancytopenia     Sleep apnea     Stage 3b chronic kidney disease     " Type 2 diabetes mellitus with diabetic polyneuropathy, with long-term current use of insulin 12/11/2012    Vitamin B 12 deficiency 8/23/2012       Objective:        Physical Exam  Constitutional:       General: He is not in acute distress.     Appearance: He is well-developed. He is not diaphoretic.   HENT:      Head: Normocephalic and atraumatic.      Right Ear: External ear normal.      Left Ear: External ear normal.      Nose: Nose normal.   Eyes:      General:         Right eye: No discharge.         Left eye: No discharge.      Pupils: Pupils are equal, round, and reactive to light.   Cardiovascular:      Rate and Rhythm: Normal rate and regular rhythm.      Heart sounds: Normal heart sounds.   Pulmonary:      Effort: Pulmonary effort is normal.      Breath sounds: Normal breath sounds.   Abdominal:      Palpations: Abdomen is soft.   Musculoskeletal:         General: Normal range of motion.      Cervical back: Normal range of motion and neck supple.   Skin:     General: Skin is warm and dry.      Capillary Refill: Capillary refill takes less than 2 seconds.   Neurological:      Mental Status: He is alert and oriented to person, place, and time.      Motor: No abnormal muscle tone.      Coordination: Coordination normal.   Psychiatric:         Behavior: Behavior normal.         Thought Content: Thought content normal.         Judgment: Judgment normal.           Assessment / Plan:     Uncontrolled type 2 diabetes mellitus with kidney complication, with long-term current use of insulin  -     POCT Glucose, Hand-Held Device  -     insulin glargine U-300 conc (TOUJEO MAX U-300 SOLOSTAR) 300 unit/mL (3 mL) insulin pen; Inject 80 Units into the skin once daily.  Dispense: 8 pen; Refill: 3  -     insulin aspart U-100 (NOVOLOG) 100 unit/mL (3 mL) InPn pen; Inject 25 Units into the skin 3 (three) times daily with meals.  Dispense: 69 mL; Refill: 3    Stage 3b chronic kidney disease    Hypoglycemia unawareness  associated with type 2 diabetes mellitus    Blindness and low vision    Idiopathic peripheral neuropathy    CHF (NYHA class III, ACC/AHA stage C)    Coronary artery disease involving native coronary artery of native heart without angina pectoris    Hypertension associated with diabetes    Hyperlipidemia associated with type 2 diabetes mellitus    FRAN (obstructive sleep apnea)    Morbid obesity      Additional Plan Details:    - POCT Glucose  - Encouraged continuation of lifestyle changes including regular exercise and limiting carbohydrates to 30-45 grams per meal threes times daily and 15 grams per snack with a limit of two daily.   - Encouraged continued monitoring of blood glucose with maintenance of 4 times daily and Continuously with personal CGM Sriram.   - Current DM Medication Regimen:  Continue Jardiance 25 mg. Change 70/30 80 units in the morning and 80 units before dinner - will plan to transition to Toujeo 80 units daily and Novolog 25 units TID wm and correction dosing following supply.  Continue Miglitol 25 mg TID wm.    - Follow up with PCP for falls  - Elevated BP: Patient originally presented to clinic with BP of 162/93. Patient was asymptomatic and resting comfortably. Patient denies missing BP medication. Following visit, BP was 157/93. Patient was instructed to monitor BP at home and bring PCP BG logs to upcoming appt.   - Health Maintenance standards addressed today: COVID - 19 Vaccine - Patient completed outside of [facility]; will coordinate records retrieval  - Nursing Visit: Patient is age 79 or younger with an A1c of 7.5 or greater and will not need nursing visit at this time .   - Follow up in 2 months.       Blakeney McKnight, PA-C [facility]     A total of 30 minutes was spent in face to face time, of which over 50 % was spent in counseling patient on disease process, complications, treatment, and side effects of medications.

## 2022-03-08 ENCOUNTER — OFFICE VISIT (OUTPATIENT)
Dept: PODIATRY | Facility: CLINIC | Age: 67
End: 2022-03-08
Payer: COMMERCIAL

## 2022-03-08 ENCOUNTER — OFFICE VISIT (OUTPATIENT)
Dept: OPHTHALMOLOGY | Facility: CLINIC | Age: 67
End: 2022-03-08
Payer: COMMERCIAL

## 2022-03-08 VITALS
HEART RATE: 65 BPM | WEIGHT: 315 LBS | SYSTOLIC BLOOD PRESSURE: 143 MMHG | BODY MASS INDEX: 36.45 KG/M2 | HEIGHT: 78 IN | DIASTOLIC BLOOD PRESSURE: 81 MMHG

## 2022-03-08 DIAGNOSIS — H35.372 EPIRETINAL MEMBRANE (ERM) OF LEFT EYE: ICD-10-CM

## 2022-03-08 DIAGNOSIS — L85.3 DRY SKIN DERMATITIS: ICD-10-CM

## 2022-03-08 DIAGNOSIS — G60.9 IDIOPATHIC PERIPHERAL NEUROPATHY: ICD-10-CM

## 2022-03-08 DIAGNOSIS — Z87.2 HEALED ULCER OF RIGHT FOOT: ICD-10-CM

## 2022-03-08 DIAGNOSIS — H54.10 BLINDNESS AND LOW VISION: ICD-10-CM

## 2022-03-08 DIAGNOSIS — H40.1133 PRIMARY OPEN ANGLE GLAUCOMA OF BOTH EYES, SEVERE STAGE: Primary | ICD-10-CM

## 2022-03-08 DIAGNOSIS — L03.032 ACUTE PARONYCHIA OF TOE OF LEFT FOOT: Primary | ICD-10-CM

## 2022-03-08 DIAGNOSIS — Z91.199 NON COMPLIANCE WITH MEDICAL TREATMENT: ICD-10-CM

## 2022-03-08 PROCEDURE — 1101F PT FALLS ASSESS-DOCD LE1/YR: CPT | Mod: CPTII,S$GLB,, | Performed by: PODIATRIST

## 2022-03-08 PROCEDURE — 99212 OFFICE O/P EST SF 10 MIN: CPT | Mod: S$GLB,,, | Performed by: PODIATRIST

## 2022-03-08 PROCEDURE — 1159F MED LIST DOCD IN RCRD: CPT | Mod: CPTII,S$GLB,, | Performed by: PODIATRIST

## 2022-03-08 PROCEDURE — 3079F PR MOST RECENT DIASTOLIC BLOOD PRESSURE 80-89 MM HG: ICD-10-PCS | Mod: CPTII,S$GLB,, | Performed by: PODIATRIST

## 2022-03-08 PROCEDURE — 3077F SYST BP >= 140 MM HG: CPT | Mod: CPTII,S$GLB,, | Performed by: PODIATRIST

## 2022-03-08 PROCEDURE — 99999 PR PBB SHADOW E&M-EST. PATIENT-LVL III: CPT | Mod: PBBFAC,,, | Performed by: PODIATRIST

## 2022-03-08 PROCEDURE — 3044F HG A1C LEVEL LT 7.0%: CPT | Mod: CPTII,S$GLB,, | Performed by: OPHTHALMOLOGY

## 2022-03-08 PROCEDURE — 99212 PR OFFICE/OUTPT VISIT, EST, LEVL II, 10-19 MIN: ICD-10-PCS | Mod: S$GLB,,, | Performed by: PODIATRIST

## 2022-03-08 PROCEDURE — 3044F HG A1C LEVEL LT 7.0%: CPT | Mod: CPTII,S$GLB,, | Performed by: PODIATRIST

## 2022-03-08 PROCEDURE — 1160F PR REVIEW ALL MEDS BY PRESCRIBER/CLIN PHARMACIST DOCUMENTED: ICD-10-PCS | Mod: CPTII,S$GLB,, | Performed by: OPHTHALMOLOGY

## 2022-03-08 PROCEDURE — 1159F PR MEDICATION LIST DOCUMENTED IN MEDICAL RECORD: ICD-10-PCS | Mod: CPTII,S$GLB,, | Performed by: OPHTHALMOLOGY

## 2022-03-08 PROCEDURE — 3288F FALL RISK ASSESSMENT DOCD: CPT | Mod: CPTII,S$GLB,, | Performed by: PODIATRIST

## 2022-03-08 PROCEDURE — 1101F PR PT FALLS ASSESS DOC 0-1 FALLS W/OUT INJ PAST YR: ICD-10-PCS | Mod: CPTII,S$GLB,, | Performed by: PODIATRIST

## 2022-03-08 PROCEDURE — 99214 PR OFFICE/OUTPT VISIT, EST, LEVL IV, 30-39 MIN: ICD-10-PCS | Mod: S$GLB,,, | Performed by: OPHTHALMOLOGY

## 2022-03-08 PROCEDURE — 99999 PR PBB SHADOW E&M-EST. PATIENT-LVL II: CPT | Mod: PBBFAC,,, | Performed by: OPHTHALMOLOGY

## 2022-03-08 PROCEDURE — 3008F BODY MASS INDEX DOCD: CPT | Mod: CPTII,S$GLB,, | Performed by: PODIATRIST

## 2022-03-08 PROCEDURE — 1159F MED LIST DOCD IN RCRD: CPT | Mod: CPTII,S$GLB,, | Performed by: OPHTHALMOLOGY

## 2022-03-08 PROCEDURE — 99999 PR PBB SHADOW E&M-EST. PATIENT-LVL III: ICD-10-PCS | Mod: PBBFAC,,, | Performed by: PODIATRIST

## 2022-03-08 PROCEDURE — 1160F RVW MEDS BY RX/DR IN RCRD: CPT | Mod: CPTII,S$GLB,, | Performed by: PODIATRIST

## 2022-03-08 PROCEDURE — 99999 PR PBB SHADOW E&M-EST. PATIENT-LVL II: ICD-10-PCS | Mod: PBBFAC,,, | Performed by: OPHTHALMOLOGY

## 2022-03-08 PROCEDURE — 1159F PR MEDICATION LIST DOCUMENTED IN MEDICAL RECORD: ICD-10-PCS | Mod: CPTII,S$GLB,, | Performed by: PODIATRIST

## 2022-03-08 PROCEDURE — 3079F DIAST BP 80-89 MM HG: CPT | Mod: CPTII,S$GLB,, | Performed by: PODIATRIST

## 2022-03-08 PROCEDURE — 3077F PR MOST RECENT SYSTOLIC BLOOD PRESSURE >= 140 MM HG: ICD-10-PCS | Mod: CPTII,S$GLB,, | Performed by: PODIATRIST

## 2022-03-08 PROCEDURE — 99214 OFFICE O/P EST MOD 30 MIN: CPT | Mod: S$GLB,,, | Performed by: OPHTHALMOLOGY

## 2022-03-08 PROCEDURE — 3288F PR FALLS RISK ASSESSMENT DOCUMENTED: ICD-10-PCS | Mod: CPTII,S$GLB,, | Performed by: PODIATRIST

## 2022-03-08 PROCEDURE — 1160F RVW MEDS BY RX/DR IN RCRD: CPT | Mod: CPTII,S$GLB,, | Performed by: OPHTHALMOLOGY

## 2022-03-08 PROCEDURE — 3008F PR BODY MASS INDEX (BMI) DOCUMENTED: ICD-10-PCS | Mod: CPTII,S$GLB,, | Performed by: PODIATRIST

## 2022-03-08 PROCEDURE — 3044F PR MOST RECENT HEMOGLOBIN A1C LEVEL <7.0%: ICD-10-PCS | Mod: CPTII,S$GLB,, | Performed by: OPHTHALMOLOGY

## 2022-03-08 PROCEDURE — 3044F PR MOST RECENT HEMOGLOBIN A1C LEVEL <7.0%: ICD-10-PCS | Mod: CPTII,S$GLB,, | Performed by: PODIATRIST

## 2022-03-08 PROCEDURE — 1160F PR REVIEW ALL MEDS BY PRESCRIBER/CLIN PHARMACIST DOCUMENTED: ICD-10-PCS | Mod: CPTII,S$GLB,, | Performed by: PODIATRIST

## 2022-03-08 RX ORDER — MUPIROCIN 20 MG/G
OINTMENT TOPICAL DAILY
Qty: 30 G | Refills: 0 | Status: ON HOLD | OUTPATIENT
Start: 2022-03-08 | End: 2023-06-03 | Stop reason: HOSPADM

## 2022-03-08 RX ORDER — BRINZOLAMIDE 10 MG/ML
1 SUSPENSION/ DROPS OPHTHALMIC 3 TIMES DAILY
Qty: 15 ML | Refills: 6 | Status: SHIPPED | OUTPATIENT
Start: 2022-03-08 | End: 2023-04-28 | Stop reason: SDUPTHER

## 2022-03-08 NOTE — PROGRESS NOTES
Subjective:     Patient ID: Stepan Springer is a 67 y.o. male.    Chief Complaint: Nail Care (Hallux f/u, no c/o pain, Wears tennis shoes with socks, diabetic Pt, last seen on 08/24/21 with TARAN Christie)    Stepan is a 67 y.o. male who presents to the clinic for evaluation and treatment of high risk feet. Stepan has a past medical history of Anxiety, Bell's palsy, CHF (congestive heart failure), Chronic kidney disease, stage 3a (11/2/2021), Coronary artery disease involving native coronary artery without angina pectoris (10/18/2016), Depression, Diabetes mellitus, Glaucoma, Hypertension, Idiopathic peripheral neuropathy (12/11/2012), Mixed hyperlipidemia (12/11/2012), Morbid obesity with BMI of 45.0-49.9, adult (2/12/2019), Pancytopenia, Sleep apnea, Stage 3b chronic kidney disease, Type 2 diabetes mellitus with diabetic polyneuropathy, with long-term current use of insulin (12/11/2012), and Vitamin B 12 deficiency (8/23/2012). The patient's chief complaint is right toe check.  Patient states he has been dressing toes as instructed.  This patient has documented high risk feet requiring routine maintenance secondary to peripheral neuropathy.       PCP: KANDICE Fleming MD    Date Last Seen by PCP: 08/24/2021    Current shoe gear:  Affected Foot: Tennis shoes     Unaffected Foot: Tennis shoes    Hemoglobin A1C   Date Value Ref Range Status   02/08/2022 6.9 (H) 4.0 - 5.6 % Final     Comment:     ADA Screening Guidelines:  5.7-6.4%  Consistent with prediabetes  >or=6.5%  Consistent with diabetes    High levels of fetal hemoglobin interfere with the HbA1C  assay. Heterozygous hemoglobin variants (HbS, HgC, etc)do  not significantly interfere with this assay.   However, presence of multiple variants may affect accuracy.     09/09/2021 5.8 (H) 4.0 - 5.6 % Final     Comment:     ADA Screening Guidelines:  5.7-6.4%  Consistent with prediabetes  >or=6.5%  Consistent with diabetes    High levels of fetal  hemoglobin interfere with the HbA1C  assay. Heterozygous hemoglobin variants (HbS, HgC, etc)do  not significantly interfere with this assay.   However, presence of multiple variants may affect accuracy.     05/06/2021 7.7 (H) 4.0 - 5.6 % Final     Comment:     ADA Screening Guidelines:  5.7-6.4%  Consistent with prediabetes  >or=6.5%  Consistent with diabetes    High levels of fetal hemoglobin interfere with the HbA1C  assay. Heterozygous hemoglobin variants (HbS, HgC, etc)do  not significantly interfere with this assay.   However, presence of multiple variants may affect accuracy.             Patient Active Problem List   Diagnosis    Status post cataract extraction and insertion of intraocular lens    Corneal opacity - Left Eye    Type 2 diabetes mellitus with diabetic polyneuropathy, with long-term current use of insulin    Obstructive sleep apnea (untreated, refuses treatment)    Chronic diastolic congestive heart failure    LBBB (left bundle branch block)    Type 2 diabetes mellitus with stage 3a chronic kidney disease, with long-term current use of insulin    Anxiety    Hypertension associated with diabetes    Stage 3b chronic kidney disease    Primary open angle glaucoma of both eyes, severe stage    Left nephrolithiasis    Hydronephrosis, left    NSTEMI (non-ST elevated myocardial infarction)    CAD with remote PCI (BMS) of RCA in 9/2016    Recurrent major depressive disorder    Vasculogenic erectile dysfunction    Blindness and low vision    Hx of retinal detachment    High myopia, both eyes    Epiretinal membrane (ERM) of left eye    Lattice degeneration of peripheral retina, left    Right-sided Bell's palsy    Hyperlipidemia associated with type 2 diabetes mellitus    Morbid obesity with BMI of 45.0-49.9, adult    GERD (gastroesophageal reflux disease)    Hypoglycemia unawareness associated with type 2 diabetes mellitus    Elevated troponin I level    Thrombocytopenia     Pancytopenia    History of COVID-19 (April, 2020)    PVD (peripheral vascular disease)    Influenza Immunization not carried out because of patient refusal    Type 2 diabetes mellitus with microalbuminuria, with long-term current use of insulin    Chronic kidney disease, stage 3a    Hyperparathyroidism, secondary renal    Vitamin D deficiency    Lymphedema       Medication List with Changes/Refills   Current Medications    AMLODIPINE (NORVASC) 5 MG TABLET    Take 1 tablet (5 mg total) by mouth every evening.    ASPIRIN 325 MG TABLET    Take 325 mg by mouth once daily.    ATORVASTATIN (LIPITOR) 10 MG TABLET    Take 1 tablet (10 mg total) by mouth every evening.    BLOOD PRESSURE CUFF MISC    1 cuff    BLOOD SUGAR DIAGNOSTIC STRP    To check BG 3 times daily, to use with insurance preferred meter    BRIMONIDINE 0.1% (ALPHAGAN P) 0.1 % DROP    Place 1 drop into both eyes 3 (three) times daily. Order 90 day supply    BRIMONIDINE 0.15 % OPTH DROP (ALPHAGAN) 0.15 % OPHTHALMIC SOLUTION    Place 1 drop into both eyes 2 (two) times daily.    BRINZOLAMIDE (AZOPT) 1 % OPHTHALMIC SUSPENSION    Place 1 drop into both eyes 3 (three) times daily.    CARVEDILOL (COREG) 25 MG TABLET    Take 1 tablet (25 mg total) by mouth 2 (two) times daily with meals.    CLONIDINE (CATAPRES) 0.1 MG TABLET    Take 1 tablet (0.1 mg total) by mouth 3 (three) times daily.    DULOXETINE (CYMBALTA) 30 MG CAPSULE    Take 1 capsule (30 mg total) by mouth once daily.    EMPAGLIFLOZIN (JARDIANCE) 25 MG TABLET    Take 1 tablet (25 mg total) by mouth once daily.    FLASH GLUCOSE SENSOR (FREESTYLE CLEMENCIA 14 DAY SENSOR) KIT    1 each by Misc.(Non-Drug; Combo Route) route every 14 (fourteen) days.    FUROSEMIDE (LASIX) 40 MG TABLET    Take 1.5 tablets (60 mg total) by mouth 2 (two) times a day.    HYDRALAZINE (APRESOLINE) 100 MG TABLET    Take 1 tablet (100 mg total) by mouth every 8 (eight) hours.    HYDROCHLOROTHIAZIDE (HYDRODIURIL) 25 MG TABLET    Take  1 tablet (25 mg total) by mouth once daily.    INSULIN ASPART U-100 (NOVOLOG) 100 UNIT/ML (3 ML) INPN PEN    Inject 25 Units into the skin 3 (three) times daily with meals.    INSULIN GLARGINE U-300 CONC (TOUJEO MAX U-300 SOLOSTAR) 300 UNIT/ML (3 ML) INSULIN PEN    Inject 80 Units into the skin once daily.    LANCETS MISC    To check BG 3 times daily, to use with insurance preferred meter    LATANOPROST (XALATAN) 0.005 % OPHTHALMIC SOLUTION    Place 1 drop into both eyes once daily.    LISINOPRIL (PRINIVIL,ZESTRIL) 40 MG TABLET    Take 1 tablet (40 mg total) by mouth once daily.    MIGLITOL (GLYSET) 25 MG TAB    Take 1 tablet (25 mg total) by mouth 3 (three) times daily with meals.    NETARSUDIL (RHOPRESSA) 0.02 % OPHTHALMIC SOLUTION    Place 1 drop into the left eye once daily.    NITROGLYCERIN (NITROSTAT) 0.4 MG SL TABLET    Place 1 tablet (0.4 mg total) under the tongue every 5 (five) minutes as needed for Chest pain.    OMEPRAZOLE (PRILOSEC) 40 MG CAPSULE    Take 1 capsule (40 mg total) by mouth once daily.    POLYETHYLENE GLYCOL (GLYCOLAX) 17 GRAM/DOSE POWDER    Take 17 g by mouth once daily.    SILVER SULFADIAZINE 1% (SILVADENE) 1 % CREAM    Apply topically 2 (two) times daily.   Changed and/or Refilled Medications    Modified Medication Previous Medication    BRINZOLAMIDE (AZOPT) 1 % OPHTHALMIC SUSPENSION brinzolamide (AZOPT) 1 % ophthalmic suspension       Place 1 drop into both eyes 3 (three) times daily. Brand name only    Place 1 drop into both eyes 3 (three) times daily. Brand name only    MUPIROCIN (BACTROBAN) 2 % OINTMENT mupirocin (BACTROBAN) 2 % ointment       Apply topically once daily.    Apply topically once daily.       Review of patient's allergies indicates:   Allergen Reactions    Ancef in dextrose (iso-osm)      dizziness    Cefazolin      Other reaction(s): Shakes  Other reaction(s): Chills       Past Surgical History:   Procedure Laterality Date    CARDIAC CATHETERIZATION  7/22/13    non  "obstructive cad    CATARACT EXTRACTION  OU    COLONOSCOPY N/A 5/1/2019    Procedure: COLONOSCOPY;  Surgeon: Henry Mo III, MD;  Location: [facility] ENDO;  Service: Endoscopy;  Laterality: N/A;    CORONARY ANGIOPLASTY      ESOPHAGOGASTRODUODENOSCOPY N/A 5/1/2019    Procedure: ESOPHAGOGASTRODUODENOSCOPY (EGD);  Surgeon: Henry Mo III, MD;  Location: [facility] ENDO;  Service: Endoscopy;  Laterality: N/A;    EYE SURGERY      FRACTURE SURGERY      kidney stone       August 2016    PC IOL OU      RETINAL DETACHMENT REPAIR W/ SCLERAL BUCKLE LE      WRIST SURGERY         Family History   Problem Relation Age of Onset    Macular degeneration Father     Heart disease Father         CHF     Heart attack Father     Hypertension Mother     Macular degeneration Paternal Uncle     Hypertension Maternal Grandmother     Hypertension Maternal Grandfather     Strabismus Neg Hx     Retinal detachment Neg Hx     Glaucoma Neg Hx     Blindness Neg Hx     Amblyopia Neg Hx        Social History     Socioeconomic History    Marital status:    Occupational History     Comment: Quit job at [employer]; wants to open his own [employer] business    Tobacco Use    Smoking status: Never Smoker    Smokeless tobacco: Never Used   Substance and Sexual Activity    Alcohol use: Yes     Comment: " one to two glasses of wine per year"    Drug use: No    Sexual activity: Not Currently     Birth control/protection: None   Social History Narrative    , 1 son, JANIE.       Vitals:    03/08/22 0745   BP: (!) 143/81   Pulse: 65   Weight: (!) 195.5 kg (431 lb)   Height: 6' 7" (2.007 m)   PainSc: 0-No pain   PainLoc: Foot       Hemoglobin A1C   Date Value Ref Range Status   02/08/2022 6.9 (H) 4.0 - 5.6 % Final     Comment:     ADA Screening Guidelines:  5.7-6.4%  Consistent with prediabetes  >or=6.5%  Consistent with diabetes    High levels of fetal hemoglobin interfere with the HbA1C  assay. Heterozygous hemoglobin variants " (HbS, HgC, etc)do  not significantly interfere with this assay.   However, presence of multiple variants may affect accuracy.     09/09/2021 5.8 (H) 4.0 - 5.6 % Final     Comment:     ADA Screening Guidelines:  5.7-6.4%  Consistent with prediabetes  >or=6.5%  Consistent with diabetes    High levels of fetal hemoglobin interfere with the HbA1C  assay. Heterozygous hemoglobin variants (HbS, HgC, etc)do  not significantly interfere with this assay.   However, presence of multiple variants may affect accuracy.     05/06/2021 7.7 (H) 4.0 - 5.6 % Final     Comment:     ADA Screening Guidelines:  5.7-6.4%  Consistent with prediabetes  >or=6.5%  Consistent with diabetes    High levels of fetal hemoglobin interfere with the HbA1C  assay. Heterozygous hemoglobin variants (HbS, HgC, etc)do  not significantly interfere with this assay.   However, presence of multiple variants may affect accuracy.         Review of Systems   Constitutional: Negative for chills and fever.   Respiratory: Negative for shortness of breath.    Cardiovascular: Negative for chest pain, palpitations, orthopnea, claudication and leg swelling.   Gastrointestinal: Negative for diarrhea, nausea and vomiting.   Musculoskeletal: Negative for joint pain.   Skin: Negative for rash.   Neurological: Positive for tingling and sensory change.   Psychiatric/Behavioral: Negative.          Objective:   PHYSICAL EXAM: Apperance: Alert and orient in no distress,well developed, and with good attention to grooming and body habits  Patient presents ambulating in tennis shoes.   LOWER EXTREMITY EXAM:  VASCULAR: Dorsalis pedis pulses 1/4 bilateral and Posterior Tibial pulses 1/4 bilateral. Capillary fill time <4 seconds bilateral. Moderate edema observed bilateral. Varicosities absent bilateral. Skin temperature of the lower extremities is warm to cool, proximal to distal. Hair growth dim bilateral. (--) lymphangitis or (--) cellulitis noted right.  DERMATOLOGICAL: (--)  edema, (--) erythema, (--) malodor, (--) drainage, (--) warmth to right foot. Previous ulcer noted to distal right hallux closed with thin hyperkeratotic tissue. Post debridement no ope lesion noted. Previous ulcer noted to dorsal right hallux and 2nd toe closed with epithealuzed tissue.  The dorsum surface of the feet are soft and supple.  The plantar aspects of feet are dry and scaly. Nails 1,2,3,4,5 bilateral elongated, incurvated, and discolored with subungual debris. Webspaces 1,2,3,4 bilateral clean, dry and without evidence of break in skin integrity.   NEUROLOGICAL: Light touch, sharp-dull, proprioception all present and equal bilaterally.  Vibratory sensation diminished at bilateral hallux and navicular. Protective sensation absent at 2/10 sites as tested with a [place] 5.07 monofilament.   MUSCULOSKELETAL: Muscle strength is 5/5 for foot inverters, everters, plantarflexors, and dorsiflexors. Muscle tone is normal. Semi-rigid hammertoes bilateral. No pain on palpation of left hallux nail.               Assessment:   Acute paronychia of toe of left foot - Left Foot    Dry skin dermatitis  -     mupirocin (BACTROBAN) 2 % ointment; Apply topically once daily.  Dispense: 30 g; Refill: 0    Uncontrolled type 2 diabetes mellitus with kidney complication, with long-term current use of insulin    Idiopathic peripheral neuropathy    Healed ulcer of right foot          Plan:   Acute paronychia of toe of left foot - Left Foot    Dry skin dermatitis  -     mupirocin (BACTROBAN) 2 % ointment; Apply topically once daily.  Dispense: 30 g; Refill: 0    Uncontrolled type 2 diabetes mellitus with kidney complication, with long-term current use of insulin    Idiopathic peripheral neuropathy    Healed ulcer of right foot      I counseled the patient on his conditions, regarding findings of my examination, my impressions, and usual treatment plan.   Greater than 15 minutes of a 20 minute appointment spent on education  about the diabetic foot, neuropathy, and prevention of limb loss.  Shoe inspection. Diabetic Foot Education. Patient reminded of the importance of good nutrition and blood sugar control to help prevent podiatric complications of diabetes. Patient instructed on proper foot hygeine. We discussed wearing proper shoe gear, daily foot inspections, never walking without protective shoe gear, never putting sharp instruments to feet.    With patient's permission, right hallux callus trimmed in thickness with #15 blade in thickness without incident.The patient is alerted to watch for any signs of infection (redness, pus, pain, increased swelling or fever) and call if such occurs. Home wound care instructions are provided.  Prescription written for Bactroban ointment to be applied to right hallux once daily.   Patient  will continue to monitor the areas daily, inspect feet, wear protective shoe gear when ambulatory, moisturizer to maintain skin integrity. Patient reminded of the importance of good nutrition and blood sugar control to help prevent podiatric complications of diabetes.  Patient to return 2 months or sooner if needed.                 Vangie Cuevas DPM  [facility]

## 2022-03-08 NOTE — PROGRESS NOTES
HPI     Glaucoma     Comments: 1 month IOP check after better drop compliance       Patient states OU is doing well, ran out of Azopt 2 days ago and needs a   90 day supply in the name brand of the Azopt. Pt says he never refilled it since he lost the box and wasn't sure how to refill              Comments     POAG - Dr Burgess pt   Corneal Opacity   [place] palsy   DM   RD Repair with Scleral Buckle right eye   PCIOL OU   CANALOPLASTY OD 8-22-13 Good flow and tension(-900)   CANAL OS 03/20/14/LOT # 1306-01/REF # [id number]   -500 with shutter effect due to much intraop respiratory movement   Moderate flow with good tension       OU: Azopt BID & Alphagan BID  Pt stopped using Latanoprost/ Travatan          Last edited by Lyndsay Paul, Patient Care Assistant on 3/8/2022 11:07   AM. (History)            Assessment /Plan     For exam results, see Encounter Report.      ICD-10-CM ICD-9-CM    1. Primary open angle glaucoma of both eyes, severe stage  H40.1133 365.11 Intraocular pressure is not within an acceptable range relative to target pressure. Increases risk of irreversible visual loss due to inadequate compliance discussed. Lengthy discussion regarding importance of absolute compliance with treatment regimen.    Discussed options, risks, and benefits of additional medication, SLT laser, or incisional glaucoma surgery without improved compliance.     Patient chooses increased        365.73    2. Uncontrolled type 2 diabetes mellitus with stage 3 chronic kidney disease, with long-term current use of insulin  E11.22 250.52 Not due     E11.65 585.3     N18.30 V58.67     Z79.4     3. Epiretinal membrane (ERM) of left eye  H35.372 362.56    4. Blindness and low vision  H54.10 369.9 Follow    5. Non compliance with medical treatment  Z91.19 V15.81 Follow        RETURN TO CLINIC 2 month IOP   Pt reqt next appts in May due to him working    OU: Azopt BID & Alphagan BID

## 2022-03-24 ENCOUNTER — TELEPHONE (OUTPATIENT)
Dept: INTERNAL MEDICINE | Facility: CLINIC | Age: 67
End: 2022-03-24
Payer: COMMERCIAL

## 2022-03-24 NOTE — PROGRESS NOTES
TO MY TEAM: I need to review these results with Stepan. If he doesn't already have an appointment scheduled with me within the next 3 months, please schedule him an appointment for this purpose. Virtual visit OK.    Thanks!  --------------------------------------------------------------------------------   Future Appointments  5/9/2022   8:20 AM    Vangie Cuevas DPM       [facility] POD            [facility]  5/9/2022   11:30 AM   Luís Millard PA-C [facility] DIABETE        [facility]  6/7/2022   8:15 AM    Alvin Hale MD      [facility] OPHTHAL        [facility]  7/7/2022   11:00 AM   Ramiro Summers MD              [facility] CARDIO         [facility]

## 2022-04-20 ENCOUNTER — TELEPHONE (OUTPATIENT)
Dept: INTERNAL MEDICINE | Facility: CLINIC | Age: 67
End: 2022-04-20
Payer: COMMERCIAL

## 2022-04-20 NOTE — TELEPHONE ENCOUNTER
Spoke with pt regarding scheduling appointment with wound care doctor pt would like to come in around May 9.2022.//LB

## 2022-04-20 NOTE — TELEPHONE ENCOUNTER
----- Message from Charis Carlton sent at 4/20/2022  3:31 PM CDT -----  pt needs call back regarding status of wound care dr lymphedema (oozing, water coming out). would like to be seen 5/9..[phone] (home)

## 2022-04-21 ENCOUNTER — TELEPHONE (OUTPATIENT)
Dept: INTERNAL MEDICINE | Facility: CLINIC | Age: 67
End: 2022-04-21
Payer: COMMERCIAL

## 2022-04-21 NOTE — TELEPHONE ENCOUNTER
LAST ENCOUNTER WITH ME:  10/8/2020   NEXT APPOINTMENT WITH ME: 7/7/2022    TO MY TEAM:    I haven't seen him in over a year and a half.   His BP is uncontrolled.   We can address any referrals at his next appointment.   He can also request wound care referral from podiatrist Dr. Cuevas at upcoming appointment.   Schedule him for Nurse Visit for blood pressure check on May 9.   Tell Stepan I look forward to seeing him at his next appointment on 7/7/2022.    Thanks!    Future Appointments   Date Time Provider Department Center   5/9/2022  8:20 AM Vangie Cuevas DPM [facility] POD [facility]   5/9/2022 11:30 AM Luís Millard PA-C [facility] DIABETE [facility]   6/7/2022  8:15 AM Alvin Hale MD [facility] OPHTHAL [facility]   7/7/2022 10:30 AM CRISTIAN Fleming MD [facility] IM [facility]   7/7/2022 11:00 AM Ramiro Summers MD [facility] CARDIO [facility]      BP Readings from Last 6 Encounters:   03/08/22 (!) 143/81   02/10/22 (!) 157/93   02/08/22 (!) 155/85   02/08/22 (!) 166/88   01/06/22 118/72   12/09/21 115/69

## 2022-05-09 ENCOUNTER — OFFICE VISIT (OUTPATIENT)
Dept: PODIATRY | Facility: CLINIC | Age: 67
End: 2022-05-09
Payer: MEDICARE

## 2022-05-09 ENCOUNTER — OFFICE VISIT (OUTPATIENT)
Dept: DIABETES | Facility: CLINIC | Age: 67
End: 2022-05-09
Payer: MEDICARE

## 2022-05-09 VITALS
HEIGHT: 78 IN | BODY MASS INDEX: 36.45 KG/M2 | WEIGHT: 315 LBS | SYSTOLIC BLOOD PRESSURE: 122 MMHG | DIASTOLIC BLOOD PRESSURE: 66 MMHG | DIASTOLIC BLOOD PRESSURE: 74 MMHG | SYSTOLIC BLOOD PRESSURE: 109 MMHG | HEART RATE: 64 BPM

## 2022-05-09 DIAGNOSIS — N18.32 STAGE 3B CHRONIC KIDNEY DISEASE: ICD-10-CM

## 2022-05-09 DIAGNOSIS — B35.1 DERMATOPHYTOSIS OF NAIL: ICD-10-CM

## 2022-05-09 DIAGNOSIS — I50.9 CHF (NYHA CLASS III, ACC/AHA STAGE C): ICD-10-CM

## 2022-05-09 DIAGNOSIS — E11.59 HYPERTENSION ASSOCIATED WITH DIABETES: ICD-10-CM

## 2022-05-09 DIAGNOSIS — L60.0 ONYCHOCRYPTOSIS: ICD-10-CM

## 2022-05-09 DIAGNOSIS — E11.69 HYPERLIPIDEMIA ASSOCIATED WITH TYPE 2 DIABETES MELLITUS: ICD-10-CM

## 2022-05-09 DIAGNOSIS — I25.10 CORONARY ARTERY DISEASE INVOLVING NATIVE CORONARY ARTERY OF NATIVE HEART WITHOUT ANGINA PECTORIS: ICD-10-CM

## 2022-05-09 DIAGNOSIS — G60.9 IDIOPATHIC PERIPHERAL NEUROPATHY: ICD-10-CM

## 2022-05-09 DIAGNOSIS — I15.2 HYPERTENSION ASSOCIATED WITH DIABETES: ICD-10-CM

## 2022-05-09 DIAGNOSIS — E11.649 HYPOGLYCEMIA UNAWARENESS ASSOCIATED WITH TYPE 2 DIABETES MELLITUS: ICD-10-CM

## 2022-05-09 DIAGNOSIS — I89.0 LYMPHEDEMA: ICD-10-CM

## 2022-05-09 DIAGNOSIS — G47.33 OSA (OBSTRUCTIVE SLEEP APNEA): ICD-10-CM

## 2022-05-09 DIAGNOSIS — E78.5 HYPERLIPIDEMIA ASSOCIATED WITH TYPE 2 DIABETES MELLITUS: ICD-10-CM

## 2022-05-09 DIAGNOSIS — E66.01 MORBID OBESITY: ICD-10-CM

## 2022-05-09 DIAGNOSIS — H54.10 BLINDNESS AND LOW VISION: ICD-10-CM

## 2022-05-09 LAB — GLUCOSE SERPL-MCNC: 104 MG/DL (ref 70–110)

## 2022-05-09 PROCEDURE — 99999 PR PBB SHADOW E&M-EST. PATIENT-LVL III: CPT | Mod: PBBFAC,,, | Performed by: PHYSICIAN ASSISTANT

## 2022-05-09 PROCEDURE — 99213 OFFICE O/P EST LOW 20 MIN: CPT | Mod: PBBFAC,27 | Performed by: PHYSICIAN ASSISTANT

## 2022-05-09 PROCEDURE — 95251 CONT GLUC MNTR ANALYSIS I&R: CPT | Mod: ,,, | Performed by: PHYSICIAN ASSISTANT

## 2022-05-09 PROCEDURE — 99214 OFFICE O/P EST MOD 30 MIN: CPT | Mod: S$PBB,,, | Performed by: PHYSICIAN ASSISTANT

## 2022-05-09 PROCEDURE — 99999 PR PBB SHADOW E&M-EST. PATIENT-LVL III: ICD-10-PCS | Mod: PBBFAC,,, | Performed by: PODIATRIST

## 2022-05-09 PROCEDURE — 99213 PR OFFICE/OUTPT VISIT, EST, LEVL III, 20-29 MIN: ICD-10-PCS | Mod: 25,S$PBB,, | Performed by: PODIATRIST

## 2022-05-09 PROCEDURE — 99213 OFFICE O/P EST LOW 20 MIN: CPT | Mod: 25,S$PBB,, | Performed by: PODIATRIST

## 2022-05-09 PROCEDURE — 82962 GLUCOSE BLOOD TEST: CPT | Mod: PBBFAC | Performed by: PHYSICIAN ASSISTANT

## 2022-05-09 PROCEDURE — 99213 OFFICE O/P EST LOW 20 MIN: CPT | Mod: PBBFAC | Performed by: PODIATRIST

## 2022-05-09 PROCEDURE — 11721 DEBRIDE NAIL 6 OR MORE: CPT | Mod: Q9,S$PBB,, | Performed by: PODIATRIST

## 2022-05-09 PROCEDURE — 99999 PR PBB SHADOW E&M-EST. PATIENT-LVL III: CPT | Mod: PBBFAC,,, | Performed by: PODIATRIST

## 2022-05-09 PROCEDURE — 99214 PR OFFICE/OUTPT VISIT, EST, LEVL IV, 30-39 MIN: ICD-10-PCS | Mod: S$PBB,,, | Performed by: PHYSICIAN ASSISTANT

## 2022-05-09 PROCEDURE — 11721 PR DEBRIDEMENT OF NAILS, 6 OR MORE: ICD-10-PCS | Mod: Q9,S$PBB,, | Performed by: PODIATRIST

## 2022-05-09 PROCEDURE — 95251 PR GLUCOSE MONITOR, 72 HOUR, PHYS INTERP: ICD-10-PCS | Mod: ,,, | Performed by: PHYSICIAN ASSISTANT

## 2022-05-09 PROCEDURE — 11721 DEBRIDE NAIL 6 OR MORE: CPT | Mod: Q9,PBBFAC | Performed by: PODIATRIST

## 2022-05-09 PROCEDURE — 99999 PR PBB SHADOW E&M-EST. PATIENT-LVL III: ICD-10-PCS | Mod: PBBFAC,,, | Performed by: PHYSICIAN ASSISTANT

## 2022-05-09 RX ORDER — PEN NEEDLE, DIABETIC 30 GX3/16"
1 NEEDLE, DISPOSABLE MISCELLANEOUS
Qty: 300 EACH | Refills: 3 | Status: SHIPPED | OUTPATIENT
Start: 2022-05-09 | End: 2023-05-09

## 2022-05-09 NOTE — PROGRESS NOTES
Subjective:     Patient ID: Stepan Springer is a 67 y.o. male.    Chief Complaint: Nail Care (No c/o pain, Wears tennis shoes with socks, diabetic Pt, last seen on 02/10/22 with Diabetes Management.)    Stepan is a 67 y.o. male who presents to the clinic for evaluation and treatment of high risk feet. Stepan has a past medical history of Anxiety, Bell's palsy, CHF (congestive heart failure), Chronic kidney disease, stage 3a (11/2/2021), Coronary artery disease involving native coronary artery without angina pectoris (10/18/2016), Depression, Diabetes mellitus, Glaucoma, Hypertension, Idiopathic peripheral neuropathy (12/11/2012), Mixed hyperlipidemia (12/11/2012), Morbid obesity with BMI of 45.0-49.9, adult (2/12/2019), Pancytopenia, Sleep apnea, Stage 3b chronic kidney disease, Type 2 diabetes mellitus with diabetic polyneuropathy, with long-term current use of insulin (12/11/2012), and Vitamin B 12 deficiency (8/23/2012). The patient's chief complaint is long thick nails. Patient states he would like a referral to Lymphedema clinic. Patient admits clear drainage from legs.  This patient has documented high risk feet requiring routine maintenance secondary to peripheral neuropathy.       PCP: KANDICE Fleming MD    Date Last Seen by PCP: 02/10/2022    Current shoe gear:  Affected Foot: Tennis shoes     Unaffected Foot: Tennis shoes    Hemoglobin A1C   Date Value Ref Range Status   02/08/2022 6.9 (H) 4.0 - 5.6 % Final     Comment:     ADA Screening Guidelines:  5.7-6.4%  Consistent with prediabetes  >or=6.5%  Consistent with diabetes    High levels of fetal hemoglobin interfere with the HbA1C  assay. Heterozygous hemoglobin variants (HbS, HgC, etc)do  not significantly interfere with this assay.   However, presence of multiple variants may affect accuracy.     09/09/2021 5.8 (H) 4.0 - 5.6 % Final     Comment:     ADA Screening Guidelines:  5.7-6.4%  Consistent with prediabetes  >or=6.5%  Consistent with  diabetes    High levels of fetal hemoglobin interfere with the HbA1C  assay. Heterozygous hemoglobin variants (HbS, HgC, etc)do  not significantly interfere with this assay.   However, presence of multiple variants may affect accuracy.     05/06/2021 7.7 (H) 4.0 - 5.6 % Final     Comment:     ADA Screening Guidelines:  5.7-6.4%  Consistent with prediabetes  >or=6.5%  Consistent with diabetes    High levels of fetal hemoglobin interfere with the HbA1C  assay. Heterozygous hemoglobin variants (HbS, HgC, etc)do  not significantly interfere with this assay.   However, presence of multiple variants may affect accuracy.             Patient Active Problem List   Diagnosis    Status post cataract extraction and insertion of intraocular lens    Corneal opacity - Left Eye    Type 2 diabetes mellitus with diabetic polyneuropathy, with long-term current use of insulin    Obstructive sleep apnea (untreated, refuses treatment)    Chronic diastolic congestive heart failure    LBBB (left bundle branch block)    Type 2 diabetes mellitus with stage 3a chronic kidney disease, with long-term current use of insulin    Anxiety    Hypertension associated with diabetes    Stage 3b chronic kidney disease    Primary open angle glaucoma of both eyes, severe stage    Left nephrolithiasis    Hydronephrosis, left    NSTEMI (non-ST elevated myocardial infarction)    CAD with remote PCI (BMS) of RCA in 9/2016    Recurrent major depressive disorder    Vasculogenic erectile dysfunction    Blindness and low vision    Hx of retinal detachment    High myopia, both eyes    Epiretinal membrane (ERM) of left eye    Lattice degeneration of peripheral retina, left    Right-sided Bell's palsy    Hyperlipidemia associated with type 2 diabetes mellitus    Morbid obesity with BMI of 45.0-49.9, adult    GERD (gastroesophageal reflux disease)    Hypoglycemia unawareness associated with type 2 diabetes mellitus    Elevated troponin I  level    Thrombocytopenia    Pancytopenia    History of COVID-19 (April, 2020)    PVD (peripheral vascular disease)    Influenza Immunization not carried out because of patient refusal    Type 2 diabetes mellitus with microalbuminuria, with long-term current use of insulin    Chronic kidney disease, stage 3a    Hyperparathyroidism, secondary renal    Vitamin D deficiency    Lymphedema       Medication List with Changes/Refills   Current Medications    AMLODIPINE (NORVASC) 5 MG TABLET    Take 1 tablet (5 mg total) by mouth every evening.    ASPIRIN 325 MG TABLET    Take 325 mg by mouth once daily.    ATORVASTATIN (LIPITOR) 10 MG TABLET    Take 1 tablet (10 mg total) by mouth every evening.    BLOOD PRESSURE CUFF MISC    1 cuff    BLOOD SUGAR DIAGNOSTIC STRP    To check BG 3 times daily, to use with insurance preferred meter    BRIMONIDINE 0.1% (ALPHAGAN P) 0.1 % DROP    Place 1 drop into both eyes 3 (three) times daily. Order 90 day supply    BRIMONIDINE 0.15 % OPTH DROP (ALPHAGAN) 0.15 % OPHTHALMIC SOLUTION    Place 1 drop into both eyes 2 (two) times daily.    BRINZOLAMIDE (AZOPT) 1 % OPHTHALMIC SUSPENSION    Place 1 drop into both eyes 3 (three) times daily.    BRINZOLAMIDE (AZOPT) 1 % OPHTHALMIC SUSPENSION    Place 1 drop into both eyes 3 (three) times daily. Brand name only    CARVEDILOL (COREG) 25 MG TABLET    Take 1 tablet (25 mg total) by mouth 2 (two) times daily with meals.    CLONIDINE (CATAPRES) 0.1 MG TABLET    Take 1 tablet (0.1 mg total) by mouth 3 (three) times daily.    DULOXETINE (CYMBALTA) 30 MG CAPSULE    Take 1 capsule (30 mg total) by mouth once daily.    EMPAGLIFLOZIN (JARDIANCE) 25 MG TABLET    Take 1 tablet (25 mg total) by mouth once daily.    FLASH GLUCOSE SENSOR (FREESTYLE CLEMENCIA 14 DAY SENSOR) KIT    1 each by Misc.(Non-Drug; Combo Route) route every 14 (fourteen) days.    FUROSEMIDE (LASIX) 40 MG TABLET    Take 1.5 tablets (60 mg total) by mouth 2 (two) times a day.     HYDRALAZINE (APRESOLINE) 100 MG TABLET    Take 1 tablet (100 mg total) by mouth every 8 (eight) hours.    HYDROCHLOROTHIAZIDE (HYDRODIURIL) 25 MG TABLET    Take 1 tablet (25 mg total) by mouth once daily.    INSULIN ASPART U-100 (NOVOLOG) 100 UNIT/ML (3 ML) INPN PEN    Inject 25 Units into the skin 3 (three) times daily with meals.    INSULIN GLARGINE U-300 CONC (TOUJEO MAX U-300 SOLOSTAR) 300 UNIT/ML (3 ML) INSULIN PEN    Inject 80 Units into the skin once daily.    LANCETS MISC    To check BG 3 times daily, to use with insurance preferred meter    LATANOPROST (XALATAN) 0.005 % OPHTHALMIC SOLUTION    Place 1 drop into both eyes once daily.    LISINOPRIL (PRINIVIL,ZESTRIL) 40 MG TABLET    Take 1 tablet (40 mg total) by mouth once daily.    MIGLITOL (GLYSET) 25 MG TAB    Take 1 tablet (25 mg total) by mouth 3 (three) times daily with meals.    MUPIROCIN (BACTROBAN) 2 % OINTMENT    Apply topically once daily.    NETARSUDIL (RHOPRESSA) 0.02 % OPHTHALMIC SOLUTION    Place 1 drop into the left eye once daily.    NITROGLYCERIN (NITROSTAT) 0.4 MG SL TABLET    Place 1 tablet (0.4 mg total) under the tongue every 5 (five) minutes as needed for Chest pain.    OMEPRAZOLE (PRILOSEC) 40 MG CAPSULE    Take 1 capsule (40 mg total) by mouth once daily.    POLYETHYLENE GLYCOL (GLYCOLAX) 17 GRAM/DOSE POWDER    Take 17 g by mouth once daily.    SILVER SULFADIAZINE 1% (SILVADENE) 1 % CREAM    Apply topically 2 (two) times daily.       Review of patient's allergies indicates:   Allergen Reactions    Ancef in dextrose (iso-osm)      dizziness    Cefazolin      Other reaction(s): Shakes  Other reaction(s): Chills       Past Surgical History:   Procedure Laterality Date    CARDIAC CATHETERIZATION  7/22/13    non obstructive cad    CATARACT EXTRACTION  OU    COLONOSCOPY N/A 5/1/2019    Procedure: COLONOSCOPY;  Surgeon: Henry Mo III, MD;  Location: [facility];  Service: Endoscopy;  Laterality: N/A;    CORONARY ANGIOPLASTY       "ESOPHAGOGASTRODUODENOSCOPY N/A 5/1/2019    Procedure: ESOPHAGOGASTRODUODENOSCOPY (EGD);  Surgeon: Henry Mo III, MD;  Location[facility];  Service: Endoscopy;  Laterality: N/A;    EYE SURGERY      FRACTURE SURGERY      kidney stone       August 2016    PC IOL OU      RETINAL DETACHMENT REPAIR W/ SCLERAL BUCKLE LE      WRIST SURGERY         Family History   Problem Relation Age of Onset    Macular degeneration Father     Heart disease Father         CHF     Heart attack Father     Hypertension Mother     Macular degeneration Paternal Uncle     Hypertension Maternal Grandmother     Hypertension Maternal Grandfather     Strabismus Neg Hx     Retinal detachment Neg Hx     Glaucoma Neg Hx     Blindness Neg Hx     Amblyopia Neg Hx        Social History     Socioeconomic History    Marital status:    Occupational History     Comment: Quit job at [employer]; wants to open his own [employer] business    Tobacco Use    Smoking status: Never Smoker    Smokeless tobacco: Never Used   Substance and Sexual Activity    Alcohol use: Yes     Comment: " one to two glasses of wine per year"    Drug use: No    Sexual activity: Not Currently     Birth control/protection: None   Social History Narrative    , 1 son, ANGIEHA.       Vitals:    05/09/22 0746   BP: 122/74   Weight: (!) 195.5 kg (431 lb)   Height: 6' 7" (2.007 m)   PainSc: 0-No pain   PainLoc: Foot       Hemoglobin A1C   Date Value Ref Range Status   02/08/2022 6.9 (H) 4.0 - 5.6 % Final     Comment:     ADA Screening Guidelines:  5.7-6.4%  Consistent with prediabetes  >or=6.5%  Consistent with diabetes    High levels of fetal hemoglobin interfere with the HbA1C  assay. Heterozygous hemoglobin variants (HbS, HgC, etc)do  not significantly interfere with this assay.   However, presence of multiple variants may affect accuracy.     09/09/2021 5.8 (H) 4.0 - 5.6 % Final     Comment:     ADA Screening Guidelines:  5.7-6.4%  Consistent with " prediabetes  >or=6.5%  Consistent with diabetes    High levels of fetal hemoglobin interfere with the HbA1C  assay. Heterozygous hemoglobin variants (HbS, HgC, etc)do  not significantly interfere with this assay.   However, presence of multiple variants may affect accuracy.     05/06/2021 7.7 (H) 4.0 - 5.6 % Final     Comment:     ADA Screening Guidelines:  5.7-6.4%  Consistent with prediabetes  >or=6.5%  Consistent with diabetes    High levels of fetal hemoglobin interfere with the HbA1C  assay. Heterozygous hemoglobin variants (HbS, HgC, etc)do  not significantly interfere with this assay.   However, presence of multiple variants may affect accuracy.         Review of Systems   Constitutional: Negative for chills and fever.   Respiratory: Negative for shortness of breath.    Cardiovascular: Negative for chest pain, palpitations, orthopnea, claudication and leg swelling.   Gastrointestinal: Negative for diarrhea, nausea and vomiting.   Musculoskeletal: Negative for joint pain.   Skin: Negative for rash.   Neurological: Positive for tingling and sensory change.   Psychiatric/Behavioral: Negative.          Objective:   PHYSICAL EXAM: Apperance: Alert and orient in no distress,well developed, and with good attention to grooming and body habits  Patient presents ambulating in tennis shoes.   LOWER EXTREMITY EXAM:  VASCULAR: Dorsalis pedis pulses 1/4 bilateral and Posterior Tibial pulses 1/4 bilateral. Capillary fill time <4 seconds bilateral. Moderate edema observed bilateral. Varicosities absent bilateral. Skin temperature of the lower extremities is warm to cool, proximal to distal. Hair growth dim bilateral. (--) lymphangitis or (--) cellulitis noted right.  DERMATOLOGICAL: (--) edema, (--) erythema, (--) malodor, (--) drainage, (--) warmth to right foot. Previous ulcer noted to distal right hallux closed with thin hyperkeratotic tissue. Post debridement no ope lesion noted. Previous ulcer noted to dorsal right  hallux and 2nd toe closed with epithealuzed tissue.  The dorsum surface of the feet are soft and supple.  The plantar aspects of feet are dry and scaly. Nails 1,2,3,4,5 bilateral elongated, incurvated, and discolored with subungual debris. Webspaces 1,2,3,4 bilateral clean, dry and without evidence of break in skin integrity.   NEUROLOGICAL: Light touch, sharp-dull, proprioception all present and equal bilaterally.  Vibratory sensation diminished at bilateral hallux and navicular. Protective sensation absent at 2/10 sites as tested with a [place] 5.07 monofilament.   MUSCULOSKELETAL: Muscle strength is 5/5 for foot inverters, everters, plantarflexors, and dorsiflexors. Muscle tone is normal. Semi-rigid hammertoes bilateral. No pain on palpation of left hallux nail.       Assessment:   Uncontrolled type 2 diabetes mellitus with kidney complication, with long-term current use of insulin  -     Ambulatory referral/consult to Physical/Occupational Therapy; Future; Expected date: 05/16/2022    Idiopathic peripheral neuropathy  -     Ambulatory referral/consult to Physical/Occupational Therapy; Future; Expected date: 05/16/2022    Dermatophytosis of nail    Onychocryptosis - Left Foot    Lymphedema  -     Ambulatory referral/consult to Physical/Occupational Therapy; Future; Expected date: 05/16/2022          Plan:   Uncontrolled type 2 diabetes mellitus with kidney complication, with long-term current use of insulin  -     Ambulatory referral/consult to Physical/Occupational Therapy; Future; Expected date: 05/16/2022    Idiopathic peripheral neuropathy  -     Ambulatory referral/consult to Physical/Occupational Therapy; Future; Expected date: 05/16/2022    Dermatophytosis of nail    Onychocryptosis - Left Foot    Lymphedema  -     Ambulatory referral/consult to Physical/Occupational Therapy; Future; Expected date: 05/16/2022      I counseled the patient on his conditions, regarding findings of my examination, my  impressions, and usual treatment plan.   Greater than 15 minutes of a 20 minute appointment spent on education about the diabetic foot, neuropathy, and prevention of limb loss.  Shoe inspection. Diabetic Foot Education. Patient reminded of the importance of good nutrition and blood sugar control to help prevent podiatric complications of diabetes. Patient instructed on proper foot hygeine. We discussed wearing proper shoe gear, daily foot inspections, never walking without protective shoe gear, never putting sharp instruments to feet.    With patient's permission, nails 1-5 bilateral were debrided/trimmed in length and thickness aggressively to their soft tissue attachment mechanically and with electric , removing all offending nail and debris. Patient relates relief following the procedure.  Referral completed for Lymphedema clinic.   Patient  will continue to monitor the areas daily, inspect feet, wear protective shoe gear when ambulatory, moisturizer to maintain skin integrity. Patient reminded of the importance of good nutrition and blood sugar control to help prevent podiatric complications of diabetes.  Patient to return 3 months or sooner if needed.               Vangie Cuevas DPM  [facility]

## 2022-05-09 NOTE — PATIENT INSTRUCTIONS
CURRENT DM MEDICATIONS:   Novolin 70/30 80 units in the morning and 80 units before dinner  Miglitol 25 mg TID wm   Jardiance 25 mg daily          AFTER 70/30 supply:   Toujeo 80 units daily - will plan to take every day at 12p  Novolog 15 units for a small meal / 25 units for a regular meal - 15 mins before eating   Miglitol 25 mg TID wm   Jardiance 25 mg daily

## 2022-05-09 NOTE — PROGRESS NOTES
PCP: KANDICE Fleming MD    Subjective:     Chief Complaint: Diabetes - Established Patient    HISTORY OF PRESENT ILLNESS: 67 y.o.   male presenting for diabetes management visit.   The patient's last visit with me was on 10/19/2021.  Patient has had Type II diabetes since 2005.  Pertinent to decision making is the following comorbidities: HTN, HLD, CAD, S/p MI, CHF, CKD III and Obesity by BMI  Patient has the following Diabetes complications: with diabetic chronic kidney disease  He has attended diabetes education in the past.     Patient's most recent A1c of 6.9% was completed 2 days ago.   Patient states since His last A1c His blood glucose levels have been both high and low throughout the day     Patient monitors blood glucose 4 times per day and Continuously with personal CGM Sriram.   Patient blood glucose monitoring device will be uploaded into [employer] today.   Patient endorses the following diabetes related symptoms: None.   Interpretation of CGM includes an average blood glucose of 167 mg/dL with a glucose variability of 24.8%. GMI of 7.3%. Patient's time in range of 57%, time above range of 41%, and time below range of 2%.   Patients records show some baseline hyperglycemia but improvement of hypoglycemia overnight.   Patient is due today for the following diabetes-related health maintenance standards: COVID Vaccine and Lipid panel.   He denies any recent hospital admissions or emergency room visits.  He voices having hypoglycemia as above. Per chart, patient has history of hypoglycemia unawareness.   Patient's concerns today include glycemic control.   Patient medication regimen is as below.     CURRENT DM MEDICATIONS:   · Novolin 70/30 80 units in the morning and 80 units before dinner - has 30 day supply left   Miglitol 25 mg TID wm    Jardiance 25 mg daily    Patient has failed the following Diabetes medications:    Metformin - GI    Glyburide   Ozempic - cost   Basal - Lantus          Labs Reviewed.       Lab Results   Component Value Date    CPEPTIDE 2.70 12/13/2018     Lab Results   Component Value Date    GLUTAMICACID 0.00 12/13/2018          //   , There is no height or weight on file to calculate BMI.  His blood sugar in clinic today is:    Lab Results   Component Value Date    POCGLU 104 05/09/2022       Review of Systems   Constitutional: Negative for activity change, appetite change, chills and fever.   HENT: Negative for dental problem, mouth sores, nosebleeds, sore throat and trouble swallowing.    Eyes: Negative for pain and discharge.   Respiratory: Negative for shortness of breath, wheezing and stridor.    Cardiovascular: Negative for chest pain, palpitations and leg swelling.   Gastrointestinal: Negative for abdominal pain, diarrhea, nausea and vomiting.   Endocrine: Negative for polydipsia, polyphagia and polyuria.   Genitourinary: Negative for dysuria, frequency and urgency.   Musculoskeletal: Negative for joint swelling and myalgias.   Skin: Negative for rash and wound.   Neurological: Negative for dizziness, syncope, weakness and headaches.   Psychiatric/Behavioral: Negative for behavioral problems and dysphoric mood.         Diabetes Management Status  Statin: Taking  ACE/ARB: Taking    Screening or Prevention Patient's value Goal Complete/Controlled?   HgA1C Testing and Control   Lab Results   Component Value Date    HGBA1C 6.9 (H) 02/08/2022      Annually/Less than 8% No   Lipid profile : 05/06/2021 Annually Yes   LDL control Lab Results   Component Value Date    LDLCALC 48.8 (L) 05/06/2021    Annually/Less than 100 mg/dl  Yes   Nephropathy screening Lab Results   Component Value Date    MICALBCREAT 35.0 (H) 10/19/2021     Lab Results   Component Value Date    PROTEINUA Negative 03/22/2021    Annually No   Blood pressure BP Readings from Last 1 Encounters:   05/09/22 109/66    Less than 140/90 No   Dilated retinal exam : 12/07/2021 Annually Yes    Foot exam   :  "08/24/2021 Annually Yes     ACTIVITY LEVEL: Rarely Active. Discussed activities, benefits, methods, and precautions.  MEAL PLANNING: Patient reports number of meals per day to be 3 and number of snacks per day to be 2.   Patient is encouraged to carb count and consume no more than 30 - 45 grams of carbohydrates in each meal and 15 grams of carbohydrates in each snack.     Social History     Socioeconomic History    Marital status:    Occupational History     Comment: Quit job at [employer]; wants to open his own [employer] business    Tobacco Use    Smoking status: Never Smoker    Smokeless tobacco: Never Used   Substance and Sexual Activity    Alcohol use: Yes     Comment: " one to two glasses of wine per year"    Drug use: No    Sexual activity: Not Currently     Birth control/protection: None   Social History Narrative    , 1 son, JANIE.     Past Medical History:   Diagnosis Date    Anxiety     Bell's palsy     CHF (congestive heart failure)     Chronic kidney disease, stage 3a 11/2/2021    Coronary artery disease involving native coronary artery without angina pectoris 10/18/2016    Depression     Diabetes mellitus     Glaucoma     Hypertension     Idiopathic peripheral neuropathy 12/11/2012    Mixed hyperlipidemia 12/11/2012    Morbid obesity with BMI of 45.0-49.9, adult 2/12/2019    Pancytopenia     Sleep apnea     Stage 3b chronic kidney disease     Type 2 diabetes mellitus with diabetic polyneuropathy, with long-term current use of insulin 12/11/2012    Vitamin B 12 deficiency 8/23/2012       Objective:        Physical Exam  Constitutional:       General: He is not in acute distress.     Appearance: He is well-developed. He is not diaphoretic.   HENT:      Head: Normocephalic and atraumatic.      Right Ear: External ear normal.      Left Ear: External ear normal.      Nose: Nose normal.   Eyes:      General:         Right eye: No discharge.         Left eye: No discharge.      " "Pupils: Pupils are equal, round, and reactive to light.   Cardiovascular:      Rate and Rhythm: Normal rate and regular rhythm.      Heart sounds: Normal heart sounds.   Pulmonary:      Effort: Pulmonary effort is normal.      Breath sounds: Normal breath sounds.   Abdominal:      Palpations: Abdomen is soft.   Musculoskeletal:         General: Normal range of motion.      Cervical back: Normal range of motion and neck supple.   Skin:     General: Skin is warm and dry.      Capillary Refill: Capillary refill takes less than 2 seconds.   Neurological:      Mental Status: He is alert and oriented to person, place, and time.      Motor: No abnormal muscle tone.      Coordination: Coordination normal.   Psychiatric:         Behavior: Behavior normal.         Thought Content: Thought content normal.         Judgment: Judgment normal.           Assessment / Plan:     Uncontrolled type 2 diabetes mellitus with kidney complication, with long-term current use of insulin  -     POCT Glucose, Hand-Held Device  -     pen needle, diabetic (BD ULTRA-FINE AMANDA PEN NEEDLE) 32 gauge x 5/32" Ndle; 1 each by Misc.(Non-Drug; Combo Route) route 4 (four) times daily with meals and nightly.  Dispense: 300 each; Refill: 3  -     Hemoglobin A1C; Standing  -     Lipid Panel; Future; Expected date: 05/09/2022    Stage 3b chronic kidney disease    Hypoglycemia unawareness associated with type 2 diabetes mellitus    Blindness and low vision    Idiopathic peripheral neuropathy    CHF (NYHA class III, ACC/AHA stage C)    Coronary artery disease involving native coronary artery of native heart without angina pectoris    Hypertension associated with diabetes    Hyperlipidemia associated with type 2 diabetes mellitus    FRAN (obstructive sleep apnea)    Morbid obesity      Additional Plan Details:    - POCT Glucose  - Encouraged continuation of lifestyle changes including regular exercise and limiting carbohydrates to 30-45 grams per meal threes " times daily and 15 grams per snack with a limit of two daily.   - Encouraged continued monitoring of blood glucose with maintenance of 4 times daily and Continuously with personal CGM Sriram.   - Current DM Medication Regimen:  Continue Jardiance 25 mg. Change 70/30 80 units in the morning and 80 units before dinner - will plan to transition to Toujeo 80 units daily and Novolog 15 units for small meal and 25 units with regular meal TID wm and correction dosing following supply.  Continue Miglitol 25 mg TID wm.    - Health Maintenance standards addressed today: Lipid panel to be scheduled today and COVID - 19 Vaccine - patient will schedule outside of [facility]   - Nursing Visit: Patient is age 79 or younger with an A1c of 7.5 or greater and will not need nursing visit at this time .   - Follow up in 3 months and in 6 weeks for call.       Blakeney McKnight, PA-C [facility]     A total of 30 minutes was spent in face to face time, of which over 50 % was spent in counseling patient on disease process, complications, treatment, and side effects of medications.

## 2022-06-01 ENCOUNTER — CLINICAL SUPPORT (OUTPATIENT)
Dept: REHABILITATION | Facility: HOSPITAL | Age: 67
End: 2022-06-01
Attending: PODIATRIST
Payer: MEDICARE

## 2022-06-01 DIAGNOSIS — G60.9 IDIOPATHIC PERIPHERAL NEUROPATHY: ICD-10-CM

## 2022-06-01 DIAGNOSIS — I89.0 LYMPHEDEMA: ICD-10-CM

## 2022-06-01 PROCEDURE — 97535 SELF CARE MNGMENT TRAINING: CPT

## 2022-06-01 PROCEDURE — 97161 PT EVAL LOW COMPLEX 20 MIN: CPT

## 2022-06-01 NOTE — PATIENT INSTRUCTIONS
discussed etiology of lymphedema and the lymphatic system  -discussed cellulitis and ways to decrease risk of cellulitis  -recommendations made for using trim to fit velcro wraps to push fluid  -faxed patient information and recommendations to [facility]

## 2022-06-01 NOTE — PLAN OF CARE
[facility]  Physical Therapy/Lymphedema Initial Evaluation    Name: Stepan Springer  Clinic Number: [id number]    Therapy Diagnosis:   Encounter Diagnoses   Name Primary?    Uncontrolled type 2 diabetes mellitus with kidney complication, with long-term current use of insulin     Idiopathic peripheral neuropathy     Lymphedema       Physician: Vangie Cuevas DPM    Physician Orders: PT Eval and Treat   Medical Diagnosis from Referral: lymphedema  Evaluation Date: 6/1/2022  Authorization Period Expiration: 6/2/2023  Plan of Care Expiration: 8/24/2022  Visit # / Visits authorized: 1/ 1    Time In: 0910 am  Time Out: 1000 am  Total Billable Time: 10 minutes    Precautions: Standard    Subjective   Date of onset: Jan 2022  History of current condition - Stepan reports: swelling of legs that started the beginning of 2022. Swelling is worsening in both legs with the left leg worse than the right. Bumps started forming on his legs at the end of march. He reports a hx of cellulitis in the left leg in 2005. He lives in [place] and uses public transportation and will have difficulty getting to therapy. He is willing to purchase trim to fit velcro wraps to use for compression and to come to therapy to have the straps cut to fit     Medical History:   Past Medical History:   Diagnosis Date    Anxiety     Bell's palsy     CHF (congestive heart failure)     Chronic kidney disease, stage 3a 11/2/2021    Coronary artery disease involving native coronary artery without angina pectoris 10/18/2016    Depression     Diabetes mellitus     Glaucoma     Hypertension     Idiopathic peripheral neuropathy 12/11/2012    Mixed hyperlipidemia 12/11/2012    Morbid obesity with BMI of 45.0-49.9, adult 2/12/2019    Pancytopenia     Sleep apnea     Stage 3b chronic kidney disease     Type 2 diabetes mellitus with diabetic polyneuropathy, with long-term current use of insulin 12/11/2012     Vitamin B 12 deficiency 8/23/2012       Surgical History:   Stepan Springer  has a past surgical history that includes Retinal detachment repair w/ scleral buckle; PC IOL OU; Cataract extraction (OU); Wrist surgery; Eye surgery; Fracture surgery; Cardiac catheterization (7/22/13); kidney stone ; Esophagogastroduodenoscopy (N/A, 5/1/2019); Colonoscopy (N/A, 5/1/2019); and Coronary angioplasty.    Medications:   Stepan has a current medication list which includes the following prescription(s): amlodipine, aspirin, atorvastatin, blood pressure cuff, blood sugar diagnostic, brimonidine 0.1%, brimonidine 0.15 % opth drop, brinzolamide, brinzolamide, carvedilol, clonidine, duloxetine, jardiance, freestyle adi 14 day sensor, furosemide, hydralazine, hydrochlorothiazide, insulin aspart u-100, toujeo max u-300 solostar, lancets, latanoprost, lisinopril, miglitol, mupirocin, rhopressa, nitroglycerin, omeprazole, pen needle, diabetic, polyethylene glycol, and silver sulfadiazine 1%.    Allergies:   Review of patient's allergies indicates:   Allergen Reactions    Ancef in dextrose (iso-osm)      dizziness    Cefazolin      Other reaction(s): Shakes  Other reaction(s): Chills        Surgery: none  Radiation:  0 weeks  Chemotherapy: -   Hormonal Medications: -   Pt denies  KFand DM.  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  Prior Level of Function: no swelling prior to Jan 2022  Current Level of Function: worsening of swelling bilateral lower extremities   Gait: uses a cane  Transfers: indep with a cane   Bed Mobility: indep with difficulty lifting legs     Pain:  Current 0/10, worst 2/10, best 0/10   Location: bilateral lower legs  Description: pressure  Aggravating  Factors: Sitting  Easing Factors: elevation    Pts goals: manage swelling in both legs    Objective     STAGE II secondary Lymphedema (phlebolymphedema)  Amount of Swelling: moderate  Location of Swelling: bilateral lower extremities    Skin Integrity: reddened skin; skin lesions forming; large area on posterior left leg - lobule)  Palpation/Texture: fibrous lower legs (posterior and anterior aspect, middle and distal leg)   Circulation: impaired venous flow (hx)  Posture: fair  Flexibility: good motions of both hips      Girth Measurements (in centimeters)  LANDMARK LEFT LE  2022 RIGHT LE  2022 DIFF   at eval   20 cm below SBP 66 cm 61.5 cm 4.5 cm   30 cm below SBP 70 cm 63 cm 7 cm   40 cm below SBP 68 cm 52 cm 16 cm   Ankle 44.5 cm 41 cm 2.5 cm   Forefoot 32.5 cm 31.5 cm 1 cm     Length of le cm  Length of foot: 23 cm (heel to MT of G. Toe) Size 14 foot        CMS Impairment/Limitation/Restriction for FOTO - Survey    Therapist reviewed FOTO scores for Stepan Springer on 2022.   FOTO documents entered into [employer] - see Media section.    Limitation Score: -%  Consult only       TREATMENT   Treatment Time In: 0950 am  Treatment Time Out: 1000 am  Total Treatment time separate from Evaluation: 10 minutes     Stepan received self care/home management to develop knowledge/understanding of lymphedema etiology, progression and treatment options x 10 minutes including:    PROVIDED LYMPHEDEMA HANDOUT AND REVIEWED THE FOLLOWING INFORMATION:  -discussed etiology of lymphedema and the lymphatic system  -discussed cellulitis and ways to decrease risk of cellulitis  -recommendations made for using trim to fit velcro wraps to push fluid  -faxed patient information and recommendations to [facility]    Home Exercises and Patient Education Provided    Education provided:   - see above information     This patient is in agreement to participate in Lymphedema treatment (using trim to fit wraps)    Written Home  Exercises Provided: yes.  Exercises were reviewed and Stepan was able to demonstrate them prior to the end of the session.  Stepan demonstrated good  understanding of the education provided.     See EMR under Patient Instructions for exercises provided 6/1/2022.    Assessment   Stepan is a 67 y.o. male referred to [facility] with a medical diagnosis of lymphedema. This patient presents s/p swelling in both legs that started in January 2022   resulting in: lymphedema of the BLE, increased pain, increased stiffness in the legs, as well as difficulty performing walking , and placing the pt at higher risk of infection.     Pt prognosis is Good.   Pt will benefit from skilled outpatient Physical Therapy to address the deficits stated above and in the chart below, provide pt/family education, and to maximize pt's level of independence.     Plan of care discussed with patient: Yes  Pt's spiritual, cultural and educational needs considered and patient is agreeable to the plan of care and goals as stated below:     Anticipated Barriers for therapy: patient lives in [facility] and relies on public transportation    Medical Necessity is demonstrated by the following  History  Co-morbidities and personal factors that may impact the plan of care Co-morbidities:   high BMI and see above info    Personal Factors:   no deficits     low   Examination  Body Structures and Functions, activity limitations and participation restrictions that may impact the plan of care Body Regions:   lower extremities    Body Systems:    gait  edema    Participation Restrictions:   none    Activity limitations:   Learning and applying knowledge  visual deficit    General Tasks and Commands  no deficits    Communication  no deficits    Mobility  walking  driving (bike, car, motorcycle)    Self care  no deficits    Domestic Life  shopping  assisting others    Interactions/Relationships  no deficits    Life Areas  no  deficits    Community and Social Life  community life  recreation and leisure         low   Clinical Presentation stable and uncomplicated low   Decision Making/ Complexity Score: low     The following goals were discussed with the patient and patient is in agreement with them as to be addressed in the treatment plan.     Short Term Goals: (6 weeks)  1. Patient will show decreased girth in bilateral lower extremities  by up to 8 cm to allow for LE symmetry, shoe and clothing choice, and ability to apply needed compression.  (progressing, not met)   2. Patient will demonstrate 100% knowledge of lymphedema precautions and signs of infection to allow for reduced lymphedema risk, infection risk, and/or exacerbation of condition.  (progressing, not met)  3. Patient or caregiver will perform self-bandaging techniques and/or wearing of compression garments to allow for lymphatic drainage support, skin elasticity, and reduction in shape and size of limb. (progressing, not met)  4. Patient will perform self lymph drainage techniques to areas within reach to enhance lymphatic drainage and skin condition.  (progressing, not met)  5. Patient will tolerate daily activities with multilayered bandaging to allow for lymphatic and venous support.  (progressing, not met)    Long Term Goals: (12  weeks)  1. Patient will show decreased girth in bilateral lower extremities  by up to 10-15 cm  to allow for LE symmetry, shoe and clothing choice, and ability to apply needed compression daily.  (progressing, not met)  2. Patient will show reduction in density to mild or less with improved contour of limb to allow for cosmesis, LE symmetry, infection risk reduction, and clothing and compression choice.   (progressing, not met)  3. Patient to william/doff compression garment with daily compliance to assist in lymphedema management, skin elasticity, and tissue density.  (progressing, not met)  4. Pt to show improved postural awareness and  alignment.  (progressing, not met)  5. Pt to be I and compliant with HEP to allow for increased function in affected limb.   (progressing, not met)      Plan   Plan of care Certification: 6/1/2022 to 8/24/2022.    Outpatient Physical Therapy 1-2 times weekly for 12 weeks to include the following interventions: Manual Therapy, Patient Education, Self Care, Therapeutic Exercise and vaso-pneumatic compression. Complete Decongestive Therapy- compression and home equipment needs to be addressed and assisted.    Pt may be seen by a PTA as part of the Rehab treatment team.    Mckenna Omer, PT, CLSYLVIA-GRIFFIN

## 2022-06-07 ENCOUNTER — OFFICE VISIT (OUTPATIENT)
Dept: OPHTHALMOLOGY | Facility: CLINIC | Age: 67
End: 2022-06-07
Payer: MEDICARE

## 2022-06-07 DIAGNOSIS — H40.1133 PRIMARY OPEN ANGLE GLAUCOMA OF BOTH EYES, SEVERE STAGE: Primary | ICD-10-CM

## 2022-06-07 DIAGNOSIS — Z91.199 NON COMPLIANCE WITH MEDICAL TREATMENT: ICD-10-CM

## 2022-06-07 DIAGNOSIS — H54.10 BLINDNESS AND LOW VISION: ICD-10-CM

## 2022-06-07 DIAGNOSIS — H35.372 EPIRETINAL MEMBRANE (ERM) OF LEFT EYE: ICD-10-CM

## 2022-06-07 PROCEDURE — 99214 OFFICE O/P EST MOD 30 MIN: CPT | Mod: S$PBB,,, | Performed by: OPHTHALMOLOGY

## 2022-06-07 PROCEDURE — 99999 PR PBB SHADOW E&M-EST. PATIENT-LVL IV: ICD-10-PCS | Mod: PBBFAC,,, | Performed by: OPHTHALMOLOGY

## 2022-06-07 PROCEDURE — 99214 PR OFFICE/OUTPT VISIT, EST, LEVL IV, 30-39 MIN: ICD-10-PCS | Mod: S$PBB,,, | Performed by: OPHTHALMOLOGY

## 2022-06-07 PROCEDURE — 99214 OFFICE O/P EST MOD 30 MIN: CPT | Mod: PBBFAC | Performed by: OPHTHALMOLOGY

## 2022-06-07 PROCEDURE — 99999 PR PBB SHADOW E&M-EST. PATIENT-LVL IV: CPT | Mod: PBBFAC,,, | Performed by: OPHTHALMOLOGY

## 2022-06-07 RX ORDER — NETARSUDIL 0.2 MG/ML
1 SOLUTION/ DROPS OPHTHALMIC; TOPICAL DAILY
Qty: 2.5 ML | Refills: 6 | Status: SHIPPED | OUTPATIENT
Start: 2022-06-07 | End: 2022-08-30 | Stop reason: SDUPTHER

## 2022-06-07 NOTE — PROGRESS NOTES
HPI     Glaucoma     Comments: 2M IOP              Comments     Patient states that he is using and tolerating Azopt/ Alphagan Ou BID -   denies va changes OU - denies pain/ discomfort OU      POAG - Dr Burgess pt   Corneal Opacity   Bethel palsy   DM   RD Repair with Scleral Buckle right eye   PCIOL OU   CANALOPLASTY OD 8-22-13 Good flow and tension(-900)   CANAL OS 03/20/14/LOT # 1306-01/REF # [id number]   -500 with shutter effect due to much intraop respiratory movement   Moderate flow with good tension       OU: Azopt BID & Alphagan BID  Pt stopped using Latanoprost/ Travatan            Last edited by Paloma Robison MA on 6/7/2022  8:39 AM. (History)            Assessment /Plan     For exam results, see Encounter Report.      ICD-10-CM ICD-9-CM    1. Primary open angle glaucoma of both eyes, severe stage  H40.1133 365.11 IOP better today, pt is back on drops at this time and better with compliance   Would like IOP closer to goal   Pt desires to follow at this time and defers surgical options at this time     Add Rhopressa trial since pt is intolerant of latanoprost/ travatan      365.73    2. Uncontrolled type 2 diabetes mellitus with stage 3 chronic kidney disease, with long-term current use of insulin  E11.22 250.52 Not due for dilation at this time     E11.65 585.3     N18.30 V58.67     Z79.4     3. Epiretinal membrane (ERM) of left eye  H35.372 362.56 And h/o scleral buckle OD    4. Blindness and low vision  H54.10 369.9    5. Non compliance with medical treatment  Z91.19 V15.81 Pt is back on drops at this time and compliant        Back compliant with meds   RETURN TO CLINIC 1-2 month     OU: Azopt BID & Alphagan BID  Add Rhopressa QD OU

## 2022-06-21 ENCOUNTER — PATIENT MESSAGE (OUTPATIENT)
Dept: DIABETES | Facility: CLINIC | Age: 67
End: 2022-06-21

## 2022-06-21 RX ORDER — FLASH GLUCOSE SENSOR
1 KIT MISCELLANEOUS
Qty: 6 KIT | Refills: 3 | Status: SHIPPED | OUTPATIENT
Start: 2022-06-21 | End: 2022-12-30 | Stop reason: SDUPTHER

## 2022-06-21 NOTE — TELEPHONE ENCOUNTER
----- Message from Lyndsay Horton sent at 6/21/2022 12:47 PM CDT -----  Contact: stepan  .Type:  RX Refill Request    Who Called:  Stepan  Refill or New Rx: refill   RX Name and Strength: Sriram sensors  How is the patient currently taking it? (ex. 1XDay): na  Is this a 30 day or 90 day RX: na    Preferred Pharmacy with phone number:   [facility] - [place], [place] - [address] AT [address] [facility]  [address]  [facility] [address]  Phone: [phone] Fax: [phone]         Local or Mail Order: mail order   Ordering Provider:Would the patient rather a call back or a response via My  [facility]? Call   Best Call Back Number:   [phone]      Additional Information:  The patient need to have his sriram sensors please

## 2022-07-05 DIAGNOSIS — I73.9 PVD (PERIPHERAL VASCULAR DISEASE): Primary | ICD-10-CM

## 2022-07-07 ENCOUNTER — HOSPITAL ENCOUNTER (OUTPATIENT)
Dept: CARDIOLOGY | Facility: HOSPITAL | Age: 67
Discharge: HOME | End: 2022-07-07
Attending: INTERNAL MEDICINE
Payer: MEDICARE

## 2022-07-07 ENCOUNTER — OFFICE VISIT (OUTPATIENT)
Dept: INTERNAL MEDICINE | Facility: CLINIC | Age: 67
End: 2022-07-07
Payer: MEDICARE

## 2022-07-07 ENCOUNTER — OFFICE VISIT (OUTPATIENT)
Dept: CARDIOLOGY | Facility: CLINIC | Age: 67
End: 2022-07-07
Payer: MEDICARE

## 2022-07-07 VITALS
DIASTOLIC BLOOD PRESSURE: 96 MMHG | HEART RATE: 67 BPM | OXYGEN SATURATION: 98 % | BODY MASS INDEX: 49.27 KG/M2 | TEMPERATURE: 98 F | WEIGHT: 315 LBS | SYSTOLIC BLOOD PRESSURE: 162 MMHG

## 2022-07-07 VITALS
HEART RATE: 56 BPM | WEIGHT: 315 LBS | BODY MASS INDEX: 49 KG/M2 | OXYGEN SATURATION: 98 % | DIASTOLIC BLOOD PRESSURE: 80 MMHG | SYSTOLIC BLOOD PRESSURE: 138 MMHG

## 2022-07-07 DIAGNOSIS — E11.22 TYPE 2 DIABETES MELLITUS WITH STAGE 3A CHRONIC KIDNEY DISEASE, WITH LONG-TERM CURRENT USE OF INSULIN: Primary | ICD-10-CM

## 2022-07-07 DIAGNOSIS — I73.9 PVD (PERIPHERAL VASCULAR DISEASE): ICD-10-CM

## 2022-07-07 DIAGNOSIS — Z79.4 TYPE 2 DIABETES MELLITUS WITH STAGE 3A CHRONIC KIDNEY DISEASE, WITH LONG-TERM CURRENT USE OF INSULIN: Primary | ICD-10-CM

## 2022-07-07 DIAGNOSIS — E55.9 VITAMIN D DEFICIENCY: ICD-10-CM

## 2022-07-07 DIAGNOSIS — I50.32 CHRONIC DIASTOLIC CONGESTIVE HEART FAILURE: Primary | ICD-10-CM

## 2022-07-07 DIAGNOSIS — L97.521 DIABETIC ULCER OF TOE OF LEFT FOOT ASSOCIATED WITH TYPE 2 DIABETES MELLITUS, LIMITED TO BREAKDOWN OF SKIN: ICD-10-CM

## 2022-07-07 DIAGNOSIS — N18.32 STAGE 3B CHRONIC KIDNEY DISEASE: ICD-10-CM

## 2022-07-07 DIAGNOSIS — E78.5 HYPERLIPIDEMIA ASSOCIATED WITH TYPE 2 DIABETES MELLITUS: ICD-10-CM

## 2022-07-07 DIAGNOSIS — E11.621 DIABETIC ULCER OF TOE OF LEFT FOOT ASSOCIATED WITH TYPE 2 DIABETES MELLITUS, LIMITED TO BREAKDOWN OF SKIN: ICD-10-CM

## 2022-07-07 DIAGNOSIS — I25.10 CORONARY ARTERY DISEASE INVOLVING NATIVE CORONARY ARTERY OF NATIVE HEART WITHOUT ANGINA PECTORIS: Chronic | ICD-10-CM

## 2022-07-07 DIAGNOSIS — E11.69 HYPERLIPIDEMIA ASSOCIATED WITH TYPE 2 DIABETES MELLITUS: ICD-10-CM

## 2022-07-07 DIAGNOSIS — N18.32 STAGE 3B CHRONIC KIDNEY DISEASE: Chronic | ICD-10-CM

## 2022-07-07 DIAGNOSIS — E66.01 MORBID OBESITY WITH BMI OF 45.0-49.9, ADULT: ICD-10-CM

## 2022-07-07 DIAGNOSIS — I89.0 LYMPHEDEMA: ICD-10-CM

## 2022-07-07 DIAGNOSIS — E11.42 TYPE 2 DIABETES MELLITUS WITH DIABETIC POLYNEUROPATHY, WITH LONG-TERM CURRENT USE OF INSULIN: Chronic | ICD-10-CM

## 2022-07-07 DIAGNOSIS — Z79.4 TYPE 2 DIABETES MELLITUS WITH DIABETIC POLYNEUROPATHY, WITH LONG-TERM CURRENT USE OF INSULIN: Chronic | ICD-10-CM

## 2022-07-07 DIAGNOSIS — N18.31 TYPE 2 DIABETES MELLITUS WITH STAGE 3A CHRONIC KIDNEY DISEASE, WITH LONG-TERM CURRENT USE OF INSULIN: Primary | ICD-10-CM

## 2022-07-07 DIAGNOSIS — G47.33 OBSTRUCTIVE SLEEP APNEA: ICD-10-CM

## 2022-07-07 DIAGNOSIS — I44.7 LBBB (LEFT BUNDLE BRANCH BLOCK): ICD-10-CM

## 2022-07-07 LAB
25(OH)D3+25(OH)D2 SERPL-MCNC: 12 NG/ML (ref 30–96)
ALBUMIN SERPL BCP-MCNC: 3.8 G/DL (ref 3.5–5.2)
ALT SERPL W/O P-5'-P-CCNC: 17 U/L (ref 10–44)
ANION GAP SERPL CALC-SCNC: 10 MMOL/L (ref 8–16)
AST SERPL-CCNC: 13 U/L (ref 10–40)
BUN SERPL-MCNC: 20 MG/DL (ref 8–23)
CALCIUM SERPL-MCNC: 9.8 MG/DL (ref 8.7–10.5)
CHLORIDE SERPL-SCNC: 112 MMOL/L (ref 95–110)
CHOLEST SERPL-MCNC: 177 MG/DL (ref 120–199)
CHOLEST/HDLC SERPL: 4.4 {RATIO} (ref 2–5)
CO2 SERPL-SCNC: 19 MMOL/L (ref 23–29)
CREAT SERPL-MCNC: 1.5 MG/DL (ref 0.5–1.4)
EST. GFR  (AFRICAN AMERICAN): 55.7 ML/MIN/1.73 M^2
EST. GFR  (NON AFRICAN AMERICAN): 48.2 ML/MIN/1.73 M^2
GLUCOSE SERPL-MCNC: 202 MG/DL (ref 70–110)
HDLC SERPL-MCNC: 40 MG/DL (ref 40–75)
HDLC SERPL: 22.6 % (ref 20–50)
LDLC SERPL CALC-MCNC: 100.8 MG/DL (ref 63–159)
NONHDLC SERPL-MCNC: 137 MG/DL
PHOSPHATE SERPL-MCNC: 2.7 MG/DL (ref 2.7–4.5)
POTASSIUM SERPL-SCNC: 3.9 MMOL/L (ref 3.5–5.1)
PTH-INTACT SERPL-MCNC: 85.4 PG/ML (ref 9–77)
SODIUM SERPL-SCNC: 141 MMOL/L (ref 136–145)
TRIGL SERPL-MCNC: 181 MG/DL (ref 30–150)

## 2022-07-07 PROCEDURE — 83970 ASSAY OF PARATHORMONE: CPT | Performed by: FAMILY MEDICINE

## 2022-07-07 PROCEDURE — 80069 RENAL FUNCTION PANEL: CPT | Performed by: FAMILY MEDICINE

## 2022-07-07 PROCEDURE — 99215 OFFICE O/P EST HI 40 MIN: CPT | Mod: PBBFAC,25 | Performed by: FAMILY MEDICINE

## 2022-07-07 PROCEDURE — 99999 PR PBB SHADOW E&M-EST. PATIENT-LVL V: CPT | Mod: PBBFAC,,, | Performed by: FAMILY MEDICINE

## 2022-07-07 PROCEDURE — 84450 TRANSFERASE (AST) (SGOT): CPT | Performed by: FAMILY MEDICINE

## 2022-07-07 PROCEDURE — 93005 ELECTROCARDIOGRAM TRACING: CPT

## 2022-07-07 PROCEDURE — 99214 OFFICE O/P EST MOD 30 MIN: CPT | Mod: PBBFAC | Performed by: INTERNAL MEDICINE

## 2022-07-07 PROCEDURE — 93010 ELECTROCARDIOGRAM REPORT: CPT | Mod: ,,, | Performed by: INTERNAL MEDICINE

## 2022-07-07 PROCEDURE — 82306 VITAMIN D 25 HYDROXY: CPT | Performed by: FAMILY MEDICINE

## 2022-07-07 PROCEDURE — 80061 LIPID PANEL: CPT | Performed by: FAMILY MEDICINE

## 2022-07-07 PROCEDURE — 99214 OFFICE O/P EST MOD 30 MIN: CPT | Mod: S$PBB,,, | Performed by: INTERNAL MEDICINE

## 2022-07-07 PROCEDURE — 99214 PR OFFICE/OUTPT VISIT, EST, LEVL IV, 30-39 MIN: ICD-10-PCS | Mod: S$PBB,,, | Performed by: INTERNAL MEDICINE

## 2022-07-07 PROCEDURE — 99214 PR OFFICE/OUTPT VISIT, EST, LEVL IV, 30-39 MIN: ICD-10-PCS | Mod: S$PBB,,, | Performed by: FAMILY MEDICINE

## 2022-07-07 PROCEDURE — 99214 OFFICE O/P EST MOD 30 MIN: CPT | Mod: S$PBB,,, | Performed by: FAMILY MEDICINE

## 2022-07-07 PROCEDURE — 93010 EKG 12-LEAD: ICD-10-PCS | Mod: ,,, | Performed by: INTERNAL MEDICINE

## 2022-07-07 PROCEDURE — 99999 PR PBB SHADOW E&M-EST. PATIENT-LVL V: ICD-10-PCS | Mod: PBBFAC,,, | Performed by: FAMILY MEDICINE

## 2022-07-07 PROCEDURE — 99999 PR PBB SHADOW E&M-EST. PATIENT-LVL IV: CPT | Mod: PBBFAC,,, | Performed by: INTERNAL MEDICINE

## 2022-07-07 PROCEDURE — 99999 PR PBB SHADOW E&M-EST. PATIENT-LVL IV: ICD-10-PCS | Mod: PBBFAC,,, | Performed by: INTERNAL MEDICINE

## 2022-07-07 PROCEDURE — 84460 ALANINE AMINO (ALT) (SGPT): CPT | Performed by: FAMILY MEDICINE

## 2022-07-07 NOTE — PROGRESS NOTES
Subjective:   Patient ID:  Stepan Springer is a 67 y.o. male who presents for follow up of No chief complaint on file.      68 yo male, came in for 6 months f/u. Open a tax office by himself.  PMH CAD h/o NSTEMI, s/p PCI midRCA SABINO x2 in 2016 by Dr. Parekh, midLAD 40%. LBBB, DM x 15 yrs, CKD stage III, HTN, HLD obesity, PVD/lymphoedema FRAN not tolerate CPAP. Left leg weakness cane dependent No smoking. Occasional drinking  H/o COVID 19 twice infection in  and positive in   ECHO normal EF and mild LVH   NUKE stress test no ischemia, reviewed in the office  EKG  NSR and LBBB  LAWSON chronic walking from the parking to the front. Worse at summer. In cold weather could walk a mile   No chest pain/chest pressure  Both shoulder pain while walking  Occasional dizziness and imbalance.  Sleeps on 1 pillow., good appetite. Lost 30 pounds in 1 yr  03/2020 fell and admitted to [facility]. Had left leg injury  BP high and A1c 12. LDL controlled. Started vegetable and mild.     07/2021 visit  Fell 2 weeks ago and the pain improved,  /120 today, no headache vision change dizziness.  No chest pain dyspnea and faint  C/o leg swelling worse and now on lasix 40 mg bid    08/2021 visit   Leg swelling improved after increased lasix to 80 mg bid for a week and kept on 60 mg bid. BMP pending now  BP high 174/112 mmHG    09/2021 visit  BP better controlled. The leg swelling and could not find appropriated size compression sock.  Left leg swelling below the knee.     visit  No dizziness and fall since . Leg swelling stable.   Chronic dizziness when standign up quickly. No faint and syncope. No PND and orthopnea  Could not tolerate CPAP    Interval history  Will start Lymphedema Rx soon. Recheck BP in the office controlled. No dizziness faint. SOB stable. EKG showed NSR and chronic LBBB      Past Medical History:   Diagnosis Date    Anxiety     Bell's palsy     CHF (congestive heart  "failure)     Chronic kidney disease, stage 3a 11/02/2021    Coronary artery disease involving native coronary artery without angina pectoris 10/18/2016    Depression     Diabetes mellitus     Glaucoma     Hypertension     Idiopathic peripheral neuropathy 12/11/2012    Lymphedema of both lower extremities     Mixed hyperlipidemia 12/11/2012    Morbid obesity with BMI of 45.0-49.9, adult 02/12/2019    Pancytopenia     Sleep apnea     Stage 3b chronic kidney disease     Type 2 diabetes mellitus with diabetic polyneuropathy, with long-term current use of insulin 12/11/2012    Vitamin B 12 deficiency 08/23/2012       Past Surgical History:   Procedure Laterality Date    CARDIAC CATHETERIZATION  7/22/13    non obstructive cad    CATARACT EXTRACTION  OU    COLONOSCOPY N/A 5/1/2019    Procedure: COLONOSCOPY;  Surgeon: Henry Mo III, MD;  Location: [facility];  Service: Endoscopy;  Laterality: N/A;    CORONARY ANGIOPLASTY      ESOPHAGOGASTRODUODENOSCOPY N/A 5/1/2019    Procedure: ESOPHAGOGASTRODUODENOSCOPY (EGD);  Surgeon: Henry Mo III, MD;  Location: [facility];  Service: Endoscopy;  Laterality: N/A;    EYE SURGERY      FRACTURE SURGERY      kidney stone       August 2016    PC IOL OU      RETINAL DETACHMENT REPAIR W/ SCLERAL BUCKLE LE      WRIST SURGERY         Social History     Tobacco Use    Smoking status: Never Smoker    Smokeless tobacco: Never Used   Substance Use Topics    Alcohol use: Yes     Comment: " one to two glasses of wine per year"    Drug use: No       Family History   Problem Relation Age of Onset    Macular degeneration Father     Heart disease Father         CHF     Heart attack Father     Hypertension Mother     Macular degeneration Paternal Uncle     Hypertension Maternal Grandmother     Hypertension Maternal Grandfather     Strabismus Neg Hx     Retinal detachment Neg Hx     Glaucoma Neg Hx     Blindness Neg Hx     Amblyopia Neg Hx  "         Review of Systems   Constitutional: Negative for decreased appetite, diaphoresis, fever, malaise/fatigue and night sweats.   HENT: Negative for nosebleeds.    Eyes: Negative for blurred vision and double vision.   Cardiovascular: Positive for dyspnea on exertion and leg swelling. Negative for chest pain, claudication, irregular heartbeat, near-syncope, orthopnea, palpitations, paroxysmal nocturnal dyspnea and syncope.   Respiratory: Negative for cough, shortness of breath, sleep disturbances due to breathing, snoring, sputum production and wheezing.    Endocrine: Negative for cold intolerance and polyuria.   Hematologic/Lymphatic: Does not bruise/bleed easily.   Skin: Negative for rash.   Musculoskeletal: Positive for joint pain and muscle weakness. Negative for back pain, falls, joint swelling and neck pain.   Gastrointestinal: Negative for abdominal pain, heartburn, nausea and vomiting.   Genitourinary: Negative for dysuria, frequency and hematuria.   Neurological: Positive for dizziness. Negative for difficulty with concentration, focal weakness, headaches, light-headedness, numbness, seizures and weakness.   Psychiatric/Behavioral: Negative for depression, memory loss and substance abuse. The patient does not have insomnia.    Allergic/Immunologic: Negative for HIV exposure and hives.       Objective:   Physical Exam  HENT:      Head: Normocephalic.   Eyes:      Pupils: Pupils are equal, round, and reactive to light.   Neck:      Thyroid: No thyromegaly.      Vascular: Normal carotid pulses. No carotid bruit or JVD.   Cardiovascular:      Rate and Rhythm: Normal rate and regular rhythm.  No extrasystoles are present.     Chest Wall: PMI is not displaced.      Pulses: Normal pulses.      Heart sounds: Normal heart sounds. No murmur heard.    No gallop. No S3 sounds.   Pulmonary:      Effort: No respiratory distress.      Breath sounds: Normal breath sounds. No stridor.   Abdominal:      General: Bowel  sounds are normal.      Palpations: Abdomen is soft.      Tenderness: There is no abdominal tenderness. There is no rebound.   Musculoskeletal:         General: Swelling (L >>R) and deformity present.   Skin:     Findings: No rash.   Neurological:      Mental Status: He is alert and oriented to person, place, and time.   Psychiatric:         Behavior: Behavior normal.         Lab Results   Component Value Date    CHOL 116 (L) 05/06/2021    CHOL 76 (L) 02/07/2020    CHOL 163 01/14/2020     Lab Results   Component Value Date    HDL 35 (L) 05/06/2021    HDL 20 (L) 02/07/2020    HDL 45 01/14/2020     Lab Results   Component Value Date    LDLCALC 48.8 (L) 05/06/2021    LDLCALC 38.8 (L) 02/07/2020    LDLCALC 96.6 01/14/2020     Lab Results   Component Value Date    TRIG 161 (H) 05/06/2021    TRIG 86 02/07/2020    TRIG 107 01/14/2020     Lab Results   Component Value Date    CHOLHDL 30.2 05/06/2021    CHOLHDL 26.3 02/07/2020    CHOLHDL 27.6 01/14/2020       Chemistry        Component Value Date/Time     02/08/2022 0727    K 3.5 02/08/2022 0727     02/08/2022 0727    CO2 19 (L) 02/08/2022 0727    BUN 18 02/08/2022 0727    BUN 34 (A) 10/28/2013 0835    CREATININE 1.6 (H) 02/08/2022 0727    CREATININE 1.7 10/28/2013 0835     (H) 02/08/2022 0727        Component Value Date/Time    CALCIUM 8.9 02/08/2022 0727    ALKPHOS 110 04/20/2020 1529    AST 15 02/08/2022 0727    AST 14 10/28/2013 0835    ALT 24 02/08/2022 0727    BILITOT 0.7 04/20/2020 1529    ESTGFRAFRICA 51.9 (A) 02/08/2022 0727    EGFRNONAA 44.9 (A) 02/08/2022 0727          Lab Results   Component Value Date    HGBA1C 6.9 (H) 02/08/2022     Lab Results   Component Value Date    TSH 1.349 01/14/2020     Lab Results   Component Value Date    INR 1.2 09/20/2016    INR 1.1 09/20/2016    INR 1.0 08/15/2016     Lab Results   Component Value Date    WBC 4.11 03/22/2021    HGB 16.4 03/22/2021    HCT 47.7 03/22/2021    MCV 95 03/22/2021     03/22/2021      BMP  Sodium   Date Value Ref Range Status   02/08/2022 136 136 - 145 mmol/L Final     Potassium   Date Value Ref Range Status   02/08/2022 3.5 3.5 - 5.1 mmol/L Final     Chloride   Date Value Ref Range Status   02/08/2022 106 95 - 110 mmol/L Final     CO2   Date Value Ref Range Status   02/08/2022 19 (L) 23 - 29 mmol/L Final     BUN   Date Value Ref Range Status   02/08/2022 18 8 - 23 mg/dL Final   10/28/2013 34 (A) 4 - 21 mg/dL Final     Creatinine   Date Value Ref Range Status   02/08/2022 1.6 (H) 0.5 - 1.4 mg/dL Final   10/28/2013 1.7 mg/dL Final     Calcium   Date Value Ref Range Status   02/08/2022 8.9 8.7 - 10.5 mg/dL Final     Anion Gap   Date Value Ref Range Status   02/08/2022 11 8 - 16 mmol/L Final     eGFR if    Date Value Ref Range Status   02/08/2022 51.9 (A) >60 mL/min/1.73 m^2 Final     eGFR if non    Date Value Ref Range Status   02/08/2022 44.9 (A) >60 mL/min/1.73 m^2 Final     Comment:     Calculation used to obtain the estimated glomerular filtration  rate (eGFR) is the CKD-EPI equation.        BNP  @LABRCNTIP(BNP,BNPTRIAGEBLO)@  @LABRCNTIP(troponini)@  CrCl cannot be calculated (Patient's most recent lab result is older than the maximum 7 days allowed.).  No results found in the last 24 hours.  No results found in the last 24 hours.  No results found in the last 24 hours.    Assessment:      1. Chronic diastolic congestive heart failure    2. Type 2 diabetes mellitus with diabetic polyneuropathy, with long-term current use of insulin    3. Stage 3b chronic kidney disease    4. Coronary artery disease involving native coronary artery of native heart without angina pectoris    5. Hyperlipidemia associated with type 2 diabetes mellitus    6. PVD (peripheral vascular disease)    7. Lymphedema    8. LBBB (left bundle branch block)        Plan:   Continue ASA and Lipitor for HLD and DM  Continue Coreg amlodipine hydralazine clonidine hctz and Lisinopirl for  HTN  Continue Lasix and HCTZ for PVD  Continue Lymphedema PT  DM Rx per PCP  Counseled DASH  Check Lipid profile in 6 months  Recommend heart-healthy diet, weight control and regular exercise.  Iam. Risk modification.   I have reviewed all pertinent labs and cardiac studies independently. Plans and recommendations have been formulated under my direct supervision. All questions answered and patient voiced understanding.   If symptoms persist go to the ED  RTC in 6 months

## 2022-07-07 NOTE — PROGRESS NOTES
OFFICE VISIT 7/7/22 10:30 AM CDT    CHIEF COMPLAINT: Follow-up and Medication Refill    He has limited understanding and recollection of his current medications, and pharmacy records suggest poor medication adherence. We discussed strategies to help him better keep track of his current treatment regimen and overcome any barriers to adherence to medication regimen.    DIAGNOSES SPECIFICALLY EVALUATED AND TREATED THIS ENCOUNTER  1. Type 2 diabetes mellitus with stage 3a chronic kidney disease, with long-term current use of insulin    2. Diabetic ulcer of toe of left foot associated with type 2 diabetes mellitus, limited to breakdown of skin    3. Morbid obesity with BMI of 45.0-49.9, adult    4. Obstructive sleep apnea (untreated, refuses treatment)    5. Stage 3b chronic kidney disease  - Renal Function Panel  - Vitamin D  - PTH, intact    6. Hyperlipidemia associated with type 2 diabetes mellitus  - AST (SGOT); Future  - Lipid Panel  - AST (SGOT)  - ALT (SGPT)    7. Vitamin D deficiency  - Vitamin D   --------------------------------------------------------------------------------   STEPAN FLANAGAN (MRN [id number], APPT: 7/7/22 10:30 AM CDT)  --------------------------------  Ready to check out. See orders.  Clinic collect labs.  Schedule next available appointment with Britany Luis PA-C or Mariajose Mack NP.  Schedule Stepan for next available in-office appointment with me.  Instruct him to bring all his current prescriptions (pill bottles) to his next appointment with PACO and with me so we can take care of any needed refills and check to see that his current medication list is accurate.   Thanks.  --------------------------------------------------------------------------------     Follow up for soon to review test results, discuss treatment plan, re-evaluate problem(s) discussed today.   Future Appointments   Date Time Provider Department Center   7/7/2022 10:30 AM CRISTIAN Fleming MD [facility]  [place]   7/7/2022 10:40 AM EKG, [facility] SPECCPR [facility]   7/7/2022 11:00 AM Ramiro Summers MD [facility] CARDIO [facility]   7/13/2022  8:00 AM BRIDGETTE Simpson OP [facility] [place]   8/15/2022  8:20 AM Vangie Cuevas DPM [facility] POD [facility]   8/15/2022  9:30 AM Luís Millard PA-C [facility] DIABETE [facility]   8/30/2022 10:00 AM FIELDS, VISUAL-ONE [facility] OPHTHAL [facility]   8/30/2022 10:30 AM Alvin Hale MD [facility] OPHTHAL [place]     Problem List Items Addressed This Visit       Morbid obesity with BMI of 45.0-49.9, adult (Chronic)    Obstructive sleep apnea (untreated, refuses treatment) (Chronic)    Overview     Untreated. He tried CPAP 7 years ago and did not tolerate it. He declined any further evaluation and management of this condition. I instructed him to let me know if he changes his mind.          Stage 3b chronic kidney disease (Chronic)    Relevant Orders    Renal Function Panel (Completed)    Vitamin D (Completed)    PTH, intact (Completed)    Vitamin D deficiency (Chronic)    Relevant Orders    Vitamin D (Completed)    Diabetic ulcer of toe of left foot associated with type 2 diabetes mellitus, limited to breakdown of skin    Current Assessment & Plan     See photo. Wound education provided. Has appointment with podiatry.         Hyperlipidemia associated with type 2 diabetes mellitus    Relevant Orders    AST (SGOT)    Lipid Panel (Completed)    AST (SGOT) (Completed)    ALT (SGPT) (Completed)    Type 2 diabetes mellitus with stage 3a chronic kidney disease, with long-term current use of insulin - Primary   Unless noted herein, any chronic conditions are represented as and appear compensated/controlled and stable, and no other significant complaints or concerns were reported.    PRESCRIPTION DRUG MANAGEMENT  Outpatient Medications Prior to Visit   Medication Sig Dispense Refill    aspirin 325 MG tablet Take 325 mg by mouth once daily.      brimonidine 0.1% (ALPHAGAN P) 0.1 % Drop Place 1  "drop into both eyes 3 (three) times daily. Order 90 day supply 15 mL 4    carvediloL (COREG) 25 MG tablet Take 1 tablet (25 mg total) by mouth 2 (two) times daily with meals. 180 tablet 1    flash glucose sensor (FREESTYLE CLEMENCIA 14 DAY SENSOR) Kit 1 each by Misc.(Non-Drug; Combo Route) route every 14 (fourteen) days. 6 kit 3    furosemide (LASIX) 40 MG tablet Take 1.5 tablets (60 mg total) by mouth 2 (two) times a day. (Patient taking differently: Take 40 mg by mouth 3 (three) times daily.) 270 tablet 1    lisinopriL (PRINIVIL,ZESTRIL) 40 MG tablet Take 1 tablet (40 mg total) by mouth once daily. 90 tablet 1    netarsudiL (RHOPRESSA) 0.02 % ophthalmic solution Place 1 drop into both eyes once daily. 2.5 mL 6    pen needle, diabetic (BD ULTRA-FINE AMANDA PEN NEEDLE) 32 gauge x 5/32" Ndle 1 each by Misc.(Non-Drug; Combo Route) route 4 (four) times daily with meals and nightly. 300 each 3    amLODIPine (NORVASC) 5 MG tablet Take 1 tablet (5 mg total) by mouth every evening. 90 tablet 1    atorvastatin (LIPITOR) 10 MG tablet Take 1 tablet (10 mg total) by mouth every evening. 90 tablet 1    BLOOD PRESSURE CUFF Misc 1 cuff 1 each 0    blood sugar diagnostic Strp To check BG 3 times daily, to use with insurance preferred meter 100 strip 3    brinzolamide (AZOPT) 1 % ophthalmic suspension Place 1 drop into both eyes 3 (three) times daily. 270 drop 3    cloNIDine (CATAPRES) 0.1 MG tablet Take 1 tablet (0.1 mg total) by mouth 3 (three) times daily. 270 tablet 1    empagliflozin (JARDIANCE) 25 mg tablet Take 1 tablet (25 mg total) by mouth once daily. 90 tablet 3    hydrALAZINE (APRESOLINE) 100 MG tablet Take 1 tablet (100 mg total) by mouth every 8 (eight) hours. 270 tablet 1    hydroCHLOROthiazide (HYDRODIURIL) 25 MG tablet Take 1 tablet (25 mg total) by mouth once daily. 90 tablet 1    lancets Misc To check BG 3 times daily, to use with insurance preferred meter 100 each 3    latanoprost (XALATAN) 0.005 % ophthalmic solution " Place 1 drop into both eyes once daily. 6 mL 3    miglitoL (GLYSET) 25 MG Tab Take 1 tablet (25 mg total) by mouth 3 (three) times daily with meals. 270 tablet 3    polyethylene glycol (GLYCOLAX) 17 gram/dose powder Take 17 g by mouth once daily. 238 g 6    silver sulfADIAZINE 1% (SILVADENE) 1 % cream Apply topically 2 (two) times daily. 20 g 0    brinzolamide (AZOPT) 1 % ophthalmic suspension Place 1 drop into both eyes 3 (three) times daily. Brand name only 15 mL 6    insulin aspart U-100 (NOVOLOG) 100 unit/mL (3 mL) InPn pen Inject 25 Units into the skin 3 (three) times daily with meals. 69 mL 3    insulin glargine U-300 conc (TOUJEO MAX U-300 SOLOSTAR) 300 unit/mL (3 mL) insulin pen Inject 80 Units into the skin once daily. 8 pen 3    mupirocin (BACTROBAN) 2 % ointment Apply topically once daily. 30 g 0    brimonidine 0.15 % OPTH DROP (ALPHAGAN) 0.15 % ophthalmic solution Place 1 drop into both eyes 2 (two) times daily. 15 mL 0    DULoxetine (CYMBALTA) 30 MG capsule Take 1 capsule (30 mg total) by mouth once daily. 90 capsule 1    netarsudiL (RHOPRESSA) 0.02 % ophthalmic solution Place 1 drop into the left eye once daily. 2.5 mL 6    nitroGLYCERIN (NITROSTAT) 0.4 MG SL tablet Place 1 tablet (0.4 mg total) under the tongue every 5 (five) minutes as needed for Chest pain. 20 tablet 0    omeprazole (PRILOSEC) 40 MG capsule Take 1 capsule (40 mg total) by mouth once daily. 90 capsule 3     No facility-administered medications prior to visit.   There are no discontinued medications.   PHYSICAL EXAM  Vitals:    07/07/22 0847   BP: (!) 162/96   BP Location: Left arm   Patient Position: Sitting   BP Method: Thigh Cuff (Manual)   Pulse: 67   Temp: 98.2 °F (36.8 °C)   TempSrc: Oral   SpO2: 98%   Weight: (!) 198.4 kg (437 lb 6.3 oz)   Physical Exam  Vitals reviewed.   Constitutional:       General: He is not in acute distress.     Appearance: Normal appearance. He is not ill-appearing or diaphoretic.   Cardiovascular:       "Rate and Rhythm: Normal rate and regular rhythm.   Pulmonary:      Effort: Pulmonary effort is normal.      Breath sounds: Normal breath sounds.   Skin:     General: Skin is warm and dry.   Neurological:      Mental Status: He is alert and oriented to person, place, and time. Mental status is at baseline.   Psychiatric:         Mood and Affect: Mood normal.         Behavior: Behavior normal.   Diabetic foot exam: Ulcer left 4th toe, onychomycosis. 10g monofilament sensation is ABSENT in all 3 of 5 locations tested on the right and 2 of 5 locations tested on the left. He has upcoming podiatry appointment. Wound care education provided.     Orders Placed This Encounter    AST (SGOT)    Lipid Panel    Renal Function Panel    Vitamin D    AST (SGOT)    ALT (SGPT)    PTH, intact     TOTAL TIME evaluating and managing this patient for this encounter was greater than or equal to 33 minutes. This time was spent personally by me on the following activities: review of nurse's notes, pre-charting, review of patient's past medical history, assessing age-appropriate health maintenance needs, review of any interval history, obtaining history from the patient, examination of the patient, review and interpretation of lab results, evaluation of the patient's response to treatment, medication reconciliation, medication counseling, discussing strategies to help overcome any barriers to adherence to medication regimen, ordering labs, educating patient and answering their questions about treatment plan, goals of treatment, and follow-up, and final documentation of the visit. This time was exclusive of any separately billable procedures for this patient and exclusive of time spent treating any other patients.   Documentation entered by me for this encounter may have been done in part using speech-recognition technology. Although I have made an effort to ensure accuracy, "sound like" errors may exist and should be interpreted in context. "

## 2022-07-08 ENCOUNTER — TELEPHONE (OUTPATIENT)
Dept: INTERNAL MEDICINE | Facility: CLINIC | Age: 67
End: 2022-07-08
Payer: COMMERCIAL

## 2022-07-08 NOTE — TELEPHONE ENCOUNTER
----- Message from Alexey Jane sent at 7/8/2022  3:38 AM CDT -----  Regarding: Lab Client Services  Contact: [phone]  Good Morning,     My name is Alexey Jane I work in the Lab Client Services. We had a problem with some lab work on this patient. If someone from your office could call us at [phone] or [phone] that would be great. Anyone in my department can help.      Thank you

## 2022-07-08 NOTE — TELEPHONE ENCOUNTER
Spoke with lab client services and they said that the patient's CBC could not be ran because the blood was clotted. Informed Dr. Fleming.

## 2022-07-13 ENCOUNTER — CLINICAL SUPPORT (OUTPATIENT)
Dept: REHABILITATION | Facility: HOSPITAL | Age: 67
End: 2022-07-13
Payer: MEDICARE

## 2022-07-13 DIAGNOSIS — I89.0 LYMPHEDEMA: Primary | ICD-10-CM

## 2022-07-13 PROCEDURE — 97535 SELF CARE MNGMENT TRAINING: CPT

## 2022-07-13 NOTE — PROGRESS NOTES
Physical Therapy Treatment Note     Name: Stepan BONNER [facility] Number: [id number]    Therapy Diagnosis:   Encounter Diagnosis   Name Primary?    Lymphedema Yes     Physician: Vangie Cuevas DPM    Visit Date: 7/13/2022    Physician Orders: PT Eval and Treat   Medical Diagnosis from Referral: lymphedema  Evaluation Date: 6/1/2022  Authorization Period Expiration: 6/2/2023  Plan of Care Expiration: 8/24/2022  Visit # / Visits authorized: 1/ 20    Time In: 0800 am  Time Out: 0915 am  Total Billable Time: 75 minutes    Precautions: Standard and visual deficit    Subjective     Pt reports: has the velcro wraps with him and they need to be cut to fit  He was compliant with home exercise program.  Response to previous treatment: good  Functional change: none at this time    Pain: 0/10  Location:  No pain     Objective   No FOTO - consult only    Stepan received self care/home management x (75) minutes including:     Intervention Performed Today    Cut to fit two lower leg velcro wraps x    Patient instruction on donning and doffing of lower leg wraps and ankle wraps x                                    Plan for Next Visit: cut to fit velcro wrap prn        Stepan received the following supervised modalities after being cleared for contradictions: Vasopneumatic compression for (-) minutes without adverse effects (PLANNED FOR NEXT VISIT)      Home Exercises Provided and Patient Education Provided     Education provided:   - donning and doffing of bilateral lower leg and foot/ankle wraps. The foot/ankle wrap for the left foot did not fit well so extra velcro pieces were used to attach straps    Home instruction Provided: yes.  Exercises were reviewed and Stepan was able to demonstrate them prior to the end of the session.  Stepan demonstrated good  understanding of the education provided.     See EMR under Patient Instructions for exercises provided 7/13/2022. (nothing written provided)    Assessment      Sedrick's leg wraps were cut to fit. He was trained on donning and doffing of the wraps effectively. The left ankle/foot wrap did not fit well so the extra velcro from the lower leg wraps was used to attach the straps. Sedrick was instructed to keep straps tight by adjusting the straps frequently and to wear the wraps day and night and to remove them when bathing only. He was approved for a compression pump which he will benefit from at home. He will return in 3 weeks for a follow up.  Stepan Is progressing well towards his goals.   Pt prognosis is Excellent.     Pt will continue to benefit from skilled outpatient physical therapy to address the deficits listed in the problem list box on initial evaluation, provide pt/family education and to maximize pt's level of independence in the home and community environment.     Pt's spiritual, cultural and educational needs considered and pt agreeable to plan of care and goals.     Anticipated barriers to physical therapy: patient lives in [facility] and relies on public transportation    Goals:     Short Term Goals: (6 weeks)  1. Patient will show decreased girth in bilateral lower extremities  by up to 8 cm to allow for LE symmetry, shoe and clothing choice, and ability to apply needed compression.  (progressing, not met)   2. Patient will demonstrate 100% knowledge of lymphedema precautions and signs of infection to allow for reduced lymphedema risk, infection risk, and/or exacerbation of condition.  (progressing, not met)  3. Patient or caregiver will perform self-bandaging techniques and/or wearing of compression garments to allow for lymphatic drainage support, skin elasticity, and reduction in shape and size of limb. (progressing, not met)  4. Patient will perform self lymph drainage techniques to areas within reach to enhance lymphatic drainage and skin condition.  (progressing, not met)  5. Patient will tolerate daily activities with multilayered bandaging to  allow for lymphatic and venous support.  (progressing, not met)     Long Term Goals: (12  weeks)  1. Patient will show decreased girth in bilateral lower extremities  by up to 10-15 cm  to allow for LE symmetry, shoe and clothing choice, and ability to apply needed compression daily.  (progressing, not met)  2. Patient will show reduction in density to mild or less with improved contour of limb to allow for cosmesis, LE symmetry, infection risk reduction, and clothing and compression choice.   (progressing, not met)  3. Patient to william/doff compression garment with daily compliance to assist in lymphedema management, skin elasticity, and tissue density.  (progressing, not met)  4. Pt to show improved postural awareness and alignment.  (progressing, not met)  5. Pt to be I and compliant with HEP to allow for increased function in affected limb.   (progressing, not met)        Plan   Plan of care Certification: 6/1/2022 to 8/24/2022.     Outpatient Physical Therapy 1-2 times weekly for 12 weeks to include the following interventions: Manual Therapy, Patient Education, Self Care, Therapeutic Exercise and vaso-pneumatic compression. Complete Decongestive Therapy- compression and home equipment needs to be addressed and assisted    Mckenna Omer, PT, BERNABE

## 2022-07-18 NOTE — TELEPHONE ENCOUNTER
Future Appointments   Date Time Provider Department Center   7/21/2022  8:00 AM Britany Luis PA-C [facility] IM [facility]   8/2/2022  8:00 AM BRIDGETTE Simpson OP [facility] [place]   8/15/2022  8:20 AM Vangie Cuevas DPM [facility] POD [facility]   8/15/2022  9:30 AM Luís Millard PA-C [facility] DIABETE [facility]   8/30/2022 10:00 AM FIELDS, VISUAL-ONE [facility] OPHTHAL [facility]   8/30/2022 10:30 AM Alvin Hale MD [facility] OPHTHAL [facility]   10/6/2022 10:30 AM CRISTIAN Fleming MD [facility] IM [facility]   1/12/2023  9:00 AM Ramiro Summers MD [facility] CARDIO [facility]      Lab Results   Component Value Date    WBC 4.11 03/22/2021    HGB 16.4 03/22/2021    HCT 47.7 03/22/2021    MCV 95 03/22/2021     03/22/2021

## 2022-07-21 ENCOUNTER — OFFICE VISIT (OUTPATIENT)
Dept: INTERNAL MEDICINE | Facility: CLINIC | Age: 67
End: 2022-07-21
Payer: MEDICARE

## 2022-07-21 VITALS
SYSTOLIC BLOOD PRESSURE: 130 MMHG | TEMPERATURE: 98 F | BODY MASS INDEX: 46.49 KG/M2 | DIASTOLIC BLOOD PRESSURE: 88 MMHG | OXYGEN SATURATION: 98 % | WEIGHT: 315 LBS | HEART RATE: 75 BPM

## 2022-07-21 DIAGNOSIS — I10 ESSENTIAL HYPERTENSION: ICD-10-CM

## 2022-07-21 DIAGNOSIS — I44.7 LBBB (LEFT BUNDLE BRANCH BLOCK): ICD-10-CM

## 2022-07-21 DIAGNOSIS — D22.9 ATYPICAL MOLE: Primary | ICD-10-CM

## 2022-07-21 DIAGNOSIS — N18.32 STAGE 3B CHRONIC KIDNEY DISEASE: Chronic | ICD-10-CM

## 2022-07-21 DIAGNOSIS — E11.69 HYPERLIPIDEMIA ASSOCIATED WITH TYPE 2 DIABETES MELLITUS: ICD-10-CM

## 2022-07-21 DIAGNOSIS — E11.29 TYPE 2 DIABETES MELLITUS WITH MICROALBUMINURIA, WITH LONG-TERM CURRENT USE OF INSULIN: ICD-10-CM

## 2022-07-21 DIAGNOSIS — E11.59 HYPERTENSION ASSOCIATED WITH DIABETES: ICD-10-CM

## 2022-07-21 DIAGNOSIS — E11.42 TYPE 2 DIABETES MELLITUS WITH DIABETIC POLYNEUROPATHY, WITH LONG-TERM CURRENT USE OF INSULIN: Chronic | ICD-10-CM

## 2022-07-21 DIAGNOSIS — Z79.4 TYPE 2 DIABETES MELLITUS WITH STAGE 3A CHRONIC KIDNEY DISEASE, WITH LONG-TERM CURRENT USE OF INSULIN: ICD-10-CM

## 2022-07-21 DIAGNOSIS — N18.31 TYPE 2 DIABETES MELLITUS WITH STAGE 3A CHRONIC KIDNEY DISEASE, WITH LONG-TERM CURRENT USE OF INSULIN: ICD-10-CM

## 2022-07-21 DIAGNOSIS — Z79.4 TYPE 2 DIABETES MELLITUS WITH DIABETIC POLYNEUROPATHY, WITH LONG-TERM CURRENT USE OF INSULIN: Chronic | ICD-10-CM

## 2022-07-21 DIAGNOSIS — I50.32 CHRONIC DIASTOLIC CONGESTIVE HEART FAILURE: ICD-10-CM

## 2022-07-21 DIAGNOSIS — I21.4 NSTEMI (NON-ST ELEVATED MYOCARDIAL INFARCTION): ICD-10-CM

## 2022-07-21 DIAGNOSIS — Z79.4 TYPE 2 DIABETES MELLITUS WITH MICROALBUMINURIA, WITH LONG-TERM CURRENT USE OF INSULIN: ICD-10-CM

## 2022-07-21 DIAGNOSIS — R80.9 TYPE 2 DIABETES MELLITUS WITH MICROALBUMINURIA, WITH LONG-TERM CURRENT USE OF INSULIN: ICD-10-CM

## 2022-07-21 DIAGNOSIS — E78.5 HYPERLIPIDEMIA ASSOCIATED WITH TYPE 2 DIABETES MELLITUS: ICD-10-CM

## 2022-07-21 DIAGNOSIS — I15.2 HYPERTENSION ASSOCIATED WITH DIABETES: ICD-10-CM

## 2022-07-21 DIAGNOSIS — I25.10 CORONARY ARTERY DISEASE INVOLVING NATIVE CORONARY ARTERY OF NATIVE HEART WITHOUT ANGINA PECTORIS: Chronic | ICD-10-CM

## 2022-07-21 DIAGNOSIS — E11.22 TYPE 2 DIABETES MELLITUS WITH STAGE 3A CHRONIC KIDNEY DISEASE, WITH LONG-TERM CURRENT USE OF INSULIN: ICD-10-CM

## 2022-07-21 PROCEDURE — 99999 PR PBB SHADOW E&M-EST. PATIENT-LVL V: CPT | Mod: PBBFAC,,, | Performed by: PHYSICIAN ASSISTANT

## 2022-07-21 PROCEDURE — 99214 OFFICE O/P EST MOD 30 MIN: CPT | Mod: S$PBB,,, | Performed by: PHYSICIAN ASSISTANT

## 2022-07-21 PROCEDURE — 99214 PR OFFICE/OUTPT VISIT, EST, LEVL IV, 30-39 MIN: ICD-10-PCS | Mod: S$PBB,,, | Performed by: PHYSICIAN ASSISTANT

## 2022-07-21 PROCEDURE — 99999 PR PBB SHADOW E&M-EST. PATIENT-LVL V: ICD-10-PCS | Mod: PBBFAC,,, | Performed by: PHYSICIAN ASSISTANT

## 2022-07-21 PROCEDURE — 99215 OFFICE O/P EST HI 40 MIN: CPT | Mod: PBBFAC | Performed by: PHYSICIAN ASSISTANT

## 2022-07-21 RX ORDER — CLONIDINE HYDROCHLORIDE 0.1 MG/1
0.1 TABLET ORAL 3 TIMES DAILY
Qty: 270 TABLET | Refills: 1 | Status: SHIPPED | OUTPATIENT
Start: 2022-07-21 | End: 2022-10-06 | Stop reason: SDUPTHER

## 2022-07-21 RX ORDER — HYDRALAZINE HYDROCHLORIDE 100 MG/1
100 TABLET, FILM COATED ORAL EVERY 8 HOURS
Qty: 270 TABLET | Refills: 1 | Status: ON HOLD | OUTPATIENT
Start: 2022-07-21 | End: 2022-09-03

## 2022-07-21 RX ORDER — EMPAGLIFLOZIN 25 MG/1
25 TABLET, FILM COATED ORAL DAILY
Qty: 90 TABLET | Refills: 3 | Status: SHIPPED | OUTPATIENT
Start: 2022-07-21 | End: 2023-04-18

## 2022-07-21 RX ORDER — ATORVASTATIN CALCIUM 20 MG/1
20 TABLET, FILM COATED ORAL NIGHTLY
Qty: 90 TABLET | Refills: 3 | Status: SHIPPED | OUTPATIENT
Start: 2022-07-21 | End: 2022-10-06 | Stop reason: SDUPTHER

## 2022-07-21 RX ORDER — AMLODIPINE BESYLATE 5 MG/1
5 TABLET ORAL NIGHTLY
Qty: 90 TABLET | Refills: 1 | Status: SHIPPED | OUTPATIENT
Start: 2022-07-21 | End: 2022-10-06 | Stop reason: SDUPTHER

## 2022-07-21 RX ORDER — NITROGLYCERIN 0.4 MG/1
0.4 TABLET SUBLINGUAL EVERY 5 MIN PRN
Qty: 20 TABLET | Refills: 0 | Status: SHIPPED | OUTPATIENT
Start: 2022-07-21 | End: 2022-07-21

## 2022-07-21 NOTE — PROGRESS NOTES
Subjective:      Patient ID: Stepan Springer is a 67 y.o. male.    Chief Complaint: Follow-up    HPI   Here today for refills on his medications. Brought in medications today for review.   Scheduled for follow up with podiatry, diabetes and optometry.   Today no complaints. No chest pain, shortness of breath, or palpitations.   Seen by cardiology recently. Stable.   Going to PT for lymphedema. Since getting new compression stockings he has lost 20lbs in 1 week.     Also has a mild on his forehead a friend told him to get looked at. Pt has never noticed it before so unsure if changing or how long it has been there.       Patient Active Problem List   Diagnosis    Status post cataract extraction and insertion of intraocular lens    Corneal opacity - Left Eye    Type 2 diabetes mellitus with diabetic polyneuropathy, with long-term current use of insulin    Obstructive sleep apnea (untreated, refuses treatment)    Chronic diastolic congestive heart failure    LBBB (left bundle branch block)    Type 2 diabetes mellitus with stage 3a chronic kidney disease, with long-term current use of insulin    Anxiety    Hypertension associated with diabetes    Stage 3b chronic kidney disease    Primary open angle glaucoma of both eyes, severe stage    Left nephrolithiasis    Hydronephrosis, left    NSTEMI (non-ST elevated myocardial infarction)    CAD with remote PCI (BMS) of RCA in 9/2016    Recurrent major depressive disorder    Vasculogenic erectile dysfunction    Blindness and low vision    Hx of retinal detachment    High myopia, both eyes    Epiretinal membrane (ERM) of left eye    Lattice degeneration of peripheral retina, left    Right-sided Bell's palsy    Hyperlipidemia associated with type 2 diabetes mellitus    Morbid obesity with BMI of 45.0-49.9, adult    GERD (gastroesophageal reflux disease)    Hypoglycemia unawareness associated with type 2 diabetes mellitus    Elevated troponin I  level    Thrombocytopenia    Pancytopenia    History of COVID-19 (April, 2020)    PVD (peripheral vascular disease)    Influenza Immunization not carried out because of patient refusal    Type 2 diabetes mellitus with microalbuminuria, with long-term current use of insulin    Chronic kidney disease, stage 3a    Hyperparathyroidism, secondary renal    Vitamin D deficiency    Lymphedema       Current Outpatient Medications:     aspirin 325 MG tablet, Take 325 mg by mouth once daily., Disp: , Rfl:     BLOOD PRESSURE CUFF Misc, 1 cuff, Disp: 1 each, Rfl: 0    blood sugar diagnostic Strp, To check BG 3 times daily, to use with insurance preferred meter, Disp: 100 strip, Rfl: 3    brimonidine 0.1% (ALPHAGAN P) 0.1 % Drop, Place 1 drop into both eyes 3 (three) times daily. Order 90 day supply, Disp: 15 mL, Rfl: 4    brinzolamide (AZOPT) 1 % ophthalmic suspension, Place 1 drop into both eyes 3 (three) times daily. Brand name only, Disp: 15 mL, Rfl: 6    carvediloL (COREG) 25 MG tablet, Take 1 tablet (25 mg total) by mouth 2 (two) times daily with meals., Disp: 180 tablet, Rfl: 1    flash glucose sensor (FREESTYLE CLEMENCIA 14 DAY SENSOR) Kit, 1 each by Misc.(Non-Drug; Combo Route) route every 14 (fourteen) days., Disp: 6 kit, Rfl: 3    furosemide (LASIX) 40 MG tablet, Take 1.5 tablets (60 mg total) by mouth 2 (two) times a day., Disp: 270 tablet, Rfl: 1    hydroCHLOROthiazide (HYDRODIURIL) 25 MG tablet, Take 1 tablet (25 mg total) by mouth once daily., Disp: 90 tablet, Rfl: 1    insulin aspart U-100 (NOVOLOG) 100 unit/mL (3 mL) InPn pen, Inject 25 Units into the skin 3 (three) times daily with meals., Disp: 69 mL, Rfl: 3    insulin glargine U-300 conc (TOUJEO MAX U-300 SOLOSTAR) 300 unit/mL (3 mL) insulin pen, Inject 80 Units into the skin once daily., Disp: 8 pen, Rfl: 3    lancets Misc, To check BG 3 times daily, to use with insurance preferred meter, Disp: 100 each, Rfl: 3    latanoprost (XALATAN)  "0.005 % ophthalmic solution, Place 1 drop into both eyes once daily., Disp: 6 mL, Rfl: 3    lisinopriL (PRINIVIL,ZESTRIL) 40 MG tablet, Take 1 tablet (40 mg total) by mouth once daily., Disp: 90 tablet, Rfl: 1    miglitoL (GLYSET) 25 MG Tab, Take 1 tablet (25 mg total) by mouth 3 (three) times daily with meals., Disp: 270 tablet, Rfl: 3    mupirocin (BACTROBAN) 2 % ointment, Apply topically once daily., Disp: 30 g, Rfl: 0    netarsudiL (RHOPRESSA) 0.02 % ophthalmic solution, Place 1 drop into both eyes once daily., Disp: 2.5 mL, Rfl: 6    pen needle, diabetic (BD ULTRA-FINE AMANDA PEN NEEDLE) 32 gauge x 5/32" Ndle, 1 each by Misc.(Non-Drug; Combo Route) route 4 (four) times daily with meals and nightly., Disp: 300 each, Rfl: 3    polyethylene glycol 3350 (DULCOLAX BALANCE ORAL), Take by mouth., Disp: , Rfl:     UNABLE TO FIND, medication name: EXIPURE take one capsule daily, Disp: , Rfl:     amLODIPine (NORVASC) 5 MG tablet, Take 1 tablet (5 mg total) by mouth every evening., Disp: 90 tablet, Rfl: 1    atorvastatin (LIPITOR) 20 MG tablet, Take 1 tablet (20 mg total) by mouth every evening., Disp: 90 tablet, Rfl: 3    cloNIDine (CATAPRES) 0.1 MG tablet, Take 1 tablet (0.1 mg total) by mouth 3 (three) times daily., Disp: 270 tablet, Rfl: 1    DULoxetine (CYMBALTA) 30 MG capsule, Take 1 capsule (30 mg total) by mouth once daily., Disp: 90 capsule, Rfl: 1    empagliflozin (JARDIANCE) 25 mg tablet, Take 1 tablet (25 mg total) by mouth once daily., Disp: 90 tablet, Rfl: 3    hydrALAZINE (APRESOLINE) 100 MG tablet, Take 1 tablet (100 mg total) by mouth every 8 (eight) hours., Disp: 270 tablet, Rfl: 1    nitroGLYCERIN (NITROSTAT) 0.4 MG SL tablet, Place 1 tablet (0.4 mg total) under the tongue every 5 (five) minutes as needed for Chest pain., Disp: 20 tablet, Rfl: 0  Health Maintenance Due   Topic Date Due    Pneumococcal Vaccines (Age 65+) (2 - PCV) 11/26/2016    COVID-19 Vaccine (4 - Booster for Moderna " series) 04/16/2022    Foot Exam  08/24/2022       Review of Systems   Constitutional: Negative for activity change, appetite change, chills, diaphoresis, fatigue, fever and unexpected weight change.   HENT: Negative.  Negative for congestion, hearing loss, postnasal drip, rhinorrhea, sore throat, trouble swallowing and voice change.    Eyes: Negative.  Negative for visual disturbance.   Respiratory: Negative.  Negative for cough, choking, chest tightness and shortness of breath.    Cardiovascular: Positive for leg swelling. Negative for chest pain and palpitations.   Gastrointestinal: Negative for abdominal distention, abdominal pain, blood in stool, constipation, diarrhea, nausea and vomiting.   Endocrine: Negative for cold intolerance, heat intolerance, polydipsia and polyuria.   Genitourinary: Negative.  Negative for difficulty urinating and frequency.   Musculoskeletal: Negative for arthralgias, back pain, gait problem, joint swelling and myalgias.   Skin: Negative for color change, pallor, rash and wound.   Neurological: Negative for dizziness, tremors, weakness, light-headedness, numbness and headaches.   Hematological: Negative for adenopathy.   Psychiatric/Behavioral: Negative for behavioral problems, confusion, self-injury, sleep disturbance and suicidal ideas. The patient is not nervous/anxious.      Objective:   /88 (BP Location: Right arm, Patient Position: Sitting, BP Method: X-Large (Manual))   Pulse 75   Temp 98.1 °F (36.7 °C) (Tympanic)   Wt (!) 187.2 kg (412 lb 11.2 oz)   SpO2 98%   BMI 46.49 kg/m²     Physical Exam  Vitals and nursing note reviewed.   Constitutional:       General: He is not in acute distress.     Appearance: Normal appearance. He is well-developed. He is obese. He is not ill-appearing, toxic-appearing or diaphoretic.   HENT:      Head: Normocephalic and atraumatic.   Cardiovascular:      Rate and Rhythm: Normal rate and regular rhythm.      Heart sounds: Normal heart  sounds. No murmur heard.    No friction rub. No gallop.   Pulmonary:      Effort: Pulmonary effort is normal. No respiratory distress.      Breath sounds: Normal breath sounds. No wheezing or rales.   Musculoskeletal:      Right lower leg: Edema present.      Left lower leg: Edema present.   Skin:     General: Skin is warm.   Neurological:      Mental Status: He is alert and oriented to person, place, and time.   Psychiatric:         Behavior: Behavior normal.         Thought Content: Thought content normal.         Judgment: Judgment normal.           Office Visit on 07/07/2022   Component Date Value Ref Range Status    Cholesterol 07/07/2022 177  120 - 199 mg/dL Final    Comment: The National Cholesterol Education Program (NCEP) has set the  following guidelines (reference ranges) for Cholesterol:  Optimal.....................<200 mg/dL  Borderline High.............200-239 mg/dL  High........................> or = 240 mg/dL      Triglycerides 07/07/2022 181 (A) 30 - 150 mg/dL Final    Comment: The National Cholesterol Education Program (NCEP) has set the  following guidelines (reference values) for triglycerides:  Normal......................<150 mg/dL  Borderline High.............150-199 mg/dL  High........................200-499 mg/dL      HDL 07/07/2022 40  40 - 75 mg/dL Final    Comment: The National Cholesterol Education Program (NCEP) has set the  following guidelines (reference values) for HDL Cholesterol:  Low...............<40 mg/dL  Optimal...........>60 mg/dL      LDL Cholesterol 07/07/2022 100.8  63.0 - 159.0 mg/dL Final    Comment: The National Cholesterol Education Program (NCEP) has set the  following guidelines (reference values) for LDL Cholesterol:  Optimal.......................<130 mg/dL  Borderline High...............130-159 mg/dL  High..........................160-189 mg/dL  Very High.....................>190 mg/dL      HDL/Cholesterol Ratio 07/07/2022 22.6  20.0 - 50.0 % Final    Total  Cholesterol/HDL Ratio 07/07/2022 4.4  2.0 - 5.0 Final    Non-HDL Cholesterol 07/07/2022 137  mg/dL Final    Comment: Risk category and Non-HDL cholesterol goals:  Coronary heart disease (CHD)or equivalent (10-year risk of CHD >20%):  Non-HDL cholesterol goal     <130 mg/dL  Two or more CHD risk factors and 10-year risk of CHD <= 20%:  Non-HDL cholesterol goal     <160 mg/dL  0 to 1 CHD risk factor:  Non-HDL cholesterol goal     <190 mg/dL      Glucose 07/07/2022 202 (A) 70 - 110 mg/dL Final    Sodium 07/07/2022 141  136 - 145 mmol/L Final    Potassium 07/07/2022 3.9  3.5 - 5.1 mmol/L Final    Chloride 07/07/2022 112 (A) 95 - 110 mmol/L Final    CO2 07/07/2022 19 (A) 23 - 29 mmol/L Final    BUN 07/07/2022 20  8 - 23 mg/dL Final    Calcium 07/07/2022 9.8  8.7 - 10.5 mg/dL Final    Creatinine 07/07/2022 1.5 (A) 0.5 - 1.4 mg/dL Final    Albumin 07/07/2022 3.8  3.5 - 5.2 g/dL Final    Phosphorus 07/07/2022 2.7  2.7 - 4.5 mg/dL Final    eGFR if  07/07/2022 55.7 (A) >60 mL/min/1.73 m^2 Final    eGFR if non African American 07/07/2022 48.2 (A) >60 mL/min/1.73 m^2 Final    Comment: Calculation used to obtain the estimated glomerular filtration  rate (eGFR) is the CKD-EPI equation.       Anion Gap 07/07/2022 10  8 - 16 mmol/L Final    Vit D, 25-Hydroxy 07/07/2022 12 (A) 30 - 96 ng/mL Final    Comment: Vitamin D deficiency.........<10 ng/mL                              Vitamin D insufficiency......10-29 ng/mL       Vitamin D sufficiency........> or equal to 30 ng/mL  Vitamin D toxicity............>100 ng/mL      AST 07/07/2022 13  10 - 40 U/L Final    ALT 07/07/2022 17  10 - 44 U/L Final    PTH, Intact 07/07/2022 85.4 (A) 9.0 - 77.0 pg/mL Final   Lab Visit on 07/07/2022   Component Date Value Ref Range Status    Hemoglobin A1C 07/07/2022 7.0 (A) 4.0 - 5.6 % Final    Comment: ADA Screening Guidelines:  5.7-6.4%  Consistent with prediabetes  >or=6.5%  Consistent with diabetes    High levels of  fetal hemoglobin interfere with the HbA1C  assay. Heterozygous hemoglobin variants (HbS, HgC, etc)do  not significantly interfere with this assay.   However, presence of multiple variants may affect accuracy.      Estimated Avg Glucose 07/07/2022 154 (A) 68 - 131 mg/dL Final       Assessment:     1. Atypical mole    2. Uncontrolled type 2 diabetes mellitus with kidney complication, with long-term current use of insulin    3. Essential hypertension    4. Hypertension associated with diabetes    5. Type 2 diabetes mellitus with stage 3a chronic kidney disease, with long-term current use of insulin    6. Type 2 diabetes mellitus with diabetic polyneuropathy, with long-term current use of insulin    7. Stage 3b chronic kidney disease    8. Coronary artery disease involving native coronary artery of native heart without angina pectoris    9. Chronic diastolic congestive heart failure    10. LBBB (left bundle branch block)    11. NSTEMI (non-ST elevated myocardial infarction)    12. Type 2 diabetes mellitus with microalbuminuria, with long-term current use of insulin    13. Hyperlipidemia associated with type 2 diabetes mellitus      Plan:   Atypical mole  -     Ambulatory referral/consult to Dermatology; Future; Expected date: 07/28/2022    Uncontrolled type 2 diabetes mellitus with kidney complication, with long-term current use of insulin  -     empagliflozin (JARDIANCE) 25 mg tablet; Take 1 tablet (25 mg total) by mouth once daily.  Dispense: 90 tablet; Refill: 3    Essential hypertension  -     cloNIDine (CATAPRES) 0.1 MG tablet; Take 1 tablet (0.1 mg total) by mouth 3 (three) times daily.  Dispense: 270 tablet; Refill: 1  -     hydrALAZINE (APRESOLINE) 100 MG tablet; Take 1 tablet (100 mg total) by mouth every 8 (eight) hours.  Dispense: 270 tablet; Refill: 1  -     amLODIPine (NORVASC) 5 MG tablet; Take 1 tablet (5 mg total) by mouth every evening.  Dispense: 90 tablet; Refill: 1    Hypertension associated with  diabetes  -     amLODIPine (NORVASC) 5 MG tablet; Take 1 tablet (5 mg total) by mouth every evening.  Dispense: 90 tablet; Refill: 1    Type 2 diabetes mellitus with stage 3a chronic kidney disease, with long-term current use of insulin    Type 2 diabetes mellitus with diabetic polyneuropathy, with long-term current use of insulin    Stage 3b chronic kidney disease    Coronary artery disease involving native coronary artery of native heart without angina pectoris  -     nitroGLYCERIN (NITROSTAT) 0.4 MG SL tablet; Place 1 tablet (0.4 mg total) under the tongue every 5 (five) minutes as needed for Chest pain.  Dispense: 20 tablet; Refill: 0  -     atorvastatin (LIPITOR) 20 MG tablet; Take 1 tablet (20 mg total) by mouth every evening.  Dispense: 90 tablet; Refill: 3    Chronic diastolic congestive heart failure    LBBB (left bundle branch block)  -     nitroGLYCERIN (NITROSTAT) 0.4 MG SL tablet; Place 1 tablet (0.4 mg total) under the tongue every 5 (five) minutes as needed for Chest pain.  Dispense: 20 tablet; Refill: 0    NSTEMI (non-ST elevated myocardial infarction)  -     nitroGLYCERIN (NITROSTAT) 0.4 MG SL tablet; Place 1 tablet (0.4 mg total) under the tongue every 5 (five) minutes as needed for Chest pain.  Dispense: 20 tablet; Refill: 0    Type 2 diabetes mellitus with microalbuminuria, with long-term current use of insulin    Hyperlipidemia associated with type 2 diabetes mellitus  -     atorvastatin (LIPITOR) 20 MG tablet; Take 1 tablet (20 mg total) by mouth every evening.  Dispense: 90 tablet; Refill: 3    -scheduled to see diabetes, podiatry, and optometry. Cont current meds as prescribed.     -follow up with pcp as scheduled.    -recent labs reviewed with patient in detail. Lipids not at goal. Will increase lipitor from 10mg daily to 20mg.     Follow up if symptoms worsen or fail to improve.

## 2022-08-02 ENCOUNTER — CLINICAL SUPPORT (OUTPATIENT)
Dept: REHABILITATION | Facility: HOSPITAL | Age: 67
End: 2022-08-02
Payer: MEDICARE

## 2022-08-02 DIAGNOSIS — I89.0 LYMPHEDEMA: Primary | ICD-10-CM

## 2022-08-02 PROCEDURE — 97016 VASOPNEUMATIC DEVICE THERAPY: CPT

## 2022-08-02 PROCEDURE — 97535 SELF CARE MNGMENT TRAINING: CPT

## 2022-08-02 NOTE — PROGRESS NOTES
Physical Therapy Treatment Note     Name: Stepan BONNER [facility] Number: [id number]    Therapy Diagnosis:   Encounter Diagnosis   Name Primary?    Lymphedema Yes     Physician: Vangie Cuevas DPM    Visit Date: 8/2/2022    Physician Orders: PT Eval and Treat   Medical Diagnosis from Referral: lymphedema  Evaluation Date: 6/1/2022  Authorization Period Expiration: 6/2/2023  Plan of Care Expiration: 8/24/2022  Visit # / Visits authorized: 2/ 20    Time In: 0800 am  Time Out: 0910  Total Billable Time: 60 minutes    Precautions: Standard and visual deficit    Subjective     Pt reports: needs the velcro wraps to be cut down. Has lost 22 pounds since wearing the wraps  He was compliant with home exercise program.  Response to previous treatment: good  Functional change: able to lift legs with less difficulty    Pain: 0/10  Location:  No pain     Objective   No FOTO - consult only    Stepan received self care/home management x (45) minutes including:     Intervention Performed Today    Cut to fit left lower leg velcro wrap x    Patient instruction on donning and doffing of lower leg wraps and ankle wraps x    Skin care bilateral lower extremities  x    Instructed in use of a compression pump x                          Plan for Next Visit: cut to fit velcro wrap prn        Stepan received the following supervised modalities after being cleared for contradictions: Vasopneumatic compression for (15) minutes without adverse effects       Home Exercises Provided and Patient Education Provided     Education provided:   - donning and doffing of bilateral lower leg and foot/ankle wraps. The foot/ankle wrap for the left foot did not fit well so extra velcro pieces were used to attach straps    Home instruction Provided: yes.  Exercises were reviewed and Stepan was able to demonstrate them prior to the end of the session.  Stepan demonstrated good  understanding of the education provided.     See EMR under Patient  Instructions for exercises provided 7/13/2022. (nothing written provided)    Assessment     Sedrick's legs have improved in size and skin elasticity. His straps were cut down today to fit the current size of his legs. He was provided with a compression pump for both legs with good results (improved skin elasticity of both legs and feet/ankles)  Stepan Is progressing well towards his goals.   Pt prognosis is Excellent.     Pt will continue to benefit from skilled outpatient physical therapy to address the deficits listed in the problem list box on initial evaluation, provide pt/family education and to maximize pt's level of independence in the home and community environment.     Pt's spiritual, cultural and educational needs considered and pt agreeable to plan of care and goals.     Anticipated barriers to physical therapy: patient lives in [facility] and relies on public transportation    Goals:     Short Term Goals: (6 weeks)  1. Patient will show decreased girth in bilateral lower extremities  by up to 8 cm to allow for LE symmetry, shoe and clothing choice, and ability to apply needed compression.  (progressing, not met)   2. Patient will demonstrate 100% knowledge of lymphedema precautions and signs of infection to allow for reduced lymphedema risk, infection risk, and/or exacerbation of condition.  (progressing, not met)  3. Patient or caregiver will perform self-bandaging techniques and/or wearing of compression garments to allow for lymphatic drainage support, skin elasticity, and reduction in shape and size of limb. (progressing, not met)  4. Patient will perform self lymph drainage techniques to areas within reach to enhance lymphatic drainage and skin condition.  (progressing, not met)  5. Patient will tolerate daily activities with multilayered bandaging to allow for lymphatic and venous support.  (progressing, not met)     Long Term Goals: (12  weeks)  1. Patient will show decreased girth in bilateral  lower extremities  by up to 10-15 cm  to allow for LE symmetry, shoe and clothing choice, and ability to apply needed compression daily.  (progressing, not met)  2. Patient will show reduction in density to mild or less with improved contour of limb to allow for cosmesis, LE symmetry, infection risk reduction, and clothing and compression choice.   (progressing, not met)  3. Patient to william/doff compression garment with daily compliance to assist in lymphedema management, skin elasticity, and tissue density.  (progressing, not met)  4. Pt to show improved postural awareness and alignment.  (progressing, not met)  5. Pt to be I and compliant with HEP to allow for increased function in affected limb.   (progressing, not met)        Plan   Plan of care Certification: 6/1/2022 to 8/24/2022.     Outpatient Physical Therapy 1-2 times weekly for 12 weeks to include the following interventions: Manual Therapy, Patient Education, Self Care, Therapeutic Exercise and vaso-pneumatic compression. Complete Decongestive Therapy- compression and home equipment needs to be addressed and assisted    Mckenna Omer, PT, BERNABE

## 2022-08-14 NOTE — PROGRESS NOTES
Patient BP upon presentation 218/105 on L arm automatic cuff reading and  216/105 on R arm automatic cuff reading. Patient asymptomatic and endorses missing BP meds this am.  Patient also admits he has been unable to check his blood pressure at home recently because blood pressure cuff is an appropriately size and has been popping when he has been taking his blood pressure. Came to clinic with transportation services.  Unable to return home in a timely manner.  Repeat of blood pressure on manual cuff was 208/118 in left arm.  Advised patient to go to the ED for emergent hypertension treatment.  [facility] ambulance was notified to transport patient.  [facility] ED notified and triage given to ED nurse.  We will reschedule patient visit.    Luís Millard PA-C  Diabetes Management

## 2022-08-15 ENCOUNTER — HOSPITAL ENCOUNTER (OUTPATIENT)
Facility: HOSPITAL | Age: 67
Discharge: HOME | End: 2022-08-17
Attending: EMERGENCY MEDICINE | Admitting: EMERGENCY MEDICINE
Payer: MEDICARE

## 2022-08-15 ENCOUNTER — OFFICE VISIT (OUTPATIENT)
Dept: PODIATRY | Facility: CLINIC | Age: 67
End: 2022-08-15
Payer: MEDICARE

## 2022-08-15 ENCOUNTER — OFFICE VISIT (OUTPATIENT)
Dept: DIABETES | Facility: CLINIC | Age: 67
End: 2022-08-15
Payer: MEDICARE

## 2022-08-15 VITALS — WEIGHT: 315 LBS | HEIGHT: 78 IN | BODY MASS INDEX: 36.45 KG/M2

## 2022-08-15 VITALS
BODY MASS INDEX: 44.46 KG/M2 | WEIGHT: 315 LBS | DIASTOLIC BLOOD PRESSURE: 118 MMHG | HEART RATE: 59 BPM | SYSTOLIC BLOOD PRESSURE: 208 MMHG

## 2022-08-15 DIAGNOSIS — N18.32 STAGE 3B CHRONIC KIDNEY DISEASE: ICD-10-CM

## 2022-08-15 DIAGNOSIS — I89.0 LYMPHEDEMA: ICD-10-CM

## 2022-08-15 DIAGNOSIS — I16.0 HYPERTENSIVE URGENCY: Primary | ICD-10-CM

## 2022-08-15 DIAGNOSIS — G60.9 IDIOPATHIC PERIPHERAL NEUROPATHY: ICD-10-CM

## 2022-08-15 DIAGNOSIS — E11.69 HYPERLIPIDEMIA ASSOCIATED WITH TYPE 2 DIABETES MELLITUS: ICD-10-CM

## 2022-08-15 DIAGNOSIS — I15.2 HYPERTENSION ASSOCIATED WITH DIABETES: ICD-10-CM

## 2022-08-15 DIAGNOSIS — G47.33 OSA (OBSTRUCTIVE SLEEP APNEA): ICD-10-CM

## 2022-08-15 DIAGNOSIS — B35.1 DERMATOPHYTOSIS OF NAIL: ICD-10-CM

## 2022-08-15 DIAGNOSIS — E11.649 HYPOGLYCEMIA UNAWARENESS ASSOCIATED WITH TYPE 2 DIABETES MELLITUS: ICD-10-CM

## 2022-08-15 DIAGNOSIS — R79.89 ELEVATED TROPONIN: ICD-10-CM

## 2022-08-15 DIAGNOSIS — I50.9 CHF (NYHA CLASS III, ACC/AHA STAGE C): ICD-10-CM

## 2022-08-15 DIAGNOSIS — I10 HYPERTENSION: ICD-10-CM

## 2022-08-15 DIAGNOSIS — I10 ELEVATED BLOOD PRESSURE READING IN OFFICE WITH DIAGNOSIS OF HYPERTENSION: ICD-10-CM

## 2022-08-15 DIAGNOSIS — H54.10 BLINDNESS AND LOW VISION: ICD-10-CM

## 2022-08-15 DIAGNOSIS — E11.59 HYPERTENSION ASSOCIATED WITH DIABETES: ICD-10-CM

## 2022-08-15 DIAGNOSIS — E78.5 HYPERLIPIDEMIA ASSOCIATED WITH TYPE 2 DIABETES MELLITUS: ICD-10-CM

## 2022-08-15 DIAGNOSIS — R07.9 CHEST PAIN: ICD-10-CM

## 2022-08-15 DIAGNOSIS — E66.01 MORBID OBESITY: ICD-10-CM

## 2022-08-15 DIAGNOSIS — I25.10 CORONARY ARTERY DISEASE INVOLVING NATIVE CORONARY ARTERY OF NATIVE HEART WITHOUT ANGINA PECTORIS: ICD-10-CM

## 2022-08-15 LAB
ALBUMIN SERPL BCP-MCNC: 4.3 G/DL (ref 3.5–5.2)
ALP SERPL-CCNC: 97 U/L (ref 55–135)
ALT SERPL W/O P-5'-P-CCNC: 22 U/L (ref 10–44)
ANION GAP SERPL CALC-SCNC: 12 MMOL/L (ref 8–16)
AST SERPL-CCNC: 14 U/L (ref 10–40)
BACTERIA #/AREA URNS HPF: ABNORMAL /HPF
BASOPHILS # BLD AUTO: 0.03 K/UL (ref 0–0.2)
BASOPHILS NFR BLD: 0.6 % (ref 0–1.9)
BILIRUB SERPL-MCNC: 0.9 MG/DL (ref 0.1–1)
BILIRUB UR QL STRIP: NEGATIVE
BNP SERPL-MCNC: 122 PG/ML (ref 0–99)
BUN SERPL-MCNC: 17 MG/DL (ref 8–23)
CALCIUM SERPL-MCNC: 9.8 MG/DL (ref 8.7–10.5)
CHLORIDE SERPL-SCNC: 110 MMOL/L (ref 95–110)
CLARITY UR: CLEAR
CO2 SERPL-SCNC: 22 MMOL/L (ref 23–29)
COLOR UR: YELLOW
CREAT SERPL-MCNC: 1.7 MG/DL (ref 0.5–1.4)
DIFFERENTIAL METHOD: NORMAL
EOSINOPHIL # BLD AUTO: 0.2 K/UL (ref 0–0.5)
EOSINOPHIL NFR BLD: 3.8 % (ref 0–8)
ERYTHROCYTE [DISTWIDTH] IN BLOOD BY AUTOMATED COUNT: 12.6 % (ref 11.5–14.5)
EST. GFR  (NO RACE VARIABLE): 44 ML/MIN/1.73 M^2
GLUCOSE SERPL-MCNC: 206 MG/DL (ref 70–110)
GLUCOSE SERPL-MCNC: 207 MG/DL (ref 70–110)
GLUCOSE UR QL STRIP: ABNORMAL
HCT VFR BLD AUTO: 49.7 % (ref 40–54)
HGB BLD-MCNC: 17.1 G/DL (ref 14–18)
HGB UR QL STRIP: ABNORMAL
HYALINE CASTS #/AREA URNS LPF: 4 /LPF
IMM GRANULOCYTES # BLD AUTO: 0.01 K/UL (ref 0–0.04)
IMM GRANULOCYTES NFR BLD AUTO: 0.2 % (ref 0–0.5)
KETONES UR QL STRIP: NEGATIVE
LEUKOCYTE ESTERASE UR QL STRIP: NEGATIVE
LYMPHOCYTES # BLD AUTO: 1.4 K/UL (ref 1–4.8)
LYMPHOCYTES NFR BLD: 28.8 % (ref 18–48)
MCH RBC QN AUTO: 30.5 PG (ref 27–31)
MCHC RBC AUTO-ENTMCNC: 34.4 G/DL (ref 32–36)
MCV RBC AUTO: 89 FL (ref 82–98)
MICROSCOPIC COMMENT: ABNORMAL
MONOCYTES # BLD AUTO: 0.4 K/UL (ref 0.3–1)
MONOCYTES NFR BLD: 8.1 % (ref 4–15)
NEUTROPHILS # BLD AUTO: 2.7 K/UL (ref 1.8–7.7)
NEUTROPHILS NFR BLD: 58.5 % (ref 38–73)
NITRITE UR QL STRIP: NEGATIVE
NRBC BLD-RTO: 0 /100 WBC
PH UR STRIP: 6 [PH] (ref 5–8)
PLATELET # BLD AUTO: 191 K/UL (ref 150–450)
PMV BLD AUTO: 9.8 FL (ref 9.2–12.9)
POCT GLUCOSE: 188 MG/DL (ref 70–110)
POTASSIUM SERPL-SCNC: 3.5 MMOL/L (ref 3.5–5.1)
PROT SERPL-MCNC: 8 G/DL (ref 6–8.4)
PROT UR QL STRIP: ABNORMAL
RBC # BLD AUTO: 5.6 M/UL (ref 4.6–6.2)
RBC #/AREA URNS HPF: 0 /HPF (ref 0–4)
SARS-COV-2 RDRP RESP QL NAA+PROBE: NEGATIVE
SODIUM SERPL-SCNC: 144 MMOL/L (ref 136–145)
SP GR UR STRIP: 1.01 (ref 1–1.03)
TROPONIN I SERPL DL<=0.01 NG/ML-MCNC: 0.04 NG/ML (ref 0–0.03)
TROPONIN I SERPL DL<=0.01 NG/ML-MCNC: 0.04 NG/ML (ref 0–0.03)
URN SPEC COLLECT METH UR: ABNORMAL
UROBILINOGEN UR STRIP-ACNC: NEGATIVE EU/DL
WBC # BLD AUTO: 4.69 K/UL (ref 3.9–12.7)
WBC #/AREA URNS HPF: 0 /HPF (ref 0–5)
YEAST URNS QL MICRO: ABNORMAL

## 2022-08-15 PROCEDURE — 99291 CRITICAL CARE FIRST HOUR: CPT | Mod: 25

## 2022-08-15 PROCEDURE — 36415 COLL VENOUS BLD VENIPUNCTURE: CPT | Performed by: NURSE PRACTITIONER

## 2022-08-15 PROCEDURE — 96374 THER/PROPH/DIAG INJ IV PUSH: CPT

## 2022-08-15 PROCEDURE — 63600175 PHARM REV CODE 636 W HCPCS: Performed by: EMERGENCY MEDICINE

## 2022-08-15 PROCEDURE — 83036 HEMOGLOBIN GLYCOSYLATED A1C: CPT | Performed by: NURSE PRACTITIONER

## 2022-08-15 PROCEDURE — 81000 URINALYSIS NONAUTO W/SCOPE: CPT | Performed by: EMERGENCY MEDICINE

## 2022-08-15 PROCEDURE — 96372 THER/PROPH/DIAG INJ SC/IM: CPT | Mod: 59 | Performed by: NURSE PRACTITIONER

## 2022-08-15 PROCEDURE — 99213 OFFICE O/P EST LOW 20 MIN: CPT | Mod: 25,S$PBB,, | Performed by: PODIATRIST

## 2022-08-15 PROCEDURE — 99999 PR PBB SHADOW E&M-EST. PATIENT-LVL IV: ICD-10-PCS | Mod: PBBFAC,,, | Performed by: PHYSICIAN ASSISTANT

## 2022-08-15 PROCEDURE — C9399 UNCLASSIFIED DRUGS OR BIOLOG: HCPCS | Performed by: NURSE PRACTITIONER

## 2022-08-15 PROCEDURE — G0378 HOSPITAL OBSERVATION PER HR: HCPCS

## 2022-08-15 PROCEDURE — 93010 EKG 12-LEAD: ICD-10-PCS | Mod: ,,, | Performed by: STUDENT IN AN ORGANIZED HEALTH CARE EDUCATION/TRAINING PROGRAM

## 2022-08-15 PROCEDURE — 82962 GLUCOSE BLOOD TEST: CPT | Mod: PBBFAC | Performed by: PHYSICIAN ASSISTANT

## 2022-08-15 PROCEDURE — 93005 ELECTROCARDIOGRAM TRACING: CPT

## 2022-08-15 PROCEDURE — 83880 ASSAY OF NATRIURETIC PEPTIDE: CPT | Performed by: EMERGENCY MEDICINE

## 2022-08-15 PROCEDURE — 11721 PR DEBRIDEMENT OF NAILS, 6 OR MORE: ICD-10-PCS | Mod: Q9,S$PBB,, | Performed by: PODIATRIST

## 2022-08-15 PROCEDURE — 99999 PR PBB SHADOW E&M-EST. PATIENT-LVL IV: CPT | Mod: PBBFAC,,, | Performed by: PODIATRIST

## 2022-08-15 PROCEDURE — 99499 NO LOS: ICD-10-PCS | Mod: S$PBB,,, | Performed by: PHYSICIAN ASSISTANT

## 2022-08-15 PROCEDURE — 99499 UNLISTED E&M SERVICE: CPT | Mod: S$PBB,,, | Performed by: PHYSICIAN ASSISTANT

## 2022-08-15 PROCEDURE — 99214 OFFICE O/P EST MOD 30 MIN: CPT | Mod: PBBFAC,25,27 | Performed by: PODIATRIST

## 2022-08-15 PROCEDURE — 25000003 PHARM REV CODE 250: Performed by: EMERGENCY MEDICINE

## 2022-08-15 PROCEDURE — 63600175 PHARM REV CODE 636 W HCPCS: Performed by: NURSE PRACTITIONER

## 2022-08-15 PROCEDURE — 99999 PR PBB SHADOW E&M-EST. PATIENT-LVL IV: CPT | Mod: PBBFAC,,, | Performed by: PHYSICIAN ASSISTANT

## 2022-08-15 PROCEDURE — 93010 ELECTROCARDIOGRAM REPORT: CPT | Mod: ,,, | Performed by: STUDENT IN AN ORGANIZED HEALTH CARE EDUCATION/TRAINING PROGRAM

## 2022-08-15 PROCEDURE — 11721 DEBRIDE NAIL 6 OR MORE: CPT | Mod: Q9,PBBFAC | Performed by: PODIATRIST

## 2022-08-15 PROCEDURE — 11721 DEBRIDE NAIL 6 OR MORE: CPT | Mod: Q9,S$PBB,, | Performed by: PODIATRIST

## 2022-08-15 PROCEDURE — 99999 PR PBB SHADOW E&M-EST. PATIENT-LVL IV: ICD-10-PCS | Mod: PBBFAC,,, | Performed by: PODIATRIST

## 2022-08-15 PROCEDURE — U0002 COVID-19 LAB TEST NON-CDC: HCPCS | Performed by: EMERGENCY MEDICINE

## 2022-08-15 PROCEDURE — 85025 COMPLETE CBC W/AUTO DIFF WBC: CPT | Performed by: EMERGENCY MEDICINE

## 2022-08-15 PROCEDURE — 80053 COMPREHEN METABOLIC PANEL: CPT | Performed by: EMERGENCY MEDICINE

## 2022-08-15 PROCEDURE — 99213 PR OFFICE/OUTPT VISIT, EST, LEVL III, 20-29 MIN: ICD-10-PCS | Mod: 25,S$PBB,, | Performed by: PODIATRIST

## 2022-08-15 PROCEDURE — 99214 OFFICE O/P EST MOD 30 MIN: CPT | Mod: PBBFAC,25 | Performed by: PHYSICIAN ASSISTANT

## 2022-08-15 PROCEDURE — 25000003 PHARM REV CODE 250: Performed by: NURSE PRACTITIONER

## 2022-08-15 PROCEDURE — 84484 ASSAY OF TROPONIN QUANT: CPT | Performed by: EMERGENCY MEDICINE

## 2022-08-15 RX ORDER — HEPARIN SODIUM 5000 [USP'U]/ML
5000 INJECTION, SOLUTION INTRAVENOUS; SUBCUTANEOUS EVERY 8 HOURS
Status: DISCONTINUED | OUTPATIENT
Start: 2022-08-15 | End: 2022-08-15

## 2022-08-15 RX ORDER — BRINZOLAMIDE 10 MG/ML
1 SUSPENSION/ DROPS OPHTHALMIC 3 TIMES DAILY
Status: DISCONTINUED | OUTPATIENT
Start: 2022-08-15 | End: 2022-08-17 | Stop reason: HOSPADM

## 2022-08-15 RX ORDER — ONDANSETRON 2 MG/ML
4 INJECTION INTRAMUSCULAR; INTRAVENOUS EVERY 6 HOURS PRN
Status: DISCONTINUED | OUTPATIENT
Start: 2022-08-15 | End: 2022-08-17 | Stop reason: HOSPADM

## 2022-08-15 RX ORDER — CLONIDINE HYDROCHLORIDE 0.1 MG/1
0.1 TABLET ORAL
Status: COMPLETED | OUTPATIENT
Start: 2022-08-15 | End: 2022-08-15

## 2022-08-15 RX ORDER — CLONIDINE HYDROCHLORIDE 0.1 MG/1
0.1 TABLET ORAL 3 TIMES DAILY
Status: DISCONTINUED | OUTPATIENT
Start: 2022-08-15 | End: 2022-08-17 | Stop reason: HOSPADM

## 2022-08-15 RX ORDER — IBUPROFEN 200 MG
16 TABLET ORAL
Status: DISCONTINUED | OUTPATIENT
Start: 2022-08-15 | End: 2022-08-17 | Stop reason: HOSPADM

## 2022-08-15 RX ORDER — CARVEDILOL 6.25 MG/1
25 TABLET ORAL 2 TIMES DAILY WITH MEALS
Status: DISCONTINUED | OUTPATIENT
Start: 2022-08-15 | End: 2022-08-17 | Stop reason: HOSPADM

## 2022-08-15 RX ORDER — HEPARIN SODIUM 5000 [USP'U]/ML
7500 INJECTION, SOLUTION INTRAVENOUS; SUBCUTANEOUS EVERY 8 HOURS
Status: DISCONTINUED | OUTPATIENT
Start: 2022-08-15 | End: 2022-08-17 | Stop reason: HOSPADM

## 2022-08-15 RX ORDER — IPRATROPIUM BROMIDE AND ALBUTEROL SULFATE 2.5; .5 MG/3ML; MG/3ML
3 SOLUTION RESPIRATORY (INHALATION) EVERY 4 HOURS PRN
Status: DISCONTINUED | OUTPATIENT
Start: 2022-08-15 | End: 2022-08-17 | Stop reason: HOSPADM

## 2022-08-15 RX ORDER — FUROSEMIDE 10 MG/ML
40 INJECTION INTRAMUSCULAR; INTRAVENOUS
Status: DISCONTINUED | OUTPATIENT
Start: 2022-08-15 | End: 2022-08-15

## 2022-08-15 RX ORDER — HYDRALAZINE HYDROCHLORIDE 25 MG/1
100 TABLET, FILM COATED ORAL EVERY 8 HOURS
Status: DISCONTINUED | OUTPATIENT
Start: 2022-08-15 | End: 2022-08-15

## 2022-08-15 RX ORDER — GLUCAGON 1 MG
1 KIT INJECTION
Status: DISCONTINUED | OUTPATIENT
Start: 2022-08-15 | End: 2022-08-17 | Stop reason: HOSPADM

## 2022-08-15 RX ORDER — HYDRALAZINE HYDROCHLORIDE 20 MG/ML
10 INJECTION INTRAMUSCULAR; INTRAVENOUS
Status: COMPLETED | OUTPATIENT
Start: 2022-08-15 | End: 2022-08-15

## 2022-08-15 RX ORDER — ACETAMINOPHEN 325 MG/1
650 TABLET ORAL EVERY 4 HOURS PRN
Status: DISCONTINUED | OUTPATIENT
Start: 2022-08-15 | End: 2022-08-17 | Stop reason: HOSPADM

## 2022-08-15 RX ORDER — ASPIRIN 325 MG
325 TABLET ORAL
Status: COMPLETED | OUTPATIENT
Start: 2022-08-15 | End: 2022-08-15

## 2022-08-15 RX ORDER — NALOXONE HCL 0.4 MG/ML
0.02 VIAL (ML) INJECTION
Status: DISCONTINUED | OUTPATIENT
Start: 2022-08-15 | End: 2022-08-17 | Stop reason: HOSPADM

## 2022-08-15 RX ORDER — HYDRALAZINE HYDROCHLORIDE 25 MG/1
100 TABLET, FILM COATED ORAL EVERY 8 HOURS
Status: DISCONTINUED | OUTPATIENT
Start: 2022-08-15 | End: 2022-08-17 | Stop reason: HOSPADM

## 2022-08-15 RX ORDER — INSULIN ASPART 100 [IU]/ML
1-10 INJECTION, SOLUTION INTRAVENOUS; SUBCUTANEOUS
Status: DISCONTINUED | OUTPATIENT
Start: 2022-08-15 | End: 2022-08-17 | Stop reason: HOSPADM

## 2022-08-15 RX ORDER — FUROSEMIDE 40 MG/1
40 TABLET ORAL
Status: COMPLETED | OUTPATIENT
Start: 2022-08-15 | End: 2022-08-15

## 2022-08-15 RX ORDER — ASPIRIN 325 MG
325 TABLET ORAL DAILY
Status: DISCONTINUED | OUTPATIENT
Start: 2022-08-16 | End: 2022-08-17 | Stop reason: HOSPADM

## 2022-08-15 RX ORDER — SODIUM CHLORIDE 0.9 % (FLUSH) 0.9 %
10 SYRINGE (ML) INJECTION EVERY 12 HOURS PRN
Status: DISCONTINUED | OUTPATIENT
Start: 2022-08-15 | End: 2022-08-17 | Stop reason: HOSPADM

## 2022-08-15 RX ORDER — SIMETHICONE 80 MG
1 TABLET,CHEWABLE ORAL 4 TIMES DAILY PRN
Status: DISCONTINUED | OUTPATIENT
Start: 2022-08-15 | End: 2022-08-17 | Stop reason: HOSPADM

## 2022-08-15 RX ORDER — ATORVASTATIN CALCIUM 10 MG/1
20 TABLET, FILM COATED ORAL NIGHTLY
Status: DISCONTINUED | OUTPATIENT
Start: 2022-08-15 | End: 2022-08-17 | Stop reason: HOSPADM

## 2022-08-15 RX ORDER — INSULIN ASPART 100 [IU]/ML
15 INJECTION, SOLUTION INTRAVENOUS; SUBCUTANEOUS
Status: DISCONTINUED | OUTPATIENT
Start: 2022-08-16 | End: 2022-08-17 | Stop reason: HOSPADM

## 2022-08-15 RX ORDER — AMLODIPINE BESYLATE 5 MG/1
5 TABLET ORAL NIGHTLY
Status: DISCONTINUED | OUTPATIENT
Start: 2022-08-15 | End: 2022-08-17 | Stop reason: HOSPADM

## 2022-08-15 RX ORDER — NITROGLYCERIN 0.4 MG/1
0.4 TABLET SUBLINGUAL EVERY 5 MIN PRN
Status: DISCONTINUED | OUTPATIENT
Start: 2022-08-15 | End: 2022-08-17 | Stop reason: HOSPADM

## 2022-08-15 RX ORDER — LISINOPRIL 20 MG/1
40 TABLET ORAL DAILY
Status: DISCONTINUED | OUTPATIENT
Start: 2022-08-16 | End: 2022-08-17 | Stop reason: HOSPADM

## 2022-08-15 RX ORDER — MAG HYDROX/ALUMINUM HYD/SIMETH 200-200-20
30 SUSPENSION, ORAL (FINAL DOSE FORM) ORAL 4 TIMES DAILY PRN
Status: DISCONTINUED | OUTPATIENT
Start: 2022-08-15 | End: 2022-08-17 | Stop reason: HOSPADM

## 2022-08-15 RX ORDER — HYDRALAZINE HYDROCHLORIDE 20 MG/ML
10 INJECTION INTRAMUSCULAR; INTRAVENOUS EVERY 6 HOURS PRN
Status: DISCONTINUED | OUTPATIENT
Start: 2022-08-15 | End: 2022-08-17 | Stop reason: HOSPADM

## 2022-08-15 RX ORDER — IBUPROFEN 200 MG
24 TABLET ORAL
Status: DISCONTINUED | OUTPATIENT
Start: 2022-08-15 | End: 2022-08-17 | Stop reason: HOSPADM

## 2022-08-15 RX ADMIN — ATORVASTATIN CALCIUM 20 MG: 10 TABLET, FILM COATED ORAL at 09:08

## 2022-08-15 RX ADMIN — HYDRALAZINE HYDROCHLORIDE 100 MG: 25 TABLET, FILM COATED ORAL at 09:08

## 2022-08-15 RX ADMIN — CARVEDILOL 25 MG: 6.25 TABLET, FILM COATED ORAL at 09:08

## 2022-08-15 RX ADMIN — BRINZOLAMIDE 1 DROP: 10 SUSPENSION/ DROPS OPHTHALMIC at 09:08

## 2022-08-15 RX ADMIN — CLONIDINE HYDROCHLORIDE 0.1 MG: 0.1 TABLET ORAL at 09:08

## 2022-08-15 RX ADMIN — CLONIDINE HYDROCHLORIDE 0.1 MG: 0.1 TABLET ORAL at 11:08

## 2022-08-15 RX ADMIN — HYDRALAZINE HYDROCHLORIDE 10 MG: 20 INJECTION INTRAMUSCULAR; INTRAVENOUS at 03:08

## 2022-08-15 RX ADMIN — ASPIRIN 325 MG ORAL TABLET 325 MG: 325 PILL ORAL at 11:08

## 2022-08-15 RX ADMIN — INSULIN DETEMIR 40 UNITS: 100 INJECTION, SOLUTION SUBCUTANEOUS at 09:08

## 2022-08-15 RX ADMIN — FUROSEMIDE 20 MG: 20 TABLET ORAL at 11:08

## 2022-08-15 RX ADMIN — AMLODIPINE BESYLATE 5 MG: 5 TABLET ORAL at 09:08

## 2022-08-15 RX ADMIN — HEPARIN SODIUM 7500 UNITS: 5000 INJECTION INTRAVENOUS; SUBCUTANEOUS at 09:08

## 2022-08-15 RX ADMIN — FUROSEMIDE 60 MG: 40 TABLET ORAL at 09:08

## 2022-08-15 RX ADMIN — HYDRALAZINE HYDROCHLORIDE 100 MG: 25 TABLET, FILM COATED ORAL at 12:08

## 2022-08-15 RX ADMIN — FUROSEMIDE 40 MG: 40 TABLET ORAL at 11:08

## 2022-08-15 NOTE — ASSESSMENT & PLAN NOTE
Body mass index is 44.5 kg/m². Morbid obesity complicates all aspects of disease management from diagnostic modalities to treatment. Weight loss encouraged and health benefits explained to patient.

## 2022-08-15 NOTE — ASSESSMENT & PLAN NOTE
Patient has a current diagnosis of hypertensive urgency (without evidence of end organ damage) which is controlled.  Latest blood pressure and vitals reviewed-   Temp:  [98.6 °F (37 °C)]   Pulse:  [56-70]   Resp:  [13-22]   BP: (184-240)/()   SpO2:  [98 %-100 %] .   Patient currently on IV antihypertensives.   Home meds for hypertension were reviewed and noted below.   Hypertension Medications             amLODIPine (NORVASC) 5 MG tablet Take 1 tablet (5 mg total) by mouth every evening.    carvediloL (COREG) 25 MG tablet Take 1 tablet (25 mg total) by mouth 2 (two) times daily with meals.    cloNIDine (CATAPRES) 0.1 MG tablet Take 1 tablet (0.1 mg total) by mouth 3 (three) times daily.    furosemide (LASIX) 40 MG tablet Take 1.5 tablets (60 mg total) by mouth 2 (two) times a day.    hydrALAZINE (APRESOLINE) 100 MG tablet Take 1 tablet (100 mg total) by mouth every 8 (eight) hours.    hydroCHLOROthiazide (HYDRODIURIL) 25 MG tablet Take 1 tablet (25 mg total) by mouth once daily.    lisinopriL (PRINIVIL,ZESTRIL) 40 MG tablet Take 1 tablet (40 mg total) by mouth once daily.    nitroGLYCERIN (NITROSTAT) 0.4 MG SL tablet PLACE 1 TABLET (0.4 MG TOTAL) UNDER THE TONGUE EVERY 5 (FIVE) MINUTES AS NEEDED FOR CHEST PAIN.          Medication adjustment for hospital antihypertensives is as follows- Resume home meds, IV hydralazine PRN    Will aim for controlled BP reduction by medications noted above. Monitor and mitigate end organ damage as indicated.

## 2022-08-15 NOTE — SUBJECTIVE & OBJECTIVE
Past Medical History:   Diagnosis Date    Anxiety     Bell's palsy     CHF (congestive heart failure)     Chronic kidney disease, stage 3a 11/02/2021    Coronary artery disease involving native coronary artery without angina pectoris 10/18/2016    Depression     Diabetes mellitus     Glaucoma     Hypertension     Idiopathic peripheral neuropathy 12/11/2012    Lymphedema of both lower extremities     Mixed hyperlipidemia 12/11/2012    Morbid obesity with BMI of 45.0-49.9, adult 02/12/2019    Pancytopenia     Sleep apnea     Stage 3b chronic kidney disease     Type 2 diabetes mellitus with diabetic polyneuropathy, with long-term current use of insulin 12/11/2012    Vitamin B 12 deficiency 08/23/2012       Past Surgical History:   Procedure Laterality Date    CARDIAC CATHETERIZATION  7/22/13    non obstructive cad    CATARACT EXTRACTION  OU    COLONOSCOPY N/A 5/1/2019    Procedure: COLONOSCOPY;  Surgeon: Henry Mo III, MD;  Location: [facility];  Service: Endoscopy;  Laterality: N/A;    CORONARY ANGIOPLASTY      ESOPHAGOGASTRODUODENOSCOPY N/A 5/1/2019    Procedure: ESOPHAGOGASTRODUODENOSCOPY (EGD);  Surgeon: Henry Mo III, MD;  Location: [facility];  Service: Endoscopy;  Laterality: N/A;    EYE SURGERY      FRACTURE SURGERY      kidney stone       August 2016    PC IOL OU      RETINAL DETACHMENT REPAIR W/ SCLERAL BUCKLE LE      WRIST SURGERY         Review of patient's allergies indicates:   Allergen Reactions    Ancef in dextrose (iso-osm)      dizziness    Cefazolin      Other reaction(s): Shakes  Other reaction(s): Chills       No current facility-administered medications on file prior to encounter.     Current Outpatient Medications on File Prior to Encounter   Medication Sig    amLODIPine (NORVASC) 5 MG tablet Take 1 tablet (5 mg total) by mouth every evening.    aspirin 325 MG tablet Take 325 mg by mouth once daily.    atorvastatin (LIPITOR) 20 MG tablet Take 1 tablet (20 mg total) by mouth every  evening.    BLOOD PRESSURE CUFF Misc 1 cuff    blood sugar diagnostic Strp To check BG 3 times daily, to use with insurance preferred meter    brimonidine 0.1% (ALPHAGAN P) 0.1 % Drop Place 1 drop into both eyes 3 (three) times daily. Order 90 day supply    brinzolamide (AZOPT) 1 % ophthalmic suspension Place 1 drop into both eyes 3 (three) times daily. Brand name only    carvediloL (COREG) 25 MG tablet Take 1 tablet (25 mg total) by mouth 2 (two) times daily with meals.    cloNIDine (CATAPRES) 0.1 MG tablet Take 1 tablet (0.1 mg total) by mouth 3 (three) times daily.    DULoxetine (CYMBALTA) 30 MG capsule Take 1 capsule (30 mg total) by mouth once daily.    empagliflozin (JARDIANCE) 25 mg tablet Take 1 tablet (25 mg total) by mouth once daily.    flash glucose sensor (FREESTYLE CLEMENCIA 14 DAY SENSOR) Kit 1 each by Misc.(Non-Drug; Combo Route) route every 14 (fourteen) days.    furosemide (LASIX) 40 MG tablet Take 1.5 tablets (60 mg total) by mouth 2 (two) times a day.    hydrALAZINE (APRESOLINE) 100 MG tablet Take 1 tablet (100 mg total) by mouth every 8 (eight) hours.    hydroCHLOROthiazide (HYDRODIURIL) 25 MG tablet Take 1 tablet (25 mg total) by mouth once daily.    insulin aspart U-100 (NOVOLOG) 100 unit/mL (3 mL) InPn pen Inject 25 Units into the skin 3 (three) times daily with meals.    insulin glargine U-300 conc (TOUJEO MAX U-300 SOLOSTAR) 300 unit/mL (3 mL) insulin pen Inject 80 Units into the skin once daily.    lancets [facility] [place] [facility] To check BG 3 times daily, to use with insurance preferred meter    latanoprost (XALATAN) 0.005 % ophthalmic solution Place 1 drop into both eyes once daily.    lisinopriL (PRINIVIL,ZESTRIL) 40 MG tablet Take 1 tablet (40 mg total) by mouth once daily.    miglitoL (GLYSET) 25 MG Tab Take 1 tablet (25 mg total) by mouth 3 (three) times daily with meals.    mupirocin (BACTROBAN) 2 % ointment Apply topically once daily.    netarsudiL (RHOPRESSA) 0.02 % ophthalmic solution Place 1 drop into  "both eyes once daily.    nitroGLYCERIN (NITROSTAT) 0.4 MG SL tablet PLACE 1 TABLET (0.4 MG TOTAL) UNDER THE TONGUE EVERY 5 (FIVE) MINUTES AS NEEDED FOR CHEST PAIN.    pen needle, diabetic (BD ULTRA-FINE AMANDA PEN NEEDLE) 32 gauge x 5/32" Ndle 1 each by Misc.(Non-Drug; Combo Route) route 4 (four) times daily with meals and nightly.    polyethylene glycol 3350 (DULCOLAX BALANCE ORAL) Take by mouth.    UNABLE TO FIND medication name: EXIPURE take one capsule daily     Family History       Problem Relation (Age of Onset)    Heart attack Father    Heart disease Father    Hypertension Mother, Maternal Grandmother, Maternal Grandfather    Macular degeneration Father, Paternal Uncle          Tobacco Use    Smoking status: Never Smoker    Smokeless tobacco: Never Used   Substance and Sexual Activity    Alcohol use: Yes     Comment: " one to two glasses of wine per year"    Drug use: No    Sexual activity: Not Currently     Birth control/protection: None     Review of Systems   Constitutional:  Negative for activity change, appetite change, chills, fatigue, fever and unexpected weight change.   HENT:  Negative for congestion, facial swelling, rhinorrhea, sinus pressure, sneezing and sore throat.    Eyes:  Negative for discharge, redness and visual disturbance.   Respiratory:  Negative for apnea, cough, chest tightness, shortness of breath, wheezing and stridor.    Cardiovascular:  Negative for chest pain, palpitations and leg swelling.   Gastrointestinal:  Negative for abdominal distention, abdominal pain, anal bleeding, blood in stool, constipation, diarrhea, nausea and vomiting.   Genitourinary:  Negative for difficulty urinating, dysuria, frequency, hematuria and penile discharge.   Musculoskeletal:  Negative for arthralgias, back pain, gait problem, joint swelling, myalgias, neck pain and neck stiffness.   Skin:  Negative for color change and pallor.   Neurological:  Negative for dizziness, tremors, seizures, syncope, " facial asymmetry, speech difficulty, weakness, light-headedness, numbness and headaches.   Psychiatric/Behavioral:  Negative for behavioral problems, confusion, hallucinations and suicidal ideas. The patient is not nervous/anxious.    All other systems reviewed and are negative.  Objective:     Vital Signs (Most Recent):  Temp: 98.6 °F (37 °C) (08/15/22 1053)  Pulse: 66 (08/15/22 1603)  Resp: 13 (08/15/22 1603)  BP: (!) 184/83 (08/15/22 1603)  SpO2: 100 % (08/15/22 1457)   Vital Signs (24h Range):  Temp:  [98.6 °F (37 °C)] 98.6 °F (37 °C)  Pulse:  [56-70] 66  Resp:  [13-22] 13  SpO2:  [98 %-100 %] 100 %  BP: (184-240)/() 184/83     Weight: (!) 179.2 kg (395 lb)  Body mass index is 44.5 kg/m².    Physical Exam  Vitals and nursing note reviewed.   Constitutional:       Appearance: He is well-developed.   HENT:      Head: Normocephalic and atraumatic.   Eyes:      Conjunctiva/sclera: Conjunctivae normal.      Pupils: Pupils are equal, round, and reactive to light.   Cardiovascular:      Rate and Rhythm: Normal rate and regular rhythm.      Heart sounds: Normal heart sounds. No murmur heard.  Pulmonary:      Effort: Pulmonary effort is normal. No respiratory distress.      Breath sounds: Normal breath sounds.   Abdominal:      General: Bowel sounds are normal. There is no distension.      Palpations: Abdomen is soft.      Tenderness: There is no abdominal tenderness.   Musculoskeletal:         General: No tenderness or deformity.      Cervical back: Normal range of motion and neck supple.   Skin:     General: Skin is warm and dry.      Findings: No erythema or rash.   Neurological:      Mental Status: He is alert and oriented to person, place, and time.   Psychiatric:         Behavior: Behavior normal.         CRANIAL NERVES     CN III, IV, VI   Pupils are equal, round, and reactive to light.     Significant Labs: All pertinent labs within the past 24 hours have been reviewed.    Significant Imaging:   Imaging  Results    None

## 2022-08-15 NOTE — ED NOTES
Pt states he was sent to the ED from his doctor's office d/t high blood pressure. Pt wearing bilateral compression devices on lower legs d/t lymphedema.

## 2022-08-15 NOTE — HPI
Stepan Springer is a 67 y.o. male patient with a PMH of HTN, DM, depression who presented to the ER because he was referred to the ED by Luís Millard PA-C, because his blood pressure was 208/118. The patient reports that he did not take his blood pressure meds today. He denies any associated symptoms. Patient denies fever, chills, CP, cough, cristina, pnd, orthopnea, palpitations, diaphoresis, headache, blurred vision, numbness, tingling, dizziness, localized weakness, n/v/d, abdominal pain, blood in stools, melena, hematemesis, urinary frequency, urgency, dysuria or hematuria. In the ER the patients BP got as high as 240/109. The patient is being placed in observation for treatment of HTN, urgency.

## 2022-08-15 NOTE — ED PROVIDER NOTES
"SCRIBE #1 NOTE: I, Maggie Pichardo, am scribing for, and in the presence of, Dong Gregory MD. I have scribed the entire note.      History      Chief Complaint   Patient presents with    Hypertension     Sent from clinic with high blood pressure, no complaints.  Did not take his meds this am       Review of patient's allergies indicates:   Allergen Reactions    Ancef in dextrose (iso-osm)      dizziness    Cefazolin      Other reaction(s): Shakes  Other reaction(s): Chills        HPI   HPI    8/15/2022, 10:55 AM   History obtained from the patient      History of Present Illness: Stepan Springer is a 67 y.o. male patient who presents to the Emergency Department because he was referred to the ED by Luís Millard PA-C, because his blood pressure was 208/118. The pt reports that he did not take any of his medications this morning, but states that he is normally complaint with his medications. Per the pt's medication list, he is taking hydralazine 100 mg, clonidine 0.1 mg, and Lasix 40 mg. The pt has no complaints at this time and states, "I feel fine." Patient denies any CP, SOB, N/V/D, light-headedness, weakness, numbness, fever, chills, and all other sxs at this time. No prior Tx reported. No further complaints or concerns at this time.         Arrival mode: EMS    PCP: KANDICE Fleming MD       Past Medical History:  Past Medical History:   Diagnosis Date    Anxiety     Bell's palsy     CHF (congestive heart failure)     Chronic kidney disease, stage 3a 11/02/2021    Coronary artery disease involving native coronary artery without angina pectoris 10/18/2016    Depression     Diabetes mellitus     Glaucoma     Hypertension     Idiopathic peripheral neuropathy 12/11/2012    Lymphedema of both lower extremities     Mixed hyperlipidemia 12/11/2012    Morbid obesity with BMI of 45.0-49.9, adult 02/12/2019    Pancytopenia     Sleep apnea     Stage 3b chronic kidney disease     Type 2 " "diabetes mellitus with diabetic polyneuropathy, with long-term current use of insulin 12/11/2012    Vitamin B 12 deficiency 08/23/2012       Past Surgical History:  Past Surgical History:   Procedure Laterality Date    CARDIAC CATHETERIZATION  7/22/13    non obstructive cad    CATARACT EXTRACTION  OU    COLONOSCOPY N/A 5/1/2019    Procedure: COLONOSCOPY;  Surgeon: Henry Mo III, MD;  [facility];  Service: Endoscopy;  Laterality: N/A;    CORONARY ANGIOPLASTY      ESOPHAGOGASTRODUODENOSCOPY N/A 5/1/2019    Procedure: ESOPHAGOGASTRODUODENOSCOPY (EGD);  Surgeon: Henry Mo III, MD;  Location: [facility] ENDO;  Service: Endoscopy;  Laterality: N/A;    EYE SURGERY      FRACTURE SURGERY      kidney stone       August 2016    PC IOL OU      RETINAL DETACHMENT REPAIR W/ SCLERAL BUCKLE LE      WRIST SURGERY           Family History:  Family History   Problem Relation Age of Onset    Macular degeneration Father     Heart disease Father         CHF     Heart attack Father     Hypertension Mother     Macular degeneration Paternal Uncle     Hypertension Maternal Grandmother     Hypertension Maternal Grandfather     Strabismus Neg Hx     Retinal detachment Neg Hx     Glaucoma Neg Hx     Blindness Neg Hx     Amblyopia Neg Hx        Social History:  Social History     Tobacco Use    Smoking status: Never Smoker    Smokeless tobacco: Never Used   Substance and Sexual Activity    Alcohol use: Yes     Comment: " one to two glasses of wine per year"    Drug use: No    Sexual activity: Not Currently     Birth control/protection: None       ROS   Review of Systems   Constitutional: Negative for chills and fever.   HENT: Negative for sore throat.    Respiratory: Negative for shortness of breath.    Cardiovascular: Negative for chest pain.   Gastrointestinal: Negative for diarrhea, nausea and vomiting.   Genitourinary: Negative for dysuria.   Musculoskeletal: Negative for back pain.   Skin: " Negative for rash.   Neurological: Negative for weakness, light-headedness and numbness.   Hematological: Does not bruise/bleed easily.   All other systems reviewed and are negative.    Physical Exam      Initial Vitals   BP Pulse Resp Temp SpO2   08/15/22 1045 08/15/22 1045 08/15/22 1045 08/15/22 1053 08/15/22 1045   (!) 200/100 (!) 58 18 98.6 °F (37 °C) 98 %      MAP       --                 Physical Exam  Nursing Notes and Vital Signs Reviewed.  Constitutional: Patient is in no acute distress. Well-developed and well-nourished.  Head: Atraumatic. Normocephalic.  Eyes: PERRL. EOM intact. Conjunctivae are not pale. No scleral icterus.  ENT: Mucous membranes are moist. Oropharynx is clear and symmetric.    Neck: Supple. Full ROM. No lymphadenopathy.  Cardiovascular: Regular rate. Regular rhythm. No murmurs, rubs, or gallops. Distal pulses are 2+ and symmetric.  Pulmonary/Chest: No respiratory distress. Clear to auscultation bilaterally. No wheezing or rales.  Abdominal: Soft and non-distended.  There is no tenderness.  No rebound, guarding, or rigidity.   Musculoskeletal: Moves all extremities. No obvious deformities. There is lymphedema to the bilateral lower extremities with lymphedema wraps on the bilateral lower extremities. No pitting edema.  Skin: Warm and dry.  Neurological:  Alert, awake, and appropriate.  Normal speech.  No acute focal neurological deficits are appreciated.  Psychiatric: Normal affect. Good eye contact. Appropriate in content.    ED Course    Critical Care    Date/Time: 8/15/2022 5:30 PM  Performed by: Dong Gregory MD  Authorized by: Dong Gregory MD   Direct patient critical care time: 11 minutes  Additional history critical care time: 8 minutes  Ordering / reviewing critical care time: 7 minutes  Documentation critical care time: 6 minutes  Consulting other physicians critical care time: 9 minutes  Total critical care time (exclusive of procedural time) : 41 minutes  Critical care time  was exclusive of separately billable procedures and treating other patients and teaching time.  Critical care was necessary to treat or prevent imminent or life-threatening deterioration of the following conditions: hypertensive urgency.  Critical care was time spent personally by me on the following activities: blood draw for specimens, development of treatment plan with patient or surrogate, discussions with consultants, interpretation of cardiac output measurements, evaluation of patient's response to treatment, examination of patient, obtaining history from patient or surrogate, ordering and performing treatments and interventions, ordering and review of laboratory studies, pulse oximetry, re-evaluation of patient's condition and review of old charts.        ED Vital Signs:  Vitals:    08/15/22 1045 08/15/22 1053 08/15/22 1116 08/15/22 1149   BP: (!) 200/100 (!) 236/116 (!) 240/109 (!) 234/116   Pulse: (!) 58      Resp: 18      Temp:  98.6 °F (37 °C)     TempSrc:  Oral     SpO2: 98%      Weight:        08/15/22 1203 08/15/22 1221 08/15/22 1230 08/15/22 1300   BP: (!) 230/110 (!) 218/100 (!) 200/91 (!) 194/86   Pulse:   (!) 56 67   Resp:    (!) 22   Temp:       TempSrc:       SpO2:   100% 100%   Weight:        08/15/22 1309 08/15/22 1457 08/15/22 1603   BP:  (!) 210/96 (!) 184/83   Pulse:  70 66   Resp:  (!) 22 13   Temp:      TempSrc:      SpO2:  100%    Weight: (!) 179.2 kg (395 lb)         Abnormal Lab Results:  Labs Reviewed   COMPREHENSIVE METABOLIC PANEL - Abnormal; Notable for the following components:       Result Value    CO2 22 (*)     Glucose 206 (*)     Creatinine 1.7 (*)     eGFR 44 (*)     All other components within normal limits   TROPONIN I - Abnormal; Notable for the following components:    Troponin I 0.042 (*)     All other components within normal limits   B-TYPE NATRIURETIC PEPTIDE - Abnormal; Notable for the following components:     (*)     All other components within normal limits    URINALYSIS, REFLEX TO URINE CULTURE - Abnormal; Notable for the following components:    Protein, UA 1+ (*)     Glucose, UA 3+ (*)     Occult Blood UA Trace (*)     All other components within normal limits    Narrative:     Specimen Source->Urine   URINALYSIS MICROSCOPIC - Abnormal; Notable for the following components:    Hyaline Casts, UA 4 (*)     All other components within normal limits    Narrative:     Specimen Source->Urine   TROPONIN I - Abnormal; Notable for the following components:    Troponin I 0.042 (*)     All other components within normal limits   CBC W/ AUTO DIFFERENTIAL   SARS-COV-2 RNA AMPLIFICATION, QUAL        All Lab Results:  Results for orders placed or performed during the hospital encounter of 08/15/22   CBC auto differential   Result Value Ref Range    WBC 4.69 3.90 - 12.70 K/uL    RBC 5.60 4.60 - 6.20 M/uL    Hemoglobin 17.1 14.0 - 18.0 g/dL    Hematocrit 49.7 40.0 - 54.0 %    MCV 89 82 - 98 fL    MCH 30.5 27.0 - 31.0 pg    MCHC 34.4 32.0 - 36.0 g/dL    RDW 12.6 11.5 - 14.5 %    Platelets 191 150 - 450 K/uL    MPV 9.8 9.2 - 12.9 fL    Immature Granulocytes 0.2 0.0 - 0.5 %    Gran # (ANC) 2.7 1.8 - 7.7 K/uL    Immature Grans (Abs) 0.01 0.00 - 0.04 K/uL    Lymph # 1.4 1.0 - 4.8 K/uL    Mono # 0.4 0.3 - 1.0 K/uL    Eos # 0.2 0.0 - 0.5 K/uL    Baso # 0.03 0.00 - 0.20 K/uL    nRBC 0 0 /100 WBC    Gran % 58.5 38.0 - 73.0 %    Lymph % 28.8 18.0 - 48.0 %    Mono % 8.1 4.0 - 15.0 %    Eosinophil % 3.8 0.0 - 8.0 %    Basophil % 0.6 0.0 - 1.9 %    Differential Method Automated    Comprehensive metabolic panel   Result Value Ref Range    Sodium 144 136 - 145 mmol/L    Potassium 3.5 3.5 - 5.1 mmol/L    Chloride 110 95 - 110 mmol/L    CO2 22 (L) 23 - 29 mmol/L    Glucose 206 (H) 70 - 110 mg/dL    BUN 17 8 - 23 mg/dL    Creatinine 1.7 (H) 0.5 - 1.4 mg/dL    Calcium 9.8 8.7 - 10.5 mg/dL    Total Protein 8.0 6.0 - 8.4 g/dL    Albumin 4.3 3.5 - 5.2 g/dL    Total Bilirubin 0.9 0.1 - 1.0 mg/dL     Alkaline Phosphatase 97 55 - 135 U/L    AST 14 10 - 40 U/L    ALT 22 10 - 44 U/L    Anion Gap 12 8 - 16 mmol/L    eGFR 44 (A) >60 mL/min/1.73 m^2   Troponin I   Result Value Ref Range    Troponin I 0.042 (H) 0.000 - 0.026 ng/mL   Brain natriuretic peptide   Result Value Ref Range     (H) 0 - 99 pg/mL   Urinalysis, Reflex to Urine Culture Urine, Clean Catch    Specimen: Urine   Result Value Ref Range    Specimen UA Urine, Clean Catch     Color, UA Yellow Yellow, Straw, Ella    Appearance, UA Clear Clear    pH, UA 6.0 5.0 - 8.0    Specific Gravity, UA 1.015 1.005 - 1.030    Protein, UA 1+ (A) Negative    Glucose, UA 3+ (A) Negative    Ketones, UA Negative Negative    Bilirubin (UA) Negative Negative    Occult Blood UA Trace (A) Negative    Nitrite, UA Negative Negative    Urobilinogen, UA Negative <2.0 EU/dL    Leukocytes, UA Negative Negative   Urinalysis Microscopic   Result Value Ref Range    RBC, UA 0 0 - 4 /hpf    WBC, UA 0 0 - 5 /hpf    Bacteria None None-Occ /hpf    Yeast, UA None None    Hyaline Casts, UA 4 (A) 0-1/lpf /lpf    Microscopic Comment SEE COMMENT    Troponin I   Result Value Ref Range    Troponin I 0.042 (H) 0.000 - 0.026 ng/mL         Imaging Results:  Imaging Results    None        The EKG was ordered, reviewed, and independently interpreted by the ED provider.  Interpretation time: 10:50  Rate: 54 BPM  Rhythm: sinus bradycardia  Interpretation: LBBB. No STEMI.         The Emergency Provider reviewed the vital signs and test results, which are outlined above.    ED Discussion     4:51 PM: Discussed pt's case with Dr. Baker (Cardiology) who recommends considering observation overnight to monitor blood pressure.    5:13 PM: Re-evaluated pt. Pt is resting comfortably and is in no acute distress.  Pt states that he would like to stay for blood pressure control.  D/w pt all pertinent results. D/w pt any concerns expressed at this time. Answered all questions. Pt expresses understanding at  this time.    5:25 PM: Discussed case with Avlin Schumacher NP ([facility]). Dr. Romo agrees with current care and management of pt and accepts admission.   Admitting Service: Hospital Medicine  Admitting Physician: Dr. Romo  Admit to: Obs Unit    5:26 PM: Re-evaluated pt. I have discussed test results, shared treatment plan, and the need for admission with patient and family at bedside. Pt and family express understanding at this time and agree with all information. All questions answered. Pt and family have no further questions or concerns at this time. Pt is ready for admit.           ED Medication(s):  Medications   hydrALAZINE tablet 100 mg (100 mg Oral Given 8/15/22 1203)   cloNIDine tablet 0.1 mg (0.1 mg Oral Given 8/15/22 1100)   aspirin tablet 325 mg (325 mg Oral Given 8/15/22 1112)   furosemide tablet 60 mg (20 mg Oral Given 8/15/22 1116)   furosemide tablet 40 mg (40 mg Oral Given 8/15/22 1149)   hydrALAZINE injection 10 mg (10 mg Intravenous Given 8/15/22 1541)           New Prescriptions    No medications on file         Medical Decision Making    Medical Decision Making:   Clinical Tests:   Lab Tests: Ordered and Reviewed  Medical Tests: Ordered and Reviewed           Scribe Attestation:   Scribe #1: I performed the above scribed service and the documentation accurately describes the services I performed. I attest to the accuracy of the note.    Attending:   Physician Attestation Statement for Scribe #1: I, Dong Gregory MD, personally performed the services described in this documentation, as scribed by Maggie Pichardo, in my presence, and it is both accurate and complete.          Clinical Impression       ICD-10-CM ICD-9-CM   1. Hypertensive urgency  I16.0 401.9   2. Hypertension  I10 401.9       Disposition:   Disposition: Placed in Observation  Condition: Fair         Dong Gregory MD  08/15/22 3421

## 2022-08-15 NOTE — ASSESSMENT & PLAN NOTE
Patient's FSGs are controlled on current medication regimen.  Last A1c reviewed-   Lab Results   Component Value Date    HGBA1C 7.0 (H) 07/07/2022     Most recent fingerstick glucose reviewed- No results for input(s): POCTGLUCOSE in the last 24 hours.  Current correctional scale  Medium  Maintain anti-hyperglycemic dose as follows-   Antihyperglycemics (From admission, onward)            Start     Stop Route Frequency Ordered    08/16/22 0745  insulin aspart U-100 pen 15 Units         -- SubQ 3 times daily with meals 08/15/22 1834    08/15/22 2100  insulin detemir U-100 pen 40 Units         -- SubQ Nightly 08/15/22 1834    08/15/22 1939  insulin aspart U-100 pen 1-10 Units         -- SubQ Before meals & nightly PRN 08/15/22 1840        Hold Oral hypoglycemics while patient is in the hospital.

## 2022-08-15 NOTE — Clinical Note
Patient presented with very elevated BP today to my clinic. Missed meds and couldn't get home in timely manner since he takes transportation. BP in clinic 216/105 and 208/118 retake. Has not been able to monitor BP at home due to cuff being too small - can we order him something bigger through his insurance or is that an out of pocket cost? Did discuss at length not missing BP meds. Thank you!

## 2022-08-15 NOTE — PROGRESS NOTES
Subjective:     Patient ID: Stepan Springer is a 67 y.o. male.    Chief Complaint: Nail Care (Diabetic pt wears tennis shoes , PCP Dr. Fleming last seen 7-21-22)    Stepan is a 67 y.o. male who presents to the clinic for evaluation and treatment of high risk feet. Stepan has a past medical history of Anxiety, Bell's palsy, CHF (congestive heart failure), Chronic kidney disease, stage 3a (11/02/2021), Coronary artery disease involving native coronary artery without angina pectoris (10/18/2016), Depression, Diabetes mellitus, Glaucoma, Hypertension, Idiopathic peripheral neuropathy (12/11/2012), Lymphedema of both lower extremities, Mixed hyperlipidemia (12/11/2012), Morbid obesity with BMI of 45.0-49.9, adult (02/12/2019), Pancytopenia, Sleep apnea, Stage 3b chronic kidney disease, Type 2 diabetes mellitus with diabetic polyneuropathy, with long-term current use of insulin (12/11/2012), and Vitamin B 12 deficiency (08/23/2012). The patient's chief complaint is long thick nails. Patient states he has been going to Lymphedema clinic.  This patient has documented high risk feet requiring routine maintenance secondary to peripheral neuropathy.       PCP: KANDICE Fleming MD    Date Last Seen by PCP: 07/21/2022    Current shoe gear:  Affected Foot: Tennis shoes     Unaffected Foot: Tennis shoes    Hemoglobin A1C   Date Value Ref Range Status   08/15/2022 6.5 (H) 4.0 - 5.6 % Final     Comment:     ADA Screening Guidelines:  5.7-6.4%  Consistent with prediabetes  >or=6.5%  Consistent with diabetes    High levels of fetal hemoglobin interfere with the HbA1C  assay. Heterozygous hemoglobin variants (HbS, HgC, etc)do  not significantly interfere with this assay.   However, presence of multiple variants may affect accuracy.     07/07/2022 7.0 (H) 4.0 - 5.6 % Final     Comment:     ADA Screening Guidelines:  5.7-6.4%  Consistent with prediabetes  >or=6.5%  Consistent with diabetes    High levels of fetal hemoglobin  interfere with the HbA1C  assay. Heterozygous hemoglobin variants (HbS, HgC, etc)do  not significantly interfere with this assay.   However, presence of multiple variants may affect accuracy.     02/08/2022 6.9 (H) 4.0 - 5.6 % Final     Comment:     ADA Screening Guidelines:  5.7-6.4%  Consistent with prediabetes  >or=6.5%  Consistent with diabetes    High levels of fetal hemoglobin interfere with the HbA1C  assay. Heterozygous hemoglobin variants (HbS, HgC, etc)do  not significantly interfere with this assay.   However, presence of multiple variants may affect accuracy.             Patient Active Problem List   Diagnosis    Status post cataract extraction and insertion of intraocular lens    Corneal opacity - Left Eye    Type 2 diabetes mellitus with diabetic polyneuropathy, with long-term current use of insulin    Obstructive sleep apnea (untreated, refuses treatment)    Chronic diastolic congestive heart failure    LBBB (left bundle branch block)    Type 2 diabetes mellitus with stage 3a chronic kidney disease, with long-term current use of insulin    Anxiety    Hypertension associated with diabetes    Stage 3b chronic kidney disease    Primary open angle glaucoma of both eyes, severe stage    Left nephrolithiasis    Hydronephrosis, left    NSTEMI (non-ST elevated myocardial infarction)    CAD with remote PCI (BMS) of RCA in 9/2016    Recurrent major depressive disorder    Vasculogenic erectile dysfunction    Blindness and low vision    Hx of retinal detachment    High myopia, both eyes    Epiretinal membrane (ERM) of left eye    Lattice degeneration of peripheral retina, left    Right-sided Bell's palsy    Hyperlipidemia associated with type 2 diabetes mellitus    Morbid obesity with BMI of 45.0-49.9, adult    GERD (gastroesophageal reflux disease)    Hypoglycemia unawareness associated with type 2 diabetes mellitus    Elevated troponin    Thrombocytopenia    Pancytopenia    History of COVID-19 (April, 2020)     PVD (peripheral vascular disease)    Influenza Immunization not carried out because of patient refusal    Type 2 diabetes mellitus with microalbuminuria, with long-term current use of insulin    Chronic kidney disease, stage 3a    Hyperparathyroidism, secondary renal    Vitamin D deficiency    Lymphedema    Hypertensive urgency       Medication List with Changes/Refills   Current Medications    AMLODIPINE (NORVASC) 5 MG TABLET    Take 1 tablet (5 mg total) by mouth every evening.    ASPIRIN 325 MG TABLET    Take 325 mg by mouth once daily.    ATORVASTATIN (LIPITOR) 20 MG TABLET    Take 1 tablet (20 mg total) by mouth every evening.    BRIMONIDINE 0.1% (ALPHAGAN P) 0.1 % DROP    Place 1 drop into both eyes 3 (three) times daily. Order 90 day supply    BRINZOLAMIDE (AZOPT) 1 % OPHTHALMIC SUSPENSION    Place 1 drop into both eyes 3 (three) times daily. Brand name only    CARVEDILOL (COREG) 25 MG TABLET    Take 1 tablet (25 mg total) by mouth 2 (two) times daily with meals.    CLONIDINE (CATAPRES) 0.1 MG TABLET    Take 1 tablet (0.1 mg total) by mouth 3 (three) times daily.    EMPAGLIFLOZIN (JARDIANCE) 25 MG TABLET    Take 1 tablet (25 mg total) by mouth once daily.    FLASH GLUCOSE SENSOR (FREESTYLE CLEMENCIA 14 DAY SENSOR) KIT    1 each by Misc.(Non-Drug; Combo Route) route every 14 (fourteen) days.    FUROSEMIDE (LASIX) 40 MG TABLET    Take 1.5 tablets (60 mg total) by mouth 2 (two) times a day.    HYDRALAZINE (APRESOLINE) 100 MG TABLET    Take 1 tablet (100 mg total) by mouth every 8 (eight) hours.    INSULIN ASPART U-100 (NOVOLOG) 100 UNIT/ML (3 ML) INPN PEN    Inject 25 Units into the skin 3 (three) times daily with meals.    INSULIN GLARGINE U-300 CONC (TOUJEO MAX U-300 SOLOSTAR) 300 UNIT/ML (3 ML) INSULIN PEN    Inject 80 Units into the skin once daily.    LISINOPRIL (PRINIVIL,ZESTRIL) 40 MG TABLET    Take 1 tablet (40 mg total) by mouth once daily.    MUPIROCIN (BACTROBAN) 2 % OINTMENT    Apply topically once  "daily.    NETARSUDIL (RHOPRESSA) 0.02 % OPHTHALMIC SOLUTION    Place 1 drop into both eyes once daily.    NITROGLYCERIN (NITROSTAT) 0.4 MG SL TABLET    PLACE 1 TABLET (0.4 MG TOTAL) UNDER THE TONGUE EVERY 5 (FIVE) MINUTES AS NEEDED FOR CHEST PAIN.    PEN NEEDLE, DIABETIC (BD ULTRA-FINE AMANDA PEN NEEDLE) 32 GAUGE X 5/32" NDLE    1 each by Misc.(Non-Drug; Combo Route) route 4 (four) times daily with meals and nightly.   Discontinued Medications    BLOOD PRESSURE CUFF MISC    1 cuff    BLOOD SUGAR DIAGNOSTIC STRP    To check BG 3 times daily, to use with insurance preferred meter    DULOXETINE (CYMBALTA) 30 MG CAPSULE    Take 1 capsule (30 mg total) by mouth once daily.    HYDROCHLOROTHIAZIDE (HYDRODIURIL) 25 MG TABLET    Take 1 tablet (25 mg total) by mouth once daily.    LANCETS MISC    To check BG 3 times daily, to use with insurance preferred meter    LATANOPROST (XALATAN) 0.005 % OPHTHALMIC SOLUTION    Place 1 drop into both eyes once daily.    MIGLITOL (GLYSET) 25 MG TAB    Take 1 tablet (25 mg total) by mouth 3 (three) times daily with meals.    POLYETHYLENE GLYCOL 3350 (DULCOLAX BALANCE ORAL)    Take by mouth.    UNABLE TO FIND    medication name: EXIPURE take one capsule daily       Review of patient's allergies indicates:   Allergen Reactions    Cefazolin Other (See Comments)     Shakes, chills, dizziness       Past Surgical History:   Procedure Laterality Date    CARDIAC CATHETERIZATION  7/22/13    non obstructive cad    CATARACT EXTRACTION  OU    COLONOSCOPY N/A 5/1/2019    Procedure: COLONOSCOPY;  Surgeon: Henry Mo III, MD;  Location: [facility] ENDO;  Service: Endoscopy;  Laterality: N/A;    CORONARY ANGIOPLASTY      ESOPHAGOGASTRODUODENOSCOPY N/A 5/1/2019    Procedure: ESOPHAGOGASTRODUODENOSCOPY (EGD);  Surgeon: Henry Mo III, MD;  Location: [facility];  Service: Endoscopy;  Laterality: N/A;    EYE SURGERY      FRACTURE SURGERY      kidney stone       August 2016    PC IOL OU      RETINAL " "DETACHMENT REPAIR W/ SCLERAL BUCKLE LE      WRIST SURGERY         Family History   Problem Relation Age of Onset    Macular degeneration Father     Heart disease Father         CHF     Heart attack Father     Hypertension Mother     Macular degeneration Paternal Uncle     Hypertension Maternal Grandmother     Hypertension Maternal Grandfather     Strabismus Neg Hx     Retinal detachment Neg Hx     Glaucoma Neg Hx     Blindness Neg Hx     Amblyopia Neg Hx        Social History     Socioeconomic History    Marital status:    Occupational History     Comment: Quit job at [employer]; wants to open his own [employer] business    Tobacco Use    Smoking status: Never    Smokeless tobacco: Never   Substance and Sexual Activity    Alcohol use: Yes     Comment: " one to two glasses of wine per year"    Drug use: No    Sexual activity: Not Currently     Birth control/protection: None   Social History Narrative    , 1 son, [employer].       Vitals:    08/15/22 0811   Weight: (!) 187.2 kg (412 lb 11.2 oz)   Height: 6' 7" (2.007 m)       Hemoglobin A1C   Date Value Ref Range Status   08/15/2022 6.5 (H) 4.0 - 5.6 % Final     Comment:     ADA Screening Guidelines:  5.7-6.4%  Consistent with prediabetes  >or=6.5%  Consistent with diabetes    High levels of fetal hemoglobin interfere with the HbA1C  assay. Heterozygous hemoglobin variants (HbS, HgC, etc)do  not significantly interfere with this assay.   However, presence of multiple variants may affect accuracy.     07/07/2022 7.0 (H) 4.0 - 5.6 % Final     Comment:     ADA Screening Guidelines:  5.7-6.4%  Consistent with prediabetes  >or=6.5%  Consistent with diabetes    High levels of fetal hemoglobin interfere with the HbA1C  assay. Heterozygous hemoglobin variants (HbS, HgC, etc)do  not significantly interfere with this assay.   However, presence of multiple variants may affect accuracy.     02/08/2022 6.9 (H) 4.0 - 5.6 % Final     Comment:     ADA Screening Guidelines:  5.7-6.4%  " Consistent with prediabetes  >or=6.5%  Consistent with diabetes    High levels of fetal hemoglobin interfere with the HbA1C  assay. Heterozygous hemoglobin variants (HbS, HgC, etc)do  not significantly interfere with this assay.   However, presence of multiple variants may affect accuracy.         Review of Systems   Constitutional:  Negative for chills and fever.   Respiratory:  Negative for shortness of breath.    Cardiovascular:  Negative for chest pain, palpitations, orthopnea, claudication and leg swelling.   Gastrointestinal:  Negative for diarrhea, nausea and vomiting.   Musculoskeletal:  Negative for joint pain.   Skin:  Negative for rash.   Neurological:  Positive for tingling and sensory change.   Psychiatric/Behavioral: Negative.         Objective:   PHYSICAL EXAM: Apperance: Alert and orient in no distress,well developed, and with good attention to grooming and body habits  Patient presents ambulating in tennis shoes.   LOWER EXTREMITY EXAM:  VASCULAR: Dorsalis pedis pulses 1/4 bilateral and Posterior Tibial pulses 1/4 bilateral. Capillary fill time <4 seconds bilateral. Moderate edema observed bilateral. Varicosities absent bilateral. Skin temperature of the lower extremities is warm to cool, proximal to distal. Hair growth dim bilateral. (--) lymphangitis or (--) cellulitis noted right.  DERMATOLOGICAL: (--) edema, (--) erythema, (--) malodor, (--) drainage, (--) warmth to right foot. The dorsum surface of the feet are soft and supple.  The plantar aspects of feet are dry and scaly. Nails 1,2,3,4,5 bilateral elongated, incurvated, and discolored with subungual debris. Webspaces 1,2,3,4 bilateral clean, dry and without evidence of break in skin integrity.   NEUROLOGICAL: Light touch, sharp-dull, proprioception all present and equal bilaterally.  Vibratory sensation diminished at bilateral hallux and navicular. Protective sensation absent at 2/10 sites as tested with a [place] 5.07 monofilament.    MUSCULOSKELETAL: Muscle strength is 5/5 for foot inverters, everters, plantarflexors, and dorsiflexors. Muscle tone is normal. Semi-rigid hammertoes bilateral. No pain on palpation of left hallux nail.       Assessment:   Uncontrolled type 2 diabetes mellitus with kidney complication, with long-term current use of insulin    Idiopathic peripheral neuropathy    Dermatophytosis of nail    Lymphedema        Plan:   Uncontrolled type 2 diabetes mellitus with kidney complication, with long-term current use of insulin    Idiopathic peripheral neuropathy    Dermatophytosis of nail    Lymphedema    I counseled the patient on his conditions, regarding findings of my examination, my impressions, and usual treatment plan.   Greater than 50% of this visit spent on counseling and coordination of care.  Greater than 15 minutes of a 20 minute appointment spent on education about the diabetic foot, neuropathy, and prevention of limb loss.  Shoe inspection. Diabetic Foot Education. Patient reminded of the importance of good nutrition and blood sugar control to help prevent podiatric complications of diabetes. Patient instructed on proper foot hygeine. We discussed wearing proper shoe gear, daily foot inspections, never walking without protective shoe gear, never putting sharp instruments to feet.    With patient's permission, nails 1-5 bilateral were debrided/trimmed in length and thickness aggressively to their soft tissue attachment mechanically and with electric , removing all offending nail and debris. Patient relates relief following the procedure.  Patient to continue Lymphedema clinic.   Patient  will continue to monitor the areas daily, inspect feet, wear protective shoe gear when ambulatory, moisturizer to maintain skin integrity. Patient reminded of the importance of good nutrition and blood sugar control to help prevent podiatric complications of diabetes.  Patient to return 3 months or sooner if needed.                Vangie Cuevas, DPM  [facility]

## 2022-08-15 NOTE — H&P
[facility] - Emergency Dept.  [facility]  History & Physical    Patient Name: Stepan Springer  MRN: [id number]  Patient Class: OP- Observation  Admission Date: 8/15/2022  Attending Physician: Dong Gregory MD   Primary Care Provider: KANDICE Fleming MD         Patient information was obtained from patient and ER records.     Subjective:     Principal Problem:Hypertensive urgency    Chief Complaint:   Chief Complaint   Patient presents with    Hypertension     Sent from clinic with high blood pressure, no complaints.  Did not take his meds this am        HPI: Stepan Springer is a 67 y.o. male patient with a PMH of HTN, DM, depression who presented to the ER because he was referred to the ED by Luís Millard PA-C, because his blood pressure was 208/118. The patient reports that he did not take his blood pressure meds today. He denies any associated symptoms. Patient denies fever, chills, CP, cough, cristina, pnd, orthopnea, palpitations, diaphoresis, headache, blurred vision, numbness, tingling, dizziness, localized weakness, n/v/d, abdominal pain, blood in stools, melena, hematemesis, urinary frequency, urgency, dysuria or hematuria. In the ER the patients BP got as high as 240/109. The patient is being placed in observation for treatment of HTN, urgency.         Past Medical History:   Diagnosis Date    Anxiety     Bell's palsy     CHF (congestive heart failure)     Chronic kidney disease, stage 3a 11/02/2021    Coronary artery disease involving native coronary artery without angina pectoris 10/18/2016    Depression     Diabetes mellitus     Glaucoma     Hypertension     Idiopathic peripheral neuropathy 12/11/2012    Lymphedema of both lower extremities     Mixed hyperlipidemia 12/11/2012    Morbid obesity with BMI of 45.0-49.9, adult 02/12/2019    Pancytopenia     Sleep apnea     Stage 3b chronic kidney disease     Type 2 diabetes mellitus with diabetic polyneuropathy, with long-term  current use of insulin 12/11/2012    Vitamin B 12 deficiency 08/23/2012       Past Surgical History:   Procedure Laterality Date    CARDIAC CATHETERIZATION  7/22/13    non obstructive cad    CATARACT EXTRACTION  OU    COLONOSCOPY N/A 5/1/2019    Procedure: COLONOSCOPY;  Surgeon: Henry Mo III, MD;  Location: [facility];  Service: Endoscopy;  Laterality: N/A;    CORONARY ANGIOPLASTY      ESOPHAGOGASTRODUODENOSCOPY N/A 5/1/2019    Procedure: ESOPHAGOGASTRODUODENOSCOPY (EGD);  Surgeon: Henry Mo III, MD;  Location: [facility];  Service: Endoscopy;  Laterality: N/A;    EYE SURGERY      FRACTURE SURGERY      kidney stone       August 2016    PC IOL OU      RETINAL DETACHMENT REPAIR W/ SCLERAL BUCKLE LE      WRIST SURGERY         Review of patient's allergies indicates:   Allergen Reactions    Ancef in dextrose (iso-osm)      dizziness    Cefazolin      Other reaction(s): Shakes  Other reaction(s): Chills       No current facility-administered medications on file prior to encounter.     Current Outpatient Medications on File Prior to Encounter   Medication Sig    amLODIPine (NORVASC) 5 MG tablet Take 1 tablet (5 mg total) by mouth every evening.    aspirin 325 MG tablet Take 325 mg by mouth once daily.    atorvastatin (LIPITOR) 20 MG tablet Take 1 tablet (20 mg total) by mouth every evening.    BLOOD PRESSURE CUFF Misc 1 cuff    blood sugar diagnostic Strp To check BG 3 times daily, to use with insurance preferred meter    brimonidine 0.1% (ALPHAGAN P) 0.1 % Drop Place 1 drop into both eyes 3 (three) times daily. Order 90 day supply    brinzolamide (AZOPT) 1 % ophthalmic suspension Place 1 drop into both eyes 3 (three) times daily. Brand name only    carvediloL (COREG) 25 MG tablet Take 1 tablet (25 mg total) by mouth 2 (two) times daily with meals.    cloNIDine (CATAPRES) 0.1 MG tablet Take 1 tablet (0.1 mg total) by mouth 3 (three) times daily.    DULoxetine (CYMBALTA) 30 MG capsule  "Take 1 capsule (30 mg total) by mouth once daily.    empagliflozin (JARDIANCE) 25 mg tablet Take 1 tablet (25 mg total) by mouth once daily.    flash glucose sensor (FREESTYLE CLEMENCIA 14 DAY SENSOR) Kit 1 each by Misc.(Non-Drug; Combo Route) route every 14 (fourteen) days.    furosemide (LASIX) 40 MG tablet Take 1.5 tablets (60 mg total) by mouth 2 (two) times a day.    hydrALAZINE (APRESOLINE) 100 MG tablet Take 1 tablet (100 mg total) by mouth every 8 (eight) hours.    hydroCHLOROthiazide (HYDRODIURIL) 25 MG tablet Take 1 tablet (25 mg total) by mouth once daily.    insulin aspart U-100 (NOVOLOG) 100 unit/mL (3 mL) InPn pen Inject 25 Units into the skin 3 (three) times daily with meals.    insulin glargine U-300 conc (TOUJEO MAX U-300 SOLOSTAR) 300 unit/mL (3 mL) insulin pen Inject 80 Units into the skin once daily.    lancets [facility] To check BG 3 times daily, to use with insurance preferred meter    latanoprost (XALATAN) 0.005 % ophthalmic solution Place 1 drop into both eyes once daily.    lisinopriL (PRINIVIL,ZESTRIL) 40 MG tablet Take 1 tablet (40 mg total) by mouth once daily.    miglitoL (GLYSET) 25 MG Tab Take 1 tablet (25 mg total) by mouth 3 (three) times daily with meals.    mupirocin (BACTROBAN) 2 % ointment Apply topically once daily.    netarsudiL (RHOPRESSA) 0.02 % ophthalmic solution Place 1 drop into both eyes once daily.    nitroGLYCERIN (NITROSTAT) 0.4 MG SL tablet PLACE 1 TABLET (0.4 MG TOTAL) UNDER THE TONGUE EVERY 5 (FIVE) MINUTES AS NEEDED FOR CHEST PAIN.    pen needle, diabetic (BD ULTRA-FINE AMANDA PEN NEEDLE) 32 gauge x 5/32" Ndle 1 each by Misc.(Non-Drug; Combo Route) route 4 (four) times daily with meals and nightly.    polyethylene glycol 3350 (DULCOLAX BALANCE ORAL) Take by mouth.    UNABLE TO FIND medication name: EXIPURE take one capsule daily     Family History       Problem Relation (Age of Onset)    Heart attack Father    Heart disease Father    Hypertension Mother, " "Maternal Grandmother, Maternal Grandfather    Macular degeneration Father, Paternal Uncle          Tobacco Use    Smoking status: Never Smoker    Smokeless tobacco: Never Used   Substance and Sexual Activity    Alcohol use: Yes     Comment: " one to two glasses of wine per year"    Drug use: No    Sexual activity: Not Currently     Birth control/protection: None     Review of Systems   Constitutional:  Negative for activity change, appetite change, chills, fatigue, fever and unexpected weight change.   HENT:  Negative for congestion, facial swelling, rhinorrhea, sinus pressure, sneezing and sore throat.    Eyes:  Negative for discharge, redness and visual disturbance.   Respiratory:  Negative for apnea, cough, chest tightness, shortness of breath, wheezing and stridor.    Cardiovascular:  Negative for chest pain, palpitations and leg swelling.   Gastrointestinal:  Negative for abdominal distention, abdominal pain, anal bleeding, blood in stool, constipation, diarrhea, nausea and vomiting.   Genitourinary:  Negative for difficulty urinating, dysuria, frequency, hematuria and penile discharge.   Musculoskeletal:  Negative for arthralgias, back pain, gait problem, joint swelling, myalgias, neck pain and neck stiffness.   Skin:  Negative for color change and pallor.   Neurological:  Negative for dizziness, tremors, seizures, syncope, facial asymmetry, speech difficulty, weakness, light-headedness, numbness and headaches.   Psychiatric/Behavioral:  Negative for behavioral problems, confusion, hallucinations and suicidal ideas. The patient is not nervous/anxious.    All other systems reviewed and are negative.  Objective:     Vital Signs (Most Recent):  Temp: 98.6 °F (37 °C) (08/15/22 1053)  Pulse: 66 (08/15/22 1603)  Resp: 13 (08/15/22 1603)  BP: (!) 184/83 (08/15/22 1603)  SpO2: 100 % (08/15/22 1457)   Vital Signs (24h Range):  Temp:  [98.6 °F (37 °C)] 98.6 °F (37 °C)  Pulse:  [56-70] 66  Resp:  [13-22] 13  SpO2:  " [98 %-100 %] 100 %  BP: (184-240)/() 184/83     Weight: (!) 179.2 kg (395 lb)  Body mass index is 44.5 kg/m².    Physical Exam  Vitals and nursing note reviewed.   Constitutional:       Appearance: He is well-developed.   HENT:      Head: Normocephalic and atraumatic.   Eyes:      Conjunctiva/sclera: Conjunctivae normal.      Pupils: Pupils are equal, round, and reactive to light.   Cardiovascular:      Rate and Rhythm: Normal rate and regular rhythm.      Heart sounds: Normal heart sounds. No murmur heard.  Pulmonary:      Effort: Pulmonary effort is normal. No respiratory distress.      Breath sounds: Normal breath sounds.   Abdominal:      General: Bowel sounds are normal. There is no distension.      Palpations: Abdomen is soft.      Tenderness: There is no abdominal tenderness.   Musculoskeletal:         General: No tenderness or deformity.      Cervical back: Normal range of motion and neck supple.   Skin:     General: Skin is warm and dry.      Findings: No erythema or rash.   Neurological:      Mental Status: He is alert and oriented to person, place, and time.   Psychiatric:         Behavior: Behavior normal.         CRANIAL NERVES     CN III, IV, VI   Pupils are equal, round, and reactive to light.     Significant Labs: All pertinent labs within the past 24 hours have been reviewed.    Significant Imaging:   Imaging Results    None           Assessment/Plan:     * Hypertensive urgency  Patient has a current diagnosis of hypertensive urgency (without evidence of end organ damage) which is controlled.  Latest blood pressure and vitals reviewed-   Temp:  [98.6 °F (37 °C)]   Pulse:  [56-70]   Resp:  [13-22]   BP: (184-240)/()   SpO2:  [98 %-100 %] .   Patient currently on IV antihypertensives.   Home meds for hypertension were reviewed and noted below.   Hypertension Medications             amLODIPine (NORVASC) 5 MG tablet Take 1 tablet (5 mg total) by mouth every evening.    carvediloL (COREG) 25  MG tablet Take 1 tablet (25 mg total) by mouth 2 (two) times daily with meals.    cloNIDine (CATAPRES) 0.1 MG tablet Take 1 tablet (0.1 mg total) by mouth 3 (three) times daily.    furosemide (LASIX) 40 MG tablet Take 1.5 tablets (60 mg total) by mouth 2 (two) times a day.    hydrALAZINE (APRESOLINE) 100 MG tablet Take 1 tablet (100 mg total) by mouth every 8 (eight) hours.    hydroCHLOROthiazide (HYDRODIURIL) 25 MG tablet Take 1 tablet (25 mg total) by mouth once daily.    lisinopriL (PRINIVIL,ZESTRIL) 40 MG tablet Take 1 tablet (40 mg total) by mouth once daily.    nitroGLYCERIN (NITROSTAT) 0.4 MG SL tablet PLACE 1 TABLET (0.4 MG TOTAL) UNDER THE TONGUE EVERY 5 (FIVE) MINUTES AS NEEDED FOR CHEST PAIN.          Medication adjustment for hospital antihypertensives is as follows- Resume home meds, IV hydralazine PRN    Will aim for controlled BP reduction by medications noted above. Monitor and mitigate end organ damage as indicated.    Morbid obesity with BMI of 45.0-49.9, adult  Body mass index is 44.5 kg/m². Morbid obesity complicates all aspects of disease management from diagnostic modalities to treatment. Weight loss encouraged and health benefits explained to patient.         CAD with remote PCI (BMS) of RCA in 9/2016  - Resume home meds       Stage 3b chronic kidney disease  - Monitor renal function   - CMP in am       Anxiety  - Resume home meds       Type 2 diabetes mellitus with diabetic polyneuropathy, with long-term current use of insulin  Patient's FSGs are controlled on current medication regimen.  Last A1c reviewed-   Lab Results   Component Value Date    HGBA1C 7.0 (H) 07/07/2022     Most recent fingerstick glucose reviewed- No results for input(s): POCTGLUCOSE in the last 24 hours.  Current correctional scale  Medium  Maintain anti-hyperglycemic dose as follows-   Antihyperglycemics (From admission, onward)            Start     Stop Route Frequency Ordered    08/16/22 9294  insulin aspart U-100 pen  15 Units         -- SubQ 3 times daily with meals 08/15/22 1834    08/15/22 2100  insulin detemir U-100 pen 40 Units         -- SubQ Nightly 08/15/22 1834    08/15/22 1939  insulin aspart U-100 pen 1-10 Units         -- SubQ Before meals & nightly PRN 08/15/22 1840        Hold Oral hypoglycemics while patient is in the hospital.      VTE Risk Mitigation (From admission, onward)         Ordered     heparin (porcine) injection 7,500 Units  Every 8 hours         08/15/22 1851     IP VTE HIGH RISK PATIENT  Once         08/15/22 1840     Place sequential compression device  Until discontinued         08/15/22 1840                   Alvin Schumacher NP  Department of Hospital Medicine   [facility] - Emergency Dept.

## 2022-08-16 ENCOUNTER — TELEPHONE (OUTPATIENT)
Dept: DIABETES | Facility: CLINIC | Age: 67
End: 2022-08-16
Payer: MEDICARE

## 2022-08-16 VITALS
OXYGEN SATURATION: 97 % | HEIGHT: 78 IN | DIASTOLIC BLOOD PRESSURE: 90 MMHG | TEMPERATURE: 98 F | BODY MASS INDEX: 36.45 KG/M2 | WEIGHT: 315 LBS | RESPIRATION RATE: 14 BRPM | HEART RATE: 70 BPM | SYSTOLIC BLOOD PRESSURE: 133 MMHG

## 2022-08-16 LAB
ALBUMIN SERPL BCP-MCNC: 3.6 G/DL (ref 3.5–5.2)
ALP SERPL-CCNC: 89 U/L (ref 55–135)
ALT SERPL W/O P-5'-P-CCNC: 17 U/L (ref 10–44)
ANION GAP SERPL CALC-SCNC: 11 MMOL/L (ref 8–16)
AORTIC ROOT ANNULUS: 3.74 CM
ASCENDING AORTA: 3.61 CM
AST SERPL-CCNC: 16 U/L (ref 10–40)
AV INDEX (PROSTH): 0.62
AV MEAN GRADIENT: 4 MMHG
AV PEAK GRADIENT: 6 MMHG
AV VALVE AREA: 2.64 CM2
AV VELOCITY RATIO: 0.67
BASOPHILS # BLD AUTO: 0.02 K/UL (ref 0–0.2)
BASOPHILS NFR BLD: 0.4 % (ref 0–1.9)
BILIRUB SERPL-MCNC: 0.5 MG/DL (ref 0.1–1)
BSA FOR ECHO PROCEDURE: 3.12 M2
BUN SERPL-MCNC: 20 MG/DL (ref 8–23)
CALCIUM SERPL-MCNC: 9.4 MG/DL (ref 8.7–10.5)
CHLORIDE SERPL-SCNC: 111 MMOL/L (ref 95–110)
CO2 SERPL-SCNC: 20 MMOL/L (ref 23–29)
CREAT SERPL-MCNC: 1.9 MG/DL (ref 0.5–1.4)
CV ECHO LV RWT: 0.68 CM
DIFFERENTIAL METHOD: NORMAL
DOP CALC AO PEAK VEL: 1.2 M/S
DOP CALC AO VTI: 28.1 CM
DOP CALC LVOT AREA: 4.3 CM2
DOP CALC LVOT DIAMETER: 2.33 CM
DOP CALC LVOT PEAK VEL: 0.8 M/S
DOP CALC LVOT STROKE VOLUME: 74.15 CM3
DOP CALC RVOT PEAK VEL: 0.97 M/S
DOP CALC RVOT VTI: 21.5 CM
DOP CALCLVOT PEAK VEL VTI: 17.4 CM
E WAVE DECELERATION TIME: 244.73 MSEC
E/A RATIO: 0.77
E/E' RATIO: 8.91 M/S
ECHO LV POSTERIOR WALL: 1.78 CM (ref 0.6–1.1)
EJECTION FRACTION: 55 %
EOSINOPHIL # BLD AUTO: 0.1 K/UL (ref 0–0.5)
EOSINOPHIL NFR BLD: 3 % (ref 0–8)
ERYTHROCYTE [DISTWIDTH] IN BLOOD BY AUTOMATED COUNT: 12.8 % (ref 11.5–14.5)
EST. GFR  (NO RACE VARIABLE): 39 ML/MIN/1.73 M^2
ESTIMATED AVG GLUCOSE: 140 MG/DL (ref 68–131)
FRACTIONAL SHORTENING: 27 % (ref 28–44)
GLUCOSE SERPL-MCNC: 291 MG/DL (ref 70–110)
HBA1C MFR BLD: 6.5 % (ref 4–5.6)
HCT VFR BLD AUTO: 42.6 % (ref 40–54)
HGB BLD-MCNC: 14.8 G/DL (ref 14–18)
IMM GRANULOCYTES # BLD AUTO: 0.01 K/UL (ref 0–0.04)
IMM GRANULOCYTES NFR BLD AUTO: 0.2 % (ref 0–0.5)
INTERVENTRICULAR SEPTUM: 1.94 CM (ref 0.6–1.1)
IVRT: 87.54 MSEC
LA MAJOR: 5.25 CM
LA MINOR: 5.2 CM
LA WIDTH: 3.8 CM
LEFT ATRIUM SIZE: 4.2 CM
LEFT ATRIUM VOLUME INDEX: 23.5 ML/M2
LEFT ATRIUM VOLUME: 70.88 CM3
LEFT INTERNAL DIMENSION IN SYSTOLE: 3.82 CM (ref 2.1–4)
LEFT VENTRICLE DIASTOLIC VOLUME INDEX: 44.17 ML/M2
LEFT VENTRICLE DIASTOLIC VOLUME: 132.95 ML
LEFT VENTRICLE MASS INDEX: 160 G/M2
LEFT VENTRICLE SYSTOLIC VOLUME INDEX: 20.9 ML/M2
LEFT VENTRICLE SYSTOLIC VOLUME: 62.91 ML
LEFT VENTRICULAR INTERNAL DIMENSION IN DIASTOLE: 5.26 CM (ref 3.5–6)
LEFT VENTRICULAR MASS: 482.01 G
LV LATERAL E/E' RATIO: 8.17 M/S
LV SEPTAL E/E' RATIO: 9.8 M/S
LVOT MG: 2 MMHG
LVOT MV: 0.7 CM/S
LYMPHOCYTES # BLD AUTO: 1.2 K/UL (ref 1–4.8)
LYMPHOCYTES NFR BLD: 25.7 % (ref 18–48)
MAGNESIUM SERPL-MCNC: 1.9 MG/DL (ref 1.6–2.6)
MCH RBC QN AUTO: 30.6 PG (ref 27–31)
MCHC RBC AUTO-ENTMCNC: 34.7 G/DL (ref 32–36)
MCV RBC AUTO: 88 FL (ref 82–98)
MONOCYTES # BLD AUTO: 0.4 K/UL (ref 0.3–1)
MONOCYTES NFR BLD: 9.1 % (ref 4–15)
MV PEAK A VEL: 0.64 M/S
MV PEAK E VEL: 0.49 M/S
MV STENOSIS PRESSURE HALF TIME: 70.97 MS
MV VALVE AREA P 1/2 METHOD: 3.1 CM2
NEUTROPHILS # BLD AUTO: 2.9 K/UL (ref 1.8–7.7)
NEUTROPHILS NFR BLD: 61.6 % (ref 38–73)
NRBC BLD-RTO: 0 /100 WBC
PLATELET # BLD AUTO: 162 K/UL (ref 150–450)
PMV BLD AUTO: 11.2 FL (ref 9.2–12.9)
POCT GLUCOSE: 125 MG/DL (ref 70–110)
POCT GLUCOSE: 130 MG/DL (ref 70–110)
POCT GLUCOSE: 155 MG/DL (ref 70–110)
POCT GLUCOSE: 250 MG/DL (ref 70–110)
POCT GLUCOSE: 286 MG/DL (ref 70–110)
POTASSIUM SERPL-SCNC: 3.5 MMOL/L (ref 3.5–5.1)
PROT SERPL-MCNC: 6.6 G/DL (ref 6–8.4)
PV MEAN GRADIENT: 2.42 MMHG
RA MAJOR: 4.02 CM
RA PRESSURE: 3 MMHG
RBC # BLD AUTO: 4.83 M/UL (ref 4.6–6.2)
SODIUM SERPL-SCNC: 142 MMOL/L (ref 136–145)
STJ: 3.68 CM
TDI LATERAL: 0.06 M/S
TDI SEPTAL: 0.05 M/S
TDI: 0.06 M/S
TRICUSPID ANNULAR PLANE SYSTOLIC EXCURSION: 2.1 CM
TROPONIN I SERPL DL<=0.01 NG/ML-MCNC: 0.05 NG/ML (ref 0–0.03)
WBC # BLD AUTO: 4.63 K/UL (ref 3.9–12.7)

## 2022-08-16 PROCEDURE — 25000003 PHARM REV CODE 250: Performed by: NURSE PRACTITIONER

## 2022-08-16 PROCEDURE — 96372 THER/PROPH/DIAG INJ SC/IM: CPT | Performed by: NURSE PRACTITIONER

## 2022-08-16 PROCEDURE — 80053 COMPREHEN METABOLIC PANEL: CPT | Performed by: NURSE PRACTITIONER

## 2022-08-16 PROCEDURE — 96372 THER/PROPH/DIAG INJ SC/IM: CPT | Mod: 59 | Performed by: NURSE PRACTITIONER

## 2022-08-16 PROCEDURE — 84484 ASSAY OF TROPONIN QUANT: CPT | Performed by: NURSE PRACTITIONER

## 2022-08-16 PROCEDURE — 99214 OFFICE O/P EST MOD 30 MIN: CPT | Mod: 25,,, | Performed by: STUDENT IN AN ORGANIZED HEALTH CARE EDUCATION/TRAINING PROGRAM

## 2022-08-16 PROCEDURE — 99214 PR OFFICE/OUTPT VISIT, EST, LEVL IV, 30-39 MIN: ICD-10-PCS | Mod: 25,,, | Performed by: STUDENT IN AN ORGANIZED HEALTH CARE EDUCATION/TRAINING PROGRAM

## 2022-08-16 PROCEDURE — 36415 COLL VENOUS BLD VENIPUNCTURE: CPT | Performed by: NURSE PRACTITIONER

## 2022-08-16 PROCEDURE — 85025 COMPLETE CBC W/AUTO DIFF WBC: CPT | Performed by: NURSE PRACTITIONER

## 2022-08-16 PROCEDURE — 63600175 PHARM REV CODE 636 W HCPCS: Performed by: NURSE PRACTITIONER

## 2022-08-16 PROCEDURE — 25500020 PHARM REV CODE 255: Performed by: EMERGENCY MEDICINE

## 2022-08-16 PROCEDURE — G0378 HOSPITAL OBSERVATION PER HR: HCPCS

## 2022-08-16 PROCEDURE — 83735 ASSAY OF MAGNESIUM: CPT | Performed by: NURSE PRACTITIONER

## 2022-08-16 RX ADMIN — ASPIRIN 325 MG ORAL TABLET 325 MG: 325 PILL ORAL at 09:08

## 2022-08-16 RX ADMIN — LISINOPRIL 40 MG: 20 TABLET ORAL at 09:08

## 2022-08-16 RX ADMIN — INSULIN DETEMIR 40 UNITS: 100 INJECTION, SOLUTION SUBCUTANEOUS at 09:08

## 2022-08-16 RX ADMIN — HYDRALAZINE HYDROCHLORIDE 100 MG: 25 TABLET, FILM COATED ORAL at 04:08

## 2022-08-16 RX ADMIN — CARVEDILOL 25 MG: 6.25 TABLET, FILM COATED ORAL at 05:08

## 2022-08-16 RX ADMIN — CLONIDINE HYDROCHLORIDE 0.1 MG: 0.1 TABLET ORAL at 04:08

## 2022-08-16 RX ADMIN — BRINZOLAMIDE 1 DROP: 10 SUSPENSION/ DROPS OPHTHALMIC at 09:08

## 2022-08-16 RX ADMIN — HEPARIN SODIUM 7500 UNITS: 5000 INJECTION INTRAVENOUS; SUBCUTANEOUS at 09:08

## 2022-08-16 RX ADMIN — INSULIN ASPART 15 UNITS: 100 INJECTION, SOLUTION INTRAVENOUS; SUBCUTANEOUS at 12:08

## 2022-08-16 RX ADMIN — ATORVASTATIN CALCIUM 20 MG: 10 TABLET, FILM COATED ORAL at 09:08

## 2022-08-16 RX ADMIN — INSULIN ASPART 15 UNITS: 100 INJECTION, SOLUTION INTRAVENOUS; SUBCUTANEOUS at 09:08

## 2022-08-16 RX ADMIN — FUROSEMIDE 60 MG: 40 TABLET ORAL at 09:08

## 2022-08-16 RX ADMIN — HUMAN ALBUMIN MICROSPHERES AND PERFLUTREN 0.11 MG: 10; .22 INJECTION, SOLUTION INTRAVENOUS at 09:08

## 2022-08-16 RX ADMIN — CARVEDILOL 25 MG: 6.25 TABLET, FILM COATED ORAL at 09:08

## 2022-08-16 RX ADMIN — CLONIDINE HYDROCHLORIDE 0.1 MG: 0.1 TABLET ORAL at 09:08

## 2022-08-16 RX ADMIN — HEPARIN SODIUM 7500 UNITS: 5000 INJECTION INTRAVENOUS; SUBCUTANEOUS at 05:08

## 2022-08-16 RX ADMIN — INSULIN ASPART 15 UNITS: 100 INJECTION, SOLUTION INTRAVENOUS; SUBCUTANEOUS at 05:08

## 2022-08-16 RX ADMIN — INSULIN ASPART 4 UNITS: 100 INJECTION, SOLUTION INTRAVENOUS; SUBCUTANEOUS at 06:08

## 2022-08-16 RX ADMIN — HYDRALAZINE HYDROCHLORIDE 100 MG: 25 TABLET, FILM COATED ORAL at 05:08

## 2022-08-16 NOTE — DISCHARGE INSTRUCTIONS
Please change positions slowly  -Please take BP twice daily and record readings to review with PCP

## 2022-08-16 NOTE — CONSULTS
[facility]  Cardiology  Consult Note    Patient Name: Stepan Springer  MRN: [id number]  Admission Date: 8/15/2022  Hospital Length of Stay: 0 days  Code Status: Full Code   Attending Provider: Albin Romo MD   Consulting Provider: Regine Serrato PA-C  Primary Care Physician: KANDICE Fleming MD  Principal Problem:Hypertensive urgency    Patient information was obtained from patient, past medical records and ER records.     Inpatient consult to Cardiology  Consult performed by: Regine Serrato PA-C  Consult ordered by: Alvin Schumacher NP        Subjective:     Chief Complaint: HTN urgency    HPI:   Mr. Springer is a 67 year old male patient whose current medical conditions include CAD s/p prior NSTEMI with PCI of RCA in 2016, chronic LBBB, DM type II, depression, hyperlipidemia, HTN, and lymphedema who was sent to [facility] ED from clinic yesterday due to uncontrolled BP (208/118). Patient denied any associated symptoms-no chest pain, SOB, lightheadedness, dizziness, near syncope, or syncope. Reported he had not yet taken his scheduled medications prior to appointment. Initial workup in ED workup revealed creatinine of 1.9, BP of 208/118, and troponin of 0.042 and patient was subsequently admitted for further evaluation and treatment. Cardiology consulted to assist with management. Patient seen and examined today, resting in bed. Feel well. No CV complaints.  BP improved, home mediations resumed, patient states he is compliant as OP. Troponin flat. Echo showed normal EF. Prior MPI stress test 9/20 negative for ischemia/injury.              Past Medical History:   Diagnosis Date    Anxiety     Bell's palsy     CHF (congestive heart failure)     Chronic kidney disease, stage 3a 11/02/2021    Coronary artery disease involving native coronary artery without angina pectoris 10/18/2016    Depression     Diabetes mellitus     Glaucoma     Hypertension     Idiopathic peripheral neuropathy  12/11/2012    Lymphedema of both lower extremities     Mixed hyperlipidemia 12/11/2012    Morbid obesity with BMI of 45.0-49.9, adult 02/12/2019    Pancytopenia     Sleep apnea     Stage 3b chronic kidney disease     Type 2 diabetes mellitus with diabetic polyneuropathy, with long-term current use of insulin 12/11/2012    Vitamin B 12 deficiency 08/23/2012       Past Surgical History:   Procedure Laterality Date    CARDIAC CATHETERIZATION  7/22/13    non obstructive cad    CATARACT EXTRACTION  OU    COLONOSCOPY N/A 5/1/2019    Procedure: COLONOSCOPY;  Surgeon: Henry Mo III, MD;  Location: [facility];  Service: Endoscopy;  Laterality: N/A;    CORONARY ANGIOPLASTY      ESOPHAGOGASTRODUODENOSCOPY N/A 5/1/2019    Procedure: ESOPHAGOGASTRODUODENOSCOPY (EGD);  Surgeon: Henry Mo III, MD;  Location: [facility];  Service: Endoscopy;  Laterality: N/A;    EYE SURGERY      FRACTURE SURGERY      kidney stone       August 2016    PC IOL OU      RETINAL DETACHMENT REPAIR W/ SCLERAL BUCKLE LE      WRIST SURGERY         Review of patient's allergies indicates:   Allergen Reactions    Cefazolin Other (See Comments)     Shakes, chills, dizziness       No current facility-administered medications on file prior to encounter.     Current Outpatient Medications on File Prior to Encounter   Medication Sig    amLODIPine (NORVASC) 5 MG tablet Take 1 tablet (5 mg total) by mouth every evening.    aspirin 325 MG tablet Take 325 mg by mouth once daily.    atorvastatin (LIPITOR) 20 MG tablet Take 1 tablet (20 mg total) by mouth every evening.    brimonidine 0.1% (ALPHAGAN P) 0.1 % Drop Place 1 drop into both eyes 3 (three) times daily. Order 90 day supply    brinzolamide (AZOPT) 1 % ophthalmic suspension Place 1 drop into both eyes 3 (three) times daily. Brand name only    carvediloL (COREG) 25 MG tablet Take 1 tablet (25 mg total) by mouth 2 (two) times daily with meals.    cloNIDine (CATAPRES) 0.1 MG  "tablet Take 1 tablet (0.1 mg total) by mouth 3 (three) times daily.    empagliflozin (JARDIANCE) 25 mg tablet Take 1 tablet (25 mg total) by mouth once daily.    furosemide (LASIX) 40 MG tablet Take 1.5 tablets (60 mg total) by mouth 2 (two) times a day. (Patient taking differently: Take 40 mg by mouth 3 (three) times daily.)    hydrALAZINE (APRESOLINE) 100 MG tablet Take 1 tablet (100 mg total) by mouth every 8 (eight) hours.    hydroCHLOROthiazide (HYDRODIURIL) 25 MG tablet Take 1 tablet (25 mg total) by mouth once daily.    insulin aspart U-100 (NOVOLOG) 100 unit/mL (3 mL) InPn pen Inject 25 Units into the skin 3 (three) times daily with meals.    insulin glargine U-300 conc (TOUJEO MAX U-300 SOLOSTAR) 300 unit/mL (3 mL) insulin pen Inject 80 Units into the skin once daily.    lisinopriL (PRINIVIL,ZESTRIL) 40 MG tablet Take 1 tablet (40 mg total) by mouth once daily.    miglitoL (GLYSET) 25 MG Tab Take 1 tablet (25 mg total) by mouth 3 (three) times daily with meals.    mupirocin (BACTROBAN) 2 % ointment Apply topically once daily.    netarsudiL (RHOPRESSA) 0.02 % ophthalmic solution Place 1 drop into both eyes once daily.    flash glucose sensor (FREESTYLE CLEMENCIA 14 DAY SENSOR) Kit 1 each by Misc.(Non-Drug; Combo Route) route every 14 (fourteen) days.    nitroGLYCERIN (NITROSTAT) 0.4 MG SL tablet PLACE 1 TABLET (0.4 MG TOTAL) UNDER THE TONGUE EVERY 5 (FIVE) MINUTES AS NEEDED FOR CHEST PAIN.    pen needle, diabetic (BD ULTRA-FINE AMANDA PEN NEEDLE) 32 gauge x 5/32" Ndle 1 each by Misc.(Non-Drug; Combo Route) route 4 (four) times daily with meals and nightly.     Family History       Problem Relation (Age of Onset)    Heart attack Father    Heart disease Father    Hypertension Mother, Maternal Grandmother, Maternal Grandfather    Macular degeneration Father, Paternal Uncle          Tobacco Use    Smoking status: Never Smoker    Smokeless tobacco: Never Used   Substance and Sexual Activity    Alcohol " "use: Yes     Comment: " one to two glasses of wine per year"    Drug use: No    Sexual activity: Not Currently     Birth control/protection: None     Review of Systems   Constitutional: Negative.   HENT: Negative.     Eyes: Negative.    Cardiovascular:  Positive for leg swelling (chronic).   Respiratory: Negative.     Endocrine: Negative.    Hematologic/Lymphatic: Negative.    Skin: Negative.    Musculoskeletal: Negative.    Gastrointestinal: Negative.    Genitourinary: Negative.    Neurological: Negative.    Psychiatric/Behavioral: Negative.     Allergic/Immunologic: Negative.    Objective:     Vital Signs (Most Recent):  Temp: 98.1 °F (36.7 °C) (08/16/22 0543)  Pulse: (!) 59 (08/16/22 0543)  Resp: 19 (08/16/22 0543)  BP: (!) 158/74 (08/16/22 0543)  SpO2: 97 % (08/16/22 0543)   Vital Signs (24h Range):  Temp:  [97.5 °F (36.4 °C)-98.6 °F (37 °C)] 98.1 °F (36.7 °C)  Pulse:  [56-82] 59  Resp:  [13-22] 19  SpO2:  [95 %-100 %] 97 %  BP: (138-240)/() 158/74     Weight: (!) 175 kg (385 lb 12.9 oz)  Body mass index is 43.46 kg/m².    SpO2: 97 %  O2 Device (Oxygen Therapy): room air      Intake/Output Summary (Last 24 hours) at 8/16/2022 0837  Last data filed at 8/16/2022 0500  Gross per 24 hour   Intake 240 ml   Output 4900 ml   Net -4660 ml       Lines/Drains/Airways       Peripheral Intravenous Line  Duration                  Peripheral IV - Single Lumen 08/15/22 1541 20 G Left Antecubital <1 day                    Physical Exam  Vitals and nursing note reviewed.   Constitutional:       General: He is not in acute distress.     Appearance: Normal appearance. He is well-developed. He is not diaphoretic.   HENT:      Head: Normocephalic and atraumatic.   Eyes:      General:         Right eye: No discharge.         Left eye: No discharge.      Pupils: Pupils are equal, round, and reactive to light.   Neck:      Thyroid: No thyromegaly.      Vascular: No JVD.      Trachea: No tracheal deviation.   Cardiovascular:     "  Rate and Rhythm: Normal rate and regular rhythm.      Heart sounds: Normal heart sounds, S1 normal and S2 normal. No murmur heard.  Pulmonary:      Effort: Pulmonary effort is normal. No respiratory distress.      Breath sounds: Normal breath sounds. No wheezing or rales.   Abdominal:      General: There is no distension.      Palpations: Abdomen is soft.      Tenderness: There is no rebound.   Musculoskeletal:      Cervical back: Neck supple.      Right lower leg: Edema present.      Left lower leg: Edema present.      Comments: BLE in wraps   Skin:     General: Skin is warm and dry.      Findings: No erythema.   Neurological:      Mental Status: He is alert and oriented to person, place, and time.   Psychiatric:         Mood and Affect: Mood normal.         Behavior: Behavior normal.         Thought Content: Thought content normal.       Significant Labs: CMP   Recent Labs   Lab 08/15/22  1216 08/16/22  0539    142   K 3.5 3.5    111*   CO2 22* 20*   * 291*   BUN 17 20   CREATININE 1.7* 1.9*   CALCIUM 9.8 9.4   PROT 8.0 6.6   ALBUMIN 4.3 3.6   BILITOT 0.9 0.5   ALKPHOS 97 89   AST 14 16   ALT 22 17   ANIONGAP 12 11   , CBC   Recent Labs   Lab 08/15/22  1216 08/16/22  0539   WBC 4.69 4.63   HGB 17.1 14.8   HCT 49.7 42.6    162   , Troponin   Recent Labs   Lab 08/15/22  1216 08/15/22  1446   TROPONINI 0.042* 0.042*   , and All pertinent lab results from the last 24 hours have been reviewed.    Significant Imaging: Echocardiogram: Transthoracic echo (TTE) complete (Cupid Only):   Results for orders placed or performed during the hospital encounter of 03/04/21   Echo Color Flow Doppler? Yes   Result Value Ref Range    Ascending aorta 3.48 cm    STJ 3.39 cm    IVRT 66.60 msec    IVS 1.36 (A) 0.6 - 1.1 cm    LA size 3.52 cm    Left Atrium Major Axis 5.14 cm    Left Atrium Minor Axis 4.54 cm    LVIDd 6.03 (A) 3.5 - 6.0 cm    LVIDs 4.44 (A) 2.1 - 4.0 cm    LVOT diameter 2.91 cm    LVOT peak VTI  11.75 cm    Posterior Wall 1.68 (A) 0.6 - 1.1 cm    MV Peak A Abhi 0.29 m/s    E wave deceleration time 118.55 msec    MV Peak E Abhi 0.64 m/s    PV Peak D Abhi 0.19 m/s    PV Peak S Abhi 0.31 m/s    RA Major Axis 4.99 cm    RA Width 2.78 cm    Sinus 3.48 cm    TR Max Abhi 2.25 m/s    TDI LATERAL 0.05 m/s    TDI SEPTAL 0.06 m/s    LA WIDTH 3.79 cm    Ao root annulus 3.17 cm    PV PEAK VELOCITY 1.14 cm/s    MV stenosis pressure 1/2 time 34.38 ms    LV Diastolic Volume 182.01 mL    LV Systolic Volume 89.54 mL    LVOT peak abhi 0.73 m/s    RVOT peak VTI 14.39 cm    RVOT peak abhi 0.79 m/s    PV mean gradient 1 mmHg    LV LATERAL E/E' RATIO 12.80 m/s    LV SEPTAL E/E' RATIO 10.67 m/s    FS 26 %    LA volume 54.67 cm3    LV mass 438.97 g    Left Ventricle Relative Wall Thickness 0.56 cm    MV valve area p 1/2 method 6.40 cm2    E/A ratio 2.21     Mean e' 0.06 m/s    Pulm vein S/D ratio 1.63     LVOT area 6.6 cm2    LVOT stroke volume 78.11 cm3    E/E' ratio 11.64 m/s    LV Systolic Volume Index 28.3 mL/m2    LV Diastolic Volume Index 57.60 mL/m2    LA Volume Index 17.3 mL/m2    LV Mass Index 139 g/m2    Triscuspid Valve Regurgitation Peak Gradient 20 mmHg    BSA 3.3 m2    Right Atrial Pressure (from IVC) 3 mmHg    TV rest pulmonary artery pressure 23 mmHg    Narrative    · Concentric hypertrophy and low normal systolic function. The estimated   ejection fraction is 50%  · Normal left ventricular diastolic function.  · There is abnormal septal wall motion consistent with left bundle branch   block.  · With normal right ventricular systolic function.  · Right atrial enlargement.  · Normal central venous pressure (3 mmHg).  · The estimated PA systolic pressure is 23 mmHg.       and EKG: Reviewed    Assessment and Plan:   Patient who presents with HTN urgency, due to not taking her medications prior to appt. BP much improved/stable. Echo with normal EF. Follow-up in clinic.    * Hypertensive urgency  -Due to lack of meds, BP  improved since admission  -Resume home regimen as tolerated  -Monitor BP trend    Elevated troponin  -Troponin flat  -Elevation secondary to demand ischemia from uncontrolled HTN, CKD  -No chest pain  -Echo showed normal EF    Morbid obesity with BMI of 45.0-49.9, adult  -Weight loss    CAD with remote PCI (BMS) of RCA in 9/2016  -Stable, no angina  -Troponin flat  -Continue ASA, amlodipine, Coreg, ACEi  -Check echo    Stage 3b chronic kidney disease  -Mgmt as per primary team    Type 2 diabetes mellitus with diabetic polyneuropathy, with long-term current use of insulin  -Mgmt as per primary team        VTE Risk Mitigation (From admission, onward)         Ordered     heparin (porcine) injection 7,500 Units  Every 8 hours         08/15/22 1851     IP VTE HIGH RISK PATIENT  Once         08/15/22 1840     Place sequential compression device  Until discontinued         08/15/22 1840                Thank you for your consult. I will follow-up with patient. Please contact us if you have any additional questions.    Regine Serrato PA-C  Cardiology   [facility]

## 2022-08-16 NOTE — TELEPHONE ENCOUNTER
----- Message from Luís Millard PA-C sent at 8/16/2022 11:31 AM CDT -----  Cancelled appt for today since pt is inpatient still. Please call / [employer] message and tell him to let us know when he gets out so I can do virtual with him. Tell him I am unable to do visit until he is not admitted per insurance rules    Thank you

## 2022-08-16 NOTE — DISCHARGE SUMMARY
[facility]  [facility] Medicine  Discharge Summary      Patient Name: Stepan Springer  MRN: [id number]  Patient Class: OP- Observation  Admission Date: 8/15/2022  Hospital Length of Stay: 0 days  Discharge Date and Time: 8/16/2022 6:30 PM  Attending Physician: Dr. Albin Romo   Discharging Provider: Ayah Garcia NP  Primary Care Provider: KANDICE Fleming MD      HPI:   Stepan Springer is a 67 y.o. male patient with a PMH of HTN, DM, depression who presented to the ER because he was referred to the ED by Luís Millard PA-C, because his blood pressure was 208/118. The patient reports that he did not take his blood pressure meds today. He denies any associated symptoms. Patient denies fever, chills, CP, cough, cristina, pnd, orthopnea, palpitations, diaphoresis, headache, blurred vision, numbness, tingling, dizziness, localized weakness, n/v/d, abdominal pain, blood in stools, melena, hematemesis, urinary frequency, urgency, dysuria or hematuria. In the ER the patients BP got as high as 240/109. The patient is being placed in observation for treatment of HTN, urgency.         * No surgery found *      Hospital Course:   Patient admitted to Observation for Hypertensive Urgency.  Antihypertensive medications regime resumed with BP improved.  Troponin elevated and flat likely due to demand from uncontrolled HTN. Cardiology following. Echo showed concentric hypertrophy and normal systolic function and EF of 55%. Vital signs stable.  Pt denies any cardiac symptoms or signs of acute distress.  Pt encouraged to maintain compliance with prescribed medications, dietary restrictions, and follow up appointments with understanding verbalized. Pt seen and examined and deemed stable for discharge to home.  Medications reconciled. Pt instructed to follow up with PCP upon discharge for further evaluation.          Goals of Care Treatment Preferences:  Code Status: Full Code      Consults:   Consults (From admission,  onward)        Status Ordering Provider     Inpatient consult to Cardiology  Once        Provider:  Breann Baker MD    Completed LILIANA RODRIGUEZ          Final Active Diagnoses:    Diagnosis Date Noted POA    PRINCIPAL PROBLEM:  Hypertensive urgency [I16.0] 08/15/2022 Yes    Elevated troponin [R77.8] 02/07/2020 Yes    Morbid obesity with BMI of 45.0-49.9, adult [E66.01, Z68.42] 02/12/2019 Not Applicable     Chronic    CAD with remote PCI (BMS) of RCA in 9/2016 [I25.10] 10/18/2016 Yes     Chronic    Stage 3b chronic kidney disease [N18.32]  Yes     Chronic    Anxiety [F41.9]  Yes    Type 2 diabetes mellitus with diabetic polyneuropathy, with long-term current use of insulin [E11.42, Z79.4] 12/11/2012 Not Applicable     Chronic      Problems Resolved During this Admission:       Discharged Condition: stable    Disposition: Home or Self Care    Follow Up:   Follow-up Information     KANDICE Fleming MD Follow up in 3 day(s).    Specialty: Family Medicine  Why: -hospital follow up  Contact information:  [address]  [place] [address]  [phone]                       Patient Instructions:      Diet Cardiac     Diet diabetic     Notify your health care provider if you experience any of the following:  temperature >100.4     Notify your health care provider if you experience any of the following:  persistent nausea and vomiting or diarrhea     Notify your health care provider if you experience any of the following:  increased confusion or weakness     Notify your health care provider if you experience any of the following:  persistent dizziness, light-headedness, or visual disturbances     Notify your health care provider if you experience any of the following:  difficulty breathing or increased cough     Notify your health care provider if you experience any of the following:  severe uncontrolled pain     Activity as tolerated       Significant Diagnostic Studies: Labs: All labs within the  past 24 hours have been reviewed    Pending Diagnostic Studies:     None         Medications:  Reconciled Home Medications:      Medication List      CHANGE how you take these medications    furosemide 40 MG tablet  Commonly known as: LASIX  Take 1.5 tablets (60 mg total) by mouth 2 (two) times a day.  What changed:   · how much to take  · when to take this        CONTINUE taking these medications    amLODIPine 5 MG tablet  Commonly known as: NORVASC  Take 1 tablet (5 mg total) by mouth every evening.     aspirin 325 MG tablet  Take 325 mg by mouth once daily.     atorvastatin 20 MG tablet  Commonly known as: LIPITOR  Take 1 tablet (20 mg total) by mouth every evening.     brimonidine 0.1% 0.1 % Drop  Commonly known as: ALPHAGAN P  Place 1 drop into both eyes 3 (three) times daily. Order 90 day supply     brinzolamide 1 % ophthalmic suspension  Commonly known as: AZOPT  Place 1 drop into both eyes 3 (three) times daily. Brand name only     carvediloL 25 MG tablet  Commonly known as: COREG  Take 1 tablet (25 mg total) by mouth 2 (two) times daily with meals.     cloNIDine 0.1 MG tablet  Commonly known as: CATAPRES  Take 1 tablet (0.1 mg total) by mouth 3 (three) times daily.     FREESTYLE CLEMENCIA 14 DAY SENSOR Kit  Generic drug: flash glucose sensor  1 each by Misc.(Non-Drug; Combo Route) route every 14 (fourteen) days.     hydrALAZINE 100 MG tablet  Commonly known as: APRESOLINE  Take 1 tablet (100 mg total) by mouth every 8 (eight) hours.     insulin aspart U-100 100 unit/mL (3 mL) Inpn pen  Commonly known as: NovoLOG  Inject 25 Units into the skin 3 (three) times daily with meals.     JARDIANCE 25 mg tablet  Generic drug: empagliflozin  Take 1 tablet (25 mg total) by mouth once daily.     lisinopriL 40 MG tablet  Commonly known as: PRINIVIL,ZESTRIL  Take 1 tablet (40 mg total) by mouth once daily.     mupirocin 2 % ointment  Commonly known as: BACTROBAN  Apply topically once daily.     nitroGLYCERIN 0.4 MG SL  "tablet  Commonly known as: NITROSTAT  PLACE 1 TABLET (0.4 MG TOTAL) UNDER THE TONGUE EVERY 5 (FIVE) MINUTES AS NEEDED FOR CHEST PAIN.     pen needle, diabetic 32 gauge x 5/32" Ndle  Commonly known as: BD ULTRA-FINE MAANDA PEN NEEDLE  1 each by Misc.(Non-Drug; Combo Route) route 4 (four) times daily with meals and nightly.     RHOPRESSA 0.02 % ophthalmic solution  Generic drug: netarsudiL  Place 1 drop into both eyes once daily.     TOUJEO MAX U-300 SOLOSTAR 300 unit/mL (3 mL) insulin pen  Generic drug: insulin glargine U-300 conc  Inject 80 Units into the skin once daily.        STOP taking these medications    hydroCHLOROthiazide 25 MG tablet  Commonly known as: HYDRODIURIL     miglitoL 25 MG Tab  Commonly known as: GLYSET            Indwelling Lines/Drains at time of discharge:   Lines/Drains/Airways     None                 Time spent on the discharge of patient: > 34 minutes         Ayah Garcia NP  Department of Hospital Medicine  [facility]  "

## 2022-08-16 NOTE — HPI
Mr. Springer is a 67 year old male patient whose current medical conditions include CAD s/p prior NSTEMI with PCI of RCA in 2016, chronic LBBB, DM type II, depression, hyperlipidemia, HTN, and lymphedema who was sent to [facility] ED from clinic yesterday due to uncontrolled BP (208/118). Patient denied any associated symptoms-no chest pain, SOB, lightheadedness, dizziness, near syncope, or syncope. Reported he had not yet taken his scheduled medications prior to appointment. Initial workup in ED workup revealed creatinine of 1.9, BP of 208/118, and troponin of 0.042 and patient was subsequently admitted for further evaluation and treatment. Cardiology consulted to assist with management. Patient seen and examined today, resting in bed. Feel well. No CV complaints.  BP improved, home mediations resumed, patient states he is compliant as OP. Troponin flat. Echo showed normal EF. Prior MPI stress test 9/20 negative for ischemia/injury.

## 2022-08-16 NOTE — ASSESSMENT & PLAN NOTE
-Due to lack of meds, BP improved since admission  -Resume home regimen as tolerated  -Monitor BP trend

## 2022-08-16 NOTE — PLAN OF CARE
[facility] - Med Surg 3  Initial Discharge Assessment       Primary Care Provider: KANDICE Fleming MD    Admission Diagnosis: Hypertension [I10]  Hypertensive urgency [I16.0]  Chest pain [R07.9]    Admission Date: 8/15/2022  Expected Discharge Date: 8/16/2022    Discharge Barriers Identified: None    Payor: MEDICARE / Plan: MEDICARE PART A & B / Product Type: Government /     Extended Emergency Contact Information  Primary Emergency Contact: TRISTAN Mantilla  Mobile Phone: [phone]  Relation: Friend  Secondary Emergency Contact: Geena Mantilla   [facility]  Home Phone: [phone]  Mobile Phone: [phone]  Relation: Friend    Discharge Plan A: Home  Discharge Plan B: Home      [facility] [address] AT Portal to Registered [facility] Sites  [address]  [facility] [address]  Phone: [phone] Fax: [phone]    [facility] [address] [place], [place] - [address]  [address] [facility] [address]  Phone: [phone] Fax: [phone]    [facility]  [id number] [place] [address]  Phone: [phone] Fax: [phone]      Initial Assessment (most recent)     Adult Discharge Assessment - 08/16/22 1029        Discharge Assessment    Assessment Type Discharge Planning Assessment     Confirmed/corrected address, phone number and insurance Yes     Confirmed Demographics Correct on Facesheet     Source of Information patient     Communicated JOSE with patient/caregiver Yes     Lives With alone     Do you expect to return to your current living situation? Yes     Do you have help at home or someone to help you manage your care at home? No     Prior to hospitilization cognitive status: Alert/Oriented     Current cognitive status: Alert/Oriented     Walking or Climbing Stairs Difficulty ambulation difficulty, requires equipment     Mobility Management cane/RW PRN     Dressing/Bathing Difficulty none     Home Accessibility  stairs to enter home     Number of Stairs, Main Entrance three     Surface of Stairs, Main Entrance concrete     Stair Railings, Main Entrance railings safe and in good condition     Landing, Stairs, Main Entrance inadequate turning radius     Home Layout Able to live on 1st floor     Equipment Currently Used at Home cane, straight;walker, rolling     Readmission within 30 days? Yes     Patient currently being followed by outpatient case management? No     Do you currently have service(s) that help you manage your care at home? No     Do you take prescription medications? Yes     Do you have prescription coverage? Yes     Do you have any problems affording any of your prescribed medications? No     Is the patient taking medications as prescribed? yes     How do you get to doctors appointments? other (see comments)   [facility]    Are you on dialysis? No     Do you take coumadin? No     Discharge Plan A Home     Discharge Plan B Home     DME Needed Upon Discharge  none     Discharge Plan discussed with: Patient     Discharge Barriers Identified None               Met with pt at bedside for DC assessment. Pt lives at home alone and uses a cane and RW PRN. Pt is currently engaged in OP PT at the [facility] [address]. No CM DC needs identified at this time. SWer provided a transitional care folder, information on advanced directives, information on pharmacy bedside delivery, and discharge planning begins on admission with contact information for any needs/questions.    Wiley Argueta LMSW 8/16/2022 10:36 AM

## 2022-08-16 NOTE — ASSESSMENT & PLAN NOTE
-Troponin flat  -Elevation secondary to demand ischemia from uncontrolled HTN, CKD  -No chest pain  -Echo showed normal EF

## 2022-08-16 NOTE — SUBJECTIVE & OBJECTIVE
Past Medical History:   Diagnosis Date    Anxiety     Bell's palsy     CHF (congestive heart failure)     Chronic kidney disease, stage 3a 11/02/2021    Coronary artery disease involving native coronary artery without angina pectoris 10/18/2016    Depression     Diabetes mellitus     Glaucoma     Hypertension     Idiopathic peripheral neuropathy 12/11/2012    Lymphedema of both lower extremities     Mixed hyperlipidemia 12/11/2012    Morbid obesity with BMI of 45.0-49.9, adult 02/12/2019    Pancytopenia     Sleep apnea     Stage 3b chronic kidney disease     Type 2 diabetes mellitus with diabetic polyneuropathy, with long-term current use of insulin 12/11/2012    Vitamin B 12 deficiency 08/23/2012       Past Surgical History:   Procedure Laterality Date    CARDIAC CATHETERIZATION  7/22/13    non obstructive cad    CATARACT EXTRACTION  OU    COLONOSCOPY N/A 5/1/2019    Procedure: COLONOSCOPY;  Surgeon: Henry Mo III, MD;  Location: [facility];  Service: Endoscopy;  Laterality: N/A;    CORONARY ANGIOPLASTY      ESOPHAGOGASTRODUODENOSCOPY N/A 5/1/2019    Procedure: ESOPHAGOGASTRODUODENOSCOPY (EGD);  Surgeon: Henry Mo III, MD;  Location: [facility];  Service: Endoscopy;  Laterality: N/A;    EYE SURGERY      FRACTURE SURGERY      kidney stone       August 2016    PC IOL OU      RETINAL DETACHMENT REPAIR W/ SCLERAL BUCKLE LE      WRIST SURGERY         Review of patient's allergies indicates:   Allergen Reactions    Cefazolin Other (See Comments)     Shakes, chills, dizziness       No current facility-administered medications on file prior to encounter.     Current Outpatient Medications on File Prior to Encounter   Medication Sig    amLODIPine (NORVASC) 5 MG tablet Take 1 tablet (5 mg total) by mouth every evening.    aspirin 325 MG tablet Take 325 mg by mouth once daily.    atorvastatin (LIPITOR) 20 MG tablet Take 1 tablet (20 mg total) by mouth every evening.    brimonidine 0.1% (ALPHAGAN P) 0.1 % Drop  "Place 1 drop into both eyes 3 (three) times daily. Order 90 day supply    brinzolamide (AZOPT) 1 % ophthalmic suspension Place 1 drop into both eyes 3 (three) times daily. Brand name only    carvediloL (COREG) 25 MG tablet Take 1 tablet (25 mg total) by mouth 2 (two) times daily with meals.    cloNIDine (CATAPRES) 0.1 MG tablet Take 1 tablet (0.1 mg total) by mouth 3 (three) times daily.    empagliflozin (JARDIANCE) 25 mg tablet Take 1 tablet (25 mg total) by mouth once daily.    furosemide (LASIX) 40 MG tablet Take 1.5 tablets (60 mg total) by mouth 2 (two) times a day. (Patient taking differently: Take 40 mg by mouth 3 (three) times daily.)    hydrALAZINE (APRESOLINE) 100 MG tablet Take 1 tablet (100 mg total) by mouth every 8 (eight) hours.    hydroCHLOROthiazide (HYDRODIURIL) 25 MG tablet Take 1 tablet (25 mg total) by mouth once daily.    insulin aspart U-100 (NOVOLOG) 100 unit/mL (3 mL) InPn pen Inject 25 Units into the skin 3 (three) times daily with meals.    insulin glargine U-300 conc (TOUJEO MAX U-300 SOLOSTAR) 300 unit/mL (3 mL) insulin pen Inject 80 Units into the skin once daily.    lisinopriL (PRINIVIL,ZESTRIL) 40 MG tablet Take 1 tablet (40 mg total) by mouth once daily.    miglitoL (GLYSET) 25 MG Tab Take 1 tablet (25 mg total) by mouth 3 (three) times daily with meals.    mupirocin (BACTROBAN) 2 % ointment Apply topically once daily.    netarsudiL (RHOPRESSA) 0.02 % ophthalmic solution Place 1 drop into both eyes once daily.    flash glucose sensor (FREESTYLE CLEMENCIA 14 DAY SENSOR) Kit 1 each by Misc.(Non-Drug; Combo Route) route every 14 (fourteen) days.    nitroGLYCERIN (NITROSTAT) 0.4 MG SL tablet PLACE 1 TABLET (0.4 MG TOTAL) UNDER THE TONGUE EVERY 5 (FIVE) MINUTES AS NEEDED FOR CHEST PAIN.    pen needle, diabetic (BD ULTRA-FINE AMANDA PEN NEEDLE) 32 gauge x 5/32" Ndle 1 each by Misc.(Non-Drug; Combo Route) route 4 (four) times daily with meals and nightly.     Family History       Problem Relation " "(Age of Onset)    Heart attack Father    Heart disease Father    Hypertension Mother, Maternal Grandmother, Maternal Grandfather    Macular degeneration Father, Paternal Uncle          Tobacco Use    Smoking status: Never Smoker    Smokeless tobacco: Never Used   Substance and Sexual Activity    Alcohol use: Yes     Comment: " one to two glasses of wine per year"    Drug use: No    Sexual activity: Not Currently     Birth control/protection: None     Review of Systems   Constitutional: Negative.   HENT: Negative.     Eyes: Negative.    Cardiovascular:  Positive for leg swelling (chronic).   Respiratory: Negative.     Endocrine: Negative.    Hematologic/Lymphatic: Negative.    Skin: Negative.    Musculoskeletal: Negative.    Gastrointestinal: Negative.    Genitourinary: Negative.    Neurological: Negative.    Psychiatric/Behavioral: Negative.     Allergic/Immunologic: Negative.    Objective:     Vital Signs (Most Recent):  Temp: 98.1 °F (36.7 °C) (08/16/22 0543)  Pulse: (!) 59 (08/16/22 0543)  Resp: 19 (08/16/22 0543)  BP: (!) 158/74 (08/16/22 0543)  SpO2: 97 % (08/16/22 0543)   Vital Signs (24h Range):  Temp:  [97.5 °F (36.4 °C)-98.6 °F (37 °C)] 98.1 °F (36.7 °C)  Pulse:  [56-82] 59  Resp:  [13-22] 19  SpO2:  [95 %-100 %] 97 %  BP: (138-240)/() 158/74     Weight: (!) 175 kg (385 lb 12.9 oz)  Body mass index is 43.46 kg/m².    SpO2: 97 %  O2 Device (Oxygen Therapy): room air      Intake/Output Summary (Last 24 hours) at 8/16/2022 0837  Last data filed at 8/16/2022 0500  Gross per 24 hour   Intake 240 ml   Output 4900 ml   Net -4660 ml       Lines/Drains/Airways       Peripheral Intravenous Line  Duration                  Peripheral IV - Single Lumen 08/15/22 1541 20 G Left Antecubital <1 day                    Physical Exam  Vitals and nursing note reviewed.   Constitutional:       General: He is not in acute distress.     Appearance: Normal appearance. He is well-developed. He is not diaphoretic.   HENT:      " Head: Normocephalic and atraumatic.   Eyes:      General:         Right eye: No discharge.         Left eye: No discharge.      Pupils: Pupils are equal, round, and reactive to light.   Neck:      Thyroid: No thyromegaly.      Vascular: No JVD.      Trachea: No tracheal deviation.   Cardiovascular:      Rate and Rhythm: Normal rate and regular rhythm.      Heart sounds: Normal heart sounds, S1 normal and S2 normal. No murmur heard.  Pulmonary:      Effort: Pulmonary effort is normal. No respiratory distress.      Breath sounds: Normal breath sounds. No wheezing or rales.   Abdominal:      General: There is no distension.      Palpations: Abdomen is soft.      Tenderness: There is no rebound.   Musculoskeletal:      Cervical back: Neck supple.      Right lower leg: Edema present.      Left lower leg: Edema present.      Comments: BLE in wraps   Skin:     General: Skin is warm and dry.      Findings: No erythema.   Neurological:      Mental Status: He is alert and oriented to person, place, and time.   Psychiatric:         Mood and Affect: Mood normal.         Behavior: Behavior normal.         Thought Content: Thought content normal.       Significant Labs: CMP   Recent Labs   Lab 08/15/22  1216 08/16/22  0539    142   K 3.5 3.5    111*   CO2 22* 20*   * 291*   BUN 17 20   CREATININE 1.7* 1.9*   CALCIUM 9.8 9.4   PROT 8.0 6.6   ALBUMIN 4.3 3.6   BILITOT 0.9 0.5   ALKPHOS 97 89   AST 14 16   ALT 22 17   ANIONGAP 12 11   , CBC   Recent Labs   Lab 08/15/22  1216 08/16/22  0539   WBC 4.69 4.63   HGB 17.1 14.8   HCT 49.7 42.6    162   , Troponin   Recent Labs   Lab 08/15/22  1216 08/15/22  1446   TROPONINI 0.042* 0.042*   , and All pertinent lab results from the last 24 hours have been reviewed.    Significant Imaging: Echocardiogram: Transthoracic echo (TTE) complete (Cupid Only):   Results for orders placed or performed during the hospital encounter of 03/04/21   Echo Color Flow Doppler? Yes    Result Value Ref Range    Ascending aorta 3.48 cm    STJ 3.39 cm    IVRT 66.60 msec    IVS 1.36 (A) 0.6 - 1.1 cm    LA size 3.52 cm    Left Atrium Major Axis 5.14 cm    Left Atrium Minor Axis 4.54 cm    LVIDd 6.03 (A) 3.5 - 6.0 cm    LVIDs 4.44 (A) 2.1 - 4.0 cm    LVOT diameter 2.91 cm    LVOT peak VTI 11.75 cm    Posterior Wall 1.68 (A) 0.6 - 1.1 cm    MV Peak A Abhi 0.29 m/s    E wave deceleration time 118.55 msec    MV Peak E Abhi 0.64 m/s    PV Peak D Abhi 0.19 m/s    PV Peak S Abhi 0.31 m/s    RA Major Axis 4.99 cm    RA Width 2.78 cm    Sinus 3.48 cm    TR Max Abhi 2.25 m/s    TDI LATERAL 0.05 m/s    TDI SEPTAL 0.06 m/s    LA WIDTH 3.79 cm    Ao root annulus 3.17 cm    PV PEAK VELOCITY 1.14 cm/s    MV stenosis pressure 1/2 time 34.38 ms    LV Diastolic Volume 182.01 mL    LV Systolic Volume 89.54 mL    LVOT peak abhi 0.73 m/s    RVOT peak VTI 14.39 cm    RVOT peak abhi 0.79 m/s    PV mean gradient 1 mmHg    LV LATERAL E/E' RATIO 12.80 m/s    LV SEPTAL E/E' RATIO 10.67 m/s    FS 26 %    LA volume 54.67 cm3    LV mass 438.97 g    Left Ventricle Relative Wall Thickness 0.56 cm    MV valve area p 1/2 method 6.40 cm2    E/A ratio 2.21     Mean e' 0.06 m/s    Pulm vein S/D ratio 1.63     LVOT area 6.6 cm2    LVOT stroke volume 78.11 cm3    E/E' ratio 11.64 m/s    LV Systolic Volume Index 28.3 mL/m2    LV Diastolic Volume Index 57.60 mL/m2    LA Volume Index 17.3 mL/m2    LV Mass Index 139 g/m2    Triscuspid Valve Regurgitation Peak Gradient 20 mmHg    BSA 3.3 m2    Right Atrial Pressure (from IVC) 3 mmHg    TV rest pulmonary artery pressure 23 mmHg    Narrative    · Concentric hypertrophy and low normal systolic function. The estimated   ejection fraction is 50%  · Normal left ventricular diastolic function.  · There is abnormal septal wall motion consistent with left bundle branch   block.  · With normal right ventricular systolic function.  · Right atrial enlargement.  · Normal central venous pressure (3 mmHg).  · The  estimated PA systolic pressure is 23 mmHg.       and EKG: Reviewed

## 2022-08-16 NOTE — PLAN OF CARE
Problem: Adult Inpatient Plan of Care  Goal: Plan of Care Review  Outcome: Ongoing, Progressing  Goal: Patient-Specific Goal (Individualized)  Outcome: Ongoing, Progressing  Goal: Optimal Comfort and Wellbeing  Outcome: Ongoing, Progressing     Problem: Bariatric Environmental Safety  Goal: Safety Maintained with Care  Outcome: Ongoing, Progressing     Problem: Diabetes Comorbidity  Goal: Blood Glucose Level Within Targeted Range  Outcome: Ongoing, Progressing       Pt BP improved pt with no complaints, echo completed, cardiology seen patient.   Pt discharged pending facilitated transport. IV d/c'd. Discharge instructions discusses.

## 2022-08-16 NOTE — PHARMACY MED REC
"Admission Medication History     The home medication history was taken by Scooby Del Cid.    You may go to "Admission" then "Reconcile Home Medications" tabs to review and/or act upon these items.      The home medication list has been updated by the Pharmacy department.    Please read ALL comments highlighted in yellow.    Please address this information as you see fit.     Feel free to contact us if you have any questions or require assistance.      The medications listed below were removed from the home medication list. Please reorder if appropriate:  Patient reports no longer taking the following medication(s):   DULOXETINE 30 MG CAPSULE   LATANOPROST 0.005 % OPHTHALMIC SOLUTION   POLYETHYLENE GLYCOL 3350    Medications listed below were obtained from: Patient/family, Analytic software- [employer] and Patient's pharmacy  (Not in a hospital admission)      Potential issues to be addressed PRIOR TO DISCHARGE: NONE    Scooby Del Cid CPhT  Pharmacy Technician Specialist-Medication History  [facility] [phone]  Secure chat preferred     Current Outpatient Medications on File Prior to Encounter   Medication Sig Dispense Refill Last Dose    amLODIPine (NORVASC) 5 MG tablet Take 1 tablet (5 mg total) by mouth every evening. 90 tablet 1 8/14/2022 at Unknown time    aspirin 325 MG tablet Take 325 mg by mouth once daily.   8/14/2022 at Unknown time    atorvastatin (LIPITOR) 20 MG tablet Take 1 tablet (20 mg total) by mouth every evening. 90 tablet 3 8/14/2022 at Unknown time    brimonidine 0.1% (ALPHAGAN P) 0.1 % Drop Place 1 drop into both eyes 3 (three) times daily. Order 90 day supply 15 mL 4 8/14/2022 at Unknown time    brinzolamide (AZOPT) 1 % ophthalmic suspension Place 1 drop into both eyes 3 (three) times daily. Brand name only 15 mL 6 8/14/2022 at Unknown time    carvediloL (COREG) 25 MG tablet Take 1 tablet (25 mg total) by mouth 2 (two) times daily with meals. 180 tablet 1 8/14/2022 at Unknown time "    cloNIDine (CATAPRES) 0.1 MG tablet Take 1 tablet (0.1 mg total) by mouth 3 (three) times daily. 270 tablet 1 8/14/2022 at Unknown time    empagliflozin (JARDIANCE) 25 mg tablet Take 1 tablet (25 mg total) by mouth once daily. 90 tablet 3 8/14/2022 at Unknown time    furosemide (LASIX) 40 MG tablet Take 1.5 tablets (60 mg total) by mouth 2 (two) times a day. (Patient taking differently: Take 40 mg by mouth 3 (three) times daily.) 270 tablet 1 8/14/2022 at Unknown time    hydrALAZINE (APRESOLINE) 100 MG tablet Take 1 tablet (100 mg total) by mouth every 8 (eight) hours. 270 tablet 1 8/14/2022 at Unknown time    hydroCHLOROthiazide (HYDRODIURIL) 25 MG tablet Take 1 tablet (25 mg total) by mouth once daily. 90 tablet 1 8/14/2022 at Unknown time    insulin aspart U-100 (NOVOLOG) 100 unit/mL (3 mL) InPn pen Inject 25 Units into the skin 3 (three) times daily with meals. 69 mL 3 8/14/2022 at Unknown time    insulin glargine U-300 conc (TOUJEO MAX U-300 SOLOSTAR) 300 unit/mL (3 mL) insulin pen Inject 80 Units into the skin once daily. 8 pen 3 8/14/2022 at Unknown time    lisinopriL (PRINIVIL,ZESTRIL) 40 MG tablet Take 1 tablet (40 mg total) by mouth once daily. 90 tablet 1 8/14/2022 at Unknown time    miglitoL (GLYSET) 25 MG Tab Take 1 tablet (25 mg total) by mouth 3 (three) times daily with meals. 270 tablet 3 8/14/2022 at Unknown time    mupirocin (BACTROBAN) 2 % ointment Apply topically once daily. 30 g 0 Past Week at Unknown time    netarsudiL (RHOPRESSA) 0.02 % ophthalmic solution Place 1 drop into both eyes once daily. 2.5 mL 6 8/14/2022 at Unknown time    flash glucose sensor (FREESTYLE CLEMENCIA 14 DAY SENSOR) Kit 1 each by Misc.(Non-Drug; Combo Route) route every 14 (fourteen) days. 6 kit 3 Unknown at Unknown time    nitroGLYCERIN (NITROSTAT) 0.4 MG SL tablet PLACE 1 TABLET (0.4 MG TOTAL) UNDER THE TONGUE EVERY 5 (FIVE) MINUTES AS NEEDED FOR CHEST PAIN. 20 tablet 0 Unknown at Unknown time    pen  "needle, diabetic (BD ULTRA-FINE AMANDA PEN NEEDLE) 32 gauge x 5/32" Ndle 1 each by Misc.(Non-Drug; Combo Route) route 4 (four) times daily with meals and nightly. 300 each 3 Unknown at Unknown time    [DISCONTINUED] BLOOD PRESSURE CUFF Misc 1 cuff 1 each 0     [DISCONTINUED] blood sugar diagnostic Strp To check BG 3 times daily, to use with insurance preferred meter 100 strip 3     [DISCONTINUED] DULoxetine (CYMBALTA) 30 MG capsule Take 1 capsule (30 mg total) by mouth once daily. 90 capsule 1     [DISCONTINUED] lancets [facility] To check BG 3 times daily, to use with insurance preferred meter 100 each 3     [DISCONTINUED] latanoprost (XALATAN) 0.005 % ophthalmic solution Place 1 drop into both eyes once daily. 6 mL 3     [DISCONTINUED] polyethylene glycol 3350 (DULCOLAX BALANCE ORAL) Take by mouth.       [DISCONTINUED] UNABLE TO FIND medication name: EXIPURE take one capsule daily                                .          "

## 2022-08-17 PROCEDURE — G0378 HOSPITAL OBSERVATION PER HR: HCPCS

## 2022-08-17 NOTE — NURSING
Pt discharged home in stable condition, VSS, afebrile, denies pain. Pt belongings sent home with pt. Pt discharge reviewed, pt verbalized understanding. Discharge instructions discussed.  IV and tele monitor d/c'd.

## 2022-08-19 NOTE — HOSPITAL COURSE
Patient admitted to Observation for Hypertensive Urgency.  Antihypertensive medications resumed with BP improved.  Troponin elevated likely due to uncontrolled HTN. Echo showed concentric hypertrophy and normal systolic function and EF of 55%. Vital signs stable.  Pt denies any cardiac symptoms or signs of acute distress.  Pt encouraged to maintain compliance with prescribed medications, dietary restrictions, and follow up appointments with understanding verbalized. Pt seen and examined and deemed stable for discharge to home.  Medications reconciled. Pt instructed to follow up with PCP upon discharge for further evaluation.

## 2022-08-23 NOTE — PROGRESS NOTES
Results to be addressed at upcoming appointment(s) already scheduled.  Future Appointments  8/30/2022  10:00 AM   FIELDS, VISUAL-ONE         [facility] OPHTHAL        [facility]  8/30/2022  10:30 AM   Alvin Hale MD      [facility] OPHTHAL        [facility]  8/30/2022  11:30 AM   Lemuel Kelly MD       [facility] DERM           [facility]  10/6/2022  10:30 AM   CRISTIAN Fleming MD      [facility] IM             [facility]  11/17/2022 8:15 AM    Vangie Cuevas DPM       [facility] POD            [facility]  1/12/2023  9:00 AM    Ramiro Summers MD              [facility] CARDIO         [facility]

## 2022-08-24 ENCOUNTER — CLINICAL SUPPORT (OUTPATIENT)
Dept: REHABILITATION | Facility: HOSPITAL | Age: 67
End: 2022-08-24
Payer: MEDICARE

## 2022-08-24 DIAGNOSIS — I89.0 LYMPHEDEMA: Primary | ICD-10-CM

## 2022-08-24 PROCEDURE — 97535 SELF CARE MNGMENT TRAINING: CPT

## 2022-08-24 NOTE — PLAN OF CARE
Outpatient Therapy Updated Plan of Care     Visit Date: 8/24/2022  Name: Stepan Springer  [facility] Number: [id number]    Therapy Diagnosis:   Encounter Diagnosis   Name Primary?    Lymphedema Yes     Physician: Vanige Cuevas DPM    Physician Orders: PT Eval and Treat   Medical Diagnosis from Referral: lymphedema  Evaluation Date: 6/1/2022    Total Visits Received: 4  Cancelled Visits: 0  No Show Visits: 0    Current Certification Period:  8/24/2022 to 11/2/2022  Precautions:  Visually impaired  Visits from Evaluation Date:  3  Functional Level Prior to Evaluation:      Subjective     Update: since starting lymphedema therapy, he is able to get on and off the floor, cross his legs, walk farther    Objective     Update:     STAGE II secondary Lymphedema (phlebolymphedema)  Amount of Swelling: moderate left, mild right  Location of Swelling: bilateral lower extremities    Skin Integrity: rubor with dependency; no skin lesions; no lobule present)  Palpation/Texture: soft, non-pitting, good skin elasticity right leg and proximal left leg  Circulation: impaired venous flow (hx)  Posture: fair  Flexibility: good motions of both hips        Girth Measurements (in centimeters) SEE PHOTOS IN MEDIA SECTION 8/24/2022  LANDMARK LEFT LE  6/1/2022 RIGHT LE  6/1/2022 DIFF   at eval   20 cm below SBP 66 cm 61.5 cm 4.5 cm   30 cm below SBP 70 cm 63 cm 7 cm   40 cm below SBP 68 cm 52 cm 16 cm   Ankle 44.5 cm 41 cm 2.5 cm   Forefoot 32.5 cm 31.5 cm 1 cm      LANDMARK LEFT LE  8/24/2022 RIGHT LE  8/24/2022    20 cm below SBP 51.5 cm 53 cm    30 cm below SBP 51.5 cm 50.5 cm    40 cm below SBP 44 cm 40.5 cm    Ankle 38 cm 36 cm    Forefoot 30 cm 28.5 cm        Assessment   Stepan is progressing well with physical therapy, with no complaints of pain or discomfort and significant improvements noted in size of legs and elasticity of skin of both legs since initial evaluation; please see exam for details. Stepan continues to have  difficulty with swelling in both legs, due to impaired veins and lymphatics. He will benefit from a compression pump at home which he is hoping to receive soon.   The above impairments and functional deficits will continue to be addressed over the course of this plan of care. Stepan was educated on continued progression of PT, expectations for the duration of care, updates on goals set at initial evaluation, and home exercise program.  Stepan will continue to benefit from physical therapy in order to further address goals, maximize function and quality of life.     Short Term Goals: (5 weeks)  1. Patient will show decreased girth in bilateral lower extremities  by up to 8 cm to allow for LE symmetry, shoe and clothing choice, and ability to apply needed compression.  (GOAL MET)  2. Patient will demonstrate 100% knowledge of lymphedema precautions and signs of infection to allow for reduced lymphedema risk, infection risk, and/or exacerbation of condition.  (GOAL MET)  3. Patient or caregiver will perform self-bandaging techniques and/or wearing of compression garments to allow for lymphatic drainage support, skin elasticity, and reduction in shape and size of limb. (GOAL MET)  4. Patient will perform self lymph drainage techniques to areas within reach to enhance lymphatic drainage and skin condition.  (progressing, not met)  5. Patient will tolerate daily activities with multilayered bandaging to allow for lymphatic and venous support.  (GOAL MET)     Long Term Goals: (10  weeks)  1. Patient will show decreased girth in bilateral lower extremities  by up to 10-15 cm  to allow for LE symmetry, shoe and clothing choice, and ability to apply needed compression daily.  (progressing, not met)  2. Patient will show reduction in density to mild or less with improved contour of limb to allow for cosmesis, LE symmetry, infection risk reduction, and clothing and compression choice.   (GOAL MET)  3. Patient to william/doff  compression garment with daily compliance to assist in lymphedema management, skin elasticity, and tissue density.  (GOAL MET)  4. Pt to show improved postural awareness and alignment.  (GOAL MET)  5. Pt to be I and compliant with HEP to allow for increased function in affected limb.   (GOAL MET)      Long Term Goal Status:   continue per initial plan of care.  Reasons for Recertification of Therapy:   Pt will continue to benefit from skilled outpatient physical therapy to address the deficits listed in the problem list box on initial evaluation, provide pt/family education and to maximize pt's level of independence in the home and community environment.     Plan     Update Certification Period: 8/24/2022 to 11/2/2022  Recommended Treatment Plan: 1 time every 3 weeks for 10 weeks: Manual Therapy, Patient Education, Self Care, Therapeutic Exercise, vaso-pneumatic compression and Therapeutic Activites    Mckenna Omer, PT, SYLVIA-GRIFFIN      I CERTIFY THE NEED FOR THESE SERVICES FURNISHED UNDER THIS PLAN OF TREATMENT AND WHILE UNDER MY CARE    Physician's comments:        Physician's Signature: ___________________________________________________

## 2022-08-24 NOTE — PROGRESS NOTES
Physical Therapy Treatment Note     Name: Stepan BONNER [facility] Number: [id number]    Therapy Diagnosis:   Encounter Diagnosis   Name Primary?    Lymphedema Yes     Physician: Vangie Cuevas DPM    Visit Date: 8/24/2022    Physician Orders: PT Eval and Treat   Medical Diagnosis from Referral: lymphedema  Evaluation Date: 6/1/2022  Authorization Period Expiration: 12/31/2022  Plan of Care Expiration: 11/2/2022  Visit # / Visits authorized: 3/ 20    Time In: 1000 am  Time Out: 1055 am  Total Billable Time: 55 minutes    Precautions: Standard and visual deficit    Subjective     Pt reports: has lost 50 lbs since starting therapy. Legs are feeling good.  He was compliant with home exercise program.  Response to previous treatment: good  Functional change: able to lift legs with less difficulty; able to get on/off floor; able to  leg and cross it over the other leg    Pain: 0/10  Location:  No pain     Objective   No FOTO - consult only    Stepan received self care/home management x (55) minutes including:     Intervention Performed Today    Cut to fit left lower leg velcro wrap x    Patient instruction on donning and doffing of lower leg wraps and ankle wraps x    Skin care bilateral lower extremities  x    Instructed in use of a compression pump     Manual Lymph Drainage instruction and return demonstration BLE x                     Plan for Next Visit: cut to fit velcro wrap prn; Manual Lymph Drainage training        Stepan received the following supervised modalities after being cleared for contradictions: Vasopneumatic compression for (--) minutes without adverse effects       Home Exercises Provided and Patient Education Provided     Education provided:   - donning and doffing of bilateral lower leg and foot/ankle wraps. The foot/ankle wrap for the left foot did not fit well so extra velcro pieces were used to attach straps    Home instruction Provided: yes.  Exercises were reviewed and Stepan  was able to demonstrate them prior to the end of the session.  Stepan demonstrated good  understanding of the education provided.     See EMR under Patient Instructions for exercises provided 7/13/2022. (nothing written provided)    Assessment   Stepan is progressing well with physical therapy, with no complaints of pain or discomfort and significant improvements noted in size of legs and elasticity of skin of both legs since initial evaluation; please see exam for details. Stepan continues to have difficulty with swelling in both legs, due to impaired veins and lymphatics. He will benefit from a compression pump at home which he is hoping to receive soon.   The above impairments and functional deficits will continue to be addressed over the course of this plan of care. Stepan was educated on continued progression of PT, expectations for the duration of care, updates on goals set at initial evaluation, and home exercise program.  Stepan will continue to benefit from physical therapy in order to further address goals, maximize function and quality of life.     Stepan Is progressing well towards his goals.   Pt prognosis is Excellent.     Pt will continue to benefit from skilled outpatient physical therapy to address the deficits listed in the problem list box on initial evaluation, provide pt/family education and to maximize pt's level of independence in the home and community environment.     Pt's spiritual, cultural and educational needs considered and pt agreeable to plan of care and goals.     Anticipated barriers to physical therapy: patient lives in [place] and relies on public transportation    Goals:     Short Term Goals: (5 weeks)  1. Patient will show decreased girth in bilateral lower extremities  by up to 8 cm to allow for LE symmetry, shoe and clothing choice, and ability to apply needed compression.  (GOAL MET)  2. Patient will demonstrate 100% knowledge of lymphedema precautions and signs of  infection to allow for reduced lymphedema risk, infection risk, and/or exacerbation of condition.  (GOAL MET)  3. Patient or caregiver will perform self-bandaging techniques and/or wearing of compression garments to allow for lymphatic drainage support, skin elasticity, and reduction in shape and size of limb. (GOAL MET)  4. Patient will perform self lymph drainage techniques to areas within reach to enhance lymphatic drainage and skin condition.  (progressing, not met)  5. Patient will tolerate daily activities with multilayered bandaging to allow for lymphatic and venous support.  (GOAL MET)     Long Term Goals: (10  weeks)  1. Patient will show decreased girth in bilateral lower extremities  by up to 10-15 cm  to allow for LE symmetry, shoe and clothing choice, and ability to apply needed compression daily.  (progressing, not met)  2. Patient will show reduction in density to mild or less with improved contour of limb to allow for cosmesis, LE symmetry, infection risk reduction, and clothing and compression choice.   (GOAL MET)  3. Patient to william/doff compression garment with daily compliance to assist in lymphedema management, skin elasticity, and tissue density.  (GOAL MET)  4. Pt to show improved postural awareness and alignment.  (GOAL MET)  5. Pt to be I and compliant with HEP to allow for increased function in affected limb.   (GOAL MET)        Plan   Plan of care Certification: 8/24/2022 To 11/2/2022     Outpatient Physical Therapy 1 time every 3 weeks x 10 weeks to include the following interventions: Manual Therapy, Patient Education, Self Care, Therapeutic Exercise and vaso-pneumatic compression. Complete Decongestive Therapy- compression and home equipment needs to be addressed and assisted    Mckenna Omer, PT, BERNABE

## 2022-08-28 PROBLEM — E11.621 DIABETIC ULCER OF LEFT GREAT TOE: Status: ACTIVE | Noted: 2022-08-28

## 2022-08-28 PROBLEM — L97.521 DIABETIC ULCER OF TOE OF LEFT FOOT ASSOCIATED WITH TYPE 2 DIABETES MELLITUS, LIMITED TO BREAKDOWN OF SKIN: Status: ACTIVE | Noted: 2022-08-28

## 2022-08-28 PROBLEM — L97.529 DIABETIC ULCER OF LEFT GREAT TOE: Status: ACTIVE | Noted: 2022-08-28

## 2022-08-30 ENCOUNTER — OFFICE VISIT (OUTPATIENT)
Dept: OPHTHALMOLOGY | Facility: CLINIC | Age: 67
End: 2022-08-30
Payer: MEDICARE

## 2022-08-30 ENCOUNTER — OFFICE VISIT (OUTPATIENT)
Dept: DERMATOLOGY | Facility: CLINIC | Age: 67
End: 2022-08-30
Payer: MEDICARE

## 2022-08-30 VITALS
WEIGHT: 315 LBS | DIASTOLIC BLOOD PRESSURE: 90 MMHG | SYSTOLIC BLOOD PRESSURE: 133 MMHG | BODY MASS INDEX: 36.45 KG/M2 | HEIGHT: 78 IN

## 2022-08-30 DIAGNOSIS — H10.9 BACTERIAL CONJUNCTIVITIS: ICD-10-CM

## 2022-08-30 DIAGNOSIS — D22.9 ATYPICAL MOLE: ICD-10-CM

## 2022-08-30 DIAGNOSIS — H40.1133 PRIMARY OPEN ANGLE GLAUCOMA OF BOTH EYES, SEVERE STAGE: Primary | ICD-10-CM

## 2022-08-30 DIAGNOSIS — H54.10 BLINDNESS AND LOW VISION: ICD-10-CM

## 2022-08-30 DIAGNOSIS — D48.5 NEOPLASM OF UNCERTAIN BEHAVIOR OF SKIN: Primary | ICD-10-CM

## 2022-08-30 DIAGNOSIS — H35.372 EPIRETINAL MEMBRANE (ERM) OF LEFT EYE: ICD-10-CM

## 2022-08-30 DIAGNOSIS — Z91.199 NON COMPLIANCE WITH MEDICAL TREATMENT: ICD-10-CM

## 2022-08-30 PROCEDURE — 11102 PR TANGENTIAL BIOPSY, SKIN, SINGLE LESION: ICD-10-PCS | Mod: S$PBB,,, | Performed by: STUDENT IN AN ORGANIZED HEALTH CARE EDUCATION/TRAINING PROGRAM

## 2022-08-30 PROCEDURE — 88342 IMHCHEM/IMCYTCHM 1ST ANTB: CPT | Mod: 26,,, | Performed by: PATHOLOGY

## 2022-08-30 PROCEDURE — 88305 TISSUE EXAM BY PATHOLOGIST: ICD-10-PCS | Mod: 26,,, | Performed by: PATHOLOGY

## 2022-08-30 PROCEDURE — 99214 OFFICE O/P EST MOD 30 MIN: CPT | Mod: S$PBB,,, | Performed by: OPHTHALMOLOGY

## 2022-08-30 PROCEDURE — 88305 TISSUE EXAM BY PATHOLOGIST: CPT | Mod: 26,,, | Performed by: PATHOLOGY

## 2022-08-30 PROCEDURE — 88342 IMHCHEM/IMCYTCHM 1ST ANTB: CPT | Performed by: PATHOLOGY

## 2022-08-30 PROCEDURE — 88342 CHG IMMUNOCYTOCHEMISTRY: ICD-10-PCS | Mod: 26,,, | Performed by: PATHOLOGY

## 2022-08-30 PROCEDURE — 11102 TANGNTL BX SKIN SINGLE LES: CPT | Mod: PBBFAC | Performed by: STUDENT IN AN ORGANIZED HEALTH CARE EDUCATION/TRAINING PROGRAM

## 2022-08-30 PROCEDURE — 99202 PR OFFICE/OUTPT VISIT, NEW, LEVL II, 15-29 MIN: ICD-10-PCS | Mod: 25,S$PBB,, | Performed by: STUDENT IN AN ORGANIZED HEALTH CARE EDUCATION/TRAINING PROGRAM

## 2022-08-30 PROCEDURE — 99999 PR PBB SHADOW E&M-EST. PATIENT-LVL III: CPT | Mod: PBBFAC,,, | Performed by: OPHTHALMOLOGY

## 2022-08-30 PROCEDURE — 99202 OFFICE O/P NEW SF 15 MIN: CPT | Mod: 25,S$PBB,, | Performed by: STUDENT IN AN ORGANIZED HEALTH CARE EDUCATION/TRAINING PROGRAM

## 2022-08-30 PROCEDURE — 11102 TANGNTL BX SKIN SINGLE LES: CPT | Mod: S$PBB,,, | Performed by: STUDENT IN AN ORGANIZED HEALTH CARE EDUCATION/TRAINING PROGRAM

## 2022-08-30 PROCEDURE — 99999 PR PBB SHADOW E&M-EST. PATIENT-LVL V: ICD-10-PCS | Mod: PBBFAC,,, | Performed by: STUDENT IN AN ORGANIZED HEALTH CARE EDUCATION/TRAINING PROGRAM

## 2022-08-30 PROCEDURE — 88305 TISSUE EXAM BY PATHOLOGIST: CPT | Performed by: PATHOLOGY

## 2022-08-30 PROCEDURE — 99999 PR PBB SHADOW E&M-EST. PATIENT-LVL III: ICD-10-PCS | Mod: PBBFAC,,, | Performed by: OPHTHALMOLOGY

## 2022-08-30 PROCEDURE — 99215 OFFICE O/P EST HI 40 MIN: CPT | Mod: PBBFAC,27 | Performed by: STUDENT IN AN ORGANIZED HEALTH CARE EDUCATION/TRAINING PROGRAM

## 2022-08-30 PROCEDURE — 99214 PR OFFICE/OUTPT VISIT, EST, LEVL IV, 30-39 MIN: ICD-10-PCS | Mod: S$PBB,,, | Performed by: OPHTHALMOLOGY

## 2022-08-30 PROCEDURE — 99999 PR PBB SHADOW E&M-EST. PATIENT-LVL V: CPT | Mod: PBBFAC,,, | Performed by: STUDENT IN AN ORGANIZED HEALTH CARE EDUCATION/TRAINING PROGRAM

## 2022-08-30 PROCEDURE — 99213 OFFICE O/P EST LOW 20 MIN: CPT | Mod: PBBFAC | Performed by: OPHTHALMOLOGY

## 2022-08-30 RX ORDER — OFLOXACIN 3 MG/ML
1 SOLUTION/ DROPS OPHTHALMIC 4 TIMES DAILY
Qty: 5 ML | Refills: 1 | Status: SHIPPED | OUTPATIENT
Start: 2022-08-30 | End: 2022-09-29

## 2022-08-30 RX ORDER — NETARSUDIL 0.2 MG/ML
1 SOLUTION/ DROPS OPHTHALMIC; TOPICAL DAILY
Qty: 2.5 ML | Refills: 6 | Status: SHIPPED | OUTPATIENT
Start: 2022-08-30 | End: 2023-04-28 | Stop reason: SDUPTHER

## 2022-08-30 NOTE — PROGRESS NOTES
HPI     Glaucoma            Comments: 1-2 M IOP          Comments    Patient states that he was in the hospital with /145 and since then   his va has been a little blurry - doesn't check blood pressure at home -   OU: rhopressa / Azopt/ Alphagan - will need refills on Rhopressa - did not   get Rhopressa/ Alphagan while in hospital for 2 days - only Azopt      POAG - Dr Burgess pt   Corneal Opacity   Blythe palsy   DM   RD Repair with Scleral Buckle right eye   PCIOL OU   CANALOPLASTY OD 8-22-13 Good flow and tension(-900)   CANAL OS 03/20/14/LOT # 1306-01/REF # [id number]   -500 with shutter effect due to much intraop respiratory movement   Moderate flow with good tension       OU: Azopt BID & Alphagan BID  Rhopressa QD OU            Last edited by Alvin Hale MD on 8/30/2022 11:15 AM.            Assessment /Plan     For exam results, see Encounter Report.      ICD-10-CM ICD-9-CM    1. Primary open angle glaucoma of both eyes, severe stage  H40.1133 365.11 IOP much better today   On rocklatan      365.73       2. Uncontrolled type 2 diabetes mellitus with stage 3 chronic kidney disease, with long-term current use of insulin  E11.22 250.52 Not due for dilation at this time     E11.65 585.3     N18.30 V58.67     Z79.4        3. Epiretinal membrane (ERM) of left eye  H35.372 362.56 Follow       4. Blindness and low vision  H54.10 369.9 Stable       5. Non compliance with medical treatment  Z91.19 V15.81 Pt will work with compliance on drops -      6. Bacterial conjunctivitis  H10.9 372.39 Discharge present today OS, add ocuflox QID OU      041.9               OU: Azopt BID & Alphagan BID  Rhopressa QD OU  Start Ocuflox QID OU       RETURN TO CLINIC 3 months DOA

## 2022-08-30 NOTE — PROGRESS NOTES
Subjective:       Patient ID:  Stepan Springer is a 67 y.o. male who presents for   Chief Complaint   Patient presents with    Mole     History of Present Illness: The patient presents with chief complaint of a changing mole.  Location: left side of forehead  Duration: unsure how long has been present; but changing over the past few months  Signs/Symptoms: darkened irregular color mole  Prior treatments: none  Denies any history of skin cancer or melanoma.       Mole      Review of Systems   Constitutional:  Negative for fever and chills.   Skin:  Negative for itching, rash and dry skin.      Objective:    Physical Exam   Constitutional: He appears well-developed and well-nourished. No distress.   Neurological: He is alert and oriented to person, place, and time. He is not disoriented.   Psychiatric: He has a normal mood and affect.   Skin:   Areas Examined (abnormalities noted in diagram):   Scalp / Hair Palpated and Inspected  Head / Face Inspection Performed  Neck Inspection Performed            Diagram Legend     Erythematous scaling macule/papule c/w actinic keratosis       Vascular papule c/w angioma      Pigmented verrucoid papule/plaque c/w seborrheic keratosis      Yellow umbilicated papule c/w sebaceous hyperplasia      Irregularly shaped tan macule c/w lentigo     1-2 mm smooth white papules consistent with Milia      Movable subcutaneous cyst with punctum c/w epidermal inclusion cyst      Subcutaneous movable cyst c/w pilar cyst      Firm pink to brown papule c/w dermatofibroma      Pedunculated fleshy papule(s) c/w skin tag(s)      Evenly pigmented macule c/w junctional nevus     Mildly variegated pigmented, slightly irregular-bordered macule c/w mildly atypical nevus      Flesh colored to evenly pigmented papule c/w intradermal nevus       Pink pearly papule/plaque c/w basal cell carcinoma      Erythematous hyperkeratotic cursted plaque c/w SCC      Surgical scar with no sign of skin cancer  recurrence      Open and closed comedones      Inflammatory papules and pustules      Verrucoid papule consistent consistent with wart     Erythematous eczematous patches and plaques     Dystrophic onycholytic nail with subungual debris c/w onychomycosis     Umbilicated papule    Erythematous-base heme-crusted tan verrucoid plaque consistent with inflamed seborrheic keratosis     Erythematous Silvery Scaling Plaque c/w Psoriasis     See annotation        Assessment / Plan:      Pathology Orders:       Normal Orders This Visit    Specimen to Pathology, Dermatology     Questions:    Procedure Type: Dermatology and skin neoplasms    Number of Specimens: 1    ------------------------: -------------------------    Spec 1 Procedure: Biopsy    Spec 1 Clinical Impression: r/o dysplatic nevus    Spec 1 Source: left forehead    Release to patient:           Neoplasm of uncertain behavior of skin  -     Specimen to Pathology, Dermatology    Shave biopsy procedure note:    Shave biopsy performed after verbal consent including risk of infection, scar, recurrence, need for additional treatment of site. Area prepped with alcohol, anesthetized with approximately 1.0cc of 1% lidocaine with epinephrine. Lesional tissue shaved with razor blade. Hemostasis achieved with application of aluminum chloride followed by hyfrecation. No complications. Dressing applied. Wound care explained.    If biopsy positive for malignancy, refer for Mohs surgery consultation.               Follow up in about 3 months (around 11/30/2022).

## 2022-09-03 ENCOUNTER — NURSE TRIAGE (OUTPATIENT)
Dept: ADMINISTRATIVE | Facility: CLINIC | Age: 67
End: 2022-09-03
Payer: MEDICARE

## 2022-09-03 ENCOUNTER — HOSPITAL ENCOUNTER (INPATIENT)
Facility: HOSPITAL | Age: 67
LOS: 2 days | Discharge: HOME | DRG: 125 | End: 2022-09-07
Attending: EMERGENCY MEDICINE | Admitting: INTERNAL MEDICINE
Payer: MEDICARE

## 2022-09-03 DIAGNOSIS — I25.10 CORONARY ARTERY DISEASE INVOLVING NATIVE CORONARY ARTERY OF NATIVE HEART WITHOUT ANGINA PECTORIS: Chronic | ICD-10-CM

## 2022-09-03 DIAGNOSIS — R07.9 CHEST PAIN: ICD-10-CM

## 2022-09-03 DIAGNOSIS — E11.59 HYPERTENSION ASSOCIATED WITH DIABETES: ICD-10-CM

## 2022-09-03 DIAGNOSIS — I63.9 STROKE: ICD-10-CM

## 2022-09-03 DIAGNOSIS — N17.9 AKI (ACUTE KIDNEY INJURY): ICD-10-CM

## 2022-09-03 DIAGNOSIS — H53.133 ACUTE LOSS OF VISION, BILATERAL: Primary | ICD-10-CM

## 2022-09-03 DIAGNOSIS — E11.22 TYPE 2 DIABETES MELLITUS WITH STAGE 3A CHRONIC KIDNEY DISEASE, WITH LONG-TERM CURRENT USE OF INSULIN: ICD-10-CM

## 2022-09-03 DIAGNOSIS — I10 ESSENTIAL HYPERTENSION: ICD-10-CM

## 2022-09-03 DIAGNOSIS — N18.31 TYPE 2 DIABETES MELLITUS WITH STAGE 3A CHRONIC KIDNEY DISEASE, WITH LONG-TERM CURRENT USE OF INSULIN: ICD-10-CM

## 2022-09-03 DIAGNOSIS — G45.9 TIA (TRANSIENT ISCHEMIC ATTACK): ICD-10-CM

## 2022-09-03 DIAGNOSIS — Z79.4 TYPE 2 DIABETES MELLITUS WITH STAGE 3A CHRONIC KIDNEY DISEASE, WITH LONG-TERM CURRENT USE OF INSULIN: ICD-10-CM

## 2022-09-03 DIAGNOSIS — I15.2 HYPERTENSION ASSOCIATED WITH DIABETES: ICD-10-CM

## 2022-09-03 PROBLEM — H53.9 TRANSIENT VISUAL DISTURBANCE: Status: ACTIVE | Noted: 2022-09-03

## 2022-09-03 PROBLEM — N18.30 ACUTE RENAL FAILURE SUPERIMPOSED ON STAGE 3 CHRONIC KIDNEY DISEASE: Status: ACTIVE | Noted: 2022-09-03

## 2022-09-03 PROBLEM — E87.6 HYPOKALEMIA: Status: ACTIVE | Noted: 2022-09-03

## 2022-09-03 LAB
ALBUMIN SERPL BCP-MCNC: 3.6 G/DL (ref 3.5–5.2)
ALP SERPL-CCNC: 83 U/L (ref 55–135)
ALT SERPL W/O P-5'-P-CCNC: 14 U/L (ref 10–44)
ANION GAP SERPL CALC-SCNC: 14 MMOL/L (ref 8–16)
APTT BLDCRRT: 25.9 SEC (ref 21–32)
AST SERPL-CCNC: 12 U/L (ref 10–40)
BASOPHILS # BLD AUTO: 0.03 K/UL (ref 0–0.2)
BASOPHILS NFR BLD: 0.5 % (ref 0–1.9)
BILIRUB SERPL-MCNC: 0.5 MG/DL (ref 0.1–1)
BUN SERPL-MCNC: 66 MG/DL (ref 8–23)
CALCIUM SERPL-MCNC: 9.8 MG/DL (ref 8.7–10.5)
CHLORIDE SERPL-SCNC: 107 MMOL/L (ref 95–110)
CO2 SERPL-SCNC: 18 MMOL/L (ref 23–29)
CREAT SERPL-MCNC: 3.7 MG/DL (ref 0.5–1.4)
DIFFERENTIAL METHOD: NORMAL
EOSINOPHIL # BLD AUTO: 0.2 K/UL (ref 0–0.5)
EOSINOPHIL NFR BLD: 3.4 % (ref 0–8)
ERYTHROCYTE [DISTWIDTH] IN BLOOD BY AUTOMATED COUNT: 12.4 % (ref 11.5–14.5)
ERYTHROCYTE [SEDIMENTATION RATE] IN BLOOD BY WESTERGREN METHOD: 2 MM/HR (ref 0–10)
EST. GFR  (NO RACE VARIABLE): 17 ML/MIN/1.73 M^2
GLUCOSE SERPL-MCNC: 224 MG/DL (ref 70–110)
HCT VFR BLD AUTO: 43.4 % (ref 40–54)
HGB BLD-MCNC: 15.3 G/DL (ref 14–18)
IMM GRANULOCYTES # BLD AUTO: 0.01 K/UL (ref 0–0.04)
IMM GRANULOCYTES NFR BLD AUTO: 0.2 % (ref 0–0.5)
INR PPP: 1 (ref 0.8–1.2)
LYMPHOCYTES # BLD AUTO: 1.6 K/UL (ref 1–4.8)
LYMPHOCYTES NFR BLD: 28.6 % (ref 18–48)
MCH RBC QN AUTO: 30.7 PG (ref 27–31)
MCHC RBC AUTO-ENTMCNC: 35.3 G/DL (ref 32–36)
MCV RBC AUTO: 87 FL (ref 82–98)
MONOCYTES # BLD AUTO: 0.4 K/UL (ref 0.3–1)
MONOCYTES NFR BLD: 7.7 % (ref 4–15)
NEUTROPHILS # BLD AUTO: 3.3 K/UL (ref 1.8–7.7)
NEUTROPHILS NFR BLD: 59.6 % (ref 38–73)
NRBC BLD-RTO: 0 /100 WBC
PLATELET # BLD AUTO: 160 K/UL (ref 150–450)
PMV BLD AUTO: 10.6 FL (ref 9.2–12.9)
POCT GLUCOSE: 182 MG/DL (ref 70–110)
POCT GLUCOSE: 99 MG/DL (ref 70–110)
POTASSIUM SERPL-SCNC: 3.1 MMOL/L (ref 3.5–5.1)
PROT SERPL-MCNC: 6.7 G/DL (ref 6–8.4)
PROTHROMBIN TIME: 11 SEC (ref 9–12.5)
RBC # BLD AUTO: 4.99 M/UL (ref 4.6–6.2)
SARS-COV-2 RDRP RESP QL NAA+PROBE: NEGATIVE
SODIUM SERPL-SCNC: 139 MMOL/L (ref 136–145)
TROPONIN I SERPL DL<=0.01 NG/ML-MCNC: 0.03 NG/ML (ref 0–0.03)
TSH SERPL DL<=0.005 MIU/L-ACNC: 1.26 UIU/ML (ref 0.4–4)
WBC # BLD AUTO: 5.56 K/UL (ref 3.9–12.7)

## 2022-09-03 PROCEDURE — G0378 HOSPITAL OBSERVATION PER HR: HCPCS

## 2022-09-03 PROCEDURE — 93010 ELECTROCARDIOGRAM REPORT: CPT | Mod: ,,, | Performed by: INTERNAL MEDICINE

## 2022-09-03 PROCEDURE — G0425 PR INPT TELEHEALTH CONSULT 30M: ICD-10-PCS | Mod: 95,ICN,CMP, | Performed by: PSYCHIATRY & NEUROLOGY

## 2022-09-03 PROCEDURE — 85610 PROTHROMBIN TIME: CPT | Performed by: EMERGENCY MEDICINE

## 2022-09-03 PROCEDURE — 84484 ASSAY OF TROPONIN QUANT: CPT | Performed by: EMERGENCY MEDICINE

## 2022-09-03 PROCEDURE — 85730 THROMBOPLASTIN TIME PARTIAL: CPT | Performed by: EMERGENCY MEDICINE

## 2022-09-03 PROCEDURE — 93005 ELECTROCARDIOGRAM TRACING: CPT

## 2022-09-03 PROCEDURE — 85651 RBC SED RATE NONAUTOMATED: CPT | Performed by: EMERGENCY MEDICINE

## 2022-09-03 PROCEDURE — 84443 ASSAY THYROID STIM HORMONE: CPT | Performed by: EMERGENCY MEDICINE

## 2022-09-03 PROCEDURE — 80053 COMPREHEN METABOLIC PANEL: CPT | Performed by: EMERGENCY MEDICINE

## 2022-09-03 PROCEDURE — U0002 COVID-19 LAB TEST NON-CDC: HCPCS | Performed by: EMERGENCY MEDICINE

## 2022-09-03 PROCEDURE — 85025 COMPLETE CBC W/AUTO DIFF WBC: CPT | Performed by: EMERGENCY MEDICINE

## 2022-09-03 PROCEDURE — 93010 EKG 12-LEAD: ICD-10-PCS | Mod: ,,, | Performed by: INTERNAL MEDICINE

## 2022-09-03 PROCEDURE — 82962 GLUCOSE BLOOD TEST: CPT

## 2022-09-03 PROCEDURE — 99285 EMERGENCY DEPT VISIT HI MDM: CPT | Mod: 25

## 2022-09-03 PROCEDURE — G0425 INPT/ED TELECONSULT30: HCPCS | Mod: 95,ICN,CMP, | Performed by: PSYCHIATRY & NEUROLOGY

## 2022-09-03 RX ORDER — ASPIRIN 81 MG/1
81 TABLET ORAL DAILY
Status: DISCONTINUED | OUTPATIENT
Start: 2022-09-04 | End: 2022-09-04

## 2022-09-03 RX ORDER — IPRATROPIUM BROMIDE AND ALBUTEROL SULFATE 2.5; .5 MG/3ML; MG/3ML
3 SOLUTION RESPIRATORY (INHALATION) EVERY 4 HOURS PRN
Status: DISCONTINUED | OUTPATIENT
Start: 2022-09-04 | End: 2022-09-07 | Stop reason: HOSPADM

## 2022-09-03 RX ORDER — GLUCAGON 1 MG
1 KIT INJECTION
Status: DISCONTINUED | OUTPATIENT
Start: 2022-09-04 | End: 2022-09-07 | Stop reason: HOSPADM

## 2022-09-03 RX ORDER — GUAIFENESIN 100 MG/5ML
200 SOLUTION ORAL EVERY 4 HOURS PRN
Status: DISCONTINUED | OUTPATIENT
Start: 2022-09-04 | End: 2022-09-07 | Stop reason: HOSPADM

## 2022-09-03 RX ORDER — HEPARIN SODIUM 5000 [USP'U]/ML
5000 INJECTION, SOLUTION INTRAVENOUS; SUBCUTANEOUS EVERY 12 HOURS
Status: DISCONTINUED | OUTPATIENT
Start: 2022-09-04 | End: 2022-09-07 | Stop reason: HOSPADM

## 2022-09-03 RX ORDER — ONDANSETRON 2 MG/ML
4 INJECTION INTRAMUSCULAR; INTRAVENOUS EVERY 8 HOURS PRN
Status: DISCONTINUED | OUTPATIENT
Start: 2022-09-04 | End: 2022-09-07 | Stop reason: HOSPADM

## 2022-09-03 RX ORDER — SODIUM CHLORIDE 9 MG/ML
INJECTION, SOLUTION INTRAVENOUS CONTINUOUS
Status: DISCONTINUED | OUTPATIENT
Start: 2022-09-04 | End: 2022-09-04

## 2022-09-03 RX ORDER — SODIUM CHLORIDE 0.9 % (FLUSH) 0.9 %
10 SYRINGE (ML) INJECTION
Status: DISCONTINUED | OUTPATIENT
Start: 2022-09-04 | End: 2022-09-07 | Stop reason: HOSPADM

## 2022-09-03 RX ORDER — POTASSIUM CHLORIDE 20 MEQ/1
40 TABLET, EXTENDED RELEASE ORAL ONCE
Status: COMPLETED | OUTPATIENT
Start: 2022-09-04 | End: 2022-09-04

## 2022-09-03 RX ORDER — ACETAMINOPHEN 325 MG/1
650 TABLET ORAL EVERY 6 HOURS PRN
Status: DISCONTINUED | OUTPATIENT
Start: 2022-09-04 | End: 2022-09-07 | Stop reason: HOSPADM

## 2022-09-03 RX ORDER — INSULIN ASPART 100 [IU]/ML
1-10 INJECTION, SOLUTION INTRAVENOUS; SUBCUTANEOUS EVERY 6 HOURS PRN
Status: DISCONTINUED | OUTPATIENT
Start: 2022-09-04 | End: 2022-09-07 | Stop reason: HOSPADM

## 2022-09-03 RX ORDER — ATORVASTATIN CALCIUM 40 MG/1
40 TABLET, FILM COATED ORAL DAILY
Status: DISCONTINUED | OUTPATIENT
Start: 2022-09-04 | End: 2022-09-05

## 2022-09-03 NOTE — TELEPHONE ENCOUNTER
"Reports he feels he becomes dizzy ("like im going to pass out) when he stands up. He is having many hiccups w/ nausea. He also reports vision changes that began today w/ blurriness currently. Advised per protocol. Verbalizes understanding.     Reason for Disposition   [1] Blurred vision or visual changes AND [2] present now AND [3] sudden onset or new (e.g., minutes, hours, days)  (Exception: previously diagnosed migraine headaches with same symptoms)    Additional Information   Negative: Complete loss of vision in 1 or both eyes   Negative: Severe eye pain    Protocols used: Vision Loss or Change-A-AH    "

## 2022-09-04 LAB
ALBUMIN SERPL BCP-MCNC: 3.5 G/DL (ref 3.5–5.2)
ALP SERPL-CCNC: 79 U/L (ref 55–135)
ALT SERPL W/O P-5'-P-CCNC: 14 U/L (ref 10–44)
ANION GAP SERPL CALC-SCNC: 11 MMOL/L (ref 8–16)
AORTIC ROOT ANNULUS: 3.86 CM
APTT BLDCRRT: 23.8 SEC (ref 21–32)
ASCENDING AORTA: 4.01 CM
AST SERPL-CCNC: 10 U/L (ref 10–40)
AV INDEX (PROSTH): 0.74
AV MEAN GRADIENT: 5 MMHG
AV PEAK GRADIENT: 9 MMHG
AV VALVE AREA: 3.97 CM2
AV VELOCITY RATIO: 0.71
BASOPHILS # BLD AUTO: 0.03 K/UL (ref 0–0.2)
BASOPHILS NFR BLD: 0.5 % (ref 0–1.9)
BILIRUB SERPL-MCNC: 0.4 MG/DL (ref 0.1–1)
BSA FOR ECHO PROCEDURE: 3.11 M2
BUN SERPL-MCNC: 61 MG/DL (ref 8–23)
CALCIUM SERPL-MCNC: 9.4 MG/DL (ref 8.7–10.5)
CHLORIDE SERPL-SCNC: 108 MMOL/L (ref 95–110)
CHOLEST SERPL-MCNC: 100 MG/DL (ref 120–199)
CHOLEST/HDLC SERPL: 3.1 {RATIO} (ref 2–5)
CK MB SERPL-MCNC: 1.1 NG/ML (ref 0.1–6.5)
CK MB SERPL-RTO: 1.4 % (ref 0–5)
CK SERPL-CCNC: 76 U/L (ref 20–200)
CO2 SERPL-SCNC: 23 MMOL/L (ref 23–29)
CREAT SERPL-MCNC: 3.4 MG/DL (ref 0.5–1.4)
CV ECHO LV RWT: 0.63 CM
DIFFERENTIAL METHOD: NORMAL
DOP CALC AO PEAK VEL: 1.46 M/S
DOP CALC AO VTI: 32.7 CM
DOP CALC LVOT AREA: 5.4 CM2
DOP CALC LVOT DIAMETER: 2.62 CM
DOP CALC LVOT PEAK VEL: 1.03 M/S
DOP CALC LVOT STROKE VOLUME: 129.86 CM3
DOP CALC RVOT PEAK VEL: 0.93 M/S
DOP CALC RVOT VTI: 17.4 CM
DOP CALCLVOT PEAK VEL VTI: 24.1 CM
E WAVE DECELERATION TIME: 352.51 MSEC
E/A RATIO: 0.83
E/E' RATIO: 5.91 M/S
ECHO LV POSTERIOR WALL: 1.71 CM (ref 0.6–1.1)
EJECTION FRACTION: 55 %
EOSINOPHIL # BLD AUTO: 0.3 K/UL (ref 0–0.5)
EOSINOPHIL NFR BLD: 4 % (ref 0–8)
ERYTHROCYTE [DISTWIDTH] IN BLOOD BY AUTOMATED COUNT: 12.4 % (ref 11.5–14.5)
EST. GFR  (NO RACE VARIABLE): 19 ML/MIN/1.73 M^2
FRACTIONAL SHORTENING: 37 % (ref 28–44)
GLUCOSE SERPL-MCNC: 100 MG/DL (ref 70–110)
HCT VFR BLD AUTO: 44.2 % (ref 40–54)
HDLC SERPL-MCNC: 32 MG/DL (ref 40–75)
HDLC SERPL: 32 % (ref 20–50)
HGB BLD-MCNC: 14.6 G/DL (ref 14–18)
IMM GRANULOCYTES # BLD AUTO: 0.01 K/UL (ref 0–0.04)
IMM GRANULOCYTES NFR BLD AUTO: 0.2 % (ref 0–0.5)
INR PPP: 1.1 (ref 0.8–1.2)
INTERVENTRICULAR SEPTUM: 1.86 CM (ref 0.6–1.1)
IVC DIAMETER: 1.45 CM
IVRT: 108.47 MSEC
LA MAJOR: 4.69 CM
LA MINOR: 4.39 CM
LA WIDTH: 4 CM
LDLC SERPL CALC-MCNC: 44.4 MG/DL (ref 63–159)
LEFT ATRIUM SIZE: 4.11 CM
LEFT ATRIUM VOLUME INDEX: 21.1 ML/M2
LEFT ATRIUM VOLUME: 63.37 CM3
LEFT INTERNAL DIMENSION IN SYSTOLE: 3.46 CM (ref 2.1–4)
LEFT VENTRICLE DIASTOLIC VOLUME INDEX: 48.44 ML/M2
LEFT VENTRICLE DIASTOLIC VOLUME: 145.32 ML
LEFT VENTRICLE MASS INDEX: 160 G/M2
LEFT VENTRICLE SYSTOLIC VOLUME INDEX: 16.4 ML/M2
LEFT VENTRICLE SYSTOLIC VOLUME: 49.34 ML
LEFT VENTRICULAR INTERNAL DIMENSION IN DIASTOLE: 5.47 CM (ref 3.5–6)
LEFT VENTRICULAR MASS: 479.08 G
LV LATERAL E/E' RATIO: 7.22 M/S
LV SEPTAL E/E' RATIO: 5 M/S
LVOT MG: 3.59 MMHG
LVOT MV: 0.94 CM/S
LYMPHOCYTES # BLD AUTO: 2.1 K/UL (ref 1–4.8)
LYMPHOCYTES NFR BLD: 33.4 % (ref 18–48)
MAGNESIUM SERPL-MCNC: 2.6 MG/DL (ref 1.6–2.6)
MCH RBC QN AUTO: 29.7 PG (ref 27–31)
MCHC RBC AUTO-ENTMCNC: 33 G/DL (ref 32–36)
MCV RBC AUTO: 90 FL (ref 82–98)
MONOCYTES # BLD AUTO: 0.7 K/UL (ref 0.3–1)
MONOCYTES NFR BLD: 11.4 % (ref 4–15)
MV PEAK A VEL: 0.78 M/S
MV PEAK E VEL: 0.65 M/S
MV STENOSIS PRESSURE HALF TIME: 102.23 MS
MV VALVE AREA P 1/2 METHOD: 2.15 CM2
NEUTROPHILS # BLD AUTO: 3.1 K/UL (ref 1.8–7.7)
NEUTROPHILS NFR BLD: 50.5 % (ref 38–73)
NONHDLC SERPL-MCNC: 68 MG/DL
NRBC BLD-RTO: 0 /100 WBC
PHOSPHATE SERPL-MCNC: 4.6 MG/DL (ref 2.7–4.5)
PISA TR MAX VEL: 2.33 M/S
PLATELET # BLD AUTO: 168 K/UL (ref 150–450)
PMV BLD AUTO: 10.4 FL (ref 9.2–12.9)
POCT GLUCOSE: 105 MG/DL (ref 70–110)
POCT GLUCOSE: 179 MG/DL (ref 70–110)
POCT GLUCOSE: 181 MG/DL (ref 70–110)
POCT GLUCOSE: 188 MG/DL (ref 70–110)
POTASSIUM SERPL-SCNC: 3.2 MMOL/L (ref 3.5–5.1)
PROT SERPL-MCNC: 6.3 G/DL (ref 6–8.4)
PROTHROMBIN TIME: 11.4 SEC (ref 9–12.5)
PV MEAN GRADIENT: 2.27 MMHG
RA MAJOR: 4.44 CM
RA PRESSURE: 3 MMHG
RA WIDTH: 4.1 CM
RBC # BLD AUTO: 4.92 M/UL (ref 4.6–6.2)
SINUS: 4.06 CM
SODIUM SERPL-SCNC: 142 MMOL/L (ref 136–145)
STJ: 3.94 CM
TDI LATERAL: 0.09 M/S
TDI SEPTAL: 0.13 M/S
TDI: 0.11 M/S
TR MAX PG: 22 MMHG
TRICUSPID ANNULAR PLANE SYSTOLIC EXCURSION: 2.57 CM
TRIGL SERPL-MCNC: 118 MG/DL (ref 30–150)
TROPONIN I SERPL DL<=0.01 NG/ML-MCNC: 0.05 NG/ML (ref 0–0.03)
TV REST PULMONARY ARTERY PRESSURE: 25 MMHG
WBC # BLD AUTO: 6.22 K/UL (ref 3.9–12.7)

## 2022-09-04 PROCEDURE — 83735 ASSAY OF MAGNESIUM: CPT | Performed by: INTERNAL MEDICINE

## 2022-09-04 PROCEDURE — 84484 ASSAY OF TROPONIN QUANT: CPT | Mod: 91 | Performed by: INTERNAL MEDICINE

## 2022-09-04 PROCEDURE — 97116 GAIT TRAINING THERAPY: CPT

## 2022-09-04 PROCEDURE — 25000003 PHARM REV CODE 250: Performed by: NURSE PRACTITIONER

## 2022-09-04 PROCEDURE — 97165 OT EVAL LOW COMPLEX 30 MIN: CPT

## 2022-09-04 PROCEDURE — 97162 PT EVAL MOD COMPLEX 30 MIN: CPT

## 2022-09-04 PROCEDURE — 97530 THERAPEUTIC ACTIVITIES: CPT

## 2022-09-04 PROCEDURE — 96372 THER/PROPH/DIAG INJ SC/IM: CPT | Performed by: INTERNAL MEDICINE

## 2022-09-04 PROCEDURE — 85730 THROMBOPLASTIN TIME PARTIAL: CPT | Performed by: INTERNAL MEDICINE

## 2022-09-04 PROCEDURE — 80053 COMPREHEN METABOLIC PANEL: CPT | Performed by: INTERNAL MEDICINE

## 2022-09-04 PROCEDURE — 84484 ASSAY OF TROPONIN QUANT: CPT | Performed by: INTERNAL MEDICINE

## 2022-09-04 PROCEDURE — 84100 ASSAY OF PHOSPHORUS: CPT | Performed by: INTERNAL MEDICINE

## 2022-09-04 PROCEDURE — 36415 COLL VENOUS BLD VENIPUNCTURE: CPT | Performed by: INTERNAL MEDICINE

## 2022-09-04 PROCEDURE — G0378 HOSPITAL OBSERVATION PER HR: HCPCS

## 2022-09-04 PROCEDURE — 82553 CREATINE MB FRACTION: CPT | Performed by: INTERNAL MEDICINE

## 2022-09-04 PROCEDURE — 94761 N-INVAS EAR/PLS OXIMETRY MLT: CPT

## 2022-09-04 PROCEDURE — 80061 LIPID PANEL: CPT | Performed by: INTERNAL MEDICINE

## 2022-09-04 PROCEDURE — 85610 PROTHROMBIN TIME: CPT | Performed by: INTERNAL MEDICINE

## 2022-09-04 PROCEDURE — 25000003 PHARM REV CODE 250: Performed by: INTERNAL MEDICINE

## 2022-09-04 PROCEDURE — 63600175 PHARM REV CODE 636 W HCPCS: Performed by: INTERNAL MEDICINE

## 2022-09-04 PROCEDURE — 92610 EVALUATE SWALLOWING FUNCTION: CPT

## 2022-09-04 PROCEDURE — 85025 COMPLETE CBC W/AUTO DIFF WBC: CPT | Performed by: INTERNAL MEDICINE

## 2022-09-04 RX ORDER — SODIUM CHLORIDE 9 MG/ML
INJECTION, SOLUTION INTRAVENOUS CONTINUOUS
Status: DISCONTINUED | OUTPATIENT
Start: 2022-09-04 | End: 2022-09-07 | Stop reason: HOSPADM

## 2022-09-04 RX ORDER — ASPIRIN 325 MG
325 TABLET ORAL DAILY
Status: DISCONTINUED | OUTPATIENT
Start: 2022-09-05 | End: 2022-09-07 | Stop reason: HOSPADM

## 2022-09-04 RX ADMIN — HEPARIN SODIUM 5000 UNITS: 5000 INJECTION INTRAVENOUS; SUBCUTANEOUS at 09:09

## 2022-09-04 RX ADMIN — SODIUM CHLORIDE: 0.9 INJECTION, SOLUTION INTRAVENOUS at 12:09

## 2022-09-04 RX ADMIN — POTASSIUM CHLORIDE 40 MEQ: 1500 TABLET, EXTENDED RELEASE ORAL at 12:09

## 2022-09-04 RX ADMIN — SODIUM CHLORIDE: 9 INJECTION, SOLUTION INTRAVENOUS at 12:09

## 2022-09-04 RX ADMIN — ATORVASTATIN CALCIUM 40 MG: 40 TABLET, FILM COATED ORAL at 09:09

## 2022-09-04 RX ADMIN — INSULIN DETEMIR 30 UNITS: 100 INJECTION, SOLUTION SUBCUTANEOUS at 09:09

## 2022-09-04 RX ADMIN — INSULIN ASPART 2 UNITS: 100 INJECTION, SOLUTION INTRAVENOUS; SUBCUTANEOUS at 06:09

## 2022-09-04 RX ADMIN — ASPIRIN 81 MG: 81 TABLET, COATED ORAL at 09:09
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
## 2022-09-04 NOTE — ASSESSMENT & PLAN NOTE
Body mass index is 43.15 kg/m². Morbid obesity complicates all aspects of disease management from diagnostic modalities to treatment. Weight loss encouraged and health benefits explained to patient.

## 2022-09-04 NOTE — ASSESSMENT & PLAN NOTE
Body mass index is 43.36 kg/m². Morbid obesity complicates all aspects of disease management from diagnostic modalities to treatment. Weight loss encouraged and health benefits explained to patient.

## 2022-09-04 NOTE — SUBJECTIVE & OBJECTIVE
Woke up with symptoms?: {YES NO:02940}    Recent bleeding noted: {Yes / No / Unknown:74}  Does the patient take any Blood Thinners? yes  Medications: Antiplatelets:  aspirin    No current facility-administered medications on file prior to encounter.     Current Outpatient Medications on File Prior to Encounter   Medication Sig Dispense Refill    amLODIPine (NORVASC) 5 MG tablet Take 1 tablet (5 mg total) by mouth every evening. 90 tablet 1    aspirin 325 MG tablet Take 325 mg by mouth once daily.      atorvastatin (LIPITOR) 20 MG tablet Take 1 tablet (20 mg total) by mouth every evening. 90 tablet 3    brimonidine 0.1% (ALPHAGAN P) 0.1 % Drop Place 1 drop into both eyes 3 (three) times daily. Order 90 day supply 15 mL 4    brinzolamide (AZOPT) 1 % ophthalmic suspension Place 1 drop into both eyes 3 (three) times daily. Brand name only 15 mL 6    carvediloL (COREG) 25 MG tablet Take 1 tablet (25 mg total) by mouth 2 (two) times daily with meals. 180 tablet 1    cloNIDine (CATAPRES) 0.1 MG tablet Take 1 tablet (0.1 mg total) by mouth 3 (three) times daily. 270 tablet 1    empagliflozin (JARDIANCE) 25 mg tablet Take 1 tablet (25 mg total) by mouth once daily. 90 tablet 3    flash glucose sensor (FREESTYLE CLEMENCIA 14 DAY SENSOR) Kit 1 each by Misc.(Non-Drug; Combo Route) route every 14 (fourteen) days. 6 kit 3    furosemide (LASIX) 40 MG tablet Take 1.5 tablets (60 mg total) by mouth 2 (two) times a day. (Patient taking differently: Take 40 mg by mouth 3 (three) times daily.) 270 tablet 1    hydrALAZINE (APRESOLINE) 100 MG tablet Take 1 tablet (100 mg total) by mouth every 8 (eight) hours. 270 tablet 1    insulin aspart U-100 (NOVOLOG) 100 unit/mL (3 mL) InPn pen Inject 25 Units into the skin 3 (three) times daily with meals. 69 mL 3    insulin glargine U-300 conc (TOUJEO MAX U-300 SOLOSTAR) 300 unit/mL (3 mL) insulin pen Inject 80 Units into the skin once daily. 8 pen 3    lisinopriL (PRINIVIL,ZESTRIL) 40  "MG tablet Take 1 tablet (40 mg total) by mouth once daily. 90 tablet 1    mupirocin (BACTROBAN) 2 % ointment Apply topically once daily. 30 g 0    netarsudiL (RHOPRESSA) 0.02 % ophthalmic solution Place 1 drop into both eyes once daily. 2.5 mL 6    nitroGLYCERIN (NITROSTAT) 0.4 MG SL tablet PLACE 1 TABLET (0.4 MG TOTAL) UNDER THE TONGUE EVERY 5 (FIVE) MINUTES AS NEEDED FOR CHEST PAIN. 20 tablet 0    ofloxacin (OCUFLOX) 0.3 % ophthalmic solution Place 1 drop into both eyes 4 (four) times daily. 5 mL 1    pen needle, diabetic (BD ULTRA-FINE AMANDA PEN NEEDLE) 32 gauge x 5/32" Ndle 1 each by Misc.(Non-Drug; Combo Route) route 4 (four) times daily with meals and nightly. 300 each 3    [DISCONTINUED] hydroCHLOROthiazide (HYDRODIURIL) 25 MG tablet Take 1 tablet (25 mg total) by mouth once daily. 90 tablet 1    [DISCONTINUED] miglitoL (GLYSET) 25 MG Tab Take 1 tablet (25 mg total) by mouth 3 (three) times daily with meals. 270 tablet 3     Past Medical History:   Diagnosis Date    Anxiety     Bell's palsy     CHF (congestive heart failure)     Chronic kidney disease, stage 3a 11/02/2021    Coronary artery disease involving native coronary artery without angina pectoris 10/18/2016    Depression     Diabetes mellitus     Glaucoma     Hypertension     Idiopathic peripheral neuropathy 12/11/2012    Lymphedema of both lower extremities     Mixed hyperlipidemia 12/11/2012    Morbid obesity with BMI of 45.0-49.9, adult 02/12/2019    Pancytopenia     Sleep apnea     Stage 3b chronic kidney disease     Type 2 diabetes mellitus with diabetic polyneuropathy, with long-term current use of insulin 12/11/2012    Vitamin B 12 deficiency 08/23/2012     Past Surgical History:   Procedure Laterality Date    CARDIAC CATHETERIZATION  7/22/13    non obstructive cad    CATARACT EXTRACTION  OU    COLONOSCOPY N/A 5/1/2019    Procedure: COLONOSCOPY;  Surgeon: Henry Mo III, MD;  Location: [facility];  Service: " "Endoscopy;  Laterality: N/A;    CORONARY ANGIOPLASTY      ESOPHAGOGASTRODUODENOSCOPY N/A 5/1/2019    Procedure: ESOPHAGOGASTRODUODENOSCOPY (EGD);  Surgeon: Henry Mo III, MD;  Location: [facility];  Service: Endoscopy;  Laterality: N/A;    EYE SURGERY      FRACTURE SURGERY      kidney stone       August 2016    PC IOL OU      RETINAL DETACHMENT REPAIR W/ SCLERAL BUCKLE LE      WRIST SURGERY       Social History     Tobacco Use    Smoking status: Never    Smokeless tobacco: Never   Substance Use Topics    Alcohol use: Yes     Comment: " one to two glasses of wine per year"    Drug use: No     Breast Cancer-related family history is not on file.      Allergies: Cefazolin     Review of Systems   Cardiovascular: Positive for chest pain.   Musculoskeletal: Positive for arthralgias.   Neurological: Positive for headaches.     Objective:   Vitals: Blood pressure (!) 148/96, pulse 73, temperature 98 °F (36.7 °C), temperature source Oral, resp. rate 18, height 6' 7" (2.007 m), SpO2 97 %.     CT READ: Yes  No hemmorhage. No mass effect. No early infarct signs.   WM changes  Physical Exam  Vitals reviewed.     Overweight  Nasal scotoma on R (old)  R lower facial weakness but hx R Bell's Palsy  BLE weakness but no drift with best effort  "

## 2022-09-04 NOTE — ASSESSMENT & PLAN NOTE
Creatinine elevated 3.7 (baseline 1.3-1.7).    -continue NS at 125 cc/hour for the next 1 L.    Repeat labs in a.m.

## 2022-09-04 NOTE — PT/OT/SLP EVAL
Speech Language Pathology Evaluation  Bedside Swallow    Patient Name:  Stepan Springer   MRN:  [id number]  Admitting Diagnosis: Transient visual disturbance    Recommendations:                 General Recommendations: No further S.T. warranted  Diet recommendations:  Regular Diet - IDDSI Level 7, Thin liquids - IDDSI Level 0   Aspiration Precautions: Standard aspiration precautions   General Precautions: Standard,    Communication strategies:  none    History:     Past Medical History:   Diagnosis Date    Anxiety     Bell's palsy     CHF (congestive heart failure)     Chronic kidney disease, stage 3a 11/02/2021    Coronary artery disease involving native coronary artery without angina pectoris 10/18/2016    Depression     Diabetes mellitus     Glaucoma     Hypertension     Idiopathic peripheral neuropathy 12/11/2012    Lymphedema of both lower extremities     Mixed hyperlipidemia 12/11/2012    Morbid obesity with BMI of 45.0-49.9, adult 02/12/2019    Pancytopenia     Sleep apnea     Stage 3b chronic kidney disease     Type 2 diabetes mellitus with diabetic polyneuropathy, with long-term current use of insulin 12/11/2012    Vitamin B 12 deficiency 08/23/2012       Past Surgical History:   Procedure Laterality Date    CARDIAC CATHETERIZATION  7/22/13    non obstructive cad    CATARACT EXTRACTION  OU    COLONOSCOPY N/A 5/1/2019    Procedure: COLONOSCOPY;  Surgeon: Henry Mo III, MD;  Location: [facility];  Service: Endoscopy;  Laterality: N/A;    CORONARY ANGIOPLASTY      ESOPHAGOGASTRODUODENOSCOPY N/A 5/1/2019    Procedure: ESOPHAGOGASTRODUODENOSCOPY (EGD);  Surgeon: Henry Mo III, MD;  Location: [facility];  Service: Endoscopy;  Laterality: N/A;    EYE SURGERY      FRACTURE SURGERY      kidney stone       August 2016    PC IOL OU      RETINAL DETACHMENT REPAIR W/ SCLERAL BUCKLE LE      WRIST SURGERY         Social History: Patient lives with independently.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  He does get assistance with driving to appointments due to his decreased vision.     Prior Intubation HX:  none     Modified Barium Swallow: none reported    Chest X-Rays: clear lungs reported     Prior diet: Pt reported that he was on a regular consistency diet and thin liquids and was swallowing without difficulties.    Occupation/hobbies/homemaking: n/a.    Subjective     Pt sitting up in bed and reported he ate breakfast meal without difficulties.   Patient goals: I want them to figure out what happened to me yesterday     Pain/Comfort:  Pain Rating 1: 0/10    Respiratory Status: Room air    Objective:     Oral Musculature Evaluation  Oral Musculature: WFL  Dentition: present and adequate  Mucosal Quality: good  Mandibular Strength and Mobility: WFL  Oral Labial Strength and Mobility: WFL  Lingual Strength and Mobility: WFL  Velar Elevation: WFL    Bedside Swallow Eval:   Consistencies Assessed:  Thin liquids No delays or wet vocal quality noted  Puree No delays or wet vocal quality noted   Solids No delays or wet vocal quality noted; chewed and cleared effectively      Oral Phase:   WFL    Pharyngeal Phase:   no overt clinical signs/symptoms of aspiration    Compensatory Strategies  Basic swallowing precaution recommended and reviewed     Treatment: Pt followed commands, answered questions appropriately.  He was oriented and did not present with word finding difficulties at conversational level.  Pt did report that occasionally he "talks to fast" and has difficulties thinking of words.  Pt was educated on strategies to improve this.  He is currently able to expressive needs/ideas effectively.     Assessment:     Stepan Springer is a 67 y.o. male who presents to be at baseline status.  Pt with WFL swallowing, language, speech and cognitive skills.  Pt recommended for a regular consistency diet and thin liquids with basic strategies reviewed. No further S.T. services warranted at this " time.    Goals:   Multidisciplinary Problems       SLP Goals       Not on file                    Plan:     Patient to be seen:      Plan of Care expires:     Plan of Care reviewed with:  patient   SLP Follow-Up:  No       Discharge recommendations:    n/a  Barriers to Discharge:  see medical chart     Time Tracking:     SLP Treatment Date:   09/04/22  Speech Start Time:  0905  Speech Stop Time:  0930    Speech Total Time (min):  25 min    Billable Minutes: Total Time 25 minutes     09/04/2022

## 2022-09-04 NOTE — ASSESSMENT & PLAN NOTE
- Place in Observation on Telemetry to r/o acute CVA.  - CT of the head showed no acute abnormality.  - Will obtain MRI of the brain.  - 2D echo.  - Check FLP and HbA1c.  - Neuro checks.  - Start daily ASA 81 mg and Lipitor 40 mg.  - Allow for permissive HTN.   - PT/OT/ST consult to eval and treat.  - Neurology consult.

## 2022-09-04 NOTE — PROGRESS NOTES
[facility] - Med Surg 3  [facility] Medicine  Progress Note    Patient Name: Stepan Springer  MRN: [id number]  Patient Class: OP- Observation   Admission Date: 9/3/2022  Length of Stay: 0 days  Attending Physician: Severiano Vega, *  Primary Care Provider: KANDICE Fleming MD        Subjective:     Principal Problem:Transient visual disturbance        HPI:  Mr. Gordon is a 67-year-old morbidly obese  male with PMH significant for CAD, diastolic CHF, CKD stage 3, insulin-dependent diabetes, hypertension, presented to the ED complaining of left eye visual loss associated the right facial weakness.  The left eye vision disturbances have significantly improved.  Patient deemed not a candidate for tPA a symptoms ongoing since yesterday, and possibly much longer.  Evaluated by tele Neurology, concern for left carotid TIA/stroke.  Recommended CTA head and neck, however creatinine elevated 3.7 (1.23-1.7).  Admitted for full TIA workup.  Laboratory workup reveals potassium 3.1, creatinine 3.7.    Admitting diagnosis:  Left visual disturbances.  Concern for TIA/CVA.      Overview/Hospital Course:  Pt reports chronic right facial weakness from having [place] Palsy. His left eye vision loss came on suddenly and lasted 3 hours. Now it is resolved. Vascular Neurologist voiced concern for left carotid TIA/Stroke. Unfortunately, due to patient's body habitus the MRI of Brain cannot be performed. Will repeat CT of Head 24 hours from initial CT. Carotid US pending. Pt with new NINFA with creatinine 3.7 >> 3.4 (baseline 1.5) and unable to perform CTA of head and neck at this time. Gentle IV fluids and nephrotoxic meds held. Recent ECHO with EF 55 % and no wall motion abnormalities. Last Hgb A1 C = 6.5. Pt on full strength ASA and STATIN. He had no speech/cognitive abnormalities/ no functional immobility. Hypokalemia replaced.       Interval History:  See Hospital Course    Review of Systems   Constitutional: Negative.   Negative for chills and fever.   HENT: Negative.  Negative for congestion, rhinorrhea, sore throat and trouble swallowing.    Eyes:  Positive for visual disturbance (left).   Respiratory: Negative.  Negative for cough, shortness of breath and wheezing.    Cardiovascular:  Positive for leg swelling. Negative for chest pain and palpitations.   Gastrointestinal: Negative.  Negative for abdominal pain, diarrhea, nausea and vomiting.   Endocrine: Negative.    Genitourinary: Negative.  Negative for dysuria and flank pain.   Musculoskeletal: Negative.  Negative for back pain.   Skin: Negative.  Negative for rash.   Allergic/Immunologic: Negative.    Neurological:  Positive for numbness (right facial). Negative for speech difficulty, weakness and headaches.   Hematological: Negative.    Psychiatric/Behavioral: Negative.  Negative for hallucinations.    All other systems reviewed and are negative.  Objective:     Vital Signs (Most Recent):  Temp: 97.8 °F (36.6 °C) (09/04/22 1052)  Pulse: (!) 47 (09/04/22 1052)  Resp: 18 (09/04/22 1052)  BP: (!) 141/67 (09/04/22 1052)  SpO2: 97 % (09/04/22 1052)   Vital Signs (24h Range):  Temp:  [97.8 °F (36.6 °C)-98.1 °F (36.7 °C)] 97.8 °F (36.6 °C)  Pulse:  [41-79] 47  Resp:  [13-19] 18  SpO2:  [97 %-98 %] 97 %  BP: (141-150)/(67-96) 141/67     Weight: (!) 173.7 kg (383 lb)  Body mass index is 43.15 kg/m².    Intake/Output Summary (Last 24 hours) at 9/4/2022 1133  Last data filed at 9/4/2022 0500  Gross per 24 hour   Intake 360 ml   Output 800 ml   Net -440 ml      Physical Exam  Vitals and nursing note reviewed.   Constitutional:       General: He is awake. He is not in acute distress.     Appearance: He is obese. He is not ill-appearing.   HENT:      Head: Normocephalic and atraumatic.      Mouth/Throat:      Mouth: Mucous membranes are moist.   Eyes:      General: No scleral icterus.     Extraocular Movements: Extraocular movements intact.      Conjunctiva/sclera: Conjunctivae normal.       Pupils: Pupils are equal, round, and reactive to light.   Cardiovascular:      Rate and Rhythm: Normal rate and regular rhythm.      Heart sounds: No murmur heard.  Pulmonary:      Effort: Pulmonary effort is normal. No respiratory distress.      Breath sounds: Normal breath sounds. No wheezing.   Abdominal:      Palpations: Abdomen is soft.      Tenderness: There is no abdominal tenderness.   Musculoskeletal:         General: Swelling present. Normal range of motion.      Cervical back: Normal range of motion and neck supple.   Skin:     General: Skin is warm.      Coloration: Skin is not jaundiced.   Neurological:      General: No focal deficit present.      Mental Status: He is alert and oriented to person, place, and time. Mental status is at baseline.      Comments: No focal neurological deficit at this time   Psychiatric:         Attention and Perception: Attention normal.         Speech: Speech normal.         Behavior: Behavior is cooperative.       Significant Labs: All pertinent labs within the past 24 hours have been reviewed.  CBC:   Recent Labs   Lab 09/03/22 1952 09/04/22  0605   WBC 5.56 6.22   HGB 15.3 14.6   HCT 43.4 44.2    168     CMP:   Recent Labs   Lab 09/03/22 1952 09/04/22  0605    142   K 3.1* 3.2*    108   CO2 18* 23   * 100   BUN 66* 61*   CREATININE 3.7* 3.4*   CALCIUM 9.8 9.4   PROT 6.7 6.3   ALBUMIN 3.6 3.5   BILITOT 0.5 0.4   ALKPHOS 83 79   AST 12 10   ALT 14 14   ANIONGAP 14 11       Significant Imaging: I have reviewed all pertinent imaging results/findings within the past 24 hours.      Assessment/Plan:      * Transient visual disturbance  - Place in Observation on Telemetry to r/o acute CVA.  - CT of the head showed no acute abnormality.  - Will obtain MRI of the brain (unable to perform due to body habitus)  - 2D echo.  - Check FLP and HbA1c.  - Neuro checks.  - Start daily  mg and Lipitor 40 mg.  - Allow for permissive HTN.   - PT/OT/ST  consult to eval and treat - no deficits  - Neurology consult.         Acute renal failure superimposed on stage 3 chronic kidney disease  Creatinine elevated 3.7 (baseline 1.3-1.7).    -continue NS at 125 cc/hour for the next 1 L.    Still elevated 3.4 in relation to baseline, continue IV fluids      Hypokalemia  - K 3.2  -replace KCL 40 meq PO       Elevated troponin  -denies chest pain  -trend cardiac enzymes  Elevated but flat - demand ischemia possibly from NINFA      Morbid obesity with BMI of 45.0-49.9, adult  Body mass index is 43.15 kg/m². Morbid obesity complicates all aspects of disease management from diagnostic modalities to treatment. Weight loss encouraged and health benefits explained to patient.         Type 2 diabetes mellitus with diabetic polyneuropathy, with long-term current use of insulin  Patient's FSGs are uncontrolled due to hyperglycemia on current medication regimen.  Last A1c reviewed-   Lab Results   Component Value Date    HGBA1C 6.5 (H) 08/15/2022     Most recent fingerstick glucose reviewed-   Recent Labs   Lab 09/03/22 2021 09/03/22  2258 09/04/22  0617 09/04/22  1052   POCTGLUCOSE 182* 99 105 188*     Current correctional scale  Medium  Increase anti-hyperglycemic dose as follows-   Antihyperglycemics (From admission, onward)    Start     Stop Route Frequency Ordered    09/04/22 2100  insulin detemir U-100 pen 30 Units         -- SubQ Nightly 09/03/22 2331    09/04/22 0029  insulin aspart U-100 pen 1-10 Units         -- SubQ Every 6 hours PRN 09/03/22 2329        Hold Oral hypoglycemics while patient is in the hospital.      VTE Risk Mitigation (From admission, onward)         Ordered     heparin (porcine) injection 5,000 Units  Every 12 hours         09/03/22 2328     IP VTE HIGH RISK PATIENT  Once         09/03/22 2328     Place sequential compression device  Until discontinued         09/03/22 2328                Discharge Planning   JOSE:      Code Status: Full Code   Is the  patient medically ready for discharge?:     Reason for patient still in hospital (select all that apply): Patient trending condition and Imaging                     Sheree Barron NP  Department of Hospital Medicine   [facility] - Med S[facility]

## 2022-09-04 NOTE — ASSESSMENT & PLAN NOTE
- Place in Observation on Telemetry to r/o acute CVA.  - CT of the head showed no acute abnormality.  - Will obtain MRI of the brain (unable to perform due to body habitus)  - 2D echo.  - Check FLP and HbA1c.  - Neuro checks.  - Start daily  mg and Lipitor 40 mg.  - Allow for permissive HTN.   - PT/OT/ST consult to eval and treat - no deficits  - Neurology consult.

## 2022-09-04 NOTE — HPI
66 y/o WM with L eye complete visual loss with some graying of vision in the temporal field on the R.   R shoulder numbness onset 11a.  Visual disturbance and numbness resolved beginning after 3 hours and now resolved. He has mild headache. There were associated palpitations.

## 2022-09-04 NOTE — PLAN OF CARE
Problem: Diabetes Comorbidity  Goal: Blood Glucose Level Within Targeted Range  Intervention: Monitor and Manage Glycemia  Flowsheets (Taken 9/4/2022 0146)  Glycemic Management: blood glucose monitored     Problem: Fluid and Electrolyte Imbalance (Acute Kidney Injury/Impairment)  Goal: Fluid and Electrolyte Balance  Intervention: Monitor and Manage Fluid and Electrolyte Balance  Flowsheets (Taken 9/4/2022 0146)  Fluid/Electrolyte Management: intravenous fluid replacement initiated     Problem: Renal Function Impairment (Acute Kidney Injury/Impairment)  Goal: Effective Renal Function  Intervention: Monitor and Support Renal Function  Flowsheets (Taken 9/4/2022 0146)  Medication Review/Management: medications reviewed     Problem: Infection  Goal: Absence of Infection Signs and Symptoms  Intervention: Prevent or Manage Infection  Flowsheets (Taken 9/4/2022 0146)  Infection Management: aseptic technique maintained     Problem: Bowel Elimination Impaired (Stroke, Ischemic/Transient Ischemic Attack)  Goal: Effective Bowel Elimination  Intervention: Promote Effective Bowel Elimination  Flowsheets (Taken 9/4/2022 0146)  Bowel Elimination Management:   relaxation techniques promoted   sitting position facilitated   hygiene measures promoted

## 2022-09-04 NOTE — ASSESSMENT & PLAN NOTE
Patient's FSGs are uncontrolled due to hyperglycemia on current medication regimen.  Last A1c reviewed-   Lab Results   Component Value Date    HGBA1C 6.5 (H) 08/15/2022     Most recent fingerstick glucose reviewed-   Recent Labs   Lab 09/03/22 2021 09/03/22  2258   POCTGLUCOSE 182* 99     Current correctional scale  Medium  Increase anti-hyperglycemic dose as follows-   Antihyperglycemics (From admission, onward)    Start     Stop Route Frequency Ordered    09/04/22 2100  insulin detemir U-100 pen 30 Units         -- SubQ Nightly 09/03/22 2331    09/04/22 0029  insulin aspart U-100 pen 1-10 Units         -- SubQ Every 6 hours PRN 09/03/22 2329        Hold Oral hypoglycemics while patient is in the hospital.

## 2022-09-04 NOTE — SUBJECTIVE & OBJECTIVE
Interval History:  See Hospital Course    Review of Systems   Constitutional: Negative.  Negative for chills and fever.   HENT: Negative.  Negative for congestion, rhinorrhea, sore throat and trouble swallowing.    Eyes:  Positive for visual disturbance (left).   Respiratory: Negative.  Negative for cough, shortness of breath and wheezing.    Cardiovascular:  Positive for leg swelling. Negative for chest pain and palpitations.   Gastrointestinal: Negative.  Negative for abdominal pain, diarrhea, nausea and vomiting.   Endocrine: Negative.    Genitourinary: Negative.  Negative for dysuria and flank pain.   Musculoskeletal: Negative.  Negative for back pain.   Skin: Negative.  Negative for rash.   Allergic/Immunologic: Negative.    Neurological:  Positive for numbness (right facial). Negative for speech difficulty, weakness and headaches.   Hematological: Negative.    Psychiatric/Behavioral: Negative.  Negative for hallucinations.    All other systems reviewed and are negative.  Objective:     Vital Signs (Most Recent):  Temp: 97.8 °F (36.6 °C) (09/04/22 1052)  Pulse: (!) 47 (09/04/22 1052)  Resp: 18 (09/04/22 1052)  BP: (!) 141/67 (09/04/22 1052)  SpO2: 97 % (09/04/22 1052)   Vital Signs (24h Range):  Temp:  [97.8 °F (36.6 °C)-98.1 °F (36.7 °C)] 97.8 °F (36.6 °C)  Pulse:  [41-79] 47  Resp:  [13-19] 18  SpO2:  [97 %-98 %] 97 %  BP: (141-150)/(67-96) 141/67     Weight: (!) 173.7 kg (383 lb)  Body mass index is 43.15 kg/m².    Intake/Output Summary (Last 24 hours) at 9/4/2022 1133  Last data filed at 9/4/2022 0500  Gross per 24 hour   Intake 360 ml   Output 800 ml   Net -440 ml      Physical Exam  Vitals and nursing note reviewed.   Constitutional:       General: He is awake. He is not in acute distress.     Appearance: He is obese. He is not ill-appearing.   HENT:      Head: Normocephalic and atraumatic.      Mouth/Throat:      Mouth: Mucous membranes are moist.   Eyes:      General: No scleral icterus.     Extraocular  Movements: Extraocular movements intact.      Conjunctiva/sclera: Conjunctivae normal.      Pupils: Pupils are equal, round, and reactive to light.   Cardiovascular:      Rate and Rhythm: Normal rate and regular rhythm.      Heart sounds: No murmur heard.  Pulmonary:      Effort: Pulmonary effort is normal. No respiratory distress.      Breath sounds: Normal breath sounds. No wheezing.   Abdominal:      Palpations: Abdomen is soft.      Tenderness: There is no abdominal tenderness.   Musculoskeletal:         General: Swelling present. Normal range of motion.      Cervical back: Normal range of motion and neck supple.   Skin:     General: Skin is warm.      Coloration: Skin is not jaundiced.   Neurological:      General: No focal deficit present.      Mental Status: He is alert and oriented to person, place, and time. Mental status is at baseline.      Comments: No focal neurological deficit at this time   Psychiatric:         Attention and Perception: Attention normal.         Speech: Speech normal.         Behavior: Behavior is cooperative.       Significant Labs: All pertinent labs within the past 24 hours have been reviewed.  CBC:   Recent Labs   Lab 09/03/22 1952 09/04/22  0605   WBC 5.56 6.22   HGB 15.3 14.6   HCT 43.4 44.2    168     CMP:   Recent Labs   Lab 09/03/22 1952 09/04/22  0605    142   K 3.1* 3.2*    108   CO2 18* 23   * 100   BUN 66* 61*   CREATININE 3.7* 3.4*   CALCIUM 9.8 9.4   PROT 6.7 6.3   ALBUMIN 3.6 3.5   BILITOT 0.5 0.4   ALKPHOS 83 79   AST 12 10   ALT 14 14   ANIONGAP 14 11       Significant Imaging: I have reviewed all pertinent imaging results/findings within the past 24 hours.

## 2022-09-04 NOTE — ASSESSMENT & PLAN NOTE
-denies chest pain  -trend cardiac enzymes  Elevated but flat - demand ischemia possibly from NINFA

## 2022-09-04 NOTE — PLAN OF CARE
Initial PT eval completed. Patient ambulated 150 feet, Mod I. Patient will not require skilled therapy services at this time and will be DC to people movers program for gait/TE to tolerance. Recommend home upon DC from hospital.

## 2022-09-04 NOTE — HOSPITAL COURSE
Pt reports chronic right facial weakness from having [place] Palsy. His left eye vision loss came on suddenly and lasted 3 hours. Now it is resolved. Vascular Neurologist voiced concern for left carotid TIA/Stroke. Unfortunately, due to patient's body habitus the MRI of Brain cannot be performed. Will repeat CT of Head 24 hours from initial CT. Carotid US pending. Pt with new NINFA with creatinine 3.7 >> 3.4 (baseline 1.5) and unable to perform CTA of head and neck at this time. Gentle IV fluids and nephrotoxic meds held. Recent ECHO with EF 55 % and no wall motion abnormalities. Last Hgb A1 C = 6.5. Pt on full strength ASA and STATIN. He had no speech/cognitive abnormalities/ no functional immobility. Hypokalemia replaced. As of 9/5/2022, creatinine trending downward 2.8 today (baseline around 1.5-1.9) will continue gentle hydration. Patient reports left eye vision back to baseline blurriness. 9/6/22-Pt doing well. Renal function continues to improve. Cardiology consulted for bradycardia. 9/7/22- Heart rate has improved. Cardiology resumed coreg at a lower dose. Cardiology will arrange for OP vital connect monitor to rule out arrhythmia related events. Patient seen and examined on the date of discharge and found stable for discharge. Home meds reconciled. Patient to follow-up with PCP, cardiology , and vascular surgery outpatient.

## 2022-09-04 NOTE — ED PROVIDER NOTES
"SCRIBE #1 NOTE: I, Yousef Ashirena, am scribing for, and in the presence of, Dwayne Oconnor MD. I have scribed the entire note.       History     Chief Complaint   Patient presents with    Chest Pain     Onset of right shoulder pain, dizziness, and nausea at 1100 that progressed to palpitations and and chest pain.     Review of patient's allergies indicates:   Allergen Reactions    Cefazolin Other (See Comments)     Shakes, chills, dizziness         History of Present Illness     HPI    9/3/2022, 7:25 PM  History obtained from the patient      History of Present Illness: Stepan Springer is a 67 y.o. male patient with a PMHx of DM, HTN, CHF, and CKD who presents to the Emergency Department for evaluation of R shoulder pain which onset gradually at 11:00 AM today. Pt states pain felt like his shoulder area went "hard". Pt also reports his vision going black at the same time and lasted 3 hours. 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 Symptoms are constant and moderate in severity. No mitigating or exacerbating factors reported. No associated sxs include reported. Patient denies any fever, chills, numbness, SOB, n/v, and all other sxs at this time. No further complaints or concerns at this time.       Arrival mode: EMS    PCP: KANDICE Fleming MD        Past Medical History:  Past Medical History:   Diagnosis Date    Anxiety     Bell's palsy     CHF (congestive heart failure)     Chronic kidney disease, stage 3a 11/02/2021    Coronary artery disease involving native coronary artery without angina pectoris 10/18/2016    Depression     Diabetes mellitus     Glaucoma     Hypertension     Idiopathic peripheral neuropathy 12/11/2012    Lymphedema of both lower extremities     Mixed hyperlipidemia 12/11/2012    Morbid obesity with BMI of 45.0-49.9, adult 02/12/2019    Pancytopenia     Sleep apnea     "unable to use"    Stage 3b chronic kidney disease     Type 2 " "diabetes mellitus with diabetic polyneuropathy, with long-term current use of insulin 12/11/2012    Vitamin B 12 deficiency 08/23/2012       Past Surgical History:  Past Surgical History:   Procedure Laterality Date    CARDIAC CATHETERIZATION  7/22/13    non obstructive cad    CATARACT EXTRACTION  OU    COLONOSCOPY N/A 5/1/2019    Procedure: COLONOSCOPY;  Surgeon: Henry Mo III, MD;  Location: [facility];  Service: Endoscopy;  Laterality: N/A;    CORONARY ANGIOPLASTY      ESOPHAGOGASTRODUODENOSCOPY N/A 5/1/2019    Procedure: ESOPHAGOGASTRODUODENOSCOPY (EGD);  Surgeon: Henry Mo III, MD;  Location: [facility] ENDO;  Service: Endoscopy;  Laterality: N/A;    EYE SURGERY      FRACTURE SURGERY      kidney stone       August 2016    PC IOL OU      RETINAL DETACHMENT REPAIR W/ SCLERAL BUCKLE LE      WRIST SURGERY           Family History:  Family History   Problem Relation Age of Onset    Macular degeneration Father     Heart disease Father         CHF     Heart attack Father     Hypertension Mother     Macular degeneration Paternal Uncle     Hypertension Maternal Grandmother     Hypertension Maternal Grandfather     Strabismus Neg Hx     Retinal detachment Neg Hx     Glaucoma Neg Hx     Blindness Neg Hx     Amblyopia Neg Hx        Social History:  Social History     Tobacco Use    Smoking status: Never    Smokeless tobacco: Never   Substance and Sexual Activity    Alcohol use: Yes     Comment: " one to two glasses of wine per year"    Drug use: No    Sexual activity: Not Currently     Birth control/protection: None        Review of Systems     Review of Systems   Constitutional:  Negative for chills and fever.   HENT:  Negative for sore throat.    Eyes:  Positive for visual disturbance.   Respiratory:  Negative for shortness of breath.    Cardiovascular:  Negative for chest pain.   Gastrointestinal:  Negative for nausea and vomiting.   Genitourinary:  Negative for dysuria.   Musculoskeletal:  Negative for back " pain.        (+) R shoulder pain   Skin:  Negative for rash.   Neurological:  Negative for weakness and numbness.   Hematological:  Does not bruise/bleed easily.   All other systems reviewed and are negative.     Physical Exam     Initial Vitals [09/03/22 1919]   BP Pulse Resp Temp SpO2   (!) 148/96 73 18 98 °F (36.7 °C) 97 %      MAP       --          Physical Exam  Nursing Notes and Vital Signs Reviewed.  Constitutional: Patient is in no acute distress. Well-developed and well-nourished.  Head: Atraumatic. Normocephalic.  Eyes: PERRL. EOM intact. Conjunctivae are not pale. No scleral icterus. Conjunctival injection to R eye.  ENT: Mucous membranes are moist. Oropharynx is clear and symmetric.    Neck: Supple. Full ROM. No lymphadenopathy.  Cardiovascular: Regular rate. Regular rhythm. No murmurs, rubs, or gallops. Distal pulses are 2+ and symmetric.  Pulmonary/Chest: No respiratory distress. Clear to auscultation bilaterally. No wheezing or rales.  Abdominal: Soft and non-distended.  There is no tenderness.  No rebound, guarding, or rigidity. Good bowel sounds.  Genitourinary: No CVA tenderness  Musculoskeletal: Moves all extremities. No obvious deformities. No calf tenderness. 2+ edema to legs bilaterally.  Skin: Warm and dry.  Neurological:  Alert, awake, and appropriate.  Normal speech.  No acute focal neurological deficits are appreciated.  Psychiatric: Normal affect. Good eye contact. Appropriate in content.     ED Course   Critical Care    Date/Time: 10/5/2022 4:24 AM  Performed by: Dwayne Oconnor MD  Authorized by: Severiano Vega MD   Total critical care time (exclusive of procedural time) : 45 minutes  Critical care time was exclusive of separately billable procedures and treating other patients.  Critical care was necessary to treat or prevent imminent or life-threatening deterioration of the following conditions: dehydration and renal failure.  Critical care was time spent personally  by me on the following activities: blood draw for specimens, development of treatment plan with patient or surrogate, discussions with consultants, review of old charts, re-evaluation of patient's condition, pulse oximetry, ordering and review of radiographic studies, ordering and review of laboratory studies, ordering and performing treatments and interventions, obtaining history from patient or surrogate and examination of patient.      ED Vital Signs:  Vitals:    09/05/22 2300 09/06/22 0015 09/06/22 0100 09/06/22 0441   BP:  (!) 142/62  (!) 176/81   Pulse: (!) 50 (!) 55 (!) 51 (!) 53   Resp:  20  20   Temp:  98.8 °F (37.1 °C)  98.6 °F (37 °C)   TempSrc:  Oral  Oral   SpO2:  97%  98%   Weight:       Height:        09/06/22 0724 09/06/22 0900 09/06/22 1115 09/06/22 1128   BP: (!) 175/86   (!) 189/85   Pulse: (!) 49 (!) 51 (!) 54 (!) 49   Resp: 20   20   Temp: 98.4 °F (36.9 °C)   98.2 °F (36.8 °C)   TempSrc: Oral   Oral   SpO2: 97%   98%   Weight:       Height:        09/06/22 1630 09/06/22 1944 09/07/22 0000 09/07/22 0402   BP: (!) 173/75 (!) 202/98  (!) 181/86   Pulse: (!) 51 (!) 57  (!) 51   Resp: 19 18  18   Temp: 98.1 °F (36.7 °C) 98.8 °F (37.1 °C) 98.4 °F (36.9 °C) 98.5 °F (36.9 °C)   TempSrc: Oral Oral  Oral   SpO2: 97% 98%  97%   Weight:       Height:        09/07/22 0744 09/07/22 0910 09/07/22 1324   BP: (!) 169/80  (!) 177/89   Pulse: 60 65 69   Resp: 16  16   Temp: 98.1 °F (36.7 °C)  98 °F (36.7 °C)   TempSrc: Oral  Oral   SpO2: 97%  97%   Weight:      Height:          Abnormal Lab Results:  Labs Reviewed   COMPREHENSIVE METABOLIC PANEL - Abnormal; Notable for the following components:       Result Value    Potassium 3.1 (*)     CO2 18 (*)     Glucose 224 (*)     BUN 66 (*)     Creatinine 3.7 (*)     eGFR 17 (*)     All other components within normal limits   TROPONIN I - Abnormal; Notable for the following components:    Troponin I 0.034 (*)     All other components within normal limits   POCT GLUCOSE -  Abnormal; Notable for the following components:    POCT Glucose 182 (*)     All other components within normal limits   CBC W/ AUTO DIFFERENTIAL   PROTIME-INR   TSH   APTT   SARS-COV-2 RNA AMPLIFICATION, QUAL   SEDIMENTATION RATE        All Lab Results:        Imaging Results:  Imaging Results              CT Head Without Contrast (Final result)  Result time 09/03/22 20:15:53      Final result by Aiden Almeida MD (09/03/22 20:15:53)                   Impression:      No acute abnormality.    Atrophy and chronic white matter changes.    Prominence of the lateral ventricles similar to prior exam    All CT scans   are performed using dose optimization techniques including the following: automated exposure control; adjustment of the mA and/or kV; use of iterative reconstruction technique.  Dose modulation was employed for ALARA by means of: Automated exposure control; adjustment of the mA and/or kV according to patient size (this includes techniques or standardized protocols for targeted exams where dose is matched to indication/reason for exam; i.e. extremities or head); and/or use of iterative reconstructive technique.      Electronically signed by: Juan Pablo Wolfe  Date:    09/03/2022  Time:    20:15               Narrative:    EXAMINATION:  CT HEAD WITHOUT CONTRAST    CLINICAL HISTORY:  Neuro deficit, acute, stroke suspected;    TECHNIQUE:  Low dose axial CT images obtained throughout the head without intravenous contrast. Sagittal and coronal reconstructions were performed.    COMPARISON:  None.    FINDINGS:  Atrophy and chronic white matter changes.  Prominence of the lateral ventricles similar to prior exam.  No extra-axial blood or fluid collections.    No parenchymal mass, hemorrhage, edema or major vascular distribution infarct.    Skull/extracranial contents (limited evaluation): No fracture. Minimal mucosal thickening of the left maxillary sinus.                                       X-Ray Chest AP Portable  (Final result)  Result time 09/03/22 19:42:38      Final result by Aiden Almeida MD (09/03/22 19:42:38)                   Impression:      No acute abnormality.      Electronically signed by: Juan Pablo Wolfe  Date:    09/03/2022  Time:    19:42               Narrative:    EXAMINATION:  XR CHEST AP PORTABLE    CLINICAL HISTORY:  Stroke;    TECHNIQUE:  Single frontal view of the chest was performed.    COMPARISON:  None    FINDINGS:  The lungs are clear, with normal appearance of pulmonary vasculature and no pleural effusion or pneumothorax.    The cardiac silhouette is normal in size. The hilar and mediastinal contours are unremarkable.    Bones are intact.                                       The EKG was ordered, reviewed, and independently interpreted by the ED provider.  Interpretation time: 19:19  Rate: 71 BPM  Rhythm:  Sinus rhythm with 1st degree A-V block  Interpretation: Left bundle branch block. No STEMI.             The Emergency Provider reviewed the vital signs and test results, which are outlined above.     ED Discussion     7:44 PM: Discussed pt's case with Dr. Hale (Ophthalmology) who states if it was a true binocular event then a CNS event has to be considered and ruled out.  He states this patient has almost total loss of vision in his right eye from glaucoma and a great deal of loss in his left. He states the pt was in the ophthalmology very recently and his glaucoma was controlled so these symptoms are most certainly not glaucoma related and has had recent high blood pressure problems that may be contributing . He agrees work up approach is appropriate.    9:25 PM: Discussed pt's case with Dr. Aldridge ([facility] medicine) who recommends mri/mra first thing in the AM.      9:27 PM: Discussed case with Bigg Aldridge MD ([facility] Medicine). Dr. Aldridge agrees with current care and management of pt and accepts admission.   Admitting Service: Hospital Medicine  Admitting Physician: Dr. Aldridge  Admit  to: obs     9:27 PM: Re-evaluated pt. I have discussed test results, shared treatment plan, and the need for admission with patient and family at bedside. Pt and family express understanding at this time and agree with all information. All questions answered. Pt and family have no further questions or concerns at this time. Pt is ready for admit.           Medical Decision Making:   Clinical Tests:   Lab Tests: Ordered and Reviewed  Radiological Study: Ordered and Reviewed  Medical Tests: Ordered and Reviewed         ED Medication(s):  Medications   potassium chloride SA CR tablet 40 mEq (40 mEq Oral Given 9/4/22 0029)               Scribe Attestation:   Scribe #1: I performed the above scribed service and the documentation accurately describes the services I performed. I attest to the accuracy of the note.     Attending:   Physician Attestation Statement for Scribe #1: I, Dwayne Oconnor,*, personally performed the services described in this documentation, as scribed by Peace Wilcox, in my presence, and it is both accurate and complete.           Clinical Impression       ICD-10-CM ICD-9-CM   1. Acute loss of vision, bilateral  H53.133 368.11   2. Chest pain  R07.9 786.50   3. Stroke  I63.9 434.91   4. NINFA (acute kidney injury)  N17.9 584.9   5. TIA (transient ischemic attack)  G45.9 435.9   6. Hypertension associated with diabetes  E11.59 250.80    I15.2 401.9   7. Essential hypertension  I10 401.9   8. Coronary artery disease involving native coronary artery of native heart without angina pectoris  I25.10 414.01   9. Type 2 diabetes mellitus with stage 3a chronic kidney disease, with long-term current use of insulin  E11.22 250.40    N18.31 585.3    Z79.4 V58.67       Disposition:   Disposition: Placed in Observation  Condition: Fair       Dwayne Oconnor MD  09/04/22 3632       Dwayne Oconnor MD  10/05/22 0526

## 2022-09-04 NOTE — SUBJECTIVE & OBJECTIVE
Past Medical History:   Diagnosis Date    Anxiety     Bell's palsy     CHF (congestive heart failure)     Chronic kidney disease, stage 3a 11/02/2021    Coronary artery disease involving native coronary artery without angina pectoris 10/18/2016    Depression     Diabetes mellitus     Glaucoma     Hypertension     Idiopathic peripheral neuropathy 12/11/2012    Lymphedema of both lower extremities     Mixed hyperlipidemia 12/11/2012    Morbid obesity with BMI of 45.0-49.9, adult 02/12/2019    Pancytopenia     Sleep apnea     Stage 3b chronic kidney disease     Type 2 diabetes mellitus with diabetic polyneuropathy, with long-term current use of insulin 12/11/2012    Vitamin B 12 deficiency 08/23/2012       Past Surgical History:   Procedure Laterality Date    CARDIAC CATHETERIZATION  7/22/13    non obstructive cad    CATARACT EXTRACTION  OU    COLONOSCOPY N/A 5/1/2019    Procedure: COLONOSCOPY;  Surgeon: Henry Mo III, MD;  Location: [facility];  Service: Endoscopy;  Laterality: N/A;    CORONARY ANGIOPLASTY      ESOPHAGOGASTRODUODENOSCOPY N/A 5/1/2019    Procedure: ESOPHAGOGASTRODUODENOSCOPY (EGD);  Surgeon: Henry Mo III, MD;  Location: [facility];  Service: Endoscopy;  Laterality: N/A;    EYE SURGERY      FRACTURE SURGERY      kidney stone       August 2016    PC IOL OU      RETINAL DETACHMENT REPAIR W/ SCLERAL BUCKLE LE      WRIST SURGERY         Review of patient's allergies indicates:   Allergen Reactions    Cefazolin Other (See Comments)     Shakes, chills, dizziness       No current facility-administered medications on file prior to encounter.     Current Outpatient Medications on File Prior to Encounter   Medication Sig    amLODIPine (NORVASC) 5 MG tablet Take 1 tablet (5 mg total) by mouth every evening.    aspirin 325 MG tablet Take 325 mg by mouth once daily.    atorvastatin (LIPITOR) 20 MG tablet Take 1 tablet (20 mg total) by mouth every evening.    brimonidine 0.1% (ALPHAGAN P) 0.1 % Drop  "Place 1 drop into both eyes 3 (three) times daily. Order 90 day supply    brinzolamide (AZOPT) 1 % ophthalmic suspension Place 1 drop into both eyes 3 (three) times daily. Brand name only    carvediloL (COREG) 25 MG tablet Take 1 tablet (25 mg total) by mouth 2 (two) times daily with meals.    cloNIDine (CATAPRES) 0.1 MG tablet Take 1 tablet (0.1 mg total) by mouth 3 (three) times daily.    empagliflozin (JARDIANCE) 25 mg tablet Take 1 tablet (25 mg total) by mouth once daily.    flash glucose sensor (FREESTYLE CLEMENCIA 14 DAY SENSOR) Kit 1 each by Misc.(Non-Drug; Combo Route) route every 14 (fourteen) days.    furosemide (LASIX) 40 MG tablet Take 1.5 tablets (60 mg total) by mouth 2 (two) times a day. (Patient taking differently: Take 40 mg by mouth 3 (three) times daily.)    insulin aspart U-100 (NOVOLOG) 100 unit/mL (3 mL) InPn pen Inject 25 Units into the skin 3 (three) times daily with meals.    insulin glargine U-300 conc (TOUJEO MAX U-300 SOLOSTAR) 300 unit/mL (3 mL) insulin pen Inject 80 Units into the skin once daily.    lisinopriL (PRINIVIL,ZESTRIL) 40 MG tablet Take 1 tablet (40 mg total) by mouth once daily.    mupirocin (BACTROBAN) 2 % ointment Apply topically once daily.    netarsudiL (RHOPRESSA) 0.02 % ophthalmic solution Place 1 drop into both eyes once daily.    ofloxacin (OCUFLOX) 0.3 % ophthalmic solution Place 1 drop into both eyes 4 (four) times daily.    pen needle, diabetic (BD ULTRA-FINE AMADNA PEN NEEDLE) 32 gauge x 5/32" Ndle 1 each by Misc.(Non-Drug; Combo Route) route 4 (four) times daily with meals and nightly.    nitroGLYCERIN (NITROSTAT) 0.4 MG SL tablet PLACE 1 TABLET (0.4 MG TOTAL) UNDER THE TONGUE EVERY 5 (FIVE) MINUTES AS NEEDED FOR CHEST PAIN.    [DISCONTINUED] hydrALAZINE (APRESOLINE) 100 MG tablet Take 1 tablet (100 mg total) by mouth every 8 (eight) hours.    [DISCONTINUED] hydroCHLOROthiazide (HYDRODIURIL) 25 MG tablet Take 1 tablet (25 mg total) by mouth once daily.    " "[DISCONTINUED] miglitoL (GLYSET) 25 MG Tab Take 1 tablet (25 mg total) by mouth 3 (three) times daily with meals.     Family History       Problem Relation (Age of Onset)    Heart attack Father    Heart disease Father    Hypertension Mother, Maternal Grandmother, Maternal Grandfather    Macular degeneration Father, Paternal Uncle          Tobacco Use    Smoking status: Never    Smokeless tobacco: Never   Substance and Sexual Activity    Alcohol use: Yes     Comment: " one to two glasses of wine per year"    Drug use: No    Sexual activity: Not Currently     Birth control/protection: None     Review of Systems   Constitutional: Negative.  Negative for chills and fever.   HENT: Negative.  Negative for congestion, rhinorrhea, sore throat and trouble swallowing.    Eyes:  Positive for visual disturbance (left).   Respiratory: Negative.  Negative for cough, shortness of breath and wheezing.    Cardiovascular:  Positive for leg swelling. Negative for chest pain and palpitations.   Gastrointestinal: Negative.  Negative for abdominal pain, diarrhea, nausea and vomiting.   Endocrine: Negative.    Genitourinary: Negative.  Negative for dysuria and flank pain.   Musculoskeletal: Negative.  Negative for back pain.   Skin: Negative.  Negative for rash.   Allergic/Immunologic: Negative.    Neurological:  Positive for numbness (right facial). Negative for speech difficulty, weakness and headaches.   Hematological: Negative.    Psychiatric/Behavioral: Negative.  Negative for hallucinations.    All other systems reviewed and are negative.  Objective:     Vital Signs (Most Recent):  Temp: 98.1 °F (36.7 °C) (09/03/22 2259)  Pulse: 67 (09/03/22 2259)  Resp: 19 (09/03/22 2259)  BP: (!) 149/80 (09/03/22 2259)  SpO2: 97 % (09/03/22 2259)   Vital Signs (24h Range):  Temp:  [98 °F (36.7 °C)-98.1 °F (36.7 °C)] 98.1 °F (36.7 °C)  Pulse:  [67-73] 67  Resp:  [13-19] 19  SpO2:  [97 %-98 %] 97 %  BP: (148-150)/(79-96) 149/80        Body mass " index is 43.36 kg/m².    Physical Exam  Vitals and nursing note reviewed.   Constitutional:       General: He is awake. He is not in acute distress.     Appearance: He is morbidly obese. He is not ill-appearing.   HENT:      Head: Normocephalic and atraumatic.      Mouth/Throat:      Mouth: Mucous membranes are moist.   Eyes:      General: No scleral icterus.     Conjunctiva/sclera: Conjunctivae normal.   Cardiovascular:      Rate and Rhythm: Normal rate and regular rhythm.      Heart sounds: No murmur heard.  Pulmonary:      Effort: Pulmonary effort is normal. No respiratory distress.      Breath sounds: Normal breath sounds. No wheezing.   Abdominal:      Palpations: Abdomen is soft.      Tenderness: There is no abdominal tenderness.   Musculoskeletal:         General: Swelling present. Normal range of motion.      Cervical back: Normal range of motion and neck supple.   Skin:     General: Skin is warm.      Coloration: Skin is not jaundiced.   Neurological:      General: No focal deficit present.      Mental Status: He is alert and oriented to person, place, and time. Mental status is at baseline.      Comments: No focal neurological doses at this time   Psychiatric:         Attention and Perception: Attention normal.         Speech: Speech normal.         Behavior: Behavior is cooperative.           Significant Labs: All pertinent labs within the past 24 hours have been reviewed.  BMP:   Recent Labs   Lab 09/03/22 1952   *      K 3.1*      CO2 18*   BUN 66*   CREATININE 3.7*   CALCIUM 9.8     CBC:   Recent Labs   Lab 09/03/22 1952   WBC 5.56   HGB 15.3   HCT 43.4        CMP:   Recent Labs   Lab 09/03/22 1952      K 3.1*      CO2 18*   *   BUN 66*   CREATININE 3.7*   CALCIUM 9.8   PROT 6.7   ALBUMIN 3.6   BILITOT 0.5   ALKPHOS 83   AST 12   ALT 14   ANIONGAP 14       Significant Imaging: I have reviewed all pertinent imaging results/findings within the past 24  hours.  I have reviewed and interpreted all pertinent imaging results/findings within the past 24 hours.    Imaging Results              CT Head Without Contrast (Final result)  Result time 09/03/22 20:15:53      Final result by Aiden Almeida MD (09/03/22 20:15:53)                   Impression:      No acute abnormality.    Atrophy and chronic white matter changes.    Prominence of the lateral ventricles similar to prior exam    All CT scans   are performed using dose optimization techniques including the following: automated exposure control; adjustment of the mA and/or kV; use of iterative reconstruction technique.  Dose modulation was employed for ALARA by means of: Automated exposure control; adjustment of the mA and/or kV according to patient size (this includes techniques or standardized protocols for targeted exams where dose is matched to indication/reason for exam; i.e. extremities or head); and/or use of iterative reconstructive technique.      Electronically signed by: Juan Pablo Wolfe  Date:    09/03/2022  Time:    20:15               Narrative:    EXAMINATION:  CT HEAD WITHOUT CONTRAST    CLINICAL HISTORY:  Neuro deficit, acute, stroke suspected;    TECHNIQUE:  Low dose axial CT images obtained throughout the head without intravenous contrast. Sagittal and coronal reconstructions were performed.    COMPARISON:  None.    FINDINGS:  Atrophy and chronic white matter changes.  Prominence of the lateral ventricles similar to prior exam.  No extra-axial blood or fluid collections.    No parenchymal mass, hemorrhage, edema or major vascular distribution infarct.    Skull/extracranial contents (limited evaluation): No fracture. Minimal mucosal thickening of the left maxillary sinus.                                       X-Ray Chest AP Portable (Final result)  Result time 09/03/22 19:42:38      Final result by Aiden Almeida MD (09/03/22 19:42:38)                   Impression:      No acute  abnormality.      Electronically signed by: Juan Pablo Wolfe  Date:    09/03/2022  Time:    19:42               Narrative:    EXAMINATION:  XR CHEST AP PORTABLE    CLINICAL HISTORY:  Stroke;    TECHNIQUE:  Single frontal view of the chest was performed.    COMPARISON:  None    FINDINGS:  The lungs are clear, with normal appearance of pulmonary vasculature and no pleural effusion or pneumothorax.    The cardiac silhouette is normal in size. The hilar and mediastinal contours are unremarkable.    Bones are intact.                                      I have independently reviewed and interpreted the EKG.     I have independently reviewed all pertinent labs within the past 24 hours.    I have independently reviewed, visualized and interpreted all pertinent imaging results within the past 24 hours and discussed the findings with the ED physician, Dr. Oconnor

## 2022-09-04 NOTE — H&P
[facility]  [facility] Medicine  History & Physical    Patient Name: Stepan Springer  MRN: [id number]  Patient Class: OP- Observation  Admission Date: 9/3/2022  Attending Physician: Severiano Vega, *   Primary Care Provider: KANDICE Fleming MD         Patient information was obtained from patient, past medical records and ER records.     Subjective:     Principal Problem:Transient visual disturbance    Chief Complaint:   Chief Complaint   Patient presents with    Chest Pain     Onset of right shoulder pain, dizziness, and nausea at 1100 that progressed to palpitations and and chest pain.        HPI: Mr. Gordon is a 67-year-old morbidly obese  male with PMH significant for CAD, diastolic CHF, CKD stage 3, insulin-dependent diabetes, hypertension, presented to the ED complaining of left eye visual loss associated the right facial weakness.  The left eye vision disturbances have significantly improved.  Patient deemed not a candidate for tPA a symptoms ongoing since yesterday, and possibly much longer.  Evaluated by tele Neurology, concern for left carotid TIA/stroke.  Recommended CTA head and neck, however creatinine elevated 3.7 (1.23-1.7).  Admitted for full TIA workup.  Laboratory workup reveals potassium 3.1, creatinine 3.7.    Admitting diagnosis:  Left visual disturbances.  Concern for TIA/CVA.      Past Medical History:   Diagnosis Date    Anxiety     Bell's palsy     CHF (congestive heart failure)     Chronic kidney disease, stage 3a 11/02/2021    Coronary artery disease involving native coronary artery without angina pectoris 10/18/2016    Depression     Diabetes mellitus     Glaucoma     Hypertension     Idiopathic peripheral neuropathy 12/11/2012    Lymphedema of both lower extremities     Mixed hyperlipidemia 12/11/2012    Morbid obesity with BMI of 45.0-49.9, adult 02/12/2019    Pancytopenia     Sleep apnea     Stage 3b chronic kidney disease     Type 2  diabetes mellitus with diabetic polyneuropathy, with long-term current use of insulin 12/11/2012    Vitamin B 12 deficiency 08/23/2012       Past Surgical History:   Procedure Laterality Date    CARDIAC CATHETERIZATION  7/22/13    non obstructive cad    CATARACT EXTRACTION  OU    COLONOSCOPY N/A 5/1/2019    Procedure: COLONOSCOPY;  Surgeon: Henry Mo III, MD;  Location: [facility] ENDO;  Service: Endoscopy;  Laterality: N/A;    CORONARY ANGIOPLASTY      ESOPHAGOGASTRODUODENOSCOPY N/A 5/1/2019    Procedure: ESOPHAGOGASTRODUODENOSCOPY (EGD);  Surgeon: Henry Mo III, MD;  Location: [facility] ENDO;  Service: Endoscopy;  Laterality: N/A;    EYE SURGERY      FRACTURE SURGERY      kidney stone       August 2016    PC IOL OU      RETINAL DETACHMENT REPAIR W/ SCLERAL BUCKLE LE      WRIST SURGERY         Review of patient's allergies indicates:   Allergen Reactions    Cefazolin Other (See Comments)     Shakes, chills, dizziness       No current facility-administered medications on file prior to encounter.     Current Outpatient Medications on File Prior to Encounter   Medication Sig    amLODIPine (NORVASC) 5 MG tablet Take 1 tablet (5 mg total) by mouth every evening.    aspirin 325 MG tablet Take 325 mg by mouth once daily.    atorvastatin (LIPITOR) 20 MG tablet Take 1 tablet (20 mg total) by mouth every evening.    brimonidine 0.1% (ALPHAGAN P) 0.1 % Drop Place 1 drop into both eyes 3 (three) times daily. Order 90 day supply    brinzolamide (AZOPT) 1 % ophthalmic suspension Place 1 drop into both eyes 3 (three) times daily. Brand name only    carvediloL (COREG) 25 MG tablet Take 1 tablet (25 mg total) by mouth 2 (two) times daily with meals.    cloNIDine (CATAPRES) 0.1 MG tablet Take 1 tablet (0.1 mg total) by mouth 3 (three) times daily.    empagliflozin (JARDIANCE) 25 mg tablet Take 1 tablet (25 mg total) by mouth once daily.    flash glucose sensor (FREESTYLE CLEMENCIA 14 DAY SENSOR) Kit 1 each by  "Misc.(Non-Drug; Combo Route) route every 14 (fourteen) days.    furosemide (LASIX) 40 MG tablet Take 1.5 tablets (60 mg total) by mouth 2 (two) times a day. (Patient taking differently: Take 40 mg by mouth 3 (three) times daily.)    insulin aspart U-100 (NOVOLOG) 100 unit/mL (3 mL) InPn pen Inject 25 Units into the skin 3 (three) times daily with meals.    insulin glargine U-300 conc (TOUJEO MAX U-300 SOLOSTAR) 300 unit/mL (3 mL) insulin pen Inject 80 Units into the skin once daily.    lisinopriL (PRINIVIL,ZESTRIL) 40 MG tablet Take 1 tablet (40 mg total) by mouth once daily.    mupirocin (BACTROBAN) 2 % ointment Apply topically once daily.    netarsudiL (RHOPRESSA) 0.02 % ophthalmic solution Place 1 drop into both eyes once daily.    ofloxacin (OCUFLOX) 0.3 % ophthalmic solution Place 1 drop into both eyes 4 (four) times daily.    pen needle, diabetic (BD ULTRA-FINE AMANDA PEN NEEDLE) 32 gauge x 5/32" Ndle 1 each by Misc.(Non-Drug; Combo Route) route 4 (four) times daily with meals and nightly.    nitroGLYCERIN (NITROSTAT) 0.4 MG SL tablet PLACE 1 TABLET (0.4 MG TOTAL) UNDER THE TONGUE EVERY 5 (FIVE) MINUTES AS NEEDED FOR CHEST PAIN.    [DISCONTINUED] hydrALAZINE (APRESOLINE) 100 MG tablet Take 1 tablet (100 mg total) by mouth every 8 (eight) hours.    [DISCONTINUED] hydroCHLOROthiazide (HYDRODIURIL) 25 MG tablet Take 1 tablet (25 mg total) by mouth once daily.    [DISCONTINUED] miglitoL (GLYSET) 25 MG Tab Take 1 tablet (25 mg total) by mouth 3 (three) times daily with meals.     Family History       Problem Relation (Age of Onset)    Heart attack Father    Heart disease Father    Hypertension Mother, Maternal Grandmother, Maternal Grandfather    Macular degeneration Father, Paternal Uncle          Tobacco Use    Smoking status: Never    Smokeless tobacco: Never   Substance and Sexual Activity    Alcohol use: Yes     Comment: " one to two glasses of wine per year"    Drug use: No    Sexual activity: " Not Currently     Birth control/protection: None     Review of Systems   Constitutional: Negative.  Negative for chills and fever.   HENT: Negative.  Negative for congestion, rhinorrhea, sore throat and trouble swallowing.    Eyes:  Positive for visual disturbance (left).   Respiratory: Negative.  Negative for cough, shortness of breath and wheezing.    Cardiovascular:  Positive for leg swelling. Negative for chest pain and palpitations.   Gastrointestinal: Negative.  Negative for abdominal pain, diarrhea, nausea and vomiting.   Endocrine: Negative.    Genitourinary: Negative.  Negative for dysuria and flank pain.   Musculoskeletal: Negative.  Negative for back pain.   Skin: Negative.  Negative for rash.   Allergic/Immunologic: Negative.    Neurological:  Positive for numbness (right facial). Negative for speech difficulty, weakness and headaches.   Hematological: Negative.    Psychiatric/Behavioral: Negative.  Negative for hallucinations.    All other systems reviewed and are negative.  Objective:     Vital Signs (Most Recent):  Temp: 98.1 °F (36.7 °C) (09/03/22 2259)  Pulse: 67 (09/03/22 2259)  Resp: 19 (09/03/22 2259)  BP: (!) 149/80 (09/03/22 2259)  SpO2: 97 % (09/03/22 2259)   Vital Signs (24h Range):  Temp:  [98 °F (36.7 °C)-98.1 °F (36.7 °C)] 98.1 °F (36.7 °C)  Pulse:  [67-73] 67  Resp:  [13-19] 19  SpO2:  [97 %-98 %] 97 %  BP: (148-150)/(79-96) 149/80        Body mass index is 43.36 kg/m².    Physical Exam  Vitals and nursing note reviewed.   Constitutional:       General: He is awake. He is not in acute distress.     Appearance: He is morbidly obese. He is not ill-appearing.   HENT:      Head: Normocephalic and atraumatic.      Mouth/Throat:      Mouth: Mucous membranes are moist.   Eyes:      General: No scleral icterus.     Conjunctiva/sclera: Conjunctivae normal.   Cardiovascular:      Rate and Rhythm: Normal rate and regular rhythm.      Heart sounds: No murmur heard.  Pulmonary:      Effort: Pulmonary  effort is normal. No respiratory distress.      Breath sounds: Normal breath sounds. No wheezing.   Abdominal:      Palpations: Abdomen is soft.      Tenderness: There is no abdominal tenderness.   Musculoskeletal:         General: Swelling present. Normal range of motion.      Cervical back: Normal range of motion and neck supple.   Skin:     General: Skin is warm.      Coloration: Skin is not jaundiced.   Neurological:      General: No focal deficit present.      Mental Status: He is alert and oriented to person, place, and time. Mental status is at baseline.      Comments: No focal neurological doses at this time   Psychiatric:         Attention and Perception: Attention normal.         Speech: Speech normal.         Behavior: Behavior is cooperative.           Significant Labs: All pertinent labs within the past 24 hours have been reviewed.  BMP:   Recent Labs   Lab 09/03/22 1952   *      K 3.1*      CO2 18*   BUN 66*   CREATININE 3.7*   CALCIUM 9.8     CBC:   Recent Labs   Lab 09/03/22 1952   WBC 5.56   HGB 15.3   HCT 43.4        CMP:   Recent Labs   Lab 09/03/22 1952      K 3.1*      CO2 18*   *   BUN 66*   CREATININE 3.7*   CALCIUM 9.8   PROT 6.7   ALBUMIN 3.6   BILITOT 0.5   ALKPHOS 83   AST 12   ALT 14   ANIONGAP 14       Significant Imaging: I have reviewed all pertinent imaging results/findings within the past 24 hours.  I have reviewed and interpreted all pertinent imaging results/findings within the past 24 hours.    Imaging Results              CT Head Without Contrast (Final result)  Result time 09/03/22 20:15:53      Final result by Aiden Almeida MD (09/03/22 20:15:53)                   Impression:      No acute abnormality.    Atrophy and chronic white matter changes.    Prominence of the lateral ventricles similar to prior exam    All CT scans   are performed using dose optimization techniques including the following: automated exposure control;  adjustment of the mA and/or kV; use of iterative reconstruction technique.  Dose modulation was employed for ALARA by means of: Automated exposure control; adjustment of the mA and/or kV according to patient size (this includes techniques or standardized protocols for targeted exams where dose is matched to indication/reason for exam; i.e. extremities or head); and/or use of iterative reconstructive technique.      Electronically signed by: Juan Pablo Wolfe  Date:    09/03/2022  Time:    20:15               Narrative:    EXAMINATION:  CT HEAD WITHOUT CONTRAST    CLINICAL HISTORY:  Neuro deficit, acute, stroke suspected;    TECHNIQUE:  Low dose axial CT images obtained throughout the head without intravenous contrast. Sagittal and coronal reconstructions were performed.    COMPARISON:  None.    FINDINGS:  Atrophy and chronic white matter changes.  Prominence of the lateral ventricles similar to prior exam.  No extra-axial blood or fluid collections.    No parenchymal mass, hemorrhage, edema or major vascular distribution infarct.    Skull/extracranial contents (limited evaluation): No fracture. Minimal mucosal thickening of the left maxillary sinus.                                       X-Ray Chest AP Portable (Final result)  Result time 09/03/22 19:42:38      Final result by Aiden Amleida MD (09/03/22 19:42:38)                   Impression:      No acute abnormality.      Electronically signed by: Juan Pablo Wolfe  Date:    09/03/2022  Time:    19:42               Narrative:    EXAMINATION:  XR CHEST AP PORTABLE    CLINICAL HISTORY:  Stroke;    TECHNIQUE:  Single frontal view of the chest was performed.    COMPARISON:  None    FINDINGS:  The lungs are clear, with normal appearance of pulmonary vasculature and no pleural effusion or pneumothorax.    The cardiac silhouette is normal in size. The hilar and mediastinal contours are unremarkable.    Bones are intact.                                      I have independently  reviewed and interpreted the EKG.     I have independently reviewed all pertinent labs within the past 24 hours.    I have independently reviewed, visualized and interpreted all pertinent imaging results within the past 24 hours and discussed the findings with the ED physician, Dr. Oconnor          Assessment/Plan:     * Transient visual disturbance  L eye visual loss with R temporal visual disturbance and R shoulder numbness. There is R facial weakness but it is possibly old.  Concern is for L carotid TIA/stroke.  Rec CTA head/neck and MRI brain.  He is not a tPA nor thrombectomy candidate.      - Place in Observation on Telemetry to r/o acute CVA.  - CT of the head showed no acute abnormality.  - Will obtain MRI of the brain.  - 2D echo.  - Check FLP and HbA1c.  - Neuro checks.  - Start daily ASA 81 mg and Lipitor 40 mg.  - Allow for permissive HTN.   - PT/OT/ST consult to eval and treat.  - Neurology consult.         Acute renal failure superimposed on stage 3 chronic kidney disease  Creatinine elevated 3.7 (baseline 1.3-1.7).    -continue NS at 125 cc/hour for the next 1 L.    Repeat labs in a.m.      Type 2 diabetes mellitus with diabetic polyneuropathy, with long-term current use of insulin  Patient's FSGs are uncontrolled due to hyperglycemia on current medication regimen.  Last A1c reviewed-   Lab Results   Component Value Date    HGBA1C 6.5 (H) 08/15/2022     Most recent fingerstick glucose reviewed-   Recent Labs   Lab 09/03/22 2021 09/03/22  2258   POCTGLUCOSE 182* 99     Current correctional scale  Medium  Increase anti-hyperglycemic dose as follows-   Antihyperglycemics (From admission, onward)    Start     Stop Route Frequency Ordered    09/04/22 2100  insulin detemir U-100 pen 30 Units         -- SubQ Nightly 09/03/22 2331    09/04/22 0029  insulin aspart U-100 pen 1-10 Units         -- SubQ Every 6 hours PRN 09/03/22 2329        Hold Oral hypoglycemics while patient is in the hospital.    Elevated  troponin  -denies chest pain  -trend cardiac enzymes      Hypokalemia  - K 3.1  -replace KCL 40 meq PO x1      Morbid obesity with BMI of 45.0-49.9, adult  Body mass index is 43.36 kg/m². Morbid obesity complicates all aspects of disease management from diagnostic modalities to treatment. Weight loss encouraged and health benefits explained to patient.           VTE Risk Mitigation (From admission, onward)         Ordered     heparin (porcine) injection 5,000 Units  Every 12 hours         09/03/22 2328     IP VTE HIGH RISK PATIENT  Once         09/03/22 2328     Place sequential compression device  Until discontinued         09/03/22 2328                 The patient is placed in OBSERVATION status.      Durga Agee MD  Department of Hospital Medicine   [facility]

## 2022-09-04 NOTE — HPI
Mr. Gordon is a 67-year-old morbidly obese  male with PMH significant for CAD, diastolic CHF, CKD stage 3, insulin-dependent diabetes, hypertension, presented to the ED complaining of left eye visual loss associated the right facial weakness.  The left eye vision disturbances have significantly improved.  Patient deemed not a candidate for tPA a symptoms ongoing since yesterday, and possibly much longer.  Evaluated by tele Neurology, concern for left carotid TIA/stroke.  Recommended CTA head and neck, however creatinine elevated 3.7 (1.23-1.7).  Admitted for full TIA workup.  Laboratory workup reveals potassium 3.1, creatinine 3.7.    Admitting diagnosis:  Left visual disturbances.  Concern for TIA/CVA.

## 2022-09-04 NOTE — ASSESSMENT & PLAN NOTE
Creatinine elevated 3.7 (baseline 1.3-1.7).    -continue NS at 125 cc/hour for the next 1 L.    Still elevated 3.4 in relation to baseline, continue IV fluids

## 2022-09-04 NOTE — PROGRESS NOTES
Notified by Monitor yuliet Moran LPN that his pulse had decreased to 35-39 and then went back up into the 40's where he has been maintaining all day - Stated she had run a strip and placed in the chart.

## 2022-09-04 NOTE — PT/OT/SLP EVAL
Occupational Therapy   Evaluation and Discharge Note    Name: Stepan Springer  MRN: [id number]  Admitting Diagnosis:  Transient visual disturbance   Recent Surgery: * No surgery found *      Recommendations:     Discharge Recommendations: home  Discharge Equipment Recommendations:  walker, rolling, shower chair (HURRYCANE)  Barriers to discharge:       Assessment:     Stepan Springer is a 67 y.o. male with a medical diagnosis of Transient visual disturbance. At this time, patient is functioning at their prior level of function and does not require further acute OT services.     Plan:     During this hospitalization, patient does not require further acute OT services.  Please re-consult if situation changes.    Plan of Care Reviewed with: patient    Subjective     Chief Complaint: NONE  Patient/Family Comments/goals: TO IMPROVE VISION.    Occupational Profile:  Living Environment: PATIENT LIVES IN 1 STORY HOUSE WITH 4 STEPS AND 1 RAIL IN BACK AND 4 STEPS WITH 2 RAILS AT FRONT ENTRANCE.  PATIENT LIVES ALONE WITH 2 CATS.   Previous level of function: PATIENT STATED HE WAS (I) WITH ALL LEVELS OF SELF CARE AND WAS NOT DRIVING.   Roles and Routines: PATIENT STATED FRIENDS AND NEIGHBOR (A) AS NEEDED.  Equipment Used at home:   SHOWER CHAIR, HURRYCANE, WALK-IN SHOWER.  Assistance upon Discharge: FRIENDS, NEIGHBORS    Pain/Comfort:  Pain Rating 1: 0/10    Patients cultural, spiritual, Adventist conflicts given the current situation:      Objective:     Communicated with: NURSE LANGE prior to session.  Patient found  SITTING UPRIGHT IN BED  with peripheral IV, telemetry upon OT entry to room.    General Precautions: Standard, fall, vision impaired   Orthopedic Precautions:    Braces:    Respiratory Status: Room air     Occupational Performance:    Bed Mobility:    Patient completed Scooting/Bridging with independence  Patient completed Supine to Sit with independence  Patient completed Sit to Supine with  independence    Functional Mobility/Transfers:  Patient completed Sit <> Stand Transfer with independence  with  rolling walker   Patient completed Bed <> Chair Transfer using Stand Pivot technique with modified independence with rolling walker  Patient completed Toilet Transfer Stand Pivot technique with modified independence with  grab bars  Functional Mobility: MOD (I)/(I) WITH RW.    Activities of Daily Living:  Upper Body Dressing: modified independence    Lower Body Dressing: modified independence    Toileting: supervision WITH GRAB BARS.    Cognitive/Visual Perceptual:  PATIENT C/O CONTINUED BLURRY VISION.    Physical Exam:  Balance:    -       NO LOB WITH AMBULATION WITH RW NOR WITH STAND PIVOT.  Upper Extremity Range of Motion:     -       Right Upper Extremity: WFL  -       Left Upper Extremity: WFL    AMPAC 6 Click ADL:  AMPAC Total Score: 23    Treatment & Education:  OT EVAL PERFORMED.  PATIENT EDUCATED RE:  PURPOSE OF OT.  PATIENT PARTICIPATED IN [facility] MOBILITY AND SELF CARE TASKS.    Patient left sitting edge of bed with all lines intact, call button in reach, and NURSE notified    GOALS:   Multidisciplinary Problems       Occupational Therapy Goals          Problem: Occupational Therapy    Goal Priority Disciplines Outcome Interventions   Occupational Therapy Goal     OT, PT/OT     Description: NO SKILLED OT INTERVENTION NEEDS IDENTIFIED. PATIENT WILL PARTICIPATE IN PEOPLE MOVERS PROGRAM FOR CONT MOBILITY.                        History:     Past Medical History:   Diagnosis Date    Anxiety     Bell's palsy     CHF (congestive heart failure)     Chronic kidney disease, stage 3a 11/02/2021    Coronary artery disease involving native coronary artery without angina pectoris 10/18/2016    Depression     Diabetes mellitus     Glaucoma     Hypertension     Idiopathic peripheral neuropathy 12/11/2012    Lymphedema of both lower extremities     Mixed hyperlipidemia 12/11/2012    Morbid obesity with BMI of  45.0-49.9, adult 02/12/2019    Pancytopenia     Sleep apnea     Stage 3b chronic kidney disease     Type 2 diabetes mellitus with diabetic polyneuropathy, with long-term current use of insulin 12/11/2012    Vitamin B 12 deficiency 08/23/2012         Past Surgical History:   Procedure Laterality Date    CARDIAC CATHETERIZATION  7/22/13    non obstructive cad    CATARACT EXTRACTION  OU    COLONOSCOPY N/A 5/1/2019    Procedure: COLONOSCOPY;  Surgeon: Henry Mo III, MD;  Location: [facility];  Service: Endoscopy;  Laterality: N/A;    CORONARY ANGIOPLASTY      ESOPHAGOGASTRODUODENOSCOPY N/A 5/1/2019    Procedure: ESOPHAGOGASTRODUODENOSCOPY (EGD);  Surgeon: Henry Mo III, MD;  Location: [facility];  Service: Endoscopy;  Laterality: N/A;    EYE SURGERY      FRACTURE SURGERY      kidney stone       August 2016    PC IOL OU      RETINAL DETACHMENT REPAIR W/ SCLERAL BUCKLE LE      WRIST SURGERY         Time Tracking:     OT Date of Treatment: 09/04/22  OT Start Time: 0825  OT Stop Time: 0848  OT Total Time (min): 23 min    Billable Minutes:Evaluation 10  Therapeutic Activity 13    9/4/2022

## 2022-09-04 NOTE — CONSULTS
[facility]  Vascular Neurology  Comprehensive Stroke Center  TeleVascular Neurology Acute Consultation Note      Consults    Consulting Provider: REG LAGUERRE  Current Providers  No providers found    Patient Location:  [facility] EMERGENCY DEPARTMENT Emergency Department  Spoke hospital nurse at bedside with patient assisting consultant.     Patient information was obtained from patient.         Assessment/Plan:       Diagnoses:   Transient visual disturbance  L eye visual loss with R temporal visual disturbance and R shoulder numbness. There is R facial weakness but it is possibly old.  Concern is for L carotid TIA/stroke.  Rec CTA head/neck and MRI brain.  He is not a tPA nor thrombectomy candidate.      Also rec ESR given mild headache      STROKE DOCUMENTATION     Acute Stroke Times:   Acute Stroke Times   Symptom Onset Date: 09/03/22  Symptom Onset Time: 1100  Stroke Team Arrival Time: 2015  CT Interpretation Time: 2018  Alteplase Recommended: No    NIH Scale:  Interval: baseline  1a. Level of Consciousness: 0-->Alert, keenly responsive  1b. LOC Questions: 0-->Answers both questions correctly  1c. LOC Commands: 0-->Performs both tasks correctly  2. Best Gaze: 0-->Normal  3. Visual: 0-->No visual loss (old R nasal scotoma)  4. Facial Palsy: 1-->Minor paralysis (flattened nasolabial fold, asymmetry on smiling) (flat R)  5a. Motor Arm, Left: 0-->No drift, limb holds 90 (or 45) degrees for full 10 secs  5b. Motor Arm, Right: 0-->No drift, limb holds 90 (or 45) degrees for full 10 secs  6a. Motor Leg, Left: 0-->No drift, leg holds 30 degree position for full 5 secs  6b. Motor Leg, Right: 0-->No drift, leg holds 30 degree position for full 5 secs  7. Limb Ataxia: 0-->Absent  8. Sensory: 0-->Normal, no sensory loss  9. Best Language: 0-->No aphasia, normal  10. Dysarthria: 0-->Normal  11. Extinction and Inattention (formerly Neglect): 0-->No abnormality  Total (NIH Stroke  "Scale): 1     Modified [place] Coma Scale:    ABCD2 Score:    [id number] Score:   HAS -BLED Score:   ICH Score:   Hunt & Lopez Classification:       Blood pressure (!) 150/79, pulse 73, temperature 98 °F (36.7 °C), temperature source Oral, resp. rate 13, height 6' 7" (2.007 m), SpO2 98 %.  Alteplase Eligible?: No  Alteplase Recommendation: Alteplase not recommended due to Outside of treatment window  and Patient back to neurological baseline  Possible Interventional Revascularization Candidate? No; no significant neurologic deficit (NIHSS <6)     Disposition Recommendation: pending further studies    Subjective:     History of Present Illness:  68 y/o WM with L eye complete visual loss with some graying of vision in the temporal field on the R.   R shoulder numbness onset 11a.  Visual disturbance and numbness resolved beginning after 3 hours and now resolved. He has mild headache. There were associated palpitations.       Woke up with symptoms?: no    Recent bleeding noted: no  Does the patient take any Blood Thinners? yes  Medications: Antiplatelets:  aspirin    No current facility-administered medications on file prior to encounter.     Current Outpatient Medications on File Prior to Encounter   Medication Sig Dispense Refill    amLODIPine (NORVASC) 5 MG tablet Take 1 tablet (5 mg total) by mouth every evening. 90 tablet 1    aspirin 325 MG tablet Take 325 mg by mouth once daily.      atorvastatin (LIPITOR) 20 MG tablet Take 1 tablet (20 mg total) by mouth every evening. 90 tablet 3    brimonidine 0.1% (ALPHAGAN P) 0.1 % Drop Place 1 drop into both eyes 3 (three) times daily. Order 90 day supply 15 mL 4    brinzolamide (AZOPT) 1 % ophthalmic suspension Place 1 drop into both eyes 3 (three) times daily. Brand name only 15 mL 6    carvediloL (COREG) 25 MG tablet Take 1 tablet (25 mg total) by mouth 2 (two) times daily with meals. 180 tablet 1    cloNIDine (CATAPRES) 0.1 MG tablet Take 1 tablet (0.1 mg " "total) by mouth 3 (three) times daily. 270 tablet 1    empagliflozin (JARDIANCE) 25 mg tablet Take 1 tablet (25 mg total) by mouth once daily. 90 tablet 3    flash glucose sensor (FREESTYLE CLEMENCIA 14 DAY SENSOR) Kit 1 each by Misc.(Non-Drug; Combo Route) route every 14 (fourteen) days. 6 kit 3    furosemide (LASIX) 40 MG tablet Take 1.5 tablets (60 mg total) by mouth 2 (two) times a day. (Patient taking differently: Take 40 mg by mouth 3 (three) times daily.) 270 tablet 1    hydrALAZINE (APRESOLINE) 100 MG tablet Take 1 tablet (100 mg total) by mouth every 8 (eight) hours. 270 tablet 1    insulin aspart U-100 (NOVOLOG) 100 unit/mL (3 mL) InPn pen Inject 25 Units into the skin 3 (three) times daily with meals. 69 mL 3    insulin glargine U-300 conc (TOUJEO MAX U-300 SOLOSTAR) 300 unit/mL (3 mL) insulin pen Inject 80 Units into the skin once daily. 8 pen 3    lisinopriL (PRINIVIL,ZESTRIL) 40 MG tablet Take 1 tablet (40 mg total) by mouth once daily. 90 tablet 1    mupirocin (BACTROBAN) 2 % ointment Apply topically once daily. 30 g 0    netarsudiL (RHOPRESSA) 0.02 % ophthalmic solution Place 1 drop into both eyes once daily. 2.5 mL 6    nitroGLYCERIN (NITROSTAT) 0.4 MG SL tablet PLACE 1 TABLET (0.4 MG TOTAL) UNDER THE TONGUE EVERY 5 (FIVE) MINUTES AS NEEDED FOR CHEST PAIN. 20 tablet 0    ofloxacin (OCUFLOX) 0.3 % ophthalmic solution Place 1 drop into both eyes 4 (four) times daily. 5 mL 1    pen needle, diabetic (BD ULTRA-FINE AMANDA PEN NEEDLE) 32 gauge x 5/32" Ndle 1 each by Misc.(Non-Drug; Combo Route) route 4 (four) times daily with meals and nightly. 300 each 3    [DISCONTINUED] hydroCHLOROthiazide (HYDRODIURIL) 25 MG tablet Take 1 tablet (25 mg total) by mouth once daily. 90 tablet 1    [DISCONTINUED] miglitoL (GLYSET) 25 MG Tab Take 1 tablet (25 mg total) by mouth 3 (three) times daily with meals. 270 tablet 3     Past Medical History:   Diagnosis Date    Anxiety     Bell's palsy     CHF (congestive heart failure)  " "   Chronic kidney disease, stage 3a 11/02/2021    Coronary artery disease involving native coronary artery without angina pectoris 10/18/2016    Depression     Diabetes mellitus     Glaucoma     Hypertension     Idiopathic peripheral neuropathy 12/11/2012    Lymphedema of both lower extremities     Mixed hyperlipidemia 12/11/2012    Morbid obesity with BMI of 45.0-49.9, adult 02/12/2019    Pancytopenia     Sleep apnea     Stage 3b chronic kidney disease     Type 2 diabetes mellitus with diabetic polyneuropathy, with long-term current use of insulin 12/11/2012    Vitamin B 12 deficiency 08/23/2012     Past Surgical History:   Procedure Laterality Date    CARDIAC CATHETERIZATION  7/22/13    non obstructive cad    CATARACT EXTRACTION  OU    COLONOSCOPY N/A 5/1/2019    Procedure: COLONOSCOPY;  Surgeon: Henry Mo III, MD;  Location: [facility];  Service: Endoscopy;  Laterality: N/A;    CORONARY ANGIOPLASTY      ESOPHAGOGASTRODUODENOSCOPY N/A 5/1/2019    Procedure: ESOPHAGOGASTRODUODENOSCOPY (EGD);  Surgeon: Henry Mo III, MD;  Location: [facility];  Service: Endoscopy;  Laterality: N/A;    EYE SURGERY      FRACTURE SURGERY      kidney stone       August 2016    PC IOL OU      RETINAL DETACHMENT REPAIR W/ SCLERAL BUCKLE LE      WRIST SURGERY       Social History     Tobacco Use    Smoking status: Never    Smokeless tobacco: Never   Substance Use Topics    Alcohol use: Yes     Comment: " one to two glasses of wine per year"    Drug use: No     Breast Cancer-related family history is not on file.      Allergies: Cefazolin     Review of Systems   Cardiovascular: Positive for chest pain.   Musculoskeletal: Positive for arthralgias.   Neurological: Positive for headaches.     Objective:   Vitals: Blood pressure (!) 148/96, pulse 73, temperature 98 °F (36.7 °C), temperature source Oral, resp. rate 18, height 6' 7" (2.007 m), SpO2 97 %.     CT READ: Yes  No hemmorhage. No mass effect. No early infarct signs.   WM " changes  Physical Exam  Vitals reviewed.     Overweight  Nasal scotoma on R (old)  R lower facial weakness but hx R Bell's Palsy  BLE weakness but no drift with best effort    Recommended the emergency room physician to have a brief discussion with the patient and/or family if available regarding the  risks and benefits of treatment, and to briefly document the occurrence of that discussion in his clinical encounter note.     The attending portion of this evaluation, treatment, and documentation was performed per Rojelio Aggarwal MD via audiovisual.    Billing code:  (time dependent stroke, complex case, unstable patient, hemorrhages, any intervention, some mimics)    This patient has a very critical neurological condition/illness, with very high morbidity and mortality.  There is a very high probability for acute neurological change leading to clinical and possibly life-threatening deterioration requiring highest level of physician preparedness for urgent intervention.  There is possibility that this condition will require treatment with high risk medications as quickly as possible.  There is also a possibility that the patient may benefit from further, more advance complex therapies (e.g. endovascular therapy) that will require prompt diagnosis and care.  Care was coordinated with other physicians involved in the patient's care.  Radiologic studies and laboratory data were reviewed and interpreted, and plan of care was re-assessed based on the results.  Diagnosis, treatment options and prognosis may have been discussed with the patient and/or family members or caregiver.  Further advanced medical management and further evaluation is warranted for his care.    In your opinion, this was a: Tier 2 Van Negative    Consult End Time: 9:09 PM     Rojelio Aggarwal MD  [facility]  Vascular Neurology   [facility]

## 2022-09-04 NOTE — PLAN OF CARE
Discussed Plan of Care with patient and verbalized understanding - Patient remains AAOx4 - remains free of falls, accidents and trauma during the day shift. Bed is in the low position and the call light is within reach. Echo, Carotid US completed. Will continue to monitor

## 2022-09-04 NOTE — NURSING
Pt admitted for stroke rule out, hr dropped to 41 while resting , hx of sleep apnea, does not want cpap, provider notified.

## 2022-09-05 PROBLEM — I10 HYPERTENSION: Status: ACTIVE | Noted: 2022-09-05

## 2022-09-05 LAB
ALBUMIN SERPL BCP-MCNC: 3.6 G/DL (ref 3.5–5.2)
ALP SERPL-CCNC: 86 U/L (ref 55–135)
ALT SERPL W/O P-5'-P-CCNC: 15 U/L (ref 10–44)
ANION GAP SERPL CALC-SCNC: 11 MMOL/L (ref 8–16)
AST SERPL-CCNC: 11 U/L (ref 10–40)
BASOPHILS # BLD AUTO: 0.03 K/UL (ref 0–0.2)
BASOPHILS NFR BLD: 0.6 % (ref 0–1.9)
BILIRUB SERPL-MCNC: 0.5 MG/DL (ref 0.1–1)
BUN SERPL-MCNC: 52 MG/DL (ref 8–23)
CALCIUM SERPL-MCNC: 9.3 MG/DL (ref 8.7–10.5)
CHLORIDE SERPL-SCNC: 110 MMOL/L (ref 95–110)
CO2 SERPL-SCNC: 21 MMOL/L (ref 23–29)
CREAT SERPL-MCNC: 2.8 MG/DL (ref 0.5–1.4)
DIFFERENTIAL METHOD: NORMAL
EOSINOPHIL # BLD AUTO: 0.3 K/UL (ref 0–0.5)
EOSINOPHIL NFR BLD: 5 % (ref 0–8)
ERYTHROCYTE [DISTWIDTH] IN BLOOD BY AUTOMATED COUNT: 12.3 % (ref 11.5–14.5)
EST. GFR  (NO RACE VARIABLE): 24 ML/MIN/1.73 M^2
GLUCOSE SERPL-MCNC: 153 MG/DL (ref 70–110)
HCT VFR BLD AUTO: 45.2 % (ref 40–54)
HGB BLD-MCNC: 15.3 G/DL (ref 14–18)
IMM GRANULOCYTES # BLD AUTO: 0.01 K/UL (ref 0–0.04)
IMM GRANULOCYTES NFR BLD AUTO: 0.2 % (ref 0–0.5)
LYMPHOCYTES # BLD AUTO: 1.9 K/UL (ref 1–4.8)
LYMPHOCYTES NFR BLD: 36.5 % (ref 18–48)
MCH RBC QN AUTO: 30.7 PG (ref 27–31)
MCHC RBC AUTO-ENTMCNC: 33.8 G/DL (ref 32–36)
MCV RBC AUTO: 91 FL (ref 82–98)
MONOCYTES # BLD AUTO: 0.6 K/UL (ref 0.3–1)
MONOCYTES NFR BLD: 10.7 % (ref 4–15)
NEUTROPHILS # BLD AUTO: 2.4 K/UL (ref 1.8–7.7)
NEUTROPHILS NFR BLD: 47 % (ref 38–73)
NRBC BLD-RTO: 0 /100 WBC
PLATELET # BLD AUTO: 159 K/UL (ref 150–450)
PMV BLD AUTO: 10.2 FL (ref 9.2–12.9)
POCT GLUCOSE: 134 MG/DL (ref 70–110)
POCT GLUCOSE: 136 MG/DL (ref 70–110)
POCT GLUCOSE: 150 MG/DL (ref 70–110)
POCT GLUCOSE: 158 MG/DL (ref 70–110)
POTASSIUM SERPL-SCNC: 3.6 MMOL/L (ref 3.5–5.1)
PROT SERPL-MCNC: 6.4 G/DL (ref 6–8.4)
RBC # BLD AUTO: 4.99 M/UL (ref 4.6–6.2)
SODIUM SERPL-SCNC: 142 MMOL/L (ref 136–145)
WBC # BLD AUTO: 5.15 K/UL (ref 3.9–12.7)

## 2022-09-05 PROCEDURE — 25000003 PHARM REV CODE 250: Performed by: INTERNAL MEDICINE

## 2022-09-05 PROCEDURE — 63600175 PHARM REV CODE 636 W HCPCS: Performed by: INTERNAL MEDICINE

## 2022-09-05 PROCEDURE — 25000003 PHARM REV CODE 250: Performed by: NURSE PRACTITIONER

## 2022-09-05 PROCEDURE — 85025 COMPLETE CBC W/AUTO DIFF WBC: CPT | Performed by: INTERNAL MEDICINE

## 2022-09-05 PROCEDURE — 94761 N-INVAS EAR/PLS OXIMETRY MLT: CPT

## 2022-09-05 PROCEDURE — 80053 COMPREHEN METABOLIC PANEL: CPT | Performed by: INTERNAL MEDICINE

## 2022-09-05 PROCEDURE — 11000001 HC ACUTE MED/SURG PRIVATE ROOM

## 2022-09-05 PROCEDURE — 36415 COLL VENOUS BLD VENIPUNCTURE: CPT | Performed by: INTERNAL MEDICINE

## 2022-09-05 RX ORDER — AMLODIPINE BESYLATE 5 MG/1
5 TABLET ORAL NIGHTLY
Status: DISCONTINUED | OUTPATIENT
Start: 2022-09-05 | End: 2022-09-05

## 2022-09-05 RX ORDER — CLOPIDOGREL BISULFATE 75 MG/1
75 TABLET ORAL DAILY
Status: DISCONTINUED | OUTPATIENT
Start: 2022-09-05 | End: 2022-09-07 | Stop reason: HOSPADM

## 2022-09-05 RX ORDER — AMLODIPINE BESYLATE 5 MG/1
5 TABLET ORAL NIGHTLY
Status: DISCONTINUED | OUTPATIENT
Start: 2022-09-05 | End: 2022-09-06

## 2022-09-05 RX ORDER — HYDRALAZINE HYDROCHLORIDE 25 MG/1
50 TABLET, FILM COATED ORAL EVERY 8 HOURS
Status: DISCONTINUED | OUTPATIENT
Start: 2022-09-05 | End: 2022-09-05

## 2022-09-05 RX ORDER — HYDRALAZINE HYDROCHLORIDE 20 MG/ML
10 INJECTION INTRAMUSCULAR; INTRAVENOUS EVERY 6 HOURS PRN
Status: DISCONTINUED | OUTPATIENT
Start: 2022-09-05 | End: 2022-09-07 | Stop reason: HOSPADM

## 2022-09-05 RX ORDER — HYDRALAZINE HYDROCHLORIDE 25 MG/1
50 TABLET, FILM COATED ORAL EVERY 8 HOURS
Status: DISCONTINUED | OUTPATIENT
Start: 2022-09-05 | End: 2022-09-06

## 2022-09-05 RX ORDER — ATORVASTATIN CALCIUM 10 MG/1
20 TABLET, FILM COATED ORAL NIGHTLY
Status: DISCONTINUED | OUTPATIENT
Start: 2022-09-05 | End: 2022-09-07 | Stop reason: HOSPADM

## 2022-09-05 RX ORDER — CLONIDINE HYDROCHLORIDE 0.1 MG/1
0.1 TABLET ORAL 3 TIMES DAILY
Status: DISCONTINUED | OUTPATIENT
Start: 2022-09-05 | End: 2022-09-05

## 2022-09-05 RX ORDER — CARVEDILOL 6.25 MG/1
25 TABLET ORAL 2 TIMES DAILY WITH MEALS
Status: DISCONTINUED | OUTPATIENT
Start: 2022-09-05 | End: 2022-09-05

## 2022-09-05 RX ORDER — AMLODIPINE BESYLATE 10 MG/1
10 TABLET ORAL NIGHTLY
Status: DISCONTINUED | OUTPATIENT
Start: 2022-09-05 | End: 2022-09-05

## 2022-09-05 RX ADMIN — INSULIN DETEMIR 30 UNITS: 100 INJECTION, SOLUTION SUBCUTANEOUS at 09:09

## 2022-09-05 RX ADMIN — ATORVASTATIN CALCIUM 20 MG: 10 TABLET, FILM COATED ORAL at 09:09

## 2022-09-05 RX ADMIN — ACETAMINOPHEN 650 MG: 325 TABLET ORAL at 05:09

## 2022-09-05 RX ADMIN — ASPIRIN 325 MG ORAL TABLET 325 MG: 325 PILL ORAL at 09:09

## 2022-09-05 RX ADMIN — HEPARIN SODIUM 5000 UNITS: 5000 INJECTION INTRAVENOUS; SUBCUTANEOUS at 09:09

## 2022-09-05 RX ADMIN — ATORVASTATIN CALCIUM 40 MG: 40 TABLET, FILM COATED ORAL at 09:09

## 2022-09-05 RX ADMIN — HYDRALAZINE HYDROCHLORIDE 50 MG: 25 TABLET, FILM COATED ORAL at 09:09

## 2022-09-05 RX ADMIN — CLOPIDOGREL 75 MG: 75 TABLET, FILM COATED ORAL at 04:09

## 2022-09-05 RX ADMIN — CLONIDINE HYDROCHLORIDE 0.1 MG: 0.1 TABLET ORAL at 05:09

## 2022-09-05 RX ADMIN — HYDRALAZINE HYDROCHLORIDE 10 MG: 20 INJECTION INTRAMUSCULAR; INTRAVENOUS at 05:09

## 2022-09-05 NOTE — CONSULTS
[facility]  Vascular Surgery  Consult Note    Consults  Subjective:     Chief Complaint/Reason for Admission: left eye vision loss/TIA    History of Present Illness: asked to eval 66 y/o male multiple chronic medical issues, CAD s/p PTCA/stenting, HTN, hyperlipidemia, morbid orbesity admitted with episode of left eye vison loss x about 3 hours Saturday.  Pt reports symptoms now resolved.  CT head negative for acute CVA.  Pending MRI.    Carotid duplex with left 50-69% stenosis mild velocity elevation.  Unable to get contrast CT secondary to elevated Cr-3.7, NINFA.      Medications Prior to Admission   Medication Sig Dispense Refill Last Dose    amLODIPine (NORVASC) 5 MG tablet Take 1 tablet (5 mg total) by mouth every evening. 90 tablet 1 Past Week    aspirin 325 MG tablet Take 325 mg by mouth once daily.   Past Week    atorvastatin (LIPITOR) 20 MG tablet Take 1 tablet (20 mg total) by mouth every evening. 90 tablet 3 Past Week    brimonidine 0.1% (ALPHAGAN P) 0.1 % Drop Place 1 drop into both eyes 3 (three) times daily. Order 90 day supply 15 mL 4 Past Month    brinzolamide (AZOPT) 1 % ophthalmic suspension Place 1 drop into both eyes 3 (three) times daily. Brand name only 15 mL 6 Past Month    carvediloL (COREG) 25 MG tablet Take 1 tablet (25 mg total) by mouth 2 (two) times daily with meals. 180 tablet 1 Past Month    cloNIDine (CATAPRES) 0.1 MG tablet Take 1 tablet (0.1 mg total) by mouth 3 (three) times daily. 270 tablet 1 Past Week    empagliflozin (JARDIANCE) 25 mg tablet Take 1 tablet (25 mg total) by mouth once daily. 90 tablet 3 9/2/2022    flash glucose sensor (FREESTYLE LCEMENCIA 14 DAY SENSOR) Kit 1 each by Misc.(Non-Drug; Combo Route) route every 14 (fourteen) days. 6 kit 3 9/2/2022    furosemide (LASIX) 40 MG tablet Take 1.5 tablets (60 mg total) by mouth 2 (two) times a day. (Patient taking differently: Take 40 mg by mouth 3 (three) times daily.) 270 tablet 1 9/2/2022    insulin aspart U-100  "(NOVOLOG) 100 unit/mL (3 mL) InPn pen Inject 25 Units into the skin 3 (three) times daily with meals. 69 mL 3 9/2/2022    insulin glargine U-300 conc (TOUJEO MAX U-300 SOLOSTAR) 300 unit/mL (3 mL) insulin pen Inject 80 Units into the skin once daily. 8 pen 3 9/2/2022    lisinopriL (PRINIVIL,ZESTRIL) 40 MG tablet Take 1 tablet (40 mg total) by mouth once daily. 90 tablet 1 Past Week    mupirocin (BACTROBAN) 2 % ointment Apply topically once daily. 30 g 0 9/2/2022    netarsudiL (RHOPRESSA) 0.02 % ophthalmic solution Place 1 drop into both eyes once daily. 2.5 mL 6 9/2/2022    ofloxacin (OCUFLOX) 0.3 % ophthalmic solution Place 1 drop into both eyes 4 (four) times daily. 5 mL 1 9/2/2022    pen needle, diabetic (BD ULTRA-FINE AMANDA PEN NEEDLE) 32 gauge x 5/32" Ndle 1 each by Misc.(Non-Drug; Combo Route) route 4 (four) times daily with meals and nightly. 300 each 3 9/2/2022    nitroGLYCERIN (NITROSTAT) 0.4 MG SL tablet PLACE 1 TABLET (0.4 MG TOTAL) UNDER THE TONGUE EVERY 5 (FIVE) MINUTES AS NEEDED FOR CHEST PAIN. 20 tablet 0     [DISCONTINUED] hydrALAZINE (APRESOLINE) 100 MG tablet Take 1 tablet (100 mg total) by mouth every 8 (eight) hours. 270 tablet 1        Review of patient's allergies indicates:   Allergen Reactions    Cefazolin Other (See Comments)     Shakes, chills, dizziness       Past Medical History:   Diagnosis Date    Anxiety     Bell's palsy     CHF (congestive heart failure)     Chronic kidney disease, stage 3a 11/02/2021    Coronary artery disease involving native coronary artery without angina pectoris 10/18/2016    Depression     Diabetes mellitus     Glaucoma     Hypertension     Idiopathic peripheral neuropathy 12/11/2012    Lymphedema of both lower extremities     Mixed hyperlipidemia 12/11/2012    Morbid obesity with BMI of 45.0-49.9, adult 02/12/2019    Pancytopenia     Sleep apnea     Stage 3b chronic kidney disease     Type 2 diabetes mellitus with diabetic polyneuropathy, with long-term current " "use of insulin 12/11/2012    Vitamin B 12 deficiency 08/23/2012     Past Surgical History:   Procedure Laterality Date    CARDIAC CATHETERIZATION  7/22/13    non obstructive cad    CATARACT EXTRACTION  OU    COLONOSCOPY N/A 5/1/2019    Procedure: COLONOSCOPY;  Surgeon: Henry Mo III, MD;  Location: [facility];  Service: Endoscopy;  Laterality: N/A;    CORONARY ANGIOPLASTY      ESOPHAGOGASTRODUODENOSCOPY N/A 5/1/2019    Procedure: ESOPHAGOGASTRODUODENOSCOPY (EGD);  Surgeon: Henry Mo III, MD;  [facility]: [facility] ENDO;  Service: Endoscopy;  Laterality: N/A;    EYE SURGERY      FRACTURE SURGERY      kidney stone       August 2016    PC IOL OU      RETINAL DETACHMENT REPAIR W/ SCLERAL BUCKLE LE      WRIST SURGERY       Family History       Problem Relation (Age of Onset)    Heart attack Father    Heart disease Father    Hypertension Mother, Maternal Grandmother, Maternal Grandfather    Macular degeneration Father, Paternal Uncle          Tobacco Use    Smoking status: Never    Smokeless tobacco: Never   Substance and Sexual Activity    Alcohol use: Yes     Comment: " one to two glasses of wine per year"    Drug use: No    Sexual activity: Not Currently     Birth control/protection: None     Review of Systems  Objective:     Vital Signs (Most Recent):  Temp: 97.8 °F (36.6 °C) (09/05/22 1120)  Pulse: (!) 49 (09/05/22 1120)  Resp: 18 (09/05/22 1120)  BP: (!) 187/88 (09/05/22 1120)  SpO2: 96 % (09/05/22 1120)   Vital Signs (24h Range):  Temp:  [97.4 °F (36.3 °C)-98.1 °F (36.7 °C)] 97.8 °F (36.6 °C)  Pulse:  [48-53] 49  Resp:  [16-18] 18  SpO2:  [95 %-97 %] 96 %  BP: (141-187)/(68-88) 187/88     Weight: (!) 173.7 kg (383 lb)  Body mass index is 43.15 kg/m².    Date 09/05/22 0700 - 09/06/22 0659   Shift 7268-3286 5959-5675 5818-7073 24 Hour Total   INTAKE   P.O. 480   480   Shift Total(mL/kg) 480(2.8)   480(2.8)   OUTPUT   Shift Total(mL/kg)       Weight (kg) 173.7 173.7 173.7 173.7       Physical Exam    Significant " Labs:  CMP:   Recent Labs   Lab 09/05/22  0454   *   CALCIUM 9.3   ALBUMIN 3.6   PROT 6.4      K 3.6   CO2 21*      BUN 52*   CREATININE 2.8*   ALKPHOS 86   ALT 15   AST 11   BILITOT 0.5       Significant Diagnostics:  I have reviewed and interpreted all pertinent imaging results/findings within the past 24 hours.    Assessment/Plan:     Active Diagnoses:    Diagnosis Date Noted POA    PRINCIPAL PROBLEM:  Transient visual disturbance [H53.9] 09/03/2022 Yes    Hypokalemia [E87.6] 09/03/2022 Yes    Acute renal failure superimposed on stage 3 chronic kidney disease [N17.9, N18.30] 09/03/2022 Yes    Elevated troponin [R77.8] 02/07/2020 Yes    Morbid obesity with BMI of 45.0-49.9, adult [E66.01, Z68.42] 02/12/2019 Not Applicable     Chronic    Type 2 diabetes mellitus with diabetic polyneuropathy, with long-term current use of insulin [E11.42, Z79.4] 12/11/2012 Not Applicable     Chronic      Problems Resolved During this Admission:     68 y/o male TIA/left eye  Mild-moderate left ICA stenosis on duplex ultrasound.  Unable to get CTA neck secondary to elevated Creatinine  No vascular intervention planned at this point.  Would recommend adding plavix to his antiplatelet regimen.  Pt may f/u with VSc in the office.  Consider nephrology eval for his creatinine.  Will eventually need contrast CT of the neck for adequate planning.    Thank you for your consult. I will follow-up with patient. Please contact us if you have any additional questions.    Cayetano Brar MD  Vascular Surgery  [facility]

## 2022-09-05 NOTE — PLAN OF CARE
Problem: Adult Inpatient Plan of Care  Goal: Plan of Care Review  Outcome: Ongoing, Progressing  Goal: Patient-Specific Goal (Individualized)  Outcome: Ongoing, Progressing  Goal: Absence of Hospital-Acquired Illness or Injury  Outcome: Ongoing, Progressing  Goal: Optimal Comfort and Wellbeing  Outcome: Ongoing, Progressing  Goal: Readiness for Transition of Care  Outcome: Ongoing, Progressing     Problem: Bariatric Environmental Safety  Goal: Safety Maintained with Care  Outcome: Ongoing, Progressing     Problem: Diabetes Comorbidity  Goal: Blood Glucose Level Within Targeted Range  Outcome: Ongoing, Progressing     Problem: Fluid and Electrolyte Imbalance (Acute Kidney Injury/Impairment)  Goal: Fluid and Electrolyte Balance  Outcome: Ongoing, Progressing     Problem: Oral Intake Inadequate (Acute Kidney Injury/Impairment)  Goal: Optimal Nutrition Intake  Outcome: Ongoing, Progressing     Problem: Renal Function Impairment (Acute Kidney Injury/Impairment)  Goal: Effective Renal Function  Outcome: Ongoing, Progressing     Problem: Infection  Goal: Absence of Infection Signs and Symptoms  Outcome: Ongoing, Progressing     Problem: Adjustment to Illness (Stroke, Ischemic/Transient Ischemic Attack)  Goal: Optimal Coping  Outcome: Ongoing, Progressing     Problem: Bowel Elimination Impaired (Stroke, Ischemic/Transient Ischemic Attack)  Goal: Effective Bowel Elimination  Outcome: Ongoing, Progressing     Problem: Cerebral Tissue Perfusion (Stroke, Ischemic/Transient Ischemic Attack)  Goal: Optimal Cerebral Tissue Perfusion  Outcome: Ongoing, Progressing     Problem: Cognitive Impairment (Stroke, Ischemic/Transient Ischemic Attack)  Goal: Optimal Cognitive Function  Outcome: Ongoing, Progressing     Problem: Communication Impairment (Stroke, Ischemic/Transient Ischemic Attack)  Goal: Improved Communication Skills  Outcome: Ongoing, Progressing     Problem: Functional Ability Impaired (Stroke, Ischemic/Transient Ischemic  Attack)  Goal: Optimal Functional Ability  Outcome: Ongoing, Progressing     Problem: Respiratory Compromise (Stroke, Ischemic/Transient Ischemic Attack)  Goal: Effective Oxygenation and Ventilation  Outcome: Ongoing, Progressing     Problem: Sensorimotor Impairment (Stroke, Ischemic/Transient Ischemic Attack)  Goal: Improved Sensorimotor Function  Outcome: Ongoing, Progressing     Problem: Swallowing Impairment (Stroke, Ischemic/Transient Ischemic Attack)  Goal: Optimal Eating and Swallowing without Aspiration  Outcome: Ongoing, Progressing     Problem: Urinary Elimination Impaired (Stroke, Ischemic/Transient Ischemic Attack)  Goal: Effective Urinary Elimination  Outcome: Ongoing, Progressing     Problem: Impaired Wound Healing  Goal: Optimal Wound Healing  Outcome: Ongoing, Progressing

## 2022-09-05 NOTE — ASSESSMENT & PLAN NOTE
- Place in Observation on Telemetry to r/o acute CVA.  - CT of the head showed no acute abnormality.  - Will obtain MRI of the brain (unable to perform due to body habitus)  - 2D echo.  - Check FLP and HbA1c.  - Neuro checks.  - Start daily  mg and Lipitor 40 mg.  - Allow for permissive HTN.   - PT/OT/ST consult to eval and treat - no deficits  - Neurology consult.         9/5/2022  Pt reports left eye vision back to blurred baseline   Vascular Surgery following   No plan for surgical intervention at present    Add Plavix per Vascular Surgery recommendations

## 2022-09-05 NOTE — ASSESSMENT & PLAN NOTE
Creatinine elevated 3.7 (baseline 1.3-1.7).    -continue NS at 125 cc/hour for the next 1 L.    Still elevated 3.4 in relation to baseline, continue IV fluids      9/5/2022  Creatinine 2.8  Continue gentle hydration   Will follow

## 2022-09-05 NOTE — PROGRESS NOTES
[facility]  [facility] Medicine  Progress Note    Patient Name: Stepan Springer  MRN: [id number]  Patient Class: IP- Inpatient   Admission Date: 9/3/2022  Length of Stay: 0 days  Attending Physician: Severiano Vega, *  Primary Care Provider: KANDICE Fleming MD        Subjective:     Principal Problem:Transient visual disturbance        HPI:  Mr. Gordon is a 67-year-old morbidly obese  male with PMH significant for CAD, diastolic CHF, CKD stage 3, insulin-dependent diabetes, hypertension, presented to the ED complaining of left eye visual loss associated the right facial weakness.  The left eye vision disturbances have significantly improved.  Patient deemed not a candidate for tPA a symptoms ongoing since yesterday, and possibly much longer.  Evaluated by tele Neurology, concern for left carotid TIA/stroke.  Recommended CTA head and neck, however creatinine elevated 3.7 (1.23-1.7).  Admitted for full TIA workup.  Laboratory workup reveals potassium 3.1, creatinine 3.7.    Admitting diagnosis:  Left visual disturbances.  Concern for TIA/CVA.      Overview/Hospital Course:  Pt reports chronic right facial weakness from having [place] Palsy. His left eye vision loss came on suddenly and lasted 3 hours. Now it is resolved. Vascular Neurologist voiced concern for left carotid TIA/Stroke. Unfortunately, due to patient's body habitus the MRI of Brain cannot be performed. Will repeat CT of Head 24 hours from initial CT. Carotid US pending. Pt with new NINFA with creatinine 3.7 >> 3.4 (baseline 1.5) and unable to perform CTA of head and neck at this time. Gentle IV fluids and nephrotoxic meds held. Recent ECHO with EF 55 % and no wall motion abnormalities. Last Hgb A1 C = 6.5. Pt on full strength ASA and STATIN. He had no speech/cognitive abnormalities/ no functional immobility. Hypokalemia replaced. As of 9/5/2022, creatinine trending downward 2.8 today (baseline around 1.5-1.9) will continue  gentle hydration. Patient reports left eye vision back to baseline blurriness.        Interval History: Creatinine 2.8, continue gentle hydration and monitor kidney function.     Review of Systems   Constitutional: Negative.  Negative for chills and fever.   HENT: Negative.  Negative for congestion, rhinorrhea, sore throat and trouble swallowing.    Eyes:  Positive for visual disturbance.   Respiratory: Negative.  Negative for cough, shortness of breath and wheezing.    Cardiovascular:  Positive for leg swelling. Negative for chest pain and palpitations.   Gastrointestinal: Negative.  Negative for abdominal pain, diarrhea, nausea and vomiting.   Endocrine: Negative.    Genitourinary: Negative.  Negative for dysuria and flank pain.   Musculoskeletal: Negative.  Negative for back pain.   Skin: Negative.  Negative for rash.   Allergic/Immunologic: Negative.    Neurological:  Positive for numbness (right facial). Negative for speech difficulty, weakness and headaches.   Hematological: Negative.    Psychiatric/Behavioral: Negative.  Negative for hallucinations.    All other systems reviewed and are negative.  Objective:     Vital Signs (Most Recent):  Temp: 97.8 °F (36.6 °C) (09/05/22 1120)  Pulse: (!) 49 (09/05/22 1120)  Resp: 18 (09/05/22 1120)  BP: (!) 187/88 (09/05/22 1120)  SpO2: 96 % (09/05/22 1120)   Vital Signs (24h Range):  Temp:  [97.4 °F (36.3 °C)-98.1 °F (36.7 °C)] 97.8 °F (36.6 °C)  Pulse:  [48-53] 49  Resp:  [16-18] 18  SpO2:  [95 %-97 %] 96 %  BP: (141-187)/(68-88) 187/88     Weight: (!) 173.7 kg (383 lb)  Body mass index is 43.15 kg/m².    Intake/Output Summary (Last 24 hours) at 9/5/2022 1420  Last data filed at 9/5/2022 1200  Gross per 24 hour   Intake 2074.69 ml   Output --   Net 2074.69 ml      Physical Exam  Vitals and nursing note reviewed.   Constitutional:       General: He is awake. He is not in acute distress.     Appearance: He is obese. He is not ill-appearing.   HENT:      Head: Normocephalic  and atraumatic.      Mouth/Throat:      Mouth: Mucous membranes are moist.   Eyes:      General: No scleral icterus.     Extraocular Movements: Extraocular movements intact.      Conjunctiva/sclera: Conjunctivae normal.      Pupils: Pupils are equal, round, and reactive to light.   Cardiovascular:      Rate and Rhythm: Normal rate and regular rhythm.      Heart sounds: No murmur heard.  Pulmonary:      Effort: Pulmonary effort is normal. No respiratory distress.      Breath sounds: Normal breath sounds. No wheezing.   Abdominal:      Palpations: Abdomen is soft.      Tenderness: There is no abdominal tenderness.   Musculoskeletal:         General: Swelling present. Normal range of motion.      Cervical back: Normal range of motion and neck supple.   Skin:     General: Skin is warm.      Coloration: Skin is not jaundiced.   Neurological:      General: No focal deficit present.      Mental Status: He is alert and oriented to person, place, and time. Mental status is at baseline.      Comments: No focal neurological deficit at this time   Psychiatric:         Attention and Perception: Attention normal.         Speech: Speech normal.         Behavior: Behavior is cooperative.       Significant Labs: All pertinent labs within the past 24 hours have been reviewed.  CBC:   Recent Labs   Lab 09/03/22 1952 09/04/22 0605 09/05/22  0454   WBC 5.56 6.22 5.15   HGB 15.3 14.6 15.3   HCT 43.4 44.2 45.2    168 159     CMP:   Recent Labs   Lab 09/03/22 1952 09/04/22 0605 09/05/22  0454    142 142   K 3.1* 3.2* 3.6    108 110   CO2 18* 23 21*   * 100 153*   BUN 66* 61* 52*   CREATININE 3.7* 3.4* 2.8*   CALCIUM 9.8 9.4 9.3   PROT 6.7 6.3 6.4   ALBUMIN 3.6 3.5 3.6   BILITOT 0.5 0.4 0.5   ALKPHOS 83 79 86   AST 12 10 11   ALT 14 14 15   ANIONGAP 14 11 11       Significant Imaging:     Imaging Results              CT Head Without Contrast (Final result)  Result time 09/03/22 20:15:53      Final result by  Aiden Almeida MD (09/03/22 20:15:53)                   Impression:      No acute abnormality.    Atrophy and chronic white matter changes.    Prominence of the lateral ventricles similar to prior exam    All CT scans   are performed using dose optimization techniques including the following: automated exposure control; adjustment of the mA and/or kV; use of iterative reconstruction technique.  Dose modulation was employed for ALARA by means of: Automated exposure control; adjustment of the mA and/or kV according to patient size (this includes techniques or standardized protocols for targeted exams where dose is matched to indication/reason for exam; i.e. extremities or head); and/or use of iterative reconstructive technique.      Electronically signed by: Juan Pablo Wolfe  Date:    09/03/2022  Time:    20:15               Narrative:    EXAMINATION:  CT HEAD WITHOUT CONTRAST    CLINICAL HISTORY:  Neuro deficit, acute, stroke suspected;    TECHNIQUE:  Low dose axial CT images obtained throughout the head without intravenous contrast. Sagittal and coronal reconstructions were performed.    COMPARISON:  None.    FINDINGS:  Atrophy and chronic white matter changes.  Prominence of the lateral ventricles similar to prior exam.  No extra-axial blood or fluid collections.    No parenchymal mass, hemorrhage, edema or major vascular distribution infarct.    Skull/extracranial contents (limited evaluation): No fracture. Minimal mucosal thickening of the left maxillary sinus.                                       X-Ray Chest AP Portable (Final result)  Result time 09/03/22 19:42:38      Final result by Aiden Almeida MD (09/03/22 19:42:38)                   Impression:      No acute abnormality.      Electronically signed by: Juan Pablo Wolfe  Date:    09/03/2022  Time:    19:42               Narrative:    EXAMINATION:  XR CHEST AP PORTABLE    CLINICAL HISTORY:  Stroke;    TECHNIQUE:  Single frontal view of the chest was  performed.    COMPARISON:  None    FINDINGS:  The lungs are clear, with normal appearance of pulmonary vasculature and no pleural effusion or pneumothorax.    The cardiac silhouette is normal in size. The hilar and mediastinal contours are unremarkable.    Bones are intact.                                         Assessment/Plan:      * Transient visual disturbance  - Place in Observation on Telemetry to r/o acute CVA.  - CT of the head showed no acute abnormality.  - Will obtain MRI of the brain (unable to perform due to body habitus)  - 2D echo.  - Check FLP and HbA1c.  - Neuro checks.  - Start daily  mg and Lipitor 40 mg.  - Allow for permissive HTN.   - PT/OT/ST consult to eval and treat - no deficits  - Neurology consult.         9/5/2022  Pt reports left eye vision back to blurred baseline   Vascular Surgery following   No plan for surgical intervention at present    Add Plavix per Vascular Surgery recommendations     Acute renal failure superimposed on stage 3 chronic kidney disease  Creatinine elevated 3.7 (baseline 1.3-1.7).    -continue NS at 125 cc/hour for the next 1 L.    Still elevated 3.4 in relation to baseline, continue IV fluids      9/5/2022  Creatinine 2.8  Continue gentle hydration   Will follow       Hypokalemia  - K 3.2  -replace KCL 40 meq PO       9/5/2022  Potassium 3.6 today     Elevated troponin  -denies chest pain  -trend cardiac enzymes  Elevated but flat - demand ischemia possibly from NINFA      Morbid obesity with BMI of 45.0-49.9, adult  Body mass index is 43.15 kg/m². Morbid obesity complicates all aspects of disease management from diagnostic modalities to treatment. Weight loss encouraged and health benefits explained to patient.         Type 2 diabetes mellitus with diabetic polyneuropathy, with long-term current use of insulin  Patient's FSGs are uncontrolled due to hyperglycemia on current medication regimen.  Last A1c reviewed-   Lab Results   Component Value Date     HGBA1C 6.5 (H) 08/15/2022     Most recent fingerstick glucose reviewed-   Recent Labs   Lab 09/04/22  1549 09/04/22  2129 09/05/22  0522 09/05/22  1121   POCTGLUCOSE 179* 181* 134* 158*     Current correctional scale  Medium  Increase anti-hyperglycemic dose as follows-   Antihyperglycemics (From admission, onward)      Start     Stop Route Frequency Ordered    09/04/22 2100  insulin detemir U-100 pen 30 Units         -- SubQ Nightly 09/03/22 2331    09/04/22 0029  insulin aspart U-100 pen 1-10 Units         -- SubQ Every 6 hours PRN 09/03/22 2329          Hold Oral hypoglycemics while patient is in the hospital.        Hypertension       Restart home BP medications       Hold home coreg due to Bradycardia       Start hydralazine         VTE Risk Mitigation (From admission, onward)           Ordered     heparin (porcine) injection 5,000 Units  Every 12 hours         09/03/22 2328     IP VTE HIGH RISK PATIENT  Once         09/03/22 2328     Place sequential compression device  Until discontinued         09/03/22 2328                    Discharge Planning   OJSE:      Code Status: Full Code   Is the patient medically ready for discharge?:     Reason for patient still in hospital (select all that apply): Patient trending condition and Treatment                     Dominik Roca NP  Department of Hospital Medicine   [facility]

## 2022-09-05 NOTE — ASSESSMENT & PLAN NOTE
Patient's FSGs are uncontrolled due to hyperglycemia on current medication regimen.  Last A1c reviewed-   Lab Results   Component Value Date    HGBA1C 6.5 (H) 08/15/2022     Most recent fingerstick glucose reviewed-   Recent Labs   Lab 09/04/22  1549 09/04/22  2129 09/05/22  0522 09/05/22  1121   POCTGLUCOSE 179* 181* 134* 158*     Current correctional scale  Medium  Increase anti-hyperglycemic dose as follows-   Antihyperglycemics (From admission, onward)    Start     Stop Route Frequency Ordered    09/04/22 2100  insulin detemir U-100 pen 30 Units         -- SubQ Nightly 09/03/22 2331    09/04/22 0029  insulin aspart U-100 pen 1-10 Units         -- SubQ Every 6 hours PRN 09/03/22 2329        Hold Oral hypoglycemics while patient is in the hospital.

## 2022-09-05 NOTE — SUBJECTIVE & OBJECTIVE
Interval History: Creatinine 2.8, continue gentle hydration and monitor kidney function.     Review of Systems   Constitutional: Negative.  Negative for chills and fever.   HENT: Negative.  Negative for congestion, rhinorrhea, sore throat and trouble swallowing.    Eyes:  Positive for visual disturbance.   Respiratory: Negative.  Negative for cough, shortness of breath and wheezing.    Cardiovascular:  Positive for leg swelling. Negative for chest pain and palpitations.   Gastrointestinal: Negative.  Negative for abdominal pain, diarrhea, nausea and vomiting.   Endocrine: Negative.    Genitourinary: Negative.  Negative for dysuria and flank pain.   Musculoskeletal: Negative.  Negative for back pain.   Skin: Negative.  Negative for rash.   Allergic/Immunologic: Negative.    Neurological:  Positive for numbness (right facial). Negative for speech difficulty, weakness and headaches.   Hematological: Negative.    Psychiatric/Behavioral: Negative.  Negative for hallucinations.    All other systems reviewed and are negative.  Objective:     Vital Signs (Most Recent):  Temp: 97.8 °F (36.6 °C) (09/05/22 1120)  Pulse: (!) 49 (09/05/22 1120)  Resp: 18 (09/05/22 1120)  BP: (!) 187/88 (09/05/22 1120)  SpO2: 96 % (09/05/22 1120)   Vital Signs (24h Range):  Temp:  [97.4 °F (36.3 °C)-98.1 °F (36.7 °C)] 97.8 °F (36.6 °C)  Pulse:  [48-53] 49  Resp:  [16-18] 18  SpO2:  [95 %-97 %] 96 %  BP: (141-187)/(68-88) 187/88     Weight: (!) 173.7 kg (383 lb)  Body mass index is 43.15 kg/m².    Intake/Output Summary (Last 24 hours) at 9/5/2022 1420  Last data filed at 9/5/2022 1200  Gross per 24 hour   Intake 2074.69 ml   Output --   Net 2074.69 ml      Physical Exam  Vitals and nursing note reviewed.   Constitutional:       General: He is awake. He is not in acute distress.     Appearance: He is obese. He is not ill-appearing.   HENT:      Head: Normocephalic and atraumatic.      Mouth/Throat:      Mouth: Mucous membranes are moist.   Eyes:       General: No scleral icterus.     Extraocular Movements: Extraocular movements intact.      Conjunctiva/sclera: Conjunctivae normal.      Pupils: Pupils are equal, round, and reactive to light.   Cardiovascular:      Rate and Rhythm: Normal rate and regular rhythm.      Heart sounds: No murmur heard.  Pulmonary:      Effort: Pulmonary effort is normal. No respiratory distress.      Breath sounds: Normal breath sounds. No wheezing.   Abdominal:      Palpations: Abdomen is soft.      Tenderness: There is no abdominal tenderness.   Musculoskeletal:         General: Swelling present. Normal range of motion.      Cervical back: Normal range of motion and neck supple.   Skin:     General: Skin is warm.      Coloration: Skin is not jaundiced.   Neurological:      General: No focal deficit present.      Mental Status: He is alert and oriented to person, place, and time. Mental status is at baseline.      Comments: No focal neurological deficit at this time   Psychiatric:         Attention and Perception: Attention normal.         Speech: Speech normal.         Behavior: Behavior is cooperative.       Significant Labs: All pertinent labs within the past 24 hours have been reviewed.  CBC:   Recent Labs   Lab 09/03/22 1952 09/04/22 0605 09/05/22  0454   WBC 5.56 6.22 5.15   HGB 15.3 14.6 15.3   HCT 43.4 44.2 45.2    168 159     CMP:   Recent Labs   Lab 09/03/22 1952 09/04/22 0605 09/05/22  0454    142 142   K 3.1* 3.2* 3.6    108 110   CO2 18* 23 21*   * 100 153*   BUN 66* 61* 52*   CREATININE 3.7* 3.4* 2.8*   CALCIUM 9.8 9.4 9.3   PROT 6.7 6.3 6.4   ALBUMIN 3.6 3.5 3.6   BILITOT 0.5 0.4 0.5   ALKPHOS 83 79 86   AST 12 10 11   ALT 14 14 15   ANIONGAP 14 11 11       Significant Imaging:     Imaging Results              CT Head Without Contrast (Final result)  Result time 09/03/22 20:15:53      Final result by Aiden Almeida MD (09/03/22 20:15:53)                   Impression:      No acute  abnormality.    Atrophy and chronic white matter changes.    Prominence of the lateral ventricles similar to prior exam    All CT scans   are performed using dose optimization techniques including the following: automated exposure control; adjustment of the mA and/or kV; use of iterative reconstruction technique.  Dose modulation was employed for ALARA by means of: Automated exposure control; adjustment of the mA and/or kV according to patient size (this includes techniques or standardized protocols for targeted exams where dose is matched to indication/reason for exam; i.e. extremities or head); and/or use of iterative reconstructive technique.      Electronically signed by: Juan Pablo Wolfe  Date:    09/03/2022  Time:    20:15               Narrative:    EXAMINATION:  CT HEAD WITHOUT CONTRAST    CLINICAL HISTORY:  Neuro deficit, acute, stroke suspected;    TECHNIQUE:  Low dose axial CT images obtained throughout the head without intravenous contrast. Sagittal and coronal reconstructions were performed.    COMPARISON:  None.    FINDINGS:  Atrophy and chronic white matter changes.  Prominence of the lateral ventricles similar to prior exam.  No extra-axial blood or fluid collections.    No parenchymal mass, hemorrhage, edema or major vascular distribution infarct.    Skull/extracranial contents (limited evaluation): No fracture. Minimal mucosal thickening of the left maxillary sinus.                                       X-Ray Chest AP Portable (Final result)  Result time 09/03/22 19:42:38      Final result by Aiden Almeida MD (09/03/22 19:42:38)                   Impression:      No acute abnormality.      Electronically signed by: Juan Pablo Wolfe  Date:    09/03/2022  Time:    19:42               Narrative:    EXAMINATION:  XR CHEST AP PORTABLE    CLINICAL HISTORY:  Stroke;    TECHNIQUE:  Single frontal view of the chest was performed.    COMPARISON:  None    FINDINGS:  The lungs are clear, with normal appearance of  pulmonary vasculature and no pleural effusion or pneumothorax.    The cardiac silhouette is normal in size. The hilar and mediastinal contours are unremarkable.    Bones are intact.

## 2022-09-05 NOTE — ASSESSMENT & PLAN NOTE
Restart home BP medications   Hold home coreg and amlodipine due to Bradycardia   Start hydralazine   Cardiology consulted

## 2022-09-06 PROBLEM — E87.6 HYPOKALEMIA: Status: RESOLVED | Noted: 2022-09-03 | Resolved: 2022-09-06

## 2022-09-06 LAB
ANION GAP SERPL CALC-SCNC: 8 MMOL/L (ref 8–16)
BUN SERPL-MCNC: 32 MG/DL (ref 8–23)
CALCIUM SERPL-MCNC: 9.1 MG/DL (ref 8.7–10.5)
CHLORIDE SERPL-SCNC: 112 MMOL/L (ref 95–110)
CO2 SERPL-SCNC: 21 MMOL/L (ref 23–29)
CREAT SERPL-MCNC: 2.3 MG/DL (ref 0.5–1.4)
EST. GFR  (NO RACE VARIABLE): 31 ML/MIN/1.73 M^2
GLUCOSE SERPL-MCNC: 156 MG/DL (ref 70–110)
POCT GLUCOSE: 122 MG/DL (ref 70–110)
POCT GLUCOSE: 133 MG/DL (ref 70–110)
POCT GLUCOSE: 158 MG/DL (ref 70–110)
POCT GLUCOSE: 224 MG/DL (ref 70–110)
POTASSIUM SERPL-SCNC: 4.2 MMOL/L (ref 3.5–5.1)
SODIUM SERPL-SCNC: 141 MMOL/L (ref 136–145)

## 2022-09-06 PROCEDURE — 80048 BASIC METABOLIC PNL TOTAL CA: CPT | Performed by: NURSE PRACTITIONER

## 2022-09-06 PROCEDURE — 94761 N-INVAS EAR/PLS OXIMETRY MLT: CPT

## 2022-09-06 PROCEDURE — 63600175 PHARM REV CODE 636 W HCPCS: Performed by: INTERNAL MEDICINE

## 2022-09-06 PROCEDURE — 25000003 PHARM REV CODE 250: Performed by: INTERNAL MEDICINE

## 2022-09-06 PROCEDURE — 25000003 PHARM REV CODE 250: Performed by: NURSE PRACTITIONER

## 2022-09-06 PROCEDURE — 11000001 HC ACUTE MED/SURG PRIVATE ROOM

## 2022-09-06 PROCEDURE — 36415 COLL VENOUS BLD VENIPUNCTURE: CPT | Performed by: NURSE PRACTITIONER

## 2022-09-06 RX ORDER — HYDRALAZINE HYDROCHLORIDE 25 MG/1
100 TABLET, FILM COATED ORAL EVERY 8 HOURS
Status: DISCONTINUED | OUTPATIENT
Start: 2022-09-06 | End: 2022-09-07 | Stop reason: HOSPADM

## 2022-09-06 RX ADMIN — HEPARIN SODIUM 5000 UNITS: 5000 INJECTION INTRAVENOUS; SUBCUTANEOUS at 08:09

## 2022-09-06 RX ADMIN — SODIUM CHLORIDE: 9 INJECTION, SOLUTION INTRAVENOUS at 03:09

## 2022-09-06 RX ADMIN — HYDRALAZINE HYDROCHLORIDE 50 MG: 25 TABLET, FILM COATED ORAL at 03:09

## 2022-09-06 RX ADMIN — INSULIN ASPART 4 UNITS: 100 INJECTION, SOLUTION INTRAVENOUS; SUBCUTANEOUS at 11:09

## 2022-09-06 RX ADMIN — CLOPIDOGREL 75 MG: 75 TABLET, FILM COATED ORAL at 09:09

## 2022-09-06 RX ADMIN — ASPIRIN 325 MG ORAL TABLET 325 MG: 325 PILL ORAL at 09:09

## 2022-09-06 RX ADMIN — HEPARIN SODIUM 5000 UNITS: 5000 INJECTION INTRAVENOUS; SUBCUTANEOUS at 09:09

## 2022-09-06 RX ADMIN — ATORVASTATIN CALCIUM 20 MG: 10 TABLET, FILM COATED ORAL at 08:09

## 2022-09-06 RX ADMIN — INSULIN DETEMIR 30 UNITS: 100 INJECTION, SOLUTION SUBCUTANEOUS at 08:09

## 2022-09-06 RX ADMIN — HYDRALAZINE HYDROCHLORIDE 100 MG: 25 TABLET, FILM COATED ORAL at 11:09

## 2022-09-06 RX ADMIN — HYDRALAZINE HYDROCHLORIDE 50 MG: 25 TABLET, FILM COATED ORAL at 05:09

## 2022-09-06 NOTE — PROGRESS NOTES
[facility]  [facility]  Progress Note    Patient Name: Stepan Springer  MRN: [id number]  Patient Class: IP- Inpatient   Admission Date: 9/3/2022  Length of Stay: 1 days  Attending Physician: Severiano Vega, *  Primary Care Provider: KANDICE Fleming MD        Subjective:     Principal Problem:Transient visual disturbance        HPI:  Mr. Gordon is a 67-year-old morbidly obese  male with PMH significant for CAD, diastolic CHF, CKD stage 3, insulin-dependent diabetes, hypertension, presented to the ED complaining of left eye visual loss associated the right facial weakness.  The left eye vision disturbances have significantly improved.  Patient deemed not a candidate for tPA a symptoms ongoing since yesterday, and possibly much longer.  Evaluated by tele Neurology, concern for left carotid TIA/stroke.  Recommended CTA head and neck, however creatinine elevated 3.7 (1.23-1.7).  Admitted for full TIA workup.  Laboratory workup reveals potassium 3.1, creatinine 3.7.    Admitting diagnosis:  Left visual disturbances.  Concern for TIA/CVA.      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 His left eye vision loss came on suddenly and lasted 3 hours. Now it is resolved. Vascular Neurologist voiced concern for left carotid TIA/Stroke. Unfortunately, due to patient's body habitus the MRI of Brain cannot be performed. Will repeat CT of Head 24 hours from initial CT. Carotid US pending. Pt with new NINFA with creatinine 3.7 >> 3.4 (baseline 1.5) and unable to perform CTA of head and neck at this time. Gentle IV fluids and nephrotoxic meds held. Recent ECHO with EF 55 % and no wall motion abnormalities. Last Hgb A1 C = 6.5. Pt on full strength ASA and STATIN. He had no speech/cognitive abnormalities/ no functional immobility. Hypokalemia replaced. As of 9/5/2022, creatinine trending downward 2.8 today (baseline around 1.5-1.9) will continue  gentle hydration. Patient reports left eye vision back to baseline blurriness. 9/6/22-Pt doing well. Renal function continues to improve. Cardiology consulted for bradycardia.           Review of Systems   Constitutional: Negative.  Negative for chills and fever.   HENT: Negative.  Negative for congestion, rhinorrhea, sore throat and trouble swallowing.    Eyes:  Positive for visual disturbance.   Respiratory: Negative.  Negative for cough, shortness of breath and wheezing.    Cardiovascular:  Positive for leg swelling. Negative for chest pain and palpitations.   Gastrointestinal: Negative.  Negative for abdominal pain, diarrhea, nausea and vomiting.   Endocrine: Negative.    Genitourinary: Negative.  Negative for dysuria and flank pain.   Musculoskeletal: Negative.  Negative for back pain.   Skin: Negative.  Negative for rash.   Allergic/Immunologic: Negative.    Neurological:  Positive for numbness (right facial). Negative for speech difficulty, weakness and headaches.   Hematological: Negative.    Psychiatric/Behavioral: Negative.  Negative for hallucinations.    All other systems reviewed and are negative.  Objective:     Vital Signs (Most Recent):  Temp: 98.2 °F (36.8 °C) (09/06/22 1128)  Pulse: (!) 49 (09/06/22 1128)  Resp: 20 (09/06/22 1128)  BP: (!) 189/85 (09/06/22 1128)  SpO2: 98 % (09/06/22 1128)   Vital Signs (24h Range):  Temp:  [98.2 °F (36.8 °C)-98.8 °F (37.1 °C)] 98.2 °F (36.8 °C)  Pulse:  [48-55] 49  Resp:  [18-20] 20  SpO2:  [95 %-98 %] 98 %  BP: (138-189)/() 189/85     Weight: (!) 173.7 kg (383 lb)  Body mass index is 43.15 kg/m².    Intake/Output Summary (Last 24 hours) at 9/6/2022 1630  Last data filed at 9/6/2022 1400  Gross per 24 hour   Intake 940 ml   Output --   Net 940 ml      Physical Exam  Vitals and nursing note reviewed.   Constitutional:       General: He is awake. He is not in acute distress.     Appearance: He is obese. He is not ill-appearing.   HENT:      Head: Normocephalic  and atraumatic.      Mouth/Throat:      Mouth: Mucous membranes are moist.   Eyes:      General: No scleral icterus.     Extraocular Movements: Extraocular movements intact.      Conjunctiva/sclera: Conjunctivae normal.      Pupils: Pupils are equal, round, and reactive to light.   Cardiovascular:      Rate and Rhythm: Normal rate and regular rhythm.      Heart sounds: No murmur heard.  Pulmonary:      Effort: Pulmonary effort is normal. No respiratory distress.      Breath sounds: Normal breath sounds. No wheezing.   Abdominal:      Palpations: Abdomen is soft.      Tenderness: There is no abdominal tenderness.   Musculoskeletal:         General: Swelling present. Normal range of motion.      Cervical back: Normal range of motion and neck supple.   Skin:     General: Skin is warm.      Coloration: Skin is not jaundiced.   Neurological:      General: No focal deficit present.      Mental Status: He is alert and oriented to person, place, and time. Mental status is at baseline.      Comments: No focal neurological deficit at this time   Psychiatric:         Attention and Perception: Attention normal.         Speech: Speech normal.         Behavior: Behavior is cooperative.       Significant Labs: All pertinent labs within the past 24 hours have been reviewed.  BMP:   Recent Labs   Lab 09/06/22  1616   *      K 4.2   *   CO2 21*   BUN 32*   CREATININE 2.3*   CALCIUM 9.1     CBC:   Recent Labs   Lab 09/05/22  0454   WBC 5.15   HGB 15.3   HCT 45.2        CMP:   Recent Labs   Lab 09/05/22  0454 09/06/22  1616    141   K 3.6 4.2    112*   CO2 21* 21*   * 156*   BUN 52* 32*   CREATININE 2.8* 2.3*   CALCIUM 9.3 9.1   PROT 6.4  --    ALBUMIN 3.6  --    BILITOT 0.5  --    ALKPHOS 86  --    AST 11  --    ALT 15  --    ANIONGAP 11 8       Significant Imaging: I have reviewed all pertinent imaging results/findings within the past 24 hours.      Assessment/Plan:      * Transient visual  disturbance  - Place in Observation on Telemetry to r/o acute CVA.  - CT of the head showed no acute abnormality.  - Will obtain MRI of the brain (unable to perform due to body habitus)  - 2D echo.  - Check FLP and HbA1c.  - Neuro checks.  - Start daily  mg and Lipitor 40 mg.  - Allow for permissive HTN.   - PT/OT/ST consult to eval and treat - no deficits  - Neurology consult.     9/6/2022  Continue Plavix  Outpatient follow-up with vascular surgery      Hypertension  Restart home BP medications   Hold home coreg and amlodipine due to Bradycardia   Start hydralazine   Cardiology consulted    Acute renal failure superimposed on stage 3 chronic kidney disease  Creatinine elevated 3.7 (baseline 1.3-1.7).    -continue NS at 125 cc/hour for the next 1 L.    Still elevated 3.4 in relation to baseline, continue IV fluids      9/6/2022  Creatinine 2.3, continues to trend down   Continue gentle hydration   Follow BMP       Elevated troponin  -denies chest pain  -trend cardiac enzymes  Elevated but flat - demand ischemia possibly from NINFA      Morbid obesity with BMI of 45.0-49.9, adult  Body mass index is 43.15 kg/m². Morbid obesity complicates all aspects of disease management from diagnostic modalities to treatment. Weight loss encouraged and health benefits explained to patient.         Type 2 diabetes mellitus with diabetic polyneuropathy, with long-term current use of insulin  Patient's FSGs are uncontrolled due to hyperglycemia on current medication regimen.  Last A1c reviewed-   Lab Results   Component Value Date    HGBA1C 6.5 (H) 08/15/2022     Most recent fingerstick glucose reviewed-   Recent Labs   Lab 09/05/22  1942 09/06/22  0442 09/06/22  1127   POCTGLUCOSE 150* 133* 224*     Current correctional scale  Medium  Increase anti-hyperglycemic dose as follows-   Antihyperglycemics (From admission, onward)    Start     Stop Route Frequency Ordered    09/04/22 2100  insulin detemir U-100 pen 30 Units          -- SubQ Nightly 09/03/22 2331    09/04/22 0029  insulin aspart U-100 pen 1-10 Units         -- SubQ Every 6 hours PRN 09/03/22 2329        Hold Oral hypoglycemics while patient is in the hospital.      VTE Risk Mitigation (From admission, onward)         Ordered     heparin (porcine) injection 5,000 Units  Every 12 hours         09/03/22 2328     IP VTE HIGH RISK PATIENT  Once         09/03/22 2328     Place sequential compression device  Until discontinued         09/03/22 2328                Discharge Planning   JOSE:      Code Status: Full Code   Is the patient medically ready for discharge?:     Reason for patient still in hospital (select all that apply): Patient trending condition, Laboratory test, Treatment and Consult recommendations  Discharge Plan A: Home                  Radha Pichardo NP  Department of Hospital Medicine   [facility]

## 2022-09-06 NOTE — PLAN OF CARE
[facility]  Initial Discharge Assessment       Primary Care Provider: KANDICE Fleming MD    Admission Diagnosis: Stroke [I63.9]  NINFA (acute kidney injury) [N17.9]  Chest pain [R07.9]  Acute loss of vision, bilateral [H53.133]    Admission Date: 9/3/2022  Expected Discharge Date:     Discharge Barriers Identified: Bariatric, Transportation, No family/friends to help    Payor: MEDICARE / Plan: MEDICARE PART A & B / Product Type: Government /     Extended Emergency Contact Information  Primary Emergency Contact: TRISTAN Mantilla  Mobile Phone: [phone]  Relation: Friend  Secondary Emergency Contact: Geena Mantilla   [facility]  Home Phone: [phone]  Mobile Phone: [phone]  Relation: Friend    Discharge Plan A: Home  Discharge Plan B: Home Health      [facility] - [place], [place] - [address] AT Portal to Registered [facility]  [address]  [facility] [address]  Phone: [phone] Fax: [phone]    [facility] - [place], [place] - [address]  [address] [address] [facility] [address]  Phone: [phone] Fax: [phone]    [facility]  [id number] [place] [address]  Phone: [phone] Fax: [phone]      Initial Assessment (most recent)       Adult Discharge Assessment - 09/06/22 1428          Discharge Assessment    Assessment Type Discharge Planning Assessment     Confirmed/corrected address, phone number and insurance Yes     Confirmed Demographics Correct on Facesheet     Source of Information patient     Communicated JOSE with patient/caregiver Date not available/Unable to determine     Lives With alone     Do you expect to return to your current living situation? Yes     Do you have help at home or someone to help you manage your care at home? No     Prior to hospitilization cognitive status: Alert/Oriented     Current cognitive status: Alert/Oriented     Walking or Climbing Stairs Difficulty ambulation  difficulty, requires equipment;stair climbing difficulty, requires equipment     Dressing/Bathing Difficulty none     Home Accessibility stairs to enter home     Number of Stairs, Main Entrance three     Surface of Stairs, Main Entrance concrete     Equipment Currently Used at Home walker, rolling;shower chair     Readmission within 30 days? No     Patient currently being followed by outpatient case management? No     Do you currently have service(s) that help you manage your care at home? No     Do you take prescription medications? Yes     Do you have prescription coverage? Yes     Do you have any problems affording any of your prescribed medications? No     Is the patient taking medications as prescribed? yes     Who is going to help you get home at discharge? [place] [facility]T [facility]     How do you get to doctors appointments? agency     Are you on dialysis? No     Do you take coumadin? No     Discharge Plan A Home     Discharge Plan B Home Health     Discharge Plan discussed with: Patient     Discharge Barriers Identified Bariatric;Transportation;No family/friends to help        Physical Activity    On average, how many days per week do you engage in moderate to strenuous exercise (like a brisk walk)? 0 days     On average, how many minutes do you engage in exercise at this level? 0 min        Financial Resource Strain    How hard is it for you to pay for the very basics like food, housing, medical care, and heating? Not hard at all        Housing Stability    In the last 12 months, was there a time when you were not able to pay the mortgage or rent on time? No     In the last 12 months, how many places have you lived? 1     In the last 12 months, was there a time when you did not have a steady place to sleep or slept in a shelter (including now)? No        Transportation Needs    In the past 12 months, has lack of transportation kept you from medical appointments or from getting medications? No     In the  past 12 months, has lack of transportation kept you from meetings, work, or from getting things needed for daily living? No        Food Insecurity    Within the past 12 months, you worried that your food would run out before you got the money to buy more. Never true     Within the past 12 months, the food you bought just didn't last and you didn't have money to get more. Never true        Stress    Do you feel stress - tense, restless, nervous, or anxious, or unable to sleep at night because your mind is troubled all the time - these days? Only a little        Social Connections    In a typical week, how many times do you talk on the phone with family, friends, or neighbors? More than three times a week     How often do you get together with friends or relatives? Once a week     How often do you attend Scientologist or Muslim services? Never     Do you belong to any clubs or organizations such as Scientologist groups, unions, fraternal or athletic groups, or school groups? No     How often do you attend meetings of the clubs or organizations you belong to? Never        Alcohol Use    Q1: How often do you have a drink containing alcohol? Never     Q2: How many drinks containing alcohol do you have on a typical day when you are drinking? Patient does not drink     Q3: How often do you have six or more drinks on one occasion? Never                   Met with pt at bedside for DC assessment. Pt lives at home alone and uses a cane. CM DC needs pending hospital course. SWer provided a transitional care folder, information on advanced directives, information on pharmacy bedside delivery, and discharge planning begins on admission with contact information for any needs/questions.    Wiley Argueta LMSW 9/6/2022 2:32 PM

## 2022-09-06 NOTE — ASSESSMENT & PLAN NOTE
- Place in Observation on Telemetry to r/o acute CVA.  - CT of the head showed no acute abnormality.  - Will obtain MRI of the brain (unable to perform due to body habitus)  - 2D echo.  - Check FLP and HbA1c.  - Neuro checks.  - Start daily  mg and Lipitor 40 mg.  - Allow for permissive HTN.   - PT/OT/ST consult to eval and treat - no deficits  - Neurology consult.     9/6/2022  Continue Plavix  Outpatient follow-up with vascular surgery

## 2022-09-06 NOTE — ASSESSMENT & PLAN NOTE
Creatinine elevated 3.7 (baseline 1.3-1.7).    -continue NS at 125 cc/hour for the next 1 L.    Still elevated 3.4 in relation to baseline, continue IV fluids      9/6/2022  Creatinine 2.3, continues to trend down   Continue gentle hydration   Follow BMP

## 2022-09-06 NOTE — ASSESSMENT & PLAN NOTE
Patient's FSGs are uncontrolled due to hyperglycemia on current medication regimen.  Last A1c reviewed-   Lab Results   Component Value Date    HGBA1C 6.5 (H) 08/15/2022     Most recent fingerstick glucose reviewed-   Recent Labs   Lab 09/05/22  1942 09/06/22  0442 09/06/22  1127   POCTGLUCOSE 150* 133* 224*     Current correctional scale  Medium  Increase anti-hyperglycemic dose as follows-   Antihyperglycemics (From admission, onward)    Start     Stop Route Frequency Ordered    09/04/22 2100  insulin detemir U-100 pen 30 Units         -- SubQ Nightly 09/03/22 2331    09/04/22 0029  insulin aspart U-100 pen 1-10 Units         -- SubQ Every 6 hours PRN 09/03/22 2329        Hold Oral hypoglycemics while patient is in the hospital.

## 2022-09-06 NOTE — SUBJECTIVE & OBJECTIVE
Review of Systems   Constitutional: Negative.  Negative for chills and fever.   HENT: Negative.  Negative for congestion, rhinorrhea, sore throat and trouble swallowing.    Eyes:  Positive for visual disturbance.   Respiratory: Negative.  Negative for cough, shortness of breath and wheezing.    Cardiovascular:  Positive for leg swelling. Negative for chest pain and palpitations.   Gastrointestinal: Negative.  Negative for abdominal pain, diarrhea, nausea and vomiting.   Endocrine: Negative.    Genitourinary: Negative.  Negative for dysuria and flank pain.   Musculoskeletal: Negative.  Negative for back pain.   Skin: Negative.  Negative for rash.   Allergic/Immunologic: Negative.    Neurological:  Positive for numbness (right facial). Negative for speech difficulty, weakness and headaches.   Hematological: Negative.    Psychiatric/Behavioral: Negative.  Negative for hallucinations.    All other systems reviewed and are negative.  Objective:     Vital Signs (Most Recent):  Temp: 98.2 °F (36.8 °C) (09/06/22 1128)  Pulse: (!) 49 (09/06/22 1128)  Resp: 20 (09/06/22 1128)  BP: (!) 189/85 (09/06/22 1128)  SpO2: 98 % (09/06/22 1128)   Vital Signs (24h Range):  Temp:  [98.2 °F (36.8 °C)-98.8 °F (37.1 °C)] 98.2 °F (36.8 °C)  Pulse:  [48-55] 49  Resp:  [18-20] 20  SpO2:  [95 %-98 %] 98 %  BP: (138-189)/() 189/85     Weight: (!) 173.7 kg (383 lb)  Body mass index is 43.15 kg/m².    Intake/Output Summary (Last 24 hours) at 9/6/2022 1630  Last data filed at 9/6/2022 1400  Gross per 24 hour   Intake 940 ml   Output --   Net 940 ml      Physical Exam  Vitals and nursing note reviewed.   Constitutional:       General: He is awake. He is not in acute distress.     Appearance: He is obese. He is not ill-appearing.   HENT:      Head: Normocephalic and atraumatic.      Mouth/Throat:      Mouth: Mucous membranes are moist.   Eyes:      General: No scleral icterus.     Extraocular Movements: Extraocular movements intact.       Conjunctiva/sclera: Conjunctivae normal.      Pupils: Pupils are equal, round, and reactive to light.   Cardiovascular:      Rate and Rhythm: Normal rate and regular rhythm.      Heart sounds: No murmur heard.  Pulmonary:      Effort: Pulmonary effort is normal. No respiratory distress.      Breath sounds: Normal breath sounds. No wheezing.   Abdominal:      Palpations: Abdomen is soft.      Tenderness: There is no abdominal tenderness.   Musculoskeletal:         General: Swelling present. Normal range of motion.      Cervical back: Normal range of motion and neck supple.   Skin:     General: Skin is warm.      Coloration: Skin is not jaundiced.   Neurological:      General: No focal deficit present.      Mental Status: He is alert and oriented to person, place, and time. Mental status is at baseline.      Comments: No focal neurological deficit at this time   Psychiatric:         Attention and Perception: Attention normal.         Speech: Speech normal.         Behavior: Behavior is cooperative.       Significant Labs: All pertinent labs within the past 24 hours have been reviewed.  BMP:   Recent Labs   Lab 09/06/22  1616   *      K 4.2   *   CO2 21*   BUN 32*   CREATININE 2.3*   CALCIUM 9.1     CBC:   Recent Labs   Lab 09/05/22  0454   WBC 5.15   HGB 15.3   HCT 45.2        CMP:   Recent Labs   Lab 09/05/22  0454 09/06/22  1616    141   K 3.6 4.2    112*   CO2 21* 21*   * 156*   BUN 52* 32*   CREATININE 2.8* 2.3*   CALCIUM 9.3 9.1   PROT 6.4  --    ALBUMIN 3.6  --    BILITOT 0.5  --    ALKPHOS 86  --    AST 11  --    ALT 15  --    ANIONGAP 11 8       Significant Imaging: I have reviewed all pertinent imaging results/findings within the past 24 hours.

## 2022-09-07 ENCOUNTER — TELEPHONE (OUTPATIENT)
Dept: CARDIOLOGY | Facility: HOSPITAL | Age: 67
End: 2022-09-07
Payer: MEDICARE

## 2022-09-07 VITALS
HEIGHT: 78 IN | BODY MASS INDEX: 36.45 KG/M2 | TEMPERATURE: 98 F | OXYGEN SATURATION: 97 % | DIASTOLIC BLOOD PRESSURE: 89 MMHG | RESPIRATION RATE: 16 BRPM | HEART RATE: 69 BPM | WEIGHT: 315 LBS | SYSTOLIC BLOOD PRESSURE: 177 MMHG

## 2022-09-07 DIAGNOSIS — R00.1 BRADYCARDIA: ICD-10-CM

## 2022-09-07 DIAGNOSIS — H53.9 TRANSIENT VISUAL DISTURBANCE: Primary | ICD-10-CM

## 2022-09-07 LAB
ANION GAP SERPL CALC-SCNC: 9 MMOL/L (ref 8–16)
BASOPHILS # BLD AUTO: 0.03 K/UL (ref 0–0.2)
BASOPHILS NFR BLD: 0.6 % (ref 0–1.9)
BUN SERPL-MCNC: 25 MG/DL (ref 8–23)
CALCIUM SERPL-MCNC: 8.8 MG/DL (ref 8.7–10.5)
CHLORIDE SERPL-SCNC: 114 MMOL/L (ref 95–110)
CO2 SERPL-SCNC: 19 MMOL/L (ref 23–29)
CREAT SERPL-MCNC: 1.8 MG/DL (ref 0.5–1.4)
DIFFERENTIAL METHOD: ABNORMAL
EOSINOPHIL # BLD AUTO: 0.4 K/UL (ref 0–0.5)
EOSINOPHIL NFR BLD: 8.3 % (ref 0–8)
ERYTHROCYTE [DISTWIDTH] IN BLOOD BY AUTOMATED COUNT: 12.1 % (ref 11.5–14.5)
EST. GFR  (NO RACE VARIABLE): 41 ML/MIN/1.73 M^2
GLUCOSE SERPL-MCNC: 163 MG/DL (ref 70–110)
HCT VFR BLD AUTO: 44.2 % (ref 40–54)
HGB BLD-MCNC: 15.1 G/DL (ref 14–18)
IMM GRANULOCYTES # BLD AUTO: 0.01 K/UL (ref 0–0.04)
IMM GRANULOCYTES NFR BLD AUTO: 0.2 % (ref 0–0.5)
LYMPHOCYTES # BLD AUTO: 1.6 K/UL (ref 1–4.8)
LYMPHOCYTES NFR BLD: 32.6 % (ref 18–48)
MCH RBC QN AUTO: 30.6 PG (ref 27–31)
MCHC RBC AUTO-ENTMCNC: 34.2 G/DL (ref 32–36)
MCV RBC AUTO: 90 FL (ref 82–98)
MONOCYTES # BLD AUTO: 0.5 K/UL (ref 0.3–1)
MONOCYTES NFR BLD: 9.1 % (ref 4–15)
NEUTROPHILS # BLD AUTO: 2.4 K/UL (ref 1.8–7.7)
NEUTROPHILS NFR BLD: 49.2 % (ref 38–73)
NRBC BLD-RTO: 0 /100 WBC
PLATELET # BLD AUTO: 147 K/UL (ref 150–450)
PMV BLD AUTO: 9.7 FL (ref 9.2–12.9)
POCT GLUCOSE: 131 MG/DL (ref 70–110)
POCT GLUCOSE: 136 MG/DL (ref 70–110)
POTASSIUM SERPL-SCNC: 3.9 MMOL/L (ref 3.5–5.1)
RBC # BLD AUTO: 4.94 M/UL (ref 4.6–6.2)
SODIUM SERPL-SCNC: 142 MMOL/L (ref 136–145)
WBC # BLD AUTO: 4.94 K/UL (ref 3.9–12.7)

## 2022-09-07 PROCEDURE — 25000003 PHARM REV CODE 250: Performed by: INTERNAL MEDICINE

## 2022-09-07 PROCEDURE — 63600175 PHARM REV CODE 636 W HCPCS: Performed by: INTERNAL MEDICINE

## 2022-09-07 PROCEDURE — 85025 COMPLETE CBC W/AUTO DIFF WBC: CPT | Performed by: INTERNAL MEDICINE

## 2022-09-07 PROCEDURE — 25000003 PHARM REV CODE 250: Performed by: NURSE PRACTITIONER

## 2022-09-07 PROCEDURE — 99223 PR INITIAL HOSPITAL CARE,LEVL III: ICD-10-PCS | Mod: ,,, | Performed by: INTERNAL MEDICINE

## 2022-09-07 PROCEDURE — 80048 BASIC METABOLIC PNL TOTAL CA: CPT | Performed by: INTERNAL MEDICINE

## 2022-09-07 PROCEDURE — 25000003 PHARM REV CODE 250: Performed by: PHYSICIAN ASSISTANT

## 2022-09-07 PROCEDURE — 36415 COLL VENOUS BLD VENIPUNCTURE: CPT | Performed by: INTERNAL MEDICINE

## 2022-09-07 PROCEDURE — 99223 1ST HOSP IP/OBS HIGH 75: CPT | Mod: ,,, | Performed by: INTERNAL MEDICINE

## 2022-09-07 RX ORDER — CLOPIDOGREL BISULFATE 75 MG/1
75 TABLET ORAL DAILY
Qty: 30 TABLET | Refills: 0 | Status: ON HOLD | OUTPATIENT
Start: 2022-09-08 | End: 2022-10-07 | Stop reason: HOSPADM

## 2022-09-07 RX ORDER — AMLODIPINE BESYLATE 10 MG/1
10 TABLET ORAL DAILY
Status: DISCONTINUED | OUTPATIENT
Start: 2022-09-07 | End: 2022-09-07 | Stop reason: HOSPADM

## 2022-09-07 RX ORDER — CARVEDILOL 6.25 MG/1
6.25 TABLET ORAL 2 TIMES DAILY
Status: DISCONTINUED | OUTPATIENT
Start: 2022-09-07 | End: 2022-09-07 | Stop reason: HOSPADM

## 2022-09-07 RX ORDER — HYDRALAZINE HYDROCHLORIDE 100 MG/1
100 TABLET, FILM COATED ORAL EVERY 8 HOURS
Qty: 270 TABLET | Refills: 1 | Status: SHIPPED | OUTPATIENT
Start: 2022-09-07 | End: 2023-07-10

## 2022-09-07 RX ORDER — CARVEDILOL 6.25 MG/1
6.25 TABLET ORAL 2 TIMES DAILY WITH MEALS
Qty: 60 TABLET | Refills: 0 | Status: SHIPPED | OUTPATIENT
Start: 2022-09-07 | End: 2022-09-22

## 2022-09-07 RX ORDER — CARVEDILOL 6.25 MG/1
6.25 TABLET ORAL 2 TIMES DAILY
Qty: 60 TABLET | Refills: 0 | Status: SHIPPED | OUTPATIENT
Start: 2022-09-07 | End: 2022-09-22

## 2022-09-07 RX ORDER — FUROSEMIDE 40 MG/1
40 TABLET ORAL DAILY
Qty: 270 TABLET | Refills: 1
Start: 2022-09-07 | End: 2023-04-18 | Stop reason: SDUPTHER

## 2022-09-07 RX ADMIN — CARVEDILOL 6.25 MG: 6.25 TABLET, FILM COATED ORAL at 01:09

## 2022-09-07 RX ADMIN — HYDRALAZINE HYDROCHLORIDE 100 MG: 25 TABLET, FILM COATED ORAL at 01:09

## 2022-09-07 RX ADMIN — ASPIRIN 325 MG ORAL TABLET 325 MG: 325 PILL ORAL at 09:09

## 2022-09-07 RX ADMIN — ACETAMINOPHEN 650 MG: 325 TABLET ORAL at 03:09

## 2022-09-07 RX ADMIN — AMLODIPINE BESYLATE 10 MG: 10 TABLET ORAL at 09:09

## 2022-09-07 RX ADMIN — SODIUM CHLORIDE: 9 INJECTION, SOLUTION INTRAVENOUS at 05:09

## 2022-09-07 RX ADMIN — HEPARIN SODIUM 5000 UNITS: 5000 INJECTION INTRAVENOUS; SUBCUTANEOUS at 09:09

## 2022-09-07 RX ADMIN — CLOPIDOGREL 75 MG: 75 TABLET, FILM COATED ORAL at 09:09

## 2022-09-07 RX ADMIN — HYDRALAZINE HYDROCHLORIDE 100 MG: 25 TABLET, FILM COATED ORAL at 05:09

## 2022-09-07 NOTE — PLAN OF CARE
Problem: Fluid and Electrolyte Imbalance (Acute Kidney Injury/Impairment)  Goal: Fluid and Electrolyte Balance  Outcome: Ongoing, Progressing     Problem: Oral Intake Inadequate (Acute Kidney Injury/Impairment)  Goal: Optimal Nutrition Intake  Outcome: Ongoing, Progressing     Problem: Renal Function Impairment (Acute Kidney Injury/Impairment)  Goal: Effective Renal Function  Outcome: Ongoing, Progressing       Pt cleared from cards. Discharge home. Transportation arranged. Meds sent to [facility]. Pt without wallet and no money to cover expense. SW notified to help with cost.   Will follow

## 2022-09-07 NOTE — CONSULTS
[facility]  Cardiology  Consult Note    Patient Name: Stepan Springer  MRN: [id number]  Admission Date: 9/3/2022  Hospital Length of Stay: 2 days  Code Status: Full Code   Attending Provider: Severiano Vega, *   Consulting Provider: Regine Serrato PA-C  Primary Care Physician: KANDICE Fleming MD  Principal Problem:Transient visual disturbance    Patient information was obtained from patient, past medical records and ER records.     Inpatient consult to Cardiology  Consult performed by: Regine Serrato PA-C  Consult ordered by: Radha Pichardo NP        Subjective:     Chief Complaint: Left-sided vision loss    HPI:   Mr. Springer is a 67 year old male patient whose current medical conditions include obesity, CAD, diastolic CHF, CKD, DM type II, HTN, and lymphedema who presented to [facility] ED on 9/3/22 with a chief complaint of left-sided vision loss associated with right facial weakness that onset the day prior. He denied any associated chest pain, SOB, speech disturbance. Initial workup revealed NINFA (creatinine 3.7) and patient was subsequently admitted for further evaluation of possible TIA/CVA. During his admission he was noted to be bradycardic and cardiology was consulted to assist with management. Patient seen and examined today, resting in bed. Feels well. States vision has returned to baseline. No other CV complaints. Remains chest pain free. He reports compliance with his medications, on Coreg 25 mg BID as OP. Followed routinely by Dr. Summers. Chart reviewed. Carotid U/S showed LICA stenosis of 59-60%, vascular following, Plavix added. Echo showed normal EF. EKG without acute changes. Discussed OP vital connect monitor to rule out arrhythmias.       Past Medical History:   Diagnosis Date    Anxiety     Bell's palsy     CHF (congestive heart failure)     Chronic kidney disease, stage 3a 11/02/2021    Coronary artery disease involving native coronary artery without angina  pectoris 10/18/2016    Depression     Diabetes mellitus     Glaucoma     Hypertension     Idiopathic peripheral neuropathy 12/11/2012    Lymphedema of both lower extremities     Mixed hyperlipidemia 12/11/2012    Morbid obesity with BMI of 45.0-49.9, adult 02/12/2019    Pancytopenia     Sleep apnea     Stage 3b chronic kidney disease     Type 2 diabetes mellitus with diabetic polyneuropathy, with long-term current use of insulin 12/11/2012    Vitamin B 12 deficiency 08/23/2012       Past Surgical History:   Procedure Laterality Date    CARDIAC CATHETERIZATION  7/22/13    non obstructive cad    CATARACT EXTRACTION  OU    COLONOSCOPY N/A 5/1/2019    Procedure: COLONOSCOPY;  Surgeon: Henry Mo III, MD;  Location: [facility] ENDO;  Service: Endoscopy;  Laterality: N/A;    CORONARY ANGIOPLASTY      ESOPHAGOGASTRODUODENOSCOPY N/A 5/1/2019    Procedure: ESOPHAGOGASTRODUODENOSCOPY (EGD);  Surgeon: Henry Mo III, MD;  Location: [facility] ENDO;  Service: Endoscopy;  Laterality: N/A;    EYE SURGERY      FRACTURE SURGERY      kidney stone       August 2016    PC IOL OU      RETINAL DETACHMENT REPAIR W/ SCLERAL BUCKLE LE      WRIST SURGERY         Review of patient's allergies indicates:   Allergen Reactions    Cefazolin Other (See Comments)     Shakes, chills, dizziness       No current facility-administered medications on file prior to encounter.     Current Outpatient Medications on File Prior to Encounter   Medication Sig    amLODIPine (NORVASC) 5 MG tablet Take 1 tablet (5 mg total) by mouth every evening.    aspirin 325 MG tablet Take 325 mg by mouth once daily.    atorvastatin (LIPITOR) 20 MG tablet Take 1 tablet (20 mg total) by mouth every evening.    brimonidine 0.1% (ALPHAGAN P) 0.1 % Drop Place 1 drop into both eyes 3 (three) times daily. Order 90 day supply    brinzolamide (AZOPT) 1 % ophthalmic suspension Place 1 drop into both eyes 3 (three) times daily. Brand name only     "carvediloL (COREG) 25 MG tablet Take 1 tablet (25 mg total) by mouth 2 (two) times daily with meals.    cloNIDine (CATAPRES) 0.1 MG tablet Take 1 tablet (0.1 mg total) by mouth 3 (three) times daily.    empagliflozin (JARDIANCE) 25 mg tablet Take 1 tablet (25 mg total) by mouth once daily.    flash glucose sensor (FREESTYLE CLEMENCIA 14 DAY SENSOR) Kit 1 each by Misc.(Non-Drug; Combo Route) route every 14 (fourteen) days.    furosemide (LASIX) 40 MG tablet Take 1.5 tablets (60 mg total) by mouth 2 (two) times a day. (Patient taking differently: Take 40 mg by mouth 3 (three) times daily.)    insulin aspart U-100 (NOVOLOG) 100 unit/mL (3 mL) InPn pen Inject 25 Units into the skin 3 (three) times daily with meals.    insulin glargine U-300 conc (TOUJEO MAX U-300 SOLOSTAR) 300 unit/mL (3 mL) insulin pen Inject 80 Units into the skin once daily.    lisinopriL (PRINIVIL,ZESTRIL) 40 MG tablet Take 1 tablet (40 mg total) by mouth once daily.    mupirocin (BACTROBAN) 2 % ointment Apply topically once daily.    netarsudiL (RHOPRESSA) 0.02 % ophthalmic solution Place 1 drop into both eyes once daily.    ofloxacin (OCUFLOX) 0.3 % ophthalmic solution Place 1 drop into both eyes 4 (four) times daily.    pen needle, diabetic (BD ULTRA-FINE AMANDA PEN NEEDLE) 32 gauge x 5/32" Ndle 1 each by Misc.(Non-Drug; Combo Route) route 4 (four) times daily with meals and nightly.    nitroGLYCERIN (NITROSTAT) 0.4 MG SL tablet PLACE 1 TABLET (0.4 MG TOTAL) UNDER THE TONGUE EVERY 5 (FIVE) MINUTES AS NEEDED FOR CHEST PAIN.    [DISCONTINUED] hydroCHLOROthiazide (HYDRODIURIL) 25 MG tablet Take 1 tablet (25 mg total) by mouth once daily.    [DISCONTINUED] miglitoL (GLYSET) 25 MG Tab Take 1 tablet (25 mg total) by mouth 3 (three) times daily with meals.     Family History       Problem Relation (Age of Onset)    Heart attack Father    Heart disease Father    Hypertension Mother, Maternal Grandmother, Maternal Grandfather    Macular " "degeneration Father, Paternal Uncle          Tobacco Use    Smoking status: Never    Smokeless tobacco: Never   Substance and Sexual Activity    Alcohol use: Yes     Comment: " one to two glasses of wine per year"    Drug use: No    Sexual activity: Not Currently     Birth control/protection: None     Review of Systems   Constitutional: Positive for malaise/fatigue.   HENT: Negative.     Eyes:  Positive for vision loss in left eye (resolved).   Cardiovascular:  Positive for leg swelling (chronic).   Respiratory: Negative.     Endocrine: Negative.    Hematologic/Lymphatic: Negative.    Skin: Negative.    Musculoskeletal: Negative.    Gastrointestinal: Negative.    Genitourinary: Negative.    Neurological:         Right facial weakness (resolved)     Psychiatric/Behavioral: Negative.     Allergic/Immunologic: Negative.    Objective:     Vital Signs (Most Recent):  Temp: 98.1 °F (36.7 °C) (09/07/22 0744)  Pulse: 60 (09/07/22 0744)  Resp: 16 (09/07/22 0744)  BP: (!) 169/80 (09/07/22 0744)  SpO2: 97 % (09/07/22 0744)   Vital Signs (24h Range):  Temp:  [98.1 °F (36.7 °C)-98.8 °F (37.1 °C)] 98.1 °F (36.7 °C)  Pulse:  [49-60] 60  Resp:  [16-20] 16  SpO2:  [97 %-98 %] 97 %  BP: (169-202)/(75-98) 169/80     Weight: (!) 173.7 kg (383 lb)  Body mass index is 43.15 kg/m².    SpO2: 97 %  O2 Device (Oxygen Therapy): room air      Intake/Output Summary (Last 24 hours) at 9/7/2022 1039  Last data filed at 9/7/2022 0100  Gross per 24 hour   Intake 1140 ml   Output --   Net 1140 ml       Lines/Drains/Airways       Peripheral Intravenous Line  Duration                  Peripheral IV - Single Lumen 20 G Left;Posterior Hand -- days         Peripheral IV - Single Lumen 09/03/22 2126 20 G Right Antecubital 3 days                    Physical Exam  Vitals and nursing note reviewed.   Constitutional:       General: He is not in acute distress.     Appearance: Normal appearance. He is well-developed. He is not diaphoretic.   HENT:      " Head: Normocephalic and atraumatic.   Eyes:      General:         Right eye: No discharge.         Left eye: No discharge.      Pupils: Pupils are equal, round, and reactive to light.   Neck:      Thyroid: No thyromegaly.      Vascular: No JVD.      Trachea: No tracheal deviation.   Cardiovascular:      Rate and Rhythm: Normal rate and regular rhythm.      Heart sounds: Normal heart sounds, S1 normal and S2 normal. No murmur heard.  Pulmonary:      Effort: Pulmonary effort is normal. No respiratory distress.      Breath sounds: Normal breath sounds. No wheezing or rales.   Abdominal:      General: There is no distension.      Palpations: Abdomen is soft.      Tenderness: There is no rebound.   Musculoskeletal:      Cervical back: Neck supple.      Right lower leg: Edema present.      Left lower leg: Edema present.   Skin:     General: Skin is warm and dry.      Findings: No erythema.      Comments: Lymphedema wraps in place   Neurological:      General: No focal deficit present.      Mental Status: He is alert and oriented to person, place, and time.   Psychiatric:         Mood and Affect: Mood normal.         Behavior: Behavior normal.         Thought Content: Thought content normal.       Significant Labs: CMP   Recent Labs   Lab 09/06/22  1616 09/07/22  0915    142   K 4.2 3.9   * 114*   CO2 21* 19*   * 163*   BUN 32* 25*   CREATININE 2.3* 1.8*   CALCIUM 9.1 8.8   ANIONGAP 8 9   , CBC   Recent Labs   Lab 09/07/22  0915   WBC 4.94   HGB 15.1   HCT 44.2   *   , INR No results for input(s): INR, PROTIME in the last 48 hours., Troponin No results for input(s): TROPONINI in the last 48 hours., and All pertinent lab results from the last 24 hours have been reviewed.    Significant Imaging: Echocardiogram: Transthoracic echo (TTE) complete (Cupid Only):   Results for orders placed or performed during the hospital encounter of 09/03/22   Echo   Result Value Ref Range    BSA 3.11 m2    TDI SEPTAL  0.13 m/s    LV LATERAL E/E' RATIO 7.22 m/s    LV SEPTAL E/E' RATIO 5.00 m/s    LA WIDTH 4.00 cm    IVC diameter 1.45 cm    Left Ventricular Outflow Tract Mean Velocity 0.94 cm/s    Left Ventricular Outflow Tract Mean Gradient 3.59 mmHg    TDI LATERAL 0.09 m/s    LVIDd 5.47 3.5 - 6.0 cm    IVS 1.86 (A) 0.6 - 1.1 cm    Posterior Wall 1.71 (A) 0.6 - 1.1 cm    Ao root annulus 3.86 cm    LVIDs 3.46 2.1 - 4.0 cm    FS 37 28 - 44 %    LA volume 63.37 cm3    Sinus 4.06 cm    STJ 3.94 cm    Ascending aorta 4.01 cm    LV mass 479.08 g    LA size 4.11 cm    TAPSE 2.57 cm    Left Ventricle Relative Wall Thickness 0.63 cm    AV mean gradient 5 mmHg    AV valve area 3.97 cm2    AV Velocity Ratio 0.71     AV index (prosthetic) 0.74     MV valve area p 1/2 method 2.15 cm2    E/A ratio 0.83     Mean e' 0.11 m/s    E wave deceleration time 352.51 msec    IVRT 108.47 msec    LVOT diameter 2.62 cm    LVOT area 5.4 cm2    LVOT peak abhi 1.03 m/s    LVOT peak VTI 24.10 cm    Ao peak abhi 1.46 m/s    Ao VTI 32.7 cm    RVOT peak abhi 0.93 m/s    RVOT peak VTI 17.4 cm    LVOT stroke volume 129.86 cm3    AV peak gradient 9 mmHg    PV mean gradient 2.27 mmHg    E/E' ratio 5.91 m/s    MV Peak E Abhi 0.65 m/s    TR Max Abhi 2.33 m/s    MV stenosis pressure 1/2 time 102.23 ms    MV Peak A Abhi 0.78 m/s    LV Systolic Volume 49.34 mL    LV Systolic Volume Index 16.4 mL/m2    LV Diastolic Volume 145.32 mL    LV Diastolic Volume Index 48.44 mL/m2    LA Volume Index 21.1 mL/m2    LV Mass Index 160 g/m2    RA Major Axis 4.44 cm    Left Atrium Minor Axis 4.39 cm    Left Atrium Major Axis 4.69 cm    Triscuspid Valve Regurgitation Peak Gradient 22 mmHg    RA Width 4.10 cm    Right Atrial Pressure (from IVC) 3 mmHg    EF 55 %    TV rest pulmonary artery pressure 25 mmHg    Narrative    · The left ventricle is normal in size with concentric hypertrophy and   normal systolic function.  · The estimated ejection fraction is 55%.  · Normal left ventricular  diastolic function.  · Normal right ventricular size with normal right ventricular systolic   function.  · Overall the study quality was technically difficult. The study was   difficult due to patient's clinical status, body habitus and poor   endocardial visualization.  · There is abnormal septal wall motion consistent with left bundle branch   block.  · Normal central venous pressure (3 mmHg).  · The estimated PA systolic pressure is 25 mmHg.  · The aortic root is mildly dilated.      , EKG: Reviewed, and X-Ray: CXR: X-Ray Chest 1 View (CXR): No results found for this visit on 09/03/22. and X-Ray Chest PA and Lateral (CXR): No results found for this visit on 09/03/22.    Assessment and Plan:   Patient who presents with TIA like symptoms. Elevated troponin secondary to demand. Bradycardic during admission, resume BB at lower dose. OP vital connect monitor. Continue OMT as tolerated.     * Transient visual disturbance  -Likely TIA  -Carotid U/S reviewed, vascular following  -Continue ASA, Plavix, statin  -CT of head reviewed  -OP vital connect monitor to rule out arrhythmia related events    Hypertension  -Uncontrolled  -Continue hydralazine  -Resume Coreg but at lower dose, 6.25 mg BID  -Resume amlodipine as warranted for BP control    Acute renal failure superimposed on stage 3 chronic kidney disease  -Mgmt as per primary team, improved since admission    Elevated troponin  -Troponin flat, no chest pain symptoms  -Elevation secondary to TIA-like episode, NINFA  -Continue ASA, Plavix, statin  -Echo showed normal EF  -Resume BB at lower dose    Morbid obesity with BMI of 45.0-49.9, adult  -Weight loss    Type 2 diabetes mellitus with diabetic polyneuropathy, with long-term current use of insulin  -Mgmt as per hospital medicine        VTE Risk Mitigation (From admission, onward)         Ordered     heparin (porcine) injection 5,000 Units  Every 12 hours         09/03/22 0543     IP VTE HIGH RISK PATIENT  Once          09/03/22 2328     Place sequential compression device  Until discontinued         09/03/22 2328                Thank you for your consult. I will sign off. Please contact us if you have any additional questions.    Regine Serrato PA-C  Cardiology   [facility]

## 2022-09-07 NOTE — HPI
Mr. Springer is a 67 year old male patient whose current medical conditions include obesity, CAD, diastolic CHF, CKD, DM type II, HTN, and lymphedema who presented to [facility] ED on 9/3/22 with a chief complaint of left-sided vision loss associated with right facial weakness that onset the day prior. He denied any associated chest pain, SOB, speech disturbance. Initial workup revealed NINFA (creatinine 3.7) and patient was subsequently admitted for further evaluation of possible TIA/CVA. During his admission he was noted to be bradycardic and cardiology was consulted to assist with management. Patient seen and examined today, resting in bed. Feels well. States vision has returned to baseline. No other CV complaints. Remains chest pain free. He reports compliance with his medications, on Coreg 25 mg BID as OP. Followed routinely by Dr. Summers. Chart reviewed. Carotid U/S showed LICA stenosis of 59-60%, vascular following, Plavix added. Echo showed normal EF. EKG without acute changes. Discussed OP vital connect monitor to rule out arrhythmias.

## 2022-09-07 NOTE — ASSESSMENT & PLAN NOTE
-Troponin flat, no chest pain symptoms  -Elevation secondary to TIA-like episode, NINFA  -Continue ASA, Plavix, statin  -Echo showed normal EF  -Resume BB at lower dose

## 2022-09-07 NOTE — SUBJECTIVE & OBJECTIVE
Past Medical History:   Diagnosis Date    Anxiety     Bell's palsy     CHF (congestive heart failure)     Chronic kidney disease, stage 3a 11/02/2021    Coronary artery disease involving native coronary artery without angina pectoris 10/18/2016    Depression     Diabetes mellitus     Glaucoma     Hypertension     Idiopathic peripheral neuropathy 12/11/2012    Lymphedema of both lower extremities     Mixed hyperlipidemia 12/11/2012    Morbid obesity with BMI of 45.0-49.9, adult 02/12/2019    Pancytopenia     Sleep apnea     Stage 3b chronic kidney disease     Type 2 diabetes mellitus with diabetic polyneuropathy, with long-term current use of insulin 12/11/2012    Vitamin B 12 deficiency 08/23/2012       Past Surgical History:   Procedure Laterality Date    CARDIAC CATHETERIZATION  7/22/13    non obstructive cad    CATARACT EXTRACTION  OU    COLONOSCOPY N/A 5/1/2019    Procedure: COLONOSCOPY;  Surgeon: Henry Mo III, MD;  Location: [facility];  Service: Endoscopy;  Laterality: N/A;    CORONARY ANGIOPLASTY      ESOPHAGOGASTRODUODENOSCOPY N/A 5/1/2019    Procedure: ESOPHAGOGASTRODUODENOSCOPY (EGD);  Surgeon: Henry Mo III, MD;  Location: [facility];  Service: Endoscopy;  Laterality: N/A;    EYE SURGERY      FRACTURE SURGERY      kidney stone       August 2016    PC IOL OU      RETINAL DETACHMENT REPAIR W/ SCLERAL BUCKLE LE      WRIST SURGERY         Review of patient's allergies indicates:   Allergen Reactions    Cefazolin Other (See Comments)     Shakes, chills, dizziness       No current facility-administered medications on file prior to encounter.     Current Outpatient Medications on File Prior to Encounter   Medication Sig    amLODIPine (NORVASC) 5 MG tablet Take 1 tablet (5 mg total) by mouth every evening.    aspirin 325 MG tablet Take 325 mg by mouth once daily.    atorvastatin (LIPITOR) 20 MG tablet Take 1 tablet (20 mg total) by mouth every evening.    brimonidine  "0.1% (ALPHAGAN P) 0.1 % Drop Place 1 drop into both eyes 3 (three) times daily. Order 90 day supply    brinzolamide (AZOPT) 1 % ophthalmic suspension Place 1 drop into both eyes 3 (three) times daily. Brand name only    carvediloL (COREG) 25 MG tablet Take 1 tablet (25 mg total) by mouth 2 (two) times daily with meals.    cloNIDine (CATAPRES) 0.1 MG tablet Take 1 tablet (0.1 mg total) by mouth 3 (three) times daily.    empagliflozin (JARDIANCE) 25 mg tablet Take 1 tablet (25 mg total) by mouth once daily.    flash glucose sensor (FREESTYLE CLEMENCIA 14 DAY SENSOR) Kit 1 each by Misc.(Non-Drug; Combo Route) route every 14 (fourteen) days.    furosemide (LASIX) 40 MG tablet Take 1.5 tablets (60 mg total) by mouth 2 (two) times a day. (Patient taking differently: Take 40 mg by mouth 3 (three) times daily.)    insulin aspart U-100 (NOVOLOG) 100 unit/mL (3 mL) InPn pen Inject 25 Units into the skin 3 (three) times daily with meals.    insulin glargine U-300 conc (TOUJEO MAX U-300 SOLOSTAR) 300 unit/mL (3 mL) insulin pen Inject 80 Units into the skin once daily.    lisinopriL (PRINIVIL,ZESTRIL) 40 MG tablet Take 1 tablet (40 mg total) by mouth once daily.    mupirocin (BACTROBAN) 2 % ointment Apply topically once daily.    netarsudiL (RHOPRESSA) 0.02 % ophthalmic solution Place 1 drop into both eyes once daily.    ofloxacin (OCUFLOX) 0.3 % ophthalmic solution Place 1 drop into both eyes 4 (four) times daily.    pen needle, diabetic (BD ULTRA-FINE AMANDA PEN NEEDLE) 32 gauge x 5/32" Ndle 1 each by Misc.(Non-Drug; Combo Route) route 4 (four) times daily with meals and nightly.    nitroGLYCERIN (NITROSTAT) 0.4 MG SL tablet PLACE 1 TABLET (0.4 MG TOTAL) UNDER THE TONGUE EVERY 5 (FIVE) MINUTES AS NEEDED FOR CHEST PAIN.    [DISCONTINUED] hydroCHLOROthiazide (HYDRODIURIL) 25 MG tablet Take 1 tablet (25 mg total) by mouth once daily.    [DISCONTINUED] miglitoL (GLYSET) 25 MG Tab Take 1 tablet (25 mg total) by mouth 3 " "(three) times daily with meals.     Family History       Problem Relation (Age of Onset)    Heart attack Father    Heart disease Father    Hypertension Mother, Maternal Grandmother, Maternal Grandfather    Macular degeneration Father, Paternal Uncle          Tobacco Use    Smoking status: Never    Smokeless tobacco: Never   Substance and Sexual Activity    Alcohol use: Yes     Comment: " one to two glasses of wine per year"    Drug use: No    Sexual activity: Not Currently     Birth control/protection: None     Review of Systems   Constitutional: Positive for malaise/fatigue.   HENT: Negative.     Eyes:  Positive for vision loss in left eye (resolved).   Cardiovascular:  Positive for leg swelling (chronic).   Respiratory: Negative.     Endocrine: Negative.    Hematologic/Lymphatic: Negative.    Skin: Negative.    Musculoskeletal: Negative.    Gastrointestinal: Negative.    Genitourinary: Negative.    Neurological:         Right facial weakness (resolved)     Psychiatric/Behavioral: Negative.     Allergic/Immunologic: Negative.    Objective:     Vital Signs (Most Recent):  Temp: 98.1 °F (36.7 °C) (09/07/22 0744)  Pulse: 60 (09/07/22 0744)  Resp: 16 (09/07/22 0744)  BP: (!) 169/80 (09/07/22 0744)  SpO2: 97 % (09/07/22 0744)   Vital Signs (24h Range):  Temp:  [98.1 °F (36.7 °C)-98.8 °F (37.1 °C)] 98.1 °F (36.7 °C)  Pulse:  [49-60] 60  Resp:  [16-20] 16  SpO2:  [97 %-98 %] 97 %  BP: (169-202)/(75-98) 169/80     Weight: (!) 173.7 kg (383 lb)  Body mass index is 43.15 kg/m².    SpO2: 97 %  O2 Device (Oxygen Therapy): room air      Intake/Output Summary (Last 24 hours) at 9/7/2022 1039  Last data filed at 9/7/2022 0100  Gross per 24 hour   Intake 1140 ml   Output --   Net 1140 ml       Lines/Drains/Airways       Peripheral Intravenous Line  Duration                  Peripheral IV - Single Lumen 20 G Left;Posterior Hand -- days         Peripheral IV - Single Lumen 09/03/22 2126 20 G Right Antecubital 3 days         "            Physical Exam  Vitals and nursing note reviewed.   Constitutional:       General: He is not in acute distress.     Appearance: Normal appearance. He is well-developed. He is not diaphoretic.   HENT:      Head: Normocephalic and atraumatic.   Eyes:      General:         Right eye: No discharge.         Left eye: No discharge.      Pupils: Pupils are equal, round, and reactive to light.   Neck:      Thyroid: No thyromegaly.      Vascular: No JVD.      Trachea: No tracheal deviation.   Cardiovascular:      Rate and Rhythm: Normal rate and regular rhythm.      Heart sounds: Normal heart sounds, S1 normal and S2 normal. No murmur heard.  Pulmonary:      Effort: Pulmonary effort is normal. No respiratory distress.      Breath sounds: Normal breath sounds. No wheezing or rales.   Abdominal:      General: There is no distension.      Palpations: Abdomen is soft.      Tenderness: There is no rebound.   Musculoskeletal:      Cervical back: Neck supple.      Right lower leg: Edema present.      Left lower leg: Edema present.   Skin:     General: Skin is warm and dry.      Findings: No erythema.      Comments: Lymphedema wraps in place   Neurological:      General: No focal deficit present.      Mental Status: He is alert and oriented to person, place, and time.   Psychiatric:         Mood and Affect: Mood normal.         Behavior: Behavior normal.         Thought Content: Thought content normal.       Significant Labs: CMP   Recent Labs   Lab 09/06/22  1616 09/07/22  0915    142   K 4.2 3.9   * 114*   CO2 21* 19*   * 163*   BUN 32* 25*   CREATININE 2.3* 1.8*   CALCIUM 9.1 8.8   ANIONGAP 8 9   , CBC   Recent Labs   Lab 09/07/22  0915   WBC 4.94   HGB 15.1   HCT 44.2   *   , INR No results for input(s): INR, PROTIME in the last 48 hours., Troponin No results for input(s): TROPONINI in the last 48 hours., and All pertinent lab results from the last 24 hours have been reviewed.    Significant  Imaging: Echocardiogram: Transthoracic echo (TTE) complete (Cupid Only):   Results for orders placed or performed during the hospital encounter of 09/03/22   Echo   Result Value Ref Range    BSA 3.11 m2    TDI SEPTAL 0.13 m/s    LV LATERAL E/E' RATIO 7.22 m/s    LV SEPTAL E/E' RATIO 5.00 m/s    LA WIDTH 4.00 cm    IVC diameter 1.45 cm    Left Ventricular Outflow Tract Mean Velocity 0.94 cm/s    Left Ventricular Outflow Tract Mean Gradient 3.59 mmHg    TDI LATERAL 0.09 m/s    LVIDd 5.47 3.5 - 6.0 cm    IVS 1.86 (A) 0.6 - 1.1 cm    Posterior Wall 1.71 (A) 0.6 - 1.1 cm    Ao root annulus 3.86 cm    LVIDs 3.46 2.1 - 4.0 cm    FS 37 28 - 44 %    LA volume 63.37 cm3    Sinus 4.06 cm    STJ 3.94 cm    Ascending aorta 4.01 cm    LV mass 479.08 g    LA size 4.11 cm    TAPSE 2.57 cm    Left Ventricle Relative Wall Thickness 0.63 cm    AV mean gradient 5 mmHg    AV valve area 3.97 cm2    AV Velocity Ratio 0.71     AV index (prosthetic) 0.74     MV valve area p 1/2 method 2.15 cm2    E/A ratio 0.83     Mean e' 0.11 m/s    E wave deceleration time 352.51 msec    IVRT 108.47 msec    LVOT diameter 2.62 cm    LVOT area 5.4 cm2    LVOT peak abhi 1.03 m/s    LVOT peak VTI 24.10 cm    Ao peak abhi 1.46 m/s    Ao VTI 32.7 cm    RVOT peak abhi 0.93 m/s    RVOT peak VTI 17.4 cm    LVOT stroke volume 129.86 cm3    AV peak gradient 9 mmHg    PV mean gradient 2.27 mmHg    E/E' ratio 5.91 m/s    MV Peak E Abhi 0.65 m/s    TR Max Abhi 2.33 m/s    MV stenosis pressure 1/2 time 102.23 ms    MV Peak A Abhi 0.78 m/s    LV Systolic Volume 49.34 mL    LV Systolic Volume Index 16.4 mL/m2    LV Diastolic Volume 145.32 mL    LV Diastolic Volume Index 48.44 mL/m2    LA Volume Index 21.1 mL/m2    LV Mass Index 160 g/m2    RA Major Axis 4.44 cm    Left Atrium Minor Axis 4.39 cm    Left Atrium Major Axis 4.69 cm    Triscuspid Valve Regurgitation Peak Gradient 22 mmHg    RA Width 4.10 cm    Right Atrial Pressure (from IVC) 3 mmHg    EF 55 %    TV rest pulmonary  artery pressure 25 mmHg    Narrative    · The left ventricle is normal in size with concentric hypertrophy and   normal systolic function.  · The estimated ejection fraction is 55%.  · Normal left ventricular diastolic function.  · Normal right ventricular size with normal right ventricular systolic   function.  · Overall the study quality was technically difficult. The study was   difficult due to patient's clinical status, body habitus and poor   endocardial visualization.  · There is abnormal septal wall motion consistent with left bundle branch   block.  · Normal central venous pressure (3 mmHg).  · The estimated PA systolic pressure is 25 mmHg.  · The aortic root is mildly dilated.      , EKG: Reviewed, and X-Ray: CXR: X-Ray Chest 1 View (CXR): No results found for this visit on 09/03/22. and X-Ray Chest PA and Lateral (CXR): No results found for this visit on 09/03/22.

## 2022-09-07 NOTE — CONSULTS
"[facility] - Med Surg 3  Wound Care    Patient Name:  Stepan Springer   MRN:  [id number]  Date: 9/7/2022  Diagnosis: Transient visual disturbance    History:     Past Medical History:   Diagnosis Date    Anxiety     Bell's palsy     CHF (congestive heart failure)     Chronic kidney disease, stage 3a 11/02/2021    Coronary artery disease involving native coronary artery without angina pectoris 10/18/2016    Depression     Diabetes mellitus     Glaucoma     Hypertension     Idiopathic peripheral neuropathy 12/11/2012    Lymphedema of both lower extremities     Mixed hyperlipidemia 12/11/2012    Morbid obesity with BMI of 45.0-49.9, adult 02/12/2019    Pancytopenia     Sleep apnea     Stage 3b chronic kidney disease     Type 2 diabetes mellitus with diabetic polyneuropathy, with long-term current use of insulin 12/11/2012    Vitamin B 12 deficiency 08/23/2012       Social History     Socioeconomic History    Marital status:    Occupational History     Comment: Quit job at [employer]; wants to open his own [employer] business    Tobacco Use    Smoking status: Never    Smokeless tobacco: Never   Substance and Sexual Activity    Alcohol use: Yes     Comment: " one to two glasses of wine per year"    Drug use: No    Sexual activity: Not Currently     Birth control/protection: None   Social History Narrative    , 1 son, JANIE.     Social Determinants of Health     Financial Resource Strain: Low Risk     Difficulty of Paying Living Expenses: Not hard at all   Food Insecurity: No Food Insecurity    Worried About Running Out of Food in the Last Year: Never true    Ran Out of Food in the Last Year: Never true   Transportation Needs: No Transportation Needs    Lack of Transportation (Medical): No    Lack of Transportation (Non-Medical): No   Physical Activity: Inactive    Days of Exercise per Week: 0 days    Minutes of Exercise per Session: 0 min   Stress: No Stress Concern Present    Feeling of Stress : Only a little   Social " Connections: Unknown    Frequency of Communication with Friends and Family: More than three times a week    Frequency of Social Gatherings with Friends and Family: Once a week    Attends [employer] Services: Never    Active Member of Clubs or Organizations: No    Attends Club or Organization Meetings: Never   Housing Stability: Low Risk     Unable to Pay for Housing in the Last Year: No    Number of Places Lived in the Last Year: 1    Unstable Housing in the Last Year: No       Precautions:     Allergies as of 09/03/2022 - Reviewed 09/03/2022   Allergen Reaction Noted    Cefazolin Other (See Comments) 07/22/2012       [facility] Assessment Details/Treatment     Consulted to this 67 year old male patient. Hx DM, CHF, CAD, reports lymphedema. Lymphedema velcro compression sleeves in place to BLE, no wounds. Reports going to [facility] outpatient therapy for tx. Recommend continued use of these. Patient reports he is ok in the bed currently. Within weight limits. Will sign off, please reconsult if needed.    09/07/2022

## 2022-09-07 NOTE — ASSESSMENT & PLAN NOTE
-Uncontrolled  -Continue hydralazine  -Resume Coreg but at lower dose, 6.25 mg BID  -Resume amlodipine as warranted for BP control

## 2022-09-07 NOTE — DISCHARGE SUMMARY
[facility] - Med Surg 3  Hospital Medicine  Discharge Summary      Patient Name: Stepan Springer  MRN: [id number]  Patient Class: IP- Inpatient  Admission Date: 9/3/2022  Hospital Length of Stay: 2 days  Discharge Date and Time:  09/07/2022 6:05 PM  Attending Physician: No att. providers found   Discharging Provider: Radha Pichardo NP  Primary Care Provider: KANDICE Fleming MD      HPI:   Mr. Gordon is a 67-year-old morbidly obese  male with PMH significant for CAD, diastolic CHF, CKD stage 3, insulin-dependent diabetes, hypertension, presented to the ED complaining of left eye visual loss associated the right facial weakness.  The left eye vision disturbances have significantly improved.  Patient deemed not a candidate for tPA a symptoms ongoing since yesterday, and possibly much longer.  Evaluated by tele Neurology, concern for left carotid TIA/stroke.  Recommended CTA head and neck, however creatinine elevated 3.7 (1.23-1.7).  Admitted for full TIA workup.  Laboratory workup reveals potassium 3.1, creatinine 3.7.    Admitting diagnosis:  Left visual disturbances.  Concern for TIA/CVA.      * No surgery found *      Hospital Course:   Pt reports chronic right facial weakness from having [place] Palsy. His left eye vision loss came on suddenly and lasted 3 hours. Now it is resolved. Vascular Neurologist voiced concern for left carotid TIA/Stroke. Unfortunately, due to patient's body habitus the MRI of Brain cannot be performed. Will repeat CT of Head 24 hours from initial CT. Carotid US pending. Pt with new NINFA with creatinine 3.7 >> 3.4 (baseline 1.5) and unable to perform CTA of head and neck at this time. Gentle IV fluids and nephrotoxic meds held. Recent ECHO with EF 55 % and no wall motion abnormalities. Last Hgb A1 C = 6.5. Pt on full strength ASA and STATIN. He had no speech/cognitive abnormalities/ no functional immobility. Hypokalemia replaced. As of 9/5/2022, creatinine trending downward 2.8  today (baseline around 1.5-1.9) will continue gentle hydration. Patient reports left eye vision back to baseline blurriness. 9/6/22-Pt doing well. Renal function continues to improve. Cardiology consulted for bradycardia. 9/7/22- Heart rate has improved. Cardiology resumed coreg at a lower dose. Cardiology will arrange for OP vital connect monitor to rule out arrhythmia related events. Patient seen and examined on the date of discharge and found stable for discharge. Home meds reconciled. Patient to follow-up with PCP, cardiology , and vascular surgery outpatient.        Goals of Care Treatment Preferences:  Code Status: Full Code      Consults:   Consults (From admission, onward)        Status Ordering Provider     Inpatient consult to Cardiology  Once        Provider:  Ramiro Summers MD    Completed YANELIS BEAUCHAMP     Inpatient consult to Vascular Surgery  Once        Provider:  Cayetano Brar MD    Acknowledged MECHELLE EVANS     IP consult to case management/social work  Once        Provider:  (Not yet assigned)    Completed HAL BEGUM     Consult to Telemedicine - Acute Stroke  Once        Provider:  (Not yet assigned)    Acknowledged REG LAGUERRE          No new Assessment & Plan notes have been filed under this hospital service since the last note was generated.  Service: Hospital Medicine    Final Active Diagnoses:    Diagnosis Date Noted POA    PRINCIPAL PROBLEM:  Transient visual disturbance [H53.9] 09/03/2022 Yes    Hypertension [I10] 09/05/2022 Yes    Acute renal failure superimposed on stage 3 chronic kidney disease [N17.9, N18.30] 09/03/2022 Yes    Elevated troponin [R77.8] 02/07/2020 Yes    Morbid obesity with BMI of 45.0-49.9, adult [E66.01, Z68.42] 02/12/2019 Not Applicable     Chronic    Type 2 diabetes mellitus with diabetic polyneuropathy, with long-term current use of insulin [E11.42, Z79.4] 12/11/2012 Not Applicable     Chronic      Problems Resolved During this  Admission:    Diagnosis Date Noted Date Resolved POA    Hypokalemia [E87.6] 09/03/2022 09/06/2022 Yes       Discharged Condition: stable    Disposition: Home or Self Care    Follow Up:   Follow-up Information     KANDICE Fleming MD Follow up in 3 day(s).    Specialty: Family Medicine  Why: for hospital follow-up  Contact information:  [address]  [place] [address]  [phone]             Ramiro Summers MD Follow up in 1 week(s).    Specialties: Cardiology, Cardiovascular Disease  Why: for hospital follow-up. OP vital connect monitor to rule out arrhythmia related events  Contact information:  [address]  [place] [address]  [phone]             Cayetano Brar MD Follow up in 1 week(s).    Specialty: Vascular Surgery  Why: for hospital follow-up  Contact information:  [address]  [address] [facility] [address]  [phone]                       Patient Instructions:      Notify your health care provider if you experience any of the following:  temperature >100.4     Notify your health care provider if you experience any of the following:  severe uncontrolled pain     Notify your health care provider if you experience any of the following:  difficulty breathing or increased cough     Notify your health care provider if you experience any of the following:  persistent dizziness, light-headedness, or visual disturbances     Notify your health care provider if you experience any of the following:  increased confusion or weakness     Activity as tolerated       Significant Diagnostic Studies: Labs:   BMP:   Recent Labs   Lab 09/06/22  1616 09/07/22  0915   * 163*    142   K 4.2 3.9   * 114*   CO2 21* 19*   BUN 32* 25*   CREATININE 2.3* 1.8*   CALCIUM 9.1 8.8   , CMP   Recent Labs   Lab 09/06/22  1616 09/07/22  0915    142   K 4.2 3.9   * 114*   CO2 21* 19*   * 163*   BUN 32* 25*   CREATININE 2.3* 1.8*   CALCIUM 9.1 8.8   ANIONGAP 8 9    and CBC   Recent  Labs   Lab 09/07/22  0915   WBC 4.94   HGB 15.1   HCT 44.2   *       Pending Diagnostic Studies:     None         Medications:  Reconciled Home Medications:      Medication List      START taking these medications    clopidogreL 75 mg tablet  Commonly known as: PLAVIX  Take 1 tablet (75 mg total) by mouth once daily.  Start taking on: September 8, 2022        CHANGE how you take these medications    * carvediloL 6.25 MG tablet  Commonly known as: COREG  Take 1 tablet (6.25 mg total) by mouth 2 (two) times daily.  What changed:   · medication strength  · how much to take  · when to take this     * carvediloL 6.25 MG tablet  Commonly known as: COREG  Take 1 tablet (6.25 mg total) by mouth 2 (two) times daily with meals.  What changed: You were already taking a medication with the same name, and this prescription was added. Make sure you understand how and when to take each.     furosemide 40 MG tablet  Commonly known as: LASIX  Take 1 tablet (40 mg total) by mouth once daily.  What changed:   · how much to take  · when to take this         * This list has 2 medication(s) that are the same as other medications prescribed for you. Read the directions carefully, and ask your doctor or other care provider to review them with you.            CONTINUE taking these medications    amLODIPine 5 MG tablet  Commonly known as: NORVASC  Take 1 tablet (5 mg total) by mouth every evening.     aspirin 325 MG tablet  Take 325 mg by mouth once daily.     atorvastatin 20 MG tablet  Commonly known as: LIPITOR  Take 1 tablet (20 mg total) by mouth every evening.     brimonidine 0.1% 0.1 % Drop  Commonly known as: ALPHAGAN P  Place 1 drop into both eyes 3 (three) times daily. Order 90 day supply     brinzolamide 1 % ophthalmic suspension  Commonly known as: AZOPT  Place 1 drop into both eyes 3 (three) times daily. Brand name only     cloNIDine 0.1 MG tablet  Commonly known as: CATAPRES  Take 1 tablet (0.1 mg total) by mouth 3  "(three) times daily.     FREESTYLE CLEMENCIA 14 DAY SENSOR Kit  Generic drug: flash glucose sensor  1 each by Misc.(Non-Drug; Combo Route) route every 14 (fourteen) days.     hydrALAZINE 100 MG tablet  Commonly known as: APRESOLINE  Take 1 tablet (100 mg total) by mouth every 8 (eight) hours.     insulin aspart U-100 100 unit/mL (3 mL) Inpn pen  Commonly known as: NovoLOG  Inject 25 Units into the skin 3 (three) times daily with meals.     JARDIANCE 25 mg tablet  Generic drug: empagliflozin  Take 1 tablet (25 mg total) by mouth once daily.     mupirocin 2 % ointment  Commonly known as: BACTROBAN  Apply topically once daily.     nitroGLYCERIN 0.4 MG SL tablet  Commonly known as: NITROSTAT  PLACE 1 TABLET (0.4 MG TOTAL) UNDER THE TONGUE EVERY 5 (FIVE) MINUTES AS NEEDED FOR CHEST PAIN.     ofloxacin 0.3 % ophthalmic solution  Commonly known as: OCUFLOX  Place 1 drop into both eyes 4 (four) times daily.     pen needle, diabetic 32 gauge x 5/32" Ndle  Commonly known as: BD ULTRA-FINE AMANDA PEN NEEDLE  1 each by Misc.(Non-Drug; Combo Route) route 4 (four) times daily with meals and nightly.     RHOPRESSA 0.02 % ophthalmic solution  Generic drug: netarsudiL  Place 1 drop into both eyes once daily.     TOUJEO MAX U-300 SOLOSTAR 300 unit/mL (3 mL) insulin pen  Generic drug: insulin glargine U-300 conc  Inject 80 Units into the skin once daily.        STOP taking these medications    hydroCHLOROthiazide 25 MG tablet  Commonly known as: HYDRODIURIL     lisinopriL 40 MG tablet  Commonly known as: PRINIVIL,ZESTRIL     miglitoL 25 MG Tab  Commonly known as: GLYSET            Indwelling Lines/Drains at time of discharge:   Lines/Drains/Airways     None                 Time spent on the discharge of patient: 35 minutes         Radha Pichardo NP  Department of Hospital Medicine  [facility]  "

## 2022-09-07 NOTE — TELEPHONE ENCOUNTER
Please arrange OP clinic f/u with Dr. Summers in next 1-2 weeks. Patient needs vital connect monitor placed same day.    Thanks

## 2022-09-07 NOTE — ASSESSMENT & PLAN NOTE
-Likely TIA  -Carotid U/S reviewed, vascular following  -Continue ASA, Plavix, statin  -CT of head reviewed  -OP vital connect monitor to rule out arrhythmia related events

## 2022-09-08 ENCOUNTER — TELEPHONE (OUTPATIENT)
Dept: INTERNAL MEDICINE | Facility: CLINIC | Age: 67
End: 2022-09-08
Payer: MEDICARE

## 2022-09-08 NOTE — TELEPHONE ENCOUNTER
Spoke with pt regarding scheduling appointment pt states he has appointment in two weeks with .//CHRISTINE

## 2022-09-08 NOTE — TELEPHONE ENCOUNTER
----- Message from Abbi Purdy sent at 9/8/2022  2:59 PM CDT -----  Contact: self/[phone]  Type:  Patient Returning Call    Who Called:patient  Who Left Message for Patient:Nurse  Does the patient know what this is regarding?:Appointment  Would the patient rather a call back or a response via [facility]? Call back  Best Call Back Number:[phone]  Additional Information:

## 2022-09-13 ENCOUNTER — PATIENT MESSAGE (OUTPATIENT)
Dept: DERMATOLOGY | Facility: CLINIC | Age: 67
End: 2022-09-13
Payer: MEDICARE

## 2022-09-13 DIAGNOSIS — D03.30 MELANOMA IN SITU OF FACE: Primary | ICD-10-CM

## 2022-09-13 LAB
COMMENT: NORMAL
FINAL PATHOLOGIC DIAGNOSIS: NORMAL
GROSS: NORMAL
Lab: NORMAL
MICROSCOPIC EXAM: NORMAL

## 2022-09-13 NOTE — TELEPHONE ENCOUNTER
Called to speak with patient regarding results of biopsy from forehead, which was consistent with melanoma in situ. No answer, voicemail left to return call. Will attempt again. For now, will send referral to surgical oncology for evaluation for treatment and complete removal.

## 2022-09-15 ENCOUNTER — TELEPHONE (OUTPATIENT)
Dept: HEMATOLOGY/ONCOLOGY | Facility: CLINIC | Age: 67
End: 2022-09-15
Payer: MEDICARE

## 2022-09-15 NOTE — TELEPHONE ENCOUNTER
Received referral for pt to see surgery regarding melanoma in situ of the forehead. Called & discussed with pt, he prefers [place] location, scheduled on 9/30/22 with Dr. Ceballos. Reviewed appt details & confirmed.

## 2022-09-20 ENCOUNTER — CLINICAL SUPPORT (OUTPATIENT)
Dept: REHABILITATION | Facility: HOSPITAL | Age: 67
End: 2022-09-20
Payer: MEDICARE

## 2022-09-20 DIAGNOSIS — I89.0 LYMPHEDEMA: Primary | ICD-10-CM

## 2022-09-20 PROCEDURE — 97140 MANUAL THERAPY 1/> REGIONS: CPT

## 2022-09-20 PROCEDURE — 97535 SELF CARE MNGMENT TRAINING: CPT

## 2022-09-20 PROCEDURE — 97016 VASOPNEUMATIC DEVICE THERAPY: CPT

## 2022-09-20 NOTE — PROGRESS NOTES
Physical Therapy Treatment Note     Name: Stepan BONNER [facility] Number: [id number]    Therapy Diagnosis:   Encounter Diagnosis   Name Primary?    Lymphedema Yes       Physician: Vangie Cuevas DPM    Visit Date: 9/20/2022    Physician Orders: PT Eval and Treat   Medical Diagnosis from Referral: lymphedema  Evaluation Date: 6/1/2022  Authorization Period Expiration: 12/31/2022  Plan of Care Expiration: 11/2/2022  Visit # / Visits authorized: 4/ 20    Time In: 0810 am  Time Out: 0930 am  Total Billable Time: 45 minutes; 25 minutes untimed    Precautions: Standard and visual deficit    Subjective     Pt reports: had a TIA since last visit. He is doing good now. He lost one of his ankle/foot compression wraps  He was compliant with home exercise program.  Response to previous treatment: good  Functional change: able to lift legs with less difficulty; able to get on/off floor; able to  leg and cross it over the other leg    Pain: 0/10  Location:  No pain     Objective   No FOTO - consult only    Stepan received self care/home management and manual therapy x (45) minutes including:     Intervention Performed Today    Cut to fit left lower leg velcro wrap x    Patient instruction on donning and doffing of lower leg wraps and ankle wraps x    Skin care bilateral lower extremities  x    Instructed in use of a compression pump x    Manual Lymph Drainage performed on bilateral lower legs, ankles and feet x 10 minutes x    Measured left leg 30 cm below SBP x 53 cm (1.5 cm larger than 8/24/2022 measurements)   Called [facility] regarding status of compression pump x    Called [facility] to order new ankle/foot wrap x      Plan for Next Visit: cut to fit velcro wrap prn; Manual Lymph Drainage training        Stepan received the following supervised modalities after being cleared for contradictions: Vasopneumatic compression for (--) minutes without adverse effects       Home Exercises Provided and Patient Education  Provided     Education provided:   - donning and doffing of bilateral lower leg and foot/ankle wraps. The foot/ankle wrap for the left foot did not fit well so extra velcro pieces were used to attach straps    Home instruction Provided: yes.  Exercises were reviewed and Stepan was able to demonstrate them prior to the end of the session.  Stepan demonstrated good  understanding of the education provided.     See EMR under Patient Instructions for exercises provided 7/13/2022. (nothing written provided)    Assessment   Fidel left leg increased in size by 1.5 cm larger in the calf. This could be attributed to Sedrick having IV fluids recently. The straps on his left leg were tightened however some of the straps were tightened more than others creating an abnormal shape of the leg. The compression pump helped even out the fluid in the legs. He has still not received his home pump and [facility] said that all they are waiting on is the face to face from the MD. With the consent of Sedrick during today's visit, the 5/9/2022 note from the referring MD was faxed to [facility]. Sedrick will return in 3-4 weeks.     Stepan Is progressing well towards his goals.   Pt prognosis is Excellent.     Pt will continue to benefit from skilled outpatient physical therapy to address the deficits listed in the problem list box on initial evaluation, provide pt/family education and to maximize pt's level of independence in the home and community environment.     Pt's spiritual, cultural and educational needs considered and pt agreeable to plan of care and goals.     Anticipated barriers to physical therapy: patient lives in [facility] and relies on public transportation    Goals:     Short Term Goals: (5 weeks)  1. Patient will show decreased girth in bilateral lower extremities  by up to 8 cm to allow for LE symmetry, shoe and clothing choice, and ability to apply needed compression.  (GOAL MET)  2. Patient will demonstrate 100%  knowledge of lymphedema precautions and signs of infection to allow for reduced lymphedema risk, infection risk, and/or exacerbation of condition.  (GOAL MET)  3. Patient or caregiver will perform self-bandaging techniques and/or wearing of compression garments to allow for lymphatic drainage support, skin elasticity, and reduction in shape and size of limb. (GOAL MET)  4. Patient will perform self lymph drainage techniques to areas within reach to enhance lymphatic drainage and skin condition.  (progressing, not met)  5. Patient will tolerate daily activities with multilayered bandaging to allow for lymphatic and venous support.  (GOAL MET)     Long Term Goals: (10  weeks)  1. Patient will show decreased girth in bilateral lower extremities  by up to 10-15 cm  to allow for LE symmetry, shoe and clothing choice, and ability to apply needed compression daily.  (progressing, not met)  2. Patient will show reduction in density to mild or less with improved contour of limb to allow for cosmesis, LE symmetry, infection risk reduction, and clothing and compression choice.   (GOAL MET)  3. Patient to william/doff compression garment with daily compliance to assist in lymphedema management, skin elasticity, and tissue density.  (GOAL MET)  4. Pt to show improved postural awareness and alignment.  (GOAL MET)  5. Pt to be I and compliant with HEP to allow for increased function in affected limb.   (GOAL MET)        Plan   Plan of care Certification: 8/24/2022 To 11/2/2022     Outpatient Physical Therapy 1 time every 3 weeks x 10 weeks to include the following interventions: Manual Therapy, Patient Education, Self Care, Therapeutic Exercise and vaso-pneumatic compression. Complete Decongestive Therapy- compression and home equipment needs to be addressed and assisted    Mckenna Omer, PT, BERNABE

## 2022-09-22 ENCOUNTER — OFFICE VISIT (OUTPATIENT)
Dept: CARDIOLOGY | Facility: CLINIC | Age: 67
End: 2022-09-22
Payer: MEDICARE

## 2022-09-22 ENCOUNTER — HOSPITAL ENCOUNTER (OUTPATIENT)
Dept: CARDIOLOGY | Facility: HOSPITAL | Age: 67
Discharge: HOME | End: 2022-09-22
Payer: MEDICARE

## 2022-09-22 VITALS
OXYGEN SATURATION: 98 % | WEIGHT: 315 LBS | SYSTOLIC BLOOD PRESSURE: 110 MMHG | HEIGHT: 78 IN | DIASTOLIC BLOOD PRESSURE: 70 MMHG | HEART RATE: 60 BPM | BODY MASS INDEX: 36.45 KG/M2

## 2022-09-22 DIAGNOSIS — I73.9 PVD (PERIPHERAL VASCULAR DISEASE): ICD-10-CM

## 2022-09-22 DIAGNOSIS — H53.9 TRANSIENT VISUAL DISTURBANCE: ICD-10-CM

## 2022-09-22 DIAGNOSIS — I15.2 HYPERTENSION ASSOCIATED WITH DIABETES: Chronic | ICD-10-CM

## 2022-09-22 DIAGNOSIS — R00.1 BRADYCARDIA: ICD-10-CM

## 2022-09-22 DIAGNOSIS — E11.59 HYPERTENSION ASSOCIATED WITH DIABETES: Chronic | ICD-10-CM

## 2022-09-22 DIAGNOSIS — I50.32 CHRONIC DIASTOLIC CONGESTIVE HEART FAILURE: ICD-10-CM

## 2022-09-22 DIAGNOSIS — I10 PRIMARY HYPERTENSION: ICD-10-CM

## 2022-09-22 DIAGNOSIS — N18.32 STAGE 3B CHRONIC KIDNEY DISEASE: Chronic | ICD-10-CM

## 2022-09-22 DIAGNOSIS — I25.10 CORONARY ARTERY DISEASE INVOLVING NATIVE CORONARY ARTERY OF NATIVE HEART WITHOUT ANGINA PECTORIS: Primary | Chronic | ICD-10-CM

## 2022-09-22 PROCEDURE — 99214 OFFICE O/P EST MOD 30 MIN: CPT | Mod: S$PBB,,, | Performed by: INTERNAL MEDICINE

## 2022-09-22 PROCEDURE — 99999 PR PBB SHADOW E&M-EST. PATIENT-LVL III: CPT | Mod: PBBFAC,,, | Performed by: INTERNAL MEDICINE

## 2022-09-22 PROCEDURE — 99213 OFFICE O/P EST LOW 20 MIN: CPT | Mod: PBBFAC | Performed by: INTERNAL MEDICINE

## 2022-09-22 PROCEDURE — 99999 PR PBB SHADOW E&M-EST. PATIENT-LVL III: ICD-10-PCS | Mod: PBBFAC,,, | Performed by: INTERNAL MEDICINE

## 2022-09-22 PROCEDURE — 93248 CV CARDIAC MONITOR - 3-15 DAY ADULT (CUPID ONLY): ICD-10-PCS | Mod: ,,, | Performed by: INTERNAL MEDICINE

## 2022-09-22 PROCEDURE — 93248 EXT ECG>7D<15D REV&INTERPJ: CPT | Mod: ,,, | Performed by: INTERNAL MEDICINE

## 2022-09-22 PROCEDURE — 99214 PR OFFICE/OUTPT VISIT, EST, LEVL IV, 30-39 MIN: ICD-10-PCS | Mod: S$PBB,,, | Performed by: INTERNAL MEDICINE

## 2022-09-22 RX ORDER — LOSARTAN POTASSIUM 25 MG/1
25 TABLET ORAL DAILY
Qty: 30 TABLET | Refills: 5 | Status: SHIPPED | OUTPATIENT
Start: 2022-09-22 | End: 2023-01-12 | Stop reason: SDUPTHER

## 2022-09-22 RX ORDER — METOPROLOL SUCCINATE 25 MG/1
25 TABLET, EXTENDED RELEASE ORAL DAILY
Qty: 30 TABLET | Refills: 5 | Status: SHIPPED | OUTPATIENT
Start: 2022-09-22 | End: 2023-01-12

## 2022-09-22 NOTE — PROGRESS NOTES
Subjective:   Patient ID:  Stepan Springer is a 67 y.o. male who presents for follow up of Hospital Follow Up      66 yo male, came in for hospital discharge f/u. Open a tax office by himself.  PMH CAD h/o NSTEMI, s/p PCI midRCA SABINO x2 in 2016 by Dr. Parekh, midLAD 40%. LBBB, DM x 15 yrs, h/o vision TIA in , CKD stage III, HTN, HLD obesity, PVD/lymphoedema FRAN not tolerate CPAP. Left leg weakness cane dependent No smoking. Occasional drinking  H/o COVID 19 twice infection in  and positive in   ECHO normal EF and mild LVH   NUKE stress test no ischemia, reviewed in the office  EKG  NSR and LBBB  LAWSON chronic walking from the parking to the front. Worse at summer. In cold weather could walk a mile   No chest pain/chest pressure  Both shoulder pain while walking  Occasional dizziness and imbalance.  Sleeps on 1 pillow., good appetite. Lost 30 pounds in 1 yr  03/2020 fell and admitted to [facility]. Had left leg injury  BP high and A1c 12. LDL controlled. Started vegetable and mild.     07/2021 visit  Fell 2 weeks ago and the pain improved,  /120 today, no headache vision change dizziness.  No chest pain dyspnea and faint  C/o leg swelling worse and now on lasix 40 mg bid    08/2021 visit   Leg swelling improved after increased lasix to 80 mg bid for a week and kept on 60 mg bid. BMP pending now  BP high 174/112 mmHG    09/2021 visit  BP better controlled. The leg swelling and could not find appropriated size compression sock.  Left leg swelling below the knee.     visit  No dizziness and fall since . Leg swelling stable.   Chronic dizziness when standign up quickly. No faint and syncope. No PND and orthopnea  Could not tolerate CPAP     visit  Will start Lymphedema Rx soon. Recheck BP in the office controlled. No dizziness faint. SOB stable. EKG showed NSR and chronic LBBB    Interval history  09/2022 admitted for left eye vision temporarily lose and  Dx of TIA. Related to Dehydration and bradycardia. Decreased coreg dose due to bradycardia. Cr up to 3.4 and now back to baseline 1.7. CTA head and neck mild to mod Dz   admitted for uncontrolled HTN /109 mmHG, missed the med one time before the admission  Has been on compression socks for 2 months and lost weight 40 pounds  Now vision ok. BP controlled cr 1.8  Now held HCTZ and Lisinopril   Could not afford Ozempic                          Past Medical History:   Diagnosis Date    Anxiety     Bell's palsy     CHF (congestive heart failure)     Chronic kidney disease, stage 3a 11/02/2021    Coronary artery disease involving native coronary artery without angina pectoris 10/18/2016    Depression     Diabetes mellitus     Glaucoma     Hypertension     Idiopathic peripheral neuropathy 12/11/2012    Lymphedema of both lower extremities     Mixed hyperlipidemia 12/11/2012    Morbid obesity with BMI of 45.0-49.9, adult 02/12/2019    Pancytopenia     Sleep apnea     Stage 3b chronic kidney disease     Type 2 diabetes mellitus with diabetic polyneuropathy, with long-term current use of insulin 12/11/2012    Vitamin B 12 deficiency 08/23/2012       Past Surgical History:   Procedure Laterality Date    CARDIAC CATHETERIZATION  7/22/13    non obstructive cad    CATARACT EXTRACTION  OU    COLONOSCOPY N/A 5/1/2019    Procedure: COLONOSCOPY;  Surgeon: Henry Mo III, MD;  Location: [facility];  Service: Endoscopy;  Laterality: N/A;    CORONARY ANGIOPLASTY      ESOPHAGOGASTRODUODENOSCOPY N/A 5/1/2019    Procedure: ESOPHAGOGASTRODUODENOSCOPY (EGD);  Surgeon: Henry Mo III, MD;  Location: [facility];  Service: Endoscopy;  Laterality: N/A;    EYE SURGERY      FRACTURE SURGERY      kidney stone       August 2016    PC IOL OU      RETINAL DETACHMENT REPAIR W/ SCLERAL BUCKLE LE      WRIST SURGERY         Social History     Tobacco Use    Smoking status: Never    Smokeless tobacco: Never   Substance Use  "Topics    Alcohol use: Yes     Comment: " one to two glasses of wine per year"    Drug use: No       Family History   Problem Relation Age of Onset    Macular degeneration Father     Heart disease Father         CHF     Heart attack Father     Hypertension Mother     Macular degeneration Paternal Uncle     Hypertension Maternal Grandmother     Hypertension Maternal Grandfather     Strabismus Neg Hx     Retinal detachment Neg Hx     Glaucoma Neg Hx     Blindness Neg Hx     Amblyopia Neg Hx          Review of Systems   Constitutional: Negative for decreased appetite, diaphoresis, fever, malaise/fatigue and night sweats.   HENT:  Negative for nosebleeds.    Eyes:  Negative for blurred vision and double vision.   Cardiovascular:  Positive for dyspnea on exertion and leg swelling. Negative for chest pain, claudication, irregular heartbeat, near-syncope, orthopnea, palpitations, paroxysmal nocturnal dyspnea and syncope.   Respiratory:  Negative for cough, shortness of breath, sleep disturbances due to breathing, snoring, sputum production and wheezing.    Endocrine: Negative for cold intolerance and polyuria.   Hematologic/Lymphatic: Does not bruise/bleed easily.   Skin:  Negative for rash.   Musculoskeletal:  Positive for joint pain and muscle weakness. Negative for back pain, falls, joint swelling and neck pain.   Gastrointestinal:  Negative for abdominal pain, heartburn, nausea and vomiting.   Genitourinary:  Negative for dysuria, frequency and hematuria.   Neurological:  Positive for dizziness. Negative for difficulty with concentration, focal weakness, headaches, light-headedness, numbness, seizures and weakness.   Psychiatric/Behavioral:  Negative for depression, memory loss and substance abuse. The patient does not have insomnia.    Allergic/Immunologic: Negative for HIV exposure and hives.     Objective:   Physical Exam  HENT:      Head: Normocephalic.   Eyes:      Pupils: Pupils are equal, round, and reactive to " light.   Neck:      Thyroid: No thyromegaly.      Vascular: Normal carotid pulses. No carotid bruit or JVD.   Cardiovascular:      Rate and Rhythm: Normal rate and regular rhythm. No extrasystoles are present.     Chest Wall: PMI is not displaced.      Pulses: Normal pulses.      Heart sounds: Normal heart sounds. No murmur heard.    No gallop. No S3 sounds.   Pulmonary:      Effort: No respiratory distress.      Breath sounds: Normal breath sounds. No stridor.   Abdominal:      General: Bowel sounds are normal.      Palpations: Abdomen is soft.      Tenderness: There is no abdominal tenderness. There is no rebound.   Musculoskeletal:         General: Swelling (L >>R) and deformity present.   Skin:     Findings: No rash.   Neurological:      Mental Status: He is alert and oriented to person, place, and time.   Psychiatric:         Behavior: Behavior normal.       Lab Results   Component Value Date    CHOL 100 (L) 09/04/2022    CHOL 177 07/07/2022    CHOL 116 (L) 05/06/2021     Lab Results   Component Value Date    HDL 32 (L) 09/04/2022    HDL 40 07/07/2022    HDL 35 (L) 05/06/2021     Lab Results   Component Value Date    LDLCALC 44.4 (L) 09/04/2022    LDLCALC 100.8 07/07/2022    LDLCALC 48.8 (L) 05/06/2021     Lab Results   Component Value Date    TRIG 118 09/04/2022    TRIG 181 (H) 07/07/2022    TRIG 161 (H) 05/06/2021     Lab Results   Component Value Date    CHOLHDL 32.0 09/04/2022    CHOLHDL 22.6 07/07/2022    CHOLHDL 30.2 05/06/2021       Chemistry        Component Value Date/Time     09/07/2022 0915    K 3.9 09/07/2022 0915     (H) 09/07/2022 0915    CO2 19 (L) 09/07/2022 0915    BUN 25 (H) 09/07/2022 0915    BUN 34 (A) 10/28/2013 0835    CREATININE 1.8 (H) 09/07/2022 0915    CREATININE 1.7 10/28/2013 0835     (H) 09/07/2022 0915        Component Value Date/Time    CALCIUM 8.8 09/07/2022 0915    ALKPHOS 86 09/05/2022 0454    AST 11 09/05/2022 0454    AST 14 10/28/2013 0835    ALT 15  09/05/2022 0454    BILITOT 0.5 09/05/2022 0454    ESTGFRAFRICA 55.7 (A) 07/07/2022 1224    EGFRNONAA 48.2 (A) 07/07/2022 1224          Lab Results   Component Value Date    HGBA1C 6.5 (H) 08/15/2022     Lab Results   Component Value Date    TSH 1.259 09/03/2022     Lab Results   Component Value Date    INR 1.1 09/04/2022    INR 1.0 09/03/2022    INR 1.2 09/20/2016     Lab Results   Component Value Date    WBC 4.94 09/07/2022    HGB 15.1 09/07/2022    HCT 44.2 09/07/2022    MCV 90 09/07/2022     (L) 09/07/2022     BMP  Sodium   Date Value Ref Range Status   09/07/2022 142 136 - 145 mmol/L Final     Potassium   Date Value Ref Range Status   09/07/2022 3.9 3.5 - 5.1 mmol/L Final     Chloride   Date Value Ref Range Status   09/07/2022 114 (H) 95 - 110 mmol/L Final     CO2   Date Value Ref Range Status   09/07/2022 19 (L) 23 - 29 mmol/L Final     BUN   Date Value Ref Range Status   09/07/2022 25 (H) 8 - 23 mg/dL Final   10/28/2013 34 (A) 4 - 21 mg/dL Final     Creatinine   Date Value Ref Range Status   09/07/2022 1.8 (H) 0.5 - 1.4 mg/dL Final   10/28/2013 1.7 mg/dL Final     Calcium   Date Value Ref Range Status   09/07/2022 8.8 8.7 - 10.5 mg/dL Final     Anion Gap   Date Value Ref Range Status   09/07/2022 9 8 - 16 mmol/L Final     eGFR if    Date Value Ref Range Status   07/07/2022 55.7 (A) >60 mL/min/1.73 m^2 Final     eGFR if non    Date Value Ref Range Status   07/07/2022 48.2 (A) >60 mL/min/1.73 m^2 Final     Comment:     Calculation used to obtain the estimated glomerular filtration  rate (eGFR) is the CKD-EPI equation.        BNP  @LABRCNTIP(BNP,BNPTRIAGEBLO)@  @LABRCNTIP(troponini)@  CrCl cannot be calculated (Patient's most recent lab result is older than the maximum 7 days allowed.).  No results found in the last 24 hours.  No results found in the last 24 hours.  No results found in the last 24 hours.    Assessment:      1. Coronary artery disease involving native  coronary artery of native heart without angina pectoris    2. Chronic diastolic congestive heart failure    3. PVD (peripheral vascular disease)    4. Primary hypertension    5. Hypertension associated with diabetes    6. Stage 3b chronic kidney disease      CAD stable  CHFpEF and PVD controlled  Weight loss  H/o Vision TIA   Plan:   Continue Plavix for 4 weeks post TIA and stop   Continue ASA 81 mg lifelong   D/c Coreg and add toprolXL 25 mg daily due to soft BP   Add Losartan 25 mg daiyl for Dm and HTN  Continue Amlodipine Clonidine and hydralazine  \Fluid restriction 1.5 liters a day  Na< 2 gm   RTC in 3 months      Continue lasix 40 mg daily  Fluid restriction 60 oz

## 2022-09-30 ENCOUNTER — OFFICE VISIT (OUTPATIENT)
Dept: OTOLARYNGOLOGY | Facility: CLINIC | Age: 67
End: 2022-09-30
Payer: MEDICARE

## 2022-09-30 VITALS — WEIGHT: 315 LBS | BODY MASS INDEX: 45.67 KG/M2 | TEMPERATURE: 98 F

## 2022-09-30 DIAGNOSIS — D03.30 MELANOMA IN SITU OF FACE: Primary | ICD-10-CM

## 2022-09-30 DIAGNOSIS — M95.2 MOHS DEFECT OF FOREHEAD: Primary | ICD-10-CM

## 2022-09-30 DIAGNOSIS — Z98.890 MOHS DEFECT OF FOREHEAD: Primary | ICD-10-CM

## 2022-09-30 PROCEDURE — 99204 PR OFFICE/OUTPT VISIT, NEW, LEVL IV, 45-59 MIN: ICD-10-PCS | Mod: S$PBB,,, | Performed by: OTOLARYNGOLOGY

## 2022-09-30 PROCEDURE — 99204 OFFICE O/P NEW MOD 45 MIN: CPT | Mod: S$PBB,,, | Performed by: OTOLARYNGOLOGY

## 2022-09-30 PROCEDURE — 99999 PR PBB SHADOW E&M-EST. PATIENT-LVL V: ICD-10-PCS | Mod: PBBFAC,,, | Performed by: OTOLARYNGOLOGY

## 2022-09-30 PROCEDURE — 99215 OFFICE O/P EST HI 40 MIN: CPT | Mod: PBBFAC | Performed by: OTOLARYNGOLOGY

## 2022-09-30 PROCEDURE — 99999 PR PBB SHADOW E&M-EST. PATIENT-LVL V: CPT | Mod: PBBFAC,,, | Performed by: OTOLARYNGOLOGY

## 2022-09-30 NOTE — PROGRESS NOTES
History of Present Illness:   Stepan Springer is a 67 y.o. year old male evaluated on 9/30/2022, in the Otolaryngology-Head and Neck Surgery Clinic at [facility]. The patient was referred by Dr. 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  Patient reports that he had an noticed any problems until someone told him about 3 months ago that he had a lesion on his forehead.  He denies pain to the area.  He denies prior cutaneous malignancies.  He does have significant comorbidities with history of coronary artery disease with CHF and diabetes.  He was hospitalized recently with a TIA and was placed on Plavix and aspirin which per cardiology follow-up he was supposed to finish course of Plavix at 4 weeks from TIA symptoms which was beginning of September.  He was seen by Dr. Kelly who performed a shave biopsy showing melanoma in-situ.  He denies family history of melanoma.            Past Medical/Surgical History  Past Medical History:   Diagnosis Date    Anxiety     Bell's palsy     CHF (congestive heart failure)     Chronic kidney disease, stage 3a 11/02/2021    Coronary artery disease involving native coronary artery without angina pectoris 10/18/2016    Depression     Diabetes mellitus     Glaucoma     Hypertension     Idiopathic peripheral neuropathy 12/11/2012    Lymphedema of both lower extremities     Mixed hyperlipidemia 12/11/2012    Morbid obesity with BMI of 45.0-49.9, adult 02/12/2019    Pancytopenia     Sleep apnea     Stage 3b chronic kidney disease     Type 2 diabetes mellitus with diabetic polyneuropathy, with long-term current use of insulin 12/11/2012    Vitamin B 12 deficiency 08/23/2012     His  has a past surgical history that includes Retinal detachment repair w/ scleral buckle; PC IOL OU; Cataract extraction (OU); Wrist surgery; Eye surgery; Fracture surgery; Cardiac catheterization (7/22/13); kidney stone ; Esophagogastroduodenoscopy (N/A, 5/1/2019); Colonoscopy  (N/A, 5/1/2019); and Coronary angioplasty.     Past Family/Social History  His family history includes Heart attack in his father; Heart disease in his father; Hypertension in his maternal grandfather, maternal grandmother, and mother; Macular degeneration in his father and paternal uncle.  He  reports that he has never smoked. He has never used smokeless tobacco. He reports current alcohol use. He reports that he does not use drugs.     Medications/Allergies/Immunizations  His current medication(s) include:   Current Outpatient Medications   Medication Sig Dispense Refill    amLODIPine (NORVASC) 5 MG tablet Take 1 tablet (5 mg total) by mouth every evening. 90 tablet 1    aspirin 325 MG tablet Take 325 mg by mouth once daily.      atorvastatin (LIPITOR) 20 MG tablet Take 1 tablet (20 mg total) by mouth every evening. 90 tablet 3    brimonidine 0.1% (ALPHAGAN P) 0.1 % Drop Place 1 drop into both eyes 3 (three) times daily. Order 90 day supply 15 mL 4    brinzolamide (AZOPT) 1 % ophthalmic suspension Place 1 drop into both eyes 3 (three) times daily. Brand name only 15 mL 6    cloNIDine (CATAPRES) 0.1 MG tablet Take 1 tablet (0.1 mg total) by mouth 3 (three) times daily. 270 tablet 1    clopidogreL (PLAVIX) 75 mg tablet Take 1 tablet (75 mg total) by mouth once daily. 30 tablet 0    empagliflozin (JARDIANCE) 25 mg tablet Take 1 tablet (25 mg total) by mouth once daily. 90 tablet 3    flash glucose sensor (FREESTYLE CLEMENCIA 14 DAY SENSOR) Kit 1 each by Misc.(Non-Drug; Combo Route) route every 14 (fourteen) days. 6 kit 3    furosemide (LASIX) 40 MG tablet Take 1 tablet (40 mg total) by mouth once daily. 270 tablet 1    hydrALAZINE (APRESOLINE) 100 MG tablet Take 1 tablet (100 mg total) by mouth every 8 (eight) hours. 270 tablet 1    insulin aspart U-100 (NOVOLOG) 100 unit/mL (3 mL) InPn pen Inject 25 Units into the skin 3 (three) times daily with meals. 69 mL 3    insulin glargine U-300 conc (TOUJEO MAX U-300 SOLOSTAR)  "300 unit/mL (3 mL) insulin pen Inject 80 Units into the skin once daily. 8 pen 3    losartan (COZAAR) 25 MG tablet Take 1 tablet (25 mg total) by mouth once daily. 30 tablet 5    metoprolol succinate (TOPROL-XL) 25 MG 24 hr tablet Take 1 tablet (25 mg total) by mouth once daily. 30 tablet 5    mupirocin (BACTROBAN) 2 % ointment Apply topically once daily. 30 g 0    netarsudiL (RHOPRESSA) 0.02 % ophthalmic solution Place 1 drop into both eyes once daily. 2.5 mL 6    pen needle, diabetic (BD ULTRA-FINE AMANDA PEN NEEDLE) 32 gauge x 5/32" Ndle 1 each by Misc.(Non-Drug; Combo Route) route 4 (four) times daily with meals and nightly. 300 each 3    nitroGLYCERIN (NITROSTAT) 0.4 MG SL tablet PLACE 1 TABLET (0.4 MG TOTAL) UNDER THE TONGUE EVERY 5 (FIVE) MINUTES AS NEEDED FOR CHEST PAIN. 20 tablet 0     No current facility-administered medications for this visit.        Allergies: Cefazolin     Immunizations:   Immunization History   Administered Date(s) Administered    COVID-19, MRNA, LN-S, PF (MODERNA FULL 0.5 ML DOSE) 03/29/2021, 04/28/2021, 12/16/2021, 12/16/2021    Pneumococcal Polysaccharide - 23 Valent 04/24/2014, 11/26/2015    Tdap 12/10/2013    Zoster Recombinant 02/22/2021, 05/17/2021         Review of Systems   Constitutional: Negative for fever, weight loss and weight gain.  Skin: Negative for rash, itchiness, dryness  HENT:  As per HPI  Cardiovascular: Negative for chest pain and dyspnea on exertion .   Respiratory: Is not experiencing shortness of breath.   Gastrointestinal: Negative for nausea and vomiting.   Neurological: Negative for headaches.   Lymph/Heme: Negative for lymphadenopathy or easy bruising  Musculoskeletal: Negative for joint or muscle pain  Psychiatric: The patient is not nervous/anxious.        All other systems are negative except for that listed in the HPI.      PHYSICAL EXAM:   Vital Signs:  Temp 97.9 °F (36.6 °C) (Temporal)   Wt (!) 183.9 kg (405 lb 6.8 oz)   BMI 45.67 kg/m²      General: "  Well-developed, well-nourished  Communication and Voice:  Clear pitch and clarity  Hearing: Hearing adequate for verbal communication bilaterally   Inspection:  Normocephalic and atraumatic left forehead with an area that is melanotic with a scarring from recent shave biopsy centrally measuring approximately 7 mm in diameter  Palpation:  Facial skeleton intact without bony stepoffs  Parotid Glands:  No mass or tenderness  Facial Strength:  Facial motility symmetric and full bilaterally  Pinna:  External ear intact and fully developed  External canal:  Canal is patent with intact skin  Tympanic Membrane:  Clear and mobile  External nose:  No scar or anatomic deformity  Internal Nose:  Septum intact and midline.  No edema, polyp, or rhinorrhea.  TMJ:  No pain to palpation with full mobility  Oral cavity, Lips, Teeth, and Gums:  Mucosa and teeth intact and viable, No lesions, masses or ulcers  Oropharynx: No erythema or exudate, no masses or ulcerations, non-obstructive tonsils  Nasopharynx:  No mass or lesion with intact mucosa  Hypopharynx:  Not well visualized secondary to gagging  Larynx:  Not well visualized secondary to gagging  Neck, Trachea, Lymphatics:  Midline trachea without mass or lesion, no lymphadenopathy  Thyroid:  No mass or nodularity  Eyes: No nystagmus with equal extraocular motion bilaterally  Neuro/Psych/Balance: Patient oriented and appropriate in interaction;  Appropriate mood and affect;  Gait is intact with no imbalance; Cranial nerves I-XII are intact  Respiratory effort:  Equal inspiration and expiration without stridor  Peripheral Vascular:  Warm extremities with equal pulses     PATHOLOGY REVIEW:    Forehead shave biopsy 08/30/2022   Skin, left forehead, shave biopsy:   -MALIGNANT MELANOMA IN-SITU (pTis), LENTIGO MALIGNA TYPE   This lesion is skin cancer. You will be contacted regarding treatment.     RADIOLOGIC REVIEW:    CT head on recent admission with a normal limits    ASSESSMENT:    1. Melanoma in situ of face            PLAN:   Different options for management were discussed with the patient.  Melanoma being a primarily surgical disease was discussed with the patient.  I did contact his cardiologist Dr. Summers and  he is cleared to stop aspirin and Plavix prior to procedure.  Given his cardiac condition and recent TIA I will proceed with both resection and reconstruction under local/MAC.  I discussed with him the option of slow Mohs with dermatologist with subsequent reconstruction with me.  I discussed that this would require him presenting to [place] or [place].  Patient does not drive and is reliant on transplantation and would rather stay local.  I will proceed with wide local excision myself with delayed reconstruction for margin control.  I will contact pathologist to discuss en face sectioning the day of surgery.  Resection is set for next 10/07/2022 with plan on reconstruction 10/14/2022 both at [facility].  Consent obtained    I believe that Mr. Springer has a good understanding of the issues involved and I answered all of his questions.     DISCLAIMER: This note was prepared with [employer] voice recognition transcription software. Garbled syntax, mangled pronouns, and other bizarre constructions may be attributed to that software system. While efforts were made to correct any mistakes made by this voice recognition program, some errors and/or omissions may remain in the note that were missed when the note was originally created.

## 2022-09-30 NOTE — H&P (VIEW-ONLY)
History of Present Illness:   Stepan Springer is a 67 y.o. year old male evaluated on 9/30/2022, in the Otolaryngology-Head and Neck Surgery Clinic at [facility]. The patient was referred by Dr. 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  Patient reports that he had an noticed any problems until someone told him about 3 months ago that he had a lesion on his forehead.  He denies pain to the area.  He denies prior cutaneous malignancies.  He does have significant comorbidities with history of coronary artery disease with CHF and diabetes.  He was hospitalized recently with a TIA and was placed on Plavix and aspirin which per cardiology follow-up he was supposed to finish course of Plavix at 4 weeks from TIA symptoms which was beginning of September.  He was seen by Dr. Kelly who performed a shave biopsy showing melanoma in-situ.  He denies family history of melanoma.            Past Medical/Surgical History  Past Medical History:   Diagnosis Date    Anxiety     Bell's palsy     CHF (congestive heart failure)     Chronic kidney disease, stage 3a 11/02/2021    Coronary artery disease involving native coronary artery without angina pectoris 10/18/2016    Depression     Diabetes mellitus     Glaucoma     Hypertension     Idiopathic peripheral neuropathy 12/11/2012    Lymphedema of both lower extremities     Mixed hyperlipidemia 12/11/2012    Morbid obesity with BMI of 45.0-49.9, adult 02/12/2019    Pancytopenia     Sleep apnea     Stage 3b chronic kidney disease     Type 2 diabetes mellitus with diabetic polyneuropathy, with long-term current use of insulin 12/11/2012    Vitamin B 12 deficiency 08/23/2012     His  has a past surgical history that includes Retinal detachment repair w/ scleral buckle; PC IOL OU; Cataract extraction (OU); Wrist surgery; Eye surgery; Fracture surgery; Cardiac catheterization (7/22/13); kidney stone ; Esophagogastroduodenoscopy (N/A, 5/1/2019); Colonoscopy  (N/A, 5/1/2019); and Coronary angioplasty.     Past Family/Social History  His family history includes Heart attack in his father; Heart disease in his father; Hypertension in his maternal grandfather, maternal grandmother, and mother; Macular degeneration in his father and paternal uncle.  He  reports that he has never smoked. He has never used smokeless tobacco. He reports current alcohol use. He reports that he does not use drugs.     Medications/Allergies/Immunizations  His current medication(s) include:   Current Outpatient Medications   Medication Sig Dispense Refill    amLODIPine (NORVASC) 5 MG tablet Take 1 tablet (5 mg total) by mouth every evening. 90 tablet 1    aspirin 325 MG tablet Take 325 mg by mouth once daily.      atorvastatin (LIPITOR) 20 MG tablet Take 1 tablet (20 mg total) by mouth every evening. 90 tablet 3    brimonidine 0.1% (ALPHAGAN P) 0.1 % Drop Place 1 drop into both eyes 3 (three) times daily. Order 90 day supply 15 mL 4    brinzolamide (AZOPT) 1 % ophthalmic suspension Place 1 drop into both eyes 3 (three) times daily. Brand name only 15 mL 6    cloNIDine (CATAPRES) 0.1 MG tablet Take 1 tablet (0.1 mg total) by mouth 3 (three) times daily. 270 tablet 1    clopidogreL (PLAVIX) 75 mg tablet Take 1 tablet (75 mg total) by mouth once daily. 30 tablet 0    empagliflozin (JARDIANCE) 25 mg tablet Take 1 tablet (25 mg total) by mouth once daily. 90 tablet 3    flash glucose sensor (FREESTYLE CLEMENCIA 14 DAY SENSOR) Kit 1 each by Misc.(Non-Drug; Combo Route) route every 14 (fourteen) days. 6 kit 3    furosemide (LASIX) 40 MG tablet Take 1 tablet (40 mg total) by mouth once daily. 270 tablet 1    hydrALAZINE (APRESOLINE) 100 MG tablet Take 1 tablet (100 mg total) by mouth every 8 (eight) hours. 270 tablet 1    insulin aspart U-100 (NOVOLOG) 100 unit/mL (3 mL) InPn pen Inject 25 Units into the skin 3 (three) times daily with meals. 69 mL 3    insulin glargine U-300 conc (TOUJEO MAX U-300 SOLOSTAR)  "300 unit/mL (3 mL) insulin pen Inject 80 Units into the skin once daily. 8 pen 3    losartan (COZAAR) 25 MG tablet Take 1 tablet (25 mg total) by mouth once daily. 30 tablet 5    metoprolol succinate (TOPROL-XL) 25 MG 24 hr tablet Take 1 tablet (25 mg total) by mouth once daily. 30 tablet 5    mupirocin (BACTROBAN) 2 % ointment Apply topically once daily. 30 g 0    netarsudiL (RHOPRESSA) 0.02 % ophthalmic solution Place 1 drop into both eyes once daily. 2.5 mL 6    pen needle, diabetic (BD ULTRA-FINE AMANDA PEN NEEDLE) 32 gauge x 5/32" Ndle 1 each by Misc.(Non-Drug; Combo Route) route 4 (four) times daily with meals and nightly. 300 each 3    nitroGLYCERIN (NITROSTAT) 0.4 MG SL tablet PLACE 1 TABLET (0.4 MG TOTAL) UNDER THE TONGUE EVERY 5 (FIVE) MINUTES AS NEEDED FOR CHEST PAIN. 20 tablet 0     No current facility-administered medications for this visit.        Allergies: Cefazolin     Immunizations:   Immunization History   Administered Date(s) Administered    COVID-19, MRNA, LN-S, PF (MODERNA FULL 0.5 ML DOSE) 03/29/2021, 04/28/2021, 12/16/2021, 12/16/2021    Pneumococcal Polysaccharide - 23 Valent 04/24/2014, 11/26/2015    Tdap 12/10/2013    Zoster Recombinant 02/22/2021, 05/17/2021         Review of Systems   Constitutional: Negative for fever, weight loss and weight gain.  Skin: Negative for rash, itchiness, dryness  HENT:  As per HPI  Cardiovascular: Negative for chest pain and dyspnea on exertion .   Respiratory: Is not experiencing shortness of breath.   Gastrointestinal: Negative for nausea and vomiting.   Neurological: Negative for headaches.   Lymph/Heme: Negative for lymphadenopathy or easy bruising  Musculoskeletal: Negative for joint or muscle pain  Psychiatric: The patient is not nervous/anxious.        All other systems are negative except for that listed in the HPI.      PHYSICAL EXAM:   Vital Signs:  Temp 97.9 °F (36.6 °C) (Temporal)   Wt (!) 183.9 kg (405 lb 6.8 oz)   BMI 45.67 kg/m²      General: "  Well-developed, well-nourished  Communication and Voice:  Clear pitch and clarity  Hearing: Hearing adequate for verbal communication bilaterally   Inspection:  Normocephalic and atraumatic left forehead with an area that is melanotic with a scarring from recent shave biopsy centrally measuring approximately 7 mm in diameter  Palpation:  Facial skeleton intact without bony stepoffs  Parotid Glands:  No mass or tenderness  Facial Strength:  Facial motility symmetric and full bilaterally  Pinna:  External ear intact and fully developed  External canal:  Canal is patent with intact skin  Tympanic Membrane:  Clear and mobile  External nose:  No scar or anatomic deformity  Internal Nose:  Septum intact and midline.  No edema, polyp, or rhinorrhea.  TMJ:  No pain to palpation with full mobility  Oral cavity, Lips, Teeth, and Gums:  Mucosa and teeth intact and viable, No lesions, masses or ulcers  Oropharynx: No erythema or exudate, no masses or ulcerations, non-obstructive tonsils  Nasopharynx:  No mass or lesion with intact mucosa  Hypopharynx:  Not well visualized secondary to gagging  Larynx:  Not well visualized secondary to gagging  Neck, Trachea, Lymphatics:  Midline trachea without mass or lesion, no lymphadenopathy  Thyroid:  No mass or nodularity  Eyes: No nystagmus with equal extraocular motion bilaterally  Neuro/Psych/Balance: Patient oriented and appropriate in interaction;  Appropriate mood and affect;  Gait is intact with no imbalance; Cranial nerves I-XII are intact  Respiratory effort:  Equal inspiration and expiration without stridor  Peripheral Vascular:  Warm extremities with equal pulses     PATHOLOGY REVIEW:    Forehead shave biopsy 08/30/2022   Skin, left forehead, shave biopsy:   -MALIGNANT MELANOMA IN-SITU (pTis), LENTIGO MALIGNA TYPE   This lesion is skin cancer. You will be contacted regarding treatment.     RADIOLOGIC REVIEW:    CT head on recent admission with a normal limits    ASSESSMENT:    1. Melanoma in situ of face            PLAN:   Different options for management were discussed with the patient.  Melanoma being a primarily surgical disease was discussed with the patient.  I did contact his cardiologist Dr. Summers and  he is cleared to stop aspirin and Plavix prior to procedure.  Given his cardiac condition and recent TIA I will proceed with both resection and reconstruction under local/MAC.  I discussed with him the option of slow Mohs with dermatologist with subsequent reconstruction with me.  I discussed that this would require him presenting to [place] or [place].  Patient does not drive and is reliant on transplantation and would rather stay local.  I will proceed with wide local excision myself with delayed reconstruction for margin control.  I will contact pathologist to discuss en face sectioning the day of surgery.  Resection is set for next 10/07/2022 with plan on reconstruction 10/14/2022 both at [facility].  Consent obtained    I believe that Mr. Springer has a good understanding of the issues involved and I answered all of his questions.     DISCLAIMER: This note was prepared with [employer] voice recognition transcription software. Garbled syntax, mangled pronouns, and other bizarre constructions may be attributed to that software system. While efforts were made to correct any mistakes made by this voice recognition program, some errors and/or omissions may remain in the note that were missed when the note was originally created.

## 2022-09-30 NOTE — H&P (VIEW-ONLY)
History of Present Illness:   Stepan Springer is a 67 y.o. year old male evaluated on 9/30/2022, in the Otolaryngology-Head and Neck Surgery Clinic at [facility]. The patient was referred by Dr. 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  Patient reports that he had an noticed any problems until someone told him about 3 months ago that he had a lesion on his forehead.  He denies pain to the area.  He denies prior cutaneous malignancies.  He does have significant comorbidities with history of coronary artery disease with CHF and diabetes.  He was hospitalized recently with a TIA and was placed on Plavix and aspirin which per cardiology follow-up he was supposed to finish course of Plavix at 4 weeks from TIA symptoms which was beginning of September.  He was seen by Dr. Kelly who performed a shave biopsy showing melanoma in-situ.  He denies family history of melanoma.            Past Medical/Surgical History  Past Medical History:   Diagnosis Date    Anxiety     Bell's palsy     CHF (congestive heart failure)     Chronic kidney disease, stage 3a 11/02/2021    Coronary artery disease involving native coronary artery without angina pectoris 10/18/2016    Depression     Diabetes mellitus     Glaucoma     Hypertension     Idiopathic peripheral neuropathy 12/11/2012    Lymphedema of both lower extremities     Mixed hyperlipidemia 12/11/2012    Morbid obesity with BMI of 45.0-49.9, adult 02/12/2019    Pancytopenia     Sleep apnea     Stage 3b chronic kidney disease     Type 2 diabetes mellitus with diabetic polyneuropathy, with long-term current use of insulin 12/11/2012    Vitamin B 12 deficiency 08/23/2012     His  has a past surgical history that includes Retinal detachment repair w/ scleral buckle; PC IOL OU; Cataract extraction (OU); Wrist surgery; Eye surgery; Fracture surgery; Cardiac catheterization (7/22/13); kidney stone ; Esophagogastroduodenoscopy (N/A, 5/1/2019); Colonoscopy  (N/A, 5/1/2019); and Coronary angioplasty.     Past Family/Social History  His family history includes Heart attack in his father; Heart disease in his father; Hypertension in his maternal grandfather, maternal grandmother, and mother; Macular degeneration in his father and paternal uncle.  He  reports that he has never smoked. He has never used smokeless tobacco. He reports current alcohol use. He reports that he does not use drugs.     Medications/Allergies/Immunizations  His current medication(s) include:   Current Outpatient Medications   Medication Sig Dispense Refill    amLODIPine (NORVASC) 5 MG tablet Take 1 tablet (5 mg total) by mouth every evening. 90 tablet 1    aspirin 325 MG tablet Take 325 mg by mouth once daily.      atorvastatin (LIPITOR) 20 MG tablet Take 1 tablet (20 mg total) by mouth every evening. 90 tablet 3    brimonidine 0.1% (ALPHAGAN P) 0.1 % Drop Place 1 drop into both eyes 3 (three) times daily. Order 90 day supply 15 mL 4    brinzolamide (AZOPT) 1 % ophthalmic suspension Place 1 drop into both eyes 3 (three) times daily. Brand name only 15 mL 6    cloNIDine (CATAPRES) 0.1 MG tablet Take 1 tablet (0.1 mg total) by mouth 3 (three) times daily. 270 tablet 1    clopidogreL (PLAVIX) 75 mg tablet Take 1 tablet (75 mg total) by mouth once daily. 30 tablet 0    empagliflozin (JARDIANCE) 25 mg tablet Take 1 tablet (25 mg total) by mouth once daily. 90 tablet 3    flash glucose sensor (FREESTYLE CLEMENCIA 14 DAY SENSOR) Kit 1 each by Misc.(Non-Drug; Combo Route) route every 14 (fourteen) days. 6 kit 3    furosemide (LASIX) 40 MG tablet Take 1 tablet (40 mg total) by mouth once daily. 270 tablet 1    hydrALAZINE (APRESOLINE) 100 MG tablet Take 1 tablet (100 mg total) by mouth every 8 (eight) hours. 270 tablet 1    insulin aspart U-100 (NOVOLOG) 100 unit/mL (3 mL) InPn pen Inject 25 Units into the skin 3 (three) times daily with meals. 69 mL 3    insulin glargine U-300 conc (TOUJEO MAX U-300 SOLOSTAR)  "300 unit/mL (3 mL) insulin pen Inject 80 Units into the skin once daily. 8 pen 3    losartan (COZAAR) 25 MG tablet Take 1 tablet (25 mg total) by mouth once daily. 30 tablet 5    metoprolol succinate (TOPROL-XL) 25 MG 24 hr tablet Take 1 tablet (25 mg total) by mouth once daily. 30 tablet 5    mupirocin (BACTROBAN) 2 % ointment Apply topically once daily. 30 g 0    netarsudiL (RHOPRESSA) 0.02 % ophthalmic solution Place 1 drop into both eyes once daily. 2.5 mL 6    pen needle, diabetic (BD ULTRA-FINE AMANDA PEN NEEDLE) 32 gauge x 5/32" Ndle 1 each by Misc.(Non-Drug; Combo Route) route 4 (four) times daily with meals and nightly. 300 each 3    nitroGLYCERIN (NITROSTAT) 0.4 MG SL tablet PLACE 1 TABLET (0.4 MG TOTAL) UNDER THE TONGUE EVERY 5 (FIVE) MINUTES AS NEEDED FOR CHEST PAIN. 20 tablet 0     No current facility-administered medications for this visit.        Allergies: Cefazolin     Immunizations:   Immunization History   Administered Date(s) Administered    COVID-19, MRNA, LN-S, PF (MODERNA FULL 0.5 ML DOSE) 03/29/2021, 04/28/2021, 12/16/2021, 12/16/2021    Pneumococcal Polysaccharide - 23 Valent 04/24/2014, 11/26/2015    Tdap 12/10/2013    Zoster Recombinant 02/22/2021, 05/17/2021         Review of Systems   Constitutional: Negative for fever, weight loss and weight gain.  Skin: Negative for rash, itchiness, dryness  HENT:  As per HPI  Cardiovascular: Negative for chest pain and dyspnea on exertion .   Respiratory: Is not experiencing shortness of breath.   Gastrointestinal: Negative for nausea and vomiting.   Neurological: Negative for headaches.   Lymph/Heme: Negative for lymphadenopathy or easy bruising  Musculoskeletal: Negative for joint or muscle pain  Psychiatric: The patient is not nervous/anxious.        All other systems are negative except for that listed in the HPI.      PHYSICAL EXAM:   Vital Signs:  Temp 97.9 °F (36.6 °C) (Temporal)   Wt (!) 183.9 kg (405 lb 6.8 oz)   BMI 45.67 kg/m²      General: "  Well-developed, well-nourished  Communication and Voice:  Clear pitch and clarity  Hearing: Hearing adequate for verbal communication bilaterally   Inspection:  Normocephalic and atraumatic left forehead with an area that is melanotic with a scarring from recent shave biopsy centrally measuring approximately 7 mm in diameter  Palpation:  Facial skeleton intact without bony stepoffs  Parotid Glands:  No mass or tenderness  Facial Strength:  Facial motility symmetric and full bilaterally  Pinna:  External ear intact and fully developed  External canal:  Canal is patent with intact skin  Tympanic Membrane:  Clear and mobile  External nose:  No scar or anatomic deformity  Internal Nose:  Septum intact and midline.  No edema, polyp, or rhinorrhea.  TMJ:  No pain to palpation with full mobility  Oral cavity, Lips, Teeth, and Gums:  Mucosa and teeth intact and viable, No lesions, masses or ulcers  Oropharynx: No erythema or exudate, no masses or ulcerations, non-obstructive tonsils  Nasopharynx:  No mass or lesion with intact mucosa  Hypopharynx:  Not well visualized secondary to gagging  Larynx:  Not well visualized secondary to gagging  Neck, Trachea, Lymphatics:  Midline trachea without mass or lesion, no lymphadenopathy  Thyroid:  No mass or nodularity  Eyes: No nystagmus with equal extraocular motion bilaterally  Neuro/Psych/Balance: Patient oriented and appropriate in interaction;  Appropriate mood and affect;  Gait is intact with no imbalance; Cranial nerves I-XII are intact  Respiratory effort:  Equal inspiration and expiration without stridor  Peripheral Vascular:  Warm extremities with equal pulses     PATHOLOGY REVIEW:    Forehead shave biopsy 08/30/2022   Skin, left forehead, shave biopsy:   -MALIGNANT MELANOMA IN-SITU (pTis), LENTIGO MALIGNA TYPE   This lesion is skin cancer. You will be contacted regarding treatment.     RADIOLOGIC REVIEW:    CT head on recent admission with a normal limits    ASSESSMENT:    1. Melanoma in situ of face            PLAN:   Different options for management were discussed with the patient.  Melanoma being a primarily surgical disease was discussed with the patient.  I did contact his cardiologist Dr. Summers and  he is cleared to stop aspirin and Plavix prior to procedure.  Given his cardiac condition and recent TIA I will proceed with both resection and reconstruction under local/MAC.  I discussed with him the option of slow Mohs with dermatologist with subsequent reconstruction with me.  I discussed that this would require him presenting to [place] or [place].  Patient does not drive and is reliant on transplantation and would rather stay local.  I will proceed with wide local excision myself with delayed reconstruction for margin control.  I will contact pathologist to discuss en face sectioning the day of surgery.  Resection is set for next 10/07/2022 with plan on reconstruction 10/14/2022 both at [facility].  Consent obtained    I believe that Mr. Springer has a good understanding of the issues involved and I answered all of his questions.     DISCLAIMER: This note was prepared with [employer] voice recognition transcription software. Garbled syntax, mangled pronouns, and other bizarre constructions may be attributed to that software system. While efforts were made to correct any mistakes made by this voice recognition program, some errors and/or omissions may remain in the note that were missed when the note was originally created.

## 2022-10-03 NOTE — PRE ADMISSION SCREENING
Pre op instructions reviewed with patient per phone on 10/03/22: Spoke about pre op process and surgery instructions, verbalized understanding.    Surgery is scheduled on 10/07/22.  We will call you the business day prior to surgery to confirm arrival time (after 2:30 pm), as it is subject to change due to cancellations & emergencies.    Please report to the [facility] (1st Floor) at [facility] located off of [facility] (2nd building on the left, in front of the [facility]),address: [address] [place] [address] [place]. [id number]      INSTRUCTIONS IMPORTANT!!!  Do Not Eat, Drink, or Smoke after 12 midnight! NO WATER after midnight! OK to brush teeth, no gum, candy or mints!    Take only these medicines with a small swallow of water-morning of surgery:  Patient does not wish to take any medications prior to surgery    ____  NO Acrylic/fake nails or nail polish worn day of surgery (specifically hand/arm & foot surgeries).  ____  NO powder, lotions, deodorants, oils or creams on body.  ____  Please Remove All jewelry & piercings prior to surgery.  ____  Please Remove Dentures, Hearing Aids & Contact Lens prior to the start of surgery.  ____  Please bring photo ID and insurance information to hospital (Leave Valuables at Home).  ____  If going home the same day, arrange for a ride home. You will not be able to drive 24 hrs if Anesthesia was used.   ____  Wear clean, loose fitting clothing. Allow for dressings, bandages.  ____  Stop all Aspirin products, Ibuprofen, Advil, Motrin & Aleve at least 5-7 days before surgery, unless otherwise instructed by your doctor, or the nurse.   ____  Blood Thinners are stopped based on your Provider's recommendation; Call Surgeon's Office to inquire when to stop/hold.  ____  Stop taking any Fish Oil supplements or Vitamins at least 5 days prior to surgery, unless instructed otherwise by your Doctor.            Diabetic Patients: If you take diabetic medication, do NOT take morning of  surgery unless instructed by             Doctor. Metformin to be stopped 24 hrs prior to surgery time. DO NOT take long-acting insulin the evening before surgery. Blood sugars will be checked in pre-op morning of.    Bathing Instructions:    -Do not shave your face or body the day before or the day of surgery.              -Do not shave abdominal area prior to surgery   -Shower & Rinse your body as usual with anti-bacterial Soap (Dial)              -Rinse your body thoroughly.     [facility] Visitor/Ride Policy:  Only 2 adults allowed (over the age of 18) to accompany you to the Hospital. You Must have a ride home from a responsible adult that you know and trust. Medical Transport, Uber or Lyft can only be used if patient has a responsible adult to accompany them during ride home.    Post-Op Instructions: You will receive Post-op/Discharge instructions by your Discharge Nurse prior to going home. Please call your Surgeon's office with any post-surgery questions/concerns.    *Call [facility] Pre-Admissions Department with surgery instruction questions @ [phone]-[place], [phone] or [phone] (Mon-Fri 8 am to 4 pm)    *If you are running late or have questions the morning of surgery, please call the Surgery Dept @ [phone]  *Insurance/ Financial Questions, please call [phone].

## 2022-10-06 ENCOUNTER — OFFICE VISIT (OUTPATIENT)
Dept: INTERNAL MEDICINE | Facility: CLINIC | Age: 67
End: 2022-10-06
Payer: MEDICARE

## 2022-10-06 ENCOUNTER — TELEPHONE (OUTPATIENT)
Dept: PREADMISSION TESTING | Facility: HOSPITAL | Age: 67
End: 2022-10-06
Payer: MEDICARE

## 2022-10-06 VITALS
HEIGHT: 78 IN | BODY MASS INDEX: 36.45 KG/M2 | HEART RATE: 53 BPM | OXYGEN SATURATION: 96 % | TEMPERATURE: 98 F | SYSTOLIC BLOOD PRESSURE: 130 MMHG | DIASTOLIC BLOOD PRESSURE: 70 MMHG | WEIGHT: 315 LBS

## 2022-10-06 DIAGNOSIS — E11.22 TYPE 2 DIABETES MELLITUS WITH STAGE 3B CHRONIC KIDNEY DISEASE, WITH LONG-TERM CURRENT USE OF INSULIN: Chronic | ICD-10-CM

## 2022-10-06 DIAGNOSIS — I25.10 CORONARY ARTERY DISEASE INVOLVING NATIVE CORONARY ARTERY OF NATIVE HEART WITHOUT ANGINA PECTORIS: Chronic | ICD-10-CM

## 2022-10-06 DIAGNOSIS — E11.59 HYPERTENSION ASSOCIATED WITH DIABETES: Primary | Chronic | ICD-10-CM

## 2022-10-06 DIAGNOSIS — E78.5 HYPERLIPIDEMIA ASSOCIATED WITH TYPE 2 DIABETES MELLITUS: ICD-10-CM

## 2022-10-06 DIAGNOSIS — E11.69 HYPERLIPIDEMIA ASSOCIATED WITH TYPE 2 DIABETES MELLITUS: ICD-10-CM

## 2022-10-06 DIAGNOSIS — Z79.4 TYPE 2 DIABETES MELLITUS WITH STAGE 3B CHRONIC KIDNEY DISEASE, WITH LONG-TERM CURRENT USE OF INSULIN: Chronic | ICD-10-CM

## 2022-10-06 DIAGNOSIS — I10 ESSENTIAL HYPERTENSION: ICD-10-CM

## 2022-10-06 DIAGNOSIS — I15.2 HYPERTENSION ASSOCIATED WITH DIABETES: Primary | Chronic | ICD-10-CM

## 2022-10-06 DIAGNOSIS — E66.01 CLASS 3 SEVERE OBESITY DUE TO EXCESS CALORIES WITH SERIOUS COMORBIDITY AND BODY MASS INDEX (BMI) OF 45.0 TO 49.9 IN ADULT: Chronic | ICD-10-CM

## 2022-10-06 DIAGNOSIS — N18.32 STAGE 3B CHRONIC KIDNEY DISEASE: Chronic | ICD-10-CM

## 2022-10-06 DIAGNOSIS — Z23 NEED FOR COVID-19 VACCINE: ICD-10-CM

## 2022-10-06 DIAGNOSIS — Z23 NEED FOR PNEUMOCOCCAL VACCINE: ICD-10-CM

## 2022-10-06 DIAGNOSIS — N18.32 TYPE 2 DIABETES MELLITUS WITH STAGE 3B CHRONIC KIDNEY DISEASE, WITH LONG-TERM CURRENT USE OF INSULIN: Chronic | ICD-10-CM

## 2022-10-06 DIAGNOSIS — Z23 NEED FOR INFLUENZA VACCINATION: ICD-10-CM

## 2022-10-06 DIAGNOSIS — G47.33 OBSTRUCTIVE SLEEP APNEA: Chronic | ICD-10-CM

## 2022-10-06 PROBLEM — E66.813 CLASS 3 SEVERE OBESITY DUE TO EXCESS CALORIES WITH SERIOUS COMORBIDITY AND BODY MASS INDEX (BMI) OF 45.0 TO 49.9 IN ADULT: Chronic | Status: ACTIVE | Noted: 2019-02-12

## 2022-10-06 PROBLEM — N18.31 CHRONIC KIDNEY DISEASE, STAGE 3A: Chronic | Status: RESOLVED | Noted: 2021-11-02 | Resolved: 2022-10-06

## 2022-10-06 PROCEDURE — 99214 OFFICE O/P EST MOD 30 MIN: CPT | Mod: S$PBB,,, | Performed by: FAMILY MEDICINE

## 2022-10-06 PROCEDURE — 99999 PR PBB SHADOW E&M-EST. PATIENT-LVL V: ICD-10-PCS | Mod: PBBFAC,,, | Performed by: FAMILY MEDICINE

## 2022-10-06 PROCEDURE — 90677 PCV20 VACCINE IM: CPT | Mod: PBBFAC

## 2022-10-06 PROCEDURE — 99214 PR OFFICE/OUTPT VISIT, EST, LEVL IV, 30-39 MIN: ICD-10-PCS | Mod: S$PBB,,, | Performed by: FAMILY MEDICINE

## 2022-10-06 PROCEDURE — 99999 PR PBB SHADOW E&M-EST. PATIENT-LVL V: CPT | Mod: PBBFAC,,, | Performed by: FAMILY MEDICINE

## 2022-10-06 PROCEDURE — 99215 OFFICE O/P EST HI 40 MIN: CPT | Mod: PBBFAC,25 | Performed by: FAMILY MEDICINE

## 2022-10-06 RX ORDER — AMLODIPINE BESYLATE 5 MG/1
5 TABLET ORAL NIGHTLY
Qty: 90 TABLET | Refills: 1 | Status: SHIPPED | OUTPATIENT
Start: 2022-10-06 | End: 2023-12-04

## 2022-10-06 RX ORDER — ATORVASTATIN CALCIUM 20 MG/1
20 TABLET, FILM COATED ORAL NIGHTLY
Qty: 90 TABLET | Refills: 1 | Status: SHIPPED | OUTPATIENT
Start: 2022-10-06 | End: 2025-07-01

## 2022-10-06 RX ORDER — CLONIDINE HYDROCHLORIDE 0.1 MG/1
0.1 TABLET ORAL 3 TIMES DAILY
Qty: 270 TABLET | Refills: 1 | Status: SHIPPED | OUTPATIENT
Start: 2022-10-06 | End: 2024-01-23 | Stop reason: SDUPTHER

## 2022-10-06 NOTE — TELEPHONE ENCOUNTER
----- Message from Ramiro Summers MD sent at 10/6/2022  1:19 PM CDT -----  Regarding: FW: surgery clearance  OK with me.  H can stop Plavix and ASA.  ----- Message -----  From: Shreya Garcia RN  Sent: 10/6/2022  10:24 AM CDT  To: Ramiro Summers MD  Subject: FW: surgery clearance                            Please advise  ----- Message -----  From: [place] Ontiveros RN  Sent: 10/6/2022   8:30 AM CDT  To: [facility] Staff  Subject: surgery clearance                                Good morning, this pt is scheduled for surgery tomorrow with Dr. Ceballos. Dr. Summers saw this pt 9/30/22. Is he cleared for surgery or would he need to be seen again? I apologize if I just missed the note. Thank you

## 2022-10-06 NOTE — ASSESSMENT & PLAN NOTE
Lab Results   Component Value Date    CHOL 100 (L) 09/04/2022    CHOL 177 07/07/2022    TRIG 118 09/04/2022    TRIG 181 (H) 07/07/2022    HDL 32 (L) 09/04/2022    HDL 40 07/07/2022    LDLCALC 44.4 (L) 09/04/2022    LDLCALC 100.8 07/07/2022    NONHDLCHOL 68 09/04/2022    NONHDLCHOL 137 07/07/2022    AST 11 09/05/2022    ALT 15 09/05/2022

## 2022-10-06 NOTE — TELEPHONE ENCOUNTER
Called and LVM for pt about the following:     Please arrive to [facility] main lobby at 0815 am on 10/7/22 for your scheduled procedure. NOTHING to eat or drink after midnight unless instructed otherwise by your Surgeon. Take only the medications instructed by your Pre Admit Nurse with small sip of water.    Thank you,  -[facility] Pre Admit Testing Dept.  Mon-Fri 8 am - 4 pm [phone]

## 2022-10-06 NOTE — ASSESSMENT & PLAN NOTE
Lab Results   Component Value Date    EGFRNORACEVR 41 (A) 09/07/2022    EGFRNORACEVR 31 (A) 09/06/2022    EGFRNORACEVR 24 (A) 09/05/2022    CREATININE 1.8 (H) 09/07/2022    CREATININE 2.3 (H) 09/06/2022    CREATININE 2.8 (H) 09/05/2022    BUN 25 (H) 09/07/2022    BUN 32 (H) 09/06/2022    BUN 52 (H) 09/05/2022

## 2022-10-06 NOTE — ASSESSMENT & PLAN NOTE
Diabetes Management Status    Statin: Taking  ACE/ARB: Taking    Screening or Prevention Patient's value Goal Complete/Controlled?   HgA1C Testing and Control   Lab Results   Component Value Date    HGBA1C 6.5 (H) 08/15/2022      Annually/Less than 8% Yes   Lipid profile : 09/04/2022 Annually Yes   LDL control Lab Results   Component Value Date    LDLCALC 44.4 (L) 09/04/2022    Annually/Less than 100 mg/dl  Yes   Nephropathy screening Lab Results   Component Value Date    LABMICR 21.0 10/19/2021     Lab Results   Component Value Date    PROTEINUA 1+ (A) 08/15/2022    Annually Yes   Blood pressure BP Readings from Last 1 Encounters:   10/06/22 130/70    Less than 140/90 Yes   Dilated retinal exam : 12/07/2021 Annually Yes   Foot exam   : 08/15/2022 Annually Yes     Lab Results   Component Value Date    HGBA1C 6.5 (H) 08/15/2022    HGBA1C 7.0 (H) 07/07/2022    HGBA1C 6.9 (H) 02/08/2022    EGFRNORACEVR 41 (A) 09/07/2022    MICALBCREAT 35.0 (H) 10/19/2021    LDLCALC 44.4 (L) 09/04/2022     Lab Results   Component Value Date    GLUTAMICACID 0.00 12/13/2018    CPEPTIDE 2.70 12/13/2018      Last 6 Patient Entered Readings                                          Most Recent A1c:     There is no flowsheet data to display.        HEALTH MAINTENANCE: Diabetic health maintenance interventions reviewed and are up to date except for:  There are no preventive care reminders to display for this patient.

## 2022-10-06 NOTE — ASSESSMENT & PLAN NOTE
"Wt Readings from Last 6 Encounters:   10/06/22 (!) 185 kg (407 lb 13.6 oz)   09/30/22 (!) 183.9 kg (405 lb 6.8 oz)   09/22/22 (!) 181 kg (399 lb 0.5 oz)   09/04/22 (!) 173.7 kg (383 lb)   08/30/22 (!) 174.6 kg (384 lb 14.8 oz)   08/16/22 (!) 174.6 kg (385 lb)     Estimated body mass index is 45.95 kg/m² as calculated from the following:    Height as of this encounter: 6' 7" (2.007 m).    Weight as of this encounter: 185 kg (407 lb 13.6 oz).    Therapeutic lifestyle changes encouraged. Offered Lifestyle Clinic Referral.  "

## 2022-10-06 NOTE — ASSESSMENT & PLAN NOTE
BP Readings from Last 6 Encounters:   10/06/22 130/70   09/22/22 110/70   09/07/22 (!) 177/89   08/30/22 (!) 133/90   08/16/22 (!) 133/90   08/15/22 (!) 208/118     Lab Results   Component Value Date    EGFRNORACEVR 41 (A) 09/07/2022    CREATININE 1.8 (H) 09/07/2022    BUN 25 (H) 09/07/2022    K 3.9 09/07/2022     09/07/2022     (H) 09/07/2022     Results for orders placed or performed during the hospital encounter of 09/03/22   EKG 12-lead    Collection Time: 09/03/22  7:19 PM    Narrative    Test Reason : R07.9,    Vent. Rate : 071 BPM     Atrial Rate : 071 BPM     P-R Int : 274 ms          QRS Dur : 200 ms      QT Int : 496 ms       P-R-T Axes : 027 -18 108 degrees     QTc Int : 538 ms    Sinus rhythm with 1st degree A-V block  Left bundle branch block  Abnormal ECG  When compared with ECG of 15-AUG-2022 10:50,  WY interval has increased  Questionable change in The axis  Nonspecific T wave abnormality no longer evident in Inferior leads  T wave inversion less evident in Lateral leads  Confirmed by HUMBLE KATZ MD ([id number]) on 9/5/2022 5:56:10 PM    Referred By: [email or web address]   SELF           Confirmed By:HUMBLE KATZ MD

## 2022-10-06 NOTE — PATIENT INSTRUCTIONS
I recommend that you get the new bi-valent COVID-19 vaccine booster that protects against the Omicron sub-variants of COVID-19.    People who are fully vaccinated are much better protected from severe illness (hospitalization and death) from COVID-19 than people who are unvaccinated or not fully vaccinated.    You can learn more about the COVID-19 vaccine and booster shot options at [email or web address].    The quickest and easiest way to schedule an appointment for your COVID-19 booster vaccination is by using [facility] or by calling [phone].    Please take care of yourself. You are important to me.

## 2022-10-06 NOTE — PROGRESS NOTES
OFFICE VISIT 10/6/22 10:30 AM CDT    CHIEF COMPLAINT: Follow-up    Interval history reviewed. Stepan reports that his chronic conditions are stable. He says he is doing well on his current medicines, he reports preceiving no adverse side-effects and wants to continue current treatment. He refused flu shot.    DIAGNOSES SPECIFICALLY EVALUATED AND TREATED THIS ENCOUNTER  1. Hypertension associated with diabetes  - Hypertension Digital Medicine (HDMP) Enrollment Order  - Hypertension Digital Medicine (HDMP): Assign Onboarding Questionnaires  - cloNIDine (CATAPRES) 0.1 MG tablet; Take 1 tablet (0.1 mg total) by mouth 3 (three) times daily.  Dispense: 270 tablet; Refill: 1  - amLODIPine (NORVASC) 5 MG tablet; Take 1 tablet (5 mg total) by mouth every evening.  Dispense: 90 tablet; Refill: 1    2. Stage 3b chronic kidney disease    3. Essential hypertension  - cloNIDine (CATAPRES) 0.1 MG tablet; Take 1 tablet (0.1 mg total) by mouth 3 (three) times daily.  Dispense: 270 tablet; Refill: 1  - amLODIPine (NORVASC) 5 MG tablet; Take 1 tablet (5 mg total) by mouth every evening.  Dispense: 90 tablet; Refill: 1    4. Hyperlipidemia associated with type 2 diabetes mellitus  - atorvastatin (LIPITOR) 20 MG tablet; Take 1 tablet (20 mg total) by mouth every evening.  Dispense: 90 tablet; Refill: 1    5. Type 2 diabetes mellitus with stage 3b chronic kidney disease, with long-term current use of insulin  - Microalbumin/Creatinine Ratio, Urine; Future  - Diabetes Digital Medicine (DDMP) Enrollment Order  - Diabetes Digital Medicine (DDMP): Assign Onboarding Questionnaires    6. Class 3 severe obesity due to excess calories with serious comorbidity and body mass index (BMI) of 45.0 to 49.9 in adult    7. Need for COVID-19 vaccine    8. Need for influenza vaccination    9. Need for pneumococcal vaccine  - Pneumococcal Conjugate Vaccine (20 Valent) (IM)    10. Obstructive sleep apnea (untreated, refuses treatment)    11. Coronary  "artery disease involving native coronary artery of native heart without angina pectoris  - atorvastatin (LIPITOR) 20 MG tablet; Take 1 tablet (20 mg total) by mouth every evening.  Dispense: 90 tablet; Refill: 1     --------------------------------------------------------------------------------   ARIANNA FLANAGAN (MRN [id number], APPT: 10/6/22 10:30 AM CDT)  --------------------------------  Ready to check out. See orders.  Give PCV-20 immunization, then send to check-out and digital medicine for the following:  Send to Digital Medicine Enrollment.  Send to lab for urine microalbumin.  F/U with me in early-mid-March.  Thanks.  --------------------------------------------------------------------------------      Follow up in about 5 months (around 3/6/2023) for re-evaluate problem(s) discussed today.   Future Appointments   Date Time Provider Department Center   10/11/2022  9:00 AM BRIDGETTE [facility] Yasmin Mane   11/17/2022  8:15 AM Vangie Cuevas DPM [facility] POD [place]   12/20/2022  9:15 AM Alvin Hale MD [facility] OPHTHAL [place]   1/12/2023  9:00 AM Ramiro Summers MD [facility] CARDIO [place]     Problem List Items Addressed This Visit       CAD with remote PCI (BMS) of RCA in 9/2016 (Chronic)    Relevant Medications    atorvastatin (LIPITOR) 20 MG tablet    Class 3 severe obesity due to excess calories with serious comorbidity and body mass index (BMI) of 45.0 to 49.9 in adult (Chronic)    Current Assessment & Plan     Wt Readings from Last 6 Encounters:   10/06/22 (!) 185 kg (407 lb 13.6 oz)   09/30/22 (!) 183.9 kg (405 lb 6.8 oz)   09/22/22 (!) 181 kg (399 lb 0.5 oz)   09/04/22 (!) 173.7 kg (383 lb)   08/30/22 (!) 174.6 kg (384 lb 14.8 oz)   08/16/22 (!) 174.6 kg (385 lb)   Estimated body mass index is 45.95 kg/m² as calculated from the following:    Height as of this encounter: 6' 7" (2.007 m).    Weight as of this encounter: 185 kg (407 lb 13.6 oz).    Therapeutic lifestyle changes encouraged. " Offered Lifestyle Clinic Referral.         Hypertension associated with diabetes - Primary (Chronic)    Current Assessment & Plan     BP Readings from Last 6 Encounters:   10/06/22 130/70   09/22/22 110/70   09/07/22 (!) 177/89   08/30/22 (!) 133/90   08/16/22 (!) 133/90   08/15/22 (!) 208/118     Lab Results   Component Value Date    EGFRNORACEVR 41 (A) 09/07/2022    CREATININE 1.8 (H) 09/07/2022    BUN 25 (H) 09/07/2022    K 3.9 09/07/2022     09/07/2022     (H) 09/07/2022     Results for orders placed or performed during the hospital encounter of 09/03/22   EKG 12-lead    Collection Time: 09/03/22  7:19 PM    Narrative    Test Reason : R07.9,    Vent. Rate : 071 BPM     Atrial Rate : 071 BPM     P-R Int : 274 ms          QRS Dur : 200 ms      QT Int : 496 ms       P-R-T Axes : 027 -18 108 degrees     QTc Int : 538 ms    Sinus rhythm with 1st degree A-V block  Left bundle branch block  Abnormal ECG  When compared with ECG of 15-AUG-2022 10:50,  MI interval has increased  Questionable change in The axis  Nonspecific T wave abnormality no longer evident in Inferior leads  T wave inversion less evident in Lateral leads  Confirmed by HUMBLE KATZ MD ([id number]) on 9/5/2022 5:56:10 PM    Referred By: [email or web address]   SELF           Confirmed By:HUMBLE KATZ MD             Relevant Medications    cloNIDine (CATAPRES) 0.1 MG tablet    amLODIPine (NORVASC) 5 MG tablet    Other Relevant Orders    Hypertension [employer] Medicine (HDMP) Enrollment Order (Completed)    Hypertension Digital Medicine ([facility]): Assign Onboarding Questionnaires (Completed)    Obstructive sleep apnea (untreated, refuses treatment) (Chronic)    Overview     Untreated. He tried CPAP 7 years ago and did not tolerate it. He sleeps in a recliner. He declined any further evaluation and management of this condition. I instructed him to let me know if he changes his mind.          Stage 3b chronic kidney disease (Chronic)    Current Assessment & Plan     Lab  Results   Component Value Date    EGFRNORACEVR 41 (A) 09/07/2022    EGFRNORACEVR 31 (A) 09/06/2022    EGFRNORACEVR 24 (A) 09/05/2022    CREATININE 1.8 (H) 09/07/2022    CREATININE 2.3 (H) 09/06/2022    CREATININE 2.8 (H) 09/05/2022    BUN 25 (H) 09/07/2022    BUN 32 (H) 09/06/2022    BUN 52 (H) 09/05/2022             Type 2 diabetes mellitus with stage 3b chronic kidney disease, with long-term current use of insulin (Chronic)    Current Assessment & Plan     Diabetes Management Status    Statin: Taking  ACE/ARB: Taking    Screening or Prevention Patient's value Goal Complete/Controlled?   HgA1C Testing and Control   Lab Results   Component Value Date    HGBA1C 6.5 (H) 08/15/2022      Annually/Less than 8% Yes   Lipid profile : 09/04/2022 Annually Yes   LDL control Lab Results   Component Value Date    LDLCALC 44.4 (L) 09/04/2022    Annually/Less than 100 mg/dl  Yes   Nephropathy screening Lab Results   Component Value Date    LABMICR 21.0 10/19/2021     Lab Results   Component Value Date    PROTEINUA 1+ (A) 08/15/2022    Annually Yes   Blood pressure BP Readings from Last 1 Encounters:   10/06/22 130/70    Less than 140/90 Yes   Dilated retinal exam : 12/07/2021 Annually Yes   Foot exam   : 08/15/2022 Annually Yes     Lab Results   Component Value Date    HGBA1C 6.5 (H) 08/15/2022    HGBA1C 7.0 (H) 07/07/2022    HGBA1C 6.9 (H) 02/08/2022    EGFRNORACEVR 41 (A) 09/07/2022    MICALBCREAT 35.0 (H) 10/19/2021    LDLCALC 44.4 (L) 09/04/2022     Lab Results   Component Value Date    GLUTAMICACID 0.00 12/13/2018    CPEPTIDE 2.70 12/13/2018      Last 6 Patient Entered Readings                                          Most Recent A1c:     There is no flowsheet data to display.        HEALTH MAINTENANCE: Diabetic health maintenance interventions reviewed and are up to date except for:  There are no preventive care reminders to display for this patient.          Relevant Orders    Microalbumin/Creatinine Ratio, Urine     Diabetes Digital Medicine (DD) Enrollment Order (Completed)    Diabetes Digital Medicine (DD): Assign Onboarding Questionnaires (Completed)    Essential hypertension    Relevant Medications    cloNIDine (CATAPRES) 0.1 MG tablet    amLODIPine (NORVASC) 5 MG tablet    Hyperlipidemia associated with type 2 diabetes mellitus    Current Assessment & Plan     Lab Results   Component Value Date    CHOL 100 (L) 09/04/2022    CHOL 177 07/07/2022    TRIG 118 09/04/2022    TRIG 181 (H) 07/07/2022    HDL 32 (L) 09/04/2022    HDL 40 07/07/2022    LDLCALC 44.4 (L) 09/04/2022    LDLCALC 100.8 07/07/2022    NONHDLCHOL 68 09/04/2022    NONHDLCHOL 137 07/07/2022    AST 11 09/05/2022    ALT 15 09/05/2022          Relevant Medications    atorvastatin (LIPITOR) 20 MG tablet     Other Visit Diagnoses       Need for COVID-19 vaccine        Need for influenza vaccination        Need for pneumococcal vaccine        Relevant Orders    Pneumococcal Conjugate Vaccine (20 Valent) (IM)        Unless noted herein, any chronic conditions are represented as and appear compensated/controlled and stable, and no other significant complaints or concerns were reported.    PRESCRIPTION DRUG MANAGEMENT  Outpatient Medications Prior to Visit   Medication Sig Dispense Refill    aspirin 325 MG tablet Take 325 mg by mouth once daily.      brimonidine 0.1% (ALPHAGAN P) 0.1 % Drop Place 1 drop into both eyes 3 (three) times daily. Order 90 day supply 15 mL 4    brinzolamide (AZOPT) 1 % ophthalmic suspension Place 1 drop into both eyes 3 (three) times daily. Brand name only 15 mL 6    clopidogreL (PLAVIX) 75 mg tablet Take 1 tablet (75 mg total) by mouth once daily. 30 tablet 0    empagliflozin (JARDIANCE) 25 mg tablet Take 1 tablet (25 mg total) by mouth once daily. 90 tablet 3    flash glucose sensor (FREESTYLE CLEMENCIA 14 DAY SENSOR) Kit 1 each by Misc.(Non-Drug; Combo Route) route every 14 (fourteen) days. 6 kit 3    furosemide (LASIX) 40 MG tablet Take 1  "tablet (40 mg total) by mouth once daily. 270 tablet 1    hydrALAZINE (APRESOLINE) 100 MG tablet Take 1 tablet (100 mg total) by mouth every 8 (eight) hours. 270 tablet 1    insulin aspart U-100 (NOVOLOG) 100 unit/mL (3 mL) InPn pen Inject 25 Units into the skin 3 (three) times daily with meals. 69 mL 3    insulin glargine U-300 conc (TOUJEO MAX U-300 SOLOSTAR) 300 unit/mL (3 mL) insulin pen Inject 80 Units into the skin once daily. 8 pen 3    losartan (COZAAR) 25 MG tablet Take 1 tablet (25 mg total) by mouth once daily. 30 tablet 5    metoprolol succinate (TOPROL-XL) 25 MG 24 hr tablet Take 1 tablet (25 mg total) by mouth once daily. 30 tablet 5    mupirocin (BACTROBAN) 2 % ointment Apply topically once daily. 30 g 0    netarsudiL (RHOPRESSA) 0.02 % ophthalmic solution Place 1 drop into both eyes once daily. 2.5 mL 6    pen needle, diabetic (BD ULTRA-FINE AMANDA PEN NEEDLE) 32 gauge x 5/32" Ndle 1 each by Misc.(Non-Drug; Combo Route) route 4 (four) times daily with meals and nightly. 300 each 3    amLODIPine (NORVASC) 5 MG tablet Take 1 tablet (5 mg total) by mouth every evening. 90 tablet 1    atorvastatin (LIPITOR) 20 MG tablet Take 1 tablet (20 mg total) by mouth every evening. 90 tablet 3    cloNIDine (CATAPRES) 0.1 MG tablet Take 1 tablet (0.1 mg total) by mouth 3 (three) times daily. 270 tablet 1    nitroGLYCERIN (NITROSTAT) 0.4 MG SL tablet PLACE 1 TABLET (0.4 MG TOTAL) UNDER THE TONGUE EVERY 5 (FIVE) MINUTES AS NEEDED FOR CHEST PAIN. 20 tablet 0     No facility-administered medications prior to visit.     Medications Discontinued During This Encounter   Medication Reason    cloNIDine (CATAPRES) 0.1 MG tablet Reorder    amLODIPine (NORVASC) 5 MG tablet Reorder    atorvastatin (LIPITOR) 20 MG tablet Reorder     Medications Ordered This Encounter   Medications    amLODIPine (NORVASC) 5 MG tablet     Sig: Take 1 tablet (5 mg total) by mouth every evening.     Dispense:  90 tablet     Refill:  1     -    " "atorvastatin (LIPITOR) 20 MG tablet     Sig: Take 1 tablet (20 mg total) by mouth every evening.     Dispense:  90 tablet     Refill:  1     -    cloNIDine (CATAPRES) 0.1 MG tablet     Sig: Take 1 tablet (0.1 mg total) by mouth 3 (three) times daily.     Dispense:  270 tablet     Refill:  1     -     PHYSICAL EXAM  Vitals:    10/06/22 1015   BP: 130/70   BP Location: Right arm   Patient Position: Sitting   BP Method: Thigh Cuff (Manual)   Pulse: (!) 53   Temp: 97.7 °F (36.5 °C)   TempSrc: Tympanic   SpO2: 96%   Weight: (!) 185 kg (407 lb 13.6 oz)   Height: 6' 7" (2.007 m)   CONSTITUTIONAL: Vital signs noted. Appears well.  PSYCH: Alert and conversant. Mood is grossly neutral. Affect appropriate.  PULM: Breathing unlabored.  HEART: Regular.     Documentation entered by me for this encounter may have been done in part using speech-recognition technology. Although I have made an effort to ensure accuracy, "sound like" errors may exist and should be interpreted in context.  "

## 2022-10-07 ENCOUNTER — HOSPITAL ENCOUNTER (OUTPATIENT)
Facility: HOSPITAL | Age: 67
Discharge: HOME | End: 2022-10-07
Attending: OTOLARYNGOLOGY | Admitting: OTOLARYNGOLOGY
Payer: MEDICARE

## 2022-10-07 VITALS
WEIGHT: 315 LBS | BODY MASS INDEX: 36.45 KG/M2 | OXYGEN SATURATION: 99 % | RESPIRATION RATE: 16 BRPM | TEMPERATURE: 97 F | HEIGHT: 78 IN | SYSTOLIC BLOOD PRESSURE: 154 MMHG | HEART RATE: 67 BPM | DIASTOLIC BLOOD PRESSURE: 77 MMHG

## 2022-10-07 DIAGNOSIS — C43.39: Primary | ICD-10-CM

## 2022-10-07 PROCEDURE — 88305 TISSUE EXAM BY PATHOLOGIST: ICD-10-PCS | Mod: 26,,, | Performed by: PATHOLOGY

## 2022-10-07 PROCEDURE — 88305 TISSUE EXAM BY PATHOLOGIST: CPT | Mod: 26,,, | Performed by: PATHOLOGY

## 2022-10-07 PROCEDURE — 11644 PR EXC SKIN MALIG 3.1-4 CM FACE,FACIAL: ICD-10-PCS | Mod: ,,, | Performed by: OTOLARYNGOLOGY

## 2022-10-07 PROCEDURE — 36000706: Performed by: OTOLARYNGOLOGY

## 2022-10-07 PROCEDURE — 82962 GLUCOSE BLOOD TEST: CPT | Performed by: OTOLARYNGOLOGY

## 2022-10-07 PROCEDURE — 88305 TISSUE EXAM BY PATHOLOGIST: CPT | Performed by: PATHOLOGY

## 2022-10-07 PROCEDURE — 36000707: Performed by: OTOLARYNGOLOGY

## 2022-10-07 PROCEDURE — 71000015 HC POSTOP RECOV 1ST HR: Performed by: OTOLARYNGOLOGY

## 2022-10-07 PROCEDURE — 25000003 PHARM REV CODE 250: Performed by: OTOLARYNGOLOGY

## 2022-10-07 PROCEDURE — 11644 EXC F/E/E/N/L MAL+MRG 3.1-4: CPT | Mod: ,,, | Performed by: OTOLARYNGOLOGY

## 2022-10-07 RX ORDER — LIDOCAINE HYDROCHLORIDE 20 MG/ML
INJECTION, SOLUTION EPIDURAL; INFILTRATION; INTRACAUDAL; PERINEURAL
Status: DISCONTINUED | OUTPATIENT
Start: 2022-10-07 | End: 2022-10-07 | Stop reason: HOSPADM

## 2022-10-07 RX ORDER — CARVEDILOL 6.25 MG/1
6.25 TABLET ORAL 2 TIMES DAILY
COMMUNITY
End: 2023-01-12 | Stop reason: SDUPTHER

## 2022-10-07 RX ORDER — BUPIVACAINE HYDROCHLORIDE AND EPINEPHRINE 2.5; 5 MG/ML; UG/ML
INJECTION, SOLUTION EPIDURAL; INFILTRATION; INTRACAUDAL; PERINEURAL
Status: DISCONTINUED | OUTPATIENT
Start: 2022-10-07 | End: 2022-10-07 | Stop reason: HOSPADM

## 2022-10-07 RX ORDER — CLINDAMYCIN PHOSPHATE 900 MG/50ML
900 INJECTION, SOLUTION INTRAVENOUS ONCE
Status: COMPLETED | OUTPATIENT
Start: 2022-10-07 | End: 2022-10-07

## 2022-10-07 RX ADMIN — CLINDAMYCIN PHOSPHATE 900 MG: 900 INJECTION, SOLUTION INTRAVENOUS at 09:10

## 2022-10-11 LAB
FINAL PATHOLOGIC DIAGNOSIS: NORMAL
Lab: NORMAL
MICROSCOPIC EXAM: NORMAL

## 2022-10-13 ENCOUNTER — TELEPHONE (OUTPATIENT)
Dept: PREADMISSION TESTING | Facility: HOSPITAL | Age: 67
End: 2022-10-13
Payer: MEDICARE

## 2022-10-13 ENCOUNTER — ANESTHESIA EVENT (OUTPATIENT)
Dept: SURGERY | Facility: HOSPITAL | Age: 67
End: 2022-10-13
Payer: MEDICARE

## 2022-10-13 PROBLEM — C43.39: Status: ACTIVE | Noted: 2022-10-13

## 2022-10-13 NOTE — TELEPHONE ENCOUNTER
Called and LVM for pt about the following:     Please arrive to [facility] main lobby at 0730 am on 10/14/22 for your scheduled procedure. NOTHING to eat or drink after midnight unless instructed otherwise by your Surgeon. Take only the medications instructed by your Pre Admit Nurse with small sip of water.    Thank you,  -[facility] Pre [facility].  Mon-Fri 8 am - 4 pm [phone]

## 2022-10-13 NOTE — OP NOTE
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  Xeroform placed and secured to the skin with 2-0 silk.    Indications for Procedure:  Mr. Workman is a 67-year-old gentleman seen in my clinic with melanoma in-situ diagnosed on shave biopsy and he was scheduled for excision.    Procedure in Detail:  After informed consent was obtained in holding area the risks and benefits reviewed patient was taken back to or 5.  Patient was placed on monitoring.  I marked out an area 1.2-1.3 cm around the melanotic lesion and prior shave biopsy.  This was then infiltrated with several cc of mixture of 0.25% Marcaine plane as well as lidocaine with epinephrine.  Skin was prepped and draped in usual sterile fashion.  Incision was made with 15 blade through the skin circumferentially around the lesion with marking stitches placed.  This was then continued with scissors in a supragaleal plane with care to leave galea and periosteum down.  Hemostasis was obtained with bipolar and Xeroform was placed in the wound and secured to the skin with 2-0 silk suture.      Complications:  None    Attestation:  I was present for the entire procedure    DISCLAIMER: This note was prepared with [employer] voice recognition transcription software. Garbled syntax, mangled pronouns, and other bizarre constructions may be attributed to that software system. While efforts were made to correct any mistakes made by this voice recognition program, some errors and/or omissions may remain in the note that were missed when  the note was originally created.

## 2022-10-13 NOTE — DISCHARGE SUMMARY
[facility] - Surgery (Hospital)  Discharge Note  Short Stay    Procedure(s) (LRB):  EXCISION, MELANOMA (Left)      OUTCOME: Patient tolerated treatment/procedure well without complication and is now ready for discharge.    DISPOSITION: Home or Self Care    FINAL DIAGNOSIS:  <principal problem not specified>    FOLLOWUP: In clinic    DISCHARGE INSTRUCTIONS:  No discharge procedures on file.      Clinical Reference Documents Added to Patient Instructions         Document    SURGICAL WOUND DISCHARGE INSTRUCTIONS (ENGLISH)            TIME SPENT ON DISCHARGE: 10 minutes

## 2022-10-14 ENCOUNTER — PATIENT OUTREACH (OUTPATIENT)
Dept: ADMINISTRATIVE | Facility: HOSPITAL | Age: 67
End: 2022-10-14
Payer: MEDICARE

## 2022-10-14 ENCOUNTER — ANESTHESIA (OUTPATIENT)
Dept: SURGERY | Facility: HOSPITAL | Age: 67
End: 2022-10-14
Payer: MEDICARE

## 2022-10-14 ENCOUNTER — HOSPITAL ENCOUNTER (OUTPATIENT)
Facility: HOSPITAL | Age: 67
Discharge: HOME | End: 2022-10-14
Attending: OTOLARYNGOLOGY | Admitting: OTOLARYNGOLOGY
Payer: MEDICARE

## 2022-10-14 VITALS
HEART RATE: 60 BPM | OXYGEN SATURATION: 97 % | WEIGHT: 315 LBS | BODY MASS INDEX: 36.45 KG/M2 | RESPIRATION RATE: 12 BRPM | DIASTOLIC BLOOD PRESSURE: 82 MMHG | SYSTOLIC BLOOD PRESSURE: 166 MMHG | TEMPERATURE: 98 F | HEIGHT: 78 IN

## 2022-10-14 DIAGNOSIS — C43.39: Primary | ICD-10-CM

## 2022-10-14 DIAGNOSIS — S01.80XA OPEN WOUND OF FOREHEAD, INITIAL ENCOUNTER: ICD-10-CM

## 2022-10-14 LAB
OHS CV HOLTER SINUS AVERAGE HR: 60
OHS CV HOLTER SINUS MAX HR: 103
OHS CV HOLTER SINUS MIN HR: 43
POCT GLUCOSE: 213 MG/DL (ref 70–110)

## 2022-10-14 PROCEDURE — 14301 PR ADJ TISS XFER ANY AREA,30.1-60 SQCM: ICD-10-PCS | Mod: 58,,, | Performed by: OTOLARYNGOLOGY

## 2022-10-14 PROCEDURE — 14302 TIS TRNFR ADDL 30 SQ CM: CPT | Mod: ,,, | Performed by: OTOLARYNGOLOGY

## 2022-10-14 PROCEDURE — 14301 TIS TRNFR ANY 30.1-60 SQ CM: CPT | Mod: 58,,, | Performed by: OTOLARYNGOLOGY

## 2022-10-14 PROCEDURE — 25000003 PHARM REV CODE 250: Performed by: NURSE ANESTHETIST, CERTIFIED REGISTERED

## 2022-10-14 PROCEDURE — 71000015 HC POSTOP RECOV 1ST HR: Performed by: OTOLARYNGOLOGY

## 2022-10-14 PROCEDURE — 37000008 HC ANESTHESIA 1ST 15 MINUTES: Performed by: OTOLARYNGOLOGY

## 2022-10-14 PROCEDURE — 37000009 HC ANESTHESIA EA ADD 15 MINS: Performed by: OTOLARYNGOLOGY

## 2022-10-14 PROCEDURE — 25000003 PHARM REV CODE 250: Performed by: OTOLARYNGOLOGY

## 2022-10-14 PROCEDURE — 63600175 PHARM REV CODE 636 W HCPCS: Performed by: ANESTHESIOLOGY

## 2022-10-14 PROCEDURE — 71000033 HC RECOVERY, INTIAL HOUR: Performed by: OTOLARYNGOLOGY

## 2022-10-14 PROCEDURE — 14302 PR ADJ TISS XFER ANY AREA,EA ADD 30.0 SQCM: ICD-10-PCS | Mod: ,,, | Performed by: OTOLARYNGOLOGY

## 2022-10-14 PROCEDURE — 63600175 PHARM REV CODE 636 W HCPCS: Performed by: STUDENT IN AN ORGANIZED HEALTH CARE EDUCATION/TRAINING PROGRAM

## 2022-10-14 PROCEDURE — 25000003 PHARM REV CODE 250: Performed by: ANESTHESIOLOGY

## 2022-10-14 PROCEDURE — 36000707: Performed by: OTOLARYNGOLOGY

## 2022-10-14 PROCEDURE — 63600175 PHARM REV CODE 636 W HCPCS: Performed by: NURSE ANESTHETIST, CERTIFIED REGISTERED

## 2022-10-14 PROCEDURE — 36000706: Performed by: OTOLARYNGOLOGY

## 2022-10-14 RX ORDER — PROPOFOL 10 MG/ML
INJECTION, EMULSION INTRAVENOUS
Status: DISCONTINUED | OUTPATIENT
Start: 2022-10-14 | End: 2022-10-14

## 2022-10-14 RX ORDER — SODIUM CHLORIDE, SODIUM LACTATE, POTASSIUM CHLORIDE, CALCIUM CHLORIDE 600; 310; 30; 20 MG/100ML; MG/100ML; MG/100ML; MG/100ML
INJECTION, SOLUTION INTRAVENOUS CONTINUOUS PRN
Status: DISCONTINUED | OUTPATIENT
Start: 2022-10-14 | End: 2022-10-14

## 2022-10-14 RX ORDER — OXYCODONE HYDROCHLORIDE 5 MG/1
5 TABLET ORAL
Status: DISCONTINUED | OUTPATIENT
Start: 2022-10-14 | End: 2022-10-14 | Stop reason: HOSPADM

## 2022-10-14 RX ORDER — FENTANYL CITRATE 50 UG/ML
INJECTION, SOLUTION INTRAMUSCULAR; INTRAVENOUS
Status: DISCONTINUED | OUTPATIENT
Start: 2022-10-14 | End: 2022-10-14

## 2022-10-14 RX ORDER — VALACYCLOVIR HYDROCHLORIDE 1 G/1
1000 TABLET, FILM COATED ORAL 2 TIMES DAILY
Qty: 20 TABLET | Refills: 0 | Status: ON HOLD | OUTPATIENT
Start: 2022-10-14 | End: 2023-06-03 | Stop reason: HOSPADM

## 2022-10-14 RX ORDER — ONDANSETRON 2 MG/ML
4 INJECTION INTRAMUSCULAR; INTRAVENOUS DAILY PRN
Status: DISCONTINUED | OUTPATIENT
Start: 2022-10-14 | End: 2022-10-14 | Stop reason: HOSPADM

## 2022-10-14 RX ORDER — MIDAZOLAM HYDROCHLORIDE 1 MG/ML
INJECTION INTRAMUSCULAR; INTRAVENOUS
Status: DISCONTINUED | OUTPATIENT
Start: 2022-10-14 | End: 2022-10-14

## 2022-10-14 RX ORDER — HYDRALAZINE HYDROCHLORIDE 20 MG/ML
10 INJECTION INTRAMUSCULAR; INTRAVENOUS ONCE
Status: COMPLETED | OUTPATIENT
Start: 2022-10-14 | End: 2022-10-14

## 2022-10-14 RX ORDER — ALBUTEROL SULFATE 0.83 MG/ML
2.5 SOLUTION RESPIRATORY (INHALATION) EVERY 4 HOURS PRN
Status: DISCONTINUED | OUTPATIENT
Start: 2022-10-14 | End: 2022-10-14 | Stop reason: HOSPADM

## 2022-10-14 RX ORDER — LIDOCAINE HCL/EPINEPHRINE/PF 2%-1:200K
VIAL (ML) INJECTION
Status: DISCONTINUED | OUTPATIENT
Start: 2022-10-14 | End: 2022-10-14 | Stop reason: HOSPADM

## 2022-10-14 RX ORDER — SODIUM CHLORIDE 0.9 % (FLUSH) 0.9 %
3 SYRINGE (ML) INJECTION
Status: DISCONTINUED | OUTPATIENT
Start: 2022-10-14 | End: 2022-10-14 | Stop reason: HOSPADM

## 2022-10-14 RX ORDER — PROCHLORPERAZINE EDISYLATE 5 MG/ML
5 INJECTION INTRAMUSCULAR; INTRAVENOUS EVERY 30 MIN PRN
Status: DISCONTINUED | OUTPATIENT
Start: 2022-10-14 | End: 2022-10-14 | Stop reason: HOSPADM

## 2022-10-14 RX ORDER — LIDOCAINE HCL/PF 100 MG/5ML
SYRINGE (ML) INTRAVENOUS
Status: DISCONTINUED | OUTPATIENT
Start: 2022-10-14 | End: 2022-10-14

## 2022-10-14 RX ORDER — METHYLPREDNISOLONE 4 MG/1
TABLET ORAL
Qty: 21 EACH | Refills: 0 | Status: SHIPPED | OUTPATIENT
Start: 2022-10-14 | End: 2022-11-04

## 2022-10-14 RX ORDER — HYDROMORPHONE HYDROCHLORIDE 2 MG/ML
0.2 INJECTION, SOLUTION INTRAMUSCULAR; INTRAVENOUS; SUBCUTANEOUS EVERY 5 MIN PRN
Status: DISCONTINUED | OUTPATIENT
Start: 2022-10-14 | End: 2022-10-14 | Stop reason: HOSPADM

## 2022-10-14 RX ORDER — MEPERIDINE HYDROCHLORIDE 25 MG/ML
12.5 INJECTION INTRAMUSCULAR; INTRAVENOUS; SUBCUTANEOUS ONCE
Status: COMPLETED | OUTPATIENT
Start: 2022-10-14 | End: 2022-10-14

## 2022-10-14 RX ORDER — CLINDAMYCIN PHOSPHATE 900 MG/50ML
INJECTION, SOLUTION INTRAVENOUS
Status: DISCONTINUED | OUTPATIENT
Start: 2022-10-14 | End: 2022-10-14

## 2022-10-14 RX ORDER — DIPHENHYDRAMINE HYDROCHLORIDE 50 MG/ML
25 INJECTION INTRAMUSCULAR; INTRAVENOUS EVERY 6 HOURS PRN
Status: DISCONTINUED | OUTPATIENT
Start: 2022-10-14 | End: 2022-10-14 | Stop reason: HOSPADM

## 2022-10-14 RX ADMIN — PROPOFOL 20 MG: 10 INJECTION, EMULSION INTRAVENOUS at 09:10

## 2022-10-14 RX ADMIN — LIDOCAINE HYDROCHLORIDE 50 MG: 20 INJECTION, SOLUTION INTRAVENOUS at 09:10

## 2022-10-14 RX ADMIN — SODIUM CHLORIDE, SODIUM LACTATE, POTASSIUM CHLORIDE, AND CALCIUM CHLORIDE: 600; 310; 30; 20 INJECTION, SOLUTION INTRAVENOUS at 09:10

## 2022-10-14 RX ADMIN — MIDAZOLAM HYDROCHLORIDE 2 MG: 1 INJECTION, SOLUTION INTRAMUSCULAR; INTRAVENOUS at 09:10

## 2022-10-14 RX ADMIN — MEPERIDINE HYDROCHLORIDE 12.5 MG: 25 INJECTION INTRAMUSCULAR; INTRAVENOUS; SUBCUTANEOUS at 10:10

## 2022-10-14 RX ADMIN — PROPOFOL 10 MG: 10 INJECTION, EMULSION INTRAVENOUS at 09:10

## 2022-10-14 RX ADMIN — FENTANYL CITRATE 25 MCG: 50 INJECTION, SOLUTION INTRAMUSCULAR; INTRAVENOUS at 09:10

## 2022-10-14 RX ADMIN — HYDRALAZINE HYDROCHLORIDE 10 MG: 20 INJECTION, SOLUTION INTRAMUSCULAR; INTRAVENOUS at 08:10

## 2022-10-14 RX ADMIN — PROPOFOL 50 MG: 10 INJECTION, EMULSION INTRAVENOUS at 09:10

## 2022-10-14 RX ADMIN — OXYCODONE HYDROCHLORIDE 5 MG: 5 TABLET ORAL at 10:10

## 2022-10-14 RX ADMIN — CLINDAMYCIN PHOSPHATE 900 MG: 18 INJECTION, SOLUTION INTRAVENOUS at 09:10

## 2022-10-14 NOTE — TRANSFER OF CARE
"Anesthesia Transfer of Care Note    Patient: Stepan Springer    Procedure(s) Performed: Procedure(s) (LRB):  CLOSURE, WOUND (Left)    Patient location: PACU    Anesthesia Type: general    Transport from OR: Transported from OR on room air with adequate spontaneous ventilation    Post pain: adequate analgesia    Post assessment: no apparent anesthetic complications    Post vital signs: stable    Level of consciousness: awake, alert and oriented    Nausea/Vomiting: no nausea/vomiting    Complications: none    Transfer of care protocol was followed      Last vitals:   Visit Vitals  BP (!) 208/152   Pulse 60   Temp 36.4 °C (97.5 °F) (Temporal)   Resp 20   Ht 6' 7" (2.007 m)   Wt (!) 185 kg (407 lb 13.6 oz)   SpO2 97%   BMI 45.95 kg/m²     "

## 2022-10-14 NOTE — OP NOTE
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    Indications for Procedure:  Mr. Gordon is a 67-year-old gentleman that underwent resection of melanoma in-situ by me last week.  Pathology revealed clear margins and he was scheduled for reconstruction today.    Procedure in Detail:  After informed consent was obtained in holding area the risks and benefits reviewed patient was taken back to or 3.  Anesthesia proceeded with placement of patient on monitors and moderate sedation.  Time-out was undertaken verification of patient and correct procedure.  I localized the forehead with 2% lidocaine with 1-819642 epinephrine with injection all along forehead from eyebrow up towards the vertex and laterally towards the temporalis line.  Area was prepped and draped in usual sterile fashion     Xeroform dressing was removed and wide undermining was undertaken in a subgaleal plane.  This was undertaken to lateral edge of the forehead of the temporalis line on the left and towards mid forehead on the right.  Was also undermined from eyebrows all the way to 4-5 cm from vertex.  I then proceeded with galeotomies bilaterally from either side of the defect.  This allowed good stretch of the forehead tissue.  Total undermined area was 9 x 11 cm.  Advancement flaps were then closed with a deep galea and subcutaneous layer with combination of 2-0 and 3-0 Vicryl.  Inferior and superior standing cutaneous deformities were  excised with 15 blade and also closed with deep 3-0 Vicryl.  Skin was closed with a running locking 4-0 Prolene suture.  Patient was turned over to Anesthesia  and taken to recovery in stable condition    Complications:  None    Attestation:  I was present for the entire procedure    DISCLAIMER: This note was prepared with [employer] voice recognition transcription software. Garbled syntax, mangled pronouns, and other bizarre constructions may be attributed to that software system. While efforts were made to correct any mistakes made by this voice recognition program, some errors and/or omissions may remain in the note that were missed when the note was originally created.

## 2022-10-14 NOTE — ANESTHESIA POSTPROCEDURE EVALUATION
Anesthesia Post Evaluation    Patient: Stepan Springer    Procedure(s) Performed: Procedure(s) (LRB):  CLOSURE, WOUND (Left)    Final Anesthesia Type: MAC      Patient location during evaluation: PACU  Patient participation: Yes- Able to Participate  Level of consciousness: awake and alert and oriented  Post-procedure vital signs: reviewed and stable  Pain management: adequate  Airway patency: patent  FRAN mitigation strategies: Multimodal analgesia  PONV status at discharge: No PONV  Anesthetic complications: no      Cardiovascular status: blood pressure returned to baseline and hemodynamically stable  Respiratory status: unassisted  Hydration status: euvolemic  Follow-up not needed.          Vitals Value Taken Time   /85 10/14/22 1027   Temp 36.7 °C (98.1 °F) 10/14/22 1008   Pulse 60 10/14/22 1029   Resp 84 10/14/22 1028   SpO2 97 % 10/14/22 1029   Vitals shown include unvalidated device data.      Event Time   Out of Recovery 10:38:21         Pain/Sherif Score: Pain Rating Prior to Med Admin: 7 (10/14/2022 10:35 AM)  Sherif Score: 9 (10/14/2022 10:30 AM)

## 2022-10-14 NOTE — DISCHARGE SUMMARY
[facility] - Surgery (Hospital)  Discharge Note  Short Stay  Discharge date: 10/14/22    Procedure(s) (LRB):  CLOSURE, WOUND (Left)      OUTCOME: Patient tolerated treatment/procedure well without complication and is now ready for discharge.    DISPOSITION: Home or Self Care    FINAL DIAGNOSIS:  <principal problem not specified>    FOLLOWUP: In clinic    DISCHARGE INSTRUCTIONS:  No discharge procedures on file.      Clinical Reference Documents Added to Patient Instructions         Document    METHYLPREDNISOLONE, ADULT (ENGLISH)    VALACYCLOVIR, ADULT (ENGLISH)    WOUND CARE DISCHARGE INSTRUCTIONS (ENGLISH)            TIME SPENT ON DISCHARGE: 10 minutes

## 2022-10-14 NOTE — PROGRESS NOTES
Meds were sent to [facility] Working eye exam report; Chart reviewed for 2022 dm eye exam - Scheduled 12/20/2022

## 2022-10-14 NOTE — INTERVAL H&P NOTE
The patient has been examined and the H&P has been reviewed:    I concur with the findings and changes have been noted since the H&P was written:  Patient since last week at resection developed left-sided facial weakness and self diagnosed with Bell's palsy.  Reports no symptoms similar to his TIA before.  He has not started on any medications.  Will proceed with closure today but plan on starting him on steroids as well as antivirals.  Eye protection was discussed in detail and will be included in his discharge.    Surgery risks, benefits and alternative options discussed and understood by patient/family.          There are no hospital problems to display for this patient.

## 2022-10-14 NOTE — ANESTHESIA PREPROCEDURE EVALUATION
10/14/2022  Stepan Springer is a 67 y.o., male     Patient Active Problem List   Diagnosis    Status post cataract extraction and insertion of intraocular lens    Corneal opacity - Left Eye    Type 2 diabetes mellitus with diabetic polyneuropathy, with long-term current use of insulin    Obstructive sleep apnea (untreated, refuses treatment)    Chronic diastolic congestive heart failure    LBBB (left bundle branch block)    Type 2 diabetes mellitus with stage 3b chronic kidney disease, with long-term current use of insulin    Anxiety    Hypertension associated with diabetes    Stage 3b chronic kidney disease    Primary open angle glaucoma of both eyes, severe stage    Left nephrolithiasis    Hydronephrosis, left    NSTEMI (non-ST elevated myocardial infarction)    CAD with remote PCI (BMS) of RCA in 9/2016    Recurrent major depressive disorder    Vasculogenic erectile dysfunction    Blindness and low vision    Hx of retinal detachment    High myopia, both eyes    Epiretinal membrane (ERM) of left eye    Lattice degeneration of peripheral retina, left    Right-sided Bell's palsy    Hyperlipidemia associated with type 2 diabetes mellitus    Class 3 severe obesity due to excess calories with serious comorbidity and body mass index (BMI) of 45.0 to 49.9 in adult    GERD (gastroesophageal reflux disease)    Hypoglycemia unawareness associated with type 2 diabetes mellitus    Elevated troponin    Thrombocytopenia    Pancytopenia    History of COVID-19 (April, 2020)    PVD (peripheral vascular disease)    Influenza Immunization not carried out because of patient refusal    Type 2 diabetes mellitus with microalbuminuria, with long-term current use of insulin    Hyperparathyroidism, secondary renal    Vitamin D deficiency    Lymphedema    Hypertensive urgency    Diabetic  ulcer of toe of left foot associated with type 2 diabetes mellitus, limited to breakdown of skin    Transient visual disturbance    Acute renal failure superimposed on stage 3 chronic kidney disease    Essential hypertension    Malignant melanoma of skin of forehead     Past Surgical History:   Procedure Laterality Date    CARDIAC CATHETERIZATION  7/22/13    non obstructive cad    CATARACT EXTRACTION  OU    COLONOSCOPY N/A 5/1/2019    Procedure: COLONOSCOPY;  Surgeon: Henry Mo III, MD;  Location: [facility] ENDO;  Service: Endoscopy;  Laterality: N/A;    CORONARY ANGIOPLASTY      ESOPHAGOGASTRODUODENOSCOPY N/A 5/1/2019    Procedure: ESOPHAGOGASTRODUODENOSCOPY (EGD);  Surgeon: Henry Mo III, MD;  Location: [facility] ENDO;  Service: Endoscopy;  Laterality: N/A;    EXCISION OF MELANOMA Left 10/7/2022    Procedure: EXCISION, MELANOMA;  Surgeon: Jovita Ceballos MD;  Location: [facility] OR;  Service: ENT;  Laterality: Left;    EYE SURGERY      FRACTURE SURGERY      kidney stone       August 2016    PC IOL OU      RETINAL DETACHMENT REPAIR W/ SCLERAL BUCKLE LE      WRIST SURGERY           Pre-op Assessment    I have reviewed the Patient Summary Reports.    I have reviewed the Nursing Notes.    I have reviewed the Medications.     Review of Systems  Anesthesia Hx:  No problems with previous Anesthesia  History of prior surgery of interest to airway management or planning:  Denies Personal Hx of Anesthesia complications.   Social:  Non-Smoker, Social Alcohol Use    Hematology/Oncology:  Hematology Normal   Oncology Normal     EENT/Dental:EENT/Dental Normal   Cardiovascular:   Exercise tolerance: poor Hypertension Past MI CAD  CABG/stent Dysrhythmias  CHF hyperlipidemia ECG has been reviewed. ECHO (2022):  The left ventricle is normal in size with concentric hypertrophy and normal systolic function.  ? The estimated ejection fraction is 55%.  ? Normal left ventricular diastolic function.  ? Normal right  ventricular size with normal right ventricular systolic function.  ? Overall the study quality was technically difficult. The study was difficult due to patient's clinical status, body habitus and poor endocardial visualization.  ? There is abnormal septal wall motion consistent with left bundle branch block.  ? Normal central venous pressure (3 mmHg).  ? The estimated PA systolic pressure is 25 mmHg.  ? The aortic root is mildly dilated.     EKG:  Sinus rhythm with 1st degree A-V block   Left bundle branch block   Abnormal ECG   When compared with ECG of 15-AUG-2022 10:50,   IL interval has increased   Questionable change in The axis   Nonspecific T wave abnormality no longer evident in Inferior leads   T wave inversion less evident in Lateral leads   Confirmed by HUMBLE KATZ MD ([id number]) on 9/5/2022 5:56:10 PM       Pulmonary:   Sleep Apnea, CPAP    Renal/:   Chronic Renal Disease, CKD renal calculi    Hepatic/GI:   Bowel Prep. GERD, well controlled 0230 last drink of fluid.  Monday last solid meal.   Musculoskeletal:   Arthritis  H/o Bell's palsy  Idiopathic peripheral neuropathy   Neurological:   Neuromuscular Disease,    Endocrine:   Diabetes, well controlled, type 2    Dermatological:  Skin Normal    Psych:   Psychiatric History          Chemistry        Component Value Date/Time     09/07/2022 0915    K 3.9 09/07/2022 0915     (H) 09/07/2022 0915    CO2 19 (L) 09/07/2022 0915    BUN 25 (H) 09/07/2022 0915    BUN 34 (A) 10/28/2013 0835    CREATININE 1.8 (H) 09/07/2022 0915    CREATININE 1.7 10/28/2013 0835     (H) 09/07/2022 0915        Component Value Date/Time    CALCIUM 8.8 09/07/2022 0915    ALKPHOS 86 09/05/2022 0454    AST 11 09/05/2022 0454    AST 14 10/28/2013 0835    ALT 15 09/05/2022 0454    BILITOT 0.5 09/05/2022 0454    ESTGFRAFRICA 55.7 (A) 07/07/2022 1224    EGFRNONAA 48.2 (A) 07/07/2022 1224        Lab Results   Component Value Date    WBC 4.94 09/07/2022    HGB 15.1 09/07/2022     HCT 44.2 09/07/2022    MCV 90 09/07/2022     (L) 09/07/2022           Physical Exam  General:  Well nourished, Morbid Obesity, Alert and Oriented   Left sided facial droop with left-eye conjunctival injection c/w Bell's Palsy - Patient reports experiencing episode of Bell's Palsy after previous procedure on 10/7/22 done under Local/Nursing sedation    Airway/Jaw/Neck:  Airway Findings: Mouth Opening: Normal   Tongue: Large   General Airway Assessment: Adult Mallampati: IV  TM Distance: Normal   Neck ROM: Normal ROM       Dental:  Dental Findings: Periodontal disease      Chest/Lungs:  Chest/Lungs Findings: Clear to auscultation, Normal Respiratory Rate               Anesthesia Plan  Type of Anesthesia, risks & benefits discussed:  Anesthesia Type:  MAC, Gen ETT    Patient's Preference:   Plan Factors:          Intra-op Monitoring Plan: Standard ASA Monitors  Intra-op Monitoring Plan Comments:   Post Op Pain Control Plan: multimodal analgesia and IV/PO Opioids PRN  Post Op Pain Control Plan Comments:     Induction:   IV  Beta Blocker:  Patient is on a Beta-Blocker and has received one dose within the past 24 hours (No further documentation required).       Informed Consent: Informed consent signed with the Patient and all parties understand the risks and agree with anesthesia plan.  All questions answered.  Anesthesia consent signed with patient.  ASA Score: 3     Day of Surgery Review of History & Physical:  There are no significant changes.      Anesthesia Plan Notes: SBP 220s this AM in pre-op - patient denies taking any of his meds this AM; will treat in pre-op    *Patioent reports experiencing left-sided Bell's Palsy after previous procedure (10/7/22) - surgical team made aware; Patient does haveprevious hx of Bell's Palsy previously to Right side of face that did resolve        Ready For Surgery From Anesthesia Perspective.           Physical Exam  General: Well nourished, Morbid Obesity, Alert and  Oriented  Left sided facial droop with left-eye conjunctival injection c/w Bell's Palsy - Patient reports experiencing episode of Bell's Palsy after previous procedure on 10/7/22 done under Local/Nursing sedation  Airway:  Mallampati: IV   Mouth Opening: Normal  TM Distance: Normal  Tongue: Large  Neck ROM: Normal ROM    Dental:  Periodontal disease  Poor dentition with several chipped and missing teeth  Chest/Lungs:  Clear to auscultation, Normal Respiratory Rate          Anesthesia Plan  Type of Anesthesia, risks & benefits discussed:    Anesthesia Type: MAC, Gen ETT  Intra-op Monitoring Plan: Standard ASA Monitors  Post Op Pain Control Plan: multimodal analgesia and IV/PO Opioids PRN  Induction:  IV  Airway Plan: Direct  Informed Consent: Informed consent signed with the Patient and all parties understand the risks and agree with anesthesia plan.  All questions answered.   ASA Score: 3  Anesthesia Plan Notes: SBP 220s this AM in pre-op - patient denies taking any of his meds this AM; will treat in pre-op    *Patioent reports experiencing left-sided Bell's Palsy after previous procedure (10/7/22) - surgical team made aware; Patient does haveprevious hx of Bell's Palsy previously to Right side of face that did resolve    Ready For Surgery From Anesthesia Perspective.       .

## 2022-10-17 NOTE — OP NOTE
Date of Service: 10/17/2022    Pre-operative Diagnosis:   History of melanoma in-situ of the forehead   Open wound of the forehead    Post-operative Diagnosis:  Same    Procedures Performed:  Closure of forehead defect by adjacent tissue transfer with bilateral advancement flaps with advanced area being 8 x 11 cm    Surgeon: Jovita Ceballos MD    Assistant(s):  None    Anesthesia: MAC    EBL:  Proximally 50 cc    Specimens:  None    Findings:  Forehead defect closed by bilateral advancement flaps with galeotomies and advanced area being 8 x 11 cm.    Indications for Procedure:  Mr. Gordon is a 67-year-old gentleman that I removed a melanoma in-situ of the forehead last week with pathology showing clear margins and he was scheduled for reconstruction today.    Procedure in Detail:  After informed consent was obtained from patient in holding area the risks and benefits reviewed patient was taken back to OR 3.  Anesthesia proceeded with placement of patient on monitors and sedation.  Time-out was undertaken verification of patient and correct procedure.  I proceeded with injection from the edges of the defect into the forehead with injection of the entirety of the forehead with local with epinephrine.  Forehead was then prepped and draped in usual sterile fashion.      I removed the prior Xeroform bolster and I proceeded with undermining from edges of the defect with transition from a supra galea to subgaleal plane.  This was done with undermining all the way to the temporalis line on the left and then 2 distal forehead laterally on the right.  I then proceeded with galeotomies on either side of the defect with division of the frontalis muscle with Bovie cautery at 2 different points parallel to the defect in order to allow better movement of tissue and advancement of the bilateral advancement flaps.  Total undermined and advanced area was 8 x 11 cm.  Pulley stitch was then placed with a 2-0 silk and advancement flaps  were closed with deep combination of 2-0 and 3-0 Vicryl and then standing cutaneous deformities both inferiorly towards the eyebrow and superiorly towards the hairline were excised with a 15 blade and also closed with deep 3-0 Vicryl.  Skin was closed with running locking 4-0 Prolene suture.  Patient was turned over to Anesthesia awakened and taken to recovery in stable condition    Complications:  None    Attestation:  I was present for the entire procedure    DISCLAIMER: This note was prepared with [employer] voice recognition transcription software. Garbled syntax, mangled pronouns, and other bizarre constructions may be attributed to that software system. While efforts were made to correct any mistakes made by this voice recognition program, some errors and/or omissions may remain in the note that were missed when the note was originally created.

## 2022-10-21 ENCOUNTER — OFFICE VISIT (OUTPATIENT)
Dept: OTOLARYNGOLOGY | Facility: CLINIC | Age: 67
End: 2022-10-21
Payer: MEDICARE

## 2022-10-21 VITALS — TEMPERATURE: 99 F

## 2022-10-21 DIAGNOSIS — Z09 POSTOP CHECK: Primary | ICD-10-CM

## 2022-10-21 PROCEDURE — 99024 PR POST-OP FOLLOW-UP VISIT: ICD-10-PCS | Mod: POP,,, | Performed by: OTOLARYNGOLOGY

## 2022-10-21 PROCEDURE — 99999 PR PBB SHADOW E&M-EST. PATIENT-LVL IV: ICD-10-PCS | Mod: PBBFAC,,, | Performed by: OTOLARYNGOLOGY

## 2022-10-21 PROCEDURE — 99024 POSTOP FOLLOW-UP VISIT: CPT | Mod: POP,,, | Performed by: OTOLARYNGOLOGY

## 2022-10-21 PROCEDURE — 99999 PR PBB SHADOW E&M-EST. PATIENT-LVL IV: CPT | Mod: PBBFAC,,, | Performed by: OTOLARYNGOLOGY

## 2022-10-21 PROCEDURE — 99214 OFFICE O/P EST MOD 30 MIN: CPT | Mod: PBBFAC | Performed by: OTOLARYNGOLOGY

## 2022-10-21 NOTE — PROGRESS NOTES
Subjective: 67 y.o. male seen in follow up s/p closure of forehead defect after melanoma in-situ resection done on 10/14/2022.  Patient did well postop of note he also had Bell's palsy on presentation for his reconstruction.  He did a Medrol Dosepak with some elevation in his blood sugars.  He did not fill his valacyclovir.  Has been doing well otherwise but not really cleaning incision.      Objective:  Temp 98.8 °F (37.1 °C) (Temporal)     Exam:  General No acute distress  Crusting cleared from forehead incision with Prolenes removed incision clean  intact with mild oozing of serous fluid.  Left facial nerve still house Brackmann 5/6  Left eye with no closure at all with maximal effort without good Gonzalez's phenomenon    Assessment / Plan:  Doing well after reconstruction of forehead for melanoma in-situ resection.  Wound care was discussed for that.  As far as his Bell's palsy I am really concerned about his eyelid care.  I prescribed an artificial tears and recommended taping of the eye at night.  I will re-evaluate him in about 3 weeks to reassess for gold weight if his nerve does not improve

## 2022-11-02 ENCOUNTER — TELEPHONE (OUTPATIENT)
Dept: PRIMARY CARE CLINIC | Facility: CLINIC | Age: 67
End: 2022-11-02
Payer: MEDICARE

## 2022-11-02 NOTE — TELEPHONE ENCOUNTER
----- Message from Mago Gonzalez sent at 11/2/2022  2:20 PM CDT -----  Contact: [phone]  Pt is calling in regards to scheduling an follow up appt. Pt stated that he would like to be seen on 11/17 his 8:15 appt  or 11/18 after his 9:15 appt. Please call pt back at [phone]. Thanks KB

## 2022-11-11 ENCOUNTER — TELEPHONE (OUTPATIENT)
Dept: PSYCHIATRY | Facility: CLINIC | Age: 67
End: 2022-11-11
Payer: MEDICARE

## 2022-11-17 ENCOUNTER — OFFICE VISIT (OUTPATIENT)
Dept: PODIATRY | Facility: CLINIC | Age: 67
End: 2022-11-17
Payer: MEDICARE

## 2022-11-17 VITALS — BODY MASS INDEX: 36.45 KG/M2 | HEIGHT: 78 IN | WEIGHT: 315 LBS

## 2022-11-17 DIAGNOSIS — G60.9 IDIOPATHIC PERIPHERAL NEUROPATHY: Primary | ICD-10-CM

## 2022-11-17 DIAGNOSIS — E11.42 TYPE 2 DIABETES MELLITUS WITH DIABETIC POLYNEUROPATHY, WITH LONG-TERM CURRENT USE OF INSULIN: ICD-10-CM

## 2022-11-17 DIAGNOSIS — B35.1 DERMATOPHYTOSIS OF NAIL: ICD-10-CM

## 2022-11-17 DIAGNOSIS — Z79.4 TYPE 2 DIABETES MELLITUS WITH DIABETIC POLYNEUROPATHY, WITH LONG-TERM CURRENT USE OF INSULIN: ICD-10-CM

## 2022-11-17 PROCEDURE — 99999 PR PBB SHADOW E&M-EST. PATIENT-LVL IV: CPT | Mod: PBBFAC,,, | Performed by: PODIATRIST

## 2022-11-17 PROCEDURE — 99999 PR PBB SHADOW E&M-EST. PATIENT-LVL IV: ICD-10-PCS | Mod: PBBFAC,,, | Performed by: PODIATRIST

## 2022-11-17 PROCEDURE — 99499 NO LOS: ICD-10-PCS | Mod: S$PBB,,, | Performed by: PODIATRIST

## 2022-11-17 PROCEDURE — 11721 DEBRIDE NAIL 6 OR MORE: CPT | Mod: Q9,PBBFAC | Performed by: PODIATRIST

## 2022-11-17 PROCEDURE — 11721 DEBRIDE NAIL 6 OR MORE: CPT | Mod: Q9,S$PBB,, | Performed by: PODIATRIST

## 2022-11-17 PROCEDURE — 99214 OFFICE O/P EST MOD 30 MIN: CPT | Mod: PBBFAC | Performed by: PODIATRIST

## 2022-11-17 PROCEDURE — 99499 UNLISTED E&M SERVICE: CPT | Mod: S$PBB,,, | Performed by: PODIATRIST

## 2022-11-17 PROCEDURE — 11721 PR DEBRIDEMENT OF NAILS, 6 OR MORE: ICD-10-PCS | Mod: Q9,S$PBB,, | Performed by: PODIATRIST

## 2022-11-17 NOTE — PROGRESS NOTES
Subjective:     Patient ID: Stepan Springer is a 67 y.o. male.    Chief Complaint: Nail Care (Diabetic pt wears tennis shoes, PCP Dr. Fleming last seen 10-6-22)    Stepan is a 67 y.o. male who presents to the clinic for evaluation and treatment of high risk feet. Stepan has a past medical history of Anxiety, Bell's palsy, CHF (congestive heart failure), Chronic kidney disease, stage 3a (11/02/2021), Coronary artery disease involving native coronary artery without angina pectoris (10/18/2016), Depression, Diabetes mellitus, Glaucoma, Hypertension, Idiopathic peripheral neuropathy (12/11/2012), Lymphedema of both lower extremities, Malignant melanoma of skin of forehead (10/13/2022), Mixed hyperlipidemia (12/11/2012), Morbid obesity with BMI of 45.0-49.9, adult (02/12/2019), Pancytopenia, Sleep apnea, Stage 3b chronic kidney disease, Type 2 diabetes mellitus with diabetic polyneuropathy, with long-term current use of insulin (12/11/2012), and Vitamin B 12 deficiency (08/23/2012). The patient's chief complaint is long thick nails. Patient states he has been going to Lymphedema clinic.  This patient has documented high risk feet requiring routine maintenance secondary to peripheral neuropathy.       PCP: KANDICE Fleming MD    Date Last Seen by PCP: 10/06/2022    Current shoe gear:  Affected Foot: Tennis shoes     Unaffected Foot: Tennis shoes    Hemoglobin A1C   Date Value Ref Range Status   08/15/2022 6.5 (H) 4.0 - 5.6 % Final     Comment:     ADA Screening Guidelines:  5.7-6.4%  Consistent with prediabetes  >or=6.5%  Consistent with diabetes    High levels of fetal hemoglobin interfere with the HbA1C  assay. Heterozygous hemoglobin variants (HbS, HgC, etc)do  not significantly interfere with this assay.   However, presence of multiple variants may affect accuracy.     07/07/2022 7.0 (H) 4.0 - 5.6 % Final     Comment:     ADA Screening Guidelines:  5.7-6.4%  Consistent with prediabetes  >or=6.5%  Consistent  with diabetes    High levels of fetal hemoglobin interfere with the HbA1C  assay. Heterozygous hemoglobin variants (HbS, HgC, etc)do  not significantly interfere with this assay.   However, presence of multiple variants may affect accuracy.     02/08/2022 6.9 (H) 4.0 - 5.6 % Final     Comment:     ADA Screening Guidelines:  5.7-6.4%  Consistent with prediabetes  >or=6.5%  Consistent with diabetes    High levels of fetal hemoglobin interfere with the HbA1C  assay. Heterozygous hemoglobin variants (HbS, HgC, etc)do  not significantly interfere with this assay.   However, presence of multiple variants may affect accuracy.             Patient Active Problem List   Diagnosis    Status post cataract extraction and insertion of intraocular lens    Corneal opacity - Left Eye    Type 2 diabetes mellitus with diabetic polyneuropathy, with long-term current use of insulin    Obstructive sleep apnea (untreated, refuses treatment)    Chronic diastolic congestive heart failure    LBBB (left bundle branch block)    Type 2 diabetes mellitus with stage 3b chronic kidney disease, with long-term current use of insulin    Anxiety    Hypertension associated with diabetes    Stage 3b chronic kidney disease    Primary open angle glaucoma of both eyes, severe stage    Left nephrolithiasis    Hydronephrosis, left    NSTEMI (non-ST elevated myocardial infarction)    CAD with remote PCI (BMS) of RCA in 9/2016    Recurrent major depressive disorder    Vasculogenic erectile dysfunction    Blindness and low vision    Hx of retinal detachment    High myopia, both eyes    Epiretinal membrane (ERM) of left eye    Lattice degeneration of peripheral retina, left    Right-sided Bell's palsy    Hyperlipidemia associated with type 2 diabetes mellitus    Class 3 severe obesity due to excess calories with serious comorbidity and body mass index (BMI) of 45.0 to 49.9 in adult    GERD (gastroesophageal reflux disease)    Hypoglycemia unawareness associated  with type 2 diabetes mellitus    Elevated troponin    Thrombocytopenia    Pancytopenia    History of COVID-19 (April, 2020)    PVD (peripheral vascular disease)    Influenza Immunization not carried out because of patient refusal    Type 2 diabetes mellitus with microalbuminuria, with long-term current use of insulin    Hyperparathyroidism, secondary renal    Vitamin D deficiency    Lymphedema    Hypertensive urgency    Diabetic ulcer of toe of left foot associated with type 2 diabetes mellitus, limited to breakdown of skin    Transient visual disturbance    Acute renal failure superimposed on stage 3 chronic kidney disease    Essential hypertension    Malignant melanoma of skin of forehead    Open wound of forehead       Medication List with Changes/Refills   Current Medications    AMLODIPINE (NORVASC) 5 MG TABLET    Take 1 tablet (5 mg total) by mouth every evening.    ATORVASTATIN (LIPITOR) 20 MG TABLET    Take 1 tablet (20 mg total) by mouth every evening.    BRIMONIDINE 0.1% (ALPHAGAN P) 0.1 % DROP    Place 1 drop into both eyes 3 (three) times daily. Order 90 day supply    BRINZOLAMIDE (AZOPT) 1 % OPHTHALMIC SUSPENSION    Place 1 drop into both eyes 3 (three) times daily. Brand name only    CARVEDILOL (COREG) 6.25 MG TABLET    Take 6.25 mg by mouth 2 (two) times daily.    CLONIDINE (CATAPRES) 0.1 MG TABLET    Take 1 tablet (0.1 mg total) by mouth 3 (three) times daily.    DEXTRAN 70-HYPROMELLOSE (TEARS) OPHTHALMIC SOLUTION    Place 2 drops into the left eye every 4 (four) hours.    EMPAGLIFLOZIN (JARDIANCE) 25 MG TABLET    Take 1 tablet (25 mg total) by mouth once daily.    FLASH GLUCOSE SENSOR (FREESTYLE CLEMENCIA 14 DAY SENSOR) KIT    1 each by Misc.(Non-Drug; Combo Route) route every 14 (fourteen) days.    FUROSEMIDE (LASIX) 40 MG TABLET    Take 1 tablet (40 mg total) by mouth once daily.    HYDRALAZINE (APRESOLINE) 100 MG TABLET    Take 1 tablet (100 mg total) by mouth every 8 (eight) hours.    INSULIN ASPART  "U-100 (NOVOLOG) 100 UNIT/ML (3 ML) INPN PEN    Inject 25 Units into the skin 3 (three) times daily with meals.    INSULIN GLARGINE U-300 CONC (TOUJEO MAX U-300 SOLOSTAR) 300 UNIT/ML (3 ML) INSULIN PEN    Inject 80 Units into the skin once daily.    LOSARTAN (COZAAR) 25 MG TABLET    Take 1 tablet (25 mg total) by mouth once daily.    METOPROLOL SUCCINATE (TOPROL-XL) 25 MG 24 HR TABLET    Take 1 tablet (25 mg total) by mouth once daily.    MUPIROCIN (BACTROBAN) 2 % OINTMENT    Apply topically once daily.    NETARSUDIL (RHOPRESSA) 0.02 % OPHTHALMIC SOLUTION    Place 1 drop into both eyes once daily.    NITROGLYCERIN (NITROSTAT) 0.4 MG SL TABLET    PLACE 1 TABLET (0.4 MG TOTAL) UNDER THE TONGUE EVERY 5 (FIVE) MINUTES AS NEEDED FOR CHEST PAIN.    PEN NEEDLE, DIABETIC (BD ULTRA-FINE AMANDA PEN NEEDLE) 32 GAUGE X 5/32" NDLE    1 each by Misc.(Non-Drug; Combo Route) route 4 (four) times daily with meals and nightly.    VALACYCLOVIR (VALTREX) 1000 MG TABLET    Take 1 tablet (1,000 mg total) by mouth 2 (two) times daily. for 10 days       Review of patient's allergies indicates:   Allergen Reactions    Cefazolin Other (See Comments)     Shakes, chills, dizziness       Past Surgical History:   Procedure Laterality Date    CARDIAC CATHETERIZATION  7/22/13    non obstructive cad    CATARACT EXTRACTION  OU    CLOSURE OF WOUND Left 10/14/2022    Procedure: CLOSURE, WOUND;  Surgeon: Jovita Ceballos MD;  Location: [facility] OR;  Service: ENT;  Laterality: Left;  closure of forehead    COLONOSCOPY N/A 5/1/2019    Procedure: COLONOSCOPY;  Surgeon: Henry Mo III, MD;  Location: [facility] ENDO;  Service: Endoscopy;  Laterality: N/A;    CORONARY ANGIOPLASTY      ESOPHAGOGASTRODUODENOSCOPY N/A 5/1/2019    Procedure: ESOPHAGOGASTRODUODENOSCOPY (EGD);  Surgeon: Henry Mo III, MD;  Location: [facility] ENDO;  Service: Endoscopy;  Laterality: N/A;    EXCISION OF MELANOMA Left 10/7/2022    Procedure: EXCISION, MELANOMA;  Surgeon: Jovita Ceballos MD;  " "Location: [facility] OR;  Service: ENT;  Laterality: Left;    EYE SURGERY      FRACTURE SURGERY      kidney stone       August 2016    PC IOL OU      RETINAL DETACHMENT REPAIR W/ SCLERAL BUCKLE LE      WRIST SURGERY         Family History   Problem Relation Age of Onset    Macular degeneration Father     Heart disease Father         CHF     Heart attack Father     Hypertension Mother     Macular degeneration Paternal Uncle     Hypertension Maternal Grandmother     Hypertension Maternal Grandfather     Strabismus Neg Hx     Retinal detachment Neg Hx     Glaucoma Neg Hx     Blindness Neg Hx     Amblyopia Neg Hx        Social History     Socioeconomic History    Marital status:    Occupational History     Comment: Quit job at [employer]; wants to open his own [employer] business    Tobacco Use    Smoking status: Never    Smokeless tobacco: Never   Substance and Sexual Activity    Alcohol use: Yes     Comment: " one to two glasses of wine per year"    Drug use: No    Sexual activity: Not Currently     Birth control/protection: None   Social History Narrative    , 1 son, [employer].     Social Determinants of Health     Financial Resource Strain: Low Risk     Difficulty of Paying Living Expenses: Not hard at all   Food Insecurity: No Food Insecurity    Worried About Running Out of Food in the Last Year: Never true    Ran Out of Food in the Last Year: Never true   Transportation Needs: No Transportation Needs    Lack of Transportation (Medical): No    Lack of Transportation (Non-Medical): No   Physical Activity: Inactive    Days of Exercise per Week: 0 days    Minutes of Exercise per Session: 0 min   Stress: No Stress Concern Present    Feeling of Stress : Only a little   Social Connections: Unknown    Frequency of Communication with Friends and Family: More than three times a week    Frequency of Social Gatherings with Friends and Family: Once a week    Attends Jew Services: Never    Active Member of Clubs or " "Organizations: No    Attends Club or Organization Meetings: Never   Housing Stability: Low Risk     Unable to Pay for Housing in the Last Year: No    Number of Places Lived in the Last Year: 1    Unstable Housing in the Last Year: No       Vitals:    11/17/22 0810   Weight: (!) 185 kg (407 lb 13.6 oz)   Height: 6' 7" (2.007 m)   PainSc: 0-No pain       Hemoglobin A1C   Date Value Ref Range Status   08/15/2022 6.5 (H) 4.0 - 5.6 % Final     Comment:     ADA Screening Guidelines:  5.7-6.4%  Consistent with prediabetes  >or=6.5%  Consistent with diabetes    High levels of fetal hemoglobin interfere with the HbA1C  assay. Heterozygous hemoglobin variants (HbS, HgC, etc)do  not significantly interfere with this assay.   However, presence of multiple variants may affect accuracy.     07/07/2022 7.0 (H) 4.0 - 5.6 % Final     Comment:     ADA Screening Guidelines:  5.7-6.4%  Consistent with prediabetes  >or=6.5%  Consistent with diabetes    High levels of fetal hemoglobin interfere with the HbA1C  assay. Heterozygous hemoglobin variants (HbS, HgC, etc)do  not significantly interfere with this assay.   However, presence of multiple variants may affect accuracy.     02/08/2022 6.9 (H) 4.0 - 5.6 % Final     Comment:     ADA Screening Guidelines:  5.7-6.4%  Consistent with prediabetes  >or=6.5%  Consistent with diabetes    High levels of fetal hemoglobin interfere with the HbA1C  assay. Heterozygous hemoglobin variants (HbS, HgC, etc)do  not significantly interfere with this assay.   However, presence of multiple variants may affect accuracy.         Review of Systems   Constitutional:  Negative for chills and fever.   Respiratory:  Negative for shortness of breath.    Cardiovascular:  Negative for chest pain, palpitations, orthopnea, claudication and leg swelling.   Gastrointestinal:  Negative for diarrhea, nausea and vomiting.   Musculoskeletal:  Negative for joint pain.   Skin:  Negative for rash.   Neurological:  Positive " for tingling and sensory change.   Psychiatric/Behavioral: Negative.         Objective:   PHYSICAL EXAM: Apperance: Alert and orient in no distress,well developed, and with good attention to grooming and body habits  Patient presents ambulating in tennis shoes.   LOWER EXTREMITY EXAM:  VASCULAR: Dorsalis pedis pulses 1/4 bilateral and Posterior Tibial pulses 1/4 bilateral. Capillary fill time <4 seconds bilateral. Moderate edema observed bilateral. Varicosities absent bilateral. Skin temperature of the lower extremities is warm to cool, proximal to distal. Hair growth dim bilateral. (--) lymphangitis or (--) cellulitis noted right.  DERMATOLOGICAL: (--) edema, (--) erythema, (--) malodor, (--) drainage, (--) warmth to right foot. The dorsum surface of the feet are soft and supple.  The plantar aspects of feet are dry and scaly. Nails 1,2,3,4,5 bilateral elongated, incurvated, and discolored with subungual debris. Webspaces 1,2,3,4 bilateral clean, dry and without evidence of break in skin integrity.   NEUROLOGICAL: Light touch, sharp-dull, proprioception all present and equal bilaterally.  Vibratory sensation diminished at bilateral hallux and navicular. Protective sensation absent at 2/10 sites as tested with a Westminster-Pranav 5.07 monofilament.   MUSCULOSKELETAL: Muscle strength is 5/5 for foot inverters, everters, plantarflexors, and dorsiflexors. Muscle tone is normal. Semi-rigid hammertoes bilateral. No pain on palpation of left hallux nail.       Assessment:   Idiopathic peripheral neuropathy    Dermatophytosis of nail    Type 2 diabetes mellitus with diabetic polyneuropathy, with long-term current use of insulin          Plan:   Idiopathic peripheral neuropathy    Dermatophytosis of nail    Type 2 diabetes mellitus with diabetic polyneuropathy, with long-term current use of insulin      I counseled the patient on his conditions, regarding findings of my examination, my impressions, and usual treatment plan.    Greater than 12 minutes of a 15 minute appointment spent on education about the diabetic foot, neuropathy, and prevention of limb loss.  Shoe inspection. Diabetic Foot Education. Patient reminded of the importance of good nutrition and blood sugar control to help prevent podiatric complications of diabetes. Patient instructed on proper foot hygeine. We discussed wearing proper shoe gear, daily foot inspections, never walking without protective shoe gear, never putting sharp instruments to feet.    With patient's permission, nails 1-5 bilateral were debrided/trimmed in length and thickness aggressively to their soft tissue attachment mechanically and with electric , removing all offending nail and debris. Patient relates relief following the procedure.  Patient to continue Lymphedema clinic.   Patient  will continue to monitor the areas daily, inspect feet, wear protective shoe gear when ambulatory, moisturizer to maintain skin integrity. Patient reminded of the importance of good nutrition and blood sugar control to help prevent podiatric complications of diabetes.  Patient to return 3 months or sooner if needed.       Vangie Cuevas DPM  [facility]

## 2022-11-18 ENCOUNTER — OFFICE VISIT (OUTPATIENT)
Dept: OTOLARYNGOLOGY | Facility: CLINIC | Age: 67
End: 2022-11-18
Payer: MEDICARE

## 2022-11-18 VITALS — TEMPERATURE: 97 F

## 2022-11-18 DIAGNOSIS — Z98.890 MOHS DEFECT OF FOREHEAD: Primary | ICD-10-CM

## 2022-11-18 DIAGNOSIS — M95.2 MOHS DEFECT OF FOREHEAD: Primary | ICD-10-CM

## 2022-11-18 DIAGNOSIS — G51.0 LEFT-SIDED BELL'S PALSY: ICD-10-CM

## 2022-11-18 PROCEDURE — 99024 POSTOP FOLLOW-UP VISIT: CPT | Mod: POP,,, | Performed by: OTOLARYNGOLOGY

## 2022-11-18 PROCEDURE — 99999 PR PBB SHADOW E&M-EST. PATIENT-LVL IV: CPT | Mod: PBBFAC,,, | Performed by: OTOLARYNGOLOGY

## 2022-11-18 PROCEDURE — 99999 PR PBB SHADOW E&M-EST. PATIENT-LVL IV: ICD-10-PCS | Mod: PBBFAC,,, | Performed by: OTOLARYNGOLOGY

## 2022-11-18 PROCEDURE — 99214 OFFICE O/P EST MOD 30 MIN: CPT | Mod: PBBFAC | Performed by: OTOLARYNGOLOGY

## 2022-11-18 PROCEDURE — 99024 PR POST-OP FOLLOW-UP VISIT: ICD-10-PCS | Mod: POP,,, | Performed by: OTOLARYNGOLOGY

## 2022-11-19 NOTE — PROGRESS NOTES
Subjective: 67 y.o. male seen in follow up s/p closure of forehead defect after melanoma in-situ resection done on 10/14/2022.  Patient did well postop of note he also had Bell's palsy on presentation for his reconstruction.  He did a Medrol Dosepak with some elevation in his blood sugars.  He did not fill his valacyclovir.  He has an ophthalmology appointment pending with some vision problems.  He reports continued tearing from the left eye and has been applying wetting drops.    Objective:  Temp 97.2 °F (36.2 °C) (Temporal)     Exam:  General No acute distress  Crusting cleared from forehead incision with some crusting removed but incision well approximated and clean dry intact  Left facial nerve still house Brackmann 4-5/6  Left eye with no closure at all with maximal effort without good Gonzalez's phenomenon somewhat improved from last visit    Assessment / Plan:  Doing well after reconstruction of forehead for melanoma in-situ resection.  Wound care was discussed for that.  As far as his Bell's palsy I am really concerned about his eye care.  I prescribed an artificial tears and recommended taping of the eye at night.  Patient reports that his last Bell's palsy on the right side previously took a while to recover.  I will hold off on weight to the left upper eyelid for protection and defer to Ophthalmology . .  He is seeing Dr. Hale soon and I will copy this note to him.  If he feels like the patient should proceed with weight to the eyelid I am happy to schedule that otherwise I will see him after the beginning of the new year for follow-up

## 2022-11-29 ENCOUNTER — CLINICAL SUPPORT (OUTPATIENT)
Dept: REHABILITATION | Facility: HOSPITAL | Age: 67
End: 2022-11-29
Payer: MEDICARE

## 2022-11-29 DIAGNOSIS — I89.0 LYMPHEDEMA: Primary | ICD-10-CM

## 2022-11-29 PROCEDURE — 97016 VASOPNEUMATIC DEVICE THERAPY: CPT | Mod: PN

## 2022-11-29 PROCEDURE — 97535 SELF CARE MNGMENT TRAINING: CPT | Mod: PN

## 2022-11-29 NOTE — PLAN OF CARE
Outpatient Therapy Updated Plan of Care      Visit Date: 11/29/2022  Name: Stepan Springer  [facility] Number: [id number]     Therapy Diagnosis:        Encounter Diagnosis   Name Primary?    Lymphedema Yes      Physician: Vangie Cuevas DPM     Physician Orders: PT Eval and Treat   Medical Diagnosis from Referral: lymphedema  Evaluation Date: 6/1/2022     Total Visits Received: 5  Cancelled Visits: 0  No Show Visits: 0     Current Certification Period:  8/24/2022 to 11/2/2022  Precautions:  Visually impaired  Visits from Evaluation Date:  4  Functional Level Prior to Evaluation:       Subjective      Update: since starting lymphedema therapy, he is able to get on and off the floor, cross his legs, walk farther; his cut to fit compression wraps are not holding together and he will need to be fitted for two non-cut to fit wraps.     Objective      Update:      STAGE II secondary Lymphedema (phlebolymphedema)  Amount of Swelling: moderate left, mild right (increase in size since last visit due to velcro zippers not holding and Sedrick not wearing the wraps for a few days because of the velcro issue)  Location of Swelling: bilateral lower extremities    Skin Integrity: rubor with dependency; no skin lesions; no lobule present)  Palpation/Texture: soft, non-pitting, good skin elasticity right leg and proximal left leg  Circulation: impaired venous flow (hx)  Posture: fair  Flexibility: good motions of both hips        Girth Measurements (in centimeters) SEE PHOTOS IN MEDIA SECTION 11/29/2022    [facility]MARK LEFT LE  6/1/2022 RIGHT LE  6/1/2022 DIFF   at eval   20 cm below SBP 66 cm 61.5 cm 4.5 cm   30 cm below SBP 70 cm 63 cm 7 cm   40 cm below SBP 68 cm 52 cm 16 cm   Ankle 44.5 cm 41 cm 2.5 cm   Forefoot 32.5 cm 31.5 cm 1 cm      [facility]MARK LEFT LE  8/24/2022 RIGHT LE  8/24/2022     20 cm below SBP 51.5 cm 53 cm     30 cm below SBP 51.5 cm 50.5 cm     40 cm below SBP 44 cm 40.5 cm     Ankle 38 cm 36 cm     Forefoot 30 cm 28.5  cm        [facility]MARK LEFT LE  11/29/2022 RIGHT LE  11/29/2022     20 cm below SBP 55 cm 56 cm     30 cm below SBP 52.5 cm 56 cm     40 cm below SBP 55 cm 50.5 cm     Ankle 40.5 cm 38 cm     Forefoot 32 cm 29.5 cm          Assessment   Stepan is progressing well with physical therapy, with no complaints of pain or discomfort and inconsistent improvements noted in size of legs and elasticity of skin of both legs since initial evaluation; please see exam for details. Stepan continues to have difficulty with swelling in both legs, due to impaired veins and lymphatics. He now has  a compression pump at home which is helping him manage the swelling. He now needs two new velcro wraps due to his existing wraps not holding anymore. The recommended wraps are the Sigvaris Compriflex Complete (does not have a zipper attachment and is not a cut to fit).   The above impairments and functional deficits will continue to be addressed over the course of this plan of care. Stepan was educated on continued progression of PT, expectations for the duration of care, updates on goals set at initial evaluation, and home exercise program.  Stepan will continue to benefit from physical therapy in order to further address goals, maximize function and quality of life.      Short Term Goals: (6 weeks)  1. Patient will show decreased girth in bilateral lower extremities  by up to 8 cm to allow for LE symmetry, shoe and clothing choice, and ability to apply needed compression.  (GOAL MET)  2. Patient will demonstrate 100% knowledge of lymphedema precautions and signs of infection to allow for reduced lymphedema risk, infection risk, and/or exacerbation of condition.  (GOAL MET)  3. Patient or caregiver will perform self-bandaging techniques and/or wearing of compression garments to allow for lymphatic drainage support, skin elasticity, and reduction in shape and size of limb. (GOAL MET)  4. Patient will perform self lymph drainage techniques to  areas within reach to enhance lymphatic drainage and skin condition.  (progressing, not met)  5. Patient will tolerate daily activities with multilayered bandaging to allow for lymphatic and venous support.  (GOAL MET)  6. Patient will receive the new compression wraps for each leg and verbalize understanding of how to re-order wraps in the future (new goal)     Long Term Goals: (12  weeks)  1. Patient will show decreased girth in bilateral lower extremities  by up to 10-15 cm  to allow for LE symmetry, shoe and clothing choice, and ability to apply needed compression daily.  (progressing, not met)  2. Patient will show reduction in density to mild or less with improved contour of limb to allow for cosmesis, LE symmetry, infection risk reduction, and clothing and compression choice.   (GOAL MET)  3. Patient to william/doff compression garment with daily compliance to assist in lymphedema management, skin elasticity, and tissue density.  (GOAL MET)  4. Pt to show improved postural awareness and alignment.  (GOAL MET)  5. Pt to be I and compliant with HEP to allow for increased function in affected limb.   (GOAL MET)        Long Term Goal Status:   continue per initial plan of care.  Reasons for Recertification of Therapy:   Pt will continue to benefit from skilled outpatient physical therapy to address the deficits listed in the problem list box on initial evaluation, provide pt/family education and to maximize pt's level of independence in the home and community environment.      Plan      Update Certification Period: 11/29/2022 to 2/21/2022  Recommended Treatment Plan: 1 time every 3 weeks for 12 weeks: Manual Therapy, Patient Education, Self Care, Therapeutic Exercise, vaso-pneumatic compression and Therapeutic Activites     Mckenna Omer, PT, BERNABE        I CERTIFY THE NEED FOR THESE SERVICES FURNISHED UNDER THIS PLAN OF TREATMENT AND WHILE UNDER MY CARE     Physician's comments:           Physician's  Signature: ___________________________________________________

## 2022-11-29 NOTE — PROGRESS NOTES
Physical Therapy Treatment Note     Name: Stepan BONNER [facility] Number: [id number]    Therapy Diagnosis:   Encounter Diagnosis   Name Primary?    Lymphedema Yes         Physician: Vangie Cuevas DPM    Visit Date: 11/29/2022    Physician Orders: PT Eval and Treat   Medical Diagnosis from Referral: lymphedema  Evaluation Date: 6/1/2022  Authorization Period Expiration: 12/31/2022  Plan of Care Expiration: 2/21/2023  Visit # / Visits authorized: 5/ 20    Time In: 1020 am  Time Out: 1130 pm  Total Billable Time: 40 minutes; 25 minutes untimed    Precautions: Standard and visual deficit    Subjective     Pt reports: the velcro on the zipper on the wraps is not holding; he will need to order more wraps to compress his legs. He has not been wearing one of the wraps due to velcro loosing its effectiveness  He was compliant with home exercise program.  Response to previous treatment: good  Functional change: able to lift legs with less difficulty; able to get on/off floor; able to  leg and cross it over the other leg    Pain: 0/10  Location:  No pain     Objective   No FOTO - consult only; visually impaired    Stepan received self care/home management and manual therapy x (40) minutes including:     Intervention Performed Today    Cut to fit left lower leg velcro wrap x    Patient instruction on donning and doffing of lower leg wraps and ankle wraps x  Using extra pieces of velcro to reinforce areas wear the velcro does not stick   Measured bilateral lower extremities  x Increase in size due to problems with velcro on the wraps   Faxed [employer] to order two Sigvaris Compriflex Complete wraps (beige) x One Tall large and one Tall XL to be delivered to his house in [place], [place]     Plan for Next Visit: follow up in 3 weeks         Stepan received the following supervised modalities after being cleared for contradictions: Vasopneumatic compression for (25) minutes without adverse effects; pump used today to  push fluid more evenly for measurments      Home Exercises Provided and Patient Education Provided     Education provided:   - donning and doffing of bilateral lower leg and foot/ankle wraps. The foot/ankle wrap for the left foot did not fit well so extra velcro pieces were used to attach straps    Home instruction Provided: yes.  Exercises were reviewed and Stepan was able to demonstrate them prior to the end of the session.  Stepan demonstrated good  understanding of the education provided.     See EMR under Patient Instructions for exercises provided 7/13/2022. (nothing written provided)    Assessment   Stepan is progressing well with physical therapy, with no complaints of pain or discomfort and inconsistent improvements noted in size of legs and elasticity of skin of both legs since initial evaluation; please see exam for details. Stepan continues to have difficulty with swelling in both legs, due to impaired veins and lymphatics. He now has  a compression pump at home which is helping him manage the swelling. He now needs two new velcro wraps due to his existing wraps not holding anymore. The recommended wraps are the Sigvaris Compriflex Complete (does not have a zipper attachment and is not a cut to fit).   The above impairments and functional deficits will continue to be addressed over the course of this plan of care. Stepan was educated on continued progression of PT, expectations for the duration of care, updates on goals set at initial evaluation, and home exercise program.  Stepan will continue to benefit from physical therapy in order to further address goals, maximize function and quality of life.      Stepan Is progressing well towards his goals.   Pt prognosis is Excellent.     Pt will continue to benefit from skilled outpatient physical therapy to address the deficits listed in the problem list box on initial evaluation, provide pt/family education and to maximize pt's level of independence  in the home and community environment.     Pt's spiritual, cultural and educational needs considered and pt agreeable to plan of care and goals.     Anticipated barriers to physical therapy: patient lives in [facility] and relies on public transportation    Goals:     Short Term Goals: (5 weeks)  1. Patient will show decreased girth in bilateral lower extremities  by up to 8 cm to allow for LE symmetry, shoe and clothing choice, and ability to apply needed compression.  (GOAL MET)  2. Patient will demonstrate 100% knowledge of lymphedema precautions and signs of infection to allow for reduced lymphedema risk, infection risk, and/or exacerbation of condition.  (GOAL MET)  3. Patient or caregiver will perform self-bandaging techniques and/or wearing of compression garments to allow for lymphatic drainage support, skin elasticity, and reduction in shape and size of limb. (GOAL MET)  4. Patient will perform self lymph drainage techniques to areas within reach to enhance lymphatic drainage and skin condition.  (progressing, not met)  5. Patient will tolerate daily activities with multilayered bandaging to allow for lymphatic and venous support.  (GOAL MET)   6. Patient will receive the new compression wraps for each leg and verbalize understanding of how to re-order wraps in the future (new goal)    Long Term Goals: (10  weeks)  1. Patient will show decreased girth in bilateral lower extremities  by up to 10-15 cm  to allow for LE symmetry, shoe and clothing choice, and ability to apply needed compression daily.  (progressing, not met)  2. Patient will show reduction in density to mild or less with improved contour of limb to allow for cosmesis, LE symmetry, infection risk reduction, and clothing and compression choice.   (GOAL MET)  3. Patient to william/doff compression garment with daily compliance to assist in lymphedema management, skin elasticity, and tissue density.  (GOAL MET)  4. Pt to show improved postural  awareness and alignment.  (GOAL MET)  5. Pt to be I and compliant with HEP to allow for increased function in affected limb.   (GOAL MET)        Plan   Plan of care Certification:11/29/2022 to 2/21/2023     Outpatient Physical Therapy 1 time every 3-4 weeks x 12 weeks to include the following interventions: Manual Therapy, Patient Education, Self Care, Therapeutic Exercise and vaso-pneumatic compression. Complete Decongestive Therapy- compression and home equipment needs to be addressed and assisted    Mckenna Omer, PT, [facility]

## 2022-12-12 PROBLEM — N18.30 ACUTE RENAL FAILURE SUPERIMPOSED ON STAGE 3 CHRONIC KIDNEY DISEASE: Status: RESOLVED | Noted: 2022-09-03 | Resolved: 2022-12-12

## 2022-12-12 PROBLEM — N17.9 ACUTE RENAL FAILURE SUPERIMPOSED ON STAGE 3 CHRONIC KIDNEY DISEASE: Status: RESOLVED | Noted: 2022-09-03 | Resolved: 2022-12-12

## 2022-12-20 ENCOUNTER — OFFICE VISIT (OUTPATIENT)
Dept: OPHTHALMOLOGY | Facility: CLINIC | Age: 67
End: 2022-12-20
Payer: MEDICARE

## 2022-12-20 DIAGNOSIS — H54.10 BLINDNESS AND LOW VISION: ICD-10-CM

## 2022-12-20 DIAGNOSIS — G51.0 LEFT-SIDED BELL'S PALSY: ICD-10-CM

## 2022-12-20 DIAGNOSIS — R80.9 TYPE 2 DIABETES MELLITUS WITH MICROALBUMINURIA, WITH LONG-TERM CURRENT USE OF INSULIN: ICD-10-CM

## 2022-12-20 DIAGNOSIS — E11.29 TYPE 2 DIABETES MELLITUS WITH MICROALBUMINURIA, WITH LONG-TERM CURRENT USE OF INSULIN: ICD-10-CM

## 2022-12-20 DIAGNOSIS — H35.372 EPIRETINAL MEMBRANE (ERM) OF LEFT EYE: ICD-10-CM

## 2022-12-20 DIAGNOSIS — H40.1133 PRIMARY OPEN ANGLE GLAUCOMA OF BOTH EYES, SEVERE STAGE: Primary | ICD-10-CM

## 2022-12-20 DIAGNOSIS — Z79.4 TYPE 2 DIABETES MELLITUS WITH MICROALBUMINURIA, WITH LONG-TERM CURRENT USE OF INSULIN: ICD-10-CM

## 2022-12-20 DIAGNOSIS — Z91.199 NON COMPLIANCE WITH MEDICAL TREATMENT: ICD-10-CM

## 2022-12-20 PROCEDURE — 99214 PR OFFICE/OUTPT VISIT, EST, LEVL IV, 30-39 MIN: ICD-10-PCS | Mod: S$PBB,,, | Performed by: OPHTHALMOLOGY

## 2022-12-20 PROCEDURE — 99214 OFFICE O/P EST MOD 30 MIN: CPT | Mod: PBBFAC | Performed by: OPHTHALMOLOGY

## 2022-12-20 PROCEDURE — 99999 PR PBB SHADOW E&M-EST. PATIENT-LVL IV: ICD-10-PCS | Mod: PBBFAC,,, | Performed by: OPHTHALMOLOGY

## 2022-12-20 PROCEDURE — 99214 OFFICE O/P EST MOD 30 MIN: CPT | Mod: S$PBB,,, | Performed by: OPHTHALMOLOGY

## 2022-12-20 PROCEDURE — 99999 PR PBB SHADOW E&M-EST. PATIENT-LVL IV: CPT | Mod: PBBFAC,,, | Performed by: OPHTHALMOLOGY

## 2022-12-20 RX ORDER — NAPROXEN SODIUM 220 MG/1
81 TABLET, FILM COATED ORAL DAILY
COMMUNITY
End: 2024-01-22

## 2022-12-20 NOTE — PROGRESS NOTES
HPI     Glaucoma            Comments: 3M Dil          Comments    Patient states that he had stroke in beginning of Sept, lost vision in OS   for several hours before returning - had skin cancer removed from forehead   about 6 wks ago and 2 wks later he got Luana Palsy - hasn't been able to   use Glaucoma drops because they are causing extreme dryness OS > OD -   using Refresh OU several times a day - [place] in   [place] - if needs to be seen again before summer, he can come back to    or return to [facility] - Dr Burgess pt   Corneal Opacity   Luana palsy   DM   RD Repair with Scleral Buckle right eye   PCIOL OU   CANALOPLASTY OD 8-22-13 Good flow and tension(-900)   CANAL OS 03/20/14/LOT # 1306-01/REF # [id number]   -500 with shutter effect due to much intraop respiratory movement   Moderate flow with good tension       OU: Azopt BID & Alphagan BID  Rhopressa QD OU            Last edited by Paloma Robison MA on 12/20/2022  9:40 AM.            Assessment /Plan     For exam results, see Encounter Report.      ICD-10-CM ICD-9-CM    1. Primary open angle glaucoma of both eyes, severe stage  H40.1133 365.11 Non compliant with meds   Increased IOP - encourage compliance      365.73       2. Left-sided Bell's palsy  G51.0 351.0 Left side   Use tears and gel drops   Encourage forced blinking to avoid cornea/ dryness issues   Can try taping lid - pt was shown today how to tape lid into position until bells improved       3. Epiretinal membrane (ERM) of left eye  H35.372 362.56 Follow       4. Blindness and low vision  H54.10 369.9       5. Non compliance with medical treatment  Z91.199 V15.81 Intraocular pressure is not within an acceptable range relative to target pressure. Encourage compliance         6. Type 2 diabetes mellitus with microalbuminuria, with long-term current use of insulin  E11.29 250.40 Defers dilation today due to     R80.9 791.0     Z79.4 V58.67           OU:  Azopt BID & Alphagan BID  Rhopressa QD OU    RETURN TO CLINIC 4-6 weeks Angélica CAROLINA

## 2022-12-27 ENCOUNTER — CLINICAL SUPPORT (OUTPATIENT)
Dept: REHABILITATION | Facility: HOSPITAL | Age: 67
End: 2022-12-27
Payer: MEDICARE

## 2022-12-27 DIAGNOSIS — I89.0 LYMPHEDEMA: Primary | ICD-10-CM

## 2022-12-27 PROCEDURE — 97140 MANUAL THERAPY 1/> REGIONS: CPT | Mod: PN

## 2022-12-27 PROCEDURE — 97535 SELF CARE MNGMENT TRAINING: CPT | Mod: PN

## 2022-12-27 NOTE — PROGRESS NOTES
Physical Therapy Treatment Note     Name: Stepan Davies [facility] Number: [id number]    Therapy Diagnosis:   Encounter Diagnosis   Name Primary?    Lymphedema Yes       Physician: Vangie Cuevas DPM    Visit Date: 12/27/2022    Physician Orders: PT Eval and Treat   Medical Diagnosis from Referral: lymphedema  Evaluation Date: 6/1/2022  Visit # / Visits authorized: 6/20    Time In: 0805 am  Time Out: 0900 am  Total Billable Time: 55 minutes    Precautions: Standard and visual deficit    Subjective     Pt reports: received the new velcro calf wraps and have been using them daily. Continues to use the pump at home which is helping the swelling. Does not moisturize legs  He was compliant with home exercise program.  Response to previous treatment: good  Functional change: able to lift legs with less difficulty; able to get on/off floor; able to  leg and cross it over the other leg    Pain: 0/10  Location:  No pain     Objective   No FOTO - consult only; visually impaired       STAGE II secondary Lymphedema (phlebolymphedema)  Amount of Swelling: moderate left, mild right (increase in size since last visit due to velcro zippers not holding and [place] not wearing the wraps for a few days because of the velcro issue)  Location of Swelling: bilateral lower extremities    Skin Integrity: rubor with dependency; no skin lesions; no lobule present); very dry skin  Palpation/Texture: soft, non-pitting, good skin elasticity right leg and proximal left leg  Circulation: impaired venous flow (hx)  Posture: fair  Flexibility: good motions of both hips    Girth Measurements (in centimeters)      [place] LEFT LE  6/1/2022 RIGHT LE  6/1/2022 DIFF   at eval   20 cm below SBP 66 cm 61.5 cm 4.5 cm   30 cm below SBP 70 cm 63 cm 7 cm   40 cm below SBP 68 cm 52 cm 16 cm   Ankle 44.5 cm 41 cm 2.5 cm   Forefoot 32.5 cm 31.5 cm 1 cm      [place] LEFT LE  8/24/2022 RIGHT LE  8/24/2022     20 cm below SBP 51.5 cm 53 cm     30 cm  below SBP 51.5 cm 50.5 cm     40 cm below SBP 44 cm 40.5 cm     Ankle 38 cm 36 cm     Forefoot 30 cm 28.5 cm        [place]MARK LEFT LE  11/29/2022 RIGHT LE  11/29/2022     20 cm below SBP 55 cm 56 cm     30 cm below SBP 52.5 cm 56 cm     40 cm below SBP 55 cm 50.5 cm     Ankle 40.5 cm 38 cm     Forefoot 32 cm 29.5 cm           [place] LEFT LE  12/27/2022 RIGHT LE  12/27/2022     20 cm below SBP 51.5 cm 54 cm     30 cm below SBP 50 cm 50.5 cm     40 cm below SBP 42 cm 38.5 cm     Ankle 37.5 cm 36.5 cm     Forefoot 29 cm 29 cm         Stepan received self care/home management  (45 minutes) and manual therapy (10 minutes) for (55 total) minutes including:     Intervention Performed Today    Patient instruction on donning and doffing of lower leg wraps and ankle wraps x  Recommended putting ankle/foot wrap on first and then donning the calf wraps and making sure that the calf wrap straps and ankle wraps straps touch (slightly overlap each other)   Measured bilateral lower extremities  x Decrease in size both legs, ankles; improved skin elasticity    Skin care provided with training of Manual Lymph Drainage of lower legs/ankles/feet x Emphasized good skin care and to incorporate light stretching of the skin of lower legs to improve lymphatic drainage.     Plan for Next Visit: follow up in 3 weeks         Stepan received the following supervised modalities after being cleared for contradictions: Vasopneumatic compression for (--) minutes without adverse effects; pump used today to push fluid more evenly for measurments      Home Exercises Provided and Patient Education Provided     Education provided:   - SEE ABOVE SELF CARE/HOME MANAGEMENT SECTION    Assessment   Stepan received his new compression wraps and he is managing his swelling in his legs and feet independently. He was instructed to make sure that his calf wrap straps meets the ankle wrap straps. He was instructed to start moisturizing both legs and feet  during the week to address dry skin.     Goals:     Short Term Goals: (5 weeks)  1. Patient will show decreased girth in bilateral lower extremities  by up to 8 cm to allow for LE symmetry, shoe and clothing choice, and ability to apply needed compression.  (GOAL MET)  2. Patient will demonstrate 100% knowledge of lymphedema precautions and signs of infection to allow for reduced lymphedema risk, infection risk, and/or exacerbation of condition.  (GOAL MET)  3. Patient or caregiver will perform self-bandaging techniques and/or wearing of compression garments to allow for lymphatic drainage support, skin elasticity, and reduction in shape and size of limb. (GOAL MET)  4. Patient will perform self lymph drainage techniques to areas within reach to enhance lymphatic drainage and skin condition.  (progressing, not met)  5. Patient will tolerate daily activities with multilayered bandaging to allow for lymphatic and venous support.  (GOAL MET)   6. Patient will receive the new compression wraps for each leg and verbalize understanding of how to re-order wraps in the future (GOAL MET)    Long Term Goals: (10  weeks)  1. Patient will show decreased girth in bilateral lower extremities  by up to 10-15 cm  to allow for LE symmetry, shoe and clothing choice, and ability to apply needed compression daily.  (GOAL MET)  2. Patient will show reduction in density to mild or less with improved contour of limb to allow for cosmesis, LE symmetry, infection risk reduction, and clothing and compression choice.   (GOAL MET)  3. Patient to william/doff compression garment with daily compliance to assist in lymphedema management, skin elasticity, and tissue density.  (GOAL MET)  4. Pt to show improved postural awareness and alignment.  (GOAL MET)  5. Pt to be I and compliant with HEP to allow for increased function in affected limb.   (GOAL MET)        Plan   DISCHARGE FROM PT/LYMPHEDEMA THERAPY DUE TO GOALS MET    Mckenna Omer, PT,  [facility]

## 2022-12-30 DIAGNOSIS — Z79.4 UNCONTROLLED TYPE 2 DIABETES MELLITUS WITH HYPERGLYCEMIA, WITH LONG-TERM CURRENT USE OF INSULIN: ICD-10-CM

## 2022-12-30 DIAGNOSIS — E11.65 UNCONTROLLED TYPE 2 DIABETES MELLITUS WITH HYPERGLYCEMIA, WITH LONG-TERM CURRENT USE OF INSULIN: ICD-10-CM

## 2022-12-30 RX ORDER — FLASH GLUCOSE SENSOR
1 KIT MISCELLANEOUS
Qty: 6 KIT | Refills: 3 | Status: SHIPPED | OUTPATIENT
Start: 2022-12-30 | End: 2023-12-30

## 2023-01-12 ENCOUNTER — OFFICE VISIT (OUTPATIENT)
Dept: CARDIOLOGY | Facility: CLINIC | Age: 68
End: 2023-01-12
Payer: MEDICARE

## 2023-01-12 VITALS
HEART RATE: 91 BPM | SYSTOLIC BLOOD PRESSURE: 180 MMHG | BODY MASS INDEX: 36.45 KG/M2 | OXYGEN SATURATION: 99 % | HEIGHT: 78 IN | WEIGHT: 315 LBS | DIASTOLIC BLOOD PRESSURE: 95 MMHG

## 2023-01-12 DIAGNOSIS — E11.59 HYPERTENSION ASSOCIATED WITH DIABETES: Primary | Chronic | ICD-10-CM

## 2023-01-12 DIAGNOSIS — Z79.4 TYPE 2 DIABETES MELLITUS WITH DIABETIC POLYNEUROPATHY, WITH LONG-TERM CURRENT USE OF INSULIN: Chronic | ICD-10-CM

## 2023-01-12 DIAGNOSIS — E78.5 HYPERLIPIDEMIA ASSOCIATED WITH TYPE 2 DIABETES MELLITUS: ICD-10-CM

## 2023-01-12 DIAGNOSIS — E11.69 HYPERLIPIDEMIA ASSOCIATED WITH TYPE 2 DIABETES MELLITUS: ICD-10-CM

## 2023-01-12 DIAGNOSIS — I15.2 HYPERTENSION ASSOCIATED WITH DIABETES: Primary | Chronic | ICD-10-CM

## 2023-01-12 DIAGNOSIS — E66.01 CLASS 3 SEVERE OBESITY DUE TO EXCESS CALORIES WITH SERIOUS COMORBIDITY AND BODY MASS INDEX (BMI) OF 45.0 TO 49.9 IN ADULT: Chronic | ICD-10-CM

## 2023-01-12 DIAGNOSIS — I25.10 CORONARY ARTERY DISEASE INVOLVING NATIVE CORONARY ARTERY OF NATIVE HEART WITHOUT ANGINA PECTORIS: Chronic | ICD-10-CM

## 2023-01-12 DIAGNOSIS — I73.9 PVD (PERIPHERAL VASCULAR DISEASE): ICD-10-CM

## 2023-01-12 DIAGNOSIS — E11.42 TYPE 2 DIABETES MELLITUS WITH DIABETIC POLYNEUROPATHY, WITH LONG-TERM CURRENT USE OF INSULIN: Chronic | ICD-10-CM

## 2023-01-12 PROCEDURE — 99214 OFFICE O/P EST MOD 30 MIN: CPT | Mod: S$PBB,,, | Performed by: INTERNAL MEDICINE

## 2023-01-12 PROCEDURE — 99999 PR PBB SHADOW E&M-EST. PATIENT-LVL IV: CPT | Mod: PBBFAC,,, | Performed by: INTERNAL MEDICINE

## 2023-01-12 PROCEDURE — 99214 PR OFFICE/OUTPT VISIT, EST, LEVL IV, 30-39 MIN: ICD-10-PCS | Mod: S$PBB,,, | Performed by: INTERNAL MEDICINE

## 2023-01-12 PROCEDURE — 99214 OFFICE O/P EST MOD 30 MIN: CPT | Mod: PBBFAC | Performed by: INTERNAL MEDICINE

## 2023-01-12 PROCEDURE — 99999 PR PBB SHADOW E&M-EST. PATIENT-LVL IV: ICD-10-PCS | Mod: PBBFAC,,, | Performed by: INTERNAL MEDICINE

## 2023-01-12 RX ORDER — LOSARTAN POTASSIUM 100 MG/1
100 TABLET ORAL DAILY
Qty: 90 TABLET | Refills: 2 | Status: SHIPPED | OUTPATIENT
Start: 2023-01-12 | End: 2023-10-09

## 2023-01-12 RX ORDER — CARVEDILOL 12.5 MG/1
12.5 TABLET ORAL 2 TIMES DAILY
Qty: 180 TABLET | Refills: 2 | Status: ON HOLD | OUTPATIENT
Start: 2023-01-12 | End: 2023-06-03 | Stop reason: SDUPTHER

## 2023-01-12 NOTE — PROGRESS NOTES
Subjective:   Patient ID:  Stepan Springer is a 67 y.o. male who presents for follow up of No chief complaint on file.      66 yo male, came in for hospital discharge f/u. Open a tax office by himself.  PMH CAD h/o NSTEMI, s/p PCI midRCA SABINO x2 in 2016 by Dr. Parekh, midLAD 40%. LBBB, DM x 15 yrs, h/o vision TIA in , CKD stage III, HTN, HLD obesity, PVD/lymphoedema FRAN not tolerate CPAP. Left leg weakness cane dependent No smoking. Occasional drinking  H/o COVID 19 twice infection in  and positive in   ECHO normal EF and mild LVH   NUKE stress test no ischemia, reviewed in the office  EKG  NSR and LBBB  LAWSON chronic walking from the parking to the front. Worse at summer. In cold weather could walk a mile   No chest pain/chest pressure  Both shoulder pain while walking  Occasional dizziness and imbalance.  Sleeps on 1 pillow., good appetite. Lost 30 pounds in 1 yr  03/2020 fell and admitted to [facility]. Had left leg injury  BP high and A1c 12. LDL controlled. Started vegetable and mild.     07/2021 visit  Fell 2 weeks ago and the pain improved,  /120 today, no headache vision change dizziness.  No chest pain dyspnea and faint  C/o leg swelling worse and now on lasix 40 mg bid    08/2021 visit   Leg swelling improved after increased lasix to 80 mg bid for a week and kept on 60 mg bid. BMP pending now  BP high 174/112 mmHG    09/2021 visit  BP better controlled. The leg swelling and could not find appropriated size compression sock.  Left leg swelling below the knee.     visit  No dizziness and fall since . Leg swelling stable.   Chronic dizziness when standign up quickly. No faint and syncope. No PND and orthopnea  Could not tolerate CPAP     visit  Will start Lymphedema Rx soon. Recheck BP in the office controlled. No dizziness faint. SOB stable. EKG showed NSR and chronic LBBB     visit  09/2022 admitted for left eye vision temporarily  "lose and Dx of TIA. Related to Dehydration and bradycardia. Decreased coreg dose due to bradycardia. Cr up to 3.4 and now back to baseline 1.7. CTA head and neck mild to mod Dz   admitted for uncontrolled HTN /109 mmHG, missed the med one time before the admission  Has been on compression socks for 2 months and lost weight 40 pounds  Now vision ok. BP controlled cr 1.8. Now held HCTZ and Lisinopril   Could not afford Ozempic.     Interval history  Right side bells palsy. Weight loss 8 lbs in 3 months. BP high, on lymphedema wrap   echo normal EF and LVH; BARDY rare arrhythmia                      Past Medical History:   Diagnosis Date    Anxiety     Bell's palsy     CHF (congestive heart failure)     Chronic kidney disease, stage 3a 11/02/2021    Coronary artery disease involving native coronary artery without angina pectoris 10/18/2016    Depression     Diabetes mellitus     Glaucoma     Hypertension     Idiopathic peripheral neuropathy 12/11/2012    Lymphedema of both lower extremities     Malignant melanoma of skin of forehead 10/13/2022    Mixed hyperlipidemia 12/11/2012    Morbid obesity with BMI of 45.0-49.9, adult 02/12/2019    Pancytopenia     Sleep apnea     "unable to use"    Stage 3b chronic kidney disease     Type 2 diabetes mellitus with diabetic polyneuropathy, with long-term current use of insulin 12/11/2012    Vitamin B 12 deficiency 08/23/2012       Past Surgical History:   Procedure Laterality Date    CARDIAC CATHETERIZATION  7/22/13    non obstructive cad    CATARACT EXTRACTION  OU    CLOSURE OF WOUND Left 10/14/2022    Procedure: CLOSURE, WOUND;  Surgeon: Jovita Ceballos MD;  Location: [facility] OR;  Service: ENT;  Laterality: Left;  closure of forehead    COLONOSCOPY N/A 5/1/2019    Procedure: COLONOSCOPY;  Surgeon: Henry Mo III, MD;  Location: [facility] ENDO;  Service: Endoscopy;  Laterality: N/A;    CORONARY ANGIOPLASTY      ESOPHAGOGASTRODUODENOSCOPY N/A 5/1/2019    " "Procedure: ESOPHAGOGASTRODUODENOSCOPY (EGD);  Surgeon: Henry Mo III, MD;  Location: [facility] ENDO;  Service: Endoscopy;  Laterality: N/A;    EXCISION OF MELANOMA Left 10/7/2022    Procedure: EXCISION, MELANOMA;  Surgeon: Jovita Ceballos MD;  Location: [facility] OR;  Service: ENT;  Laterality: Left;    EYE SURGERY      FRACTURE SURGERY      kidney stone       August 2016    PC IOL OU      RETINAL DETACHMENT REPAIR W/ SCLERAL BUCKLE LE      WRIST SURGERY         Social History     Tobacco Use    Smoking status: Never    Smokeless tobacco: Never   Substance Use Topics    Alcohol use: Yes     Comment: " one to two glasses of wine per year"    Drug use: No       Family History   Problem Relation Age of Onset    Macular degeneration Father     Heart disease Father         CHF     Heart attack Father     Hypertension Mother     Macular degeneration Paternal Uncle     Hypertension Maternal Grandmother     Hypertension Maternal Grandfather     Strabismus Neg Hx     Retinal detachment Neg Hx     Glaucoma Neg Hx     Blindness Neg Hx     Amblyopia Neg Hx          Review of Systems   Constitutional: Negative for decreased appetite, diaphoresis, fever, malaise/fatigue and night sweats.   HENT:  Negative for nosebleeds.    Eyes:  Negative for blurred vision and double vision.   Cardiovascular:  Positive for dyspnea on exertion and leg swelling. Negative for chest pain, claudication, irregular heartbeat, near-syncope, orthopnea, palpitations, paroxysmal nocturnal dyspnea and syncope.   Respiratory:  Negative for cough, shortness of breath, sleep disturbances due to breathing, snoring, sputum production and wheezing.    Endocrine: Negative for cold intolerance and polyuria.   Hematologic/Lymphatic: Does not bruise/bleed easily.   Skin:  Negative for rash.   Musculoskeletal:  Positive for joint pain and muscle weakness. Negative for back pain, falls, joint swelling and neck pain.   Gastrointestinal:  Negative for abdominal pain, " heartburn, nausea and vomiting.   Genitourinary:  Negative for dysuria, frequency and hematuria.   Neurological:  Positive for dizziness. Negative for difficulty with concentration, focal weakness, headaches, light-headedness, numbness, seizures and weakness.   Psychiatric/Behavioral:  Negative for depression, memory loss and substance abuse. The patient does not have insomnia.    Allergic/Immunologic: Negative for HIV exposure and hives.     Objective:   Physical Exam  HENT:      Head: Normocephalic.   Eyes:      Pupils: Pupils are equal, round, and reactive to light.   Neck:      Thyroid: No thyromegaly.      Vascular: Normal carotid pulses. No carotid bruit or JVD.   Cardiovascular:      Rate and Rhythm: Normal rate and regular rhythm. No extrasystoles are present.     Chest Wall: PMI is not displaced.      Pulses: Normal pulses.      Heart sounds: Normal heart sounds. No murmur heard.    No gallop. No S3 sounds.   Pulmonary:      Effort: No respiratory distress.      Breath sounds: Normal breath sounds. No stridor.   Abdominal:      General: Bowel sounds are normal.      Palpations: Abdomen is soft.      Tenderness: There is no abdominal tenderness. There is no rebound.   Musculoskeletal:         General: Swelling (L >>R) and deformity present.   Skin:     Findings: No rash.   Neurological:      Mental Status: He is alert and oriented to person, place, and time.   Psychiatric:         Behavior: Behavior normal.       Lab Results   Component Value Date    CHOL 100 (L) 09/04/2022    CHOL 177 07/07/2022    CHOL 116 (L) 05/06/2021     Lab Results   Component Value Date    HDL 32 (L) 09/04/2022    HDL 40 07/07/2022    HDL 35 (L) 05/06/2021     Lab Results   Component Value Date    LDLCALC 44.4 (L) 09/04/2022    LDLCALC 100.8 07/07/2022    LDLCALC 48.8 (L) 05/06/2021     Lab Results   Component Value Date    TRIG 118 09/04/2022    TRIG 181 (H) 07/07/2022    TRIG 161 (H) 05/06/2021     Lab Results   Component Value  Date    CHOLHDL 32.0 09/04/2022    CHOLHDL 22.6 07/07/2022    CHOLHDL 30.2 05/06/2021       Chemistry        Component Value Date/Time     09/07/2022 0915    K 3.9 09/07/2022 0915     (H) 09/07/2022 0915    CO2 19 (L) 09/07/2022 0915    BUN 25 (H) 09/07/2022 0915    BUN 34 (A) 10/28/2013 0835    CREATININE 1.8 (H) 09/07/2022 0915    CREATININE 1.7 10/28/2013 0835     (H) 09/07/2022 0915        Component Value Date/Time    CALCIUM 8.8 09/07/2022 0915    ALKPHOS 86 09/05/2022 0454    AST 11 09/05/2022 0454    AST 14 10/28/2013 0835    ALT 15 09/05/2022 0454    BILITOT 0.5 09/05/2022 0454    ESTGFRAFRICA 55.7 (A) 07/07/2022 1224    EGFRNONAA 48.2 (A) 07/07/2022 1224          Lab Results   Component Value Date    HGBA1C 6.5 (H) 08/15/2022     Lab Results   Component Value Date    TSH 1.259 09/03/2022     Lab Results   Component Value Date    INR 1.1 09/04/2022    INR 1.0 09/03/2022    INR 1.2 09/20/2016     Lab Results   Component Value Date    WBC 4.94 09/07/2022    HGB 15.1 09/07/2022    HCT 44.2 09/07/2022    MCV 90 09/07/2022     (L) 09/07/2022     BMP  Sodium   Date Value Ref Range Status   09/07/2022 142 136 - 145 mmol/L Final     Potassium   Date Value Ref Range Status   09/07/2022 3.9 3.5 - 5.1 mmol/L Final     Chloride   Date Value Ref Range Status   09/07/2022 114 (H) 95 - 110 mmol/L Final     CO2   Date Value Ref Range Status   09/07/2022 19 (L) 23 - 29 mmol/L Final     BUN   Date Value Ref Range Status   09/07/2022 25 (H) 8 - 23 mg/dL Final   10/28/2013 34 (A) 4 - 21 mg/dL Final     Creatinine   Date Value Ref Range Status   09/07/2022 1.8 (H) 0.5 - 1.4 mg/dL Final   10/28/2013 1.7 mg/dL Final     Calcium   Date Value Ref Range Status   09/07/2022 8.8 8.7 - 10.5 mg/dL Final     Anion Gap   Date Value Ref Range Status   09/07/2022 9 8 - 16 mmol/L Final     eGFR if    Date Value Ref Range Status   07/07/2022 55.7 (A) >60 mL/min/1.73 m^2 Final     eGFR if non African  American   Date Value Ref Range Status   07/07/2022 48.2 (A) >60 mL/min/1.73 m^2 Final     Comment:     Calculation used to obtain the estimated glomerular filtration  rate (eGFR) is the CKD-EPI equation.        BNP  @LABRCNTIP(BNP,BNPTRIAGEBLO)@  @LABRCNTIP(troponini)@  CrCl cannot be calculated (Patient's most recent lab result is older than the maximum 7 days allowed.).  No results found in the last 24 hours.  No results found in the last 24 hours.  No results found in the last 24 hours.    Assessment:      1. Hypertension associated with diabetes    2. Coronary artery disease involving native coronary artery of native heart without angina pectoris    3. Hyperlipidemia associated with type 2 diabetes mellitus    4. PVD (peripheral vascular disease)    5. Type 2 diabetes mellitus with diabetic polyneuropathy, with long-term current use of insulin    6. Class 3 severe obesity due to excess calories with serious comorbidity and body mass index (BMI) of 45.0 to 49.9 in adult        Plan:   D/c metoprolol  Increase coreg 6.25 mg bid to 12.5 mg bid and losartan from 25 mg to 100 mg daily  Continue ASA 81 mg lifelong   Continue Amlodipine Clonidine and hydralazine  RTC in 3 m

## 2023-01-17 ENCOUNTER — OFFICE VISIT (OUTPATIENT)
Dept: DIABETES | Facility: CLINIC | Age: 68
End: 2023-01-17
Payer: MEDICARE

## 2023-01-17 DIAGNOSIS — E11.69 HYPERLIPIDEMIA ASSOCIATED WITH TYPE 2 DIABETES MELLITUS: ICD-10-CM

## 2023-01-17 DIAGNOSIS — Z79.4 TYPE 2 DIABETES MELLITUS WITH HYPERGLYCEMIA, WITH LONG-TERM CURRENT USE OF INSULIN: ICD-10-CM

## 2023-01-17 DIAGNOSIS — E66.01 MORBID OBESITY: ICD-10-CM

## 2023-01-17 DIAGNOSIS — I15.2 HYPERTENSION ASSOCIATED WITH DIABETES: ICD-10-CM

## 2023-01-17 DIAGNOSIS — E11.649 HYPOGLYCEMIA UNAWARENESS ASSOCIATED WITH TYPE 2 DIABETES MELLITUS: ICD-10-CM

## 2023-01-17 DIAGNOSIS — E78.5 HYPERLIPIDEMIA ASSOCIATED WITH TYPE 2 DIABETES MELLITUS: ICD-10-CM

## 2023-01-17 DIAGNOSIS — H54.10 BLINDNESS AND LOW VISION: ICD-10-CM

## 2023-01-17 DIAGNOSIS — G47.33 OSA (OBSTRUCTIVE SLEEP APNEA): ICD-10-CM

## 2023-01-17 DIAGNOSIS — I25.10 CORONARY ARTERY DISEASE INVOLVING NATIVE CORONARY ARTERY OF NATIVE HEART WITHOUT ANGINA PECTORIS: ICD-10-CM

## 2023-01-17 DIAGNOSIS — G60.9 IDIOPATHIC PERIPHERAL NEUROPATHY: ICD-10-CM

## 2023-01-17 DIAGNOSIS — N18.32 STAGE 3B CHRONIC KIDNEY DISEASE: ICD-10-CM

## 2023-01-17 DIAGNOSIS — E11.65 TYPE 2 DIABETES MELLITUS WITH HYPERGLYCEMIA, WITH LONG-TERM CURRENT USE OF INSULIN: ICD-10-CM

## 2023-01-17 DIAGNOSIS — Z53.21 PATIENT LEFT WITHOUT BEING SEEN: Primary | ICD-10-CM

## 2023-01-17 DIAGNOSIS — E11.59 HYPERTENSION ASSOCIATED WITH DIABETES: ICD-10-CM

## 2023-01-17 DIAGNOSIS — I50.9 CHF (NYHA CLASS III, ACC/AHA STAGE C): ICD-10-CM

## 2023-01-17 PROCEDURE — 99499 UNLISTED E&M SERVICE: CPT | Mod: 95,,, | Performed by: PHYSICIAN ASSISTANT

## 2023-01-17 PROCEDURE — 99499 NO LOS: ICD-10-PCS | Mod: 95,,, | Performed by: PHYSICIAN ASSISTANT

## 2023-01-17 NOTE — PROGRESS NOTES
Unable to reach patient for telephone visit. Need to reschedule with download scheduled. KIARA Millard PA-C  Diabetes Management

## 2023-01-19 ENCOUNTER — TELEPHONE (OUTPATIENT)
Dept: DIABETES | Facility: CLINIC | Age: 68
End: 2023-01-19
Payer: MEDICARE

## 2023-01-19 NOTE — TELEPHONE ENCOUNTER
Called pt no answer LVM ----- Message from Katie Olson sent at 1/18/2023 11:02 AM CST -----  Contact: Stepan Ying is calling in to see if he can scheduled for this week or next for an in office visit. Please call him at [phone]

## 2023-01-19 NOTE — TELEPHONE ENCOUNTER
cALLED PT NO ANSWER lvm ----- Message from Alem Pichardo sent at 1/17/2023 10:13 AM CST -----  Contact: Stepan Ying is calling to speak to the nurse regarding a appointment he missed today on 01/17 for a virtual visit. Patient stated he does not like the virtual appointment and would rather be seen in person. He can take a later appointment today for virtual if available, if not he would like in person. Please give him a call back at [phone]    Thanks  LJ

## 2023-01-20 ENCOUNTER — OFFICE VISIT (OUTPATIENT)
Dept: OTOLARYNGOLOGY | Facility: CLINIC | Age: 68
End: 2023-01-20
Payer: MEDICARE

## 2023-01-20 VITALS — BODY MASS INDEX: 45.77 KG/M2 | WEIGHT: 315 LBS

## 2023-01-20 DIAGNOSIS — D03.30 MELANOMA IN SITU OF FACE: ICD-10-CM

## 2023-01-20 DIAGNOSIS — Z09 POSTOP CHECK: ICD-10-CM

## 2023-01-20 DIAGNOSIS — G51.0 LEFT-SIDED BELL'S PALSY: Primary | ICD-10-CM

## 2023-01-20 PROCEDURE — 99214 OFFICE O/P EST MOD 30 MIN: CPT | Mod: S$PBB,,, | Performed by: OTOLARYNGOLOGY

## 2023-01-20 PROCEDURE — 99213 OFFICE O/P EST LOW 20 MIN: CPT | Mod: PBBFAC | Performed by: OTOLARYNGOLOGY

## 2023-01-20 PROCEDURE — 99214 PR OFFICE/OUTPT VISIT, EST, LEVL IV, 30-39 MIN: ICD-10-PCS | Mod: S$PBB,,, | Performed by: OTOLARYNGOLOGY

## 2023-01-20 PROCEDURE — 99999 PR PBB SHADOW E&M-EST. PATIENT-LVL III: CPT | Mod: PBBFAC,,, | Performed by: OTOLARYNGOLOGY

## 2023-01-20 PROCEDURE — 99999 PR PBB SHADOW E&M-EST. PATIENT-LVL III: ICD-10-PCS | Mod: PBBFAC,,, | Performed by: OTOLARYNGOLOGY

## 2023-01-25 ENCOUNTER — OFFICE VISIT (OUTPATIENT)
Dept: OPHTHALMOLOGY | Facility: CLINIC | Age: 68
End: 2023-01-25
Payer: MEDICARE

## 2023-01-25 DIAGNOSIS — R80.9 TYPE 2 DIABETES MELLITUS WITH MICROALBUMINURIA, WITH LONG-TERM CURRENT USE OF INSULIN: ICD-10-CM

## 2023-01-25 DIAGNOSIS — E11.29 TYPE 2 DIABETES MELLITUS WITH MICROALBUMINURIA, WITH LONG-TERM CURRENT USE OF INSULIN: ICD-10-CM

## 2023-01-25 DIAGNOSIS — G51.0 LEFT-SIDED BELL'S PALSY: ICD-10-CM

## 2023-01-25 DIAGNOSIS — H35.372 EPIRETINAL MEMBRANE (ERM) OF LEFT EYE: ICD-10-CM

## 2023-01-25 DIAGNOSIS — Z79.4 TYPE 2 DIABETES MELLITUS WITH MICROALBUMINURIA, WITH LONG-TERM CURRENT USE OF INSULIN: ICD-10-CM

## 2023-01-25 DIAGNOSIS — H40.1133 PRIMARY OPEN ANGLE GLAUCOMA OF BOTH EYES, SEVERE STAGE: Primary | ICD-10-CM

## 2023-01-25 PROCEDURE — 99999 PR PBB SHADOW E&M-EST. PATIENT-LVL IV: CPT | Mod: PBBFAC,,, | Performed by: OPHTHALMOLOGY

## 2023-01-25 PROCEDURE — 99214 PR OFFICE/OUTPT VISIT, EST, LEVL IV, 30-39 MIN: ICD-10-PCS | Mod: S$PBB,,, | Performed by: OPHTHALMOLOGY

## 2023-01-25 PROCEDURE — 99214 OFFICE O/P EST MOD 30 MIN: CPT | Mod: PBBFAC | Performed by: OPHTHALMOLOGY

## 2023-01-25 PROCEDURE — 99214 OFFICE O/P EST MOD 30 MIN: CPT | Mod: S$PBB,,, | Performed by: OPHTHALMOLOGY

## 2023-01-25 PROCEDURE — 99999 PR PBB SHADOW E&M-EST. PATIENT-LVL IV: ICD-10-PCS | Mod: PBBFAC,,, | Performed by: OPHTHALMOLOGY

## 2023-01-25 NOTE — PROGRESS NOTES
"History of Present Illness:   Stepan Springer is a 67 y.o. year old male evaluated on 1/25/2023, in the Otolaryngology-Head and Neck Surgery Clinic at [facility]. The patient is s/p closure of forehead defect after melanoma in-situ resection done on 10/14/2022.  Patient did well postop of note he also had Bell's palsy on presentation for his reconstruction.  He did a Medrol Dosepak with some elevation in his blood sugars.  He did not fill his valacyclovir.  He saw Ophthalmology since last visit with no corneal abrasions noted and he was told to continue to tape the eye with wetting drops as well.  He still reports no significant movement but some return in some tone.            Past Medical/Surgical History  Past Medical History:   Diagnosis Date    Anxiety     Bell's palsy     CHF (congestive heart failure)     Chronic kidney disease, stage 3a 11/02/2021    Coronary artery disease involving native coronary artery without angina pectoris 10/18/2016    Depression     Diabetes mellitus     Glaucoma     Hypertension     Idiopathic peripheral neuropathy 12/11/2012    Lymphedema of both lower extremities     Malignant melanoma of skin of forehead 10/13/2022    Mixed hyperlipidemia 12/11/2012    Morbid obesity with BMI of 45.0-49.9, adult 02/12/2019    Pancytopenia     Sleep apnea     "unable to use"    Stage 3b chronic kidney disease     Type 2 diabetes mellitus with diabetic polyneuropathy, with long-term current use of insulin 12/11/2012    Vitamin B 12 deficiency 08/23/2012     His  has a past surgical history that includes Retinal detachment repair w/ scleral buckle; PC IOL OU; Cataract extraction (OU); Wrist surgery; Eye surgery; Fracture surgery; Cardiac catheterization (7/22/13); kidney stone ; Esophagogastroduodenoscopy (N/A, 5/1/2019); Colonoscopy (N/A, 5/1/2019); Coronary angioplasty; Excision of melanoma (Left, 10/7/2022); and Closure of wound (Left, 10/14/2022).     Past Family/Social " History  His family history includes Heart attack in his father; Heart disease in his father; Hypertension in his maternal grandfather, maternal grandmother, and mother; Macular degeneration in his father and paternal uncle.  He  reports that he has never smoked. He has never used smokeless tobacco. He reports current alcohol use. He reports that he does not use drugs.     Medications/Allergies/Immunizations  His current medication(s) include:   Current Outpatient Medications   Medication Sig Dispense Refill    amLODIPine (NORVASC) 5 MG tablet Take 1 tablet (5 mg total) by mouth every evening. 90 tablet 1    aspirin 81 MG Chew Take 81 mg by mouth once daily.      atorvastatin (LIPITOR) 20 MG tablet Take 1 tablet (20 mg total) by mouth every evening. 90 tablet 1    brimonidine 0.1% (ALPHAGAN P) 0.1 % Drop Place 1 drop into both eyes 3 (three) times daily. Order 90 day supply 15 mL 4    brinzolamide (AZOPT) 1 % ophthalmic suspension Place 1 drop into both eyes 3 (three) times daily. Brand name only 15 mL 6    carvediloL (COREG) 12.5 MG tablet Take 1 tablet (12.5 mg total) by mouth 2 (two) times daily. 180 tablet 2    cloNIDine (CATAPRES) 0.1 MG tablet Take 1 tablet (0.1 mg total) by mouth 3 (three) times daily. 270 tablet 1    dextran 70-hypromellose (TEARS) ophthalmic solution Place 2 drops into the left eye every 4 (four) hours. 30 mL 11    empagliflozin (JARDIANCE) 25 mg tablet Take 1 tablet (25 mg total) by mouth once daily. 90 tablet 3    flash glucose sensor (FREESTYLE CLEMENCIA 14 DAY SENSOR) Kit 1 each by Misc.(Non-Drug; Combo Route) route every 14 (fourteen) days. 6 kit 3    furosemide (LASIX) 40 MG tablet Take 1 tablet (40 mg total) by mouth once daily. 270 tablet 1    hydrALAZINE (APRESOLINE) 100 MG tablet Take 1 tablet (100 mg total) by mouth every 8 (eight) hours. 270 tablet 1    insulin aspart U-100 (NOVOLOG) 100 unit/mL (3 mL) InPn pen Inject 25 Units into the skin 3 (three) times daily with meals. 69 mL 3  "   insulin glargine U-300 conc (TOUJEO MAX U-300 SOLOSTAR) 300 unit/mL (3 mL) insulin pen Inject 80 Units into the skin once daily. 8 pen 3    losartan (COZAAR) 100 MG tablet Take 1 tablet (100 mg total) by mouth once daily. 90 tablet 2    mupirocin (BACTROBAN) 2 % ointment Apply topically once daily. 30 g 0    netarsudiL (RHOPRESSA) 0.02 % ophthalmic solution Place 1 drop into both eyes once daily. 2.5 mL 6    nitroGLYCERIN (NITROSTAT) 0.4 MG SL tablet PLACE 1 TABLET (0.4 MG TOTAL) UNDER THE TONGUE EVERY 5 (FIVE) MINUTES AS NEEDED FOR CHEST PAIN. 20 tablet 0    pen needle, diabetic (BD ULTRA-FINE AMANDA PEN NEEDLE) 32 gauge x 5/32" Ndle 1 each by Misc.(Non-Drug; Combo Route) route 4 (four) times daily with meals and nightly. 300 each 3    valACYclovir (VALTREX) 1000 MG tablet Take 1 tablet (1,000 mg total) by mouth 2 (two) times daily. for 10 days 20 tablet 0     No current facility-administered medications for this visit.        Allergies: Cefazolin     Immunizations:   Immunization History   Administered Date(s) Administered    COVID-19, MRNA, LN-S, PF (MODERNA FULL 0.5 ML DOSE) 03/29/2021, 04/28/2021, 12/16/2021, 12/16/2021    Pneumococcal Conjugate - 20 Valent 10/06/2022    Pneumococcal Polysaccharide - 23 Valent 04/24/2014, 11/26/2015    Tdap 12/10/2013    Zoster Recombinant 02/22/2021, 05/17/2021         Review of Systems   Constitutional: Negative for fever, weight loss and weight gain.  Skin: Negative for rash, itchiness, dryness  HENT:  As per HPI  Cardiovascular: Negative for chest pain and dyspnea on exertion .   Respiratory: Is not experiencing shortness of breath.   Gastrointestinal: Negative for nausea and vomiting.   Neurological: Negative for headaches.   Lymph/Heme: Negative for lymphadenopathy or easy bruising  Musculoskeletal: Negative for joint or muscle pain  Psychiatric: The patient is not nervous/anxious.        All other systems are negative except for that listed in the HPI.      PHYSICAL " EXAM:   Vital Signs:  Wt (!) 184.3 kg (406 lb 4.9 oz)   BMI 45.77 kg/m²      General:  Well-developed, well-nourished  Communication and Voice:  Clear pitch and clarity  Hearing: Hearing adequate for verbal communication bilaterally   Inspection:  Normocephalic and atraumatic without mass or lesion left forehead incision healing very well  Palpation:  Facial skeleton intact without bony stepoffs  Parotid Glands:  No mass or tenderness  Facial Strength:  Left facial nerve still house Brackmann 4-5/6 with minimal movement of the forehead.  Right facial nerve intact  Pinna:  External ear intact and fully developed  External canal:  Canal is patent with intact skin  Tympanic Membrane:  Clear and mobile  External nose:  No scar or anatomic deformity  Internal Nose:  Septum intact and midline.  No edema, polyp, or rhinorrhea.  TMJ:  No pain to palpation with full mobility  Oral cavity, Lips, Teeth, and Gums:  Mucosa and teeth intact and viable, No lesions, masses or ulcers  Oropharynx: No erythema or exudate, no masses or ulcerations, non-obstructive tonsils  Nasopharynx:  No mass or lesion with intact mucosa  Hypopharynx:  Not well visualized secondary to gagging  Larynx:  Not well visualized secondary to gagging  Neck, Trachea, Lymphatics:  Midline trachea without mass or lesion, no lymphadenopathy  Thyroid:  No mass or nodularity  Eyes: No nystagmus with equal extraocular motion bilaterally good Gonzalez's phenomenon with still scleral show  Neuro/Psych/Balance: Patient oriented and appropriate in interaction;  Appropriate mood and affect;  Gait is intact with no imbalance; Cranial nerves I-XII are intact  Respiratory effort:  Equal inspiration and expiration without stridor  Peripheral Vascular:  Warm extremities with equal pulses     ASSESSMENT:   1. Left-sided Bell's palsy    2. Postop check    3. Melanoma in situ of face            PLAN:   I explained to the patient that the conservative route would be to go ahead  with placement of titanium wait at this time.  He has had slow recovery and I am not sure about how much movement he is going to get with possible synkinesis.  He is not been very compliant with eye care.  I offered him intervention versus continued observation however I urged him to be more consistent with taping eye and using artificial tears if he wants to continue to observe.  I explained to him if he still has opening next time that I have to schedule him for a titanium weight placement as well as tarsal strip      I believe that Mr. Springer has a good understanding of the issues involved and I answered all of his questions.     DISCLAIMER: This note was prepared with [employer] voice recognition transcription software. Garbled syntax, mangled pronouns, and other bizarre constructions may be attributed to that software system. While efforts were made to correct any mistakes made by this voice recognition program, some errors and/or omissions may remain in the note that were missed when the note was originally created.

## 2023-01-25 NOTE — PROGRESS NOTES
HPI     Glaucoma            Comments: 1 month COAG follow-up. [facility] has no changes. No pain or   irritation. Have not taken gtts since November due to bell-palsy but is   getting back to it last week.           Comments    POAG - Dr Burgess pt   Corneal Opacity   [place] palsy 10/2022 Left - was placed on steroids  DM   RD Repair with Scleral Buckle right eye   PCIOL OU   CANALOPLASTY OD 8-22-13 Good flow and tension(-900)   CANAL OS 03/20/14/LOT # 1306-01/REF # [id number]   -500 with shutter effect due to much intraop respiratory movement   Moderate flow with good tension       OU: Azopt BID & Alphagan BID  Rhopressa QD OU            Last edited by Carmine Myers on 1/25/2023  9:55 AM.            Assessment /Plan     For exam results, see Encounter Report.      ICD-10-CM ICD-9-CM    1. Primary open angle glaucoma of both eyes, severe stage  H40.1133 365.11 Doing well - intraocular pressure is within acceptable range relative to target pressure with no evidence of progression.   Continue current treatment.  Reviewed importance of continued compliance with treatment and follow up.      365.73       2. Type 2 diabetes mellitus with microalbuminuria, with long-term current use of insulin  E11.29 250.40 Diabetes with no diabetic retinopathy on dilated exam.   Reviewed diabetic eye precautions including excellent blood sugar control, and importance of regular follow up.     R80.9 791.0     Z79.4 V58.67       3. Left-sided Bell's palsy  G51.0 351.0 Saw Dr Ceballos 1/20/23, would not likely benefit from weight placement at this time. Profound ectropion and lag noted on exam. Recommend forced blinking to avoid further cornea issues. Patient states he tapes lid at night, though not on consistent basis.       4. Epiretinal membrane (ERM) of left eye  H35.372 362.56 Stable to previous, monitor           Return to clinic 2 months for IOP      OU: Azopt BID   OU: Alphagan BID  OU: Rhopressa QD

## 2023-01-30 DIAGNOSIS — E11.22 TYPE 2 DIABETES MELLITUS WITH CHRONIC KIDNEY DISEASE, WITH LONG-TERM CURRENT USE OF INSULIN, UNSPECIFIED CKD STAGE: ICD-10-CM

## 2023-01-30 DIAGNOSIS — Z79.4 TYPE 2 DIABETES MELLITUS WITH CHRONIC KIDNEY DISEASE, WITH LONG-TERM CURRENT USE OF INSULIN, UNSPECIFIED CKD STAGE: ICD-10-CM

## 2023-01-30 RX ORDER — INSULIN ASPART 100 [IU]/ML
25 INJECTION, SOLUTION INTRAVENOUS; SUBCUTANEOUS
Qty: 69 ML | Refills: 3 | Status: SHIPPED | OUTPATIENT
Start: 2023-01-30 | End: 2023-03-06 | Stop reason: SDUPTHER

## 2023-01-30 RX ORDER — INSULIN GLARGINE 300 U/ML
80 INJECTION, SOLUTION SUBCUTANEOUS DAILY
Qty: 8 PEN | Refills: 3 | Status: SHIPPED | OUTPATIENT
Start: 2023-01-30 | End: 2023-02-01

## 2023-01-31 DIAGNOSIS — E11.22 TYPE 2 DIABETES MELLITUS WITH CHRONIC KIDNEY DISEASE, WITH LONG-TERM CURRENT USE OF INSULIN, UNSPECIFIED CKD STAGE: ICD-10-CM

## 2023-01-31 DIAGNOSIS — Z79.4 TYPE 2 DIABETES MELLITUS WITH CHRONIC KIDNEY DISEASE, WITH LONG-TERM CURRENT USE OF INSULIN, UNSPECIFIED CKD STAGE: ICD-10-CM

## 2023-01-31 RX ORDER — INSULIN GLARGINE 100 [IU]/ML
INJECTION, SOLUTION SUBCUTANEOUS
Refills: 3 | OUTPATIENT
Start: 2023-01-31

## 2023-01-31 NOTE — TELEPHONE ENCOUNTER
Received refill request for Basaglar. Please ask pt if he is taking this. I thought he was taking Toujeo as his long acting insulin. If yes, how much.     Luís Millard PA-C  Diabetes Management

## 2023-02-01 RX ORDER — INSULIN GLARGINE 100 [IU]/ML
80 INJECTION, SOLUTION SUBCUTANEOUS DAILY
Qty: 72 ML | Refills: 3 | Status: SHIPPED | OUTPATIENT
Start: 2023-02-01 | End: 2023-03-06 | Stop reason: SDUPTHER

## 2023-02-01 NOTE — TELEPHONE ENCOUNTER
Pt said, he has never taken Tujeo. He had several bottles of the 70/30 , so that's what he was using until Oct/Nov. When he ran out at that time, he started Basaglar 80 units daily.

## 2023-02-17 NOTE — ASSESSMENT & PLAN NOTE
Patient's FSGs are uncontrolled due to hyperglycemia on current medication regimen.  Last A1c reviewed-   Lab Results   Component Value Date    HGBA1C 6.5 (H) 08/15/2022     Most recent fingerstick glucose reviewed-   Recent Labs   Lab 09/03/22 2021 09/03/22  2258 09/04/22  0617 09/04/22  1052   POCTGLUCOSE 182* 99 105 188*     Current correctional scale  Medium  Increase anti-hyperglycemic dose as follows-   Antihyperglycemics (From admission, onward)    Start     Stop Route Frequency Ordered    09/04/22 2100  insulin detemir U-100 pen 30 Units         -- SubQ Nightly 09/03/22 2331    09/04/22 0029  insulin aspart U-100 pen 1-10 Units         -- SubQ Every 6 hours PRN 09/03/22 2329        Hold Oral hypoglycemics while patient is in the hospital.   RN assumed cares at [id number]    Vitals: WDL, soft at baseline  Pain: Continues to report abd pain rating 4-8 with some relief from PRN and scheduled pain meds  Neuro: Developmentally delayed but able to make needs known. A&O x4. Family at bedside and attentive to pt  Cardiac: WDL  Respiratory: Satting 98% on RA  GI/: Continues with loose stools, reporting 2 today. Pt marks down how many times he has a BM on paper for staff  IV/Drains: Right port-a-cath infusing with D5NS at 125ml/hr until 2200. Blood return noted  Activity: Up independently  Skin: WDL, Pt declined full skin assessment but insisted he did not have any sores on bottom  Labs: On K+ and Mag protocol. K+3.4 and Mag 2.3. No replacement needed on either values. Next check in the AM.    Plan of Care:  Patient has been been afraid to try any solid food in fear of constant diarrhea and increased abd pain. Has been tolerating popsicles along with juice and ensure and also keely baby food brought in by family. Have tried to encourage pt many times to try solid food, even just some toast but patient continues to decline. Will continue to encourage solid food.

## 2023-02-20 ENCOUNTER — OFFICE VISIT (OUTPATIENT)
Dept: PODIATRY | Facility: CLINIC | Age: 68
End: 2023-02-20
Payer: MEDICARE

## 2023-02-20 VITALS — HEIGHT: 78 IN | WEIGHT: 315 LBS | BODY MASS INDEX: 36.45 KG/M2

## 2023-02-20 DIAGNOSIS — Z79.4 TYPE 2 DIABETES MELLITUS WITH DIABETIC POLYNEUROPATHY, WITH LONG-TERM CURRENT USE OF INSULIN: Primary | ICD-10-CM

## 2023-02-20 DIAGNOSIS — B35.1 DERMATOPHYTOSIS OF NAIL: ICD-10-CM

## 2023-02-20 DIAGNOSIS — E11.42 TYPE 2 DIABETES MELLITUS WITH DIABETIC POLYNEUROPATHY, WITH LONG-TERM CURRENT USE OF INSULIN: Primary | ICD-10-CM

## 2023-02-20 PROCEDURE — 99499 UNLISTED E&M SERVICE: CPT | Mod: S$PBB,,, | Performed by: PODIATRIST

## 2023-02-20 PROCEDURE — 11721 DEBRIDE NAIL 6 OR MORE: CPT | Mod: Q9,PBBFAC | Performed by: PODIATRIST

## 2023-02-20 PROCEDURE — 11721 PR DEBRIDEMENT OF NAILS, 6 OR MORE: ICD-10-PCS | Mod: Q9,S$PBB,, | Performed by: PODIATRIST

## 2023-02-20 PROCEDURE — 99214 OFFICE O/P EST MOD 30 MIN: CPT | Mod: PBBFAC | Performed by: PODIATRIST

## 2023-02-20 PROCEDURE — 99999 PR PBB SHADOW E&M-EST. PATIENT-LVL IV: CPT | Mod: PBBFAC,,, | Performed by: PODIATRIST

## 2023-02-20 PROCEDURE — 11721 DEBRIDE NAIL 6 OR MORE: CPT | Mod: Q9,S$PBB,, | Performed by: PODIATRIST

## 2023-02-20 PROCEDURE — 99999 PR PBB SHADOW E&M-EST. PATIENT-LVL IV: ICD-10-PCS | Mod: PBBFAC,,, | Performed by: PODIATRIST

## 2023-02-20 PROCEDURE — 99499 NO LOS: ICD-10-PCS | Mod: S$PBB,,, | Performed by: PODIATRIST

## 2023-02-27 ENCOUNTER — LAB VISIT (OUTPATIENT)
Dept: LAB | Facility: HOSPITAL | Age: 68
End: 2023-02-27
Attending: PHYSICIAN ASSISTANT
Payer: MEDICARE

## 2023-02-27 ENCOUNTER — PATIENT MESSAGE (OUTPATIENT)
Dept: DIABETES | Facility: CLINIC | Age: 68
End: 2023-02-27

## 2023-02-27 ENCOUNTER — OFFICE VISIT (OUTPATIENT)
Dept: DIABETES | Facility: CLINIC | Age: 68
End: 2023-02-27
Payer: MEDICARE

## 2023-02-27 DIAGNOSIS — E66.01 MORBID OBESITY: ICD-10-CM

## 2023-02-27 DIAGNOSIS — G47.33 OSA (OBSTRUCTIVE SLEEP APNEA): ICD-10-CM

## 2023-02-27 DIAGNOSIS — Z12.5 SCREENING FOR PROSTATE CANCER: ICD-10-CM

## 2023-02-27 DIAGNOSIS — E11.69 HYPERLIPIDEMIA ASSOCIATED WITH TYPE 2 DIABETES MELLITUS: ICD-10-CM

## 2023-02-27 DIAGNOSIS — N18.32 STAGE 3B CHRONIC KIDNEY DISEASE: ICD-10-CM

## 2023-02-27 DIAGNOSIS — E11.649 HYPOGLYCEMIA UNAWARENESS ASSOCIATED WITH TYPE 2 DIABETES MELLITUS: ICD-10-CM

## 2023-02-27 DIAGNOSIS — E78.5 HYPERLIPIDEMIA ASSOCIATED WITH TYPE 2 DIABETES MELLITUS: ICD-10-CM

## 2023-02-27 DIAGNOSIS — E11.65 TYPE 2 DIABETES MELLITUS WITH HYPERGLYCEMIA, WITH LONG-TERM CURRENT USE OF INSULIN: ICD-10-CM

## 2023-02-27 DIAGNOSIS — E11.65 TYPE 2 DIABETES MELLITUS WITH HYPERGLYCEMIA, WITH LONG-TERM CURRENT USE OF INSULIN: Primary | ICD-10-CM

## 2023-02-27 DIAGNOSIS — Z79.4 TYPE 2 DIABETES MELLITUS WITH HYPERGLYCEMIA, WITH LONG-TERM CURRENT USE OF INSULIN: Primary | ICD-10-CM

## 2023-02-27 DIAGNOSIS — I50.9 CHF (NYHA CLASS III, ACC/AHA STAGE C): ICD-10-CM

## 2023-02-27 DIAGNOSIS — E11.59 HYPERTENSION ASSOCIATED WITH DIABETES: ICD-10-CM

## 2023-02-27 DIAGNOSIS — I15.2 HYPERTENSION ASSOCIATED WITH DIABETES: ICD-10-CM

## 2023-02-27 DIAGNOSIS — Z79.4 TYPE 2 DIABETES MELLITUS WITH HYPERGLYCEMIA, WITH LONG-TERM CURRENT USE OF INSULIN: ICD-10-CM

## 2023-02-27 DIAGNOSIS — H54.10 BLINDNESS AND LOW VISION: ICD-10-CM

## 2023-02-27 DIAGNOSIS — I25.10 CORONARY ARTERY DISEASE INVOLVING NATIVE CORONARY ARTERY OF NATIVE HEART WITHOUT ANGINA PECTORIS: ICD-10-CM

## 2023-02-27 DIAGNOSIS — G60.9 IDIOPATHIC PERIPHERAL NEUROPATHY: ICD-10-CM

## 2023-02-27 LAB
ALBUMIN SERPL BCP-MCNC: 3.6 G/DL (ref 3.5–5.2)
ALBUMIN/CREAT UR: 105.3 UG/MG (ref 0–30)
ALP SERPL-CCNC: 112 U/L (ref 55–135)
ALT SERPL W/O P-5'-P-CCNC: 16 U/L (ref 10–44)
ANION GAP SERPL CALC-SCNC: 7 MMOL/L (ref 8–16)
AST SERPL-CCNC: 13 U/L (ref 10–40)
BILIRUB SERPL-MCNC: 0.7 MG/DL (ref 0.1–1)
BUN SERPL-MCNC: 19 MG/DL (ref 8–23)
CALCIUM SERPL-MCNC: 9.3 MG/DL (ref 8.7–10.5)
CHLORIDE SERPL-SCNC: 107 MMOL/L (ref 95–110)
CO2 SERPL-SCNC: 27 MMOL/L (ref 23–29)
CREAT SERPL-MCNC: 1.7 MG/DL (ref 0.5–1.4)
CREAT UR-MCNC: 150 MG/DL (ref 23–375)
EST. GFR  (NO RACE VARIABLE): 43.9 ML/MIN/1.73 M^2
ESTIMATED AVG GLUCOSE: 180 MG/DL (ref 68–131)
GLUCOSE SERPL-MCNC: 130 MG/DL (ref 70–110)
HBA1C MFR BLD: 7.9 % (ref 4–5.6)
MICROALBUMIN UR DL<=1MG/L-MCNC: 158 UG/ML
POTASSIUM SERPL-SCNC: 3.4 MMOL/L (ref 3.5–5.1)
PROT SERPL-MCNC: 6.9 G/DL (ref 6–8.4)
SODIUM SERPL-SCNC: 141 MMOL/L (ref 136–145)

## 2023-02-27 PROCEDURE — 83036 HEMOGLOBIN GLYCOSYLATED A1C: CPT | Performed by: PHYSICIAN ASSISTANT

## 2023-02-27 PROCEDURE — 36415 COLL VENOUS BLD VENIPUNCTURE: CPT | Performed by: PHYSICIAN ASSISTANT

## 2023-02-27 PROCEDURE — 99442 PR PHYSICIAN TELEPHONE EVALUATION 11-20 MIN: CPT | Mod: 95,,, | Performed by: PHYSICIAN ASSISTANT

## 2023-02-27 PROCEDURE — 82570 ASSAY OF URINE CREATININE: CPT | Performed by: PHYSICIAN ASSISTANT

## 2023-02-27 PROCEDURE — 84153 ASSAY OF PSA TOTAL: CPT | Performed by: PHYSICIAN ASSISTANT

## 2023-02-27 PROCEDURE — 99442 PR PHYSICIAN TELEPHONE EVALUATION 11-20 MIN: ICD-10-PCS | Mod: 95,,, | Performed by: PHYSICIAN ASSISTANT

## 2023-02-27 PROCEDURE — 80053 COMPREHEN METABOLIC PANEL: CPT | Performed by: PHYSICIAN ASSISTANT

## 2023-02-27 NOTE — PATIENT INSTRUCTIONS
CURRENT DM MEDICATIONS:   Basaglar 80 units daily   Novolog 25 units  before meals - dose 15 mins before  Miglitol 25 mg before meals   Jardiance 25 mg daily      UNTIL RESTART BASAGLAR and NOVOLOG, Change to:   Novolin 70/30 80 units before breakfast and 80 units before dinner

## 2023-02-27 NOTE — PROGRESS NOTES
PCP: KANDICE Fleming MD    Subjective:     Chief Complaint: Diabetes - Established Patient    Established Patient - Audio Only Telehealth Visit     The patient location is: Home  The chief complaint leading to consultation is: Diabetes follow up  Visit type: Virtual visit with audio only (telephone)  Total Time Spent with Patient: 19 minutes     The reason for the audio only service rather than synchronous audio and video virtual visit was related to technical difficulties or patient preference/necessity.     Each patient to whom I provide medical services by telemedicine is:  (1) informed of the relationship between the physician and patient and the respective role of any other health care provider with respect to management of the patient; and (2) notified that they may decline to receive medical services by telemedicine and may withdraw from such care at any time. Patient verbally consented to receive this service via voice-only telephone call.    This service was not originating from a related E/M service provided within the previous 7 days nor will  to an E/M service or procedure within the next 24 hours or my soonest available appointment.  Prevailing standard of care was able to be met in this audio-only visit.       HISTORY OF PRESENT ILLNESS: 68 y.o.   male presenting for diabetes management visit.   The patient's last visit with me was on 1/17/2023.  Patient has had Type II diabetes since 2005.  Pertinent to decision making is the following comorbidities: HTN, HLD, CAD, S/p MI, CHF, CKD III and Obesity by BMI  Patient has the following Diabetes complications: with diabetic chronic kidney disease  He has attended diabetes education in the past.     Patient's most recent A1c of 6.9% was completed 2 days ago.   Patient states since His last A1c His blood glucose levels have been both high and low throughout the day     Patient monitors blood glucose 4 times per day and Continuously with  personal CGM Sriram.   Patient blood glucose monitoring device will be uploaded into [employer] [facility] today.   Patient endorses the following diabetes related symptoms:  None .     Patients records show some baseline hypoglycemia and postprandial hyperglycemia following meals. Patient reports taking insulin outside of medical advice.   Patient is due today for the following diabetes-related health maintenance standards: COVID Vaccine, Flu Vaccine, Eye exam, A1c, and Urine micro.   He denies any recent hospital admissions or emergency room visits.  He voices having hypoglycemia as above. Per chart, patient has history of hypoglycemia unawareness.   Patient's concerns today include glycemic control. Of note, patient recently had bad batch of sensors and was unable to read number for [employer] to get more. Patient ran out of basaglar and then had issues paying for refill x 1 mo. Patient increased Novolog outside of medical advice to help with lack of basaglar. Also pt is planning to move to [facility] to be closer to daughter amongst health issues and will need to est care there in June for diabetes care.   Patient medication regimen is as below.     CURRENT DM MEDICATIONS:   Basaglar - ran out and had issues paying for reorder; planning to reorder this week    Novolog 25 units every 4 hours - outside of medical advice; changed from TID wm to q4h to make up for lack of basaglar  Miglitol 25 mg TID wm   Jardiance 25 mg daily    Patient has failed the following Diabetes medications:   Metformin - GI   Glyburide  Ozempic - cost  Basal - Lantus         Labs Reviewed.       Lab Results   Component Value Date    CPEPTIDE 2.70 12/13/2018     Lab Results   Component Value Date    GLUTAMICACID 0.00 12/13/2018          //   , There is no height or weight on file to calculate BMI.  His blood sugar in clinic today is:    Lab Results   Component Value Date    POCGLU 207 (A) 08/15/2022       Review of Systems   Constitutional:  Negative for  activity change, appetite change, chills and fever.   HENT:  Negative for dental problem, mouth sores, nosebleeds, sore throat and trouble swallowing.    Eyes:  Negative for pain and discharge.   Respiratory:  Negative for shortness of breath, wheezing and stridor.    Cardiovascular:  Negative for chest pain, palpitations and leg swelling.   Gastrointestinal:  Negative for abdominal pain, diarrhea, nausea and vomiting.   Endocrine: Negative for polydipsia, polyphagia and polyuria.   Genitourinary:  Negative for dysuria, frequency and urgency.   Musculoskeletal:  Negative for joint swelling and myalgias.   Skin:  Negative for rash and wound.   Neurological:  Negative for dizziness, syncope, weakness and headaches.   Psychiatric/Behavioral:  Negative for behavioral problems and dysphoric mood.        Diabetes Management Status  Statin: Taking  ACE/ARB: Taking    Screening or Prevention Patient's value Goal Complete/Controlled?   HgA1C Testing and Control   Lab Results   Component Value Date    HGBA1C 6.5 (H) 08/15/2022      Annually/Less than 8% No   Lipid profile : 09/04/2022 Annually Yes   LDL control Lab Results   Component Value Date    LDLCALC 44.4 (L) 09/04/2022    Annually/Less than 100 mg/dl  Yes   Nephropathy screening Lab Results   Component Value Date    MICALBCREAT 35.0 (H) 10/19/2021     Lab Results   Component Value Date    PROTEINUA 1+ (A) 08/15/2022    Annually No   Blood pressure BP Readings from Last 1 Encounters:   01/12/23 (!) 180/95    Less than 140/90 No   Dilated retinal exam : 12/07/2021 Annually Yes    Foot exam   : 08/15/2022 Annually Yes     ACTIVITY LEVEL: Rarely Active. Discussed activities, benefits, methods, and precautions.  MEAL PLANNING: Patient reports number of meals per day to be 3 and number of snacks per day to be 2.   Patient is encouraged to carb count and consume no more than 30 - 45 grams of carbohydrates in each meal and 15 grams of carbohydrates in each snack.     Social  "History     Socioeconomic History    Marital status:    Occupational History     Comment: Quit job at [employer]; wants to open his own [employer] business    Tobacco Use    Smoking status: Never    Smokeless tobacco: Never   Substance and Sexual Activity    Alcohol use: Yes     Comment: " one to two glasses of wine per year"    Drug use: No    Sexual activity: Not Currently     Birth control/protection: None   Social History Narrative    , 1 son, JANIE.     Social Determinants of Health     Financial Resource Strain: Low Risk     Difficulty of Paying Living Expenses: Not hard at all   Food Insecurity: No Food Insecurity    Worried About Running Out of Food in the Last Year: Never true    Ran Out of Food in the Last Year: Never true   Transportation Needs: No Transportation Needs    Lack of Transportation (Medical): No    Lack of Transportation (Non-Medical): No   Physical Activity: Inactive    Days of Exercise per Week: 0 days    Minutes of Exercise per Session: 0 min   Stress: No Stress Concern Present    Feeling of Stress : Only a little   Social Connections: Unknown    Frequency of Communication with Friends and Family: More than three times a week    Frequency of Social Gatherings with Friends and Family: Once a week    Attends Congregation Services: Never    Active Member of Clubs or Organizations: No    Attends Club or Organization Meetings: Never   Housing Stability: Low Risk     Unable to Pay for Housing in the Last Year: No    Number of Places Lived in the Last Year: 1    Unstable Housing in the Last Year: No     Past Medical History:   Diagnosis Date    Anxiety     Bell's palsy     CHF (congestive heart failure)     Chronic kidney disease, stage 3a 11/02/2021    Coronary artery disease involving native coronary artery without angina pectoris 10/18/2016    Depression     Diabetes mellitus     Glaucoma     Hypertension     Idiopathic peripheral neuropathy 12/11/2012    Lymphedema of both lower extremities  " "   Malignant melanoma of skin of forehead 10/13/2022    Mixed hyperlipidemia 12/11/2012    Morbid obesity with BMI of 45.0-49.9, adult 02/12/2019    Pancytopenia     Sleep apnea     "unable to use"    Stage 3b chronic kidney disease     Type 2 diabetes mellitus with diabetic polyneuropathy, with long-term current use of insulin 12/11/2012    Vitamin B 12 deficiency 08/23/2012       Objective:        Physical Exam  Neurological:      Mental Status: He is alert and oriented to person, place, and time. Mental status is at baseline.   Psychiatric:         Mood and Affect: Mood normal.         Behavior: Behavior normal.         Thought Content: Thought content normal.         Judgment: Judgment normal.         Assessment / Plan:     Type 2 diabetes mellitus with hyperglycemia, with long-term current use of insulin    Stage 3b chronic kidney disease    Hypoglycemia unawareness associated with type 2 diabetes mellitus    Blindness and low vision    Idiopathic peripheral neuropathy    CHF (NYHA class III, ACC/AHA stage C)    Coronary artery disease involving native coronary artery of native heart without angina pectoris    Hypertension associated with diabetes    Hyperlipidemia associated with type 2 diabetes mellitus    FRAN (obstructive sleep apnea)    Morbid obesity      Additional Plan Details:    - POCT Glucose  - Encouraged continuation of lifestyle changes including regular exercise and limiting carbohydrates to 30-45 grams per meal threes times daily and 15 grams per snack with a limit of two daily.   - Encouraged continued monitoring of blood glucose with maintenance of 4 times daily and Continuously with personal CGM Sriram.   - Current DM Medication Regimen:  Continue Jardiance 25 mg. Restart Basaglar 80 units daily and Novolog 25 units with regular meal TID wm - may use 70/30 80 units before breakfast and 80 units before dinner. Continue Miglitol 25 mg TID wm.    - Labs today  - Referral to Endo - est care in June " at [facility] in [facility]  - Health Maintenance standards addressed today: Eye Exam - will be completed within [facility] system and scheduled today, Urine Microalbumin / Creatinine Ratio scheduled, A1c to be scheduled, Flu Shot - patient would like to complete outside of [facility], and COVID - 19 Vaccine - patient will schedule outside of [facility]   - Nursing Visit: Patient is age 79 or younger with an A1c of 7.5 or greater and will not need nursing visit at this time .   - Follow up in 3 weeks for call.       Blakeney McKnight, PA-C [facility]

## 2023-02-28 LAB — COMPLEXED PSA SERPL-MCNC: 0.18 NG/ML (ref 0–4)

## 2023-03-06 DIAGNOSIS — Z79.4 TYPE 2 DIABETES MELLITUS WITH CHRONIC KIDNEY DISEASE, WITH LONG-TERM CURRENT USE OF INSULIN, UNSPECIFIED CKD STAGE: ICD-10-CM

## 2023-03-06 DIAGNOSIS — E11.22 TYPE 2 DIABETES MELLITUS WITH CHRONIC KIDNEY DISEASE, WITH LONG-TERM CURRENT USE OF INSULIN, UNSPECIFIED CKD STAGE: ICD-10-CM

## 2023-03-06 RX ORDER — INSULIN ASPART 100 [IU]/ML
25 INJECTION, SOLUTION INTRAVENOUS; SUBCUTANEOUS
Qty: 69 ML | Refills: 3 | Status: ON HOLD | OUTPATIENT
Start: 2023-03-06 | End: 2023-06-05 | Stop reason: SDUPTHER

## 2023-03-06 RX ORDER — INSULIN GLARGINE 100 [IU]/ML
80 INJECTION, SOLUTION SUBCUTANEOUS DAILY
Qty: 72 ML | Refills: 3 | Status: ON HOLD | OUTPATIENT
Start: 2023-03-06 | End: 2023-06-03 | Stop reason: SDUPTHER

## 2023-03-11 NOTE — PROGRESS NOTES
Subjective:     Patient ID: Stepan Springer is a 68 y.o. male.    Chief Complaint: Nail Care (Diabetic pt, wears tennis shoes with socks, last seen on 10/06/22 with PCP Dr. Fleming)    Stepan is a 68 y.o. male who presents to the clinic for evaluation and treatment of high risk feet. Stepan has a past medical history of Anxiety, Bell's palsy, CHF (congestive heart failure), Chronic kidney disease, stage 3a (11/02/2021), Coronary artery disease involving native coronary artery without angina pectoris (10/18/2016), Depression, Diabetes mellitus, Glaucoma, Hypertension, Idiopathic peripheral neuropathy (12/11/2012), Lymphedema of both lower extremities, Malignant melanoma of skin of forehead (10/13/2022), Mixed hyperlipidemia (12/11/2012), Morbid obesity with BMI of 45.0-49.9, adult (02/12/2019), Pancytopenia, Sleep apnea, Stage 3b chronic kidney disease, Type 2 diabetes mellitus with diabetic polyneuropathy, with long-term current use of insulin (12/11/2012), and Vitamin B 12 deficiency (08/23/2012). The patient's chief complaint is long thick nails. Patient states he has been going to Lymphedema clinic.  This patient has documented high risk feet requiring routine maintenance secondary to peripheral neuropathy.       PCP: KANDICE Fleming MD    Date Last Seen by PCP: 10/06/2022    Current shoe gear:  Affected Foot: Tennis shoes     Unaffected Foot: Tennis shoes    Hemoglobin A1C   Date Value Ref Range Status   02/27/2023 7.9 (H) 4.0 - 5.6 % Final     Comment:     ADA Screening Guidelines:  5.7-6.4%  Consistent with prediabetes  >or=6.5%  Consistent with diabetes    High levels of fetal hemoglobin interfere with the HbA1C  assay. Heterozygous hemoglobin variants (HbS, HgC, etc)do  not significantly interfere with this assay.   However, presence of multiple variants may affect accuracy.     08/15/2022 6.5 (H) 4.0 - 5.6 % Final     Comment:     ADA Screening Guidelines:  5.7-6.4%  Consistent with  prediabetes  >or=6.5%  Consistent with diabetes    High levels of fetal hemoglobin interfere with the HbA1C  assay. Heterozygous hemoglobin variants (HbS, HgC, etc)do  not significantly interfere with this assay.   However, presence of multiple variants may affect accuracy.     07/07/2022 7.0 (H) 4.0 - 5.6 % Final     Comment:     ADA Screening Guidelines:  5.7-6.4%  Consistent with prediabetes  >or=6.5%  Consistent with diabetes    High levels of fetal hemoglobin interfere with the HbA1C  assay. Heterozygous hemoglobin variants (HbS, HgC, etc)do  not significantly interfere with this assay.   However, presence of multiple variants may affect accuracy.             Patient Active Problem List   Diagnosis    Status post cataract extraction and insertion of intraocular lens    Corneal opacity - Left Eye    Type 2 diabetes mellitus with diabetic polyneuropathy, with long-term current use of insulin    Obstructive sleep apnea (untreated, refuses treatment)    Chronic diastolic congestive heart failure    LBBB (left bundle branch block)    Type 2 diabetes mellitus with stage 3b chronic kidney disease, with long-term current use of insulin    Anxiety    Hypertension associated with diabetes    Stage 3b chronic kidney disease    Primary open angle glaucoma of both eyes, severe stage    Left nephrolithiasis    Hydronephrosis, left    NSTEMI (non-ST elevated myocardial infarction)    CAD with remote PCI (BMS) of RCA in 9/2016    Recurrent major depressive disorder    Vasculogenic erectile dysfunction    Blindness and low vision    Hx of retinal detachment    High myopia, both eyes    Epiretinal membrane (ERM) of left eye    Lattice degeneration of peripheral retina, left    Right-sided Bell's palsy    Hyperlipidemia associated with type 2 diabetes mellitus    Class 3 severe obesity due to excess calories with serious comorbidity and body mass index (BMI) of 45.0 to 49.9 in adult    GERD (gastroesophageal reflux disease)     Hypoglycemia unawareness associated with type 2 diabetes mellitus    Elevated troponin    Thrombocytopenia    Pancytopenia    History of COVID-19 (April, 2020)    PVD (peripheral vascular disease)    Influenza Immunization not carried out because of patient refusal    Type 2 diabetes mellitus with microalbuminuria, with long-term current use of insulin    Hyperparathyroidism, secondary renal    Vitamin D deficiency    Lymphedema    Hypertensive urgency    Diabetic ulcer of toe of left foot associated with type 2 diabetes mellitus, limited to breakdown of skin    Transient visual disturbance    Essential hypertension    Malignant melanoma of skin of forehead    Open wound of forehead       Medication List with Changes/Refills   Current Medications    AMLODIPINE (NORVASC) 5 MG TABLET    Take 1 tablet (5 mg total) by mouth every evening.    ASPIRIN 81 MG CHEW    Take 81 mg by mouth once daily.    ATORVASTATIN (LIPITOR) 20 MG TABLET    Take 1 tablet (20 mg total) by mouth every evening.    BRIMONIDINE 0.1% (ALPHAGAN P) 0.1 % DROP    Place 1 drop into both eyes 3 (three) times daily. Order 90 day supply    BRINZOLAMIDE (AZOPT) 1 % OPHTHALMIC SUSPENSION    Place 1 drop into both eyes 3 (three) times daily. Brand name only    CARVEDILOL (COREG) 12.5 MG TABLET    Take 1 tablet (12.5 mg total) by mouth 2 (two) times daily.    CLONIDINE (CATAPRES) 0.1 MG TABLET    Take 1 tablet (0.1 mg total) by mouth 3 (three) times daily.    DEXTRAN 70-HYPROMELLOSE (TEARS) OPHTHALMIC SOLUTION    Place 2 drops into the left eye every 4 (four) hours.    EMPAGLIFLOZIN (JARDIANCE) 25 MG TABLET    Take 1 tablet (25 mg total) by mouth once daily.    FLASH GLUCOSE SENSOR (FREESTYLE CLEMENCIA 14 DAY SENSOR) KIT    1 each by Misc.(Non-Drug; Combo Route) route every 14 (fourteen) days.    FUROSEMIDE (LASIX) 40 MG TABLET    Take 1 tablet (40 mg total) by mouth once daily.    HYDRALAZINE (APRESOLINE) 100 MG TABLET    Take 1 tablet (100 mg total) by mouth  "every 8 (eight) hours.    LOSARTAN (COZAAR) 100 MG TABLET    Take 1 tablet (100 mg total) by mouth once daily.    MUPIROCIN (BACTROBAN) 2 % OINTMENT    Apply topically once daily.    NETARSUDIL (RHOPRESSA) 0.02 % OPHTHALMIC SOLUTION    Place 1 drop into both eyes once daily.    NITROGLYCERIN (NITROSTAT) 0.4 MG SL TABLET    PLACE 1 TABLET (0.4 MG TOTAL) UNDER THE TONGUE EVERY 5 (FIVE) MINUTES AS NEEDED FOR CHEST PAIN.    PEN NEEDLE, DIABETIC (BD ULTRA-FINE AMANDA PEN NEEDLE) 32 GAUGE X 5/32" NDLE    1 each by Misc.(Non-Drug; Combo Route) route 4 (four) times daily with meals and nightly.    VALACYCLOVIR (VALTREX) 1000 MG TABLET    Take 1 tablet (1,000 mg total) by mouth 2 (two) times daily. for 10 days   Changed and/or Refilled Medications    Modified Medication Previous Medication    INSULIN (BASAGLAR KWIKPEN U-100 INSULIN) GLARGINE 100 UNITS/ML SUBQ PEN insulin (BASAGLAR KWIKPEN U-100 INSULIN) glargine 100 units/mL SubQ pen       Inject 80 Units into the skin once daily.    Inject 80 Units into the skin once daily.    INSULIN ASPART U-100 (NOVOLOG) 100 UNIT/ML (3 ML) INPN PEN insulin aspart U-100 (NOVOLOG) 100 unit/mL (3 mL) InPn pen       Inject 25 Units into the skin 3 (three) times daily with meals.    Inject 25 Units into the skin 3 (three) times daily with meals.       Review of patient's allergies indicates:   Allergen Reactions    Cefazolin Other (See Comments)     Shakes, chills, dizziness       Past Surgical History:   Procedure Laterality Date    CARDIAC CATHETERIZATION  7/22/13    non obstructive cad    CATARACT EXTRACTION  OU    CLOSURE OF WOUND Left 10/14/2022    Procedure: CLOSURE, WOUND;  Surgeon: Jovita Ceballos MD;  Location: [facility] OR;  Service: ENT;  Laterality: Left;  closure of forehead    COLONOSCOPY N/A 5/1/2019    Procedure: COLONOSCOPY;  Surgeon: Henry Mo III, MD;  Location: [facility] ENDO;  Service: Endoscopy;  Laterality: N/A;    CORONARY ANGIOPLASTY      ESOPHAGOGASTRODUODENOSCOPY N/A " "5/1/2019    Procedure: ESOPHAGOGASTRODUODENOSCOPY (EGD);  Surgeon: Henry Mo III, MD;  Location: [facility] ENDO;  Service: Endoscopy;  Laterality: N/A;    EXCISION OF MELANOMA Left 10/7/2022    Procedure: EXCISION, MELANOMA;  Surgeon: Jovita Ceballos MD;  Location: [facility] OR;  Service: ENT;  Laterality: Left;    EYE SURGERY      FRACTURE SURGERY      kidney stone       August 2016    PC IOL OU      RETINAL DETACHMENT REPAIR W/ SCLERAL BUCKLE LE      WRIST SURGERY         Family History   Problem Relation Age of Onset    Macular degeneration Father     Heart disease Father         CHF     Heart attack Father     Hypertension Mother     Macular degeneration Paternal Uncle     Hypertension Maternal Grandmother     Hypertension Maternal Grandfather     Strabismus Neg Hx     Retinal detachment Neg Hx     Glaucoma Neg Hx     Blindness Neg Hx     Amblyopia Neg Hx        Social History     Socioeconomic History    Marital status:    Occupational History     Comment: Quit job at [employer]; wants to open his own [employer] business    Tobacco Use    Smoking status: Never    Smokeless tobacco: Never   Substance and Sexual Activity    Alcohol use: Yes     Comment: " one to two glasses of wine per year"    Drug use: No    Sexual activity: Not Currently     Birth control/protection: None   Social History Narrative    , 1 son, [employer].     Social Determinants of Health     Financial Resource Strain: Low Risk     Difficulty of Paying Living Expenses: Not hard at all   Food Insecurity: No Food Insecurity    Worried About Running Out of Food in the Last Year: Never true    Ran Out of Food in the Last Year: Never true   Transportation Needs: No Transportation Needs    Lack of Transportation (Medical): No    Lack of Transportation (Non-Medical): No   Physical Activity: Inactive    Days of Exercise per Week: 0 days    Minutes of Exercise per Session: 0 min   Stress: No Stress Concern Present    Feeling of Stress : Only a little   Social " "Connections: Unknown    Frequency of Communication with Friends and Family: More than three times a week    Frequency of Social Gatherings with Friends and Family: Once a week    Attends Church Services: Never    Active Member of Clubs or Organizations: No    Attends Club or Organization Meetings: Never   Housing Stability: Low Risk     Unable to Pay for Housing in the Last Year: No    Number of Places Lived in the Last Year: 1    Unstable Housing in the Last Year: No       Vitals:    02/20/23 0815   Weight: (!) 184.2 kg (406 lb)   Height: 6' 7" (2.007 m)   PainSc: 0-No pain   PainLoc: Foot       Hemoglobin A1C   Date Value Ref Range Status   02/27/2023 7.9 (H) 4.0 - 5.6 % Final     Comment:     ADA Screening Guidelines:  5.7-6.4%  Consistent with prediabetes  >or=6.5%  Consistent with diabetes    High levels of fetal hemoglobin interfere with the HbA1C  assay. Heterozygous hemoglobin variants (HbS, HgC, etc)do  not significantly interfere with this assay.   However, presence of multiple variants may affect accuracy.     08/15/2022 6.5 (H) 4.0 - 5.6 % Final     Comment:     ADA Screening Guidelines:  5.7-6.4%  Consistent with prediabetes  >or=6.5%  Consistent with diabetes    High levels of fetal hemoglobin interfere with the HbA1C  assay. Heterozygous hemoglobin variants (HbS, HgC, etc)do  not significantly interfere with this assay.   However, presence of multiple variants may affect accuracy.     07/07/2022 7.0 (H) 4.0 - 5.6 % Final     Comment:     ADA Screening Guidelines:  5.7-6.4%  Consistent with prediabetes  >or=6.5%  Consistent with diabetes    High levels of fetal hemoglobin interfere with the HbA1C  assay. Heterozygous hemoglobin variants (HbS, HgC, etc)do  not significantly interfere with this assay.   However, presence of multiple variants may affect accuracy.         Review of Systems   Constitutional:  Negative for chills and fever.   Respiratory:  Negative for shortness of breath.  "   Cardiovascular:  Negative for chest pain, palpitations, orthopnea, claudication and leg swelling.   Gastrointestinal:  Negative for diarrhea, nausea and vomiting.   Musculoskeletal:  Negative for joint pain.   Skin:  Negative for rash.   Neurological:  Positive for tingling and sensory change.   Psychiatric/Behavioral: Negative.         Objective:   PHYSICAL EXAM: Apperance: Alert and orient in no distress,well developed, and with good attention to grooming and body habits  Patient presents ambulating in tennis shoes.   LOWER EXTREMITY EXAM:  VASCULAR: Dorsalis pedis pulses 1/4 bilateral and Posterior Tibial pulses 1/4 bilateral. Capillary fill time <4 seconds bilateral. Moderate edema observed bilateral. Varicosities absent bilateral. Skin temperature of the lower extremities is warm to cool, proximal to distal. Hair growth dim bilateral. (--) lymphangitis or (--) cellulitis noted right.  DERMATOLOGICAL: (--) edema, (--) erythema, (--) malodor, (--) drainage, (--) warmth to right foot. The dorsum surface of the feet are soft and supple.  The plantar aspects of feet are dry and scaly. Nails 1,2,3,4,5 bilateral elongated, incurvated, and discolored with subungual debris. Webspaces 1,2,3,4 bilateral clean, dry and without evidence of break in skin integrity.   NEUROLOGICAL: Light touch, sharp-dull, proprioception all present and equal bilaterally.  Vibratory sensation diminished at bilateral hallux and navicular. Protective sensation absent at 2/10 sites as tested with a Sebastian-Pranav 5.07 monofilament.   MUSCULOSKELETAL: Muscle strength is 5/5 for foot inverters, everters, plantarflexors, and dorsiflexors. Muscle tone is normal. Semi-rigid hammertoes bilateral. No pain on palpation of left hallux nail.       Assessment:   Type 2 diabetes mellitus with diabetic polyneuropathy, with long-term current use of insulin    Dermatophytosis of nail            Plan:   Type 2 diabetes mellitus with diabetic polyneuropathy,  with long-term current use of insulin    Dermatophytosis of nail    I counseled the patient on his conditions, regarding findings of my examination, my impressions, and usual treatment plan.   Appointment spent on education about the diabetic foot, neuropathy, and prevention of limb loss.  Shoe inspection. Diabetic Foot Education. Patient reminded of the importance of good nutrition and blood sugar control to help prevent podiatric complications of diabetes. Patient instructed on proper foot hygeine. We discussed wearing proper shoe gear, daily foot inspections, never walking without protective shoe gear, never putting sharp instruments to feet.    With patient's permission, nails 1-5 bilateral were debrided/trimmed in length and thickness aggressively to their soft tissue attachment mechanically and with electric , removing all offending nail and debris. Patient relates relief following the procedure.  Patient to continue Lymphedema wraps.   Patient  will continue to monitor the areas daily, inspect feet, wear protective shoe gear when ambulatory, moisturizer to maintain skin integrity. Patient reminded of the importance of good nutrition and blood sugar control to help prevent podiatric complications of diabetes.  Patient to return 3 months or sooner if needed.       Vangie Cuevas DPM  [facility]

## 2023-03-23 ENCOUNTER — OFFICE VISIT (OUTPATIENT)
Dept: DIABETES | Facility: CLINIC | Age: 68
End: 2023-03-23
Payer: MEDICARE

## 2023-03-23 DIAGNOSIS — I15.2 HYPERTENSION ASSOCIATED WITH DIABETES: ICD-10-CM

## 2023-03-23 DIAGNOSIS — N18.32 STAGE 3B CHRONIC KIDNEY DISEASE: ICD-10-CM

## 2023-03-23 DIAGNOSIS — E78.5 HYPERLIPIDEMIA ASSOCIATED WITH TYPE 2 DIABETES MELLITUS: ICD-10-CM

## 2023-03-23 DIAGNOSIS — E11.59 HYPERTENSION ASSOCIATED WITH DIABETES: ICD-10-CM

## 2023-03-23 DIAGNOSIS — E11.649 HYPOGLYCEMIA UNAWARENESS ASSOCIATED WITH TYPE 2 DIABETES MELLITUS: ICD-10-CM

## 2023-03-23 DIAGNOSIS — G60.9 IDIOPATHIC PERIPHERAL NEUROPATHY: ICD-10-CM

## 2023-03-23 DIAGNOSIS — I50.9 CHF (NYHA CLASS III, ACC/AHA STAGE C): ICD-10-CM

## 2023-03-23 DIAGNOSIS — E11.65 TYPE 2 DIABETES MELLITUS WITH HYPERGLYCEMIA, WITH LONG-TERM CURRENT USE OF INSULIN: Primary | ICD-10-CM

## 2023-03-23 DIAGNOSIS — E11.69 HYPERLIPIDEMIA ASSOCIATED WITH TYPE 2 DIABETES MELLITUS: ICD-10-CM

## 2023-03-23 DIAGNOSIS — H54.10 BLINDNESS AND LOW VISION: ICD-10-CM

## 2023-03-23 DIAGNOSIS — G47.33 OSA (OBSTRUCTIVE SLEEP APNEA): ICD-10-CM

## 2023-03-23 DIAGNOSIS — I25.10 CORONARY ARTERY DISEASE INVOLVING NATIVE CORONARY ARTERY OF NATIVE HEART WITHOUT ANGINA PECTORIS: ICD-10-CM

## 2023-03-23 DIAGNOSIS — E66.01 MORBID OBESITY: ICD-10-CM

## 2023-03-23 DIAGNOSIS — Z79.4 TYPE 2 DIABETES MELLITUS WITH HYPERGLYCEMIA, WITH LONG-TERM CURRENT USE OF INSULIN: Primary | ICD-10-CM

## 2023-03-23 PROCEDURE — 99442 PR PHYSICIAN TELEPHONE EVALUATION 11-20 MIN: CPT | Mod: 95,,, | Performed by: PHYSICIAN ASSISTANT

## 2023-03-23 PROCEDURE — 99442 PR PHYSICIAN TELEPHONE EVALUATION 11-20 MIN: ICD-10-PCS | Mod: 95,,, | Performed by: PHYSICIAN ASSISTANT

## 2023-03-23 NOTE — PROGRESS NOTES
PCP: KANDICE Fleming MD    Subjective:     Chief Complaint: Diabetes - Established Patient    Established Patient - Audio Only Telehealth Visit     The patient location is: Home  The chief complaint leading to consultation is: Diabetes follow up  Visit type: Virtual visit with audio only (telephone)  Total Time Spent with Patient: 17 minutes     The reason for the audio only service rather than synchronous audio and video virtual visit was related to technical difficulties or patient preference/necessity.     Each patient to whom I provide medical services by telemedicine is:  (1) informed of the relationship between the physician and patient and the respective role of any other health care provider with respect to management of the patient; and (2) notified that they may decline to receive medical services by telemedicine and may withdraw from such care at any time. Patient verbally consented to receive this service via voice-only telephone call.    This service was not originating from a related E/M service provided within the previous 7 days nor will  to an E/M service or procedure within the next 24 hours or my soonest available appointment.  Prevailing standard of care was able to be met in this audio-only visit.       HISTORY OF PRESENT ILLNESS: 68 y.o.   male presenting for diabetes management visit.   The patient's last visit with me was on 2/27/2023.  Patient has had Type II diabetes since 2005.  Pertinent to decision making is the following comorbidities: HTN, HLD, CAD, S/p MI, CHF, CKD III and Obesity by BMI  Patient has the following Diabetes complications: with diabetic chronic kidney disease  He has attended diabetes education in the past.     Patient's most recent A1c of 7.9% was completed 1 months ago.   Patient states since His last A1c His blood glucose levels have been both high and low throughout the day     Patient monitors blood glucose 4 times per day and Continuously with  personal CGM Sriram.   Patient blood glucose monitoring device will be uploaded into [employer] [facility] today.   Patient endorses the following diabetes related symptoms:  None .     Patients records show some baseline hyperglycemia and good coverage of meal time insulin most of the time.   Patient is due today for the following diabetes-related health maintenance standards: COVID Vaccine, Flu Vaccine, Eye exam.  He denies any recent hospital admissions or emergency room visits.  He voices having hypoglycemia as above. Per chart, patient has history of hypoglycemia unawareness.   Patient's concerns today include glycemic control. Also pt is planning to move to [facility] to be closer to daughter amongst health issues and will need to est care there in June for diabetes care.   Patient medication regimen is as below.     CURRENT DM MEDICATIONS:   Basaglar 80 units daily    Novolog 25 units TID wm   Miglitol 25 mg TID wm   Jardiance 25 mg daily    Patient has failed the following Diabetes medications:   Metformin - GI   Glyburide  Ozempic - cost  Basal - Lantus         Labs Reviewed.       Lab Results   Component Value Date    CPEPTIDE 2.70 12/13/2018     Lab Results   Component Value Date    GLUTAMICACID 0.00 12/13/2018          //   , There is no height or weight on file to calculate BMI.  His blood sugar in clinic today is:    Lab Results   Component Value Date    POCGLU 207 (A) 08/15/2022       Review of Systems   Constitutional:  Negative for activity change, appetite change, chills and fever.   HENT:  Negative for dental problem, mouth sores, nosebleeds, sore throat and trouble swallowing.    Eyes:  Negative for pain and discharge.   Respiratory:  Negative for shortness of breath, wheezing and stridor.    Cardiovascular:  Negative for chest pain, palpitations and leg swelling.   Gastrointestinal:  Negative for abdominal pain, diarrhea, nausea and vomiting.   Endocrine: Negative for polydipsia, polyphagia and polyuria.  "  Genitourinary:  Negative for dysuria, frequency and urgency.   Musculoskeletal:  Negative for joint swelling and myalgias.   Skin:  Negative for rash and wound.   Neurological:  Negative for dizziness, syncope, weakness and headaches.   Psychiatric/Behavioral:  Negative for behavioral problems and dysphoric mood.        Diabetes Management Status  Statin: Taking  ACE/ARB: Taking    Screening or Prevention Patient's value Goal Complete/Controlled?   HgA1C Testing and Control   Lab Results   Component Value Date    HGBA1C 7.9 (H) 02/27/2023      Annually/Less than 8% No   Lipid profile : 09/04/2022 Annually Yes   LDL control Lab Results   Component Value Date    LDLCALC 44.4 (L) 09/04/2022    Annually/Less than 100 mg/dl  Yes   Nephropathy screening Lab Results   Component Value Date    MICALBCREAT 105.3 (H) 02/27/2023     Lab Results   Component Value Date    PROTEINUA 1+ (A) 08/15/2022    Annually No   Blood pressure BP Readings from Last 1 Encounters:   01/12/23 (!) 180/95    Less than 140/90 No   Dilated retinal exam : 12/07/2021 Annually Yes    Foot exam   : 08/15/2022 Annually Yes     ACTIVITY LEVEL: Rarely Active. Discussed activities, benefits, methods, and precautions.  MEAL PLANNING: Patient reports number of meals per day to be 3 and number of snacks per day to be 2.   Patient is encouraged to carb count and consume no more than 30 - 45 grams of carbohydrates in each meal and 15 grams of carbohydrates in each snack.     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     Social Determinants " "of Health     Financial Resource Strain: Low Risk     Difficulty of Paying Living Expenses: Not hard at all   Food Insecurity: No Food Insecurity    Worried About Running Out of Food in the Last Year: Never true    Ran Out of Food in the Last Year: Never true   Transportation Needs: No Transportation Needs    Lack of Transportation (Medical): No    Lack of Transportation (Non-Medical): No   Physical Activity: Inactive    Days of Exercise per Week: 0 days    Minutes of Exercise per Session: 0 min   Stress: No Stress Concern Present    Feeling of Stress : Only a little   Social Connections: Unknown    Frequency of Communication with Friends and Family: More than three times a week    Frequency of Social Gatherings with Friends and Family: Once a week    Attends Protestant Services: Never    Active Member of Clubs or Organizations: No    Attends Club or Organization Meetings: Never   Housing Stability: Low Risk     Unable to Pay for Housing in the Last Year: No    Number of Places Lived in the Last Year: 1    Unstable Housing in the Last Year: No     Past Medical History:   Diagnosis Date    Anxiety     Bell's palsy     CHF (congestive heart failure)     Chronic kidney disease, stage 3a 11/02/2021    Coronary artery disease involving native coronary artery without angina pectoris 10/18/2016    Depression     Diabetes mellitus     Glaucoma     Hypertension     Idiopathic peripheral neuropathy 12/11/2012    Lymphedema of both lower extremities     Malignant melanoma of skin of forehead 10/13/2022    Mixed hyperlipidemia 12/11/2012    Morbid obesity with BMI of 45.0-49.9, adult 02/12/2019    Pancytopenia     Sleep apnea     "unable to use"    Stage 3b chronic kidney disease     Type 2 diabetes mellitus with diabetic polyneuropathy, with long-term current use of insulin 12/11/2012    Vitamin B 12 deficiency 08/23/2012       Objective:        Physical Exam  Neurological:      Mental Status: He is alert and oriented to " person, place, and time. Mental status is at baseline.   Psychiatric:         Mood and Affect: Mood normal.         Behavior: Behavior normal.         Thought Content: Thought content normal.         Judgment: Judgment normal.         Assessment / Plan:     Type 2 diabetes mellitus with hyperglycemia, with long-term current use of insulin    Stage 3b chronic kidney disease    Hypoglycemia unawareness associated with type 2 diabetes mellitus    Blindness and low vision    Idiopathic peripheral neuropathy    CHF (NYHA class III, ACC/AHA stage C)    Coronary artery disease involving native coronary artery of native heart without angina pectoris    Hypertension associated with diabetes    Hyperlipidemia associated with type 2 diabetes mellitus    FRAN (obstructive sleep apnea)    Morbid obesity      Additional Plan Details:    - POCT Glucose  - Encouraged continuation of lifestyle changes including regular exercise and limiting carbohydrates to 30-45 grams per meal threes times daily and 15 grams per snack with a limit of two daily.   - Encouraged continued monitoring of blood glucose with maintenance of 4 times daily and Continuously with personal CGM Sriram.   - Current DM Medication Regimen:  Continue Jardiance 25 mg. Change Basaglar 84 units daily. Continue Novolog 25 units with regular meal TID wm. Continue Miglitol 25 mg TID wm.    - Referral to Endo - [place] [facility]  - Health Maintenance standards addressed today: Eye Exam - will be completed within [facility] system and scheduled today, Flu Shot - patient would like to complete outside of [facility], and COVID - 19 Vaccine - patient will schedule outside of [facility]   - Nursing Visit: Patient is age 79 or younger with an A1c of 7.5 or greater and will not need nursing visit at this time .   - Follow up in 8 weeks for call.       Luís Millard PA-C  [facility]

## 2023-03-23 NOTE — Clinical Note
"8 wk audio "Sriram sharing" Mail Appt letter  Dr. BAXTER STAFF: pt needs an est care appt with Endo in [facility] for July - using Sriram CGM - transfer from [facility] team "

## 2023-03-24 NOTE — PATIENT INSTRUCTIONS
CURRENT DM MEDICATIONS:   Basaglar 84 units daily    Novolog 25 units TID wm   Miglitol 25 mg TID wm   Jardiance 25 mg daily

## 2023-04-03 DIAGNOSIS — I50.32 CHRONIC DIASTOLIC CONGESTIVE HEART FAILURE: ICD-10-CM

## 2023-04-03 DIAGNOSIS — I44.7 LBBB (LEFT BUNDLE BRANCH BLOCK): Primary | ICD-10-CM

## 2023-04-18 ENCOUNTER — OFFICE VISIT (OUTPATIENT)
Dept: CARDIOLOGY | Facility: CLINIC | Age: 68
End: 2023-04-18
Payer: MEDICARE

## 2023-04-18 ENCOUNTER — HOSPITAL ENCOUNTER (OUTPATIENT)
Dept: CARDIOLOGY | Facility: HOSPITAL | Age: 68
Discharge: HOME | End: 2023-04-18
Attending: INTERNAL MEDICINE
Payer: MEDICARE

## 2023-04-18 ENCOUNTER — PATIENT MESSAGE (OUTPATIENT)
Dept: OTOLARYNGOLOGY | Facility: CLINIC | Age: 68
End: 2023-04-18
Payer: MEDICARE

## 2023-04-18 VITALS
OXYGEN SATURATION: 97 % | BODY MASS INDEX: 46.05 KG/M2 | WEIGHT: 315 LBS | HEIGHT: 78 IN | BODY MASS INDEX: 36.45 KG/M2 | WEIGHT: 315 LBS | SYSTOLIC BLOOD PRESSURE: 126 MMHG | DIASTOLIC BLOOD PRESSURE: 84 MMHG | HEART RATE: 62 BPM

## 2023-04-18 DIAGNOSIS — I25.10 CORONARY ARTERY DISEASE INVOLVING NATIVE CORONARY ARTERY OF NATIVE HEART WITHOUT ANGINA PECTORIS: Chronic | ICD-10-CM

## 2023-04-18 DIAGNOSIS — I89.0 LYMPHEDEMA: ICD-10-CM

## 2023-04-18 DIAGNOSIS — Z79.4 TYPE 2 DIABETES MELLITUS WITH DIABETIC POLYNEUROPATHY, WITH LONG-TERM CURRENT USE OF INSULIN: Chronic | ICD-10-CM

## 2023-04-18 DIAGNOSIS — I50.32 CHRONIC DIASTOLIC CONGESTIVE HEART FAILURE: ICD-10-CM

## 2023-04-18 DIAGNOSIS — E66.01 CLASS 3 SEVERE OBESITY DUE TO EXCESS CALORIES WITH SERIOUS COMORBIDITY AND BODY MASS INDEX (BMI) OF 45.0 TO 49.9 IN ADULT: Chronic | ICD-10-CM

## 2023-04-18 DIAGNOSIS — I73.9 PVD (PERIPHERAL VASCULAR DISEASE): ICD-10-CM

## 2023-04-18 DIAGNOSIS — I44.7 LBBB (LEFT BUNDLE BRANCH BLOCK): ICD-10-CM

## 2023-04-18 DIAGNOSIS — E78.5 HYPERLIPIDEMIA ASSOCIATED WITH TYPE 2 DIABETES MELLITUS: ICD-10-CM

## 2023-04-18 DIAGNOSIS — I10 ESSENTIAL HYPERTENSION: Primary | ICD-10-CM

## 2023-04-18 DIAGNOSIS — E11.42 TYPE 2 DIABETES MELLITUS WITH DIABETIC POLYNEUROPATHY, WITH LONG-TERM CURRENT USE OF INSULIN: Chronic | ICD-10-CM

## 2023-04-18 DIAGNOSIS — E11.69 HYPERLIPIDEMIA ASSOCIATED WITH TYPE 2 DIABETES MELLITUS: ICD-10-CM

## 2023-04-18 PROCEDURE — 99999 PR PBB SHADOW E&M-EST. PATIENT-LVL IV: CPT | Mod: PBBFAC,,, | Performed by: INTERNAL MEDICINE

## 2023-04-18 PROCEDURE — 99999 PR PBB SHADOW E&M-EST. PATIENT-LVL IV: ICD-10-PCS | Mod: PBBFAC,,, | Performed by: INTERNAL MEDICINE

## 2023-04-18 PROCEDURE — 93010 ELECTROCARDIOGRAM REPORT: CPT | Mod: ,,, | Performed by: INTERNAL MEDICINE

## 2023-04-18 PROCEDURE — 99214 PR OFFICE/OUTPT VISIT, EST, LEVL IV, 30-39 MIN: ICD-10-PCS | Mod: S$PBB,,, | Performed by: INTERNAL MEDICINE

## 2023-04-18 PROCEDURE — 93010 EKG 12-LEAD: ICD-10-PCS | Mod: ,,, | Performed by: INTERNAL MEDICINE

## 2023-04-18 PROCEDURE — 99214 OFFICE O/P EST MOD 30 MIN: CPT | Mod: PBBFAC | Performed by: INTERNAL MEDICINE

## 2023-04-18 PROCEDURE — 99214 OFFICE O/P EST MOD 30 MIN: CPT | Mod: S$PBB,,, | Performed by: INTERNAL MEDICINE

## 2023-04-18 PROCEDURE — 93005 ELECTROCARDIOGRAM TRACING: CPT

## 2023-04-18 RX ORDER — FUROSEMIDE 40 MG/1
40 TABLET ORAL DAILY
Qty: 90 TABLET | Refills: 1
Start: 2023-04-18 | End: 2023-05-29 | Stop reason: SDUPTHER

## 2023-04-18 NOTE — PROGRESS NOTES
Subjective:   Patient ID:  Stepan Springer is a 68 y.o. male who presents for follow up of Palpitations      66 yo male, came in for hospital discharge f/u. Open a tax office by himself.  PMH CAD h/o NSTEMI, s/p PCI midRCA SABINO x2 in 2016 by Dr. Parekh, midLAD 40%. LBBB, DM x 15 yrs, h/o vision TIA in , CKD stage III, HTN, HLD obesity, PVD/lymphoedema FRAN not tolerate CPAP. Left leg weakness cane dependent No smoking. Occasional drinking  H/o COVID 19 twice infection in  and positive in   ECHO normal EF and mild LVH   NUKE stress test no ischemia, reviewed in the office  EKG  NSR and LBBB  LAWSON chronic walking from the parking to the front. Worse at summer. In cold weather could walk a mile   No chest pain/chest pressure  Both shoulder pain while walking  Occasional dizziness and imbalance.  Sleeps on 1 pillow., good appetite. Lost 30 pounds in 1 yr  03/2020 fell and admitted to [facility]. Had left leg injury  BP high and A1c 12. LDL controlled. Started vegetable and mild.     07/2021 visit  Fell 2 weeks ago and the pain improved,  /120 today, no headache vision change dizziness.  No chest pain dyspnea and faint  C/o leg swelling worse and now on lasix 40 mg bid    08/2021 visit   Leg swelling improved after increased lasix to 80 mg bid for a week and kept on 60 mg bid. BMP pending now  BP high 174/112 mmHG    09/2021 visit  BP better controlled. The leg swelling and could not find appropriated size compression sock.  Left leg swelling below the knee.     visit  No dizziness and fall since . Leg swelling stable.   Chronic dizziness when standign up quickly. No faint and syncope. No PND and orthopnea  Could not tolerate CPAP     visit  Will start Lymphedema Rx soon. Recheck BP in the office controlled. No dizziness faint. SOB stable. EKG showed NSR and chronic LBBB     visit  09/2022 admitted for left eye vision temporarily lose and Dx of  "TIA. Related to Dehydration and bradycardia. Decreased coreg dose due to bradycardia. Cr up to 3.4 and now back to baseline 1.7. CTA head and neck mild to mod Dz   admitted for uncontrolled HTN /109 mmHG, missed the med one time before the admission  Has been on compression socks for 2 months and lost weight 40 pounds  Now vision ok. BP controlled cr 1.8. Now held HCTZ and Lisinopril   Could not afford Ozempic.      visit  Right side bells palsy. Weight loss 8 lbs in 3 months. BP high, on lymphedema wrap   echo normal EF and LVH; BARDY rare arrhythmia    Interval history  Bp controlled and med compliance  Plan to move to his daughter at .                      Past Medical History:   Diagnosis Date    Anxiety     Bell's palsy     CHF (congestive heart failure)     Chronic kidney disease, stage 3a 11/02/2021    Coronary artery disease involving native coronary artery without angina pectoris 10/18/2016    Depression     Diabetes mellitus     Glaucoma     Hypertension     Idiopathic peripheral neuropathy 12/11/2012    Lymphedema of both lower extremities     Malignant melanoma of skin of forehead 10/13/2022    Mixed hyperlipidemia 12/11/2012    Morbid obesity with BMI of 45.0-49.9, adult 02/12/2019    Pancytopenia     Sleep apnea     "unable to use"    Stage 3b chronic kidney disease     Type 2 diabetes mellitus with diabetic polyneuropathy, with long-term current use of insulin 12/11/2012    Vitamin B 12 deficiency 08/23/2012       Past Surgical History:   Procedure Laterality Date    CARDIAC CATHETERIZATION  7/22/13    non obstructive cad    CATARACT EXTRACTION  OU    CLOSURE OF WOUND Left 10/14/2022    Procedure: CLOSURE, WOUND;  Surgeon: Jovita Ceballos MD;  Location: [facility] OR;  Service: ENT;  Laterality: Left;  closure of forehead    COLONOSCOPY N/A 5/1/2019    Procedure: COLONOSCOPY;  Surgeon: Henry Mo III, MD;  Location: [facility] ENDO;  Service: Endoscopy;  Laterality: N/A;    " "CORONARY ANGIOPLASTY      ESOPHAGOGASTRODUODENOSCOPY N/A 5/1/2019    Procedure: ESOPHAGOGASTRODUODENOSCOPY (EGD);  Surgeon: Henry Mo III, MD;  Location: [facility] ENDO;  Service: Endoscopy;  Laterality: N/A;    EXCISION OF MELANOMA Left 10/7/2022    Procedure: EXCISION, MELANOMA;  Surgeon: Jovita Ceballos MD;  Location: [facility] OR;  Service: ENT;  Laterality: Left;    EYE SURGERY      FRACTURE SURGERY      kidney stone       August 2016    PC IOL OU      RETINAL DETACHMENT REPAIR W/ SCLERAL BUCKLE LE      WRIST SURGERY         Social History     Tobacco Use    Smoking status: Never    Smokeless tobacco: Never   Substance Use Topics    Alcohol use: Yes     Comment: " one to two glasses of wine per year"    Drug use: No       Family History   Problem Relation Age of Onset    Macular degeneration Father     Heart disease Father         CHF     Heart attack Father     Hypertension Mother     Macular degeneration Paternal Uncle     Hypertension Maternal Grandmother     Hypertension Maternal Grandfather     Strabismus Neg Hx     Retinal detachment Neg Hx     Glaucoma Neg Hx     Blindness Neg Hx     Amblyopia Neg Hx          Review of Systems   Constitutional: Negative for decreased appetite, diaphoresis, fever, malaise/fatigue and night sweats.   HENT:  Negative for nosebleeds.    Eyes:  Negative for blurred vision and double vision.   Cardiovascular:  Positive for dyspnea on exertion and leg swelling. Negative for chest pain, claudication, irregular heartbeat, near-syncope, orthopnea, palpitations, paroxysmal nocturnal dyspnea and syncope.   Respiratory:  Negative for cough, shortness of breath, sleep disturbances due to breathing, snoring, sputum production and wheezing.    Endocrine: Negative for cold intolerance and polyuria.   Hematologic/Lymphatic: Does not bruise/bleed easily.   Skin:  Negative for rash.   Musculoskeletal:  Positive for joint pain and muscle weakness. Negative for back pain, falls, joint swelling and " neck pain.   Gastrointestinal:  Negative for abdominal pain, heartburn, nausea and vomiting.   Genitourinary:  Negative for dysuria, frequency and hematuria.   Neurological:  Positive for dizziness. Negative for difficulty with concentration, focal weakness, headaches, light-headedness, numbness, seizures and weakness.   Psychiatric/Behavioral:  Negative for depression, memory loss and substance abuse. The patient does not have insomnia.    Allergic/Immunologic: Negative for HIV exposure and hives.     Objective:   Physical Exam  HENT:      Head: Normocephalic.   Eyes:      Pupils: Pupils are equal, round, and reactive to light.   Neck:      Thyroid: No thyromegaly.      Vascular: Normal carotid pulses. No carotid bruit or JVD.   Cardiovascular:      Rate and Rhythm: Normal rate and regular rhythm. No extrasystoles are present.     Chest Wall: PMI is not displaced.      Pulses: Normal pulses.      Heart sounds: Normal heart sounds. No murmur heard.    No gallop. No S3 sounds.   Pulmonary:      Effort: No respiratory distress.      Breath sounds: Normal breath sounds. No stridor.   Abdominal:      General: Bowel sounds are normal.      Palpations: Abdomen is soft.      Tenderness: There is no abdominal tenderness. There is no rebound.   Musculoskeletal:         General: Swelling (L >>R) and deformity present.   Skin:     Findings: No rash.   Neurological:      Mental Status: He is alert and oriented to person, place, and time.   Psychiatric:         Behavior: Behavior normal.       Lab Results   Component Value Date    CHOL 100 (L) 09/04/2022    CHOL 177 07/07/2022    CHOL 116 (L) 05/06/2021     Lab Results   Component Value Date    HDL 32 (L) 09/04/2022    HDL 40 07/07/2022    HDL 35 (L) 05/06/2021     Lab Results   Component Value Date    LDLCALC 44.4 (L) 09/04/2022    LDLCALC 100.8 07/07/2022    LDLCALC 48.8 (L) 05/06/2021     Lab Results   Component Value Date    TRIG 118 09/04/2022    TRIG 181 (H) 07/07/2022     TRIG 161 (H) 05/06/2021     Lab Results   Component Value Date    CHOLHDL 32.0 09/04/2022    CHOLHDL 22.6 07/07/2022    CHOLHDL 30.2 05/06/2021       Chemistry        Component Value Date/Time     02/27/2023 0930    K 3.4 (L) 02/27/2023 0930     02/27/2023 0930    CO2 27 02/27/2023 0930    BUN 19 02/27/2023 0930    BUN 34 (A) 10/28/2013 0835    CREATININE 1.7 (H) 02/27/2023 0930    CREATININE 1.7 10/28/2013 0835     (H) 02/27/2023 0930        Component Value Date/Time    CALCIUM 9.3 02/27/2023 0930    ALKPHOS 112 02/27/2023 0930    AST 13 02/27/2023 0930    AST 14 10/28/2013 0835    ALT 16 02/27/2023 0930    BILITOT 0.7 02/27/2023 0930    ESTGFRAFRICA 55.7 (A) 07/07/2022 1224    EGFRNONAA 48.2 (A) 07/07/2022 1224          Lab Results   Component Value Date    HGBA1C 7.9 (H) 02/27/2023     Lab Results   Component Value Date    TSH 1.259 09/03/2022     Lab Results   Component Value Date    INR 1.1 09/04/2022    INR 1.0 09/03/2022    INR 1.2 09/20/2016     Lab Results   Component Value Date    WBC 4.94 09/07/2022    HGB 15.1 09/07/2022    HCT 44.2 09/07/2022    MCV 90 09/07/2022     (L) 09/07/2022     BMP  Sodium   Date Value Ref Range Status   02/27/2023 141 136 - 145 mmol/L Final     Potassium   Date Value Ref Range Status   02/27/2023 3.4 (L) 3.5 - 5.1 mmol/L Final     Chloride   Date Value Ref Range Status   02/27/2023 107 95 - 110 mmol/L Final     CO2   Date Value Ref Range Status   02/27/2023 27 23 - 29 mmol/L Final     BUN   Date Value Ref Range Status   02/27/2023 19 8 - 23 mg/dL Final   10/28/2013 34 (A) 4 - 21 mg/dL Final     Creatinine   Date Value Ref Range Status   02/27/2023 1.7 (H) 0.5 - 1.4 mg/dL Final   10/28/2013 1.7 mg/dL Final     Calcium   Date Value Ref Range Status   02/27/2023 9.3 8.7 - 10.5 mg/dL Final     Anion Gap   Date Value Ref Range Status   02/27/2023 7 (L) 8 - 16 mmol/L Final     eGFR if    Date Value Ref Range Status   07/07/2022 55.7 (A) >60  mL/min/1.73 m^2 Final     eGFR if non    Date Value Ref Range Status   07/07/2022 48.2 (A) >60 mL/min/1.73 m^2 Final     Comment:     Calculation used to obtain the estimated glomerular filtration  rate (eGFR) is the CKD-EPI equation.        BNP  @LABRCNTIP(BNP,BNPTRIAGEBLO)@  @LABRCNTIP(troponini)@  CrCl cannot be calculated (Patient's most recent lab result is older than the maximum 7 days allowed.).  No results found in the last 24 hours.  No results found in the last 24 hours.  No results found in the last 24 hours.    Assessment:      1. Essential hypertension    2. Coronary artery disease involving native coronary artery of native heart without angina pectoris    3. Chronic diastolic congestive heart failure    4. LBBB (left bundle branch block)    5. Hyperlipidemia associated with type 2 diabetes mellitus    6. PVD (peripheral vascular disease)    7. Type 2 diabetes mellitus with diabetic polyneuropathy, with long-term current use of insulin    8. Class 3 severe obesity due to excess calories with serious comorbidity and body mass index (BMI) of 45.0 to 49.9 in adult    9. Lymphedema        Plan:   Continue coreg 12.5 mg bid and losartan 100 mg daily  Continue ASA 81 mg lifelong   Continue lasix Amlodipine Clonidine and hydralazine  DM Rx per PCP  Counseled DASH  Check Lipid profile with PCP in 6 months  Recommend heart-healthy diet, weight control   Iam. Risk modification.   I have reviewed all pertinent labs and cardiac studies independently. Plans and recommendations have been formulated under my direct supervision. All questions answered and patient voiced understanding.   If symptoms persist go to the ED  Will f/u at [facility] cardiology

## 2023-04-28 ENCOUNTER — OFFICE VISIT (OUTPATIENT)
Dept: OPHTHALMOLOGY | Facility: CLINIC | Age: 68
End: 2023-04-28
Payer: MEDICARE

## 2023-04-28 DIAGNOSIS — Z79.4 TYPE 2 DIABETES MELLITUS WITH MICROALBUMINURIA, WITH LONG-TERM CURRENT USE OF INSULIN: ICD-10-CM

## 2023-04-28 DIAGNOSIS — H40.1133 PRIMARY OPEN ANGLE GLAUCOMA OF BOTH EYES, SEVERE STAGE: Primary | ICD-10-CM

## 2023-04-28 DIAGNOSIS — H35.372 EPIRETINAL MEMBRANE (ERM) OF LEFT EYE: ICD-10-CM

## 2023-04-28 DIAGNOSIS — G51.0 LEFT-SIDED BELL'S PALSY: ICD-10-CM

## 2023-04-28 DIAGNOSIS — E11.29 TYPE 2 DIABETES MELLITUS WITH MICROALBUMINURIA, WITH LONG-TERM CURRENT USE OF INSULIN: ICD-10-CM

## 2023-04-28 DIAGNOSIS — R80.9 TYPE 2 DIABETES MELLITUS WITH MICROALBUMINURIA, WITH LONG-TERM CURRENT USE OF INSULIN: ICD-10-CM

## 2023-04-28 DIAGNOSIS — Z91.148 NON COMPLIANCE W MEDICATION REGIMEN: ICD-10-CM

## 2023-04-28 PROCEDURE — 99214 OFFICE O/P EST MOD 30 MIN: CPT | Mod: S$PBB,,, | Performed by: OPHTHALMOLOGY

## 2023-04-28 PROCEDURE — 99999 PR PBB SHADOW E&M-EST. PATIENT-LVL III: ICD-10-PCS | Mod: PBBFAC,,, | Performed by: OPHTHALMOLOGY

## 2023-04-28 PROCEDURE — 99213 OFFICE O/P EST LOW 20 MIN: CPT | Mod: PBBFAC | Performed by: OPHTHALMOLOGY

## 2023-04-28 PROCEDURE — 99999 PR PBB SHADOW E&M-EST. PATIENT-LVL III: CPT | Mod: PBBFAC,,, | Performed by: OPHTHALMOLOGY

## 2023-04-28 PROCEDURE — 99214 PR OFFICE/OUTPT VISIT, EST, LEVL IV, 30-39 MIN: ICD-10-PCS | Mod: S$PBB,,, | Performed by: OPHTHALMOLOGY

## 2023-04-28 RX ORDER — NETARSUDIL 0.2 MG/ML
1 SOLUTION/ DROPS OPHTHALMIC; TOPICAL DAILY
Qty: 2.5 ML | Refills: 6 | Status: SHIPPED | OUTPATIENT
Start: 2023-04-28

## 2023-04-28 RX ORDER — BRIMONIDINE TARTRATE 1 MG/ML
1 SOLUTION/ DROPS OPHTHALMIC 3 TIMES DAILY
Qty: 10 ML | Refills: 4 | Status: SHIPPED | OUTPATIENT
Start: 2023-04-28

## 2023-04-28 RX ORDER — BRINZOLAMIDE 10 MG/ML
1 SUSPENSION/ DROPS OPHTHALMIC 3 TIMES DAILY
Qty: 10 ML | Refills: 6 | Status: SHIPPED | OUTPATIENT
Start: 2023-04-28

## 2023-04-28 NOTE — PROGRESS NOTES
HPI     Glaucoma            Comments: 2 month IOP check    Patient states OU is doing well, no changes in VA and using drops as   directed.  Needs refills sent to the pharmacy. Patient states He is moving   back to [place] and after today all further appts will be with Dr Burgess. He saw him in the past and will be back In [facility]           Comments    POAG - Dr Burgess pt   Corneal Opacity   [place] palsy 10/2022 Left - was placed on steroids  DM   RD Repair with Scleral Buckle right eye   PCIOL OU   CANALOPLASTY OD 8-22-13 Good flow and tension(-900)   CANAL OS 03/20/14/LOT # 1306-01/REF # [id number]   -500 with shutter effect due to much intraop respiratory movement   Moderate flow with good tension       OU: Azopt BID   OU: Alphagan BID  OU: Rhopressa QD          Last edited by Alvin Hale MD on 4/28/2023  9:55 AM.            Assessment /Plan     For exam results, see Encounter Report.      ICD-10-CM ICD-9-CM    1. Primary open angle glaucoma of both eyes, severe stage  H40.1133 365.11 Increased IOP, pt is off all meds - stopped in error- pt understands RBA with not using meds      365.73       2. Type 2 diabetes mellitus with microalbuminuria, with long-term current use of insulin  E11.29 250.40 Last dilation was 1/2023 - not due for dilation at this time     Lab Results   Component Value Date    HGBA1C 7.9 (H) 02/27/2023         R80.9 791.0     Z79.4 V58.67       3. Epiretinal membrane (ERM) of left eye  H35.372 362.56 Follow       4. Left-sided Bell's palsy  G51.0 351.0 Resolved today           OU: Azopt BID   OU: Alphagan BID  OU: Rhopressa QD    Pt is moving back to [facility] and states he will resume care back with Dr. Burgess

## 2023-04-28 NOTE — Clinical Note
Pt is moving back to [place] and needs appt to see Dr Burgess in the next 1 month. Please contact pt to make appointment

## 2023-05-22 ENCOUNTER — OFFICE VISIT (OUTPATIENT)
Dept: DIABETES | Facility: CLINIC | Age: 68
End: 2023-05-22
Payer: MEDICARE

## 2023-05-22 DIAGNOSIS — G60.9 IDIOPATHIC PERIPHERAL NEUROPATHY: ICD-10-CM

## 2023-05-22 DIAGNOSIS — I15.2 HYPERTENSION ASSOCIATED WITH DIABETES: ICD-10-CM

## 2023-05-22 DIAGNOSIS — E78.5 HYPERLIPIDEMIA ASSOCIATED WITH TYPE 2 DIABETES MELLITUS: ICD-10-CM

## 2023-05-22 DIAGNOSIS — E66.01 MORBID OBESITY: ICD-10-CM

## 2023-05-22 DIAGNOSIS — I25.10 CORONARY ARTERY DISEASE INVOLVING NATIVE CORONARY ARTERY OF NATIVE HEART WITHOUT ANGINA PECTORIS: ICD-10-CM

## 2023-05-22 DIAGNOSIS — G47.33 OSA (OBSTRUCTIVE SLEEP APNEA): ICD-10-CM

## 2023-05-22 DIAGNOSIS — Z79.4 TYPE 2 DIABETES MELLITUS WITH HYPERGLYCEMIA, WITH LONG-TERM CURRENT USE OF INSULIN: Primary | ICD-10-CM

## 2023-05-22 DIAGNOSIS — E11.65 TYPE 2 DIABETES MELLITUS WITH HYPERGLYCEMIA, WITH LONG-TERM CURRENT USE OF INSULIN: Primary | ICD-10-CM

## 2023-05-22 DIAGNOSIS — I50.9 CHF (NYHA CLASS III, ACC/AHA STAGE C): ICD-10-CM

## 2023-05-22 DIAGNOSIS — Z79.4 UNCONTROLLED TYPE 2 DIABETES MELLITUS WITH HYPERGLYCEMIA, WITH LONG-TERM CURRENT USE OF INSULIN: ICD-10-CM

## 2023-05-22 DIAGNOSIS — E11.649 HYPOGLYCEMIA UNAWARENESS ASSOCIATED WITH TYPE 2 DIABETES MELLITUS: ICD-10-CM

## 2023-05-22 DIAGNOSIS — E11.59 HYPERTENSION ASSOCIATED WITH DIABETES: ICD-10-CM

## 2023-05-22 DIAGNOSIS — E11.65 UNCONTROLLED TYPE 2 DIABETES MELLITUS WITH HYPERGLYCEMIA, WITH LONG-TERM CURRENT USE OF INSULIN: ICD-10-CM

## 2023-05-22 DIAGNOSIS — E11.69 HYPERLIPIDEMIA ASSOCIATED WITH TYPE 2 DIABETES MELLITUS: ICD-10-CM

## 2023-05-22 DIAGNOSIS — N18.32 STAGE 3B CHRONIC KIDNEY DISEASE: ICD-10-CM

## 2023-05-22 DIAGNOSIS — H54.10 BLINDNESS AND LOW VISION: ICD-10-CM

## 2023-05-22 PROCEDURE — 99443 PR PHYSICIAN TELEPHONE EVALUATION 21-30 MIN: ICD-10-PCS | Mod: 95,,, | Performed by: PHYSICIAN ASSISTANT

## 2023-05-22 PROCEDURE — 99443 PR PHYSICIAN TELEPHONE EVALUATION 21-30 MIN: CPT | Mod: 95,,, | Performed by: PHYSICIAN ASSISTANT

## 2023-05-22 RX ORDER — LANCETS
EACH MISCELLANEOUS
Qty: 300 EACH | Refills: 3 | Status: SHIPPED | OUTPATIENT
Start: 2023-05-22

## 2023-05-22 RX ORDER — INSULIN PUMP SYRINGE, 3 ML
EACH MISCELLANEOUS
Qty: 1 EACH | Refills: 0 | Status: SHIPPED | OUTPATIENT
Start: 2023-05-22 | End: 2024-05-21

## 2023-05-22 NOTE — PATIENT INSTRUCTIONS
CURRENT DM MEDICATIONS:   Basaglar 74 units daily    Novolog 26 units TID wm   Miglitol 25 mg TID wm   Jardiance 25 mg daily

## 2023-05-22 NOTE — Clinical Note
6 week audio (Sriram sharing)  Please also let pt know it will not allow me to message a team for Dr Kerns in primary care in [place] and I am guessing she no longer is practicing or has left [facility]. He can call the general [facility] number and ask to be scheduled with a primary care at [facility]

## 2023-05-22 NOTE — PROGRESS NOTES
PCP: KANDICE Fleming MD    Subjective:     Chief Complaint: Diabetes - Established Patient    Established Patient - Audio Only Telehealth Visit     The patient location is: Home  The chief complaint leading to consultation is: Diabetes follow up  Visit type: Virtual visit with audio only (telephone)  Total Time Spent with Patient: 22 minutes     The reason for the audio only service rather than synchronous audio and video virtual visit was related to technical difficulties or patient preference/necessity.     Each patient to whom I provide medical services by telemedicine is:  (1) informed of the relationship between the physician and patient and the respective role of any other health care provider with respect to management of the patient; and (2) notified that they may decline to receive medical services by telemedicine and may withdraw from such care at any time. Patient verbally consented to receive this service via voice-only telephone call.    This service was not originating from a related E/M service provided within the previous 7 days nor will  to an E/M service or procedure within the next 24 hours or my soonest available appointment.  Prevailing standard of care was able to be met in this audio-only visit.       HISTORY OF PRESENT ILLNESS: 68 y.o.   male presenting for diabetes management visit.   The patient's last visit with me was on 3/23/2023.  Patient has had Type II diabetes since 2005.  Pertinent to decision making is the following comorbidities: HTN, HLD, CAD, S/p MI, CHF, CKD III and Obesity by BMI  Patient has the following Diabetes complications: with diabetic chronic kidney disease  He has attended diabetes education in the past.     Patient's most recent A1c of 7.9% was completed 3 months ago.   Patient states since His last A1c His blood glucose levels have been both high and low throughout the day     Patient monitors blood glucose 4 times per day and Continuously with  personal CGM Sriram.   Patient blood glucose monitoring device will be uploaded into [employer] today.   Patient endorses the following diabetes related symptoms:  None .     Patients records show some baseline hypoglycemia and some occasional postprandial hyperglycemia. Needs to call [facility] to ship new Sriram.   Patient is due today for the following diabetes-related health maintenance standards: COVID Vaccine and Eye exam.  He denies any recent hospital admissions or emergency room visits.  He voices having hypoglycemia as above. Per chart, patient has history of hypoglycemia unawareness.   Patient's concerns today include glycemic control. Pt recently moved to [facility] and will be moving before end of the year to [facility].   Patient medication regimen is as below.     CURRENT DM MEDICATIONS:   Basaglar 82 units daily    Novolog 25 units TID wm   Miglitol 25 mg TID wm   Jardiance 25 mg daily    Patient has failed the following Diabetes medications:   Metformin - GI   Glyburide  Ozempic - cost  Basal - Lantus         Labs Reviewed.       Lab Results   Component Value Date    CPEPTIDE 2.70 12/13/2018     Lab Results   Component Value Date    GLUTAMICACID 0.00 12/13/2018          //   , There is no height or weight on file to calculate BMI.  His blood sugar in clinic today is:    Lab Results   Component Value Date    POCGLU 207 (A) 08/15/2022       Review of Systems   Constitutional:  Negative for activity change, appetite change, chills and fever.   HENT:  Negative for dental problem, mouth sores, nosebleeds, sore throat and trouble swallowing.    Eyes:  Negative for pain and discharge.   Respiratory:  Negative for shortness of breath, wheezing and stridor.    Cardiovascular:  Negative for chest pain, palpitations and leg swelling.   Gastrointestinal:  Negative for abdominal pain, diarrhea, nausea and vomiting.   Endocrine: Negative for polydipsia, polyphagia and polyuria.   Genitourinary:  Negative for dysuria, frequency  "and urgency.   Musculoskeletal:  Negative for joint swelling and myalgias.   Skin:  Negative for rash and wound.   Neurological:  Negative for dizziness, syncope, weakness and headaches.   Psychiatric/Behavioral:  Negative for behavioral problems and dysphoric mood.        Diabetes Management Status  Statin: Taking  ACE/ARB: Taking    Screening or Prevention Patient's value Goal Complete/Controlled?   HgA1C Testing and Control   Lab Results   Component Value Date    HGBA1C 7.9 (H) 02/27/2023      Annually/Less than 8% No   Lipid profile : 09/04/2022 Annually Yes   LDL control Lab Results   Component Value Date    LDLCALC 44.4 (L) 09/04/2022    Annually/Less than 100 mg/dl  Yes   Nephropathy screening Lab Results   Component Value Date    MICALBCREAT 105.3 (H) 02/27/2023     Lab Results   Component Value Date    PROTEINUA 1+ (A) 08/15/2022    Annually No   Blood pressure BP Readings from Last 1 Encounters:   04/18/23 126/84    Less than 140/90 No   Dilated retinal exam : 12/07/2021 Annually Yes    Foot exam   : 08/15/2022 Annually Yes     ACTIVITY LEVEL: Rarely Active. Discussed activities, benefits, methods, and precautions.  MEAL PLANNING: Patient reports number of meals per day to be 3 and number of snacks per day to be 2.   Patient is encouraged to carb count and consume no more than 30 - 45 grams of carbohydrates in each meal and 15 grams of carbohydrates in each snack.     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     Social Determinants of Health     Financial Resource Strain: Low Risk     " "Difficulty of Paying Living Expenses: Not hard at all   Food Insecurity: No Food Insecurity    Worried About Running Out of Food in the Last Year: Never true    Ran Out of Food in the Last Year: Never true   Transportation Needs: No Transportation Needs    Lack of Transportation (Medical): No    Lack of Transportation (Non-Medical): No   Physical Activity: Inactive    Days of Exercise per Week: 0 days    Minutes of Exercise per Session: 0 min   Stress: No Stress Concern Present    Feeling of Stress : Only a little   Social Connections: Unknown    Frequency of Communication with Friends and Family: More than three times a week    Frequency of Social Gatherings with Friends and Family: Once a week    Attends Jainism Services: Never    Active Member of Clubs or Organizations: No    Attends Club or Organization Meetings: Never   Housing Stability: Low Risk     Unable to Pay for Housing in the Last Year: No    Number of Places Lived in the Last Year: 1    Unstable Housing in the Last Year: No     Past Medical History:   Diagnosis Date    Anxiety     Bell's palsy     CHF (congestive heart failure)     Chronic kidney disease, stage 3a 11/02/2021    Coronary artery disease involving native coronary artery without angina pectoris 10/18/2016    Depression     Diabetes mellitus     Glaucoma     Hypertension     Idiopathic peripheral neuropathy 12/11/2012    Lymphedema of both lower extremities     Malignant melanoma of skin of forehead 10/13/2022    Mixed hyperlipidemia 12/11/2012    Morbid obesity with BMI of 45.0-49.9, adult 02/12/2019    Pancytopenia     Sleep apnea     "unable to use"    Stage 3b chronic kidney disease     Type 2 diabetes mellitus with diabetic polyneuropathy, with long-term current use of insulin 12/11/2012    Vitamin B 12 deficiency 08/23/2012       Objective:        Physical Exam  Neurological:      Mental Status: He is alert and oriented to person, place, and time. Mental status is at baseline. "   Psychiatric:         Mood and Affect: Mood normal.         Behavior: Behavior normal.         Thought Content: Thought content normal.         Judgment: Judgment normal.         Assessment / Plan:     Type 2 diabetes mellitus with hyperglycemia, with long-term current use of insulin  -     blood-glucose meter kit; To check BG 4 times daily, to use with insurance preferred meter  Dispense: 1 each; Refill: 0  -     lancets Misc; To check BG 4 times daily, to use with insurance preferred meter  Dispense: 300 each; Refill: 3  -     blood sugar diagnostic Strp; To check BG 4 times daily, to use with insurance preferred meter  Dispense: 300 each; Refill: 3    Stage 3b chronic kidney disease    Hypoglycemia unawareness associated with type 2 diabetes mellitus    Blindness and low vision    Idiopathic peripheral neuropathy    CHF (NYHA class III, ACC/AHA stage C)    Coronary artery disease involving native coronary artery of native heart without angina pectoris    Hypertension associated with diabetes    Hyperlipidemia associated with type 2 diabetes mellitus    FRAN (obstructive sleep apnea)    Morbid obesity    Uncontrolled type 2 diabetes mellitus with hyperglycemia, with long-term current use of insulin      Additional Plan Details:    - POCT Glucose  - Encouraged continuation of lifestyle changes including regular exercise and limiting carbohydrates to 30-45 grams per meal threes times daily and 15 grams per snack with a limit of two daily.   - Encouraged continued monitoring of blood glucose with maintenance of 4 times daily and Continuously with personal CGM Sriram. Insurance preferred Meter and supplies Rx today.   - Current DM Medication Regimen:  Continue Jardiance 25 mg. Change Basaglar 74 units daily. Continue Novolog 25 units with regular meal TID wm. Continue Miglitol 25 mg TID wm.    - Health Maintenance standards addressed today: Eye Exam - will be completed within [facility] system and scheduled today and  COVID - 19 Vaccine - patient will schedule outside of [facility]   - Nursing Visit: Patient is age 79 or younger with an A1c of 7.5 or greater and will not need nursing visit at this time .   - Follow up in 6 weeks for call.       Blakeney McKnight, PA-C [facility]

## 2023-05-23 ENCOUNTER — TELEPHONE (OUTPATIENT)
Dept: DIABETES | Facility: CLINIC | Age: 68
End: 2023-05-23
Payer: MEDICARE

## 2023-05-23 NOTE — TELEPHONE ENCOUNTER
----- Message from Luís Millard PA-C sent at 5/22/2023  4:28 PM CDT -----  6 week audio (Sriram sharing)   Please also let pt know it will not allow me to message a team for Dr Kerns in primary care in [place] and I am guessing she no longer is practicing or has left [facility]. He can call the general [facility] number and ask to be scheduled with a primary care at [facility]

## 2023-05-29 DIAGNOSIS — I10 ESSENTIAL HYPERTENSION: ICD-10-CM

## 2023-05-29 RX ORDER — FUROSEMIDE 40 MG/1
40 TABLET ORAL DAILY
Qty: 90 TABLET | Refills: 1 | Status: SHIPPED | OUTPATIENT
Start: 2023-05-29 | End: 2024-01-04 | Stop reason: SDUPTHER

## 2023-05-30 NOTE — PROGRESS NOTES
Stepan Springer  1955        Subjective     Chief Complaint: establish care    History of Present Illness:  Mr. Stepan Springer is a 68 y.o. male who presents to clinic for establishing care. Last PCP was Dr. Fleming. Recently moved from [place] to [place] daughter.    DM2:   Follows with endocrine.  Last A1c of 7.9 on 2/2023.  Elevated microalbumin/Creatinine ratio as well.   Failed Metformin in past.  On Basaglar 74 units qd, Novolog 25 TIDWM, Miglitol 25mg TIDWM, Jardiance 25mg qd.  Has CGM.  Up to date on foot exam.  Not up to date on eye exam, scheduled at most recent endocrine follow up and to be completed on 6/5.  Diet consists of keto diet.  Exercise regimen of walking around 1 mile 3 x a week.    HTN:  On hydralazine 100mg TID, clonidine 0.1 mg qd, Coreg 12.5 mg BID, losartan 100mg qd, amlodipine 5 mg qd.  Follows with cardiology closely however would like referral for cardiologist in [place].  Last seen on 4/18/23.  BP initially 210/123 -> 190/132 -> 172/122.  Asymptomatic.  Does not take BP at home.  States has tried multiple times at home with home devices however cuff always breaks, has attempted wrist cuff as well without success.  Discussed importance of compliance of home medications and re-attempting with BP cuff.     CAD:  On ASA, stable.  Follows with cardiology    HFpEF:  Follows with cardiology closely.  On lasix 40mg qd.  Denies orthopnea, chest pain, dyspnea.    HLD:  Last lipid panel with LDL 44 on 9/2022.  On lipitor 20mg.  Diet and exercise regimen as above.    Obesity:  Diet and exercise as above.  Discussed risk of weight with overall health and importance of weight loss.    HM:  Family and patient are considering moving to [place] hopefully by then end of the calendar year.  Up to date on recent blood work, vaccines besides COVID booster.    Review of Systems   Constitutional: Negative.  Negative for chills and fever.   HENT: Negative.  Negative for sinus pain  "and sore throat.    Eyes: Negative.  Negative for blurred vision and pain.   Respiratory:  Negative for cough, sputum production and shortness of breath.    Cardiovascular:  Positive for leg swelling (chronic). Negative for chest pain, palpitations and orthopnea.   Gastrointestinal: Negative.  Negative for abdominal pain, constipation, diarrhea, nausea and vomiting.   Genitourinary: Negative.  Negative for dysuria, flank pain and hematuria.   Musculoskeletal:  Positive for back pain (chronic) and joint pain (chronic).   Neurological: Negative.  Negative for dizziness and headaches.      PAST HISTORY:     Past Medical History:   Diagnosis Date    Anxiety     Bell's palsy     CHF (congestive heart failure)     Chronic kidney disease, stage 3a 11/02/2021    Coronary artery disease involving native coronary artery without angina pectoris 10/18/2016    Depression     Diabetes mellitus     Glaucoma     Hypertension     Idiopathic peripheral neuropathy 12/11/2012    Lymphedema of both lower extremities     Malignant melanoma of skin of forehead 10/13/2022    Mixed hyperlipidemia 12/11/2012    Morbid obesity with BMI of 45.0-49.9, adult 02/12/2019    Pancytopenia     Sleep apnea     "unable to use"    Stage 3b chronic kidney disease     Type 2 diabetes mellitus with diabetic polyneuropathy, with long-term current use of insulin 12/11/2012    Vitamin B 12 deficiency 08/23/2012       Past Surgical History:   Procedure Laterality Date    CARDIAC CATHETERIZATION  7/22/13    non obstructive cad    CATARACT EXTRACTION  OU    CLOSURE OF WOUND Left 10/14/2022    Procedure: CLOSURE, WOUND;  Surgeon: Jovita Ceballos MD;  Location: [facility] OR;  Service: ENT;  Laterality: Left;  closure of forehead    COLONOSCOPY N/A 5/1/2019    Procedure: COLONOSCOPY;  Surgeon: Henry Mo III, MD;  Location: [facility] ENDO;  Service: Endoscopy;  Laterality: N/A;    CORONARY ANGIOPLASTY      ESOPHAGOGASTRODUODENOSCOPY N/A 5/1/2019    Procedure: " "ESOPHAGOGASTRODUODENOSCOPY (EGD);  Surgeon: Henry Mo III, MD;  Location: [facility] ENDO;  Service: Endoscopy;  Laterality: N/A;    EXCISION OF MELANOMA Left 10/7/2022    Procedure: EXCISION, MELANOMA;  Surgeon: Jovita Ceballos MD;  Location: [facility] OR;  Service: ENT;  Laterality: Left;    EYE SURGERY      FRACTURE SURGERY      kidney stone       August 2016    PC IOL OU      RETINAL DETACHMENT REPAIR W/ SCLERAL BUCKLE LE      WRIST SURGERY         Family History   Problem Relation Age of Onset    Macular degeneration Father     Heart disease Father         CHF     Heart attack Father     Hypertension Mother     Macular degeneration Paternal Uncle     Hypertension Maternal Grandmother     Hypertension Maternal Grandfather     Strabismus Neg Hx     Retinal detachment Neg Hx     Glaucoma Neg Hx     Blindness Neg Hx     Amblyopia Neg Hx        Social History     Socioeconomic History    Marital status:    Occupational History     Comment: Quit job at [employer]; wants to open his own [employer] business    Tobacco Use    Smoking status: Never    Smokeless tobacco: Never   Substance and Sexual Activity    Alcohol use: Yes     Comment: " one to two glasses of wine per year"    Drug use: No    Sexual activity: Not Currently     Birth control/protection: None   Social History Narrative    , 1 son, [employer].     Social Determinants of Health     Financial Resource Strain: Low Risk     Difficulty of Paying Living Expenses: Not hard at all   Food Insecurity: No Food Insecurity    Worried About Running Out of Food in the Last Year: Never true    Ran Out of Food in the Last Year: Never true   Transportation Needs: No Transportation Needs    Lack of Transportation (Medical): No    Lack of Transportation (Non-Medical): No   Physical Activity: Inactive    Days of Exercise per Week: 0 days    Minutes of Exercise per Session: 0 min   Stress: No Stress Concern Present    Feeling of Stress : Only a little   Social Connections: Unknown "    Frequency of Communication with Friends and Family: More than three times a week    Frequency of Social Gatherings with Friends and Family: Once a week    Attends Yarsani Services: Never    Active Member of Clubs or Organizations: No    Attends Club or Organization Meetings: Never   Housing Stability: Low Risk     Unable to Pay for Housing in the Last Year: No    Number of Places Lived in the Last Year: 1    Unstable Housing in the Last Year: No       MEDICATIONS & ALLERGIES:     Current Outpatient Medications on File Prior to Visit   Medication Sig    amLODIPine (NORVASC) 5 MG tablet Take 1 tablet (5 mg total) by mouth every evening.    aspirin 81 MG Chew Take 81 mg by mouth once daily.    atorvastatin (LIPITOR) 20 MG tablet Take 1 tablet (20 mg total) by mouth every evening.    blood sugar diagnostic Strp To check BG 4 times daily, to use with insurance preferred meter    blood-glucose meter kit To check BG 4 times daily, to use with insurance preferred meter    brimonidine 0.1% (ALPHAGAN P) 0.1 % Drop Place 1 drop into both eyes 3 (three) times daily.    AZOPT 1 % ophthalmic suspension Place 1 drop into both eyes 3 (three) times daily. Brand name only    carvediloL (COREG) 12.5 MG tablet Take 1 tablet (12.5 mg total) by mouth 2 (two) times daily.    cloNIDine (CATAPRES) 0.1 MG tablet Take 1 tablet (0.1 mg total) by mouth 3 (three) times daily.    dextran 70-hypromellose (TEARS) ophthalmic solution Place 2 drops into the left eye every 4 (four) hours.    flash glucose sensor (FREESTYLE CLEMENCIA 14 DAY SENSOR) Kit 1 each by Misc.(Non-Drug; Combo Route) route every 14 (fourteen) days.    furosemide (LASIX) 40 MG tablet Take 1 tablet (40 mg total) by mouth once daily.    hydrALAZINE (APRESOLINE) 100 MG tablet Take 1 tablet (100 mg total) by mouth every 8 (eight) hours.    insulin (BASAGLAR KWIKPEN U-100 INSULIN) glargine 100 units/mL SubQ pen Inject 80 Units into the skin once daily.    insulin aspart U-100  "(NOVOLOG) 100 unit/mL (3 mL) InPn pen Inject 25 Units into the skin 3 (three) times daily with meals.    lancets Misc To check BG 4 times daily, to use with insurance preferred meter    losartan (COZAAR) 100 MG tablet Take 1 tablet (100 mg total) by mouth once daily.    mupirocin (BACTROBAN) 2 % ointment Apply topically once daily.    netarsudiL (RHOPRESSA) 0.02 % ophthalmic solution Place 1 drop into both eyes once daily.    nitroGLYCERIN (NITROSTAT) 0.4 MG SL tablet PLACE 1 TABLET (0.4 MG TOTAL) UNDER THE TONGUE EVERY 5 (FIVE) MINUTES AS NEEDED FOR CHEST PAIN.    valACYclovir (VALTREX) 1000 MG tablet Take 1 tablet (1,000 mg total) by mouth 2 (two) times daily. for 10 days (Patient not taking: Reported on 4/18/2023)    [DISCONTINUED] hydroCHLOROthiazide (HYDRODIURIL) 25 MG tablet Take 1 tablet (25 mg total) by mouth once daily.    [DISCONTINUED] miglitoL (GLYSET) 25 MG Tab Take 1 tablet (25 mg total) by mouth 3 (three) times daily with meals.     No current facility-administered medications on file prior to visit.       Review of patient's allergies indicates:   Allergen Reactions    Cefazolin Other (See Comments)     Shakes, chills, dizziness       OBJECTIVE:     Vital Signs:  Vitals:    05/31/23 1351 05/31/23 1435 05/31/23 1436   BP: (!) 210/123 (!) 190/132 (!) 172/122   BP Location: Left arm     Patient Position: Sitting     BP Method: Large (Automatic)     Pulse: 82     SpO2: 96%     Weight: (!) 184.1 kg (405 lb 13.9 oz)     Height: 6' 7" (2.007 m)         Body mass index is 45.72 kg/m².     Physical Exam:  Physical Exam  Vitals and nursing note reviewed.   Constitutional:       General: He is not in acute distress.     Appearance: He is obese. He is not ill-appearing.   HENT:      Head: Normocephalic.      Mouth/Throat:      Mouth: Mucous membranes are moist.      Pharynx: Oropharynx is clear. No oropharyngeal exudate or posterior oropharyngeal erythema.   Eyes:      General: No scleral icterus.     " Extraocular Movements: Extraocular movements intact.      Conjunctiva/sclera: Conjunctivae normal.      Pupils: Pupils are equal, round, and reactive to light.   Cardiovascular:      Rate and Rhythm: Normal rate and regular rhythm.      Heart sounds: Normal heart sounds.   Pulmonary:      Effort: Pulmonary effort is normal. No respiratory distress.      Breath sounds: Normal breath sounds. No wheezing or rales.   Chest:      Chest wall: No tenderness.   Abdominal:      Palpations: Abdomen is soft.      Tenderness: There is no abdominal tenderness. There is no right CVA tenderness, left CVA tenderness or guarding.   Musculoskeletal:         General: No tenderness. Normal range of motion.      Cervical back: Normal range of motion and neck supple.      Right lower leg: Edema present.      Left lower leg: Edema present.   Skin:     General: Skin is warm and dry.      Comments: Scar on left side forhead   Neurological:      General: No focal deficit present.      Mental Status: He is alert and oriented to person, place, and time.          Laboratory  Lab Results   Component Value Date    WBC 4.94 09/07/2022    HGB 15.1 09/07/2022    HCT 44.2 09/07/2022    MCV 90 09/07/2022     (L) 09/07/2022     Lab Results   Component Value Date     (H) 02/27/2023     02/27/2023    K 3.4 (L) 02/27/2023     02/27/2023    CO2 27 02/27/2023    BUN 19 02/27/2023    CREATININE 1.7 (H) 02/27/2023    CALCIUM 9.3 02/27/2023    MG 2.6 09/04/2022     Lab Results   Component Value Date    INR 1.1 09/04/2022    INR 1.0 09/03/2022    INR 1.2 09/20/2016     Lab Results   Component Value Date    HGBA1C 7.9 (H) 02/27/2023     No results for input(s): POCTGLUCOSE in the last 72 hours.      Health Maintenance         Date Due Completion Date    COVID-19 Vaccine (5 - Moderna series) 02/10/2022 12/16/2021    Eye Exam 12/07/2022 12/7/2021    Override on 7/18/2017: Done    Override on 9/16/2016: Done    Override on 7/10/2013: Done     Foot Exam 08/15/2023 8/15/2022    Override on 10/23/2013: Done    Override on 8/21/2013: Done    Hemoglobin A1c 08/27/2023 2/27/2023    Influenza Vaccine (Season Ended) 09/01/2023 ---    Lipid Panel 09/04/2023 9/4/2022    TETANUS VACCINE 12/10/2023 12/10/2013    PROSTATE-SPECIFIC ANTIGEN 02/27/2024 2/27/2023    Diabetes Urine Screening 02/27/2024 2/27/2023    High Dose Statin 04/28/2024 4/28/2023    Colorectal Cancer Screening 05/01/2029 5/1/2019            ASSESSMENT & PLAN:   Mr. Stepan Springer is a 68 y.o. male who was seen today in clinic for establishing care    Stepan was seen today for follow-up.    Diagnoses and all orders for this visit:    Type 2 diabetes mellitus with stage 3a chronic kidney disease, with long-term current use of insulin  -     Ambulatory referral/consult to Podiatry; Future    Type 2 diabetes mellitus with diabetic polyneuropathy, with long-term current use of insulin  -     Ambulatory referral/consult to Podiatry; Future    Essential hypertension  -     Ambulatory referral/consult to Cardiology; Future    Stage 3b chronic kidney disease    Coronary artery disease involving native coronary artery of native heart without angina pectoris  -     Ambulatory referral/consult to Cardiology; Future    Class 3 severe obesity due to excess calories with serious comorbidity and body mass index (BMI) of 45.0 to 49.9 in adult    Chronic diastolic congestive heart failure  -     Ambulatory referral/consult to Cardiology; Future         1. Type 2 diabetes mellitus with stage 3a chronic kidney disease, with long-term current use of insulin    2. Type 2 diabetes mellitus with diabetic polyneuropathy, with long-term current use of insulin    3. Essential hypertension    4. Stage 3b chronic kidney disease    5. Coronary artery disease involving native coronary artery of native heart without angina pectoris    6. Class 3 severe obesity due to excess calories with serious comorbidity and body mass  index (BMI) of 45.0 to 49.9 in adult    7. Chronic diastolic congestive heart failure        Patient presented with hypertensive urgency, asymptomatic.  Did not take home BP meds.  Discussed concern and recommended ED evaluation.  Patient declined and would like to go home to take BP meds.  Discussed risks for continued elevation of BP to this degree and concern for potential fatal outcomes, however patient declining.  Understanding of risk and instructed to go home immediately to take missed home BP medications.  Discussed strict return precautions with any symptoms including chest pain, dyspnea, changes in vision, headaches.    Referral sent to cardiology and podiatry for [facility].    Please follow up in 1 week with a provider for a blood pressure check.    Keaton Ramirez MD  Internal Medicine PGY-2  [facility]  [address] [place] [address]

## 2023-05-31 ENCOUNTER — OFFICE VISIT (OUTPATIENT)
Dept: INTERNAL MEDICINE | Facility: CLINIC | Age: 68
End: 2023-05-31
Payer: MEDICARE

## 2023-05-31 VITALS
HEIGHT: 78 IN | OXYGEN SATURATION: 96 % | DIASTOLIC BLOOD PRESSURE: 122 MMHG | BODY MASS INDEX: 36.45 KG/M2 | WEIGHT: 315 LBS | HEART RATE: 82 BPM | SYSTOLIC BLOOD PRESSURE: 172 MMHG

## 2023-05-31 DIAGNOSIS — N18.32 STAGE 3B CHRONIC KIDNEY DISEASE: ICD-10-CM

## 2023-05-31 DIAGNOSIS — Z79.4 TYPE 2 DIABETES MELLITUS WITH DIABETIC POLYNEUROPATHY, WITH LONG-TERM CURRENT USE OF INSULIN: ICD-10-CM

## 2023-05-31 DIAGNOSIS — I50.32 CHRONIC DIASTOLIC CONGESTIVE HEART FAILURE: ICD-10-CM

## 2023-05-31 DIAGNOSIS — N18.31 TYPE 2 DIABETES MELLITUS WITH STAGE 3A CHRONIC KIDNEY DISEASE, WITH LONG-TERM CURRENT USE OF INSULIN: ICD-10-CM

## 2023-05-31 DIAGNOSIS — E11.42 TYPE 2 DIABETES MELLITUS WITH DIABETIC POLYNEUROPATHY, WITH LONG-TERM CURRENT USE OF INSULIN: ICD-10-CM

## 2023-05-31 DIAGNOSIS — I25.10 CORONARY ARTERY DISEASE INVOLVING NATIVE CORONARY ARTERY OF NATIVE HEART WITHOUT ANGINA PECTORIS: ICD-10-CM

## 2023-05-31 DIAGNOSIS — I16.0 HYPERTENSIVE URGENCY: Primary | ICD-10-CM

## 2023-05-31 DIAGNOSIS — Z79.4 TYPE 2 DIABETES MELLITUS WITH STAGE 3A CHRONIC KIDNEY DISEASE, WITH LONG-TERM CURRENT USE OF INSULIN: ICD-10-CM

## 2023-05-31 DIAGNOSIS — E66.01 CLASS 3 SEVERE OBESITY DUE TO EXCESS CALORIES WITH SERIOUS COMORBIDITY AND BODY MASS INDEX (BMI) OF 45.0 TO 49.9 IN ADULT: ICD-10-CM

## 2023-05-31 DIAGNOSIS — I10 ESSENTIAL HYPERTENSION: ICD-10-CM

## 2023-05-31 DIAGNOSIS — E11.22 TYPE 2 DIABETES MELLITUS WITH STAGE 3A CHRONIC KIDNEY DISEASE, WITH LONG-TERM CURRENT USE OF INSULIN: ICD-10-CM

## 2023-05-31 PROCEDURE — 99999 PR PBB SHADOW E&M-EST. PATIENT-LVL V: CPT | Mod: PBBFAC,GC,,

## 2023-05-31 PROCEDURE — 99214 OFFICE O/P EST MOD 30 MIN: CPT | Mod: S$PBB,GC,,

## 2023-05-31 PROCEDURE — 99214 PR OFFICE/OUTPT VISIT, EST, LEVL IV, 30-39 MIN: ICD-10-PCS | Mod: S$PBB,GC,,

## 2023-05-31 PROCEDURE — 99999 PR PBB SHADOW E&M-EST. PATIENT-LVL V: ICD-10-PCS | Mod: PBBFAC,GC,,

## 2023-05-31 PROCEDURE — 99215 OFFICE O/P EST HI 40 MIN: CPT | Mod: PBBFAC

## 2023-05-31 NOTE — PATIENT INSTRUCTIONS
Blood pressure significantly elevated today after not taking home medications.  Although you are asymptomatic, you are at a higher risk of a fatal outcome if blood pressure is still elevated to this degree.  Highly recommend going to the emergency room although if not willing, please go home immediately and take home blood pressure medications.    Referral sent to cardiology and podiatry for [facility].    Please follow up in 1 week with a provider for a blood pressure check.

## 2023-06-02 ENCOUNTER — OFFICE VISIT (OUTPATIENT)
Dept: OPHTHALMOLOGY | Facility: CLINIC | Age: 68
DRG: 918 | End: 2023-06-02
Payer: MEDICARE

## 2023-06-02 ENCOUNTER — HOSPITAL ENCOUNTER (INPATIENT)
Facility: HOSPITAL | Age: 68
LOS: 1 days | Discharge: HOME | DRG: 918 | End: 2023-06-05
Attending: EMERGENCY MEDICINE | Admitting: HOSPITALIST
Payer: MEDICARE

## 2023-06-02 DIAGNOSIS — E16.0 HYPOGLYCEMIA DUE TO INSULIN: Primary | ICD-10-CM

## 2023-06-02 DIAGNOSIS — T38.3X5A HYPOGLYCEMIA DUE TO INSULIN: Primary | ICD-10-CM

## 2023-06-02 DIAGNOSIS — R55 NEAR SYNCOPE: ICD-10-CM

## 2023-06-02 DIAGNOSIS — E11.22 TYPE 2 DIABETES MELLITUS WITH CHRONIC KIDNEY DISEASE, WITH LONG-TERM CURRENT USE OF INSULIN, UNSPECIFIED CKD STAGE: ICD-10-CM

## 2023-06-02 DIAGNOSIS — I10 ESSENTIAL HYPERTENSION: ICD-10-CM

## 2023-06-02 DIAGNOSIS — R80.9 TYPE 2 DIABETES MELLITUS WITH MICROALBUMINURIA, WITH LONG-TERM CURRENT USE OF INSULIN: ICD-10-CM

## 2023-06-02 DIAGNOSIS — E11.29 TYPE 2 DIABETES MELLITUS WITH MICROALBUMINURIA, WITH LONG-TERM CURRENT USE OF INSULIN: ICD-10-CM

## 2023-06-02 DIAGNOSIS — I73.9 PVD (PERIPHERAL VASCULAR DISEASE): ICD-10-CM

## 2023-06-02 DIAGNOSIS — Z79.4 TYPE 2 DIABETES MELLITUS WITH CHRONIC KIDNEY DISEASE, WITH LONG-TERM CURRENT USE OF INSULIN, UNSPECIFIED CKD STAGE: ICD-10-CM

## 2023-06-02 DIAGNOSIS — L97.521 DIABETIC ULCER OF TOE OF LEFT FOOT ASSOCIATED WITH TYPE 2 DIABETES MELLITUS, LIMITED TO BREAKDOWN OF SKIN: ICD-10-CM

## 2023-06-02 DIAGNOSIS — I50.32 CHRONIC DIASTOLIC CONGESTIVE HEART FAILURE: ICD-10-CM

## 2023-06-02 DIAGNOSIS — Z79.4 TYPE 2 DIABETES MELLITUS WITH MICROALBUMINURIA, WITH LONG-TERM CURRENT USE OF INSULIN: ICD-10-CM

## 2023-06-02 DIAGNOSIS — E16.2 HYPOGLYCEMIA: Primary | ICD-10-CM

## 2023-06-02 DIAGNOSIS — E11.621 DIABETIC ULCER OF TOE OF LEFT FOOT ASSOCIATED WITH TYPE 2 DIABETES MELLITUS, LIMITED TO BREAKDOWN OF SKIN: ICD-10-CM

## 2023-06-02 PROBLEM — I13.0 BENIGN HYPERTENSIVE HEART AND KIDNEY DISEASE WITH HF AND CKD: Status: ACTIVE | Noted: 2023-06-02

## 2023-06-02 PROBLEM — I15.2 OBESITY, DIABETES, AND HYPERTENSION SYNDROME: Status: ACTIVE | Noted: 2023-06-02

## 2023-06-02 PROBLEM — E11.69 OBESITY, DIABETES, AND HYPERTENSION SYNDROME: Status: ACTIVE | Noted: 2023-06-02

## 2023-06-02 PROBLEM — E11.59 OBESITY, DIABETES, AND HYPERTENSION SYNDROME: Status: ACTIVE | Noted: 2023-06-02

## 2023-06-02 PROBLEM — E66.9 OBESITY, DIABETES, AND HYPERTENSION SYNDROME: Status: ACTIVE | Noted: 2023-06-02

## 2023-06-02 LAB
ALBUMIN SERPL BCP-MCNC: 3.2 G/DL (ref 3.5–5.2)
ALP SERPL-CCNC: 91 U/L (ref 55–135)
ALT SERPL W/O P-5'-P-CCNC: 14 U/L (ref 10–44)
ANION GAP SERPL CALC-SCNC: 10 MMOL/L (ref 8–16)
AST SERPL-CCNC: 13 U/L (ref 10–40)
BASOPHILS # BLD AUTO: 0.03 K/UL (ref 0–0.2)
BASOPHILS NFR BLD: 0.6 % (ref 0–1.9)
BILIRUB SERPL-MCNC: 0.6 MG/DL (ref 0.1–1)
BUN SERPL-MCNC: 26 MG/DL (ref 8–23)
CALCIUM SERPL-MCNC: 8.6 MG/DL (ref 8.7–10.5)
CHLORIDE SERPL-SCNC: 113 MMOL/L (ref 95–110)
CO2 SERPL-SCNC: 19 MMOL/L (ref 23–29)
CREAT SERPL-MCNC: 2.2 MG/DL (ref 0.5–1.4)
DIFFERENTIAL METHOD: NORMAL
EOSINOPHIL # BLD AUTO: 0.2 K/UL (ref 0–0.5)
EOSINOPHIL NFR BLD: 4.4 % (ref 0–8)
ERYTHROCYTE [DISTWIDTH] IN BLOOD BY AUTOMATED COUNT: 13.1 % (ref 11.5–14.5)
EST. GFR  (NO RACE VARIABLE): 32.2 ML/MIN/1.73 M^2
ESTIMATED AVG GLUCOSE: 160 MG/DL (ref 68–131)
GLUCOSE SERPL-MCNC: 93 MG/DL (ref 70–110)
HBA1C MFR BLD: 7.2 % (ref 4–5.6)
HCT VFR BLD AUTO: 43.4 % (ref 40–54)
HCV AB SERPL QL IA: NORMAL
HGB BLD-MCNC: 14.2 G/DL (ref 14–18)
HIV 1+2 AB+HIV1 P24 AG SERPL QL IA: NORMAL
IMM GRANULOCYTES # BLD AUTO: 0.01 K/UL (ref 0–0.04)
IMM GRANULOCYTES NFR BLD AUTO: 0.2 % (ref 0–0.5)
LYMPHOCYTES # BLD AUTO: 1.1 K/UL (ref 1–4.8)
LYMPHOCYTES NFR BLD: 23.4 % (ref 18–48)
MCH RBC QN AUTO: 30.6 PG (ref 27–31)
MCHC RBC AUTO-ENTMCNC: 32.7 G/DL (ref 32–36)
MCV RBC AUTO: 94 FL (ref 82–98)
MONOCYTES # BLD AUTO: 0.4 K/UL (ref 0.3–1)
MONOCYTES NFR BLD: 8.2 % (ref 4–15)
NEUTROPHILS # BLD AUTO: 3 K/UL (ref 1.8–7.7)
NEUTROPHILS NFR BLD: 63.2 % (ref 38–73)
NRBC BLD-RTO: 0 /100 WBC
PLATELET # BLD AUTO: 159 K/UL (ref 150–450)
PMV BLD AUTO: 10.4 FL (ref 9.2–12.9)
POCT GLUCOSE: 135 MG/DL (ref 70–110)
POCT GLUCOSE: 77 MG/DL (ref 70–110)
POCT GLUCOSE: 90 MG/DL (ref 70–110)
POCT GLUCOSE: 97 MG/DL (ref 70–110)
POTASSIUM SERPL-SCNC: 4 MMOL/L (ref 3.5–5.1)
PROT SERPL-MCNC: 5.9 G/DL (ref 6–8.4)
RBC # BLD AUTO: 4.64 M/UL (ref 4.6–6.2)
SODIUM SERPL-SCNC: 142 MMOL/L (ref 136–145)
WBC # BLD AUTO: 4.75 K/UL (ref 3.9–12.7)

## 2023-06-02 PROCEDURE — 99223 PR INITIAL HOSPITAL CARE,LEVL III: ICD-10-PCS | Mod: ,,, | Performed by: INTERNAL MEDICINE

## 2023-06-02 PROCEDURE — 99213 OFFICE O/P EST LOW 20 MIN: CPT | Mod: PBBFAC,25,27 | Performed by: OPHTHALMOLOGY

## 2023-06-02 PROCEDURE — 63600175 PHARM REV CODE 636 W HCPCS: Performed by: HOSPITALIST

## 2023-06-02 PROCEDURE — 93010 ELECTROCARDIOGRAM REPORT: CPT | Mod: ,,, | Performed by: INTERNAL MEDICINE

## 2023-06-02 PROCEDURE — G0378 HOSPITAL OBSERVATION PER HR: HCPCS

## 2023-06-02 PROCEDURE — 96372 THER/PROPH/DIAG INJ SC/IM: CPT | Performed by: HOSPITALIST

## 2023-06-02 PROCEDURE — 87389 HIV-1 AG W/HIV-1&-2 AB AG IA: CPT | Performed by: PHYSICIAN ASSISTANT

## 2023-06-02 PROCEDURE — 99285 EMERGENCY DEPT VISIT HI MDM: CPT | Mod: ,,, | Performed by: EMERGENCY MEDICINE

## 2023-06-02 PROCEDURE — 99223 PR INITIAL HOSPITAL CARE,LEVL III: ICD-10-PCS | Mod: ,,, | Performed by: HOSPITALIST

## 2023-06-02 PROCEDURE — 86803 HEPATITIS C AB TEST: CPT | Performed by: PHYSICIAN ASSISTANT

## 2023-06-02 PROCEDURE — 80053 COMPREHEN METABOLIC PANEL: CPT | Performed by: EMERGENCY MEDICINE

## 2023-06-02 PROCEDURE — 83036 HEMOGLOBIN GLYCOSYLATED A1C: CPT | Performed by: PHYSICIAN ASSISTANT

## 2023-06-02 PROCEDURE — 99285 EMERGENCY DEPT VISIT HI MDM: CPT

## 2023-06-02 PROCEDURE — 96361 HYDRATE IV INFUSION ADD-ON: CPT

## 2023-06-02 PROCEDURE — 93010 EKG 12-LEAD: ICD-10-PCS | Mod: ,,, | Performed by: INTERNAL MEDICINE

## 2023-06-02 PROCEDURE — 99499 NO LOS: ICD-10-PCS | Mod: S$PBB,,, | Performed by: OPHTHALMOLOGY

## 2023-06-02 PROCEDURE — 99999 PR PBB SHADOW E&M-EST. PATIENT-LVL III: CPT | Mod: PBBFAC,,, | Performed by: OPHTHALMOLOGY

## 2023-06-02 PROCEDURE — 99223 1ST HOSP IP/OBS HIGH 75: CPT | Mod: ,,, | Performed by: INTERNAL MEDICINE

## 2023-06-02 PROCEDURE — 99999 PR PBB SHADOW E&M-EST. PATIENT-LVL III: ICD-10-PCS | Mod: PBBFAC,,, | Performed by: OPHTHALMOLOGY

## 2023-06-02 PROCEDURE — 93005 ELECTROCARDIOGRAM TRACING: CPT

## 2023-06-02 PROCEDURE — 99499 UNLISTED E&M SERVICE: CPT | Mod: S$PBB,,, | Performed by: OPHTHALMOLOGY

## 2023-06-02 PROCEDURE — 85025 COMPLETE CBC W/AUTO DIFF WBC: CPT | Performed by: EMERGENCY MEDICINE

## 2023-06-02 PROCEDURE — 96360 HYDRATION IV INFUSION INIT: CPT

## 2023-06-02 PROCEDURE — 25000003 PHARM REV CODE 250: Performed by: HOSPITALIST

## 2023-06-02 PROCEDURE — 82962 GLUCOSE BLOOD TEST: CPT | Mod: 91

## 2023-06-02 PROCEDURE — 99285 PR EMERGENCY DEPT VISIT,LEVEL V: ICD-10-PCS | Mod: ,,, | Performed by: EMERGENCY MEDICINE

## 2023-06-02 PROCEDURE — 99223 1ST HOSP IP/OBS HIGH 75: CPT | Mod: ,,, | Performed by: HOSPITALIST

## 2023-06-02 RX ORDER — IBUPROFEN 200 MG
16 TABLET ORAL
Status: DISCONTINUED | OUTPATIENT
Start: 2023-06-02 | End: 2023-06-05 | Stop reason: HOSPADM

## 2023-06-02 RX ORDER — HEPARIN SODIUM 5000 [USP'U]/ML
7500 INJECTION, SOLUTION INTRAVENOUS; SUBCUTANEOUS EVERY 8 HOURS
Status: DISCONTINUED | OUTPATIENT
Start: 2023-06-02 | End: 2023-06-05 | Stop reason: HOSPADM

## 2023-06-02 RX ORDER — ACETAMINOPHEN 325 MG/1
650 TABLET ORAL EVERY 4 HOURS PRN
Status: DISCONTINUED | OUTPATIENT
Start: 2023-06-02 | End: 2023-06-05 | Stop reason: HOSPADM

## 2023-06-02 RX ORDER — AMLODIPINE BESYLATE 5 MG/1
5 TABLET ORAL NIGHTLY
Status: DISCONTINUED | OUTPATIENT
Start: 2023-06-02 | End: 2023-06-05 | Stop reason: HOSPADM

## 2023-06-02 RX ORDER — DEXTROSE 40 %
30 GEL (GRAM) ORAL
Status: DISCONTINUED | OUTPATIENT
Start: 2023-06-02 | End: 2023-06-05 | Stop reason: HOSPADM

## 2023-06-02 RX ORDER — DEXTROSE 40 %
15 GEL (GRAM) ORAL
Status: DISCONTINUED | OUTPATIENT
Start: 2023-06-02 | End: 2023-06-05 | Stop reason: HOSPADM

## 2023-06-02 RX ORDER — TALC
6 POWDER (GRAM) TOPICAL NIGHTLY PRN
Status: CANCELLED | OUTPATIENT
Start: 2023-06-02

## 2023-06-02 RX ORDER — TALC
6 POWDER (GRAM) TOPICAL NIGHTLY PRN
Status: DISCONTINUED | OUTPATIENT
Start: 2023-06-02 | End: 2023-06-05 | Stop reason: HOSPADM

## 2023-06-02 RX ORDER — CLONIDINE HYDROCHLORIDE 0.1 MG/1
0.1 TABLET ORAL 3 TIMES DAILY
Status: DISCONTINUED | OUTPATIENT
Start: 2023-06-02 | End: 2023-06-05 | Stop reason: HOSPADM

## 2023-06-02 RX ORDER — GLUCAGON 1 MG
1 KIT INJECTION
Status: DISCONTINUED | OUTPATIENT
Start: 2023-06-02 | End: 2023-06-05 | Stop reason: HOSPADM

## 2023-06-02 RX ORDER — BRINZOLAMIDE 10 MG/ML
1 SUSPENSION/ DROPS OPHTHALMIC 3 TIMES DAILY
Status: DISCONTINUED | OUTPATIENT
Start: 2023-06-02 | End: 2023-06-04

## 2023-06-02 RX ORDER — INSULIN ASPART 100 [IU]/ML
25 INJECTION, SOLUTION INTRAVENOUS; SUBCUTANEOUS
Status: DISCONTINUED | OUTPATIENT
Start: 2023-06-02 | End: 2023-06-03

## 2023-06-02 RX ORDER — SODIUM CHLORIDE 0.9 % (FLUSH) 0.9 %
5 SYRINGE (ML) INJECTION
Status: DISCONTINUED | OUTPATIENT
Start: 2023-06-02 | End: 2023-06-05 | Stop reason: HOSPADM

## 2023-06-02 RX ORDER — CARVEDILOL 3.12 MG/1
6.25 TABLET ORAL 2 TIMES DAILY
Status: DISCONTINUED | OUTPATIENT
Start: 2023-06-02 | End: 2023-06-05 | Stop reason: HOSPADM

## 2023-06-02 RX ORDER — ONDANSETRON 2 MG/ML
8 INJECTION INTRAMUSCULAR; INTRAVENOUS EVERY 6 HOURS PRN
Status: DISCONTINUED | OUTPATIENT
Start: 2023-06-02 | End: 2023-06-05 | Stop reason: HOSPADM

## 2023-06-02 RX ORDER — SODIUM CHLORIDE 0.9 % (FLUSH) 0.9 %
10 SYRINGE (ML) INJECTION
Status: CANCELLED | OUTPATIENT
Start: 2023-06-02

## 2023-06-02 RX ORDER — NAPROXEN SODIUM 220 MG/1
81 TABLET, FILM COATED ORAL DAILY
Status: DISCONTINUED | OUTPATIENT
Start: 2023-06-03 | End: 2023-06-05 | Stop reason: HOSPADM

## 2023-06-02 RX ORDER — INSULIN ASPART 100 [IU]/ML
0-5 INJECTION, SOLUTION INTRAVENOUS; SUBCUTANEOUS
Status: DISCONTINUED | OUTPATIENT
Start: 2023-06-02 | End: 2023-06-05 | Stop reason: HOSPADM

## 2023-06-02 RX ORDER — BRIMONIDINE TARTRATE 1.5 MG/ML
1 SOLUTION/ DROPS OPHTHALMIC 3 TIMES DAILY
Status: DISCONTINUED | OUTPATIENT
Start: 2023-06-02 | End: 2023-06-04

## 2023-06-02 RX ORDER — POLYETHYLENE GLYCOL 3350 17 G/17G
17 POWDER, FOR SOLUTION ORAL DAILY PRN
Status: DISCONTINUED | OUTPATIENT
Start: 2023-06-02 | End: 2023-06-05 | Stop reason: HOSPADM

## 2023-06-02 RX ORDER — LOSARTAN POTASSIUM 50 MG/1
100 TABLET ORAL DAILY
Status: DISCONTINUED | OUTPATIENT
Start: 2023-06-03 | End: 2023-06-05 | Stop reason: HOSPADM

## 2023-06-02 RX ORDER — IBUPROFEN 200 MG
24 TABLET ORAL
Status: DISCONTINUED | OUTPATIENT
Start: 2023-06-02 | End: 2023-06-05 | Stop reason: HOSPADM

## 2023-06-02 RX ORDER — GLUCAGON 1 MG
1 KIT INJECTION
Status: DISCONTINUED | OUTPATIENT
Start: 2023-06-02 | End: 2023-06-02

## 2023-06-02 RX ORDER — NALOXONE HCL 0.4 MG/ML
0.02 VIAL (ML) INJECTION
Status: DISCONTINUED | OUTPATIENT
Start: 2023-06-02 | End: 2023-06-05 | Stop reason: HOSPADM

## 2023-06-02 RX ORDER — ATORVASTATIN CALCIUM 20 MG/1
20 TABLET, FILM COATED ORAL NIGHTLY
Status: DISCONTINUED | OUTPATIENT
Start: 2023-06-02 | End: 2023-06-05 | Stop reason: HOSPADM

## 2023-06-02 RX ORDER — ACETAMINOPHEN 500 MG
1000 TABLET ORAL EVERY 8 HOURS PRN
Status: DISCONTINUED | OUTPATIENT
Start: 2023-06-02 | End: 2023-06-05 | Stop reason: HOSPADM

## 2023-06-02 RX ORDER — HYDRALAZINE HYDROCHLORIDE 50 MG/1
100 TABLET, FILM COATED ORAL EVERY 8 HOURS
Status: DISCONTINUED | OUTPATIENT
Start: 2023-06-02 | End: 2023-06-05 | Stop reason: HOSPADM

## 2023-06-02 RX ADMIN — ATORVASTATIN CALCIUM 20 MG: 20 TABLET, FILM COATED ORAL at 09:06

## 2023-06-02 RX ADMIN — CARVEDILOL 6.25 MG: 3.12 TABLET, FILM COATED ORAL at 09:06

## 2023-06-02 RX ADMIN — CLONIDINE HYDROCHLORIDE 0.1 MG: 0.1 TABLET ORAL at 09:06

## 2023-06-02 RX ADMIN — HEPARIN SODIUM 7500 UNITS: 5000 INJECTION INTRAVENOUS; SUBCUTANEOUS at 09:06

## 2023-06-02 RX ADMIN — AMLODIPINE BESYLATE 5 MG: 5 TABLET ORAL at 09:06

## 2023-06-02 RX ADMIN — SODIUM CHLORIDE, SODIUM LACTATE, POTASSIUM CHLORIDE, AND CALCIUM CHLORIDE 1000 ML: .6; .31; .03; .02 INJECTION, SOLUTION INTRAVENOUS at 03:06

## 2023-06-02 RX ADMIN — HYDRALAZINE HYDROCHLORIDE 100 MG: 50 TABLET ORAL at 09:06

## 2023-06-02 NOTE — PROVIDER PROGRESS NOTES - EMERGENCY DEPT.
Encounter Date: 6/2/2023    ED Physician Progress Notes         EKG - STEMI Decision  Initial Reading: No STEMI present.  Response: patient moved to monitored bed.    Heart rate 53, left bundle-branch pattern similar to previous ECG on 04/18/2023, no criteria for STEMI.    Costa Peoples DO, LUCY  Emergency Staff Physician   Dept of Emergency Medicine   [facility]  Spectralink: [id number]        Disclaimer: This note has been generated using voice-recognition software. There may be typographical errors that have been missed during proof-reading.

## 2023-06-02 NOTE — CONSULTS
"[facility] - Emergency Dept  Endocrinology  Diabetes Consult Note    Consult Requested by: Surekha Reyes MD   Reason for admit: Hypoglycemia unawareness associated with type 2 diabetes mellitus    HISTORY OF PRESENT ILLNESS:  Reason for Consult: Hypoglycemia    Diabetes diagnosis year:     Home Diabetes Medications: 25 units of short acting TIDWM and 80 units of long acting BID (prescribed as daily dosing so patient doubled his prescribed dose), miglitol 25mg TIDWM    How often checking glucose at home? Checks freestyle 10x/day  BG readings on regimen:   Hypoglycemia on the regimen? Yes once today: freestyle read < 40, fingerstick check in clinic 57  Missed doses on regimen? none    Diabetes Complications include:   CKD, CAD, neuropathy    Complicating diabetes co morbidities:   CVA last summer, CAD, HLD, obesity    HPI:   Mr. Springer is a 67 yo male with T1DM, HTN, HLD, CAD, S/p MI, CHF, h/o CVA (), CKD III and Obesity admitted to hospital medicine for hypoglycemia. Patient was in opthalmology clinic this morning and noted his BG was "low" on the freestyle adi indy which correlates to a BG < 40. The ophthalmology clinic performed a fingerstick glucose check that read 57 per ED nurse and 67 per ophthalmology clinic. He was given candy and soda and was sent to the ED for evaluation. Nursing reports patient near syncope but patient denies feeling symptomatic from this hypoglycemic episode. He reports eating a small breakfast which is normal for him and took his 80 units of long acting as well as his 25 units of short acting insulin.       Interval HPI:   Overnight events: Presented to ED this morning. BG stable since arrival ~70-90 range. Asymptomatic.   Eatin%  Nausea: No  Hypoglycemia and intervention:  in optho clinic and was given  candy and soda  Fever: No  TPN and/or TF: No  If yes, type of TF/TPN and rate: NA    PMH, PSH, FH, SH updated and reviewed     ROS:  Review of Systems   Eyes:  " Positive for visual disturbance (chronic).   Cardiovascular:  Positive for leg swelling (chronic).   Gastrointestinal:  Negative for nausea.   Neurological:  Positive for light-headedness. Negative for syncope and weakness.     Current Medications and/or Treatments Impacting Glycemic Control  Immunotherapy:    Immunosuppressants       None          Steroids:   Hormones (From admission, onward)      None          Pressors:    Autonomic Drugs (From admission, onward)      None          Hyperglycemia/Diabetes Medications:   Antihyperglycemics (From admission, onward)      None             PHYSICAL EXAMINATION:  Vitals:    06/02/23 1315   BP: 113/65   Pulse: (!) 57   Resp: 17   Temp:      Body mass index is 45.06 kg/m².     Physical Exam  Vitals and nursing note reviewed.   Constitutional:       General: He is not in acute distress.     Appearance: He is obese. He is not ill-appearing or diaphoretic.   HENT:      Head: Normocephalic and atraumatic.      Right Ear: External ear normal.      Left Ear: External ear normal.      Nose: Nose normal.   Eyes:      General: No scleral icterus.        Right eye: No discharge.         Left eye: No discharge.      Extraocular Movements: Extraocular movements intact.   Cardiovascular:      Rate and Rhythm: Normal rate and regular rhythm.   Pulmonary:      Effort: Pulmonary effort is normal. No respiratory distress.   Abdominal:      General: There is no distension.   Musculoskeletal:         General: No swelling or deformity. Normal range of motion.      Cervical back: Normal range of motion and neck supple.      Right lower leg: Edema present.      Left lower leg: Edema present.   Skin:     General: Skin is warm.      Coloration: Skin is not jaundiced.      Findings: No bruising.   Neurological:      Mental Status: He is alert.   Psychiatric:         Mood and Affect: Mood normal.         Behavior: Behavior normal.         Thought Content: Thought content normal.            Labs  Reviewed and Include   Recent Labs   Lab 06/02/23  1159   GLU 93   CALCIUM 8.6*   ALBUMIN 3.2*   PROT 5.9*      K 4.0   CO2 19*   *   BUN 26*   CREATININE 2.2*   ALKPHOS 91   ALT 14   AST 13   BILITOT 0.6     Lab Results   Component Value Date    WBC 4.75 06/02/2023    HGB 14.2 06/02/2023    HCT 43.4 06/02/2023    MCV 94 06/02/2023     06/02/2023     No results for input(s): TSH, FREET4 in the last 168 hours.  Lab Results   Component Value Date    HGBA1C 7.2 (H) 06/02/2023       Nutritional status:   Body mass index is 45.06 kg/m².  Lab Results   Component Value Date    ALBUMIN 3.2 (L) 06/02/2023    ALBUMIN 3.6 02/27/2023    ALBUMIN 3.6 09/05/2022     No results found for: PREALBUMIN    Estimated Creatinine Clearance: 60.2 mL/min (A) (based on SCr of 2.2 mg/dL (H)).    Accu-Checks  Recent Labs     06/02/23  1121 06/02/23  1146 06/02/23  1430   POCTGLUCOSE 90 97 77        ASSESSMENT and PLAN    Endocrine  * Hypoglycemia unawareness associated with type 2 diabetes mellitus  Patient hypoglycemic to < 40 on CGM and 57 on fingerstick likely 2/2 to inappropriate insulin dosing, reports taking twice the amount of prescribed long acting  Hypoglycemia quickly resolved with PO glucose    Recommendations finalized for discharge:  - goal -180  - aspart 25 units TIDWM, hold today's lunch dose  - detemir 40 units BID, hold tonight's dose and resume tomorrow am  - LDSSI while inpatient  - POCT glucose ACQ  - hypoglycemia protocol in place  - close outpatient follow up with endocrinology upon discharge    Patient okay to discharge from endocrinology perspective. We will sign off.    **Please reach out to endocrine with any BG related questions or concerns**        Plan discussed with patient, family, and RN at bedside.     Meagan Law MD  Endocrinology  [facility] - Emergency Dept

## 2023-06-02 NOTE — SUBJECTIVE & OBJECTIVE
Interval HPI:   Overnight events: Presented to ED this morning. BG stable since arrival ~70-90 range. Asymptomatic.   Eatin%  Nausea: No  Hypoglycemia and intervention:  in optho clinic and was given  candy and soda  Fever: No  TPN and/or TF: No  If yes, type of TF/TPN and rate: NA    PMH, PSH, FH, SH updated and reviewed     ROS:  Review of Systems   Eyes:  Positive for visual disturbance (chronic).   Cardiovascular:  Positive for leg swelling (chronic).   Gastrointestinal:  Negative for nausea.   Neurological:  Positive for light-headedness. Negative for syncope and weakness.     Current Medications and/or Treatments Impacting Glycemic Control  Immunotherapy:    Immunosuppressants       None          Steroids:   Hormones (From admission, onward)      None          Pressors:    Autonomic Drugs (From admission, onward)      None          Hyperglycemia/Diabetes Medications:   Antihyperglycemics (From admission, onward)      None             PHYSICAL EXAMINATION:  Vitals:    23 1315   BP: 113/65   Pulse: (!) 57   Resp: 17   Temp:      Body mass index is 45.06 kg/m².     Physical Exam  Vitals and nursing note reviewed.   Constitutional:       General: He is not in acute distress.     Appearance: He is obese. He is not ill-appearing or diaphoretic.   HENT:      Head: Normocephalic and atraumatic.      Right Ear: External ear normal.      Left Ear: External ear normal.      Nose: Nose normal.   Eyes:      General: No scleral icterus.        Right eye: No discharge.         Left eye: No discharge.      Extraocular Movements: Extraocular movements intact.   Cardiovascular:      Rate and Rhythm: Normal rate and regular rhythm.   Pulmonary:      Effort: Pulmonary effort is normal. No respiratory distress.   Abdominal:      General: There is no distension.   Musculoskeletal:         General: No swelling or deformity. Normal range of motion.      Cervical back: Normal range of motion and neck supple.      Right  lower leg: Edema present.      Left lower leg: Edema present.   Skin:     General: Skin is warm.      Coloration: Skin is not jaundiced.      Findings: No bruising.   Neurological:      Mental Status: He is alert.   Psychiatric:         Mood and Affect: Mood normal.         Behavior: Behavior normal.         Thought Content: Thought content normal.

## 2023-06-02 NOTE — ASSESSMENT & PLAN NOTE
Patient's FSGs are uncontrolled due to hypoglycemia on current medication regimen.  Last A1c reviewed-   Lab Results   Component Value Date    HGBA1C 7.2 (H) 06/02/2023     Most recent fingerstick glucose reviewed-   Recent Labs   Lab 06/02/23  1121 06/02/23  1146 06/02/23  1430   POCTGLUCOSE 90 97 77     Current correctional scale  Low  Maintain anti-hyperglycemic dose as follows-   Antihyperglycemics (From admission, onward)    Start     Stop Route Frequency Ordered    06/03/23 0900  insulin detemir U-100 (Levemir) pen 40 Units         -- SubQ 2 times daily 06/02/23 1553    06/02/23 1700  insulin aspart U-100 pen 25 Units         -- SubQ 3 times daily with meals 06/02/23 1553    06/02/23 1653  insulin aspart U-100 pen 0-5 Units         -- SubQ Before meals & nightly PRN 06/02/23 1553        Hold Oral hypoglycemics while patient is in the hospital.    · Endocrine NP documented that he is supposed to be on 74 units of long-acting insulin once a day and 25 units of NPH insulin 3 times a day with meals  · Patient states he was told at his appointment to take 100 units of long-acting insulin twice a day and 60 units of NPH 3 times a day  · Glucose in Optho clinic 50-60s with dizziness and lightheadedness, but 70s in the ER  · Endocrine consulted for assistance - suggest BID long acting insulin to assist in better glucose control

## 2023-06-02 NOTE — PROGRESS NOTES
HPI    DLS: 04/28/2023   Transfer of care back to Dr. Burgess from Dr. Hale   POAG -    Corneal Opacity   [place] palsy 10/2022 Left - was placed on steroids   DM   RD Repair with Scleral Buckle right eye   PCIOL OU   CANALOPLASTY OD 8-22-13 Good flow and tension(-900)   CANAL OS 03/20/14/LOT # 1306-01/REF # [id number]   -500 with shutter effect due to much intraop respiratory movement   Moderate flow with good tension       OU: Azopt BID   OU: Alphagan BID   OU: Rhopressa QD    Last edited by Linda Olivia MA on 6/2/2023 10:41 AM.            Assessment /Plan     For exam results, see Encounter Report.    There are no diagnoses linked to this encounter.

## 2023-06-02 NOTE — ASSESSMENT & PLAN NOTE
Patient is identified as having Diastolic (HFpEF) heart failure that is Chronic. CHF is currently controlled. Latest ECHO performed and demonstrates- Results for orders placed during the hospital encounter of 09/03/22    Echo    Interpretation Summary  · The left ventricle is normal in size with concentric hypertrophy and normal systolic function.  · The estimated ejection fraction is 55%.  · Normal left ventricular diastolic function.  · Normal right ventricular size with normal right ventricular systolic function.  · Overall the study quality was technically difficult. The study was difficult due to patient's clinical status, body habitus and poor endocardial visualization.  · There is abnormal septal wall motion consistent with left bundle branch block.  · Normal central venous pressure (3 mmHg).  · The estimated PA systolic pressure is 25 mmHg.  · The aortic root is mildly dilated.  . Continue Beta Blocker and ACE/ARB and monitor clinical status closely. Monitor on telemetry. Patient is off CHF pathway.  Monitor strict Is&Os and daily weights.  Place on fluid restriction of 2 L. Continue to stress to patient importance of self efficacy and  on diet for CHF. Last BNP reviewed- and noted below No results for input(s): BNP, BNPTRIAGEBLO in the last 168 hours..

## 2023-06-02 NOTE — ASSESSMENT & PLAN NOTE
Patient hypoglycemic to < 40 on CGM and 57 on fingerstick likely 2/2 to inappropriate insulin dosing, reports taking twice the amount of prescribed long acting  Hypoglycemia quickly resolved with PO glucose    Recommendations finalized for discharge:  - goal -180  - aspart 25 units TIDWM, hold today's lunch dose  - detemir 40 units BID, hold tonight's dose and resume tomorrow am  - LDSSI while inpatient  - POCT glucose ACQ  - hypoglycemia protocol in place  - close outpatient follow up with endocrinology upon discharge    Patient okay to discharge from endocrinology perspective. We will sign off.    **Please reach out to endocrine with any BG related questions or concerns**

## 2023-06-02 NOTE — SUBJECTIVE & OBJECTIVE
"Past Medical History:   Diagnosis Date    Anxiety     Bell's palsy     CHF (congestive heart failure)     Chronic kidney disease, stage 3a 11/02/2021    Coronary artery disease involving native coronary artery without angina pectoris 10/18/2016    Depression     Diabetes mellitus     Glaucoma     Hypertension     Idiopathic peripheral neuropathy 12/11/2012    Lymphedema of both lower extremities     Malignant melanoma of skin of forehead 10/13/2022    Mixed hyperlipidemia 12/11/2012    Morbid obesity with BMI of 45.0-49.9, adult 02/12/2019    Pancytopenia     Sleep apnea     "unable to use"    Stage 3b chronic kidney disease     Type 2 diabetes mellitus with diabetic polyneuropathy, with long-term current use of insulin 12/11/2012    Vitamin B 12 deficiency 08/23/2012       Past Surgical History:   Procedure Laterality Date    CARDIAC CATHETERIZATION  7/22/13    non obstructive cad    CATARACT EXTRACTION  OU    CLOSURE OF WOUND Left 10/14/2022    Procedure: CLOSURE, WOUND;  Surgeon: Jovita Ceballos MD;  Location: [facility] OR;  Service: ENT;  Laterality: Left;  closure of forehead    COLONOSCOPY N/A 5/1/2019    Procedure: COLONOSCOPY;  Surgeon: Henry Mo III, MD;  Location: [facility];  Service: Endoscopy;  Laterality: N/A;    CORONARY ANGIOPLASTY      ESOPHAGOGASTRODUODENOSCOPY N/A 5/1/2019    Procedure: ESOPHAGOGASTRODUODENOSCOPY (EGD);  Surgeon: Henry Mo III, MD;  Location: [facility] ENDO;  Service: Endoscopy;  Laterality: N/A;    EXCISION OF MELANOMA Left 10/7/2022    Procedure: EXCISION, MELANOMA;  Surgeon: Jovita Ceballos MD;  Location: [facility] OR;  Service: ENT;  Laterality: Left;    EYE SURGERY      FRACTURE SURGERY      kidney stone       August 2016    PC IOL OU      RETINAL DETACHMENT REPAIR W/ SCLERAL BUCKLE LE      WRIST SURGERY         Review of patient's allergies indicates:   Allergen Reactions    Cefazolin Other (See Comments)     Shakes, chills, dizziness       No current facility-administered " medications on file prior to encounter.     Current Outpatient Medications on File Prior to Encounter   Medication Sig    amLODIPine (NORVASC) 5 MG tablet Take 1 tablet (5 mg total) by mouth every evening.    aspirin 81 MG Chew Take 81 mg by mouth once daily.    atorvastatin (LIPITOR) 20 MG tablet Take 1 tablet (20 mg total) by mouth every evening.    AZOPT 1 % ophthalmic suspension Place 1 drop into both eyes 3 (three) times daily. Brand name only    blood sugar diagnostic Strp To check BG 4 times daily, to use with insurance preferred meter    blood-glucose meter kit To check BG 4 times daily, to use with insurance preferred meter    brimonidine 0.1% (ALPHAGAN P) 0.1 % Drop Place 1 drop into both eyes 3 (three) times daily.    carvediloL (COREG) 12.5 MG tablet Take 1 tablet (12.5 mg total) by mouth 2 (two) times daily.    cloNIDine (CATAPRES) 0.1 MG tablet Take 1 tablet (0.1 mg total) by mouth 3 (three) times daily.    dextran 70-hypromellose (TEARS) ophthalmic solution Place 2 drops into the left eye every 4 (four) hours.    flash glucose sensor (FREESTYLE CLEMENCIA 14 DAY SENSOR) Kit 1 each by Misc.(Non-Drug; Combo Route) route every 14 (fourteen) days.    furosemide (LASIX) 40 MG tablet Take 1 tablet (40 mg total) by mouth once daily.    hydrALAZINE (APRESOLINE) 100 MG tablet Take 1 tablet (100 mg total) by mouth every 8 (eight) hours.    insulin (BASAGLAR KWIKPEN U-100 INSULIN) glargine 100 units/mL SubQ pen Inject 80 Units into the skin once daily.    insulin aspart U-100 (NOVOLOG) 100 unit/mL (3 mL) InPn pen Inject 25 Units into the skin 3 (three) times daily with meals.    lancets Misc To check BG 4 times daily, to use with insurance preferred meter    losartan (COZAAR) 100 MG tablet Take 1 tablet (100 mg total) by mouth once daily.    mupirocin (BACTROBAN) 2 % ointment Apply topically once daily.    netarsudiL (RHOPRESSA) 0.02 % ophthalmic solution Place 1 drop into both eyes once daily.    nitroGLYCERIN  "(NITROSTAT) 0.4 MG SL tablet PLACE 1 TABLET (0.4 MG TOTAL) UNDER THE TONGUE EVERY 5 (FIVE) MINUTES AS NEEDED FOR CHEST PAIN.    valACYclovir (VALTREX) 1000 MG tablet Take 1 tablet (1,000 mg total) by mouth 2 (two) times daily. for 10 days (Patient not taking: Reported on 4/18/2023)    [DISCONTINUED] hydroCHLOROthiazide (HYDRODIURIL) 25 MG tablet Take 1 tablet (25 mg total) by mouth once daily.    [DISCONTINUED] miglitoL (GLYSET) 25 MG Tab Take 1 tablet (25 mg total) by mouth 3 (three) times daily with meals.     Family History       Problem Relation (Age of Onset)    Heart attack Father    Heart disease Father    Hypertension Mother, Maternal Grandmother, Maternal Grandfather    Macular degeneration Father, Paternal Uncle          Tobacco Use    Smoking status: Never    Smokeless tobacco: Never   Substance and Sexual Activity    Alcohol use: Yes     Comment: " one to two glasses of wine per year"    Drug use: No    Sexual activity: Not Currently     Birth control/protection: None     Review of Systems   Constitutional:  Positive for activity change. Negative for chills, fatigue and fever.   HENT:  Negative for sore throat and trouble swallowing.    Eyes:  Negative for photophobia and visual disturbance.   Respiratory:  Negative for cough and shortness of breath.    Cardiovascular:  Negative for chest pain, palpitations and leg swelling.   Gastrointestinal:  Negative for abdominal pain, constipation, diarrhea, nausea and vomiting.   Endocrine: Negative for cold intolerance and heat intolerance.   Genitourinary:  Negative for dysuria and frequency.   Musculoskeletal:  Negative for arthralgias and myalgias.   Skin:  Negative for rash and wound.   Neurological:  Positive for dizziness and light-headedness. Negative for syncope and weakness.   Psychiatric/Behavioral:  Negative for confusion and hallucinations.    All other systems reviewed and are negative.  Objective:     Vital Signs (Most Recent):  Temp: 98 °F (36.7 " °C) (06/02/23 1121)  Pulse: (!) 57 (06/02/23 1315)  Resp: 17 (06/02/23 1315)  BP: 113/65 (06/02/23 1315)  SpO2: 97 % (06/02/23 1315) Vital Signs (24h Range):  Temp:  [98 °F (36.7 °C)] 98 °F (36.7 °C)  Pulse:  [57-58] 57  Resp:  [17-20] 17  SpO2:  [95 %-97 %] 97 %  BP: (113-115)/(61-69) 113/65     Weight: (!) 181.4 kg (400 lb)  Body mass index is 45.06 kg/m².     Physical Exam  Vitals and nursing note reviewed.   Constitutional:       Appearance: He is obese.   HENT:      Head: Normocephalic and atraumatic.   Eyes:      General: No scleral icterus.  Cardiovascular:      Rate and Rhythm: Regular rhythm. Bradycardia present.      Heart sounds: No murmur heard.  Pulmonary:      Effort: Pulmonary effort is normal. No respiratory distress.      Breath sounds: Normal breath sounds. No wheezing.   Abdominal:      General: Bowel sounds are normal. There is no distension.      Palpations: Abdomen is soft.      Tenderness: There is no abdominal tenderness.   Musculoskeletal:         General: Normal range of motion.      Cervical back: Normal range of motion and neck supple.   Skin:     General: Skin is warm and dry.   Neurological:      General: No focal deficit present.      Mental Status: He is alert and oriented to person, place, and time. Mental status is at baseline.              Significant Labs: All pertinent labs within the past 24 hours have been reviewed.  A1C:   Recent Labs   Lab 02/27/23  0930 06/02/23  1345   HGBA1C 7.9* 7.2*     CBC:   Recent Labs   Lab 06/02/23  1159   WBC 4.75   HGB 14.2   HCT 43.4        CMP:   Recent Labs   Lab 06/02/23  1159      K 4.0   *   CO2 19*   GLU 93   BUN 26*   CREATININE 2.2*   CALCIUM 8.6*   PROT 5.9*   ALBUMIN 3.2*   BILITOT 0.6   ALKPHOS 91   AST 13   ALT 14   ANIONGAP 10       Significant Imaging: I have reviewed all pertinent imaging results/findings within the past 24 hours.

## 2023-06-02 NOTE — ED PROVIDER NOTES
Encounter Date: 6/2/2023       History     Chief Complaint   Patient presents with    Hypoglycemia     Lightheaded in clinic glucose was 64, 57, 54, after eating candy     68-year-old male, history of CHF, CAD, hypertension, CKD, FRAN, diabetes, currently taking Lantus as his long-acting medication and NPH insulin for short-acting.  Patient states that he takes 100 units of Lantus b.i.d. and 60 units of regular insulin 3 times a day though this is not what is listed in his chart.  In his chart it was listed day supposed to take 80 units of long-acting insulin once a day and 25 units of regular insulin 3 times a day.  Patient states he was told to do the long-acting twice a day by his nurse practitioner but this is not documented in his most recent PCP visit note.  He states that he has been on this medication for a long time.  States that he rarely has hypoglycemic episodes but he has had them in the past in the last time he had to be hospitalized for this was last June.  Today the patient was in the ophthalmology clinic and he was feeling weak and lightheaded and they checked his blood glucose and it was 64, 57 and then 54.  He was given candy and juice and does feel better at this point.  He denies any recent illnesses, fevers, chills.  States that he had crackers for breakfast this morning and has not eaten lunch but states that is normal for him.  He is not take any oral medication for his diabetes.    The history is provided by the patient and a relative.   Review of patient's allergies indicates:   Allergen Reactions    Cefazolin Other (See Comments)     Shakes, chills, dizziness     Past Medical History:   Diagnosis Date    Anxiety     Bell's palsy     CHF (congestive heart failure)     Chronic kidney disease, stage 3a 11/02/2021    Coronary artery disease involving native coronary artery without angina pectoris 10/18/2016    Depression     Diabetes mellitus     Glaucoma     Hypertension     Idiopathic  "peripheral neuropathy 12/11/2012    Lymphedema of both lower extremities     Malignant melanoma of skin of forehead 10/13/2022    Mixed hyperlipidemia 12/11/2012    Morbid obesity with BMI of 45.0-49.9, adult 02/12/2019    Pancytopenia     Sleep apnea     "unable to use"    Stage 3b chronic kidney disease     Type 2 diabetes mellitus with diabetic polyneuropathy, with long-term current use of insulin 12/11/2012    Vitamin B 12 deficiency 08/23/2012     Past Surgical History:   Procedure Laterality Date    CARDIAC CATHETERIZATION  7/22/13    non obstructive cad    CATARACT EXTRACTION  OU    CLOSURE OF WOUND Left 10/14/2022    Procedure: CLOSURE, WOUND;  Surgeon: Jovita Ceballos MD;  Location: [facility] OR;  Service: ENT;  Laterality: Left;  closure of forehead    COLONOSCOPY N/A 5/1/2019    Procedure: COLONOSCOPY;  Surgeon: Henry Mo III, MD;  Location: [facility] ENDO;  Service: Endoscopy;  Laterality: N/A;    CORONARY ANGIOPLASTY      ESOPHAGOGASTRODUODENOSCOPY N/A 5/1/2019    Procedure: ESOPHAGOGASTRODUODENOSCOPY (EGD);  Surgeon: Henry Mo III, MD;  Location: [facility] ENDO;  Service: Endoscopy;  Laterality: N/A;    EXCISION OF MELANOMA Left 10/7/2022    Procedure: EXCISION, MELANOMA;  Surgeon: Jovita Ceballos MD;  Location: [facility] OR;  Service: ENT;  Laterality: Left;    EYE SURGERY      FRACTURE SURGERY      kidney stone       August 2016    PC IOL OU      RETINAL DETACHMENT REPAIR W/ SCLERAL BUCKLE LE      WRIST SURGERY       Family History   Problem Relation Age of Onset    Macular degeneration Father     Heart disease Father         CHF     Heart attack Father     Hypertension Mother     Macular degeneration Paternal Uncle     Hypertension Maternal Grandmother     Hypertension Maternal Grandfather     Strabismus Neg Hx     Retinal detachment Neg Hx     Glaucoma Neg Hx     Blindness Neg Hx     Amblyopia Neg Hx      Social History     Tobacco Use    Smoking status: Never    Smokeless tobacco: Never   Substance Use " "Topics    Alcohol use: Yes     Comment: " one to two glasses of wine per year"    Drug use: No     Review of Systems    Physical Exam     Initial Vitals [06/02/23 1121]   BP Pulse Resp Temp SpO2   115/69 (!) 57 20 98 °F (36.7 °C) 95 %      MAP       --         Physical Exam    Nursing note and vitals reviewed.  Constitutional: Vital signs are normal. He appears well-developed and well-nourished. He is not diaphoretic. He is Obese .  Non-toxic appearance. He does not appear ill. No distress.   HENT:   Head: Normocephalic and atraumatic.   Mouth/Throat: Mucous membranes are normal. Mucous membranes are not dry.   Eyes: Conjunctivae and lids are normal.   Neck: Neck supple.   Normal range of motion.  Cardiovascular:  Normal rate.           Pulmonary/Chest: No respiratory distress.   Musculoskeletal:      Cervical back: Normal range of motion and neck supple.     Neurological: He is alert and oriented to person, place, and time.   Skin: Skin is dry and intact. No pallor.   Psychiatric: He has a normal mood and affect. His speech is normal and behavior is normal.       ED Course   Procedures  Labs Reviewed   COMPREHENSIVE METABOLIC PANEL - Abnormal; Notable for the following components:       Result Value    Chloride 113 (*)     CO2 19 (*)     BUN 26 (*)     Creatinine 2.2 (*)     Calcium 8.6 (*)     Total Protein 5.9 (*)     Albumin 3.2 (*)     eGFR 32.2 (*)     All other components within normal limits    Narrative:     Release to patient->Immediate   HEMOGLOBIN A1C - Abnormal; Notable for the following components:    Hemoglobin A1C 7.2 (*)     Estimated Avg Glucose 160 (*)     All other components within normal limits    Narrative:     Release to patient->Immediate  add on GHGB per Surekha Reyes MD   HIV 1 / 2 ANTIBODY    Narrative:     Release to patient->Immediate   HEPATITIS C ANTIBODY    Narrative:     Release to patient->Immediate   CBC W/ AUTO DIFFERENTIAL    Narrative:     Release to patient->Immediate "   POCT GLUCOSE   POCT GLUCOSE   POCT GLUCOSE   POCT GLUCOSE MONITORING CONTINUOUS        ECG Results              EKG 12-lead (Final result)  Result time 06/02/23 11:50:07      Final result by Interface, Lab In [facility] (06/02/23 11:50:07)                   Narrative:    Test Reason : R55,    Vent. Rate : 053 BPM     Atrial Rate : 000 BPM     P-R Int : 000 ms          QRS Dur : 190 ms      QT Int : 548 ms       P-R-T Axes : 000 -24 141 degrees     QTc Int : 514 ms    Sinus bradycardia  Left bundle branch block  Abnormal ECG  When compared with ECG of 18-APR-2023 09:19,  No significant change was found  Confirmed by MANINDER CORONA MD ([id number]) on 6/2/2023 11:49:55 AM    Referred By: [email or web address]   SELF           Confirmed By:MANINDER CORONA MD                                  Imaging Results    None          Medications   dextrose 10% bolus 125 mL 125 mL (has no administration in time range)   dextrose 10% bolus 250 mL 250 mL (has no administration in time range)   dextrose 40 % gel 15,000 mg (has no administration in time range)   dextrose 40 % gel 30,000 mg (has no administration in time range)   glucose chewable tablet 16 g (has no administration in time range)   glucose chewable tablet 24 g (has no administration in time range)   glucagon (human recombinant) injection 1 mg (has no administration in time range)   insulin detemir U-100 (Levemir) pen 40 Units (has no administration in time range)   insulin aspart U-100 pen 25 Units (25 Units Subcutaneous Not Given 6/2/23 1700)   insulin aspart U-100 pen 0-5 Units (has no administration in time range)   sodium chloride 0.9% flush 5 mL (has no administration in time range)   melatonin tablet 6 mg (has no administration in time range)   ondansetron injection 8 mg (has no administration in time range)   polyethylene glycol packet 17 g (has no administration in time range)   acetaminophen tablet 650 mg (has no administration in time range)   acetaminophen tablet 1,000 mg (has no  administration in time range)   naloxone 0.4 mg/mL injection 0.02 mg (has no administration in time range)   heparin (porcine) injection 7,500 Units (has no administration in time range)   amLODIPine tablet 5 mg (has no administration in time range)   aspirin chewable tablet 81 mg (has no administration in time range)   atorvastatin tablet 20 mg (has no administration in time range)   brinzolamide 1 % ophthalmic suspension 1 drop (has no administration in time range)   brimonidine 0.15 % OPTH DROP ophthalmic solution 1 drop (has no administration in time range)   cloNIDine tablet 0.1 mg (has no administration in time range)   hydrALAZINE tablet 100 mg (has no administration in time range)   losartan tablet 100 mg (has no administration in time range)   carvediloL tablet 6.25 mg (has no administration in time range)   lactated ringers bolus 1,000 mL (0 mLs Intravenous Stopped 6/2/23 1700)     Medical Decision Making:   History:   Old Medical Records: I decided to obtain old medical records.  Old Records Summarized: records from clinic visits and records from previous admission(s).       <> Summary of Records: DM regimen from most recent endocrine visit:           Current DM Medication Regimen:  Continue Jardiance 25 mg. Change Basaglar 74 units daily. Continue Novolog 25 units with regular meal TID wm. Continue Miglitol 25 mg TID wm.   Initial Assessment:   Symptomatic Hypoglycemia, resolved on arrival.    Patient hemodynamically stable and does not appear ill on arrival    I was able to reach out to patient's endocrine nurse practitioner and confirm the prescribed diabetes medication regimen above and then discussed this with the patient and there seems to be significant confusion on his part about what exactly supposed to be taking.  Patient's endocrinologist confirms with me that she was very explicit with patient about the above regimen but he does not seem to remember being told this.  In terms of the oral  medications he is supposed to be on, he states that they are not covered by his insurance plan.  I strongly suspect that his hypoglycemic episode was triggered by an accidental overdose of his long-acting and or short-acting insulin regimen.  Clinical Tests:   Lab Tests: Ordered and Reviewed  ED Management:  -labs  -meal provided    Patient took his long-acting around 730 this morning.  I am concerned about his risk for recurrent hypoglycemia over the next 24 hours given long duration of action of this medication and the uncertainty bed exactly how much of it that he took.  Will admit for observation for the next 24 hours for q.2 hours fingersticks.                        Clinical Impression:   Final diagnoses:  [R55] Near syncope  [E16.2] Hypoglycemia (Primary)        ED Disposition Condition    Observation Stable                Beulah Osorio MD  06/02/23 0743

## 2023-06-02 NOTE — PROGRESS NOTES
HPI    Pt arrived in clinic for apt   Pt was weak and started to pass out.   Pt was helped into a chair in the lobby.   Pt's blood glucose was checked.   Pt's blood glucose was 64  Crackers and juice was given to the pt   Dr. Burgess decided to send the pt to the ED for further evaluation      Last edited by Linda Olivia MA on 6/2/2023  1:57 PM.            Assessment /Plan     For exam results, see Encounter Report.    Hypoglycemia due to insulin

## 2023-06-02 NOTE — H&P
[facility] - Emergency Dept  [facility] Medicine  History & Physical    Patient Name: Stepan Springer  MRN: [id number]  Patient Class: OP- Observation  Admission Date: 6/2/2023  Attending Physician: Surekha Reyes MD   Primary Care Provider: KANDICE Fleming MD         Patient information was obtained from patient and ER records.     Subjective:     Principal Problem:Hypoglycemia unawareness associated with type 2 diabetes mellitus    Chief Complaint:   Chief Complaint   Patient presents with    Hypoglycemia     Lightheaded in clinic glucose was 64, 57, 54, after eating candy        HPI: Mr. Stepan Springer is a 68 y.o. male with T2DM, CKD, HTN, and obesity who [place] to the [facility] [place] on [place]2 from Optho clinic after he was found to be hypoglycemic with blood glucose 50-60s.  He reports that in clinic, he went up to stand and felt dizzy and lightheaded.  He denies any tremors or feeling shaky.  He reports that his glucometer reading has gone to 40 before, but says that it doesn't go lower than 40, so he is unsure if he has had lower blood sugars.  He was seen by his Endocrine NP in May 2023.  His NP documented that he is supposed to be on 74 units of long-acting insulin once a day and 25 units of NPH insulin 3 times a day with meals.  However, the patient states he was told at his appointment to take 100 units of long-acting insulin twice a day and 60 units of NPH 3 times a day.  He denies any chest pain, shortness of breath, fever or chills.    Upon arrival to the ER, vitals were temp 98F, HR 57, /69.  Labs showed Creatinine 2.2, Glucose 77, HbA1c 7.2.  He was given juice and sugars improved.  As he was given a double dose of his long acting insulin, he will be admitted to Hospital Medicine for observation and serial blood glucose checks.      Past Medical History:   Diagnosis Date    Anxiety     Bell's palsy     CHF (congestive heart failure)     Chronic kidney disease, stage 3a 11/02/2021     "Coronary artery disease involving native coronary artery without angina pectoris 10/18/2016    Depression     Diabetes mellitus     Glaucoma     Hypertension     Idiopathic peripheral neuropathy 12/11/2012    Lymphedema of both lower extremities     Malignant melanoma of skin of forehead 10/13/2022    Mixed hyperlipidemia 12/11/2012    Morbid obesity with BMI of 45.0-49.9, adult 02/12/2019    Pancytopenia     Sleep apnea     "unable to use"    Stage 3b chronic kidney disease     Type 2 diabetes mellitus with diabetic polyneuropathy, with long-term current use of insulin 12/11/2012    Vitamin B 12 deficiency 08/23/2012       Past Surgical History:   Procedure Laterality Date    CARDIAC CATHETERIZATION  7/22/13    non obstructive cad    CATARACT EXTRACTION  OU    CLOSURE OF WOUND Left 10/14/2022    Procedure: CLOSURE, WOUND;  Surgeon: Jovita Ceballos MD;  Location: [facility] OR;  Service: ENT;  Laterality: Left;  closure of forehead    COLONOSCOPY N/A 5/1/2019    Procedure: COLONOSCOPY;  Surgeon: Henry Mo III, MD;  Location: [facility] ENDO;  Service: Endoscopy;  Laterality: N/A;    CORONARY ANGIOPLASTY      ESOPHAGOGASTRODUODENOSCOPY N/A 5/1/2019    Procedure: ESOPHAGOGASTRODUODENOSCOPY (EGD);  Surgeon: Henry Mo III, MD;  Location: [facility] ENDO;  Service: Endoscopy;  Laterality: N/A;    EXCISION OF MELANOMA Left 10/7/2022    Procedure: EXCISION, MELANOMA;  Surgeon: Jovita Ceballos MD;  Location: [facility] OR;  Service: ENT;  Laterality: Left;    EYE SURGERY      FRACTURE SURGERY      kidney stone       August 2016    PC IOL OU      RETINAL DETACHMENT REPAIR W/ SCLERAL BUCKLE LE      WRIST SURGERY         Review of patient's allergies indicates:   Allergen Reactions    Cefazolin Other (See Comments)     Shakes, chills, dizziness       No current facility-administered medications on file prior to encounter.     Current Outpatient Medications on File Prior to Encounter   Medication Sig    " amLODIPine (NORVASC) 5 MG tablet Take 1 tablet (5 mg total) by mouth every evening.    aspirin 81 MG Chew Take 81 mg by mouth once daily.    atorvastatin (LIPITOR) 20 MG tablet Take 1 tablet (20 mg total) by mouth every evening.    AZOPT 1 % ophthalmic suspension Place 1 drop into both eyes 3 (three) times daily. Brand name only    blood sugar diagnostic Strp To check BG 4 times daily, to use with insurance preferred meter    blood-glucose meter kit To check BG 4 times daily, to use with insurance preferred meter    brimonidine 0.1% (ALPHAGAN P) 0.1 % Drop Place 1 drop into both eyes 3 (three) times daily.    carvediloL (COREG) 12.5 MG tablet Take 1 tablet (12.5 mg total) by mouth 2 (two) times daily.    cloNIDine (CATAPRES) 0.1 MG tablet Take 1 tablet (0.1 mg total) by mouth 3 (three) times daily.    dextran 70-hypromellose (TEARS) ophthalmic solution Place 2 drops into the left eye every 4 (four) hours.    flash glucose sensor (FREESTYLE CLEMENCIA 14 DAY SENSOR) Kit 1 each by Misc.(Non-Drug; Combo Route) route every 14 (fourteen) days.    furosemide (LASIX) 40 MG tablet Take 1 tablet (40 mg total) by mouth once daily.    hydrALAZINE (APRESOLINE) 100 MG tablet Take 1 tablet (100 mg total) by mouth every 8 (eight) hours.    insulin (BASAGLAR KWIKPEN U-100 INSULIN) glargine 100 units/mL SubQ pen Inject 80 Units into the skin once daily.    insulin aspart U-100 (NOVOLOG) 100 unit/mL (3 mL) InPn pen Inject 25 Units into the skin 3 (three) times daily with meals.    lancets Misc To check BG 4 times daily, to use with insurance preferred meter    losartan (COZAAR) 100 MG tablet Take 1 tablet (100 mg total) by mouth once daily.    mupirocin (BACTROBAN) 2 % ointment Apply topically once daily.    netarsudiL (RHOPRESSA) 0.02 % ophthalmic solution Place 1 drop into both eyes once daily.    nitroGLYCERIN (NITROSTAT) 0.4 MG SL tablet PLACE 1 TABLET (0.4 MG TOTAL) UNDER THE TONGUE EVERY 5 (FIVE) MINUTES AS NEEDED  "FOR CHEST PAIN.    valACYclovir (VALTREX) 1000 MG tablet Take 1 tablet (1,000 mg total) by mouth 2 (two) times daily. for 10 days (Patient not taking: Reported on 4/18/2023)    [DISCONTINUED] hydroCHLOROthiazide (HYDRODIURIL) 25 MG tablet Take 1 tablet (25 mg total) by mouth once daily.    [DISCONTINUED] miglitoL (GLYSET) 25 MG Tab Take 1 tablet (25 mg total) by mouth 3 (three) times daily with meals.     Family History       Problem Relation (Age of Onset)    Heart attack Father    Heart disease Father    Hypertension Mother, Maternal Grandmother, Maternal Grandfather    Macular degeneration Father, Paternal Uncle          Tobacco Use    Smoking status: Never    Smokeless tobacco: Never   Substance and Sexual Activity    Alcohol use: Yes     Comment: " one to two glasses of wine per year"    Drug use: No    Sexual activity: Not Currently     Birth control/protection: None     Review of Systems   Constitutional:  Positive for activity change. Negative for chills, fatigue and fever.   HENT:  Negative for sore throat and trouble swallowing.    Eyes:  Negative for photophobia and visual disturbance.   Respiratory:  Negative for cough and shortness of breath.    Cardiovascular:  Negative for chest pain, palpitations and leg swelling.   Gastrointestinal:  Negative for abdominal pain, constipation, diarrhea, nausea and vomiting.   Endocrine: Negative for cold intolerance and heat intolerance.   Genitourinary:  Negative for dysuria and frequency.   Musculoskeletal:  Negative for arthralgias and myalgias.   Skin:  Negative for rash and wound.   Neurological:  Positive for dizziness and light-headedness. Negative for syncope and weakness.   Psychiatric/Behavioral:  Negative for confusion and hallucinations.    All other systems reviewed and are negative.  Objective:     Vital Signs (Most Recent):  Temp: 98 °F (36.7 °C) (06/02/23 1121)  Pulse: (!) 57 (06/02/23 1315)  Resp: 17 (06/02/23 1315)  BP: 113/65 (06/02/23 " 1315)  SpO2: 97 % (06/02/23 1315) Vital Signs (24h Range):  Temp:  [98 °F (36.7 °C)] 98 °F (36.7 °C)  Pulse:  [57-58] 57  Resp:  [17-20] 17  SpO2:  [95 %-97 %] 97 %  BP: (113-115)/(61-69) 113/65     Weight: (!) 181.4 kg (400 lb)  Body mass index is 45.06 kg/m².     Physical Exam  Vitals and nursing note reviewed.   Constitutional:       Appearance: He is obese.   HENT:      Head: Normocephalic and atraumatic.   Eyes:      General: No scleral icterus.  Cardiovascular:      Rate and Rhythm: Regular rhythm. Bradycardia present.      Heart sounds: No murmur heard.  Pulmonary:      Effort: Pulmonary effort is normal. No respiratory distress.      Breath sounds: Normal breath sounds. No wheezing.   Abdominal:      General: Bowel sounds are normal. There is no distension.      Palpations: Abdomen is soft.      Tenderness: There is no abdominal tenderness.   Musculoskeletal:         General: Normal range of motion.      Cervical back: Normal range of motion and neck supple.   Skin:     General: Skin is warm and dry.   Neurological:      General: No focal deficit present.      Mental Status: He is alert and oriented to person, place, and time. Mental status is at baseline.              Significant Labs: All pertinent labs within the past 24 hours have been reviewed.  A1C:   Recent Labs   Lab 02/27/23  0930 06/02/23  1345   HGBA1C 7.9* 7.2*     CBC:   Recent Labs   Lab 06/02/23  1159   WBC 4.75   HGB 14.2   HCT 43.4        CMP:   Recent Labs   Lab 06/02/23  1159      K 4.0   *   CO2 19*   GLU 93   BUN 26*   CREATININE 2.2*   CALCIUM 8.6*   PROT 5.9*   ALBUMIN 3.2*   BILITOT 0.6   ALKPHOS 91   AST 13   ALT 14   ANIONGAP 10       Significant Imaging: I have reviewed all pertinent imaging results/findings within the past 24 hours.    Assessment/Plan:     * Hypoglycemia unawareness associated with type 2 diabetes mellitus  Patient's FSGs are uncontrolled due to hypoglycemia on current medication regimen.  Last  A1c reviewed-   Lab Results   Component Value Date    HGBA1C 7.2 (H) 06/02/2023     Most recent fingerstick glucose reviewed-   Recent Labs   Lab 06/02/23  1121 06/02/23  1146 06/02/23  1430   POCTGLUCOSE 90 97 77     Current correctional scale  Low  Maintain anti-hyperglycemic dose as follows-   Antihyperglycemics (From admission, onward)    Start     Stop Route Frequency Ordered    06/03/23 0900  insulin detemir U-100 (Levemir) pen 40 Units         -- SubQ 2 times daily 06/02/23 1553    06/02/23 1700  insulin aspart U-100 pen 25 Units         -- SubQ 3 times daily with meals 06/02/23 1553    06/02/23 1653  insulin aspart U-100 pen 0-5 Units         -- SubQ Before meals & nightly PRN 06/02/23 1553        Hold Oral hypoglycemics while patient is in the hospital.    · Endocrine NP documented that he is supposed to be on 74 units of long-acting insulin once a day and 25 units of NPH insulin 3 times a day with meals  · Patient states he was told at his appointment to take 100 units of long-acting insulin twice a day and 60 units of NPH 3 times a day  · Glucose in Optho clinic 50-60s with dizziness and lightheadedness, but 70s in the ER  · Endocrine consulted for assistance - suggest BID long acting insulin to assist in better glucose control    Hypoglycemia due to insulin  · See above      Obesity, diabetes, and hypertension syndrome  · Noted      Benign hypertensive heart and kidney disease with HF and CKD  · Noted      Class 3 severe obesity due to excess calories with serious comorbidity and body mass index (BMI) of 45.0 to 49.9 in adult  Body mass index is 45.06 kg/m². Morbid obesity complicates all aspects of disease management from diagnostic modalities to treatment. Weight loss encouraged and health benefits explained to patient.         Hyperlipidemia associated with type 2 diabetes mellitus  · Chronic and stable  · Continue Statin    CAD with remote PCI (BMS) of RCA in 9/2016  · Chronic and stable  · Continue  Aspirin, Statin, Coreg, Losartan      Stage 3b chronic kidney disease  · See above      Hypertension associated with diabetes  · Chronic issue but BP 110s on arrival in the ER  · Continue Norvasc, Clonidine, Losartan, Hydralazine  · Decrease Coreg to 6.25mg BID given bradycardia with HR 50s    Chronic diastolic congestive heart failure  Patient is identified as having Diastolic (HFpEF) heart failure that is Chronic. CHF is currently controlled. Latest ECHO performed and demonstrates- Results for orders placed during the hospital encounter of 09/03/22    Echo    Interpretation Summary  · The left ventricle is normal in size with concentric hypertrophy and normal systolic function.  · The estimated ejection fraction is 55%.  · Normal left ventricular diastolic function.  · Normal right ventricular size with normal right ventricular systolic function.  · Overall the study quality was technically difficult. The study was difficult due to patient's clinical status, body habitus and poor endocardial visualization.  · There is abnormal septal wall motion consistent with left bundle branch block.  · Normal central venous pressure (3 mmHg).  · The estimated PA systolic pressure is 25 mmHg.  · The aortic root is mildly dilated.  . Continue Beta Blocker and ACE/ARB and monitor clinical status closely. Monitor on telemetry. Patient is off CHF pathway.  Monitor strict Is&Os and daily weights.  Place on fluid restriction of 2 L. Continue to stress to patient importance of self efficacy and  on diet for CHF. Last BNP reviewed- and noted below No results for input(s): BNP, BNPTRIAGEBLO in the last 168 hours..        VTE Risk Mitigation (From admission, onward)         Ordered     heparin (porcine) injection 7,500 Units  Every 8 hours         06/02/23 1602     IP VTE HIGH RISK PATIENT  Once         06/02/23 1602                   On 06/02/2023, patient should be placed in hospital observation services under my  care.        Surekha Reyes MD  Department of Hospital Medicine  [facility] - Emergency Dept

## 2023-06-02 NOTE — ASSESSMENT & PLAN NOTE
· Chronic issue but BP 110s on arrival in the ER  · Continue Norvasc, Clonidine, Losartan, Hydralazine  · Decrease Coreg to 6.25mg BID given bradycardia with HR 50s

## 2023-06-02 NOTE — HPI
Mr. Stepan Springer is a 68 y.o. male with T2DM, CKD, HTN, and obesity who presented to the [facility] [place] ED on 6/2 from Optho clinic after he was found to be hypoglycemic with blood glucose 50-60s.  He reports that in clinic, he went up to stand and felt dizzy and lightheaded.  He denies any tremors or feeling shaky.  He reports that his glucometer reading has gone to 40 before, but says that it doesn't go lower than 40, so he is unsure if he has had lower blood sugars.  He was seen by his Endocrine NP in May 2023.  His NP documented that he is supposed to be on 74 units of long-acting insulin once a day and 25 units of NPH insulin 3 times a day with meals.  However, the patient states he was told at his appointment to take 100 units of long-acting insulin twice a day and 60 units of NPH 3 times a day.  He denies any chest pain, shortness of breath, fever or chills.    Upon arrival to the ER, vitals were temp 98F, HR 57, /69.  Labs showed Creatinine 2.2, Glucose 77, HbA1c 7.2.  He was given juice and sugars improved.  As he was given a double dose of his long acting insulin, he will be admitted to Hospital Medicine for observation and serial blood glucose checks.

## 2023-06-02 NOTE — PROGRESS NOTES
HPI    Pt arrived in clinic for apt   Pt was weak and started to pass out.   Pt was helped into a chair in the lobby.   Pt's blood glucose was checked.   Pt's blood glucose was 64  Crackers and juice was given to the pt   Dr. Burgess decided to send the pt to the ED for further evaluation      Last edited by Linda Olivia MA on 6/2/2023  1:57 PM.            Assessment /Plan     For exam results, see Encounter Report.    Hypoglycemia due to insulin      Patient presented to eye clinic with light headedness and feeling poorly since awaking this AM    Patient was lethargic in waiting room  Fingerstick BS @ 64 and provided violeta crackers & OJ    A decision was made to transport patients to ER for evaluation    Patient apparently overdosed on Diabetes meds // Insulin and will be placed in Observation    Plan  RTC 1-2 months IOP, DFE and OCT RNFL  RTC sooner prn with good understanding

## 2023-06-02 NOTE — ED NOTES
LOC: The patient is awake, alert and aware of environment with an appropriate affect, the patient is oriented x 3 and speaking appropriately.  APPEARANCE: Patient resting comfortably and in no acute distress, patient is clean and well groomed, patient's clothing is properly fastened.  SKIN: The skin is warm and dry, color consistent with ethnicity  MUSCULOSKELETAL: Patient moving all extremities spontaneously  RESPIRATORY: Airway is open and patent, respirations are spontaneous, patient has a normal effort and rate  ABDOMEN: Soft and non tender to palpation, no distention noted       Patient arrives from the optho clinic with the complaint of hypoglycemia. Patient states he took his regular scheduled insulin and ate a small breakfast which is normal for him. Patient states he started to feel like he was going to pass out in clinic and when they took his blood glucose it was noted that his CBG was 57. Patient ate 3 pieces of candy and drank a dr. Pepper and is feeling better. Patient is in no acute distress noted, will continue to monitor .

## 2023-06-02 NOTE — HPI
"Reason for Consult: Hypoglycemia    Diabetes diagnosis year: 2005    Home Diabetes Medications: 25 units of short acting TIDWM and 80 units of long acting BID (prescribed as daily dosing so patient doubled his prescribed dose), miglitol 25mg TIDWM    How often checking glucose at home? Checks freestyle 10x/day  BG readings on regimen:   Hypoglycemia on the regimen? Yes once today: freestyle read < 40, fingerstick check in clinic 57  Missed doses on regimen? none    Diabetes Complications include:   CKD, CAD, neuropathy    Complicating diabetes co morbidities:   CVA last summer, CAD, HLD, obesity    HPI:   Mr. Springer is a 67 yo male with T1DM, HTN, HLD, CAD, S/p MI, CHF, h/o CVA (2022), CKD III and Obesity admitted to hospital medicine for hypoglycemia. Patient was in opthalmology clinic this morning and noted his BG was "low" on the freestyle dai indy which correlates to a BG < 40. The ophthalmology clinic performed a fingerstick glucose check that read 57 per ED nurse and 67 per ophthalmology clinic. He was given candy and soda and was sent to the ED for evaluation. Nursing reports patient near syncope but patient denies feeling symptomatic from this hypoglycemic episode. He reports eating a small breakfast which is normal for him and took his 80 units of long acting as well as his 25 units of short acting insulin.   "

## 2023-06-03 LAB
ALBUMIN SERPL BCP-MCNC: 3.1 G/DL (ref 3.5–5.2)
ALP SERPL-CCNC: 97 U/L (ref 55–135)
ALT SERPL W/O P-5'-P-CCNC: 16 U/L (ref 10–44)
ANION GAP SERPL CALC-SCNC: 7 MMOL/L (ref 8–16)
AST SERPL-CCNC: 12 U/L (ref 10–40)
BASOPHILS # BLD AUTO: 0.02 K/UL (ref 0–0.2)
BASOPHILS NFR BLD: 0.5 % (ref 0–1.9)
BILIRUB SERPL-MCNC: 0.6 MG/DL (ref 0.1–1)
BUN SERPL-MCNC: 21 MG/DL (ref 8–23)
CALCIUM SERPL-MCNC: 9.1 MG/DL (ref 8.7–10.5)
CHLORIDE SERPL-SCNC: 115 MMOL/L (ref 95–110)
CO2 SERPL-SCNC: 18 MMOL/L (ref 23–29)
CREAT SERPL-MCNC: 1.7 MG/DL (ref 0.5–1.4)
DIFFERENTIAL METHOD: NORMAL
EOSINOPHIL # BLD AUTO: 0.2 K/UL (ref 0–0.5)
EOSINOPHIL NFR BLD: 3.7 % (ref 0–8)
ERYTHROCYTE [DISTWIDTH] IN BLOOD BY AUTOMATED COUNT: 13.2 % (ref 11.5–14.5)
EST. GFR  (NO RACE VARIABLE): 43.9 ML/MIN/1.73 M^2
GLUCOSE SERPL-MCNC: 102 MG/DL (ref 70–110)
HCT VFR BLD AUTO: 47.4 % (ref 40–54)
HGB BLD-MCNC: 15.2 G/DL (ref 14–18)
IMM GRANULOCYTES # BLD AUTO: 0.01 K/UL (ref 0–0.04)
IMM GRANULOCYTES NFR BLD AUTO: 0.2 % (ref 0–0.5)
LYMPHOCYTES # BLD AUTO: 1.2 K/UL (ref 1–4.8)
LYMPHOCYTES NFR BLD: 28.5 % (ref 18–48)
MAGNESIUM SERPL-MCNC: 2 MG/DL (ref 1.6–2.6)
MCH RBC QN AUTO: 30.7 PG (ref 27–31)
MCHC RBC AUTO-ENTMCNC: 32.1 G/DL (ref 32–36)
MCV RBC AUTO: 96 FL (ref 82–98)
MONOCYTES # BLD AUTO: 0.3 K/UL (ref 0.3–1)
MONOCYTES NFR BLD: 8.4 % (ref 4–15)
NEUTROPHILS # BLD AUTO: 2.4 K/UL (ref 1.8–7.7)
NEUTROPHILS NFR BLD: 58.7 % (ref 38–73)
NRBC BLD-RTO: 0 /100 WBC
PHOSPHATE SERPL-MCNC: 2.5 MG/DL (ref 2.7–4.5)
PLATELET # BLD AUTO: 169 K/UL (ref 150–450)
PMV BLD AUTO: 10.3 FL (ref 9.2–12.9)
POCT GLUCOSE: 136 MG/DL (ref 70–110)
POCT GLUCOSE: 139 MG/DL (ref 70–110)
POCT GLUCOSE: 43 MG/DL (ref 70–110)
POCT GLUCOSE: 80 MG/DL (ref 70–110)
POCT GLUCOSE: 92 MG/DL (ref 70–110)
POCT GLUCOSE: 94 MG/DL (ref 70–110)
POCT GLUCOSE: 95 MG/DL (ref 70–110)
POTASSIUM SERPL-SCNC: 3.8 MMOL/L (ref 3.5–5.1)
PROT SERPL-MCNC: 5.8 G/DL (ref 6–8.4)
RBC # BLD AUTO: 4.95 M/UL (ref 4.6–6.2)
SODIUM SERPL-SCNC: 140 MMOL/L (ref 136–145)
WBC # BLD AUTO: 4.07 K/UL (ref 3.9–12.7)

## 2023-06-03 PROCEDURE — 63600175 PHARM REV CODE 636 W HCPCS: Performed by: HOSPITALIST

## 2023-06-03 PROCEDURE — 25000003 PHARM REV CODE 250: Performed by: HOSPITALIST

## 2023-06-03 PROCEDURE — 83735 ASSAY OF MAGNESIUM: CPT | Performed by: HOSPITALIST

## 2023-06-03 PROCEDURE — 99239 HOSP IP/OBS DSCHRG MGMT >30: CPT | Mod: ,,, | Performed by: HOSPITALIST

## 2023-06-03 PROCEDURE — 96372 THER/PROPH/DIAG INJ SC/IM: CPT | Performed by: HOSPITALIST

## 2023-06-03 PROCEDURE — 36415 COLL VENOUS BLD VENIPUNCTURE: CPT | Performed by: HOSPITALIST

## 2023-06-03 PROCEDURE — 80053 COMPREHEN METABOLIC PANEL: CPT | Performed by: HOSPITALIST

## 2023-06-03 PROCEDURE — 25000003 PHARM REV CODE 250

## 2023-06-03 PROCEDURE — 96372 THER/PROPH/DIAG INJ SC/IM: CPT

## 2023-06-03 PROCEDURE — G0378 HOSPITAL OBSERVATION PER HR: HCPCS

## 2023-06-03 PROCEDURE — 99239 PR HOSPITAL DISCHARGE DAY,>30 MIN: ICD-10-PCS | Mod: ,,, | Performed by: HOSPITALIST

## 2023-06-03 PROCEDURE — 63600175 PHARM REV CODE 636 W HCPCS

## 2023-06-03 PROCEDURE — 85025 COMPLETE CBC W/AUTO DIFF WBC: CPT | Performed by: HOSPITALIST

## 2023-06-03 PROCEDURE — 84100 ASSAY OF PHOSPHORUS: CPT | Performed by: HOSPITALIST

## 2023-06-03 RX ORDER — INSULIN GLARGINE 100 [IU]/ML
40 INJECTION, SOLUTION SUBCUTANEOUS 2 TIMES DAILY
Qty: 72 ML | Refills: 3
Start: 2023-06-03 | End: 2023-06-05 | Stop reason: SDUPTHER

## 2023-06-03 RX ORDER — INSULIN ASPART 100 [IU]/ML
20 INJECTION, SOLUTION INTRAVENOUS; SUBCUTANEOUS
Status: DISCONTINUED | OUTPATIENT
Start: 2023-06-04 | End: 2023-06-03

## 2023-06-03 RX ORDER — INSULIN ASPART 100 [IU]/ML
22 INJECTION, SOLUTION INTRAVENOUS; SUBCUTANEOUS
Status: DISCONTINUED | OUTPATIENT
Start: 2023-06-03 | End: 2023-06-03

## 2023-06-03 RX ORDER — INSULIN ASPART 100 [IU]/ML
20 INJECTION, SOLUTION INTRAVENOUS; SUBCUTANEOUS
Status: DISCONTINUED | OUTPATIENT
Start: 2023-06-03 | End: 2023-06-03

## 2023-06-03 RX ORDER — CARVEDILOL 12.5 MG/1
6.25 TABLET ORAL 2 TIMES DAILY
Qty: 180 TABLET | Refills: 2
Start: 2023-06-03 | End: 2023-06-05 | Stop reason: SDUPTHER

## 2023-06-03 RX ADMIN — HYDRALAZINE HYDROCHLORIDE 100 MG: 50 TABLET ORAL at 06:06

## 2023-06-03 RX ADMIN — HEPARIN SODIUM 7500 UNITS: 5000 INJECTION INTRAVENOUS; SUBCUTANEOUS at 04:06

## 2023-06-03 RX ADMIN — Medication 24 G: at 03:06

## 2023-06-03 RX ADMIN — CLONIDINE HYDROCHLORIDE 0.1 MG: 0.1 TABLET ORAL at 04:06

## 2023-06-03 RX ADMIN — ASPIRIN 81 MG 81 MG: 81 TABLET ORAL at 08:06

## 2023-06-03 RX ADMIN — CLONIDINE HYDROCHLORIDE 0.1 MG: 0.1 TABLET ORAL at 08:06

## 2023-06-03 RX ADMIN — LOSARTAN POTASSIUM 100 MG: 50 TABLET, FILM COATED ORAL at 08:06

## 2023-06-03 RX ADMIN — HYDRALAZINE HYDROCHLORIDE 100 MG: 50 TABLET ORAL at 04:06

## 2023-06-03 RX ADMIN — HYDRALAZINE HYDROCHLORIDE 100 MG: 50 TABLET ORAL at 10:06

## 2023-06-03 RX ADMIN — HEPARIN SODIUM 7500 UNITS: 5000 INJECTION INTRAVENOUS; SUBCUTANEOUS at 10:06

## 2023-06-03 RX ADMIN — INSULIN ASPART 25 UNITS: 100 INJECTION, SOLUTION INTRAVENOUS; SUBCUTANEOUS at 08:06

## 2023-06-03 RX ADMIN — CARVEDILOL 6.25 MG: 3.12 TABLET, FILM COATED ORAL at 08:06

## 2023-06-03 RX ADMIN — CLONIDINE HYDROCHLORIDE 0.1 MG: 0.1 TABLET ORAL at 10:06

## 2023-06-03 RX ADMIN — INSULIN ASPART 25 UNITS: 100 INJECTION, SOLUTION INTRAVENOUS; SUBCUTANEOUS at 12:06

## 2023-06-03 RX ADMIN — HEPARIN SODIUM 7500 UNITS: 5000 INJECTION INTRAVENOUS; SUBCUTANEOUS at 06:06

## 2023-06-03 RX ADMIN — ATORVASTATIN CALCIUM 20 MG: 20 TABLET, FILM COATED ORAL at 10:06

## 2023-06-03 RX ADMIN — CARVEDILOL 6.25 MG: 3.12 TABLET, FILM COATED ORAL at 10:06

## 2023-06-03 RX ADMIN — INSULIN DETEMIR 40 UNITS: 100 INJECTION, SOLUTION SUBCUTANEOUS at 08:06

## 2023-06-03 NOTE — CARE UPDATE
Informed by nursing that post prandial blood sugar after lunch was 43.  He received Detemir 40 units this morning, as well as Aspart 25 units TIDWM per Endocrine recs.  Discussed with Endocrine.  Suggest holding further insulin today.  Want to evaluate sugars on 40 units daily.  They will see him tomorrow.  Repeat sugar is 80 after 24g Dextrose tablet.  Discharge order pulled.  AM labs ordered.    Surekha Reyes MD, [facility] Medicine

## 2023-06-03 NOTE — SUBJECTIVE & OBJECTIVE
Interval History: No acute events overnight.  Sugars slowly improving.  Feels well.  Last sugar 137 this morning.  Tentative plan to DC home this afternoon pending sugar trend.    Review of Systems   Constitutional:  Negative for chills, fatigue and fever.   Respiratory:  Negative for cough and shortness of breath.    Cardiovascular:  Negative for chest pain, palpitations and leg swelling.   Gastrointestinal:  Negative for abdominal pain, diarrhea, nausea and vomiting.   Genitourinary:  Negative for dysuria and urgency.   Neurological:  Negative for dizziness and headaches.   All other systems reviewed and are negative.  Objective:     Vital Signs (Most Recent):  Temp: 98.1 °F (36.7 °C) (06/03/23 0742)  Pulse: (!) 55 (06/03/23 0742)  Resp: 18 (06/03/23 0742)  BP: (!) 156/81 (06/03/23 0742)  SpO2: 98 % (06/03/23 0742) Vital Signs (24h Range):  Temp:  [97.9 °F (36.6 °C)-98.2 °F (36.8 °C)] 98.1 °F (36.7 °C)  Pulse:  [52-58] 55  Resp:  [16-20] 18  SpO2:  [95 %-98 %] 98 %  BP: (113-166)/(61-89) 156/81     Weight: (!) 189.6 kg (417 lb 15.9 oz)  Body mass index is 47.09 kg/m².  No intake or output data in the 24 hours ending 06/03/23 1058      Physical Exam  Constitutional:       Appearance: He is well-developed. He is obese.   HENT:      Head: Normocephalic and atraumatic.   Cardiovascular:      Rate and Rhythm: Regular rhythm. Bradycardia present.      Heart sounds: No murmur heard.  Pulmonary:      Effort: Pulmonary effort is normal. No respiratory distress.      Breath sounds: Normal breath sounds. No wheezing or rales.   Abdominal:      General: There is no distension.      Palpations: Abdomen is soft.      Tenderness: There is no abdominal tenderness.   Musculoskeletal:         General: No deformity.   Skin:     General: Skin is warm.   Neurological:      General: No focal deficit present.      Mental Status: He is alert and oriented to person, place, and time. Mental status is at baseline.           Significant  Labs: All pertinent labs within the past 24 hours have been reviewed.  POCT Glucose:   Recent Labs   Lab 06/02/23  1146 06/02/23  1430 06/02/23  2016   POCTGLUCOSE 97 77 135*       Significant Imaging: I have reviewed all pertinent imaging results/findings within the past 24 hours.

## 2023-06-03 NOTE — ASSESSMENT & PLAN NOTE
Body mass index is 47.09 kg/m². Morbid obesity complicates all aspects of disease management from diagnostic modalities to treatment. Weight loss encouraged and health benefits explained to patient.

## 2023-06-03 NOTE — DISCHARGE INSTRUCTIONS
"Instead of taking your long acting insulin dose of 80 units once a day, Endocrine (sugar doctors) want you to change to 36 units once a day, and change your short acting/meal time insulin dose to 12  units with each meal.    Your heart rates have been very low in the 50s.  Instead of taking Coreg (Carvedilol) 12.5mg twice a day, take half a tablet (6.25mg) twice a day.    Follow up with your upcoming appointment in the next few months for diabetes.  If having additional lows prior to that visit then reach out to them sooner.      "15-15 Rule" for hypoglycemia treatment  - Many people tend to want to eat as much as they can for low blood glucose until they feel better. This can cause blood glucose levels to go very high, which is bad. Using the step-wise approach of the "15-15 Rule" can help you correct a low blood glucose while also preventing subsequent high blood glucose levels  - If you experience an episode of hypoglycemia (glucose less than 70), please follow the "15-15 rule": Take 15 grams of carbohydrate (see examples below) to raise your blood sugar and recheck it after 15 minutes. If still below 70 mg/dL, take another 15 gram serving of carbohydrate  -Repeat these steps until your blood sugar is at least 70 mg/dL. Once your blood sugar is back to normal, eat a small serving of protein to make sure it doesnt lower again    -Examples of 15g of carbohydrate:  4 ounces (1/2 cup) of juice or regular soda (not diet)  1 tablespoon of sugar, honey, or corn syrup  Hard candies, jellybeans, or gumdrops--see food label for how many to consume  Make a note about any episodes of low blood sugar and talk with your health care team if they are recurrent     - Note: When treating a low blood glucose, the choice of carbohydrate source is important. Complex carbohydrates, or foods that contain fats along with carbs (like chocolate) can slow the absorption of glucose and should not be used to treat an emergency low    - For " more information, please visit:  [email or web address]

## 2023-06-03 NOTE — PLAN OF CARE
Adequate for discharge.       Problem: Adult Inpatient Plan of Care  Goal: Plan of Care Review  Outcome: Met  Goal: Patient-Specific Goal (Individualized)  Outcome: Met  Goal: Absence of Hospital-Acquired Illness or Injury  Outcome: Met  Goal: Optimal Comfort and Wellbeing  Outcome: Met  Goal: Readiness for Transition of Care  Outcome: Met     Problem: Bariatric Environmental Safety  Goal: Safety Maintained with Care  Outcome: Met     Problem: Fall Injury Risk  Goal: Absence of Fall and Fall-Related Injury  Outcome: Met     Problem: Diabetes Comorbidity  Goal: Blood Glucose Level Within Targeted Range  Outcome: Met

## 2023-06-03 NOTE — PLAN OF CARE
CM to bedside - pt provided assessment info. Pt w/ DME in place, lives w/ dgtr & son-in-law. Pt will likely d/c home w/ no needs. Pt has completed the [employer] application for his transportation needs as his family works during the day - waiting on confirmation. Pt declined for CM to make a Hosp f/u appt d/t ride issues. Pt does have 2 upcoming appts at the end of June that he is aware of. Pt is open to PCP and Podiatry referrals for the [facility] - [place], [place].    YAMILEX ChowdaryN, RN, [facility]  Transitional Care Manager  [phone]  [email or web address]    [facility] - [facility]  Discharge Assessment    Primary Care Provider: KANDICE Fleming MD     Discharge Assessment (most recent)       BRIEF DISCHARGE ASSESSMENT - 06/03/23 2375          Discharge Planning    Assessment Type Discharge Planning Brief Assessment     Resource/Environmental Concerns reliable transportation     Transportation Concerns rides, unreliable from others;no car     Support Systems Children     Equipment Currently Used at Home glucometer;walker, rolling     Current Living Arrangements home     Patient/Family Anticipates Transition to home with family     Patient/Family Anticipated Services at Transition none     DME Needed Upon Discharge  none     Discharge Plan A Home with family     Discharge Plan B Home with family;Home Health        Physical Activity    On average, how many days per week do you engage in moderate to strenuous exercise (like a brisk walk)? 0 days     On average, how many minutes do you engage in exercise at this level? 0 min        Financial Resource Strain    How hard is it for you to pay for the very basics like food, housing, medical care, and heating? Not very hard        Housing Stability    In the last 12 months, was there a time when you were not able to pay the mortgage or rent on time? No     In the last 12 months, was there a time when you did not have a steady place to sleep or slept in a shelter (including  now)? No        Transportation Needs    In the past 12 months, has lack of transportation kept you from medical appointments or from getting medications? Yes     In the past 12 months, has lack of transportation kept you from meetings, work, or from getting things needed for daily living? Yes        Food Insecurity    Within the past 12 months, you worried that your food would run out before you got the money to buy more. Never true     Within the past 12 months, the food you bought just didn't last and you didn't have money to get more. Never true        Stress    Do you feel stress - tense, restless, nervous, or anxious, or unable to sleep at night because your mind is troubled all the time - these days? Only a little        Social Connections    In a typical week, how many times do you talk on the phone with family, friends, or neighbors? More than three times a week     How often do you get together with friends or relatives? More than three times a week     How often do you attend meetings of the clubs or organizations you belong to? Patient refused     Are you , , , , never , or living with a partner?         Alcohol Use    Q1: How often do you have a drink containing alcohol? Monthly or less     Q2: How many drinks containing alcohol do you have on a typical day when you are drinking? 1 or 2     Q3: How often do you have six or more drinks on one occasion? Never

## 2023-06-03 NOTE — ASSESSMENT & PLAN NOTE
Patient's FSGs are uncontrolled due to hypoglycemia on current medication regimen.  Last A1c reviewed-   Lab Results   Component Value Date    HGBA1C 7.2 (H) 06/02/2023     Most recent fingerstick glucose reviewed-   Recent Labs   Lab 06/02/23  1121 06/02/23  1146 06/02/23  1430 06/02/23 2016   POCTGLUCOSE 90 97 77 135*     Current correctional scale  Low  Maintain anti-hyperglycemic dose as follows-   Antihyperglycemics (From admission, onward)    Start     Stop Route Frequency Ordered    06/03/23 0900  insulin detemir U-100 (Levemir) pen 40 Units         -- SubQ 2 times daily 06/02/23 1553    06/02/23 1700  insulin aspart U-100 pen 25 Units         -- SubQ 3 times daily with meals 06/02/23 1553    06/02/23 1653  insulin aspart U-100 pen 0-5 Units         -- SubQ Before meals & nightly PRN 06/02/23 1553        Hold Oral hypoglycemics while patient is in the hospital.    · Endocrine NP documented that he is supposed to be on 74 units of long-acting insulin once a day and 25 units of NPH insulin 3 times a day with meals  · Patient states he was told at his appointment to take 100 units of long-acting insulin twice a day and 60 units of NPH 3 times a day  · Glucose in Optho clinic 50-60s with dizziness and lightheadedness, but 70s in the ER  · Endocrine consulted for assistance - suggest BID long acting insulin to assist in better glucose control  · Sugars increasing

## 2023-06-03 NOTE — PROGRESS NOTES
[facility] - Observation [address] [place] [address] Medicine  Progress Note    Patient Name: Stepan Springer  MRN: [id number]  Patient Class: OP- Observation   Admission Date: 6/2/2023  Length of Stay: 0 days  Attending Physician: Surekha Reyes MD  Primary Care Provider: KANDICE Fleming MD        Subjective:     Principal Problem:Hypoglycemia unawareness associated with type 2 diabetes mellitus        HPI:  Mr. Stepan Springer is a 68 y.o. male with T2DM, CKD, HTN, and obesity who [place] [facility] [place] from Optho clinic after he was found to be hypoglycemic with blood glucose 50-60s.  He reports that in clinic, he went up to stand and felt dizzy and lightheaded.  He denies any tremors or feeling shaky.  He reports that his glucometer reading has gone to 40 before, but says that it doesn't go lower than 40, so he is unsure if he has had lower blood sugars.  He was seen by his Endocrine NP in May 2023.  His NP documented that he is supposed to be on 74 units of long-acting insulin once a day and 25 units of NPH insulin 3 times a day with meals.  However, the patient states he was told at his appointment to take 100 units of long-acting insulin twice a day and 60 units of NPH 3 times a day.  He denies any chest pain, shortness of breath, fever or chills.    Upon arrival to the ER, vitals were temp 98F, HR 57, /69.  Labs showed Creatinine 2.2, Glucose 77, HbA1c 7.2.  He was given juice and sugars improved.  As he was given a double dose of his long acting insulin, he will be admitted to Hospital Medicine for observation and serial blood glucose checks.      Overview/Hospital Course:  Mr. Gordon was admitted to Hospital Medicine for management of hypoglycemia.  Endocrine was consulted given his large insulin requirements.  They suggest long acting 40 units BID and continuing short acting 25 units TIDWM.      Interval History: No acute events overnight.  Sugars slowly improving.  Feels well.  Last sugar 137  this morning.  Tentative plan to DC home this afternoon pending sugar trend.    Review of Systems   Constitutional:  Negative for chills, fatigue and fever.   Respiratory:  Negative for cough and shortness of breath.    Cardiovascular:  Negative for chest pain, palpitations and leg swelling.   Gastrointestinal:  Negative for abdominal pain, diarrhea, nausea and vomiting.   Genitourinary:  Negative for dysuria and urgency.   Neurological:  Negative for dizziness and headaches.   All other systems reviewed and are negative.  Objective:     Vital Signs (Most Recent):  Temp: 98.1 °F (36.7 °C) (06/03/23 0742)  Pulse: (!) 55 (06/03/23 0742)  Resp: 18 (06/03/23 0742)  BP: (!) 156/81 (06/03/23 0742)  SpO2: 98 % (06/03/23 0742) Vital Signs (24h Range):  Temp:  [97.9 °F (36.6 °C)-98.2 °F (36.8 °C)] 98.1 °F (36.7 °C)  Pulse:  [52-58] 55  Resp:  [16-20] 18  SpO2:  [95 %-98 %] 98 %  BP: (113-166)/(61-89) 156/81     Weight: (!) 189.6 kg (417 lb 15.9 oz)  Body mass index is 47.09 kg/m².  No intake or output data in the 24 hours ending 06/03/23 1058      Physical Exam  Constitutional:       Appearance: He is well-developed. He is obese.   HENT:      Head: Normocephalic and atraumatic.   Cardiovascular:      Rate and Rhythm: Regular rhythm. Bradycardia present.      Heart sounds: No murmur heard.  Pulmonary:      Effort: Pulmonary effort is normal. No respiratory distress.      Breath sounds: Normal breath sounds. No wheezing or rales.   Abdominal:      General: There is no distension.      Palpations: Abdomen is soft.      Tenderness: There is no abdominal tenderness.   Musculoskeletal:         General: No deformity.   Skin:     General: Skin is warm.   Neurological:      General: No focal deficit present.      Mental Status: He is alert and oriented to person, place, and time. Mental status is at baseline.           Significant Labs: All pertinent labs within the past 24 hours have been reviewed.  POCT Glucose:   Recent Labs   Lab  06/02/23  1146 06/02/23  1430 06/02/23  2016   POCTGLUCOSE 97 77 135*       Significant Imaging: I have reviewed all pertinent imaging results/findings within the past 24 hours.      Assessment/Plan:      * Hypoglycemia unawareness associated with type 2 diabetes mellitus  Patient's FSGs are uncontrolled due to hypoglycemia on current medication regimen.  Last A1c reviewed-   Lab Results   Component Value Date    HGBA1C 7.2 (H) 06/02/2023     Most recent fingerstick glucose reviewed-   Recent Labs   Lab 06/02/23  1121 06/02/23  1146 06/02/23  1430 06/02/23 2016   POCTGLUCOSE 90 97 77 135*     Current correctional scale  Low  Maintain anti-hyperglycemic dose as follows-   Antihyperglycemics (From admission, onward)    Start     Stop Route Frequency Ordered    06/03/23 0900  insulin detemir U-100 (Levemir) pen 40 Units         -- SubQ 2 times daily 06/02/23 1553    06/02/23 1700  insulin aspart U-100 pen 25 Units         -- SubQ 3 times daily with meals 06/02/23 1553    06/02/23 1653  insulin aspart U-100 pen 0-5 Units         -- SubQ Before meals & nightly PRN 06/02/23 1553        Hold Oral hypoglycemics while patient is in the hospital.    · Endocrine NP documented that he is supposed to be on 74 units of long-acting insulin once a day and 25 units of NPH insulin 3 times a day with meals  · Patient states he was told at his appointment to take 100 units of long-acting insulin twice a day and 60 units of NPH 3 times a day  · Glucose in Optho clinic 50-60s with dizziness and lightheadedness, but 70s in the ER  · Endocrine consulted for assistance - suggest BID long acting insulin to assist in better glucose control  · Sugars increasing    Hypoglycemia due to insulin  · See above      Obesity, diabetes, and hypertension syndrome  · Noted      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 Morbid obesity complicates all aspects of disease management from diagnostic modalities to treatment. Weight loss encouraged and health benefits explained to patient.         Hyperlipidemia associated with type 2 diabetes mellitus  · Chronic and stable  · Continue Statin    CAD with remote PCI (BMS) of RCA in 9/2016  · Chronic and stable  · Continue Aspirin, Statin, Coreg, Losartan      Stage 3b chronic kidney disease  · See above      Hypertension associated with diabetes  · Chronic issue but BP 110s on arrival in the ER  · Continue Norvasc, Clonidine, Losartan, Hydralazine  · Decrease Coreg to 6.25mg BID given bradycardia with HR 50s    Chronic diastolic congestive heart failure  Patient is identified as having Diastolic (HFpEF) heart failure that is Chronic. CHF is currently controlled. Latest ECHO performed and demonstrates- Results for orders placed during the hospital encounter of 09/03/22    Echo    Interpretation Summary  · The left ventricle is normal in size with concentric hypertrophy and normal systolic function.  · The estimated ejection fraction is 55%.  · Normal left ventricular diastolic function.  · Normal right ventricular size with normal right ventricular systolic function.  · Overall the study quality was technically difficult. The study was difficult due to patient's clinical status, body habitus and poor endocardial visualization.  · There is abnormal septal wall motion consistent with left bundle branch block.  · Normal central venous pressure (3 mmHg).  · The estimated PA systolic pressure is 25 mmHg.  · The aortic root is mildly dilated.  . Continue Beta Blocker and ACE/ARB and monitor clinical status closely. Monitor on telemetry. Patient is off CHF pathway.  Monitor strict Is&Os and daily weights.  Place on fluid restriction of 2 L. Continue to stress to patient importance of self efficacy and  on diet for CHF. Last BNP  reviewed- and noted below No results for input(s): BNP, BNPTRIAGEBLO in the last 168 hours..        VTE Risk Mitigation (From admission, onward)         Ordered     heparin (porcine) injection 7,500 Units  Every 8 hours         06/02/23 1602     IP VTE HIGH RISK PATIENT  Once         06/02/23 1602                Discharge Planning   JOSE: 6/3/2023     Code Status: Full Code   Is the patient medically ready for discharge?: No    Reason for patient still in hospital (select all that apply): Patient trending condition                     Surekha Reyes MD  Department of Hospital Medicine   [facility] - Observation 11H

## 2023-06-03 NOTE — PLAN OF CARE
Pt is expected to discharge today to home w/ no needs. Pt declined to have a Hosp f/u appt made d/t having ride issues - he's in the process getting a [employer] pass. CM placed referrals for PCP and Podiatry for [facility] per pt request.    YAMILEX ChowdaryN, RN, [facility]  Transitional Care Manager  [phone]  [email or web address]      [facility] - [facility]  Discharge Final Note    Primary Care Provider: KANDICE Fleming MD    Expected Discharge Date: 6/3/2023    Final Discharge Note (most recent)       Final Note - 06/03/23 1241          Final Note    Assessment Type Final Discharge Note     Anticipated Discharge Disposition Home or Self Care     Hospital Resources/Appts/Education Provided Community resources provided        Post-Acute Status    Post-Acute Authorization Other     Other Status No Post-Acute Service Needs     Discharge Delays None known at this time                     Important Message from Medicare             Contact Info       [facility]   Specialty: Family Medicine    [address]  [place] [address]   Phone: [phone]       Next Steps: Call    Instructions: If symptoms worsen, call for an appt with Primary Care  Referral was made to follow up with Primary Care on the [facility] - Podiatry   Specialty: Podiatry    [address]  [place] [address]   Phone: [phone]       Next Steps: Call    Instructions: Referral made to [place] with [place] [facility] [place]

## 2023-06-04 LAB
ALBUMIN SERPL BCP-MCNC: 3 G/DL (ref 3.5–5.2)
ALP SERPL-CCNC: 86 U/L (ref 55–135)
ALT SERPL W/O P-5'-P-CCNC: 13 U/L (ref 10–44)
ANION GAP SERPL CALC-SCNC: 9 MMOL/L (ref 8–16)
AST SERPL-CCNC: 11 U/L (ref 10–40)
BASOPHILS # BLD AUTO: 0.02 K/UL (ref 0–0.2)
BASOPHILS NFR BLD: 0.5 % (ref 0–1.9)
BILIRUB SERPL-MCNC: 0.6 MG/DL (ref 0.1–1)
BUN SERPL-MCNC: 23 MG/DL (ref 8–23)
CALCIUM SERPL-MCNC: 9 MG/DL (ref 8.7–10.5)
CHLORIDE SERPL-SCNC: 111 MMOL/L (ref 95–110)
CO2 SERPL-SCNC: 22 MMOL/L (ref 23–29)
CREAT SERPL-MCNC: 1.9 MG/DL (ref 0.5–1.4)
DIFFERENTIAL METHOD: ABNORMAL
EOSINOPHIL # BLD AUTO: 0.2 K/UL (ref 0–0.5)
EOSINOPHIL NFR BLD: 3.9 % (ref 0–8)
ERYTHROCYTE [DISTWIDTH] IN BLOOD BY AUTOMATED COUNT: 13.1 % (ref 11.5–14.5)
EST. GFR  (NO RACE VARIABLE): 38.4 ML/MIN/1.73 M^2
GLUCOSE SERPL-MCNC: 83 MG/DL (ref 70–110)
HCT VFR BLD AUTO: 41.5 % (ref 40–54)
HGB BLD-MCNC: 14 G/DL (ref 14–18)
IMM GRANULOCYTES # BLD AUTO: 0.01 K/UL (ref 0–0.04)
IMM GRANULOCYTES NFR BLD AUTO: 0.2 % (ref 0–0.5)
LYMPHOCYTES # BLD AUTO: 1.6 K/UL (ref 1–4.8)
LYMPHOCYTES NFR BLD: 39.1 % (ref 18–48)
MAGNESIUM SERPL-MCNC: 2 MG/DL (ref 1.6–2.6)
MCH RBC QN AUTO: 30.6 PG (ref 27–31)
MCHC RBC AUTO-ENTMCNC: 33.7 G/DL (ref 32–36)
MCV RBC AUTO: 91 FL (ref 82–98)
MONOCYTES # BLD AUTO: 0.5 K/UL (ref 0.3–1)
MONOCYTES NFR BLD: 11.7 % (ref 4–15)
NEUTROPHILS # BLD AUTO: 1.8 K/UL (ref 1.8–7.7)
NEUTROPHILS NFR BLD: 44.6 % (ref 38–73)
NRBC BLD-RTO: 0 /100 WBC
PHOSPHATE SERPL-MCNC: 3.6 MG/DL (ref 2.7–4.5)
PLATELET # BLD AUTO: 149 K/UL (ref 150–450)
PMV BLD AUTO: 10.7 FL (ref 9.2–12.9)
POCT GLUCOSE: 110 MG/DL (ref 70–110)
POCT GLUCOSE: 139 MG/DL (ref 70–110)
POCT GLUCOSE: 151 MG/DL (ref 70–110)
POCT GLUCOSE: 171 MG/DL (ref 70–110)
POCT GLUCOSE: 77 MG/DL (ref 70–110)
POTASSIUM SERPL-SCNC: 4 MMOL/L (ref 3.5–5.1)
PROT SERPL-MCNC: 5.4 G/DL (ref 6–8.4)
RBC # BLD AUTO: 4.58 M/UL (ref 4.6–6.2)
SODIUM SERPL-SCNC: 142 MMOL/L (ref 136–145)
WBC # BLD AUTO: 4.12 K/UL (ref 3.9–12.7)

## 2023-06-04 PROCEDURE — 85025 COMPLETE CBC W/AUTO DIFF WBC: CPT | Performed by: HOSPITALIST

## 2023-06-04 PROCEDURE — 63600175 PHARM REV CODE 636 W HCPCS: Performed by: STUDENT IN AN ORGANIZED HEALTH CARE EDUCATION/TRAINING PROGRAM

## 2023-06-04 PROCEDURE — 80053 COMPREHEN METABOLIC PANEL: CPT | Performed by: HOSPITALIST

## 2023-06-04 PROCEDURE — 63600175 PHARM REV CODE 636 W HCPCS: Performed by: HOSPITALIST

## 2023-06-04 PROCEDURE — 36415 COLL VENOUS BLD VENIPUNCTURE: CPT | Performed by: HOSPITALIST

## 2023-06-04 PROCEDURE — 96372 THER/PROPH/DIAG INJ SC/IM: CPT | Performed by: HOSPITALIST

## 2023-06-04 PROCEDURE — 83735 ASSAY OF MAGNESIUM: CPT | Performed by: HOSPITALIST

## 2023-06-04 PROCEDURE — 99232 SBSQ HOSP IP/OBS MODERATE 35: CPT | Mod: ,,, | Performed by: INTERNAL MEDICINE

## 2023-06-04 PROCEDURE — 99232 PR SUBSEQUENT HOSPITAL CARE,LEVL II: ICD-10-PCS | Mod: ,,, | Performed by: INTERNAL MEDICINE

## 2023-06-04 PROCEDURE — 25000003 PHARM REV CODE 250: Performed by: HOSPITALIST

## 2023-06-04 PROCEDURE — 99232 SBSQ HOSP IP/OBS MODERATE 35: CPT | Mod: ,,, | Performed by: HOSPITALIST

## 2023-06-04 PROCEDURE — 99232 PR SUBSEQUENT HOSPITAL CARE,LEVL II: ICD-10-PCS | Mod: ,,, | Performed by: HOSPITALIST

## 2023-06-04 PROCEDURE — 84100 ASSAY OF PHOSPHORUS: CPT | Performed by: HOSPITALIST

## 2023-06-04 PROCEDURE — 25000003 PHARM REV CODE 250: Performed by: STUDENT IN AN ORGANIZED HEALTH CARE EDUCATION/TRAINING PROGRAM

## 2023-06-04 PROCEDURE — 11000001 HC ACUTE MED/SURG PRIVATE ROOM

## 2023-06-04 RX ORDER — INSULIN ASPART 100 [IU]/ML
12 INJECTION, SOLUTION INTRAVENOUS; SUBCUTANEOUS
Status: DISCONTINUED | OUTPATIENT
Start: 2023-06-04 | End: 2023-06-05 | Stop reason: HOSPADM

## 2023-06-04 RX ADMIN — HYDRALAZINE HYDROCHLORIDE 100 MG: 50 TABLET ORAL at 09:06

## 2023-06-04 RX ADMIN — HYDRALAZINE HYDROCHLORIDE 100 MG: 50 TABLET ORAL at 02:06

## 2023-06-04 RX ADMIN — ATORVASTATIN CALCIUM 20 MG: 20 TABLET, FILM COATED ORAL at 09:06

## 2023-06-04 RX ADMIN — HEPARIN SODIUM 7500 UNITS: 5000 INJECTION INTRAVENOUS; SUBCUTANEOUS at 06:06

## 2023-06-04 RX ADMIN — CARVEDILOL 6.25 MG: 3.12 TABLET, FILM COATED ORAL at 09:06

## 2023-06-04 RX ADMIN — AMLODIPINE BESYLATE 5 MG: 5 TABLET ORAL at 09:06

## 2023-06-04 RX ADMIN — CLONIDINE HYDROCHLORIDE 0.1 MG: 0.1 TABLET ORAL at 09:06

## 2023-06-04 RX ADMIN — CLONIDINE HYDROCHLORIDE 0.1 MG: 0.1 TABLET ORAL at 02:06

## 2023-06-04 RX ADMIN — INSULIN ASPART 12 UNITS: 100 INJECTION, SOLUTION INTRAVENOUS; SUBCUTANEOUS at 05:06

## 2023-06-04 RX ADMIN — LOSARTAN POTASSIUM 100 MG: 50 TABLET, FILM COATED ORAL at 09:06

## 2023-06-04 RX ADMIN — INSULIN ASPART 12 UNITS: 100 INJECTION, SOLUTION INTRAVENOUS; SUBCUTANEOUS at 11:06

## 2023-06-04 RX ADMIN — INSULIN DETEMIR 36 UNITS: 100 INJECTION, SOLUTION SUBCUTANEOUS at 10:06

## 2023-06-04 RX ADMIN — HEPARIN SODIUM 7500 UNITS: 5000 INJECTION INTRAVENOUS; SUBCUTANEOUS at 09:06

## 2023-06-04 RX ADMIN — ASPIRIN 81 MG 81 MG: 81 TABLET ORAL at 09:06

## 2023-06-04 RX ADMIN — HEPARIN SODIUM 7500 UNITS: 5000 INJECTION INTRAVENOUS; SUBCUTANEOUS at 02:06

## 2023-06-04 NOTE — PROGRESS NOTES
"[facility] - [facility] 11[facility]  Endocrinology  Progress Note    Admit Date: 6/2/2023     Reason for Consult: Hypoglycemia    Diabetes diagnosis year: 2005    Home Diabetes Medications: 25 units of short acting TIDWM and 80 units of long acting BID (prescribed as daily dosing so patient doubled his prescribed dose), miglitol 25mg TIDWM    How often checking glucose at home? Checks freestyle 10x/day  BG readings on regimen:   Hypoglycemia on the regimen? Yes once today: freestyle read < 40, fingerstick check in clinic 57  Missed doses on regimen? none    Diabetes Complications include:   CKD, CAD, neuropathy    Complicating diabetes co morbidities:   CVA last summer, CAD, HLD, obesity    HPI:   Mr. Springer is a 69 yo male with T1DM, HTN, HLD, CAD, S/p MI, CHF, h/o CVA (2022), CKD III and Obesity admitted to hospital medicine for hypoglycemia. Patient was in opthalmology clinic this morning and noted his BG was "low" on the freestyle adi indy which correlates to a BG < 40. The ophthalmology clinic performed a fingerstick glucose check that read 57 per ED nurse and 67 per ophthalmology clinic. He was given candy and soda and was sent to the ED for evaluation. Nursing reports patient near syncope but patient denies feeling symptomatic from this hypoglycemic episode. He reports eating a small breakfast which is normal for him and took his 80 units of long acting as well as his 25 units of short acting insulin.       Interval HPI:   Patient with episode of asymptomatic hypoglycemia after lunch yesterday  Did not receive meal time insulin for dinner and overnight levemir    BP (!) 103/52 (BP Location: Right arm, Patient Position: Lying)   Pulse (!) 57   Temp 98 °F (36.7 °C) (Oral)   Resp 18   Ht 6' 7" (2.007 m)   Wt (!) 189.6 kg (417 lb 15.9 oz)   SpO2 96%   BMI 47.09 kg/m²     Labs Reviewed and Include    Recent Labs   Lab 06/04/23  0435   GLU 83   CALCIUM 9.0   ALBUMIN 3.0*   PROT 5.4*      K 4.0   CO2 " 22*   *   BUN 23   CREATININE 1.9*   ALKPHOS 86   ALT 13   AST 11   BILITOT 0.6     Lab Results   Component Value Date    WBC 4.12 06/04/2023    HGB 14.0 06/04/2023    HCT 41.5 06/04/2023    MCV 91 06/04/2023     (L) 06/04/2023     No results for input(s): TSH, FREET4 in the last 168 hours.  Lab Results   Component Value Date    HGBA1C 7.2 (H) 06/02/2023       Nutritional status:   Body mass index is 47.09 kg/m².  Lab Results   Component Value Date    ALBUMIN 3.0 (L) 06/04/2023    ALBUMIN 3.1 (L) 06/03/2023    ALBUMIN 3.2 (L) 06/02/2023     No results found for: PREALBUMIN    Estimated Creatinine Clearance: 71.5 mL/min (A) (based on SCr of 1.9 mg/dL (H)).    Accu-Checks  Recent Labs     06/02/23  1430 06/02/23  1657 06/02/23  1921 06/02/23  2016 06/03/23  0847 06/03/23  1207 06/03/23  1502 06/03/23  1538 06/03/23  1940 06/04/23  0406   POCTGLUCOSE 77 92 139* 135* 136* 94 43* 80 95 77       Current Medications and/or Treatments Impacting Glycemic Control  Immunotherapy:    Immunosuppressants       None          Steroids:   Hormones (From admission, onward)      Start     Stop Route Frequency Ordered    06/02/23 1702  melatonin tablet 6 mg         -- Oral Nightly PRN 06/02/23 1602          Pressors:    Autonomic Drugs (From admission, onward)      None          Hyperglycemia/Diabetes Medications:   Antihyperglycemics (From admission, onward)      Start     Stop Route Frequency Ordered    06/02/23 1653  insulin aspart U-100 pen 0-5 Units         -- SubQ Before meals & nightly PRN 06/02/23 1553            ASSESSMENT and PLAN    Renal/  Stage 3b chronic kidney disease  - Caution with insulin titration to avoid further episodes of hypoglycemia    Endocrine  * Hypoglycemia unawareness associated with type 2 diabetes mellitus  - Patient hypoglycemic to < 40 on CGM and 57 on fingerstick likely due to inappropriate insulin dosing at home  - His CGM data from April 18th to May 1st 2023 shows active time use of  61 %, in his readings hypoglycemia is seen at 33 % time  - Had episode of hypoglycemia after lunch yesterday too. Hypoglycemia quickly resolved with dextrose  - He was on higher than weight based prandial insulin, we did decrease his basal insulin on this admission but even then he is having hypoglycemia   -   Recommend to decrease insulin to 0.4 units/kg weight based and target blood glucose goal inpatient around 140-180 mg/dl/  - Aspart 12 units before meals  - Levemir 36 units daily  - LDSSI while inpatient  - blood glucose checks before meals, at bedtime and at 2 am.   - hypoglycemia protocol in place  - Considering he is on low carbohydrate diet (keto diet) at home, would recommend holding off on jardiance on discharge.   - He would need close outpatient follow up with endocrinology provider upon discharge           Type 2 diabetes mellitus with stage 3b chronic kidney disease, with long-term current use of insulin  - management as above  - will decrease insulin doses to 0.4 units/kg        Donaldo Yin MD  Endocrinology  [facility]

## 2023-06-04 NOTE — SUBJECTIVE & OBJECTIVE
"Interval HPI:   Patient with episode of asymptomatic hypoglycemia after lunch yesterday  Did not receive meal time insulin for dinner and overnight levemir    BP (!) 103/52 (BP Location: Right arm, Patient Position: Lying)   Pulse (!) 57   Temp 98 °F (36.7 °C) (Oral)   Resp 18   Ht 6' 7" (2.007 m)   Wt (!) 189.6 kg (417 lb 15.9 oz)   SpO2 96%   BMI 47.09 kg/m²     Labs Reviewed and Include    Recent Labs   Lab 06/04/23  0435   GLU 83   CALCIUM 9.0   ALBUMIN 3.0*   PROT 5.4*      K 4.0   CO2 22*   *   BUN 23   CREATININE 1.9*   ALKPHOS 86   ALT 13   AST 11   BILITOT 0.6     Lab Results   Component Value Date    WBC 4.12 06/04/2023    HGB 14.0 06/04/2023    HCT 41.5 06/04/2023    MCV 91 06/04/2023     (L) 06/04/2023     No results for input(s): TSH, FREET4 in the last 168 hours.  Lab Results   Component Value Date    HGBA1C 7.2 (H) 06/02/2023       Nutritional status:   Body mass index is 47.09 kg/m².  Lab Results   Component Value Date    ALBUMIN 3.0 (L) 06/04/2023    ALBUMIN 3.1 (L) 06/03/2023    ALBUMIN 3.2 (L) 06/02/2023     No results found for: PREALBUMIN    Estimated Creatinine Clearance: 71.5 mL/min (A) (based on SCr of 1.9 mg/dL (H)).    Accu-Checks  Recent Labs     06/02/23  1430 06/02/23  1657 06/02/23  1921 06/02/23  2016 06/03/23  0847 06/03/23  1207 06/03/23  1502 06/03/23  1538 06/03/23  1940 06/04/23  0406   POCTGLUCOSE 77 92 139* 135* 136* 94 43* 80 95 77       Current Medications and/or Treatments Impacting Glycemic Control  Immunotherapy:    Immunosuppressants       None          Steroids:   Hormones (From admission, onward)      Start     Stop Route Frequency Ordered    06/02/23 1702  melatonin tablet 6 mg         -- Oral Nightly PRN 06/02/23 1602          Pressors:    Autonomic Drugs (From admission, onward)      None          Hyperglycemia/Diabetes Medications:   Antihyperglycemics (From admission, onward)      Start     Stop Route Frequency Ordered    06/02/23 1653  " insulin aspart U-100 pen 0-5 Units         -- SubQ Before meals & nightly PRN 06/02/23 7105

## 2023-06-04 NOTE — SUBJECTIVE & OBJECTIVE
Interval History: No acute events overnight.  Sugars slowly improving to the 90s.  Concern for possible insulin stacking yesterday as Creatinine continues to improve.  Endocrine adjusting sugars.    Review of Systems   Constitutional:  Negative for chills, fatigue and fever.   Respiratory:  Negative for cough and shortness of breath.    Cardiovascular:  Negative for chest pain, palpitations and leg swelling.   Gastrointestinal:  Negative for abdominal pain, diarrhea, nausea and vomiting.   Genitourinary:  Negative for dysuria and urgency.   Neurological:  Negative for dizziness and headaches.   All other systems reviewed and are negative.  Objective:     Vital Signs (Most Recent):  Temp: 97.9 °F (36.6 °C) (06/04/23 0800)  Pulse: (!) 59 (06/04/23 0800)  Resp: 18 (06/04/23 0800)  BP: (!) 149/73 (06/04/23 0800)  SpO2: 97 % (06/04/23 0800) Vital Signs (24h Range):  Temp:  [97.7 °F (36.5 °C)-98.2 °F (36.8 °C)] 97.9 °F (36.6 °C)  Pulse:  [54-65] 59  Resp:  [18] 18  SpO2:  [94 %-97 %] 97 %  BP: (103-149)/(52-76) 149/73     Weight: (!) 189.6 kg (417 lb 15.9 oz)  Body mass index is 47.09 kg/m².  No intake or output data in the 24 hours ending 06/04/23 1042      Physical Exam  Constitutional:       Appearance: He is well-developed. He is obese.   HENT:      Head: Normocephalic and atraumatic.   Cardiovascular:      Rate and Rhythm: Regular rhythm. Bradycardia present.      Heart sounds: No murmur heard.  Pulmonary:      Effort: Pulmonary effort is normal. No respiratory distress.      Breath sounds: Normal breath sounds. No wheezing or rales.   Abdominal:      General: There is no distension.      Palpations: Abdomen is soft.      Tenderness: There is no abdominal tenderness.   Musculoskeletal:         General: No deformity.   Skin:     General: Skin is warm.   Neurological:      General: No focal deficit present.      Mental Status: He is alert and oriented to person, place, and time. Mental status is at baseline.            Significant Labs: All pertinent labs within the past 24 hours have been reviewed.  POCT Glucose:   Recent Labs   Lab 06/03/23  1940 06/04/23  0406 06/04/23  0903   POCTGLUCOSE 95 77 110         Significant Imaging: I have reviewed all pertinent imaging results/findings within the past 24 hours.

## 2023-06-04 NOTE — ASSESSMENT & PLAN NOTE
Patient is identified as having Diastolic (HFpEF) heart failure that is Chronic. CHF is currently controlled. Latest ECHO performed and demonstrates- Results for orders placed during the hospital encounter of 09/03/22    Echo    Interpretation Summary  · The left ventricle is normal in size with concentric hypertrophy and normal systolic function.  · The estimated ejection fraction is 55%.  · Normal left ventricular diastolic function.  · Normal right ventricular size with normal right ventricular systolic function.  · Overall the study quality was technically difficult. The study was difficult due to patient's clinical status, body habitus and poor endocardial visualization.  · There is abnormal septal wall motion consistent with left bundle branch block.  · Normal central venous pressure (3 mmHg).  · The estimated PA systolic pressure is 25 mmHg.  · The aortic root is mildly dilated.  . Continue Beta Blocker and ACE/ARB and monitor clinical status closely. Monitor on telemetry. Patient is off CHF pathway.  Monitor strict Is&Os and daily weights.  Place on fluid restriction of 2 L. Continue to stress to patient importance of self efficacy and  on diet for CHF. Last BNP reviewed- and noted below No results for input(s): BNP, BNPTRIAGEBLO in the last 168 hours..     EMS

## 2023-06-04 NOTE — ASSESSMENT & PLAN NOTE
Patient's FSGs are uncontrolled due to hypoglycemia on current medication regimen.  Last A1c reviewed-   Lab Results   Component Value Date    HGBA1C 7.2 (H) 06/02/2023     Most recent fingerstick glucose reviewed-   Recent Labs   Lab 06/03/23  1538 06/03/23  1940 06/04/23  0406 06/04/23  0903   POCTGLUCOSE 80 95 77 110     Current correctional scale  Low  Maintain anti-hyperglycemic dose as follows-   Antihyperglycemics (From admission, onward)    Start     Stop Route Frequency Ordered    06/04/23 1130  insulin aspart U-100 pen 12 Units         -- SubQ 3 times daily with meals 06/04/23 0953    06/04/23 1000  insulin detemir U-100 (Levemir) pen 36 Units         -- SubQ Daily 06/04/23 0953    06/02/23 1653  insulin aspart U-100 pen 0-5 Units         -- SubQ Before meals & nightly PRN 06/02/23 1553        Hold Oral hypoglycemics while patient is in the hospital.    · Endocrine NP documented that he is supposed to be on 74 units of long-acting insulin once a day and 25 units of NPH insulin 3 times a day with meals  · Patient states he was told at his appointment to take 100 units of long-acting insulin twice a day and 60 units of NPH 3 times a day  · Glucose in Optho clinic 50-60s with dizziness and lightheadedness, but 70s in the ER  · Endocrine consulted and adjusting insulin regimen  · Was hypoglycemic yesterday for 43, possibly insulin stacking from NINFA on CKD

## 2023-06-04 NOTE — PROGRESS NOTES
[facility] [address] [place] [address] Medicine  Progress Note    Patient Name: Stepan Springer  MRN: [id number]  Patient Class: IP- Inpatient   Admission Date: 6/2/2023  Length of Stay: 0 days  Attending Physician: Surekha Reyes MD  Primary Care Provider: KANDICE Fleming MD        Subjective:     Principal Problem:Hypoglycemia unawareness associated with type 2 diabetes mellitus        HPI:  Mr. Stepan Springer is a 68 y.o. male with T2DM, CKD, HTN, and obesity who presented to the [facility] [place] on 6/2 from Optho clinic after he was found to be hypoglycemic with blood glucose 50-60s.  He reports that in clinic, he went up to stand and felt dizzy and lightheaded.  He denies any tremors or feeling shaky.  He reports that his glucometer reading has gone to 40 before, but says that it doesn't go lower than 40, so he is unsure if he has had lower blood sugars.  He was seen by his Endocrine NP in May 2023.  His NP documented that he is supposed to be on 74 units of long-acting insulin once a day and 25 units of NPH insulin 3 times a day with meals.  However, the patient states he was told at his appointment to take 100 units of long-acting insulin twice a day and 60 units of NPH 3 times a day.  He denies any chest pain, shortness of breath, fever or chills.    Upon arrival to the ER, vitals were temp 98F, HR 57, /69.  Labs showed Creatinine 2.2, Glucose 77, HbA1c 7.2.  He was given juice and sugars improved.  As he was given a double dose of his long acting insulin, he will be admitted to Hospital Medicine for observation and serial blood glucose checks.      Overview/Hospital Course:  Mr. Gordon was admitted to Hospital Medicine for management of hypoglycemia.  Endocrine was consulted given his large insulin requirements.  They suggested long acting 40 units BID and continuing short acting 25 units TIDWM.  However, on 6/3 after getting Detemir 40 units in the morning, as well as Aspart 25 units TIDWM x 2  (breakfast and lunch), his sugar was 43.  He was given Dextrose and sugar improved to 90.  Possible insulin stacking with NINFA on CKD.  Discussed with Endocrine who suggested holding further insulin.  His insulin regimen was adjusted      Interval History: No acute events overnight.  Sugars slowly improving to the 90s.  Concern for possible insulin stacking yesterday as Creatinine continues to improve.  Endocrine adjusting sugars.    Review of Systems   Constitutional:  Negative for chills, fatigue and fever.   Respiratory:  Negative for cough and shortness of breath.    Cardiovascular:  Negative for chest pain, palpitations and leg swelling.   Gastrointestinal:  Negative for abdominal pain, diarrhea, nausea and vomiting.   Genitourinary:  Negative for dysuria and urgency.   Neurological:  Negative for dizziness and headaches.   All other systems reviewed and are negative.  Objective:     Vital Signs (Most Recent):  Temp: 97.9 °F (36.6 °C) (06/04/23 0800)  Pulse: (!) 59 (06/04/23 0800)  Resp: 18 (06/04/23 0800)  BP: (!) 149/73 (06/04/23 0800)  SpO2: 97 % (06/04/23 0800) Vital Signs (24h Range):  Temp:  [97.7 °F (36.5 °C)-98.2 °F (36.8 °C)] 97.9 °F (36.6 °C)  Pulse:  [54-65] 59  Resp:  [18] 18  SpO2:  [94 %-97 %] 97 %  BP: (103-149)/(52-76) 149/73     Weight: (!) 189.6 kg (417 lb 15.9 oz)  Body mass index is 47.09 kg/m².  No intake or output data in the 24 hours ending 06/04/23 1042      Physical Exam  Constitutional:       Appearance: He is well-developed. He is obese.   HENT:      Head: Normocephalic and atraumatic.   Cardiovascular:      Rate and Rhythm: Regular rhythm. Bradycardia present.      Heart sounds: No murmur heard.  Pulmonary:      Effort: Pulmonary effort is normal. No respiratory distress.      Breath sounds: Normal breath sounds. No wheezing or rales.   Abdominal:      General: There is no distension.      Palpations: Abdomen is soft.      Tenderness: There is no abdominal tenderness.    Musculoskeletal:         General: No deformity.   Skin:     General: Skin is warm.   Neurological:      General: No focal deficit present.      Mental Status: He is alert and oriented to person, place, and time. Mental status is at baseline.           Significant Labs: All pertinent labs within the past 24 hours have been reviewed.  POCT Glucose:   Recent Labs   Lab 06/03/23  1940 06/04/23  0406 06/04/23  0903   POCTGLUCOSE 95 77 110         Significant Imaging: I have reviewed all pertinent imaging results/findings within the past 24 hours.      Assessment/Plan:      * Hypoglycemia unawareness associated with type 2 diabetes mellitus  Patient's FSGs are uncontrolled due to hypoglycemia on current medication regimen.  Last A1c reviewed-   Lab Results   Component Value Date    HGBA1C 7.2 (H) 06/02/2023     Most recent fingerstick glucose reviewed-   Recent Labs   Lab 06/03/23  1538 06/03/23  1940 06/04/23  0406 06/04/23  0903   POCTGLUCOSE 80 95 77 110     Current correctional scale  Low  Maintain anti-hyperglycemic dose as follows-   Antihyperglycemics (From admission, onward)    Start     Stop Route Frequency Ordered    06/04/23 1130  insulin aspart U-100 pen 12 Units         -- SubQ 3 times daily with meals 06/04/23 0953    06/04/23 1000  insulin detemir U-100 (Levemir) pen 36 Units         -- SubQ Daily 06/04/23 0953    06/02/23 1653  insulin aspart U-100 pen 0-5 Units         -- SubQ Before meals & nightly PRN 06/02/23 1553        Hold Oral hypoglycemics while patient is in the hospital.    · Endocrine NP documented that he is supposed to be on 74 units of long-acting insulin once a day and 25 units of NPH insulin 3 times a day with meals  · Patient states he was told at his appointment to take 100 units of long-acting insulin twice a day and 60 units of NPH 3 times a day  · Glucose in Optho clinic 50-60s with dizziness and lightheadedness, but 70s in the ER  · Endocrine consulted and adjusting insulin  regimen  · Was hypoglycemic yesterday for 43, possibly insulin stacking from NINFA on CKD    Hypoglycemia due to insulin  · See above      Obesity, diabetes, and hypertension syndrome  · Noted      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 Morbid obesity complicates all aspects of disease management from diagnostic modalities to treatment. Weight loss encouraged and health benefits explained to patient.         Hyperlipidemia associated with type 2 diabetes mellitus  · Chronic and stable  · Continue Statin    CAD with remote PCI (BMS) of RCA in 9/2016  · Chronic and stable  · Continue Aspirin, Statin, Coreg, Losartan      Stage 3b chronic kidney disease  · Creatinine 2.2 on admit, baseline closer to 1.8  · Creatinine down to 1.9 today      Hypertension associated with diabetes  · Chronic issue but BP 110s on arrival in the ER  · Continue Norvasc, Clonidine, Losartan, Hydralazine  · Decrease Coreg to 6.25mg BID given bradycardia with HR 50s    Type 2 diabetes mellitus with stage 3b chronic kidney disease, with long-term current use of insulin  · See hypoglycemia above    Chronic diastolic congestive heart failure  Patient is identified as having Diastolic (HFpEF) heart failure that is Chronic. CHF is currently controlled. Latest ECHO performed and demonstrates- Results for orders placed during the hospital encounter of 09/03/22    Echo    Interpretation Summary  · The left ventricle is normal in size with concentric hypertrophy and normal systolic function.  · The estimated ejection fraction is 55%.  · Normal left ventricular diastolic function.  · Normal right ventricular size with normal right ventricular systolic function.  · Overall the study quality was technically difficult. The study was difficult due to patient's clinical  status, body habitus and poor endocardial visualization.  · There is abnormal septal wall motion consistent with left bundle branch block.  · Normal central venous pressure (3 mmHg).  · The estimated PA systolic pressure is 25 mmHg.  · The aortic root is mildly dilated.  . Continue Beta Blocker and ACE/ARB and monitor clinical status closely. Monitor on telemetry. Patient is off CHF pathway.  Monitor strict Is&Os and daily weights.  Place on fluid restriction of 2 L. Continue to stress to patient importance of self efficacy and  on diet for CHF. Last BNP reviewed- and noted below No results for input(s): BNP, BNPTRIAGEBLO in the last 168 hours..      VTE Risk Mitigation (From admission, onward)         Ordered     heparin (porcine) injection 7,500 Units  Every 8 hours         06/02/23 1602     IP VTE HIGH RISK PATIENT  Once         06/02/23 1602                Discharge Planning   JOSE: 6/5/2023     Code Status: Full Code   Is the patient medically ready for discharge?: No    Reason for patient still in hospital (select all that apply): Patient trending condition  Discharge Plan A: Home with family   Discharge Delays: None known at this time              Surekha Reyes MD  Department of Hospital Medicine   [facility]

## 2023-06-04 NOTE — ASSESSMENT & PLAN NOTE
- Patient hypoglycemic to < 40 on CGM and 57 on fingerstick likely due to inappropriate insulin dosing at home  - His CGM data from April 18th to May 1st 2023 shows active time use of 61 %, in his readings hypoglycemia is seen at 33 % time  - Had episode of hypoglycemia after lunch yesterday too. Hypoglycemia quickly resolved with dextrose  - He was on higher than weight based prandial insulin, we did decrease his basal insulin on this admission but even then he is having hypoglycemia   -   Recommend to decrease insulin to 0.4 units/kg weight based and target blood glucose goal inpatient around 140-180 mg/dl/  - Aspart 12 units before meals  - Levemir 36 units daily  - LDSSI while inpatient  - blood glucose checks before meals, at bedtime and at 2 am.   - hypoglycemia protocol in place  - He would need close outpatient follow up with endocrinology provider upon discharge

## 2023-06-05 VITALS
BODY MASS INDEX: 36.45 KG/M2 | HEIGHT: 78 IN | OXYGEN SATURATION: 93 % | TEMPERATURE: 98 F | DIASTOLIC BLOOD PRESSURE: 77 MMHG | HEART RATE: 71 BPM | RESPIRATION RATE: 19 BRPM | SYSTOLIC BLOOD PRESSURE: 128 MMHG | WEIGHT: 315 LBS

## 2023-06-05 LAB
BILIRUB UR QL STRIP: NEGATIVE
CLARITY UR REFRACT.AUTO: CLEAR
COLOR UR AUTO: YELLOW
GLUCOSE UR QL STRIP: NEGATIVE
HGB UR QL STRIP: NEGATIVE
KETONES UR QL STRIP: NEGATIVE
LEUKOCYTE ESTERASE UR QL STRIP: NEGATIVE
NITRITE UR QL STRIP: NEGATIVE
PH UR STRIP: 6 [PH] (ref 5–8)
POCT GLUCOSE: 163 MG/DL (ref 70–110)
PROT UR QL STRIP: NEGATIVE
SP GR UR STRIP: 1.01 (ref 1–1.03)
URN SPEC COLLECT METH UR: NORMAL

## 2023-06-05 PROCEDURE — 99239 PR HOSPITAL DISCHARGE DAY,>30 MIN: ICD-10-PCS | Mod: ,,, | Performed by: HOSPITALIST

## 2023-06-05 PROCEDURE — 99239 HOSP IP/OBS DSCHRG MGMT >30: CPT | Mod: ,,, | Performed by: HOSPITALIST

## 2023-06-05 PROCEDURE — 63600175 PHARM REV CODE 636 W HCPCS: Performed by: HOSPITALIST

## 2023-06-05 PROCEDURE — 99232 SBSQ HOSP IP/OBS MODERATE 35: CPT | Mod: ,,, | Performed by: INTERNAL MEDICINE

## 2023-06-05 PROCEDURE — 25000003 PHARM REV CODE 250: Performed by: HOSPITALIST

## 2023-06-05 PROCEDURE — 99232 PR SUBSEQUENT HOSPITAL CARE,LEVL II: ICD-10-PCS | Mod: ,,, | Performed by: INTERNAL MEDICINE

## 2023-06-05 PROCEDURE — 81003 URINALYSIS AUTO W/O SCOPE: CPT | Performed by: PHYSICIAN ASSISTANT

## 2023-06-05 RX ORDER — CARVEDILOL 6.25 MG/1
6.25 TABLET ORAL 2 TIMES DAILY
Qty: 180 TABLET | Refills: 0 | Status: SHIPPED | OUTPATIENT
Start: 2023-06-05 | End: 2023-08-07

## 2023-06-05 RX ORDER — INSULIN ASPART 100 [IU]/ML
12 INJECTION, SOLUTION INTRAVENOUS; SUBCUTANEOUS
Qty: 69 ML | Refills: 3 | Status: SHIPPED | OUTPATIENT
Start: 2023-06-05 | End: 2024-02-05 | Stop reason: SDUPTHER

## 2023-06-05 RX ORDER — INSULIN GLARGINE 100 [IU]/ML
36 INJECTION, SOLUTION SUBCUTANEOUS DAILY
Qty: 72 ML | Refills: 3
Start: 2023-06-05 | End: 2024-06-04

## 2023-06-05 RX ADMIN — HYDRALAZINE HYDROCHLORIDE 100 MG: 50 TABLET ORAL at 06:06

## 2023-06-05 RX ADMIN — HEPARIN SODIUM 7500 UNITS: 5000 INJECTION INTRAVENOUS; SUBCUTANEOUS at 06:06

## 2023-06-05 RX ADMIN — LOSARTAN POTASSIUM 100 MG: 50 TABLET, FILM COATED ORAL at 08:06

## 2023-06-05 RX ADMIN — CLONIDINE HYDROCHLORIDE 0.1 MG: 0.1 TABLET ORAL at 08:06

## 2023-06-05 RX ADMIN — INSULIN DETEMIR 36 UNITS: 100 INJECTION, SOLUTION SUBCUTANEOUS at 08:06

## 2023-06-05 RX ADMIN — ASPIRIN 81 MG 81 MG: 81 TABLET ORAL at 08:06

## 2023-06-05 RX ADMIN — INSULIN ASPART 12 UNITS: 100 INJECTION, SOLUTION INTRAVENOUS; SUBCUTANEOUS at 08:06

## 2023-06-05 RX ADMIN — CARVEDILOL 6.25 MG: 3.12 TABLET, FILM COATED ORAL at 08:06

## 2023-06-05 NOTE — ASSESSMENT & PLAN NOTE
Patient's FSGs are uncontrolled due to hypoglycemia on current medication regimen.  Last A1c reviewed-   Lab Results   Component Value Date    HGBA1C 7.2 (H) 06/02/2023     Most recent fingerstick glucose reviewed-   Recent Labs   Lab 06/04/23  1237 06/04/23  1727 06/04/23  2145 06/05/23  0814   POCTGLUCOSE 139* 171* 151* 163*     Current correctional scale  Low  Maintain anti-hyperglycemic dose as follows-   Antihyperglycemics (From admission, onward)    Start     Stop Route Frequency Ordered    06/04/23 1130  insulin aspart U-100 pen 12 Units         -- SubQ 3 times daily with meals 06/04/23 0953    06/04/23 1000  insulin detemir U-100 (Levemir) pen 36 Units         -- SubQ Daily 06/04/23 0953    06/02/23 1653  insulin aspart U-100 pen 0-5 Units         -- SubQ Before meals & nightly PRN 06/02/23 1553        Hold Oral hypoglycemics while patient is in the hospital.    · Endocrine NP documented that he is supposed to be on 74 units of long-acting insulin once a day and 25 units of NPH insulin 3 times a day with meals  · Patient states he was told at his appointment to take 100 units of long-acting insulin twice a day and 60 units of NPH 3 times a day  · Glucose in Optho clinic 50-60s with dizziness and lightheadedness, but 70s in the ER  · Endocrine consulted and adjusting insulin regimen  · Ready to DC to Detemir 36 and Aspart 12 TIDWM  · Has Endocrine followup

## 2023-06-05 NOTE — ASSESSMENT & PLAN NOTE
- Patient hypoglycemic to < 40 on CGM and 57 on fingerstick likely due to inappropriate insulin dosing at home  - His CGM data from April 18th to May 1st 2023 shows active time use of 61 %, in his readings hypoglycemia is seen at 33 % time  - After insulin adjustment there has been no further hypoglycemia but still awaiting morning BG results.    - He was on higher than weight based prandial insulin, we did decrease his basal insulin on this admission     - Now on decreased insulin at 0.4 units/kg weight based and target blood glucose goal inpatient around 140-180 mg/dl    Plan:  - Aspart 12 units before meals  - Levemir 36 units daily  - LDSSI while inpatient    - Would recommend the same regimen as above at discharge with insurance preferred insulin brands.      - blood glucose checks before meals, at bedtime and at 2 am, continue CGM use at home  - hypoglycemia protocol in place (will consider additional dose changes in insulin if needed)  - He would need close outpatient follow up with his endocrinology provider upon discharge, if having lows after discharge then he needs to contact them sooner.

## 2023-06-05 NOTE — DISCHARGE SUMMARY
[facility] [address] [place] [address] Medicine  Discharge Summary      Patient Name: Stepan Springer  MRN: [id number]  [facility]: [id number]  Patient Class: IP- Inpatient  Admission Date: 6/2/2023  Hospital Length of Stay: 1 days  Discharge Date and Time: No discharge date for patient encounter.  Attending Physician: Surekha Reyes MD   Discharging Provider: Surekha Reyes MD  Primary Care Provider: KANDICE Fleming MD  Hospital Medicine Team: [facility] HOSP MED  Surekha Reyes MD  Primary Care Team: [facility]    HPI:   Mr. Stepan Springer is a 68 y.o. male with T2DM, CKD, HTN, and obesity who presented to the [facility] ED on 6/2 from Optho clinic after he was found to be hypoglycemic with blood glucose 50-60s.  He reports that in clinic, he went up to stand and felt dizzy and lightheaded.  He denies any tremors or feeling shaky.  He reports that his glucometer reading has gone to 40 before, but says that it doesn't go lower than 40, so he is unsure if he has had lower blood sugars.  He was seen by his Endocrine NP in May 2023.  His NP documented that he is supposed to be on 74 units of long-acting insulin once a day and 25 units of NPH insulin 3 times a day with meals.  However, the patient states he was told at his appointment to take 100 units of long-acting insulin twice a day and 60 units of NPH 3 times a day.  He denies any chest pain, shortness of breath, fever or chills.    Upon arrival to the ER, vitals were temp 98F, HR 57, /69.  Labs showed Creatinine 2.2, Glucose 77, HbA1c 7.2.  He was given juice and sugars improved.  As he was given a double dose of his long acting insulin, he will be admitted to Hospital Medicine for observation and serial blood glucose checks.      * No surgery found *      Hospital Course:   Mr. Gordon was admitted to Hospital Medicine for management of hypoglycemia.  Endocrine was consulted given his large insulin requirements.  They suggested long acting 40 units BID and  continuing short acting 25 units TIDWM.  However, on 6/3 after getting Detemir 40 units in the morning, as well as Aspart 25 units TIDWM x 2 (breakfast and lunch), his sugar was 43.  He was given Dextrose and sugar improved to 90.  Possible insulin stacking with NINFA on CKD.  Discussed with Endocrine who suggested holding further insulin.  His insulin regimen was adjusted.  He was discharged on 36 long acting daily and 12 units short acting TIWM.  He has Endocrine followup.       Goals of Care Treatment Preferences:  Code Status: Full Code      Consults:   Consults (From admission, onward)        Status Ordering Provider     [facility]  Once        Provider:  (Not yet assigned)    Completed WINTER HERNANDEZ     IP consult to case management  Once        Provider:  (Not yet assigned)    Acknowledged WINTER HERNANDEZ     Inpatient consult to Endocrinology  Once        Provider:  (Not yet assigned)    Completed WINTER HERNANDEZ          No new Assessment & Plan notes have been filed under this hospital service since the last note was generated.  Service: Hospital Medicine    Final Active Diagnoses:    Diagnosis Date Noted POA    PRINCIPAL PROBLEM:  Hypoglycemia unawareness associated with type 2 diabetes mellitus [E11.649] 05/08/2019 Yes    Hypoglycemia due to insulin [E16.0, T38.3X5A] 06/02/2023 Yes    Benign hypertensive heart and kidney disease with HF and CKD [I13.0] 06/02/2023 Yes    Obesity, diabetes, and hypertension syndrome [E11.69, E66.9, E11.59, I15.2] 06/02/2023 Yes    Class 3 severe obesity due to excess calories with serious comorbidity and body mass index (BMI) of 45.0 to 49.9 in adult [E66.01, Z68.42] 02/12/2019 Not Applicable     Chronic    Hyperlipidemia associated with type 2 diabetes mellitus [E11.69, E78.5] 11/06/2018 Yes    CAD with remote PCI (BMS) of RCA in 9/2016 [I25.10] 10/18/2016 Yes     Chronic    Hypertension associated with diabetes [E11.59, I15.2]  Yes      Chronic    Stage 3b chronic kidney disease [N18.32]  Yes     Chronic    Type 2 diabetes mellitus with stage 3b chronic kidney disease, with long-term current use of insulin [E11.22, N18.32, Z79.4] 08/21/2013 Not Applicable     Chronic    Chronic diastolic congestive heart failure [I50.32] 07/20/2013 Yes      Problems Resolved During this Admission:       Discharged Condition: fair    Disposition: Home or Self Care    Follow Up:   Follow-up Information     [facility]. Call.    Specialty: Family Medicine  Why: If symptoms worsen, call for an appt with Primary Care  Referral was made to follow up with Primary Care on the [facility]  Contact information:  [address]  [facility] [address] [phone]  Additional information:  Please park in surface lot and use [facility] entrance. Check in at main registration.            [facility]. Call.    Specialty: Podiatry  Why: Referral made to follow up with Podiatry on the [facility]  Contact information:  [address]  [facility] [address] [phone]  Additional information:  1st Floor                     Patient Instructions:      Ambulatory referral/consult to Internal Medicine   Standing Status: Future   Referral Priority: Routine Referral Type: Consultation   Referral Reason: Specialty Services Required   Requested Specialty: Internal Medicine   Number of Visits Requested: 1     Ambulatory referral/consult to Podiatry   Standing Status: Future   Referral Priority: Routine Referral Type: Consultation   Referral Reason: Specialty Services Required   Requested Specialty: Podiatry   Number of Visits Requested: 1       Significant Diagnostic Studies: Labs:   A1C:   Recent Labs   Lab 02/27/23  0930 06/02/23  1345   HGBA1C 7.9* 7.2*       Pending Diagnostic Studies:     None         Medications:  Reconciled Home Medications:      Medication List      CHANGE how you take these medications    carvediloL  6.25 MG tablet  Commonly known as: COREG  Take 1 tablet (6.25 mg total) by mouth 2 (two) times daily.  What changed:   · medication strength  · how much to take     insulin aspart U-100 100 unit/mL (3 mL) Inpn pen  Commonly known as: NovoLOG  Inject 12 Units into the skin 3 (three) times daily with meals.  What changed: how much to take     insulin glargine 100 units/mL SubQ pen  Commonly known as: BASAGLAR KWIKPEN U-100 INSULIN  Inject 36 Units into the skin once daily.  What changed: how much to take        CONTINUE taking these medications    ALPHAGAN P 0.1 % Drop  Generic drug: brimonidine 0.1%  Place 1 drop into both eyes 3 (three) times daily.     amLODIPine 5 MG tablet  Commonly known as: NORVASC  Take 1 tablet (5 mg total) by mouth every evening.     aspirin 81 MG Chew  Take 81 mg by mouth once daily.     atorvastatin 20 MG tablet  Commonly known as: LIPITOR  Take 1 tablet (20 mg total) by mouth every evening.     AZOPT 1 % ophthalmic suspension  Generic drug: brinzolamide  Place 1 drop into both eyes 3 (three) times daily. Brand name only     blood sugar diagnostic Strp  To check BG 4 times daily, to use with insurance preferred meter     blood-glucose meter kit  To check BG 4 times daily, to use with insurance preferred meter     cloNIDine 0.1 MG tablet  Commonly known as: CATAPRES  Take 1 tablet (0.1 mg total) by mouth 3 (three) times daily.     dextran 70-hypromellose ophthalmic solution  Commonly known as: TEARS  Place 2 drops into the left eye every 4 (four) hours.     FREESTYLE CLEMENCIA 14 DAY SENSOR Kit  Generic drug: flash glucose sensor  1 each by Misc.(Non-Drug; Combo Route) route every 14 (fourteen) days.     furosemide 40 MG tablet  Commonly known as: LASIX  Take 1 tablet (40 mg total) by mouth once daily.     hydrALAZINE 100 MG tablet  Commonly known as: APRESOLINE  Take 1 tablet (100 mg total) by mouth every 8 (eight) hours.     lancets Misc  To check BG 4 times daily, to use with insurance  preferred meter     losartan 100 MG tablet  Commonly known as: COZAAR  Take 1 tablet (100 mg total) by mouth once daily.     nitroGLYCERIN 0.4 MG SL tablet  Commonly known as: NITROSTAT  PLACE 1 TABLET (0.4 MG TOTAL) UNDER THE TONGUE EVERY 5 (FIVE) MINUTES AS NEEDED FOR CHEST PAIN.     RHOPRESSA 0.02 % ophthalmic solution  Generic drug: netarsudiL  Place 1 drop into both eyes once daily.        STOP taking these medications    mupirocin 2 % ointment  Commonly known as: BACTROBAN     valACYclovir 1000 MG tablet  Commonly known as: VALTREX            Indwelling Lines/Drains at time of discharge:   Lines/Drains/Airways     None                 Time spent on the discharge of patient: 35 minutes         Surekha Reyes MD  Department of Hospital Medicine  [facility] - Observation 11H

## 2023-06-05 NOTE — PLAN OF CARE
CHW met with patient/family at bedside. Patient experience rounding completed and reviewed the following.     Do you know your discharge plan? Yes or No,    If yes, what is the plan? (Home, Home Health, Rehab, SNF, LTAC, or Other)   Home    If you are discharging home, do you have help at home? Yes or No        Yes    Do you think you will need help at home at discharge? Yes or No     Have you discussed your needs and preferences with your SW/CM? Yes or No   Yes    Assigned SW/CM notified of any patient/family needs or concerns.    Suly Paul CHW  Case Management   [phone]

## 2023-06-05 NOTE — SUBJECTIVE & OBJECTIVE
Interval History: No acute events overnight.  Sugars better.  Ready for DC.  Verbally discussed insulin doses and Coreg changes on DC.  Requests a new Coreg prescription for new dosing.    Review of Systems   Constitutional:  Negative for chills, fatigue and fever.   Respiratory:  Negative for cough and shortness of breath.    Cardiovascular:  Negative for chest pain, palpitations and leg swelling.   Gastrointestinal:  Negative for abdominal pain, diarrhea, nausea and vomiting.   Genitourinary:  Negative for dysuria and urgency.   Neurological:  Negative for dizziness and headaches.   All other systems reviewed and are negative.  Objective:     Vital Signs (Most Recent):  Temp: 98 °F (36.7 °C) (06/05/23 0813)  Pulse: 71 (06/05/23 0813)  Resp: 19 (06/05/23 0813)  BP: 128/77 (06/05/23 0813)  SpO2: (!) 93 % (06/05/23 0813) Vital Signs (24h Range):  Temp:  [97.7 °F (36.5 °C)-98.2 °F (36.8 °C)] 98 °F (36.7 °C)  Pulse:  [53-71] 71  Resp:  [12-19] 19  SpO2:  [93 %-98 %] 93 %  BP: (114-178)/(60-86) 128/77     Weight: (!) 189.6 kg (417 lb 15.9 oz)  Body mass index is 47.09 kg/m².  No intake or output data in the 24 hours ending 06/05/23 0937      Physical Exam  Constitutional:       Appearance: He is well-developed. He is obese.   HENT:      Head: Normocephalic and atraumatic.   Cardiovascular:      Rate and Rhythm: Regular rhythm. Bradycardia present.      Heart sounds: No murmur heard.  Pulmonary:      Effort: Pulmonary effort is normal. No respiratory distress.      Breath sounds: Normal breath sounds. No wheezing or rales.   Abdominal:      General: There is no distension.      Palpations: Abdomen is soft.      Tenderness: There is no abdominal tenderness.   Musculoskeletal:         General: No deformity.   Skin:     General: Skin is warm.   Neurological:      General: No focal deficit present.      Mental Status: He is alert and oriented to person, place, and time. Mental status is at baseline.           Significant  Labs: All pertinent labs within the past 24 hours have been reviewed.  POCT Glucose:   Recent Labs   Lab 06/04/23  1727 06/04/23  2145 06/05/23  0814   POCTGLUCOSE 171* 151* 163*         Significant Imaging: I have reviewed all pertinent imaging results/findings within the past 24 hours.

## 2023-06-05 NOTE — SUBJECTIVE & OBJECTIVE
"Interval HPI:   Overnight events:  No hypoglycemia noted yesterday or overnight.  On lower insulin doses than prior.  BG at goal this AM.    Eatin%  Nausea: No  Hypoglycemia and intervention: No  Fever: No  TPN and/or TF: No  If yes, type of TF/TPN and rate: NA    BP (!) 178/86 (BP Location: Right arm, Patient Position: Sitting)   Pulse 64   Temp 97.7 °F (36.5 °C) (Oral)   Resp 12   Ht 6' 7" (2.007 m)   Wt (!) 189.6 kg (417 lb 15.9 oz)   SpO2 96%   BMI 47.09 kg/m²     Labs Reviewed and Include    No results for input(s): GLU, CALCIUM, ALBUMIN, PROT, NA, K, CO2, CL, BUN, CREATININE, ALKPHOS, ALT, AST, BILITOT in the last 24 hours.  Lab Results   Component Value Date    WBC 4.12 2023    HGB 14.0 2023    HCT 41.5 2023    MCV 91 2023     (L) 2023     No results for input(s): TSH, FREET4 in the last 168 hours.  Lab Results   Component Value Date    HGBA1C 7.2 (H) 2023       Nutritional status:   Body mass index is 47.09 kg/m².  Lab Results   Component Value Date    ALBUMIN 3.0 (L) 2023    ALBUMIN 3.1 (L) 2023    ALBUMIN 3.2 (L) 2023     No results found for: PREALBUMIN    Estimated Creatinine Clearance: 71.5 mL/min (A) (based on SCr of 1.9 mg/dL (H)).    Accu-Checks  Recent Labs     23  0847 23  1207 23  1502 23  1538 23  1940 23  0406 23  0903 23  1237 23  1727 23  2145   POCTGLUCOSE 136* 94 43* 80 95 77 110 139* 171* 151*       Current Medications and/or Treatments Impacting Glycemic Control  Immunotherapy:    Immunosuppressants       None          Steroids:   Hormones (From admission, onward)      Start     Stop Route Frequency Ordered    23 1702  melatonin tablet 6 mg         -- Oral Nightly PRN 23 1602          Pressors:    Autonomic Drugs (From admission, onward)      None          Hyperglycemia/Diabetes Medications:   Antihyperglycemics (From admission, onward)      " Start     Stop Route Frequency Ordered    06/04/23 1130  insulin aspart U-100 pen 12 Units         -- SubQ 3 times daily with meals 06/04/23 0953    06/04/23 1000  insulin detemir U-100 (Levemir) pen 36 Units         -- SubQ Daily 06/04/23 0953    06/02/23 1653  insulin aspart U-100 pen 0-5 Units         -- SubQ Before meals & nightly PRN 06/02/23 1557

## 2023-06-05 NOTE — PROGRESS NOTES
"[facility]  Endocrinology  Progress Note    Admit Date: 2023     Reason for Consult: Hypoglycemia    Diabetes diagnosis year:     Home Diabetes Medications: 25 units of short acting TIDWM and 80 units of long acting BID (prescribed as daily dosing so patient doubled his prescribed dose), miglitol 25mg TIDWM    How often checking glucose at home? Checks freestyle 10x/day  BG readings on regimen:   Hypoglycemia on the regimen? Yes once today: freestyle read < 40, fingerstick check in clinic 57  Missed doses on regimen? none    Diabetes Complications include:   CKD, CAD, neuropathy    Complicating diabetes co morbidities:   CVA last summer, CAD, HLD, obesity    HPI:   Mr. Springer is a 69 yo male with T1DM, HTN, HLD, CAD, S/p MI, CHF, h/o CVA (), CKD III and Obesity admitted to hospital medicine for hypoglycemia. Patient was in opthalmology clinic this morning and noted his BG was "low" on the freestyle adi indy which correlates to a BG < 40. The ophthalmology clinic performed a fingerstick glucose check that read 57 per ED nurse and 67 per ophthalmology clinic. He was given candy and soda and was sent to the ED for evaluation. Nursing reports patient near syncope but patient denies feeling symptomatic from this hypoglycemic episode. He reports eating a small breakfast which is normal for him and took his 80 units of long acting as well as his 25 units of short acting insulin.       Interval HPI:   Overnight events:  No hypoglycemia noted yesterday or overnight.  On lower insulin doses than prior.  BG at goal this AM.    Eatin%  Nausea: No  Hypoglycemia and intervention: No  Fever: No  TPN and/or TF: No  If yes, type of TF/TPN and rate: NA    BP (!) 178/86 (BP Location: Right arm, Patient Position: Sitting)   Pulse 64   Temp 97.7 °F (36.5 °C) (Oral)   Resp 12   Ht 6' 7" (2.007 m)   Wt (!) 189.6 kg (417 lb 15.9 oz)   SpO2 96%   BMI 47.09 kg/m²     Labs Reviewed and " Include    No results for input(s): GLU, CALCIUM, ALBUMIN, PROT, NA, K, CO2, CL, BUN, CREATININE, ALKPHOS, ALT, AST, BILITOT in the last 24 hours.  Lab Results   Component Value Date    WBC 4.12 06/04/2023    HGB 14.0 06/04/2023    HCT 41.5 06/04/2023    MCV 91 06/04/2023     (L) 06/04/2023     No results for input(s): TSH, FREET4 in the last 168 hours.  Lab Results   Component Value Date    HGBA1C 7.2 (H) 06/02/2023       Nutritional status:   Body mass index is 47.09 kg/m².  Lab Results   Component Value Date    ALBUMIN 3.0 (L) 06/04/2023    ALBUMIN 3.1 (L) 06/03/2023    ALBUMIN 3.2 (L) 06/02/2023     No results found for: PREALBUMIN    Estimated Creatinine Clearance: 71.5 mL/min (A) (based on SCr of 1.9 mg/dL (H)).    Accu-Checks  Recent Labs     06/03/23  0847 06/03/23  1207 06/03/23  1502 06/03/23  1538 06/03/23  1940 06/04/23  0406 06/04/23  0903 06/04/23  1237 06/04/23  1727 06/04/23  2145   POCTGLUCOSE 136* 94 43* 80 95 77 110 139* 171* 151*       Current Medications and/or Treatments Impacting Glycemic Control  Immunotherapy:    Immunosuppressants       None          Steroids:   Hormones (From admission, onward)      Start     Stop Route Frequency Ordered    06/02/23 1702  melatonin tablet 6 mg         -- Oral Nightly PRN 06/02/23 1602          Pressors:    Autonomic Drugs (From admission, onward)      None          Hyperglycemia/Diabetes Medications:   Antihyperglycemics (From admission, onward)      Start     Stop Route Frequency Ordered    06/04/23 1130  insulin aspart U-100 pen 12 Units         -- SubQ 3 times daily with meals 06/04/23 0953    06/04/23 1000  insulin detemir U-100 (Levemir) pen 36 Units         -- SubQ Daily 06/04/23 0953    06/02/23 1653  insulin aspart U-100 pen 0-5 Units         -- SubQ Before meals & nightly PRN 06/02/23 1499            ASSESSMENT and PLAN    Renal/  Stage 3b chronic kidney disease  - Caution with insulin titration to avoid further episodes of  hypoglycemia    Endocrine  * Hypoglycemia unawareness associated with type 2 diabetes mellitus  - Patient hypoglycemic to < 40 on CGM and 57 on fingerstick likely due to inappropriate insulin dosing at home  - His CGM data from April 18th to May 1st 2023 shows active time use of 61 %, in his readings hypoglycemia is seen at 33 % time  - After insulin adjustment there has been no further hypoglycemia but still awaiting morning BG results.    - He was on higher than weight based prandial insulin, we did decrease his basal insulin on this admission     - Now on decreased insulin at 0.4 units/kg weight based and target blood glucose goal inpatient around 140-180 mg/dl    Plan:  - Aspart 12 units before meals  - Levemir 36 units daily  - LDSSI while inpatient    - Would recommend the same regimen as above at discharge with insurance preferred insulin brands.      - blood glucose checks before meals, at bedtime and at 2 am, continue CGM use at home  - hypoglycemia protocol in place (will consider additional dose changes in insulin if needed)  - He would need close outpatient follow up with his endocrinology provider upon discharge, if having lows after discharge then he needs to contact them sooner.             Type 2 diabetes mellitus with stage 3b chronic kidney disease, with long-term current use of insulin  - management as above  - recent change in insulin doses to 0.4 units/kg, if further lows then we will consider decrease to 0.3 units/kg        Chris Alexandra,   Endocrinology

## 2023-06-05 NOTE — ASSESSMENT & PLAN NOTE
· Creatinine 2.2 on admit, baseline closer to 1.8  · Creatinine down to 1.9 today     Attending Discharge Physical Examination:

## 2023-06-05 NOTE — ASSESSMENT & PLAN NOTE
Body mass index is 47.09 kg/m². Morbid obesity complicates all aspects of disease management from diagnostic modalities to treatment. Weight loss encouraged and health benefits explained to patient.        Exam normal, discussed safety, diet, exercise, and sleep hygiene. Vaccines up to date.

## 2023-06-05 NOTE — PROGRESS NOTES
[facility] - Observation [address] [place] [address] Medicine  Progress Note    Patient Name: Stepan Springer  MRN: [id number]  Patient Class: IP- Inpatient   Admission Date: 6/2/2023  Length of Stay: 1 days  Attending Physician: Surekha Reyes MD  Primary Care Provider: KANDICE Fleming MD        Subjective:     Principal Problem:Hypoglycemia unawareness associated with type 2 diabetes mellitus        HPI:  Mr. Stepan Springer is a 68 y.o. male with T2DM, CKD, HTN, and obesity who [place] [facility] [place] from Optho clinic after he was found to be hypoglycemic with blood glucose 50-60s.  He reports that in clinic, he went up to stand and felt dizzy and lightheaded.  He denies any tremors or feeling shaky.  He reports that his glucometer reading has gone to 40 before, but says that it doesn't go lower than 40, so he is unsure if he has had lower blood sugars.  He was seen by his Endocrine NP in May 2023.  His NP documented that he is supposed to be on 74 units of long-acting insulin once a day and 25 units of NPH insulin 3 times a day with meals.  However, the patient states he was told at his appointment to take 100 units of long-acting insulin twice a day and 60 units of NPH 3 times a day.  He denies any chest pain, shortness of breath, fever or chills.    Upon arrival to the ER, vitals were temp 98F, HR 57, /69.  Labs showed Creatinine 2.2, Glucose 77, HbA1c 7.2.  He was given juice and sugars improved.  As he was given a double dose of his long acting insulin, he will be admitted to Hospital Medicine for observation and serial blood glucose checks.      Overview/Hospital Course:  Mr. Gordon was admitted to Hospital Medicine for management of hypoglycemia.  Endocrine was consulted given his large insulin requirements.  They suggested long acting 40 units BID and continuing short acting 25 units TIDWM.  However, on 6/3 after getting Detemir 40 units in the morning, as well as Aspart 25 units TIDWM x 2  (breakfast and lunch), his sugar was 43.  He was given Dextrose and sugar improved to 90.  Possible insulin stacking with NINFA on CKD.  Discussed with Endocrine who suggested holding further insulin.  His insulin regimen was adjusted.  He was discharged on 36 long acting daily and 12 units short acting TIWM.  He has Endocrine followup.      Interval History: No acute events overnight.  Sugars better.  Ready for DC.  Verbally discussed insulin doses and Coreg changes on DC.  Requests a new Coreg prescription for new dosing.    Review of Systems   Constitutional:  Negative for chills, fatigue and fever.   Respiratory:  Negative for cough and shortness of breath.    Cardiovascular:  Negative for chest pain, palpitations and leg swelling.   Gastrointestinal:  Negative for abdominal pain, diarrhea, nausea and vomiting.   Genitourinary:  Negative for dysuria and urgency.   Neurological:  Negative for dizziness and headaches.   All other systems reviewed and are negative.  Objective:     Vital Signs (Most Recent):  Temp: 98 °F (36.7 °C) (06/05/23 0813)  Pulse: 71 (06/05/23 0813)  Resp: 19 (06/05/23 0813)  BP: 128/77 (06/05/23 0813)  SpO2: (!) 93 % (06/05/23 0813) Vital Signs (24h Range):  Temp:  [97.7 °F (36.5 °C)-98.2 °F (36.8 °C)] 98 °F (36.7 °C)  Pulse:  [53-71] 71  Resp:  [12-19] 19  SpO2:  [93 %-98 %] 93 %  BP: (114-178)/(60-86) 128/77     Weight: (!) 189.6 kg (417 lb 15.9 oz)  Body mass index is 47.09 kg/m².  No intake or output data in the 24 hours ending 06/05/23 0937      Physical Exam  Constitutional:       Appearance: He is well-developed. He is obese.   HENT:      Head: Normocephalic and atraumatic.   Cardiovascular:      Rate and Rhythm: Regular rhythm. Bradycardia present.      Heart sounds: No murmur heard.  Pulmonary:      Effort: Pulmonary effort is normal. No respiratory distress.      Breath sounds: Normal breath sounds. No wheezing or rales.   Abdominal:      General: There is no distension.       Palpations: Abdomen is soft.      Tenderness: There is no abdominal tenderness.   Musculoskeletal:         General: No deformity.   Skin:     General: Skin is warm.   Neurological:      General: No focal deficit present.      Mental Status: He is alert and oriented to person, place, and time. Mental status is at baseline.           Significant Labs: All pertinent labs within the past 24 hours have been reviewed.  POCT Glucose:   Recent Labs   Lab 06/04/23  1727 06/04/23  2145 06/05/23  0814   POCTGLUCOSE 171* 151* 163*         Significant Imaging: I have reviewed all pertinent imaging results/findings within the past 24 hours.      Assessment/Plan:      * Hypoglycemia unawareness associated with type 2 diabetes mellitus  Patient's FSGs are uncontrolled due to hypoglycemia on current medication regimen.  Last A1c reviewed-   Lab Results   Component Value Date    HGBA1C 7.2 (H) 06/02/2023     Most recent fingerstick glucose reviewed-   Recent Labs   Lab 06/04/23  1237 06/04/23  1727 06/04/23  2145 06/05/23  0814   POCTGLUCOSE 139* 171* 151* 163*     Current correctional scale  Low  Maintain anti-hyperglycemic dose as follows-   Antihyperglycemics (From admission, onward)    Start     Stop Route Frequency Ordered    06/04/23 1130  insulin aspart U-100 pen 12 Units         -- SubQ 3 times daily with meals 06/04/23 0953    06/04/23 1000  insulin detemir U-100 (Levemir) pen 36 Units         -- SubQ Daily 06/04/23 0953    06/02/23 1653  insulin aspart U-100 pen 0-5 Units         -- SubQ Before meals & nightly PRN 06/02/23 1553        Hold Oral hypoglycemics while patient is in the hospital.    · Endocrine NP documented that he is supposed to be on 74 units of long-acting insulin once a day and 25 units of NPH insulin 3 times a day with meals  · Patient states he was told at his appointment to take 100 units of long-acting insulin twice a day and 60 units of NPH 3 times a day  · Glucose in Optho clinic 50-60s with dizziness  and lightheadedness, but 70s in the ER  · Endocrine consulted and adjusting insulin regimen  · Ready to DC to Detemir 36 and Aspart 12 TIDWM  · Has Endocrine followup    Hypoglycemia due to insulin  · See above      Obesity, diabetes, and hypertension syndrome  · Noted      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 Morbid obesity complicates all aspects of disease management from diagnostic modalities to treatment. Weight loss encouraged and health benefits explained to patient.         Hyperlipidemia associated with type 2 diabetes mellitus  · Chronic and stable  · Continue Statin    CAD with remote PCI (BMS) of RCA in 9/2016  · Chronic and stable  · Continue Aspirin, Statin, Coreg, Losartan      Stage 3b chronic kidney disease  · Creatinine 2.2 on admit, baseline closer to 1.8  · Creatinine down to 1.9 today      Hypertension associated with diabetes  · Chronic issue but BP 110s on arrival in the ER  · Continue Norvasc, Clonidine, Losartan, Hydralazine  · Decrease Coreg to 6.25mg BID given bradycardia with HR 50s    Type 2 diabetes mellitus with stage 3b chronic kidney disease, with long-term current use of insulin  · See hypoglycemia above    Chronic diastolic congestive heart failure  Patient is identified as having Diastolic (HFpEF) heart failure that is Chronic. CHF is currently controlled. Latest ECHO performed and demonstrates- Results for orders placed during the hospital encounter of 09/03/22    Echo    Interpretation Summary  · The left ventricle is normal in size with concentric hypertrophy and normal systolic function.  · The estimated ejection fraction is 55%.  · Normal left ventricular diastolic function.  · Normal right ventricular size with normal right ventricular systolic function.  · Overall the study quality  was technically difficult. The study was difficult due to patient's clinical status, body habitus and poor endocardial visualization.  · There is abnormal septal wall motion consistent with left bundle branch block.  · Normal central venous pressure (3 mmHg).  · The estimated PA systolic pressure is 25 mmHg.  · The aortic root is mildly dilated.  . Continue Beta Blocker and ACE/ARB and monitor clinical status closely. Monitor on telemetry. Patient is off CHF pathway.  Monitor strict Is&Os and daily weights.  Place on fluid restriction of 2 L. Continue to stress to patient importance of self efficacy and  on diet for CHF. Last BNP reviewed- and noted below No results for input(s): BNP, BNPTRIAGEBLO in the last 168 hours..        VTE Risk Mitigation (From admission, onward)         Ordered     heparin (porcine) injection 7,500 Units  Every 8 hours         06/02/23 1602     IP VTE HIGH RISK PATIENT  Once         06/02/23 1602                Discharge Planning   JOSE: 6/5/2023     Code Status: Full Code   Is the patient medically ready for discharge?: No    Reason for patient still in hospital (select all that apply): Patient trending condition  Discharge Plan A: Home with family   Discharge Delays: None known at this time              Surekha Reyes MD  Department of Hospital Medicine   [facility] - Observation 11H

## 2023-06-05 NOTE — ASSESSMENT & PLAN NOTE
- management as above  - recent change in insulin doses to 0.4 units/kg, if further lows then we will consider decrease to 0.3 units/kg

## 2023-06-06 NOTE — PLAN OF CARE
06/06/23 0840   Final Note   Assessment Type Final Discharge Note   Anticipated Discharge Disposition Home     Patient d/c home w/ no d/c needs. Mariam Lagunas RN

## 2023-06-09 NOTE — ASSESSMENT & PLAN NOTE
"Wt Readings from Last 6 Encounters:   10/08/20 (!) 185.2 kg (408 lb 4.7 oz)   10/08/20 (!) 185.2 kg (408 lb 4.7 oz)   09/10/20 (!) 185.7 kg (409 lb 6.3 oz)   09/01/20 (!) 184.4 kg (406 lb 8.5 oz)   08/20/20 (!) 186 kg (410 lb 0.9 oz)   08/18/20 (!) 185.5 kg (408 lb 15.3 oz)     BMI Readings from Last 2 Encounters:   10/08/20 46.00 kg/m²   10/08/20 46.00 kg/m²   Therapeutic lifestyle changes encouraged. Additional instructions were reviewed with him. Refer to "Patient Instructions" section of after visit summary.   "
Asymptomatic, uncontrolled, but improved. Following with diabetes clinic.  Future Appointments   Date Time Provider Department Center   10/14/2020  9:30 AM [facility]      
BP Readings from Last 6 Encounters:   10/08/20 (!) 140/60   10/08/20 138/84   09/10/20 (!) 152/98   09/01/20 (!) 140/88   08/20/20 (!) 183/105   08/18/20 (!) 131/91   Hypertension Digital Medicine program BP readings determined inaccurate. He has new device and will start using this.  Asymptomatic, not controlled.  Saw cardiology this morning.  
Lab Results   Component Value Date    ESTGFRAFRICA 37.6 (A) 10/01/2020    ESTGFRAFRICA 37.6 (A) 09/24/2020    ESTGFRAFRICA 40 (A) 04/20/2020    EGFRNONAA 32.5 (A) 10/01/2020    EGFRNONAA 32.5 (A) 09/24/2020    EGFRNONAA 34 (A) 04/20/2020    CREATININE 2.1 (H) 10/01/2020    CREATININE 2.1 (H) 09/24/2020    CREATININE 2.0 (H) 04/20/2020    BUN 29 (H) 10/01/2020    BUN 31 (H) 09/24/2020    BUN 14 04/20/2020     Lab Results   Component Value Date    ALBUMIN 3.6 04/20/2020    PROT 7.3 04/20/2020    MICALBCREAT 12.5 04/12/2018    LABMICR 3.0 04/12/2018    CREATRANDUR 22.0 (L) 08/09/2018     10/01/2020    K 3.8 10/01/2020     10/01/2020    CO2 28 10/01/2020     (H) 10/01/2020    .0 (H) 08/09/2018    CALCIUM 9.1 10/01/2020    PHOS 3.1 02/07/2020    MG 1.9 02/07/2020    HGB 15.2 04/20/2020    HCT 44.3 04/20/2020     
[Negative] : Genitourinary

## 2023-06-16 NOTE — PHYSICIAN QUERY
"PT Name: Stepan Springer  MR #: [id number]    DOCUMENTATION CLARIFICATION      CDS/: Jens Hamm RN,CCDS               Contact information:    [email or web address]       This form is a permanent document in the medical record.     Query Date: June 16, 2023    By submitting this query, we are merely seeking further clarification of documentation. Please utilize your independent clinical judgment when addressing the question(s) below.    The Medical Record contains the following:   Indicators   Supporting Clinical Findings Location in Medical Record   x Hypertension associated with diabetes documented Hypertension associated with diabetes  Chronic issue but BP 110s on arrival in the ER  Continue Norvasc, Clonidine, Losartan, Hydralazine  Decrease Coreg to 6.25mg BID given bradycardia with HR 50s    6/3  Hosp[facility] Med[facility] 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   6/3 HM, PN   x Vital signs BP: (113-115)/(61-69) 113/65;   Pulse:  [57-58] 57    BP: (113-166)/(61-89) 156/81;  Pulse:  [52-58] 55    BP: (103-149)/(52-76) 149/73;  Pulse:  [54-65] 59 6/2  H&P; HM    6/3  HM, PN    6/4  HM, PN     x Treatment/Medication home meds: losartan; coreg; atorvastatin; lasix;   insulin glargine SQ; insulin novolog;       Atorvastatin, PO; 6/2-4  Coreg, PO; 6/4-5  Clonidine, PO; 6/4-5  Losartan, PO; 6/3-5   6/2 H&P; HM      MAR   "  "  "    Other     Provider, due to conflicting documentation, please clarify the relationship between the Hypertension and Diabetes Mellitus  [ x  ] Hypertension is a manifestation of Diabetes Mellitus (Secondary Hypertension)   [   ]  Hypertension is not a manifestation of Diabetes Mellitus (Essential Hypertension)   [   ] Other Clarification (please specify): ____________   [  ] Clinically Undetermined       Please document in your progress notes daily for the " duration of treatment, until resolved, and include in your discharge summary.

## 2023-06-23 ENCOUNTER — TELEPHONE (OUTPATIENT)
Dept: ADMINISTRATIVE | Facility: HOSPITAL | Age: 68
End: 2023-06-23
Payer: MEDICARE

## 2023-07-07 ENCOUNTER — TELEPHONE (OUTPATIENT)
Dept: ADMINISTRATIVE | Facility: HOSPITAL | Age: 68
End: 2023-07-07
Payer: MEDICARE

## 2023-07-10 ENCOUNTER — OFFICE VISIT (OUTPATIENT)
Dept: CARDIOLOGY | Facility: CLINIC | Age: 68
End: 2023-07-10
Payer: MEDICARE

## 2023-07-10 VITALS
DIASTOLIC BLOOD PRESSURE: 96 MMHG | HEART RATE: 57 BPM | WEIGHT: 315 LBS | HEIGHT: 78 IN | OXYGEN SATURATION: 95 % | SYSTOLIC BLOOD PRESSURE: 193 MMHG | BODY MASS INDEX: 36.45 KG/M2

## 2023-07-10 DIAGNOSIS — I44.7 LBBB (LEFT BUNDLE BRANCH BLOCK): ICD-10-CM

## 2023-07-10 DIAGNOSIS — I89.0 LYMPHEDEMA: ICD-10-CM

## 2023-07-10 DIAGNOSIS — I25.10 CORONARY ARTERY DISEASE INVOLVING NATIVE CORONARY ARTERY OF NATIVE HEART WITHOUT ANGINA PECTORIS: Primary | ICD-10-CM

## 2023-07-10 DIAGNOSIS — Z79.4 TYPE 2 DIABETES MELLITUS WITH MICROALBUMINURIA, WITH LONG-TERM CURRENT USE OF INSULIN: ICD-10-CM

## 2023-07-10 DIAGNOSIS — R80.9 TYPE 2 DIABETES MELLITUS WITH MICROALBUMINURIA, WITH LONG-TERM CURRENT USE OF INSULIN: ICD-10-CM

## 2023-07-10 DIAGNOSIS — N18.32 STAGE 3B CHRONIC KIDNEY DISEASE: Chronic | ICD-10-CM

## 2023-07-10 DIAGNOSIS — E78.5 HYPERLIPIDEMIA ASSOCIATED WITH TYPE 2 DIABETES MELLITUS: ICD-10-CM

## 2023-07-10 DIAGNOSIS — I73.9 PVD (PERIPHERAL VASCULAR DISEASE): ICD-10-CM

## 2023-07-10 DIAGNOSIS — E11.69 HYPERLIPIDEMIA ASSOCIATED WITH TYPE 2 DIABETES MELLITUS: ICD-10-CM

## 2023-07-10 DIAGNOSIS — I50.32 CHRONIC DIASTOLIC CONGESTIVE HEART FAILURE: ICD-10-CM

## 2023-07-10 DIAGNOSIS — G47.33 OBSTRUCTIVE SLEEP APNEA: Chronic | ICD-10-CM

## 2023-07-10 DIAGNOSIS — E11.29 TYPE 2 DIABETES MELLITUS WITH MICROALBUMINURIA, WITH LONG-TERM CURRENT USE OF INSULIN: ICD-10-CM

## 2023-07-10 DIAGNOSIS — I10 ESSENTIAL HYPERTENSION: ICD-10-CM

## 2023-07-10 DIAGNOSIS — E66.01 MORBID OBESITY WITH BMI OF 45.0-49.9, ADULT: ICD-10-CM

## 2023-07-10 PROCEDURE — 99999 PR PBB SHADOW E&M-EST. PATIENT-LVL V: CPT | Mod: PBBFAC,,, | Performed by: PHYSICIAN ASSISTANT

## 2023-07-10 PROCEDURE — 99214 PR OFFICE/OUTPT VISIT, EST, LEVL IV, 30-39 MIN: ICD-10-PCS | Mod: S$PBB,,, | Performed by: PHYSICIAN ASSISTANT

## 2023-07-10 PROCEDURE — 99215 OFFICE O/P EST HI 40 MIN: CPT | Mod: PBBFAC | Performed by: PHYSICIAN ASSISTANT

## 2023-07-10 PROCEDURE — 99214 OFFICE O/P EST MOD 30 MIN: CPT | Mod: S$PBB,,, | Performed by: PHYSICIAN ASSISTANT

## 2023-07-10 PROCEDURE — 99999 PR PBB SHADOW E&M-EST. PATIENT-LVL V: ICD-10-PCS | Mod: PBBFAC,,, | Performed by: PHYSICIAN ASSISTANT

## 2023-07-10 RX ORDER — NITROGLYCERIN 0.4 MG/1
0.4 TABLET SUBLINGUAL EVERY 5 MIN PRN
Qty: 100 TABLET | Refills: 2 | Status: SHIPPED | OUTPATIENT
Start: 2023-07-10 | End: 2024-01-22

## 2023-07-10 RX ORDER — HYDRALAZINE HYDROCHLORIDE 50 MG/1
50 TABLET, FILM COATED ORAL EVERY 8 HOURS
Qty: 90 TABLET | Refills: 11 | Status: SHIPPED | OUTPATIENT
Start: 2023-07-10 | End: 2023-07-10 | Stop reason: SDUPTHER

## 2023-07-10 RX ORDER — NITROGLYCERIN 0.4 MG/1
0.4 TABLET SUBLINGUAL EVERY 5 MIN PRN
Qty: 100 TABLET | Refills: 2 | Status: SHIPPED | OUTPATIENT
Start: 2023-07-10 | End: 2023-07-10 | Stop reason: SDUPTHER

## 2023-07-10 RX ORDER — HYDRALAZINE HYDROCHLORIDE 50 MG/1
50 TABLET, FILM COATED ORAL EVERY 8 HOURS
Qty: 90 TABLET | Refills: 11 | Status: SHIPPED | OUTPATIENT
Start: 2023-07-10 | End: 2023-08-07

## 2023-07-10 NOTE — PROGRESS NOTES
Cardiology Clinic Note  Reason for Visit: CAD, HTN, HLD  LOV w/ Cardiology: 4/18/2023    HPI:     PMHx:  CAD h/o NSTEMI  -s/p PCI midRCA SABINO x2 and mid LAD 40% stenosis (2016)  LBBB  DMT2  H/o TIA (2022)  CKD stage III  HTN  HLD  PVD  FRAN, does not want CPAP      Stepan Springer is a 68 y.o. male, who presents to establish with cardiology locally in [place]. He recently moved here from [place]. Lives with his daughter and son in law. Had a hospital visit 6/2/2023 for episode of hypoglycemia and NINFA. He has no acute cardiac complaints today. He states his blood sugar has been better at home. His son in law has been helping him eat a diet lower in carbs. He reports a long history of difficult to control BP. He states he can not measure at home due to the size of his bicep causing the cuff to break. He is not currently taking hydralazine, which appears to have first been prescribed orally in 2018. He deals with significant lymphedema, not recently worsened and wears compression bandages. He sees lymphedema clinic in [place]. Does not want to transition to local group just yet. Previously diagnosed with sleep apnea, not interested in trying CPAP machine again.  He denies chest pain/pressure/tightness/discomfort, dyspnea on regular exertion, orthopnea, PND, sustained palpitations, syncope or claudication symptoms.        ROS:    Pertinent ROS included in HPI and below.  PMH:     Past Medical History:   Diagnosis Date    Anxiety     Bell's palsy     CHF (congestive heart failure)     Chronic kidney disease, stage 3a 11/02/2021    Coronary artery disease involving native coronary artery without angina pectoris 10/18/2016    Depression     Diabetes mellitus     Glaucoma     Hypertension     Idiopathic peripheral neuropathy 12/11/2012    Lymphedema of both lower extremities     Malignant melanoma of skin of forehead 10/13/2022    Mixed hyperlipidemia 12/11/2012    Morbid obesity with BMI of 45.0-49.9,  "adult 02/12/2019    Pancytopenia     Sleep apnea     "unable to use"    Stage 3b chronic kidney disease     Type 2 diabetes mellitus with diabetic polyneuropathy, with long-term current use of insulin 12/11/2012    Vitamin B 12 deficiency 08/23/2012     Past Surgical History:   Procedure Laterality Date    CARDIAC CATHETERIZATION  7/22/13    non obstructive cad    CATARACT EXTRACTION  OU    CLOSURE OF WOUND Left 10/14/2022    Procedure: CLOSURE, WOUND;  Surgeon: Jovita Ceballos MD;  Location: [facility] OR;  Service: ENT;  Laterality: Left;  closure of forehead    COLONOSCOPY N/A 5/1/2019    Procedure: COLONOSCOPY;  Surgeon: Henry Mo III, MD;  Location: [facility] ENDO;  Service: Endoscopy;  Laterality: N/A;    CORONARY ANGIOPLASTY      ESOPHAGOGASTRODUODENOSCOPY N/A 5/1/2019    Procedure: ESOPHAGOGASTRODUODENOSCOPY (EGD);  Surgeon: Henry Mo III, MD;  Location: [facility] ENDO;  Service: Endoscopy;  Laterality: N/A;    EXCISION OF MELANOMA Left 10/7/2022    Procedure: EXCISION, MELANOMA;  Surgeon: Jovita Ceballos MD;  Location: [facility] OR;  Service: ENT;  Laterality: Left;    EYE SURGERY      FRACTURE SURGERY      kidney stone       August 2016    PC IOL OU      RETINAL DETACHMENT REPAIR W/ SCLERAL BUCKLE LE      WRIST SURGERY       Allergies:     Review of patient's allergies indicates:   Allergen Reactions    Cefazolin Other (See Comments)     Shakes, chills, dizziness     Medications:     Current Outpatient Medications on File Prior to Visit   Medication Sig Dispense Refill    amLODIPine (NORVASC) 5 MG tablet Take 1 tablet (5 mg total) by mouth every evening. 90 tablet 1    aspirin 81 MG Chew Take 81 mg by mouth once daily.      atorvastatin (LIPITOR) 20 MG tablet Take 1 tablet (20 mg total) by mouth every evening. 90 tablet 1    AZOPT 1 % ophthalmic suspension Place 1 drop into both eyes 3 (three) times daily. Brand name only 10 mL 6    blood sugar diagnostic Strp To check BG 4 times daily, to use with insurance " preferred meter 300 each 3    blood-glucose meter kit To check BG 4 times daily, to use with insurance preferred meter 1 each 0    brimonidine 0.1% (ALPHAGAN P) 0.1 % Drop Place 1 drop into both eyes 3 (three) times daily. 10 mL 4    carvediloL (COREG) 6.25 MG tablet Take 1 tablet (6.25 mg total) by mouth 2 (two) times daily. 180 tablet 0    cloNIDine (CATAPRES) 0.1 MG tablet Take 1 tablet (0.1 mg total) by mouth 3 (three) times daily. 270 tablet 1    dextran 70-hypromellose (TEARS) ophthalmic solution Place 2 drops into the left eye every 4 (four) hours. 30 mL 11    flash glucose sensor (FREESTYLE LCEMENCIA 14 DAY SENSOR) Kit 1 each by Misc.(Non-Drug; Combo Route) route every 14 (fourteen) days. 6 kit 3    furosemide (LASIX) 40 MG tablet Take 1 tablet (40 mg total) by mouth once daily. 90 tablet 1    insulin (BASAGLAR KWIKPEN U-100 INSULIN) glargine 100 units/mL SubQ pen Inject 36 Units into the skin once daily. 72 mL 3    insulin aspart U-100 (NOVOLOG) 100 unit/mL (3 mL) InPn pen Inject 12 Units into the skin 3 (three) times daily with meals. 69 mL 3    lancets Misc To check BG 4 times daily, to use with insurance preferred meter 300 each 3    netarsudiL (RHOPRESSA) 0.02 % ophthalmic solution Place 1 drop into both eyes once daily. 2.5 mL 6    [DISCONTINUED] hydrALAZINE (APRESOLINE) 100 MG tablet Take 1 tablet (100 mg total) by mouth every 8 (eight) hours. 270 tablet 1    losartan (COZAAR) 100 MG tablet Take 1 tablet (100 mg total) by mouth once daily. 90 tablet 2    [DISCONTINUED] hydroCHLOROthiazide (HYDRODIURIL) 25 MG tablet Take 1 tablet (25 mg total) by mouth once daily. 90 tablet 1    [DISCONTINUED] miglitoL (GLYSET) 25 MG Tab Take 1 tablet (25 mg total) by mouth 3 (three) times daily with meals. 270 tablet 3    [DISCONTINUED] nitroGLYCERIN (NITROSTAT) 0.4 MG SL tablet PLACE 1 TABLET (0.4 MG TOTAL) UNDER THE TONGUE EVERY 5 (FIVE) MINUTES AS NEEDED FOR CHEST PAIN. 20 tablet 0     No current facility-administered  "medications on file prior to visit.     Social History:     Social History     Tobacco Use    Smoking status: Never    Smokeless tobacco: Never   Substance Use Topics    Alcohol use: Yes     Comment: " one to two glasses of wine per year"     Family History:     Family History   Problem Relation Age of Onset    Macular degeneration Father     Heart disease Father         CHF     Heart attack Father     Hypertension Mother     Macular degeneration Paternal Uncle     Hypertension Maternal Grandmother     Hypertension Maternal Grandfather     Strabismus Neg Hx     Retinal detachment Neg Hx     Glaucoma Neg Hx     Blindness Neg Hx     Amblyopia Neg Hx      Physical Exam:   BP (!) 193/96   Pulse (!) 57   Ht 6' 7" (2.007 m)   Wt (!) 185 kg (407 lb 13.6 oz)   SpO2 95%   BMI 45.95 kg/m²      Physical Exam  Vitals and nursing note reviewed.   Constitutional:       Appearance: Normal appearance. He is morbidly obese.   HENT:      Head: Normocephalic and atraumatic.   Neck:      Vascular: No carotid bruit or hepatojugular reflux.   Cardiovascular:      Rate and Rhythm: Normal rate and regular rhythm.      Pulses:           Radial pulses are 2+ on the right side and 2+ on the left side.         Right dorsalis pedis pulse not accessible and left dorsalis pedis pulse not accessible.         Right posterior tibial pulse not accessible and left posterior tibial pulse not accessible.      Heart sounds: S1 normal and S2 normal.   Pulmonary:      Effort: Pulmonary effort is normal.      Breath sounds: Normal breath sounds.   Abdominal:      General: Bowel sounds are normal.      Palpations: Abdomen is soft.      Tenderness: There is no abdominal tenderness.   Musculoskeletal:      Right lower le+ Edema present.      Left lower le+ Edema present.   Feet:      Right foot:      Skin integrity: Skin integrity normal.      Left foot:      Skin integrity: Skin integrity normal.   Neurological:      Mental Status: He is alert. "   Psychiatric:         Behavior: Behavior is cooperative.        Labs:     Blood Tests:  Lab Results   Component Value Date     (H) 08/15/2022     06/04/2023    K 4.0 06/04/2023     (H) 06/04/2023    CO2 22 (L) 06/04/2023    BUN 23 06/04/2023    BUN 34 (A) 10/28/2013    CREATININE 1.9 (H) 06/04/2023    CREATININE 1.7 10/28/2013    GLU 83 06/04/2023    HGBA1C 7.2 (H) 06/02/2023    MG 2.0 06/04/2023    AST 11 06/04/2023    AST 14 10/28/2013    ALT 13 06/04/2023    ALBUMIN 3.0 (L) 06/04/2023    PROT 5.4 (L) 06/04/2023    BILITOT 0.6 06/04/2023    WBC 4.12 06/04/2023    HGB 14.0 06/04/2023    HCT 41.5 06/04/2023    MCV 91 06/04/2023     (L) 06/04/2023    INR 1.1 09/04/2022    TSH 1.259 09/03/2022       Lab Results   Component Value Date    CHOL 100 (L) 09/04/2022    HDL 32 (L) 09/04/2022    TRIG 118 09/04/2022    TRIG 130 10/28/2013       Lab Results   Component Value Date    LDLCALC 44.4 (L) 09/04/2022         Imaging:     Echocardiogram  TTE 9/4/2022  The left ventricle is normal in size with concentric hypertrophy and normal systolic function.  The estimated ejection fraction is 55%.  Normal left ventricular diastolic function.  Normal right ventricular size with normal right ventricular systolic function.  Overall the study quality was technically difficult. The study was difficult due to patient's clinical status, body habitus and poor endocardial visualization.  There is abnormal septal wall motion consistent with left bundle branch block.  Normal central venous pressure (3 mmHg).  The estimated PA systolic pressure is 25 mmHg.  The aortic root is mildly dilated.    Stress testing  SPECT 10/21/2020    The study shows normal myocardial perfusion.    The perfusion scan is free of evidence from myocardial ischemia or injury.    There is a  mild intensity fixed defect in the inferior wall of the left ventricle secondary to diaphragm attenuation.    There is normal wall motion at rest and post  stress.    LV cavity size is normal at rest and normal at stress.    The EKG portion of this study is negative for ischemia.    The patient reported no chest pain during the stress test.    Arrhythmias during stress: rare PVCs.    Cath Lab  [facility] 2016       Patient has a right dominant coronary artery.        - Left Main Coronary Artery:              The LM is normal. There is WAYLON 3 flow.      - Left Anterior Descending Artery:              The mid LAD has a 40% stenosis. There is WAYLON 3 flow.      - Left Circumflex Artery:              The LCX has luminal irregularities. There is WAYLON 3 flow.      - Right Coronary Artery:              The mid RCA has a 80% stenosis. There is WAYLON 3 flow.                      Lesion Details:   This is a Type C lesion.  The length is 20mm, eccentric shape, moderate proximal tortuosity, segment angulation of 45-90 degrees, irregular contour, moderate to heavy calcification. The lesion is ulcerated.              The distal RCA has a 50% stenosis. There is WAYLON 3 flow.    Other  12 Day Cardiac monitor 2022  Total Analysis Time: 12 days and 17 hours  Heart rates varied between 43 and 103 BPM with an average of 60 BPM.   The patient was monitored for a total of 13d 5h, underlying rhythm is Sinus.  The minimum heart rate was 43 bpm; the maximum 103 bpm; the average 60 bpm.  0 % of Atrial fibrillation/Atrial flutter with longest episode of 0 ms.  AV block with 0 %  There were 0 pauses, the longest pause was 0 ms at.  0 episodes of VT were found with Longest VT at 0 s .  6 supraventricular episodes were found. Longest SVT Episode 24 beats  There were a total of 0 PVCs with 0 morphologies and 0 couplets. Overall PVC [place] at 0 %  There were a total of 853 PSVCs with 1 morphologies and 0 couplets. Overall PSVC [place] at 0.08 %  There is a total of 0 patient events    [id number]  Sinus bradycardia   Left bundle branch block   Abnormal ECG   When compared with ECG of 2023  09:19,   No significant change was found    Assessment:     1. Coronary artery disease involving native coronary artery of native heart without angina pectoris    2. Essential hypertension    3. Chronic diastolic congestive heart failure    4. Type 2 diabetes mellitus with microalbuminuria, with long-term current use of insulin    5. LBBB (left bundle branch block)    6. Hyperlipidemia associated with type 2 diabetes mellitus    7. PVD (peripheral vascular disease)    8. Lymphedema    9. Obstructive sleep apnea (untreated, refuses treatment)    10. Stage 3b chronic kidney disease    11. BMI of 45.0-49.9, adult        Plan:     Coronary artery disease involving native coronary artery of native heart without angina pectoris  -     nitroGLYCERIN (NITROSTAT) 0.4 MG SL tablet; Place 1 tablet (0.4 mg total) under the tongue every 5 (five) minutes as needed for Chest pain.  Dispense: 100 tablet; Refill: 2  Continue aspirin and statin qD  Last stress test negative in 2020  Reports being asymptomatic prior to NSTEMI in 2016    Essential hypertension  -     Comprehensive Metabolic Panel; Future; Expected date: 10/10/2023  -     hydrALAZINE (APRESOLINE) 50 MG tablet; Take 1 tablet (50 mg total) by mouth every 8 (eight) hours.  Dispense: 90 tablet; Refill: 11  Please bring all medications to follow up appointment   States he has never taken hydralazine, although there is a long history in his records.   Advised to check meds at home. If he is not currently taking hydralazine he should start at 50 mg TID and titrate up.  Limit dietary sodium  Has previously taken a higher dose of carvedilol, unclear why reduced  Caution in considering increase in amlodipine based on degree of PVD    Chronic diastolic congestive heart failure  Stable, continue to monitor    Type 2 diabetes mellitus with microalbuminuria, with long-term current use of insulin  -     Ambulatory referral/consult to Internal Medicine; Future; Expected date:  07/17/2023    LBBB (left bundle branch block)    Hyperlipidemia associated with type 2 diabetes mellitus  -     Lipid Panel; Future; Expected date: 10/10/2023  Due to update annual lipid panel in Sept  Previously well controlled   Recommend Mediterranean diet     PVD (peripheral vascular disease)  Lymphedema  Continues to [facility] with [facility] for now  Stable, continue to monitor     Obstructive sleep apnea (untreated, refuses treatment)    CKD3b  Recommend establishing with local internal med provider for further monitoring.   Avoid NSAIDs and work on better BP control    BMI 45  Weight loss through a combination of diet and exercise encouraged.       Signed:  Mini Santos PA-C  Cardiology     7/10/2023 9:02 AM    Follow-up:     Future Appointments   Date Time Provider Department Center   7/11/2023  9:30 AM Luís Millard PA-C [facility] DIABETE [place]   8/7/2023  2:00 PM Mini Santos PA-C [facility] CARDIO [facility]   9/25/2023 11:00 AM Chato Huber MD [facility] ENDODIA [facility]   10/10/2023  8:15 AM LAB, [facility] [place]

## 2023-07-12 ENCOUNTER — TELEPHONE (OUTPATIENT)
Dept: ADMINISTRATIVE | Facility: HOSPITAL | Age: 68
End: 2023-07-12
Payer: MEDICARE

## 2023-07-28 ENCOUNTER — OFFICE VISIT (OUTPATIENT)
Dept: DIABETES | Facility: CLINIC | Age: 68
End: 2023-07-28
Payer: MEDICARE

## 2023-07-28 DIAGNOSIS — E66.01 MORBID OBESITY: ICD-10-CM

## 2023-07-28 DIAGNOSIS — E11.65 TYPE 2 DIABETES MELLITUS WITH HYPERGLYCEMIA, WITH LONG-TERM CURRENT USE OF INSULIN: Primary | ICD-10-CM

## 2023-07-28 DIAGNOSIS — H54.10 BLINDNESS AND LOW VISION: ICD-10-CM

## 2023-07-28 DIAGNOSIS — G47.33 OSA (OBSTRUCTIVE SLEEP APNEA): ICD-10-CM

## 2023-07-28 DIAGNOSIS — E11.59 HYPERTENSION ASSOCIATED WITH DIABETES: ICD-10-CM

## 2023-07-28 DIAGNOSIS — E78.5 HYPERLIPIDEMIA ASSOCIATED WITH TYPE 2 DIABETES MELLITUS: ICD-10-CM

## 2023-07-28 DIAGNOSIS — N18.32 STAGE 3B CHRONIC KIDNEY DISEASE: ICD-10-CM

## 2023-07-28 DIAGNOSIS — I25.10 CORONARY ARTERY DISEASE INVOLVING NATIVE CORONARY ARTERY OF NATIVE HEART WITHOUT ANGINA PECTORIS: ICD-10-CM

## 2023-07-28 DIAGNOSIS — I50.9 CHF (NYHA CLASS III, ACC/AHA STAGE C): ICD-10-CM

## 2023-07-28 DIAGNOSIS — Z79.4 TYPE 2 DIABETES MELLITUS WITH HYPERGLYCEMIA, WITH LONG-TERM CURRENT USE OF INSULIN: Primary | ICD-10-CM

## 2023-07-28 DIAGNOSIS — E11.649 HYPOGLYCEMIA UNAWARENESS ASSOCIATED WITH TYPE 2 DIABETES MELLITUS: ICD-10-CM

## 2023-07-28 DIAGNOSIS — I15.2 HYPERTENSION ASSOCIATED WITH DIABETES: ICD-10-CM

## 2023-07-28 DIAGNOSIS — E11.69 HYPERLIPIDEMIA ASSOCIATED WITH TYPE 2 DIABETES MELLITUS: ICD-10-CM

## 2023-07-28 DIAGNOSIS — G60.9 IDIOPATHIC PERIPHERAL NEUROPATHY: ICD-10-CM

## 2023-07-28 PROCEDURE — 99441 PR PHYSICIAN TELEPHONE EVALUATION 5-10 MIN: ICD-10-PCS | Mod: 95,,, | Performed by: PHYSICIAN ASSISTANT

## 2023-07-28 PROCEDURE — 95251 CONT GLUC MNTR ANALYSIS I&R: CPT | Mod: S$PBB,NDTC,, | Performed by: PHYSICIAN ASSISTANT

## 2023-07-28 PROCEDURE — 95251 PR GLUCOSE MONITOR, 72 HOUR, PHYS INTERP: ICD-10-PCS | Mod: S$PBB,NDTC,, | Performed by: PHYSICIAN ASSISTANT

## 2023-07-28 PROCEDURE — 99441 PR PHYSICIAN TELEPHONE EVALUATION 5-10 MIN: CPT | Mod: 95,,, | Performed by: PHYSICIAN ASSISTANT

## 2023-07-28 NOTE — PROGRESS NOTES
PCP: KANDICE Fleming MD    Subjective:     Chief Complaint: Diabetes - Established Patient    Established Patient - Audio Only Telehealth Visit     The patient location is: Home  The chief complaint leading to consultation is: Diabetes follow up  Visit type: Virtual visit with audio only (telephone)  Total Time Spent with Patient: 9 minutes     The reason for the audio only service rather than synchronous audio and video virtual visit was related to technical difficulties or patient preference/necessity.     Each patient to whom I provide medical services by telemedicine is:  (1) informed of the relationship between the physician and patient and the respective role of any other health care provider with respect to management of the patient; and (2) notified that they may decline to receive medical services by telemedicine and may withdraw from such care at any time. Patient verbally consented to receive this service via voice-only telephone call.    This service was not originating from a related E/M service provided within the previous 7 days nor will  to an E/M service or procedure within the next 24 hours or my soonest available appointment.  Prevailing standard of care was able to be met in this audio-only visit.       HISTORY OF PRESENT ILLNESS: 68 y.o.   male presenting for diabetes management visit.   The patient's last visit with me was on 5/22/2023.  Patient has had Type II diabetes since 2005.  Pertinent to decision making is the following comorbidities: HTN, HLD, CAD, S/p MI, CHF, CKD III and Obesity by BMI  Patient has the following Diabetes complications: with diabetic chronic kidney disease  He has attended diabetes education in the past.     Patient's most recent A1c of 7.2% was completed 1 months ago.   Patient states since His last A1c His blood glucose levels have been both high and low throughout the day     Patient monitors blood glucose 4 times per day and Continuously with  personal CGM Sriram.   Patient blood glucose monitoring device will be uploaded into [employer] today.   Patient endorses the following diabetes related symptoms:  None .         Patients records show baseline euglycemia and some occasional postprandial near hypoglycemia.   Patient is due today for the following diabetes-related health maintenance standards: COVID Vaccine, Foot Exam, Eye exam.  He voices recent hospital admissions or emergency room visits for hypoglycemia for taking incorrect insulin amounts - insulin amounts printed out at visit and given to pt and daughter.   He voices having hypoglycemia as above. Per chart, patient has history of hypoglycemia unawareness.   Patient's concerns today include glycemic control. Pt recently moved to [facility] and will be moving before end of the year to [facility].   Patient medication regimen is as below.     CURRENT DM MEDICATIONS:   Basaglar 9 units daily  - unclear where dosing instructions came from  Novolog 25 units TID wm - using prior dosing amount  Miglitol 25 mg TID wm   Jardiance 25 mg daily    Patient has failed the following Diabetes medications:   Metformin - GI   Glyburide  Ozempic - cost  Basal - Lantus         Labs Reviewed.       Lab Results   Component Value Date    CPEPTIDE 2.70 12/13/2018     Lab Results   Component Value Date    GLUTAMICACID 0.00 12/13/2018          //   , There is no height or weight on file to calculate BMI.  His blood sugar in clinic today is:    Lab Results   Component Value Date    POCGLU 207 (A) 08/15/2022       Review of Systems   Constitutional:  Negative for activity change, appetite change, chills and fever.   HENT:  Negative for dental problem, mouth sores, nosebleeds, sore throat and trouble swallowing.    Eyes:  Negative for pain and discharge.   Respiratory:  Negative for shortness of breath, wheezing and stridor.    Cardiovascular:  Negative for chest pain, palpitations and leg swelling.   Gastrointestinal:  Negative  "for abdominal pain, diarrhea, nausea and vomiting.   Endocrine: Negative for polydipsia, polyphagia and polyuria.   Genitourinary:  Negative for dysuria, frequency and urgency.   Musculoskeletal:  Negative for joint swelling and myalgias.   Skin:  Negative for rash and wound.   Neurological:  Negative for dizziness, syncope, weakness and headaches.   Psychiatric/Behavioral:  Negative for behavioral problems and dysphoric mood.        Diabetes Management Status  Statin: Taking  ACE/ARB: Taking    Screening or Prevention Patient's value Goal Complete/Controlled?   HgA1C Testing and Control   Lab Results   Component Value Date    HGBA1C 7.2 (H) 06/02/2023      Annually/Less than 8% No   Lipid profile : 09/04/2022 Annually Yes   LDL control Lab Results   Component Value Date    LDLCALC 44.4 (L) 09/04/2022    Annually/Less than 100 mg/dl  Yes   Nephropathy screening Lab Results   Component Value Date    MICALBCREAT 105.3 (H) 02/27/2023     Lab Results   Component Value Date    PROTEINUA Negative 06/05/2023    Annually No   Blood pressure BP Readings from Last 1 Encounters:   07/10/23 (!) 193/96    Less than 140/90 No   Dilated retinal exam : 12/07/2021 Annually Yes    Foot exam   : 08/15/2022 Annually Yes     ACTIVITY LEVEL: Rarely Active. Discussed activities, benefits, methods, and precautions.  MEAL PLANNING: Patient reports number of meals per day to be 3 and number of snacks per day to be 2.   Patient is encouraged to carb count and consume no more than 30 - 45 grams of carbohydrates in each meal and 15 grams of carbohydrates in each snack.     Social History     Socioeconomic History    Marital status:    Occupational History     Comment: Quit job at [employer]; wants to open his own [employer] business    Tobacco Use    Smoking status: Never    Smokeless tobacco: Never   Substance and Sexual Activity    Alcohol use: Yes     Comment: " one to two glasses of wine per year"    Drug use: No    Sexual activity: Not " "Currently     Birth control/protection: None   Social History Narrative    , 1 son, JANIE.     Social Determinants of Health     Financial Resource Strain: Low Risk     Difficulty of Paying Living Expenses: Not very hard   Food Insecurity: No Food Insecurity    Worried About Running Out of Food in the Last Year: Never true    Ran Out of Food in the Last Year: Never true   Transportation Needs: Unmet Transportation Needs    Lack of Transportation (Medical): Yes    Lack of Transportation (Non-Medical): Yes   Physical Activity: Inactive    Days of Exercise per Week: 0 days    Minutes of Exercise per Session: 0 min   Stress: No Stress Concern Present    Feeling of Stress : Only a little   Social Connections: Socially Isolated    Frequency of Communication with Friends and Family: More than three times a week    Frequency of Social Gatherings with Friends and Family: More than three times a week    Attends Restorationist Services: Never    Active Member of Clubs or Organizations: No    Attends Club or Organization Meetings: Patient refused    Marital Status:    Housing Stability: Low Risk     Unable to Pay for Housing in the Last Year: No    Number of Places Lived in the Last Year: 1    Unstable Housing in the Last Year: No     Past Medical History:   Diagnosis Date    Anxiety     Bell's palsy     CHF (congestive heart failure)     Chronic kidney disease, stage 3a 11/02/2021    Coronary artery disease involving native coronary artery without angina pectoris 10/18/2016    Depression     Diabetes mellitus     Glaucoma     Hypertension     Idiopathic peripheral neuropathy 12/11/2012    Lymphedema of both lower extremities     Malignant melanoma of skin of forehead 10/13/2022    Mixed hyperlipidemia 12/11/2012    Morbid obesity with BMI of 45.0-49.9, adult 02/12/2019    Pancytopenia     Sleep apnea     "unable to use"    Stage 3b chronic kidney disease     Type 2 diabetes mellitus with diabetic polyneuropathy, with " long-term current use of insulin 12/11/2012    Vitamin B 12 deficiency 08/23/2012       Objective:        Physical Exam  Neurological:      Mental Status: He is alert and oriented to person, place, and time. Mental status is at baseline.   Psychiatric:         Mood and Affect: Mood normal.         Behavior: Behavior normal.         Thought Content: Thought content normal.         Judgment: Judgment normal.         Assessment / Plan:     Type 2 diabetes mellitus with hyperglycemia, with long-term current use of insulin    Stage 3b chronic kidney disease    Hypoglycemia unawareness associated with type 2 diabetes mellitus    Blindness and low vision    Idiopathic peripheral neuropathy    CHF (NYHA class III, ACC/AHA stage C)    Coronary artery disease involving native coronary artery of native heart without angina pectoris    Hypertension associated with diabetes    Hyperlipidemia associated with type 2 diabetes mellitus    FRAN (obstructive sleep apnea)    Morbid obesity      Additional Plan Details:    - POCT Glucose  - Encouraged continuation of lifestyle changes including regular exercise and limiting carbohydrates to 30-45 grams per meal threes times daily and 15 grams per snack with a limit of two daily.   - Encouraged continued monitoring of blood glucose with maintenance of 4 times daily and Continuously with personal CGM Sriram.   - Current DM Medication Regimen:  Continue Jardiance 25 mg. Continue Basaglar 9 units daily. Change Novolog 20 units with regular meal TID wm. Continue Miglitol 25 mg TID wm.    - Health Maintenance standards addressed today: Foot Exam - deferred by patient today because Telemedicine or Telephone visit, Eye Exam - will be completed within [facility] system and scheduled today, and COVID - 19 Vaccine [facility]   - Nursing Visit: Patient is age 79 or younger with an A1c of 7.5 or greater and will not need nursing visit at this time .   - Follow up with  [facility]ology team.       Luís Millard PA-C  [facility]

## 2023-07-31 NOTE — PATIENT INSTRUCTIONS
CURRENT DM MEDICATIONS:   Basaglar 9 units daily    Novolog 20 units before each meal 3x daily  Miglitol 25 mg 3x daily with meals   Jardiance 25 mg daily

## 2023-08-07 ENCOUNTER — OFFICE VISIT (OUTPATIENT)
Dept: CARDIOLOGY | Facility: CLINIC | Age: 68
End: 2023-08-07
Payer: MEDICARE

## 2023-08-07 VITALS
WEIGHT: 315 LBS | DIASTOLIC BLOOD PRESSURE: 86 MMHG | SYSTOLIC BLOOD PRESSURE: 168 MMHG | HEIGHT: 78 IN | BODY MASS INDEX: 36.45 KG/M2 | HEART RATE: 96 BPM

## 2023-08-07 DIAGNOSIS — Z79.4 TYPE 2 DIABETES MELLITUS WITH MICROALBUMINURIA, WITH LONG-TERM CURRENT USE OF INSULIN: ICD-10-CM

## 2023-08-07 DIAGNOSIS — E78.5 HYPERLIPIDEMIA ASSOCIATED WITH TYPE 2 DIABETES MELLITUS: ICD-10-CM

## 2023-08-07 DIAGNOSIS — R80.9 TYPE 2 DIABETES MELLITUS WITH MICROALBUMINURIA, WITH LONG-TERM CURRENT USE OF INSULIN: ICD-10-CM

## 2023-08-07 DIAGNOSIS — I89.0 LYMPHEDEMA: ICD-10-CM

## 2023-08-07 DIAGNOSIS — E11.69 HYPERLIPIDEMIA ASSOCIATED WITH TYPE 2 DIABETES MELLITUS: ICD-10-CM

## 2023-08-07 DIAGNOSIS — E66.01 MORBID OBESITY WITH BMI OF 45.0-49.9, ADULT: ICD-10-CM

## 2023-08-07 DIAGNOSIS — I25.10 CORONARY ARTERY DISEASE INVOLVING NATIVE CORONARY ARTERY OF NATIVE HEART WITHOUT ANGINA PECTORIS: ICD-10-CM

## 2023-08-07 DIAGNOSIS — I73.9 PVD (PERIPHERAL VASCULAR DISEASE): ICD-10-CM

## 2023-08-07 DIAGNOSIS — E11.42 TYPE 2 DIABETES MELLITUS WITH DIABETIC POLYNEUROPATHY, WITH LONG-TERM CURRENT USE OF INSULIN: Chronic | ICD-10-CM

## 2023-08-07 DIAGNOSIS — E11.59 HYPERTENSION ASSOCIATED WITH DIABETES: Primary | Chronic | ICD-10-CM

## 2023-08-07 DIAGNOSIS — I50.32 CHRONIC DIASTOLIC CONGESTIVE HEART FAILURE: ICD-10-CM

## 2023-08-07 DIAGNOSIS — N18.32 STAGE 3B CHRONIC KIDNEY DISEASE: ICD-10-CM

## 2023-08-07 DIAGNOSIS — Z79.4 TYPE 2 DIABETES MELLITUS WITH DIABETIC POLYNEUROPATHY, WITH LONG-TERM CURRENT USE OF INSULIN: Chronic | ICD-10-CM

## 2023-08-07 DIAGNOSIS — I15.2 HYPERTENSION ASSOCIATED WITH DIABETES: Primary | Chronic | ICD-10-CM

## 2023-08-07 DIAGNOSIS — E11.29 TYPE 2 DIABETES MELLITUS WITH MICROALBUMINURIA, WITH LONG-TERM CURRENT USE OF INSULIN: ICD-10-CM

## 2023-08-07 PROCEDURE — 99214 PR OFFICE/OUTPT VISIT, EST, LEVL IV, 30-39 MIN: ICD-10-PCS | Mod: S$PBB,,, | Performed by: PHYSICIAN ASSISTANT

## 2023-08-07 PROCEDURE — 99215 OFFICE O/P EST HI 40 MIN: CPT | Mod: PBBFAC | Performed by: PHYSICIAN ASSISTANT

## 2023-08-07 PROCEDURE — 99999 PR PBB SHADOW E&M-EST. PATIENT-LVL V: ICD-10-PCS | Mod: PBBFAC,,, | Performed by: PHYSICIAN ASSISTANT

## 2023-08-07 PROCEDURE — 99214 OFFICE O/P EST MOD 30 MIN: CPT | Mod: S$PBB,,, | Performed by: PHYSICIAN ASSISTANT

## 2023-08-07 PROCEDURE — 99999 PR PBB SHADOW E&M-EST. PATIENT-LVL V: CPT | Mod: PBBFAC,,, | Performed by: PHYSICIAN ASSISTANT

## 2023-08-07 RX ORDER — CARVEDILOL 25 MG/1
25 TABLET ORAL 2 TIMES DAILY
Qty: 180 TABLET | Refills: 3 | Status: SHIPPED | OUTPATIENT
Start: 2023-08-07 | End: 2023-10-09 | Stop reason: SDUPTHER

## 2023-08-07 NOTE — PROGRESS NOTES
Cardiology Clinic Note  Reason for Visit: CAD, HTN, HLD  LOV w/ Cardiology: 7/10/2023    HPI:     PMHx:  CAD h/o NSTEMI  -s/p PCI midRCA SABINO x2 and mid LAD 40% stenosis (2016)  LBBB  DMT2  H/o TIA (2022)  CKD stage III  HTN  HLD  PVD  FRAN, does not want CPAP      Stepan Springer is a 68 y.o. male, who presents for follow up regarding HTN. He brought all of his cardiac medications to clinic today, and med rec is updated. He is not taking hydralazine prescribed at LOV. He is also taking carvedilol 12.5 mg as opposed to 12.5 mg BID currently listed. He does not measure his BP at home as he can not find a cuff big enough to do so accurately. His weight has increased by 16 lbs over the past month. He is not able to exercise due to OA and lymphedema issues. He walks with the assistance of a cane. He is having some right shoulder pain after lifting something heavy a few weeks ago. He reports taking 8-10 aspirin 325 mg daily for the past 2 weeks. He is still seeing endocrinology and lymphedema PT in [place]. He denies chest pain/pressure/tightness/discomfort, dyspnea on regular exertion, orthopnea, PND, peripheral edema, sustained palpitations, syncope or claudication symptoms.       TARAN Santos HPI 7/10/2023  Presents to establish with cardiology locally in [place]. He recently moved here from [place]. Lives with his daughter and son in law. Had a hospital visit 6/2/2023 for episode of hypoglycemia and NINFA. He has no acute cardiac complaints today. He states his blood sugar has been better at home. His son in law has been helping him eat a diet lower in carbs. He reports a long history of difficult to control BP. He states he can not measure at home due to the size of his bicep causing the cuff to break. He is not currently taking hydralazine, which appears to have first been prescribed orally in 2018. He deals with significant lymphedema, not recently worsened and wears compression bandages. He sees  "lymphedema clinic in [place]. Does not want to transition to local group just yet. Previously diagnosed with sleep apnea, not interested in trying CPAP machine again.  He denies chest pain/pressure/tightness/discomfort, dyspnea on regular exertion, orthopnea, PND, sustained palpitations, syncope or claudication symptoms.        ROS:    Pertinent ROS included in HPI and below.  PMH:     Past Medical History:   Diagnosis Date    Anxiety     Bell's palsy     CHF (congestive heart failure)     Chronic kidney disease, stage 3a 11/02/2021    Coronary artery disease involving native coronary artery without angina pectoris 10/18/2016    Depression     Diabetes mellitus     Glaucoma     Hypertension     Idiopathic peripheral neuropathy 12/11/2012    Lymphedema of both lower extremities     Malignant melanoma of skin of forehead 10/13/2022    Mixed hyperlipidemia 12/11/2012    Morbid obesity with BMI of 45.0-49.9, adult 02/12/2019    Pancytopenia     Sleep apnea     "unable to use"    Stage 3b chronic kidney disease     Type 2 diabetes mellitus with diabetic polyneuropathy, with long-term current use of insulin 12/11/2012    Vitamin B 12 deficiency 08/23/2012     Past Surgical History:   Procedure Laterality Date    CARDIAC CATHETERIZATION  7/22/13    non obstructive cad    CATARACT EXTRACTION  OU    CLOSURE OF WOUND Left 10/14/2022    Procedure: CLOSURE, WOUND;  Surgeon: Jovita Ceballos MD;  Location: [facility] OR;  Service: ENT;  Laterality: Left;  closure of forehead    COLONOSCOPY N/A 5/1/2019    Procedure: COLONOSCOPY;  Surgeon: Henry Mo III, MD;  Location: [facility] ENDO;  Service: Endoscopy;  Laterality: N/A;    CORONARY ANGIOPLASTY      ESOPHAGOGASTRODUODENOSCOPY N/A 5/1/2019    Procedure: ESOPHAGOGASTRODUODENOSCOPY (EGD);  Surgeon: Henry Mo III, MD;  Location: [facility] ENDO;  Service: Endoscopy;  Laterality: N/A;    EXCISION OF MELANOMA Left 10/7/2022    Procedure: EXCISION, MELANOMA;  Surgeon: Jovita Ceballos MD;  " Location: [facility] OR;  Service: ENT;  Laterality: Left;    EYE SURGERY      FRACTURE SURGERY      kidney stone       August 2016    PC IOL OU      RETINAL DETACHMENT REPAIR W/ SCLERAL BUCKLE LE      WRIST SURGERY       Allergies:     Review of patient's allergies indicates:   Allergen Reactions    Cefazolin Other (See Comments)     Shakes, chills, dizziness     Medications:     Current Outpatient Medications on File Prior to Visit   Medication Sig Dispense Refill    amLODIPine (NORVASC) 5 MG tablet Take 1 tablet (5 mg total) by mouth every evening. 90 tablet 1    aspirin 81 MG Chew Take 81 mg by mouth once daily.      atorvastatin (LIPITOR) 20 MG tablet Take 1 tablet (20 mg total) by mouth every evening. 90 tablet 1    AZOPT 1 % ophthalmic suspension Place 1 drop into both eyes 3 (three) times daily. Brand name only 10 mL 6    blood-glucose meter kit To check BG 4 times daily, to use with insurance preferred meter 1 each 0    brimonidine 0.1% (ALPHAGAN P) 0.1 % Drop Place 1 drop into both eyes 3 (three) times daily. 10 mL 4    cloNIDine (CATAPRES) 0.1 MG tablet Take 1 tablet (0.1 mg total) by mouth 3 (three) times daily. 270 tablet 1    dextran 70-hypromellose (TEARS) ophthalmic solution Place 2 drops into the left eye every 4 (four) hours. 30 mL 11    flash glucose sensor (FREESTYLE CLEMENCIA 14 DAY SENSOR) Kit 1 each by Misc.(Non-Drug; Combo Route) route every 14 (fourteen) days. 6 kit 3    furosemide (LASIX) 40 MG tablet Take 1 tablet (40 mg total) by mouth once daily. 90 tablet 1    insulin (BASAGLAR KWIKPEN U-100 INSULIN) glargine 100 units/mL SubQ pen Inject 36 Units into the skin once daily. 72 mL 3    insulin aspart U-100 (NOVOLOG) 100 unit/mL (3 mL) InPn pen Inject 12 Units into the skin 3 (three) times daily with meals. 69 mL 3    lancets Mis To check BG 4 times daily, to use with insurance preferred meter 300 each 3    losartan (COZAAR) 100 MG tablet Take 1 tablet (100 mg total) by mouth once daily. 90 tablet 2  "   netarsudiL (RHOPRESSA) 0.02 % ophthalmic solution Place 1 drop into both eyes once daily. 2.5 mL 6    nitroGLYCERIN (NITROSTAT) 0.4 MG SL tablet Place 1 tablet (0.4 mg total) under the tongue every 5 (five) minutes as needed for Chest pain. 100 tablet 2    [DISCONTINUED] carvediloL (COREG) 6.25 MG tablet Take 1 tablet (6.25 mg total) by mouth 2 (two) times daily. (Patient taking differently: Take 12.5 mg by mouth 2 (two) times daily.) 180 tablet 0    blood sugar diagnostic Strp To check BG 4 times daily, to use with insurance preferred meter (Patient not taking: Reported on 8/7/2023) 300 each 3    [DISCONTINUED] hydrALAZINE (APRESOLINE) 50 MG tablet Take 1 tablet (50 mg total) by mouth every 8 (eight) hours. (Patient not taking: Reported on 8/7/2023) 90 tablet 11    [DISCONTINUED] hydroCHLOROthiazide (HYDRODIURIL) 25 MG tablet Take 1 tablet (25 mg total) by mouth once daily. 90 tablet 1    [DISCONTINUED] miglitoL (GLYSET) 25 MG Tab Take 1 tablet (25 mg total) by mouth 3 (three) times daily with meals. 270 tablet 3     No current facility-administered medications on file prior to visit.     Social History:     Social History     Tobacco Use    Smoking status: Never    Smokeless tobacco: Never   Substance Use Topics    Alcohol use: Yes     Comment: " one to two glasses of wine per year"     Family History:     Family History   Problem Relation Age of Onset    Macular degeneration Father     Heart disease Father         CHF     Heart attack Father     Hypertension Mother     Macular degeneration Paternal Uncle     Hypertension Maternal Grandmother     Hypertension Maternal Grandfather     Strabismus Neg Hx     Retinal detachment Neg Hx     Glaucoma Neg Hx     Blindness Neg Hx     Amblyopia Neg Hx      Physical Exam:   BP (!) 168/86   Pulse 96   Ht 6' 7" (2.007 m)   Wt (!) 192.3 kg (423 lb 15.1 oz)   BMI 47.76 kg/m²      Physical Exam  Vitals and nursing note reviewed.   Constitutional:       Appearance: Normal " appearance. He is morbidly obese.   HENT:      Head: Normocephalic and atraumatic.   Neck:      Vascular: No carotid bruit or hepatojugular reflux.   Cardiovascular:      Rate and Rhythm: Normal rate and regular rhythm.      Pulses:           Radial pulses are 2+ on the right side and 2+ on the left side.         Right dorsalis pedis pulse not accessible and left dorsalis pedis pulse not accessible.         Right posterior tibial pulse not accessible and left posterior tibial pulse not accessible.      Heart sounds: S1 normal and S2 normal.   Pulmonary:      Effort: Pulmonary effort is normal.      Breath sounds: Normal breath sounds.   Abdominal:      General: Bowel sounds are normal.      Palpations: Abdomen is soft.      Tenderness: There is no abdominal tenderness.   Musculoskeletal:      Right lower le+ Edema present.      Left lower le+ Edema present.   Feet:      Right foot:      Skin integrity: Skin integrity normal.      Left foot:      Skin integrity: Skin integrity normal.   Neurological:      Mental Status: He is alert.   Psychiatric:         Behavior: Behavior is cooperative.          Labs:     Blood Tests:  Lab Results   Component Value Date     (H) 08/15/2022     2023    K 4.0 2023     (H) 2023    CO2 22 (L) 2023    BUN 23 2023    BUN 34 (A) 10/28/2013    CREATININE 1.9 (H) 2023    CREATININE 1.7 10/28/2013    GLU 83 2023    HGBA1C 7.2 (H) 2023    MG 2.0 2023    AST 11 2023    AST 14 10/28/2013    ALT 13 2023    ALBUMIN 3.0 (L) 2023    PROT 5.4 (L) 2023    BILITOT 0.6 2023    WBC 4.12 2023    HGB 14.0 2023    HCT 41.5 2023    MCV 91 2023     (L) 2023    INR 1.1 2022    TSH 1.259 2022       Lab Results   Component Value Date    CHOL 100 (L) 2022    HDL 32 (L) 2022    TRIG 118 2022    TRIG 130 10/28/2013       Lab Results    Component Value Date    LDLCALC 44.4 (L) 09/04/2022         Imaging:     Echocardiogram  TTE 9/4/2022  The left ventricle is normal in size with concentric hypertrophy and normal systolic function.  The estimated ejection fraction is 55%.  Normal left ventricular diastolic function.  Normal right ventricular size with normal right ventricular systolic function.  Overall the study quality was technically difficult. The study was difficult due to patient's clinical status, body habitus and poor endocardial visualization.  There is abnormal septal wall motion consistent with left bundle branch block.  Normal central venous pressure (3 mmHg).  The estimated PA systolic pressure is 25 mmHg.  The aortic root is mildly dilated.    Stress testing  SPECT 10/21/2020    The study shows normal myocardial perfusion.    The perfusion scan is free of evidence from myocardial ischemia or injury.    There is a  mild intensity fixed defect in the inferior wall of the left ventricle secondary to diaphragm attenuation.    There is normal wall motion at rest and post stress.    LV cavity size is normal at rest and normal at stress.    The EKG portion of this study is negative for ischemia.    The patient reported no chest pain during the stress test.    Arrhythmias during stress: rare PVCs.    Cath Lab  [facility] 9/21/2016       Patient has a right dominant coronary artery.        - Left Main Coronary Artery:              The LM is normal. There is WAYLON 3 flow.      - Left Anterior Descending Artery:              The mid LAD has a 40% stenosis. There is WAYLON 3 flow.      - Left Circumflex Artery:              The LCX has luminal irregularities. There is WAYLON 3 flow.      - Right Coronary Artery:              The mid RCA has a 80% stenosis. There is WAYLON 3 flow.                      Lesion Details:   This is a Type C lesion.  The length is 20mm, eccentric shape, moderate proximal tortuosity, segment angulation of 45-90 degrees, irregular  contour, moderate to heavy calcification. The lesion is ulcerated.              The distal RCA has a 50% stenosis. There is WAYLON 3 flow.    Other  12 Day Cardiac monitor 2022  Total Analysis Time: 12 days and 17 hours  Heart rates varied between 43 and 103 BPM with an average of 60 BPM.   The patient was monitored for a total of 13d 5h, underlying rhythm is Sinus.  The minimum heart rate was 43 bpm; the maximum 103 bpm; the average 60 bpm.  0 % of Atrial fibrillation/Atrial flutter with longest episode of 0 ms.  AV block with 0 %  There were 0 pauses, the longest pause was 0 ms at.  0 episodes of VT were found with Longest VT at 0 s .  6 supraventricular episodes were found. Longest SVT Episode 24 beats  There were a total of 0 PVCs with 0 morphologies and 0 couplets. Overall PVC [place] at 0 %  There were a total of 853 PSVCs with 1 morphologies and 0 couplets. Overall PSVC [place] at 0.08 %  There is a total of 0 patient events    EK2023  Sinus bradycardia   Left bundle branch block   Abnormal ECG   When compared with ECG of 2023 09:19,   No significant change was found    Assessment:     1. Hypertension associated with diabetes    2. Type 2 diabetes mellitus with diabetic polyneuropathy, with long-term current use of insulin    3. Coronary artery disease involving native coronary artery of native heart without angina pectoris    4. Chronic diastolic congestive heart failure    5. Type 2 diabetes mellitus with microalbuminuria, with long-term current use of insulin    6. Hyperlipidemia associated with type 2 diabetes mellitus    7. PVD (peripheral vascular disease)    8. Lymphedema    9. Stage 3b chronic kidney disease    10. BMI of 45.0-49.9, adult          Plan:     Coronary artery disease involving native coronary artery of native heart without angina pectoris  Continue aspirin and statin qD  Last stress test negative in   Reports being asymptomatic prior to NSTEMI in 2016    Essential  hypertension  Increase carvedilol to 25 mg BID  Limit dietary sodium  Caution in considering increase in amlodipine based on degree of PVD    Chronic diastolic congestive heart failure  Stable, continue to monitor    Type 2 diabetes mellitus with microalbuminuria, with long-term current use of insulin  Last A1c 7.2%  Followed by endocrinology    Hyperlipidemia associated with type 2 diabetes mellitus  Due to update annual lipid panel in Sept  Previously well controlled   Recommend Mediterranean diet     PVD (peripheral vascular disease)  Lymphedema  Continues to follow with [facility] for now  Requesting referral to podiatry   Stable, continue to monitor     Obstructive sleep apnea (untreated, refuses treatment)    CKD3b  Stop taking excessive aspirin. This could result in kidney damage, gastric bleeding and worsen HTN. Recommended tylenol, and follow up with PCP or orthopaedics for definitive/safe treatment.     BMI 45  Weight loss through a combination of diet and exercise encouraged.     Mr. Gordon should establish with a staff Cardiology MD at his next follow up.       Signed:  Mini Santos PA-C  Cardiology     8/7/2023 9:02 AM    Follow-up:     Future Appointments   Date Time Provider Department Center   8/21/2023  8:15 AM Jacinto Chauhan DPM [facility] POD [place]   9/25/2023 11:00 AM Chato Huber MD [facility] ENDODIA [place]   10/9/2023  1:00 PM Aure Tilley MD [facility] CARDIO [place]   10/10/2023  8:15 AM LAB, [facility] LAB [place]   11/6/2023 10:00 AM Kev Burgess MD [facility] OPHTHAL [place]

## 2023-08-07 NOTE — PATIENT INSTRUCTIONS
We recommend taking aspirin 81 mg once daily based on your history of coronary artery artery stent.     Try taking tylenol for your arthritis/shoulder pain. This is safer, and doesn't have any risk of stomach bleeding.

## 2023-09-11 ENCOUNTER — OFFICE VISIT (OUTPATIENT)
Dept: PODIATRY | Facility: CLINIC | Age: 68
End: 2023-09-11
Payer: MEDICARE

## 2023-09-11 VITALS — BODY MASS INDEX: 36.45 KG/M2 | WEIGHT: 315 LBS | HEIGHT: 78 IN

## 2023-09-11 DIAGNOSIS — Z79.4 TYPE 2 DIABETES MELLITUS WITH DIABETIC POLYNEUROPATHY, WITH LONG-TERM CURRENT USE OF INSULIN: Chronic | ICD-10-CM

## 2023-09-11 DIAGNOSIS — E11.42 TYPE 2 DIABETES MELLITUS WITH DIABETIC POLYNEUROPATHY, WITH LONG-TERM CURRENT USE OF INSULIN: Chronic | ICD-10-CM

## 2023-09-11 PROCEDURE — 99999 PR PBB SHADOW E&M-EST. PATIENT-LVL IV: ICD-10-PCS | Mod: PBBFAC,,, | Performed by: PODIATRIST

## 2023-09-11 PROCEDURE — 11721 DEBRIDE NAIL 6 OR MORE: CPT | Mod: PBBFAC,PO | Performed by: PODIATRIST

## 2023-09-11 PROCEDURE — 99214 OFFICE O/P EST MOD 30 MIN: CPT | Mod: PBBFAC,PO | Performed by: PODIATRIST

## 2023-09-11 PROCEDURE — 99499 UNLISTED E&M SERVICE: CPT | Mod: S$PBB,,, | Performed by: PODIATRIST

## 2023-09-11 PROCEDURE — 99499 NO LOS: ICD-10-PCS | Mod: S$PBB,,, | Performed by: PODIATRIST

## 2023-09-11 PROCEDURE — 99999 PR PBB SHADOW E&M-EST. PATIENT-LVL IV: CPT | Mod: PBBFAC,,, | Performed by: PODIATRIST

## 2023-09-11 PROCEDURE — 11721 PR DEBRIDEMENT OF NAILS, 6 OR MORE: ICD-10-PCS | Mod: S$PBB,Q9,, | Performed by: PODIATRIST

## 2023-09-11 PROCEDURE — 11721 DEBRIDE NAIL 6 OR MORE: CPT | Mod: S$PBB,Q9,, | Performed by: PODIATRIST

## 2023-09-11 NOTE — PROGRESS NOTES
Subjective:      Patient ID: Stepan Springer is a 68 y.o. male.    Chief Complaint: Diabetic Foot Exam (PCP Keaton Ramirez MD 5/31/2023)    Diabetes, increased risk amputation needing evaluation/management/optomization of foot care.    Cc2 thick long discolored misshapen toenails all toes.  Gradual onset, worsening over past several weeks, aggravated by increased weight bearing, shoe gear, pressure.  Periodic debridement helps symptoms. Denies trauma, surgery.    Review of Systems   Constitutional: Negative for chills, diaphoresis, fever, malaise/fatigue and night sweats.   Cardiovascular:  Positive for leg swelling. Negative for claudication, cyanosis and syncope.   Skin:  Positive for nail changes. Negative for color change, dry skin, rash, suspicious lesions and unusual hair distribution.   Musculoskeletal:  Negative for falls, joint pain, joint swelling, muscle cramps, muscle weakness and stiffness.   Gastrointestinal:  Negative for constipation, diarrhea, nausea and vomiting.   Neurological:  Positive for paresthesias. Negative for brief paralysis, disturbances in coordination, focal weakness, numbness, sensory change and tremors.         Objective:      Physical Exam  Constitutional:       General: He is not in acute distress.     Appearance: He is well-developed. He is not diaphoretic.   Cardiovascular:      Pulses:           Popliteal pulses are 2+ on the right side and 2+ on the left side.        Dorsalis pedis pulses are 1+ on the right side and 1+ on the left side.        Posterior tibial pulses are 1+ on the right side and 1+ on the left side.      Comments: Capillary refill 3 seconds all toes/distal feet, all toes/both feet warm to touch.      Negative lymphadenopathy bilateral popliteal fossa and tarsal tunnel.      <2+ pitting lower extremity edema bilateral.    Musculoskeletal:      Right ankle: No swelling, deformity, ecchymosis or lacerations. Normal range of motion. Normal pulse.      Right  Achilles Tendon: Normal. No defects. Bland's test negative.      Comments: Normal angle, base, station of gait. All ten toes without clubbing, cyanosis, or signs of ischemia.  No pain to palpation bilateral lower extremities.  Range of motion, stability, muscle strength, and muscle tone normal bilateral feet and legs.    Lymphadenopathy:      Lower Body: No right inguinal adenopathy. No left inguinal adenopathy.      Comments: Negative lymphadenopathy bilateral popliteal fossa and tarsal tunnel.    Negative lymphangitic streaking bilateral feet/ankles/legs.   Skin:     General: Skin is warm and dry.      Capillary Refill: Capillary refill takes 2 to 3 seconds.      Coloration: Skin is not pale.      Findings: No abrasion, bruising, burn, ecchymosis, erythema, laceration, lesion or rash.      Nails: There is no clubbing.      Comments: Skin thin, shiny, atrophic, with decreased density and distribution of pedal hair bilateral, but without hyperpigmentation, carola discoloration,  ulcers, masses, nodules or cords palpated bilateral feet and legs.    Toenails 1st, 2nd, 3rd, 4th, 5th  bilateral are hypertrophic thickened 2-3 mm, dystrophic, discolored tanish brown with tan, gray crumbly subungual debris.  Tender to distal nail plate pressure, without periungual skin abnormality of each.       Neurological:      Mental Status: He is alert and oriented to person, place, and time.      Sensory: Sensory deficit present.      Motor: No tremor, atrophy or abnormal muscle tone.      Gait: Gait normal.      Deep Tendon Reflexes:      Reflex Scores:       Patellar reflexes are 2+ on the right side and 2+ on the left side.       Achilles reflexes are 2+ on the right side and 2+ on the left side.     Comments: Paresthesias, intermittently bilateral feet with no clearly identified trigger or source.    Negative tinel sign to percussion sural, superficial peroneal, deep peroneal, saphenous, and posterior tibial nerves right and  left ankles and feet.    Decreased/absent vibratory sensation bilateral feet to 128Hz tuning fork.     Psychiatric:         Behavior: Behavior is cooperative.           Assessment:       Encounter Diagnosis   Name Primary?    Type 2 diabetes mellitus with diabetic polyneuropathy, with long-term current use of insulin          Plan:       Stepan was seen today for diabetic foot exam.    Diagnoses and all orders for this visit:    Type 2 diabetes mellitus with diabetic polyneuropathy, with long-term current use of insulin  -     Ambulatory referral/consult to Podiatry      I counseled the patient on his conditions, their implications and medical management.        The patient has received literature on basic diabetic foot care.  Patient will inspect feet daily, wear protective shoe gear when ambulatory, and apply moisturizer to skin as needed to maintain elasticity and help prevent ulceration.    With the patient's permission, I debrided all ten toenails with a sterile nipper and curette, removing all offending nail and debris.  Patient tolerated the procedure well and related significant relief.    Discussed conservative treatment with shoes of adequate dimensions, material, and style to alleviate symptoms and delay or prevent surgical intervention.    Continue compression, PCP management of diabetes and lymphedema.    1 year, prn.          Follow up in about 1 year (around 9/11/2024).

## 2023-09-19 NOTE — PROGRESS NOTES
"Subjective:       Patient ID: Stepan Springer is a 62 y.o. male.    Chief Complaint: Follow-up    Here for follow up of medical problems.  Lots of dietary indiscretion and injured knee which has made him less active.  Thinks can improve a lot.  AC breakfast sugars 115-120.  No f/c/sw/cough.  No cp/sob/palp.  BMs normal.  BPs usually good range.    Updated/ annual due 2/18:  HM: ref fluvax, 6/16 [id number], 4/14 [id number], 12/13 TDaP, 2/17 PSA/ Urol Dr. Simental, No Cscope, 9/16 Eye Dr. Hale, 9/16 HCV neg.          Review of Systems   Constitutional: Negative for chills, diaphoresis, fatigue and fever.   Respiratory: Negative for cough, chest tightness and shortness of breath.    Cardiovascular: Negative for chest pain, palpitations and leg swelling.   Gastrointestinal: Negative for blood in stool, constipation, diarrhea, nausea and vomiting.   Genitourinary: Negative for difficulty urinating and frequency.   Musculoskeletal: Negative for arthralgias.       Objective:   BP (!) 138/94 (BP Location: Right arm, Patient Position: Sitting)   Pulse 72   Temp 96.2 °F (35.7 °C) (Tympanic)   Ht 6' 7" (2.007 m)   Wt (!) 174.5 kg (384 lb 11.2 oz)   SpO2 97%   BMI 43.34 kg/m²     Physical Exam   Constitutional: He is oriented to person, place, and time. He appears well-developed and well-nourished.   HENT:   Mouth/Throat: Oropharynx is clear and moist.   Neck: Normal range of motion. Neck supple.   Cardiovascular: Normal rate, regular rhythm and intact distal pulses.  Exam reveals no gallop and no friction rub.    No murmur heard.  Pulmonary/Chest: Effort normal and breath sounds normal. He has no wheezes. He has no rales.   Abdominal: Soft. Bowel sounds are normal. He exhibits no mass. There is no tenderness.   Musculoskeletal: He exhibits no edema.   Lymphadenopathy:     He has no cervical adenopathy.   Neurological: He is alert and oriented to person, place, and time.   Psychiatric: He has a normal mood and affect. "           Results for orders placed or performed in visit on 01/04/18   Hemoglobin A1c   Result Value Ref Range    Hemoglobin A1C 9.1 (H) 4.0 - 5.6 %    Estimated Avg Glucose 214 (H) 68 - 131 mg/dL       Assessment:       1. Essential hypertension    2. Anxiety    3. Mixed hyperlipidemia    4. FRAN (obstructive sleep apnea)    5. Uncontrolled type 2 diabetes mellitus with stage 3 chronic kidney disease, with long-term current use of insulin    6. Preventive measure    7. Severe obesity (BMI >= 40)        Plan:       Stepan was seen today for follow-up.    Diagnoses and all orders for this visit:    Essential hypertension- monitor BPs when possible.    Anxiety and depression- doing well with Psych and counseling.    Mixed hyperlipidemia- check lab.    FRAN (obstructive sleep apnea) intol of CPAP.    Uncontrolled type 2 diabetes mellitus with stage 3 chronic kidney disease, with long-term current use of insulin- will work on diet/exercise.  -     Hemoglobin A1c; Future  -     Microalbumin/creatinine urine ratio; Future    Preventive measure- due in 3mo.  -     Comprehensive metabolic panel; Future  -     Lipid panel; Future  -     PSA, Screening; Future  -     CBC auto differential; Future  -     TSH; Future  -     Vitamin D; Future  -     Hemoglobin A1c; Future  -     Microalbumin/creatinine urine ratio; Future  -     Fecal Immunochemical Test (iFOBT); Future            Imiquimod Pregnancy And Lactation Text: This medication is Pregnancy Category C. It is unknown if this medication is excreted in breast milk.

## 2023-09-25 ENCOUNTER — PATIENT MESSAGE (OUTPATIENT)
Dept: ENDOCRINOLOGY | Facility: CLINIC | Age: 68
End: 2023-09-25

## 2023-09-25 ENCOUNTER — OFFICE VISIT (OUTPATIENT)
Dept: ENDOCRINOLOGY | Facility: CLINIC | Age: 68
End: 2023-09-25
Payer: MEDICARE

## 2023-09-25 ENCOUNTER — LAB VISIT (OUTPATIENT)
Dept: LAB | Facility: HOSPITAL | Age: 68
End: 2023-09-25
Attending: INTERNAL MEDICINE
Payer: MEDICARE

## 2023-09-25 VITALS
DIASTOLIC BLOOD PRESSURE: 80 MMHG | OXYGEN SATURATION: 98 % | SYSTOLIC BLOOD PRESSURE: 120 MMHG | BODY MASS INDEX: 46.77 KG/M2 | HEART RATE: 79 BPM | WEIGHT: 315 LBS

## 2023-09-25 DIAGNOSIS — Z79.4 TYPE 2 DIABETES MELLITUS WITH HYPERGLYCEMIA, WITH LONG-TERM CURRENT USE OF INSULIN: ICD-10-CM

## 2023-09-25 DIAGNOSIS — Z79.4 TYPE 2 DIABETES MELLITUS WITH MICROALBUMINURIA, WITH LONG-TERM CURRENT USE OF INSULIN: ICD-10-CM

## 2023-09-25 DIAGNOSIS — E66.9 OBESITY, DIABETES, AND HYPERTENSION SYNDROME: ICD-10-CM

## 2023-09-25 DIAGNOSIS — N25.81 HYPERPARATHYROIDISM, SECONDARY RENAL: ICD-10-CM

## 2023-09-25 DIAGNOSIS — E11.29 TYPE 2 DIABETES MELLITUS WITH MICROALBUMINURIA, WITH LONG-TERM CURRENT USE OF INSULIN: ICD-10-CM

## 2023-09-25 DIAGNOSIS — Z79.4 TYPE 2 DIABETES MELLITUS WITH HYPERGLYCEMIA, WITH LONG-TERM CURRENT USE OF INSULIN: Primary | ICD-10-CM

## 2023-09-25 DIAGNOSIS — R80.9 TYPE 2 DIABETES MELLITUS WITH MICROALBUMINURIA, WITH LONG-TERM CURRENT USE OF INSULIN: ICD-10-CM

## 2023-09-25 DIAGNOSIS — I15.2 OBESITY, DIABETES, AND HYPERTENSION SYNDROME: ICD-10-CM

## 2023-09-25 DIAGNOSIS — E11.65 TYPE 2 DIABETES MELLITUS WITH HYPERGLYCEMIA, WITH LONG-TERM CURRENT USE OF INSULIN: Primary | ICD-10-CM

## 2023-09-25 DIAGNOSIS — E11.69 OBESITY, DIABETES, AND HYPERTENSION SYNDROME: ICD-10-CM

## 2023-09-25 DIAGNOSIS — E11.65 TYPE 2 DIABETES MELLITUS WITH HYPERGLYCEMIA, WITH LONG-TERM CURRENT USE OF INSULIN: ICD-10-CM

## 2023-09-25 DIAGNOSIS — I89.0 LYMPHEDEMA: ICD-10-CM

## 2023-09-25 DIAGNOSIS — E22.0 ACROMEGALY: ICD-10-CM

## 2023-09-25 DIAGNOSIS — E11.59 OBESITY, DIABETES, AND HYPERTENSION SYNDROME: ICD-10-CM

## 2023-09-25 PROBLEM — E16.0 HYPOGLYCEMIA DUE TO INSULIN: Status: RESOLVED | Noted: 2023-06-02 | Resolved: 2023-09-25

## 2023-09-25 PROBLEM — T38.3X5A HYPOGLYCEMIA DUE TO INSULIN: Status: RESOLVED | Noted: 2023-06-02 | Resolved: 2023-09-25

## 2023-09-25 LAB
ALBUMIN SERPL BCP-MCNC: 3.7 G/DL (ref 3.5–5.2)
ALBUMIN/CREAT UR: 154.8 UG/MG (ref 0–30)
ALP SERPL-CCNC: 102 U/L (ref 55–135)
ALT SERPL W/O P-5'-P-CCNC: 21 U/L (ref 10–44)
ANION GAP SERPL CALC-SCNC: 7 MMOL/L (ref 8–16)
AST SERPL-CCNC: 14 U/L (ref 10–40)
BASOPHILS # BLD AUTO: 0.04 K/UL (ref 0–0.2)
BASOPHILS NFR BLD: 0.6 % (ref 0–1.9)
BILIRUB SERPL-MCNC: 0.5 MG/DL (ref 0.1–1)
BUN SERPL-MCNC: 37 MG/DL (ref 8–23)
CALCIUM SERPL-MCNC: 9.9 MG/DL (ref 8.7–10.5)
CHLORIDE SERPL-SCNC: 115 MMOL/L (ref 95–110)
CHOLEST SERPL-MCNC: 112 MG/DL (ref 120–199)
CHOLEST/HDLC SERPL: 2.9 {RATIO} (ref 2–5)
CO2 SERPL-SCNC: 24 MMOL/L (ref 23–29)
CREAT SERPL-MCNC: 1.9 MG/DL (ref 0.5–1.4)
CREAT UR-MCNC: 126 MG/DL (ref 23–375)
DIFFERENTIAL METHOD: ABNORMAL
EOSINOPHIL # BLD AUTO: 0.9 K/UL (ref 0–0.5)
EOSINOPHIL NFR BLD: 12.8 % (ref 0–8)
ERYTHROCYTE [DISTWIDTH] IN BLOOD BY AUTOMATED COUNT: 13.2 % (ref 11.5–14.5)
EST. GFR  (NO RACE VARIABLE): 38.4 ML/MIN/1.73 M^2
ESTIMATED AVG GLUCOSE: 151 MG/DL (ref 68–131)
GLUCOSE SERPL-MCNC: 120 MG/DL (ref 70–110)
HBA1C MFR BLD: 6.9 % (ref 4–5.6)
HCT VFR BLD AUTO: 48.8 % (ref 40–54)
HDLC SERPL-MCNC: 39 MG/DL (ref 40–75)
HDLC SERPL: 34.8 % (ref 20–50)
HGB BLD-MCNC: 16.1 G/DL (ref 14–18)
IMM GRANULOCYTES # BLD AUTO: 0.01 K/UL (ref 0–0.04)
IMM GRANULOCYTES NFR BLD AUTO: 0.1 % (ref 0–0.5)
LDLC SERPL CALC-MCNC: 49 MG/DL (ref 63–159)
LYMPHOCYTES # BLD AUTO: 1.2 K/UL (ref 1–4.8)
LYMPHOCYTES NFR BLD: 17.5 % (ref 18–48)
MCH RBC QN AUTO: 30.7 PG (ref 27–31)
MCHC RBC AUTO-ENTMCNC: 33 G/DL (ref 32–36)
MCV RBC AUTO: 93 FL (ref 82–98)
MICROALBUMIN UR DL<=1MG/L-MCNC: 195 UG/ML
MONOCYTES # BLD AUTO: 0.6 K/UL (ref 0.3–1)
MONOCYTES NFR BLD: 8.1 % (ref 4–15)
NEUTROPHILS # BLD AUTO: 4.3 K/UL (ref 1.8–7.7)
NEUTROPHILS NFR BLD: 60.9 % (ref 38–73)
NONHDLC SERPL-MCNC: 73 MG/DL
NRBC BLD-RTO: 0 /100 WBC
PLATELET # BLD AUTO: 181 K/UL (ref 150–450)
PMV BLD AUTO: 10.3 FL (ref 9.2–12.9)
POTASSIUM SERPL-SCNC: 4.2 MMOL/L (ref 3.5–5.1)
PROT SERPL-MCNC: 6.9 G/DL (ref 6–8.4)
RBC # BLD AUTO: 5.24 M/UL (ref 4.6–6.2)
SODIUM SERPL-SCNC: 146 MMOL/L (ref 136–145)
TRIGL SERPL-MCNC: 120 MG/DL (ref 30–150)
WBC # BLD AUTO: 7.04 K/UL (ref 3.9–12.7)

## 2023-09-25 PROCEDURE — 99999 PR PBB SHADOW E&M-EST. PATIENT-LVL V: ICD-10-PCS | Mod: PBBFAC,,, | Performed by: INTERNAL MEDICINE

## 2023-09-25 PROCEDURE — 80061 LIPID PANEL: CPT | Performed by: INTERNAL MEDICINE

## 2023-09-25 PROCEDURE — 95251 PR GLUCOSE MONITOR, 72 HOUR, PHYS INTERP: ICD-10-PCS | Mod: ,,, | Performed by: INTERNAL MEDICINE

## 2023-09-25 PROCEDURE — 85025 COMPLETE CBC W/AUTO DIFF WBC: CPT | Performed by: INTERNAL MEDICINE

## 2023-09-25 PROCEDURE — 83036 HEMOGLOBIN GLYCOSYLATED A1C: CPT | Performed by: INTERNAL MEDICINE

## 2023-09-25 PROCEDURE — 36415 COLL VENOUS BLD VENIPUNCTURE: CPT | Performed by: INTERNAL MEDICINE

## 2023-09-25 PROCEDURE — 99999 PR PBB SHADOW E&M-EST. PATIENT-LVL V: CPT | Mod: PBBFAC,,, | Performed by: INTERNAL MEDICINE

## 2023-09-25 PROCEDURE — 82043 UR ALBUMIN QUANTITATIVE: CPT | Performed by: INTERNAL MEDICINE

## 2023-09-25 PROCEDURE — 80053 COMPREHEN METABOLIC PANEL: CPT | Performed by: INTERNAL MEDICINE

## 2023-09-25 PROCEDURE — 99214 PR OFFICE/OUTPT VISIT, EST, LEVL IV, 30-39 MIN: ICD-10-PCS | Mod: 25,S$PBB,, | Performed by: INTERNAL MEDICINE

## 2023-09-25 PROCEDURE — 95251 CONT GLUC MNTR ANALYSIS I&R: CPT | Mod: ,,, | Performed by: INTERNAL MEDICINE

## 2023-09-25 PROCEDURE — 99214 OFFICE O/P EST MOD 30 MIN: CPT | Mod: 25,S$PBB,, | Performed by: INTERNAL MEDICINE

## 2023-09-25 PROCEDURE — 99215 OFFICE O/P EST HI 40 MIN: CPT | Mod: PBBFAC | Performed by: INTERNAL MEDICINE

## 2023-09-25 NOTE — ASSESSMENT & PLAN NOTE
We discussed the addition of a GLP-1 receptor agonists.  However, he citing concerns for side effects and prefers to stick with insulin.

## 2023-09-25 NOTE — PROGRESS NOTES
"NEW PATIENT VISIT    Subjective:      Chief Complaint: Diabetes follow-up    HPI: Stepan Springer is a 68 y.o. male who is here for type 2 diabetes mellitus      Past Medical History:   Diagnosis Date    Anxiety     Bell's palsy     CHF (congestive heart failure)     Chronic kidney disease, stage 3a 11/02/2021    Coronary artery disease involving native coronary artery without angina pectoris 10/18/2016    Depression     Diabetes mellitus     Glaucoma     Hypertension     Idiopathic peripheral neuropathy 12/11/2012    Lymphedema of both lower extremities     Malignant melanoma of skin of forehead 10/13/2022    Mixed hyperlipidemia 12/11/2012    Morbid obesity with BMI of 45.0-49.9, adult 02/12/2019    Pancytopenia     Sleep apnea     "unable to use"    Stage 3b chronic kidney disease     Type 2 diabetes mellitus with diabetic polyneuropathy, with long-term current use of insulin 12/11/2012    Vitamin B 12 deficiency 08/23/2012       With regards to the type 2 diabetes:    Current symptoms/problems include:  He reports changing his diet up since moving back to [place], which has resulted in significant improvement in his blood sugar control.  He has only been using 9 units of Basaglar daily.  It is unclear who gave him the instruction to do that, but nevertheless it seems to be working okay from a blood sugar standpoint.  He does have Sriram view, but the e-mail he used to set up the account is no longer active.  He will need assistance in setting up a new account so that he can link to ours.    Most recent a1c was 6.9 on 09/25/2023    Medical Therapy:  Current diabetic medications include:   Basaglar 9 units daily  Novolog 20 units TID with meals + correction    Diabetes Medications               insulin (BASAGLAR KWIKPEN U-100 INSULIN) glargine 100 units/mL SubQ pen Inject 36 Units into the skin once daily.    insulin aspart U-100 (NOVOLOG) 100 unit/mL (3 mL) InPn pen Inject 12 Units into the skin 3 (three) " times daily with meals.         Blood sugars:  Current monitoring regimen: CGM - Freestyle Sriram 2   87% TIR with low variability. 1% hypoglycemia. Overall control is acceptable.        Any episodes of hypoglycemia? Not since dropping basal insulin to 9 units    Current diet:  Keto-friendly diet since moving to [place] - 2/2023.  Breakfast:  Kemp shake and bar  Lunch:   Keto-friendly meals  Dinner:  Keto-friendly meals  Snacks:  Cashews or other nuts, pork rinds  Drinks:  Zero calorie sodas    Current exercise: plans to start exercising next week - walking.     Weight trend:  Wt Readings from Last 10 Encounters:   09/25/23 (!) 188.3 kg (415 lb 2 oz)   09/11/23 (!) 192.3 kg (423 lb 15.1 oz)   08/07/23 (!) 192.3 kg (423 lb 15.1 oz)   07/10/23 (!) 185 kg (407 lb 13.6 oz)   06/02/23 (!) 189.6 kg (417 lb 15.9 oz)   05/31/23 (!) 184.1 kg (405 lb 13.9 oz)   04/18/23 (!) 185.4 kg (408 lb 11.7 oz)   04/18/23 (!) 185.4 kg (408 lb 11.7 oz)   02/20/23 (!) 184.2 kg (406 lb)   01/20/23 (!) 184.3 kg (406 lb 4.9 oz)       Diabetes Management Status    Statin: Taking  ACE/ARB: Taking    Hypertension Medications               amLODIPine (NORVASC) 5 MG tablet Take 1 tablet (5 mg total) by mouth every evening.    carvediloL (COREG) 25 MG tablet Take 1 tablet (25 mg total) by mouth 2 (two) times daily.    cloNIDine (CATAPRES) 0.1 MG tablet Take 1 tablet (0.1 mg total) by mouth 3 (three) times daily.    furosemide (LASIX) 40 MG tablet Take 1 tablet (40 mg total) by mouth once daily.    losartan (COZAAR) 100 MG tablet Take 1 tablet (100 mg total) by mouth once daily.    nitroGLYCERIN (NITROSTAT) 0.4 MG SL tablet Place 1 tablet (0.4 mg total) under the tongue every 5 (five) minutes as needed for Chest pain.         BP Readings from Last 5 Encounters:   09/25/23 120/80   08/07/23 (!) 168/86   07/10/23 (!) 193/96   06/05/23 128/77   05/31/23 (!) 172/122       Hyperlipidemia Medications               atorvastatin (LIPITOR) 20 MG tablet  Take 1 tablet (20 mg total) by mouth every evening.           Lab Results   Component Value Date    LDLCALC 49.0 (L) 09/25/2023    LDLCALC 44.4 (L) 09/04/2022    LDLCALC 100.8 07/07/2022    HDL 39 (L) 09/25/2023    HDL 32 (L) 09/04/2022    HDL 40 07/07/2022    TRIG 120 09/25/2023    TRIG 118 09/04/2022    TRIG 181 (H) 07/07/2022       Screening or Prevention Patient's value Goal Complete/Controlled?   HgA1C Testing and Control   Lab Results   Component Value Date    HGBA1C 6.9 (H) 09/25/2023      Annually/Less than 8% Yes   Lipid profile : 09/25/2023 Annually No   LDL control Lab Results   Component Value Date    LDLCALC 49.0 (L) 09/25/2023    Annually/Less than 100 mg/dl  No   Nephropathy screening Lab Results   Component Value Date    LABMICR 195.0 09/25/2023     Lab Results   Component Value Date    PROTEINUA Negative 06/05/2023     Lab Results   Component Value Date    MICALBCREAT 154.8 (H) 09/25/2023    MICALBCREAT 105.3 (H) 02/27/2023    MICALBCREAT 35.0 (H) 10/19/2021    Annually Yes   Blood pressure BP Readings from Last 1 Encounters:   09/25/23 120/80    Less than 140/90 Yes   Dilated retinal exam : 12/07/2021- scheduled to see Dr. Burgess in November Annually Yes   Foot exam   : 09/11/2023 Annually Yes        Lab Results   Component Value Date    HGBA1C 6.9 (H) 09/25/2023    HGBA1C 7.2 (H) 06/02/2023    HGBA1C 7.9 (H) 02/27/2023    HGBA1C 6.5 (H) 08/15/2022    HGBA1C 7.0 (H) 07/07/2022    HGBA1C 6.9 (H) 02/08/2022    HGBA1C 5.8 (H) 09/09/2021    HGBA1C 7.7 (H) 05/06/2021    HGBA1C 11.3 (H) 01/25/2021    HGBA1C 13.4 (H) 10/08/2020     Lab Results   Component Value Date    CPEPTIDE 2.70 12/13/2018    GLUTAMICACID 0.00 12/13/2018       Other medications tried:  Jardiance --- discontinued due to low carb diet  glyburide --- discontinued due to alternate therapy  miglitol --- discontinued due to alternate therapy    Lifestyle:    Last visit with Diabetes Educator: : 12/09/2021        Objective:     Vitals:     09/25/23 1107   BP: 120/80   Pulse: 79         BP Readings from Last 5 Encounters:   09/25/23 120/80   08/07/23 (!) 168/86   07/10/23 (!) 193/96   06/05/23 128/77   05/31/23 (!) 172/122         Physical Exam  Vitals and nursing note reviewed.   Constitutional:       General: He is not in acute distress.     Appearance: He is well-developed. He is obese. He is not ill-appearing.      Comments: +Acromegalic features - large hands and feet, frontal bossing, prognathism.   HENT:      Head: Normocephalic and atraumatic.   Eyes:      General:         Right eye: No discharge.         Left eye: No discharge.      Conjunctiva/sclera: Conjunctivae normal.   Neck:      Thyroid: No thyromegaly.      Trachea: No tracheal deviation.   Pulmonary:      Effort: Pulmonary effort is normal. No respiratory distress.   Musculoskeletal:      Right lower leg: Edema present.      Left lower leg: Edema present.      Comments: No digital clubbing or extremity cyanosis     Neurological:      Mental Status: He is alert and oriented to person, place, and time.      Coordination: Coordination normal.   Psychiatric:         Mood and Affect: Mood normal.         Behavior: Behavior normal.           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  There was a pituitary MRI done in 2010, but it was a limited study due to open MRI.  Recheck serum IGF-1 now.  If high again, will check 75 g OGTT for growth hormone.    Obesity, diabetes, and hypertension syndrome  We discussed the addition of a GLP-1 receptor agonists.  However, he citing concerns for side effects and prefers to stick with insulin.    Type 2 diabetes mellitus with microalbuminuria, with long-term current use of  insulin  Reviewed goals of therapy are to get the best control we can without hypoglycemia.    Currently meeting glycemic target: yes    No changes were made today. Continue the same regimen.    Reviewed patient's current insulin regimen. Clarified proper insulin dose and timing in relation to meals, etc. Insulin injection sites and proper rotation instructed.      Advised frequent self blood glucose monitoring. Patient encouraged to document glucose results and bring them to every clinic visit.    Hypoglycemia precautions discussed.     Discussed diet and exercise.    Diabetes health maintenance topics are addressed in the HPI    Lab Results   Component Value Date    HGBA1C 6.9 (H) 09/25/2023    HGBA1C 7.2 (H) 06/02/2023    HGBA1C 7.9 (H) 02/27/2023         Lymphedema  Using compression stockings.  Following with Podiatry every three months.      Follow up in about 3 months (around 12/25/2023).

## 2023-09-25 NOTE — ASSESSMENT & PLAN NOTE
Reviewed goals of therapy are to get the best control we can without hypoglycemia.    Currently meeting glycemic target: yes    No changes were made today. Continue the same regimen.    Reviewed patient's current insulin regimen. Clarified proper insulin dose and timing in relation to meals, etc. Insulin injection sites and proper rotation instructed.      Advised frequent self blood glucose monitoring. Patient encouraged to document glucose results and bring them to every clinic visit.    Hypoglycemia precautions discussed.     Discussed diet and exercise.    Diabetes health maintenance topics are addressed in the HPI    Lab Results   Component Value Date    HGBA1C 6.9 (H) 09/25/2023    HGBA1C 7.2 (H) 06/02/2023    HGBA1C 7.9 (H) 02/27/2023

## 2023-09-25 NOTE — ASSESSMENT & PLAN NOTE
He has multiple clinical features of acromegaly along with previously elevated IGF-1 level.  There was a pituitary MRI done in 2010, but it was a limited study due to open MRI.  Recheck serum IGF-1 now.  If high again, will check 75 g OGTT for growth hormone.

## 2023-09-27 ENCOUNTER — PATIENT MESSAGE (OUTPATIENT)
Dept: ENDOCRINOLOGY | Facility: CLINIC | Age: 68
End: 2023-09-27
Payer: MEDICARE

## 2023-09-27 LAB
IGF-I SERPL-MCNC: 207 NG/ML (ref 32–209)
IGF-I Z-SCORE SERPL: 1.96 SD

## 2023-10-09 ENCOUNTER — OFFICE VISIT (OUTPATIENT)
Dept: CARDIOLOGY | Facility: CLINIC | Age: 68
End: 2023-10-09
Payer: MEDICARE

## 2023-10-09 VITALS
WEIGHT: 315 LBS | HEIGHT: 78 IN | DIASTOLIC BLOOD PRESSURE: 85 MMHG | HEART RATE: 59 BPM | SYSTOLIC BLOOD PRESSURE: 158 MMHG | BODY MASS INDEX: 36.45 KG/M2 | OXYGEN SATURATION: 97 %

## 2023-10-09 DIAGNOSIS — N18.32 STAGE 3B CHRONIC KIDNEY DISEASE: ICD-10-CM

## 2023-10-09 DIAGNOSIS — E66.01 MORBID OBESITY WITH BMI OF 45.0-49.9, ADULT: ICD-10-CM

## 2023-10-09 DIAGNOSIS — E11.69 HYPERLIPIDEMIA ASSOCIATED WITH TYPE 2 DIABETES MELLITUS: ICD-10-CM

## 2023-10-09 DIAGNOSIS — E11.59 HYPERTENSION ASSOCIATED WITH DIABETES: Chronic | ICD-10-CM

## 2023-10-09 DIAGNOSIS — I15.2 HYPERTENSION ASSOCIATED WITH DIABETES: Chronic | ICD-10-CM

## 2023-10-09 DIAGNOSIS — E78.5 HYPERLIPIDEMIA ASSOCIATED WITH TYPE 2 DIABETES MELLITUS: ICD-10-CM

## 2023-10-09 DIAGNOSIS — I89.0 LYMPHEDEMA: Primary | ICD-10-CM

## 2023-10-09 DIAGNOSIS — I10 ESSENTIAL HYPERTENSION: ICD-10-CM

## 2023-10-09 DIAGNOSIS — I25.10 CORONARY ARTERY DISEASE INVOLVING NATIVE CORONARY ARTERY OF NATIVE HEART WITHOUT ANGINA PECTORIS: ICD-10-CM

## 2023-10-09 PROCEDURE — 99999 PR PBB SHADOW E&M-EST. PATIENT-LVL V: ICD-10-PCS | Mod: PBBFAC,GC,, | Performed by: STUDENT IN AN ORGANIZED HEALTH CARE EDUCATION/TRAINING PROGRAM

## 2023-10-09 PROCEDURE — 99999 PR PBB SHADOW E&M-EST. PATIENT-LVL V: CPT | Mod: PBBFAC,GC,, | Performed by: STUDENT IN AN ORGANIZED HEALTH CARE EDUCATION/TRAINING PROGRAM

## 2023-10-09 PROCEDURE — 99213 OFFICE O/P EST LOW 20 MIN: CPT | Mod: S$PBB,GC,, | Performed by: STUDENT IN AN ORGANIZED HEALTH CARE EDUCATION/TRAINING PROGRAM

## 2023-10-09 PROCEDURE — 99215 OFFICE O/P EST HI 40 MIN: CPT | Mod: PBBFAC | Performed by: STUDENT IN AN ORGANIZED HEALTH CARE EDUCATION/TRAINING PROGRAM

## 2023-10-09 PROCEDURE — 99213 PR OFFICE/OUTPT VISIT, EST, LEVL III, 20-29 MIN: ICD-10-PCS | Mod: S$PBB,GC,, | Performed by: STUDENT IN AN ORGANIZED HEALTH CARE EDUCATION/TRAINING PROGRAM

## 2023-10-09 RX ORDER — CARVEDILOL 25 MG/1
25 TABLET ORAL 2 TIMES DAILY
Qty: 180 TABLET | Refills: 3 | Status: SHIPPED | OUTPATIENT
Start: 2023-10-09 | End: 2024-10-08

## 2023-10-09 RX ORDER — VALSARTAN 320 MG/1
320 TABLET ORAL DAILY
Qty: 90 TABLET | Refills: 3 | Status: SHIPPED | OUTPATIENT
Start: 2023-10-09 | End: 2024-01-04 | Stop reason: SDUPTHER

## 2023-10-09 NOTE — PROGRESS NOTES
Clinic Note  10/9/2023      Subjective:       Patient ID:  Stepan is a 68 y.o. male being seen for an urgent care visit.    Chief Complaint: No chief complaint on file.    Stepan Springer is a 68 year old male with the following co-morbidities  CAD h/o NSTEMI  -s/p PCI midRCA SABINO x2 and mid LAD 40% stenosis (2016)  LBBB  DMT2  H/o TIA (2022)  CKD stage III  HTN  HLD  PVD  FRAN, does not want CPAP     He was last seen 2 months ago by Tom CHIRINOS. At that time his carvedilol was increased to 25mg BID. He is also on clonidine 0.1mg TID, losartan 100mg, amlodipine 5mg qHS, lasix 40mg qd. He reports compliance with these meds. He states he has had 3 BP cuffs but they all got damaged. As such, he no longer checks his BP at home. He denies salt intake and only eats out once a month. He is back to walking 3-4 times weekly for approximately 15 mins. States he is deconditioned from not walking over the summer and trying to get used to oincreasing his tolerance. Prior to that he was walking at least a mile. He currently denies chest pain, dyspnea, lightheadedness, palpitations, orthopnea, PND, states lymphedema is under control. He follows with lymphedema clinic in  but will like to establish care here. Of note, he is also being evaluated for possible acromegaly by his endocrinologist     DM: A1c controlled 6.9  Lipid: LDL 49, within goal, HDL 39,           Review of Systems   Constitutional:  Negative for chills and fever.   Eyes: Negative.    Respiratory:  Negative for shortness of breath.    Cardiovascular:  Positive for leg swelling. Negative for chest pain, palpitations, orthopnea and PND.   Gastrointestinal: Negative.  Negative for nausea and vomiting.   Genitourinary: Negative.    Musculoskeletal: Negative.    Skin: Negative.    Neurological:  Negative for dizziness, weakness and headaches.   Psychiatric/Behavioral: Negative.       Medication List with Changes/Refills   Current Medications    AMLODIPINE  (NORVASC) 5 MG TABLET    Take 1 tablet (5 mg total) by mouth every evening.    ASPIRIN 81 MG CHEW    Take 81 mg by mouth once daily.    ATORVASTATIN (LIPITOR) 20 MG TABLET    Take 1 tablet (20 mg total) by mouth every evening.    AZOPT 1 % OPHTHALMIC SUSPENSION    Place 1 drop into both eyes 3 (three) times daily. Brand name only    BLOOD SUGAR DIAGNOSTIC STRP    To check BG 4 times daily, to use with insurance preferred meter    BLOOD-GLUCOSE METER KIT    To check BG 4 times daily, to use with insurance preferred meter    BRIMONIDINE 0.1% (ALPHAGAN P) 0.1 % DROP    Place 1 drop into both eyes 3 (three) times daily.    CARVEDILOL (COREG) 25 MG TABLET    Take 1 tablet (25 mg total) by mouth 2 (two) times daily.    CLONIDINE (CATAPRES) 0.1 MG TABLET    Take 1 tablet (0.1 mg total) by mouth 3 (three) times daily.    DEXTRAN 70-HYPROMELLOSE (TEARS) OPHTHALMIC SOLUTION    Place 2 drops into the left eye every 4 (four) hours.    FLASH GLUCOSE SENSOR (FREESTYLE CLEMENCIA 14 DAY SENSOR) KIT    1 each by Misc.(Non-Drug; Combo Route) route every 14 (fourteen) days.    FUROSEMIDE (LASIX) 40 MG TABLET    Take 1 tablet (40 mg total) by mouth once daily.    INSULIN (BASAGLAR KWIKPEN U-100 INSULIN) GLARGINE 100 UNITS/ML SUBQ PEN    Inject 36 Units into the skin once daily.    INSULIN ASPART U-100 (NOVOLOG) 100 UNIT/ML (3 ML) INPN PEN    Inject 12 Units into the skin 3 (three) times daily with meals.    LANCETS MISC    To check BG 4 times daily, to use with insurance preferred meter    LOSARTAN (COZAAR) 100 MG TABLET    Take 1 tablet (100 mg total) by mouth once daily.    NETARSUDIL (RHOPRESSA) 0.02 % OPHTHALMIC SOLUTION    Place 1 drop into both eyes once daily.    NITROGLYCERIN (NITROSTAT) 0.4 MG SL TABLET    Place 1 tablet (0.4 mg total) under the tongue every 5 (five) minutes as needed for Chest pain.       Patient Active Problem List   Diagnosis    Status post cataract extraction and insertion of intraocular lens    Corneal  opacity - Left Eye    Type 2 diabetes mellitus with diabetic polyneuropathy, with long-term current use of insulin    Obstructive sleep apnea (untreated, refuses treatment)    Chronic diastolic congestive heart failure    LBBB (left bundle branch block)    Type 2 diabetes mellitus with stage 3b chronic kidney disease, with long-term current use of insulin    Anxiety    Hypertension associated with diabetes    Stage 3b chronic kidney disease    Primary open angle glaucoma of both eyes, severe stage    Left nephrolithiasis    Hydronephrosis, left    NSTEMI (non-ST elevated myocardial infarction)    CAD with remote PCI (BMS) of RCA in 9/2016    Recurrent major depressive disorder    Vasculogenic erectile dysfunction    Blindness and low vision    Hx of retinal detachment    High myopia, both eyes    Epiretinal membrane (ERM) of left eye    Lattice degeneration of peripheral retina, left    Right-sided Bell's palsy    Hyperlipidemia associated with type 2 diabetes mellitus    Class 3 severe obesity due to excess calories with serious comorbidity and body mass index (BMI) of 45.0 to 49.9 in adult    GERD (gastroesophageal reflux disease)    Hypoglycemia unawareness associated with type 2 diabetes mellitus    Elevated troponin    Thrombocytopenia    Pancytopenia    History of COVID-19 (April, 2020)    PVD (peripheral vascular disease)    Influenza Immunization not carried out because of patient refusal    Type 2 diabetes mellitus with microalbuminuria, with long-term current use of insulin    Hyperparathyroidism, secondary renal    Vitamin D deficiency    Lymphedema    Hypertensive urgency    Diabetic ulcer of toe of left foot associated with type 2 diabetes mellitus, limited to breakdown of skin    Transient visual disturbance    Essential hypertension    Malignant melanoma of skin of forehead    Open wound of forehead    Benign hypertensive heart and kidney disease with HF and CKD    Obesity, diabetes, and hypertension  "syndrome    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    Weight as of 9/25/23: 188.3 kg (415 lb 2 oz).  Physical Exam  Constitutional:       General: He is not in acute distress.     Appearance: He is obese. He is not ill-appearing, toxic-appearing or diaphoretic.   HENT:      Head: Normocephalic.      Nose: Nose normal.      Mouth/Throat:      Mouth: Mucous membranes are moist.   Eyes:      Extraocular Movements: Extraocular movements intact.   Cardiovascular:      Rate and Rhythm: Normal rate.      Pulses:           Radial pulses are 2+ on the right side and 2+ on the left side.      Heart sounds: Normal heart sounds. No murmur heard.  Pulmonary:      Effort: Pulmonary effort is normal. No respiratory distress.      Breath sounds: Normal breath sounds. No wheezing or rales.   Abdominal:      General: Abdomen is flat. There is no distension.   Musculoskeletal:         General: Swelling (2+ pitting edema.) present. No tenderness. Normal range of motion.      Cervical back: Normal range of motion.   Skin:     General: Skin is warm.   Neurological:      Mental Status: He is alert and oriented to person, place, and time. Mental status is at baseline.   Psychiatric:         Mood and Affect: Mood normal.         Behavior: Behavior normal.         Thought Content: Thought content normal.       Assessment and Plan:     Stepan was seen today for establish care.    Diagnoses and all orders for this visit:    Lymphedema  Previously seen by lymphedema clinic in . Interested in seeing Dr. Ballard. Will send referral.  -     Ambulatory referral/consult to Vascular Cardiology; Future    Hypertension associated with diabetes   Uncontrolled. Doesn't check BP at home due to prior damaged BP " cuffs. Will switch losartan to valsartan 320mg daily.  Enroll in Digital HTN program  Will initiate work up for resistant HTN. Though he reports HTN since the age of 11y/o. Given his acromegaly like features, Possibly related to acromegaly?  -     carvediloL (COREG) 25 MG tablet; Take 1 tablet (25 mg total) by mouth 2 (two) times daily.  -     valsartan (DIOVAN) 320 MG tablet; Take 1 tablet (320 mg total) by mouth once daily.    BMI of 45.0-49.9, adult  Exercise discussed.     Stage 3b chronic kidney disease  Last seen by nephrology in 2021. Encouraged patient to follow up.     Hyperlipidemia associated with type 2 diabetes mellitus  LDL within goal. Continue atorvastatin    Coronary artery disease involving native coronary artery of native heart without angina pectoris  Continue aspirin and statin qD  Last stress test negative in 2020    Chronic diastolic congestive heart failure  Stable, continue to monitor    Obstructive Sleep Apnea  Encouraged to use CPAP        Follow Up:   Follow up in about 1 month (around 11/9/2023).        Plan discussed with attending Dr. Lo, further recommendations as per attending addendum. Please feel free to call with any questions or concerns.        Aure Tilley MD  Cardiovascular Disease  Fellow Physician - PGY5

## 2023-10-09 NOTE — PATIENT INSTRUCTIONS
STOP taking losartan. START valsartan.  WE will enroll you for the Digital hypertension program.  CHECK your BP often and keep a log.

## 2023-10-09 NOTE — PROGRESS NOTES
I have reviewed the notes, assessments, and/or procedures performed by Fellow, I concur with her/his documentation of Stepan Springer.   Will switch losartan to more effective ARB

## 2023-10-19 ENCOUNTER — LAB VISIT (OUTPATIENT)
Dept: LAB | Facility: HOSPITAL | Age: 68
End: 2023-10-19
Payer: MEDICARE

## 2023-10-19 DIAGNOSIS — E11.69 HYPERLIPIDEMIA ASSOCIATED WITH TYPE 2 DIABETES MELLITUS: ICD-10-CM

## 2023-10-19 DIAGNOSIS — I10 ESSENTIAL HYPERTENSION: ICD-10-CM

## 2023-10-19 DIAGNOSIS — E78.5 HYPERLIPIDEMIA ASSOCIATED WITH TYPE 2 DIABETES MELLITUS: ICD-10-CM

## 2023-10-19 LAB
ALBUMIN SERPL BCP-MCNC: 3.8 G/DL (ref 3.5–5.2)
ALP SERPL-CCNC: 97 U/L (ref 55–135)
ALT SERPL W/O P-5'-P-CCNC: 17 U/L (ref 10–44)
ANION GAP SERPL CALC-SCNC: 10 MMOL/L (ref 8–16)
ANION GAP SERPL CALC-SCNC: 10 MMOL/L (ref 8–16)
AST SERPL-CCNC: 19 U/L (ref 10–40)
BILIRUB SERPL-MCNC: 0.4 MG/DL (ref 0.1–1)
BUN SERPL-MCNC: 32 MG/DL (ref 8–23)
BUN SERPL-MCNC: 32 MG/DL (ref 8–23)
CALCIUM SERPL-MCNC: 9.7 MG/DL (ref 8.7–10.5)
CALCIUM SERPL-MCNC: 9.7 MG/DL (ref 8.7–10.5)
CHLORIDE SERPL-SCNC: 113 MMOL/L (ref 95–110)
CHLORIDE SERPL-SCNC: 113 MMOL/L (ref 95–110)
CHOLEST SERPL-MCNC: 105 MG/DL (ref 120–199)
CHOLEST/HDLC SERPL: 2.8 {RATIO} (ref 2–5)
CO2 SERPL-SCNC: 18 MMOL/L (ref 23–29)
CO2 SERPL-SCNC: 18 MMOL/L (ref 23–29)
CREAT SERPL-MCNC: 1.7 MG/DL (ref 0.5–1.4)
CREAT SERPL-MCNC: 1.7 MG/DL (ref 0.5–1.4)
EST. GFR  (NO RACE VARIABLE): 43.9 ML/MIN/1.73 M^2
EST. GFR  (NO RACE VARIABLE): 43.9 ML/MIN/1.73 M^2
GLUCOSE SERPL-MCNC: 107 MG/DL (ref 70–110)
GLUCOSE SERPL-MCNC: 107 MG/DL (ref 70–110)
HDLC SERPL-MCNC: 38 MG/DL (ref 40–75)
HDLC SERPL: 36.2 % (ref 20–50)
LDLC SERPL CALC-MCNC: 47.8 MG/DL (ref 63–159)
NONHDLC SERPL-MCNC: 67 MG/DL
POTASSIUM SERPL-SCNC: 4.3 MMOL/L (ref 3.5–5.1)
POTASSIUM SERPL-SCNC: 4.3 MMOL/L (ref 3.5–5.1)
PROT SERPL-MCNC: 6.9 G/DL (ref 6–8.4)
SODIUM SERPL-SCNC: 141 MMOL/L (ref 136–145)
SODIUM SERPL-SCNC: 141 MMOL/L (ref 136–145)
TRIGL SERPL-MCNC: 96 MG/DL (ref 30–150)

## 2023-10-19 PROCEDURE — 36415 COLL VENOUS BLD VENIPUNCTURE: CPT | Mod: PO | Performed by: PHYSICIAN ASSISTANT

## 2023-10-19 PROCEDURE — 80061 LIPID PANEL: CPT | Performed by: PHYSICIAN ASSISTANT

## 2023-10-19 PROCEDURE — 80053 COMPREHEN METABOLIC PANEL: CPT | Performed by: PHYSICIAN ASSISTANT

## 2023-10-30 ENCOUNTER — TELEPHONE (OUTPATIENT)
Dept: CARDIOLOGY | Facility: CLINIC | Age: 68
End: 2023-10-30
Payer: MEDICARE

## 2023-10-30 NOTE — TELEPHONE ENCOUNTER
Called the patient to discuss lab results. (Stable sCr 1.7), however patient was unreachable so a voicemail was left. Encouraged him to call back for any questions or concerns.     Aure Tilley MD  Cardiovascular Disease PGY5  [facility]

## 2023-11-06 ENCOUNTER — OFFICE VISIT (OUTPATIENT)
Dept: OPHTHALMOLOGY | Facility: CLINIC | Age: 68
End: 2023-11-06
Payer: MEDICARE

## 2023-11-06 DIAGNOSIS — H40.1133 PRIMARY OPEN ANGLE GLAUCOMA (POAG) OF BOTH EYES, SEVERE STAGE: Primary | ICD-10-CM

## 2023-11-06 DIAGNOSIS — Z98.49 STATUS POST CATARACT EXTRACTION AND INSERTION OF INTRAOCULAR LENS, UNSPECIFIED LATERALITY: Chronic | ICD-10-CM

## 2023-11-06 DIAGNOSIS — H54.10 BLINDNESS AND LOW VISION: ICD-10-CM

## 2023-11-06 DIAGNOSIS — Z96.1 STATUS POST CATARACT EXTRACTION AND INSERTION OF INTRAOCULAR LENS, UNSPECIFIED LATERALITY: Chronic | ICD-10-CM

## 2023-11-06 DIAGNOSIS — H35.372 EPIRETINAL MEMBRANE (ERM) OF LEFT EYE: ICD-10-CM

## 2023-11-06 DIAGNOSIS — H52.13 HIGH MYOPIA, BOTH EYES: ICD-10-CM

## 2023-11-06 DIAGNOSIS — Z86.69 HX OF RETINAL DETACHMENT: Chronic | ICD-10-CM

## 2023-11-06 PROCEDURE — 99213 OFFICE O/P EST LOW 20 MIN: CPT | Mod: PBBFAC | Performed by: OPHTHALMOLOGY

## 2023-11-06 PROCEDURE — 99204 PR OFFICE/OUTPT VISIT, NEW, LEVL IV, 45-59 MIN: ICD-10-PCS | Mod: S$PBB,,, | Performed by: OPHTHALMOLOGY

## 2023-11-06 PROCEDURE — 99204 OFFICE O/P NEW MOD 45 MIN: CPT | Mod: S$PBB,,, | Performed by: OPHTHALMOLOGY

## 2023-11-06 PROCEDURE — 99999 PR PBB SHADOW E&M-EST. PATIENT-LVL III: ICD-10-PCS | Mod: PBBFAC,,, | Performed by: OPHTHALMOLOGY

## 2023-11-06 PROCEDURE — 92250 FUNDUS PHOTOGRAPHY W/I&R: CPT | Mod: PBBFAC | Performed by: OPHTHALMOLOGY

## 2023-11-06 PROCEDURE — 92250 COLOR FUNDUS PHOTOGRAPHY - OU - BOTH EYES: ICD-10-PCS | Mod: 26,S$PBB,, | Performed by: OPHTHALMOLOGY

## 2023-11-06 PROCEDURE — 99999 PR PBB SHADOW E&M-EST. PATIENT-LVL III: CPT | Mod: PBBFAC,,, | Performed by: OPHTHALMOLOGY

## 2023-11-06 NOTE — PROGRESS NOTES
Assessment /Plan     For exam results, see Encounter Report.    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 CSM  Alphagan BID  Azopt BID  Rhopressa q day      PC IOL OU  Open OD / OS  OS SP Yag Cap 11/28/2017    + ERM OS with small cyst  ? Visually significant as discussed    RD precautions:  re-Discussed symptoms of RD with increased flashes, floaters, decreasing vision.  Patient/Family to call and return immediately to clinic should the symptoms of RD occur. Voiced good understanding with Q & A.    11/06/2023            Plan  RTC 2-3 month  Gonio, IOP & OCT RNFL & Adherence  rtc sooner prn with good understand

## 2023-11-13 ENCOUNTER — TELEPHONE (OUTPATIENT)
Dept: ENDOCRINOLOGY | Facility: CLINIC | Age: 68
End: 2023-11-13
Payer: MEDICARE

## 2023-11-13 NOTE — TELEPHONE ENCOUNTER
Called to schedule patient for OGTT per Dr. Huber no answer, left message to return call to clinic, call back number provided.

## 2023-12-04 ENCOUNTER — OFFICE VISIT (OUTPATIENT)
Dept: CARDIOLOGY | Facility: CLINIC | Age: 68
End: 2023-12-04
Payer: MEDICARE

## 2023-12-04 VITALS
OXYGEN SATURATION: 96 % | HEIGHT: 78 IN | DIASTOLIC BLOOD PRESSURE: 87 MMHG | BODY MASS INDEX: 36.45 KG/M2 | SYSTOLIC BLOOD PRESSURE: 179 MMHG | HEART RATE: 55 BPM | WEIGHT: 315 LBS

## 2023-12-04 DIAGNOSIS — E11.69 HYPERLIPIDEMIA ASSOCIATED WITH TYPE 2 DIABETES MELLITUS: ICD-10-CM

## 2023-12-04 DIAGNOSIS — I89.0 LYMPHEDEMA: ICD-10-CM

## 2023-12-04 DIAGNOSIS — E78.5 HYPERLIPIDEMIA ASSOCIATED WITH TYPE 2 DIABETES MELLITUS: ICD-10-CM

## 2023-12-04 DIAGNOSIS — I25.10 CORONARY ARTERY DISEASE INVOLVING NATIVE CORONARY ARTERY OF NATIVE HEART WITHOUT ANGINA PECTORIS: ICD-10-CM

## 2023-12-04 DIAGNOSIS — G47.33 OBSTRUCTIVE SLEEP APNEA: ICD-10-CM

## 2023-12-04 DIAGNOSIS — I15.2 HYPERTENSION ASSOCIATED WITH DIABETES: Primary | ICD-10-CM

## 2023-12-04 DIAGNOSIS — I50.32 CHRONIC DIASTOLIC CONGESTIVE HEART FAILURE: ICD-10-CM

## 2023-12-04 DIAGNOSIS — E66.01 MORBID OBESITY WITH BMI OF 45.0-49.9, ADULT: ICD-10-CM

## 2023-12-04 DIAGNOSIS — E11.42 TYPE 2 DIABETES MELLITUS WITH DIABETIC POLYNEUROPATHY, WITH LONG-TERM CURRENT USE OF INSULIN: ICD-10-CM

## 2023-12-04 DIAGNOSIS — I10 ESSENTIAL HYPERTENSION: ICD-10-CM

## 2023-12-04 DIAGNOSIS — N18.32 STAGE 3B CHRONIC KIDNEY DISEASE: ICD-10-CM

## 2023-12-04 DIAGNOSIS — E11.59 HYPERTENSION ASSOCIATED WITH DIABETES: Primary | ICD-10-CM

## 2023-12-04 DIAGNOSIS — Z79.4 TYPE 2 DIABETES MELLITUS WITH DIABETIC POLYNEUROPATHY, WITH LONG-TERM CURRENT USE OF INSULIN: ICD-10-CM

## 2023-12-04 PROCEDURE — 99214 OFFICE O/P EST MOD 30 MIN: CPT | Mod: S$PBB,GC,, | Performed by: STUDENT IN AN ORGANIZED HEALTH CARE EDUCATION/TRAINING PROGRAM

## 2023-12-04 PROCEDURE — 99214 PR OFFICE/OUTPT VISIT, EST, LEVL IV, 30-39 MIN: ICD-10-PCS | Mod: S$PBB,GC,, | Performed by: STUDENT IN AN ORGANIZED HEALTH CARE EDUCATION/TRAINING PROGRAM

## 2023-12-04 PROCEDURE — 99214 OFFICE O/P EST MOD 30 MIN: CPT | Mod: PBBFAC | Performed by: STUDENT IN AN ORGANIZED HEALTH CARE EDUCATION/TRAINING PROGRAM

## 2023-12-04 PROCEDURE — 99999 PR PBB SHADOW E&M-EST. PATIENT-LVL IV: CPT | Mod: PBBFAC,GC,, | Performed by: STUDENT IN AN ORGANIZED HEALTH CARE EDUCATION/TRAINING PROGRAM

## 2023-12-04 PROCEDURE — 99999 PR PBB SHADOW E&M-EST. PATIENT-LVL IV: ICD-10-PCS | Mod: PBBFAC,GC,, | Performed by: STUDENT IN AN ORGANIZED HEALTH CARE EDUCATION/TRAINING PROGRAM

## 2023-12-04 RX ORDER — NIFEDIPINE 60 MG/1
60 TABLET, EXTENDED RELEASE ORAL DAILY
Qty: 30 TABLET | Refills: 11 | Status: SHIPPED | OUTPATIENT
Start: 2023-12-04 | End: 2024-01-04 | Stop reason: SDUPTHER

## 2023-12-04 NOTE — PROGRESS NOTES
I have reviewed the notes, assessments, and/or procedures performed this visit, and I concur with the documentation.    Agree with changing dihydropyridine to nifedipine for better bp control - it can be titrated upward if needed.

## 2023-12-04 NOTE — PROGRESS NOTES
Clinic Note  12/4/2023      Subjective:       Patient ID:  Stepan is a 68 y.o. male being seen for an urgent care visit.    Chief Complaint: Hypertension    Stepan Springer is a 68 year old male with the following co-morbidities  CAD h/o NSTEMI  -s/p PCI midRCA SABINO x2 and mid LAD 40% stenosis (2016)  LBBB  DMT2  H/o TIA (2022)  CKD stage III  HTN  HLD  PVD  FRAN, does not want CPAP     He was last by me 2 months ago. At that time his losartan was switched to valsartan 360mg. He is also on clonidine 0.1mg TID, carvedilol 25 mg BID, amlodipine 5mg qHS, lasix 40mg qd. He reports compliance with these meds. He was given referral to our lymphedema clinic but it appears he did not make it to his appt. He was also not enrolled in the [facility] HTN program after last visit. He reports he doesn't take his BP at home due to BP cuff being too tight and ripping off. He is not compliant with his diet. He eats take out meals very often. He is followed by Dr. Huber who is working him up growth hormone excess. He is asymptomatic.      DM: A1c controlled 6.9. Followed by Dr. Huber.   Lipid: LDL 49, within goal, HDL 39,           Review of Systems   Constitutional:  Negative for chills and fever.   Eyes: Negative.    Respiratory:  Negative for shortness of breath.    Cardiovascular:  Positive for leg swelling. Negative for chest pain, palpitations, orthopnea and PND.   Gastrointestinal: Negative.  Negative for nausea and vomiting.   Genitourinary: Negative.    Musculoskeletal: Negative.    Skin: Negative.    Neurological:  Negative for dizziness, weakness and headaches.   Psychiatric/Behavioral: Negative.       Medication List with Changes/Refills   Current Medications    AMLODIPINE (NORVASC) 5 MG TABLET    Take 1 tablet (5 mg total) by mouth every evening.    ASPIRIN 81 MG CHEW    Take 81 mg by mouth once daily.    ATORVASTATIN (LIPITOR) 20 MG TABLET    Take 1 tablet (20 mg total) by mouth every evening.    AZOPT 1 %  OPHTHALMIC SUSPENSION    Place 1 drop into both eyes 3 (three) times daily. Brand name only    BLOOD SUGAR DIAGNOSTIC STRP    To check BG 4 times daily, to use with insurance preferred meter    BLOOD-GLUCOSE METER KIT    To check BG 4 times daily, to use with insurance preferred meter    BRIMONIDINE 0.1% (ALPHAGAN P) 0.1 % DROP    Place 1 drop into both eyes 3 (three) times daily.    CARVEDILOL (COREG) 25 MG TABLET    Take 1 tablet (25 mg total) by mouth 2 (two) times daily.    CLONIDINE (CATAPRES) 0.1 MG TABLET    Take 1 tablet (0.1 mg total) by mouth 3 (three) times daily.    DEXTRAN 70-HYPROMELLOSE (TEARS) OPHTHALMIC SOLUTION    Place 2 drops into the left eye every 4 (four) hours.    FLASH GLUCOSE SENSOR (FREESTYLE CLEMENCIA 14 DAY SENSOR) KIT    1 each by Misc.(Non-Drug; Combo Route) route every 14 (fourteen) days.    FUROSEMIDE (LASIX) 40 MG TABLET    Take 1 tablet (40 mg total) by mouth once daily.    INSULIN (BASAGLAR KWIKPEN U-100 INSULIN) GLARGINE 100 UNITS/ML SUBQ PEN    Inject 36 Units into the skin once daily.    INSULIN ASPART U-100 (NOVOLOG) 100 UNIT/ML (3 ML) INPN PEN    Inject 12 Units into the skin 3 (three) times daily with meals.    LANCETS MISC    To check BG 4 times daily, to use with insurance preferred meter    NETARSUDIL (RHOPRESSA) 0.02 % OPHTHALMIC SOLUTION    Place 1 drop into both eyes once daily.    NITROGLYCERIN (NITROSTAT) 0.4 MG SL TABLET    Place 1 tablet (0.4 mg total) under the tongue every 5 (five) minutes as needed for Chest pain.    VALSARTAN (DIOVAN) 320 MG TABLET    Take 1 tablet (320 mg total) by mouth once daily.       Patient Active Problem List   Diagnosis    Status post cataract extraction and insertion of intraocular lens    Corneal opacity - Left Eye    Type 2 diabetes mellitus with diabetic polyneuropathy, with long-term current use of insulin    Obstructive sleep apnea (untreated, refuses treatment)    Chronic diastolic congestive heart failure    LBBB (left bundle  "branch block)    Type 2 diabetes mellitus with stage 3b chronic kidney disease, with long-term current use of insulin    Anxiety    Hypertension associated with diabetes    Stage 3b chronic kidney disease    Primary open angle glaucoma (POAG) of both eyes, severe stage    Left nephrolithiasis    Hydronephrosis, left    NSTEMI (non-ST elevated myocardial infarction)    CAD with remote PCI (BMS) of RCA in 9/2016    Recurrent major depressive disorder    Vasculogenic erectile dysfunction    Blindness and low vision    Hx of retinal detachment    High myopia, both eyes    Epiretinal membrane (ERM) of left eye    Lattice degeneration of peripheral retina, left    Right-sided Bell's palsy    Hyperlipidemia associated with type 2 diabetes mellitus    Class 3 severe obesity due to excess calories with serious comorbidity and body mass index (BMI) of 45.0 to 49.9 in adult    GERD (gastroesophageal reflux disease)    Hypoglycemia unawareness associated with type 2 diabetes mellitus    Elevated troponin    Thrombocytopenia    Pancytopenia    History of COVID-19 (April, 2020)    PVD (peripheral vascular disease)    Influenza Immunization not carried out because of patient refusal    Type 2 diabetes mellitus with microalbuminuria, with long-term current use of insulin    Hyperparathyroidism, secondary renal    Vitamin D deficiency    Lymphedema    Hypertensive urgency    Diabetic ulcer of toe of left foot associated with type 2 diabetes mellitus, limited to breakdown of skin    Transient visual disturbance    Essential hypertension    Malignant melanoma of skin of forehead    Open wound of forehead    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    Weight as of 10/9/23: 188.3 kg (415 lb 2 oz).  Physical Exam  Constitutional:       General: He is not in acute distress.     Appearance: He is obese. He is not ill-appearing, toxic-appearing or diaphoretic.   HENT:      Head: Normocephalic.      Nose: Nose normal.      Mouth/Throat:      Mouth: Mucous membranes are moist.   Eyes:      Extraocular Movements: Extraocular movements intact.   Cardiovascular:      Rate and Rhythm: Normal rate.      Pulses:           Radial pulses are 2+ on the right side and 2+ on the left side.      Heart sounds: Normal heart sounds. No murmur heard.  Pulmonary:      Effort: Pulmonary effort is normal. No respiratory distress.      Breath sounds: Normal breath sounds. No wheezing or rales.   Abdominal:      General: Abdomen is flat. There is no distension.   Musculoskeletal:         General: Swelling (2+ pitting edema.) present. No tenderness. Normal range of motion.      Cervical back: Normal range of motion.   Skin:     General: Skin is warm.   Neurological:      Mental Status: He is alert and oriented to person, place, and time. Mental status is at baseline.   Psychiatric:         Mood and Affect: Mood normal.         Behavior: Behavior normal.         Thought Content: Thought content normal.       Assessment and Plan:         Lymphedema  He missed last appt scheduled with Dr. Ballard. Will reschedule.     Hypertension associated with diabetes   Uncontrolled. Asymptomatic. Doesn't check BP at home due to prior damaged BP cuffs. Will switch amlodipine to nifedipine 60mg. Continue carvedilol 25mg BID, valsartan 320mg qd, clonidine 0.1mg TID.   Enroll in [facility] HTN program  Will initiate work up for resistant HTN. Though he reports HTN since the age of 9y/o. Given his acromegaly like features, Possibly related to acromegaly? Dr. Huber working him up for GH excess.  Dietary counseling was discussed today.   -     " NIFEdipine (PROCARDIA-XL) 60 MG (OSM) 24 hr tablet; Take 1 tablet (60 mg total) by mouth once daily.  -     CV US Renal Artery Stenosis Hypertension Complete; Future  -     Aldosterone/Renin Activity Ratio; Future       BMI of 45.0-49.9, adult  Exercise discussed.      Stage 3b chronic kidney disease  sCr stable at 1.7 Last seen by nephrology in 2021. Encouraged patient to follow up.      Hyperlipidemia associated with type 2 diabetes mellitus  LDL within goal. Continue atorvastatin     Coronary artery disease involving native coronary artery of native heart without angina pectoris  Continue aspirin and statin qD  Last stress test negative in 2020     Chronic diastolic congestive heart failure  Stable, continue to monitor     Obstructive sleep apnea (untreated, refuses treatment)  Encouraged to use CPAP     Follow Up:   Follow up in about 2 months (around 2/4/2024).        Plan discussed with attending Dr. Lo, further recommendations as per attending addendum. Please feel free to call with any questions or concerns.        Aure Tilley MD  Cardiovascular Disease  Fellow Physician - PGY5

## 2023-12-07 ENCOUNTER — TELEPHONE (OUTPATIENT)
Dept: NEPHROLOGY | Facility: CLINIC | Age: 68
End: 2023-12-07
Payer: MEDICARE

## 2023-12-07 NOTE — TELEPHONE ENCOUNTER
Called pt /l/m   ----- Message from Sabrina Reardon LPN sent at 12/4/2023  5:06 PM CST -----  Regarding: FW: Appt    ----- Message -----  From: Brooklyn Post MA  Sent: 12/4/2023   4:19 PM CST  To: [facility] Nephrology Staff  Subject: Appt                                             Patient has an old referral for a nephrology appointment from Dr Tilley. Pt was last seen on 3/22/2021.     Thanks

## 2023-12-11 ENCOUNTER — LAB VISIT (OUTPATIENT)
Dept: LAB | Facility: HOSPITAL | Age: 68
End: 2023-12-11
Payer: MEDICARE

## 2023-12-11 ENCOUNTER — OFFICE VISIT (OUTPATIENT)
Dept: PODIATRY | Facility: CLINIC | Age: 68
End: 2023-12-11
Payer: MEDICARE

## 2023-12-11 VITALS
SYSTOLIC BLOOD PRESSURE: 217 MMHG | HEART RATE: 69 BPM | DIASTOLIC BLOOD PRESSURE: 115 MMHG | WEIGHT: 315 LBS | BODY MASS INDEX: 36.45 KG/M2 | HEIGHT: 78 IN

## 2023-12-11 DIAGNOSIS — I89.0 LYMPHEDEMA: ICD-10-CM

## 2023-12-11 DIAGNOSIS — Z79.4 TYPE 2 DIABETES MELLITUS WITH DIABETIC POLYNEUROPATHY, WITH LONG-TERM CURRENT USE OF INSULIN: Primary | ICD-10-CM

## 2023-12-11 DIAGNOSIS — E11.42 TYPE 2 DIABETES MELLITUS WITH DIABETIC POLYNEUROPATHY, WITH LONG-TERM CURRENT USE OF INSULIN: Primary | ICD-10-CM

## 2023-12-11 DIAGNOSIS — B35.1 ONYCHOMYCOSIS DUE TO DERMATOPHYTE: ICD-10-CM

## 2023-12-11 DIAGNOSIS — E11.59 HYPERTENSION ASSOCIATED WITH DIABETES: ICD-10-CM

## 2023-12-11 DIAGNOSIS — I15.2 HYPERTENSION ASSOCIATED WITH DIABETES: ICD-10-CM

## 2023-12-11 PROCEDURE — 11721 DEBRIDE NAIL 6 OR MORE: CPT | Mod: Q9,S$PBB,, | Performed by: PODIATRIST

## 2023-12-11 PROCEDURE — 82088 ASSAY OF ALDOSTERONE: CPT | Performed by: STUDENT IN AN ORGANIZED HEALTH CARE EDUCATION/TRAINING PROGRAM

## 2023-12-11 PROCEDURE — 11721 DEBRIDE NAIL 6 OR MORE: CPT | Mod: Q9,PBBFAC,PO | Performed by: PODIATRIST

## 2023-12-11 PROCEDURE — 99999 PR PBB SHADOW E&M-EST. PATIENT-LVL II: ICD-10-PCS | Mod: PBBFAC,,, | Performed by: PODIATRIST

## 2023-12-11 PROCEDURE — 99499 NO LOS: ICD-10-PCS | Mod: S$PBB,,, | Performed by: PODIATRIST

## 2023-12-11 PROCEDURE — 11721 PR DEBRIDEMENT OF NAILS, 6 OR MORE: ICD-10-PCS | Mod: Q9,S$PBB,, | Performed by: PODIATRIST

## 2023-12-11 PROCEDURE — 99999 PR PBB SHADOW E&M-EST. PATIENT-LVL II: CPT | Mod: PBBFAC,,, | Performed by: PODIATRIST

## 2023-12-11 PROCEDURE — 36415 COLL VENOUS BLD VENIPUNCTURE: CPT | Mod: PO | Performed by: STUDENT IN AN ORGANIZED HEALTH CARE EDUCATION/TRAINING PROGRAM

## 2023-12-11 PROCEDURE — 99212 OFFICE O/P EST SF 10 MIN: CPT | Mod: 25,PBBFAC,PO | Performed by: PODIATRIST

## 2023-12-11 PROCEDURE — 99499 UNLISTED E&M SERVICE: CPT | Mod: S$PBB,,, | Performed by: PODIATRIST

## 2023-12-11 NOTE — PROGRESS NOTES
Subjective:      Patient ID: Stepan Springer is a 68 y.o. male.    Chief Complaint: No chief complaint on file.    Diabetes, increased risk amputation needing evaluation/management/optomization of foot care.    Cc2 thick long discolored misshapen toenails all toes.  Gradual onset, worsening over past several weeks, aggravated by increased weight bearing, shoe gear, pressure.  Periodic debridement helps symptoms. Denies trauma, surgery.    No chief complaint on file.      Casual shoes both feet - athletic.    Review of Systems   Constitutional: Negative for chills, diaphoresis, fever, malaise/fatigue and night sweats.   Cardiovascular:  Positive for leg swelling. Negative for claudication, cyanosis and syncope.   Skin:  Positive for nail changes. Negative for color change, dry skin, rash, suspicious lesions and unusual hair distribution.   Musculoskeletal:  Negative for falls, joint pain, joint swelling, muscle cramps, muscle weakness and stiffness.   Gastrointestinal:  Negative for constipation, diarrhea, nausea and vomiting.   Neurological:  Positive for numbness, paresthesias and sensory change. Negative for brief paralysis, disturbances in coordination, focal weakness and tremors.           Objective:      Physical Exam  Constitutional:       General: He is not in acute distress.     Appearance: He is well-developed. He is not diaphoretic.   Cardiovascular:      Pulses:           Popliteal pulses are 2+ on the right side and 2+ on the left side.        Dorsalis pedis pulses are 1+ on the right side and 1+ on the left side.        Posterior tibial pulses are 1+ on the right side and 1+ on the left side.      Comments: Capillary refill 3 seconds all toes/distal feet, all toes/both feet warm to touch.      Negative lymphadenopathy bilateral popliteal fossa and tarsal tunnel.      2+ pitting lower extremity edema bilateral.    Musculoskeletal:      Right ankle: No swelling, deformity, ecchymosis or lacerations.  Normal range of motion. Normal pulse.      Right Achilles Tendon: Normal. No defects. Bland's test negative.      Comments: Normal angle, base, station of gait. Decreased stride and propulsion bilateral.    All ten toes without clubbing, cyanosis, or signs of ischemia.  No pain to palpation bilateral lower extremities.  Range of motion, stability, muscle strength, and muscle tone normal bilateral feet and legs.    Lymphadenopathy:      Lower Body: No right inguinal adenopathy. No left inguinal adenopathy.      Comments: Negative lymphadenopathy bilateral popliteal fossa and tarsal tunnel.    Negative lymphangitic streaking bilateral feet/ankles/legs.   Skin:     General: Skin is warm and dry.      Capillary Refill: Capillary refill takes 2 to 3 seconds.      Coloration: Skin is not pale.      Findings: No abrasion, bruising, burn, ecchymosis, erythema, laceration, lesion or rash.      Nails: There is no clubbing.      Comments: Skin thin, shiny, atrophic, with decreased density and distribution of pedal hair bilateral, but without hyperpigmentation, carola discoloration,  ulcers, masses, nodules or cords palpated bilateral feet and legs.    Toenails 1st, 2nd, 3rd, 4th, 5th  bilateral are hypertrophic thickened 2-3 mm, dystrophic, discolored tanish brown with tan, gray crumbly subungual debris.  Tender to distal nail plate pressure, without periungual skin abnormality of each.       Neurological:      Mental Status: He is alert and oriented to person, place, and time.      Sensory: Sensory deficit present.      Motor: No tremor, atrophy or abnormal muscle tone.      Gait: Gait normal.      Deep Tendon Reflexes:      Reflex Scores:       Patellar reflexes are 2+ on the right side and 2+ on the left side.       Achilles reflexes are 2+ on the right side and 2+ on the left side.     Comments: Paresthesias, intermittently bilateral feet with no clearly identified trigger or source.    Negative tinel sign to percussion  sural, superficial peroneal, deep peroneal, saphenous, and posterior tibial nerves right and left ankles and feet.    Decreased/absent vibratory sensation bilateral feet to 128Hz tuning fork.     Psychiatric:         Behavior: Behavior is cooperative.             Assessment:       Encounter Diagnoses   Name Primary?    Type 2 diabetes mellitus with diabetic polyneuropathy, with long-term current use of insulin Yes    Onychomycosis due to dermatophyte     Lymphedema          Plan:       Diagnoses and all orders for this visit:    Type 2 diabetes mellitus with diabetic polyneuropathy, with long-term current use of insulin    Onychomycosis due to dermatophyte    Lymphedema      I counseled the patient on his conditions, their implications and medical management.        The patient has received literature on basic diabetic foot care.  Patient will inspect feet daily, wear protective shoe gear when ambulatory, and apply moisturizer to skin as needed to maintain elasticity and help prevent ulceration.    With the patient's permission, I debrided all ten toenails with a sterile nipper and curette, removing all offending nail and debris.  Patient tolerated the procedure well and related significant relief.    Discussed conservative treatment with shoes of adequate dimensions, material, and style to alleviate symptoms and delay or prevent surgical intervention.    Continue compression, PCP management of diabetes and lymphedema.    1 year, prn.          Follow up in about 1 year (around 12/11/2024).

## 2023-12-15 LAB
ALDOST SERPL-MCNC: 8.4 NG/DL
ALDOST/RENIN PLAS-RTO: 41.8 RATIO
RENIN PLAS-CCNC: 0.2 NG/ML/HR

## 2023-12-18 ENCOUNTER — OFFICE VISIT (OUTPATIENT)
Dept: ENDOCRINOLOGY | Facility: CLINIC | Age: 68
End: 2023-12-18
Payer: MEDICARE

## 2023-12-18 ENCOUNTER — HOSPITAL ENCOUNTER (OUTPATIENT)
Dept: CARDIOLOGY | Facility: HOSPITAL | Age: 68
Discharge: HOME | End: 2023-12-18
Attending: STUDENT IN AN ORGANIZED HEALTH CARE EDUCATION/TRAINING PROGRAM
Payer: MEDICARE

## 2023-12-18 VITALS
WEIGHT: 315 LBS | BODY MASS INDEX: 48.02 KG/M2 | SYSTOLIC BLOOD PRESSURE: 120 MMHG | DIASTOLIC BLOOD PRESSURE: 74 MMHG | OXYGEN SATURATION: 98 % | HEART RATE: 60 BPM

## 2023-12-18 DIAGNOSIS — E11.69 OBESITY, DIABETES, AND HYPERTENSION SYNDROME: ICD-10-CM

## 2023-12-18 DIAGNOSIS — R80.9 TYPE 2 DIABETES MELLITUS WITH MICROALBUMINURIA, WITH LONG-TERM CURRENT USE OF INSULIN: Primary | ICD-10-CM

## 2023-12-18 DIAGNOSIS — E11.29 TYPE 2 DIABETES MELLITUS WITH MICROALBUMINURIA, WITH LONG-TERM CURRENT USE OF INSULIN: Primary | ICD-10-CM

## 2023-12-18 DIAGNOSIS — Z79.4 TYPE 2 DIABETES MELLITUS WITH MICROALBUMINURIA, WITH LONG-TERM CURRENT USE OF INSULIN: Primary | ICD-10-CM

## 2023-12-18 DIAGNOSIS — I10 ESSENTIAL HYPERTENSION: ICD-10-CM

## 2023-12-18 DIAGNOSIS — I15.2 HYPERTENSION ASSOCIATED WITH DIABETES: ICD-10-CM

## 2023-12-18 DIAGNOSIS — E66.9 OBESITY, DIABETES, AND HYPERTENSION SYNDROME: ICD-10-CM

## 2023-12-18 DIAGNOSIS — I89.0 LYMPHEDEMA: ICD-10-CM

## 2023-12-18 DIAGNOSIS — I15.2 OBESITY, DIABETES, AND HYPERTENSION SYNDROME: ICD-10-CM

## 2023-12-18 DIAGNOSIS — E11.59 HYPERTENSION ASSOCIATED WITH DIABETES: ICD-10-CM

## 2023-12-18 DIAGNOSIS — E11.59 OBESITY, DIABETES, AND HYPERTENSION SYNDROME: ICD-10-CM

## 2023-12-18 DIAGNOSIS — E22.0 ACROMEGALY: ICD-10-CM

## 2023-12-18 LAB
ABDOMINAL AORTA MID EDV: 17 CM/S
ABDOMINAL AORTA MID PSV: 130 CM/S
LEFT RENAL DIST DIAS: 13 CM/S
LEFT RENAL DIST SYS: 45 CM/S
LEFT RENAL ORIGIN DIAS: 18 CM/S
LEFT RENAL ORIGIN SYS: 73 CM/S
LEFT RENAL PROX DIAS: 18 CM/S
LEFT RENAL PROX SYS: 74 CM/S
LEFT RENAL ULTRASOUND ACCELERATION TIME MEASUREMENT 1: 46 MS
LEFT RENAL ULTRASOUND ACCELERATION TIME MEASUREMENT 2: 26 MS
LEFT RENAL ULTRASOUND ACCELERATION TIME MEASUREMENT 3: 49 MS
LEFT RENAL ULTRASOUND ACCELERATION TIME MEASUREMENT AVERAGE: 49 MS
LEFT RENAL ULTRASOUND KIDNEY SIZE MEASUREMENT 1: 12.1 CM
LEFT RENAL ULTRASOUND KIDNEY SIZE MEASUREMENT AVERAGE: 12.1 CM
LEFT RENAL ULTRASOUND RESISTIVE INDEX MEASUREMENT 1: 0.71
LEFT RENAL ULTRASOUND RESISTIVE INDEX MEASUREMENT 2: 0.68
LEFT RENAL ULTRASOUND RESISTIVE INDEX MEASUREMENT 3: 0.74
LEFT RENAL ULTRASOUND RESISTIVE INDEX MEASUREMENT AVERAGE: 0.74
OHS CV LEFT RENAL RAR: 0.57
OHS CV RIGHT RENAL RAR: 0.87
OHS CV US LEFT RENAL HIGHEST EDV: 18
OHS CV US LEFT RENAL HIGHEST PSV: 74
OHS CV US RIGHT RENAL HIGHEST EDV: 23
OHS CV US RIGHT RENAL HIGHEST PSV: 113
RIGHT RENAL DIST DIAS: 23 CM/S
RIGHT RENAL DIST SYS: 110 CM/S
RIGHT RENAL MID DIAS: 21 CM/S
RIGHT RENAL MID RAR: 0.8
RIGHT RENAL MID SYS: 104 CM/S
RIGHT RENAL ORIGIN DIAS: 16 CM/S
RIGHT RENAL ORIGIN SYS: 85 CM/S
RIGHT RENAL PROX DIAS: 18 CM/S
RIGHT RENAL PROX SYS: 113 CM/S
RIGHT RENAL ULTRASOUND ACCELERATION TIME MEASUREMENT 1: 63 MS
RIGHT RENAL ULTRASOUND ACCELERATION TIME MEASUREMENT 2: 49 MS
RIGHT RENAL ULTRASOUND ACCELERATION TIME MEASUREMENT 3: 43 MS
RIGHT RENAL ULTRASOUND ACCELERATION TIME MEASUREMENT AVERAGE: 63 MS
RIGHT RENAL ULTRASOUND KIDNEY SIZE MEASUREMENT 1: 12 CM
RIGHT RENAL ULTRASOUND KIDNEY SIZE MEASUREMENT AVERAGE: 12 CM
RIGHT RENAL ULTRASOUND RESISTIVE INDEX MEASUREMENT 1: 0.77
RIGHT RENAL ULTRASOUND RESISTIVE INDEX MEASUREMENT 2: 0.77
RIGHT RENAL ULTRASOUND RESISTIVE INDEX MEASUREMENT 3: 0.76
RIGHT RENAL ULTRASOUND RESISTIVE INDEX MEASUREMENT AVERAGE: 0.77

## 2023-12-18 PROCEDURE — 99999 PR PBB SHADOW E&M-EST. PATIENT-LVL III: CPT | Mod: PBBFAC,,, | Performed by: INTERNAL MEDICINE

## 2023-12-18 PROCEDURE — 99999 PR PBB SHADOW E&M-EST. PATIENT-LVL III: ICD-10-PCS | Mod: PBBFAC,,, | Performed by: INTERNAL MEDICINE

## 2023-12-18 PROCEDURE — 95251 PR GLUCOSE MONITOR, 72 HOUR, PHYS INTERP: ICD-10-PCS | Mod: ,,, | Performed by: INTERNAL MEDICINE

## 2023-12-18 PROCEDURE — 95251 CONT GLUC MNTR ANALYSIS I&R: CPT | Mod: ,,, | Performed by: INTERNAL MEDICINE

## 2023-12-18 PROCEDURE — 93975 CV US RENAL ARTERY STENOSIS HYPERTENSION COMPLETE (CUPID ONLY): ICD-10-PCS | Mod: 26,,, | Performed by: INTERNAL MEDICINE

## 2023-12-18 PROCEDURE — 93975 VASCULAR STUDY: CPT

## 2023-12-18 PROCEDURE — 93975 VASCULAR STUDY: CPT | Mod: 26,,, | Performed by: INTERNAL MEDICINE

## 2023-12-18 PROCEDURE — 99214 PR OFFICE/OUTPT VISIT, EST, LEVL IV, 30-39 MIN: ICD-10-PCS | Mod: 25,S$PBB,, | Performed by: INTERNAL MEDICINE

## 2023-12-18 PROCEDURE — 99214 OFFICE O/P EST MOD 30 MIN: CPT | Mod: 25,S$PBB,, | Performed by: INTERNAL MEDICINE

## 2023-12-18 PROCEDURE — 99213 OFFICE O/P EST LOW 20 MIN: CPT | Mod: PBBFAC,25 | Performed by: INTERNAL MEDICINE

## 2023-12-18 NOTE — ASSESSMENT & PLAN NOTE
Reviewed goals of therapy are to get the best control we can without hypoglycemia.    Currently meeting glycemic target: yes    Continue Basaglar 9 units daily  Reduce Humalog to 16 units TID with meals.    Reviewed patient's current insulin regimen. Clarified proper insulin dose and timing in relation to meals, etc. Insulin injection sites and proper rotation instructed.      Advised frequent self blood glucose monitoring. Patient encouraged to document glucose results and bring them to every clinic visit.    Hypoglycemia precautions discussed.     Discussed diet and exercise.    Diabetes health maintenance topics are addressed in the HPI    Lab Results   Component Value Date    HGBA1C 6.9 (H) 09/25/2023    HGBA1C 7.2 (H) 06/02/2023    HGBA1C 7.9 (H) 02/27/2023

## 2023-12-18 NOTE — ASSESSMENT & PLAN NOTE
He has multiple clinical features of acromegaly along with elevated IGF-1 level.  There was a pituitary MRI done in 2010, but it was a limited study due to open MRI.    Check 75 g OGTT for growth hormone.

## 2023-12-18 NOTE — ASSESSMENT & PLAN NOTE
High PAC/PRA, but total PAC was only 8.4, which argues against primary aldosteronism. Blood pressure reading today is normal, but has been high recently despite 5 blood pressure medications. In lieu of confirmatory testing, would consider addition of spironolactone if additional blood pressure control is needed.

## 2023-12-18 NOTE — PROGRESS NOTES
"FOLLOW-UP PATIENT VISIT    Subjective:      Chief Complaint: Diabetes follow-up    HPI: Stepan Springer is a 68 y.o. male who is here for type 2 diabetes mellitus      Past Medical History:   Diagnosis Date    Anxiety     Bell's palsy     CHF (congestive heart failure)     Chronic kidney disease, stage 3a 11/02/2021    Coronary artery disease involving native coronary artery without angina pectoris 10/18/2016    Depression     Diabetes mellitus     Glaucoma     Hypertension     Idiopathic peripheral neuropathy 12/11/2012    Lymphedema of both lower extremities     Malignant melanoma of skin of forehead 10/13/2022    Mixed hyperlipidemia 12/11/2012    Morbid obesity with BMI of 45.0-49.9, adult 02/12/2019    Pancytopenia     Sleep apnea     "unable to use"    Stage 3b chronic kidney disease     Type 2 diabetes mellitus with diabetic polyneuropathy, with long-term current use of insulin 12/11/2012    Vitamin B 12 deficiency 08/23/2012       With regards to the type 2 diabetes:    Current symptoms/problems include:  He reports changing his diet up since moving back to [place], which has resulted in significant improvement in his blood sugar control.  He has only been using 9 units of Basaglar daily.  It is unclear who gave him the instruction to do that, but nevertheless it seems to be working okay from a blood sugar standpoint.  He does have Sriram view, but the e-mail he used to set up the account is no longer active.  He will need assistance in setting up a new account so that he can link to ours.    Most recent a1c was 6.9 on 09/25/2023    Medical Therapy:  Current diabetic medications include:   Basaglar 9 units daily  Novolog 20 units TID with meals + correction    Diabetes Medications               insulin (BASAGLAR KWIKPEN U-100 INSULIN) glargine 100 units/mL SubQ pen Inject 36 Units into the skin once daily.    insulin aspart U-100 (NOVOLOG) 100 unit/mL (3 mL) InPn pen Inject 12 Units into the skin 3 " (three) times daily with meals.         Blood sugars:  Current monitoring regimen: CGM - Freestyle Sriram 2   87% TIR with low variability. 7% hypoglycemia. Overall control is acceptable.          Any episodes of hypoglycemia? Not since dropping basal insulin to 9 units    Current diet:  Keto-friendly diet since moving to [place] - 2/2023.  Breakfast:  Kemp shake and bar  Lunch:  Keto-friendly meals  Dinner:  Keto-friendly meals  Snacks:  Cashews or other nuts, pork rinds  Drinks:  Zero calorie sodas    Current exercise: plans to start exercising next week - walking.     Weight trend:  Wt Readings from Last 10 Encounters:   12/18/23 (!) 193.3 kg (426 lb 4.2 oz)   12/11/23 (!) 190.6 kg (420 lb 3.2 oz)   12/04/23 (!) 190.6 kg (420 lb 3.2 oz)   10/09/23 (!) 188.3 kg (415 lb 2 oz)   09/25/23 (!) 188.3 kg (415 lb 2 oz)   09/11/23 (!) 192.3 kg (423 lb 15.1 oz)   08/07/23 (!) 192.3 kg (423 lb 15.1 oz)   07/10/23 (!) 185 kg (407 lb 13.6 oz)   06/02/23 (!) 189.6 kg (417 lb 15.9 oz)   05/31/23 (!) 184.1 kg (405 lb 13.9 oz)       Diabetes Management Status    Statin: Taking  ACE/ARB: Taking    Hypertension Medications               amLODIPine (NORVASC) 5 MG tablet Take 1 tablet (5 mg total) by mouth every evening.    carvediloL (COREG) 25 MG tablet Take 1 tablet (25 mg total) by mouth 2 (two) times daily.    cloNIDine (CATAPRES) 0.1 MG tablet Take 1 tablet (0.1 mg total) by mouth 3 (three) times daily.    furosemide (LASIX) 40 MG tablet Take 1 tablet (40 mg total) by mouth once daily.    losartan (COZAAR) 100 MG tablet Take 1 tablet (100 mg total) by mouth once daily.    nitroGLYCERIN (NITROSTAT) 0.4 MG SL tablet Place 1 tablet (0.4 mg total) under the tongue every 5 (five) minutes as needed for Chest pain.         BP Readings from Last 5 Encounters:   12/18/23 120/74   12/11/23 (!) 217/115   12/04/23 (!) 179/87   10/09/23 (!) 158/85   09/25/23 120/80       Hyperlipidemia Medications               atorvastatin (LIPITOR)  20 MG tablet Take 1 tablet (20 mg total) by mouth every evening.       Lab Results   Component Value Date    LDLCALC 47.8 (L) 10/19/2023    LDLCALC 49.0 (L) 09/25/2023    LDLCALC 44.4 (L) 09/04/2022    HDL 38 (L) 10/19/2023    HDL 39 (L) 09/25/2023    HDL 32 (L) 09/04/2022    TRIG 96 10/19/2023    TRIG 120 09/25/2023    TRIG 118 09/04/2022       Screening or Prevention Patient's value Goal Complete/Controlled?   HgA1C Testing and Control   Lab Results   Component Value Date    HGBA1C 6.9 (H) 09/25/2023      Annually/Less than 8% Yes   Lipid profile : 10/19/2023 Annually No   LDL control Lab Results   Component Value Date    LDLCALC 47.8 (L) 10/19/2023    Annually/Less than 100 mg/dl  No   Nephropathy screening Lab Results   Component Value Date    LABMICR 195.0 09/25/2023     Lab Results   Component Value Date    PROTEINUA Negative 06/05/2023     Lab Results   Component Value Date    MICALBCREAT 154.8 (H) 09/25/2023    MICALBCREAT 105.3 (H) 02/27/2023    MICALBCREAT 35.0 (H) 10/19/2021    Annually Yes   Blood pressure BP Readings from Last 1 Encounters:   12/18/23 120/74    Less than 140/90 Yes   Dilated retinal exam : 11/06/2023- scheduled to see Dr. Burgess in November Annually Yes   Foot exam   : 12/11/2023 Annually Yes        Lab Results   Component Value Date    HGBA1C 6.9 (H) 09/25/2023    HGBA1C 7.2 (H) 06/02/2023    HGBA1C 7.9 (H) 02/27/2023    HGBA1C 6.5 (H) 08/15/2022    HGBA1C 7.0 (H) 07/07/2022    HGBA1C 6.9 (H) 02/08/2022    HGBA1C 5.8 (H) 09/09/2021    HGBA1C 7.7 (H) 05/06/2021    HGBA1C 11.3 (H) 01/25/2021    HGBA1C 13.4 (H) 10/08/2020     Lab Results   Component Value Date    CPEPTIDE 2.70 12/13/2018    GLUTAMICACID 0.00 12/13/2018       Other medications tried:  Jardiance --- discontinued due to low carb diet  glyburide --- discontinued due to alternate therapy  miglitol --- discontinued due to alternate therapy    Lifestyle:    Last visit with Diabetes Educator: Last Education Visit: Not  Found        Objective:     Vitals:    12/18/23 0938   BP: 120/74   Pulse: 60         BP Readings from Last 5 Encounters:   12/18/23 120/74   12/11/23 (!) 217/115   12/04/23 (!) 179/87   10/09/23 (!) 158/85   09/25/23 120/80         Physical Exam  Vitals and nursing note reviewed.   Constitutional:       General: He is not in acute distress.     Appearance: He is well-developed. He is obese. He is not ill-appearing.      Comments: +Acromegalic features - large hands and feet, frontal bossing, prognathism.   HENT:      Head: Normocephalic and atraumatic.   Eyes:      General:         Right eye: No discharge.         Left eye: No discharge.      Conjunctiva/sclera: Conjunctivae normal.   Neck:      Thyroid: No thyromegaly.      Trachea: No tracheal deviation.   Pulmonary:      Effort: Pulmonary effort is normal. No respiratory distress.   Musculoskeletal:      Right lower leg: Edema present.      Left lower leg: Edema present.      Comments: No digital clubbing or extremity cyanosis     Neurological:      Mental Status: He is alert and oriented to person, place, and time.      Coordination: Coordination normal.   Psychiatric:         Mood and Affect: Mood normal.         Behavior: Behavior normal.           Wt Readings from Last 3 Encounters:   12/18/23 0938 (!) 193.3 kg (426 lb 4.2 oz)   12/11/23 1033 (!) 190.6 kg (420 lb 3.2 oz)   12/04/23 1438 (!) 190.6 kg (420 lb 3.2 oz)       Assessment/Plan:     Type 2 diabetes mellitus with microalbuminuria, with long-term current use of insulin  Reviewed goals of therapy are to get the best control we can without hypoglycemia.    Currently meeting glycemic target: yes    Continue Basaglar 9 units daily  Reduce Humalog to 16 units TID with meals.    Reviewed patient's current insulin regimen. Clarified proper insulin dose and timing in relation to meals, etc. Insulin injection sites and proper rotation instructed.      Advised frequent self blood glucose monitoring. Patient  encouraged to document glucose results and bring them to every clinic visit.    Hypoglycemia precautions discussed.     Discussed diet and exercise.    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  There was a pituitary MRI done in 2010, but it was a limited study due to open MRI.    Check 75 g OGTT for growth hormone.    Obesity, diabetes, and hypertension syndrome  We discussed the addition of a GLP-1 receptor agonists.  However, he citing concerns for side effects and prefers to stick with insulin.    Lymphedema  Using compression stockings.  Following with Podiatry every three months.    Essential hypertension  High PAC/PRA, but total PAC was only 8.4, which argues against primary aldosteronism. Blood pressure reading today is normal, but has been high recently despite 5 blood pressure medications. In lieu of confirmatory testing, would consider addition of spironolactone if additional blood pressure control is needed.      Follow up in about 4 months (around 4/18/2024).

## 2023-12-28 ENCOUNTER — TELEPHONE (OUTPATIENT)
Dept: CARDIOLOGY | Facility: HOSPITAL | Age: 68
End: 2023-12-28
Payer: MEDICARE

## 2023-12-28 NOTE — TELEPHONE ENCOUNTER
Called the patient to discuss US results and aldosterone/renin ratio (ARR) results but went straight to voicemail. Per Mayo Le with endocrine, his BP was controlled during office visit and ARR levels not clearly indicative of hyperaldosteronism, however could add on MRA for better BP control. Left voicemail with information, and to call back if he had any questions for me.     Aure Tilley MD  Cardiovascular Disease PGY5  [facility]

## 2024-01-04 ENCOUNTER — OFFICE VISIT (OUTPATIENT)
Dept: FAMILY MEDICINE | Facility: CLINIC | Age: 69
End: 2024-01-04
Payer: MEDICARE

## 2024-01-04 ENCOUNTER — TELEPHONE (OUTPATIENT)
Dept: FAMILY MEDICINE | Facility: CLINIC | Age: 69
End: 2024-01-04

## 2024-01-04 VITALS
TEMPERATURE: 98 F | HEART RATE: 74 BPM | DIASTOLIC BLOOD PRESSURE: 84 MMHG | SYSTOLIC BLOOD PRESSURE: 160 MMHG | HEIGHT: 78 IN | WEIGHT: 315 LBS | OXYGEN SATURATION: 98 % | BODY MASS INDEX: 36.45 KG/M2

## 2024-01-04 DIAGNOSIS — I1A.0 RESISTANT HYPERTENSION: ICD-10-CM

## 2024-01-04 DIAGNOSIS — Z00.00 ENCOUNTER FOR MEDICAL EXAMINATION TO ESTABLISH CARE: Primary | ICD-10-CM

## 2024-01-04 DIAGNOSIS — E66.9 LYMPHEDEMA ASSOCIATED WITH OBESITY: ICD-10-CM

## 2024-01-04 DIAGNOSIS — E11.42 TYPE 2 DIABETES MELLITUS WITH DIABETIC POLYNEUROPATHY, WITH LONG-TERM CURRENT USE OF INSULIN: ICD-10-CM

## 2024-01-04 DIAGNOSIS — I70.1 RENAL ARTERY STENOSIS: ICD-10-CM

## 2024-01-04 DIAGNOSIS — E11.69 DYSLIPIDEMIA ASSOCIATED WITH TYPE 2 DIABETES MELLITUS: ICD-10-CM

## 2024-01-04 DIAGNOSIS — I89.0 LYMPHEDEMA ASSOCIATED WITH OBESITY: ICD-10-CM

## 2024-01-04 DIAGNOSIS — E78.5 DYSLIPIDEMIA ASSOCIATED WITH TYPE 2 DIABETES MELLITUS: ICD-10-CM

## 2024-01-04 DIAGNOSIS — Z79.899 ON POTASSIUM SPARING DIURETIC THERAPY: Primary | ICD-10-CM

## 2024-01-04 DIAGNOSIS — E22.0 ACROMEGALY: ICD-10-CM

## 2024-01-04 DIAGNOSIS — Z79.4 TYPE 2 DIABETES MELLITUS WITH DIABETIC POLYNEUROPATHY, WITH LONG-TERM CURRENT USE OF INSULIN: ICD-10-CM

## 2024-01-04 DIAGNOSIS — N18.32 STAGE 3B CHRONIC KIDNEY DISEASE: ICD-10-CM

## 2024-01-04 DIAGNOSIS — E66.01 MORBID OBESITY WITH BMI OF 45.0-49.9, ADULT: ICD-10-CM

## 2024-01-04 PROBLEM — I13.0 BENIGN HYPERTENSIVE HEART AND KIDNEY DISEASE WITH HF AND CKD: Status: RESOLVED | Noted: 2023-06-02 | Resolved: 2024-01-04

## 2024-01-04 PROBLEM — L97.521 DIABETIC ULCER OF TOE OF LEFT FOOT ASSOCIATED WITH TYPE 2 DIABETES MELLITUS, LIMITED TO BREAKDOWN OF SKIN: Status: RESOLVED | Noted: 2022-08-28 | Resolved: 2024-01-04

## 2024-01-04 PROBLEM — D69.6 THROMBOCYTOPENIA: Status: RESOLVED | Noted: 2020-02-07 | Resolved: 2024-01-04

## 2024-01-04 PROBLEM — I73.9 PVD (PERIPHERAL VASCULAR DISEASE): Status: RESOLVED | Noted: 2020-09-10 | Resolved: 2024-01-04

## 2024-01-04 PROBLEM — D61.818 PANCYTOPENIA: Status: RESOLVED | Noted: 2020-02-09 | Resolved: 2024-01-04

## 2024-01-04 PROBLEM — F33.9 RECURRENT MAJOR DEPRESSIVE DISORDER: Status: RESOLVED | Noted: 2017-01-18 | Resolved: 2024-01-04

## 2024-01-04 PROBLEM — E11.621 DIABETIC ULCER OF TOE OF LEFT FOOT ASSOCIATED WITH TYPE 2 DIABETES MELLITUS, LIMITED TO BREAKDOWN OF SKIN: Status: RESOLVED | Noted: 2022-08-28 | Resolved: 2024-01-04

## 2024-01-04 PROCEDURE — 99215 OFFICE O/P EST HI 40 MIN: CPT | Mod: PBBFAC,PO | Performed by: FAMILY MEDICINE

## 2024-01-04 PROCEDURE — 99214 OFFICE O/P EST MOD 30 MIN: CPT | Mod: S$PBB,,, | Performed by: FAMILY MEDICINE

## 2024-01-04 PROCEDURE — 99999 PR PBB SHADOW E&M-EST. PATIENT-LVL V: CPT | Mod: PBBFAC,,, | Performed by: FAMILY MEDICINE

## 2024-01-04 RX ORDER — NIFEDIPINE 60 MG/1
60 TABLET, EXTENDED RELEASE ORAL DAILY
Qty: 90 TABLET | Refills: 3 | Status: SHIPPED | OUTPATIENT
Start: 2024-01-04 | End: 2025-01-03

## 2024-01-04 RX ORDER — VALSARTAN 320 MG/1
320 TABLET ORAL DAILY
Qty: 90 TABLET | Refills: 3 | Status: SHIPPED | OUTPATIENT
Start: 2024-01-04 | End: 2025-01-03

## 2024-01-04 RX ORDER — FUROSEMIDE 40 MG/1
40 TABLET ORAL DAILY
Qty: 90 TABLET | Refills: 3 | Status: SHIPPED | OUTPATIENT
Start: 2024-01-04 | End: 2025-01-03

## 2024-01-04 RX ORDER — SPIRONOLACTONE 25 MG/1
25 TABLET ORAL DAILY
Qty: 30 TABLET | Refills: 11 | Status: SHIPPED | OUTPATIENT
Start: 2024-01-04 | End: 2025-01-03

## 2024-01-04 NOTE — Clinical Note
Good morning,  We share this patient. He came to establish care with me today. I reviewed his most recent test results and saw that his renal artery US is showing mild stenosis. I wasn't sure if this was significant enough to explain his hypertension or to deem his valsartan contraindicated now. I also saw in Dr. Huber's note the possible benefit of adding spironolactone. I am touching base with you to discuss how we can manage his antihypertensives moving forward, as his BP is still quite high. I was considering increasing the nifedipine and/or adding the spironolactone. With his history of lymphedema, a thiazide diuretic would also be helpful but I saw that this was D/Connor in the past.  Appreciate your insights. Dr. Tania Corcoran, DO Family Medicine

## 2024-01-04 NOTE — TELEPHONE ENCOUNTER
Uncertain if patient uses the portal. Please let him know that I spoke with his Endocrinologist and it was agreed to start the spironolactone for hypertension. We will continue the valsartan for now, unless any of his other specialists recommend to discontinue it. We need to keep an eye on his potassium, so we will check it in 2-4 weeks.

## 2024-01-04 NOTE — TELEPHONE ENCOUNTER
----- Message from Chato Huber MD sent at 1/4/2024  1:28 PM CST -----  Oh I see it now. Yes, I would be fine with continuing valsartan and adding spironolactone. I would repeat a renal panel in 2-4 weeks to check his potassium after starting spironolactone.  ----- Message -----  From: Tania Corcoran DO  Sent: 1/4/2024  12:48 PM CST  To: Chato Huber MD; Aure Tilley MD    Hey there,    Thanks for those insights. He had the ultrasound at the end of December, it is found in the cardiac procedures tab. I can get him started on the medication. Assuming his US is not clinically significant, I'm assuming we can continue the valsartan for now.     Dr. Tania Corcoran,   [facility]      ----- Message -----  From: Chato Huber MD  Sent: 1/4/2024  12:04 PM CST  To: Aure Tilley MD; Tania Corcoran DO    I may be missing it, but I don't see a recent renal artery U/S for him. In any case, the aldosterone is not very high so clinically significant renal artery stenosis is not likely. I think a low dose of spironolactone is reasonable (25 mg daily), with the caveat that we would need to periodically monitor his potassium.    I think he has acromegaly, which can contribute to hypertension. I have him set up for the confirmatory test next week.  ----- Message -----  From: Tania Corcoran DO  Sent: 1/4/2024  10:07 AM CST  To: Chato Huber MD; Aure Tilley MD    Good morning,    We share this patient. He came to establish care with me today. I reviewed his most recent test results and saw that his renal artery US is showing mild stenosis. I wasn't sure if this was significant enough to explain his hypertension or to deem his valsartan contraindicated now. I also saw in Dr. Huber's note the possible benefit of adding spironolactone. I am touching base with you to discuss how we can manage his antihypertensives moving forward, as his BP is still quite high. I was considering increasing the  nifedipine and/or adding the spironolactone. With his history of lymphedema, a thiazide diuretic would also be helpful but I saw that this was D/Connor in the past.    Appreciate your insights.  Dr. Tania Corcoran, [facility]

## 2024-01-04 NOTE — PROGRESS NOTES
Assessment & Plan:    Encounter for medical examination to establish care    Resistant hypertension  -     valsartan (DIOVAN) 320 MG tablet; Take 1 tablet (320 mg total) by mouth once daily.  Dispense: 90 tablet; Refill: 3  -     NIFEdipine (PROCARDIA-XL) 60 MG (OSM) 24 hr tablet; Take 1 tablet (60 mg total) by mouth once daily.  Dispense: 90 tablet; Refill: 3    Acromegaly    Renal artery stenosis  -     Ambulatory referral/consult to Vascular Surgery; Future; Expected date: 01/11/2024    Stage 3b chronic kidney disease    Patient is requesting refills of his antihypertensives. Addition of spironolactone was recommended by Endocrinology if BP continues to be difficult to control. Renal artery US showed mild b/l stenosis. Typically ARBs are contraindicated in patients with EYAD. Will refill patient's medications for now but will discuss clinical significance of this test and adjustment of patient's antihypertensive regimen with patient's Cardiologist and Endocrinologist. Recommended consultation with Vascular as well.     Patient has a follow up with Nephrology later this month.     Type 2 diabetes mellitus with diabetic polyneuropathy, with long-term current use of insulin  -     Hemoglobin A1C; Future; Expected date: 01/04/2024  -     Microalbumin/Creatinine Ratio, Urine; Future; Expected date: 01/04/2024    Improving. Due for repeat labs in 3 months.    Dyslipidemia associated with type 2 diabetes mellitus  -     Lipid Panel; Future; Expected date: 01/04/2024    Morbid obesity with BMI of 45.0-49.9, adult  -     Lipid Panel; Future; Expected date: 01/04/2024    See above regarding hormone testing.    Lymphedema associated with obesity  -     furosemide (LASIX) 40 MG tablet; Take 1 tablet (40 mg total) by mouth once daily.  Dispense: 90 tablet; Refill: 3  -     Ambulatory referral/consult to Vascular Surgery; Future; Expected date: 01/11/2024    Controlled. Medication(s) refilled.       Declines influenza vaccine.    Encouraged tetanus booster.     Follow-up: Follow up in about 3 months (around 4/4/2024).  ______________________________________________________________________    Chief Complaint  Chief Complaint   Patient presents with    Establish Care       HPI  Stepan Springer is a 68 y.o. male with medical diagnoses as listed in the medical history and problem list that presents to the office to establish care. Other than mild b/l shoulder pain, he is in his usual state of health today. He is following up with Endocrinology for suspected acromegaly and Cardiology for resistant hypertension. His amlodipine was recently D/Connor and he was started on nifedipine 60 mg. Aldosterone/renin was elevated but testing was not deemed to be medically significant enough to diagnose him with primary hyperaldosteronism. Renal artery US showed mild stenosis of b/l renal arteries. He is expecting to have a 75 g OGTT for growth hormone on Monday. He had an elevated IGF-1 several years ago but repeat testing was normal. Patient reports history of hypertension since the age of 8. He has broken 3 BP cuffs due to the large size of his arms. He has chronic lymphedema for which he wears compression braces and has SCDs at home.     Health Maintenance         Date Due Completion Date    RSV Vaccine (Age 60+ and Pregnant patients) (1 - 1-dose 60+ series) Never done ---    Influenza Vaccine (1) Never done ---    TETANUS VACCINE 12/10/2023 12/10/2013    PROSTATE-SPECIFIC ANTIGEN 02/27/2024 2/27/2023    Hemoglobin A1c 03/25/2024 9/25/2023    Diabetes Urine Screening 09/25/2024 9/25/2023    Lipid Panel 10/19/2024 10/19/2023    Eye Exam 11/06/2024 11/6/2023    Override on 7/18/2017: Done    Override on 9/16/2016: Done    Override on 7/10/2013: Done    Foot Exam 12/11/2024 12/11/2023    Override on 9/11/2023: Done (Luper)    Override on 10/23/2013: Done    Override on 8/21/2013: Done    High Dose Statin 01/04/2025 1/4/2024    Colorectal Cancer Screening  "05/01/2029 5/1/2019              PAST MEDICAL HISTORY:  Past Medical History:   Diagnosis Date    Anxiety     Bell's palsy     CHF (congestive heart failure)     Chronic kidney disease, stage 3a 11/02/2021    Coronary artery disease involving native coronary artery without angina pectoris 10/18/2016    Depression     Diabetes mellitus     Glaucoma     Hypertension     Idiopathic peripheral neuropathy 12/11/2012    Lymphedema of both lower extremities     Malignant melanoma of skin of forehead 10/13/2022    Mixed hyperlipidemia 12/11/2012    Morbid obesity with BMI of 45.0-49.9, adult 02/12/2019    Pancytopenia     Sleep apnea     "unable to use"    Stage 3b chronic kidney disease     Type 2 diabetes mellitus with diabetic polyneuropathy, with long-term current use of insulin 12/11/2012    Vitamin B 12 deficiency 08/23/2012       PAST SURGICAL HISTORY:  Past Surgical History:   Procedure Laterality Date    CARDIAC CATHETERIZATION  7/22/13    non obstructive cad    CATARACT EXTRACTION  OU    CLOSURE OF WOUND Left 10/14/2022    Procedure: CLOSURE, WOUND;  Surgeon: Jovita Ceballos MD;  Location: [facility] [place];  Service: ENT;  Laterality: Left;  closure of forehead    COLONOSCOPY N/A 5/1/2019    Procedure: COLONOSCOPY;  Surgeon: Henry Mo III, MD;  Location: [facility];  Service: Endoscopy;  Laterality: N/A;    CORONARY ANGIOPLASTY      ESOPHAGOGASTRODUODENOSCOPY N/A 5/1/2019    Procedure: ESOPHAGOGASTRODUODENOSCOPY (EGD);  Surgeon: Henry Mo III, MD;  Location: [facility] ENDO;  Service: Endoscopy;  Laterality: N/A;    EXCISION OF MELANOMA Left 10/7/2022    Procedure: EXCISION, MELANOMA;  Surgeon: Jovita Ceballos MD;  Location: [facility] OR;  Service: ENT;  Laterality: Left;    EYE SURGERY      FRACTURE SURGERY      kidney stone       August 2016    PC IOL OU      RETINAL DETACHMENT REPAIR W/ SCLERAL BUCKLE LE      WRIST SURGERY         SOCIAL HISTORY:  Social History     Socioeconomic History    Marital status:  " "  Occupational History     Comment: Quit job at [employer]; wants to open his own [employer] business    Tobacco Use    Smoking status: Never    Smokeless tobacco: Never   Substance and Sexual Activity    Alcohol use: Yes     Comment: " one to two glasses of wine per year"    Drug use: No    Sexual activity: Not Currently     Birth control/protection: None   Social History Narrative    , 1 son, JANIE.     Social Determinants of Health     Financial Resource Strain: Low Risk  (6/3/2023)    Overall Financial Resource Strain (CARDIA)     Difficulty of Paying Living Expenses: Not very hard   Food Insecurity: No Food Insecurity (6/3/2023)    Hunger Vital Sign     Worried About Running Out of Food in the Last Year: Never true     Ran Out of Food in the Last Year: Never true   Transportation Needs: Unmet Transportation Needs (6/3/2023)    PRAPARE - Transportation     Lack of Transportation (Medical): Yes     Lack of Transportation (Non-Medical): Yes   Physical Activity: Inactive (6/3/2023)    Exercise Vital Sign     Days of Exercise per Week: 0 days     Minutes of Exercise per Session: 0 min   Stress: No Stress Concern Present (6/3/2023)    [place] [employer] - Occupational Stress Questionnaire     Feeling of Stress : Only a little   Social Connections: Unknown (6/3/2023)    Social Connection and Isolation Panel [NHANES]     Frequency of Communication with Friends and Family: More than three times a week     Frequency of Social Gatherings with Friends and Family: More than three times a week     Attends Club or Organization Meetings: Patient declined     Marital Status:    Recent Concern: Social Connections - Socially Isolated (6/3/2023)    Social Connection and Isolation Panel [NHANES]     Frequency of Communication with Friends and Family: More than three times a week     Frequency of Social Gatherings with Friends and Family: More than three times a week     Attends Congregation Services: Never     " Active Member of Clubs or Organizations: No     Attends Club or Organization Meetings: Patient declined     Marital Status:    Housing Stability: Unknown (6/3/2023)    Housing Stability Vital Sign     Unable to Pay for Housing in the Last Year: No     Unstable Housing in the Last Year: No       FAMILY HISTORY:  Family History   Problem Relation Age of Onset    Macular degeneration Father     Heart disease Father         CHF     Heart attack Father     Hypertension Mother     Macular degeneration Paternal Uncle     Hypertension Maternal Grandmother     Hypertension Maternal Grandfather     Strabismus Neg Hx     Retinal detachment Neg Hx     Glaucoma Neg Hx     Blindness Neg Hx     Amblyopia Neg Hx        ALLERGIES AND MEDICATIONS: updated and reviewed.  Review of patient's allergies indicates:   Allergen Reactions    Cefazolin Other (See Comments)     Shakes, chills, dizziness     Current Outpatient Medications   Medication Sig Dispense Refill    aspirin 81 MG Chew Take 81 mg by mouth once daily.      atorvastatin (LIPITOR) 20 MG tablet Take 1 tablet (20 mg total) by mouth every evening. 90 tablet 1    AZOPT 1 % ophthalmic suspension Place 1 drop into both eyes 3 (three) times daily. Brand name only 10 mL 6    blood sugar diagnostic Strp To check BG 4 times daily, to use with insurance preferred meter 300 each 3    blood-glucose meter kit To check BG 4 times daily, to use with insurance preferred meter 1 each 0    carvediloL (COREG) 25 MG tablet Take 1 tablet (25 mg total) by mouth 2 (two) times daily. 180 tablet 3    cloNIDine (CATAPRES) 0.1 MG tablet Take 1 tablet (0.1 mg total) by mouth 3 (three) times daily. 270 tablet 1    dextran 70-hypromellose (TEARS) ophthalmic solution Place 2 drops into the left eye every 4 (four) hours. 30 mL 11    insulin (BASAGLAR KWIKPEN U-100 INSULIN) glargine 100 units/mL SubQ pen Inject 36 Units into the skin once daily. 72 mL 3    insulin aspart U-100 (NOVOLOG) 100 unit/mL  "(3 mL) InPn pen Inject 12 Units into the skin 3 (three) times daily with meals. 69 mL 3    lancets Misc To check BG 4 times daily, to use with insurance preferred meter 300 each 3    netarsudiL (RHOPRESSA) 0.02 % ophthalmic solution Place 1 drop into both eyes once daily. 2.5 mL 6    nitroGLYCERIN (NITROSTAT) 0.4 MG SL tablet Place 1 tablet (0.4 mg total) under the tongue every 5 (five) minutes as needed for Chest pain. 100 tablet 2    brimonidine 0.1% (ALPHAGAN P) 0.1 % Drop Place 1 drop into both eyes 3 (three) times daily. (Patient not taking: Reported on 12/18/2023) 10 mL 4    furosemide (LASIX) 40 MG tablet Take 1 tablet (40 mg total) by mouth once daily. 90 tablet 3    NIFEdipine (PROCARDIA-XL) 60 MG (OSM) 24 hr tablet Take 1 tablet (60 mg total) by mouth once daily. 90 tablet 3    valsartan (DIOVAN) 320 MG tablet Take 1 tablet (320 mg total) by mouth once daily. 90 tablet 3     No current facility-administered medications for this visit.         ROS  Review of Systems   Constitutional:  Negative for activity change.   Cardiovascular:  Positive for leg swelling.   Musculoskeletal:  Positive for arthralgias.           Physical Exam  Vitals:    01/04/24 0908 01/04/24 0931   BP: (!) 142/102 (!) 160/84   BP Location: Right arm    Patient Position: Sitting    BP Method: Large (Manual)    Pulse: 74    Temp: 98 °F (36.7 °C)    TempSrc: Oral    SpO2: 98%    Weight: (!) 192.3 kg (423 lb 13.4 oz)    Height: 6' 7" (2.007 m)     Body mass index is 47.75 kg/m².  Weight: (!) 192.3 kg (423 lb 13.4 oz)   Height: 6' 7" (200.7 cm)   Physical Exam  Constitutional:       General: He is not in acute distress.     Appearance: He is obese.   HENT:      Head: Normocephalic and atraumatic.   Neck:      Thyroid: No thyromegaly.      Vascular: No carotid bruit.   Cardiovascular:      Rate and Rhythm: Normal rate and regular rhythm.      Pulses: Normal pulses.      Heart sounds: Normal heart sounds.   Pulmonary:      Effort: Pulmonary " effort is normal. No respiratory distress.      Breath sounds: Normal breath sounds.   Musculoskeletal:      Cervical back: Neck supple.      Right lower leg: Edema present.      Left lower leg: Edema present.      Comments: Compression braces in place     Lymphadenopathy:      Cervical: No cervical adenopathy.   Skin:     General: Skin is warm and dry.   Neurological:      General: No focal deficit present.      Mental Status: He is alert and oriented to person, place, and time.   Psychiatric:         Mood and Affect: Mood normal.         Behavior: Behavior normal.         Thought Content: Thought content normal.

## 2024-01-05 NOTE — TELEPHONE ENCOUNTER
Pt contacted and given information in message sent earlier by this nurse and PCP. Pt verbalized understanding and states he will call back to schedule his potassium lab draw.

## 2024-01-08 ENCOUNTER — CLINICAL SUPPORT (OUTPATIENT)
Dept: ENDOCRINOLOGY | Facility: CLINIC | Age: 69
End: 2024-01-08
Payer: MEDICARE

## 2024-01-08 DIAGNOSIS — R80.9 TYPE 2 DIABETES MELLITUS WITH MICROALBUMINURIA, WITH LONG-TERM CURRENT USE OF INSULIN: ICD-10-CM

## 2024-01-08 DIAGNOSIS — Z79.4 TYPE 2 DIABETES MELLITUS WITH MICROALBUMINURIA, WITH LONG-TERM CURRENT USE OF INSULIN: ICD-10-CM

## 2024-01-08 DIAGNOSIS — E22.0 ACROMEGALY: Primary | ICD-10-CM

## 2024-01-08 DIAGNOSIS — E11.29 TYPE 2 DIABETES MELLITUS WITH MICROALBUMINURIA, WITH LONG-TERM CURRENT USE OF INSULIN: ICD-10-CM

## 2024-01-08 LAB
GLUCOSE SERPL-MCNC: 183 MG/DL (ref 70–110)
GLUCOSE SERPL-MCNC: 262 MG/DL (ref 70–110)
GLUCOSE SERPL-MCNC: 288 MG/DL (ref 70–110)
GLUCOSE SERPL-MCNC: 294 MG/DL (ref 70–110)
GLUCOSE SERPL-MCNC: 309 MG/DL (ref 70–110)

## 2024-01-08 PROCEDURE — 82947 ASSAY GLUCOSE BLOOD QUANT: CPT | Mod: 91 | Performed by: INTERNAL MEDICINE

## 2024-01-08 PROCEDURE — 83003 ASSAY GROWTH HORMONE (HGH): CPT | Mod: 91 | Performed by: INTERNAL MEDICINE

## 2024-01-08 NOTE — PROGRESS NOTES
Patient arrives in clinic for Growth Hormone per Dr. Huber  0840 IV 20 gauge placed right forearm by Sedrick with PICC team  0854 Baseline Growth Hormone and Glucose drawn and walked to lab  0906 75 grams of Glucose given  0936 30 minute Growth Hormone and Glucose drawn and walked to lab  1006 60 minute Growth Hormone and Glucose drawn and walked to lab  1036 90 minute Growth Hormone and Glucose drawn and walked to lab  1106 120 minute Growth Hormone and Glucose drawn and walked to lab  1110 IV d'cd patient released to home tolerated well.

## 2024-01-09 LAB
GH SERPL-MCNC: 0.5 NG/ML (ref 0–3)
GH SERPL-MCNC: <0.1 NG/ML (ref 0–3)

## 2024-01-11 ENCOUNTER — PATIENT MESSAGE (OUTPATIENT)
Dept: ENDOCRINOLOGY | Facility: HOSPITAL | Age: 69
End: 2024-01-11
Payer: MEDICARE

## 2024-01-22 ENCOUNTER — OFFICE VISIT (OUTPATIENT)
Dept: CARDIOLOGY | Facility: CLINIC | Age: 69
End: 2024-01-22
Payer: MEDICARE

## 2024-01-22 VITALS
HEIGHT: 78 IN | SYSTOLIC BLOOD PRESSURE: 176 MMHG | DIASTOLIC BLOOD PRESSURE: 102 MMHG | WEIGHT: 315 LBS | HEART RATE: 60 BPM | BODY MASS INDEX: 36.45 KG/M2

## 2024-01-22 DIAGNOSIS — I89.0 LYMPHEDEMA: ICD-10-CM

## 2024-01-22 DIAGNOSIS — R60.0 BILATERAL LOWER EXTREMITY EDEMA: Primary | ICD-10-CM

## 2024-01-22 PROCEDURE — 99204 OFFICE O/P NEW MOD 45 MIN: CPT | Mod: S$PBB,ICN,CMP, | Performed by: INTERNAL MEDICINE

## 2024-01-22 PROCEDURE — 99215 OFFICE O/P EST HI 40 MIN: CPT | Mod: PBBFAC,PO | Performed by: INTERNAL MEDICINE

## 2024-01-22 PROCEDURE — 99999 PR PBB SHADOW E&M-EST. PATIENT-LVL V: CPT | Mod: PBBFAC,,, | Performed by: INTERNAL MEDICINE

## 2024-01-22 RX ORDER — LOSARTAN POTASSIUM 100 MG/1
100 TABLET ORAL DAILY
COMMUNITY
Start: 2024-01-09 | End: 2024-05-10

## 2024-01-22 RX ORDER — ASPIRIN 325 MG
325 TABLET ORAL DAILY
COMMUNITY

## 2024-01-22 NOTE — PROGRESS NOTES
"[facility]    CC:   Chief Complaint   Patient presents with    Lymphedema       Patient ID: Stepan Springer is a 68 y.o. male with a past medical history of HTN, CAD (S/P PCI RCA, LAD 2016), TIA, CKD III, DM, FRAN presents for an initial appointment. Pertinent history/events are as follows:    Patient kindly referred by Dr. Aure Tilley for evaluation of Lymphedema    HPI  Today Mr. Stepan Springer presents with both leg swelling, he has been diagnosed and had Lymphedema clinic therapy last year in [facility]. Creatinine 1.7, LDL 48 on 10/19/2023. Ultrasound Renal Artery Stenosis 12/18/2023 showed stenosis (0-59%) in the Right Renal Artery. Elevated right renal resistive index suggestive of intrinsic kidney disease. Right kidney 12.00 cm. Insignificant stenosis (0-59%) in the Left Renal Artery. Elevated left renal resistive index suggestive of intrinsic kidney disease. Left kidney 12.10 cm BLE Venous Ultrasound 10/19/2021 revealed no DVT. Carotid Ultrasound 9/4/2022 demonstrated mild noncalcified plaque right carotid bifurcation. Mildly elevated velocity left internal carotid artery which correlates with a moderate grade stenosis (50-69% diameter stenosis).  Mild partially calcified plaque.             Past Medical History:   Diagnosis Date    Anxiety     Bell's palsy     CHF (congestive heart failure)     Chronic kidney disease, stage 3a 11/02/2021    Coronary artery disease involving native coronary artery without angina pectoris 10/18/2016    Depression     Diabetes mellitus     Glaucoma     Hypertension     Idiopathic peripheral neuropathy 12/11/2012    Lymphedema of both lower extremities     Malignant melanoma of skin of forehead 10/13/2022    Mixed hyperlipidemia 12/11/2012    Morbid obesity with BMI of 45.0-49.9, adult 02/12/2019    Pancytopenia     Sleep apnea     "unable to use"    Stage 3b chronic kidney disease     Type 2 diabetes mellitus with diabetic polyneuropathy, with " "long-term current use of insulin 12/11/2012    Vitamin B 12 deficiency 08/23/2012     Past Surgical History:   Procedure Laterality Date    CARDIAC CATHETERIZATION  7/22/13    non obstructive cad    CATARACT EXTRACTION  OU    CLOSURE OF WOUND Left 10/14/2022    Procedure: CLOSURE, WOUND;  Surgeon: Jovita Ceballos MD;  Location: [facility] OR;  Service: ENT;  Laterality: Left;  closure of forehead    COLONOSCOPY N/A 5/1/2019    Procedure: COLONOSCOPY;  Surgeon: Henry Mo III, MD;  Location: [facility] ENDO;  Service: Endoscopy;  Laterality: N/A;    CORONARY ANGIOPLASTY      ESOPHAGOGASTRODUODENOSCOPY N/A 5/1/2019    Procedure: ESOPHAGOGASTRODUODENOSCOPY (EGD);  Surgeon: Henry Mo III, MD;  Location: [facility] ENDO;  Service: Endoscopy;  Laterality: N/A;    EXCISION OF MELANOMA Left 10/7/2022    Procedure: EXCISION, MELANOMA;  Surgeon: Jovita Ceballos MD;  Location: [facility] OR;  Service: ENT;  Laterality: Left;    EYE SURGERY      FRACTURE SURGERY      kidney stone       August 2016    PC IOL OU      RETINAL DETACHMENT REPAIR W/ SCLERAL BUCKLE LE      WRIST SURGERY       Social History     Socioeconomic History    Marital status:    Occupational History     Comment: Quit job at [employer]; wants to open his own [employer] business    Tobacco Use    Smoking status: Never    Smokeless tobacco: Never   Substance and Sexual Activity    Alcohol use: Yes     Comment: " one to two glasses of wine per year"    Drug use: No    Sexual activity: Not Currently     Birth control/protection: None   Social History Narrative    , 1 son, [employer].     Social Determinants of Health     Financial Resource Strain: Low Risk  (6/3/2023)    Overall Financial Resource Strain (CARDIA)     Difficulty of Paying Living Expenses: Not very hard   Food Insecurity: No Food Insecurity (6/3/2023)    Hunger Vital Sign     Worried About Running Out of Food in the Last Year: Never true     Ran Out of Food in the Last Year: Never true   Transportation Needs: Unmet " Transportation Needs (6/3/2023)    PRAPARE - Transportation     Lack of Transportation (Medical): Yes     Lack of Transportation (Non-Medical): Yes   Physical Activity: Inactive (6/3/2023)    Exercise Vital Sign     Days of Exercise per Week: 0 days     Minutes of Exercise per Session: 0 min   Stress: No Stress Concern Present (6/3/2023)    [employer] [place] [employer] - Occupational Stress Questionnaire     Feeling of Stress : Only a little   Social Connections: Unknown (6/3/2023)    Social Connection and Isolation Panel [NHANES]     Frequency of Communication with Friends and Family: More than three times a week     Frequency of Social Gatherings with Friends and Family: More than three times a week     Attends Club or Organization Meetings: Patient declined     Marital Status:    Recent Concern: Social Connections - Socially Isolated (6/3/2023)    Social Connection and Isolation Panel [NHANES]     Frequency of Communication with Friends and Family: More than three times a week     Frequency of Social Gatherings with Friends and Family: More than three times a week     Attends Protestant Services: Never     Active Member of Clubs or Organizations: No     Attends Club or Organization Meetings: Patient declined     Marital Status:    Housing Stability: Unknown (6/3/2023)    Housing Stability Vital Sign     Unable to Pay for Housing in the Last Year: No     Unstable Housing in the Last Year: No     Family History   Problem Relation Age of Onset    Macular degeneration Father     Heart disease Father         CHF     Heart attack Father     Hypertension Mother     Macular degeneration Paternal Uncle     Hypertension Maternal Grandmother     Hypertension Maternal Grandfather     Strabismus Neg Hx     Retinal detachment Neg Hx     Glaucoma Neg Hx     Blindness Neg Hx     Amblyopia Neg Hx        Review of patient's allergies indicates:   Allergen Reactions    Cefazolin Other (See Comments)      Shakes, chills, dizziness       Medication List with Changes/Refills   Current Medications    ASPIRIN 325 MG TABLET    Take 325 mg by mouth once daily.    ATORVASTATIN (LIPITOR) 20 MG TABLET    Take 1 tablet (20 mg total) by mouth every evening.    AZOPT 1 % OPHTHALMIC SUSPENSION    Place 1 drop into both eyes 3 (three) times daily. Brand name only    BLOOD SUGAR DIAGNOSTIC STRP    To check BG 4 times daily, to use with insurance preferred meter    BLOOD-GLUCOSE METER KIT    To check BG 4 times daily, to use with insurance preferred meter    BRIMONIDINE 0.1% (ALPHAGAN P) 0.1 % DROP    Place 1 drop into both eyes 3 (three) times daily.    CARVEDILOL (COREG) 25 MG TABLET    Take 1 tablet (25 mg total) by mouth 2 (two) times daily.    CLONIDINE (CATAPRES) 0.1 MG TABLET    Take 1 tablet (0.1 mg total) by mouth 3 (three) times daily.    FUROSEMIDE (LASIX) 40 MG TABLET    Take 1 tablet (40 mg total) by mouth once daily.    INSULIN (BASAGLAR KWIKPEN U-100 INSULIN) GLARGINE 100 UNITS/ML SUBQ PEN    Inject 36 Units into the skin once daily.    INSULIN ASPART U-100 (NOVOLOG) 100 UNIT/ML (3 ML) INPN PEN    Inject 12 Units into the skin 3 (three) times daily with meals.    LANCETS MISC    To check BG 4 times daily, to use with insurance preferred meter    LOSARTAN (COZAAR) 100 MG TABLET    Take 100 mg by mouth once daily.    NETARSUDIL (RHOPRESSA) 0.02 % OPHTHALMIC SOLUTION    Place 1 drop into both eyes once daily.    NIFEDIPINE (PROCARDIA-XL) 60 MG (OSM) 24 HR TABLET    Take 1 tablet (60 mg total) by mouth once daily.    NITROGLYCERIN (NITROSTAT) 0.4 MG SL TABLET    Place 1 tablet (0.4 mg total) under the tongue every 5 (five) minutes as needed for Chest pain.    SPIRONOLACTONE (ALDACTONE) 25 MG TABLET    Take 1 tablet (25 mg total) by mouth once daily.    VALSARTAN (DIOVAN) 320 MG TABLET    Take 1 tablet (320 mg total) by mouth once daily.   Discontinued Medications    ASPIRIN 81 MG CHEW    Take 81 mg by mouth once daily.     "DEXTRAN 70-HYPROMELLOSE (TEARS) OPHTHALMIC SOLUTION    Place 2 drops into the left eye every 4 (four) hours.    DIPHTH,PERTUS,ACELL,,TETANUS (BOOSTRIX) 2.5-8-5 LF-MCG-LF/0.5ML SYRG INJECTION    Inject into the muscle.       Review of Systems  Constitution: Denies chills, fever, and sweats.  HENT: Denies headaches or blurry vision.  Cardiovascular: Denies chest pain or irregular heart beat.  Respiratory: Denies cough or shortness of breath.  Gastrointestinal: Denies abdominal pain, nausea, or vomiting.  Musculoskeletal: both leg edema  Neurological: Denies dizziness or focal weakness.  Psychiatric/Behavioral: Normal mental status.  Hematologic/Lymphatic: Denies bleeding problem or easy bruising/bleeding.  Skin: Denies rash or suspicious lesions    Physical Examination  BP (!) 176/102   Pulse 60   Ht 6' 7" (2.007 m)   Wt (!) 192.1 kg (423 lb 8.1 oz)   BMI 47.71 kg/m²     Constitutional: No acute distress, conversant  HEENT: Sclera anicteric, Pupils equal, round and reactive to light, extraocular motions intact, Oropharynx clear  Neck: No JVD, no carotid bruits  Cardiovascular: regular rate and rhythm, no murmur, rubs or gallops, normal S1/S2  Pulmonary: Clear to auscultation bilaterally  Abdominal: Abdomen soft, nontender, nondistended, positive bowel sounds  Extremities: both lower extremity prominent Varicose veins,  both lower extremity edema, changes consistent with lymphedema   Pulses:  Carotid pulses are 2+ on the right side, and 2+ on the left side.  Radial pulses are 2+ on the right side, and 2+ on the left side.   Femoral pulses are 2+ on the right side, and 2+ on the left side.  Popliteal pulses are 2+ on the right side, and 2+ on the left side.   Dorsalis pedis pulses are 2+ on the right side, and 2+ on the left side.   Posterior tibial pulses are 2+ on the right side, and 2+ on the left side.    Skin: No ecchymosis, erythema, or ulcers  Psych: Alert and oriented x 3, appropriate affect  Neuro: CNII-XII " intact, no focal deficits    Labs:  Most Recent Data  CBC:   Lab Results   Component Value Date    WBC 7.04 09/25/2023    HGB 16.1 09/25/2023    HCT 48.8 09/25/2023     09/25/2023    MCV 93 09/25/2023    RDW 13.2 09/25/2023     BMP:   Lab Results   Component Value Date     10/19/2023     10/19/2023    K 4.3 10/19/2023    K 4.3 10/19/2023     (H) 10/19/2023     (H) 10/19/2023    CO2 18 (L) 10/19/2023    CO2 18 (L) 10/19/2023    BUN 32 (H) 10/19/2023    BUN 32 (H) 10/19/2023    CREATININE 1.7 (H) 10/19/2023    CREATININE 1.7 (H) 10/19/2023     (H) 01/08/2024    CALCIUM 9.7 10/19/2023    CALCIUM 9.7 10/19/2023    MG 2.0 06/04/2023    PHOS 3.6 06/04/2023     LFTS;   Lab Results   Component Value Date    PROT 6.9 10/19/2023    ALBUMIN 3.8 10/19/2023    BILITOT 0.4 10/19/2023    AST 19 10/19/2023    ALKPHOS 97 10/19/2023    ALT 17 10/19/2023     COAGS:   Lab Results   Component Value Date    INR 1.1 09/04/2022     FLP:   Lab Results   Component Value Date    CHOL 105 (L) 10/19/2023    HDL 38 (L) 10/19/2023    LDLCALC 47.8 (L) 10/19/2023    TRIG 96 10/19/2023    CHOLHDL 36.2 10/19/2023     CARDIAC:   Lab Results   Component Value Date    TROPONINI 0.051 (H) 09/04/2022    TROPONINI 0.051 (H) 09/04/2022     (H) 08/15/2022       Imaging:    Ultrasound Renal Artery Stenosis 12/18/2023    There is insignificant stenosis (0-59%) in the Right Renal Artery.    Elevated right renal resistive index suggestive of intrinsic kidney disease.    Right kidney 12.00 cm.    There is insignificant stenosis (0-59%) in the Left Renal Artery.    Elevated left renal resistive index suggestive of intrinsic kidney disease.    Left kidney 12.10 cm.    This was a technically difficult study. This study was limited due to excessive bowel gas.    BLE Venous Ultrasound 10/19/2021  There is no evidence of a right lower extremity DVT.  There is a right subcutaneous edema located in the distal calf  veins.  There is no evidence of a left lower extremity DVT.  There is a left subcutaneous edema located in the proximal calf and distal calf veins.    Carotid Ultrasound 9/4/2022  1. Mild noncalcified plaque right carotid bifurcation.  Negative for hemodynamically significant stenosis.  2. Mildly elevated velocity left internal carotid artery which correlates with a moderate grade stenosis (50-69% diameter stenosis).  Mild partially calcified plaque.    I have personally reviewed these images and echo data    Assessment/Plan:  Stepan Springer is a 68 y.o. male with a past medical history of HTN, CAD (S/P PCI RCA, LAD 2016), TIA, CKD III, DM, FRAN presents for an initial appointment.    BL LE Edema: Likely due to Venous Insufficiency and Lymphedema. BLE Venous Ultrasound 10/19/2021 revealed no DVT. Check BLE Venous Ultrasound and Ankle brachial Index. Refer to lymphedema clinic.  Recommend wearing graduated compression hose.  Limit sodium intake to 2000 mg daily.  Elevate legs when resting. No fluid restriction due to CKD IIIa.     HTN: Continue current medication     CAD (S/P PCI RCA, LAD 2016): LDL 48 on 10/19/2023,  Continue current medication     Obesity: Encouraged diet, exercise and lifestyle modification for weight loss.     FRAN: Encouraged CPAP use      Follow up in 5 months    Total duration of face to face visit time 30 minutes.  Total time spent counseling greater than fifty percent of total visit time.  Counseling included discussion regarding imaging findings, diagnosis, possibilities, treatment options, risks and benefits.  The patient had many questions regarding the options and long-term effects.    Patient seen and discussed with Dr. Ballard. Further recommendations per the attending addendum.    Ronnell Sirvastava MD  PGY IV - Vascular Medicine Fellow   [facility]

## 2024-01-22 NOTE — PATIENT INSTRUCTIONS
Assessment/Plan:  Stepan Springer is a 68 y.o. male with a past medical history of HTN, CAD (S/P PCI RCA, LAD 2016), TIA, CKD III, DM, FRAN presents for an initial appointment.    BL LE Edema: Likely due to Venous Insufficiency and Lymphedema. BLE Venous Ultrasound 10/19/2021 revealed no DVT. Check BLE Venous Ultrasound and Ankle brachial Index. Refer to lymphedema clinic.  Recommend wearing graduated compression hose.  Limit sodium intake to 2000 mg daily.  Elevate legs when resting. No fluid restriction due to CKD IIIa.     HTN: Continue current medication     CAD (S/P PCI RCA, LAD 2016): LDL 48 on 10/19/2023,  Continue current medication     Obesity: Encouraged diet, exercise and lifestyle modification for weight loss.     FRAN: Encouraged CPAP use      Follow up in 5 months

## 2024-01-23 DIAGNOSIS — E11.59 HYPERTENSION ASSOCIATED WITH DIABETES: Chronic | ICD-10-CM

## 2024-01-23 DIAGNOSIS — I10 ESSENTIAL HYPERTENSION: ICD-10-CM

## 2024-01-23 DIAGNOSIS — I15.2 HYPERTENSION ASSOCIATED WITH DIABETES: Chronic | ICD-10-CM

## 2024-01-23 NOTE — TELEPHONE ENCOUNTER
No care due was identified.  [facility] Embedded Care Due Messages. Reference number: [id number].   1/23/2024 4:13:10 PM CST

## 2024-01-23 NOTE — TELEPHONE ENCOUNTER
----- Message from Linus Jane sent at 1/23/2024  3:20 PM CST -----  Contact: Nona/ [facility] Valentin Castellano is calling in regards to refill for cloNIDine (CATAPRES) 0.1 MG tablet. Ref. [id number]. Please fax to [phone]. Nona can be reached [phone] option 2.      Thanks  ISELA

## 2024-01-24 RX ORDER — CLONIDINE HYDROCHLORIDE 0.1 MG/1
0.1 TABLET ORAL 3 TIMES DAILY
Qty: 270 TABLET | Refills: 1 | Status: SHIPPED | OUTPATIENT
Start: 2024-01-24 | End: 2026-10-19

## 2024-01-24 NOTE — TELEPHONE ENCOUNTER
Refill Routing Note   Medication(s) are not appropriate for processing by [facility] for the following reason(s):        Outside of protocol    [facility] action(s):  Route               Appointments  past 12m or future 3m with PCP    Date Provider   Last Visit   1/4/2024 Tania Corcoran, DO   Next Visit   4/8/2024 Tania Corcoran, DO   ED visits in past 90 days: 0        Note composed:6:46 PM 01/23/2024

## 2024-02-05 DIAGNOSIS — E11.22 TYPE 2 DIABETES MELLITUS WITH CHRONIC KIDNEY DISEASE, WITH LONG-TERM CURRENT USE OF INSULIN, UNSPECIFIED CKD STAGE: ICD-10-CM

## 2024-02-05 DIAGNOSIS — Z79.4 TYPE 2 DIABETES MELLITUS WITH CHRONIC KIDNEY DISEASE, WITH LONG-TERM CURRENT USE OF INSULIN, UNSPECIFIED CKD STAGE: ICD-10-CM

## 2024-02-05 RX ORDER — INSULIN ASPART 100 [IU]/ML
20 INJECTION, SOLUTION INTRAVENOUS; SUBCUTANEOUS
Qty: 54 ML | Refills: 4 | Status: SHIPPED | OUTPATIENT
Start: 2024-02-05 | End: 2025-02-04

## 2024-02-06 ENCOUNTER — TELEPHONE (OUTPATIENT)
Dept: ENDOCRINOLOGY | Facility: CLINIC | Age: 69
End: 2024-02-06
Payer: MEDICARE

## 2024-02-06 DIAGNOSIS — E11.29 TYPE 2 DIABETES MELLITUS WITH MICROALBUMINURIA, WITH LONG-TERM CURRENT USE OF INSULIN: Primary | ICD-10-CM

## 2024-02-06 DIAGNOSIS — R80.9 TYPE 2 DIABETES MELLITUS WITH MICROALBUMINURIA, WITH LONG-TERM CURRENT USE OF INSULIN: Primary | ICD-10-CM

## 2024-02-06 DIAGNOSIS — Z79.4 TYPE 2 DIABETES MELLITUS WITH MICROALBUMINURIA, WITH LONG-TERM CURRENT USE OF INSULIN: Primary | ICD-10-CM

## 2024-02-06 RX ORDER — FLASH GLUCOSE SENSOR
2 KIT MISCELLANEOUS
Qty: 2 KIT | Refills: 11 | Status: SHIPPED | OUTPATIENT
Start: 2024-02-06

## 2024-02-06 NOTE — TELEPHONE ENCOUNTER
----- Message from Peter Springer MA sent at 2/5/2024  4:37 PM CST -----  GOOD EVENING, I did not see freestyle adi on chart to bet refill. Please advise  ----- Message -----  From: Sangita Canada  Sent: 2/5/2024   3:09 PM CST  To: [facility] Staff      Rx Refill/Request     Is this a Refill or New Rx:  refill  Rx Name and Strength:  insulin aspart U-100 (NOVOLOG) 100 unit/mL (3 mL) InPn pe He is also, trying to get a refill on his sensor that he put on his (flash glucose sensor (FREESTYLE ADI 14 DAY SENSOR) Kit  Preferred Pharmacy with phone number:    [facility] [address] [facility] Portal to Registered [facility] Sites  [facility]  [place] [address]  Phone: [phone] Fax: [phone]         Communication Preference: call   Additional Information

## 2024-02-19 ENCOUNTER — TELEPHONE (OUTPATIENT)
Dept: CARDIOLOGY | Facility: CLINIC | Age: 69
End: 2024-02-19
Payer: MEDICARE

## 2024-02-19 NOTE — TELEPHONE ENCOUNTER
Lymphedema Pump Progress:  [x] Order, clinical notes, and face sheet faxed to [employer] for home pneumatic compression.  [x] Reached out to patient for follow up. Wish to inquire about symptoms of lymphedema and if conservative therapy has been working.  [] Updated progress note sent to [facility].  [] Patient pneumatic compression pump approved and shipped by [employer].

## 2024-02-27 DIAGNOSIS — Z00.00 ENCOUNTER FOR MEDICARE ANNUAL WELLNESS EXAM: ICD-10-CM

## 2024-02-28 ENCOUNTER — TELEPHONE (OUTPATIENT)
Dept: CARDIOLOGY | Facility: CLINIC | Age: 69
End: 2024-02-28
Payer: MEDICARE

## 2024-02-28 NOTE — TELEPHONE ENCOUNTER
Spoke with patient. Reports his swelling has been under control. Has been wearing velcro wraps. Declines to lymphedema pump at this time.

## 2024-03-04 ENCOUNTER — PATIENT MESSAGE (OUTPATIENT)
Dept: ADMINISTRATIVE | Facility: HOSPITAL | Age: 69
End: 2024-03-04
Payer: MEDICARE

## 2024-03-14 ENCOUNTER — HOSPITAL ENCOUNTER (OUTPATIENT)
Dept: CARDIOLOGY | Facility: HOSPITAL | Age: 69
Discharge: HOME | End: 2024-03-14
Attending: STUDENT IN AN ORGANIZED HEALTH CARE EDUCATION/TRAINING PROGRAM
Payer: MEDICARE

## 2024-03-14 DIAGNOSIS — R60.0 BILATERAL LOWER EXTREMITY EDEMA: ICD-10-CM

## 2024-03-14 LAB
LEFT ABI: 1.39
LEFT ARM BP: 181 MMHG
LEFT DORSALIS PEDIS: 255 MMHG
LEFT GREAT SAPHENOUS DISTAL THIGH DIA: 0.66 CM
LEFT GREAT SAPHENOUS DISTAL THIGH REFLUX: 628 MS
LEFT GREAT SAPHENOUS JUNCTION DIA: 0.76 CM
LEFT GREAT SAPHENOUS KNEE DIA: 0.63 CM
LEFT GREAT SAPHENOUS MIDDLE THIGH DIA: 0.67 CM
LEFT GREAT SAPHENOUS PROXIMAL CALF DIA: 0.48 CM
LEFT POSTERIOR TIBIAL: 255 MMHG
LEFT SMALL SAPHENOUS KNEE DIA: 0.45 CM
LEFT SMALL SAPHENOUS SPJ DIA: 0.43 CM
LEFT SMALL SAPHENOUS SPJ REFLUX: 3819 MS
LEFT TBI: 0.69
LEFT TOE PRESSURE: 127 MMHG
RIGHT ABI: 1.39
RIGHT ARM BP: 184 MMHG
RIGHT DORSALIS PEDIS: 255 MMHG
RIGHT GREAT SAPHENOUS DISTAL THIGH DIA: 0.88 CM
RIGHT GREAT SAPHENOUS JUNCTION DIA: 0.77 CM
RIGHT GREAT SAPHENOUS JUNCTION REFLUX: 1724 MS
RIGHT GREAT SAPHENOUS KNEE DIA: 0.63 CM
RIGHT GREAT SAPHENOUS MIDDLE THIGH DIA: 0.74 CM
RIGHT GREAT SAPHENOUS PROXIMAL CALF DIA: 0.5 CM
RIGHT POSTERIOR TIBIAL: 255 MMHG
RIGHT SMALL SAPHENOUS KNEE DIA: 0.33 CM
RIGHT SMALL SAPHENOUS SPJ DIA: 0.3 CM
RIGHT TBI: 0.74
RIGHT TOE PRESSURE: 136 MMHG

## 2024-03-14 PROCEDURE — 93922 UPR/L XTREMITY ART 2 LEVELS: CPT | Mod: 26,ICN,, | Performed by: INTERNAL MEDICINE

## 2024-03-14 PROCEDURE — 93970 EXTREMITY STUDY: CPT | Mod: TC

## 2024-03-14 PROCEDURE — 93970 EXTREMITY STUDY: CPT | Mod: 26,,, | Performed by: INTERNAL MEDICINE

## 2024-03-14 PROCEDURE — 93922 UPR/L XTREMITY ART 2 LEVELS: CPT

## 2024-04-08 ENCOUNTER — OFFICE VISIT (OUTPATIENT)
Dept: FAMILY MEDICINE | Facility: CLINIC | Age: 69
End: 2024-04-08
Payer: MEDICARE

## 2024-04-08 VITALS
BODY MASS INDEX: 36.45 KG/M2 | WEIGHT: 315 LBS | OXYGEN SATURATION: 97 % | TEMPERATURE: 98 F | DIASTOLIC BLOOD PRESSURE: 82 MMHG | HEIGHT: 78 IN | SYSTOLIC BLOOD PRESSURE: 110 MMHG | HEART RATE: 61 BPM

## 2024-04-08 DIAGNOSIS — I1A.0 RESISTANT HYPERTENSION: Primary | ICD-10-CM

## 2024-04-08 DIAGNOSIS — E22.0 ACROMEGALY: ICD-10-CM

## 2024-04-08 DIAGNOSIS — E78.5 DYSLIPIDEMIA ASSOCIATED WITH TYPE 2 DIABETES MELLITUS: ICD-10-CM

## 2024-04-08 DIAGNOSIS — N18.32 STAGE 3B CHRONIC KIDNEY DISEASE: ICD-10-CM

## 2024-04-08 DIAGNOSIS — Z12.5 PROSTATE CANCER SCREENING: ICD-10-CM

## 2024-04-08 DIAGNOSIS — E11.42 TYPE 2 DIABETES MELLITUS WITH DIABETIC POLYNEUROPATHY, WITH LONG-TERM CURRENT USE OF INSULIN: ICD-10-CM

## 2024-04-08 DIAGNOSIS — E66.9 LYMPHEDEMA ASSOCIATED WITH OBESITY: ICD-10-CM

## 2024-04-08 DIAGNOSIS — E66.01 MORBID OBESITY WITH BMI OF 45.0-49.9, ADULT: ICD-10-CM

## 2024-04-08 DIAGNOSIS — I89.0 LYMPHEDEMA ASSOCIATED WITH OBESITY: ICD-10-CM

## 2024-04-08 DIAGNOSIS — E11.69 DYSLIPIDEMIA ASSOCIATED WITH TYPE 2 DIABETES MELLITUS: ICD-10-CM

## 2024-04-08 DIAGNOSIS — Z79.4 TYPE 2 DIABETES MELLITUS WITH DIABETIC POLYNEUROPATHY, WITH LONG-TERM CURRENT USE OF INSULIN: ICD-10-CM

## 2024-04-08 DIAGNOSIS — I70.1 RENAL ARTERY STENOSIS: ICD-10-CM

## 2024-04-08 PROBLEM — C43.39: Status: RESOLVED | Noted: 2022-10-13 | Resolved: 2024-04-08

## 2024-04-08 PROCEDURE — 99999 PR PBB SHADOW E&M-EST. PATIENT-LVL IV: CPT | Mod: PBBFAC,,, | Performed by: FAMILY MEDICINE

## 2024-04-08 PROCEDURE — 99214 OFFICE O/P EST MOD 30 MIN: CPT | Mod: PBBFAC,PO | Performed by: FAMILY MEDICINE

## 2024-04-08 PROCEDURE — 99214 OFFICE O/P EST MOD 30 MIN: CPT | Mod: S$PBB,,, | Performed by: FAMILY MEDICINE

## 2024-04-08 NOTE — PROGRESS NOTES
Assessment & Plan:    Resistant hypertension  Renal artery stenosis  Continue antihypertensives. Following with Cardiology.    Acromegaly  Continue therapy plan per Endocrinology.    Stage 3b chronic kidney disease  Chronic. Monitor q3-6 months.    Type 2 diabetes mellitus with diabetic polyneuropathy, with long-term current use of insulin  Dyslipidemia associated with type 2 diabetes mellitus    Morbid obesity with BMI of 45.0-49.9, adult    Lymphedema associated with obesity    Prostate cancer screening  -     PSA, Screening; Future; Expected date: 04/08/2024    Fasting labs to be performed today.     Encouraged RSV vaccine.     Follow-up: Follow up in about 6 months (around 10/8/2024).  ______________________________________________________________________    Chief Complaint  Chief Complaint   Patient presents with    Health Maintenance       HPI  Stepan Springer is a 69 y.o. male with medical diagnoses as listed in the medical history and problem list that presents to the office to follow up on his chronic conditions, for which he was last seen on 1/4/2024. Patient forgot to do his labs prior to this visit. Awaiting Sriram approval through his Endocrinologist.     Health Maintenance         Date Due Completion Date    RSV Vaccine (Age 60+ and Pregnant patients) (1 - 1-dose 60+ series) Never done ---    Influenza Vaccine (1) Never done ---    COVID-19 Vaccine (9 - 2023-24 season) 02/12/2024 12/18/2023    PROSTATE-SPECIFIC ANTIGEN 02/27/2024 2/27/2023    Hemoglobin A1c 03/25/2024 9/25/2023    Diabetes Urine Screening 09/25/2024 9/25/2023    Lipid Panel 10/19/2024 10/19/2023    Eye Exam 11/06/2024 11/6/2023    Override on 7/18/2017: Done    Override on 9/16/2016: Done    Override on 7/10/2013: Done    Foot Exam 12/11/2024 12/11/2023    Override on 9/11/2023: Done (Luper)    Override on 10/23/2013: Done    Override on 8/21/2013: Done    High Dose Statin 01/22/2025 1/22/2024    Colorectal Cancer Screening  "05/01/2029 5/1/2019    TETANUS VACCINE 01/08/2034 1/8/2024              PAST MEDICAL HISTORY:  Past Medical History:   Diagnosis Date    Anxiety     Bell's palsy     CHF (congestive heart failure)     Chronic kidney disease, stage 3a 11/02/2021    Coronary artery disease involving native coronary artery without angina pectoris 10/18/2016    Depression     Diabetes mellitus     Glaucoma     Hypertension     Idiopathic peripheral neuropathy 12/11/2012    Lymphedema of both lower extremities     Malignant melanoma of skin of forehead 10/13/2022    Mixed hyperlipidemia 12/11/2012    Morbid obesity with BMI of 45.0-49.9, adult 02/12/2019    Pancytopenia     Sleep apnea     "unable to use"    Stage 3b chronic kidney disease     Type 2 diabetes mellitus with diabetic polyneuropathy, with long-term current use of insulin 12/11/2012    Vitamin B 12 deficiency 08/23/2012       PAST SURGICAL HISTORY:  Past Surgical History:   Procedure Laterality Date    CARDIAC CATHETERIZATION  7/22/13    non obstructive cad    CATARACT EXTRACTION  OU    CLOSURE OF WOUND Left 10/14/2022    Procedure: CLOSURE, WOUND;  Surgeon: Jovita Ceballos MD;  Location: [facility] OR;  Service: ENT;  Laterality: Left;  closure of forehead    COLONOSCOPY N/A 5/1/2019    Procedure: COLONOSCOPY;  Surgeon: Henry Mo III, MD;  Location: [facility] ENDO;  Service: Endoscopy;  Laterality: N/A;    CORONARY ANGIOPLASTY      ESOPHAGOGASTRODUODENOSCOPY N/A 5/1/2019    Procedure: ESOPHAGOGASTRODUODENOSCOPY (EGD);  Surgeon: Henry Mo III, MD;  Location: [facility] ENDO;  Service: Endoscopy;  Laterality: N/A;    EXCISION OF MELANOMA Left 10/7/2022    Procedure: EXCISION, MELANOMA;  Surgeon: Jovita Ceballos MD;  Location: [facility] OR;  Service: ENT;  Laterality: Left;    EYE SURGERY      FRACTURE SURGERY      kidney stone       August 2016    PC IOL OU      RETINAL DETACHMENT REPAIR W/ SCLERAL BUCKLE LE      WRIST SURGERY         SOCIAL HISTORY:  Social History     Socioeconomic " "History    Marital status:    Occupational History     Comment: Quit job at [employer]; wants to open his own [employer] business    Tobacco Use    Smoking status: Never    Smokeless tobacco: Never   Substance and Sexual Activity    Alcohol use: Yes     Comment: " one to two glasses of wine per year"    Drug use: No    Sexual activity: Not Currently     Birth control/protection: None   Social History Narrative    , 1 son, JANIE.     Social Determinants of Health     Financial Resource Strain: Low Risk  (4/7/2024)    Overall Financial Resource Strain (CARDIA)     Difficulty of Paying Living Expenses: Not hard at all   Food Insecurity: No Food Insecurity (4/7/2024)    Hunger Vital Sign     Worried About Running Out of Food in the Last Year: Never true     Ran Out of Food in the Last Year: Never true   Transportation Needs: Unmet Transportation Needs (4/7/2024)    PRAPARE - Transportation     Lack of Transportation (Medical): Yes     Lack of Transportation (Non-Medical): Yes   Physical Activity: Insufficiently Active (4/7/2024)    Exercise Vital Sign     Days of Exercise per Week: 1 day     Minutes of Exercise per Session: 20 min   Stress: No Stress Concern Present (4/7/2024)    [employer] - Occupational Stress Questionnaire     Feeling of Stress : Not at all   Social Connections: Unknown (4/7/2024)    Social Connection and Isolation Panel [NHANES]     Frequency of Communication with Friends and Family: More than three times a week     Frequency of Social Gatherings with Friends and Family: Once a week     Active Member of Clubs or Organizations: No     Attends Club or Organization Meetings: Never     Marital Status:    Housing Stability: Low Risk  (4/7/2024)    Housing Stability Vital Sign     Unable to Pay for Housing in the Last Year: No     Number of Places Lived in the Last Year: 1     Unstable Housing in the Last Year: No       FAMILY HISTORY:  Family History   Problem " Relation Age of Onset    Macular degeneration Father     Heart disease Father         CHF     Heart attack Father     Hypertension Mother     Macular degeneration Paternal Uncle     Hypertension Maternal Grandmother     Hypertension Maternal Grandfather     Strabismus Neg Hx     Retinal detachment Neg Hx     Glaucoma Neg Hx     Blindness Neg Hx     Amblyopia Neg Hx        ALLERGIES AND MEDICATIONS: updated and reviewed.  Review of patient's allergies indicates:   Allergen Reactions    Cefazolin Other (See Comments)     Shakes, chills, dizziness     Current Outpatient Medications   Medication Sig Dispense Refill    aspirin 325 MG tablet Take 325 mg by mouth once daily.      atorvastatin (LIPITOR) 20 MG tablet Take 1 tablet (20 mg total) by mouth every evening. 90 tablet 1    AZOPT 1 % ophthalmic suspension Place 1 drop into both eyes 3 (three) times daily. Brand name only 10 mL 6    blood sugar diagnostic Strp To check BG 4 times daily, to use with insurance preferred meter 300 each 3    blood-glucose meter kit To check BG 4 times daily, to use with insurance preferred meter 1 each 0    brimonidine 0.1% (ALPHAGAN P) 0.1 % Drop Place 1 drop into both eyes 3 (three) times daily. 10 mL 4    carvediloL (COREG) 25 MG tablet Take 1 tablet (25 mg total) by mouth 2 (two) times daily. 180 tablet 3    cloNIDine (CATAPRES) 0.1 MG tablet Take 1 tablet (0.1 mg total) by mouth 3 (three) times daily. 270 tablet 1    flash glucose sensor (FREESTYLE CLEMENCIA 14 DAY SENSOR) Kit 2 each by Misc.(Non-Drug; Combo Route) route every 14 (fourteen) days. 2 kit 11    furosemide (LASIX) 40 MG tablet Take 1 tablet (40 mg total) by mouth once daily. 90 tablet 3    insulin (BASAGLAR KWIKPEN U-100 INSULIN) glargine 100 units/mL SubQ pen Inject 36 Units into the skin once daily. 72 mL 3    insulin aspart U-100 (NOVOLOG) 100 unit/mL (3 mL) InPn pen Inject 20 Units into the skin 3 (three) times daily with meals. 54 mL 4    lancets Misc To check BG 4  "times daily, to use with insurance preferred meter 300 each 3    losartan (COZAAR) 100 MG tablet Take 100 mg by mouth once daily.      netarsudiL (RHOPRESSA) 0.02 % ophthalmic solution Place 1 drop into both eyes once daily. 2.5 mL 6    NIFEdipine (PROCARDIA-XL) 60 MG (OSM) 24 hr tablet Take 1 tablet (60 mg total) by mouth once daily. 90 tablet 3    nitroGLYCERIN (NITROSTAT) 0.4 MG SL tablet Place 1 tablet (0.4 mg total) under the tongue every 5 (five) minutes as needed for Chest pain. 100 tablet 2    spironolactone (ALDACTONE) 25 MG tablet Take 1 tablet (25 mg total) by mouth once daily. 30 tablet 11    valsartan (DIOVAN) 320 MG tablet Take 1 tablet (320 mg total) by mouth once daily. 90 tablet 3     No current facility-administered medications for this visit.         ROS  Review of Systems   Constitutional:  Negative for activity change.   Cardiovascular:  Positive for leg swelling (chronic, stable).   Musculoskeletal:  Positive for arthralgias (chronic).           Physical Exam  Vitals:    04/08/24 1320   BP: 110/82   BP Location: Right arm   Patient Position: Sitting   BP Method: Large (Manual)   Pulse: 61   Temp: 97.6 °F (36.4 °C)   TempSrc: Oral   SpO2: 97%   Weight: (!) 192.2 kg (423 lb 11.6 oz)   Height: 6' 7" (2.007 m)    Body mass index is 47.73 kg/m².  Weight: (!) 192.2 kg (423 lb 11.6 oz)   Height: 6' 7" (200.7 cm)   Physical Exam  Constitutional:       General: He is not in acute distress.     Appearance: He is obese.   HENT:      Head: Normocephalic and atraumatic.   Neck:      Thyroid: No thyromegaly.      Vascular: No carotid bruit.   Cardiovascular:      Rate and Rhythm: Normal rate and regular rhythm.      Pulses: Normal pulses.      Heart sounds: Normal heart sounds.   Pulmonary:      Effort: Pulmonary effort is normal. No respiratory distress.      Breath sounds: Normal breath sounds.   Musculoskeletal:      Cervical back: Neck supple.      Right lower leg: Edema present.      Left lower leg: " Edema present.      Comments: Lower extremities wrapped in compression dressings.   Skin:     General: Skin is warm and dry.   Neurological:      Mental Status: He is alert. Mental status is at baseline.   Psychiatric:         Mood and Affect: Mood normal.         Behavior: Behavior normal.

## 2024-04-09 ENCOUNTER — PATIENT MESSAGE (OUTPATIENT)
Dept: FAMILY MEDICINE | Facility: CLINIC | Age: 69
End: 2024-04-09
Payer: MEDICARE

## 2024-04-09 ENCOUNTER — TELEPHONE (OUTPATIENT)
Dept: ADMINISTRATIVE | Facility: CLINIC | Age: 69
End: 2024-04-09
Payer: MEDICARE

## 2024-04-09 DIAGNOSIS — E11.42 TYPE 2 DIABETES MELLITUS WITH DIABETIC POLYNEUROPATHY, WITH LONG-TERM CURRENT USE OF INSULIN: Primary | ICD-10-CM

## 2024-04-09 DIAGNOSIS — Z79.4 TYPE 2 DIABETES MELLITUS WITH DIABETIC POLYNEUROPATHY, WITH LONG-TERM CURRENT USE OF INSULIN: Primary | ICD-10-CM

## 2024-04-09 NOTE — TELEPHONE ENCOUNTER
Left a voicemail message to inform the Patient about the EAW[facility] appointment and to schedule.  Requested the Patient to call the office back to be schedule.  Sent a detailed message thru [employer] as well.

## 2024-04-09 NOTE — TELEPHONE ENCOUNTER
Unsure if patient uses [facility]. Please inform him that his labs are within acceptable limits and no change to his treatment plan is recommended. Repeat labs before his follow up in 6 mo.

## 2024-05-10 RX ORDER — LOSARTAN POTASSIUM 100 MG/1
100 TABLET ORAL
Qty: 90 TABLET | Refills: 2 | Status: SHIPPED | OUTPATIENT
Start: 2024-05-10

## 2024-05-17 ENCOUNTER — TELEPHONE (OUTPATIENT)
Dept: CARDIOLOGY | Facility: CLINIC | Age: 69
End: 2024-05-17
Payer: MEDICARE

## 2024-05-17 NOTE — TELEPHONE ENCOUNTER
Spoke with pharmacy in regards to clarifying rx.                         ----- Message from Jasmina Payton sent at 5/16/2024  4:08 PM CDT -----  Pharmacy is calling to clarify or change an RX.    RX name:  losartan (COZAAR) 100 MG tablet    What do they need to clarify:  Ashely calling [facility] and need to which medication pt need to take.  Call back number is [phone]  option #2 reference number is: [id number]       [facility]  [place] [id number]  Phone: [phone] Fax: [phone]        Additional comments: Please call to advise

## 2024-05-27 ENCOUNTER — OFFICE VISIT (OUTPATIENT)
Dept: CARDIOLOGY | Facility: CLINIC | Age: 69
End: 2024-05-27
Payer: MEDICARE

## 2024-05-27 VITALS
HEART RATE: 52 BPM | DIASTOLIC BLOOD PRESSURE: 97 MMHG | BODY MASS INDEX: 36.45 KG/M2 | HEIGHT: 78 IN | SYSTOLIC BLOOD PRESSURE: 176 MMHG | WEIGHT: 315 LBS

## 2024-05-27 DIAGNOSIS — Z79.4 TYPE 2 DIABETES MELLITUS WITH MICROALBUMINURIA, WITH LONG-TERM CURRENT USE OF INSULIN: ICD-10-CM

## 2024-05-27 DIAGNOSIS — E11.59 HYPERTENSION ASSOCIATED WITH DIABETES: Chronic | ICD-10-CM

## 2024-05-27 DIAGNOSIS — I44.7 LBBB (LEFT BUNDLE BRANCH BLOCK): ICD-10-CM

## 2024-05-27 DIAGNOSIS — G47.33 OBSTRUCTIVE SLEEP APNEA: Chronic | ICD-10-CM

## 2024-05-27 DIAGNOSIS — E11.59 OBESITY, DIABETES, AND HYPERTENSION SYNDROME: ICD-10-CM

## 2024-05-27 DIAGNOSIS — E11.42 TYPE 2 DIABETES MELLITUS WITH DIABETIC POLYNEUROPATHY, WITH LONG-TERM CURRENT USE OF INSULIN: Chronic | ICD-10-CM

## 2024-05-27 DIAGNOSIS — R80.9 TYPE 2 DIABETES MELLITUS WITH MICROALBUMINURIA, WITH LONG-TERM CURRENT USE OF INSULIN: ICD-10-CM

## 2024-05-27 DIAGNOSIS — I89.0 LYMPHEDEMA: ICD-10-CM

## 2024-05-27 DIAGNOSIS — E78.5 HYPERLIPIDEMIA ASSOCIATED WITH TYPE 2 DIABETES MELLITUS: ICD-10-CM

## 2024-05-27 DIAGNOSIS — Z79.4 TYPE 2 DIABETES MELLITUS WITH DIABETIC POLYNEUROPATHY, WITH LONG-TERM CURRENT USE OF INSULIN: Chronic | ICD-10-CM

## 2024-05-27 DIAGNOSIS — E11.69 HYPERLIPIDEMIA ASSOCIATED WITH TYPE 2 DIABETES MELLITUS: ICD-10-CM

## 2024-05-27 DIAGNOSIS — I10 ESSENTIAL HYPERTENSION: ICD-10-CM

## 2024-05-27 DIAGNOSIS — E66.01 CLASS 3 SEVERE OBESITY DUE TO EXCESS CALORIES WITH SERIOUS COMORBIDITY AND BODY MASS INDEX (BMI) OF 45.0 TO 49.9 IN ADULT: Chronic | ICD-10-CM

## 2024-05-27 DIAGNOSIS — E11.29 TYPE 2 DIABETES MELLITUS WITH MICROALBUMINURIA, WITH LONG-TERM CURRENT USE OF INSULIN: ICD-10-CM

## 2024-05-27 DIAGNOSIS — I15.2 OBESITY, DIABETES, AND HYPERTENSION SYNDROME: ICD-10-CM

## 2024-05-27 DIAGNOSIS — E11.69 OBESITY, DIABETES, AND HYPERTENSION SYNDROME: ICD-10-CM

## 2024-05-27 DIAGNOSIS — R60.0 BILATERAL LOWER EXTREMITY EDEMA: Primary | ICD-10-CM

## 2024-05-27 DIAGNOSIS — I25.10 CORONARY ARTERY DISEASE INVOLVING NATIVE CORONARY ARTERY OF NATIVE HEART WITHOUT ANGINA PECTORIS: Chronic | ICD-10-CM

## 2024-05-27 DIAGNOSIS — E66.9 OBESITY, DIABETES, AND HYPERTENSION SYNDROME: ICD-10-CM

## 2024-05-27 DIAGNOSIS — I15.2 HYPERTENSION ASSOCIATED WITH DIABETES: Chronic | ICD-10-CM

## 2024-05-27 PROCEDURE — 99215 OFFICE O/P EST HI 40 MIN: CPT | Mod: S$PBB,ICN,, | Performed by: INTERNAL MEDICINE

## 2024-05-27 PROCEDURE — 99214 OFFICE O/P EST MOD 30 MIN: CPT | Mod: PBBFAC,PO | Performed by: INTERNAL MEDICINE

## 2024-05-27 PROCEDURE — 99999 PR PBB SHADOW E&M-EST. PATIENT-LVL IV: CPT | Mod: PBBFAC,,, | Performed by: INTERNAL MEDICINE

## 2024-05-27 NOTE — PATIENT INSTRUCTIONS
Assessment/Plan:  Stepan Springer is a 69 y.o. male with BLE lymphedema, venous insuficiency, HTN, CAD s/p PCI to RCA/LAD (2016), TIA, CKD III, DM, FRAN, morbid obesity, presents for a follow up appointment.     BLE edema- Due to BLE lymphedema and venous insufficiency.  Edema is stable.  BLE Venous Reflux Study on 3/14/2024 revealed hemodynamically significant BLE venous reflux and no DVT.  LESLY study on  3/14/2024 showed bilateral LESLY is unreliable, and suggestive of medial calcinosis. Right TBI 0.74, is normal.  Left TBI 0.69, is suggestive of mild arterial disease. Continue graduated compression hose.  Limit sodium intake to 2000 mg daily.   No fluid restriction due to CKD IIIa. Elevate legs when resting.    2.  HTN- Continue current medication     3. CAD s/p PCI to RCA/LAD (2016)- Continue ASA, statin,  beta blocker, ARB, calcium channel blocker.      4. Obesity- Encouraged diet, exercise and lifestyle modification for weight loss.     5. FRAN- Encouraged CPAP use      Follow up in 6 months

## 2024-05-27 NOTE — PROGRESS NOTES
[facility]      Chief Complaint   Patient presents with    Bilateral lower extremity edema       Patient ID: Stepan Springer is a 69 y.o. male with BLE lymphedema, venous insuficiency, HTN, CAD s/p PCI to RCA/LAD (2016), TIA, CKD III, DM, FRAN, morbid obesity, presents for a follow up appointment. Pertinent history/events are as follows:    Patient kindly referred by Dr. Aure Tilley for evaluation of Lymphedema.    -At our initial clinic visit on 1/22/2024, Mr. Stepan Springer presented with bilateral leg swelling, he has been diagnosed and had Lymphedema clinic therapy last year in [facility]. Creatinine 1.7, LDL 48 on 10/19/2023. Ultrasound Renal Artery Stenosis 12/18/2023 showed stenosis (0-59%) in the Right Renal Artery. Elevated right renal resistive index suggestive of intrinsic kidney disease. Right kidney 12.00 cm. Insignificant stenosis (0-59%) in the Left Renal Artery. Elevated left renal resistive index suggestive of intrinsic kidney disease. Left kidney 12.10 cm BLE Venous Ultrasound 10/19/2021 revealed no DVT. Carotid Ultrasound 9/4/2022 demonstrated mild noncalcified plaque right carotid bifurcation. Mildly elevated velocity left internal carotid artery which correlates with a moderate grade stenosis (50-69% diameter stenosis).  Mild partially calcified plaque.  Plan:   BL LE Edema: Likely due to Venous Insufficiency and Lymphedema. BLE Venous Ultrasound 10/19/2021 revealed no DVT. Check BLE Venous Ultrasound and Ankle brachial Index. Refer to lymphedema clinic.  Recommend wearing graduated compression hose.  Limit sodium intake to 2000 mg daily.  Elevate legs when resting. No fluid restriction due to CKD IIIa.   HTN: Continue current medication   CAD (S/P PCI RCA, LAD 2016): LDL 48 on 10/19/2023,  Continue current medication   Obesity: Encouraged diet, exercise and lifestyle modification for weight loss.   FRAN: Encouraged CPAP use      HPI:  Mr. Springer reports leg swelling  "has been well controlled and stable.  BLE Venous Reflux Study on 3/14/2024 revealed hemodynamically significant BLE venous reflux and no DVT.  LESLY study on  3/14/2024 showed bilateral LESLY is unreliable, and suggestive of medial calcinosis. Right TBI 0.74, is normal.  Left TBI 0.69, is suggestive of mild arterial disease.    Past Medical History:   Diagnosis Date    Anxiety     Bell's palsy     CHF (congestive heart failure)     Chronic kidney disease, stage 3a 11/02/2021    Coronary artery disease involving native coronary artery without angina pectoris 10/18/2016    Depression     Diabetes mellitus     Glaucoma     Hypertension     Idiopathic peripheral neuropathy 12/11/2012    Lymphedema of both lower extremities     Malignant melanoma of skin of forehead 10/13/2022    Mixed hyperlipidemia 12/11/2012    Morbid obesity with BMI of 45.0-49.9, adult 02/12/2019    Pancytopenia     Sleep apnea     "unable to use"    Stage 3b chronic kidney disease     Type 2 diabetes mellitus with diabetic polyneuropathy, with long-term current use of insulin 12/11/2012    Vitamin B 12 deficiency 08/23/2012     Past Surgical History:   Procedure Laterality Date    CARDIAC CATHETERIZATION  7/22/13    non obstructive cad    CATARACT EXTRACTION  OU    CLOSURE OF WOUND Left 10/14/2022    Procedure: CLOSURE, WOUND;  Surgeon: Jovita Ceballos MD;  Location: [facility];  Service: ENT;  Laterality: Left;  closure of forehead    COLONOSCOPY N/A 5/1/2019    Procedure: COLONOSCOPY;  Surgeon: Henry Mo III, MD;  Location: [facility];  Service: Endoscopy;  Laterality: N/A;    CORONARY ANGIOPLASTY      ESOPHAGOGASTRODUODENOSCOPY N/A 5/1/2019    Procedure: ESOPHAGOGASTRODUODENOSCOPY (EGD);  Surgeon: Henry Mo III, MD;  Location: [facility];  Service: Endoscopy;  Laterality: N/A;    EXCISION OF MELANOMA Left 10/7/2022    Procedure: EXCISION, MELANOMA;  Surgeon: Jovita Ceballos MD;  Location: [facility];  Service: ENT;  Laterality: Left;    EYE " "SURGERY      FRACTURE SURGERY      kidney stone       August 2016    PC IOL OU      RETINAL DETACHMENT REPAIR W/ SCLERAL BUCKLE LE      WRIST SURGERY       Social History     Socioeconomic History    Marital status:    Occupational History     Comment: Quit job at [employer]; wants to open his own [employer] business    Tobacco Use    Smoking status: Never    Smokeless tobacco: Never   Substance and Sexual Activity    Alcohol use: Yes     Comment: " one to two glasses of wine per year"    Drug use: No    Sexual activity: Not Currently     Birth control/protection: None   Social History Narrative    , 1 son, [employer].     Social Determinants of Health     Financial Resource Strain: Low Risk  (4/7/2024)    Overall Financial Resource Strain (CARDIA)     Difficulty of Paying Living Expenses: Not hard at all   Food Insecurity: No Food Insecurity (4/7/2024)    Hunger Vital Sign     Worried About Running Out of Food in the Last Year: Never true     Ran Out of Food in the Last Year: Never true   Transportation Needs: Unmet Transportation Needs (4/7/2024)    PRAPARE - Transportation     Lack of Transportation (Medical): Yes     Lack of Transportation (Non-Medical): Yes   Physical Activity: Insufficiently Active (4/7/2024)    Exercise Vital Sign     Days of Exercise per Week: 1 day     Minutes of Exercise per Session: 20 min   Stress: No Stress Concern Present (4/7/2024)    [employer] [employer] - Occupational Stress Questionnaire     Feeling of Stress : Not at all   Housing Stability: Low Risk  (4/7/2024)    Housing Stability Vital Sign     Unable to Pay for Housing in the Last Year: No     Number of Places Lived in the Last Year: 1     Unstable Housing in the Last Year: No     Family History   Problem Relation Name Age of Onset    Macular degeneration Father      Heart disease Father          CHF     Heart attack Father      Hypertension Mother      Macular degeneration Paternal Uncle      Hypertension " Maternal Grandmother      Hypertension Maternal Grandfather      Strabismus Neg Hx      Retinal detachment Neg Hx      Glaucoma Neg Hx      Blindness Neg Hx      Amblyopia Neg Hx         Review of patient's allergies indicates:   Allergen Reactions    Cefazolin Other (See Comments)     Shakes, chills, dizziness       Medication List with Changes/Refills   Current Medications    ASPIRIN 325 MG TABLET    Take 325 mg by mouth once daily.    ATORVASTATIN (LIPITOR) 20 MG TABLET    Take 1 tablet (20 mg total) by mouth every evening.    AZOPT 1 % OPHTHALMIC SUSPENSION    Place 1 drop into both eyes 3 (three) times daily. Brand name only    BLOOD SUGAR DIAGNOSTIC STRP    To check BG 4 times daily, to use with insurance preferred meter    BLOOD-GLUCOSE METER KIT    To check BG 4 times daily, to use with insurance preferred meter    BRIMONIDINE 0.1% (ALPHAGAN P) 0.1 % DROP    Place 1 drop into both eyes 3 (three) times daily.    CARVEDILOL (COREG) 25 MG TABLET    Take 1 tablet (25 mg total) by mouth 2 (two) times daily.    CLONIDINE (CATAPRES) 0.1 MG TABLET    Take 1 tablet (0.1 mg total) by mouth 3 (three) times daily.    FLASH GLUCOSE SENSOR (FREESTYLE CLEMENCIA 14 DAY SENSOR) KIT    2 each by Misc.(Non-Drug; Combo Route) route every 14 (fourteen) days.    FUROSEMIDE (LASIX) 40 MG TABLET    Take 1 tablet (40 mg total) by mouth once daily.    INSULIN (BASAGLAR KWIKPEN U-100 INSULIN) GLARGINE 100 UNITS/ML SUBQ PEN    Inject 36 Units into the skin once daily.    INSULIN ASPART U-100 (NOVOLOG) 100 UNIT/ML (3 ML) INPN PEN    Inject 20 Units into the skin 3 (three) times daily with meals.    LANCETS MISC    To check BG 4 times daily, to use with insurance preferred meter    LOSARTAN (COZAAR) 100 MG TABLET    TAKE 1 TABLET ONCE DAILY    NETARSUDIL (RHOPRESSA) 0.02 % OPHTHALMIC SOLUTION    Place 1 drop into both eyes once daily.    NIFEDIPINE (PROCARDIA-XL) 60 MG (OSM) 24 HR TABLET    Take 1 tablet (60 mg total) by mouth once daily.     "NITROGLYCERIN (NITROSTAT) 0.4 MG SL TABLET    Place 1 tablet (0.4 mg total) under the tongue every 5 (five) minutes as needed for Chest pain.    SPIRONOLACTONE (ALDACTONE) 25 MG TABLET    Take 1 tablet (25 mg total) by mouth once daily.       Review of Systems  Constitution: Denies chills, fever, and sweats.  HENT: Denies headaches or blurry vision.  Cardiovascular: Denies chest pain or irregular heart beat.  Respiratory: Denies cough or shortness of breath.  Gastrointestinal: Denies abdominal pain, nausea, or vomiting.  Musculoskeletal: both leg edema  Neurological: Denies dizziness or focal weakness.  Psychiatric/Behavioral: Normal mental status.  Hematologic/Lymphatic: Denies bleeding problem or easy bruising/bleeding.  Skin: Denies rash or suspicious lesions    Physical Examination  BP (!) 176/97   Pulse (!) 52   Ht 6' 7" (2.007 m)   Wt (!) 200.5 kg (442 lb 0.4 oz)   BMI 49.80 kg/m²     Constitutional: No acute distress, conversant  HEENT: Sclera anicteric, Pupils equal, round and reactive to light, extraocular motions intact, Oropharynx clear  Neck: No JVD, no carotid bruits  Cardiovascular: regular rate and rhythm, no murmur, rubs or gallops, normal S1/S2  Pulmonary: Clear to auscultation bilaterally  Abdominal: Abdomen soft, nontender, nondistended, positive bowel sounds  Extremities: both lower extremity prominent Varicose veins,  both lower extremity edema, changes consistent with lymphedema   Pulses:  Carotid pulses are 2+ on the right side, and 2+ on the left side.  Radial pulses are 2+ on the right side, and 2+ on the left side.   Femoral pulses are 2+ on the right side, and 2+ on the left side.  Popliteal pulses are 2+ on the right side, and 2+ on the left side.   Dorsalis pedis pulses are 2+ on the right side, and 2+ on the left side.   Posterior tibial pulses are 2+ on the right side, and 2+ on the left side.    Skin: No ecchymosis, erythema, or ulcers  Psych: Alert and oriented x 3, appropriate " affect  Neuro: CNII-XII intact, no focal deficits    Labs:  Most Recent Data  CBC:   Lab Results   Component Value Date    WBC 7.04 09/25/2023    HGB 16.1 09/25/2023    HCT 48.8 09/25/2023     09/25/2023    MCV 93 09/25/2023    RDW 13.2 09/25/2023     BMP:   Lab Results   Component Value Date     10/19/2023     10/19/2023    K 4.2 04/08/2024     (H) 10/19/2023     (H) 10/19/2023    CO2 18 (L) 10/19/2023    CO2 18 (L) 10/19/2023    BUN 32 (H) 10/19/2023    BUN 32 (H) 10/19/2023    CREATININE 1.7 (H) 10/19/2023    CREATININE 1.7 (H) 10/19/2023     (H) 01/08/2024    CALCIUM 9.7 10/19/2023    CALCIUM 9.7 10/19/2023    MG 2.0 06/04/2023    PHOS 3.6 06/04/2023     LFTS;   Lab Results   Component Value Date    PROT 6.9 10/19/2023    ALBUMIN 3.8 10/19/2023    BILITOT 0.4 10/19/2023    AST 19 10/19/2023    ALKPHOS 97 10/19/2023    ALT 17 10/19/2023     COAGS:   Lab Results   Component Value Date    INR 1.1 09/04/2022     FLP:   Lab Results   Component Value Date    CHOL 158 04/08/2024    HDL 45 04/08/2024    LDLCALC 97.4 04/08/2024    TRIG 78 04/08/2024    CHOLHDL 28.5 04/08/2024     CARDIAC:   Lab Results   Component Value Date    TROPONINI 0.051 (H) 09/04/2022    TROPONINI 0.051 (H) 09/04/2022     (H) 08/15/2022       Imaging:    BLE Venous Reflux Study 3/14/2024:    There is no evidence of a right lower extremity DVT.    The right common femoral vein has reflux.    The right greater saphenous vein has reflux.    There is no evidence of a left lower extremity DVT.    The left common femoral vein has reflux.    The left greater saphenous vein has reflux.    The left smaller saphenous vein has reflux.    LESLY study 3/14/2024:    Bilateral LESLY is unreliable, and suggestive of medial calcinosis.    Right TBI 0.74, is normal.    Left TBI 0.69, is suggestive of mild arterial disease.    PVR waveforms mildly dampened at the low thigh level and ankle level bilaterally.    PVR waveforms  are moderately dampened at the digit level bilaterally.    Ultrasound Renal Artery Stenosis 12/18/2023    There is insignificant stenosis (0-59%) in the Right Renal Artery.    Elevated right renal resistive index suggestive of intrinsic kidney disease.    Right kidney 12.00 cm.    There is insignificant stenosis (0-59%) in the Left Renal Artery.    Elevated left renal resistive index suggestive of intrinsic kidney disease.    Left kidney 12.10 cm.    This was a technically difficult study. This study was limited due to excessive bowel gas.    BLE Venous Ultrasound 10/19/2021  There is no evidence of a right lower extremity DVT.  There is a right subcutaneous edema located in the distal calf veins.  There is no evidence of a left lower extremity DVT.  There is a left subcutaneous edema located in the proximal calf and distal calf veins.    Carotid Ultrasound 9/4/2022  1. Mild noncalcified plaque right carotid bifurcation.  Negative for hemodynamically significant stenosis.  2. Mildly elevated velocity left internal carotid artery which correlates with a moderate grade stenosis (50-69% diameter stenosis).  Mild partially calcified plaque.    I have personally reviewed these images and echo data    Assessment/Plan:  Stepan Springer is a 69 y.o. male with BLE lymphedema, venous insuficiency, HTN, CAD s/p PCI to RCA/LAD (2016), TIA, CKD III, DM, FRAN, morbid obesity, presents for a follow up appointment.     BLE edema- Due to BLE lymphedema and venous insufficiency.  Edema is stable.  BLE Venous Reflux Study on 3/14/2024 revealed hemodynamically significant BLE venous reflux and no DVT.  LESLY study on  3/14/2024 showed bilateral LESLY is unreliable, and suggestive of medial calcinosis. Right TBI 0.74, is normal.  Left TBI 0.69, is suggestive of mild arterial disease. Continue graduated compression hose.  Limit sodium intake to 2000 mg daily.   No fluid restriction due to CKD IIIa. Elevate legs when resting.    2.  HTN-  Continue current medication     3. CAD s/p PCI to RCA/LAD (2016)- Continue ASA, statin,  beta blocker, ARB, calcium channel blocker.      4. Obesity- Encouraged diet, exercise and lifestyle modification for weight loss.     5. FRAN- Encouraged CPAP use      Follow up in 6 months    Total duration of face to face visit time 30 minutes.  Total time spent counseling greater than fifty percent of total visit time.  Counseling included discussion regarding imaging findings, diagnosis, possibilities, treatment options, risks and benefits.  The patient had many questions regarding the options and long-term effects.    Eder Ballard MD, PhD  Interventional Cardiology

## 2024-07-02 DIAGNOSIS — H40.1133 PRIMARY OPEN ANGLE GLAUCOMA OF BOTH EYES, SEVERE STAGE: ICD-10-CM

## 2024-07-03 RX ORDER — NETARSUDIL 0.2 MG/ML
1 SOLUTION/ DROPS OPHTHALMIC; TOPICAL DAILY
Qty: 2.5 ML | Refills: 11 | Status: SHIPPED | OUTPATIENT
Start: 2024-07-03

## 2024-07-03 RX ORDER — BRINZOLAMIDE 10 MG/ML
1 SUSPENSION/ DROPS OPHTHALMIC 3 TIMES DAILY
Qty: 10 ML | Refills: 11 | Status: SHIPPED | OUTPATIENT
Start: 2024-07-03

## 2024-07-03 RX ORDER — BRIMONIDINE TARTRATE 1 MG/ML
1 SOLUTION/ DROPS OPHTHALMIC 3 TIMES DAILY
Qty: 10 ML | Refills: 11 | Status: SHIPPED | OUTPATIENT
Start: 2024-07-03

## 2024-07-18 ENCOUNTER — OFFICE VISIT (OUTPATIENT)
Dept: FAMILY MEDICINE | Facility: CLINIC | Age: 69
End: 2024-07-18
Payer: MEDICARE

## 2024-07-18 DIAGNOSIS — I89.0 LYMPHEDEMA ASSOCIATED WITH OBESITY: Primary | ICD-10-CM

## 2024-07-18 DIAGNOSIS — R80.9 TYPE 2 DIABETES MELLITUS WITH MICROALBUMINURIA, WITH LONG-TERM CURRENT USE OF INSULIN: ICD-10-CM

## 2024-07-18 DIAGNOSIS — E66.9 LYMPHEDEMA ASSOCIATED WITH OBESITY: Primary | ICD-10-CM

## 2024-07-18 DIAGNOSIS — Z79.4 TYPE 2 DIABETES MELLITUS WITH MICROALBUMINURIA, WITH LONG-TERM CURRENT USE OF INSULIN: ICD-10-CM

## 2024-07-18 DIAGNOSIS — Z79.4 TYPE 2 DIABETES MELLITUS WITH CHRONIC KIDNEY DISEASE, WITH LONG-TERM CURRENT USE OF INSULIN, UNSPECIFIED CKD STAGE: ICD-10-CM

## 2024-07-18 DIAGNOSIS — E11.22 TYPE 2 DIABETES MELLITUS WITH CHRONIC KIDNEY DISEASE, WITH LONG-TERM CURRENT USE OF INSULIN, UNSPECIFIED CKD STAGE: ICD-10-CM

## 2024-07-18 DIAGNOSIS — E11.29 TYPE 2 DIABETES MELLITUS WITH MICROALBUMINURIA, WITH LONG-TERM CURRENT USE OF INSULIN: ICD-10-CM

## 2024-07-18 PROCEDURE — 99442 PR PHYSICIAN TELEPHONE EVALUATION 11-20 MIN: CPT | Mod: 95,,, | Performed by: FAMILY MEDICINE

## 2024-07-18 RX ORDER — FLASH GLUCOSE SENSOR
2 KIT MISCELLANEOUS
Qty: 2 KIT | Refills: 11 | Status: SHIPPED | OUTPATIENT
Start: 2024-07-18

## 2024-07-18 RX ORDER — SEMAGLUTIDE 0.68 MG/ML
0.25 INJECTION, SOLUTION SUBCUTANEOUS
Qty: 1.5 ML | Refills: 11 | Status: CANCELLED | OUTPATIENT
Start: 2024-07-18 | End: 2025-07-18

## 2024-07-18 RX ORDER — SEMAGLUTIDE 0.68 MG/ML
0.25 INJECTION, SOLUTION SUBCUTANEOUS
Qty: 1.5 ML | Refills: 11 | Status: SHIPPED | OUTPATIENT
Start: 2024-07-18 | End: 2025-07-18

## 2024-07-18 RX ORDER — INSULIN ASPART 100 [IU]/ML
5 INJECTION, SOLUTION INTRAVENOUS; SUBCUTANEOUS
Qty: 4.5 ML | Refills: 11 | Status: SHIPPED | OUTPATIENT
Start: 2024-07-18 | End: 2025-07-18

## 2024-07-18 RX ORDER — INSULIN GLARGINE 100 [IU]/ML
20 INJECTION, SOLUTION SUBCUTANEOUS NIGHTLY
COMMUNITY

## 2024-07-18 NOTE — Clinical Note
Good afternoon,  We share this patient. During his telehealth visit, he mentioned that he has been waiting on his Freestyle Sriram to be approved. I am just sending you this message out of courtesy to him to see if this can be refilled.   Thanks in advance, Dr. Tania Corcoran,  Family Medicine

## 2024-07-18 NOTE — PROGRESS NOTES
Assessment & Plan:    Type 2 diabetes mellitus with chronic kidney disease, with long-term current use of insulin, unspecified CKD stage  -     semaglutide (OZEMPIC) 0.25 mg or 0.5 mg (2 mg/3 mL) pen injector; Inject 0.25 mg into the skin every 7 days.  Dispense: 1.5 mL; Refill: 11  -     insulin aspart U-100 (NOVOLOG) 100 unit/mL (3 mL) InPn pen; Inject 5 Units into the skin 3 (three) times daily with meals.  Dispense: 4.5 mL; Refill: 11    Discussed benefits and risks of GLP-1 therapy. He was informed that there is a low risk of diabetic retinopathy from this medication but that nausea and constipation are the most common side effects. Start Ozempic. Wean down pre-meal insulin to 5 units qhs. Continue Basaglar for now. Plan to eventually D/C pre-meal insulin if possible.   Message sent to Endocrinology out of courtesy for the patient regarding Freestyle Sriram orders.    Lymphedema associated with obesity  -     Ambulatory referral/consult to Physical/Occupational Therapy; Future; Expected date: 07/25/2024    Referral to lymphatic therapy.     The patient location is:  Patient Home   The chief complaint leading to consultation is: noted below  Visit type: audio  Total time spent with patient: 15 minutes  Each patient to whom he or she provides medical services by telemedicine is:  (1) informed of the relationship between the physician and patient and the respective role of any other health care provider with respect to management of the patient; and (2) notified that he or she may decline to receive medical services by telemedicine and may withdraw from such care at any time.    Follow-up: prn. Patient already has a follow up scheduled in October.     ______________________________________________________________________    Chief Complaint  Chief Complaint   Patient presents with    Diabetes       HPI  Stepan Springer is a 69 y.o. male with multiple medical diagnoses as listed in the medical history and problem  "list that presents in a virtual visit to discuss type 2 diabetes management. Pt is known to me with his last appointment 4/8/2024. He states that his daughter is suggesting that he take Ozempic for diabetes. He is hesitant to take this medication out of concern of ocular side effects. He is awaiting approval of his Nantero Sriram. Patient is requesting a referral to lymphatic PT for chronic leg swelling.       PAST MEDICAL HISTORY:  Past Medical History:   Diagnosis Date    Anxiety     Bell's palsy     CHF (congestive heart failure)     Chronic kidney disease, stage 3a 11/02/2021    Coronary artery disease involving native coronary artery without angina pectoris 10/18/2016    Depression     Diabetes mellitus     Glaucoma     Hypertension     Idiopathic peripheral neuropathy 12/11/2012    Lymphedema of both lower extremities     Malignant melanoma of skin of forehead 10/13/2022    Mixed hyperlipidemia 12/11/2012    Morbid obesity with BMI of 45.0-49.9, adult 02/12/2019    Pancytopenia     Sleep apnea     "unable to use"    Stage 3b chronic kidney disease     Type 2 diabetes mellitus with diabetic polyneuropathy, with long-term current use of insulin 12/11/2012    Vitamin B 12 deficiency 08/23/2012       PAST SURGICAL HISTORY:  Past Surgical History:   Procedure Laterality Date    CARDIAC CATHETERIZATION  7/22/13    non obstructive cad    CATARACT EXTRACTION  OU    CLOSURE OF WOUND Left 10/14/2022    Procedure: CLOSURE, WOUND;  Surgeon: Jovita Ceballos MD;  Location: [facility] OR;  Service: ENT;  Laterality: Left;  closure of forehead    COLONOSCOPY N/A 5/1/2019    Procedure: COLONOSCOPY;  Surgeon: Henry Mo III, MD;  Location: [facility] ENDO;  Service: Endoscopy;  Laterality: N/A;    CORONARY ANGIOPLASTY      ESOPHAGOGASTRODUODENOSCOPY N/A 5/1/2019    Procedure: ESOPHAGOGASTRODUODENOSCOPY (EGD);  Surgeon: Henry Mo III, MD;  Location: [facility] ENDO;  Service: Endoscopy;  Laterality: N/A;    EXCISION OF MELANOMA Left " "10/7/2022    Procedure: EXCISION, MELANOMA;  Surgeon: Jovita Ceballos MD;  Location: [facility];  Service: ENT;  Laterality: Left;    EYE SURGERY      FRACTURE SURGERY      kidney stone       August 2016    PC IOL OU      RETINAL DETACHMENT REPAIR W/ SCLERAL BUCKLE LE      WRIST SURGERY         SOCIAL HISTORY:  Social History     Socioeconomic History    Marital status:    Occupational History     Comment: Quit job at [employer]; wants to open his own [employer] business    Tobacco Use    Smoking status: Never    Smokeless tobacco: Never   Substance and Sexual Activity    Alcohol use: Yes     Comment: " one to two glasses of wine per year"    Drug use: No    Sexual activity: Not Currently     Birth control/protection: None   Social History Narrative    , 1 son, [employer].     Social Determinants of Health     Financial Resource Strain: Low Risk  (4/7/2024)    Overall Financial Resource Strain (CARDIA)     Difficulty of Paying Living Expenses: Not hard at all   Food Insecurity: No Food Insecurity (4/7/2024)    Hunger Vital Sign     Worried About Running Out of Food in the Last Year: Never true     Ran Out of Food in the Last Year: Never true   Transportation Needs: Unmet Transportation Needs (4/7/2024)    PRAPARE - Transportation     Lack of Transportation (Medical): Yes     Lack of Transportation (Non-Medical): Yes   Physical Activity: Insufficiently Active (4/7/2024)    Exercise Vital Sign     Days of Exercise per Week: 1 day     Minutes of Exercise per Session: 20 min   Stress: No Stress Concern Present (4/7/2024)    [employer] [place] [employer] - Occupational Stress Questionnaire     Feeling of Stress : Not at all   Housing Stability: Low Risk  (4/7/2024)    Housing Stability Vital Sign     Unable to Pay for Housing in the Last Year: No     Number of Places Lived in the Last Year: 1     Unstable Housing in the Last Year: No       FAMILY HISTORY:  Family History   Problem Relation Name Age of Onset    " Macular degeneration Father      Heart disease Father          CHF     Heart attack Father      Hypertension Mother      Macular degeneration Paternal Uncle      Hypertension Maternal Grandmother      Hypertension Maternal Grandfather      Strabismus Neg Hx      Retinal detachment Neg Hx      Glaucoma Neg Hx      Blindness Neg Hx      Amblyopia Neg Hx         ALLERGIES AND MEDICATIONS: updated and reviewed.  Review of patient's allergies indicates:   Allergen Reactions    Cefazolin Other (See Comments)     Shakes, chills, dizziness     Current Outpatient Medications   Medication Sig Dispense Refill    aspirin 325 MG tablet Take 325 mg by mouth once daily.      atorvastatin (LIPITOR) 20 MG tablet Take 1 tablet (20 mg total) by mouth every evening. 90 tablet 1    AZOPT 1 % ophthalmic suspension Place 1 drop into both eyes 3 (three) times daily. Brand name only 10 mL 11    blood sugar diagnostic Strp To check BG 4 times daily, to use with insurance preferred meter 300 each 3    blood-glucose meter kit To check BG 4 times daily, to use with insurance preferred meter 1 each 0    brimonidine 0.1% (ALPHAGAN P) 0.1 % Drop Place 1 drop into both eyes 3 (three) times daily. 10 mL 11    carvediloL (COREG) 25 MG tablet Take 1 tablet (25 mg total) by mouth 2 (two) times daily. 180 tablet 3    cloNIDine (CATAPRES) 0.1 MG tablet Take 1 tablet (0.1 mg total) by mouth 3 (three) times daily. 270 tablet 1    flash glucose sensor (FREESTYLE CLEMENCIA 14 DAY SENSOR) Kit 2 each by Misc.(Non-Drug; Combo Route) route every 14 (fourteen) days. 2 kit 11    furosemide (LASIX) 40 MG tablet Take 1 tablet (40 mg total) by mouth once daily. 90 tablet 3    insulin aspart U-100 (NOVOLOG) 100 unit/mL (3 mL) InPn pen Inject 5 Units into the skin 3 (three) times daily with meals. 4.5 mL 11    insulin glargine U-100, Lantus, (BASAGLAR KWIKPEN U-100 INSULIN) 100 unit/mL (3 mL) InPn pen Inject 20 Units into the skin every evening.      lancets Misc To check  BG 4 times daily, to use with insurance preferred meter 300 each 3    losartan (COZAAR) 100 MG tablet TAKE 1 TABLET ONCE DAILY 90 tablet 2    netarsudiL (RHOPRESSA) 0.02 % ophthalmic solution Place 1 drop into both eyes once daily. 2.5 mL 11    NIFEdipine (PROCARDIA-XL) 60 MG (OSM) 24 hr tablet Take 1 tablet (60 mg total) by mouth once daily. 90 tablet 3    nitroGLYCERIN (NITROSTAT) 0.4 MG SL tablet Place 1 tablet (0.4 mg total) under the tongue every 5 (five) minutes as needed for Chest pain. 100 tablet 2    semaglutide (OZEMPIC) 0.25 mg or 0.5 mg (2 mg/3 mL) pen injector Inject 0.25 mg into the skin every 7 days. 1.5 mL 11    spironolactone (ALDACTONE) 25 MG tablet Take 1 tablet (25 mg total) by mouth once daily. 30 tablet 11     No current facility-administered medications for this visit.         ROS  Review of Systems   Cardiovascular:  Positive for leg swelling.           Physical Exam  Physical Exam  Neurological:      Mental Status: He is alert.   Psychiatric:         Mood and Affect: Mood normal.         *Physical exam limited by phone visit.    Health Maintenance         Date Due Completion Date    RSV Vaccine (Age 60+ and Pregnant patients) (1 - 1-dose 60+ series) Never done ---    COVID-19 Vaccine (9 - 2023-24 season) 02/12/2024 12/18/2023    Influenza Vaccine (1) 09/01/2024 ---    Hemoglobin A1c 10/08/2024 4/8/2024    Eye Exam 11/06/2024 11/6/2023    Override on 7/18/2017: Done    Override on 9/16/2016: Done    Override on 7/10/2013: Done    Foot Exam 12/11/2024 12/11/2023    Override on 9/11/2023: Done (Luper)    Override on 10/23/2013: Done    Override on 8/21/2013: Done    PROSTATE-SPECIFIC ANTIGEN 04/08/2025 4/8/2024    Diabetes Urine Screening 04/08/2025 4/8/2024    Lipid Panel 04/08/2025 4/8/2024    High Dose Statin 05/27/2025 5/27/2024    Aspirin/Antiplatelet Therapy 05/27/2025 5/27/2024    Colorectal Cancer Screening 05/01/2029 5/1/2019    TETANUS VACCINE 01/08/2034 1/8/2024

## 2024-07-26 ENCOUNTER — TELEPHONE (OUTPATIENT)
Dept: CARDIOLOGY | Facility: CLINIC | Age: 69
End: 2024-07-26
Payer: MEDICARE

## 2024-07-26 DIAGNOSIS — I89.0 LYMPHEDEMA: Primary | ICD-10-CM

## 2024-07-26 DIAGNOSIS — R60.0 BILATERAL LOWER EXTREMITY EDEMA: ICD-10-CM

## 2024-07-29 ENCOUNTER — OFFICE VISIT (OUTPATIENT)
Dept: OPHTHALMOLOGY | Facility: CLINIC | Age: 69
End: 2024-07-29
Payer: MEDICARE

## 2024-07-29 DIAGNOSIS — Z98.49 STATUS POST CATARACT EXTRACTION AND INSERTION OF INTRAOCULAR LENS, UNSPECIFIED LATERALITY: Chronic | ICD-10-CM

## 2024-07-29 DIAGNOSIS — Z86.69 HX OF RETINAL DETACHMENT: Chronic | ICD-10-CM

## 2024-07-29 DIAGNOSIS — H52.13 HIGH MYOPIA, BOTH EYES: ICD-10-CM

## 2024-07-29 DIAGNOSIS — H40.1133 PRIMARY OPEN ANGLE GLAUCOMA (POAG) OF BOTH EYES, SEVERE STAGE: Primary | ICD-10-CM

## 2024-07-29 DIAGNOSIS — Z96.1 STATUS POST CATARACT EXTRACTION AND INSERTION OF INTRAOCULAR LENS, UNSPECIFIED LATERALITY: Chronic | ICD-10-CM

## 2024-07-29 PROCEDURE — G2211 COMPLEX E/M VISIT ADD ON: HCPCS | Mod: S$PBB,,, | Performed by: OPHTHALMOLOGY

## 2024-07-29 PROCEDURE — 99214 OFFICE O/P EST MOD 30 MIN: CPT | Mod: S$PBB,,, | Performed by: OPHTHALMOLOGY

## 2024-07-29 PROCEDURE — 92020 GONIOSCOPY: CPT | Mod: S$PBB,,, | Performed by: OPHTHALMOLOGY

## 2024-07-29 PROCEDURE — 92133 CPTRZD OPH DX IMG PST SGM ON: CPT | Mod: PBBFAC | Performed by: OPHTHALMOLOGY

## 2024-07-29 PROCEDURE — 99999 PR PBB SHADOW E&M-EST. PATIENT-LVL III: CPT | Mod: PBBFAC,,, | Performed by: OPHTHALMOLOGY

## 2024-07-29 PROCEDURE — 92020 GONIOSCOPY: CPT | Mod: PBBFAC | Performed by: OPHTHALMOLOGY

## 2024-07-29 PROCEDURE — 99213 OFFICE O/P EST LOW 20 MIN: CPT | Mod: PBBFAC | Performed by: OPHTHALMOLOGY

## 2024-07-29 NOTE — PROGRESS NOTES
Assessment /Plan     For exam results, see Encounter Report.    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 Visually significant as discussed    RD precautions:  re-Discussed symptoms of RD with increased flashes, floaters, decreasing vision.  Patient/Family to call and return immediately to clinic should the symptoms of RD occur. Voiced good understanding with Q & A.    11/06/2023            Plan  RTC 4 month  IOP & DFE & Adherence  rtc sooner prn with good understand

## 2024-08-20 NOTE — PROGRESS NOTES
"Subjective:       Patient ID: Stepan Springer is a 62 y.o. male.    Chief Complaint: Annual Exam    HPI Comments: Here for f/u medical problems and preventive exam.  AC breakfast .  Occs lows about 1-2 per month.  No f/c/sw/cough. No cp/sob/palp.  BMs normal.  Urine normal.  Anx/depression better on rx and seeing counselor and Psych.  Not taking vit D.  On 70/30 5u bid.      Updated/annual due 2/17:  HM: ref fluvax, 6/16 [id number], 4/14 [id number], 12/13 TDaP, 2/16 PSA, No Cscope, 9/16 Eye Dr. Hale, 9/16 HCV neg.        Review of Systems   Constitutional: Negative for appetite change, chills, diaphoresis, fatigue and fever.   HENT: Negative for congestion, ear pain, rhinorrhea and sinus pressure.    Respiratory: Negative for cough and shortness of breath.    Cardiovascular: Negative for chest pain and palpitations.   Gastrointestinal: Negative for abdominal distention, abdominal pain, blood in stool, constipation, diarrhea, nausea and vomiting.   Genitourinary: Negative for difficulty urinating, dysuria, frequency, hematuria and urgency.   Musculoskeletal: Negative for arthralgias.   Skin: Negative for rash.   Neurological: Negative for dizziness and headaches.   Psychiatric/Behavioral: The patient is not nervous/anxious.        Objective:   BP (!) 144/86 (BP Location: Right arm)  Pulse 84  Temp 96.6 °F (35.9 °C) (Tympanic)   Ht 6' 7" (2.007 m)  Wt (!) 173.3 kg (382 lb 0.9 oz)  SpO2 97%  BMI 43.04 kg/m2    Physical Exam   Constitutional: He is oriented to person, place, and time. He appears well-developed and well-nourished.   HENT:   Head: Normocephalic and atraumatic.   Right Ear: External ear normal. Tympanic membrane is not injected.   Left Ear: External ear normal. Tympanic membrane is not injected.   Mouth/Throat: Oropharynx is clear and moist.   Eyes: Conjunctivae are normal. Pupils are equal, round, and reactive to light.   Neck: Neck supple. No thyromegaly present.   Cardiovascular: Normal " no rate, regular rhythm and intact distal pulses.  Exam reveals no gallop and no friction rub.    No murmur heard.  Pulses:       Dorsalis pedis pulses are 2+ on the right side, and 2+ on the left side.        Posterior tibial pulses are 2+ on the right side, and 2+ on the left side.   Pulmonary/Chest: Effort normal and breath sounds normal. He has no wheezes. He has no rales.   Abdominal: Soft. Bowel sounds are normal. He exhibits no mass. There is no tenderness.   Musculoskeletal: He exhibits no edema.   Feet:   Right Foot:   Protective Sensation: 10 sites tested. 10 sites sensed.   Skin Integrity: Negative for ulcer, blister, skin breakdown, erythema, warmth, callus or dry skin.   Left Foot:   Protective Sensation: 10 sites tested. 10 sites sensed.   Skin Integrity: Negative for ulcer, blister, skin breakdown, erythema, warmth, callus or dry skin.   Lymphadenopathy:     He has no cervical adenopathy.   Neurological: He is alert and oriented to person, place, and time.   Skin: Skin is warm. No rash noted.   Psychiatric: He has a normal mood and affect.     Results for ARIANNA FLANAGAN (MRN [id number]) as of 3/7/2017 16:28   Ref. Range 2/8/2017 07:19 2/8/2017 07:30   WBC Latest Ref Range: 3.90 - 12.70 K/uL  9.87   RBC Latest Ref Range: 4.60 - 6.20 M/uL  4.09 (L)   Hemoglobin Latest Ref Range: 14.0 - 18.0 g/dL  13.2 (L)   Hematocrit Latest Ref Range: 40.0 - 54.0 %  39.2 (L)   MCV Latest Ref Range: 82 - 98 fL  96   MCH Latest Ref Range: 27.0 - 31.0 pg  32.3 (H)   MCHC Latest Ref Range: 32.0 - 36.0 %  33.7   RDW Latest Ref Range: 11.5 - 14.5 %  12.8   Platelets Latest Ref Range: 150 - 350 K/uL  216   MPV Latest Ref Range: 9.2 - 12.9 fL  9.7   Gran% Latest Ref Range: 38.0 - 73.0 %  76.1 (H)   Gran # Latest Ref Range: 1.8 - 7.7 K/uL  7.5   Lymph% Latest Ref Range: 18.0 - 48.0 %  11.0 (L)   Lymph # Latest Ref Range: 1.0 - 4.8 K/uL  1.1   Mono% Latest Ref Range: 4.0 - 15.0 %  7.9   Mono # Latest Ref Range: 0.3 - 1.0 K/uL   0.8   Eosinophil% Latest Ref Range: 0.0 - 8.0 %  4.7   Eos # Latest Ref Range: 0.0 - 0.5 K/uL  0.5   Basophil% Latest Ref Range: 0.0 - 1.9 %  0.1   Baso # Latest Ref Range: 0.00 - 0.20 K/uL  0.01   Sodium Latest Ref Range: 136 - 145 mmol/L  141   Potassium Latest Ref Range: 3.5 - 5.1 mmol/L  4.8   Chloride Latest Ref Range: 95 - 110 mmol/L  110   CO2 Latest Ref Range: 23 - 29 mmol/L  25   Anion Gap Latest Ref Range: 8 - 16 mmol/L  6 (L)   BUN, Bld Latest Ref Range: 6 - 20 mg/dL  31 (H)   Creatinine Latest Ref Range: 0.5 - 1.4 mg/dL  1.8 (H)   eGFR if non African American Latest Ref Range: >60 mL/min/1.73 m^2  40.3 (A)   eGFR if African American Latest Ref Range: >60 mL/min/1.73 m^2  46.6 (A)   Glucose Latest Ref Range: 70 - 110 mg/dL  89   Calcium Latest Ref Range: 8.7 - 10.5 mg/dL  9.0   Alkaline Phosphatase Latest Ref Range: 55 - 135 U/L  91   Total Protein Latest Ref Range: 6.0 - 8.4 g/dL  6.9   Albumin Latest Ref Range: 3.5 - 5.2 g/dL  3.6   Total Bilirubin Latest Ref Range: 0.1 - 1.0 mg/dL  0.3   AST Latest Ref Range: 10 - 40 U/L  16   ALT Latest Ref Range: 10 - 44 U/L  21   Vit D, 25-Hydroxy Latest Ref Range: 30 - 96 ng/mL  28 (L)   Hemoglobin A1C Latest Ref Range: 4.5 - 6.2 %  6.2   Estimated Avg Glucose Latest Ref Range: 68 - 131 mg/dL  131   TSH Latest Ref Range: 0.400 - 4.000 uIU/mL  1.640   PSA, SCREEN Latest Ref Range: 0.00 - 4.00 ng/mL  0.18   Microalbum.,U,Random Latest Units: ug/mL 7.0    Microalb Creat Ratio Latest Ref Range: 0.0 - 30.0 ug/mg 7.2    Results for ARIANNA FLANAGAN (MRN [id number]) as of 3/7/2017 16:28   Ref. Range 9/21/2016 06:00   Triglycerides Latest Ref Range: 30 - 150 mg/dL 99   Cholesterol Latest Ref Range: 120 - 199 mg/dL 96 (L)   HDL Latest Ref Range: 40 - 75 mg/dL 31 (L)   LDL Cholesterol Latest Ref Range: 63.0 - 159.0 mg/dL 45.2 (L)   Total Cholesterol/HDL Ratio Latest Ref Range: 2.0 - 5.0  3.1     Assessment:       1. Uncontrolled type 2 diabetes mellitus with stage 3 chronic  kidney disease, with long-term current use of insulin    2. Episode of recurrent major depressive disorder, unspecified depression episode severity    3. Essential hypertension    4. Mixed hyperlipidemia    5. Anxiety    6. Chronic kidney disease, stage III (moderate)    7. Coronary artery disease involving native coronary artery without angina pectoris, unspecified whether native or transplanted heart    8. Encounter for preventive health examination        Plan:       Stepan was seen today for annual exam.    Diagnoses and all orders for this visit:    Uncontrolled type 2 diabetes mellitus with stage 3 chronic kidney disease, with long-term current use of insulin- too low, decr to 70/30 45u bid.  Recheck lab 3mo.    Essential hypertension- get BP cuff and monitor.    Mixed hyperlipidemia- stable on rx.    Anxiety/Episode of recurrent major depressive disorder- per Psych team.    Chronic kidney disease, stage III (moderate)- stable.    Coronary artery disease involving native coronary artery without angina pectoris, unspecified whether native or transplanted heart- clin stable.    RTC 3mo.  Sched Cscope.

## 2024-08-26 ENCOUNTER — TELEPHONE (OUTPATIENT)
Dept: CARDIOLOGY | Facility: CLINIC | Age: 69
End: 2024-08-26
Payer: MEDICARE

## 2024-08-26 RX ORDER — LOSARTAN POTASSIUM 100 MG/1
100 TABLET ORAL
Qty: 90 TABLET | Refills: 2 | Status: SHIPPED | OUTPATIENT
Start: 2024-08-26

## 2024-08-26 NOTE — TELEPHONE ENCOUNTER
Spoke with pharmacy in regards to clarifying rx.                ----- Message from Em Olson sent at 8/26/2024  4:21 PM CDT -----  Contact: VIANCA Sharma  ..Type:  Patient Requesting Call    Who Called: VIANCA Sharma  Does the patient know what this is regarding?: losartan (COZAAR) 100 MG tablet script questions  Would the patient rather a call back or a response via [facility]?  call  Best Call Back Number: [phone]  Additional Information:  ref # [id number]

## 2024-08-28 DIAGNOSIS — I10 ESSENTIAL HYPERTENSION: ICD-10-CM

## 2024-08-28 DIAGNOSIS — I15.2 HYPERTENSION ASSOCIATED WITH DIABETES: Chronic | ICD-10-CM

## 2024-08-28 DIAGNOSIS — E11.59 HYPERTENSION ASSOCIATED WITH DIABETES: Chronic | ICD-10-CM

## 2024-08-28 RX ORDER — CLONIDINE HYDROCHLORIDE 0.1 MG/1
0.1 TABLET ORAL 3 TIMES DAILY
Qty: 270 TABLET | Refills: 1 | Status: SHIPPED | OUTPATIENT
Start: 2024-08-28 | End: 2027-05-24

## 2024-08-28 NOTE — TELEPHONE ENCOUNTER
Care Due:                  Date            Visit Type   Department     Provider  --------------------------------------------------------------------------------                                             [facility]                              VIRTUAL      MED/ INTERNAL  Last Visit: 07-      AUDIO ONLY   MED/ PEDS      Tania Corcoran                              EP -         [facility]                              PRIMARY      MED/ INTERNAL  Next Visit: 10-      CARE ([facility])   MED/ PEDS      Tania Corcoran                                                            Last  Test          Frequency    Reason                     Performed    Due Date  --------------------------------------------------------------------------------    CMP.........  12 months..  atorvastatin, furosemide,   10-   10-                             insulin, spironolactone..    HBA1C.......  6 months...  insulin, semaglutide.....  04-   10-    [facility] Embedded Care Due Messages. Reference number: [id number].   8/28/2024 10:21:45 AM CDT

## 2024-09-09 ENCOUNTER — TELEPHONE (OUTPATIENT)
Dept: WOUND CARE | Facility: CLINIC | Age: 69
End: 2024-09-09
Payer: MEDICARE

## 2024-09-09 ENCOUNTER — CLINICAL SUPPORT (OUTPATIENT)
Dept: REHABILITATION | Facility: HOSPITAL | Age: 69
End: 2024-09-09
Payer: MEDICARE

## 2024-09-09 DIAGNOSIS — I89.0 LYMPHEDEMA: Primary | ICD-10-CM

## 2024-09-09 DIAGNOSIS — I89.0 LYMPHEDEMA ASSOCIATED WITH OBESITY: ICD-10-CM

## 2024-09-09 DIAGNOSIS — E66.9 LYMPHEDEMA ASSOCIATED WITH OBESITY: ICD-10-CM

## 2024-09-09 PROCEDURE — 97166 OT EVAL MOD COMPLEX 45 MIN: CPT

## 2024-09-09 PROCEDURE — 97535 SELF CARE MNGMENT TRAINING: CPT

## 2024-09-09 NOTE — TELEPHONE ENCOUNTER
Called no answer, left voice message asking patient to return call to [phone] to discuss scheduling appointment regarding referral from Dr. Ballard's office.

## 2024-09-09 NOTE — TELEPHONE ENCOUNTER
----- Message from Bridgette Larios MA sent at 9/9/2024  3:41 PM CDT -----  Regarding: referral  Please contact pt to get scheduled from referral.

## 2024-09-16 ENCOUNTER — TELEPHONE (OUTPATIENT)
Dept: WOUND CARE | Facility: CLINIC | Age: 69
End: 2024-09-16
Payer: MEDICARE

## 2024-09-16 NOTE — TELEPHONE ENCOUNTER
Detail voice message was left for patient, and informing him that wound care had gotten a referral from Dr. Ballard. Return my call [phone] to assist with setting appointment.

## 2024-09-16 NOTE — TELEPHONE ENCOUNTER
----- Message from Angelica Vasquez MA sent at 9/16/2024 10:33 AM CDT -----  Regarding: Referral  Dr. Ballard put in a referral. Please reach out to pt to schedule.   Thanks,  Angelica

## 2024-09-17 NOTE — PLAN OF CARE
[facility]  Occupational Therapy Initial Evaluation  Lymphedema      Name: Stepan Davies [facility] Number: [id number]    Therapy Diagnosis:   Encounter Diagnoses   Name Primary?    Lymphedema associated with obesity     Lymphedema Yes     Physician: Tania Corcoran DO    Physician Orders: Eval and Treat  Medical Diagnosis:   I89.0 (ICD-10-CM) - Lymphedema, not elsewhere classified   E66.9 (ICD-10-CM) - Obesity, unspecified     Evaluation Date: 9/9/2024  Insurance Authorization Period Expiration: 7/18/2024  Plan of Care Certification Period: 12/8/2024  Visit # / Visits authorized: 1 / 1  FOTO: see media, 1 / 3    Precautions:  Standard, Fall, and wound    Time In: 2:30  Time Out: 3:30  Total Appointment Time (timed & untimed codes): 60 minutes    Subjective      Date of onset: several years ago  History of current condition - Stepan reports: Previously participated in lymphedema therapy at [facility] in [place]. He wears Circaid Velcro wraps with a compression stocking under instead of original liner, AM and PM. [facility] is ordering him a pair of foot/ankle Velcro wraps that have not arrived yet. He returns to therapy as swelling is progressing despite compliance with Velcro wraps. He has a home pump that he does not use. He is having a hard time wearing sneakers. Since participating in lymphedema therapy last he moved in with his daughter.       Previous Lymphedema Therapy: yes, [place]   Wears Compression: yes Circaid wraps (calves) and compression stockings       Pain:  Functional Pain Scale Rating 0-10: 0/10    Occupation:  retired      Functional Limitations/Social History:    Previous functional status includes: Independent with all ADLs.   Recent falls: denies    Current Functional Status   Home/Living environment: lives with their daughter    - DME: rollator      Limitation of Functional Status as follows:   ADLs/IADLs:     - Feeding: none    - Bathing: none    -  Dressing/Grooming: none    - Home Management: none    - Driving: none    Patient's Goals for Therapy: reduce swelling    Past Medical History/Physical Systems Review:   Stepan Springer  has a past medical history of Anxiety, Bell's palsy, CHF (congestive heart failure), Chronic kidney disease, stage 3a, Coronary artery disease involving native coronary artery without angina pectoris, Depression, Diabetes mellitus, Glaucoma, Hypertension, Idiopathic peripheral neuropathy, Lymphedema of both lower extremities, Malignant melanoma of skin of forehead, Mixed hyperlipidemia, Morbid obesity with BMI of 45.0-49.9, adult, Pancytopenia, Sleep apnea, Stage 3b chronic kidney disease, Type 2 diabetes mellitus with diabetic polyneuropathy, with long-term current use of insulin, and Vitamin B 12 deficiency.    Stepan Springer  has a past surgical history that includes Retinal detachment repair w/ scleral buckle; PC IOL OU; Cataract extraction (OU); Wrist surgery; Eye surgery; Fracture surgery; Cardiac catheterization (7/22/13); kidney stone ; Esophagogastroduodenoscopy (N/A, 5/1/2019); Colonoscopy (N/A, 5/1/2019); Coronary angioplasty; Excision of melanoma (Left, 10/7/2022); and Closure of wound (Left, 10/14/2022).    Stepan has a current medication list which includes the following prescription(s): aspirin, atorvastatin, azopt, blood sugar diagnostic, blood-glucose meter, brimonidine 0.1%, carvedilol, clonidine, freestyle adi 14 day sensor, furosemide, insulin aspart u-100, basaglar kwikpen u-100 insulin, lancets, losartan, rhopressa, nifedipine, nitroglycerin, ozempic, spironolactone, [DISCONTINUED] hydrochlorothiazide, and [DISCONTINUED] miglitol.    Review of patient's allergies indicates:   Allergen Reactions    Cefazolin Other (See Comments)     Shakes, chills, dizziness        Pt denies: CKD, DVT, CA, infection, severe PAD  Pt admits medically managed/treated: CHF    Objective      Patient received from waiting  room.   Mental status: alert, oriented to person, place, and time  Appearance: Well groomed  Behavior:  calm and cooperative  Attention Span and Concentration:  Normal    Affected Areas: RLE and LLE  Refill: Day and Night   Stemmer Sign: Positive  Shape: increased girth across LE's   Tissue Texture: firm, pitting with prolonged pressure  Skin Integrity: erythema  Sensation: intact  Circulation: intact      Picture of BLE in seated position; taken via Epic Haiku on therapist's cell phone with verbal consent from patient:                  Limitation/Restriction for FOTO Lower Quadrant Edema Survey    Therapist reviewed FOTO scores for Stepan Springer on 9/9/2024.   FOTO documents entered into EPIC - see Media section.    Limitation Score: see media         Treatment      Total Treatment time (time-based codes) separate from Evaluation: 15 minutes    Stepan received the treatments listed below:        Self-care/home management techniques to improve functional performance for 15  minutes, including:    Pt was educated in potential compression needs.  Demo of products including socks, garments, and Inelastic Velcro wraps.   Discussed cost/coverage and authorization per insurance with Durable Medical Equipment(DME) provider.  Compression require orders from referring provider and coverage or purchase of products from DME or self order.      Discussed wear schedule, don/doff, wash and management of products.  Size and compression class and AM/PM needs.    Consideration for Edema Wear as form of temporary compression use until securing compression needs.   Consideration for shorts/tights or capris style compression to compliment lower leg compression to support size/shape of LE.   Product information provided.   Vendor list provided.    Informed insurance coverage of compression is per DME provider and typically Medicare and Medicare group plans may not cover cost beyond pair of standard sized knee high garments.    Commitment to attendance as well as commitment to securing compression needs is critical to edema management.     Patient Education and Home Exercises      Education provided:   1. Educated on definition of lymphedema.  2. Explained the Complete Decongestive Therapy protocol in depth  3. Educated on Phase 1 and 2 of protocol.  4. Reviewed treatment frequency and likely duration of weeks  5.Contraindications for treatment.  6. Plan of care and goals.  7. Educated on home management protocols.   8. Role of OT, goals for OT, scheduling/cancellations, insurance limitations.  9. Provided lymphedema educational pamphlet        Assessment      Stepan is a 69 y.o. male referred to outpatient Occupational Therapy with a medical diagnosis of lymphedema. This patient presents with stage 2 lymphedema due to CVI, obesity.  Patient's deficits include swelling of the BLE, open wounds on BLE, increased pain, increased stiffness in the BLE, as well as difficulty performing ADLs, and placing the pt at higher risk of infection. Therapist's recommendation includes elevation, exercise, manual lymphatic drainage, pneumatic compression pump, multi-layer bandaging, and compression garments for 8 weeks to reduce swelling. Stepan would benefit from complete decongestive therapy to reduce size of BLE, reduce risk of wounds and poor skin integrity as well as education to self-maintain reduction with use of compression garments. Wound care referral received from MD. Plan to hold therapy until evaluated by wound care given depth of wounds.     After removing compression garments, wounds observed on b/l anterior aspect of ankles. RLE appears as stage 2 while LLE appears as stage 3. Pt reports these developed after wearing Circaid wraps purchased one month ago. Pt's daughter present in evaluation was unaware of wounds. Pt and daughter in agreement with plan. Hold lymphedema therapy until evaluated by wound care provider. MD made aware and  referral received. Wounds cleansed, dressed with mepilex, secured with kerlix.     Anticipated barriers to occupational therapy: deep open wounds- needs wound care assessment    Plan of care discussed with patient: Yes  Patient's spiritual, cultural and educational needs considered and patient is agreeable to the plan of care and goals as stated below:     Medical Necessity is demonstrated by the following  Occupational Profile/History  Co-morbidities and personal factors that may impact the plan of care [] LOW: Brief chart review  [] MODERATE: Expanded chart review   [x] HIGH: Extensive chart review    Moderate / High Support Documentation: see pmh     Examination  Performance deficits relating to physical, cognitive or psychosocial skills that result in activity limitations and/or participation restrictions  [] LOW: addressing 1-3 Performance deficits  [] MODERATE: 3-5 Performance deficits  [x] HIGH: 5+ Performance deficits (please support below)    Moderate / High Support Documentation:    Physical:  Skin Integrity/Scar Formation  Edema  Gross Motor Coordination    Cognitive:  No Deficits    Psychosocial:    No Deficits     Treatment Options [] LOW: Limited options  [x] MODERATE: Several options  [] HIGH: Multiple options      Decision Making/ Complexity Score: moderate       The following goals were discussed with the patient and patient is in agreement with them as to be addressed in the treatment plan.     Goals:     Short Term Goals: (4 weeks)  Goals: Progressing / MET   1. Patient will demonstrate 100% knowledge of lymphedema precautions and signs of infection to allow for reduced lymphedema risk, infection risk, and/or exacerbation of condition.  NOT MET   2. Patient will tolerate daily activities wearing multilayered bandaging to allow for lymphatic drainage support, skin elasticity, and reduction in shape and size of limb.  NOT MET   3. Patient and/or caregiver will order/obtain appropriate compression  garments to maintain lymphatic and venous support.  NOT MET   4. Patient will show decreased total girth measurement in BLE by up to 3 cm to allow for lower extremity symmetry, shoe and clothing choice, and ability to apply needed compression. NOT MET     Long Term Goals: (8 weeks)  Goals: Progressing / MET   1. Patient and/or caregiver to william/doff compression garment independently and with daily compliance to assist in lymphedema management, skin elasticity, and tissue density NOT MET   2. Patient and/or caregiver will be independent in use of pneumatic compression pump or self manual lymphatic drainage techniques to areas within reach to enhance lymphatic drainage and skin condition.  NOT MET   3. Patient to be independent and compliant with home exercise program to allow for increased function in affected limb. NOT MET   4. Patient will show decreased total girth measurement in BLE by up to 6 cm  to allow for lower extremity symmetry, shoe and clothing choice, and ability to apply needed compression daily. NOT MET        Plan     Plan of Care Certification: 9/9/2024 to 12/8/2024.     Therapy Track: COMPLETE DECONGESTIVE THERAPY  Interventions: manual lymphatic drainage, multi-layer bandaging, compression garments, therapeutic exercise, self bandaging and manual lymphatic drainage training, home exercise programming.      Outpatient Occupational Therapy 2 times weekly for 8 weeks to include the following interventions: Manual therapy/joint mobilizations, Therapeutic exercises/activities., Edema Control, and Wound Care.    Ellen Campbell, OT, CLWT      I CERTIFY THE NEED FOR THESE SERVICES FURNISHED UNDER THIS PLAN OF TREATMENT AND WHILE UNDER MY CARE   Physician's comments:     Physician's Signature: ___________________________________________________

## 2024-10-14 ENCOUNTER — OFFICE VISIT (OUTPATIENT)
Dept: FAMILY MEDICINE | Facility: CLINIC | Age: 69
End: 2024-10-14
Payer: MEDICARE

## 2024-10-14 ENCOUNTER — PATIENT OUTREACH (OUTPATIENT)
Dept: ADMINISTRATIVE | Facility: HOSPITAL | Age: 69
End: 2024-10-14
Payer: COMMERCIAL

## 2024-10-14 VITALS
HEIGHT: 78 IN | HEART RATE: 86 BPM | BODY MASS INDEX: 36.45 KG/M2 | OXYGEN SATURATION: 96 % | SYSTOLIC BLOOD PRESSURE: 132 MMHG | WEIGHT: 315 LBS | DIASTOLIC BLOOD PRESSURE: 80 MMHG | TEMPERATURE: 98 F

## 2024-10-14 DIAGNOSIS — I15.2 HYPERTENSION ASSOCIATED WITH DIABETES: ICD-10-CM

## 2024-10-14 DIAGNOSIS — E11.69 DYSLIPIDEMIA ASSOCIATED WITH TYPE 2 DIABETES MELLITUS: ICD-10-CM

## 2024-10-14 DIAGNOSIS — E11.59 HYPERTENSION ASSOCIATED WITH DIABETES: ICD-10-CM

## 2024-10-14 DIAGNOSIS — Z79.4 TYPE 2 DIABETES MELLITUS WITH STAGE 3B CHRONIC KIDNEY DISEASE, WITH LONG-TERM CURRENT USE OF INSULIN: Primary | ICD-10-CM

## 2024-10-14 DIAGNOSIS — E66.01 MORBID OBESITY WITH BMI OF 45.0-49.9, ADULT: ICD-10-CM

## 2024-10-14 DIAGNOSIS — N18.32 TYPE 2 DIABETES MELLITUS WITH STAGE 3B CHRONIC KIDNEY DISEASE, WITH LONG-TERM CURRENT USE OF INSULIN: Primary | ICD-10-CM

## 2024-10-14 DIAGNOSIS — M62.838 MUSCLE SPASM: ICD-10-CM

## 2024-10-14 DIAGNOSIS — E11.22 TYPE 2 DIABETES MELLITUS WITH STAGE 3B CHRONIC KIDNEY DISEASE, WITH LONG-TERM CURRENT USE OF INSULIN: Primary | ICD-10-CM

## 2024-10-14 DIAGNOSIS — E78.5 DYSLIPIDEMIA ASSOCIATED WITH TYPE 2 DIABETES MELLITUS: ICD-10-CM

## 2024-10-14 PROCEDURE — 99999 PR PBB SHADOW E&M-EST. PATIENT-LVL IV: CPT | Mod: PBBFAC,,, | Performed by: FAMILY MEDICINE

## 2024-10-14 PROCEDURE — G2211 COMPLEX E/M VISIT ADD ON: HCPCS | Mod: S$PBB,,, | Performed by: FAMILY MEDICINE

## 2024-10-14 PROCEDURE — 99214 OFFICE O/P EST MOD 30 MIN: CPT | Mod: PBBFAC,PO | Performed by: FAMILY MEDICINE

## 2024-10-14 PROCEDURE — 99214 OFFICE O/P EST MOD 30 MIN: CPT | Mod: S$PBB,,, | Performed by: FAMILY MEDICINE

## 2024-10-14 NOTE — PROGRESS NOTES
"Assessment & Plan:    Type 2 diabetes mellitus with stage 3b chronic kidney disease, with long-term current use of insulin  Reports one night of hypoglycemia, BG usually ranging 140-150s. If hypoglycemia becomes more frequent, will adjust insulin however he continues to have elevated blood sugar readings.   Fasting labs to be scheduled.  Encouraged foot exam.    Dyslipidemia associated with type 2 diabetes mellitus    Hypertension associated with diabetes  Controlled. Continue current therapy.     Morbid obesity with BMI of 45.0-49.9, adult  Continue GLP-1 agonist.    Muscle spasm  -     Magnesium; Future; Expected date: 10/14/2024    Added to labs.    Recommended RSV and COVID vaccines.    Declines influenza vaccine.     Follow-up: Follow up in about 6 months (around 4/14/2025).  ______________________________________________________________________    Chief Complaint  Chief Complaint   Patient presents with    Health Maintenance     6 month follow up        HPI  Stepan Springer is a 69 y.o. male with medical diagnoses as listed in the medical history and problem list that presents to the office to follow up on his chronic conditions, for which he was last seen on 7/18/2024. Taking Ozempic and tolerating this well. Reports hypoglycemia on Saturday in the 60s and GCM showing "low" which usually means BG<40. He states that his BG usually ranges 140-150s, reaching 200 after he eats.     He c/o random episodes of cramping in the left hand and in the muscle in the left side of his neck.     Health Maintenance         Date Due Completion Date    RSV Vaccine (Age 60+ and Pregnant patients) (1 - Risk 60-74 years 1-dose series) Never done ---    Influenza Vaccine (1) Never done ---    COVID-19 Vaccine (9 - 2024-25 season) 09/01/2024 12/18/2023    Hemoglobin A1c 10/08/2024 4/8/2024    Foot Exam 12/11/2024 12/11/2023    Override on 9/11/2023: Done (Domingaer)    Override on 10/23/2013: Done    Override on 8/21/2013: Done    " "PROSTATE-SPECIFIC ANTIGEN 04/08/2025 4/8/2024    Diabetes Urine Screening 04/08/2025 4/8/2024    Lipid Panel 04/08/2025 4/8/2024    High Dose Statin 07/29/2025 7/29/2024    Aspirin/Antiplatelet Therapy 07/29/2025 7/29/2024    Eye Exam 07/29/2025 7/29/2024    Override on 7/18/2017: Done    Override on 9/16/2016: Done    Override on 7/10/2013: Done    Colorectal Cancer Screening 05/01/2029 5/1/2019    TETANUS VACCINE 01/08/2034 1/8/2024              PAST MEDICAL HISTORY:  Past Medical History:   Diagnosis Date    Anxiety     Bell's palsy     CHF (congestive heart failure)     Chronic kidney disease, stage 3a 11/02/2021    Coronary artery disease involving native coronary artery without angina pectoris 10/18/2016    Depression     Diabetes mellitus     Glaucoma     Hypertension     Idiopathic peripheral neuropathy 12/11/2012    Lymphedema of both lower extremities     Malignant melanoma of skin of forehead 10/13/2022    Mixed hyperlipidemia 12/11/2012    Morbid obesity with BMI of 45.0-49.9, adult 02/12/2019    Pancytopenia     Sleep apnea     "unable to use"    Stage 3b chronic kidney disease     Type 2 diabetes mellitus with diabetic polyneuropathy, with long-term current use of insulin 12/11/2012    Vitamin B 12 deficiency 08/23/2012       PAST SURGICAL HISTORY:  Past Surgical History:   Procedure Laterality Date    CARDIAC CATHETERIZATION  7/22/13    non obstructive cad    CATARACT EXTRACTION  OU    CLOSURE OF WOUND Left 10/14/2022    Procedure: CLOSURE, WOUND;  Surgeon: Jovita Ceballos MD;  Location: [facility] OR;  Service: ENT;  Laterality: Left;  closure of forehead    COLONOSCOPY N/A 5/1/2019    Procedure: COLONOSCOPY;  Surgeon: Henry Mo III, MD;  Location: [facility] ENDO;  Service: Endoscopy;  Laterality: N/A;    CORONARY ANGIOPLASTY      ESOPHAGOGASTRODUODENOSCOPY N/A 5/1/2019    Procedure: ESOPHAGOGASTRODUODENOSCOPY (EGD);  Surgeon: Henry Mo III, MD;  Location: [facility] ENDO;  Service: Endoscopy;  " "Laterality: N/A;    EXCISION OF MELANOMA Left 10/7/2022    Procedure: EXCISION, MELANOMA;  Surgeon: Jovita Ceballos MD;  Location: [facility];  Service: ENT;  Laterality: Left;    EYE SURGERY      FRACTURE SURGERY      kidney stone       August 2016    PC IOL OU      RETINAL DETACHMENT REPAIR W/ SCLERAL BUCKLE LE      WRIST SURGERY         SOCIAL HISTORY:  Social History     Socioeconomic History    Marital status:    Occupational History     Comment: Quit job at [employer]; wants to open his own [employer] business    Tobacco Use    Smoking status: Never    Smokeless tobacco: Never   Substance and Sexual Activity    Alcohol use: Yes     Comment: " one to two glasses of wine per year"    Drug use: No    Sexual activity: Not Currently     Birth control/protection: None   Social History Narrative    , 1 son, [employer].     Social Drivers of Health     Financial Resource Strain: Low Risk  (4/7/2024)    Overall Financial Resource Strain (CARDIA)     Difficulty of Paying Living Expenses: Not hard at all   Food Insecurity: No Food Insecurity (4/7/2024)    Hunger Vital Sign     Worried About Running Out of Food in the Last Year: Never true     Ran Out of Food in the Last Year: Never true   Transportation Needs: Unmet Transportation Needs (4/7/2024)    PRAPARE - Transportation     Lack of Transportation (Medical): Yes     Lack of Transportation (Non-Medical): Yes   Physical Activity: Insufficiently Active (4/7/2024)    Exercise Vital Sign     Days of Exercise per Week: 1 day     Minutes of Exercise per Session: 20 min   Stress: No Stress Concern Present (4/7/2024)    [employer] - Occupational Stress Questionnaire     Feeling of Stress : Not at all   Housing Stability: Low Risk  (4/7/2024)    Housing Stability Vital Sign     Unable to Pay for Housing in the Last Year: No     Number of Places Lived in the Last Year: 1     Unstable Housing in the Last Year: No       FAMILY HISTORY:  Family History "   Problem Relation Name Age of Onset    Macular degeneration Father      Heart disease Father          CHF     Heart attack Father      Hypertension Mother      Macular degeneration Paternal Uncle      Hypertension Maternal Grandmother      Hypertension Maternal Grandfather      Strabismus Neg Hx      Retinal detachment Neg Hx      Glaucoma Neg Hx      Blindness Neg Hx      Amblyopia Neg Hx         ALLERGIES AND MEDICATIONS: updated and reviewed.  Review of patient's allergies indicates:   Allergen Reactions    Cefazolin Other (See Comments)     Shakes, chills, dizziness     Current Outpatient Medications   Medication Sig Dispense Refill    aspirin 325 MG tablet Take 325 mg by mouth once daily.      atorvastatin (LIPITOR) 20 MG tablet Take 1 tablet (20 mg total) by mouth every evening. 90 tablet 1    AZOPT 1 % ophthalmic suspension Place 1 drop into both eyes 3 (three) times daily. Brand name only 10 mL 11    blood sugar diagnostic Strp To check BG 4 times daily, to use with insurance preferred meter 300 each 3    brimonidine 0.1% (ALPHAGAN P) 0.1 % Drop Place 1 drop into both eyes 3 (three) times daily. 10 mL 11    cloNIDine (CATAPRES) 0.1 MG tablet Take 1 tablet (0.1 mg total) by mouth 3 (three) times daily. 270 tablet 1    flash glucose sensor (FREESTYLE CLEMENCIA 14 DAY SENSOR) Kit 2 each by Misc.(Non-Drug; Combo Route) route every 14 (fourteen) days. 2 kit 11    furosemide (LASIX) 40 MG tablet Take 1 tablet (40 mg total) by mouth once daily. 90 tablet 3    insulin aspart U-100 (NOVOLOG) 100 unit/mL (3 mL) InPn pen Inject 5 Units into the skin 3 (three) times daily with meals. 4.5 mL 11    insulin glargine U-100, Lantus, (BASAGLAR KWIKPEN U-100 INSULIN) 100 unit/mL (3 mL) InPn pen Inject 20 Units into the skin every evening.      lancets Misc To check BG 4 times daily, to use with insurance preferred meter 300 each 3    losartan (COZAAR) 100 MG tablet TAKE 1 TABLET ONCE DAILY. 90 tablet 2    netarsudiL (RHOPRESSA)  "0.02 % ophthalmic solution Place 1 drop into both eyes once daily. 2.5 mL 11    NIFEdipine (PROCARDIA-XL) 60 MG (OSM) 24 hr tablet Take 1 tablet (60 mg total) by mouth once daily. 90 tablet 3    semaglutide (OZEMPIC) 0.25 mg or 0.5 mg (2 mg/3 mL) pen injector Inject 0.25 mg into the skin every 7 days. 1.5 mL 11    spironolactone (ALDACTONE) 25 MG tablet Take 1 tablet (25 mg total) by mouth once daily. 30 tablet 11    blood-glucose meter kit To check BG 4 times daily, to use with insurance preferred meter 1 each 0    carvediloL (COREG) 25 MG tablet Take 1 tablet (25 mg total) by mouth 2 (two) times daily. 180 tablet 3    nitroGLYCERIN (NITROSTAT) 0.4 MG SL tablet Place 1 tablet (0.4 mg total) under the tongue every 5 (five) minutes as needed for Chest pain. 100 tablet 2     No current facility-administered medications for this visit.         ROS  Review of Systems   Constitutional:  Negative for activity change.   Cardiovascular:  Positive for leg swelling (chronic).   Musculoskeletal:         Muscle spasm             Physical Exam  Vitals:    10/14/24 1548   BP: 132/80   Pulse: 86   Temp: 98 °F (36.7 °C)   TempSrc: Oral   SpO2: 96%   Weight: (!) 196.9 kg (434 lb 1.4 oz)   Height: 6' 7" (2.007 m)    Body mass index is 48.9 kg/m².  Weight: (!) 196.9 kg (434 lb 1.4 oz)   Height: 6' 7" (200.7 cm)   Physical Exam  Constitutional:       General: He is not in acute distress.     Appearance: He is obese.   HENT:      Head: Normocephalic and atraumatic.   Neck:      Thyroid: No thyromegaly.      Vascular: No carotid bruit.   Cardiovascular:      Rate and Rhythm: Normal rate and regular rhythm.      Heart sounds: Normal heart sounds.      Comments: Compression devices on legs  Pulmonary:      Effort: Pulmonary effort is normal. No respiratory distress.      Breath sounds: Normal breath sounds.   Musculoskeletal:      Cervical back: Neck supple.      Right lower leg: Edema present.      Left lower leg: Edema present.      " Comments: Ambulates with a cane   Lymphadenopathy:      Cervical: No cervical adenopathy.   Skin:     General: Skin is warm and dry.   Neurological:      Mental Status: He is alert. Mental status is at baseline.   Psychiatric:         Mood and Affect: Mood normal.         Behavior: Behavior normal.

## 2024-10-14 NOTE — PROGRESS NOTES
[facility] 1 HTN DIG MED 10.10.24 - the patient will have his daughter fill out the Digital Medicine form via the [facility] portal.    Non-Compliant Blood Pressure Reading - the patient has an appointment already scheduled with primary care on 10/14/2024.

## 2024-10-14 NOTE — PATIENT INSTRUCTIONS
You are due for your foot exam. You are also due for the COVID and RSV vaccines.     Call [phone] to schedule a visit with wound care.

## 2024-10-22 ENCOUNTER — TELEPHONE (OUTPATIENT)
Dept: FAMILY MEDICINE | Facility: CLINIC | Age: 69
End: 2024-10-22
Payer: COMMERCIAL

## 2024-10-22 NOTE — TELEPHONE ENCOUNTER
Please let patient know that furosemide is not usually dosed three times a day.    Dr. Corcoran is out today, but will be back Thursday and can decide how she would like to manage this medication.    Thanks  Dr. Duque

## 2024-10-22 NOTE — TELEPHONE ENCOUNTER
----- Message from Lazara sent at 10/22/2024  8:28 AM CDT -----  Type:  RX Refill Request     Who Called: ARIANNA FLANAGAN [[id number]]    Refill or New Rx: refill     RX Name and Strength: furosemide (LASIX) 40 MG tablet    How is the patient currently taking it? (ex. 1XDay):     Is this a 30 day or 90 day RX: 90 day     Preferred Pharmacy with phone number: [facility] - [place] - [facility] AT Portal to Registered [facility] Sites        Local or Mail Order: Mail       Would the patient rather a call back or a response via [facility]?     Best Call Back Number: [phone]    Additional Information: Pt is requesting a call back to discuss change in medication before it gets refill

## 2024-10-22 NOTE — TELEPHONE ENCOUNTER
Patient is requesting medication to be change from 1 tablet a day to 3 tablets a day for a 90 day period patient stated too much fluid in his legs

## 2024-10-28 ENCOUNTER — OFFICE VISIT (OUTPATIENT)
Dept: PODIATRY | Facility: CLINIC | Age: 69
End: 2024-10-28
Payer: MEDICARE

## 2024-10-28 ENCOUNTER — HOSPITAL ENCOUNTER (OUTPATIENT)
Dept: WOUND CARE | Facility: HOSPITAL | Age: 69
Discharge: HOME | End: 2024-10-28
Attending: INTERNAL MEDICINE
Payer: MEDICARE

## 2024-10-28 ENCOUNTER — LAB VISIT (OUTPATIENT)
Dept: LAB | Facility: HOSPITAL | Age: 69
End: 2024-10-28
Attending: FAMILY MEDICINE
Payer: MEDICARE

## 2024-10-28 VITALS — HEIGHT: 78 IN | BODY MASS INDEX: 36.45 KG/M2 | WEIGHT: 315 LBS

## 2024-10-28 VITALS
RESPIRATION RATE: 18 BRPM | TEMPERATURE: 98 F | SYSTOLIC BLOOD PRESSURE: 130 MMHG | BODY MASS INDEX: 43.24 KG/M2 | DIASTOLIC BLOOD PRESSURE: 89 MMHG | HEART RATE: 78 BPM | WEIGHT: 315 LBS

## 2024-10-28 DIAGNOSIS — E11.42 TYPE 2 DIABETES MELLITUS WITH DIABETIC POLYNEUROPATHY, WITH LONG-TERM CURRENT USE OF INSULIN: ICD-10-CM

## 2024-10-28 DIAGNOSIS — Z79.4 TYPE 2 DIABETES MELLITUS WITH OTHER SKIN ULCER, WITH LONG-TERM CURRENT USE OF INSULIN: Primary | ICD-10-CM

## 2024-10-28 DIAGNOSIS — M62.838 MUSCLE SPASM: ICD-10-CM

## 2024-10-28 DIAGNOSIS — E11.42 TYPE 2 DIABETES MELLITUS WITH DIABETIC POLYNEUROPATHY, WITH LONG-TERM CURRENT USE OF INSULIN: Primary | ICD-10-CM

## 2024-10-28 DIAGNOSIS — Z79.4 TYPE 2 DIABETES MELLITUS WITH DIABETIC POLYNEUROPATHY, WITH LONG-TERM CURRENT USE OF INSULIN: Primary | ICD-10-CM

## 2024-10-28 DIAGNOSIS — I89.0 LYMPHEDEMA: ICD-10-CM

## 2024-10-28 DIAGNOSIS — E11.622 TYPE 2 DIABETES MELLITUS WITH OTHER SKIN ULCER, WITH LONG-TERM CURRENT USE OF INSULIN: Primary | ICD-10-CM

## 2024-10-28 DIAGNOSIS — Z79.4 TYPE 2 DIABETES MELLITUS WITH DIABETIC POLYNEUROPATHY, WITH LONG-TERM CURRENT USE OF INSULIN: ICD-10-CM

## 2024-10-28 DIAGNOSIS — I87.303 VENOUS HYPERTENSION OF BOTH LOWER EXTREMITIES: ICD-10-CM

## 2024-10-28 DIAGNOSIS — B35.1 ONYCHOMYCOSIS DUE TO DERMATOPHYTE: ICD-10-CM

## 2024-10-28 PROBLEM — E11.9 DIABETES MELLITUS: Status: ACTIVE | Noted: 2024-10-28

## 2024-10-28 LAB
ALBUMIN SERPL BCP-MCNC: 3.5 G/DL (ref 3.5–5.2)
ALP SERPL-CCNC: 67 U/L (ref 40–150)
ALT SERPL W/O P-5'-P-CCNC: 15 U/L (ref 10–44)
ANION GAP SERPL CALC-SCNC: 12 MMOL/L (ref 8–16)
AST SERPL-CCNC: 18 U/L (ref 10–40)
BASOPHILS # BLD AUTO: 0.02 K/UL (ref 0–0.2)
BASOPHILS NFR BLD: 0.4 % (ref 0–1.9)
BILIRUB SERPL-MCNC: 0.6 MG/DL (ref 0.1–1)
BUN SERPL-MCNC: 29 MG/DL (ref 8–23)
CALCIUM SERPL-MCNC: 9.2 MG/DL (ref 8.7–10.5)
CHLORIDE SERPL-SCNC: 111 MMOL/L (ref 95–110)
CHOLEST SERPL-MCNC: 119 MG/DL (ref 120–199)
CHOLEST/HDLC SERPL: 3.6 {RATIO} (ref 2–5)
CO2 SERPL-SCNC: 19 MMOL/L (ref 23–29)
CREAT SERPL-MCNC: 2.2 MG/DL (ref 0.5–1.4)
DIFFERENTIAL METHOD BLD: ABNORMAL
EOSINOPHIL # BLD AUTO: 0.3 K/UL (ref 0–0.5)
EOSINOPHIL NFR BLD: 6.3 % (ref 0–8)
ERYTHROCYTE [DISTWIDTH] IN BLOOD BY AUTOMATED COUNT: 12.6 % (ref 11.5–14.5)
EST. GFR  (NO RACE VARIABLE): 32 ML/MIN/1.73 M^2
ESTIMATED AVG GLUCOSE: 174 MG/DL (ref 68–131)
GLUCOSE SERPL-MCNC: 159 MG/DL (ref 70–110)
HBA1C MFR BLD: 7.7 % (ref 4–5.6)
HCT VFR BLD AUTO: 45.3 % (ref 40–54)
HDLC SERPL-MCNC: 33 MG/DL (ref 40–75)
HDLC SERPL: 27.7 % (ref 20–50)
HGB BLD-MCNC: 15.1 G/DL (ref 14–18)
IMM GRANULOCYTES # BLD AUTO: 0.01 K/UL (ref 0–0.04)
IMM GRANULOCYTES NFR BLD AUTO: 0.2 % (ref 0–0.5)
LDLC SERPL CALC-MCNC: 71.4 MG/DL (ref 63–159)
LYMPHOCYTES # BLD AUTO: 1.4 K/UL (ref 1–4.8)
LYMPHOCYTES NFR BLD: 26.9 % (ref 18–48)
MAGNESIUM SERPL-MCNC: 2.1 MG/DL (ref 1.6–2.6)
MCH RBC QN AUTO: 31.8 PG (ref 27–31)
MCHC RBC AUTO-ENTMCNC: 33.3 G/DL (ref 32–36)
MCV RBC AUTO: 95 FL (ref 82–98)
MONOCYTES # BLD AUTO: 0.6 K/UL (ref 0.3–1)
MONOCYTES NFR BLD: 10.8 % (ref 4–15)
NEUTROPHILS # BLD AUTO: 2.9 K/UL (ref 1.8–7.7)
NEUTROPHILS NFR BLD: 55.4 % (ref 38–73)
NONHDLC SERPL-MCNC: 86 MG/DL
NRBC BLD-RTO: 0 /100 WBC
PLATELET # BLD AUTO: 163 K/UL (ref 150–450)
PMV BLD AUTO: 10.7 FL (ref 9.2–12.9)
POTASSIUM SERPL-SCNC: 4.1 MMOL/L (ref 3.5–5.1)
PROT SERPL-MCNC: 6.6 G/DL (ref 6–8.4)
RBC # BLD AUTO: 4.75 M/UL (ref 4.6–6.2)
SODIUM SERPL-SCNC: 142 MMOL/L (ref 136–145)
TRIGL SERPL-MCNC: 73 MG/DL (ref 30–150)
WBC # BLD AUTO: 5.2 K/UL (ref 3.9–12.7)

## 2024-10-28 PROCEDURE — 99214 OFFICE O/P EST MOD 30 MIN: CPT | Mod: ,,, | Performed by: INTERNAL MEDICINE

## 2024-10-28 PROCEDURE — 99203 OFFICE O/P NEW LOW 30 MIN: CPT | Mod: 27,25 | Performed by: INTERNAL MEDICINE

## 2024-10-28 PROCEDURE — 87070 CULTURE OTHR SPECIMN AEROBIC: CPT | Performed by: INTERNAL MEDICINE

## 2024-10-28 PROCEDURE — 99999 PR PBB SHADOW E&M-EST. PATIENT-LVL IV: CPT | Mod: PBBFAC,,, | Performed by: PODIATRIST

## 2024-10-28 PROCEDURE — 83036 HEMOGLOBIN GLYCOSYLATED A1C: CPT | Performed by: FAMILY MEDICINE

## 2024-10-28 PROCEDURE — 83735 ASSAY OF MAGNESIUM: CPT | Performed by: FAMILY MEDICINE

## 2024-10-28 PROCEDURE — 85025 COMPLETE CBC W/AUTO DIFF WBC: CPT | Performed by: FAMILY MEDICINE

## 2024-10-28 PROCEDURE — 99214 OFFICE O/P EST MOD 30 MIN: CPT | Mod: PBBFAC,PO,25 | Performed by: PODIATRIST

## 2024-10-28 PROCEDURE — 36415 COLL VENOUS BLD VENIPUNCTURE: CPT | Mod: PO | Performed by: FAMILY MEDICINE

## 2024-10-28 PROCEDURE — 80053 COMPREHEN METABOLIC PANEL: CPT | Performed by: FAMILY MEDICINE

## 2024-10-28 PROCEDURE — 11721 DEBRIDE NAIL 6 OR MORE: CPT | Mod: Q9,PBBFAC,PO | Performed by: PODIATRIST

## 2024-10-28 PROCEDURE — 80061 LIPID PANEL: CPT | Performed by: FAMILY MEDICINE

## 2024-10-28 RX ORDER — CICLOPIROX 80 MG/ML
SOLUTION TOPICAL NIGHTLY
Qty: 6.6 ML | Refills: 11 | Status: SHIPPED | OUTPATIENT
Start: 2024-10-28

## 2024-10-29 ENCOUNTER — PATIENT MESSAGE (OUTPATIENT)
Dept: FAMILY MEDICINE | Facility: CLINIC | Age: 69
End: 2024-10-29
Payer: COMMERCIAL

## 2024-10-29 DIAGNOSIS — E11.22 TYPE 2 DIABETES MELLITUS WITH CHRONIC KIDNEY DISEASE, WITH LONG-TERM CURRENT USE OF INSULIN, UNSPECIFIED CKD STAGE: ICD-10-CM

## 2024-10-29 DIAGNOSIS — Z79.4 TYPE 2 DIABETES MELLITUS WITH CHRONIC KIDNEY DISEASE, WITH LONG-TERM CURRENT USE OF INSULIN, UNSPECIFIED CKD STAGE: ICD-10-CM

## 2024-10-29 RX ORDER — SEMAGLUTIDE 0.68 MG/ML
0.5 INJECTION, SOLUTION SUBCUTANEOUS
Qty: 3 ML | Refills: 11 | Status: SHIPPED | OUTPATIENT
Start: 2024-10-29 | End: 2025-10-29

## 2024-10-30 ENCOUNTER — TELEPHONE (OUTPATIENT)
Dept: FAMILY MEDICINE | Facility: CLINIC | Age: 69
End: 2024-10-30
Payer: COMMERCIAL

## 2024-10-30 LAB
BACTERIA SPEC AEROBE CULT: NORMAL
BACTERIA SPEC AEROBE CULT: NORMAL

## 2024-11-04 ENCOUNTER — HOSPITAL ENCOUNTER (OUTPATIENT)
Dept: WOUND CARE | Facility: HOSPITAL | Age: 69
Discharge: HOME | End: 2024-11-04
Attending: INTERNAL MEDICINE
Payer: MEDICARE

## 2024-11-04 VITALS
RESPIRATION RATE: 18 BRPM | DIASTOLIC BLOOD PRESSURE: 85 MMHG | TEMPERATURE: 96 F | HEART RATE: 55 BPM | SYSTOLIC BLOOD PRESSURE: 176 MMHG

## 2024-11-04 DIAGNOSIS — E11.622 TYPE 2 DIABETES MELLITUS WITH OTHER SKIN ULCER, WITH LONG-TERM CURRENT USE OF INSULIN: Primary | ICD-10-CM

## 2024-11-04 DIAGNOSIS — I89.0 LYMPHEDEMA: ICD-10-CM

## 2024-11-04 DIAGNOSIS — Z79.4 TYPE 2 DIABETES MELLITUS WITH OTHER SKIN ULCER, WITH LONG-TERM CURRENT USE OF INSULIN: Primary | ICD-10-CM

## 2024-11-04 PROCEDURE — 99214 OFFICE O/P EST MOD 30 MIN: CPT | Mod: ,,, | Performed by: INTERNAL MEDICINE

## 2024-11-04 PROCEDURE — 99213 OFFICE O/P EST LOW 20 MIN: CPT | Performed by: INTERNAL MEDICINE

## 2024-11-04 NOTE — PROGRESS NOTES
[facility]                Progress Note    Subjective:       Patient ID: Stepan Springer is a 69 y.o. male.    Chief Complaint: Wound Care and Dressing Change    Patient was seen today for follow up wound care visit. Patient ambulated to the exam room with Walking Cane, with LPN by side. He denies pain or discomfort to the wound bed. Denies fever, nausea, chills, vomiting, or diarrhea at present. Dressing intact without strike through drainage. BLE edema noted. Wound care done per MD orders.     Return to clinic in 1 week.  Foam borders have remained in place since last visit wound cultures have been NGTD        Review of Systems      Objective:        Physical Exam  Constitutional:       General: He is not in acute distress.     Appearance: He is obese.   Musculoskeletal:      Right lower leg: Edema present.      Left lower leg: Edema present.   Skin:     Comments: Anterior ankle with smaller width on right no slough no exposed muscle or bone    On left no tunneling pink ulcer base without induration   Neurological:      Mental Status: He is alert.         Vitals:    11/04/24 1436   BP: (!) 176/85   Pulse: (!) 55   Resp: 18   Temp: 96.3 °F (35.7 °C)       Assessment:           ICD-10-CM ICD-9-CM   1. Type 2 diabetes mellitus with other skin ulcer, with long-term current use of insulin  E11.622 250.80    Z79.4 V58.67            Wound 10/28/24 1455 Pressure Injury Right anterior Foot (Active)   10/28/24 1455 Foot   Present on Original Admission: Y   Primary Wound Type: Pressure inj   Side: Right   Orientation: anterior   Wound Approximate Age at First Assessment (Weeks):    Wound Number:    Is this injury device related?:    Incision Type:    Closure Method:    Wound Description (Comments):    Type:    Additional Comments:    Ankle-Brachial Index:    Pulses:    Removal Indication and Assessment:    Wound Outcome:    Wound Image   11/04/24 1439   Dressing Appearance  Intact;Moist drainage 11/04/24 1439   Drainage Amount Moderate 11/04/24 1439   Drainage Characteristics/Odor Serosanguineous 11/04/24 1439   Appearance Pink;Moist 11/04/24 1439   Red (%), Wound Tissue Color 100 % 11/04/24 1439   Periwound Area Macerated;Moist;Pink 11/04/24 1439   Wound Edges Open;Defined 11/04/24 1439   Wound Length (cm) 0.2 cm 11/04/24 1439   Wound Width (cm) 1 cm 11/04/24 1439   Wound Depth (cm) 0.1 cm 11/04/24 1439   Wound Volume (cm^3) 0.02 cm^3 11/04/24 1439   Wound Surface Area (cm^2) 0.2 cm^2 11/04/24 1439   Care Cleansed with:;Antimicrobial agent;Sterile normal saline 11/04/24 1439   Dressing Applied;Island/border;Tubular bandage 11/04/24 1439   Periwound Care Dry periwound area maintained 11/04/24 1439   Compression Tubular elasticized bandage 11/04/24 1439   Dressing Change Due 11/11/24 11/04/24 1439            Wound 10/28/24 1455 Pressure Injury Left anterior Foot (Active)   10/28/24 1455 Foot   Present on Original Admission:    Primary Wound Type: Pressure inj   Side: Left   Orientation: anterior   Wound Approximate Age at First Assessment (Weeks):    Wound Number:    Is this injury device related?:    Incision Type:    Closure Method:    Wound Description (Comments):    Type:    Additional Comments:    Ankle-Brachial Index:    Pulses:    Removal Indication and Assessment:    Wound Outcome:    Wound Image   11/04/24 1439   Dressing Appearance Intact;Moist drainage 11/04/24 1439   Drainage Amount Small 11/04/24 1439   Drainage Characteristics/Odor Serosanguineous 11/04/24 1439   Appearance Pink;Moist 11/04/24 1439   Red (%), Wound Tissue Color 100 % 11/04/24 1439   Periwound Area Pustules 11/04/24 1439   Wound Edges Open;Defined 11/04/24 1439   Wound Length (cm) 0.3 cm 11/04/24 1439   Wound Width (cm) 1.3 cm 11/04/24 1439   Wound Depth (cm) 0.1 cm 11/04/24 1439   Wound Volume (cm^3) 0.039 cm^3 11/04/24 1439   Wound Surface Area (cm^2) 0.39 cm^2 11/04/24 1439   Care Cleansed  with:;Antimicrobial agent;Sterile normal saline 11/04/24 1439   Dressing Applied;Island/border;Tubular bandage 11/04/24 1439   Periwound Care Dry periwound area maintained 11/04/24 1439   Compression Tubular elasticized bandage 11/04/24 1439   Dressing Change Due 11/11/24 11/04/24 1439           Plan:          Foam borders to offload areas of pressure injury from compression wraps continue tubigrip overly foam border return one week for woudn check    Tissue pathology and/or culture taken     [] Yes      [x] No  Sharp debridement performed                   [] Yes       [x] No  Labs ordered     [] Yes       [x] No  Imaging ordered    [] Yes      [x] No    Orders Placed This Encounter   Procedures    Change dressing     Wound Dressing Orders    Dressing change frequency once a week and prn Nurse Visits  Remove old dressing  Cleanse or irrigate with: Normal Saline    Right Anterior Foot & Left Anterior Foot  Protect periwound with: Maintain dry jae wound  Primary dressing: Apply Foam border dressing (bilateral)  BLE Compression: Tubi-, size G,     Return to clinic in 1 week.        Follow up in about 1 week (around 11/11/2024) for wound care.

## 2024-11-11 ENCOUNTER — HOSPITAL ENCOUNTER (OUTPATIENT)
Dept: WOUND CARE | Facility: HOSPITAL | Age: 69
Discharge: HOME | End: 2024-11-11
Attending: INTERNAL MEDICINE
Payer: MEDICARE

## 2024-11-11 VITALS
SYSTOLIC BLOOD PRESSURE: 181 MMHG | TEMPERATURE: 97 F | DIASTOLIC BLOOD PRESSURE: 84 MMHG | RESPIRATION RATE: 18 BRPM | HEART RATE: 64 BPM

## 2024-11-11 DIAGNOSIS — L89.512 PRESSURE INJURY OF RIGHT ANKLE, STAGE 2: ICD-10-CM

## 2024-11-11 DIAGNOSIS — E11.622 TYPE 2 DIABETES MELLITUS WITH OTHER SKIN ULCER, WITH LONG-TERM CURRENT USE OF INSULIN: Primary | ICD-10-CM

## 2024-11-11 DIAGNOSIS — I89.0 LYMPHEDEMA: ICD-10-CM

## 2024-11-11 DIAGNOSIS — Z79.4 TYPE 2 DIABETES MELLITUS WITH OTHER SKIN ULCER, WITH LONG-TERM CURRENT USE OF INSULIN: Primary | ICD-10-CM

## 2024-11-11 DIAGNOSIS — L89.522 PRESSURE INJURY OF LEFT ANKLE, STAGE 2: ICD-10-CM

## 2024-11-11 PROCEDURE — 99214 OFFICE O/P EST MOD 30 MIN: CPT | Mod: ,,, | Performed by: INTERNAL MEDICINE

## 2024-11-11 PROCEDURE — 99213 OFFICE O/P EST LOW 20 MIN: CPT | Performed by: INTERNAL MEDICINE

## 2024-11-11 NOTE — PROGRESS NOTES
[facility]                Progress Note    Subjective:       Patient ID: Stepan Springer is a 69 y.o. male.    Chief Complaint: Wound Check    Follow Up wound care visit. Patient ambulated unaided to [facility] nurse at side. Patient denies fever, chills, nausea, vomiting or diarrhea at present. Patient denies pain to wound bed at present. Patient reports not having any issues with dressing  since last visit. Dressing appears  without strike through drainage.Dressing removed & wound cleaned by nurse. Dressing  was discarded. Bilateral anterior foot wounds with improvement. Edema to bilateral legs. Patient continues to wear bilateral lower extremity Lymphedema Circaid wrap.Topical Lidocaine 5% ointment applied to wound bed and allowed to absorb for 15 minutes for patient comfort. No change to dressing order; applied per MD orders.Patient will return to [facility] in 1 week. AVS printed and given to patient with print out of all future scheduled appointments.     Foam dressing has remained in place since last visit no periwound pain or drainage      Review of Systems      Objective:        Physical Exam  Constitutional:       General: He is not in acute distress.     Appearance: He is obese.   Pulmonary:      Effort: Pulmonary effort is normal. No respiratory distress.   Musculoskeletal:      Right lower leg: Edema present.      Left lower leg: Edema present.   Skin:     Comments: Anterior right and left distal leg/ankle ulcers both smaller with new epithelium no maceration or periwound erythema in induration, chronic edematous changes o proximal legs, legs are soft without fluctuance or erythema   Neurological:      Mental Status: He is alert.         Vitals:    11/11/24 1436   BP: (!) 181/84   Pulse: 64   Resp: 18   Temp: 97.1 °F (36.2 °C)       Assessment:           ICD-10-CM ICD-9-CM   1. Type 2 diabetes mellitus with other skin ulcer, with long-term current use of insulin   E11.622 250.80    Z79.4 V58.67   2. Pressure injury of left ankle, stage 2  L89.522 707.06     707.22   3. Pressure injury of right ankle, stage 2  L89.512 707.06     707.22   4. Lymphedema  I89.0 457.1            Wound 10/28/24 1455 Pressure Injury Right anterior Foot (Active)   10/28/24 1455 Foot   Present on Original Admission: Y   Primary Wound Type: Pressure inj   Side: Right   Orientation: anterior   Wound Approximate Age at First Assessment (Weeks):    Wound Number:    Is this injury device related?:    Incision Type:    Closure Method:    Wound Description (Comments):    Type:    Additional Comments:    Ankle-Brachial Index:    Pulses:    Removal Indication and Assessment:    Wound Outcome:    Wound Image   11/11/24 1511   Dressing Appearance Intact;Dried drainage 11/11/24 1511   Drainage Amount Scant 11/11/24 1511   Drainage Characteristics/Odor Serosanguineous 11/11/24 1511   Appearance Pink;Moist 11/11/24 1511   Red (%), Wound Tissue Color 100 % 11/11/24 1511   Periwound Area Intact;Pink;Purple 11/11/24 1511   Wound Edges Open;Defined 11/11/24 1511   Wound Length (cm) 0.1 cm 11/11/24 1511   Wound Width (cm) 0.9 cm 11/11/24 1511   Wound Depth (cm) 0.1 cm 11/11/24 1511   Wound Volume (cm^3) 0.009 cm^3 11/11/24 1511   Wound Surface Area (cm^2) 0.09 cm^2 11/11/24 1511   Care Cleansed with:;Antimicrobial agent;Sterile normal saline 11/11/24 1511   Dressing Applied;Other (comment) 11/11/24 1511   Periwound Care Dry periwound area maintained 11/11/24 1511   Compression Tubular elasticized bandage 11/11/24 1511   Dressing Change Due 11/18/24 11/11/24 1511            Wound 10/28/24 1455 Pressure Injury Left anterior Foot (Active)   10/28/24 1455 Foot   Present on Original Admission:    Primary Wound Type: Pressure inj   Side: Left   Orientation: anterior   Wound Approximate Age at First Assessment (Weeks):    Wound Number:    Is this injury device related?:    Incision Type:    Closure Method:    Wound Description  (Comments):    Type:    Additional Comments:    Ankle-Brachial Index:    Pulses:    Removal Indication and Assessment:    Wound Outcome:    Wound Image   11/11/24 1511   Dressing Appearance Intact;Dried drainage 11/11/24 1511   Drainage Amount Scant 11/11/24 1511   Appearance Pink;Moist 11/11/24 1511   Red (%), Wound Tissue Color 100 % 11/11/24 1511   Periwound Area Intact;Uvalda 11/11/24 1511   Wound Edges Open;Defined 11/11/24 1511   Wound Length (cm) 0.1 cm 11/11/24 1511   Wound Width (cm) 1.1 cm 11/11/24 1511   Wound Depth (cm) 0.1 cm 11/11/24 1511   Wound Volume (cm^3) 0.011 cm^3 11/11/24 1511   Wound Surface Area (cm^2) 0.11 cm^2 11/11/24 1511   Care Cleansed with:;Antimicrobial agent;Sterile normal saline 11/11/24 1511   Dressing Applied;Other (comment) 11/11/24 1511   Periwound Care Dry periwound area maintained 11/11/24 1511   Compression Tubular elasticized bandage 11/11/24 1511   Dressing Change Due 11/18/24 11/11/24 1511           Plan:          Pressure injuries improveing with offloading no evidence of infection continue compression for chronic lymphedema no debridement required today return one week for wound check    Tissue pathology and/or culture taken     [] Yes      [x] No  Sharp debridement performed                   [] Yes       [x] No  Labs ordered     [] Yes       [x] No  Imaging ordered    [] Yes      [x] No    Orders Placed This Encounter   Procedures    Change dressing     Wound Dressing Orders    Dressing change frequency once a week and prn Nurse Visits  Remove old dressing  Cleanse or irrigate with: Normal Saline    Right Anterior Foot & Left Anterior Foot  Protect periwound with: Maintain dry jae wound  Primary dressing: Apply Foam border dressing (bilateral)  BLE Compression: Tubi-, size G,    Return to clinic in 1 week.        Follow up in about 1 week (around 11/18/2024) for Wound Care.

## 2024-11-18 ENCOUNTER — HOSPITAL ENCOUNTER (OUTPATIENT)
Dept: WOUND CARE | Facility: HOSPITAL | Age: 69
Discharge: HOME | End: 2024-11-18
Attending: INTERNAL MEDICINE
Payer: MEDICARE

## 2024-11-18 VITALS
RESPIRATION RATE: 18 BRPM | HEART RATE: 69 BPM | DIASTOLIC BLOOD PRESSURE: 80 MMHG | TEMPERATURE: 98 F | SYSTOLIC BLOOD PRESSURE: 167 MMHG

## 2024-11-18 DIAGNOSIS — E11.622 TYPE 2 DIABETES MELLITUS WITH OTHER SKIN ULCER, WITH LONG-TERM CURRENT USE OF INSULIN: Primary | ICD-10-CM

## 2024-11-18 DIAGNOSIS — I89.0 LYMPHEDEMA: ICD-10-CM

## 2024-11-18 DIAGNOSIS — Z79.4 TYPE 2 DIABETES MELLITUS WITH OTHER SKIN ULCER, WITH LONG-TERM CURRENT USE OF INSULIN: Primary | ICD-10-CM

## 2024-11-18 PROCEDURE — 99213 OFFICE O/P EST LOW 20 MIN: CPT | Performed by: INTERNAL MEDICINE

## 2024-11-18 PROCEDURE — 99214 OFFICE O/P EST MOD 30 MIN: CPT | Mod: ,,, | Performed by: INTERNAL MEDICINE

## 2024-11-18 NOTE — PROGRESS NOTES
[facility]                Progress Note    Subjective:       Patient ID: Stepan Springer is a 69 y.o. male.    Chief Complaint: Wound Check, Dressing Change, and Wound Care    Follow Up wound care visit. Patient ambulated  with assistance of cane to [facility] with nurse at side. Patient denies fever, chills, nausea, vomiting or diarrhea at present. Patient  denies pain to wound bed at present. Patient reports not having any issues with dressing  since last visit. Dressing appears  without strike through drainage.Dressing removed & wound cleaned by nurse. Dressing was discarded. Right anterior foot wound is healed. Left anterior foot wound smaller than last wound care visit. Patient continues to wear Lymphedema circaid wrap to bilateral lower legs. Bilateral lower legs edematous, left leg more than right leg. Changes made to dressing order; applied per MD orders. Patient will return to [facility] in 1 week. AVS printed and given to patient.    Foam borders have remained in place using compression without pain no drainage    Wound Check    Dressing Change      Review of Systems      Objective:        Physical Exam  Constitutional:       General: He is not in acute distress.     Appearance: He is obese.   Musculoskeletal:      Right lower leg: Edema present.      Left lower leg: Edema present.   Skin:     Comments: Posterior calfs soft without induration or pitting edema, anterior ankles with non blanching erythema (stage I pressure injury)   Neurological:      Mental Status: He is alert.         Vitals:    11/18/24 1321   BP: (!) 167/80   Pulse: 69   Resp: 18   Temp: 97.6 °F (36.4 °C)       Assessment:           ICD-10-CM ICD-9-CM   1. Type 2 diabetes mellitus with other skin ulcer, with long-term current use of insulin  E11.622 250.80    Z79.4 V58.67            Wound 10/28/24 1455 Pressure Injury Right anterior Foot (Active)   10/28/24 1455 Foot   Present on Original Admission: Y    Primary Wound Type: Pressure inj   Side: Right   Orientation: anterior   Wound Approximate Age at First Assessment (Weeks):    Wound Number:    Is this injury device related?:    Incision Type:    Closure Method:    Wound Description (Comments):    Type:    Additional Comments:    Ankle-Brachial Index:    Pulses:    Removal Indication and Assessment:    Wound Outcome:    Wound Image   11/18/24 1540   Dressing Appearance Intact;Dry 11/18/24 1540   Drainage Amount None 11/18/24 1540   Appearance Maroon;Fibrin;Dry 11/18/24 1540   Red (%), Wound Tissue Color 100 % 11/18/24 1540   Periwound Area Intact 11/18/24 1540   Wound Length (cm) 0 cm 11/18/24 1540   Wound Width (cm) 0 cm 11/18/24 1540   Wound Depth (cm) 0 cm 11/18/24 1540   Wound Volume (cm^3) 0 cm^3 11/18/24 1540   Wound Surface Area (cm^2) 0 cm^2 11/18/24 1540   Care Cleansed with:;Antimicrobial agent;Sterile normal saline 11/18/24 1540   Dressing Applied 11/18/24 1540   Periwound Care Dry periwound area maintained 11/18/24 1540   Compression Tubular elasticized bandage 11/18/24 1540   Dressing Change Due 11/25/24 11/18/24 1540            Wound 10/28/24 1455 Pressure Injury Left anterior Foot (Active)   10/28/24 1455 Foot   Present on Original Admission:    Primary Wound Type: Pressure inj   Side: Left   Orientation: anterior   Wound Approximate Age at First Assessment (Weeks):    Wound Number:    Is this injury device related?:    Incision Type:    Closure Method:    Wound Description (Comments):    Type:    Additional Comments:    Ankle-Brachial Index:    Pulses:    Removal Indication and Assessment:    Wound Outcome:    Wound Image   11/18/24 1540   Dressing Appearance Dry;Intact 11/18/24 1540   Drainage Amount None 11/18/24 1540   Appearance Maroon;Moist 11/18/24 1540   Red (%), Wound Tissue Color 100 % 11/18/24 1540   Periwound Area Intact;Pink 11/18/24 1540   Wound Edges Open;Defined 11/18/24 1540   Wound Length (cm) 0.1 cm 11/18/24 1540   Wound Width  (cm) 0.4 cm 11/18/24 1540   Wound Depth (cm) 0.1 cm 11/18/24 1540   Wound Volume (cm^3) 0.004 cm^3 11/18/24 1540   Wound Surface Area (cm^2) 0.04 cm^2 11/18/24 1540   Care Cleansed with:;Antimicrobial agent;Sterile normal saline 11/18/24 1540   Dressing Applied 11/18/24 1540   Periwound Care Dry periwound area maintained 11/18/24 1540   Compression Tubular elasticized bandage 11/18/24 1540   Dressing Change Due 11/25/24 11/18/24 1540           Plan:          No opening still evidence of pressure injury continue foam borders to offload one more week then can transition back to compression and return to lymphedema clinic    Tissue pathology and/or culture taken     [] Yes      [x] No  Sharp debridement performed                   [] Yes       [x] No  Labs ordered     [] Yes       [x] No  Imaging ordered    [] Yes      [x] No    Orders Placed This Encounter   Procedures    Change dressing     Wound Dressing Orders    Dressing change frequency once a week and prn Nurse Visits  Remove old dressing  Cleanse or irrigate with: Normal Saline    Right Anterior Foot & Left Anterior Foot  Protect periwound with: Maintain dry jae wound  Primary dressing: Apply Foam border dressing (bilateral)  BLE Compression: Tubi-, size G,        Follow up in about 1 week (around 11/25/2024) for Wound Care.

## 2024-11-22 ENCOUNTER — TELEPHONE (OUTPATIENT)
Dept: FAMILY MEDICINE | Facility: CLINIC | Age: 69
End: 2024-11-22
Payer: COMMERCIAL

## 2024-11-22 NOTE — TELEPHONE ENCOUNTER
----- Message from Everardo sent at 11/22/2024 11:15 AM CST -----  Good morning,    The physical therapy department received your order to get the attached patient scheduled for an evaluation. We have reached out to the patient to schedule his evaluation; however, we have been unsuccessful in reaching him/her.      We wanted you to be aware that your patient has not started therapy. If you speak with him/her again about starting therapy please have them reach out to us at [phone] to get scheduled.     Sincerely,  Everardo Mo

## 2024-11-25 ENCOUNTER — HOSPITAL ENCOUNTER (OUTPATIENT)
Dept: WOUND CARE | Facility: HOSPITAL | Age: 69
Discharge: HOME | End: 2024-11-25
Attending: INTERNAL MEDICINE
Payer: MEDICARE

## 2024-11-25 VITALS
RESPIRATION RATE: 18 BRPM | TEMPERATURE: 97 F | HEART RATE: 72 BPM | SYSTOLIC BLOOD PRESSURE: 166 MMHG | DIASTOLIC BLOOD PRESSURE: 77 MMHG

## 2024-11-25 DIAGNOSIS — L89.522 PRESSURE INJURY OF LEFT ANKLE, STAGE 2: Primary | ICD-10-CM

## 2024-11-25 DIAGNOSIS — L89.512 PRESSURE INJURY OF RIGHT ANKLE, STAGE 2: ICD-10-CM

## 2024-11-25 DIAGNOSIS — I89.0 LYMPHEDEMA: ICD-10-CM

## 2024-11-25 PROCEDURE — 99213 OFFICE O/P EST LOW 20 MIN: CPT | Performed by: INTERNAL MEDICINE

## 2024-11-25 PROCEDURE — 99214 OFFICE O/P EST MOD 30 MIN: CPT | Mod: ,,, | Performed by: INTERNAL MEDICINE

## 2024-11-25 NOTE — PROGRESS NOTES
[facility]                Progress Note    Subjective:       Patient ID: Stepan Springer is a 69 y.o. male.    Chief Complaint: Wound Care and Dressing Change    Patient was seen today for follow up wound care visit. Patient ambulated to the exam room using a walking cane, with LPN by side. He denies pain or discomfort to the wound bed. Denies fever, nausea, chills, vomiting, or diarrhea at present. Dressing intact without strike through drainage. BLE lymphedema circaid wrap noted. Wound care done per MD orders.      Return to clinic in 2 week.    No issue with foam borders, is using his compression daily      Review of Systems      Objective:        Physical Exam  Constitutional:       General: He is not in acute distress.     Appearance: He is obese.   Pulmonary:      Effort: Pulmonary effort is normal. No respiratory distress.   Musculoskeletal:      Right lower leg: Edema present.      Left lower leg: Edema present.   Skin:     Comments: Right anterior skin break with hematoma manual evacuated no induration or surrounding erythema, left anterior lower leg firm eschar no induration or surrounding erythema   Neurological:      Mental Status: He is alert.         Vitals:    11/25/24 1526   BP: (!) 166/77   Pulse: 72   Resp: 18   Temp: 96.5 °F (35.8 °C)       Assessment:           ICD-10-CM ICD-9-CM   1. Pressure injury of left ankle, stage 2  L89.522 707.06     707.22   2. Pressure injury of right ankle, stage 2  L89.512 707.06     707.22   3. Lymphedema  I89.0 457.1            Wound 10/28/24 1455 Pressure Injury Right anterior Foot (Active)   10/28/24 1455 Foot   Present on Original Admission: Y   Primary Wound Type: Pressure inj   Side: Right   Orientation: anterior   Wound Approximate Age at First Assessment (Weeks):    Wound Number:    Is this injury device related?:    Incision Type:    Closure Method:    Wound Description (Comments):    Type:    Additional  Comments:    Ankle-Brachial Index:    Pulses:    Removal Indication and Assessment:    Wound Outcome:    Wound Image   11/25/24 1528   Dressing Appearance Intact;Dry 11/25/24 1528   Drainage Amount None 11/25/24 1528   Appearance Maroon;Dry 11/25/24 1528   Red (%), Wound Tissue Color 100 % 11/25/24 1528   Periwound Area Intact 11/25/24 1528   Wound Edges Defined 11/25/24 1528   Wound Length (cm) 0.6 cm 11/25/24 1528   Wound Width (cm) 0.3 cm 11/25/24 1528   Wound Depth (cm) 0.1 cm 11/25/24 1528   Wound Volume (cm^3) 0.018 cm^3 11/25/24 1528   Wound Surface Area (cm^2) 0.18 cm^2 11/25/24 1528   Care Cleansed with:;Antimicrobial agent;Sterile normal saline 11/25/24 1528   Dressing Applied;Island/border;Tubular bandage 11/25/24 1528   Periwound Care Dry periwound area maintained 11/25/24 1528   Compression Tubular elasticized bandage 11/25/24 1528   Dressing Change Due 12/09/24 11/25/24 1528            Wound 10/28/24 1455 Pressure Injury Left anterior Foot (Active)   10/28/24 1455 Foot   Present on Original Admission:    Primary Wound Type: Pressure inj   Side: Left   Orientation: anterior   Wound Approximate Age at First Assessment (Weeks):    Wound Number:    Is this injury device related?:    Incision Type:    Closure Method:    Wound Description (Comments):    Type:    Additional Comments:    Ankle-Brachial Index:    Pulses:    Removal Indication and Assessment:    Wound Outcome:    Wound Image   11/25/24 1528   Dressing Appearance Intact;Dry 11/25/24 1528   Drainage Amount None 11/25/24 1528   Appearance Maroon;Dry 11/25/24 1528   Red (%), Wound Tissue Color 100 % 11/25/24 1528   Periwound Area Intact;Dry 11/25/24 1528   Wound Edges Defined 11/25/24 1528   Wound Length (cm) 0 cm 11/25/24 1528   Wound Width (cm) 0 cm 11/25/24 1528   Wound Depth (cm) 0 cm 11/25/24 1528   Wound Volume (cm^3) 0 cm^3 11/25/24 1528   Wound Surface Area (cm^2) 0 cm^2 11/25/24 1528   Care Cleansed with:;Antimicrobial agent;Sterile normal  saline 11/25/24 1528   Dressing Applied;Island/border;Tubular bandage 11/25/24 1528   Periwound Care Dry periwound area maintained 11/25/24 1528   Compression Tubular elasticized bandage 11/25/24 1528   Dressing Change Due 12/09/24 11/25/24 1528           Plan:          With new skin break down on right continue foam border he has anotehr appointmnet next week will return in two weeks for wound check    Tissue pathology and/or culture taken     [] Yes      [x] No  Sharp debridement performed                   [] Yes       [x] No  Labs ordered     [] Yes       [x] No  Imaging ordered    [] Yes      [x] No    Orders Placed This Encounter   Procedures    Change dressing     Wound Dressing Orders    Dressing change frequency once a week and prn Nurse Visits  Remove old dressing  Cleanse or irrigate with: Normal Saline    Right Anterior Foot & Left Anterior Foot  Protect periwound with: Maintain dry jae wound  Primary dressing: Apply Foam border dressing (bilateral)  BLE Compression: Tubi-, single layer, size G,    Return to clinic in 2 week.        Follow up in about 2 weeks (around 12/9/2024) for wound care.

## 2024-12-02 ENCOUNTER — OFFICE VISIT (OUTPATIENT)
Dept: OPHTHALMOLOGY | Facility: CLINIC | Age: 69
End: 2024-12-02
Payer: MEDICARE

## 2024-12-02 DIAGNOSIS — Z96.1 PSEUDOPHAKIA OF BOTH EYES: ICD-10-CM

## 2024-12-02 DIAGNOSIS — H40.1133 PRIMARY OPEN ANGLE GLAUCOMA (POAG) OF BOTH EYES, SEVERE STAGE: Primary | ICD-10-CM

## 2024-12-02 DIAGNOSIS — H52.13 HIGH MYOPIA, BOTH EYES: ICD-10-CM

## 2024-12-02 DIAGNOSIS — H54.10 BLINDNESS AND LOW VISION: ICD-10-CM

## 2024-12-02 DIAGNOSIS — Z86.69 HX OF RETINAL DETACHMENT: Chronic | ICD-10-CM

## 2024-12-02 DIAGNOSIS — H53.10 SUBJECTIVE VISUAL DISTURBANCE OF BOTH EYES: ICD-10-CM

## 2024-12-02 PROCEDURE — 99213 OFFICE O/P EST LOW 20 MIN: CPT | Mod: PBBFAC | Performed by: OPHTHALMOLOGY

## 2024-12-02 PROCEDURE — 99999 PR PBB SHADOW E&M-EST. PATIENT-LVL III: CPT | Mod: PBBFAC,,, | Performed by: OPHTHALMOLOGY

## 2024-12-02 PROCEDURE — G2211 COMPLEX E/M VISIT ADD ON: HCPCS | Mod: S$PBB,,, | Performed by: OPHTHALMOLOGY

## 2024-12-02 PROCEDURE — 99214 OFFICE O/P EST MOD 30 MIN: CPT | Mod: S$PBB,,, | Performed by: OPHTHALMOLOGY

## 2024-12-03 NOTE — PROGRESS NOTES
Assessment /Plan     For exam results, see Encounter Report.    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  Try MultiVits discussed  Establish care with Retina service    Advanced Glaucoma OD & OS  Hx high Myopia  Bilateral RDs & SB OD --> Dr Haider    Not HVF taker      CCT  534 // 544    Low teens --> achieved & discussed    Both eyes --> tolerating well & good adherence --> CSM  Alphagan BID  Azopt BID  Rhopressa q day    SP Bilateral Canaloplasty with Dr Hale  Quiet  Observe    PC IOL OU  Open OD / OS  OS SP Yag Cap 11/28/2017    + ERM OS with small cyst  ? Visually significant as discussed    RD precautions:  re-Discussed symptoms of RD with increased flashes, floaters, decreasing vision.  Patient/Family to call and return immediately to clinic should the symptoms of RD occur. Voiced good understanding with Q & A.    11/06/2023            Plan  RTC establish care with retina service  RTC 4 month  Gonio, IOP &  Adherence  rtc sooner prn with good understand

## 2024-12-09 ENCOUNTER — HOSPITAL ENCOUNTER (OUTPATIENT)
Dept: WOUND CARE | Facility: HOSPITAL | Age: 69
Discharge: HOME | End: 2024-12-09
Attending: INTERNAL MEDICINE
Payer: MEDICARE

## 2024-12-09 VITALS
RESPIRATION RATE: 18 BRPM | DIASTOLIC BLOOD PRESSURE: 99 MMHG | SYSTOLIC BLOOD PRESSURE: 185 MMHG | HEART RATE: 72 BPM | TEMPERATURE: 98 F

## 2024-12-09 DIAGNOSIS — I89.0 LYMPHEDEMA: ICD-10-CM

## 2024-12-09 DIAGNOSIS — L89.522 PRESSURE INJURY OF LEFT ANKLE, STAGE 2: Primary | ICD-10-CM

## 2024-12-09 DIAGNOSIS — L89.512 PRESSURE INJURY OF RIGHT ANKLE, STAGE 2: ICD-10-CM

## 2024-12-09 PROCEDURE — 99213 OFFICE O/P EST LOW 20 MIN: CPT | Mod: ,,, | Performed by: INTERNAL MEDICINE

## 2024-12-09 PROCEDURE — 99213 OFFICE O/P EST LOW 20 MIN: CPT | Mod: 27 | Performed by: INTERNAL MEDICINE

## 2024-12-09 NOTE — PROGRESS NOTES
[facility]                Progress Note    Subjective:       Patient ID: Stepan pSringer is a 69 y.o. male.    Chief Complaint: Wound Check    Follow Up wound care visit. Patient ambulated unaided  to [facility] with nurse at side. Patient denies fever, chills, nausea, vomiting or diarrhea at present. Patient  denies pain to wound bed at present. Patient reports not having any issues with dressing since last visit.   Dressing appears  without strike through drainage.Dressing removed & wound cleaned by nurse. Dressing was discarded. Applied Tubi  to bilateral lower extremities.Wounds to right and left anterior foot closed. Patient discharged form wound care. AVS printed and given patient was given print out of all future scheduled appointments.       No issues with dressing no drainage or bleeding from lower legs, has been compliant with cmpression        Review of Systems      Objective:        Physical Exam  Constitutional:       General: He is not in acute distress.     Appearance: He is obese.   Eyes:      General: No scleral icterus.  Skin:     Comments: Distal anterior lower legs with intact skin no induration or expressible discharge    Neurological:      Mental Status: He is alert.         Vitals:    12/09/24 1325   BP: (!) 185/99   Pulse: 72   Resp: 18   Temp: 97.5 °F (36.4 °C)       Assessment:           ICD-10-CM ICD-9-CM   1. Pressure injury of left ankle, stage 2  L89.522 707.06     707.22   2. Pressure injury of right ankle, stage 2  L89.512 707.06     707.22            Wound 10/28/24 1455 Pressure Injury Right anterior Foot (Active)   10/28/24 1455 Foot   Present on Original Admission: Y   Primary Wound Type: Pressure inj   Side: Right   Orientation: anterior   Wound Approximate Age at First Assessment (Weeks):    Wound Number:    Is this injury device related?:    Incision Type:    Closure Method:    Wound Description (Comments):    Type:    Additional  Comments:    Ankle-Brachial Index:    Pulses:    Removal Indication and Assessment:    Wound Outcome:    Wound Image   12/09/24 1745   Dressing Appearance Intact 12/09/24 1745   Drainage Amount None 12/09/24 1745   Appearance Maroon;Intact;Dry 12/09/24 1745   Red (%), Wound Tissue Color 100 % 12/09/24 1745   Periwound Area Intact;Pink 12/09/24 1745   Wound Edges Rolled/closed 12/09/24 1745   Wound Length (cm) 0 cm 12/09/24 1745   Wound Width (cm) 0 cm 12/09/24 1745   Wound Depth (cm) 0 cm 12/09/24 1745   Wound Volume (cm^3) 0 cm^3 12/09/24 1745   Wound Surface Area (cm^2) 0 cm^2 12/09/24 1745   Care Cleansed with:;Antimicrobial agent;Sterile normal saline 12/09/24 1745   Dressing Tubular bandage 12/09/24 1745   Compression Tubular elasticized bandage 12/09/24 1745            Wound 10/28/24 1455 Pressure Injury Left anterior Foot (Active)   10/28/24 1455 Foot   Present on Original Admission:    Primary Wound Type: Pressure inj   Side: Left   Orientation: anterior   Wound Approximate Age at First Assessment (Weeks):    Wound Number:    Is this injury device related?:    Incision Type:    Closure Method:    Wound Description (Comments):    Type:    Additional Comments:    Ankle-Brachial Index:    Pulses:    Removal Indication and Assessment:    Wound Outcome:    Wound Image   12/09/24 1745   Dressing Appearance Intact 12/09/24 1745   Drainage Amount None 12/09/24 1745   Red (%), Wound Tissue Color 100 % 12/09/24 1745   Wound Edges Rolled/closed 12/09/24 1745   Wound Length (cm) 0 cm 12/09/24 1745   Wound Width (cm) 0 cm 12/09/24 1745   Wound Depth (cm) 0 cm 12/09/24 1745   Wound Volume (cm^3) 0 cm^3 12/09/24 1745   Wound Surface Area (cm^2) 0 cm^2 12/09/24 1745   Care Cleansed with:;Antimicrobial agent;Sterile normal saline 12/09/24 1745   Dressing Applied;Tubular bandage 12/09/24 1745   Compression Tubular elasticized bandage 12/09/24 1745           Plan:          Wounds healed continue circadian compression wraps  okay to use tubigrip to skin directly to follow up in lymphedema clinic. Discharge from wound care clinic    Tissue pathology and/or culture taken     [] Yes      [x] No  Sharp debridement performed                   [] Yes       [x] No  Labs ordered     [] Yes       [x] No  Imaging ordered    [] Yes      [x] No    No orders of the defined types were placed in this encounter.  Discharged from wound care .      Follow up if symptoms worsen or fail to improve.

## 2024-12-17 DIAGNOSIS — E11.59 HYPERTENSION ASSOCIATED WITH DIABETES: Chronic | ICD-10-CM

## 2024-12-17 DIAGNOSIS — I15.2 HYPERTENSION ASSOCIATED WITH DIABETES: Chronic | ICD-10-CM

## 2024-12-17 RX ORDER — CARVEDILOL 25 MG/1
25 TABLET ORAL 2 TIMES DAILY
Qty: 180 TABLET | Refills: 3 | Status: SHIPPED | OUTPATIENT
Start: 2024-12-17 | End: 2025-12-17

## 2025-01-13 DIAGNOSIS — Z00.00 ENCOUNTER FOR MEDICARE ANNUAL WELLNESS EXAM: ICD-10-CM

## 2025-01-14 ENCOUNTER — TELEPHONE (OUTPATIENT)
Dept: OPHTHALMOLOGY | Facility: CLINIC | Age: 70
End: 2025-01-14
Payer: MEDICARE

## 2025-02-28 RX ORDER — LOSARTAN POTASSIUM 100 MG/1
100 TABLET ORAL
Qty: 90 TABLET | Refills: 2 | Status: SHIPPED | OUTPATIENT
Start: 2025-02-28

## 2025-04-07 ENCOUNTER — OFFICE VISIT (OUTPATIENT)
Dept: OPHTHALMOLOGY | Facility: CLINIC | Age: 70
End: 2025-04-07
Payer: MEDICARE

## 2025-04-07 DIAGNOSIS — Z96.1 PSEUDOPHAKIA OF BOTH EYES: ICD-10-CM

## 2025-04-07 DIAGNOSIS — Z86.69 HX OF RETINAL DETACHMENT: Chronic | ICD-10-CM

## 2025-04-07 DIAGNOSIS — H54.10 BLINDNESS AND LOW VISION: ICD-10-CM

## 2025-04-07 DIAGNOSIS — H40.1133 PRIMARY OPEN ANGLE GLAUCOMA (POAG) OF BOTH EYES, SEVERE STAGE: Primary | ICD-10-CM

## 2025-04-07 PROCEDURE — G2211 COMPLEX E/M VISIT ADD ON: HCPCS | Mod: S$PBB,,, | Performed by: OPHTHALMOLOGY

## 2025-04-07 PROCEDURE — 99213 OFFICE O/P EST LOW 20 MIN: CPT | Mod: S$PBB,,, | Performed by: OPHTHALMOLOGY

## 2025-04-07 PROCEDURE — 99999 PR PBB SHADOW E&M-EST. PATIENT-LVL IV: CPT | Mod: PBBFAC,,, | Performed by: OPHTHALMOLOGY

## 2025-04-07 PROCEDURE — 99214 OFFICE O/P EST MOD 30 MIN: CPT | Mod: PBBFAC | Performed by: OPHTHALMOLOGY

## 2025-04-07 PROCEDURE — 92020 GONIOSCOPY: CPT | Mod: PBBFAC | Performed by: OPHTHALMOLOGY

## 2025-04-07 PROCEDURE — 92020 GONIOSCOPY: CPT | Mod: S$PBB,,, | Performed by: OPHTHALMOLOGY

## 2025-04-07 NOTE — PROGRESS NOTES
Assessment /Plan     For exam results, see Encounter Report.    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  Try MultiVits discussed  Establish care with Retina service    Advanced Glaucoma OD & OS  Hx high Myopia  Bilateral RDs & SB OD --> Dr Haider    Not HVF taker  Try OCT RNFL      CCT  534 // 544    Low teens --> achieved & discussed    Both eyes --> tolerating well & good adherence --> CSM  Alphagan BID  Azopt BID  Rhopressa q day    SP Bilateral Canaloplasty with Dr Hale  Quiet  Observe    PC IOL OU  Open OD / OS  OS SP Yag Cap 11/28/2017    + ERM OS with small cyst  ? Visually significant as discussed    RD precautions:  re-Discussed symptoms of RD with increased flashes, floaters, decreasing vision.  Patient/Family to call and return immediately to clinic should the symptoms of RD occur. Voiced good understanding with Q & A.    11/06/2023            Plan  RTC establish care with retina service  RTC 6 month  IOP &  Adherence & DFE (if not seen by retina) & OCT RNFL  rtc sooner prn with good understand

## 2025-04-28 ENCOUNTER — TELEPHONE (OUTPATIENT)
Dept: FAMILY MEDICINE | Facility: CLINIC | Age: 70
End: 2025-04-28
Payer: MEDICARE

## 2025-04-28 ENCOUNTER — OFFICE VISIT (OUTPATIENT)
Dept: PODIATRY | Facility: CLINIC | Age: 70
End: 2025-04-28
Payer: MEDICARE

## 2025-04-28 VITALS — HEIGHT: 78 IN | BODY MASS INDEX: 36.45 KG/M2 | WEIGHT: 315 LBS

## 2025-04-28 DIAGNOSIS — Z79.4 TYPE 2 DIABETES MELLITUS WITH DIABETIC POLYNEUROPATHY, WITH LONG-TERM CURRENT USE OF INSULIN: Primary | ICD-10-CM

## 2025-04-28 DIAGNOSIS — I89.0 LYMPHEDEMA: ICD-10-CM

## 2025-04-28 DIAGNOSIS — B35.1 ONYCHOMYCOSIS DUE TO DERMATOPHYTE: ICD-10-CM

## 2025-04-28 DIAGNOSIS — E11.42 TYPE 2 DIABETES MELLITUS WITH DIABETIC POLYNEUROPATHY, WITH LONG-TERM CURRENT USE OF INSULIN: Primary | ICD-10-CM

## 2025-04-28 PROCEDURE — 99999 PR PBB SHADOW E&M-EST. PATIENT-LVL IV: CPT | Mod: PBBFAC,,, | Performed by: PODIATRIST

## 2025-04-28 PROCEDURE — 99214 OFFICE O/P EST MOD 30 MIN: CPT | Mod: PBBFAC,PO | Performed by: PODIATRIST

## 2025-04-28 PROCEDURE — 99213 OFFICE O/P EST LOW 20 MIN: CPT | Mod: S$PBB,,, | Performed by: PODIATRIST

## 2025-04-28 NOTE — TELEPHONE ENCOUNTER
Attempted to contact patient. Left a voice mail to call clinic back. Provider out of clinic. Please reschedule.

## 2025-04-29 ENCOUNTER — TELEPHONE (OUTPATIENT)
Dept: FAMILY MEDICINE | Facility: CLINIC | Age: 70
End: 2025-04-29
Payer: MEDICARE

## 2025-04-29 NOTE — PROGRESS NOTES
Subjective:      Patient ID: Stepan Springer is a 70 y.o. male.    Chief Complaint: Diabetes Mellitus (10/14/24 DO Corcoran) and Nail Care    Diabetes, increased risk amputation needing evaluation/management/optomization of foot care.    Cc2 thick long discolored misshapen toenails all toes.  Gradual onset, worsening over past several weeks, aggravated by increased weight bearing, shoe gear, pressure.  Periodic debridement helps symptoms. Denies trauma, surgery. Previously seen by my colleague new to me.     Chief Complaint   Patient presents with    Diabetes Mellitus     10/14/24 DO Nilo    Nail Care       Casual shoes both feet - athletic.    Review of Systems   Constitutional: Negative for chills, diaphoresis, fever, malaise/fatigue and night sweats.   Cardiovascular:  Positive for leg swelling. Negative for claudication, cyanosis and syncope.   Skin:  Positive for nail changes. Negative for color change, dry skin, rash, suspicious lesions and unusual hair distribution.   Musculoskeletal:  Negative for falls, joint pain, joint swelling, muscle cramps, muscle weakness and stiffness.   Gastrointestinal:  Negative for constipation, diarrhea, nausea and vomiting.   Neurological:  Positive for numbness, paresthesias and sensory change. Negative for brief paralysis, disturbances in coordination, focal weakness and tremors.           Objective:      Physical Exam  Constitutional:       General: He is not in acute distress.     Appearance: He is well-developed. He is not diaphoretic.   Cardiovascular:      Pulses:           Popliteal pulses are 2+ on the right side and 2+ on the left side.        Dorsalis pedis pulses are 1+ on the right side and 1+ on the left side.        Posterior tibial pulses are 1+ on the right side and 1+ on the left side.      Comments: Capillary refill 3 seconds all toes/distal feet, all toes/both feet warm to touch.      Negative lymphadenopathy bilateral popliteal fossa and tarsal tunnel.       2+ pitting lower extremity edema bilateral.    Musculoskeletal:      Right ankle: No swelling, deformity, ecchymosis or lacerations. Normal range of motion. Normal pulse.      Right Achilles Tendon: Normal. No defects. Bland's test negative.      Comments: Normal angle, base, station of gait. Decreased stride and propulsion bilateral.    All ten toes without clubbing, cyanosis, or signs of ischemia.  No pain to palpation bilateral lower extremities.  Range of motion, stability, muscle strength, and muscle tone normal bilateral feet and legs.    Lymphadenopathy:      Lower Body: No right inguinal adenopathy. No left inguinal adenopathy.      Comments: Negative lymphadenopathy bilateral popliteal fossa and tarsal tunnel.    Negative lymphangitic streaking bilateral feet/ankles/legs.   Skin:     General: Skin is warm and dry.      Capillary Refill: Capillary refill takes 2 to 3 seconds.      Coloration: Skin is not pale.      Findings: No abrasion, bruising, burn, ecchymosis, erythema, laceration, lesion or rash.      Nails: There is no clubbing.      Comments: Skin thin, shiny, atrophic, with decreased density and distribution of pedal hair bilateral, but without hyperpigmentation, carola discoloration,  ulcers, masses, nodules or cords palpated bilateral feet and legs.    Toenails 1st, 2nd, 3rd, 4th, 5th  bilateral are hypertrophic thickened 2-3 mm, dystrophic, discolored tanish brown with tan, gray crumbly subungual debris.  Tender to distal nail plate pressure, without periungual skin abnormality of each.       Neurological:      Mental Status: He is alert and oriented to person, place, and time.      Sensory: Sensory deficit present.      Motor: No tremor, atrophy or abnormal muscle tone.      Gait: Gait normal.      Deep Tendon Reflexes:      Reflex Scores:       Patellar reflexes are 2+ on the right side and 2+ on the left side.       Achilles reflexes are 2+ on the right side and 2+ on the left side.      Comments: Paresthesias, intermittently bilateral feet with no clearly identified trigger or source.    Negative tinel sign to percussion sural, superficial peroneal, deep peroneal, saphenous, and posterior tibial nerves right and left ankles and feet.    Decreased/absent vibratory sensation bilateral feet to 128Hz tuning fork.     Psychiatric:         Behavior: Behavior is cooperative.             Assessment:       Encounter Diagnoses   Name Primary?    Type 2 diabetes mellitus with diabetic polyneuropathy, with long-term current use of insulin Yes    Onychomycosis due to dermatophyte     Lymphedema            Plan:       Stepan was seen today for diabetes mellitus and nail care.    Diagnoses and all orders for this visit:    Type 2 diabetes mellitus with diabetic polyneuropathy, with long-term current use of insulin    Onychomycosis due to dermatophyte    Lymphedema        I counseled the patient on his conditions, their implications and medical management.        - Shoe inspection. Diabetic Foot Education. Patient reminded of the importance of good nutrition and blood sugar control to help prevent podiatric complications of diabetes. Patient instructed on proper foot hygeine. We discussed wearing proper shoe gear, daily foot inspections, never walking without protective shoe gear, never putting sharp instruments to feet, routine podiatric nail visits every 2-3 months.   - With patient's permission, nails were aggressively reduced and debrided x 10 to their soft tissue attachment mechanically and with electric , removing all offending nail and debris. Patient relates relief following the procedure. He will continue to monitor the areas daily, inspect his feet, wear protective shoe gear when ambulatory, moisturizer to maintain skin integrity and follow in this office in approximately 2-3 months, sooner p.r.n.             No follow-ups on file.

## 2025-04-29 NOTE — TELEPHONE ENCOUNTER
----- Message from Grover sent at 4/28/2025  9:04 AM CDT -----  Regarding: Stepan  Type:  Patient Returning Call Who Called: Stepan Who Left Message for Patient: Johny Does the patient know what this is regarding?:Yes The patient stated that he is moving and needs to speak with someone in the office about rescheduling his appointment. Please reach out to the patient as soon as possible. Would the patient rather a call back or a response via [facility]? Callback Best Call Back Number:.[phone] Additional Information:

## 2025-05-30 NOTE — PROGRESS NOTES
"FOLLOW-UP PATIENT VISIT    Subjective:      Chief Complaint: Diabetes follow-up    HPI: Stepan Springer is a 70 y.o. male who is here for type 2 diabetes mellitus. Last seen by Dr. Huber 12/2023.    Interval history:   No major changes since the last visit.       Past Medical History:   Diagnosis Date    Anxiety     Bell's palsy     CHF (congestive heart failure)     Chronic kidney disease, stage 3a 11/02/2021    Coronary artery disease involving native coronary artery without angina pectoris 10/18/2016    Depression     Diabetes mellitus     Glaucoma     Hypertension     Idiopathic peripheral neuropathy 12/11/2012    Lymphedema of both lower extremities     Malignant melanoma of skin of forehead 10/13/2022    Mixed hyperlipidemia 12/11/2012    Morbid obesity with BMI of 45.0-49.9, adult 02/12/2019    Pancytopenia     Sleep apnea     "unable to use"    Stage 3b chronic kidney disease     Type 2 diabetes mellitus with diabetic polyneuropathy, with long-term current use of insulin 12/11/2012    Vitamin B 12 deficiency 08/23/2012       With regards to the type 2 diabetes:    Most recent a1c was 7.7 on 10/28/2024    Medical Therapy:  Current diabetic medications include:   Basaglar 9 units daily  Novolog 7-6-6 units with meals   Ozempic 0.5 mg weekly (24 clicks)- Saturdays    He is currently taking 24 clicks on the Ozempic 0.5 mg pen. He slowly increases clicks when he finds himself getting hungry.    Denies abdominal pain and cramping, but reports constipation that has worsened since starting Ozempic. However, constipation is well managed by Metamucil.     Blood sugars:  Current monitoring regimen: CGM - Freestyle Sriram 14 day          Any episodes of hypoglycemia? Lowest BG 85. Denies hypoglycemia <70 since decreasing Basaglar  Symptoms (when BG in 60s): shaking, cold sweats  Intervention: 3 glucose tablets     Current diet:  Keto-friendly diet since moving to [place] - 2/2023.   Breakfast:  Atkins " milkshake, Atkins baked blueberry bar, PB crackers  Lunch (largest meal): sandwich (no sides if he's eating with his grandkids), meal if he eats with his son in law  Dinner:  Varies based on his kids. Sometimes they eat out- likes Chinese food (triple Citizen of [place] once weekly- beef, shrimp, chicken, onion; no rice)  Snacks:  Popcorn, PB crackers  Drinks:  Water, diet coke    Current exercise: denies due to arthritis in his knees and lower back. Unable to walk long distances. Unable to find transport on most days. Daughter drives him to appointments on Mondays.    Weight trend:  Wt Readings from Last 10 Encounters:   06/19/25 (!) 175.7 kg (387 lb 5.6 oz)   04/28/25 (!) 196.9 kg (434 lb 1.4 oz)   10/28/24 (!) 196.9 kg (434 lb)   10/28/24 (!) 196.9 kg (434 lb 1.4 oz)   10/14/24 (!) 196.9 kg (434 lb 1.4 oz)   05/27/24 (!) 200.5 kg (442 lb 0.4 oz)   04/08/24 (!) 192.2 kg (423 lb 11.6 oz)   01/22/24 (!) 192.1 kg (423 lb 8.1 oz)   01/04/24 (!) 192.3 kg (423 lb 13.4 oz)   12/18/23 (!) 193.3 kg (426 lb 4.2 oz)       Diabetes Management Status    Statin: Taking Atorvastatin 20 mg daily  ACE/ARB: Taking Losartan 100 mg daily + Carvedilol 25 mg BID + Nifedipine 60 mg daily + Furosemide 40 mg daily + Clonidine 0.1 mg TID    BP Readings from Last 5 Encounters:   06/19/25 130/72   12/09/24 (!) 185/99   11/25/24 (!) 166/77   11/18/24 (!) 167/80   11/11/24 (!) 181/84     Lab Results   Component Value Date    LDLCALC 71.4 10/28/2024    LDLCALC 97.4 04/08/2024    LDLCALC 47.8 (L) 10/19/2023    HDL 33 (L) 10/28/2024    HDL 45 04/08/2024    HDL 38 (L) 10/19/2023    TRIG 73 10/28/2024    TRIG 78 04/08/2024    TRIG 96 10/19/2023       Screening or Prevention Patient's value Goal Complete/Controlled?   HgA1C Testing and Control   Lab Results   Component Value Date    HGBA1C 7.7 (H) 10/28/2024      Annually/Less than 8% Yes   Lipid profile : 10/28/2024 Annually No   LDL control Lab Results   Component Value Date    LDLCALC 71.4 10/28/2024     Annually/Less than 100 mg/dl  No   Nephropathy screening Lab Results   Component Value Date    LABMICR 119.0 04/08/2024     Lab Results   Component Value Date    PROTEINUA Negative 06/05/2023     Lab Results   Component Value Date    MICALBCREAT 95.2 (H) 04/08/2024    MICALBCREAT 154.8 (H) 09/25/2023    MICALBCREAT 105.3 (H) 02/27/2023    Annually Yes   Blood pressure BP Readings from Last 1 Encounters:   06/19/25 130/72    Less than 140/90 Yes   Dilated retinal exam : 04/07/2025- scheduled to see Dr. Burgess in November Annually Yes   Foot exam   : 10/28/2024 Annually Yes        Lab Results   Component Value Date    HGBA1C 7.7 (H) 10/28/2024    HGBA1C 6.7 (H) 04/08/2024    HGBA1C 6.9 (H) 09/25/2023    HGBA1C 7.2 (H) 06/02/2023    HGBA1C 7.9 (H) 02/27/2023    HGBA1C 6.5 (H) 08/15/2022    HGBA1C 7.0 (H) 07/07/2022    HGBA1C 6.9 (H) 02/08/2022    HGBA1C 5.8 (H) 09/09/2021    HGBA1C 7.7 (H) 05/06/2021     Lab Results   Component Value Date    CPEPTIDE 2.70 12/13/2018    GLUTAMICACID 0.00 12/13/2018       Other medications tried:  Jardiance --- discontinued due to low carb diet  glyburide --- discontinued due to alternate therapy  miglitol --- discontinued due to alternate therapy    Lifestyle:    Last visit with Diabetes Educator: Last Education Visit: Not Found      Presented with acromegaly features and elevated IGF-1 level at 12/2023 visit with Dr. Huber. However, he tested negative.     Latest Reference Range & Units 09/28/10 08:28 09/25/23 12:26 01/08/24 08:54 01/08/24 09:36 01/08/24 10:06 01/08/24 10:36 01/08/24 11:06   Growth Hormone 0.0 - 3.0 ng/mL   0.5 <0.1 <0.1 <0.1 <0.1   Somatomedin (IGF-I) 32 - 209 ng/mL 265 (H) 207        (H): Data is abnormally high    MRI 11/2010  IMPRESSION:   1.  NO PITUITARY MASSES OR OTHER LESIONS ARE DEMONSTRATED.  NOTE,   HOWEVER, THAT IMAGES ARE SOMEWHAT DEGRADED BY USE OF THE OPEN MAGNET.   2.  DIFFUSE CEREBRAL VOLUME LOSS, GREATER THAN EXPECTED IN DEGREE FOR   PATIENT AGE.      HTN: Carvedilol 25 mg BID, Clonidine 0.1 mg TID, Furosemide 40 mg daily, Losartan 100 mg daily, Nifedipine 60 mg daily    Lab Results   Component Value Date    ALDOSTERONE 8.4 12/11/2023    RENIN 0.2 12/11/2023    ALDRENINCALC 41.8 (H) 12/11/2023       With regards to Vitamin D Deficiency:    Vit D, 25-Hydroxy   Date Value Ref Range Status   07/07/2022 12 (L) 30 - 96 ng/mL Final     Comment:     Vitamin D deficiency.........<10 ng/mL                              Vitamin D insufficiency......10-29 ng/mL       Vitamin D sufficiency........> or equal to 30 ng/mL  Vitamin D toxicity............>100 ng/mL         Current Meds: denies      Objective:     Vitals:    06/19/25 1349   BP: 130/72   Pulse: 69           BP Readings from Last 5 Encounters:   06/19/25 130/72   12/09/24 (!) 185/99   11/25/24 (!) 166/77   11/18/24 (!) 167/80   11/11/24 (!) 181/84         Physical Exam  Vitals and nursing note reviewed.   Constitutional:       Appearance: Normal appearance. He is well-developed. He is obese.      Comments: +Acromegalic features - large hands and feet, frontal bossing, prognathism.   Neck:      Thyroid: No thyromegaly.      Trachea: No tracheal deviation.   Cardiovascular:      Rate and Rhythm: Normal rate.   Pulmonary:      Effort: Pulmonary effort is normal. No respiratory distress.   Musculoskeletal:      Right lower leg: Edema present.      Left lower leg: Edema present.      Comments:      Neurological:      Mental Status: He is alert.   Psychiatric:         Mood and Affect: Mood normal.         Behavior: Behavior normal.           Wt Readings from Last 3 Encounters:   06/19/25 1349 (!) 175.7 kg (387 lb 5.6 oz)   04/28/25 1441 (!) 196.9 kg (434 lb 1.4 oz)   10/28/24 1504 (!) 196.9 kg (434 lb)       Assessment/Plan:     Type 2 diabetes mellitus with stage 3b chronic kidney disease, with long-term current use of insulin   - Labs : CMP, A1C, microalbumin/creatinine urine   - A1c goal <7.5% (CKD)   - Last A1C  7.7%   - Reviewed logs/CGM: GMI 6.4%, TIR 96%, 4% high, 0% low. Overall at goal with very little variation or hyperglycemia. No hypoglycemia noted. Some gaps in data when patient ran out of Sriram refills   - Patient wants to aim for 100% TIR. Discussed more lenient diabetes goals with CKD to prevent hypoglycemia   - Educated patient that weight loss, from lifestyle management and GLP1 agonist, will help improve insulin resistance, HTN, CKD, and arthralgia    - Recommended water aerobics but patient has difficulty finding transportation   - Will continue current regimen. However, patient self-increases clicks on Ozempic when he gets hungry. Insulin titration instructions given to patient if he increases Ozempic clicks   - Will monitor GFR to avoid kidney injury while on Ozempic   - If patient continues to lose weight and do well on Ozempic, will likely stop one or both insulins    Medication:   Continue Ozempic 24 clicks once weekly  Continue Basaglar 9 units once daily  Continue Novolog 7 units with breakfast, 6 units with lunch, and 6 units with dinner    Decrease insulin to the following with increased Ozempic clicks:   - Basaglar 7 units daily   - Novolog 6-5-5 units with meals    Decrease insulin to the following on 36 clicks of Ozempic:    - Basaglar 6 units   - Novolog 5-4-4 units with meals      - Reviewed goals of therapy are to get the best control we can without hypoglycemia.   - Reviewed patient's current insulin regimen. Clarified proper insulin dose and timing in relation to meals, etc. Insulin injection sites and proper rotation instructed.     - Advised frequent self blood glucose monitoring.  Patient encouraged to document glucose results and bring them to every clinic visit.   - Hypoglycemia precautions discussed. Instructed on precautions before driving.     - Call for Bg repeatedly < 70 or > 180.    - Close adherence to lifestyle changes recommended.    - Periodic follow ups for eye evaluations,  foot care and dental care suggested.      Vitamin D deficiency  Check vitamin D    Hypoglycemia unawareness associated with type 2 diabetes mellitus  Continue Sriram use to closely track blood sugars and prevent hypoglycemia/hypoglycemia unawareness    Class 3 severe obesity due to excess calories with serious comorbidity and body mass index (BMI) of 40.0 to 44.9 in adult   - Encouraged dietary and lifestyle modifications.   - Emphasized weight loss goals.   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  There was a pituitary MRI done in 2010, but it was a limited study due to open MRI.  75 g OGTT for growth hormone tested negative    Hypertension associated with diabetes   - On Losartan 100 mg daily, Carvedilol 25 mg BID, Nifedipine 60 mg daily, Furosemide 40 mg daily, Clonidine 0.1 mg TID   - Controlled in clinic today.   - Blood pressure goals discussed with patient.   - Normal aldosterone and renin 12/2023      Hyperlipidemia associated with type 2 diabetes mellitus   - LDL goal <55 (history of NSTEMI and CAD s/p PCI)   - On statin per ADA recommendations      Stage 3b chronic kidney disease  Titrate insulin slowly to avoid hypoglycemia as the risk of hypoglycemia increases with decreased creatinine clearance.        Follow up in about 6 months (around 12/19/2025).    Visit today included increased complexity associated with the care of the problems addressed and managing the longitudinal care of the patient due to the serious and/or complex managed problems.    Nuria Varghese PA-C

## 2025-06-11 DIAGNOSIS — I89.0 LYMPHEDEMA ASSOCIATED WITH OBESITY: ICD-10-CM

## 2025-06-11 DIAGNOSIS — E11.9 TYPE 2 DIABETES MELLITUS WITHOUT COMPLICATION: ICD-10-CM

## 2025-06-11 DIAGNOSIS — I1A.0 RESISTANT HYPERTENSION: ICD-10-CM

## 2025-06-11 DIAGNOSIS — E66.9 LYMPHEDEMA ASSOCIATED WITH OBESITY: ICD-10-CM

## 2025-06-11 DIAGNOSIS — E11.59 HYPERTENSION ASSOCIATED WITH DIABETES: Chronic | ICD-10-CM

## 2025-06-11 DIAGNOSIS — I10 ESSENTIAL HYPERTENSION: ICD-10-CM

## 2025-06-11 DIAGNOSIS — I15.2 HYPERTENSION ASSOCIATED WITH DIABETES: Chronic | ICD-10-CM

## 2025-06-11 DIAGNOSIS — H40.1133 PRIMARY OPEN ANGLE GLAUCOMA OF BOTH EYES, SEVERE STAGE: ICD-10-CM

## 2025-06-11 RX ORDER — NIFEDIPINE 60 MG/1
60 TABLET, EXTENDED RELEASE ORAL DAILY
Qty: 90 TABLET | Refills: 0 | Status: SHIPPED | OUTPATIENT
Start: 2025-06-11

## 2025-06-11 RX ORDER — CLONIDINE HYDROCHLORIDE 0.1 MG/1
0.1 TABLET ORAL 3 TIMES DAILY
Qty: 270 TABLET | Refills: 0 | Status: SHIPPED | OUTPATIENT
Start: 2025-06-11

## 2025-06-11 RX ORDER — FUROSEMIDE 40 MG/1
40 TABLET ORAL DAILY
Qty: 90 TABLET | Refills: 0 | Status: SHIPPED | OUTPATIENT
Start: 2025-06-11

## 2025-06-11 NOTE — TELEPHONE ENCOUNTER
Refill Routing Note   Medication(s) are not appropriate for processing by [facility] for the following reason(s):        Required labs outdated  Required vitals abnormal  Outside of protocol    [facility] action(s):  Defer  Route   Requires labs : Yes             Appointments  past 12m or future 3m with PCP    Date Provider   Last Visit   10/14/2024 Tania Corcoran, DO   Next Visit   6/30/2025 Tania Corcoran, DO   ED visits in past 90 days: 0        Note composed:11:54 AM 06/11/2025

## 2025-06-11 NOTE — TELEPHONE ENCOUNTER
Care Due:                  Date            Visit Type   Department     Provider  --------------------------------------------------------------------------------                                [facility]                              PRIMARY      / INTERNAL MED  Last Visit: 10-      [facility] ([facility])   / MIKE Corcoran                              [facility]                              PRIMARY      / INTERNAL MED  Next Visit: 06-      [facility] ([facility])   / MIKE Corcoran                                                            Last  Test          Frequency    Reason                     Performed    Due Date  --------------------------------------------------------------------------------    HBA1C.......  6 months...  insulin, semaglutide.....  10-   04-    [facility] Embedded Care Due Messages. Reference number: [id number].   6/11/2025 10:31:10 AM CDT

## 2025-06-11 NOTE — TELEPHONE ENCOUNTER
----- Message from Kiesha sent at 6/11/2025  9:57 AM CDT -----  Regarding: refill med  Contact: pt  Pt calling in regards to medicationNeeds refill: cloNIDine (CATAPRES) 0.1 MG tablet ,.. furosemide (LASIX) 40 MG tablet,... NIFEdipine (PROCARDIA-XL) 60 MG (OSM) 24 hr tablet    Pharmacy: [facility] [place] [address] ,. [phone] [phone] you

## 2025-06-12 RX ORDER — BRINZOLAMIDE 10 MG/ML
1 SUSPENSION/ DROPS OPHTHALMIC 3 TIMES DAILY
Qty: 10 ML | Refills: 11 | Status: SHIPPED | OUTPATIENT
Start: 2025-06-12

## 2025-06-12 RX ORDER — NETARSUDIL 0.2 MG/ML
1 SOLUTION/ DROPS OPHTHALMIC; TOPICAL DAILY
Qty: 2.5 ML | Refills: 11 | Status: SHIPPED | OUTPATIENT
Start: 2025-06-12

## 2025-06-17 NOTE — PATIENT INSTRUCTIONS
"Diabetes  A1C goal <7.5%    Medications:  Continue Ozempic 24 clicks once weekly  Continue Basaglar 9 units once daily  Continue Novolog 7 units with breakfast, 6 units with lunch, and 6 units with dinner    If you increase clicks on your Ozempic pen, please decrease your insulin dose to the following:    - Basaglar 7 units daily   - Novolog 6-5-5 units with meals    If you increase Ozempic to 36 clicks (0.5 mg = max dose), please decrease your insulin dose to the following:    - Basaglar 6 units   - Novolog 5-4-4 units with meals       Cholesterol  LDL goal is less than 55  Continue Atorvastatin 20 mg daily    High cholesterol foods to avoid/limit:     C: Cheese, milk, dairy  A: Animal Fats: hot dogs and hamburgers  G: "Getting it away from home": going out to eat a lot  E: Extra Fat: cookies, candy, and sweets  F: Family History    Remember high cholesterol can clog your arteries and increase risk for heart attack or stroke.     Blood Pressure  Blood pressure goal is less than 130/80  Continue Losartan 100 mg daily + Carvedilol 25 mg twice daily + Nifedipine 60 mg daily    "

## 2025-06-19 ENCOUNTER — TELEPHONE (OUTPATIENT)
Dept: FAMILY MEDICINE | Facility: CLINIC | Age: 70
End: 2025-06-19
Payer: MEDICARE

## 2025-06-19 ENCOUNTER — OFFICE VISIT (OUTPATIENT)
Dept: ENDOCRINOLOGY | Facility: CLINIC | Age: 70
End: 2025-06-19
Payer: MEDICARE

## 2025-06-19 VITALS
HEART RATE: 69 BPM | WEIGHT: 315 LBS | OXYGEN SATURATION: 98 % | SYSTOLIC BLOOD PRESSURE: 130 MMHG | DIASTOLIC BLOOD PRESSURE: 72 MMHG | HEIGHT: 78 IN | BODY MASS INDEX: 36.45 KG/M2

## 2025-06-19 DIAGNOSIS — N18.32 STAGE 3B CHRONIC KIDNEY DISEASE: Chronic | ICD-10-CM

## 2025-06-19 DIAGNOSIS — Z79.4 TYPE 2 DIABETES MELLITUS WITH CHRONIC KIDNEY DISEASE, WITH LONG-TERM CURRENT USE OF INSULIN, UNSPECIFIED CKD STAGE: ICD-10-CM

## 2025-06-19 DIAGNOSIS — Z12.5 PROSTATE CANCER SCREENING: Primary | ICD-10-CM

## 2025-06-19 DIAGNOSIS — E78.5 DYSLIPIDEMIA ASSOCIATED WITH TYPE 2 DIABETES MELLITUS: ICD-10-CM

## 2025-06-19 DIAGNOSIS — E22.0 ACROMEGALY: ICD-10-CM

## 2025-06-19 DIAGNOSIS — N18.32 TYPE 2 DIABETES MELLITUS WITH STAGE 3B CHRONIC KIDNEY DISEASE, WITH LONG-TERM CURRENT USE OF INSULIN: Chronic | ICD-10-CM

## 2025-06-19 DIAGNOSIS — E11.69 DYSLIPIDEMIA ASSOCIATED WITH TYPE 2 DIABETES MELLITUS: ICD-10-CM

## 2025-06-19 DIAGNOSIS — E66.813 CLASS 3 SEVERE OBESITY DUE TO EXCESS CALORIES WITH SERIOUS COMORBIDITY AND BODY MASS INDEX (BMI) OF 40.0 TO 44.9 IN ADULT: ICD-10-CM

## 2025-06-19 DIAGNOSIS — E11.22 TYPE 2 DIABETES MELLITUS WITH STAGE 3B CHRONIC KIDNEY DISEASE, WITH LONG-TERM CURRENT USE OF INSULIN: Chronic | ICD-10-CM

## 2025-06-19 DIAGNOSIS — E11.649 HYPOGLYCEMIA UNAWARENESS ASSOCIATED WITH TYPE 2 DIABETES MELLITUS: ICD-10-CM

## 2025-06-19 DIAGNOSIS — E11.69 HYPERLIPIDEMIA ASSOCIATED WITH TYPE 2 DIABETES MELLITUS: ICD-10-CM

## 2025-06-19 DIAGNOSIS — Z79.4 TYPE 2 DIABETES MELLITUS WITH MICROALBUMINURIA, WITH LONG-TERM CURRENT USE OF INSULIN: ICD-10-CM

## 2025-06-19 DIAGNOSIS — Z79.4 TYPE 2 DIABETES MELLITUS WITH STAGE 3B CHRONIC KIDNEY DISEASE, WITH LONG-TERM CURRENT USE OF INSULIN: Chronic | ICD-10-CM

## 2025-06-19 DIAGNOSIS — E11.29 TYPE 2 DIABETES MELLITUS WITH MICROALBUMINURIA, WITH LONG-TERM CURRENT USE OF INSULIN: ICD-10-CM

## 2025-06-19 DIAGNOSIS — R80.9 TYPE 2 DIABETES MELLITUS WITH MICROALBUMINURIA, WITH LONG-TERM CURRENT USE OF INSULIN: ICD-10-CM

## 2025-06-19 DIAGNOSIS — E11.59 HYPERTENSION ASSOCIATED WITH DIABETES: Chronic | ICD-10-CM

## 2025-06-19 DIAGNOSIS — I15.2 HYPERTENSION ASSOCIATED WITH DIABETES: Chronic | ICD-10-CM

## 2025-06-19 DIAGNOSIS — E55.9 VITAMIN D DEFICIENCY: Primary | Chronic | ICD-10-CM

## 2025-06-19 DIAGNOSIS — E11.22 TYPE 2 DIABETES MELLITUS WITH CHRONIC KIDNEY DISEASE, WITH LONG-TERM CURRENT USE OF INSULIN, UNSPECIFIED CKD STAGE: ICD-10-CM

## 2025-06-19 DIAGNOSIS — E66.01 CLASS 3 SEVERE OBESITY DUE TO EXCESS CALORIES WITH SERIOUS COMORBIDITY AND BODY MASS INDEX (BMI) OF 40.0 TO 44.9 IN ADULT: ICD-10-CM

## 2025-06-19 DIAGNOSIS — E78.5 HYPERLIPIDEMIA ASSOCIATED WITH TYPE 2 DIABETES MELLITUS: ICD-10-CM

## 2025-06-19 PROCEDURE — 99214 OFFICE O/P EST MOD 30 MIN: CPT | Mod: PBBFAC

## 2025-06-19 PROCEDURE — 99999 PR PBB SHADOW E&M-EST. PATIENT-LVL IV: CPT | Mod: PBBFAC,,,

## 2025-06-19 RX ORDER — INSULIN GLARGINE 100 [IU]/ML
9 INJECTION, SOLUTION SUBCUTANEOUS NIGHTLY
Qty: 8.1 ML | Refills: 3 | Status: SHIPPED | OUTPATIENT
Start: 2025-06-19 | End: 2026-06-19

## 2025-06-19 RX ORDER — INSULIN ASPART 100 [IU]/ML
5 INJECTION, SOLUTION INTRAVENOUS; SUBCUTANEOUS
Qty: 13.5 ML | Refills: 3 | Status: SHIPPED | OUTPATIENT
Start: 2025-06-19 | End: 2026-06-19

## 2025-06-19 RX ORDER — SEMAGLUTIDE 0.68 MG/ML
0.5 INJECTION, SOLUTION SUBCUTANEOUS
Qty: 9 ML | Refills: 3 | Status: SHIPPED | OUTPATIENT
Start: 2025-06-19 | End: 2026-06-19

## 2025-06-19 RX ORDER — FLASH GLUCOSE SENSOR
2 KIT MISCELLANEOUS
Qty: 6 KIT | Refills: 3 | Status: SHIPPED | OUTPATIENT
Start: 2025-06-19

## 2025-06-19 NOTE — ASSESSMENT & PLAN NOTE
- Labs : CMP, A1C, microalbumin/creatinine urine   - A1c goal <7.5% (CKD)   - Last A1C 7.7%   - Reviewed logs/CGM: GMI 6.4%, TIR 96%, 4% high, 0% low. Overall at goal with very little variation or hyperglycemia. No hypoglycemia noted. Some gaps in data when patient ran out of Sriram refills   - Patient wants to aim for 100% TIR. Discussed more lenient diabetes goals with CKD to prevent hypoglycemia   - Educated patient that weight loss, from lifestyle management and GLP1 agonist, will help improve insulin resistance, HTN, CKD, and arthralgia    - Recommended water aerobics but patient has difficulty finding transportation   - Will continue current regimen. However, patient self-increases clicks on Ozempic when he gets hungry. Insulin titration instructions given to patient if he increases Ozempic clicks   - Will monitor GFR to avoid kidney injury while on Ozempic   - If patient continues to lose weight and do well on Ozempic, will likely stop one or both insulins    Medication:   Continue Ozempic 24 clicks once weekly  Continue Basaglar 9 units once daily  Continue Novolog 7 units with breakfast, 6 units with lunch, and 6 units with dinner    Decrease insulin to the following with increased Ozempic clicks:   - Basaglar 7 units daily   - Novolog 6-5-5 units with meals    Decrease insulin to the following on 36 clicks of Ozempic:    - Basaglar 6 units   - Novolog 5-4-4 units with meals      - Reviewed goals of therapy are to get the best control we can without hypoglycemia.   - Reviewed patient's current insulin regimen. Clarified proper insulin dose and timing in relation to meals, etc. Insulin injection sites and proper rotation instructed.     - Advised frequent self blood glucose monitoring.  Patient encouraged to document glucose results and bring them to every clinic visit.   - Hypoglycemia precautions discussed. Instructed on precautions before driving.     - Call for Bg repeatedly < 70 or > 180.    - Close  adherence to lifestyle changes recommended.    - Periodic follow ups for eye evaluations, foot care and dental care suggested.

## 2025-06-19 NOTE — ASSESSMENT & PLAN NOTE
- Encouraged dietary and lifestyle modifications.   - Emphasized weight loss goals.   - On GLP1 agonist

## 2025-06-19 NOTE — ASSESSMENT & PLAN NOTE
He has multiple clinical features of acromegaly along with elevated IGF-1 level.  There was a pituitary MRI done in 2010, but it was a limited study due to open MRI.  75 g OGTT for growth hormone tested negative

## 2025-06-19 NOTE — TELEPHONE ENCOUNTER
----- Message from Nuria Varghese PA-C sent at 6/19/2025  2:39 PM CDT -----  Regarding: Annual Labs  Good afternoon Dr. Corcoran,    I had the pleasure of meeting Mr. Springer today for diabetes. Overall, his diabetes is under great control. He is due for labs but I understand he is following up with you 06/30/2025. He requested his labs be done all at once before he sees you. I plan on ordering a CMP, A1C, microalbumin/creatinine urine, and vitamin D. Is there anything else you would require? My staff has not scheduled his lab appointment yet but we can have it done prior to his visit with you.    Thank you,  Nuria Varghese PA-C  Endocrinology

## 2025-06-19 NOTE — ASSESSMENT & PLAN NOTE
Continue Sriram use to closely track blood sugars and prevent hypoglycemia/hypoglycemia unawareness

## 2025-06-19 NOTE — ASSESSMENT & PLAN NOTE
- On Losartan 100 mg daily, Carvedilol 25 mg BID, Nifedipine 60 mg daily, Furosemide 40 mg daily, Clonidine 0.1 mg TID   - Controlled in clinic today.   - Blood pressure goals discussed with patient.   - Normal aldosterone and renin 12/2023

## 2025-06-20 ENCOUNTER — TELEPHONE (OUTPATIENT)
Dept: ENDOCRINOLOGY | Facility: CLINIC | Age: 70
End: 2025-06-20
Payer: MEDICARE

## 2025-06-23 ENCOUNTER — TELEPHONE (OUTPATIENT)
Dept: ENDOCRINOLOGY | Facility: CLINIC | Age: 70
End: 2025-06-23
Payer: MEDICARE

## 2025-06-23 NOTE — TELEPHONE ENCOUNTER
----- Message from Med Assistant Bui sent at 6/20/2025  4:15 PM CDT -----  Regarding: FW: Annual Labs    ----- Message -----  From: Nuria Varghese PA-C  Sent: 6/20/2025   7:15 AM CDT  To: Tania Corcoran DO; Ramya Pichardo MA  Subject: RE: Annual Labs                                  Good morning,    I'll let the MA know to have him scheduled for fasting labs and will have them link your labs as well. Thank you!    Nuria  ----- Message -----  From: Tania Corcoran DO  Sent: 6/19/2025   4:05 PM CDT  To: Nuria Varghese PA-C  Subject: RE: Annual Labs                                  Good afternoon,    He is also due for a PSA and lipid panel. Can your staff add these on?    Dr. Tania Corcoran,   Family Medicine  ----- Message -----  From: Nuria Varghese PA-C  Sent: 6/19/2025   2:45 PM CDT  To: Tania Corcoran DO  Subject: Annual Labs                                      Good afternoon Dr. Corcoran,    I had the pleasure of meeting Mr. Springer today for diabetes. Overall, his diabetes is under great control. He is due for labs but I understand he is following up with you 06/30/2025. He requested his labs be done all at once before he sees you. I plan on ordering a CMP, A1C, microalbumin/creatinine urine, and vitamin D. Is there anything else you would require? My staff has not scheduled his lab appointment yet but we can have it done prior to his visit with you.    Thank you,  Nuria Varghese PA-C  Endocrinology

## 2025-06-24 DIAGNOSIS — Z79.4 TYPE 2 DIABETES MELLITUS WITH MICROALBUMINURIA, WITH LONG-TERM CURRENT USE OF INSULIN: ICD-10-CM

## 2025-06-24 DIAGNOSIS — E11.29 TYPE 2 DIABETES MELLITUS WITH MICROALBUMINURIA, WITH LONG-TERM CURRENT USE OF INSULIN: ICD-10-CM

## 2025-06-24 DIAGNOSIS — R80.9 TYPE 2 DIABETES MELLITUS WITH MICROALBUMINURIA, WITH LONG-TERM CURRENT USE OF INSULIN: ICD-10-CM

## 2025-06-24 RX ORDER — FLASH GLUCOSE SENSOR
1 KIT MISCELLANEOUS
Qty: 6 KIT | Refills: 3 | Status: SHIPPED | OUTPATIENT
Start: 2025-06-24

## 2025-06-24 NOTE — TELEPHONE ENCOUNTER
"Copied from CRM #[id number]. Topic: Medications - Medication Question  >> Jun 24, 2025 10:51 AM Med Assistant Bert wrote:  Type:  Pharmacy Calling to Clarify an RX    Name of Caller: Ted   Pharmacy Name:[facility]   Prescription Name:  What do they need to clarify?:flash glucose sensor (FREESTYLE CLEMENCIA 14 DAY SENSOR)   Best Call Back Number: [phone] Reference # [id number]  Additional Information: Calling regarding the instructions for the route of the this medication states it should be "one" not two every 14 days Please call back to advise  "

## 2025-06-30 ENCOUNTER — RESULTS FOLLOW-UP (OUTPATIENT)
Dept: ENDOCRINOLOGY | Facility: HOSPITAL | Age: 70
End: 2025-06-30

## 2025-06-30 ENCOUNTER — LAB VISIT (OUTPATIENT)
Dept: LAB | Facility: HOSPITAL | Age: 70
End: 2025-06-30
Attending: FAMILY MEDICINE
Payer: MEDICARE

## 2025-06-30 ENCOUNTER — OFFICE VISIT (OUTPATIENT)
Dept: FAMILY MEDICINE | Facility: CLINIC | Age: 70
End: 2025-06-30
Payer: MEDICARE

## 2025-06-30 ENCOUNTER — TELEPHONE (OUTPATIENT)
Dept: ENDOCRINOLOGY | Facility: CLINIC | Age: 70
End: 2025-06-30
Payer: MEDICARE

## 2025-06-30 VITALS
RESPIRATION RATE: 18 BRPM | DIASTOLIC BLOOD PRESSURE: 80 MMHG | TEMPERATURE: 97 F | OXYGEN SATURATION: 97 % | SYSTOLIC BLOOD PRESSURE: 162 MMHG | BODY MASS INDEX: 44.73 KG/M2 | HEART RATE: 69 BPM | WEIGHT: 315 LBS

## 2025-06-30 DIAGNOSIS — Z12.5 SCREENING FOR PROSTATE CANCER: ICD-10-CM

## 2025-06-30 DIAGNOSIS — E22.0 ACROMEGALY: ICD-10-CM

## 2025-06-30 DIAGNOSIS — I89.0 LYMPHEDEMA ASSOCIATED WITH OBESITY: ICD-10-CM

## 2025-06-30 DIAGNOSIS — Z79.4 TYPE 2 DIABETES MELLITUS WITH CHRONIC KIDNEY DISEASE, WITH LONG-TERM CURRENT USE OF INSULIN, UNSPECIFIED CKD STAGE: ICD-10-CM

## 2025-06-30 DIAGNOSIS — E66.813 CLASS 3 SEVERE OBESITY DUE TO EXCESS CALORIES WITH SERIOUS COMORBIDITY AND BODY MASS INDEX (BMI) OF 40.0 TO 44.9 IN ADULT: ICD-10-CM

## 2025-06-30 DIAGNOSIS — E66.9 LYMPHEDEMA ASSOCIATED WITH OBESITY: ICD-10-CM

## 2025-06-30 DIAGNOSIS — E66.01 CLASS 3 SEVERE OBESITY DUE TO EXCESS CALORIES WITH SERIOUS COMORBIDITY AND BODY MASS INDEX (BMI) OF 40.0 TO 44.9 IN ADULT: ICD-10-CM

## 2025-06-30 DIAGNOSIS — M15.0 PRIMARY OSTEOARTHRITIS INVOLVING MULTIPLE JOINTS: ICD-10-CM

## 2025-06-30 DIAGNOSIS — N18.32 TYPE 2 DIABETES MELLITUS WITH STAGE 3B CHRONIC KIDNEY DISEASE, WITH LONG-TERM CURRENT USE OF INSULIN: ICD-10-CM

## 2025-06-30 DIAGNOSIS — F51.01 PRIMARY INSOMNIA: ICD-10-CM

## 2025-06-30 DIAGNOSIS — I10 ESSENTIAL HYPERTENSION: ICD-10-CM

## 2025-06-30 DIAGNOSIS — E11.69 DYSLIPIDEMIA ASSOCIATED WITH TYPE 2 DIABETES MELLITUS: ICD-10-CM

## 2025-06-30 DIAGNOSIS — Z79.4 TYPE 2 DIABETES MELLITUS WITH STAGE 3B CHRONIC KIDNEY DISEASE, WITH LONG-TERM CURRENT USE OF INSULIN: ICD-10-CM

## 2025-06-30 DIAGNOSIS — M65.351 TRIGGER LITTLE FINGER OF RIGHT HAND: Primary | ICD-10-CM

## 2025-06-30 DIAGNOSIS — E55.9 VITAMIN D DEFICIENCY: Chronic | ICD-10-CM

## 2025-06-30 DIAGNOSIS — E11.22 TYPE 2 DIABETES MELLITUS WITH CHRONIC KIDNEY DISEASE, WITH LONG-TERM CURRENT USE OF INSULIN, UNSPECIFIED CKD STAGE: ICD-10-CM

## 2025-06-30 DIAGNOSIS — E78.5 DYSLIPIDEMIA ASSOCIATED WITH TYPE 2 DIABETES MELLITUS: ICD-10-CM

## 2025-06-30 DIAGNOSIS — L98.9 SKIN LESION: ICD-10-CM

## 2025-06-30 DIAGNOSIS — E11.22 TYPE 2 DIABETES MELLITUS WITH STAGE 3B CHRONIC KIDNEY DISEASE, WITH LONG-TERM CURRENT USE OF INSULIN: ICD-10-CM

## 2025-06-30 LAB
25(OH)D3+25(OH)D2 SERPL-MCNC: 27 NG/ML (ref 30–96)
ALBUMIN SERPL BCP-MCNC: 3.5 G/DL (ref 3.5–5.2)
ALP SERPL-CCNC: 70 UNIT/L (ref 40–150)
ALT SERPL W/O P-5'-P-CCNC: 13 UNIT/L (ref 10–44)
ANION GAP (OHS): 9 MMOL/L (ref 8–16)
AST SERPL-CCNC: 13 UNIT/L (ref 11–45)
BILIRUB SERPL-MCNC: 0.6 MG/DL (ref 0.1–1)
BUN SERPL-MCNC: 30 MG/DL (ref 8–23)
CALCIUM SERPL-MCNC: 9.1 MG/DL (ref 8.7–10.5)
CHLORIDE SERPL-SCNC: 111 MMOL/L (ref 95–110)
CHOLEST SERPL-MCNC: 128 MG/DL (ref 120–199)
CHOLEST/HDLC SERPL: 3.3 {RATIO} (ref 2–5)
CO2 SERPL-SCNC: 21 MMOL/L (ref 23–29)
CREAT SERPL-MCNC: 1.7 MG/DL (ref 0.5–1.4)
EAG (OHS): 157 MG/DL (ref 68–131)
GFR SERPLBLD CREATININE-BSD FMLA CKD-EPI: 43 ML/MIN/1.73/M2
GLUCOSE SERPL-MCNC: 114 MG/DL (ref 70–110)
HBA1C MFR BLD: 7.1 % (ref 4–5.6)
HDLC SERPL-MCNC: 39 MG/DL (ref 40–75)
HDLC SERPL: 30.5 % (ref 20–50)
LDLC SERPL CALC-MCNC: 74.2 MG/DL (ref 63–159)
NONHDLC SERPL-MCNC: 89 MG/DL
POTASSIUM SERPL-SCNC: 4 MMOL/L (ref 3.5–5.1)
PROT SERPL-MCNC: 6.2 GM/DL (ref 6–8.4)
PSA SERPL-MCNC: 0.3 NG/ML
SODIUM SERPL-SCNC: 141 MMOL/L (ref 136–145)
TRIGL SERPL-MCNC: 74 MG/DL (ref 30–150)

## 2025-06-30 PROCEDURE — G2211 COMPLEX E/M VISIT ADD ON: HCPCS | Mod: ,,, | Performed by: FAMILY MEDICINE

## 2025-06-30 PROCEDURE — 99999 PR PBB SHADOW E&M-EST. PATIENT-LVL V: CPT | Mod: PBBFAC,,, | Performed by: FAMILY MEDICINE

## 2025-06-30 PROCEDURE — 84153 ASSAY OF PSA TOTAL: CPT

## 2025-06-30 PROCEDURE — 99214 OFFICE O/P EST MOD 30 MIN: CPT | Mod: S$PBB,,, | Performed by: FAMILY MEDICINE

## 2025-06-30 PROCEDURE — 80061 LIPID PANEL: CPT

## 2025-06-30 PROCEDURE — 82306 VITAMIN D 25 HYDROXY: CPT

## 2025-06-30 PROCEDURE — 99215 OFFICE O/P EST HI 40 MIN: CPT | Mod: PBBFAC,PO | Performed by: FAMILY MEDICINE

## 2025-06-30 PROCEDURE — 83036 HEMOGLOBIN GLYCOSYLATED A1C: CPT

## 2025-06-30 PROCEDURE — 36415 COLL VENOUS BLD VENIPUNCTURE: CPT | Mod: PO

## 2025-06-30 PROCEDURE — 80053 COMPREHEN METABOLIC PANEL: CPT

## 2025-06-30 RX ORDER — TRAZODONE HYDROCHLORIDE 50 MG/1
50 TABLET ORAL NIGHTLY
Qty: 30 TABLET | Refills: 11 | Status: SHIPPED | OUTPATIENT
Start: 2025-06-30 | End: 2026-06-30

## 2025-06-30 RX ORDER — SPIRONOLACTONE 25 MG/1
25 TABLET ORAL DAILY
Qty: 30 TABLET | Refills: 11 | Status: SHIPPED | OUTPATIENT
Start: 2025-06-30 | End: 2026-06-30

## 2025-06-30 RX ORDER — GABAPENTIN 100 MG/1
100 CAPSULE ORAL 2 TIMES DAILY PRN
Qty: 60 CAPSULE | Refills: 11 | Status: SHIPPED | OUTPATIENT
Start: 2025-06-30 | End: 2026-06-30

## 2025-06-30 NOTE — TELEPHONE ENCOUNTER
----- Message from Nuria Varghese PA-C sent at 6/30/2025  4:52 PM CDT -----  Please call the patient with the following:     Kidney function has improved from October.   Vitamin D is slightly low. I recommend a multivitamin once daily that contains vitamin D.  A1C is 7.1%. A1C is a 3 month average whereas Dexcom is a 2 week average. I predict that this will improve with time and match up with Dexcom.    Thank you!  ----- Message -----  From: Lab, Background User  Sent: 6/30/2025   4:18 PM CDT  To: Nuria Varghese PA-C

## 2025-06-30 NOTE — PROGRESS NOTES
Assessment & Plan:    Trigger little finger of right hand  -     X-Ray Hand Complete Right; Future; Expected date: 06/30/2025  -     Ambulatory referral/consult to Orthopedics; Future; Expected date: 07/07/2025    Likely trigger finger. X-ray to be performed. May benefit from steroid injection with Orthopedics.    Primary osteoarthritis involving multiple joints  -     gabapentin (NEURONTIN) 100 MG capsule; Take 1 capsule (100 mg total) by mouth 2 (two) times daily as needed (arthritis).  Dispense: 60 capsule; Refill: 11    Start gabapentin bid prn. May continue Tylenol.    Primary insomnia  -     traZODone (DESYREL) 50 MG tablet; Take 1 tablet (50 mg total) by mouth every evening.  Dispense: 30 tablet; Refill: 11    Start trazodone 1 hour before bedtime. Discussed most common side effects.    Skin lesion  -     E-Consult to Dermatology    E-consult to Dermatology for examination of the lesion.    Essential hypertension  -     spironolactone (ALDACTONE) 25 MG tablet; Take 1 tablet (25 mg total) by mouth once daily.  Dispense: 30 tablet; Refill: 11    Uncontrolled. Patient supposed to be on spironolactone, D/Connor for unknown reason. Restart this medication. Goal BP <130/80. Schedule nurse visit for BP check around the time of his Podiatry appointment next month.    Type 2 diabetes mellitus with stage 3b chronic kidney disease, with long-term current use of insulin  Dyslipidemia associated with type 2 diabetes mellitus  Fasting labs to be performed.    Class 3 severe obesity due to excess calories with serious comorbidity and body mass index (BMI) of 40.0 to 44.9 in adult  Lymphedema associated with obesity    Acromegaly    Screening for prostate cancer  -     PSA, Screening; Future; Expected date: 06/30/2025        Follow-up: Follow up in about 6 months (around 12/30/2025).  ______________________________________________________________________    Chief Complaint  Chief Complaint   Patient presents with    Health  Maintenance       HPI  Stepan Springer is a 70 y.o. male with medical diagnoses as listed in the medical history and problem list that presents to the office to follow up on his chronic conditions. He has the following concerns:    Unable to extend the little finger of the right hand. Denies pain. Causes difficulty with writing.    Arthritis in his left shoulder and knees. Takes up to 3 extra strength Tylenol daily. NSAIDs contraindicated due to CKD.    Sleep maintenance insomnia. Wakes up at 3-4 am for an unknown reason.     Skin lesion at the top of the head.    Health Maintenance         Date Due Completion Date    RSV Vaccine (Age 60+ and Pregnant patients) (1 - Risk 60-74 years 1-dose series) Never done ---    PROSTATE-SPECIFIC ANTIGEN 04/08/2025 4/8/2024    Diabetes Urine Screening 04/08/2025 4/8/2024    Hemoglobin A1c 04/28/2025 10/28/2024    Influenza Vaccine (Season Ended) 09/01/2025 ---    Foot Exam 10/28/2025 10/28/2024    Override on 9/11/2023: Done (Tien)    Override on 10/23/2013: Done    Override on 8/21/2013: Done    Lipid Panel 10/28/2025 10/28/2024    Diabetic Eye Exam 04/07/2026 4/7/2025    Override on 7/18/2017: Done    Override on 9/16/2016: Done    Override on 7/10/2013: Done    Aspirin/Antiplatelet Therapy 06/30/2026 6/30/2025    Colorectal Cancer Screening 05/01/2029 5/1/2019    TETANUS VACCINE 01/08/2034 1/8/2024              PAST MEDICAL HISTORY:  Past Medical History:   Diagnosis Date    Anxiety     Bell's palsy     CHF (congestive heart failure)     Chronic kidney disease, stage 3a 11/02/2021    Coronary artery disease involving native coronary artery without angina pectoris 10/18/2016    Depression     Diabetes mellitus     Glaucoma     Hypertension     Idiopathic peripheral neuropathy 12/11/2012    Lymphedema of both lower extremities     Malignant melanoma of skin of forehead 10/13/2022    Mixed hyperlipidemia 12/11/2012    Morbid obesity with BMI of 45.0-49.9, adult 02/12/2019     "Pancytopenia     Sleep apnea     "unable to use"    Stage 3b chronic kidney disease     Type 2 diabetes mellitus with diabetic polyneuropathy, with long-term current use of insulin 12/11/2012    Vitamin B 12 deficiency 08/23/2012       PAST SURGICAL HISTORY:  Past Surgical History:   Procedure Laterality Date    CARDIAC CATHETERIZATION  7/22/13    non obstructive cad    CATARACT EXTRACTION  OU    CLOSURE OF WOUND Left 10/14/2022    Procedure: CLOSURE, WOUND;  Surgeon: Jovita Ceballos MD;  Location: [facility] OR;  Service: ENT;  Laterality: Left;  closure of forehead    COLONOSCOPY N/A 5/1/2019    Procedure: COLONOSCOPY;  Surgeon: Henry Mo III, MD;  Location: [facility] ENDO;  Service: Endoscopy;  Laterality: N/A;    CORONARY ANGIOPLASTY      ESOPHAGOGASTRODUODENOSCOPY N/A 5/1/2019    Procedure: ESOPHAGOGASTRODUODENOSCOPY (EGD);  Surgeon: Henry Mo III, MD;  Location: [facility] ENDO;  Service: Endoscopy;  Laterality: N/A;    EXCISION OF MELANOMA Left 10/7/2022    Procedure: EXCISION, MELANOMA;  Surgeon: Jovita Ceballos MD;  Location: [facility] OR;  Service: ENT;  Laterality: Left;    EYE SURGERY      FRACTURE SURGERY      kidney stone       August 2016    PC IOL OU      RETINAL DETACHMENT REPAIR W/ SCLERAL BUCKLE LE      WRIST SURGERY         SOCIAL HISTORY:  Social History[1]    FAMILY HISTORY:  Family History   Problem Relation Name Age of Onset    Macular degeneration Father      Heart disease Father          CHF     Heart attack Father      Hypertension Mother      Macular degeneration Paternal Uncle      Hypertension Maternal Grandmother      Hypertension Maternal Grandfather      Strabismus Neg Hx      Retinal detachment Neg Hx      Glaucoma Neg Hx      Blindness Neg Hx      Amblyopia Neg Hx         ALLERGIES AND MEDICATIONS: updated and reviewed.  Review of patient's allergies indicates:   Allergen Reactions    Cefazolin Other (See Comments)     Shakes, chills, dizziness     Current Medications[2]      ROS  Review of " "Systems   Musculoskeletal:  Positive for arthralgias.   Psychiatric/Behavioral:  Positive for sleep disturbance.            Physical Exam  Vitals:    06/30/25 1054   BP: (!) 162/80   Patient Position: Sitting   Pulse: 69   Resp: 18   Temp: 97.3 °F (36.3 °C)   TempSrc: Oral   SpO2: 97%   Weight: (!) 180.1 kg (397 lb 0.8 oz)    Body mass index is 44.73 kg/m².  Weight: (!) 180.1 kg (397 lb 0.8 oz)       Physical Exam  Constitutional:       General: He is not in acute distress.  Neck:      Thyroid: No thyromegaly.      Vascular: No carotid bruit.   Cardiovascular:      Rate and Rhythm: Normal rate and regular rhythm.      Pulses: Normal pulses.      Heart sounds: Normal heart sounds.   Pulmonary:      Effort: Pulmonary effort is normal. No respiratory distress.      Breath sounds: Normal breath sounds.   Musculoskeletal:      Cervical back: Neck supple.      Right lower leg: Edema present.      Left lower leg: Edema present.      Comments: PIP joint of the right 5th digit in dorsiflexion with "catching" with passive ROM of the digit   Skin:     General: Skin is warm and dry.   Neurological:      Mental Status: He is alert and oriented to person, place, and time. Mental status is at baseline.   Psychiatric:         Mood and Affect: Mood normal.         Behavior: Behavior normal.         Thought Content: Thought content normal.                  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     Social Drivers of Health     Financial Resource Strain: Low Risk  (4/7/2024)    Overall Financial Resource " Strain (CARDIA)     Difficulty of Paying Living Expenses: Not hard at all   Food Insecurity: No Food Insecurity (4/7/2024)    Hunger Vital Sign     Worried About Running Out of Food in the Last Year: Never true     Ran Out of Food in the Last Year: Never true   Transportation Needs: Unmet Transportation Needs (4/7/2024)    PRAPARE - Transportation     Lack of Transportation (Medical): Yes     Lack of Transportation (Non-Medical): Yes   Physical Activity: Insufficiently Active (4/7/2024)    Exercise Vital Sign     Days of Exercise per Week: 1 day     Minutes of Exercise per Session: 20 min   Stress: No Stress Concern Present (4/7/2024)    [employer] [place] [employer] - Occupational Stress Questionnaire     Feeling of Stress : Not at all   Housing Stability: Low Risk  (4/7/2024)    Housing Stability Vital Sign     Unable to Pay for Housing in the Last Year: No     Number of Places Lived in the Last Year: 1     Unstable Housing in the Last Year: No   [2]   Current Outpatient Medications   Medication Sig Dispense Refill    aspirin 325 MG tablet Take 325 mg by mouth once daily.      atorvastatin (LIPITOR) 20 MG tablet Take 1 tablet (20 mg total) by mouth every evening. 90 tablet 1    AZOPT 1 % ophthalmic suspension Place 1 drop into both eyes 3 (three) times daily. Brand name only 10 mL 11    blood sugar diagnostic Strp To check BG 4 times daily, to use with insurance preferred meter 300 each 3    blood-glucose meter kit To check BG 4 times daily, to use with insurance preferred meter 1 each 0    brimonidine 0.1% (ALPHAGAN P) 0.1 % Drop Place 1 drop into both eyes 3 (three) times daily. 10 mL 11    carvediloL (COREG) 25 MG tablet Take 1 tablet (25 mg total) by mouth 2 (two) times daily. 180 tablet 3    ciclopirox (PENLAC) 8 % Soln Apply topically nightly. 6.6 mL 11    cloNIDine (CATAPRES) 0.1 MG tablet Take 1 tablet (0.1 mg total) by mouth 3 (three) times daily. 270 tablet 0    flash glucose sensor (FREESTYLE  CLEMENCIA 14 DAY SENSOR) Kit 1 each by Misc.(Non-Drug; Combo Route) route every 14 (fourteen) days. 6 kit 3    furosemide (LASIX) 40 MG tablet Take 1 tablet (40 mg total) by mouth once daily. 90 tablet 0    insulin aspart U-100 (NOVOLOG) 100 unit/mL (3 mL) InPn pen Inject 5 Units into the skin 3 (three) times daily with meals. 13.5 mL 3    insulin glargine U-100, Lantus, (BASAGLAR KWIKPEN U-100 INSULIN) 100 unit/mL (3 mL) InPn pen Inject 9 Units into the skin every evening. 8.1 mL 3    lancets Misc To check BG 4 times daily, to use with insurance preferred meter 300 each 3    losartan (COZAAR) 100 MG tablet TAKE 1 TABLET ONCE DAILY 90 tablet 2    netarsudiL (RHOPRESSA) 0.02 % ophthalmic solution Place 1 drop into both eyes once daily. 2.5 mL 11    NIFEdipine (PROCARDIA-XL) 60 MG (OSM) 24 hr tablet Take 1 tablet (60 mg total) by mouth once daily. 90 tablet 0    semaglutide (OZEMPIC) 0.25 mg or 0.5 mg (2 mg/3 mL) pen injector Inject 0.5 mg into the skin every 7 days. 9 mL 3    gabapentin (NEURONTIN) 100 MG capsule Take 1 capsule (100 mg total) by mouth 2 (two) times daily as needed (arthritis). 60 capsule 11    nitroGLYCERIN (NITROSTAT) 0.4 MG SL tablet Place 1 tablet (0.4 mg total) under the tongue every 5 (five) minutes as needed for Chest pain. (Patient not taking: Reported on 6/30/2025) 100 tablet 2    spironolactone (ALDACTONE) 25 MG tablet Take 1 tablet (25 mg total) by mouth once daily. 30 tablet 11    traZODone (DESYREL) 50 MG tablet Take 1 tablet (50 mg total) by mouth every evening. 30 tablet 11     No current facility-administered medications for this visit.

## 2025-07-01 ENCOUNTER — PATIENT MESSAGE (OUTPATIENT)
Dept: FAMILY MEDICINE | Facility: CLINIC | Age: 70
End: 2025-07-01
Payer: MEDICARE

## 2025-07-01 NOTE — TELEPHONE ENCOUNTER
Uncertain if patient uses [facility] consistently. Please let him know his prostate cancer test and cholesterol panel were normal.

## 2025-07-01 NOTE — TELEPHONE ENCOUNTER
Copied from CRM #[id number]. Topic: General Inquiry - Return Call  >> Jul 1, 2025  1:52 PM Divya Saravia wrote:  PATIENT RETURNING CALL    Pt's daughter Paloma returned Ramya's call regarding test results. Please call her at [phone].     NOTE: they prefer calls over portal msgs, they haven't logged in since last year. Thanks

## 2025-07-02 ENCOUNTER — TELEPHONE (OUTPATIENT)
Dept: ENDOCRINOLOGY | Facility: CLINIC | Age: 70
End: 2025-07-02
Payer: MEDICARE

## 2025-07-02 NOTE — TELEPHONE ENCOUNTER
Please call the patient with the following:      Kidney function has improved from October.   Vitamin D is slightly low. I recommend a multivitamin once daily that contains vitamin D.  A1C is 7.1%. A1C is a 3 month average whereas Dexcom is a 2 week average. I predict that this will improve with time and match up with Dexcom.     Thank you!

## 2025-07-03 ENCOUNTER — HOSPITAL ENCOUNTER (OUTPATIENT)
Dept: RADIOLOGY | Facility: HOSPITAL | Age: 70
Discharge: HOME | End: 2025-07-03
Attending: FAMILY MEDICINE
Payer: MEDICARE

## 2025-07-03 ENCOUNTER — RESULTS FOLLOW-UP (OUTPATIENT)
Dept: FAMILY MEDICINE | Facility: CLINIC | Age: 70
End: 2025-07-03

## 2025-07-03 ENCOUNTER — TELEPHONE (OUTPATIENT)
Dept: FAMILY MEDICINE | Facility: CLINIC | Age: 70
End: 2025-07-03
Payer: MEDICARE

## 2025-07-03 DIAGNOSIS — M65.351 TRIGGER LITTLE FINGER OF RIGHT HAND: ICD-10-CM

## 2025-07-03 PROCEDURE — 73130 X-RAY EXAM OF HAND: CPT | Mod: 26,RT,, | Performed by: RADIOLOGY

## 2025-07-03 PROCEDURE — 73130 X-RAY EXAM OF HAND: CPT | Mod: TC,FY,PO,RT

## 2025-07-03 NOTE — TELEPHONE ENCOUNTER
Copied from CRM #[id number]. Topic: General Inquiry - Return Call  >> Jul 1, 2025  3:04 PM Jose Antonio wrote:  Type:  Patient Returning Call    Who Called: self    Who Left Message for Patient: travis    Does the patient know what this is regarding?:no    Would the patient rather a call back or a response via [facility]? call    Best Call Back Number:[phone]    Additional Information:

## 2025-07-03 NOTE — TELEPHONE ENCOUNTER
Left message on answering machine to return call to clinic. Let patient no that his test result for PSA and cholesterol level are normal.

## 2025-07-08 ENCOUNTER — PATIENT MESSAGE (OUTPATIENT)
Dept: FAMILY MEDICINE | Facility: CLINIC | Age: 70
End: 2025-07-08
Payer: MEDICARE

## 2025-07-08 ENCOUNTER — E-CONSULT (OUTPATIENT)
Dept: DERMATOLOGY | Facility: CLINIC | Age: 70
End: 2025-07-08

## 2025-07-08 DIAGNOSIS — D48.5 NEOPLASM OF UNCERTAIN BEHAVIOR OF SKIN: Primary | ICD-10-CM

## 2025-07-08 DIAGNOSIS — L98.9 SKIN LESION: Primary | ICD-10-CM

## 2025-07-08 NOTE — TELEPHONE ENCOUNTER
Please inform patient that I spoke with Dermatology about the lesion on his head. It is most likely benign but a skin cancer cannot be definitely ruled out without a biopsy. I put in an in-person referral to Dermatology.      Message was also sent to patient about his x-rays on 7/3. Can you review my message sent to him please?

## 2025-07-08 NOTE — TELEPHONE ENCOUNTER
Left message on patient's voicemail to return call to clinic regarding referral to dermatology and x ray results.

## 2025-07-08 NOTE — CONSULTS
[facility]  Response for E-Consult     Patient Name: Stepan Springer  MRN: [id number]  Primary Care Provider: Tania Corcoran DO   Requesting Provider: Tania Corcoran DO  E-Consult to Dermatology  Consult performed by: Radha Higgins MD  Consult ordered by: Tania Corcoran DO  Reason for consult: Skin lesion  Assessment/Recommendations: Thank you for this consult. I favor this is either an actinic keratosis, seborrheic keratosis, or even a squamous cell carcinoma in situ or SCC. As I cannot definitively rule out malignancy from the photo, I recommend in person dermatology evaluation.           Recommendation: as above     Additional future steps to consider: Reconsult if the condition worsens or fails to improve       Total time of Consultation: 5 minute    I did not speak to the requesting provider verbally about this.     *This eConsult is based on the clinical data available to me and is furnished without benefit of a physical examination. The eConsult will need to be interpreted in light of any clinical issues or changes in patient status not available to me at the time of filing this eConsults. Significant changes in patient condition or level of acuity should result in immediate formal consultation and reevaluation. Please alert me if you have further questions.    Thank you for this eConsult referral.     Radha Higgins MD  [facility]

## 2025-07-09 ENCOUNTER — TELEPHONE (OUTPATIENT)
Dept: FAMILY MEDICINE | Facility: CLINIC | Age: 70
End: 2025-07-09
Payer: MEDICARE

## 2025-07-09 NOTE — TELEPHONE ENCOUNTER
Called patient to give x ray results of wrist and informed patient of dermatology referral . Patient given telephone to schedule appointments.

## 2025-07-09 NOTE — TELEPHONE ENCOUNTER
Copied from CRM #[id number]. Topic: General Inquiry - Patient Advice  >> Jul 8, 2025  3:51 PM Suze wrote:  Type:  Patient Returning Call    Who Called: pt   Who Left Message for Patient: Jen   Does the patient know what this is regarding?: returning missed call   Would the patient rather a call back or a response via [facility]? Call   Best Call Back Number:[phone]   Additional Information:

## 2025-07-17 ENCOUNTER — OFFICE VISIT (OUTPATIENT)
Dept: ORTHOPEDICS | Facility: CLINIC | Age: 70
End: 2025-07-17
Payer: MEDICARE

## 2025-07-17 VITALS — WEIGHT: 315 LBS | HEIGHT: 78 IN | BODY MASS INDEX: 36.45 KG/M2

## 2025-07-17 DIAGNOSIS — M24.549 FLEXION CONTRACTURE OF PROXIMAL INTERPHALANGEAL (PIP) JOINT OF FINGER: Primary | ICD-10-CM

## 2025-07-17 DIAGNOSIS — M25.541 JOINT PAIN IN FINGERS OF RIGHT HAND: ICD-10-CM

## 2025-07-17 PROCEDURE — 99215 OFFICE O/P EST HI 40 MIN: CPT | Mod: PBBFAC,PN

## 2025-07-17 PROCEDURE — 99999 PR PBB SHADOW E&M-EST. PATIENT-LVL V: CPT | Mod: PBBFAC,,,

## 2025-07-17 NOTE — PROGRESS NOTES
"New Orthopedic Patient: Upper Extremity    SUBJECTIVE:      Chief Complaint:   Chief Complaint   Patient presents with    Hand Pain     Right hand pain        Referring Provider: Tania Corcoran DO     Initial visit 7/17/25 History of Present Illness:  Stepan Springer is a 70 y.o. right hand dominant male who presents to clinic today with right small finger limited range of motion. Onset of symptoms was  1 years ago. Patient denies known HAIM or trauma to the area.  Denies any pain related to this.  Unable to extend his right 5th digit at the PIP joint.  Symptoms consist of constant decreased ROM.  He has difficulty with writing letters and checks.  He has had to resort to mostly typing secondary to difficulty gripping a pen or pencil.  He additionally endorses decreased sensation diffusely to his 5th digit.  Denies any decreased sensation or tingling in any of his other digits.  He has not been taking anything for this or utilizing any bracing.    Patient denies any prior history of OT, corticosteroid injections, or surgeries to the right wrist or hand.    The patient denies any fevers, chills, N/V, D/C and presents for evaluation.    Vitals:    07/17/25 1338   PainSc: 0-No pain       Past Medical History:   Diagnosis Date    Anxiety     Bell's palsy     CHF (congestive heart failure)     Chronic kidney disease, stage 3a 11/02/2021    Coronary artery disease involving native coronary artery without angina pectoris 10/18/2016    Depression     Diabetes mellitus     Glaucoma     Hypertension     Idiopathic peripheral neuropathy 12/11/2012    Lymphedema of both lower extremities     Malignant melanoma of skin of forehead 10/13/2022    Mixed hyperlipidemia 12/11/2012    Morbid obesity with BMI of 45.0-49.9, adult 02/12/2019    Pancytopenia     Sleep apnea     "unable to use"    Stage 3b chronic kidney disease     Type 2 diabetes mellitus with diabetic polyneuropathy, with long-term current use of insulin " 12/11/2012    Vitamin B 12 deficiency 08/23/2012     Past Surgical History:   Procedure Laterality Date    CARDIAC CATHETERIZATION  7/22/13    non obstructive cad    CATARACT EXTRACTION  OU    CLOSURE OF WOUND Left 10/14/2022    Procedure: CLOSURE, WOUND;  Surgeon: Jovita Ceballos MD;  Location: [facility] OR;  Service: ENT;  Laterality: Left;  closure of forehead    COLONOSCOPY N/A 5/1/2019    Procedure: COLONOSCOPY;  Surgeon: Henry Mo III, MD;  Location: [facility] ENDO;  Service: Endoscopy;  Laterality: N/A;    CORONARY ANGIOPLASTY      ESOPHAGOGASTRODUODENOSCOPY N/A 5/1/2019    Procedure: ESOPHAGOGASTRODUODENOSCOPY (EGD);  Surgeon: Henry Mo III, MD;  Location: [facility] ENDO;  Service: Endoscopy;  Laterality: N/A;    EXCISION OF MELANOMA Left 10/7/2022    Procedure: EXCISION, MELANOMA;  Surgeon: Jovita Ceballos MD;  Location: [facility] OR;  Service: ENT;  Laterality: Left;    EYE SURGERY      FRACTURE SURGERY      kidney stone       August 2016    PC IOL OU      RETINAL DETACHMENT REPAIR W/ SCLERAL BUCKLE LE      WRIST SURGERY       Review of patient's allergies indicates:   Allergen Reactions    Cefazolin Other (See Comments)     Shakes, chills, dizziness     Social History     Social History Narrative    , 1 son, [employer].     Family History   Problem Relation Name Age of Onset    Macular degeneration Father      Heart disease Father          CHF     Heart attack Father      Hypertension Mother      Macular degeneration Paternal Uncle      Hypertension Maternal Grandmother      Hypertension Maternal Grandfather      Strabismus Neg Hx      Retinal detachment Neg Hx      Glaucoma Neg Hx      Blindness Neg Hx      Amblyopia Neg Hx         Current Medications[1]    ROS  Review of Systems:  Constitutional: no fever or chills  Eyes: no visual changes  ENT: no nasal congestion or sore throat  Respiratory: no cough or shortness of breath  Cardiovascular: no chest pain  Gastrointestinal: no nausea or vomiting, tolerating  "diet  Musculoskeletal: decreased ROM    OBJECTIVE:      Vital Signs (Most Recent):  Vitals:    07/17/25 1338   Weight: (!) 180.1 kg (397 lb 0.8 oz)   Height: 6' 7" (2.007 m)     Body mass index is 44.73 kg/m².    Physical Exam:  Constitutional: The patient appears well-developed and well-nourished. No distress.   Skin: No lesions appreciated  Head: Normocephalic and atraumatic.   Nose: Nose normal.   Ears: No deformities seen  Eyes: Conjunctivae and EOM are normal.   Neck: No tracheal deviation present.   Cardiovascular: Normal rate and intact distal pulses.    Pulmonary/Chest: Effort normal. No respiratory distress.   Abdominal: There is no guarding.   Neurological: The patient is alert.   Psychiatric: The patient has a normal mood and affect.       Right HAND/WRIST EXAMINATION:    Observation/Inspection:  No significant swelling.  There is a flexion contracture present of the PIP joint of the right 5th digit.    Tenderness:  No tenderness to palpation along dorsal or volar aspect of the 5th digit.  No pain at A1 pulley.  No palpable nodule present.  No cord present.    Range of Motion:  ROM hand full, with the exception that he is unable to actively extend the 5th digit at the PIP joint.  No active triggering on exam.  ROM wrist full, painless  ROM elbow full, painless    Special Tests and Maneuvers:  Finkelstein's Test   Neg  WHAT Test    Neg  Snuff box tenderness   Neg  Vaca's Test    Neg  Hook of Hamate Tenderness  Neg  CMC grind    Neg  Circumduction test   Neg  Tinel's Test - Carpal Tunnel  Neg  Tinel's Test - Cubital Tunnel  Neg  Phalen's Test    Neg  Median Nerve Compression Test Neg  Elbow Flexion Test    Neg    Neurovascular Exam:  Digits WWP, brisk CR < 3s throughout  NVI motor/LTS to M/R/U nerves, radial pulse 2+  2+ biceps and brachioradialis reflexes    Diagnostic Studies and other clinical records Review:      Imaging:   I independently viewed the patient's imaging as well as the radiology report.  "    X-rays AP, lateral, and oblique of right hand  demonstrate a remote ulnar styloid ossicle. No radiopaque foreign body seen. No erosions are seen. There is mild DJD. There is a flexion deformity of the PIP joint of the 5th digit. No fracture dislocation bone destruction seen.     ASSESSMENT/PLAN:    70 y.o. yo male with decreased sensation of the 5th digit and flexion contracture of the 5th PIP joint     The patient and I had a thorough discussion today. We discussed the working diagnosis as well as several other potential alternative diagnoses. Treatment options were discussed, both conservative and surgical. Conservative treatment options would include things such as activity modifications,  a period of rest, oral vs topical OTC and prescription anti-inflammatory medications, occupational therapy, splinting/bracing, immobilization, corticosteroid injections, and others. Surgical options were discussed as well.     Referral to occupational therapy - patient would prefer to go to [facility].  Heat for stiffness/arthritic symptoms  Discussed OTC oral and topical medications, patient defers as this condition is not painful to him.  Follow up in 8 weeks or sooner if needed.  Call with any questions/concerns in the interim    The patient's pathophysiology was explained in detail with reference to x-rays, models, other visual aids as appropriate. Treatment options were discussed in detail.  All questions addressed to the patient's satisfaction.     Thalia Buckner PA-C  [facility]           [1]   Current Outpatient Medications:     aspirin 325 MG tablet, Take 325 mg by mouth once daily., Disp: , Rfl:     atorvastatin (LIPITOR) 20 MG tablet, Take 1 tablet (20 mg total) by mouth every evening., Disp: 90 tablet, Rfl: 1    AZOPT 1 % ophthalmic suspension, Place 1 drop into both eyes 3 (three) times daily. Brand name only, Disp: 10 mL, Rfl: 11    blood sugar diagnostic Strp, To check BG 4 times  daily, to use with insurance preferred meter, Disp: 300 each, Rfl: 3    blood-glucose meter kit, To check BG 4 times daily, to use with insurance preferred meter, Disp: 1 each, Rfl: 0    brimonidine 0.1% (ALPHAGAN P) 0.1 % Drop, Place 1 drop into both eyes 3 (three) times daily., Disp: 10 mL, Rfl: 11    carvediloL (COREG) 25 MG tablet, Take 1 tablet (25 mg total) by mouth 2 (two) times daily., Disp: 180 tablet, Rfl: 3    ciclopirox (PENLAC) 8 % Soln, Apply topically nightly., Disp: 6.6 mL, Rfl: 11    cloNIDine (CATAPRES) 0.1 MG tablet, Take 1 tablet (0.1 mg total) by mouth 3 (three) times daily., Disp: 270 tablet, Rfl: 0    flash glucose sensor (FREESTYLE CLEMENCIA 14 DAY SENSOR) Kit, 1 each by Misc.(Non-Drug; Combo Route) route every 14 (fourteen) days., Disp: 6 kit, Rfl: 3    furosemide (LASIX) 40 MG tablet, Take 1 tablet (40 mg total) by mouth once daily., Disp: 90 tablet, Rfl: 0    gabapentin (NEURONTIN) 100 MG capsule, Take 1 capsule (100 mg total) by mouth 2 (two) times daily as needed (arthritis)., Disp: 60 capsule, Rfl: 11    insulin aspart U-100 (NOVOLOG) 100 unit/mL (3 mL) InPn pen, Inject 5 Units into the skin 3 (three) times daily with meals., Disp: 13.5 mL, Rfl: 3    insulin glargine U-100, Lantus, (BASAGLAR KWIKPEN U-100 INSULIN) 100 unit/mL (3 mL) InPn pen, Inject 9 Units into the skin every evening., Disp: 8.1 mL, Rfl: 3    lancets Misc, To check BG 4 times daily, to use with insurance preferred meter, Disp: 300 each, Rfl: 3    losartan (COZAAR) 100 MG tablet, TAKE 1 TABLET ONCE DAILY, Disp: 90 tablet, Rfl: 2    netarsudiL (RHOPRESSA) 0.02 % ophthalmic solution, Place 1 drop into both eyes once daily., Disp: 2.5 mL, Rfl: 11    NIFEdipine (PROCARDIA-XL) 60 MG (OSM) 24 hr tablet, Take 1 tablet (60 mg total) by mouth once daily., Disp: 90 tablet, Rfl: 0    nitroGLYCERIN (NITROSTAT) 0.4 MG SL tablet, Place 1 tablet (0.4 mg total) under the tongue every 5 (five) minutes as needed for Chest pain. (Patient not  taking: Reported on 6/30/2025), Disp: 100 tablet, Rfl: 2    semaglutide (OZEMPIC) 0.25 mg or 0.5 mg (2 mg/3 mL) pen injector, Inject 0.5 mg into the skin every 7 days., Disp: 9 mL, Rfl: 3    spironolactone (ALDACTONE) 25 MG tablet, Take 1 tablet (25 mg total) by mouth once daily., Disp: 30 tablet, Rfl: 11    traZODone (DESYREL) 50 MG tablet, Take 1 tablet (50 mg total) by mouth every evening., Disp: 30 tablet, Rfl: 11

## 2025-07-25 ENCOUNTER — TELEPHONE (OUTPATIENT)
Dept: PODIATRY | Facility: CLINIC | Age: 70
End: 2025-07-25
Payer: MEDICARE

## 2025-07-28 ENCOUNTER — OFFICE VISIT (OUTPATIENT)
Dept: PODIATRY | Facility: CLINIC | Age: 70
End: 2025-07-28
Payer: MEDICARE

## 2025-07-28 ENCOUNTER — CLINICAL SUPPORT (OUTPATIENT)
Dept: FAMILY MEDICINE | Facility: CLINIC | Age: 70
End: 2025-07-28
Payer: MEDICARE

## 2025-07-28 VITALS
BODY MASS INDEX: 36.45 KG/M2 | SYSTOLIC BLOOD PRESSURE: 138 MMHG | DIASTOLIC BLOOD PRESSURE: 84 MMHG | WEIGHT: 315 LBS | HEIGHT: 78 IN

## 2025-07-28 DIAGNOSIS — E11.42 TYPE 2 DIABETES MELLITUS WITH DIABETIC POLYNEUROPATHY, WITH LONG-TERM CURRENT USE OF INSULIN: Primary | ICD-10-CM

## 2025-07-28 DIAGNOSIS — Z79.4 TYPE 2 DIABETES MELLITUS WITH DIABETIC POLYNEUROPATHY, WITH LONG-TERM CURRENT USE OF INSULIN: Primary | ICD-10-CM

## 2025-07-28 DIAGNOSIS — I10 ESSENTIAL HYPERTENSION: Primary | ICD-10-CM

## 2025-07-28 DIAGNOSIS — B35.1 ONYCHOMYCOSIS DUE TO DERMATOPHYTE: ICD-10-CM

## 2025-07-28 DIAGNOSIS — I89.0 LYMPHEDEMA: ICD-10-CM

## 2025-07-28 PROCEDURE — 99213 OFFICE O/P EST LOW 20 MIN: CPT | Mod: PBBFAC,PO | Performed by: PODIATRIST

## 2025-07-28 PROCEDURE — 11721 DEBRIDE NAIL 6 OR MORE: CPT | Mod: PBBFAC,PO | Performed by: PODIATRIST

## 2025-07-28 PROCEDURE — 99999 PR PBB SHADOW E&M-EST. PATIENT-LVL III: CPT | Mod: PBBFAC,,, | Performed by: PODIATRIST

## 2025-07-28 NOTE — PROGRESS NOTES
Subjective:      Patient ID: Stepan Springer is a 70 y.o. male.    Chief Complaint: Nail Care (06/30/25 Dr Corcoran) and Diabetes Mellitus    Diabetes, increased risk amputation needing evaluation/management/optomization of foot care.    Cc2 thick long discolored misshapen toenails all toes.  Gradual onset, worsening over past several weeks, aggravated by increased weight bearing, shoe gear, pressure.  Periodic debridement helps symptoms. Denies trauma, surgery. Patient reports he is moving across town and would like to follow up with podiatry at [facility].   Chief Complaint   Patient presents with    Nail Care     06/30/25 Dr Corcoran    Diabetes Mellitus       Casual shoes both feet - athletic.    Review of Systems   Constitutional: Negative for chills, diaphoresis, fever, malaise/fatigue and night sweats.   Cardiovascular:  Positive for leg swelling. Negative for claudication, cyanosis and syncope.   Skin:  Positive for nail changes. Negative for color change, dry skin, rash, suspicious lesions and unusual hair distribution.   Musculoskeletal:  Negative for falls, joint pain, joint swelling, muscle cramps, muscle weakness and stiffness.   Gastrointestinal:  Negative for constipation, diarrhea, nausea and vomiting.   Neurological:  Positive for numbness, paresthesias and sensory change. Negative for brief paralysis, disturbances in coordination, focal weakness and tremors.           Objective:      Physical Exam  Constitutional:       General: He is not in acute distress.     Appearance: He is well-developed. He is not diaphoretic.   Cardiovascular:      Pulses:           Popliteal pulses are 2+ on the right side and 2+ on the left side.        Dorsalis pedis pulses are 1+ on the right side and 1+ on the left side.        Posterior tibial pulses are 1+ on the right side and 1+ on the left side.      Comments: Capillary refill 3 seconds all toes/distal feet, all toes/both feet warm to touch.      Negative  lymphadenopathy bilateral popliteal fossa and tarsal tunnel.      2+ pitting lower extremity edema bilateral.    Musculoskeletal:      Right ankle: No swelling, deformity, ecchymosis or lacerations. Normal range of motion. Normal pulse.      Right Achilles Tendon: Normal. No defects. Bland's test negative.      Comments: Normal angle, base, station of gait. Decreased stride and propulsion bilateral.    All ten toes without clubbing, cyanosis, or signs of ischemia.  No pain to palpation bilateral lower extremities.  Range of motion, stability, muscle strength, and muscle tone normal bilateral feet and legs.    Lymphadenopathy:      Lower Body: No right inguinal adenopathy. No left inguinal adenopathy.      Comments: Negative lymphadenopathy bilateral popliteal fossa and tarsal tunnel.    Negative lymphangitic streaking bilateral feet/ankles/legs.   Skin:     General: Skin is warm and dry.      Capillary Refill: Capillary refill takes 2 to 3 seconds.      Coloration: Skin is not pale.      Findings: No abrasion, bruising, burn, ecchymosis, erythema, laceration, lesion or rash.      Nails: There is no clubbing.      Comments: Skin thin, shiny, atrophic, with decreased density and distribution of pedal hair bilateral, but without hyperpigmentation, carola discoloration,  ulcers, masses, nodules or cords palpated bilateral feet and legs.    Toenails 1st, 2nd, 3rd, 4th, 5th  bilateral are hypertrophic thickened 2-3 mm, dystrophic, discolored tanish brown with tan, gray crumbly subungual debris.  Tender to distal nail plate pressure, without periungual skin abnormality of each.       Neurological:      Mental Status: He is alert and oriented to person, place, and time.      Sensory: Sensory deficit present.      Motor: No tremor, atrophy or abnormal muscle tone.      Gait: Gait normal.      Deep Tendon Reflexes:      Reflex Scores:       Patellar reflexes are 2+ on the right side and 2+ on the left side.       Achilles  reflexes are 2+ on the right side and 2+ on the left side.     Comments: Paresthesias, intermittently bilateral feet with no clearly identified trigger or source.    Negative tinel sign to percussion sural, superficial peroneal, deep peroneal, saphenous, and posterior tibial nerves right and left ankles and feet.    Decreased/absent vibratory sensation bilateral feet to 128Hz tuning fork.     Psychiatric:         Behavior: Behavior is cooperative.             Assessment:       No diagnosis found.          Plan:       There are no diagnoses linked to this encounter.      I counseled the patient on his conditions, their implications and medical management.        - Shoe inspection. Diabetic Foot Education. Patient reminded of the importance of good nutrition and blood sugar control to help prevent podiatric complications of diabetes. Patient instructed on proper foot hygeine. We discussed wearing proper shoe gear, daily foot inspections, never walking without protective shoe gear, never putting sharp instruments to feet, routine podiatric nail visits every 2-3 months.   - With patient's permission, nails were aggressively reduced and debrided x 10 to their soft tissue attachment mechanically and with electric , removing all offending nail and debris. Patient relates relief following the procedure. He will continue to monitor the areas daily, inspect his feet, wear protective shoe gear when ambulatory, moisturizer to maintain skin integrity and follow in this office in approximately 2-3 months, sooner p.r.n.     Patient requesting follow up - [facility]. Appt scheduled.

## 2025-07-28 NOTE — PROGRESS NOTES
Stepan Srpinger 70 y.o. male is here today for Blood Pressure check.   History of HTN yes.    Review of patient's allergies indicates:   Allergen Reactions    Cefazolin Other (See Comments)     Shakes, chills, dizziness     Creatinine   Date Value Ref Range Status   06/30/2025 1.7 (H) 0.5 - 1.4 mg/dL Final   10/28/2013 1.7 mg/dL Final     Sodium   Date Value Ref Range Status   06/30/2025 141 136 - 145 mmol/L Final   10/28/2024 142 136 - 145 mmol/L Final     Potassium   Date Value Ref Range Status   06/30/2025 4.0 3.5 - 5.1 mmol/L Final   10/28/2024 4.1 3.5 - 5.1 mmol/L Final   ]  Patient verifies taking blood pressure medications on a regular basis at the same time of the day.   Current Medications[1]  Does patient have record of home blood pressure readings no.   Last dose of blood pressure medication was taken at 7am.  Patient is asymptomatic.   No complaints       Dr. Corcoran notified.           [1]   Current Outpatient Medications:     aspirin 325 MG tablet, Take 325 mg by mouth once daily., Disp: , Rfl:     atorvastatin (LIPITOR) 20 MG tablet, Take 1 tablet (20 mg total) by mouth every evening., Disp: 90 tablet, Rfl: 1    AZOPT 1 % ophthalmic suspension, Place 1 drop into both eyes 3 (three) times daily. Brand name only, Disp: 10 mL, Rfl: 11    blood sugar diagnostic Strp, To check BG 4 times daily, to use with insurance preferred meter, Disp: 300 each, Rfl: 3    blood-glucose meter kit, To check BG 4 times daily, to use with insurance preferred meter, Disp: 1 each, Rfl: 0    brimonidine 0.1% (ALPHAGAN P) 0.1 % Drop, Place 1 drop into both eyes 3 (three) times daily., Disp: 10 mL, Rfl: 11    carvediloL (COREG) 25 MG tablet, Take 1 tablet (25 mg total) by mouth 2 (two) times daily., Disp: 180 tablet, Rfl: 3    ciclopirox (PENLAC) 8 % Soln, Apply topically nightly., Disp: 6.6 mL, Rfl: 11    cloNIDine (CATAPRES) 0.1 MG tablet, Take 1 tablet (0.1 mg total) by mouth 3 (three) times daily., Disp: 270 tablet, Rfl:  0    flash glucose sensor (FREESTYLE CLEMENCIA 14 DAY SENSOR) Kit, 1 each by Misc.(Non-Drug; Combo Route) route every 14 (fourteen) days., Disp: 6 kit, Rfl: 3    furosemide (LASIX) 40 MG tablet, Take 1 tablet (40 mg total) by mouth once daily., Disp: 90 tablet, Rfl: 0    gabapentin (NEURONTIN) 100 MG capsule, Take 1 capsule (100 mg total) by mouth 2 (two) times daily as needed (arthritis)., Disp: 60 capsule, Rfl: 11    insulin aspart U-100 (NOVOLOG) 100 unit/mL (3 mL) InPn pen, Inject 5 Units into the skin 3 (three) times daily with meals., Disp: 13.5 mL, Rfl: 3    insulin glargine U-100, Lantus, (BASAGLAR KWIKPEN U-100 INSULIN) 100 unit/mL (3 mL) InPn pen, Inject 9 Units into the skin every evening., Disp: 8.1 mL, Rfl: 3    lancets Misc, To check BG 4 times daily, to use with insurance preferred meter, Disp: 300 each, Rfl: 3    losartan (COZAAR) 100 MG tablet, TAKE 1 TABLET ONCE DAILY, Disp: 90 tablet, Rfl: 2    netarsudiL (RHOPRESSA) 0.02 % ophthalmic solution, Place 1 drop into both eyes once daily., Disp: 2.5 mL, Rfl: 11    NIFEdipine (PROCARDIA-XL) 60 MG (OSM) 24 hr tablet, Take 1 tablet (60 mg total) by mouth once daily., Disp: 90 tablet, Rfl: 0    nitroGLYCERIN (NITROSTAT) 0.4 MG SL tablet, Place 1 tablet (0.4 mg total) under the tongue every 5 (five) minutes as needed for Chest pain. (Patient not taking: Reported on 6/30/2025), Disp: 100 tablet, Rfl: 2    semaglutide (OZEMPIC) 0.25 mg or 0.5 mg (2 mg/3 mL) pen injector, Inject 0.5 mg into the skin every 7 days., Disp: 9 mL, Rfl: 3    spironolactone (ALDACTONE) 25 MG tablet, Take 1 tablet (25 mg total) by mouth once daily., Disp: 30 tablet, Rfl: 11    traZODone (DESYREL) 50 MG tablet, Take 1 tablet (50 mg total) by mouth every evening., Disp: 30 tablet, Rfl: 11

## 2025-07-30 ENCOUNTER — TELEPHONE (OUTPATIENT)
Dept: FAMILY MEDICINE | Facility: CLINIC | Age: 70
End: 2025-07-30
Payer: MEDICARE

## 2025-07-30 NOTE — TELEPHONE ENCOUNTER
Call placed to patient to notify of improved BP result and no changes required per MD at this time. Voicemail was left for patient.

## 2025-07-30 NOTE — TELEPHONE ENCOUNTER
----- Message from Tania Corcoran DO sent at 7/28/2025  6:19 PM CDT -----  BP is much improved. No further management needed.  ----- Message -----  From: Berenice Tristan LPN  Sent: 7/28/2025   2:00 PM CDT  To: Tania Corcoran DO

## 2025-08-18 ENCOUNTER — CLINICAL SUPPORT (OUTPATIENT)
Dept: REHABILITATION | Facility: HOSPITAL | Age: 70
End: 2025-08-18
Payer: MEDICARE

## 2025-08-18 DIAGNOSIS — R29.898 DECREASED GRIP STRENGTH OF RIGHT HAND: ICD-10-CM

## 2025-08-18 DIAGNOSIS — M25.641 DECREASED RANGE OF MOTION OF FINGER OF RIGHT HAND: Primary | ICD-10-CM

## 2025-08-18 DIAGNOSIS — R29.898 DECREASED PINCH STRENGTH: ICD-10-CM

## 2025-08-18 PROCEDURE — 97166 OT EVAL MOD COMPLEX 45 MIN: CPT

## 2025-08-18 PROCEDURE — 97530 THERAPEUTIC ACTIVITIES: CPT

## 2025-08-26 PROBLEM — R29.898 DECREASED GRIP STRENGTH OF RIGHT HAND: Status: ACTIVE | Noted: 2025-08-26

## 2025-08-26 PROBLEM — R29.898 DECREASED PINCH STRENGTH: Status: ACTIVE | Noted: 2025-08-26

## (undated) DEVICE — ELECTRODE BLADE INSULATED 1 IN

## (undated) DEVICE — DRAPE HALF SURGICAL 40X58IN

## (undated) DEVICE — GOWN FAB REINF SET-IN SLV 2XL

## (undated) DEVICE — SUT 4/0 18IN PROLENE BL MON

## (undated) DEVICE — DRESSING GAUZE XEROFORM 5X9

## (undated) DEVICE — SUT VICRYL PLUS 3-0 SH 18IN

## (undated) DEVICE — DRAPE EENT SPLIT STERILE

## (undated) DEVICE — CONTAINER SPECIMEN STRL 4OZ

## (undated) DEVICE — SUT LIGACLIP SMALL XTRA

## (undated) DEVICE — BLADE SURG #15 CARBON STEEL

## (undated) DEVICE — SEE MEDLINE ITEM 157194

## (undated) DEVICE — NDL HYPO REG 25G X 1 1/2

## (undated) DEVICE — JELLY LUBRICANT 1 OZ TUBE

## (undated) DEVICE — SUT CTD VICRYL 3-0 CR/SH

## (undated) DEVICE — DRAPE CORETEMP FLD WRM 56X62IN

## (undated) DEVICE — DRESSING TRANS 4X4 TEGADERM

## (undated) DEVICE — SUT VICRYL PLUS 2-0 CT1 18

## (undated) DEVICE — EVACUATOR WOUND BULB 100CC

## (undated) DEVICE — ELECTRODE REM PLYHSV RETURN 9

## (undated) DEVICE — CLIP MED TICALL

## (undated) DEVICE — SPONGE LAP 18X18 PREWASHED

## (undated) DEVICE — TRAY MINOR GEN SURG OMC

## (undated) DEVICE — GOWN SURGICAL X-LARGE

## (undated) DEVICE — SUT PROLENE 4-0 MONO 18IN

## (undated) DEVICE — TRAY SKIN SCRUB WET PREMIUM

## (undated) DEVICE — STAPLER SKIN PROXIMATE WIDE

## (undated) DEVICE — SKINMARKER & RULER REGULAR X-F

## (undated) DEVICE — TOWEL OR DISP STRL BLUE 4/PK

## (undated) DEVICE — SUT SILK 3-0 SH 18IN BLACK

## (undated) DEVICE — GAUZE SPONGE PEANUT STRL

## (undated) DEVICE — SUT 3-0 12-18IN SILK

## (undated) DEVICE — SUT 2-0 12-18IN SILK

## (undated) DEVICE — CORD BIPOLAR 12 FOOT